# Patient Record
Sex: MALE | Race: WHITE | NOT HISPANIC OR LATINO | Employment: UNEMPLOYED | URBAN - NONMETROPOLITAN AREA
[De-identification: names, ages, dates, MRNs, and addresses within clinical notes are randomized per-mention and may not be internally consistent; named-entity substitution may affect disease eponyms.]

---

## 2017-06-22 ENCOUNTER — OFFICE VISIT (OUTPATIENT)
Dept: FAMILY MEDICINE CLINIC | Facility: CLINIC | Age: 57
End: 2017-06-22

## 2017-06-22 VITALS
HEIGHT: 69 IN | WEIGHT: 189.8 LBS | SYSTOLIC BLOOD PRESSURE: 128 MMHG | HEART RATE: 87 BPM | DIASTOLIC BLOOD PRESSURE: 75 MMHG | BODY MASS INDEX: 28.11 KG/M2 | OXYGEN SATURATION: 97 %

## 2017-06-22 DIAGNOSIS — E78.5 HYPERLIPIDEMIA, UNSPECIFIED HYPERLIPIDEMIA TYPE: ICD-10-CM

## 2017-06-22 DIAGNOSIS — Z88.9 MULTIPLE ALLERGIES: Primary | ICD-10-CM

## 2017-06-22 DIAGNOSIS — Z72.0 TOBACCO ABUSE: ICD-10-CM

## 2017-06-22 DIAGNOSIS — R00.0 TACHYCARDIA: ICD-10-CM

## 2017-06-22 DIAGNOSIS — I10 ESSENTIAL HYPERTENSION: ICD-10-CM

## 2017-06-22 PROCEDURE — 99214 OFFICE O/P EST MOD 30 MIN: CPT | Performed by: NURSE PRACTITIONER

## 2017-06-22 RX ORDER — ASPIRIN 81 MG/1
81 TABLET ORAL DAILY
COMMUNITY

## 2017-06-22 RX ORDER — HYDROXYZINE PAMOATE 25 MG/1
25 CAPSULE ORAL 2 TIMES DAILY PRN
Qty: 60 CAPSULE | Refills: 1 | Status: SHIPPED | OUTPATIENT
Start: 2017-06-22 | End: 2018-06-06 | Stop reason: SDUPTHER

## 2017-06-22 RX ORDER — LANOLIN ALCOHOL/MO/W.PET/CERES
5000 CREAM (GRAM) TOPICAL DAILY
COMMUNITY
End: 2022-05-24

## 2017-06-22 NOTE — PROGRESS NOTES
"Viktoria Madison is a 57 y.o. male.     Chief Complaint: Establish Care    History of Present Illness   Pt here to establish care.  Pt was being seen by JACIEL Zabala, at [facility] and that office has since closed.  Pt does have a hx of tachycardia, HLD, TIAs and multiple allergies.      Tachycardia.  Pt was told about 4 years ago that his heart rate was in the 120s.  He has been taking Metoprolol since that time.  He has tolerated the medication well without side effects.  He denies chest pain, SOA, headaches, dizziness or syncope.  Pt states that he does drink 2 cups of coffee in the mornings.  He does drink soda but drinks Sprite which has no caffeine.  He denies any use of energy drinks.      HLD.   He does have a hx of HLD.  He was prescribed medication in the past but states that he could not afford it.  He is not currently taking any medication for his cholesterol.  He states that he only eats once or twice daily most of the time.  He tries to eat breakfast and then he eats supper.  He tries \"to stay away\" from pork.  He does not eat at restaurants very often.  He has  Not had his lipid panel checked in several months.  He does not have any insurance at this time and cannot afford to have labs.  We discussed appropriate diet and exercise and risks of elevated lipids.  Pt states understanding.  His last lipid panel was done in March 2015 and his total cholesterol was found to be 174 with ; triglycerides 202.    TIA.  Pt states that he does have a hx of mini strokes.  These occurred 3-4 years ago.  He lost his memory at the time.  He was having pain above his left eye.  He went to [place] and had a spinal tap and was dx with B12 deficiency.  He denies any further symptoms since he started his B12 supplementation.  He denies any N/T in his extremities.  Once he got his B12 level up to normal, he never had any further problems with memory.      Allergies.   Runny nose and " watery eyes.  Occasional nose bleeds.  He worked out in the hay field yesterday and his symptoms have gotten worse.  He does have an allergy to tomatoes; they cause him to break out around his mouth and his lips swell.  He denies fever, cough, n/v/d.  He has taken benadryl but it causes him too much sleepiness.  He has taken Vistaril in the past which seemed to work well for him.        Family History   Problem Relation Age of Onset   • Nephrolithiasis Mother    • Heart disease Father    • Hypertension Father    • Kidney disease Father        Social History     Social History   • Marital status:      Spouse name: N/A   • Number of children: N/A   • Years of education: N/A     Occupational History   • Not on file.     Social History Main Topics   • Smoking status: Current Every Day Smoker     Packs/day: 1.00     Types: Cigarettes   • Smokeless tobacco: Never Used   • Alcohol use No   • Drug use: Yes     Special: Marijuana      Comment: occ    • Sexual activity: Defer     Other Topics Concern   • Not on file     Social History Narrative   • No narrative on file       Past Medical History:   Diagnosis Date   • Increased heart rate    • Mini stroke    • Tobacco abuse        Review of Systems   Constitutional: Negative.    HENT: Positive for nosebleeds and rhinorrhea.    Respiratory: Negative.    Cardiovascular: Negative.    Gastrointestinal: Negative.    Genitourinary: Negative.    Musculoskeletal: Negative.    Skin: Negative.    Neurological: Negative.    Psychiatric/Behavioral: Negative.        Objective   Physical Exam   Constitutional: He is oriented to person, place, and time. He appears well-developed and well-nourished.   Neck: Normal range of motion. Neck supple.   Cardiovascular: Normal rate, regular rhythm and normal heart sounds.    Pulmonary/Chest: Effort normal and breath sounds normal.   Neurological: He is alert and oriented to person, place, and time.   Skin: Skin is warm and dry.   Psychiatric:  "He has a normal mood and affect. His behavior is normal. Judgment and thought content normal.   Nursing note and vitals reviewed.      Procedures    Vitals: Blood pressure 128/75, pulse 87, height 69\" (175.3 cm), weight 189 lb 12.8 oz (86.1 kg), SpO2 97 %.    Allergies: No Known Allergies       Assessment/Plan   Ta was seen today for establish care.    Diagnoses and all orders for this visit:    Multiple allergies  -     hydrOXYzine (VISTARIL) 25 MG capsule; Take 1 capsule by mouth 2 (Two) Times a Day As Needed for Allergies.    Hyperlipidemia, unspecified hyperlipidemia type    Tachycardia    Essential hypertension  -     metoprolol tartrate (LOPRESSOR) 25 MG tablet; Take 1 tablet by mouth 2 (Two) Times a Day.    Tobacco abuse        Add Vistaril for allergies   Continue metoprolol as prescribed   RTC 6 months         "

## 2018-06-06 ENCOUNTER — OFFICE VISIT (OUTPATIENT)
Dept: FAMILY MEDICINE CLINIC | Facility: CLINIC | Age: 58
End: 2018-06-06

## 2018-06-06 VITALS
WEIGHT: 180 LBS | HEART RATE: 88 BPM | HEIGHT: 69 IN | BODY MASS INDEX: 26.66 KG/M2 | OXYGEN SATURATION: 99 % | SYSTOLIC BLOOD PRESSURE: 110 MMHG | DIASTOLIC BLOOD PRESSURE: 60 MMHG

## 2018-06-06 DIAGNOSIS — K21.9 GASTROESOPHAGEAL REFLUX DISEASE, ESOPHAGITIS PRESENCE NOT SPECIFIED: ICD-10-CM

## 2018-06-06 DIAGNOSIS — M25.511 CHRONIC RIGHT SHOULDER PAIN: ICD-10-CM

## 2018-06-06 DIAGNOSIS — Z72.0 TOBACCO ABUSE: Primary | ICD-10-CM

## 2018-06-06 DIAGNOSIS — R00.0 TACHYCARDIA: ICD-10-CM

## 2018-06-06 DIAGNOSIS — Z88.9 MULTIPLE ALLERGIES: ICD-10-CM

## 2018-06-06 DIAGNOSIS — E78.5 HYPERLIPIDEMIA, UNSPECIFIED HYPERLIPIDEMIA TYPE: ICD-10-CM

## 2018-06-06 DIAGNOSIS — L30.9 DERMATITIS: ICD-10-CM

## 2018-06-06 DIAGNOSIS — G89.29 CHRONIC RIGHT SHOULDER PAIN: ICD-10-CM

## 2018-06-06 LAB
ALBUMIN SERPL-MCNC: 4.7 G/DL (ref 3.5–5)
ALBUMIN/GLOB SERPL: 1.5 G/DL (ref 1.5–2.5)
ALP SERPL-CCNC: 109 U/L (ref 40–129)
ALT SERPL W P-5'-P-CCNC: 14 U/L (ref 10–44)
ANION GAP SERPL CALCULATED.3IONS-SCNC: 3.4 MMOL/L (ref 3.6–11.2)
AST SERPL-CCNC: 14 U/L (ref 10–34)
BASOPHILS # BLD AUTO: 0.06 10*3/MM3 (ref 0–0.3)
BASOPHILS NFR BLD AUTO: 0.5 % (ref 0–2)
BILIRUB SERPL-MCNC: 0.4 MG/DL (ref 0.2–1.8)
BUN BLD-MCNC: 17 MG/DL (ref 7–21)
BUN/CREAT SERPL: 19.1 (ref 7–25)
CALCIUM SPEC-SCNC: 9.4 MG/DL (ref 7.7–10)
CHLORIDE SERPL-SCNC: 108 MMOL/L (ref 99–112)
CHOLEST SERPL-MCNC: 207 MG/DL (ref 0–200)
CO2 SERPL-SCNC: 26.6 MMOL/L (ref 24.3–31.9)
CREAT BLD-MCNC: 0.89 MG/DL (ref 0.43–1.29)
DEPRECATED RDW RBC AUTO: 42.7 FL (ref 37–54)
EOSINOPHIL # BLD AUTO: 0.07 10*3/MM3 (ref 0–0.7)
EOSINOPHIL NFR BLD AUTO: 0.6 % (ref 0–5)
ERYTHROCYTE [DISTWIDTH] IN BLOOD BY AUTOMATED COUNT: 14.5 % (ref 11.5–14.5)
GFR SERPL CREATININE-BSD FRML MDRD: 88 ML/MIN/1.73
GLOBULIN UR ELPH-MCNC: 3.1 GM/DL
GLUCOSE BLD-MCNC: 103 MG/DL (ref 70–110)
HCT VFR BLD AUTO: 43.6 % (ref 42–52)
HDLC SERPL-MCNC: 38 MG/DL (ref 60–100)
HGB BLD-MCNC: 14.8 G/DL (ref 14–18)
IMM GRANULOCYTES # BLD: 0.03 10*3/MM3 (ref 0–0.03)
IMM GRANULOCYTES NFR BLD: 0.3 % (ref 0–0.5)
LDLC SERPL CALC-MCNC: 133 MG/DL (ref 0–100)
LDLC/HDLC SERPL: 3.49 {RATIO}
LYMPHOCYTES # BLD AUTO: 1.69 10*3/MM3 (ref 1–3)
LYMPHOCYTES NFR BLD AUTO: 14.6 % (ref 21–51)
MAGNESIUM SERPL-MCNC: 2.2 MG/DL (ref 1.7–2.6)
MCH RBC QN AUTO: 27.7 PG (ref 27–33)
MCHC RBC AUTO-ENTMCNC: 33.9 G/DL (ref 33–37)
MCV RBC AUTO: 81.6 FL (ref 80–94)
MONOCYTES # BLD AUTO: 0.91 10*3/MM3 (ref 0.1–0.9)
MONOCYTES NFR BLD AUTO: 7.9 % (ref 0–10)
NEUTROPHILS # BLD AUTO: 8.8 10*3/MM3 (ref 1.4–6.5)
NEUTROPHILS NFR BLD AUTO: 76.1 % (ref 30–70)
OSMOLALITY SERPL CALC.SUM OF ELEC: 277.5 MOSM/KG (ref 273–305)
PLATELET # BLD AUTO: 377 10*3/MM3 (ref 130–400)
PMV BLD AUTO: 11.5 FL (ref 6–10)
POTASSIUM BLD-SCNC: 4 MMOL/L (ref 3.5–5.3)
PROT SERPL-MCNC: 7.8 G/DL (ref 6–8)
RBC # BLD AUTO: 5.34 10*6/MM3 (ref 4.7–6.1)
SODIUM BLD-SCNC: 138 MMOL/L (ref 135–153)
T4 FREE SERPL-MCNC: 1.22 NG/DL (ref 0.89–1.76)
TRIGL SERPL-MCNC: 182 MG/DL (ref 0–150)
TSH SERPL DL<=0.05 MIU/L-ACNC: 1.4 MIU/ML (ref 0.55–4.78)
VIT B12 BLD-MCNC: >2000 PG/ML (ref 211–911)
VLDLC SERPL-MCNC: 36.4 MG/DL
WBC NRBC COR # BLD: 11.56 10*3/MM3 (ref 4.5–12.5)

## 2018-06-06 PROCEDURE — 83735 ASSAY OF MAGNESIUM: CPT | Performed by: NURSE PRACTITIONER

## 2018-06-06 PROCEDURE — 82607 VITAMIN B-12: CPT | Performed by: NURSE PRACTITIONER

## 2018-06-06 PROCEDURE — 80050 GENERAL HEALTH PANEL: CPT | Performed by: NURSE PRACTITIONER

## 2018-06-06 PROCEDURE — 99214 OFFICE O/P EST MOD 30 MIN: CPT | Performed by: NURSE PRACTITIONER

## 2018-06-06 PROCEDURE — 80061 LIPID PANEL: CPT | Performed by: NURSE PRACTITIONER

## 2018-06-06 PROCEDURE — 36415 COLL VENOUS BLD VENIPUNCTURE: CPT | Performed by: NURSE PRACTITIONER

## 2018-06-06 PROCEDURE — 84439 ASSAY OF FREE THYROXINE: CPT | Performed by: NURSE PRACTITIONER

## 2018-06-06 RX ORDER — PANTOPRAZOLE SODIUM 40 MG/1
40 TABLET, DELAYED RELEASE ORAL DAILY
Qty: 90 TABLET | Refills: 1 | Status: SHIPPED | OUTPATIENT
Start: 2018-06-06 | End: 2018-09-06 | Stop reason: SDUPTHER

## 2018-06-06 RX ORDER — TRIAMCINOLONE ACETONIDE 1 MG/G
CREAM TOPICAL 2 TIMES DAILY
Qty: 80 G | Refills: 1 | Status: SHIPPED | OUTPATIENT
Start: 2018-06-06 | End: 2019-01-29

## 2018-06-06 RX ORDER — HYDROXYZINE PAMOATE 25 MG/1
25 CAPSULE ORAL 2 TIMES DAILY PRN
Qty: 90 CAPSULE | Refills: 1 | Status: SHIPPED | OUTPATIENT
Start: 2018-06-06 | End: 2018-11-29

## 2018-06-06 NOTE — PROGRESS NOTES
"Subjective   Ta Madison is a 58 y.o. male.     Chief Complaint: Shoulder Pain (right arm )    Rash   This is a chronic problem. The current episode started more than 1 year ago. The problem is unchanged. The affected locations include the right hand and left hand. He was exposed to nothing. Past treatments include nothing.   Heartburn   He complains of heartburn. This is a chronic problem. The current episode started more than 1 year ago. The problem occurs frequently. The problem has been waxing and waning. The heartburn is located in the substernum. The heartburn is of moderate intensity. The heartburn does not wake him from sleep. The heartburn does not limit his activity. The heartburn doesn't change with position. The symptoms are aggravated by certain foods and caffeine. Risk factors include caffeine use and smoking/tobacco exposure. He has tried a PPI for the symptoms. The treatment provided significant relief.      Tachycardia.  Pt was told about 4 years ago that his heart rate was in the 120s.  He has been taking Metoprolol since that time.  He has tolerated the medication well without side effects.  He denies chest pain, SOA, headaches, dizziness or syncope.  Pt states that he does drink 2 cups of coffee in the mornings.  He does drink soda but drinks Sprite which has no caffeine.  He denies any use of energy drinks.       HLD.   He does have a hx of HLD.  He was prescribed medication in the past but states that he could not afford it.  He is not currently taking any medication for his cholesterol.  He states that he only eats once or twice daily most of the time.  He tries to eat breakfast and then he eats supper.  He tries \"to stay away\" from pork.  He does not eat at restaurants very often.  He has not had his lipid panel checked in several months.       TIA.  Pt states that he does have a hx of mini strokes.  These occurred 3-4 years ago.  He lost his memory at the time.  He was having pain above " his left eye.  He went to [place] and had a spinal tap and was dx with B12 deficiency.  He denies any further symptoms since he started his B12 supplementation.  He denies any N/T in his extremities.  Once he got his B12 level up to normal, he never had any further problems with memory.       Allergies.   Runny nose and watery eyes.  Occasional nose bleeds.  He worked out in the hay field yesterday and his symptoms have gotten worse.  He does have an allergy to tomatoes; they cause him to break out around his mouth and his lips swell.  He denies fever, cough, n/v/d.  He has taken benadryl but it causes him too much sleepiness.  He has taken Vistaril in the past which seemed to work well for him.       Right Shoulder Pain  Pt has had issues with shoulder pain off and on for the past few years.   The patient complains of right shoulder pain gradual onset along the anterior lateral aspect of the shoulder. The patient reports a sharp stabbing pain upon overhead range of motion, as well as lifting anything away from the body. The patient also has complaints of superior and inferior scapula pain that is worsened upon activity relieved with rest. The patient has no previous history of relief with physical therapy or NSAIDs. The patient is right hand dominant. No paresthesias noted.  Pt states that he was evaluated by ortho in the past and received a joint injection in the shoulder and his pain has been relieved for the past two years.  He has now started having pain in that shoulder again and would like to be evaluated by ortho again and possibly receive another joint injection.     Family History   Problem Relation Age of Onset   • Nephrolithiasis Mother    • Heart disease Father    • Hypertension Father    • Kidney disease Father        Social History     Social History   • Marital status:      Spouse name: N/A   • Number of children: N/A   • Years of education: N/A     Occupational History   • Not on file.  "    Social History Main Topics   • Smoking status: Current Every Day Smoker     Packs/day: 1.00     Types: Cigarettes   • Smokeless tobacco: Never Used   • Alcohol use No   • Drug use: Yes     Types: Marijuana      Comment: occ    • Sexual activity: Defer     Other Topics Concern   • Not on file     Social History Narrative   • No narrative on file       Past Medical History:   Diagnosis Date   • Increased heart rate    • Mini stroke    • Tobacco abuse        Review of Systems   Constitutional: Negative.    HENT: Negative.    Respiratory: Negative.    Cardiovascular: Negative.    Gastrointestinal: Positive for heartburn.   Musculoskeletal: Positive for arthralgias.   Skin: Positive for rash.   Neurological: Negative.    Psychiatric/Behavioral: Negative.        Objective   Physical Exam   Constitutional: He is oriented to person, place, and time. He appears well-developed and well-nourished.   Neck: Normal range of motion. Neck supple.   Cardiovascular: Normal rate, regular rhythm and normal heart sounds.    Pulmonary/Chest: Effort normal and breath sounds normal.   Musculoskeletal: Normal range of motion. He exhibits no edema, tenderness or deformity.   Neurological: He is alert and oriented to person, place, and time.   Skin: Skin is warm and dry.   Erythematous patches noted to back of both hands   Psychiatric: He has a normal mood and affect. His behavior is normal. Judgment and thought content normal.   Nursing note and vitals reviewed.      Procedures    Vitals: Blood pressure 110/60, pulse 88, height 175.3 cm (69\"), weight 81.6 kg (180 lb), SpO2 99 %.    Allergies: No Known Allergies     During this visit the following were done:  Labs Reviewed []    Labs Ordered []    Radiology Reports Reviewed []    Radiology Ordered []    PCP Records Reviewed []    Referring Provider Records Reviewed []    ER Records Reviewed []    Hospital Records Reviewed []    History Obtained From Family []    Radiology Images Reviewed " []    Other Reviewed []    Records Requested []      Assessment/Plan   Ta was seen today for shoulder pain.    Diagnoses and all orders for this visit:    Tobacco abuse    Hyperlipidemia, unspecified hyperlipidemia type  -     CBC & Differential  -     Comprehensive Metabolic Panel  -     Lipid Panel  -     Magnesium  -     TSH  -     Vitamin B12  -     T4, Free    Tachycardia  -     CBC & Differential  -     Comprehensive Metabolic Panel  -     Lipid Panel  -     Magnesium  -     TSH  -     Vitamin B12  -     T4, Free    Gastroesophageal reflux disease, esophagitis presence not specified  -     CBC & Differential  -     Comprehensive Metabolic Panel  -     Lipid Panel  -     Magnesium  -     TSH  -     Vitamin B12  -     T4, Free  -     pantoprazole (PROTONIX) 40 MG EC tablet; Take 1 tablet by mouth Daily.    Dermatitis  -     triamcinolone (KENALOG) 0.1 % cream; Apply  topically 2 (Two) Times a Day.  -     CBC & Differential  -     Comprehensive Metabolic Panel  -     Lipid Panel  -     Magnesium  -     TSH  -     Vitamin B12  -     T4, Free    Chronic right shoulder pain  -     Ambulatory Referral to Orthopedic Surgery  -     CBC & Differential  -     Comprehensive Metabolic Panel  -     Lipid Panel  -     Magnesium  -     TSH  -     Vitamin B12  -     T4, Free    Multiple allergies  -     hydrOXYzine (VISTARIL) 25 MG capsule; Take 1 capsule by mouth 2 (Two) Times a Day As Needed for Allergies.  -     CBC & Differential  -     Comprehensive Metabolic Panel  -     Lipid Panel  -     Magnesium  -     TSH  -     Vitamin B12  -     T4, Free    Other orders  -     metoprolol tartrate (LOPRESSOR) 25 MG tablet; Take 1 tablet by mouth Daily.  -     CBC Auto Differential  -     Osmolality, Calculated; Future  -     Osmolality, Calculated

## 2018-06-06 NOTE — PROGRESS NOTES
Cholesterol and triglycerides are elevated. I do suggest starting him on a statin. Is he agreeable?  If so, send script for atorvastatin 20mg qhs with 2 refills  Thank you

## 2018-06-07 RX ORDER — ATORVASTATIN CALCIUM 20 MG/1
20 TABLET, FILM COATED ORAL DAILY
Qty: 30 TABLET | Refills: 2 | Status: SHIPPED | OUTPATIENT
Start: 2018-06-07 | End: 2018-09-06 | Stop reason: SDUPTHER

## 2018-06-07 NOTE — PROGRESS NOTES
Patient notified and expressed understanding.  I will e-scribe Atorvastatin to [facility] in [place] per your earlier instructions.

## 2018-06-25 DIAGNOSIS — M25.511 RIGHT SHOULDER PAIN, UNSPECIFIED CHRONICITY: Primary | ICD-10-CM

## 2018-06-26 ENCOUNTER — HOSPITAL ENCOUNTER (OUTPATIENT)
Dept: GENERAL RADIOLOGY | Facility: HOSPITAL | Age: 58
Discharge: HOME | End: 2018-06-26
Attending: ORTHOPAEDIC SURGERY | Admitting: ORTHOPAEDIC SURGERY

## 2018-06-26 ENCOUNTER — OFFICE VISIT (OUTPATIENT)
Dept: ORTHOPEDIC SURGERY | Facility: CLINIC | Age: 58
End: 2018-06-26

## 2018-06-26 VITALS
DIASTOLIC BLOOD PRESSURE: 64 MMHG | SYSTOLIC BLOOD PRESSURE: 128 MMHG | HEART RATE: 80 BPM | HEIGHT: 69 IN | WEIGHT: 180 LBS | BODY MASS INDEX: 26.66 KG/M2

## 2018-06-26 DIAGNOSIS — M25.511 RIGHT SHOULDER PAIN, UNSPECIFIED CHRONICITY: ICD-10-CM

## 2018-06-26 DIAGNOSIS — M25.511 CHRONIC RIGHT SHOULDER PAIN: Primary | ICD-10-CM

## 2018-06-26 DIAGNOSIS — G89.29 CHRONIC RIGHT SHOULDER PAIN: Primary | ICD-10-CM

## 2018-06-26 PROCEDURE — 73030 X-RAY EXAM OF SHOULDER: CPT | Performed by: RADIOLOGY

## 2018-06-26 PROCEDURE — 99203 OFFICE O/P NEW LOW 30 MIN: CPT | Performed by: ORTHOPAEDIC SURGERY

## 2018-06-26 PROCEDURE — 20610 DRAIN/INJ JOINT/BURSA W/O US: CPT | Performed by: ORTHOPAEDIC SURGERY

## 2018-06-26 PROCEDURE — 73030 X-RAY EXAM OF SHOULDER: CPT

## 2018-06-26 RX ORDER — BUPIVACAINE HYDROCHLORIDE 5 MG/ML
5 INJECTION, SOLUTION EPIDURAL; INTRACAUDAL
Status: COMPLETED | OUTPATIENT
Start: 2018-06-26 | End: 2018-06-26

## 2018-06-26 RX ORDER — METHYLPREDNISOLONE ACETATE 40 MG/ML
80 INJECTION, SUSPENSION INTRA-ARTICULAR; INTRALESIONAL; INTRAMUSCULAR; SOFT TISSUE
Status: COMPLETED | OUTPATIENT
Start: 2018-06-26 | End: 2018-06-26

## 2018-06-26 RX ADMIN — METHYLPREDNISOLONE ACETATE 80 MG: 40 INJECTION, SUSPENSION INTRA-ARTICULAR; INTRALESIONAL; INTRAMUSCULAR; SOFT TISSUE at 09:20

## 2018-06-26 RX ADMIN — BUPIVACAINE HYDROCHLORIDE 5 ML: 5 INJECTION, SOLUTION EPIDURAL; INTRACAUDAL at 09:20

## 2018-06-26 NOTE — PROGRESS NOTES
"  Patient: Ta Madison    YOB: 1960        History of Present Illness: Patient presents for evaluation of his right shoulder.  States his been getting progressively sore over the last 5-6 months.  No specific injury or trauma.  No overuse.  Right-hand-dominant.  Still gets night pain at times.  Pack-a-day smoker.  Was Jacqueline seen 4 years ago for similar problem and responded quite nicely to a subacromial injection.  States he gets some numbness along the medial aspect of his shoulder blade.  Denies specific neck tenderness.  Denies any paresthesias or swelling of his hands    Past Medical History:   Diagnosis Date   • Increased heart rate    • Mini stroke    • Tobacco abuse         Social History     Social History   • Marital status:      Spouse name: N/A   • Number of children: N/A   • Years of education: N/A     Occupational History   • Not on file.     Social History Main Topics   • Smoking status: Current Every Day Smoker     Packs/day: 1.00     Types: Cigarettes   • Smokeless tobacco: Never Used   • Alcohol use No   • Drug use: Yes     Types: Marijuana      Comment: occ    • Sexual activity: Defer     Other Topics Concern   • Not on file     Social History Narrative   • No narrative on file        Review of Systems:    Pertinent positives evaluated and listed in HPI, others systems completed by patient and reviewed today.         Physical Exam: 58 y.o. male  General Appearance:    Alert and oriented x 3, cooperative, in no acute distress                   Vitals:    06/26/18 0851   BP: 128/64   Pulse: 80   Weight: 81.6 kg (180 lb)   Height: 175.3 cm (69\")          Normal weight white male muscular no apparent acute distress.  His full range of motion of the left shoulder right shoulder has good range of motion except for internal rotation he goes to about L1 on the right compared about the 6 on the left.  He has good internal and external rotation strength bilaterally.  Good  " strength bilaterally hands are grossly neurovascularly intact without swelling bilaterally.  Has some tenderness over the biceps tendon in the lateral chromium of the right shoulder.  Positive impingement.  Negative speed's test on the left equivocal speed's test on the right.  Good strength but some pain with stressing the supraspinatus.  No obvious apprehension.      Radiology:     X-rays of his right shoulder taken today reviewed by myself show he is some acromioclavicular joint arthritis noted.  Some very early spurring is noted of the inferior glenoid no acute findings are noted      Large Joint Arthrocentesis  Date/Time: 6/26/2018 9:20 AM  Consent given by: patient  Site marked: site marked  Supporting Documentation  Indications: pain and diagnostic evaluation   Procedure Details  Location: shoulder - R subacromial bursa  Needle size: 25 G  Approach: lateral  Medications administered: 80 mg methylPREDNISolone acetate 40 MG/ML; 5 mL bupivacaine (PF) 0.5 %  Patient tolerance: patient tolerated the procedure well with no immediate complications                Assessment/Plan: Right shoulder pain.  Could be impingement could be secondary to biceps tendon pathology.  4 years ago he responded very well to subacromial injection and we'll order an try that again today.  He has previously done physical therapy without help.  We'll see him back in 3-4 weeks and see how the injection helps        I advised Ta of the risks of continuing to use tobacco, and I provided him with tobacco cessation educational materials in the After Visit Summary.     During this visit, I spent 2 minutes counseling the patient regarding tobacco cessation.        Patient's Body mass index is 26.58 kg/m². BMI is within normal parameters. No follow-up required.        Discussion/Summary:    This chart was completed utilizing the dragon speech recognition software.  Grammatical errors, random word insertions, pronoun errors, and incomplete  sentences or occasional consequences of the system due to software limitations, ambient noise, and hardware issues.  Any questions or concerns about the content, text, or information contained within the body of this dictation should be directly addressed to the physician for clarification          This document was signed by Inocencio López M.D. June 26, 2018 9:17 AM

## 2018-06-27 ENCOUNTER — OFFICE VISIT (OUTPATIENT)
Dept: FAMILY MEDICINE CLINIC | Facility: CLINIC | Age: 58
End: 2018-06-27

## 2018-06-27 VITALS
HEIGHT: 69 IN | DIASTOLIC BLOOD PRESSURE: 70 MMHG | BODY MASS INDEX: 26.51 KG/M2 | SYSTOLIC BLOOD PRESSURE: 116 MMHG | OXYGEN SATURATION: 98 % | HEART RATE: 89 BPM | WEIGHT: 179 LBS

## 2018-06-27 DIAGNOSIS — R21 RASH: Primary | ICD-10-CM

## 2018-06-27 PROCEDURE — 99213 OFFICE O/P EST LOW 20 MIN: CPT | Performed by: NURSE PRACTITIONER

## 2018-06-27 RX ORDER — METHYLPREDNISOLONE 4 MG/1
TABLET ORAL
Qty: 21 EACH | Refills: 0 | Status: SHIPPED | OUTPATIENT
Start: 2018-06-27 | End: 2018-09-06

## 2018-06-27 RX ORDER — DOXYCYCLINE 100 MG/1
100 CAPSULE ORAL 2 TIMES DAILY
Qty: 20 CAPSULE | Refills: 0 | Status: SHIPPED | OUTPATIENT
Start: 2018-06-27 | End: 2018-09-06

## 2018-06-27 NOTE — PROGRESS NOTES
Subjective   Ta Madison is a 58 y.o. male.     Chief Complaint: Rash (bilateral arm)    History of Present Illness   Rash   This is a chronic problem. The current episode started more than 1 year ago. The problem is unchanged. The affected locations include the right hand and left hand. He was exposed to nothing. Past treatments include triamcinolone ointment.  No relief of symptoms.     Family History   Problem Relation Age of Onset   • Nephrolithiasis Mother    • Heart disease Father    • Hypertension Father    • Kidney disease Father        Social History     Social History   • Marital status:      Spouse name: N/A   • Number of children: N/A   • Years of education: N/A     Occupational History   • Not on file.     Social History Main Topics   • Smoking status: Current Every Day Smoker     Packs/day: 1.00     Types: Cigarettes   • Smokeless tobacco: Never Used   • Alcohol use No   • Drug use: Yes     Types: Marijuana      Comment: occ    • Sexual activity: Defer     Other Topics Concern   • Not on file     Social History Narrative   • No narrative on file       Past Medical History:   Diagnosis Date   • Increased heart rate    • Mini stroke    • Tobacco abuse        Review of Systems   Constitutional: Negative.    HENT: Negative.    Respiratory: Negative.    Cardiovascular: Negative.    Gastrointestinal: Negative.    Musculoskeletal: Negative.    Skin: Positive for rash.   Neurological: Negative.    Psychiatric/Behavioral: Negative.        Objective   Physical Exam   Constitutional: He is oriented to person, place, and time. He appears well-developed and well-nourished.   Neck: Normal range of motion. Neck supple.   Cardiovascular: Normal rate, regular rhythm and normal heart sounds.    Pulmonary/Chest: Effort normal and breath sounds normal.   Neurological: He is alert and oriented to person, place, and time.   Skin: Skin is warm and dry.   Erythematous and excoriated papular areas to bilateral  "forearms and hands   Psychiatric: He has a normal mood and affect. His behavior is normal. Judgment and thought content normal.   Nursing note and vitals reviewed.      Procedures    Vitals: Blood pressure 116/70, pulse 89, height 175.3 cm (69\"), weight 81.2 kg (179 lb), SpO2 98 %.    Allergies: No Known Allergies     During this visit the following were done:  Labs Reviewed []    Labs Ordered []    Radiology Reports Reviewed []    Radiology Ordered []    PCP Records Reviewed []    Referring Provider Records Reviewed []    ER Records Reviewed []    Hospital Records Reviewed []    History Obtained From Family []    Radiology Images Reviewed []    Other Reviewed []    Records Requested []      Assessment/Plan   Ta was seen today for rash.    Diagnoses and all orders for this visit:    Rash  -     doxycycline (MONODOX) 100 MG capsule; Take 1 capsule by mouth 2 (Two) Times a Day.  -     MethylPREDNISolone (MEDROL, KAYKAY,) 4 MG tablet; Take as directed on package instructions.               "

## 2018-09-06 ENCOUNTER — OFFICE VISIT (OUTPATIENT)
Dept: FAMILY MEDICINE CLINIC | Facility: CLINIC | Age: 58
End: 2018-09-06

## 2018-09-06 ENCOUNTER — TELEPHONE (OUTPATIENT)
Dept: FAMILY MEDICINE CLINIC | Facility: CLINIC | Age: 58
End: 2018-09-06

## 2018-09-06 VITALS
OXYGEN SATURATION: 98 % | SYSTOLIC BLOOD PRESSURE: 128 MMHG | DIASTOLIC BLOOD PRESSURE: 73 MMHG | HEART RATE: 80 BPM | HEIGHT: 69 IN | BODY MASS INDEX: 27.11 KG/M2 | WEIGHT: 183 LBS

## 2018-09-06 DIAGNOSIS — E78.2 MIXED HYPERLIPIDEMIA: Primary | ICD-10-CM

## 2018-09-06 DIAGNOSIS — K21.9 GASTROESOPHAGEAL REFLUX DISEASE, ESOPHAGITIS PRESENCE NOT SPECIFIED: ICD-10-CM

## 2018-09-06 DIAGNOSIS — R00.0 TACHYCARDIA: ICD-10-CM

## 2018-09-06 LAB
ALBUMIN SERPL-MCNC: 4.5 G/DL (ref 3.5–5)
ALBUMIN/GLOB SERPL: 1.6 G/DL (ref 1.5–2.5)
ALP SERPL-CCNC: 117 U/L (ref 40–129)
ALT SERPL W P-5'-P-CCNC: 23 U/L (ref 10–44)
ANION GAP SERPL CALCULATED.3IONS-SCNC: 1.6 MMOL/L (ref 3.6–11.2)
AST SERPL-CCNC: 16 U/L (ref 10–34)
BILIRUB SERPL-MCNC: 0.4 MG/DL (ref 0.2–1.8)
BUN BLD-MCNC: 13 MG/DL (ref 7–21)
BUN/CREAT SERPL: 14.1 (ref 7–25)
CALCIUM SPEC-SCNC: 9.5 MG/DL (ref 7.7–10)
CHLORIDE SERPL-SCNC: 109 MMOL/L (ref 99–112)
CHOLEST SERPL-MCNC: 120 MG/DL (ref 0–200)
CO2 SERPL-SCNC: 26.4 MMOL/L (ref 24.3–31.9)
CREAT BLD-MCNC: 0.92 MG/DL (ref 0.43–1.29)
GFR SERPL CREATININE-BSD FRML MDRD: 85 ML/MIN/1.73
GLOBULIN UR ELPH-MCNC: 2.9 GM/DL
GLUCOSE BLD-MCNC: 105 MG/DL (ref 70–110)
HDLC SERPL-MCNC: 34 MG/DL (ref 60–100)
LDLC SERPL CALC-MCNC: 41 MG/DL (ref 0–100)
LDLC/HDLC SERPL: 1.19 {RATIO}
OSMOLALITY SERPL CALC.SUM OF ELEC: 274.3 MOSM/KG (ref 273–305)
POTASSIUM BLD-SCNC: 4.7 MMOL/L (ref 3.5–5.3)
PROT SERPL-MCNC: 7.4 G/DL (ref 6–8)
SODIUM BLD-SCNC: 137 MMOL/L (ref 135–153)
TRIGL SERPL-MCNC: 227 MG/DL (ref 0–150)
VLDLC SERPL-MCNC: 45.4 MG/DL

## 2018-09-06 PROCEDURE — 36415 COLL VENOUS BLD VENIPUNCTURE: CPT | Performed by: NURSE PRACTITIONER

## 2018-09-06 PROCEDURE — 80061 LIPID PANEL: CPT | Performed by: NURSE PRACTITIONER

## 2018-09-06 PROCEDURE — 99214 OFFICE O/P EST MOD 30 MIN: CPT | Performed by: NURSE PRACTITIONER

## 2018-09-06 PROCEDURE — 80053 COMPREHEN METABOLIC PANEL: CPT | Performed by: NURSE PRACTITIONER

## 2018-09-06 RX ORDER — PANTOPRAZOLE SODIUM 40 MG/1
40 TABLET, DELAYED RELEASE ORAL DAILY
Qty: 90 TABLET | Refills: 1 | Status: SHIPPED | OUTPATIENT
Start: 2018-09-06 | End: 2019-01-11 | Stop reason: SDUPTHER

## 2018-09-06 RX ORDER — ATORVASTATIN CALCIUM 20 MG/1
20 TABLET, FILM COATED ORAL NIGHTLY
Qty: 90 TABLET | Refills: 1 | Status: SHIPPED | OUTPATIENT
Start: 2018-09-06 | End: 2019-01-11 | Stop reason: SDUPTHER

## 2018-09-06 NOTE — PROGRESS NOTES
His cholesterol looks great.  His triglycerides are still elevated.  Continue the medication for now.  Watch his sweets/sugar intake.

## 2018-09-06 NOTE — PROGRESS NOTES
Subjective   Ta Madison is a 58 y.o. male.     Chief Complaint: Follow-up and Rash    Hyperlipidemia   This is a chronic problem. The current episode started more than 1 month ago. Factors aggravating his hyperlipidemia include beta blockers, fatty foods and smoking. Current antihyperlipidemic treatment includes statins. Improvement on treatment: lipid panel today. Risk factors for coronary artery disease include dyslipidemia and male sex.   Rash   This is a chronic problem. The current episode started more than 1 year ago. The problem is unchanged. The affected locations include the right hand and left hand. He was exposed to nothing. Past treatments include triamcinolone ointment.  No relief of symptoms.  Pt was prescribed doxycycline and medrol dosepack at his previous visit. Rash has resolved.   Heartburn   He complains of heartburn. This is a chronic problem. The current episode started more than 1 year ago. The problem occurs frequently. The problem has been waxing and waning. The heartburn is located in the substernum. The heartburn is of moderate intensity. The heartburn does not wake him from sleep. The heartburn does not limit his activity. The heartburn doesn't change with position. The symptoms are aggravated by certain foods and caffeine. Risk factors include caffeine use and smoking/tobacco exposure. He has tried a PPI for the symptoms. The treatment provided significant relief.   Tachycardia.  Pt was told about 4 years ago that his heart rate was in the 120s.  He has been taking Metoprolol since that time.  He has tolerated the medication well without side effects.  He denies chest pain, SOA, headaches, dizziness or syncope.  Pt states that he does drink 2 cups of coffee in the mornings.  He does drink soda but drinks Sprite which has no caffeine.  He denies any use of energy drinks.  He has taken metoprolol for the past 4-5 years and tolerates well.  His HR does elevate if he doesn't take the  "medication.      Family History   Problem Relation Age of Onset   • Nephrolithiasis Mother    • Heart disease Father    • Hypertension Father    • Kidney disease Father        Social History     Social History   • Marital status:      Spouse name: N/A   • Number of children: N/A   • Years of education: N/A     Occupational History   • Not on file.     Social History Main Topics   • Smoking status: Current Every Day Smoker     Packs/day: 1.00     Types: Cigarettes   • Smokeless tobacco: Never Used   • Alcohol use No   • Drug use: Yes     Types: Marijuana      Comment: occ    • Sexual activity: Defer     Other Topics Concern   • Not on file     Social History Narrative   • No narrative on file       Past Medical History:   Diagnosis Date   • Increased heart rate    • Mini stroke (CMS/HCC)    • Tobacco abuse        Review of Systems   Constitutional: Negative.    HENT: Negative.    Respiratory: Negative.    Cardiovascular: Negative.    Gastrointestinal: Negative.    Musculoskeletal: Negative.    Skin: Negative.    Neurological: Negative.    Psychiatric/Behavioral: Negative.        Objective   Physical Exam   Constitutional: He is oriented to person, place, and time. He appears well-developed and well-nourished.   Neck: Normal range of motion. Neck supple.   Cardiovascular: Normal rate, regular rhythm and normal heart sounds.    Pulmonary/Chest: Effort normal and breath sounds normal.   Neurological: He is alert and oriented to person, place, and time.   Skin: Skin is warm and dry.   Psychiatric: He has a normal mood and affect. His behavior is normal. Judgment and thought content normal.   Nursing note and vitals reviewed.      Procedures    Vitals: Blood pressure 128/73, pulse 80, height 175.3 cm (69\"), weight 83 kg (183 lb), SpO2 98 %.    Allergies: No Known Allergies     During this visit the following were done:  Labs Reviewed []    Labs Ordered []    Radiology Reports Reviewed []    Radiology Ordered [x]  "   PCP Records Reviewed []    Referring Provider Records Reviewed []    ER Records Reviewed []    Hospital Records Reviewed []    History Obtained From Family []    Radiology Images Reviewed []    Other Reviewed []    Records Requested []      Assessment/Plan   Ta was seen today for follow-up and rash.    Diagnoses and all orders for this visit:    Mixed hyperlipidemia  -     atorvastatin (LIPITOR) 20 MG tablet; Take 1 tablet by mouth Every Night.  -     Comprehensive Metabolic Panel  -     Lipid Panel    Gastroesophageal reflux disease, esophagitis presence not specified  -     pantoprazole (PROTONIX) 40 MG EC tablet; Take 1 tablet by mouth Daily.  -     Comprehensive Metabolic Panel  -     Lipid Panel    Tachycardia  -     metoprolol tartrate (LOPRESSOR) 25 MG tablet; Take 1 tablet by mouth Daily.  -     Comprehensive Metabolic Panel  -     Lipid Panel    Recheck lipid panel today

## 2018-09-06 NOTE — TELEPHONE ENCOUNTER
----- Message from EUGENIA Hernadez sent at 9/6/2018  1:43 PM EDT -----  His cholesterol looks great.  His triglycerides are still elevated.  Continue the medication for now.  Watch his sweets/sugar intake.      Left a message to return call.      Left a message to return call.      Patient returned call & verbalized understanding.

## 2018-10-03 ENCOUNTER — OFFICE VISIT (OUTPATIENT)
Dept: FAMILY MEDICINE CLINIC | Facility: CLINIC | Age: 58
End: 2018-10-03

## 2018-10-03 VITALS
OXYGEN SATURATION: 98 % | DIASTOLIC BLOOD PRESSURE: 70 MMHG | BODY MASS INDEX: 26.51 KG/M2 | SYSTOLIC BLOOD PRESSURE: 130 MMHG | HEART RATE: 83 BPM | TEMPERATURE: 99.2 F | HEIGHT: 69 IN | WEIGHT: 179 LBS
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
## 2018-10-03 PROCEDURE — 99213 OFFICE O/P EST LOW 20 MIN: CPT | Performed by: NURSE PRACTITIONER

## 2018-10-03 PROCEDURE — 96372 THER/PROPH/DIAG INJ SC/IM: CPT | Performed by: NURSE PRACTITIONER

## 2018-10-03 RX ORDER — CETIRIZINE HYDROCHLORIDE 10 MG/1
10 TABLET ORAL DAILY
Qty: 30 TABLET | Refills: 5 | Status: SHIPPED | OUTPATIENT
Start: 2018-10-03 | End: 2019-02-12

## 2018-10-03 RX ORDER — DEXAMETHASONE SODIUM PHOSPHATE 4 MG/ML
8 INJECTION, SOLUTION INTRA-ARTICULAR; INTRALESIONAL; INTRAMUSCULAR; INTRAVENOUS; SOFT TISSUE ONCE
Status: COMPLETED | OUTPATIENT
Start: 2018-10-03 | End: 2018-10-03

## 2018-10-03 RX ADMIN — DEXAMETHASONE SODIUM PHOSPHATE 8 MG: 4 INJECTION, SOLUTION INTRA-ARTICULAR; INTRALESIONAL; INTRAMUSCULAR; INTRAVENOUS; SOFT TISSUE at 17:04

## 2018-10-09 ENCOUNTER — TELEPHONE (OUTPATIENT)
Dept: FAMILY MEDICINE CLINIC | Facility: CLINIC | Age: 58
End: 2018-10-09

## 2018-10-09 DIAGNOSIS — R21 RASH: Primary | ICD-10-CM

## 2018-10-18 ENCOUNTER — OFFICE VISIT (OUTPATIENT)
Dept: FAMILY MEDICINE CLINIC | Facility: CLINIC | Age: 58
End: 2018-10-18

## 2018-10-18 VITALS
HEIGHT: 69 IN | OXYGEN SATURATION: 98 % | BODY MASS INDEX: 27.25 KG/M2 | SYSTOLIC BLOOD PRESSURE: 113 MMHG | HEART RATE: 77 BPM | DIASTOLIC BLOOD PRESSURE: 65 MMHG | TEMPERATURE: 99.3 F | WEIGHT: 184 LBS

## 2018-10-18 DIAGNOSIS — L28.2 PRURITIC RASH: Primary | ICD-10-CM

## 2018-10-18 PROCEDURE — 99213 OFFICE O/P EST LOW 20 MIN: CPT | Performed by: NURSE PRACTITIONER

## 2018-10-18 RX ORDER — GABAPENTIN 300 MG/1
300 CAPSULE ORAL 3 TIMES DAILY
Qty: 90 CAPSULE | Refills: 0 | Status: SHIPPED | OUTPATIENT
Start: 2018-10-18 | End: 2018-11-13 | Stop reason: SDUPTHER

## 2018-11-13 ENCOUNTER — OFFICE VISIT (OUTPATIENT)
Dept: FAMILY MEDICINE CLINIC | Facility: CLINIC | Age: 58
End: 2018-11-13

## 2018-11-13 VITALS
BODY MASS INDEX: 28.2 KG/M2 | HEART RATE: 100 BPM | SYSTOLIC BLOOD PRESSURE: 118 MMHG | DIASTOLIC BLOOD PRESSURE: 69 MMHG | OXYGEN SATURATION: 98 % | WEIGHT: 190.4 LBS | HEIGHT: 69 IN | TEMPERATURE: 98.5 F

## 2018-11-13 DIAGNOSIS — F45.8 NEUROPATHIC PRURITUS: Primary | ICD-10-CM

## 2018-11-13 PROCEDURE — 99213 OFFICE O/P EST LOW 20 MIN: CPT | Performed by: NURSE PRACTITIONER

## 2018-11-13 RX ORDER — GABAPENTIN 300 MG/1
300 CAPSULE ORAL 3 TIMES DAILY
Qty: 90 CAPSULE | Refills: 1 | Status: SHIPPED | OUTPATIENT
Start: 2018-11-13 | End: 2019-01-15 | Stop reason: SDUPTHER

## 2018-11-13 NOTE — PROGRESS NOTES
"Subjective   Ta Madison is a 58 y.o. male.     Chief Complaint: Follow-up and pruritic rash    History of Present Illness   History of Present Illness   This is a chronic problem. The current episode started more than 1 year ago. The problem is unchanged. The affected locations include the right hand and left hand. He was exposed to nothing. Past treatments include triamcinolone ointment.  No relief of symptoms.   Rash continues to spread up arms and around neck.  Pt states that he has had a couple of areas in the top of his scalp area also.   He has tried hydroxyzine for the itching and it has not helped at all.  He does admit that his wife gave him a gabapentin and his itching did completely stop and he was able to rest without scratching areas.  He states that his ears have been itching. He has followed with Dr Blum, dermatologist, and a biopsy was done and found to be normal.  He was told by her that she thought these may be coming from his \"nerves\" and she wrote him a script to see a chiropractor.  He is now following with a chiropractor and has had 5 visits so far.  He continues to have the rash on his neck, arms and stomach.  He has been using the gabapentin and it does seem to help with his itching.  He tried to stop using it and the itching came back severely.  He can tolerate the itching with the use of the gabapentin.  I have discussed the risks and benefits of gabapentin with him today and have agreed to write refills due to the information provided from dermatology.    Pt is to continue to follow with chiropractor for now and we will re-evaluate in two months.     Family History   Problem Relation Age of Onset   • Nephrolithiasis Mother    • Heart disease Father    • Hypertension Father    • Kidney disease Father        Social History     Socioeconomic History   • Marital status:      Spouse name: Not on file   • Number of children: Not on file   • Years of education: Not on file   • " "Highest education level: Not on file   Social Needs   • Financial resource strain: Not on file   • Food insecurity - worry: Not on file   • Food insecurity - inability: Not on file   • Transportation needs - medical: Not on file   • Transportation needs - non-medical: Not on file   Occupational History   • Not on file   Tobacco Use   • Smoking status: Current Every Day Smoker     Packs/day: 1.00     Types: Cigarettes   • Smokeless tobacco: Never Used   Substance and Sexual Activity   • Alcohol use: No   • Drug use: Yes     Types: Marijuana     Comment: occ    • Sexual activity: Defer   Other Topics Concern   • Not on file   Social History Narrative   • Not on file       Past Medical History:   Diagnosis Date   • Increased heart rate    • Mini stroke (CMS/HCC)    • Tobacco abuse        Review of Systems   Constitutional: Negative.    HENT: Negative.    Respiratory: Negative.    Cardiovascular: Negative.    Gastrointestinal: Negative.    Musculoskeletal: Negative.    Skin: Positive for rash.        itching   Neurological: Negative.    Psychiatric/Behavioral: Negative.        Objective   Physical Exam   Constitutional: He is oriented to person, place, and time. He appears well-developed and well-nourished.   Neck: Normal range of motion. Neck supple.   Cardiovascular: Normal rate, regular rhythm and normal heart sounds.   Pulmonary/Chest: Effort normal and breath sounds normal.   Neurological: He is alert and oriented to person, place, and time.   Skin: Skin is warm and dry.   Maculopapular erythematous spots to abdomen, upper arms, neck and scalp area   Psychiatric: He has a normal mood and affect. His behavior is normal. Judgment and thought content normal.   Nursing note and vitals reviewed.      Procedures    Vitals: Blood pressure 118/69, pulse 100, temperature 98.5 °F (36.9 °C), temperature source Oral, height 175.3 cm (69\"), weight 86.4 kg (190 lb 6.4 oz), SpO2 98 %.    Allergies: No Known Allergies     During " this visit the following were done:  Labs Reviewed []    Labs Ordered []    Radiology Reports Reviewed []    Radiology Ordered []    PCP Records Reviewed []    Referring Provider Records Reviewed []    ER Records Reviewed []    Hospital Records Reviewed []    History Obtained From Family []    Radiology Images Reviewed []    Other Reviewed []    Records Requested []      Assessment/Plan   Ta was seen today for follow-up and pruritic rash.    Diagnoses and all orders for this visit:    Neuropathic pruritus  -     gabapentin (NEURONTIN) 300 MG capsule; Take 1 capsule by mouth 3 (Three) Times a Day.

## 2018-11-29 ENCOUNTER — OFFICE VISIT (OUTPATIENT)
Dept: FAMILY MEDICINE CLINIC | Facility: CLINIC | Age: 58
End: 2018-11-29

## 2018-11-29 VITALS
OXYGEN SATURATION: 97 % | HEART RATE: 88 BPM | BODY MASS INDEX: 28.29 KG/M2 | DIASTOLIC BLOOD PRESSURE: 75 MMHG | TEMPERATURE: 98.4 F | WEIGHT: 191 LBS | HEIGHT: 69 IN | SYSTOLIC BLOOD PRESSURE: 124 MMHG

## 2018-11-29 DIAGNOSIS — R21 RASH: Primary | ICD-10-CM

## 2018-11-29 LAB
BASOPHILS # BLD AUTO: 0.03 10*3/MM3 (ref 0–0.3)
BASOPHILS NFR BLD AUTO: 0.3 % (ref 0–2)
DEPRECATED RDW RBC AUTO: 41.1 FL (ref 37–54)
EOSINOPHIL # BLD AUTO: 0.21 10*3/MM3 (ref 0–0.7)
EOSINOPHIL NFR BLD AUTO: 2.1 % (ref 0–5)
ERYTHROCYTE [DISTWIDTH] IN BLOOD BY AUTOMATED COUNT: 13.5 % (ref 11.5–14.5)
HCT VFR BLD AUTO: 44.4 % (ref 42–52)
HGB BLD-MCNC: 14.8 G/DL (ref 14–18)
IMM GRANULOCYTES # BLD: 0.02 10*3/MM3 (ref 0–0.03)
IMM GRANULOCYTES NFR BLD: 0.2 % (ref 0–0.5)
LYMPHOCYTES # BLD AUTO: 2.51 10*3/MM3 (ref 1–3)
LYMPHOCYTES NFR BLD AUTO: 25.4 % (ref 21–51)
MCH RBC QN AUTO: 27.8 PG (ref 27–33)
MCHC RBC AUTO-ENTMCNC: 33.3 G/DL (ref 33–37)
MCV RBC AUTO: 83.3 FL (ref 80–94)
MONOCYTES # BLD AUTO: 0.86 10*3/MM3 (ref 0.1–0.9)
MONOCYTES NFR BLD AUTO: 8.7 % (ref 0–10)
NEUTROPHILS # BLD AUTO: 6.27 10*3/MM3 (ref 1.4–6.5)
NEUTROPHILS NFR BLD AUTO: 63.3 % (ref 30–70)
PLATELET # BLD AUTO: 357 10*3/MM3 (ref 130–400)
PMV BLD AUTO: 11.4 FL (ref 6–10)
RBC # BLD AUTO: 5.33 10*6/MM3 (ref 4.7–6.1)
WBC NRBC COR # BLD: 9.9 10*3/MM3 (ref 4.5–12.5)

## 2018-11-29 PROCEDURE — 85025 COMPLETE CBC W/AUTO DIFF WBC: CPT | Performed by: NURSE PRACTITIONER

## 2018-11-29 PROCEDURE — 36415 COLL VENOUS BLD VENIPUNCTURE: CPT | Performed by: NURSE PRACTITIONER

## 2018-11-29 PROCEDURE — 99213 OFFICE O/P EST LOW 20 MIN: CPT | Performed by: NURSE PRACTITIONER

## 2018-11-29 RX ORDER — DOXEPIN HYDROCHLORIDE 25 MG/1
CAPSULE ORAL
Refills: 0 | COMMUNITY
Start: 2018-11-01 | End: 2019-01-23

## 2018-11-29 RX ORDER — PERMETHRIN 50 MG/G
CREAM TOPICAL ONCE
Qty: 60 G | Refills: 0 | Status: SHIPPED | OUTPATIENT
Start: 2018-11-29 | End: 2018-11-29

## 2018-11-29 RX ORDER — HYDROXYZINE HYDROCHLORIDE 25 MG/1
TABLET, FILM COATED ORAL
Refills: 0 | COMMUNITY
Start: 2018-11-01 | End: 2019-01-11 | Stop reason: SDUPTHER

## 2018-11-29 NOTE — PROGRESS NOTES
"Subjective   Ta Madison is a 58 y.o. male.     Chief Complaint: Rash    History of Present Illness   Rash  This is a chronic problem. The current episode started more than 1 month ago. The problem is unchanged. The affected locations include the right hand and left hand. He was exposed to nothing. Past treatments include triamcinolone ointment.  No relief of symptoms. He states rash started after having multiple deer tick bites several months ago.  Rash continues to spread up arms and around neck.  Pt states that he has had a couple of areas in the top of his scalp area also.   He has tried hydroxyzine for the itching and it has not helped at all.  He does admit that his wife gave him a gabapentin and his itching did completely stop and he was able to rest without scratching areas.  He states that his ears have been itching. He has followed with Dr Blum, dermatologist, and a biopsy was done and found to be normal.  He was told by her that she thought these may be coming from his \"nerves\" and she wrote him a script to see a chiropractor.  He is now following with a chiropractor and has had 5 visits so far.  He continues to have the rash on his neck, arms and stomach.  He has been using the gabapentin and it does seem to help with his itching.  He was given script for doxepin and hydroxyzine but he continues to itch with the rash.  He tried to stop using it and the itching came back severely.  He can tolerate the itching with the use of the gabapentin.  I have discussed the risks and benefits of gabapentin with him today and have agreed to write refills due to the information provided from dermatology.      Family History   Problem Relation Age of Onset   • Nephrolithiasis Mother    • Heart disease Father    • Hypertension Father    • Kidney disease Father        Social History     Socioeconomic History   • Marital status:      Spouse name: Not on file   • Number of children: Not on file   • Years of " "education: Not on file   • Highest education level: Not on file   Social Needs   • Financial resource strain: Not on file   • Food insecurity - worry: Not on file   • Food insecurity - inability: Not on file   • Transportation needs - medical: Not on file   • Transportation needs - non-medical: Not on file   Occupational History   • Not on file   Tobacco Use   • Smoking status: Current Every Day Smoker     Packs/day: 1.00     Types: Cigarettes   • Smokeless tobacco: Never Used   Substance and Sexual Activity   • Alcohol use: No   • Drug use: Yes     Types: Marijuana     Comment: occ    • Sexual activity: Defer   Other Topics Concern   • Not on file   Social History Narrative   • Not on file       Past Medical History:   Diagnosis Date   • Increased heart rate    • Mini stroke (CMS/HCC)    • Rash    • Tobacco abuse        Review of Systems   Constitutional: Negative.    HENT: Negative.    Respiratory: Negative.    Cardiovascular: Negative.    Gastrointestinal: Negative.    Musculoskeletal: Negative.    Skin: Positive for rash.   Neurological: Negative.    Psychiatric/Behavioral: Negative.        Objective   Physical Exam   Constitutional: He is oriented to person, place, and time. He appears well-developed and well-nourished.   Neck: Normal range of motion. Neck supple.   Cardiovascular: Normal rate, regular rhythm and normal heart sounds.   Pulmonary/Chest: Effort normal and breath sounds normal.   Neurological: He is alert and oriented to person, place, and time.   Skin: Skin is warm and dry. Rash noted.   Insect bite appearing erythematous round areas to his stomach, neck and arms today   Psychiatric: He has a normal mood and affect. His behavior is normal. Judgment and thought content normal.   Nursing note and vitals reviewed.      Procedures    Vitals: Blood pressure 124/75, pulse 88, temperature 98.4 °F (36.9 °C), temperature source Oral, height 175.3 cm (69\"), weight 86.6 kg (191 lb), SpO2 97 %.    Allergies: " No Known Allergies     During this visit the following were done:  Labs Reviewed []    Labs Ordered []    Radiology Reports Reviewed []    Radiology Ordered []    PCP Records Reviewed []    Referring Provider Records Reviewed []    ER Records Reviewed []    Hospital Records Reviewed []    History Obtained From Family []    Radiology Images Reviewed []    Other Reviewed []    Records Requested []      Assessment/Plan   Ta was seen today for rash.    Diagnoses and all orders for this visit:    Rash  -     CBC & Differential  -     permethrin (ELIMITE) 5 % cream; Apply  topically to the appropriate area as directed 1 (One) Time for 1 dose.  -     CBC Auto Differential

## 2018-11-30 ENCOUNTER — TELEPHONE (OUTPATIENT)
Dept: FAMILY MEDICINE CLINIC | Facility: CLINIC | Age: 58
End: 2018-11-30

## 2018-11-30 NOTE — TELEPHONE ENCOUNTER
----- Message from EUGENIA Hernadez sent at 11/30/2018  9:50 AM EST -----  Lab work look ok.  You may send him a letter or call him.      Stable letter mailed

## 2019-01-11 ENCOUNTER — OFFICE VISIT (OUTPATIENT)
Dept: FAMILY MEDICINE CLINIC | Facility: CLINIC | Age: 59
End: 2019-01-11

## 2019-01-11 VITALS
OXYGEN SATURATION: 98 % | HEIGHT: 69 IN | TEMPERATURE: 98.5 F | WEIGHT: 198 LBS | BODY MASS INDEX: 29.33 KG/M2 | DIASTOLIC BLOOD PRESSURE: 78 MMHG | SYSTOLIC BLOOD PRESSURE: 149 MMHG | HEART RATE: 75 BPM

## 2019-01-11 DIAGNOSIS — E78.2 MIXED HYPERLIPIDEMIA: ICD-10-CM

## 2019-01-11 DIAGNOSIS — R00.0 TACHYCARDIA: ICD-10-CM

## 2019-01-11 DIAGNOSIS — F45.8 NEUROPATHIC PRURITUS: ICD-10-CM

## 2019-01-11 DIAGNOSIS — L28.2 PRURITIC RASH: Primary | ICD-10-CM

## 2019-01-11 DIAGNOSIS — K21.9 GASTROESOPHAGEAL REFLUX DISEASE, ESOPHAGITIS PRESENCE NOT SPECIFIED: ICD-10-CM

## 2019-01-11 LAB
ALBUMIN SERPL-MCNC: 4.9 G/DL (ref 3.5–5)
ALBUMIN/GLOB SERPL: 1.7 G/DL (ref 1.5–2.5)
ALP SERPL-CCNC: 115 U/L (ref 40–129)
ALT SERPL W P-5'-P-CCNC: 31 U/L (ref 10–44)
ANION GAP SERPL CALCULATED.3IONS-SCNC: 4.9 MMOL/L (ref 3.6–11.2)
AST SERPL-CCNC: 21 U/L (ref 10–34)
BASOPHILS # BLD AUTO: 0.11 10*3/MM3 (ref 0–0.3)
BASOPHILS NFR BLD AUTO: 1.1 % (ref 0–2)
BILIRUB SERPL-MCNC: 0.6 MG/DL (ref 0.2–1.8)
BUN BLD-MCNC: 14 MG/DL (ref 7–21)
BUN/CREAT SERPL: 14.7 (ref 7–25)
CALCIUM SPEC-SCNC: 9.5 MG/DL (ref 7.7–10)
CHLORIDE SERPL-SCNC: 106 MMOL/L (ref 99–112)
CO2 SERPL-SCNC: 27.1 MMOL/L (ref 24.3–31.9)
CREAT BLD-MCNC: 0.95 MG/DL (ref 0.43–1.29)
CRP SERPL-MCNC: 0.89 MG/DL (ref 0–0.99)
DEPRECATED RDW RBC AUTO: 42.5 FL (ref 37–54)
EOSINOPHIL # BLD AUTO: 0.2 10*3/MM3 (ref 0–0.7)
EOSINOPHIL NFR BLD AUTO: 1.9 % (ref 0–5)
ERYTHROCYTE [DISTWIDTH] IN BLOOD BY AUTOMATED COUNT: 14 % (ref 11.5–14.5)
ERYTHROCYTE [SEDIMENTATION RATE] IN BLOOD: 12 MM/HR (ref 0–20)
GFR SERPL CREATININE-BSD FRML MDRD: 82 ML/MIN/1.73
GLOBULIN UR ELPH-MCNC: 2.9 GM/DL
GLUCOSE BLD-MCNC: 98 MG/DL (ref 70–110)
HCT VFR BLD AUTO: 45.5 % (ref 42–52)
HGB BLD-MCNC: 15.1 G/DL (ref 14–18)
IMM GRANULOCYTES # BLD AUTO: 0.03 10*3/MM3 (ref 0–0.03)
IMM GRANULOCYTES NFR BLD AUTO: 0.3 % (ref 0–0.5)
LYMPHOCYTES # BLD AUTO: 2.4 10*3/MM3 (ref 1–3)
LYMPHOCYTES NFR BLD AUTO: 23.3 % (ref 21–51)
MCH RBC QN AUTO: 27.6 PG (ref 27–33)
MCHC RBC AUTO-ENTMCNC: 33.2 G/DL (ref 33–37)
MCV RBC AUTO: 83.2 FL (ref 80–94)
MONOCYTES # BLD AUTO: 0.98 10*3/MM3 (ref 0.1–0.9)
MONOCYTES NFR BLD AUTO: 9.5 % (ref 0–10)
NEUTROPHILS # BLD AUTO: 6.6 10*3/MM3 (ref 1.4–6.5)
NEUTROPHILS NFR BLD AUTO: 63.9 % (ref 30–70)
OSMOLALITY SERPL CALC.SUM OF ELEC: 276.1 MOSM/KG (ref 273–305)
PLATELET # BLD AUTO: 381 10*3/MM3 (ref 130–400)
PMV BLD AUTO: 11.7 FL (ref 6–10)
POTASSIUM BLD-SCNC: 4.1 MMOL/L (ref 3.5–5.3)
PROT SERPL-MCNC: 7.8 G/DL (ref 6–8)
RBC # BLD AUTO: 5.47 10*6/MM3 (ref 4.7–6.1)
SODIUM BLD-SCNC: 138 MMOL/L (ref 135–153)
WBC NRBC COR # BLD: 10.32 10*3/MM3 (ref 4.5–12.5)

## 2019-01-11 PROCEDURE — 86256 FLUORESCENT ANTIBODY TITER: CPT | Performed by: NURSE PRACTITIONER

## 2019-01-11 PROCEDURE — 83520 IMMUNOASSAY QUANT NOS NONAB: CPT | Performed by: NURSE PRACTITIONER

## 2019-01-11 PROCEDURE — 85025 COMPLETE CBC W/AUTO DIFF WBC: CPT | Performed by: NURSE PRACTITIONER

## 2019-01-11 PROCEDURE — 80053 COMPREHEN METABOLIC PANEL: CPT | Performed by: NURSE PRACTITIONER

## 2019-01-11 PROCEDURE — 86038 ANTINUCLEAR ANTIBODIES: CPT | Performed by: NURSE PRACTITIONER

## 2019-01-11 PROCEDURE — 85652 RBC SED RATE AUTOMATED: CPT | Performed by: NURSE PRACTITIONER

## 2019-01-11 PROCEDURE — 99213 OFFICE O/P EST LOW 20 MIN: CPT | Performed by: NURSE PRACTITIONER

## 2019-01-11 PROCEDURE — 86140 C-REACTIVE PROTEIN: CPT | Performed by: NURSE PRACTITIONER

## 2019-01-11 RX ORDER — PANTOPRAZOLE SODIUM 40 MG/1
40 TABLET, DELAYED RELEASE ORAL DAILY
Qty: 90 TABLET | Refills: 1 | Status: SHIPPED | OUTPATIENT
Start: 2019-01-11 | End: 2019-02-12

## 2019-01-11 RX ORDER — ATORVASTATIN CALCIUM 20 MG/1
20 TABLET, FILM COATED ORAL NIGHTLY
Qty: 90 TABLET | Refills: 1 | Status: SHIPPED | OUTPATIENT
Start: 2019-01-11 | End: 2019-02-12

## 2019-01-11 RX ORDER — ASPIRIN 81 MG/1
81 TABLET ORAL DAILY
Status: CANCELLED | OUTPATIENT
Start: 2019-01-11

## 2019-01-11 RX ORDER — LANOLIN ALCOHOL/MO/W.PET/CERES
5000 CREAM (GRAM) TOPICAL DAILY
Status: CANCELLED | OUTPATIENT
Start: 2019-01-11

## 2019-01-11 RX ORDER — CETIRIZINE HYDROCHLORIDE 10 MG/1
10 TABLET ORAL DAILY
Qty: 30 TABLET | Refills: 5 | Status: CANCELLED | OUTPATIENT
Start: 2019-01-11

## 2019-01-11 RX ORDER — HYDROXYZINE HYDROCHLORIDE 25 MG/1
TABLET, FILM COATED ORAL
Refills: 0 | Status: CANCELLED | OUTPATIENT
Start: 2019-01-11

## 2019-01-11 RX ORDER — HYDROXYZINE HYDROCHLORIDE 25 MG/1
25 TABLET, FILM COATED ORAL EVERY 8 HOURS PRN
Qty: 60 TABLET | Refills: 1 | Status: SHIPPED | OUTPATIENT
Start: 2019-01-11 | End: 2019-01-29

## 2019-01-11 RX ORDER — GABAPENTIN 300 MG/1
300 CAPSULE ORAL 3 TIMES DAILY
Qty: 90 CAPSULE | Refills: 1 | Status: CANCELLED | OUTPATIENT
Start: 2019-01-11

## 2019-01-11 NOTE — PROGRESS NOTES
Subjective   Ta Madison is a 58 y.o. male.     Chief Complaint: Rash (face)    History of Present Illness   Rash  This is a chronic problem. The current episode started more than 1 month ago. The problem is unchanged. The affected locations include the right hand and left hand. He was exposed to nothing. Past treatments include triamcinolone ointment, decadron injections, medrol dosepack, vistaril, benadryl and gabapentin.  The gabapentin does help with the itching so that he can sleep at night.  Rash continues to spread up arms and around neck.  Pt states that he has had a couple of areas in the top of his scalp area also.   Rash has resolved on his upper extremities and trunk at this time; however, he started having a lot of itching and redness to his face 5 days ago.    Discussed with Dr. Regalado today and labs will be drawn today.           Family History   Problem Relation Age of Onset   • Nephrolithiasis Mother    • Heart disease Father    • Hypertension Father    • Kidney disease Father        Social History     Socioeconomic History   • Marital status:      Spouse name: Not on file   • Number of children: Not on file   • Years of education: Not on file   • Highest education level: Not on file   Social Needs   • Financial resource strain: Not on file   • Food insecurity - worry: Not on file   • Food insecurity - inability: Not on file   • Transportation needs - medical: Not on file   • Transportation needs - non-medical: Not on file   Occupational History   • Not on file   Tobacco Use   • Smoking status: Current Every Day Smoker     Packs/day: 1.00     Types: Cigarettes   • Smokeless tobacco: Never Used   Substance and Sexual Activity   • Alcohol use: No   • Drug use: Yes     Types: Marijuana     Comment: occ    • Sexual activity: Defer   Other Topics Concern   • Not on file   Social History Narrative   • Not on file       Past Medical History:   Diagnosis Date   • Increased heart rate    • Mini  "stroke (CMS/[facility])    • Rash    • Tobacco abuse        Review of Systems   Constitutional: Negative.    HENT: Negative.    Respiratory: Negative.    Cardiovascular: Negative.    Gastrointestinal: Negative.    Musculoskeletal: Negative.    Skin: Positive for rash.   Neurological: Negative.    Psychiatric/Behavioral: Negative.        Objective   Physical Exam   Constitutional: He is oriented to person, place, and time. He appears well-developed and well-nourished.   Neck: Normal range of motion. Neck supple.   Cardiovascular: Normal rate, regular rhythm and normal heart sounds.   Pulmonary/Chest: Effort normal and breath sounds normal.   Neurological: He is alert and oriented to person, place, and time.   Skin: Skin is warm and dry.   Complete face erythema; fine rash to upper cheek areas; excoriated areas behind ears and in scalp   Psychiatric: He has a normal mood and affect. His behavior is normal. Judgment and thought content normal.   Nursing note and vitals reviewed.      Procedures    Vitals: Blood pressure 149/78, pulse 75, temperature 98.5 °F (36.9 °C), temperature source Oral, height 175.3 cm (69\"), weight 89.8 kg (198 lb), SpO2 98 %.    Allergies: No Known Allergies     During this visit the following were done:  Labs Reviewed []    Labs Ordered []    Radiology Reports Reviewed []    Radiology Ordered []    PCP Records Reviewed []    Referring Provider Records Reviewed []    ER Records Reviewed []    Hospital Records Reviewed []    History Obtained From Family []    Radiology Images Reviewed []    Other Reviewed []    Records Requested []      Assessment/Plan   Ta was seen today for rash.    Diagnoses and all orders for this visit:    Pruritic rash  -     Antinuclear Antibody With Reflex Cascade  -     CBC & Differential  -     Comprehensive Metabolic Panel  -     Sedimentation Rate  -     C-reactive Protein  -     ANCA Panel; Future  -     ANCA Panel  -     CBC Auto Differential  -     hydrOXYzine " (ATARAX) 25 MG tablet; Take 1 tablet by mouth Every 8 (Eight) Hours As Needed for Itching.    Gastroesophageal reflux disease, esophagitis presence not specified  -     pantoprazole (PROTONIX) 40 MG EC tablet; Take 1 tablet by mouth Daily.    Mixed hyperlipidemia  -     atorvastatin (LIPITOR) 20 MG tablet; Take 1 tablet by mouth Every Night.    Neuropathic pruritus  -     Antinuclear Antibody With Reflex Cascade  -     CBC & Differential  -     Comprehensive Metabolic Panel  -     Sedimentation Rate  -     C-reactive Protein  -     ANCA Panel; Future  -     ANCA Panel  -     CBC Auto Differential    Tachycardia  -     metoprolol tartrate (LOPRESSOR) 25 MG tablet; Take 1 tablet by mouth Daily.    Other orders  -     Cancel: cetirizine (zyrTEC) 10 MG tablet; Take 1 tablet by mouth Daily.  -     Cancel: gabapentin (NEURONTIN) 300 MG capsule; Take 1 capsule by mouth 3 (Three) Times a Day.  -     Cancel: aspirin 81 MG EC tablet; Take 1 tablet by mouth Daily.  -     Cancel: hydrOXYzine (ATARAX) 25 MG tablet;   -     Cancel: vitamin B-12 (CYANOCOBALAMIN) 1000 MCG tablet; Take 5 tablets by mouth Daily.

## 2019-01-14 LAB
C-ANCA TITR SER IF: NORMAL TITER
MYELOPEROXIDASE AB SER-ACNC: <9 U/ML (ref 0–9)
P-ANCA ATYPICAL TITR SER IF: NORMAL TITER
P-ANCA TITR SER IF: NORMAL TITER
PROTEINASE3 AB SER IA-ACNC: <3.5 U/ML (ref 0–3.5)
SPECIMEN STATUS: NORMAL

## 2019-01-15 ENCOUNTER — OFFICE VISIT (OUTPATIENT)
Dept: FAMILY MEDICINE CLINIC | Facility: CLINIC | Age: 59
End: 2019-01-15

## 2019-01-15 VITALS
DIASTOLIC BLOOD PRESSURE: 71 MMHG | HEIGHT: 69 IN | SYSTOLIC BLOOD PRESSURE: 127 MMHG | TEMPERATURE: 98.9 F | OXYGEN SATURATION: 98 % | BODY MASS INDEX: 29.62 KG/M2 | WEIGHT: 200 LBS | HEART RATE: 85 BPM

## 2019-01-15 DIAGNOSIS — L28.2 PRURITIC RASH: Primary | ICD-10-CM

## 2019-01-15 DIAGNOSIS — F45.8 NEUROPATHIC PRURITUS: ICD-10-CM

## 2019-01-15 LAB — ANA SER QL: NEGATIVE

## 2019-01-15 PROCEDURE — 99213 OFFICE O/P EST LOW 20 MIN: CPT | Performed by: NURSE PRACTITIONER

## 2019-01-15 RX ORDER — AMITRIPTYLINE HYDROCHLORIDE 25 MG/1
25 TABLET, FILM COATED ORAL NIGHTLY
Qty: 30 TABLET | Refills: 1 | Status: SHIPPED | OUTPATIENT
Start: 2019-01-15 | End: 2019-01-23

## 2019-01-15 NOTE — PROGRESS NOTES
Subjective   Ta Madison is a 58 y.o. male.     Chief Complaint: Eye Burn (bilateral)    History of Present Illness   Rash  This is a chronic problem. The current episode started more than 1 month ago. The problem is unchanged. The affected locations include the right hand and left hand. He was exposed to nothing. Past treatments include triamcinolone ointment, decadron injections, medrol dosepack, vistaril, benadryl and gabapentin.  The gabapentin and vistaril does help with the itching so that he can sleep at night. He started having a lot of itching and redness to his face couple of ago.    He states that the redness seems to be improving on his face; however, two days ago he awoke with his eyes almost swollen shut.  This swelling started resolving as the day went on.  He has had a lot of itching to his eyes and face.  He denies any visual disturbances.  His vision today with glasses is 20/15 as checked by MA.    I have discussed this with Dr. Regalado and she has also spoke to patient.  She has recommended that he have some more labs drawn at this time and possibly be referred to neurology.  Dr Regalado thinks this may all be a result of an insect bite at some point.  She will be discussing this with Dr. Ashley and we will be ordering labs after her discussion with him.  Patient has been made aware and he states understanding.    At this point he is to continue the gabapentin and vistaril as before.         Family History   Problem Relation Age of Onset   • Nephrolithiasis Mother    • Heart disease Father    • Hypertension Father    • Kidney disease Father        Social History     Socioeconomic History   • Marital status:      Spouse name: Not on file   • Number of children: Not on file   • Years of education: Not on file   • Highest education level: Not on file   Social Needs   • Financial resource strain: Not on file   • Food insecurity - worry: Not on file   • Food insecurity - inability: Not on file    • Transportation needs - medical: Not on file   • Transportation needs - non-medical: Not on file   Occupational History   • Not on file   Tobacco Use   • Smoking status: Current Every Day Smoker     Packs/day: 1.00     Types: Cigarettes   • Smokeless tobacco: Never Used   Substance and Sexual Activity   • Alcohol use: No   • Drug use: Yes     Types: Marijuana     Comment: occ    • Sexual activity: Defer   Other Topics Concern   • Not on file   Social History Narrative   • Not on file       Past Medical History:   Diagnosis Date   • Increased heart rate    • Mini stroke (CMS/HCC)    • Rash    • Tobacco abuse        Review of Systems   Constitutional: Negative.    HENT: Negative.    Eyes: Positive for itching. Negative for redness and visual disturbance.   Respiratory: Negative.    Cardiovascular: Negative.    Gastrointestinal: Negative.    Musculoskeletal: Negative.    Skin: Negative.         Facial redness   Neurological: Negative.    Psychiatric/Behavioral: Negative.        Objective   Physical Exam   Constitutional: He is oriented to person, place, and time. He appears well-developed and well-nourished.   HENT:   Right Ear: External ear normal.   Left Ear: External ear normal.   Mouth/Throat: Oropharynx is clear and moist.   Eyes: Conjunctivae are normal. Pupils are equal, round, and reactive to light.   Neck: Normal range of motion. Neck supple.   Cardiovascular: Normal rate, regular rhythm and normal heart sounds.   Pulmonary/Chest: Effort normal and breath sounds normal.   Lymphadenopathy:     He has no cervical adenopathy.   Neurological: He is alert and oriented to person, place, and time.   Skin: Skin is warm and dry.   Facial skin erythematous; eyes mildly swollen in appearance   Psychiatric: He has a normal mood and affect. His behavior is normal. Judgment and thought content normal.   Nursing note and vitals reviewed.      Procedures    Vitals: Blood pressure 127/71, pulse 85, temperature 98.9 °F (37.2  "°C), temperature source Oral, height 175.3 cm (69\"), weight 90.7 kg (200 lb), SpO2 98 %.    Allergies: No Known Allergies     During this visit the following were done:  Labs Reviewed []    Labs Ordered []    Radiology Reports Reviewed []    Radiology Ordered []    PCP Records Reviewed []    Referring Provider Records Reviewed []    ER Records Reviewed []    Hospital Records Reviewed []    History Obtained From Family []    Radiology Images Reviewed []    Other Reviewed []    Records Requested []      Assessment/Plan   Ta was seen today for eye burn.    Diagnoses and all orders for this visit:    Pruritic rash  -     amitriptyline (ELAVIL) 25 MG tablet; Take 1 tablet by mouth Every Night.    Neuropathic pruritus  -     gabapentin (NEURONTIN) 300 MG capsule; Take 1 capsule by mouth 3 (Three) Times a Day.               "

## 2019-01-18 RX ORDER — GABAPENTIN 300 MG/1
300 CAPSULE ORAL 3 TIMES DAILY
Qty: 90 CAPSULE | Refills: 1 | Status: SHIPPED | OUTPATIENT
Start: 2019-01-18 | End: 2019-01-23 | Stop reason: SDUPTHER

## 2019-01-23 ENCOUNTER — TELEPHONE (OUTPATIENT)
Dept: FAMILY MEDICINE CLINIC | Facility: CLINIC | Age: 59
End: 2019-01-23

## 2019-01-23 ENCOUNTER — HOSPITAL ENCOUNTER (OUTPATIENT)
Dept: GENERAL RADIOLOGY | Facility: HOSPITAL | Age: 59
Discharge: HOME | End: 2019-01-23
Admitting: FAMILY MEDICINE

## 2019-01-23 ENCOUNTER — OFFICE VISIT (OUTPATIENT)
Dept: FAMILY MEDICINE CLINIC | Facility: CLINIC | Age: 59
End: 2019-01-23

## 2019-01-23 VITALS
OXYGEN SATURATION: 96 % | TEMPERATURE: 98.6 F | HEIGHT: 69 IN | SYSTOLIC BLOOD PRESSURE: 116 MMHG | BODY MASS INDEX: 29.33 KG/M2 | HEART RATE: 91 BPM | WEIGHT: 198 LBS | DIASTOLIC BLOOD PRESSURE: 69 MMHG

## 2019-01-23 DIAGNOSIS — F45.8 NEUROPATHIC PRURITUS: Primary | ICD-10-CM

## 2019-01-23 DIAGNOSIS — I10 ESSENTIAL HYPERTENSION: ICD-10-CM

## 2019-01-23 DIAGNOSIS — K21.9 GASTROESOPHAGEAL REFLUX DISEASE, ESOPHAGITIS PRESENCE NOT SPECIFIED: ICD-10-CM

## 2019-01-23 LAB
BASOPHILS # BLD AUTO: 0.09 10*3/MM3 (ref 0–0.3)
BASOPHILS NFR BLD AUTO: 0.9 % (ref 0–2)
CRP SERPL-MCNC: 0.62 MG/DL (ref 0–0.99)
DEPRECATED RDW RBC AUTO: 42.5 FL (ref 37–54)
EOSINOPHIL # BLD AUTO: 0.16 10*3/MM3 (ref 0–0.7)
EOSINOPHIL NFR BLD AUTO: 1.7 % (ref 0–5)
ERYTHROCYTE [DISTWIDTH] IN BLOOD BY AUTOMATED COUNT: 14.2 % (ref 11.5–14.5)
ERYTHROCYTE [SEDIMENTATION RATE] IN BLOOD: 7 MM/HR (ref 0–20)
HCT VFR BLD AUTO: 46 % (ref 42–52)
HGB BLD-MCNC: 15.4 G/DL (ref 14–18)
IMM GRANULOCYTES # BLD AUTO: 0.02 10*3/MM3 (ref 0–0.03)
IMM GRANULOCYTES NFR BLD AUTO: 0.2 % (ref 0–0.5)
LYMPHOCYTES # BLD AUTO: 2.57 10*3/MM3 (ref 1–3)
LYMPHOCYTES NFR BLD AUTO: 27.1 % (ref 21–51)
MCH RBC QN AUTO: 27.5 PG (ref 27–33)
MCHC RBC AUTO-ENTMCNC: 33.5 G/DL (ref 33–37)
MCV RBC AUTO: 82.3 FL (ref 80–94)
MONOCYTES # BLD AUTO: 0.97 10*3/MM3 (ref 0.1–0.9)
MONOCYTES NFR BLD AUTO: 10.2 % (ref 0–10)
NEUTROPHILS # BLD AUTO: 5.68 10*3/MM3 (ref 1.4–6.5)
NEUTROPHILS NFR BLD AUTO: 59.9 % (ref 30–70)
PLATELET # BLD AUTO: 365 10*3/MM3 (ref 130–400)
PMV BLD AUTO: 11.6 FL (ref 6–10)
RBC # BLD AUTO: 5.59 10*6/MM3 (ref 4.7–6.1)
WBC NRBC COR # BLD: 9.49 10*3/MM3 (ref 4.5–12.5)

## 2019-01-23 PROCEDURE — 85025 COMPLETE CBC W/AUTO DIFF WBC: CPT | Performed by: FAMILY MEDICINE

## 2019-01-23 PROCEDURE — 85652 RBC SED RATE AUTOMATED: CPT | Performed by: FAMILY MEDICINE

## 2019-01-23 PROCEDURE — 72040 X-RAY EXAM NECK SPINE 2-3 VW: CPT

## 2019-01-23 PROCEDURE — 72040 X-RAY EXAM NECK SPINE 2-3 VW: CPT | Performed by: RADIOLOGY

## 2019-01-23 PROCEDURE — 86140 C-REACTIVE PROTEIN: CPT | Performed by: FAMILY MEDICINE

## 2019-01-23 PROCEDURE — 99214 OFFICE O/P EST MOD 30 MIN: CPT | Performed by: FAMILY MEDICINE

## 2019-01-23 RX ORDER — GABAPENTIN 300 MG/1
300 CAPSULE ORAL 3 TIMES DAILY
Qty: 90 CAPSULE | Refills: 0 | Status: SHIPPED | OUTPATIENT
Start: 2019-01-23 | End: 2019-01-29

## 2019-01-23 RX ORDER — DOXEPIN HYDROCHLORIDE 50 MG/1
50 CAPSULE ORAL NIGHTLY
Qty: 30 CAPSULE | Refills: 2 | Status: SHIPPED | OUTPATIENT
Start: 2019-01-23 | End: 2019-02-12

## 2019-01-23 RX ORDER — AMITRIPTYLINE HYDROCHLORIDE 50 MG/1
50 TABLET, FILM COATED ORAL NIGHTLY
Qty: 30 TABLET | Refills: 2 | Status: SHIPPED | OUTPATIENT
Start: 2019-01-23 | End: 2019-01-29

## 2019-01-23 NOTE — TELEPHONE ENCOUNTER
pts wife Azael called and asked if Marry and Dr. Regalado had decided on what labs pt was needing?

## 2019-01-23 NOTE — TELEPHONE ENCOUNTER
No, I haven't had time to call Dr. Ashley again and he hasn't called me back. We are still working on it. You can let her know that I don't have access to him after hours and we both see patients all day so it is hard for me to catch him unless it is on my half day which is today.

## 2019-01-24 ENCOUNTER — TELEPHONE (OUTPATIENT)
Dept: FAMILY MEDICINE CLINIC | Facility: CLINIC | Age: 59
End: 2019-01-24

## 2019-01-24 NOTE — TELEPHONE ENCOUNTER
----- Message from Sharon Regalado MD sent at 1/24/2019  8:54 AM EST -----  Labs stable. Okay to either call or send letter to patient. Thanks.      Stable letter mailed.

## 2019-01-29 ENCOUNTER — OFFICE VISIT (OUTPATIENT)
Dept: FAMILY MEDICINE CLINIC | Facility: CLINIC | Age: 59
End: 2019-01-29

## 2019-01-29 VITALS
OXYGEN SATURATION: 97 % | BODY MASS INDEX: 29.47 KG/M2 | HEART RATE: 112 BPM | WEIGHT: 199 LBS | DIASTOLIC BLOOD PRESSURE: 85 MMHG | HEIGHT: 69 IN | SYSTOLIC BLOOD PRESSURE: 155 MMHG | TEMPERATURE: 98.7 F

## 2019-01-29 DIAGNOSIS — F45.8 NEUROPATHIC PRURITUS: Primary | ICD-10-CM

## 2019-01-29 PROCEDURE — 99213 OFFICE O/P EST LOW 20 MIN: CPT | Performed by: NURSE PRACTITIONER

## 2019-01-29 RX ORDER — GABAPENTIN 600 MG/1
600 TABLET ORAL 3 TIMES DAILY
Qty: 90 TABLET | Refills: 1 | Status: SHIPPED | OUTPATIENT
Start: 2019-01-29 | End: 2019-03-06 | Stop reason: SDUPTHER

## 2019-01-29 RX ORDER — NORTRIPTYLINE HYDROCHLORIDE 25 MG/1
25 CAPSULE ORAL NIGHTLY
Qty: 30 CAPSULE | Refills: 2 | Status: SHIPPED | OUTPATIENT
Start: 2019-01-29 | End: 2019-02-12

## 2019-01-29 NOTE — PROGRESS NOTES
Subjective   Ta Madison is a 58 y.o. male.     Chief Complaint: Rash    History of Present Illness   Rash  This is a chronic problem. The current episode started more than 1 month ago. The problem is unchanged. The affected locations include generalized trunk, arms, neck and head (including face). He was exposed to insect bites several months ago.   Past treatments include triamcinolone ointment, decadron injections, medrol dosepack, vistaril, benadryl, elavil, doxepin and gabapentin.   He states the doxepin does help him sleep at night.  Gabapentin he is only taking prn.  I have discussed with him that he will need to take the gabapentin routinely; maybe this will help his symptoms. Elavil has not helped at all even with the increase in dose.  Will discontinue the elavil today and start him on nortriptyline at bedtime.    I have offered to write him a script for a cream including topical elavil and ketamine and he has declined any type of creams due to the discomfort of the creams on the rash.  I have also discussed the possibility of a TENS unit to help with the discomfort. He agrees to discuss this with the chiropractor and see if this may help.  I have discussed with him that he does need to follow with a neurologist since the treatment plans have not helped with his symptoms to this point.  Pt is agreeable to referral today.     I have discussed this with Dr Regalado and treatment plan agreeable.      Family History   Problem Relation Age of Onset   • Nephrolithiasis Mother    • Heart disease Father    • Hypertension Father    • Kidney disease Father        Social History     Socioeconomic History   • Marital status:      Spouse name: Not on file   • Number of children: Not on file   • Years of education: Not on file   • Highest education level: Not on file   Social Needs   • Financial resource strain: Not on file   • Food insecurity - worry: Not on file   • Food insecurity - inability: Not on file   •  "Transportation needs - medical: Not on file   • Transportation needs - non-medical: Not on file   Occupational History   • Not on file   Tobacco Use   • Smoking status: Current Every Day Smoker     Packs/day: 1.00     Types: Cigarettes   • Smokeless tobacco: Never Used   Substance and Sexual Activity   • Alcohol use: No   • Drug use: Yes     Types: Marijuana     Comment: occ    • Sexual activity: Defer   Other Topics Concern   • Not on file   Social History Narrative   • Not on file       Past Medical History:   Diagnosis Date   • Increased heart rate    • Mini stroke (CMS/HCC)    • Rash    • Tobacco abuse        Review of Systems   Constitutional: Negative.    HENT: Negative.    Respiratory: Negative.    Cardiovascular: Negative.    Gastrointestinal: Negative.    Musculoskeletal: Negative.    Skin: Negative.    Neurological: Negative.    Psychiatric/Behavioral: Negative.        Objective   Physical Exam   Constitutional: He is oriented to person, place, and time. He appears well-developed and well-nourished.   Neck: Normal range of motion. Neck supple.   Cardiovascular: Normal rate, regular rhythm and normal heart sounds.   Pulmonary/Chest: Effort normal and breath sounds normal.   Lymphadenopathy:     He has no cervical adenopathy.     He has no axillary adenopathy.        Right: No inguinal, no supraclavicular and no epitrochlear adenopathy present.        Left: No inguinal, no supraclavicular and no epitrochlear adenopathy present.   Neurological: He is alert and oriented to person, place, and time.   Skin: Skin is warm and dry.   Psychiatric: He has a normal mood and affect. His behavior is normal. Judgment and thought content normal.   Nursing note and vitals reviewed.      Procedures    Vitals: Blood pressure 155/85, pulse 112, temperature 98.7 °F (37.1 °C), temperature source Oral, height 175.3 cm (69\"), weight 90.3 kg (199 lb), SpO2 97 %.    Allergies: No Known Allergies     During this visit the following " were done:  Labs Reviewed []    Labs Ordered []    Radiology Reports Reviewed []    Radiology Ordered []    PCP Records Reviewed []    Referring Provider Records Reviewed []    ER Records Reviewed []    Hospital Records Reviewed []    History Obtained From Family []    Radiology Images Reviewed []    Other Reviewed []    Records Requested []      Assessment/Plan   Ta was seen today for rash.    Diagnoses and all orders for this visit:    Neuropathic pruritus  -     nortriptyline (PAMELOR) 25 MG capsule; Take 1 capsule by mouth Every Night.  -     gabapentin (NEURONTIN) 600 MG tablet; Take 1 tablet by mouth 3 (Three) Times a Day.  -     Ambulatory Referral to Neurology    Stop elavil; start nortriptyline  Increase gabapentin to 600mg TID routinely   Refera to neuro

## 2019-02-05 ENCOUNTER — LAB (OUTPATIENT)
Dept: LAB | Facility: HOSPITAL | Age: 59
End: 2019-02-05

## 2019-02-05 ENCOUNTER — OFFICE VISIT (OUTPATIENT)
Dept: NEUROLOGY | Facility: CLINIC | Age: 59
End: 2019-02-05

## 2019-02-05 VITALS
BODY MASS INDEX: 30.07 KG/M2 | WEIGHT: 203 LBS | HEIGHT: 69 IN | HEART RATE: 97 BPM | SYSTOLIC BLOOD PRESSURE: 124 MMHG | DIASTOLIC BLOOD PRESSURE: 76 MMHG | OXYGEN SATURATION: 97 %

## 2019-02-05 DIAGNOSIS — R21 RASH: ICD-10-CM

## 2019-02-05 DIAGNOSIS — R21 RASH: Primary | ICD-10-CM

## 2019-02-05 LAB
BASOPHILS # BLD AUTO: 0.06 10*3/MM3 (ref 0–0.2)
BASOPHILS NFR BLD AUTO: 0.6 % (ref 0–1)
DEPRECATED RDW RBC AUTO: 42.2 FL (ref 37–54)
EOSINOPHIL # BLD AUTO: 0.18 10*3/MM3 (ref 0–0.3)
EOSINOPHIL NFR BLD AUTO: 1.7 % (ref 0–3)
ERYTHROCYTE [DISTWIDTH] IN BLOOD BY AUTOMATED COUNT: 13.8 % (ref 11.3–14.5)
HCT VFR BLD AUTO: 47.4 % (ref 38.9–50.9)
HGB BLD-MCNC: 15.4 G/DL (ref 13.1–17.5)
IMM GRANULOCYTES # BLD AUTO: 0.02 10*3/MM3 (ref 0–0.03)
IMM GRANULOCYTES NFR BLD AUTO: 0.2 % (ref 0–0.6)
LYMPHOCYTES # BLD AUTO: 2.52 10*3/MM3 (ref 0.6–4.8)
LYMPHOCYTES NFR BLD AUTO: 24.1 % (ref 24–44)
MCH RBC QN AUTO: 27.2 PG (ref 27–31)
MCHC RBC AUTO-ENTMCNC: 32.5 G/DL (ref 32–36)
MCV RBC AUTO: 83.6 FL (ref 80–99)
MONOCYTES # BLD AUTO: 0.91 10*3/MM3 (ref 0–1)
MONOCYTES NFR BLD AUTO: 8.7 % (ref 0–12)
NEUTROPHILS # BLD AUTO: 6.77 10*3/MM3 (ref 1.5–8.3)
NEUTROPHILS NFR BLD AUTO: 64.7 % (ref 41–71)
PLATELET # BLD AUTO: 390 10*3/MM3 (ref 150–450)
PMV BLD AUTO: 11.3 FL (ref 6–12)
RBC # BLD AUTO: 5.67 10*6/MM3 (ref 4.2–5.76)
TSH SERPL DL<=0.05 MIU/L-ACNC: 1.44 MIU/ML (ref 0.35–5.35)
WBC NRBC COR # BLD: 10.46 10*3/MM3 (ref 3.5–10.8)

## 2019-02-05 PROCEDURE — 84165 PROTEIN E-PHORESIS SERUM: CPT | Performed by: NURSE PRACTITIONER

## 2019-02-05 PROCEDURE — 86618 LYME DISEASE ANTIBODY: CPT

## 2019-02-05 PROCEDURE — 82784 ASSAY IGA/IGD/IGG/IGM EACH: CPT | Performed by: NURSE PRACTITIONER

## 2019-02-05 PROCEDURE — 86038 ANTINUCLEAR ANTIBODIES: CPT

## 2019-02-05 PROCEDURE — 36415 COLL VENOUS BLD VENIPUNCTURE: CPT | Performed by: NURSE PRACTITIONER

## 2019-02-05 PROCEDURE — 84443 ASSAY THYROID STIM HORMONE: CPT

## 2019-02-05 PROCEDURE — 84155 ASSAY OF PROTEIN SERUM: CPT | Performed by: NURSE PRACTITIONER

## 2019-02-05 PROCEDURE — 86334 IMMUNOFIX E-PHORESIS SERUM: CPT | Performed by: NURSE PRACTITIONER

## 2019-02-05 PROCEDURE — 85025 COMPLETE CBC W/AUTO DIFF WBC: CPT

## 2019-02-05 PROCEDURE — 99214 OFFICE O/P EST MOD 30 MIN: CPT | Performed by: NURSE PRACTITIONER

## 2019-02-06 LAB
ALBUMIN SERPL-MCNC: 3.7 G/DL (ref 2.9–4.4)
ALBUMIN/GLOB SERPL: 1.1 {RATIO} (ref 0.7–1.7)
ALPHA1 GLOB FLD ELPH-MCNC: 0.3 G/DL (ref 0–0.4)
ALPHA2 GLOB SERPL ELPH-MCNC: 0.8 G/DL (ref 0.4–1)
ANA SER QL IA: NEGATIVE
B BURGDOR IGG+IGM SER-ACNC: <0.91 ISR (ref 0–0.9)
B BURGDOR IGM SER-ACNC: <0.8 INDEX (ref 0–0.79)
B-GLOBULIN SERPL ELPH-MCNC: 1.1 G/DL (ref 0.7–1.3)
GAMMA GLOB SERPL ELPH-MCNC: 1.2 G/DL (ref 0.4–1.8)
GLOBULIN SER CALC-MCNC: 3.5 G/DL (ref 2.2–3.9)
IGA SERPL-MCNC: 169 MG/DL (ref 90–386)
IGG SERPL-MCNC: 1114 MG/DL (ref 700–1600)
IGM SERPL-MCNC: 53 MG/DL (ref 20–172)
INTERPRETATION SERPL IEP-IMP: NORMAL
Lab: NORMAL
Lab: NORMAL
M-SPIKE: NORMAL G/DL
PROT SERPL-MCNC: 7.2 G/DL (ref 6–8.5)

## 2019-02-06 NOTE — PROGRESS NOTES
"Ta Madison     VITALS: Blood pressure 116/69, pulse 91, temperature 98.6 °F (37 °C), temperature source Oral, height 175.3 cm (69.02\"), weight 89.8 kg (198 lb), SpO2 96 %.    Subjective  Chief Complaint:   Chief Complaint   Patient presents with   • Rash        History of Present Illness:  Patient is a 58 y.o.  male who presents to clinic secondary to an acute concern.    Patient was seen today for these conditions and concerns:  Rash  This is a chronic problem. The current episode started more than 1 month ago. The problem is unchanged. The affected locations include the extremities and his abdomen. He was exposed to nothing. Past treatments include triamcinolone ointment, decadron injections, medrol dosepack, vistaril, benadryl and gabapentin.  The gabapentin and vistaril does help with the itching so that he can sleep at night. He started having a lot of itching and redness to his face couple of days ago. He has been having trouble sleeping.      He states that the redness seems to be improving on his face; however, two days ago he awoke with his eyes almost swollen shut.  This swelling started resolving as the day went on.  He has had a lot of itching to his eyes and face.  He denies any visual disturbances.      No complaints about any of the medications.    The following portions of the patient's history were reviewed and updated as appropriate: allergies, current medications, past family history, past medical history, past social history, past surgical history and problem list.    Past Medical History  Past Medical History:   Diagnosis Date   • Increased heart rate    • Mini stroke (CMS/HCC)    • Rash    • Tobacco abuse        Review of Systems  Constitutional: Denies any recent history of HAs, dizziness, fevers, chills.  Eyes: Denies any changes in vision. Denies any blurry vision or diplopia.  Ears, Nose, Mouth, Throat: Denies any sore throat, rhinorrhea, or cough.  Cardiovascular: Denies any chest pain, " pressure, or palpitations.  Respiratory: Denies any shortness of breath or wheezing.  Gastrointestinal: Denies any abdominal pain, nausea, vomiting, diarrhea, or constipation.  Genitourinary: Denies any changes in urination.  Musculoskeletal: Denies any muscle weakness.  Neurological: Denies any changes in balance or gait.  Psychiatric: Denies any anxiety, depression, or insomnia. Denies any suicidal or homicidal ideations.  Endocrine: Denies any heat or cold intolerance. Denies any voice changes, polydipsia, or polyuria.  Hematologic/Lymphatic: Denies any anemia or easy bruising.    Surgical History  Past Surgical History:   Procedure Laterality Date   • APPENDECTOMY  1971    [facility]        Family History  Family History   Problem Relation Age of Onset   • Nephrolithiasis Mother    • Heart disease Father    • Hypertension Father    • Kidney disease Father        Social History  Social History     Socioeconomic History   • Marital status:      Spouse name: Not on file   • Number of children: Not on file   • Years of education: Not on file   • Highest education level: Not on file   Social Needs   • Financial resource strain: Not on file   • Food insecurity - worry: Not on file   • Food insecurity - inability: Not on file   • Transportation needs - medical: Not on file   • Transportation needs - non-medical: Not on file   Occupational History   • Not on file   Tobacco Use   • Smoking status: Current Every Day Smoker     Packs/day: 1.00     Types: Cigarettes   • Smokeless tobacco: Never Used   Substance and Sexual Activity   • Alcohol use: No   • Drug use: Yes     Types: Marijuana     Comment: occ    • Sexual activity: Defer   Other Topics Concern   • Not on file   Social History Narrative   • Not on file       Objective  Physical Exam  Gen: Patient in NAD. Pleasant and answers appropriately. A&Ox3. Patient is erythematous.    Skin: Warm and dry with normal turgor. No purpura or unusual pigmentation  noted. Hair is normal in appearance and distribution.    HEENT: NC/AT. No lesions noted. Conjunctiva clear, sclera nonicteric. PERRL. EOMI without nystagmus or strabismus. Fundi appear benign. No hemorrhages or exudates of eyes. Auditory canals are patent bilaterally without lesions. TMs intact,  nonerythematous, nonbulging without lesions. Nasal mucosa pink, nonerythematous, and nonedematous. Frontal and maxillary sinuses are nontender. O/P nonerythematous and moist without exudate.    Neck: Supple without lymph nodes palpated. FROM.     Lungs: CTA B/L without rales, rhonchi, crackles, or wheezes.    Heart: RRR. S1 and S2 normal. No S3 or S4. No MRGT.    Abd: Soft, nontender,nondistended. (+)BSx4 quadrants.     Extrem: No CCE. Radial pulses 2+/4 and equal B/L. FROMx4. No bone, joint, or muscle tenderness noted.    Neuro: No focal motor/sensory deficits.    Procedures    Assessment/Plan  Ta Madison is a 58 y.o. here for medical followup.  Diagnoses and all orders for this visit:    Neuropathic pruritus  -     gabapentin (NEURONTIN) 300 MG capsule; Take 1 capsule by mouth 3 (Three) Times a Day.  -     amitriptyline (ELAVIL) 50 MG tablet; Take 1 tablet by mouth Every Night.  -     doxepin (SINEquan) 50 MG capsule; Take 1 capsule by mouth Every Night.  -     CBC & Differential  -     Sedimentation Rate  -     C-reactive Protein  -     CBC Auto Differential  -     XR Spine Cervical 2 or 3 View  Increase amitriptyline to 50 mg at night.  Increase doxepin to 50 mg at night.    Cut the hydroxyzine out.  Take the neurontin.  Get the xray  We will call you with results.     [facility] # [id number]  Reviewed and is consistent.  S 11/2018 reviewed and is consistent.  Patient comfort assessment guide reviewed and updated today.    As part of the patient's treatment plan they are being prescribed a controlled substance/ substances with potential for abuse.  This patient has been made aware of the appropriate use of such  medications, including potential risk of somnolence, limited ability to drive and/or work safely, and potential for overdose.  It has also been made clear these medications are for use by the patient only, without concomitant use of alcohol or other substances unless prescribed/advised by medical provider.    Patient has completed prescribing agreement detailing terms of continued prescribing of controlled substances including monitoring KRISTEN reports, urine drug screens, and pill counts.  The patient is aware KRISTEN reports are reviewed on a regular basis and scanned into the chart.    History and physical exam exhibit continued safe and appropriate use of controlled substances.      Gastroesophageal reflux disease, esophagitis presence not specified  Stable on protonix.    Essential hypertension  Stable today.     Patient's Body mass index is 29.23 kg/m². BMI is above normal parameters. Recommendations include: exercise counseling and nutrition counseling.       I advised Ta of the risks of continuing to use tobacco, and I provided him with tobacco cessation educational materials in the After Visit Summary.     During this visit, I spent 3 minutes counseling the patient regarding tobacco cessation.     Findings and plans discussed with patient who verbalizes understanding and agreement. Will followup with patient once results are in. Patient to followup at clinic PRN or in two weeks for further medical followup.    Sharon Regalado MD

## 2019-02-06 NOTE — PROGRESS NOTES
Please let him know his labs have been unremarkable.  Have his allergy and rheumatology appt been set up yet?

## 2019-02-07 ENCOUNTER — TELEPHONE (OUTPATIENT)
Dept: NEUROLOGY | Facility: CLINIC | Age: 59
End: 2019-02-07

## 2019-02-07 NOTE — TELEPHONE ENCOUNTER
----- Message from EUGENIA Tariq sent at 2/6/2019  4:10 PM EST -----  Please let him know his labs have been unremarkable.  Have his allergy and rheumatology appt been set up yet?

## 2019-02-11 NOTE — TELEPHONE ENCOUNTER
Pt is aware of the lab results and said the appt for Rheumatology has not been made but the appt for the Allergy doctor is made for 2- at 9 am in the [place] location.

## 2019-02-12 ENCOUNTER — OFFICE VISIT (OUTPATIENT)
Dept: FAMILY MEDICINE CLINIC | Facility: CLINIC | Age: 59
End: 2019-02-12

## 2019-02-12 VITALS
SYSTOLIC BLOOD PRESSURE: 122 MMHG | WEIGHT: 202 LBS | TEMPERATURE: 98.7 F | OXYGEN SATURATION: 97 % | DIASTOLIC BLOOD PRESSURE: 72 MMHG | HEIGHT: 69 IN | BODY MASS INDEX: 29.92 KG/M2 | HEART RATE: 91 BPM

## 2019-02-12 DIAGNOSIS — L28.2 PRURITIC RASH: Primary | ICD-10-CM

## 2019-02-12 PROCEDURE — 99213 OFFICE O/P EST LOW 20 MIN: CPT | Performed by: NURSE PRACTITIONER

## 2019-02-12 NOTE — PROGRESS NOTES
Subjective   Ta Madison is a 58 y.o. male.     Chief Complaint: Follow-up and Rash (Pruritic rash)    History of Present Illness   Rash  This is a chronic problem. The current episode started more than 1 month ago. The problem is unchanged. The affected locations include the right hand and left hand. He was exposed to nothing. Past treatments include triamcinolone ointment, decadron injections, medrol dosepack, vistaril, benadryl and gabapentin.  The gabapentin and vistaril does help with the itching so that he can sleep at night. He started having a lot of itching and redness to his face couple of ago.    He states that the redness seems to be improving on his face; however, two days ago he awoke with his eyes almost swollen shut.  This swelling started resolving as the day went on.  He has had a lot of itching to his eyes and face.  He denies any visual disturbances.    I have discussed this with Dr. Regalado and she has also spoke to patient.  She recommended that he be referred to neurology.  Pt did follow with neurology and I have reviewed the notes from that visit.  He has been referred to an allergist at this point.  Neurology also recommended that he follow with rheumatology and a referral has been placed for that appt.      Pt states that neurology feels that he has an allergy to something and has scheduled him to see allergist later this month.  He is to have allergy testing done at that appt and he has stopped all antihistamines at this time.  He states also that he has stopped call caffeine (he feels this may be causing his symptoms or making them worse).  He has stopped taking the atorvastatin at the advice of neurology also.  His symptoms have somewhat improved since stopping caffeine and atorvastatin.  He does continue to have some itching on his right upper arm and chest area.  I have advised him to keep his appt for allergy testing.   He also has an appt to see rheumatologist as recommended by  neurologist.  He plans to keep this appt also.         Family History   Problem Relation Age of Onset   • Nephrolithiasis Mother    • Heart disease Father    • Hypertension Father    • Kidney disease Father        Social History     Socioeconomic History   • Marital status:      Spouse name: Not on file   • Number of children: Not on file   • Years of education: Not on file   • Highest education level: Not on file   Social Needs   • Financial resource strain: Not on file   • Food insecurity - worry: Not on file   • Food insecurity - inability: Not on file   • Transportation needs - medical: Not on file   • Transportation needs - non-medical: Not on file   Occupational History   • Not on file   Tobacco Use   • Smoking status: Current Every Day Smoker     Packs/day: 1.00     Types: Cigarettes   • Smokeless tobacco: Never Used   Substance and Sexual Activity   • Alcohol use: No   • Drug use: Yes     Types: Marijuana     Comment: occ    • Sexual activity: Defer   Other Topics Concern   • Not on file   Social History Narrative   • Not on file       Past Medical History:   Diagnosis Date   • Increased heart rate    • Mini stroke (CMS/HCC)    • Rash    • Tobacco abuse        Review of Systems   Constitutional: Negative.    HENT: Negative.    Respiratory: Negative.    Cardiovascular: Negative.    Gastrointestinal: Negative.    Musculoskeletal: Negative.    Skin: Positive for rash.   Neurological: Negative.    Psychiatric/Behavioral: Negative.        Objective   Physical Exam   Constitutional: He is oriented to person, place, and time. He appears well-developed and well-nourished.   Neck: Normal range of motion. Neck supple.   Cardiovascular: Normal rate, regular rhythm and normal heart sounds.   Pulmonary/Chest: Effort normal and breath sounds normal.   Neurological: He is alert and oriented to person, place, and time.   Skin: Skin is warm and dry.   Psychiatric: He has a normal mood and affect. His behavior is  "normal. Judgment and thought content normal.   Nursing note and vitals reviewed.      Procedures    Vitals: Blood pressure 122/72, pulse 91, temperature 98.7 °F (37.1 °C), temperature source Oral, height 175.3 cm (69\"), weight 91.6 kg (202 lb), SpO2 97 %.    Allergies: No Known Allergies     During this visit the following were done:  Labs Reviewed []    Labs Ordered []    Radiology Reports Reviewed []    Radiology Ordered []    PCP Records Reviewed []    Referring Provider Records Reviewed []    ER Records Reviewed []    Hospital Records Reviewed []    History Obtained From Family []    Radiology Images Reviewed []    Other Reviewed []    Records Requested []      Assessment/Plan   Ta was seen today for follow-up and rash.    Diagnoses and all orders for this visit:    Pruritic rash    Continue gabapentin as this seems to help with itching  Keep appt with allergist and rheumatologist  Follow up in 6 weeks after being evaluated by both specialists           "

## 2019-02-20 ENCOUNTER — OFFICE VISIT (OUTPATIENT)
Dept: FAMILY MEDICINE CLINIC | Facility: CLINIC | Age: 59
End: 2019-02-20

## 2019-02-20 VITALS
HEART RATE: 93 BPM | TEMPERATURE: 98.3 F | WEIGHT: 203 LBS | SYSTOLIC BLOOD PRESSURE: 159 MMHG | DIASTOLIC BLOOD PRESSURE: 80 MMHG | BODY MASS INDEX: 30.07 KG/M2 | HEIGHT: 69 IN | OXYGEN SATURATION: 98 %

## 2019-02-20 DIAGNOSIS — G89.29 CHRONIC RIGHT SHOULDER PAIN: Primary | ICD-10-CM

## 2019-02-20 DIAGNOSIS — M25.511 CHRONIC RIGHT SHOULDER PAIN: Primary | ICD-10-CM

## 2019-02-20 PROCEDURE — 99213 OFFICE O/P EST LOW 20 MIN: CPT | Performed by: NURSE PRACTITIONER

## 2019-02-20 NOTE — PROGRESS NOTES
Subjective   Ta Madison is a 58 y.o. male.     Chief Complaint: Shoulder Pain and Rash    Upper Extremity Issue   This is a chronic problem. The current episode started more than 1 year ago. The problem occurs constantly. The problem has been unchanged. Associated symptoms include arthralgias and a rash. Nothing aggravates the symptoms. He has tried NSAIDs for the symptoms. The treatment provided no relief.   Pt was evaluated by ortho and received an injection in the joint last year.  Only helped for two weeks and pain returned.  He is having to hold his right arm up to help with the pain.  He states that he had been lifting 2x6 boards last week for a family member and pain has gotten much worse.  He also started having itching to his neck and face after lifting the boards.  The itching resolves if he holds his right arm close to his chest area.  He gets a numb area to his right shoulder blade if he doesn't hold his arm close to his chest.  He has been wearing an arm sling to help with movement.  Xray of right shoulder done in 2018 with the following results reviewed:  FINDINGS: 2 views of the right shoulder demonstrate arthritic change.  Possibly previous dislocation with slight irregularity of the superolateral aspect of the humeral head.    Family History   Problem Relation Age of Onset   • Nephrolithiasis Mother    • Heart disease Father    • Hypertension Father    • Kidney disease Father        Social History     Socioeconomic History   • Marital status:      Spouse name: Not on file   • Number of children: Not on file   • Years of education: Not on file   • Highest education level: Not on file   Social Needs   • Financial resource strain: Not on file   • Food insecurity - worry: Not on file   • Food insecurity - inability: Not on file   • Transportation needs - medical: Not on file   • Transportation needs - non-medical: Not on file   Occupational History   • Not on file   Tobacco Use   • Smoking  "status: Current Every Day Smoker     Packs/day: 1.00     Types: Cigarettes   • Smokeless tobacco: Never Used   Substance and Sexual Activity   • Alcohol use: No   • Drug use: Yes     Types: Marijuana     Comment: occ    • Sexual activity: Defer   Other Topics Concern   • Not on file   Social History Narrative   • Not on file       Past Medical History:   Diagnosis Date   • Increased heart rate    • Mini stroke (CMS/HCC)    • Rash    • Tobacco abuse        Review of Systems   Constitutional: Negative.    HENT: Negative.    Respiratory: Negative.    Cardiovascular: Negative.    Gastrointestinal: Negative.    Musculoskeletal: Positive for arthralgias.   Skin: Positive for rash.   Neurological: Negative.    Psychiatric/Behavioral: Negative.        Objective   Physical Exam   Constitutional: He is oriented to person, place, and time. He appears well-developed and well-nourished.   Neck: Normal range of motion. Neck supple.   Cardiovascular: Normal rate, regular rhythm and normal heart sounds.   Pulmonary/Chest: Effort normal and breath sounds normal.   Musculoskeletal:   Limited ROM due to pain; weakness with trying to push down against resistance to right arm   Neurological: He is alert and oriented to person, place, and time.   Skin: Skin is warm and dry.   Psychiatric: He has a normal mood and affect. His behavior is normal. Judgment and thought content normal.   Nursing note and vitals reviewed.      Procedures    Vitals: Blood pressure 159/80, pulse 93, temperature 98.3 °F (36.8 °C), temperature source Oral, height 175.3 cm (69\"), weight 92.1 kg (203 lb), SpO2 98 %.    Allergies: No Known Allergies     During this visit the following were done:  Labs Reviewed []    Labs Ordered []    Radiology Reports Reviewed []    Radiology Ordered []    PCP Records Reviewed []    Referring Provider Records Reviewed []    ER Records Reviewed []    Hospital Records Reviewed []    History Obtained From Family []    Radiology Images " Reviewed []    Other Reviewed []    Records Requested []      Assessment/Plan   Ta was seen today for shoulder pain and rash.    Diagnoses and all orders for this visit:    Chronic right shoulder pain  -     MRI Shoulder Right Without Contrast; Future

## 2019-02-28 ENCOUNTER — LAB (OUTPATIENT)
Dept: FAMILY MEDICINE CLINIC | Facility: CLINIC | Age: 59
End: 2019-02-28

## 2019-02-28 DIAGNOSIS — T78.40XD ALLERGIC REACTION, SUBSEQUENT ENCOUNTER: ICD-10-CM

## 2019-02-28 DIAGNOSIS — G89.29 CHRONIC RIGHT SHOULDER PAIN: Primary | ICD-10-CM

## 2019-02-28 DIAGNOSIS — R21 RASH: Primary | ICD-10-CM

## 2019-02-28 DIAGNOSIS — R21 RASH AND OTHER NONSPECIFIC SKIN ERUPTION: ICD-10-CM

## 2019-02-28 DIAGNOSIS — M25.511 CHRONIC RIGHT SHOULDER PAIN: Primary | ICD-10-CM

## 2019-02-28 LAB
ALBUMIN SERPL-MCNC: 4.5 G/DL (ref 3.5–5)
ALBUMIN/GLOB SERPL: 1.7 G/DL (ref 1.5–2.5)
ALP SERPL-CCNC: 130 U/L (ref 40–129)
ALT SERPL W P-5'-P-CCNC: 23 U/L (ref 10–44)
ANION GAP SERPL CALCULATED.3IONS-SCNC: 2.4 MMOL/L (ref 3.6–11.2)
AST SERPL-CCNC: 20 U/L (ref 10–34)
BACTERIA UR QL AUTO: NORMAL /HPF
BASOPHILS # BLD AUTO: 0.06 10*3/MM3 (ref 0–0.3)
BASOPHILS NFR BLD AUTO: 0.7 % (ref 0–2)
BILIRUB SERPL-MCNC: 0.4 MG/DL (ref 0.2–1.8)
BUN BLD-MCNC: 16 MG/DL (ref 7–21)
BUN/CREAT SERPL: 16.7 (ref 7–25)
CALCIUM SPEC-SCNC: 9.6 MG/DL (ref 7.7–10)
CHLORIDE SERPL-SCNC: 107 MMOL/L (ref 99–112)
CO2 SERPL-SCNC: 29.6 MMOL/L (ref 24.3–31.9)
CREAT BLD-MCNC: 0.96 MG/DL (ref 0.43–1.29)
DEPRECATED RDW RBC AUTO: 44.4 FL (ref 37–54)
EOSINOPHIL # BLD AUTO: 0.39 10*3/MM3 (ref 0–0.7)
EOSINOPHIL NFR BLD AUTO: 4.3 % (ref 0–5)
ERYTHROCYTE [DISTWIDTH] IN BLOOD BY AUTOMATED COUNT: 14.7 % (ref 11.5–14.5)
ERYTHROCYTE [SEDIMENTATION RATE] IN BLOOD: 9 MM/HR (ref 0–20)
GFR SERPL CREATININE-BSD FRML MDRD: 81 ML/MIN/1.73
GLOBULIN UR ELPH-MCNC: 2.7 GM/DL
GLUCOSE BLD-MCNC: 84 MG/DL (ref 70–110)
HCT VFR BLD AUTO: 45.2 % (ref 42–52)
HGB BLD-MCNC: 14.9 G/DL (ref 14–18)
HYALINE CASTS UR QL AUTO: NORMAL /LPF
IMM GRANULOCYTES # BLD AUTO: 0.02 10*3/MM3 (ref 0–0.03)
IMM GRANULOCYTES NFR BLD AUTO: 0.2 % (ref 0–0.5)
LYMPHOCYTES # BLD AUTO: 2.28 10*3/MM3 (ref 1–3)
LYMPHOCYTES NFR BLD AUTO: 25.4 % (ref 21–51)
MCH RBC QN AUTO: 27.4 PG (ref 27–33)
MCHC RBC AUTO-ENTMCNC: 33 G/DL (ref 33–37)
MCV RBC AUTO: 83.1 FL (ref 80–94)
MONOCYTES # BLD AUTO: 0.78 10*3/MM3 (ref 0.1–0.9)
MONOCYTES NFR BLD AUTO: 8.7 % (ref 0–10)
NEUTROPHILS # BLD AUTO: 5.44 10*3/MM3 (ref 1.4–6.5)
NEUTROPHILS NFR BLD AUTO: 60.7 % (ref 30–70)
OSMOLALITY SERPL CALC.SUM OF ELEC: 277.9 MOSM/KG (ref 273–305)
PLATELET # BLD AUTO: 385 10*3/MM3 (ref 130–400)
PMV BLD AUTO: 11.5 FL (ref 6–10)
POTASSIUM BLD-SCNC: 4.7 MMOL/L (ref 3.5–5.3)
PROT SERPL-MCNC: 7.2 G/DL (ref 6–8)
RBC # BLD AUTO: 5.44 10*6/MM3 (ref 4.7–6.1)
RBC # UR: NORMAL /HPF
REF LAB TEST METHOD: NORMAL
SODIUM BLD-SCNC: 139 MMOL/L (ref 135–153)
SQUAMOUS #/AREA URNS HPF: NORMAL /HPF
T3RU NFR SERPL: 26.7 % (ref 22.5–37)
T4 SERPL-MCNC: 8.5 MCG/DL (ref 4.5–10.9)
TSH SERPL DL<=0.05 MIU/L-ACNC: 1.67 MIU/ML (ref 0.55–4.78)
WBC NRBC COR # BLD: 8.97 10*3/MM3 (ref 4.5–12.5)
WBC UR QL AUTO: NORMAL /HPF

## 2019-02-28 PROCEDURE — 86376 MICROSOMAL ANTIBODY EACH: CPT | Performed by: NURSE PRACTITIONER

## 2019-02-28 PROCEDURE — 81015 MICROSCOPIC EXAM OF URINE: CPT | Performed by: NURSE PRACTITIONER

## 2019-02-28 PROCEDURE — 85652 RBC SED RATE AUTOMATED: CPT | Performed by: NURSE PRACTITIONER

## 2019-02-28 PROCEDURE — 84436 ASSAY OF TOTAL THYROXINE: CPT | Performed by: NURSE PRACTITIONER

## 2019-02-28 PROCEDURE — 84479 ASSAY OF THYROID (T3 OR T4): CPT | Performed by: NURSE PRACTITIONER

## 2019-02-28 PROCEDURE — 80050 GENERAL HEALTH PANEL: CPT | Performed by: NURSE PRACTITIONER

## 2019-02-28 PROCEDURE — 82785 ASSAY OF IGE: CPT | Performed by: NURSE PRACTITIONER

## 2019-02-28 PROCEDURE — 86162 COMPLEMENT TOTAL (CH50): CPT | Performed by: NURSE PRACTITIONER

## 2019-02-28 PROCEDURE — 86003 ALLG SPEC IGE CRUDE XTRC EA: CPT | Performed by: NURSE PRACTITIONER

## 2019-02-28 PROCEDURE — 86664 EPSTEIN-BARR NUCLEAR ANTIGEN: CPT | Performed by: NURSE PRACTITIONER

## 2019-02-28 PROCEDURE — 83516 IMMUNOASSAY NONANTIBODY: CPT | Performed by: NURSE PRACTITIONER

## 2019-02-28 PROCEDURE — 86663 EPSTEIN-BARR ANTIBODY: CPT | Performed by: NURSE PRACTITIONER

## 2019-02-28 PROCEDURE — 86665 EPSTEIN-BARR CAPSID VCA: CPT | Performed by: NURSE PRACTITIONER

## 2019-02-28 PROCEDURE — 86038 ANTINUCLEAR ANTIBODIES: CPT | Performed by: NURSE PRACTITIONER

## 2019-03-02 LAB
EBV EA IGG SER-ACNC: <9 U/ML (ref 0–8.9)
EBV NA IGG SER IA-ACNC: >600 U/ML (ref 0–17.9)
EBV VCA IGG SER-ACNC: 383 U/ML (ref 0–17.9)
INTERPRETATION: ABNORMAL
THYROPEROXIDASE AB SERPL-ACNC: 12 IU/ML (ref 0–34)

## 2019-03-03 LAB — ANA SER QL: NEGATIVE

## 2019-03-04 LAB — CH50 SERPL-ACNC: >60 U/ML

## 2019-03-05 LAB — LAMB IGE QN: <0.1 KU/L

## 2019-03-06 ENCOUNTER — OFFICE VISIT (OUTPATIENT)
Dept: FAMILY MEDICINE CLINIC | Facility: CLINIC | Age: 59
End: 2019-03-06

## 2019-03-06 VITALS
HEIGHT: 69 IN | WEIGHT: 208 LBS | BODY MASS INDEX: 30.81 KG/M2 | OXYGEN SATURATION: 95 % | HEART RATE: 96 BPM | TEMPERATURE: 98.3 F | SYSTOLIC BLOOD PRESSURE: 110 MMHG | DIASTOLIC BLOOD PRESSURE: 56 MMHG

## 2019-03-06 DIAGNOSIS — G89.29 CHRONIC RIGHT SHOULDER PAIN: Primary | ICD-10-CM

## 2019-03-06 DIAGNOSIS — F45.8 NEUROPATHIC PRURITUS: ICD-10-CM

## 2019-03-06 DIAGNOSIS — M25.511 CHRONIC RIGHT SHOULDER PAIN: Primary | ICD-10-CM

## 2019-03-06 DIAGNOSIS — T78.40XD ALLERGIC REACTION, SUBSEQUENT ENCOUNTER: ICD-10-CM

## 2019-03-06 LAB
ALPHA GAL IGE: 0.81 KU/L
BEEF IGE QN: 0.25 KU/L
TOTAL IGE SMQN RAST: 53 IU/ML (ref 0–100)

## 2019-03-06 PROCEDURE — 99214 OFFICE O/P EST MOD 30 MIN: CPT | Performed by: NURSE PRACTITIONER

## 2019-03-06 RX ORDER — GABAPENTIN 600 MG/1
600 TABLET ORAL 3 TIMES DAILY
Qty: 90 TABLET | Refills: 1 | Status: SHIPPED | OUTPATIENT
Start: 2019-03-06 | End: 2019-06-11 | Stop reason: SDUPTHER

## 2019-03-06 RX ORDER — PANTOPRAZOLE SODIUM 40 MG/1
40 TABLET, DELAYED RELEASE ORAL DAILY
Refills: 1 | COMMUNITY
Start: 2019-02-21 | End: 2019-04-17 | Stop reason: SDUPTHER

## 2019-03-06 RX ORDER — ALBUTEROL SULFATE 90 UG/1
AEROSOL, METERED RESPIRATORY (INHALATION)
Refills: 6 | COMMUNITY
Start: 2019-02-26 | End: 2019-04-17 | Stop reason: SDUPTHER

## 2019-03-06 RX ORDER — CETIRIZINE HYDROCHLORIDE 10 MG/1
TABLET ORAL
Refills: 6 | COMMUNITY
Start: 2019-02-26 | End: 2019-10-22

## 2019-03-06 NOTE — PROGRESS NOTES
Subjective   Ta Madison is a 58 y.o. male.     Chief Complaint: Follow-up and Shoulder Pain (right )    History of Present Illness   Upper Extremity Issue   This is a chronic problem. The current episode started more than 1 year ago. The problem occurs constantly. The problem has been unchanged. Associated symptoms include arthralgias and a rash. Nothing aggravates the symptoms. He has tried NSAIDs for the symptoms. The treatment provided no relief.   Pt was evaluated by ortho and received an injection in the joint last year.  Only helped for two weeks and pain returned.  He is having to hold his right arm up to help with the pain.  He states that he had been lifting 2x6 boards for a family member and pain has gotten much worse.  He also started having itching to his neck and face after lifting the boards.  The itching resolves if he holds his right arm close to his chest area.  He gets a numb area to his right shoulder blade if he doesn't hold his arm close to his chest.  He has been wearing an arm sling to help with movement.  Xray of right shoulder done in 2018 with the following results reviewed:  FINDINGS: 2 views of the right shoulder demonstrate arthritic change.  Possibly previous dislocation with slight irregularity of the superolateral aspect of the humeral head.  Pt has placed sports tape to his right shoulder to help with support to the shoulder and it has felt better today.  He is to start physical therapy also today.   Rash  Pt has been evaluated by allergist and had allergy testing done.  He is allergic to coffee and vanilla.  He has stopped drinking coffee completely for one week and his skin problems have resolved.  He had been having sore lips and this has also resolved.  He has had some other blood work per allergist and has a follow up scheduled.  He states that overall he is feeling better with the exception of his shoulder pain.  He states that his facial flushing has calmed since he stopped  coffee also.   I have advised him to continue to follow with allergist.      Kristen # [id number]  Reviewed and is consistent.  UDS 11/14/2018 reviewed and is consistent.  Patient comfort assessment guide reviewed and updated today.    As part of the patient's treatment plan they are being prescribed a controlled substance/ substances with potential for abuse.  This patient has been made aware of the appropriate use of such medications, including potential risk of somnolence, limited ability to drive and/or work safely, and potential for overdose.  It has also been made clear these medications are for use by the patient only, without concomitant use of alcohol or other substances unless prescribed/advised by medical provider.    Patient has completed prescribing agreement detailing terms of continued prescribing of controlled substances including monitoring KRISTEN reports, urine drug screens, and pill counts.  The patient is aware KRISTEN reports are reviewed on a regular basis and scanned into the chart.    History and physical exam exhibit continued safe and appropriate use of controlled substances.    Family History   Problem Relation Age of Onset   • Nephrolithiasis Mother    • Heart disease Father    • Hypertension Father    • Kidney disease Father        Social History     Socioeconomic History   • Marital status:      Spouse name: Not on file   • Number of children: Not on file   • Years of education: Not on file   • Highest education level: Not on file   Social Needs   • Financial resource strain: Not on file   • Food insecurity - worry: Not on file   • Food insecurity - inability: Not on file   • Transportation needs - medical: Not on file   • Transportation needs - non-medical: Not on file   Occupational History   • Not on file   Tobacco Use   • Smoking status: Current Every Day Smoker     Packs/day: 1.00     Types: Cigarettes   • Smokeless tobacco: Never Used   Substance and Sexual Activity   • Alcohol  "use: No   • Drug use: Yes     Types: Marijuana     Comment: occ    • Sexual activity: Defer   Other Topics Concern   • Not on file   Social History Narrative   • Not on file       Past Medical History:   Diagnosis Date   • Increased heart rate    • Mini stroke (CMS/HCC)    • Rash    • Tobacco abuse        Review of Systems   Constitutional: Negative.    HENT: Negative.    Respiratory: Negative.    Cardiovascular: Negative.    Gastrointestinal: Negative.    Musculoskeletal: Positive for arthralgias.   Skin: Negative.    Neurological: Negative.         Tingling in right arm and neck   Psychiatric/Behavioral: Negative.        Objective   Physical Exam   Constitutional: He is oriented to person, place, and time. He appears well-developed and well-nourished.   Neck: Normal range of motion. Neck supple.   Cardiovascular: Normal rate, regular rhythm and normal heart sounds.   Pulmonary/Chest: Effort normal and breath sounds normal.   Neurological: He is alert and oriented to person, place, and time.   Skin: Skin is warm and dry.   Psychiatric: He has a normal mood and affect. His behavior is normal. Judgment and thought content normal.   Nursing note and vitals reviewed.      Procedures    Vitals: Blood pressure 110/56, pulse 96, temperature 98.3 °F (36.8 °C), temperature source Oral, height 175.3 cm (69\"), weight 94.3 kg (208 lb), SpO2 95 %.    Allergies: No Known Allergies     During this visit the following were done:  Labs Reviewed []    Labs Ordered []    Radiology Reports Reviewed []    Radiology Ordered []    PCP Records Reviewed []    Referring Provider Records Reviewed []    ER Records Reviewed []    Hospital Records Reviewed []    History Obtained From Family []    Radiology Images Reviewed []    Other Reviewed []    Records Requested []      Assessment/Plan   Ta was seen today for follow-up and shoulder pain.    Diagnoses and all orders for this visit:    Chronic right shoulder pain  -     gabapentin " (NEURONTIN) 600 MG tablet; Take 1 tablet by mouth 3 (Three) Times a Day.    Allergic reaction, subsequent encounter    Neuropathic pruritus  -     gabapentin (NEURONTIN) 600 MG tablet; Take 1 tablet by mouth 3 (Three) Times a Day.

## 2019-03-11 LAB
Lab: NORMAL
VENISON IGE QN: NORMAL

## 2019-04-08 ENCOUNTER — OFFICE VISIT (OUTPATIENT)
Dept: FAMILY MEDICINE CLINIC | Facility: CLINIC | Age: 59
End: 2019-04-08

## 2019-04-08 VITALS
OXYGEN SATURATION: 97 % | WEIGHT: 210.6 LBS | BODY MASS INDEX: 31.19 KG/M2 | HEIGHT: 69 IN | SYSTOLIC BLOOD PRESSURE: 130 MMHG | HEART RATE: 97 BPM | DIASTOLIC BLOOD PRESSURE: 78 MMHG | TEMPERATURE: 97.9 F

## 2019-04-08 DIAGNOSIS — M67.431 GANGLION CYST OF DORSUM OF RIGHT WRIST: Primary | ICD-10-CM

## 2019-04-08 DIAGNOSIS — L72.9 BENIGN SKIN CYST: ICD-10-CM

## 2019-04-08 PROCEDURE — 99214 OFFICE O/P EST MOD 30 MIN: CPT | Performed by: FAMILY MEDICINE

## 2019-04-08 NOTE — PROGRESS NOTES
Subjective   Ta Madison is a 58 y.o. male.   Pt presents today with CC of Cyst (left shoulder)      History of Present Illness   1.  Patient complains of a left posterior shoulder cyst located approximately over the middle of his scapular spine.  He reports that it was previously a marble sized cyst, recently it became erythematous and tender, it came to ahead and he squeezed foul smelling pus out of it.  He reports that it remained tender and drained for 1 or 2 days.  It has since quit draining, it is becoming less tender, he would like evaluation to see about getting it removed.  #2 he also complains of a bump on his right dorsal wrist.         The following portions of the patient's history were reviewed and updated as appropriate: allergies, current medications, past family history, past social history, past surgical history and problem list.    Review of Systems   Constitutional: Negative for chills, fever and unexpected weight loss.   HENT: Negative for congestion and sore throat.    Eyes: Negative for blurred vision and visual disturbance.   Respiratory: Negative for cough and wheezing.    Cardiovascular: Negative for chest pain and palpitations.   Gastrointestinal: Negative for abdominal pain and diarrhea.   Genitourinary: Negative for dysuria.   Musculoskeletal: Negative for arthralgias and neck stiffness.   Skin: Positive for skin lesions.   Neurological: Negative for dizziness, seizures and syncope.       Objective   Physical Exam   Constitutional: He appears well-developed and well-nourished.   HENT:   Head: Normocephalic and atraumatic.   Eyes: Conjunctivae are normal.   Neck: Normal range of motion. Neck supple.   Cardiovascular: Normal rate and regular rhythm.   Pulmonary/Chest: Effort normal and breath sounds normal.   Musculoskeletal:   Right dorsal as a soft cyst consistent with synovial cyst.  It is somewhat soft, not bony like what would be seen with carpal bossing.   Lymphadenopathy:     He  has no cervical adenopathy.   Skin:   On his left shoulder blade approximately over the spine of the scapula is an area of induration approximately 2 inches in diameter, no palpable fluid collection.  No erythema.  There is a pore in the middle that is suspicious for where the pus was removed from.  Likely epidermal inclusion cyst that was recently drained, likely re-collecting, surrounding induration resolving.         Assessment/Plan   Ta was seen today for cyst.    Diagnoses and all orders for this visit:    Ganglion cyst of dorsum of right wrist  More likely a ganglion cyst rather than carpal bossing.  Recommended no treatment at this time as it is not bothering him.  Benign skin cyst  Seems to be an epidermal inclusion cyst that became infected, he drained it manually.  If it recollects we will consider surgical removal of the cyst.  I told him that it will be weeks or even a couple months before the cyst reforms enough to be of removal.  If it becomes red, hot, and painful again he should be evaluated for abscess.  He was agreeable.  No need for antibiotic at this time.             Patient's Body mass index is 31.09 kg/m². BMI is above normal parameters. Recommendations include: exercise counseling and nutrition counseling.

## 2019-04-17 ENCOUNTER — OFFICE VISIT (OUTPATIENT)
Dept: FAMILY MEDICINE CLINIC | Facility: CLINIC | Age: 59
End: 2019-04-17

## 2019-04-17 VITALS
HEIGHT: 69 IN | TEMPERATURE: 98.3 F | DIASTOLIC BLOOD PRESSURE: 85 MMHG | HEART RATE: 95 BPM | OXYGEN SATURATION: 97 % | SYSTOLIC BLOOD PRESSURE: 124 MMHG | BODY MASS INDEX: 31.25 KG/M2 | WEIGHT: 211 LBS

## 2019-04-17 DIAGNOSIS — L28.2 PRURITIC RASH: ICD-10-CM

## 2019-04-17 DIAGNOSIS — Z88.9 MULTIPLE ALLERGIES: ICD-10-CM

## 2019-04-17 DIAGNOSIS — G89.29 CHRONIC RIGHT SHOULDER PAIN: Primary | ICD-10-CM

## 2019-04-17 DIAGNOSIS — R00.0 TACHYCARDIA: ICD-10-CM

## 2019-04-17 DIAGNOSIS — K21.9 GASTROESOPHAGEAL REFLUX DISEASE, ESOPHAGITIS PRESENCE NOT SPECIFIED: ICD-10-CM

## 2019-04-17 DIAGNOSIS — E78.2 MIXED HYPERLIPIDEMIA: ICD-10-CM

## 2019-04-17 DIAGNOSIS — M25.511 CHRONIC RIGHT SHOULDER PAIN: Primary | ICD-10-CM

## 2019-04-17 PROCEDURE — 99214 OFFICE O/P EST MOD 30 MIN: CPT | Performed by: NURSE PRACTITIONER

## 2019-04-17 RX ORDER — PANTOPRAZOLE SODIUM 40 MG/1
40 TABLET, DELAYED RELEASE ORAL DAILY
Qty: 30 TABLET | Refills: 5 | Status: SHIPPED | OUTPATIENT
Start: 2019-04-17 | End: 2019-10-22 | Stop reason: SDUPTHER

## 2019-04-17 RX ORDER — ATORVASTATIN CALCIUM 20 MG/1
20 TABLET, FILM COATED ORAL NIGHTLY
Qty: 30 TABLET | Refills: 5 | Status: SHIPPED | OUTPATIENT
Start: 2019-04-17 | End: 2019-08-26 | Stop reason: SDUPTHER

## 2019-04-17 RX ORDER — ALBUTEROL SULFATE 90 UG/1
2 AEROSOL, METERED RESPIRATORY (INHALATION) EVERY 6 HOURS PRN
Qty: 18 G | Refills: 5 | Status: SHIPPED | OUTPATIENT
Start: 2019-04-17 | End: 2019-10-22 | Stop reason: SDUPTHER

## 2019-04-17 RX ORDER — RANITIDINE 150 MG/1
150 TABLET ORAL 2 TIMES DAILY
Refills: 6 | COMMUNITY
Start: 2019-02-26 | End: 2019-04-17

## 2019-04-17 RX ORDER — ATORVASTATIN CALCIUM 20 MG/1
TABLET, FILM COATED ORAL
Refills: 1 | COMMUNITY
Start: 2019-04-03 | End: 2019-04-17 | Stop reason: SDUPTHER

## 2019-04-17 NOTE — PROGRESS NOTES
Subjective   Ta Madison is a 58 y.o. male.     Chief Complaint: Follow-up and Rash    Rash   This is a recurrent problem. The current episode started yesterday. The affected locations include the left arm and right arm. The rash is characterized by redness and itchiness. Associated with: beef. (Patient states that this rash started yesterday evening.  After questioning patient, he apparently had a hamburger for lunch.  I have reviewed his labs and apparently he does have an allergy to beef.  Patient states that he was not aware that he was allergic to beef he states that he will stop eating any beef products to see if the rash resolves on its own) Past treatments include nothing. His past medical history is significant for allergies.      Upper Extremity Issue   This is a chronic problem. The current episode started more than 1 year ago. The problem occurs constantly. The problem has been unchanged. Associated symptoms include arthralgias and a rash. Nothing aggravates the symptoms. He has tried NSAIDs for the symptoms. The treatment provided no relief.   Pt was evaluated by ortho and received an injection in the joint last year.  Only helped for two weeks and pain returned.  He is having to hold his right arm up to help with the pain.  He states that he had been lifting 2x6 boards for a family member and pain has gotten much worse.  He also started having itching to his neck and face after lifting the boards.  The itching resolves if he holds his right arm close to his chest area.  He gets a numb area to his right shoulder blade if he doesn't hold his arm close to his chest.  He has been wearing an arm sling to help with movement.  Xray of right shoulder done in 2018 with the following results reviewed:  FINDINGS: 2 views of the right shoulder demonstrate arthritic change.  Possibly previous dislocation with slight irregularity of the superolateral aspect of the humeral head.  Pt has had 6 weeks of physical  therapy.  He states that he can move his shoulder and arm better but continues to have a lot of pain in the shoulder area. Patient has requested MRI of shoulder.           Family History   Problem Relation Age of Onset   • Nephrolithiasis Mother    • Heart disease Father    • Hypertension Father    • Kidney disease Father        Social History     Socioeconomic History   • Marital status:      Spouse name: Not on file   • Number of children: Not on file   • Years of education: Not on file   • Highest education level: Not on file   Tobacco Use   • Smoking status: Former Smoker     Packs/day: 1.00     Types: Cigarettes     Last attempt to quit: 3/12/2019     Years since quittin.0   • Smokeless tobacco: Never Used   Substance and Sexual Activity   • Alcohol use: No   • Drug use: Yes     Types: Marijuana     Comment: occ    • Sexual activity: Defer       Past Medical History:   Diagnosis Date   • Increased heart rate    • Mini stroke (CMS/HCC)    • Neck pain    • Rash    • Tobacco abuse        Review of Systems   Constitutional: Negative.    HENT: Negative.    Respiratory: Negative.    Cardiovascular: Negative.    Gastrointestinal: Negative.    Musculoskeletal: Positive for arthralgias.        Weakness in right arm   Skin: Positive for rash.   Neurological: Negative.    Psychiatric/Behavioral: Negative.        Objective   Physical Exam   Constitutional: He is oriented to person, place, and time. He appears well-developed and well-nourished.   Neck: Normal range of motion. Neck supple.   Cardiovascular: Normal rate, regular rhythm and normal heart sounds.   Pulmonary/Chest: Effort normal and breath sounds normal.   Musculoskeletal: Normal range of motion. He exhibits tenderness.   Right shoulder tenderness and mild weakness in right arm   Neurological: He is alert and oriented to person, place, and time.   Skin: Skin is warm and dry.   Erythematous rash to bilateral arms   Psychiatric: He has a normal mood and  "affect. His behavior is normal. Judgment and thought content normal.   Nursing note and vitals reviewed.      Procedures    Vitals: Blood pressure 124/85, pulse 95, temperature 98.3 °F (36.8 °C), temperature source Oral, height 175.3 cm (69\"), weight 95.7 kg (211 lb), SpO2 97 %.    Allergies: No Known Allergies     During this visit the following were done:  Labs Reviewed []    Labs Ordered []    Radiology Reports Reviewed []    Radiology Ordered []    PCP Records Reviewed []    Referring Provider Records Reviewed []    ER Records Reviewed []    Hospital Records Reviewed []    History Obtained From Family []    Radiology Images Reviewed []    Other Reviewed []    Records Requested []      Assessment/Plan   Ta was seen today for follow-up and rash.    Diagnoses and all orders for this visit:    Chronic right shoulder pain  -     MRI Shoulder Right Without Contrast; Future    Tachycardia  -     metoprolol tartrate (LOPRESSOR) 25 MG tablet; Take 1 tablet by mouth Daily.    Mixed hyperlipidemia  -     atorvastatin (LIPITOR) 20 MG tablet; Take 1 tablet by mouth Every Night.    Gastroesophageal reflux disease, esophagitis presence not specified  -     pantoprazole (PROTONIX) 40 MG EC tablet; Take 1 tablet by mouth Daily.    Multiple allergies  -     albuterol sulfate  (90 Base) MCG/ACT inhaler; Inhale 2 puffs Every 6 (Six) Hours As Needed for Wheezing.    Pruritic rash               "

## 2019-05-01 ENCOUNTER — HOSPITAL ENCOUNTER (OUTPATIENT)
Dept: MRI IMAGING | Facility: HOSPITAL | Age: 59
Discharge: HOME | End: 2019-05-01
Admitting: NURSE PRACTITIONER

## 2019-05-01 DIAGNOSIS — M25.511 CHRONIC RIGHT SHOULDER PAIN: ICD-10-CM

## 2019-05-01 DIAGNOSIS — G89.29 CHRONIC RIGHT SHOULDER PAIN: ICD-10-CM

## 2019-05-01 PROCEDURE — 73221 MRI JOINT UPR EXTREM W/O DYE: CPT | Performed by: RADIOLOGY

## 2019-05-01 PROCEDURE — 73221 MRI JOINT UPR EXTREM W/O DYE: CPT

## 2019-05-02 ENCOUNTER — TELEPHONE (OUTPATIENT)
Dept: FAMILY MEDICINE CLINIC | Facility: CLINIC | Age: 59
End: 2019-05-02

## 2019-05-02 NOTE — TELEPHONE ENCOUNTER
----- Message from EUGENIA Hernadez sent at 5/2/2019 11:28 AM EDT -----  MRI appears to show a tear in the supraspinatus tendon.  Needs referral to ortho.  Who would he like to see?      Left a message to return call.    Spoke with wife & she verbalized understanding,no preference,close as possible.

## 2019-05-03 DIAGNOSIS — G89.29 CHRONIC RIGHT SHOULDER PAIN: Primary | ICD-10-CM

## 2019-05-03 DIAGNOSIS — M25.511 CHRONIC RIGHT SHOULDER PAIN: Primary | ICD-10-CM

## 2019-05-07 ENCOUNTER — OFFICE VISIT (OUTPATIENT)
Dept: FAMILY MEDICINE CLINIC | Facility: CLINIC | Age: 59
End: 2019-05-07

## 2019-05-07 DIAGNOSIS — Z53.21 PATIENT LEFT WITHOUT BEING SEEN: ICD-10-CM

## 2019-05-17 ENCOUNTER — OFFICE VISIT (OUTPATIENT)
Dept: ORTHOPEDIC SURGERY | Facility: CLINIC | Age: 59
End: 2019-05-17

## 2019-05-17 VITALS — WEIGHT: 212 LBS | HEIGHT: 68 IN | BODY MASS INDEX: 32.13 KG/M2

## 2019-05-17 DIAGNOSIS — M25.511 RIGHT SHOULDER PAIN, UNSPECIFIED CHRONICITY: ICD-10-CM

## 2019-05-17 DIAGNOSIS — R20.0 NUMBNESS AND TINGLING OF BOTH UPPER EXTREMITIES: Primary | ICD-10-CM

## 2019-05-17 DIAGNOSIS — R20.2 NUMBNESS AND TINGLING OF BOTH UPPER EXTREMITIES: Primary | ICD-10-CM

## 2019-05-17 PROCEDURE — 20610 DRAIN/INJ JOINT/BURSA W/O US: CPT | Performed by: ORTHOPAEDIC SURGERY

## 2019-05-17 PROCEDURE — 99214 OFFICE O/P EST MOD 30 MIN: CPT | Performed by: ORTHOPAEDIC SURGERY

## 2019-05-17 RX ORDER — BUPIVACAINE HYDROCHLORIDE 5 MG/ML
5 INJECTION, SOLUTION EPIDURAL; INTRACAUDAL
Status: COMPLETED | OUTPATIENT
Start: 2019-05-17 | End: 2019-05-17

## 2019-05-17 RX ORDER — METHYLPREDNISOLONE ACETATE 40 MG/ML
80 INJECTION, SUSPENSION INTRA-ARTICULAR; INTRALESIONAL; INTRAMUSCULAR; SOFT TISSUE
Status: COMPLETED | OUTPATIENT
Start: 2019-05-17 | End: 2019-05-17

## 2019-05-17 RX ADMIN — METHYLPREDNISOLONE ACETATE 80 MG: 40 INJECTION, SUSPENSION INTRA-ARTICULAR; INTRALESIONAL; INTRAMUSCULAR; SOFT TISSUE at 09:59

## 2019-05-17 RX ADMIN — BUPIVACAINE HYDROCHLORIDE 5 ML: 5 INJECTION, SOLUTION EPIDURAL; INTRACAUDAL at 09:59

## 2019-05-17 NOTE — PROGRESS NOTES
Patient: Ta Madiosn    YOB: 1960      Chief Complaint   Patient presents with   • Right Shoulder - Follow-up, Pain         History of Present Illness: Patient is referred to the office for evaluation of right shoulder pain.  However his main complaint today seems to be itchy is and shooting pains down his arms.  He says it gets numb, at the base of his neck and he gets numbness in his hands.  When he tries for 1015 minutes his hands go numb he has to shake them out occasionally wake him up at night the left hand is worse than the right.  He said he has had a almost year history of itchiness and rash that no one can figure out.  He does get some stiffness and soreness in his shoulders at times.  No specific history of injury or trauma to the shoulder.  No change in activity level we actually saw him last summer at  time I gave him a subacromial injection and he never followed up afterwards.  States he did not think the shot did very much for him.  States he gets some discomfort in his shoulders when he reaches behind his back.  Denies any significant weakness.  Denies any specific swelling.  Does get neck pain at times.  States is been through physical therapy for his neck and his shoulders.  States he thinks that this makes his rash worse.  MRI was performed of his right shoulder knee.  Former smoker    Past Medical History:   Diagnosis Date   • Increased heart rate    • Mini stroke (CMS/HCC)    • Neck pain    • Rash    • Tobacco abuse         Social History     Socioeconomic History   • Marital status:      Spouse name: Not on file   • Number of children: Not on file   • Years of education: Not on file   • Highest education level: Not on file   Tobacco Use   • Smoking status: Former Smoker     Packs/day: 1.00     Types: Cigarettes     Last attempt to quit: 3/12/2019     Years since quittin.1   • Smokeless tobacco: Never Used   Substance and Sexual Activity   • Alcohol use: No   • Drug  "use: Yes     Types: Marijuana     Comment: occ    • Sexual activity: Defer        Review of Systems:    Pertinent positives evaluated and listed in HPI, others systems completed by patient and reviewed today.         Physical Exam: 58 y.o. male  General Appearance:    Alert and oriented x 3, cooperative, in no acute distress                   Vitals:    05/17/19 0857   Weight: 96.2 kg (212 lb)   Height: 172.7 cm (68\")          There is no apparent acute distress.  Has reasonable range of motion of the neck with some slight discomfort at extremes.  He has good range of motion of both shoulders.  He has internal rotation maybe to his mid lumbar region both shoulders is he is got good strength with stressing the suture of the supraspinatus internal and external rotators bilaterally.  He has some mild impingement on the right compared to the left.  There is no obvious swelling or erythema.  He does have kind of the papular intermittent rash in both his arms with, multiple sores.  Hands appear to be grossly neurovascular intact although subjectively he says his left hand feels numb all over compared to his right one  strength is approximately equivalent.  Equivocal Tinel sign.      Radiology:     Patient brought with him today an MRI which I reviewed and shows a possible partial articular thickness tear of his rotator cuff.  There is no evidence of any large tears for through and through to see labral appears okay.  There might be some mild inflammation along his biceps tendon.      Large Joint Arthrocentesis: R subacromial bursa  Date/Time: 5/17/2019 9:59 AM  Consent given by: patient  Site marked: site marked  Supporting Documentation  Indications: pain and diagnostic evaluation   Procedure Details  Location: shoulder - R subacromial bursa  Needle size: 25 G  Approach: lateral  Medications administered: 80 mg methylPREDNISolone acetate 40 MG/ML; 5 mL bupivacaine (PF) 0.5 %  Patient tolerance: patient tolerated the " procedure well with no immediate complications                Assessment/Plan: Right shoulder pain.  Have discussed this with the patient while the shoulder is annoying at times is not his major complaint is major complaint is is numb feeling in itchy and burning sensation that he gets constantly.  Apparently has been worked up pretty significantly for this nose, with a good diagnosis for him.  I discussed with him that he does may have some degenerative changes in his rotator cuff and approach partial articular surface tear but that really should not be related to his itchiness or paresthesias at all.  He does presents with some signs and symptoms of carpal tunnel also.  I will try 1 subacromial injection with beginning of his shoulder because although he does not remember it working with a look at his old notes he did not come back to the office he did complain about shoulder for quite some time to his family doctor.  Also to send him up for EMG nerve conduction velocity test to evaluate for carpal tunnel versus possible cervical radiculopathy or neuropathy.  We will see him back after these are completed.              Patient's Body mass index is 32.23 kg/m². BMI is above normal parameters. Recommendations include: referral to primary care.        Discussion/Summary:    This chart was completed utilizing the dragon speech recognition software.  Grammatical errors, random word insertions, pronoun errors, and incomplete sentences or occasional consequences of the system due to software limitations, ambient noise, and hardware issues.  Any questions or concerns about the content, text, or information contained within the body of this dictation should be directly addressed to the physician for clarification          This document was signed by Inocencio López M.D. May 17, 2019 9:54 AM

## 2019-06-10 ENCOUNTER — TELEPHONE (OUTPATIENT)
Dept: FAMILY MEDICINE CLINIC | Facility: CLINIC | Age: 59
End: 2019-06-10

## 2019-06-11 DIAGNOSIS — M25.511 CHRONIC RIGHT SHOULDER PAIN: ICD-10-CM

## 2019-06-11 DIAGNOSIS — F45.8 NEUROPATHIC PRURITUS: ICD-10-CM

## 2019-06-11 DIAGNOSIS — G89.29 CHRONIC RIGHT SHOULDER PAIN: ICD-10-CM

## 2019-06-11 RX ORDER — GABAPENTIN 600 MG/1
600 TABLET ORAL 3 TIMES DAILY
Qty: 90 TABLET | Refills: 0 | Status: SHIPPED | OUTPATIENT
Start: 2019-06-11 | End: 2019-07-12 | Stop reason: SDUPTHER

## 2019-06-11 NOTE — PROGRESS NOTES
[facility] # [id number]  Reviewed and is consistent.  [facility] 11/2018 reviewed and is consistent.  Patient comfort assessment guide reviewed and updated today.    As part of the patient's treatment plan they are being prescribed a controlled substance/ substances with potential for abuse.  This patient has been made aware of the appropriate use of such medications, including potential risk of somnolence, limited ability to drive and/or work safely, and potential for overdose.  It has also been made clear these medications are for use by the patient only, without concomitant use of alcohol or other substances unless prescribed/advised by medical provider.    Patient has completed prescribing agreement detailing terms of continued prescribing of controlled substances including monitoring KRISTEN reports, urine drug screens, and pill counts.  The patient is aware KRISTEN reports are reviewed on a regular basis and scanned into the chart.    History and physical exam exhibit continued safe and appropriate use of controlled substances.

## 2019-07-12 ENCOUNTER — TELEPHONE (OUTPATIENT)
Dept: FAMILY MEDICINE CLINIC | Facility: CLINIC | Age: 59
End: 2019-07-12

## 2019-07-12 DIAGNOSIS — F45.8 NEUROPATHIC PRURITUS: ICD-10-CM

## 2019-07-12 DIAGNOSIS — M25.511 CHRONIC RIGHT SHOULDER PAIN: ICD-10-CM

## 2019-07-12 DIAGNOSIS — G89.29 CHRONIC RIGHT SHOULDER PAIN: ICD-10-CM

## 2019-07-12 RX ORDER — GABAPENTIN 600 MG/1
600 TABLET ORAL 3 TIMES DAILY
Qty: 90 TABLET | Refills: 0 | Status: SHIPPED | OUTPATIENT
Start: 2019-07-12 | End: 2019-08-14 | Stop reason: SDUPTHER

## 2019-08-08 ENCOUNTER — OFFICE VISIT (OUTPATIENT)
Dept: ORTHOPEDIC SURGERY | Facility: CLINIC | Age: 59
End: 2019-08-08

## 2019-08-08 VITALS
DIASTOLIC BLOOD PRESSURE: 61 MMHG | HEART RATE: 89 BPM | WEIGHT: 212.08 LBS | BODY MASS INDEX: 32.14 KG/M2 | SYSTOLIC BLOOD PRESSURE: 111 MMHG | HEIGHT: 68 IN

## 2019-08-08 DIAGNOSIS — G89.29 CHRONIC RIGHT SHOULDER PAIN: Primary | ICD-10-CM

## 2019-08-08 DIAGNOSIS — G56.03 BILATERAL CARPAL TUNNEL SYNDROME: ICD-10-CM

## 2019-08-08 DIAGNOSIS — M25.511 CHRONIC RIGHT SHOULDER PAIN: Primary | ICD-10-CM

## 2019-08-08 PROCEDURE — 99213 OFFICE O/P EST LOW 20 MIN: CPT | Performed by: ORTHOPAEDIC SURGERY

## 2019-08-08 PROCEDURE — 20610 DRAIN/INJ JOINT/BURSA W/O US: CPT | Performed by: ORTHOPAEDIC SURGERY

## 2019-08-08 RX ADMIN — BUPIVACAINE HYDROCHLORIDE 5 ML: 5 INJECTION, SOLUTION PERINEURAL at 14:46

## 2019-08-08 RX ADMIN — METHYLPREDNISOLONE ACETATE 80 MG: 40 INJECTION, SUSPENSION INTRA-ARTICULAR; INTRALESIONAL; INTRAMUSCULAR; SOFT TISSUE at 14:46

## 2019-08-08 NOTE — PROGRESS NOTES
"Ta Madison   :1960    Date of encounter:2019    Chief Complaint   Patient presents with   • Left Wrist - Pain, Numbness   • Right Wrist - Numbness, Pain       HPI:  Ta Madison is a 59 y.o. male who returns today for follow-up of right shoulder pain with paresthesias of the right hand.  He is recently undergone EMG and nerve conduction study and presents here to review this.  He also received a subacromial injection on his last office visit on May 17, 2019.  He states that the injection has helped with his pain but he has noticed is starting to wear off some.  Stick is his biggest complaint still of this itching sensation and burning sensation.  States his hands specifically do not seem to be bothering him too badly.    PMH:   Patient Active Problem List   Diagnosis   • Tachycardia   • Hyperlipidemia   • Multiple allergies   • Tobacco abuse   • Gastroesophageal reflux disease   • Chronic right shoulder pain   • Ganglion cyst of dorsum of right wrist   • Benign skin cyst   • Numbness and tingling of both upper extremities   • Right shoulder pain       Exam:  General Appearance:    59 y.o. male  cooperative, in no acute distress.  Alert and oriented x 3,                   Vitals:    19 1422   BP: 111/61   Pulse: 89   Weight: 96.2 kg (212 lb 1.3 oz)   Height: 172.7 cm (67.99\")          Body mass index is 32.25 kg/m².    Examination of the right shoulder today reveals good motion with internal rotation to about the mid lumbar region.  He has good strength.  He has mildly positive signs of impingement with Valdez and Neer's.  There is no significant weakness on exam.  He does still have a papular intermittent rash on both of his arms with multiple sores.  His neurovascular status is grossly intact.    Procedure:  EMG nerve conduction study was reviewed revealing mild carpal tunnel syndrome across the bilateral wrists.  There is no evidence of cervical plexopathy or radiculopathy.    Large Joint " Arthrocentesis: R subacromial bursa  Date/Time: 8/8/2019 2:46 PM  Consent given by: patient  Site marked: site marked  Timeout: Immediately prior to procedure a time out was called to verify the correct patient, procedure, equipment, support staff and site/side marked as required   Supporting Documentation  Indications: pain   Procedure Details  Location: shoulder - R subacromial bursa  Needle size: 25 G  Approach: lateral  Medications administered: 80 mg methylPREDNISolone acetate 40 MG/ML; 5 mL bupivacaine 0.5 %            Assessment    ICD-10-CM ICD-9-CM   1. Chronic right shoulder pain M25.511 719.41    G89.29 338.29   2. Bilateral carpal tunnel syndrome G56.03 354.0       Plan:  59-year-old male with complaints of right shoulder pain and burning and itching sensation throughout the arms.  Recent EMG study reveals mild carpal tunnel syndrome but no neuropathy or radiculopathy seen.  He states hands are bothering him too bad.  Previous subacromial injection did improve right shoulder pain significantly but beginning to wear off.  Given this today I proceeded with 80 mg of Depo-Medrol with Marcaine block into the subacromial space of the right shoulder.  He tolerated the procedure well.  This was done under sterile conditions.  We will monitor his response to the injection and he will return back with any recurrence of pain.    Written by, Claudia GRISSOM, acting as a scribe for Dr. López

## 2019-08-12 RX ORDER — METHYLPREDNISOLONE ACETATE 40 MG/ML
80 INJECTION, SUSPENSION INTRA-ARTICULAR; INTRALESIONAL; INTRAMUSCULAR; SOFT TISSUE
Status: COMPLETED | OUTPATIENT
Start: 2019-08-08 | End: 2019-08-08

## 2019-08-12 RX ORDER — BUPIVACAINE HYDROCHLORIDE 5 MG/ML
5 INJECTION, SOLUTION PERINEURAL
Status: COMPLETED | OUTPATIENT
Start: 2019-08-08 | End: 2019-08-08

## 2019-08-14 DIAGNOSIS — G89.29 CHRONIC RIGHT SHOULDER PAIN: ICD-10-CM

## 2019-08-14 DIAGNOSIS — F45.8 NEUROPATHIC PRURITUS: ICD-10-CM

## 2019-08-14 DIAGNOSIS — M25.511 CHRONIC RIGHT SHOULDER PAIN: ICD-10-CM

## 2019-08-14 RX ORDER — GABAPENTIN 600 MG/1
TABLET ORAL
Qty: 90 TABLET | Refills: 0 | Status: SHIPPED | OUTPATIENT
Start: 2019-08-14 | End: 2019-09-18 | Stop reason: SDUPTHER

## 2019-08-26 ENCOUNTER — OFFICE VISIT (OUTPATIENT)
Dept: FAMILY MEDICINE CLINIC | Facility: CLINIC | Age: 59
End: 2019-08-26

## 2019-08-26 VITALS
HEIGHT: 68 IN | WEIGHT: 204 LBS | BODY MASS INDEX: 30.92 KG/M2 | TEMPERATURE: 98.5 F | SYSTOLIC BLOOD PRESSURE: 110 MMHG | DIASTOLIC BLOOD PRESSURE: 70 MMHG | OXYGEN SATURATION: 98 % | HEART RATE: 81 BPM

## 2019-08-26 DIAGNOSIS — R21 RASH AND NONSPECIFIC SKIN ERUPTION: Primary | ICD-10-CM

## 2019-08-26 DIAGNOSIS — E78.2 MIXED HYPERLIPIDEMIA: ICD-10-CM

## 2019-08-26 PROCEDURE — 96372 THER/PROPH/DIAG INJ SC/IM: CPT | Performed by: NURSE PRACTITIONER

## 2019-08-26 PROCEDURE — 99214 OFFICE O/P EST MOD 30 MIN: CPT | Performed by: NURSE PRACTITIONER

## 2019-08-26 RX ORDER — METHYLPREDNISOLONE 4 MG/1
TABLET ORAL
Qty: 1 EACH | Refills: 0 | Status: SHIPPED | OUTPATIENT
Start: 2019-08-26 | End: 2019-08-26 | Stop reason: SDUPTHER

## 2019-08-26 RX ORDER — ATORVASTATIN CALCIUM 20 MG/1
20 TABLET, FILM COATED ORAL NIGHTLY
Qty: 30 TABLET | Refills: 5 | Status: SHIPPED | OUTPATIENT
Start: 2019-08-26 | End: 2019-10-22 | Stop reason: SDUPTHER

## 2019-08-26 RX ORDER — METHYLPREDNISOLONE 4 MG/1
TABLET ORAL
Qty: 1 EACH | Refills: 0 | Status: SHIPPED | OUTPATIENT
Start: 2019-08-26 | End: 2019-10-22

## 2019-08-26 RX ORDER — DEXAMETHASONE SODIUM PHOSPHATE 4 MG/ML
8 INJECTION, SOLUTION INTRA-ARTICULAR; INTRALESIONAL; INTRAMUSCULAR; INTRAVENOUS; SOFT TISSUE ONCE
Status: COMPLETED | OUTPATIENT
Start: 2019-08-26 | End: 2019-08-26

## 2019-08-26 RX ORDER — ATORVASTATIN CALCIUM 20 MG/1
20 TABLET, FILM COATED ORAL NIGHTLY
Qty: 30 TABLET | Refills: 5 | Status: SHIPPED | OUTPATIENT
Start: 2019-08-26 | End: 2019-08-26 | Stop reason: SDUPTHER

## 2019-08-26 RX ADMIN — DEXAMETHASONE SODIUM PHOSPHATE 8 MG: 4 INJECTION, SOLUTION INTRA-ARTICULAR; INTRALESIONAL; INTRAMUSCULAR; INTRAVENOUS; SOFT TISSUE at 16:42

## 2019-08-26 NOTE — PROGRESS NOTES
"Subjective   Ta Madison is a 59 y.o. male.   Chief Compliant: The patient presents with Skin Problem    Skin Problem   This is a new ([place]  Thinks he seen deer ticks) problem. The current episode started in the past 7 days. The problem occurs constantly. The problem has been unchanged. Associated symptoms include a rash. Pertinent negatives include no arthralgias, fatigue or fever. Associated symptoms comments: Intense itching  . Treatments tried: OTC creams, ETOH and bleach. The treatment provided no relief.   Hyperlipidemia   This is a chronic problem. The current episode started more than 1 year ago. Recent lipid tests were reviewed and are variable. Exacerbating diseases include obesity. Current antihyperlipidemic treatment includes statins. The current treatment provides moderate improvement of lipids. Compliance problems include adherence to diet and adherence to exercise.  Risk factors for coronary artery disease include male sex and family history.      The following portions of the patient's history were reviewed and updated as appropriate: allergies, current medications, past family history, past medical history, past social history, past surgical history and problem list.      Review of Systems   Constitutional: Negative.  Negative for fatigue and fever.   HENT: Negative.    Respiratory: Negative.    Cardiovascular: Negative.    Musculoskeletal: Negative for arthralgias.   Skin: Positive for rash.   All other systems reviewed and are negative.      Procedures    Vitals: Blood pressure 110/70, pulse 81, temperature 98.5 °F (36.9 °C), temperature source Oral, height 172.7 cm (68\"), weight 92.5 kg (204 lb), SpO2 98 %.     Allergies: No Known Allergies       Objective   Physical Exam   Constitutional: He is oriented to person, place, and time. He appears well-developed and well-nourished. No distress.   HENT:   Head: Normocephalic.   Cardiovascular: Normal rate, regular rhythm " and normal heart sounds.   No murmur heard.  Pulmonary/Chest: Effort normal and breath sounds normal.   Neurological: He is alert and oriented to person, place, and time.   Skin: Skin is warm and dry. Rash noted. He is not diaphoretic.   Bilateral forearms neck and legs with excoriated erythremic papular and nodular.           Psychiatric: He has a normal mood and affect. His behavior is normal.   Nursing note and vitals reviewed.      During this visit the following were done:  Labs Reviewed []    Labs Ordered []    Radiology Reports Reviewed []    Radiology Ordered []    PCP Records Reviewed []    Referring Provider Records Reviewed []    ER Records Reviewed []    Hospital Records Reviewed []    History Obtained From Family []    Radiology Images Reviewed []    Other Reviewed []    Records Requested []      Assessment/Plan   Discussed with patient impression and plan, patient verbalizes understanding  Ta was seen today for skin problem.    Diagnoses and all orders for this visit:    Rash and nonspecific skin eruption  -     permethrin (NIX CREME RINSE) 1 % liquid; Apply  topically to the appropriate area as directed 1 (One) Time for 1 dose.  -     methylPREDNISolone (MEDROL, KAYKAY,) 4 MG tablet; Take as directed on package instructions.  -     dexamethasone (DECADRON) injection 8 mg    Mixed hyperlipidemia    -     atorvastatin (LIPITOR) 20 MG tablet; Take 1 tablet by mouth Every Night.         Patient's Body mass index is 31.02 kg/m². BMI is above normal parameters. Recommendations include: exercise counseling and nutrition counseling.

## 2019-09-18 DIAGNOSIS — G89.29 CHRONIC RIGHT SHOULDER PAIN: ICD-10-CM

## 2019-09-18 DIAGNOSIS — M25.511 CHRONIC RIGHT SHOULDER PAIN: ICD-10-CM

## 2019-09-18 DIAGNOSIS — F45.8 NEUROPATHIC PRURITUS: ICD-10-CM

## 2019-09-18 RX ORDER — GABAPENTIN 600 MG/1
TABLET ORAL
Qty: 90 TABLET | Refills: 0 | Status: SHIPPED | OUTPATIENT
Start: 2019-09-18 | End: 2019-10-22 | Stop reason: SDUPTHER

## 2019-10-22 ENCOUNTER — OFFICE VISIT (OUTPATIENT)
Dept: FAMILY MEDICINE CLINIC | Facility: CLINIC | Age: 59
End: 2019-10-22

## 2019-10-22 VITALS
SYSTOLIC BLOOD PRESSURE: 124 MMHG | HEIGHT: 68 IN | BODY MASS INDEX: 30.46 KG/M2 | WEIGHT: 201 LBS | OXYGEN SATURATION: 98 % | DIASTOLIC BLOOD PRESSURE: 60 MMHG | HEART RATE: 99 BPM | TEMPERATURE: 98.3 F

## 2019-10-22 DIAGNOSIS — G89.29 CHRONIC RIGHT SHOULDER PAIN: ICD-10-CM

## 2019-10-22 DIAGNOSIS — R00.0 TACHYCARDIA: ICD-10-CM

## 2019-10-22 DIAGNOSIS — M25.511 CHRONIC RIGHT SHOULDER PAIN: ICD-10-CM

## 2019-10-22 DIAGNOSIS — F45.8 NEUROPATHIC PRURITUS: ICD-10-CM

## 2019-10-22 DIAGNOSIS — E78.2 MIXED HYPERLIPIDEMIA: ICD-10-CM

## 2019-10-22 DIAGNOSIS — Z88.9 MULTIPLE ALLERGIES: ICD-10-CM

## 2019-10-22 DIAGNOSIS — K21.9 GASTROESOPHAGEAL REFLUX DISEASE, ESOPHAGITIS PRESENCE NOT SPECIFIED: ICD-10-CM

## 2019-10-22 PROCEDURE — 99214 OFFICE O/P EST MOD 30 MIN: CPT | Performed by: NURSE PRACTITIONER

## 2019-10-22 RX ORDER — ATORVASTATIN CALCIUM 20 MG/1
20 TABLET, FILM COATED ORAL NIGHTLY
Qty: 30 TABLET | Refills: 5 | Status: SHIPPED | OUTPATIENT
Start: 2019-10-22 | End: 2020-11-10 | Stop reason: SDUPTHER

## 2019-10-22 RX ORDER — PANTOPRAZOLE SODIUM 40 MG/1
40 TABLET, DELAYED RELEASE ORAL DAILY
Qty: 30 TABLET | Refills: 5 | Status: SHIPPED | OUTPATIENT
Start: 2019-10-22 | End: 2020-05-05

## 2019-10-22 RX ORDER — ASPIRIN 81 MG/1
81 TABLET ORAL DAILY
Status: CANCELLED | OUTPATIENT
Start: 2019-10-22

## 2019-10-22 RX ORDER — KETOCONAZOLE 200 MG/1
200 TABLET ORAL DAILY
Qty: 10 TABLET | Refills: 0 | Status: SHIPPED | OUTPATIENT
Start: 2019-10-22 | End: 2020-11-10

## 2019-10-22 RX ORDER — ALBUTEROL SULFATE 90 UG/1
2 AEROSOL, METERED RESPIRATORY (INHALATION) EVERY 6 HOURS PRN
Qty: 18 G | Refills: 5 | Status: SHIPPED | OUTPATIENT
Start: 2019-10-22 | End: 2020-11-10 | Stop reason: SDUPTHER

## 2019-10-22 RX ORDER — GABAPENTIN 600 MG/1
600 TABLET ORAL 3 TIMES DAILY
Qty: 90 TABLET | Refills: 1 | OUTPATIENT
Start: 2019-10-22 | End: 2019-10-28 | Stop reason: SDUPTHER

## 2019-10-22 RX ORDER — GABAPENTIN 600 MG/1
600 TABLET ORAL 3 TIMES DAILY
Qty: 90 TABLET | Refills: 0 | Status: CANCELLED | OUTPATIENT
Start: 2019-10-22

## 2019-10-22 RX ORDER — HYDROXYZINE 50 MG/1
50 TABLET, FILM COATED ORAL 3 TIMES DAILY PRN
Qty: 90 TABLET | Refills: 1 | Status: SHIPPED | OUTPATIENT
Start: 2019-10-22 | End: 2020-01-06

## 2019-10-22 NOTE — PROGRESS NOTES
Subjective   Ta Madison is a 59 y.o. male.     Chief Complaint: Follow-up and Rash    Hyperlipidemia   This is a chronic problem. The current episode started more than 1 year ago. Factors aggravating his hyperlipidemia include beta blockers and fatty foods. Current antihyperlipidemic treatment includes statins. The current treatment provides significant improvement of lipids. Compliance problems include adherence to exercise and adherence to diet.    Heartburn   He complains of heartburn. This is a chronic problem. The current episode started more than 1 year ago. The problem occurs occasionally. The heartburn does not wake him from sleep. The heartburn does not limit his activity. The heartburn doesn't change with position. The symptoms are aggravated by certain foods. Risk factors include smoking/tobacco exposure, lack of exercise and caffeine use. He has tried a PPI for the symptoms. The treatment provided significant relief.   URI    This is a chronic (Environmental allergies) problem. The current episode started more than 1 year ago. There has been no fever. Associated symptoms include a rash. He has tried antihistamine for the symptoms. The treatment provided significant relief.   Palpitations    This is a chronic (Tachycardia) problem. The current episode started more than 1 year ago. The problem has been unchanged. The symptoms are aggravated by caffeine. He has tried beta blockers for the symptoms. The treatment provided significant relief.   Rash   This is a recurrent problem. The current episode started more than 1 month ago. The problem has been waxing and waning since onset. The affected locations include the scalp, face, left arm and right arm. The rash is characterized by redness and itchiness. It is unknown if there was an exposure to a precipitant. Past treatments include antihistamine and topical steroids. The treatment provided no relief. His past medical history is significant for allergies.           [facility] # [id number] Reviewed and is consistent.  UDS 2019 reviewed and is consistent.  Patient comfort assessment guide reviewed and updated today.    As part of the patient's treatment plan they are being prescribed a controlled substance/ substances with potential for abuse.  This patient has been made aware of the appropriate use of such medications, including potential risk of somnolence, limited ability to drive and/or work safely, and potential for overdose.  It has also been made clear these medications are for use by the patient only, without concomitant use of alcohol or other substances unless prescribed/advised by medical provider.    Patient has completed prescribing agreement detailing terms of continued prescribing of controlled substances including monitoring KRISTEN reports, urine drug screens, and pill counts.  The patient is aware KRISTEN reports are reviewed on a regular basis and scanned into the chart.    History and physical exam exhibit continued safe and appropriate use of controlled substances.    Family History   Problem Relation Age of Onset   • Nephrolithiasis Mother    • Heart disease Father    • Hypertension Father    • Kidney disease Father        Social History     Socioeconomic History   • Marital status:      Spouse name: Not on file   • Number of children: Not on file   • Years of education: Not on file   • Highest education level: Not on file   Tobacco Use   • Smoking status: Light Tobacco Smoker     Packs/day: 1.00     Types: Cigarettes     Last attempt to quit: 3/12/2019     Years since quittin.6   • Smokeless tobacco: Never Used   Substance and Sexual Activity   • Alcohol use: No   • Drug use: Yes     Types: Marijuana     Comment: occ    • Sexual activity: Defer       Past Medical History:   Diagnosis Date   • Increased heart rate    • Mini stroke (CMS/HCC)    • Neck pain    • Rash    • Tobacco abuse        Review of Systems   Constitutional: Negative.   "  HENT: Negative.    Respiratory: Negative.    Cardiovascular: Positive for palpitations.   Gastrointestinal: Positive for heartburn.   Musculoskeletal: Negative.    Skin: Positive for rash.   Neurological: Negative.    Psychiatric/Behavioral: Negative.        Objective   Physical Exam   Constitutional: He is oriented to person, place, and time. He appears well-developed and well-nourished.   Neck: Normal range of motion. Neck supple.   Cardiovascular: Normal rate, regular rhythm and normal heart sounds.   Pulmonary/Chest: Effort normal and breath sounds normal.   Neurological: He is alert and oriented to person, place, and time.   Skin: Skin is warm and dry.   Erythematous maculopapular rash to bilateral arms, face and scalp   Psychiatric: He has a normal mood and affect. His behavior is normal. Judgment and thought content normal.   Nursing note and vitals reviewed.      Procedures    Vitals: Blood pressure 124/60, pulse 99, temperature 98.3 °F (36.8 °C), temperature source Oral, height 172.7 cm (68\"), weight 91.2 kg (201 lb), SpO2 98 %.    Allergies: No Known Allergies     During this visit the following were done:  Labs Reviewed []    Labs Ordered []    Radiology Reports Reviewed []    Radiology Ordered []    PCP Records Reviewed []    Referring Provider Records Reviewed []    ER Records Reviewed []    Hospital Records Reviewed []    History Obtained From Family []    Radiology Images Reviewed []    Other Reviewed []    Records Requested []      Assessment/Plan   Ta was seen today for follow-up and rash.    Diagnoses and all orders for this visit:    Neuropathic pruritus  -     Ambulatory Referral to Allergy   Patient continues to have a rash.  He has seen an allergist in the past and was told to follow-up however he did not keep this appointment.  Will refer patient back to allergist again  -     hydrOXYzine (ATARAX) 50 MG tablet; Take 1 tablet by mouth 3 (Three) Times a Day As Needed for Itching.  -     " Start ketoconazole (NIZORAL) 200 MG tablet; Take 1 tablet by mouth Daily for 7 days    Gastroesophageal reflux disease, esophagitis presence not specified  -     Continue pantoprazole (PROTONIX) 40 MG EC tablet; Take 1 tablet by mouth Daily.    Mixed hyperlipidemia  -     Continue atorvastatin (LIPITOR) 20 MG tablet; Take 1 tablet by mouth Every Night.    Multiple allergies  -     PRN use: Albuterol sulfate  (90 Base) MCG/ACT inhaler; Inhale 2 puffs Every 6 (Six) Hours As Needed for Wheezing.    Tachycardia  -     Continue metoprolol tartrate (LOPRESSOR) 25 MG tablet; Take 1 tablet by mouth Daily.

## 2019-10-23 DIAGNOSIS — M25.511 CHRONIC RIGHT SHOULDER PAIN: ICD-10-CM

## 2019-10-23 DIAGNOSIS — G89.29 CHRONIC RIGHT SHOULDER PAIN: ICD-10-CM

## 2019-10-23 DIAGNOSIS — F45.8 NEUROPATHIC PRURITUS: ICD-10-CM

## 2019-10-24 RX ORDER — GABAPENTIN 600 MG/1
TABLET ORAL
Qty: 90 TABLET | Refills: 0 | OUTPATIENT
Start: 2019-10-24

## 2019-10-28 ENCOUNTER — TELEPHONE (OUTPATIENT)
Dept: FAMILY MEDICINE CLINIC | Facility: CLINIC | Age: 59
End: 2019-10-28

## 2019-10-28 ENCOUNTER — LAB (OUTPATIENT)
Dept: FAMILY MEDICINE CLINIC | Facility: CLINIC | Age: 59
End: 2019-10-28

## 2019-10-28 DIAGNOSIS — F45.8 NEUROPATHIC PRURITUS: ICD-10-CM

## 2019-10-28 DIAGNOSIS — G89.29 CHRONIC RIGHT SHOULDER PAIN: ICD-10-CM

## 2019-10-28 DIAGNOSIS — Z88.9 MULTIPLE ALLERGIES: ICD-10-CM

## 2019-10-28 DIAGNOSIS — E78.2 MIXED HYPERLIPIDEMIA: ICD-10-CM

## 2019-10-28 DIAGNOSIS — K21.9 GASTROESOPHAGEAL REFLUX DISEASE, ESOPHAGITIS PRESENCE NOT SPECIFIED: ICD-10-CM

## 2019-10-28 DIAGNOSIS — M25.511 CHRONIC RIGHT SHOULDER PAIN: ICD-10-CM

## 2019-10-28 PROCEDURE — 80050 GENERAL HEALTH PANEL: CPT | Performed by: NURSE PRACTITIONER

## 2019-10-28 PROCEDURE — 80061 LIPID PANEL: CPT | Performed by: NURSE PRACTITIONER

## 2019-10-28 PROCEDURE — 83735 ASSAY OF MAGNESIUM: CPT | Performed by: NURSE PRACTITIONER

## 2019-10-28 RX ORDER — GABAPENTIN 600 MG/1
600 TABLET ORAL 3 TIMES DAILY
Qty: 90 TABLET | Refills: 0 | Status: SHIPPED | OUTPATIENT
Start: 2019-10-28 | End: 2019-11-26 | Stop reason: SDUPTHER

## 2019-10-29 ENCOUNTER — TELEPHONE (OUTPATIENT)
Dept: FAMILY MEDICINE CLINIC | Facility: CLINIC | Age: 59
End: 2019-10-29

## 2019-10-29 LAB
ALBUMIN SERPL-MCNC: 4.2 G/DL (ref 3.5–5.2)
ALBUMIN/GLOB SERPL: 1.4 G/DL
ALP SERPL-CCNC: 139 U/L (ref 39–117)
ALT SERPL W P-5'-P-CCNC: 14 U/L (ref 1–41)
ANION GAP SERPL CALCULATED.3IONS-SCNC: 5.8 MMOL/L (ref 5–15)
AST SERPL-CCNC: 11 U/L (ref 1–40)
BASOPHILS # BLD AUTO: 0.11 10*3/MM3 (ref 0–0.2)
BASOPHILS NFR BLD AUTO: 1.2 % (ref 0–1.5)
BILIRUB SERPL-MCNC: 0.2 MG/DL (ref 0.2–1.2)
BUN BLD-MCNC: 14 MG/DL (ref 6–20)
BUN/CREAT SERPL: 14.1 (ref 7–25)
CALCIUM SPEC-SCNC: 8.9 MG/DL (ref 8.6–10.5)
CHLORIDE SERPL-SCNC: 99 MMOL/L (ref 98–107)
CHOLEST SERPL-MCNC: 115 MG/DL (ref 0–200)
CO2 SERPL-SCNC: 31.2 MMOL/L (ref 22–29)
CREAT BLD-MCNC: 0.99 MG/DL (ref 0.76–1.27)
DEPRECATED RDW RBC AUTO: 43.3 FL (ref 37–54)
EOSINOPHIL # BLD AUTO: 0.14 10*3/MM3 (ref 0–0.4)
EOSINOPHIL NFR BLD AUTO: 1.5 % (ref 0.3–6.2)
ERYTHROCYTE [DISTWIDTH] IN BLOOD BY AUTOMATED COUNT: 14 % (ref 12.3–15.4)
GFR SERPL CREATININE-BSD FRML MDRD: 78 ML/MIN/1.73
GLOBULIN UR ELPH-MCNC: 3.1 GM/DL
GLUCOSE BLD-MCNC: 107 MG/DL (ref 65–99)
HCT VFR BLD AUTO: 44.4 % (ref 37.5–51)
HDLC SERPL-MCNC: 28 MG/DL (ref 40–60)
HGB BLD-MCNC: 14.5 G/DL (ref 13–17.7)
IMM GRANULOCYTES # BLD AUTO: 0.04 10*3/MM3 (ref 0–0.05)
IMM GRANULOCYTES NFR BLD AUTO: 0.4 % (ref 0–0.5)
LDLC SERPL CALC-MCNC: 36 MG/DL (ref 0–100)
LDLC/HDLC SERPL: 1.3 {RATIO}
LYMPHOCYTES # BLD AUTO: 2.13 10*3/MM3 (ref 0.7–3.1)
LYMPHOCYTES NFR BLD AUTO: 22.5 % (ref 19.6–45.3)
MAGNESIUM SERPL-MCNC: 2.2 MG/DL (ref 1.6–2.6)
MCH RBC QN AUTO: 27.8 PG (ref 26.6–33)
MCHC RBC AUTO-ENTMCNC: 32.7 G/DL (ref 31.5–35.7)
MCV RBC AUTO: 85.2 FL (ref 79–97)
MONOCYTES # BLD AUTO: 0.74 10*3/MM3 (ref 0.1–0.9)
MONOCYTES NFR BLD AUTO: 7.8 % (ref 5–12)
NEUTROPHILS # BLD AUTO: 6.29 10*3/MM3 (ref 1.7–7)
NEUTROPHILS NFR BLD AUTO: 66.6 % (ref 42.7–76)
NRBC BLD AUTO-RTO: 0 /100 WBC (ref 0–0.2)
PLATELET # BLD AUTO: 440 10*3/MM3 (ref 140–450)
PMV BLD AUTO: 11.5 FL (ref 6–12)
POTASSIUM BLD-SCNC: 4.3 MMOL/L (ref 3.5–5.2)
PROT SERPL-MCNC: 7.3 G/DL (ref 6–8.5)
RBC # BLD AUTO: 5.21 10*6/MM3 (ref 4.14–5.8)
SODIUM BLD-SCNC: 136 MMOL/L (ref 136–145)
TRIGL SERPL-MCNC: 253 MG/DL (ref 0–150)
TSH SERPL DL<=0.05 MIU/L-ACNC: 1.05 UIU/ML (ref 0.27–4.2)
VLDLC SERPL-MCNC: 50.6 MG/DL (ref 5–40)
WBC NRBC COR # BLD: 9.45 10*3/MM3 (ref 3.4–10.8)

## 2019-10-29 NOTE — TELEPHONE ENCOUNTER
----- Message from EUGENIA Hernadez sent at 10/29/2019  1:38 PM EDT -----  Patient does have an elevated triglyceride level.  He should be taking his statin as prescribed.  Can you please make sure that he is?  Also he may take fish oil 2000 units daily to help with his triglyceride levels.Please let patient know.      Attempted to contact patient,no answer & no way to leave a message at this time.    Wife  Azael notified as per his Hippa,he is taking his Lipitor ,will add Fish oil ,she has some at home.

## 2019-10-30 RX ORDER — CHLORAL HYDRATE 500 MG
2000 CAPSULE ORAL
Qty: 60 CAPSULE | Refills: 5
Start: 2019-10-30 | End: 2021-03-09

## 2019-11-22 DIAGNOSIS — L28.2 PRURITIC RASH: ICD-10-CM

## 2019-11-22 RX ORDER — AMITRIPTYLINE HYDROCHLORIDE 25 MG/1
25 TABLET, FILM COATED ORAL NIGHTLY
Qty: 30 TABLET | Refills: 0 | OUTPATIENT
Start: 2019-11-22

## 2019-11-26 ENCOUNTER — TELEPHONE (OUTPATIENT)
Dept: FAMILY MEDICINE CLINIC | Facility: CLINIC | Age: 59
End: 2019-11-26

## 2019-11-26 DIAGNOSIS — G89.29 CHRONIC RIGHT SHOULDER PAIN: ICD-10-CM

## 2019-11-26 DIAGNOSIS — M25.511 CHRONIC RIGHT SHOULDER PAIN: ICD-10-CM

## 2019-11-26 DIAGNOSIS — F45.8 NEUROPATHIC PRURITUS: ICD-10-CM

## 2019-11-26 RX ORDER — GABAPENTIN 600 MG/1
600 TABLET ORAL 3 TIMES DAILY
Qty: 90 TABLET | Refills: 1 | Status: SHIPPED | OUTPATIENT
Start: 2019-11-26 | End: 2020-02-11 | Stop reason: SDUPTHER

## 2019-12-23 DIAGNOSIS — F45.8 NEUROPATHIC PRURITUS: ICD-10-CM

## 2019-12-23 RX ORDER — NORTRIPTYLINE HYDROCHLORIDE 25 MG/1
25 CAPSULE ORAL NIGHTLY
Qty: 30 CAPSULE | Refills: 2 | OUTPATIENT
Start: 2019-12-23

## 2019-12-23 RX ORDER — DOXEPIN HYDROCHLORIDE 50 MG/1
50 CAPSULE ORAL NIGHTLY
Qty: 30 CAPSULE | Refills: 2 | OUTPATIENT
Start: 2019-12-23

## 2019-12-23 RX ORDER — AMITRIPTYLINE HYDROCHLORIDE 50 MG/1
50 TABLET, FILM COATED ORAL NIGHTLY
Qty: 30 TABLET | Refills: 2 | OUTPATIENT
Start: 2019-12-23

## 2020-01-06 DIAGNOSIS — F45.8 NEUROPATHIC PRURITUS: ICD-10-CM

## 2020-01-06 RX ORDER — HYDROXYZINE 50 MG/1
TABLET, FILM COATED ORAL
Qty: 90 TABLET | Refills: 1 | Status: SHIPPED | OUTPATIENT
Start: 2020-01-06 | End: 2020-03-17 | Stop reason: SDUPTHER

## 2020-02-10 ENCOUNTER — TELEPHONE (OUTPATIENT)
Dept: FAMILY MEDICINE CLINIC | Facility: CLINIC | Age: 60
End: 2020-02-10

## 2020-02-11 DIAGNOSIS — F45.8 NEUROPATHIC PRURITUS: ICD-10-CM

## 2020-02-11 DIAGNOSIS — M25.511 CHRONIC RIGHT SHOULDER PAIN: ICD-10-CM

## 2020-02-11 DIAGNOSIS — G89.29 CHRONIC RIGHT SHOULDER PAIN: ICD-10-CM

## 2020-02-11 RX ORDER — GABAPENTIN 600 MG/1
600 TABLET ORAL 3 TIMES DAILY
Qty: 90 TABLET | Refills: 1 | Status: SHIPPED | OUTPATIENT
Start: 2020-02-11 | End: 2020-04-15 | Stop reason: SDUPTHER

## 2020-03-17 DIAGNOSIS — F45.8 NEUROPATHIC PRURITUS: ICD-10-CM

## 2020-03-17 RX ORDER — HYDROXYZINE 50 MG/1
50 TABLET, FILM COATED ORAL 3 TIMES DAILY PRN
Qty: 90 TABLET | Refills: 0 | Status: SHIPPED | OUTPATIENT
Start: 2020-03-17 | End: 2020-04-15 | Stop reason: SDUPTHER

## 2020-04-15 DIAGNOSIS — M25.511 CHRONIC RIGHT SHOULDER PAIN: ICD-10-CM

## 2020-04-15 DIAGNOSIS — F45.8 NEUROPATHIC PRURITUS: ICD-10-CM

## 2020-04-15 DIAGNOSIS — G89.29 CHRONIC RIGHT SHOULDER PAIN: ICD-10-CM

## 2020-04-15 NOTE — TELEPHONE ENCOUNTER
Patient requesting refills on gabapentin and hydroxyzine. Also requesting refills for amitriptyline but did not see on list. Frederick on your desk.

## 2020-04-16 RX ORDER — GABAPENTIN 600 MG/1
600 TABLET ORAL 3 TIMES DAILY
Qty: 90 TABLET | Refills: 1 | Status: SHIPPED | OUTPATIENT
Start: 2020-04-16 | End: 2020-05-29 | Stop reason: SDUPTHER

## 2020-04-16 RX ORDER — HYDROXYZINE 50 MG/1
50 TABLET, FILM COATED ORAL 3 TIMES DAILY PRN
Qty: 90 TABLET | Refills: 0 | Status: SHIPPED | OUTPATIENT
Start: 2020-04-16 | End: 2020-05-29 | Stop reason: SDUPTHER

## 2020-05-05 DIAGNOSIS — K21.9 GASTROESOPHAGEAL REFLUX DISEASE, ESOPHAGITIS PRESENCE NOT SPECIFIED: ICD-10-CM

## 2020-05-05 DIAGNOSIS — R00.0 TACHYCARDIA: ICD-10-CM

## 2020-05-05 RX ORDER — PANTOPRAZOLE SODIUM 40 MG/1
40 TABLET, DELAYED RELEASE ORAL DAILY
Qty: 30 TABLET | Refills: 5 | Status: SHIPPED | OUTPATIENT
Start: 2020-05-05 | End: 2020-11-10 | Stop reason: SDUPTHER

## 2020-05-29 DIAGNOSIS — G89.29 CHRONIC RIGHT SHOULDER PAIN: ICD-10-CM

## 2020-05-29 DIAGNOSIS — M25.511 CHRONIC RIGHT SHOULDER PAIN: ICD-10-CM

## 2020-05-29 DIAGNOSIS — F45.8 NEUROPATHIC PRURITUS: ICD-10-CM

## 2020-05-29 RX ORDER — HYDROXYZINE 50 MG/1
50 TABLET, FILM COATED ORAL 3 TIMES DAILY PRN
Qty: 90 TABLET | Refills: 0 | Status: SHIPPED | OUTPATIENT
Start: 2020-05-29 | End: 2020-06-18

## 2020-05-29 RX ORDER — GABAPENTIN 600 MG/1
600 TABLET ORAL 3 TIMES DAILY
Qty: 90 TABLET | Refills: 1 | Status: SHIPPED | OUTPATIENT
Start: 2020-05-29 | End: 2020-07-22

## 2020-06-18 DIAGNOSIS — F45.8 NEUROPATHIC PRURITUS: ICD-10-CM

## 2020-06-18 RX ORDER — HYDROXYZINE 50 MG/1
TABLET, FILM COATED ORAL
Qty: 90 TABLET | Refills: 0 | Status: SHIPPED | OUTPATIENT
Start: 2020-06-18 | End: 2020-08-12 | Stop reason: SDUPTHER

## 2020-07-09 DIAGNOSIS — E78.2 MIXED HYPERLIPIDEMIA: ICD-10-CM

## 2020-07-09 RX ORDER — ATORVASTATIN CALCIUM 20 MG/1
20 TABLET, FILM COATED ORAL NIGHTLY
Qty: 30 TABLET | Refills: 5 | OUTPATIENT
Start: 2020-07-09

## 2020-07-22 DIAGNOSIS — G89.29 CHRONIC RIGHT SHOULDER PAIN: ICD-10-CM

## 2020-07-22 DIAGNOSIS — F45.8 NEUROPATHIC PRURITUS: ICD-10-CM

## 2020-07-22 DIAGNOSIS — M25.511 CHRONIC RIGHT SHOULDER PAIN: ICD-10-CM

## 2020-07-22 RX ORDER — GABAPENTIN 600 MG/1
TABLET ORAL
Qty: 90 TABLET | Refills: 1 | Status: SHIPPED | OUTPATIENT
Start: 2020-07-22 | End: 2020-11-10 | Stop reason: SDUPTHER

## 2020-08-12 ENCOUNTER — TELEPHONE (OUTPATIENT)
Dept: FAMILY MEDICINE CLINIC | Facility: CLINIC | Age: 60
End: 2020-08-12

## 2020-08-12 DIAGNOSIS — F45.8 NEUROPATHIC PRURITUS: ICD-10-CM

## 2020-08-12 RX ORDER — HYDROXYZINE 50 MG/1
50 TABLET, FILM COATED ORAL 3 TIMES DAILY PRN
Qty: 90 TABLET | Refills: 2 | Status: SHIPPED | OUTPATIENT
Start: 2020-08-12 | End: 2020-11-10 | Stop reason: SDUPTHER

## 2020-10-16 DIAGNOSIS — G89.29 CHRONIC RIGHT SHOULDER PAIN: ICD-10-CM

## 2020-10-16 DIAGNOSIS — F45.8 NEUROPATHIC PRURITUS: ICD-10-CM

## 2020-10-16 DIAGNOSIS — R00.0 TACHYCARDIA: ICD-10-CM

## 2020-10-16 DIAGNOSIS — M25.511 CHRONIC RIGHT SHOULDER PAIN: ICD-10-CM

## 2020-10-16 RX ORDER — GABAPENTIN 600 MG/1
TABLET ORAL
Qty: 90 TABLET | OUTPATIENT
Start: 2020-10-16

## 2020-10-30 DIAGNOSIS — F45.8 NEUROPATHIC PRURITUS: ICD-10-CM

## 2020-10-30 DIAGNOSIS — G89.29 CHRONIC RIGHT SHOULDER PAIN: ICD-10-CM

## 2020-10-30 DIAGNOSIS — M25.511 CHRONIC RIGHT SHOULDER PAIN: ICD-10-CM

## 2020-10-30 RX ORDER — GABAPENTIN 600 MG/1
600 TABLET ORAL 3 TIMES DAILY
Qty: 90 TABLET | Refills: 1 | OUTPATIENT
Start: 2020-10-30

## 2020-11-10 ENCOUNTER — OFFICE VISIT (OUTPATIENT)
Dept: FAMILY MEDICINE CLINIC | Facility: CLINIC | Age: 60
End: 2020-11-10

## 2020-11-10 VITALS
HEIGHT: 69 IN | SYSTOLIC BLOOD PRESSURE: 110 MMHG | OXYGEN SATURATION: 98 % | WEIGHT: 196 LBS | HEART RATE: 80 BPM | DIASTOLIC BLOOD PRESSURE: 62 MMHG | TEMPERATURE: 97.7 F | BODY MASS INDEX: 29.03 KG/M2

## 2020-11-10 DIAGNOSIS — M25.511 CHRONIC RIGHT SHOULDER PAIN: ICD-10-CM

## 2020-11-10 DIAGNOSIS — E78.2 MIXED HYPERLIPIDEMIA: ICD-10-CM

## 2020-11-10 DIAGNOSIS — F45.8 NEUROPATHIC PRURITUS: ICD-10-CM

## 2020-11-10 DIAGNOSIS — G89.29 CHRONIC RIGHT SHOULDER PAIN: ICD-10-CM

## 2020-11-10 DIAGNOSIS — R21 RASH: Primary | ICD-10-CM

## 2020-11-10 DIAGNOSIS — Z88.9 MULTIPLE ALLERGIES: ICD-10-CM

## 2020-11-10 DIAGNOSIS — K21.9 GASTROESOPHAGEAL REFLUX DISEASE: ICD-10-CM

## 2020-11-10 DIAGNOSIS — Z12.5 SCREENING PSA (PROSTATE SPECIFIC ANTIGEN): ICD-10-CM

## 2020-11-10 DIAGNOSIS — R00.0 TACHYCARDIA: ICD-10-CM

## 2020-11-10 PROCEDURE — 99214 OFFICE O/P EST MOD 30 MIN: CPT | Performed by: NURSE PRACTITIONER

## 2020-11-10 PROCEDURE — 80050 GENERAL HEALTH PANEL: CPT | Performed by: NURSE PRACTITIONER

## 2020-11-10 PROCEDURE — 80061 LIPID PANEL: CPT | Performed by: NURSE PRACTITIONER

## 2020-11-10 PROCEDURE — 82607 VITAMIN B-12: CPT | Performed by: NURSE PRACTITIONER

## 2020-11-10 PROCEDURE — G0103 PSA SCREENING: HCPCS | Performed by: NURSE PRACTITIONER

## 2020-11-10 PROCEDURE — 83735 ASSAY OF MAGNESIUM: CPT | Performed by: NURSE PRACTITIONER

## 2020-11-10 RX ORDER — HYDROXYZINE 50 MG/1
50 TABLET, FILM COATED ORAL 3 TIMES DAILY PRN
Qty: 90 TABLET | Refills: 2 | Status: SHIPPED | OUTPATIENT
Start: 2020-11-10 | End: 2021-02-02 | Stop reason: SDUPTHER

## 2020-11-10 RX ORDER — ALBUTEROL SULFATE 90 UG/1
2 AEROSOL, METERED RESPIRATORY (INHALATION) EVERY 6 HOURS PRN
Qty: 18 G | Refills: 5 | Status: SHIPPED | OUTPATIENT
Start: 2020-11-10 | End: 2022-01-12 | Stop reason: SDUPTHER

## 2020-11-10 RX ORDER — PANTOPRAZOLE SODIUM 40 MG/1
40 TABLET, DELAYED RELEASE ORAL DAILY
Qty: 30 TABLET | Refills: 5 | Status: SHIPPED | OUTPATIENT
Start: 2020-11-10 | End: 2021-06-14

## 2020-11-10 RX ORDER — ATORVASTATIN CALCIUM 20 MG/1
20 TABLET, FILM COATED ORAL NIGHTLY
Qty: 30 TABLET | Refills: 5 | Status: SHIPPED | OUTPATIENT
Start: 2020-11-10 | End: 2021-06-29 | Stop reason: SDUPTHER

## 2020-11-10 RX ORDER — GABAPENTIN 600 MG/1
600 TABLET ORAL 3 TIMES DAILY
Qty: 90 TABLET | Refills: 1 | Status: SHIPPED | OUTPATIENT
Start: 2020-11-10 | End: 2021-02-02 | Stop reason: SDUPTHER

## 2020-11-10 RX ORDER — GABAPENTIN 600 MG/1
600 TABLET ORAL 3 TIMES DAILY
Qty: 90 TABLET | Refills: 1 | Status: CANCELLED | OUTPATIENT
Start: 2020-11-10

## 2020-11-10 NOTE — PROGRESS NOTES
Subjective   Ta Madison is a 60 y.o. male.     Chief Complaint: Follow-up and Rash    History of Present Illness   Hyperlipidemia   This is a chronic problem. The current episode started more than 1 year ago. Factors aggravating his hyperlipidemia include beta blockers and fatty foods. Current antihyperlipidemic treatment includes statins. The current treatment provides significant improvement of lipids. Compliance problems include adherence to exercise and adherence to diet.  He has not been taking a statin for the past 5 months.  Heartburn   He complains of heartburn. This is a chronic problem. The current episode started more than 1 year ago. The problem occurs occasionally. The heartburn does not wake him from sleep. The heartburn does not limit his activity. The heartburn doesn't change with position. The symptoms are aggravated by certain foods. Risk factors include smoking/tobacco exposure, lack of exercise and caffeine use. He has tried a PPI for the symptoms. The treatment provided significant relief.   URI    This is a chronic (Environmental allergies) problem. The current episode started more than 1 year ago. There has been no fever. Associated symptoms include a rash. He has tried antihistamine for the symptoms. The treatment provided significant relief.   Palpitations    This is a chronic (Tachycardia) problem. The current episode started more than 1 year ago. The problem has been unchanged. The symptoms are aggravated by caffeine. He has tried beta blockers for the symptoms. The treatment provided significant relief.   Rash   This is a recurrent problem. The current episode started more than 1 year ago. The problem has been waxing and waning since onset. The affected locations include the scalp, chest, face, left arm and right arm. The rash is characterized by redness and itchiness. It is unknown if there was an exposure to a precipitant. Past treatments include antihistamine and topical steroids.  The treatment provided no relief. His past medical history is significant for allergies.   He was scheduled to see allergist last year but did not receive any appointment time.  Would like referral again as he continues to have rash.    Family History   Problem Relation Age of Onset   • Nephrolithiasis Mother    • Heart disease Father    • Hypertension Father    • Kidney disease Father        Social History     Socioeconomic History   • Marital status:      Spouse name: Not on file   • Number of children: Not on file   • Years of education: Not on file   • Highest education level: Not on file   Tobacco Use   • Smoking status: Light Tobacco Smoker     Packs/day: 1.00     Types: Cigarettes     Last attempt to quit: 3/12/2019     Years since quittin.6   • Smokeless tobacco: Never Used   Substance and Sexual Activity   • Alcohol use: No   • Drug use: Yes     Types: Marijuana     Comment: occ    • Sexual activity: Defer       Past Medical History:   Diagnosis Date   • Increased heart rate    • Mini stroke (CMS/HCC)    • Neck pain    • Rash    • Tobacco abuse        Review of Systems   Constitutional: Negative.    HENT: Negative.    Respiratory: Negative.    Cardiovascular: Negative.    Gastrointestinal: Negative.    Musculoskeletal: Negative.    Skin: Negative.    Neurological: Negative.    Psychiatric/Behavioral: Negative.        Objective   Physical Exam  Vitals signs and nursing note reviewed.   Constitutional:       Appearance: He is well-developed.   Neck:      Musculoskeletal: Normal range of motion and neck supple.   Cardiovascular:      Rate and Rhythm: Normal rate.   Pulmonary:      Effort: Pulmonary effort is normal.   Skin:     General: Skin is warm and dry.      Comments: Maculopapular rash noted to right chest area; bilateral arms and scalp   Neurological:      Mental Status: He is alert and oriented to person, place, and time.   Psychiatric:         Behavior: Behavior normal.         Thought  "Content: Thought content normal.         Judgment: Judgment normal.         Procedures    Vitals: Blood pressure 110/62, pulse 80, temperature 97.7 °F (36.5 °C), height 175.3 cm (69\"), weight 88.9 kg (196 lb), SpO2 98 %.    Body mass index is 28.94 kg/m².     Allergies: No Known Allergies     During this visit the following were done:  Labs Reviewed []    Labs Ordered []    Radiology Reports Reviewed []    Radiology Ordered []    PCP Records Reviewed []    Referring Provider Records Reviewed []    ER Records Reviewed []    Hospital Records Reviewed []    History Obtained From Family []    Radiology Images Reviewed []    Other Reviewed []    Records Requested []      Assessment/Plan   Diagnoses and all orders for this visit:    1. Rash (Primary)  -     Ambulatory Referral to Allergy  -     CBC & Differential; Future  -     Comprehensive Metabolic Panel; Future  -     TSH; Future  -     Lipid Panel; Future  -     Magnesium; Future  -     Vitamin B12; Future  -     PSA Screen; Future  -     CBC & Differential  -     Comprehensive Metabolic Panel  -     TSH  -     Lipid Panel  -     Magnesium  -     Vitamin B12  -     PSA Screen  -     CBC Auto Differential    2. Neuropathic pruritus  -     hydrOXYzine (ATARAX) 50 MG tablet; Take 1 tablet by mouth 3 (Three) Times a Day As Needed for Itching.  Dispense: 90 tablet; Refill: 2  -     CBC & Differential; Future  -     Comprehensive Metabolic Panel; Future  -     TSH; Future  -     Lipid Panel; Future  -     Magnesium; Future  -     Vitamin B12; Future  -     PSA Screen; Future  -     gabapentin (NEURONTIN) 600 MG tablet; Take 1 tablet by mouth 3 (Three) Times a Day.  Dispense: 90 tablet; Refill: 1  -     CBC & Differential  -     Comprehensive Metabolic Panel  -     TSH  -     Lipid Panel  -     Magnesium  -     Vitamin B12  -     PSA Screen  -     CBC Auto Differential    3. Chronic right shoulder pain  -     CBC & Differential; Future  -     Comprehensive Metabolic Panel; " Future  -     TSH; Future  -     Lipid Panel; Future  -     Magnesium; Future  -     Vitamin B12; Future  -     PSA Screen; Future  -     gabapentin (NEURONTIN) 600 MG tablet; Take 1 tablet by mouth 3 (Three) Times a Day.  Dispense: 90 tablet; Refill: 1  -     CBC & Differential  -     Comprehensive Metabolic Panel  -     TSH  -     Lipid Panel  -     Magnesium  -     Vitamin B12  -     PSA Screen  -     CBC Auto Differential    4. Multiple allergies  -     albuterol sulfate  (90 Base) MCG/ACT inhaler; Inhale 2 puffs Every 6 (Six) Hours As Needed for Wheezing.  Dispense: 18 g; Refill: 5  -     CBC & Differential; Future  -     Comprehensive Metabolic Panel; Future  -     TSH; Future  -     Lipid Panel; Future  -     Magnesium; Future  -     Vitamin B12; Future  -     PSA Screen; Future  -     CBC & Differential  -     Comprehensive Metabolic Panel  -     TSH  -     Lipid Panel  -     Magnesium  -     Vitamin B12  -     PSA Screen  -     CBC Auto Differential    5. Mixed hyperlipidemia  -     atorvastatin (LIPITOR) 20 MG tablet; Take 1 tablet by mouth Every Night.  Dispense: 30 tablet; Refill: 5  -     CBC & Differential; Future  -     Comprehensive Metabolic Panel; Future  -     TSH; Future  -     Lipid Panel; Future  -     Magnesium; Future  -     Vitamin B12; Future  -     PSA Screen; Future  -     CBC & Differential  -     Comprehensive Metabolic Panel  -     TSH  -     Lipid Panel  -     Magnesium  -     Vitamin B12  -     PSA Screen  -     CBC Auto Differential    6. Gastroesophageal reflux disease  -     pantoprazole (PROTONIX) 40 MG EC tablet; Take 1 tablet by mouth Daily.  Dispense: 30 tablet; Refill: 5  -     CBC & Differential; Future  -     Comprehensive Metabolic Panel; Future  -     TSH; Future  -     Lipid Panel; Future  -     Magnesium; Future  -     Vitamin B12; Future  -     PSA Screen; Future  -     CBC & Differential  -     Comprehensive Metabolic Panel  -     TSH  -     Lipid Panel  -      Magnesium  -     Vitamin B12  -     PSA Screen  -     CBC Auto Differential    7. Tachycardia  -     metoprolol tartrate (LOPRESSOR) 25 MG tablet; Take 1 tablet by mouth Daily.  Dispense: 90 tablet; Refill: 1  -     CBC & Differential; Future  -     Comprehensive Metabolic Panel; Future  -     TSH; Future  -     Lipid Panel; Future  -     Magnesium; Future  -     Vitamin B12; Future  -     PSA Screen; Future  -     CBC & Differential  -     Comprehensive Metabolic Panel  -     TSH  -     Lipid Panel  -     Magnesium  -     Vitamin B12  -     PSA Screen  -     CBC Auto Differential    8. Screening PSA (prostate specific antigen)  -     PSA Screen; Future  -     PSA Screen

## 2020-11-11 ENCOUNTER — TELEPHONE (OUTPATIENT)
Dept: FAMILY MEDICINE CLINIC | Facility: CLINIC | Age: 60
End: 2020-11-11

## 2020-11-11 DIAGNOSIS — E78.2 MIXED HYPERLIPIDEMIA: ICD-10-CM

## 2020-11-11 DIAGNOSIS — R97.20 ELEVATED PSA: Primary | ICD-10-CM

## 2020-11-11 LAB
ALBUMIN SERPL-MCNC: 4.3 G/DL (ref 3.5–5.2)
ALBUMIN/GLOB SERPL: 1.6 G/DL
ALP SERPL-CCNC: 575 U/L (ref 39–117)
ALT SERPL W P-5'-P-CCNC: 12 U/L (ref 1–41)
ANION GAP SERPL CALCULATED.3IONS-SCNC: 8.6 MMOL/L (ref 5–15)
AST SERPL-CCNC: 12 U/L (ref 1–40)
BASOPHILS # BLD AUTO: 0.09 10*3/MM3 (ref 0–0.2)
BASOPHILS NFR BLD AUTO: 1.1 % (ref 0–1.5)
BILIRUB SERPL-MCNC: 0.3 MG/DL (ref 0–1.2)
BUN SERPL-MCNC: 13 MG/DL (ref 6–20)
BUN/CREAT SERPL: 13.3 (ref 7–25)
CALCIUM SPEC-SCNC: 9.1 MG/DL (ref 8.6–10.5)
CHLORIDE SERPL-SCNC: 102 MMOL/L (ref 98–107)
CHOLEST SERPL-MCNC: 211 MG/DL (ref 0–200)
CO2 SERPL-SCNC: 25.4 MMOL/L (ref 22–29)
CREAT SERPL-MCNC: 0.98 MG/DL (ref 0.76–1.27)
DEPRECATED RDW RBC AUTO: 41.2 FL (ref 37–54)
EOSINOPHIL # BLD AUTO: 0.1 10*3/MM3 (ref 0–0.4)
EOSINOPHIL NFR BLD AUTO: 1.2 % (ref 0.3–6.2)
ERYTHROCYTE [DISTWIDTH] IN BLOOD BY AUTOMATED COUNT: 14.3 % (ref 12.3–15.4)
GFR SERPL CREATININE-BSD FRML MDRD: 78 ML/MIN/1.73
GLOBULIN UR ELPH-MCNC: 2.7 GM/DL
GLUCOSE SERPL-MCNC: 99 MG/DL (ref 65–99)
HCT VFR BLD AUTO: 44 % (ref 37.5–51)
HDLC SERPL-MCNC: 27 MG/DL (ref 40–60)
HGB BLD-MCNC: 14.4 G/DL (ref 13–17.7)
IMM GRANULOCYTES # BLD AUTO: 0.03 10*3/MM3 (ref 0–0.05)
IMM GRANULOCYTES NFR BLD AUTO: 0.4 % (ref 0–0.5)
LDLC SERPL CALC-MCNC: 132 MG/DL (ref 0–100)
LDLC/HDLC SERPL: 4.67 {RATIO}
LYMPHOCYTES # BLD AUTO: 1.93 10*3/MM3 (ref 0.7–3.1)
LYMPHOCYTES NFR BLD AUTO: 22.9 % (ref 19.6–45.3)
MAGNESIUM SERPL-MCNC: 2.1 MG/DL (ref 1.6–2.6)
MCH RBC QN AUTO: 26.4 PG (ref 26.6–33)
MCHC RBC AUTO-ENTMCNC: 32.7 G/DL (ref 31.5–35.7)
MCV RBC AUTO: 80.6 FL (ref 79–97)
MONOCYTES # BLD AUTO: 0.66 10*3/MM3 (ref 0.1–0.9)
MONOCYTES NFR BLD AUTO: 7.8 % (ref 5–12)
NEUTROPHILS NFR BLD AUTO: 5.63 10*3/MM3 (ref 1.7–7)
NEUTROPHILS NFR BLD AUTO: 66.6 % (ref 42.7–76)
NRBC BLD AUTO-RTO: 0 /100 WBC (ref 0–0.2)
PLATELET # BLD AUTO: 416 10*3/MM3 (ref 140–450)
PMV BLD AUTO: 10.9 FL (ref 6–12)
POTASSIUM SERPL-SCNC: 4.4 MMOL/L (ref 3.5–5.2)
PROT SERPL-MCNC: 7 G/DL (ref 6–8.5)
PSA SERPL-MCNC: 28.3 NG/ML (ref 0–4)
RBC # BLD AUTO: 5.46 10*6/MM3 (ref 4.14–5.8)
SODIUM SERPL-SCNC: 136 MMOL/L (ref 136–145)
TRIGL SERPL-MCNC: 289 MG/DL (ref 0–150)
TSH SERPL DL<=0.05 MIU/L-ACNC: 1.69 UIU/ML (ref 0.27–4.2)
VIT B12 BLD-MCNC: >2000 PG/ML (ref 211–946)
VLDLC SERPL-MCNC: 52 MG/DL (ref 5–40)
WBC # BLD AUTO: 8.44 10*3/MM3 (ref 3.4–10.8)

## 2020-11-11 RX ORDER — FENOFIBRATE 145 MG/1
145 TABLET, COATED ORAL DAILY
Qty: 30 TABLET | Refills: 5 | Status: SHIPPED | OUTPATIENT
Start: 2020-11-11 | End: 2021-06-21

## 2020-11-11 NOTE — PROGRESS NOTES
His cholesterol level is high and I would recommend he start a statin.  Is he agreeable?  Also his PSA level is really high and could be indicative of prostate cancer.  He needs referral to urology.  Who would he prefer to see?

## 2020-11-11 NOTE — TELEPHONE ENCOUNTER
----- Message from EUGENIA Hernadez sent at 11/11/2020  9:45 AM EST -----  His cholesterol level is high and I would recommend he start a statin.  Is he agreeable?  Also his PSA level is really high and could be indicative of prostate cancer.  He needs referral to urology.  Who would he prefer to see?      Made patient aware and he is agreeable to starting a statin drug. Also he has no preference for urology, he says anyone local is okay.

## 2020-12-01 ENCOUNTER — OFFICE VISIT (OUTPATIENT)
Dept: UROLOGY | Facility: CLINIC | Age: 60
End: 2020-12-01

## 2020-12-01 VITALS — HEIGHT: 69 IN | WEIGHT: 200.6 LBS | TEMPERATURE: 97.6 F | BODY MASS INDEX: 29.71 KG/M2

## 2020-12-01 DIAGNOSIS — R97.20 ELEVATED PSA: Primary | ICD-10-CM

## 2020-12-01 DIAGNOSIS — N48.6 PEYRONIE'S DISEASE: ICD-10-CM

## 2020-12-01 DIAGNOSIS — N40.2 PROSTATE NODULE: ICD-10-CM

## 2020-12-01 PROCEDURE — 99203 OFFICE O/P NEW LOW 30 MIN: CPT | Performed by: UROLOGY

## 2020-12-01 RX ORDER — LEVOFLOXACIN 500 MG/1
TABLET, FILM COATED ORAL
Qty: 3 TABLET | Refills: 0 | Status: SHIPPED | OUTPATIENT
Start: 2020-12-01 | End: 2020-12-08

## 2020-12-01 RX ORDER — ALPRAZOLAM 0.5 MG/1
TABLET ORAL
Qty: 3 TABLET | Refills: 0 | Status: SHIPPED | OUTPATIENT
Start: 2020-12-01 | End: 2020-12-08

## 2020-12-01 NOTE — PROGRESS NOTES
Chief Complaint:          Psa of 28    HPI:   60 y.o. male.  Elevated psa of 28.  No family hx of prostate cancer.  Pt has penile curvature for 2 years.  Pt not able to have intromission.  Pt denies hx of urine or prostate infections recently  HPI  Pt does have right knee pain and leg pain in the shaft of his bones.    Past Medical History:        Past Medical History:   Diagnosis Date   • Increased heart rate    • Mini stroke (CMS/HCC)    • Neck pain    • Rash    • Tobacco abuse          Current Meds:     Current Outpatient Medications   Medication Sig Dispense Refill   • albuterol sulfate  (90 Base) MCG/ACT inhaler Inhale 2 puffs Every 6 (Six) Hours As Needed for Wheezing. 18 g 5   • aspirin 81 MG EC tablet Take 81 mg by mouth Daily.     • atorvastatin (LIPITOR) 20 MG tablet Take 1 tablet by mouth Every Night. 30 tablet 5   • fenofibrate (Tricor) 145 MG tablet Take 1 tablet by mouth Daily. 30 tablet 5   • gabapentin (NEURONTIN) 600 MG tablet Take 1 tablet by mouth 3 (Three) Times a Day. 90 tablet 1   • hydrOXYzine (ATARAX) 50 MG tablet Take 1 tablet by mouth 3 (Three) Times a Day As Needed for Itching. 90 tablet 2   • metoprolol tartrate (LOPRESSOR) 25 MG tablet Take 1 tablet by mouth Daily. 90 tablet 1   • Omega-3 Fatty Acids (FISH OIL) 1000 MG capsule capsule Take 2 capsules by mouth Daily With Breakfast. (OTC) 60 capsule 5   • pantoprazole (PROTONIX) 40 MG EC tablet Take 1 tablet by mouth Daily. 30 tablet 5   • vitamin B-12 (CYANOCOBALAMIN) 1000 MCG tablet Take 5,000 mcg by mouth Daily.     • ALPRAZolam (Xanax) 0.5 MG tablet Bring to office for procedure. Staff will instruct on dose and timing. 3 tablet 0   • levoFLOXacin (LEVAQUIN) 500 MG tablet Take 1 tablet by mouth daily for three days starting the day prior to procedure. 3 tablet 0     No current facility-administered medications for this visit.         Allergies:      No Known Allergies     Past Surgical History:     Past Surgical History:      Procedure Laterality Date   • APPENDECTOMY      [facility]          Social History:     Social History     Socioeconomic History   • Marital status:      Spouse name: Not on file   • Number of children: Not on file   • Years of education: Not on file   • Highest education level: Not on file   Tobacco Use   • Smoking status: Light Tobacco Smoker     Packs/day: 1.00     Types: Cigarettes     Last attempt to quit: 3/12/2019     Years since quittin.7   • Smokeless tobacco: Never Used   Substance and Sexual Activity   • Alcohol use: No   • Drug use: Yes     Types: Marijuana     Comment: occ    • Sexual activity: Defer       Family History:     Family History   Problem Relation Age of Onset   • Nephrolithiasis Mother    • Heart disease Father    • Hypertension Father    • Kidney disease Father        Review of Systems:     Review of Systems   Constitutional: Negative for chills, fatigue and fever.   HENT: Negative for congestion and sinus pressure.    Eyes: Negative for pain and redness.   Respiratory: Negative for chest tightness and shortness of breath.    Cardiovascular: Negative for chest pain.   Gastrointestinal: Negative for abdominal pain, constipation, diarrhea, nausea and vomiting.   Genitourinary: Negative for dysuria, flank pain, frequency, hematuria and urgency.   Musculoskeletal: Negative for back pain and neck pain.   Skin: Negative for rash.   Allergic/Immunologic: Negative for food allergies.   Neurological: Negative for dizziness and headaches.   Hematological: Does not bruise/bleed easily.   Psychiatric/Behavioral: The patient is not nervous/anxious.         IPSS Questionnaire (AUA-7):  Over the past month…    1)  Incomplete Emptying  How often have you had a sensation of not emptying your bladder?  0 - Not at all   2)  Frequency  How often have you had to urinate less than every two hours? 1 - Less than 1 time in 5   3)  Intermittency  How often have you found you stopped and  started again several times when you urinated?  1 - Less than 1 time in 5   4) Urgency  How often have you found it difficult to postpone urination?  1 - Less than 1 time in 5   5) Weak Stream  How often have you had a weak urinary stream?  3 - About half the time   6) Straining  How often have you had to push or strain to begin urination?  2 - Less than half the time   7) Nocturia  How many times did you typically get up at night to urinate?  3 - 3 times   Total Score:  11       Quality of life due to urinary symptoms:  If you were to spend the rest of your life with your urinary condition the way it is now, how would you feel about that? 2-Mostly Satisfied   Urine Leakage (Incontinence) 0-No Leakage       I have reviewed the follow portions of the patient's history and confirmed they are accurate today:  allergies, current medications, past family history, past medical history, past social history, past surgical history, problem list and ROS  Physical Exam:     Physical Exam  Vitals signs and nursing note reviewed.   HENT:      Head: Normocephalic and atraumatic.      Right Ear: External ear normal.      Left Ear: External ear normal.      Nose: Nose normal.   Eyes:      Conjunctiva/sclera: Conjunctivae normal.      Pupils: Pupils are equal, round, and reactive to light.   Neck:      Musculoskeletal: Normal range of motion and neck supple.      Thyroid: No thyromegaly.   Cardiovascular:      Rate and Rhythm: Normal rate and regular rhythm.      Heart sounds: Normal heart sounds. No murmur.   Pulmonary:      Effort: Pulmonary effort is normal. No respiratory distress.      Breath sounds: Normal breath sounds. No wheezing or rales.   Chest:      Chest wall: No tenderness.   Abdominal:      General: Bowel sounds are normal. There is no distension.      Palpations: Abdomen is soft. There is no mass.      Tenderness: There is no abdominal tenderness.      Hernia: No hernia is present.   Genitourinary:     Penis:  "Normal.       Prostate: Normal.      Rectum: Normal.      Comments: Mid penile plaque  Hard and fixed prostate  Musculoskeletal: Normal range of motion.         General: No tenderness.   Lymphadenopathy:      Cervical: No cervical adenopathy.   Skin:     General: Skin is warm.      Findings: No rash.   Neurological:      Mental Status: He is alert and oriented to person, place, and time.      Cranial Nerves: No cranial nerve deficit.      Motor: No abnormal muscle tone.      Coordination: Coordination normal.   Psychiatric:         Behavior: Behavior normal.         Thought Content: Thought content normal.         Judgment: Judgment normal.         Temp 97.6 °F (36.4 °C)   Ht 175.3 cm (69\")   Wt 91 kg (200 lb 9.6 oz)   BMI 29.62 kg/m²    Procedure:         Assessment:     Encounter Diagnoses   Name Primary?   • Elevated PSA Yes   • Peyronie's disease    • Prostate nodule        No orders of the defined types were placed in this encounter.      Patient reports that he is not currently experiencing any symptoms of urinary incontinence.      Plan:   Pt has a very abnormal prostate exam hard and fixed and a very high psa of 28 in 59 yo pt.  I would recommend prostate ultrasound and biopsies  Pt also has peyronie's disease which will defer until we finish evaluation of elevated psa and a hard prostate.    Patient's Body mass index is 29.62 kg/m². BMI is within normal parameters. No follow-up required..      Smoking Cessation Counseling:  Current every day smoker. less than 3 minutes spent counseling. Will try to cut down.  Patient is going to follow up with PCP to discuss treatment/medication options to quit tobacco use.     Counseling was given to patient for the following topics instructions for management as follows: elevasted psa. The interim medical history and current results were reviewed.  A treatment plan with follow-up was made for Elevated PSA [R97.20].   This document has been electronically signed by " Dheeraj Nieves MD December 1, 2020 15:00 EST

## 2020-12-02 ENCOUNTER — PROCEDURE VISIT (OUTPATIENT)
Dept: UROLOGY | Facility: CLINIC | Age: 60
End: 2020-12-02

## 2020-12-02 VITALS — BODY MASS INDEX: 29.77 KG/M2 | HEIGHT: 69 IN | TEMPERATURE: 98.6 F | WEIGHT: 201 LBS

## 2020-12-02 DIAGNOSIS — Z48.816 AFTERCARE FOLLOWING SURGERY OF THE GENITOURINARY SYSTEM: Primary | ICD-10-CM

## 2020-12-02 DIAGNOSIS — N40.2 PROSTATE NODULE: ICD-10-CM

## 2020-12-02 DIAGNOSIS — R97.20 ELEVATED PSA: ICD-10-CM

## 2020-12-02 PROCEDURE — 96372 THER/PROPH/DIAG INJ SC/IM: CPT | Performed by: UROLOGY

## 2020-12-02 PROCEDURE — 76942 ECHO GUIDE FOR BIOPSY: CPT | Performed by: UROLOGY

## 2020-12-02 PROCEDURE — 55700 PR PROSTATE NEEDLE BIOPSY ANY APPROACH: CPT | Performed by: UROLOGY

## 2020-12-02 NOTE — PROGRESS NOTES
Chief Complaint:          Elevated psa of 28 and hard non mobile prostate on exam    HPI:   60 y.o. male.    HPI      Past Medical History:        Past Medical History:   Diagnosis Date   • Increased heart rate    • Mini stroke (CMS/HCC)    • Neck pain    • Rash    • Tobacco abuse          Current Meds:     Current Outpatient Medications   Medication Sig Dispense Refill   • albuterol sulfate  (90 Base) MCG/ACT inhaler Inhale 2 puffs Every 6 (Six) Hours As Needed for Wheezing. 18 g 5   • ALPRAZolam (Xanax) 0.5 MG tablet Bring to office for procedure. Staff will instruct on dose and timing. 3 tablet 0   • aspirin 81 MG EC tablet Take 81 mg by mouth Daily.     • atorvastatin (LIPITOR) 20 MG tablet Take 1 tablet by mouth Every Night. 30 tablet 5   • fenofibrate (Tricor) 145 MG tablet Take 1 tablet by mouth Daily. 30 tablet 5   • gabapentin (NEURONTIN) 600 MG tablet Take 1 tablet by mouth 3 (Three) Times a Day. 90 tablet 1   • hydrOXYzine (ATARAX) 50 MG tablet Take 1 tablet by mouth 3 (Three) Times a Day As Needed for Itching. 90 tablet 2   • levoFLOXacin (LEVAQUIN) 500 MG tablet Take 1 tablet by mouth daily for three days starting the day prior to procedure. 3 tablet 0   • metoprolol tartrate (LOPRESSOR) 25 MG tablet Take 1 tablet by mouth Daily. 90 tablet 1   • Omega-3 Fatty Acids (FISH OIL) 1000 MG capsule capsule Take 2 capsules by mouth Daily With Breakfast. (OTC) 60 capsule 5   • pantoprazole (PROTONIX) 40 MG EC tablet Take 1 tablet by mouth Daily. 30 tablet 5   • vitamin B-12 (CYANOCOBALAMIN) 1000 MCG tablet Take 5,000 mcg by mouth Daily.       No current facility-administered medications for this visit.         Allergies:      No Known Allergies     Past Surgical History:     Past Surgical History:   Procedure Laterality Date   • APPENDECTOMY  1971    [facility]          Social History:     Social History     Socioeconomic History   • Marital status:      Spouse name: Not on file   • Number  "of children: Not on file   • Years of education: Not on file   • Highest education level: Not on file   Tobacco Use   • Smoking status: Light Tobacco Smoker     Packs/day: 1.00     Types: Cigarettes     Last attempt to quit: 3/12/2019     Years since quittin.7   • Smokeless tobacco: Never Used   Substance and Sexual Activity   • Alcohol use: No   • Drug use: Yes     Types: Marijuana     Comment: occ    • Sexual activity: Defer       Family History:     Family History   Problem Relation Age of Onset   • Nephrolithiasis Mother    • Heart disease Father    • Hypertension Father    • Kidney disease Father        Review of Systems:       Physical Exam:     Physical Exam    Temp 98.6 °F (37 °C)   Ht 175.3 cm (69\")   Wt 91.2 kg (201 lb)   BMI 29.68 kg/m²    Procedure:     Procedure: Transrectal Ultrasound and Guided Biopsies    Position: Fetal/left lateral decubitus    Prostate Ultrasound Guided lidocaine prostate block    Sedation: Oral xanax    Indications: elevated psa of 28 and rock hard prostate fixed    Procedures risks and benefits and risk and alternatives were discussed with the patient. Written Consent was obtained prior to procedure.    In patients without penicillin allergies, preoperative Oral antibiotics and pre-procedure rocephin administered.    Procedure details: 8 Mhz biplanar B&K probe was placed in the rectum. Prostate was scanned from the base of the bladder to the apex. Prostate size was measured at 35 cc prosthetic height 36 mm    width 40.5 mm   length 45.0mm    Seminal vesicals: abnormal  Hypoechoic lesion  Prostate median lobe normal  Pt has a large 4 cm hypoechoic lesion in peripheral and central zones    Patient is to return in 1 week to discuss pathology.      Assessment:     Encounter Diagnoses   Name Primary?   • Aftercare following surgery of the genitourinary system Yes   • Elevated PSA    • Prostate nodule        No orders of the defined types were placed in this " encounter.      Patient reports that he is not currently experiencing any symptoms of urinary incontinence.       This document has been electronically signed by Dheeraj Nieves MD December 2, 2020 09:22 EST

## 2020-12-08 ENCOUNTER — OFFICE VISIT (OUTPATIENT)
Dept: UROLOGY | Facility: CLINIC | Age: 60
End: 2020-12-08

## 2020-12-08 VITALS — BODY MASS INDEX: 29.33 KG/M2 | WEIGHT: 198 LBS | TEMPERATURE: 98.7 F | HEIGHT: 69 IN

## 2020-12-08 DIAGNOSIS — C61 PROSTATE CANCER (HCC): Primary | ICD-10-CM

## 2020-12-08 PROCEDURE — 99215 OFFICE O/P EST HI 40 MIN: CPT | Performed by: UROLOGY

## 2020-12-08 NOTE — PROGRESS NOTES
Chief Complaint:          Prostate cancer    HPI:   60 y.o. male.  Pt has a psa of 28 and his prostate is hard and fixed.  His prostate biopsies were 12 of 12 positive for prostate cancer grade 5 and 4 for a score of 9.  He has at least stage T3b or greater.    HPI      Past Medical History:        Past Medical History:   Diagnosis Date   • Increased heart rate    • Mini stroke (CMS/HCC)    • Neck pain    • Rash    • Tobacco abuse          Current Meds:     Current Outpatient Medications   Medication Sig Dispense Refill   • albuterol sulfate  (90 Base) MCG/ACT inhaler Inhale 2 puffs Every 6 (Six) Hours As Needed for Wheezing. 18 g 5   • aspirin 81 MG EC tablet Take 81 mg by mouth Daily.     • atorvastatin (LIPITOR) 20 MG tablet Take 1 tablet by mouth Every Night. 30 tablet 5   • fenofibrate (Tricor) 145 MG tablet Take 1 tablet by mouth Daily. 30 tablet 5   • gabapentin (NEURONTIN) 600 MG tablet Take 1 tablet by mouth 3 (Three) Times a Day. 90 tablet 1   • hydrOXYzine (ATARAX) 50 MG tablet Take 1 tablet by mouth 3 (Three) Times a Day As Needed for Itching. 90 tablet 2   • metoprolol tartrate (LOPRESSOR) 25 MG tablet Take 1 tablet by mouth Daily. 90 tablet 1   • Omega-3 Fatty Acids (FISH OIL) 1000 MG capsule capsule Take 2 capsules by mouth Daily With Breakfast. (OTC) 60 capsule 5   • pantoprazole (PROTONIX) 40 MG EC tablet Take 1 tablet by mouth Daily. 30 tablet 5   • vitamin B-12 (CYANOCOBALAMIN) 1000 MCG tablet Take 5,000 mcg by mouth Daily.       No current facility-administered medications for this visit.         Allergies:      No Known Allergies     Past Surgical History:     Past Surgical History:   Procedure Laterality Date   • APPENDECTOMY  1971    [facility]          Social History:     Social History     Socioeconomic History   • Marital status:      Spouse name: Not on file   • Number of children: Not on file   • Years of education: Not on file   • Highest education level: Not on file  "  Tobacco Use   • Smoking status: Light Tobacco Smoker     Packs/day: 1.00     Types: Cigarettes     Last attempt to quit: 3/12/2019     Years since quittin.7   • Smokeless tobacco: Never Used   Substance and Sexual Activity   • Alcohol use: No   • Drug use: Yes     Types: Marijuana     Comment: occ    • Sexual activity: Defer       Family History:     Family History   Problem Relation Age of Onset   • Nephrolithiasis Mother    • Heart disease Father    • Hypertension Father    • Kidney disease Father        Review of Systems:     Review of Systems   Constitutional: Negative for chills and fever.   HENT: Negative for sinus pressure.    Respiratory: Negative for cough.    Cardiovascular: Negative for leg swelling.   Gastrointestinal: Negative for abdominal pain.   Musculoskeletal: Negative for back pain.   Neurological: Negative for headaches.   Psychiatric/Behavioral: The patient is nervous/anxious.          Physical Exam:     Physical Exam    Temp 98.7 °F (37.1 °C)   Ht 175.3 cm (69\")   Wt 89.8 kg (198 lb)   BMI 29.24 kg/m²    Procedure:         Assessment:     Encounter Diagnosis   Name Primary?   • Prostate cancer (CMS/HCC) Yes       Orders Placed This Encounter   Procedures   • NM Bone Scan Whole Body     Standing Status:   Future     Standing Expiration Date:   2021   • CT Pelvis With Contrast     Standing Status:   Future     Standing Expiration Date:   2021     Order Specific Question:   Will Oral Contrast be needed for this procedure?     Answer:   No       Patient reports that he is not currently experiencing any symptoms of urinary incontinence.      Plan:   Patient has at least stage T3b prostate cancer if not a T4 and his grade is very high with grade 5 and grade 4 on all biopsies 12 out of 12 for Darrius score of 9.  The patient certainly needs aggressive therapy but I think his cancer is not confined to the prostate and we need to stage her with a bone scan and CT scan.  I think the " patient certainly will need radiation therapy if not for curative intent certainly for palliative local control.  I think the patient will certainly need Lupron and probably Casodex.  We will see the patient back in 2 weeks    Patient's Body mass index is 29.24 kg/m². BMI is within normal parameters. No follow-up required..      Smoking Cessation Counseling:  Former smoker.  Patient does not currently use any tobacco products.     Counseling was given to patient and family for the following topics diagnostic results including: Pathology results. The interim medical history and current results were reviewed.  A treatment plan with follow-up was made for Prostate cancer (CMS/[facility]) [C61]. I spent 55 minutes face to face with Ta Madison and 80 percentage was spent in counseling.       This document has been electronically signed by Dheeraj Nieves MD December 8, 2020 16:40 EST

## 2020-12-15 ENCOUNTER — HOSPITAL ENCOUNTER (OUTPATIENT)
Dept: NUCLEAR MEDICINE | Facility: HOSPITAL | Age: 60
Discharge: HOME | End: 2020-12-15

## 2020-12-15 DIAGNOSIS — C61 PROSTATE CANCER (HCC): ICD-10-CM

## 2020-12-15 PROCEDURE — 78306 BONE IMAGING WHOLE BODY: CPT | Performed by: RADIOLOGY

## 2020-12-15 PROCEDURE — A9561 TC99M OXIDRONATE: HCPCS | Performed by: UROLOGY

## 2020-12-15 PROCEDURE — 78306 BONE IMAGING WHOLE BODY: CPT

## 2020-12-15 PROCEDURE — 0 TECHNETIUM OXIDRONATE KIT: Performed by: UROLOGY

## 2020-12-15 RX ADMIN — TECHNETIUM TC 99M OXIDRONATE 1 DOSE: 3.15 INJECTION, POWDER, LYOPHILIZED, FOR SOLUTION INTRAVENOUS at 09:05

## 2020-12-21 ENCOUNTER — HOSPITAL ENCOUNTER (OUTPATIENT)
Dept: CT IMAGING | Facility: HOSPITAL | Age: 60
Discharge: HOME | End: 2020-12-21
Admitting: UROLOGY

## 2020-12-21 DIAGNOSIS — C61 PROSTATE CANCER (HCC): ICD-10-CM

## 2020-12-21 PROCEDURE — 72193 CT PELVIS W/DYE: CPT

## 2020-12-21 PROCEDURE — 25010000002 IOPAMIDOL 61 % SOLUTION: Performed by: UROLOGY

## 2020-12-21 PROCEDURE — 72193 CT PELVIS W/DYE: CPT | Performed by: RADIOLOGY

## 2020-12-21 RX ADMIN — IOPAMIDOL 100 ML: 612 INJECTION, SOLUTION INTRAVENOUS at 09:25

## 2020-12-22 ENCOUNTER — OFFICE VISIT (OUTPATIENT)
Dept: UROLOGY | Facility: CLINIC | Age: 60
End: 2020-12-22

## 2020-12-22 VITALS — WEIGHT: 198 LBS | BODY MASS INDEX: 29.33 KG/M2 | HEIGHT: 69 IN | TEMPERATURE: 99 F

## 2020-12-22 DIAGNOSIS — C61 PROSTATE CANCER (HCC): Primary | ICD-10-CM

## 2020-12-22 PROCEDURE — 99215 OFFICE O/P EST HI 40 MIN: CPT | Performed by: UROLOGY

## 2020-12-22 NOTE — PROGRESS NOTES
Chief Complaint:          Prostate cancer.    HPI:   60 y.o. male.  Pt has a psa of 28 and his prostate is hard and fixed.  His prostate biopsies were 12 of 12 positive for prostate cancer grade 5 and 4 for a score of 9.  He has at least stage T3b or greater.    HPI  The patient had a bone scan that showed diffusely metastatic disease in the kidneys were not imaged by the nuclear agent at all thus making it a SuperScan.  The patient's CT scan showed lymphadenopathy as well as extensive prostatic cancer in the region of the prostate    Past Medical History:        Past Medical History:   Diagnosis Date   • Increased heart rate    • Mini stroke (CMS/HCC)    • Neck pain    • Rash    • Tobacco abuse          Current Meds:     Current Outpatient Medications   Medication Sig Dispense Refill   • albuterol sulfate  (90 Base) MCG/ACT inhaler Inhale 2 puffs Every 6 (Six) Hours As Needed for Wheezing. 18 g 5   • aspirin 81 MG EC tablet Take 81 mg by mouth Daily.     • atorvastatin (LIPITOR) 20 MG tablet Take 1 tablet by mouth Every Night. 30 tablet 5   • fenofibrate (Tricor) 145 MG tablet Take 1 tablet by mouth Daily. 30 tablet 5   • gabapentin (NEURONTIN) 600 MG tablet Take 1 tablet by mouth 3 (Three) Times a Day. 90 tablet 1   • hydrOXYzine (ATARAX) 50 MG tablet Take 1 tablet by mouth 3 (Three) Times a Day As Needed for Itching. 90 tablet 2   • metoprolol tartrate (LOPRESSOR) 25 MG tablet Take 1 tablet by mouth Daily. 90 tablet 1   • Omega-3 Fatty Acids (FISH OIL) 1000 MG capsule capsule Take 2 capsules by mouth Daily With Breakfast. (OTC) 60 capsule 5   • pantoprazole (PROTONIX) 40 MG EC tablet Take 1 tablet by mouth Daily. 30 tablet 5   • vitamin B-12 (CYANOCOBALAMIN) 1000 MCG tablet Take 5,000 mcg by mouth Daily.       No current facility-administered medications for this visit.         Allergies:      No Known Allergies     Past Surgical History:     Past Surgical History:   Procedure Laterality Date   •  "APPENDECTOMY  1971    [facility]          Social History:     Social History     Socioeconomic History   • Marital status:      Spouse name: Not on file   • Number of children: Not on file   • Years of education: Not on file   • Highest education level: Not on file   Tobacco Use   • Smoking status: Light Tobacco Smoker     Packs/day: 1.00     Types: Cigarettes     Last attempt to quit: 3/12/2019     Years since quittin.7   • Smokeless tobacco: Never Used   Substance and Sexual Activity   • Alcohol use: No   • Drug use: Yes     Types: Marijuana     Comment: occ    • Sexual activity: Defer       Family History:     Family History   Problem Relation Age of Onset   • Nephrolithiasis Mother    • Heart disease Father    • Hypertension Father    • Kidney disease Father        Review of Systems:     Review of Systems   Constitutional: Negative for chills, fatigue and fever.   HENT: Negative for congestion and sinus pressure.    Respiratory: Negative for chest tightness and shortness of breath.    Cardiovascular: Negative for chest pain.   Gastrointestinal: Negative for abdominal pain, constipation, diarrhea, nausea and vomiting.   Genitourinary: Negative for flank pain, frequency, hematuria and urgency.   Musculoskeletal: Negative for back pain and neck pain.   Skin: Negative for rash.   Neurological: Negative for dizziness and headaches.   Hematological: Does not bruise/bleed easily.   Psychiatric/Behavioral: The patient is not nervous/anxious.        Physical Exam:     Physical Exam    Temp 99 °F (37.2 °C)   Ht 175.3 cm (69\")   Wt 89.8 kg (198 lb)   BMI 29.24 kg/m²    Procedure:         Assessment:     Encounter Diagnosis   Name Primary?   • Prostate cancer (CMS/HCC) Yes       Orders Placed This Encounter   Procedures   • Ambulatory Referral to Oncology     Referral Priority:   Routine     Referral Type:   Consultation     Referral Reason:   Specialty Services Required     Referral Location:   [facility]     Requested Specialty:   Hematology and Oncology     Number of Visits Requested:   1       Patient reports that he is not currently experiencing any symptoms of urinary incontinence.      Plan:   Will refer pt to Dr. Martinez for management of metastatic aggressive prostate cancer with verónica score of 9    Patient's Body mass index is 29.24 kg/m². BMI is within normal parameters. No follow-up required..      Smoking Cessation Counseling:  Current every day smoker. less than 3 minutes spent counseling. Will try to cut down.  Patient is going to follow up with PCP to discuss treatment/medication options to quit tobacco use.     Counseling was given to patient and family for the following topics diagnostic results including: bone scan and ct scan. The interim medical history and current results were reviewed.  A treatment plan with follow-up was made for Prostate cancer (CMS/[facility]) [C61]. I spent 36 minutes face to face with Ta Madison and 80 percentage was spent in counseling.       This document has been electronically signed by Dheeraj Nieves MD December 22, 2020 16:43 EST

## 2020-12-28 ENCOUNTER — OFFICE VISIT (OUTPATIENT)
Dept: FAMILY MEDICINE CLINIC | Facility: CLINIC | Age: 60
End: 2020-12-28

## 2020-12-28 DIAGNOSIS — G89.3 CANCER ASSOCIATED PAIN: Primary | ICD-10-CM

## 2020-12-28 DIAGNOSIS — C61 PROSTATE CANCER (HCC): ICD-10-CM

## 2020-12-28 PROCEDURE — 99443 PR PHYS/QHP TELEPHONE EVALUATION 21-30 MIN: CPT | Performed by: FAMILY MEDICINE

## 2020-12-28 RX ORDER — HYDROCODONE BITARTRATE AND ACETAMINOPHEN 5; 325 MG/1; MG/1
1 TABLET ORAL EVERY 8 HOURS PRN
Qty: 60 TABLET | Refills: 0 | Status: SHIPPED | OUTPATIENT
Start: 2020-12-28 | End: 2021-01-13 | Stop reason: DRUGHIGH

## 2020-12-28 NOTE — PROGRESS NOTES
Subjective   Ta Madison is a 60 y.o. male.   Pt presents today with CC of cancer associated pain    You have chosen to receive care through a telephone visit. Do you consent to use a telephone visit for your medical care today? Yes    History of Present Illness   Patient is a 60-year-old male who was recently diagnosed with prostate cancer, it has been proven to be metastatic.  He has an appointment with oncology on January 7.  He reports that his pain has become unbearable with NSAIDs alone.  He has done well with Norco in the past when he had dental work done.  He would like prescription for Norco for as needed use.  Of note, he currently takes gabapentin.  He has taken benzodiazepines in the past but is not currently taking benzodiazepines.  He rates his pain at 6 out of 10 up to 9 out of 10.         The following portions of the patient's history were reviewed and updated as appropriate: allergies, current medications, past family history, past medical history, past social history, past surgical history and problem list.    Review of Systems   Constitutional: Negative for chills, fever and unexpected weight loss.   HENT: Negative for congestion and sore throat.    Eyes: Negative for blurred vision and visual disturbance.   Respiratory: Negative for cough and wheezing.    Cardiovascular: Negative for chest pain and palpitations.   Gastrointestinal: Negative for abdominal pain and diarrhea.   Endocrine: Negative for cold intolerance and heat intolerance.   Genitourinary: Negative for dysuria.   Musculoskeletal: Positive for arthralgias, back pain and gait problem. Negative for neck stiffness.   Neurological: Negative for dizziness, seizures and syncope.   Psychiatric/Behavioral: Negative for self-injury, suicidal ideas and depressed mood.       Objective   Physical Exam  Neurological:      Mental Status: He is alert.   Psychiatric:         Mood and Affect: Mood normal.         Thought Content: Thought content  normal.         Judgment: Judgment normal.           Assessment/Plan   Diagnoses and all orders for this visit:    1. Cancer associated pain (Primary)  -     HYDROcodone-acetaminophen (Norco) 5-325 MG per tablet; Take 1 tablet by mouth Every 8 (Eight) Hours As Needed for Moderate Pain .  Dispense: 60 tablet; Refill: 0  I reviewed his chart.  He has metastatic cancer and complains of pain consistent with his imaging findings.  He currently takes meloxicam 15 mg daily, he may continue this.  We discussed treatment options.  We will treat with Norco 5-3 25 as he is relatively narcotic naïve.  I would like oncology to guide his pain management if possible.  I will follow the recommendation.  The side effects of this medication were discussed and he was agreeable to proceed.  He has a controlled substance agreement in his chart from the past as he takes gabapentin, same applies for this medication.  UDS is up-to-date.  2. Prostate cancer (CMS/HCC)  Follow with oncology    I discussed his care with him for 22 minutes today

## 2021-01-07 ENCOUNTER — DOCUMENTATION (OUTPATIENT)
Dept: ONCOLOGY | Facility: HOSPITAL | Age: 61
End: 2021-01-07

## 2021-01-07 ENCOUNTER — CONSULT (OUTPATIENT)
Dept: ONCOLOGY | Facility: CLINIC | Age: 61
End: 2021-01-07

## 2021-01-07 VITALS
DIASTOLIC BLOOD PRESSURE: 74 MMHG | OXYGEN SATURATION: 98 % | BODY MASS INDEX: 27.99 KG/M2 | TEMPERATURE: 97.5 F | WEIGHT: 189 LBS | RESPIRATION RATE: 18 BRPM | SYSTOLIC BLOOD PRESSURE: 127 MMHG | HEART RATE: 94 BPM | HEIGHT: 69 IN

## 2021-01-07 DIAGNOSIS — C61 ADENOCARCINOMA OF PROSTATE (HCC): Primary | ICD-10-CM

## 2021-01-07 LAB
ALBUMIN SERPL-MCNC: 4.25 G/DL (ref 3.5–5.2)
ALBUMIN/GLOB SERPL: 1 G/DL
ALP SERPL-CCNC: 4430 U/L (ref 39–117)
ALT SERPL W P-5'-P-CCNC: 12 U/L (ref 1–41)
ANION GAP SERPL CALCULATED.3IONS-SCNC: 9.6 MMOL/L (ref 5–15)
AST SERPL-CCNC: 16 U/L (ref 1–40)
BASOPHILS # BLD AUTO: 0.09 10*3/MM3 (ref 0–0.2)
BASOPHILS NFR BLD AUTO: 0.9 % (ref 0–1.5)
BILIRUB SERPL-MCNC: 0.2 MG/DL (ref 0–1.2)
BUN SERPL-MCNC: 20 MG/DL (ref 6–20)
BUN/CREAT SERPL: 21.1 (ref 7–25)
CALCIUM SPEC-SCNC: 9.3 MG/DL (ref 8.6–10.5)
CHLORIDE SERPL-SCNC: 101 MMOL/L (ref 98–107)
CO2 SERPL-SCNC: 25.4 MMOL/L (ref 22–29)
CREAT SERPL-MCNC: 0.95 MG/DL (ref 0.76–1.27)
DEPRECATED RDW RBC AUTO: 41.2 FL (ref 37–54)
EOSINOPHIL # BLD AUTO: 0.08 10*3/MM3 (ref 0–0.4)
EOSINOPHIL NFR BLD AUTO: 0.8 % (ref 0.3–6.2)
ERYTHROCYTE [DISTWIDTH] IN BLOOD BY AUTOMATED COUNT: 14.2 % (ref 12.3–15.4)
GFR SERPL CREATININE-BSD FRML MDRD: 81 ML/MIN/1.73
GLOBULIN UR ELPH-MCNC: 4.2 GM/DL
GLUCOSE SERPL-MCNC: 97 MG/DL (ref 65–99)
HCT VFR BLD AUTO: 42 % (ref 37.5–51)
HGB BLD-MCNC: 13.4 G/DL (ref 13–17.7)
IMM GRANULOCYTES # BLD AUTO: 0.28 10*3/MM3 (ref 0–0.05)
IMM GRANULOCYTES NFR BLD AUTO: 2.8 % (ref 0–0.5)
LYMPHOCYTES # BLD AUTO: 2.47 10*3/MM3 (ref 0.7–3.1)
LYMPHOCYTES NFR BLD AUTO: 24.7 % (ref 19.6–45.3)
MCH RBC QN AUTO: 25.5 PG (ref 26.6–33)
MCHC RBC AUTO-ENTMCNC: 31.9 G/DL (ref 31.5–35.7)
MCV RBC AUTO: 80 FL (ref 79–97)
MONOCYTES # BLD AUTO: 0.71 10*3/MM3 (ref 0.1–0.9)
MONOCYTES NFR BLD AUTO: 7.1 % (ref 5–12)
NEUTROPHILS NFR BLD AUTO: 6.38 10*3/MM3 (ref 1.7–7)
NEUTROPHILS NFR BLD AUTO: 63.7 % (ref 42.7–76)
NRBC BLD AUTO-RTO: 0 /100 WBC (ref 0–0.2)
PLATELET # BLD AUTO: 478 10*3/MM3 (ref 140–450)
PMV BLD AUTO: 10 FL (ref 6–12)
POTASSIUM SERPL-SCNC: 4.8 MMOL/L (ref 3.5–5.2)
PROT SERPL-MCNC: 8.4 G/DL (ref 6–8.5)
PSA SERPL-MCNC: 247 NG/ML (ref 0–4)
RBC # BLD AUTO: 5.25 10*6/MM3 (ref 4.14–5.8)
SODIUM SERPL-SCNC: 136 MMOL/L (ref 136–145)
WBC # BLD AUTO: 10.01 10*3/MM3 (ref 3.4–10.8)

## 2021-01-07 PROCEDURE — 36415 COLL VENOUS BLD VENIPUNCTURE: CPT | Performed by: INTERNAL MEDICINE

## 2021-01-07 PROCEDURE — 80053 COMPREHEN METABOLIC PANEL: CPT | Performed by: INTERNAL MEDICINE

## 2021-01-07 PROCEDURE — 84153 ASSAY OF PSA TOTAL: CPT | Performed by: INTERNAL MEDICINE

## 2021-01-07 PROCEDURE — 85025 COMPLETE CBC W/AUTO DIFF WBC: CPT | Performed by: INTERNAL MEDICINE

## 2021-01-07 PROCEDURE — 99205 OFFICE O/P NEW HI 60 MIN: CPT | Performed by: INTERNAL MEDICINE

## 2021-01-07 RX ORDER — MELOXICAM 15 MG/1
15 TABLET ORAL DAILY
COMMUNITY
End: 2021-06-29

## 2021-01-07 NOTE — PROGRESS NOTES
The patient and I discussed the results of his PHQ depression screening.    PHQ-9 Total Score:  9    Patient seen in office visit this date by provider.    SS will follow.

## 2021-01-07 NOTE — PROGRESS NOTES
Name:  Ta Madison  :  1960  Date:  2021     REFERRING PHYSICIAN  Dheeraj Nieves MD    PRIMARY CARE PROVIDER  Marry Jain APRN    REASON FOR CONSULTATION  1. Adenocarcinoma of prostate (CMS/HCC)      CHIEF COMPLAINT  Intermittent, diffuse bone pains, particularly in the bilateral hips, currently under good control with prn Norco ~two to three times per day.    Dear Dr. Nieves,    HISTORY OF PRESENT ILLNESS:   Thank you for referring Mr. Madison to our medical oncology clinic. As you are aware, he is a pleasant, 60 y.o., white male with a history of tobacco abuse who was referred to you in 2020 for an elevated serum PSA level (of ~28 ng/mL). An examination revealed a very hard and fixed prostate, and you performed multiple core biopsies on 2020. The results were consistent with Caney Score 5+4 or 4+5=9 adenocarcinoma involving all twelve sampled cores. A staging workup confirmed pelvic adenopathy as well as diffuse, sclerotic lesions throughout the axial and appendicular skeleton, including the right greater than left femoral diaphyses. With this stage IV diagnosis, he is now referred to our clinic for further evaluation and management.    INTERIM HISTORY:  Mr. Madison presents to clinic today for initial consultation accompanied by his wife. They confirm the above history. He was recently given a Rx for Norco 5/325 mg prn by his PCP, which he has been taking at most two or three times per day; and this has recently been doing a reasonable job of controlling his diffuse, bone pains. Other than these (the bone pains) and some intermittent hot flashes, he denies any specific symptoms and feels to be in his usual state of health.    Past Medical History:   Diagnosis Date   • Increased heart rate    • Mini stroke (CMS/HCC)    • Neck pain    • Prostate cancer (CMS/HCC)    • Rash    • Tobacco abuse        Past Surgical History:   Procedure Laterality Date   • APPENDECTOMY       [facility]        Social History     Socioeconomic History   • Marital status:      Spouse name: Not on file   • Number of children: Not on file   • Years of education: Not on file   • Highest education level: Not on file   Tobacco Use   • Smoking status: Light Tobacco Smoker     Packs/day: 1.00     Types: Cigarettes     Last attempt to quit: 3/12/2019     Years since quittin.8   • Smokeless tobacco: Never Used   Substance and Sexual Activity   • Alcohol use: No   • Drug use: Yes     Types: Marijuana     Comment: occ    • Sexual activity: Defer       Family History   Problem Relation Age of Onset   • Nephrolithiasis Mother    • Heart disease Father    • Hypertension Father    • Kidney disease Father        No Known Allergies    Current Outpatient Medications   Medication Sig Dispense Refill   • albuterol sulfate  (90 Base) MCG/ACT inhaler Inhale 2 puffs Every 6 (Six) Hours As Needed for Wheezing. 18 g 5   • aspirin 81 MG EC tablet Take 81 mg by mouth Daily.     • fenofibrate (Tricor) 145 MG tablet Take 1 tablet by mouth Daily. 30 tablet 5   • gabapentin (NEURONTIN) 600 MG tablet Take 1 tablet by mouth 3 (Three) Times a Day. 90 tablet 1   • HYDROcodone-acetaminophen (Norco) 5-325 MG per tablet Take 1 tablet by mouth Every 8 (Eight) Hours As Needed for Moderate Pain . 60 tablet 0   • hydrOXYzine (ATARAX) 50 MG tablet Take 1 tablet by mouth 3 (Three) Times a Day As Needed for Itching. 90 tablet 2   • meloxicam (MOBIC) 15 MG tablet Take 15 mg by mouth Daily.     • metoprolol tartrate (LOPRESSOR) 25 MG tablet Take 1 tablet by mouth Daily. 90 tablet 1   • pantoprazole (PROTONIX) 40 MG EC tablet Take 1 tablet by mouth Daily. 30 tablet 5   • atorvastatin (LIPITOR) 20 MG tablet Take 1 tablet by mouth Every Night. 30 tablet 5   • Omega-3 Fatty Acids (FISH OIL) 1000 MG capsule capsule Take 2 capsules by mouth Daily With Breakfast. (OTC) 60 capsule 5   • vitamin B-12 (CYANOCOBALAMIN) 1000 MCG tablet  "Take 5,000 mcg by mouth Daily.       No current facility-administered medications for this visit.      REVIEW OF SYSTEMS  CONSTITUTIONAL:  No fever, chills, night sweats or fatigue.  EYES:  No blurry vision, diplopia or other vision changes.  ENT:  No hearing loss, nosebleeds or sore throat.  CARDIOVASCULAR:  No palpitations, arrhythmia, syncopal episodes or edema.  PULMONARY:  No hemoptysis, wheezing, chronic cough or shortness of breath.  GASTROINTESTINAL:  No nausea or vomiting.  No constipation or diarrhea.  No abdominal pain.  GENITOURINARY:  No hematuria, kidney stones or frequent urination.  MUSCULOSKELETAL:  As per the HPI above.  INTEGUMENTARY: No rashes or pruritus.  ENDOCRINE:  No excessive thirst. Occasional hot flashes.  HEMATOLOGIC:  No history of free bleeding, spontaneous bleeding or clotting.  IMMUNOLOGIC:  No allergies or frequent infections.  NEUROLOGIC: No numbness, tingling, seizures or weakness.  PSYCHIATRIC:  No anxiety or depression.    PHYSICAL EXAMINATION  /74   Pulse 94   Temp 97.5 °F (36.4 °C) (Temporal)   Resp 18   Ht 175.3 cm (69\")   Wt 85.7 kg (189 lb)   SpO2 98%   BMI 27.91 kg/m²     Pain Score:  Pain Score    01/07/21 0901   PainSc: 0-No pain       PHQ-Score Total:  PHQ-9 Total Score: 9    GENERAL:  A well-developed, well-nourished, white male in no acute distress.  HEENT:  Pupils equally round and reactive to light.  Extraocular muscles intact.  CARDIOVASCULAR:  Regular rate and rhythm.  No murmurs, gallops or rubs.  LUNGS:  Clear to auscultation bilaterally.  ABDOMEN:  Soft, nontender, nondistended with positive bowel sounds.  EXTREMITIES:  No clubbing, cyanosis or edema bilaterally.  SKIN:  No rashes or petechiae.  NEURO:  Cranial nerves grossly intact.  No focal deficits.  PSYCH:  Alert and oriented x3.    LABORATORY    Lab Results   Component Value Date    WBC 10.01 01/07/2021    HGB 13.4 01/07/2021    HCT 42.0 01/07/2021    MCV 80.0 01/07/2021     (H) " 01/07/2021    NEUTROABS 6.38 01/07/2021       Lab Results   Component Value Date     01/07/2021    K 4.8 01/07/2021     01/07/2021    CO2 25.4 01/07/2021    BUN 20 01/07/2021    CREATININE 0.95 01/07/2021    GLUCOSE 97 01/07/2021    CALCIUM 9.3 01/07/2021    AST 16 01/07/2021    ALT 12 01/07/2021    ALKPHOS 4,430 (H) 01/07/2021    BILITOT 0.2 01/07/2021    PROTEINTOT 8.4 01/07/2021    ALBUMIN 4.25 01/07/2021     PSA (01/07/2021): pending  PSA (11/10/2020): 28.3 ng/mL    IMAGING  NM bone scan, whole body (12/15/2020):  Impression: Extensive osteoblastic metastatic disease involving the axial and appendicular skeleton including the right greater than left femoral diaphyses.    CT pelvis with contrast (12/21/2020):  Impression:  1) Urinary bladder wall thickening noted.  2) Heterogeneous appearance of the prostate gland.  3) Nonspecific stranding in the retroperitoneal space.  4) Retroperitoneal region lymph node on the right side measuring 1.4 cm.  5) Right obturator chain region lymph node measuring 1.5 cm. Other lymph nodes are identified in this region.  6) Sclerotic bone lesions are noted throughout the pelvis concerning for metastatic disease.    PATHOLOGY  Left and right prostate, needle core biopsies, twelve total cores (12/02/2020):  Darrius Score 5+4 or 4+5 = 9 prostatic adenocarcinoma involving ~45-90% of all twelve (12/12) total cores.    IMPRESSION AND PLAN  Mr. Madison is a 60 y.o., white male with:  1. Prostate adenocarcinoma: Diagnosed in December 2020 with stage IV disease, with widespread metastases involving the axial and appendicular skeleton along with probable, pelvic adenopathy. I had a long discussion with the patient and his wife in clinic today regarding this diagnosis and its prognosis. We discussed how this disease is, unfortunately, not curable; however, particularly given his young age and this type of cancer's tendency to respond very well to initial anti-testosterone therapy,  sustained remissions are very possible. He is an excellent candidate for initial therapy with both ADT ([id number] Lupron) and an androgen receptor blocker (there are several options, but the newer agents have all been shown to be better than Casodex and Xtandi is a good choice). We will plan to begin both of these treatments within the next week. We will obtain a new, baseline PSA level today and see him back in clinic in ~four to five weeks with a CBC, CMP and repeat PSA for a symptom/toxicity check.  2. Skeletal metastases: There is diffuse involvement of both the axial and appendicular skeletons on the initial staging NM bone scan and CT of the abdomen and pelvis performed in late December 2020. Secondary to issue #1. 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 Continue to monitor.  3. Pain: Secondary to #2 and reportedly currently under good control with low dose, prn Norco (5/325 mg PO two to three times per day at the most) despite definitive treatment for issue #1 not being started yet. As discussed above, we will begin [id number] Lupron and PO Xtandi as soon as possible. Our clinic has agreed to take over his narcotic Rx from this point forward, and we will start refilling his Norco once he is out of his current supply. Continue to monitor.  The patient and his wife were in agreement with these plans.    It is a pleasure to participate in Mr. Madison's care. Please do not hesitate to call with any questions or concerns that you may have.    A total of 60 minutes were spent coordinating this patient’s care in clinic today; more than 50% of this time was face-to-face with the patient and his wife, reviewing his medical history, discussing the diagnosis and its prognosis and counseling on the current treatment and followup plan. All questions were answered to their satisfaction.    FOLLOW UP  Labs today. Begin [id number] Lupron and daily PO  Xtandi within the week. With urology as previously planned. Return to our clinic in ~4-5 weeks with a CBC, CMP and PSA.            This document was electronically signed by HIRO Martinez MD January 7, 2021 11:02 EST      CC: MD Marry Jesus APRN

## 2021-01-08 ENCOUNTER — SPECIALTY PHARMACY (OUTPATIENT)
Dept: PHARMACY | Facility: HOSPITAL | Age: 61
End: 2021-01-08

## 2021-01-08 DIAGNOSIS — C61 ADENOCARCINOMA OF PROSTATE (HCC): Primary | ICD-10-CM

## 2021-01-08 RX ORDER — ONDANSETRON HYDROCHLORIDE 8 MG/1
8 TABLET, FILM COATED ORAL 3 TIMES DAILY PRN
Qty: 30 TABLET | Refills: 5 | Status: SHIPPED | OUTPATIENT
Start: 2021-01-08 | End: 2021-06-29

## 2021-01-13 DIAGNOSIS — G89.3 CANCER ASSOCIATED PAIN: ICD-10-CM

## 2021-01-13 RX ORDER — HYDROCODONE BITARTRATE AND ACETAMINOPHEN 10; 325 MG/1; MG/1
1 TABLET ORAL EVERY 6 HOURS PRN
Qty: 120 TABLET | Refills: 0 | Status: SHIPPED | OUTPATIENT
Start: 2021-01-13 | End: 2022-05-24

## 2021-01-13 RX ORDER — HYDROCODONE BITARTRATE AND ACETAMINOPHEN 5; 325 MG/1; MG/1
1 TABLET ORAL EVERY 6 HOURS PRN
Qty: 120 TABLET | Refills: 0 | Status: CANCELLED | OUTPATIENT
Start: 2021-01-13

## 2021-01-18 NOTE — PROGRESS NOTES
Specialty Pharmacy Note      Name:  Ta Madison  :  1960  Date:  2021         Past Medical History:   Diagnosis Date   • Increased heart rate    • Mini stroke (CMS/HCC)    • Neck pain    • Prostate cancer (CMS/HCC)    • Rash    • Tobacco abuse        Past Surgical History:   Procedure Laterality Date   • APPENDECTOMY      [facility]        Social History     Socioeconomic History   • Marital status:      Spouse name: Not on file   • Number of children: Not on file   • Years of education: Not on file   • Highest education level: Not on file   Tobacco Use   • Smoking status: Light Tobacco Smoker     Packs/day: 1.00     Types: Cigarettes     Last attempt to quit: 3/12/2019     Years since quittin.8   • Smokeless tobacco: Never Used   Substance and Sexual Activity   • Alcohol use: No   • Drug use: Yes     Types: Marijuana     Comment: occ    • Sexual activity: Defer       Family History   Problem Relation Age of Onset   • Nephrolithiasis Mother    • Heart disease Father    • Hypertension Father    • Kidney disease Father        No Known Allergies    Current Outpatient Medications   Medication Sig Dispense Refill   • albuterol sulfate  (90 Base) MCG/ACT inhaler Inhale 2 puffs Every 6 (Six) Hours As Needed for Wheezing. 18 g 5   • aspirin 81 MG EC tablet Take 81 mg by mouth Daily.     • atorvastatin (LIPITOR) 20 MG tablet Take 1 tablet by mouth Every Night. 30 tablet 5   • enzalutamide (XTANDI) 40 MG chemo capsule Take 4 capsules by mouth Daily. 120 capsule 5   • fenofibrate (Tricor) 145 MG tablet Take 1 tablet by mouth Daily. 30 tablet 5   • gabapentin (NEURONTIN) 600 MG tablet Take 1 tablet by mouth 3 (Three) Times a Day. 90 tablet 1   • HYDROcodone-acetaminophen (NORCO)  MG per tablet Take 1 tablet by mouth Every 6 (Six) Hours As Needed for Moderate Pain . 120 tablet 0   • hydrOXYzine (ATARAX) 50 MG tablet Take 1 tablet by mouth 3 (Three) Times a Day As Needed for  Itching. 90 tablet 2   • meloxicam (MOBIC) 15 MG tablet Take 15 mg by mouth Daily.     • metoprolol tartrate (LOPRESSOR) 25 MG tablet Take 1 tablet by mouth Daily. 90 tablet 1   • Omega-3 Fatty Acids (FISH OIL) 1000 MG capsule capsule Take 2 capsules by mouth Daily With Breakfast. (OTC) 60 capsule 5   • ondansetron (ZOFRAN) 8 MG tablet Take 1 tablet by mouth 3 (Three) Times a Day As Needed for Nausea or Vomiting. 30 tablet 5   • pantoprazole (PROTONIX) 40 MG EC tablet Take 1 tablet by mouth Daily. 30 tablet 5   • vitamin B-12 (CYANOCOBALAMIN) 1000 MCG tablet Take 5,000 mcg by mouth Daily.       No current facility-administered medications for this visit.          LABORATORY:    Lab Results   Component Value Date    WBC 10.01 01/07/2021    HGB 13.4 01/07/2021    HCT 42.0 01/07/2021    MCV 80.0 01/07/2021    RDW 14.2 01/07/2021     (H) 01/07/2021    NEUTRORELPCT 63.7 01/07/2021    LYMPHORELPCT 24.7 01/07/2021    MONORELPCT 7.1 01/07/2021    EOSRELPCT 0.8 01/07/2021    BASORELPCT 0.9 01/07/2021    NEUTROABS 6.38 01/07/2021    LYMPHSABS 2.47 01/07/2021       Lab Results   Component Value Date     01/07/2021    K 4.8 01/07/2021    CO2 25.4 01/07/2021     01/07/2021    BUN 20 01/07/2021    CREATININE 0.95 01/07/2021    GLUCOSE 97 01/07/2021    CALCIUM 9.3 01/07/2021    ALKPHOS 4,430 (H) 01/07/2021    AST 16 01/07/2021    ALT 12 01/07/2021    BILITOT 0.2 01/07/2021    ALBUMIN 4.25 01/07/2021    PROTEINTOT 8.4 01/07/2021    MG 2.1 11/10/2020       No results found for: LDH, URICACID     Imaging Results (Last 24 Hours)     ** No results found for the last 24 hours. **          ASSESSMENT/PLAN:    Patient Update Assessment (new medications, allergies, medical history): Patient to begin Xtandi for treatment of prostate cancer.    Medication(s): Xtandi    Currently Taking Medication(s): Patient to begin on 1/12/21    Effectiveness of Medication: To be assessed as clinically appropriate by oncology  MD    Experiencing Side Effects: N/A    Prior Authorization Status: Approved    Financial Assistance Status: None needed. Patient currently has $0 copay.    Any Issues Identified: Not aware of any issues    Appropriate to Process Prescription(s): Yes, after discussion with patient and MD it is appropriate to process and dispense medication from [facility] ([place]).    Counseling Offered: Patient provided with medication guide for Xtandi and contents were discussed, including common/serious side effects and administration instructions. Patient had questions answered to his satisfaction and knows to contact the oncology clinic or pharmacy with questions/concerns related to treatment. Consent signed.    Next Specialty Pharmacy Visit: In 4 weeks.

## 2021-01-20 ENCOUNTER — INFUSION (OUTPATIENT)
Dept: ONCOLOGY | Facility: HOSPITAL | Age: 61
End: 2021-01-20

## 2021-01-20 VITALS
BODY MASS INDEX: 27.81 KG/M2 | SYSTOLIC BLOOD PRESSURE: 115 MMHG | RESPIRATION RATE: 18 BRPM | DIASTOLIC BLOOD PRESSURE: 62 MMHG | TEMPERATURE: 97.3 F | WEIGHT: 188.3 LBS | HEART RATE: 91 BPM | OXYGEN SATURATION: 98 %

## 2021-01-20 DIAGNOSIS — C61 ADENOCARCINOMA OF PROSTATE (HCC): Primary | ICD-10-CM

## 2021-01-20 LAB
ALBUMIN SERPL-MCNC: 3.99 G/DL (ref 3.5–5.2)
ALBUMIN/GLOB SERPL: 1.1 G/DL
ALP SERPL-CCNC: >6000 U/L (ref 39–117)
ALT SERPL W P-5'-P-CCNC: 21 U/L (ref 1–41)
ANION GAP SERPL CALCULATED.3IONS-SCNC: 9.5 MMOL/L (ref 5–15)
AST SERPL-CCNC: 24 U/L (ref 1–40)
BASOPHILS # BLD AUTO: 0.06 10*3/MM3 (ref 0–0.2)
BASOPHILS NFR BLD AUTO: 1 % (ref 0–1.5)
BILIRUB SERPL-MCNC: 0.2 MG/DL (ref 0–1.2)
BUN SERPL-MCNC: 20 MG/DL (ref 6–20)
BUN/CREAT SERPL: 20.2 (ref 7–25)
CALCIUM SPEC-SCNC: 9 MG/DL (ref 8.6–10.5)
CHLORIDE SERPL-SCNC: 101 MMOL/L (ref 98–107)
CO2 SERPL-SCNC: 25.5 MMOL/L (ref 22–29)
CREAT SERPL-MCNC: 0.99 MG/DL (ref 0.76–1.27)
DEPRECATED RDW RBC AUTO: 41.7 FL (ref 37–54)
EOSINOPHIL # BLD AUTO: 0.04 10*3/MM3 (ref 0–0.4)
EOSINOPHIL NFR BLD AUTO: 0.6 % (ref 0.3–6.2)
ERYTHROCYTE [DISTWIDTH] IN BLOOD BY AUTOMATED COUNT: 14.9 % (ref 12.3–15.4)
GFR SERPL CREATININE-BSD FRML MDRD: 77 ML/MIN/1.73
GLOBULIN UR ELPH-MCNC: 3.6 GM/DL
GLUCOSE SERPL-MCNC: 106 MG/DL (ref 65–99)
HCT VFR BLD AUTO: 37.4 % (ref 37.5–51)
HGB BLD-MCNC: 12.1 G/DL (ref 13–17.7)
IMM GRANULOCYTES # BLD AUTO: 0.09 10*3/MM3 (ref 0–0.05)
IMM GRANULOCYTES NFR BLD AUTO: 1.4 % (ref 0–0.5)
LYMPHOCYTES # BLD AUTO: 2.21 10*3/MM3 (ref 0.7–3.1)
LYMPHOCYTES NFR BLD AUTO: 35.5 % (ref 19.6–45.3)
MCH RBC QN AUTO: 25.3 PG (ref 26.6–33)
MCHC RBC AUTO-ENTMCNC: 32.4 G/DL (ref 31.5–35.7)
MCV RBC AUTO: 78.1 FL (ref 79–97)
MONOCYTES # BLD AUTO: 0.5 10*3/MM3 (ref 0.1–0.9)
MONOCYTES NFR BLD AUTO: 8 % (ref 5–12)
NEUTROPHILS NFR BLD AUTO: 3.32 10*3/MM3 (ref 1.7–7)
NEUTROPHILS NFR BLD AUTO: 53.5 % (ref 42.7–76)
NRBC BLD AUTO-RTO: 0 /100 WBC (ref 0–0.2)
PLATELET # BLD AUTO: 380 10*3/MM3 (ref 140–450)
PMV BLD AUTO: 9.3 FL (ref 6–12)
POTASSIUM SERPL-SCNC: 4.4 MMOL/L (ref 3.5–5.2)
PROT SERPL-MCNC: 7.6 G/DL (ref 6–8.5)
RBC # BLD AUTO: 4.79 10*6/MM3 (ref 4.14–5.8)
SODIUM SERPL-SCNC: 136 MMOL/L (ref 136–145)
WBC # BLD AUTO: 6.22 10*3/MM3 (ref 3.4–10.8)

## 2021-01-20 PROCEDURE — 84153 ASSAY OF PSA TOTAL: CPT

## 2021-01-20 PROCEDURE — 25010000002 LEUPROLIDE ACETATE (3 MONTH) PER 7.5 MG: Performed by: INTERNAL MEDICINE

## 2021-01-20 PROCEDURE — 36415 COLL VENOUS BLD VENIPUNCTURE: CPT

## 2021-01-20 PROCEDURE — 85025 COMPLETE CBC W/AUTO DIFF WBC: CPT

## 2021-01-20 PROCEDURE — 96402 CHEMO HORMON ANTINEOPL SQ/IM: CPT

## 2021-01-20 PROCEDURE — 80053 COMPREHEN METABOLIC PANEL: CPT

## 2021-01-20 RX ADMIN — LEUPROLIDE ACETATE 22.5 MG: KIT at 14:14

## 2021-01-21 LAB — PSA SERPL-MCNC: 29.5 NG/ML (ref 0–4)

## 2021-02-02 DIAGNOSIS — F45.8 NEUROPATHIC PRURITUS: ICD-10-CM

## 2021-02-02 DIAGNOSIS — G89.29 CHRONIC RIGHT SHOULDER PAIN: ICD-10-CM

## 2021-02-02 DIAGNOSIS — M25.511 CHRONIC RIGHT SHOULDER PAIN: ICD-10-CM

## 2021-02-02 RX ORDER — HYDROXYZINE 50 MG/1
50 TABLET, FILM COATED ORAL 3 TIMES DAILY PRN
Qty: 90 TABLET | Refills: 2 | Status: SHIPPED | OUTPATIENT
Start: 2021-02-02 | End: 2021-09-15 | Stop reason: SDUPTHER

## 2021-02-02 RX ORDER — GABAPENTIN 600 MG/1
600 TABLET ORAL 3 TIMES DAILY
Qty: 90 TABLET | Refills: 1 | Status: SHIPPED | OUTPATIENT
Start: 2021-02-02 | End: 2021-03-09 | Stop reason: SDUPTHER

## 2021-02-02 NOTE — TELEPHONE ENCOUNTER
Caller: LosAzael    Relationship: Emergency Contact    Best call back number: [phone]   Medication needed:   Requested Prescriptions     Pending Prescriptions Disp Refills   • gabapentin (NEURONTIN) 600 MG tablet 90 tablet 1     Sig: Take 1 tablet by mouth 3 (Three) Times a Day.   • hydrOXYzine (ATARAX) 50 MG tablet 90 tablet 2     Sig: Take 1 tablet by mouth 3 (Three) Times a Day As Needed for Itching.       When do you need the refill by: ASAP    What details did the patient provide when requesting the medication: ALMOST OUT OF MEDICATION  Does the patient have less than a 3 day supply:  [x] Yes  [] No    What is the patient's preferred pharmacy: [facility] #[id number] [address] AT [facility] OF [facility] [address] [facility] [address] - [phone] [facility] [phone] FX

## 2021-02-04 ENCOUNTER — SPECIALTY PHARMACY (OUTPATIENT)
Dept: PHARMACY | Facility: HOSPITAL | Age: 61
End: 2021-02-04

## 2021-02-12 NOTE — PROGRESS NOTES
Specialty Pharmacy Note      Name:  Ta Madison  :  1960  Date:  2021       Past Medical History:   Diagnosis Date   • Increased heart rate    • Mini stroke (CMS/HCC)    • Neck pain    • Prostate cancer (CMS/HCC)    • Rash    • Tobacco abuse        Past Surgical History:   Procedure Laterality Date   • APPENDECTOMY      [facility]        Social History     Socioeconomic History   • Marital status:      Spouse name: Not on file   • Number of children: Not on file   • Years of education: Not on file   • Highest education level: Not on file   Tobacco Use   • Smoking status: Light Tobacco Smoker     Packs/day: 1.00     Types: Cigarettes     Last attempt to quit: 3/12/2019     Years since quittin.9   • Smokeless tobacco: Never Used   Substance and Sexual Activity   • Alcohol use: No   • Drug use: Yes     Types: Marijuana     Comment: occ    • Sexual activity: Defer       Family History   Problem Relation Age of Onset   • Nephrolithiasis Mother    • Heart disease Father    • Hypertension Father    • Kidney disease Father        No Known Allergies    Current Outpatient Medications   Medication Sig Dispense Refill   • albuterol sulfate  (90 Base) MCG/ACT inhaler Inhale 2 puffs Every 6 (Six) Hours As Needed for Wheezing. 18 g 5   • aspirin 81 MG EC tablet Take 81 mg by mouth Daily.     • atorvastatin (LIPITOR) 20 MG tablet Take 1 tablet by mouth Every Night. 30 tablet 5   • enzalutamide (XTANDI) 40 MG chemo capsule Take 4 capsules by mouth Daily. 120 capsule 5   • fenofibrate (Tricor) 145 MG tablet Take 1 tablet by mouth Daily. 30 tablet 5   • gabapentin (NEURONTIN) 600 MG tablet Take 1 tablet by mouth 3 (Three) Times a Day. 90 tablet 1   • HYDROcodone-acetaminophen (NORCO)  MG per tablet Take 1 tablet by mouth Every 6 (Six) Hours As Needed for Moderate Pain . 120 tablet 0   • hydrOXYzine (ATARAX) 50 MG tablet Take 1 tablet by mouth 3 (Three) Times a Day As Needed for  Itching. 90 tablet 2   • meloxicam (MOBIC) 15 MG tablet Take 15 mg by mouth Daily.     • metoprolol tartrate (LOPRESSOR) 25 MG tablet Take 1 tablet by mouth Daily. 90 tablet 1   • Omega-3 Fatty Acids (FISH OIL) 1000 MG capsule capsule Take 2 capsules by mouth Daily With Breakfast. (OTC) 60 capsule 5   • ondansetron (ZOFRAN) 8 MG tablet Take 1 tablet by mouth 3 (Three) Times a Day As Needed for Nausea or Vomiting. 30 tablet 5   • pantoprazole (PROTONIX) 40 MG EC tablet Take 1 tablet by mouth Daily. 30 tablet 5   • vitamin B-12 (CYANOCOBALAMIN) 1000 MCG tablet Take 5,000 mcg by mouth Daily.       No current facility-administered medications for this visit.          LABORATORY:    Lab Results   Component Value Date    WBC 6.22 01/20/2021    HGB 12.1 (L) 01/20/2021    HCT 37.4 (L) 01/20/2021    MCV 78.1 (L) 01/20/2021    RDW 14.9 01/20/2021     01/20/2021    NEUTRORELPCT 53.5 01/20/2021    LYMPHORELPCT 35.5 01/20/2021    MONORELPCT 8.0 01/20/2021    EOSRELPCT 0.6 01/20/2021    BASORELPCT 1.0 01/20/2021    NEUTROABS 3.32 01/20/2021    LYMPHSABS 2.21 01/20/2021       Lab Results   Component Value Date     01/20/2021    K 4.4 01/20/2021    CO2 25.5 01/20/2021     01/20/2021    BUN 20 01/20/2021    CREATININE 0.99 01/20/2021    GLUCOSE 106 (H) 01/20/2021    CALCIUM 9.0 01/20/2021    ALKPHOS >6,000 (H) 01/20/2021    AST 24 01/20/2021    ALT 21 01/20/2021    BILITOT 0.2 01/20/2021    ALBUMIN 3.99 01/20/2021    PROTEINTOT 7.6 01/20/2021    MG 2.1 11/10/2020       No results found for: LDH, URICACID     Imaging Results (Last 24 Hours)     ** No results found for the last 24 hours. **          ASSESSMENT/PLAN:    Patient Update Assessment (new medications, allergies, medical history): No changes.     Medication(s): Xtandi 40 MG chemo capsule.    Currently Taking Medication(s): Taking medication as directed.      Effectiveness of Medication: To be assessed as clinically appropriate by oncology  team.    Experiencing Side Effects: None expressed at this time.     Prior Authorization Status: Approved.     Financial Assistance Status: Not needed, patient insurance covers medication cost at this time.      Any Issues Identified: No issues expressed from patient, no issues processing refill.     Appropriate to Process Prescription(s): Yes.  Medication will be dispensed to patient from [facility].      Counseling Offered:  Patient previously counseled upon initiation of therapy, aware to contact pharmacy or oncology office with any new questions or concerns.      Next Specialty Pharmacy Visit: 03/08/2021

## 2021-02-25 ENCOUNTER — OFFICE VISIT (OUTPATIENT)
Dept: ONCOLOGY | Facility: CLINIC | Age: 61
End: 2021-02-25

## 2021-02-25 ENCOUNTER — LAB (OUTPATIENT)
Dept: ONCOLOGY | Facility: CLINIC | Age: 61
End: 2021-02-25

## 2021-02-25 VITALS
WEIGHT: 189.4 LBS | RESPIRATION RATE: 18 BRPM | DIASTOLIC BLOOD PRESSURE: 55 MMHG | HEART RATE: 89 BPM | BODY MASS INDEX: 27.97 KG/M2 | OXYGEN SATURATION: 97 % | TEMPERATURE: 98.4 F | SYSTOLIC BLOOD PRESSURE: 111 MMHG

## 2021-02-25 DIAGNOSIS — C61 ADENOCARCINOMA OF PROSTATE (HCC): Primary | ICD-10-CM

## 2021-02-25 DIAGNOSIS — C61 ADENOCARCINOMA OF PROSTATE (HCC): ICD-10-CM

## 2021-02-25 LAB
ALBUMIN SERPL-MCNC: 4.23 G/DL (ref 3.5–5.2)
ALBUMIN/GLOB SERPL: 1.4 G/DL
ALP SERPL-CCNC: 580 U/L (ref 39–117)
ALT SERPL W P-5'-P-CCNC: 19 U/L (ref 1–41)
ANION GAP SERPL CALCULATED.3IONS-SCNC: 9.5 MMOL/L (ref 5–15)
AST SERPL-CCNC: 26 U/L (ref 1–40)
BASOPHILS # BLD AUTO: 0.08 10*3/MM3 (ref 0–0.2)
BASOPHILS NFR BLD AUTO: 1.2 % (ref 0–1.5)
BILIRUB SERPL-MCNC: 0.3 MG/DL (ref 0–1.2)
BUN SERPL-MCNC: 21 MG/DL (ref 6–20)
BUN/CREAT SERPL: 20.8 (ref 7–25)
CALCIUM SPEC-SCNC: 9.1 MG/DL (ref 8.6–10.5)
CHLORIDE SERPL-SCNC: 104 MMOL/L (ref 98–107)
CO2 SERPL-SCNC: 23.5 MMOL/L (ref 22–29)
CREAT SERPL-MCNC: 1.01 MG/DL (ref 0.76–1.27)
DEPRECATED RDW RBC AUTO: 53.5 FL (ref 37–54)
EOSINOPHIL # BLD AUTO: 0.18 10*3/MM3 (ref 0–0.4)
EOSINOPHIL NFR BLD AUTO: 2.7 % (ref 0.3–6.2)
ERYTHROCYTE [DISTWIDTH] IN BLOOD BY AUTOMATED COUNT: 18.2 % (ref 12.3–15.4)
GFR SERPL CREATININE-BSD FRML MDRD: 76 ML/MIN/1.73
GLOBULIN UR ELPH-MCNC: 3 GM/DL
GLUCOSE SERPL-MCNC: 114 MG/DL (ref 65–99)
HCT VFR BLD AUTO: 34.4 % (ref 37.5–51)
HGB BLD-MCNC: 11.2 G/DL (ref 13–17.7)
IMM GRANULOCYTES # BLD AUTO: 0.02 10*3/MM3 (ref 0–0.05)
IMM GRANULOCYTES NFR BLD AUTO: 0.3 % (ref 0–0.5)
LYMPHOCYTES # BLD AUTO: 2.31 10*3/MM3 (ref 0.7–3.1)
LYMPHOCYTES NFR BLD AUTO: 35.3 % (ref 19.6–45.3)
MCH RBC QN AUTO: 26.3 PG (ref 26.6–33)
MCHC RBC AUTO-ENTMCNC: 32.6 G/DL (ref 31.5–35.7)
MCV RBC AUTO: 80.8 FL (ref 79–97)
MONOCYTES # BLD AUTO: 0.51 10*3/MM3 (ref 0.1–0.9)
MONOCYTES NFR BLD AUTO: 7.8 % (ref 5–12)
NEUTROPHILS NFR BLD AUTO: 3.45 10*3/MM3 (ref 1.7–7)
NEUTROPHILS NFR BLD AUTO: 52.7 % (ref 42.7–76)
NRBC BLD AUTO-RTO: 0 /100 WBC (ref 0–0.2)
PLATELET # BLD AUTO: 407 10*3/MM3 (ref 140–450)
PMV BLD AUTO: 9.6 FL (ref 6–12)
POTASSIUM SERPL-SCNC: 3.9 MMOL/L (ref 3.5–5.2)
PROT SERPL-MCNC: 7.2 G/DL (ref 6–8.5)
RBC # BLD AUTO: 4.26 10*6/MM3 (ref 4.14–5.8)
SODIUM SERPL-SCNC: 137 MMOL/L (ref 136–145)
WBC # BLD AUTO: 6.55 10*3/MM3 (ref 3.4–10.8)

## 2021-02-25 PROCEDURE — 84153 ASSAY OF PSA TOTAL: CPT | Performed by: INTERNAL MEDICINE

## 2021-02-25 PROCEDURE — 36415 COLL VENOUS BLD VENIPUNCTURE: CPT

## 2021-02-25 PROCEDURE — 99214 OFFICE O/P EST MOD 30 MIN: CPT | Performed by: INTERNAL MEDICINE

## 2021-02-25 PROCEDURE — 80053 COMPREHEN METABOLIC PANEL: CPT | Performed by: INTERNAL MEDICINE

## 2021-02-25 PROCEDURE — 85025 COMPLETE CBC W/AUTO DIFF WBC: CPT | Performed by: INTERNAL MEDICINE

## 2021-02-25 NOTE — PROGRESS NOTES
Name:  Ta Madisno  :  1960  Date:  2021     REFERRING PHYSICIAN  Dheeraj Nieves MD    PRIMARY CARE PROVIDER  Marry Jain APRN    REASON FOR FOLLOWUP  1. Adenocarcinoma of prostate (CMS/HCC)      CHIEF COMPLAINT  Much improved/resolved diffuse bone pains since starting Lupron and Xtandi in early 2021.    Dear Dr. Nieves,    HISTORY OF PRESENT ILLNESS:   I saw Mr. Madison in follow up today in our medical oncology clinic. As you are aware, he is a pleasant, 60 y.o., white male with a history of tobacco abuse who was referred to you in 2020 for an elevated serum PSA level (of ~28 ng/mL, which had increased to 247 ng/mL at the time of his initial appointment in our clinic). An examination revealed a very hard and fixed prostate, and you performed multiple core biopsies on 2020. The results were consistent with [place] Score 5+4 or 4+5=9 adenocarcinoma involving all twelve sampled cores. A staging workup confirmed pelvic adenopathy as well as diffuse, sclerotic lesions throughout the axial and appendicular skeleton, including the right greater than left femoral diaphyses. With this stage IV diagnosis, he was referred to our clinic for further evaluation and management. At the time of his initial consultation in our office (on 2021), he was agreeable to starting definitive therapy with a combination of ADT (Lupron injections) and anti-androgen therapy (daily PO Xtandi).    INTERIM HISTORY:  Mr. Madison returns to clinic today for follow up again accompanied by his wife. He received an initial dose of Lupron on 2021 and has been on daily Xtandi for a total of about six (6) weeks now. He is tolerating this regimen well; and he states today that, almost immediately after starting, he started to feel much better. His diffuse skeletal pains are currently all but resolved (he has not taken a Norco in weeks). His only complaint today is actually of a rash on his  face and hands that has been a near persistent issue for the past couple of years (it actually went away when his PSA was high and he was feeling bad, but now its back). Previous dermatology evaluation and biopsies have not been able to find a specific cause or effective treatment. Overall though, he is happy that he has been able to get back to doing whatever he is he wants to do.    Past Medical History:   Diagnosis Date   • Increased heart rate    • Mini stroke (CMS/HCC)    • Neck pain    • Prostate cancer (CMS/HCC)    • Rash    • Tobacco abuse        Past Surgical History:   Procedure Laterality Date   • APPENDECTOMY      [facility]        Social History     Socioeconomic History   • Marital status:      Spouse name: Not on file   • Number of children: Not on file   • Years of education: Not on file   • Highest education level: Not on file   Tobacco Use   • Smoking status: Light Tobacco Smoker     Packs/day: 1.00     Types: Cigarettes     Last attempt to quit: 3/12/2019     Years since quittin.9   • Smokeless tobacco: Never Used   Substance and Sexual Activity   • Alcohol use: No   • Drug use: Yes     Types: Marijuana     Comment: occ    • Sexual activity: Defer       Family History   Problem Relation Age of Onset   • Nephrolithiasis Mother    • Heart disease Father    • Hypertension Father    • Kidney disease Father        No Known Allergies    Current Outpatient Medications   Medication Sig Dispense Refill   • albuterol sulfate  (90 Base) MCG/ACT inhaler Inhale 2 puffs Every 6 (Six) Hours As Needed for Wheezing. 18 g 5   • aspirin 81 MG EC tablet Take 81 mg by mouth Daily.     • atorvastatin (LIPITOR) 20 MG tablet Take 1 tablet by mouth Every Night. 30 tablet 5   • enzalutamide (XTANDI) 40 MG chemo capsule Take 4 capsules by mouth Daily. 120 capsule 5   • fenofibrate (Tricor) 145 MG tablet Take 1 tablet by mouth Daily. 30 tablet 5   • gabapentin (NEURONTIN) 600 MG tablet Take 1  tablet by mouth 3 (Three) Times a Day. 90 tablet 1   • HYDROcodone-acetaminophen (NORCO)  MG per tablet Take 1 tablet by mouth Every 6 (Six) Hours As Needed for Moderate Pain . 120 tablet 0   • hydrOXYzine (ATARAX) 50 MG tablet Take 1 tablet by mouth 3 (Three) Times a Day As Needed for Itching. 90 tablet 2   • meloxicam (MOBIC) 15 MG tablet Take 15 mg by mouth Daily.     • metoprolol tartrate (LOPRESSOR) 25 MG tablet Take 1 tablet by mouth Daily. 90 tablet 1   • Omega-3 Fatty Acids (FISH OIL) 1000 MG capsule capsule Take 2 capsules by mouth Daily With Breakfast. (OTC) 60 capsule 5   • ondansetron (ZOFRAN) 8 MG tablet Take 1 tablet by mouth 3 (Three) Times a Day As Needed for Nausea or Vomiting. 30 tablet 5   • pantoprazole (PROTONIX) 40 MG EC tablet Take 1 tablet by mouth Daily. 30 tablet 5   • vitamin B-12 (CYANOCOBALAMIN) 1000 MCG tablet Take 5,000 mcg by mouth Daily.       No current facility-administered medications for this visit.      REVIEW OF SYSTEMS  CONSTITUTIONAL:  No fever, chills or night sweats.  EYES:  No blurry vision, diplopia or other vision changes.  ENT:  No hearing loss, nosebleeds or sore throat.  CARDIOVASCULAR:  No palpitations, arrhythmia, syncopal episodes or edema.  PULMONARY:  No hemoptysis, wheezing, chronic cough or shortness of breath.  GASTROINTESTINAL:  No nausea or vomiting.  No constipation or diarrhea.  No abdominal pain.  GENITOURINARY:  No hematuria, kidney stones or frequent urination.  MUSCULOSKELETAL:  As per the HPI above.  INTEGUMENTARY: No rashes or pruritus.  ENDOCRINE:  No excessive thirst. Occasional hot flashes.  HEMATOLOGIC:  No history of free bleeding, spontaneous bleeding or clotting.  IMMUNOLOGIC:  No allergies or frequent infections. More or less persistent rash for the past ~two years, as per the HPI above.  NEUROLOGIC: No numbness, tingling, seizures or weakness.  PSYCHIATRIC:  No anxiety or depression.    PHYSICAL EXAMINATION  /55   Pulse 89    Temp 98.4 °F (36.9 °C) (Temporal)   Resp 18   Wt 85.9 kg (189 lb 6.4 oz)   SpO2 97%   BMI 27.97 kg/m²     Pain Score:  Pain Score    02/25/21 1319   PainSc: 0-No pain       PHQ-Score Total:  PHQ-9 Total Score:      GENERAL:  A well-developed, well-nourished, white male in no acute distress.  HEENT:  Pupils equally round and reactive to light.  Extraocular muscles intact.  CARDIOVASCULAR:  Regular rate and rhythm.  No murmurs, gallops or rubs.  LUNGS:  Clear to auscultation bilaterally.  ABDOMEN:  Soft, nontender, nondistended with positive bowel sounds.  EXTREMITIES:  No clubbing, cyanosis or edema bilaterally.  SKIN:  No petechiae. Diffuse, lacy and pruritic rash on the face, neck and dorsa of both hands. No visible rash in any clothed areas.  NEURO:  Cranial nerves grossly intact.  No focal deficits.  PSYCH:  Alert and oriented x3.    LABORATORY    Lab Results   Component Value Date    WBC 6.55 02/25/2021    HGB 11.2 (L) 02/25/2021    HCT 34.4 (L) 02/25/2021    MCV 80.8 02/25/2021     02/25/2021    NEUTROABS 3.45 02/25/2021       Lab Results   Component Value Date     02/25/2021    K 3.9 02/25/2021     02/25/2021    CO2 23.5 02/25/2021    BUN 21 (H) 02/25/2021    CREATININE 1.01 02/25/2021    GLUCOSE 114 (H) 02/25/2021    CALCIUM 9.1 02/25/2021    AST 26 02/25/2021    ALT 19 02/25/2021    ALKPHOS 580 (H) 02/25/2021    BILITOT 0.3 02/25/2021    PROTEINTOT 7.2 02/25/2021    ALBUMIN 4.23 02/25/2021     CBC (02/25/2021): WBCs: 6.5; HgB: 11.2; Hct: 34.4; platelets: 407    PSA (02/25/2021): pending  PSA (01/20/2021): 29.5 ng/mL  PSA (01/07/2021): 247.0 ng/mL  PSA (11/10/2020): 28.3 ng/mL    IMAGING  NM bone scan, whole body (12/15/2020):  Impression: Extensive osteoblastic metastatic disease involving the axial and appendicular skeleton including the right greater than left femoral diaphyses.    CT pelvis with contrast (12/21/2020):  Impression:  1) Urinary bladder wall thickening noted.  2)  Heterogeneous appearance of the prostate gland.  3) Nonspecific stranding in the retroperitoneal space.  4) Retroperitoneal region lymph node on the right side measuring 1.4 cm.  5) Right obturator chain region lymph node measuring 1.5 cm. Other lymph nodes are identified in this region.  6) Sclerotic bone lesions are noted throughout the pelvis concerning for metastatic disease.    PATHOLOGY  Left and right prostate, needle core biopsies, twelve total cores (12/02/2020):  Darrius Score 5+4 or 4+5 = 9 prostatic adenocarcinoma involving ~45-90% of all twelve (12/12) total cores.    IMPRESSION AND PLAN  Mr. Madison is a 60 y.o., white male with:  1. Prostate adenocarcinoma: Diagnosed in December 2020 with stage IV disease, with widespread metastases involving the axial and appendicular skeleton along with probable, pelvic adenopathy. I had another, long discussion with the patient and his wife in clinic today regarding this diagnosis and its prognosis. They remain aware that this disease is, unfortunately, not curable; however, particularly given his young age and this type of cancer's tendency to respond very well to initial anti-testosterone therapy, it is very treatable, and sustained remissions are very possible. He was felt to be an excellent candidate for initial therapy with both ADT and androgen receptor blockade. He received an initial cycle of (currently [id number]) Lupron on 01/08/2021 and was also started on daily PO Xtandi at that time. He has now been on the latter for about six (6) weeks. He is tolerating this regimen very well, with no significant side effects. He states that, almost immediately after beginning this therapy, he started to feel much better. He has actually not had to take a Norco tablet for over a month, and he is back to doing the things he normally likes to do. Meanwhile, his serum PSA level has responded dramatically and quickly, decreasing from 247 ng/mL on 01/07/2021 to 29.5 ng/mL by  01/20/2021 (today's repeat result is pending but will likely be even lower). We will proceed with this current treatment plan and see him back in clinic in another ~six weeks, on the day of the next cycle of Lupron, with a CBC, CMP and PSA, for another symptom check.  2. Skeletal metastases: There was diffuse involvement of both the axial and appendicular skeletons on the initial staging NM bone scan and CT of the abdomen and pelvis performed in late December 2020. Secondary to issue #1. 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 Continue to monitor.  3. Pain: Secondary to #2 and currently all but resolved ever since Lupron/Xtandi was started in early January 2021 for the palliative treatment of issue #1. He states that he has not had to take a Norco for over a month. Continue to monitor.  4. Rash: Based on the his history of the rash, which was resolved in late 2020/early 2021 when he was feeling badly from issue #1 (and was consequently staying indoors) but, otherwise, has been persistently present on his sun-exposed areas for the past ~two years, this is likely secondary to photosensitivity. He was counseled on the use of sunscreen and other protective measures, etc. Otherwise, ongoing management per primary care and dermatology.  The patient and his wife were in agreement with these plans.    It is a pleasure to participate in Mr. Madison's care. Please do not hesitate to call with any questions or concerns that you may have.    A total of 30 minutes were spent coordinating this patient’s care in clinic today; more than 50% of this time was face-to-face with the patient and his wife, reviewing his interim medical history, discussing the results of recent labwork and counseling on the current treatment and followup plan. All questions were answered to their satisfaction.    FOLLOW UP  Continue (currently [id number])  Lupron and daily PO Xtandi. With urology, as previously planned. With dermatology, as previously planned. Return to our clinic in 6 weeks, on the day of the next cycle of Lupron, with a CBC, CMP and PSA.            This document was electronically signed by HIRO Martinez MD February 25, 2021 13:55 EST      CC: MD Marry Jesus APRN

## 2021-02-26 LAB — PSA SERPL-MCNC: 0.81 NG/ML (ref 0–4)

## 2021-03-08 ENCOUNTER — SPECIALTY PHARMACY (OUTPATIENT)
Dept: PHARMACY | Facility: HOSPITAL | Age: 61
End: 2021-03-08

## 2021-03-09 ENCOUNTER — OFFICE VISIT (OUTPATIENT)
Dept: FAMILY MEDICINE CLINIC | Facility: CLINIC | Age: 61
End: 2021-03-09

## 2021-03-09 VITALS
OXYGEN SATURATION: 98 % | BODY MASS INDEX: 27.55 KG/M2 | DIASTOLIC BLOOD PRESSURE: 60 MMHG | SYSTOLIC BLOOD PRESSURE: 108 MMHG | HEART RATE: 88 BPM | TEMPERATURE: 97.3 F | WEIGHT: 186 LBS | HEIGHT: 69 IN

## 2021-03-09 DIAGNOSIS — C61 PROSTATE CANCER (HCC): ICD-10-CM

## 2021-03-09 DIAGNOSIS — G89.29 CHRONIC RIGHT SHOULDER PAIN: ICD-10-CM

## 2021-03-09 DIAGNOSIS — M25.511 CHRONIC RIGHT SHOULDER PAIN: ICD-10-CM

## 2021-03-09 DIAGNOSIS — F45.8 NEUROPATHIC PRURITUS: Primary | ICD-10-CM

## 2021-03-09 PROCEDURE — 99214 OFFICE O/P EST MOD 30 MIN: CPT | Performed by: NURSE PRACTITIONER

## 2021-03-09 RX ORDER — GABAPENTIN 600 MG/1
600 TABLET ORAL 3 TIMES DAILY
Qty: 90 TABLET | Refills: 1 | Status: SHIPPED | OUTPATIENT
Start: 2021-03-09 | End: 2021-10-12 | Stop reason: SDUPTHER

## 2021-03-09 NOTE — PROGRESS NOTES
"Chief Complaint  Follow-up and Rash    Subjective          Ta Madison presents to [facility]  Joint Swelling  This is a chronic (Multiple joint pains and myalgia) problem. The current episode started more than 1 year ago. The problem occurs daily. The problem has been gradually improving. Associated symptoms include joint swelling. Associated symptoms comments: Patient was diagnosed with prostate cancer and is currently following with oncology.  He is currently taking chemotherapy and doing well with the treatments.. Nothing aggravates the symptoms. He has tried nothing for the symptoms.     Rash   This is a recurrent problem. The current episode started more than 1 year ago. The problem has been waxing and waning since onset. The affected locations include the scalp, chest, face, left arm and right arm. The rash is characterized by redness and itchiness. It is unknown if there was an exposure to a precipitant. Past treatments include antihistamine and topical steroids. The treatment provided no relief. His past medical history is significant for allergies.        Objective   Vital Signs:   /60 (BP Location: Left arm, Patient Position: Sitting, Cuff Size: Adult)   Pulse 88   Temp 97.3 °F (36.3 °C)   Ht 175.3 cm (69\")   Wt 84.4 kg (186 lb)   SpO2 98%   BMI 27.47 kg/m²     Physical Exam  Vitals and nursing note reviewed.   Constitutional:       Appearance: He is well-developed.   Cardiovascular:      Rate and Rhythm: Normal rate and regular rhythm.      Heart sounds: Normal heart sounds.   Pulmonary:      Effort: Pulmonary effort is normal.      Breath sounds: Normal breath sounds.   Musculoskeletal:      Cervical back: Normal range of motion and neck supple.   Skin:     General: Skin is warm and dry.   Neurological:      Mental Status: He is alert and oriented to person, place, and time.   Psychiatric:         Behavior: Behavior normal.         Thought Content: Thought " content normal.         Judgment: Judgment normal.        Result Review :                 Assessment and Plan    Diagnoses and all orders for this visit:    1. Neuropathic pruritus (Primary)  -     Ambulatory Referral to Allergy  -     Continue gabapentin (NEURONTIN) 600 MG tablet; Take 1 tablet by mouth 3 (Three) Times a Day.  Dispense: 90 tablet; Refill: 1    2. Prostate cancer (CMS/[facility])   Continue to follow with oncology as scheduled   His last PSA level was found to be normal    3. Chronic joint pain  -     gabapentin (NEURONTIN) 600 MG tablet; Take 1 tablet by mouth 3 (Three) Times a Day.  Dispense: 90 tablet; Refill: 1   Patient would like to wean himself off of the gabapentin.  I have advised him how that he can wean himself off.  Hopefully he will not need another prescription for gabapentin      Follow Up   Return in about 3 months (around 6/9/2021).  Patient was given instructions and counseling regarding his condition or for health maintenance advice. Please see specific information pulled into the AVS if appropriate.     Kristen reviewed and is consistent.  UDS 11/10/2020 reviewed and is consistent.  Patient comfort assessment guide reviewed and updated today.    As part of the patient's treatment plan they are being prescribed a controlled substance/ substances with potential for abuse.  This patient has been made aware of the appropriate use of such medications, including potential risk of somnolence, limited ability to drive and/or work safely, and potential for overdose.  It has also been made clear these medications are for use by the patient only, without concomitant use of alcohol or other substances unless prescribed/advised by medical provider.    Patient has completed prescribing agreement detailing terms of continued prescribing of controlled substances including monitoring KRISTEN reports, urine drug screens, and pill counts.  The patient is aware KRISTEN reports are reviewed on a regular basis  and scanned into the chart.    History and physical exam exhibit continued safe and appropriate use of controlled substances.

## 2021-03-11 NOTE — PROGRESS NOTES
Specialty Pharmacy Note      Name:  Ta Madison  :  1960  Date:  3/11/2021         Past Medical History:   Diagnosis Date   • Increased heart rate    • Mini stroke (CMS/HCC)    • Neck pain    • Prostate cancer (CMS/HCC)    • Rash    • Tobacco abuse        Past Surgical History:   Procedure Laterality Date   • APPENDECTOMY      [facility]        Social History     Socioeconomic History   • Marital status:      Spouse name: Not on file   • Number of children: Not on file   • Years of education: Not on file   • Highest education level: Not on file   Tobacco Use   • Smoking status: Light Tobacco Smoker     Packs/day: 1.00     Types: Cigarettes     Last attempt to quit: 3/12/2019     Years since quittin.0   • Smokeless tobacco: Never Used   Substance and Sexual Activity   • Alcohol use: No   • Drug use: Yes     Types: Marijuana     Comment: occ    • Sexual activity: Defer       Family History   Problem Relation Age of Onset   • Nephrolithiasis Mother    • Heart disease Father    • Hypertension Father    • Kidney disease Father        No Known Allergies    Current Outpatient Medications   Medication Sig Dispense Refill   • albuterol sulfate  (90 Base) MCG/ACT inhaler Inhale 2 puffs Every 6 (Six) Hours As Needed for Wheezing. 18 g 5   • aspirin 81 MG EC tablet Take 81 mg by mouth Daily.     • atorvastatin (LIPITOR) 20 MG tablet Take 1 tablet by mouth Every Night. 30 tablet 5   • enzalutamide (XTANDI) 40 MG chemo capsule Take 4 capsules by mouth Daily. 120 capsule 5   • fenofibrate (Tricor) 145 MG tablet Take 1 tablet by mouth Daily. 30 tablet 5   • gabapentin (NEURONTIN) 600 MG tablet Take 1 tablet by mouth 3 (Three) Times a Day. 90 tablet 1   • HYDROcodone-acetaminophen (NORCO)  MG per tablet Take 1 tablet by mouth Every 6 (Six) Hours As Needed for Moderate Pain . 120 tablet 0   • hydrOXYzine (ATARAX) 50 MG tablet Take 1 tablet by mouth 3 (Three) Times a Day As Needed for  Itching. 90 tablet 2   • meloxicam (MOBIC) 15 MG tablet Take 15 mg by mouth Daily.     • metoprolol tartrate (LOPRESSOR) 25 MG tablet Take 1 tablet by mouth Daily. 90 tablet 1   • ondansetron (ZOFRAN) 8 MG tablet Take 1 tablet by mouth 3 (Three) Times a Day As Needed for Nausea or Vomiting. 30 tablet 5   • pantoprazole (PROTONIX) 40 MG EC tablet Take 1 tablet by mouth Daily. 30 tablet 5   • vitamin B-12 (CYANOCOBALAMIN) 1000 MCG tablet Take 5,000 mcg by mouth Daily.       No current facility-administered medications for this visit.         LABORATORY:    Lab Results   Component Value Date    WBC 6.55 02/25/2021    HGB 11.2 (L) 02/25/2021    HCT 34.4 (L) 02/25/2021    MCV 80.8 02/25/2021    RDW 18.2 (H) 02/25/2021     02/25/2021    NEUTRORELPCT 52.7 02/25/2021    LYMPHORELPCT 35.3 02/25/2021    MONORELPCT 7.8 02/25/2021    EOSRELPCT 2.7 02/25/2021    BASORELPCT 1.2 02/25/2021    NEUTROABS 3.45 02/25/2021    LYMPHSABS 2.31 02/25/2021       Lab Results   Component Value Date     02/25/2021    K 3.9 02/25/2021    CO2 23.5 02/25/2021     02/25/2021    BUN 21 (H) 02/25/2021    CREATININE 1.01 02/25/2021    GLUCOSE 114 (H) 02/25/2021    CALCIUM 9.1 02/25/2021    ALKPHOS 580 (H) 02/25/2021    AST 26 02/25/2021    ALT 19 02/25/2021    BILITOT 0.3 02/25/2021    ALBUMIN 4.23 02/25/2021    PROTEINTOT 7.2 02/25/2021    MG 2.1 11/10/2020       No results found for: LDH, URICACID     Imaging Results (Last 24 Hours)     ** No results found for the last 24 hours. **          ASSESSMENT/PLAN:    Patient Update Assessment (new medications, allergies, medical history): No changes.      Medication(s): Xtandi 40 MG chemo capsule.     Currently Taking Medication(s): Taking medication as directed.            Effectiveness of Medication: To be assessed as clinically appropriate by oncology team.     Experiencing Side Effects: None expressed at this time.      Prior Authorization Status: Approved.      Financial Assistance  Status: Not needed, patient insurance covers medication cost at this time.       Any Issues Identified: No issues expressed from patient, no issues processing refill.      Appropriate to Process Prescription(s): Yes.  Medication will be dispensed to patient from [facility].               Counseling Offered:  Patient previously counseled upon initiation of therapy, aware to contact pharmacy or oncology office with any new questions or concerns.        Next Specialty Pharmacy Visit:  04/07/2021

## 2021-04-07 ENCOUNTER — SPECIALTY PHARMACY (OUTPATIENT)
Dept: PHARMACY | Facility: HOSPITAL | Age: 61
End: 2021-04-07

## 2021-04-12 NOTE — PROGRESS NOTES
Specialty Pharmacy Note      Name:  Ta Madison  :  1960  Date:  2021         Past Medical History:   Diagnosis Date   • Increased heart rate    • Mini stroke (CMS/HCC)    • Neck pain    • Prostate cancer (CMS/HCC)    • Rash    • Tobacco abuse        Past Surgical History:   Procedure Laterality Date   • APPENDECTOMY      [facility]        Social History     Socioeconomic History   • Marital status:      Spouse name: Not on file   • Number of children: Not on file   • Years of education: Not on file   • Highest education level: Not on file   Tobacco Use   • Smoking status: Light Tobacco Smoker     Packs/day: 1.00     Types: Cigarettes     Last attempt to quit: 3/12/2019     Years since quittin.0   • Smokeless tobacco: Never Used   Substance and Sexual Activity   • Alcohol use: No   • Drug use: Yes     Types: Marijuana     Comment: occ    • Sexual activity: Defer       Family History   Problem Relation Age of Onset   • Nephrolithiasis Mother    • Heart disease Father    • Hypertension Father    • Kidney disease Father        No Known Allergies    Current Outpatient Medications   Medication Sig Dispense Refill   • albuterol sulfate  (90 Base) MCG/ACT inhaler Inhale 2 puffs Every 6 (Six) Hours As Needed for Wheezing. 18 g 5   • aspirin 81 MG EC tablet Take 81 mg by mouth Daily.     • atorvastatin (LIPITOR) 20 MG tablet Take 1 tablet by mouth Every Night. 30 tablet 5   • enzalutamide (XTANDI) 40 MG chemo capsule Take 4 capsules by mouth Daily. 120 capsule 5   • fenofibrate (Tricor) 145 MG tablet Take 1 tablet by mouth Daily. 30 tablet 5   • gabapentin (NEURONTIN) 600 MG tablet Take 1 tablet by mouth 3 (Three) Times a Day. 90 tablet 1   • HYDROcodone-acetaminophen (NORCO)  MG per tablet Take 1 tablet by mouth Every 6 (Six) Hours As Needed for Moderate Pain . 120 tablet 0   • hydrOXYzine (ATARAX) 50 MG tablet Take 1 tablet by mouth 3 (Three) Times a Day As Needed for  Itching. 90 tablet 2   • meloxicam (MOBIC) 15 MG tablet Take 15 mg by mouth Daily.     • metoprolol tartrate (LOPRESSOR) 25 MG tablet Take 1 tablet by mouth Daily. 90 tablet 1   • ondansetron (ZOFRAN) 8 MG tablet Take 1 tablet by mouth 3 (Three) Times a Day As Needed for Nausea or Vomiting. 30 tablet 5   • pantoprazole (PROTONIX) 40 MG EC tablet Take 1 tablet by mouth Daily. 30 tablet 5   • vitamin B-12 (CYANOCOBALAMIN) 1000 MCG tablet Take 5,000 mcg by mouth Daily.       No current facility-administered medications for this visit.         LABORATORY:    Lab Results   Component Value Date    WBC 6.55 02/25/2021    HGB 11.2 (L) 02/25/2021    HCT 34.4 (L) 02/25/2021    MCV 80.8 02/25/2021    RDW 18.2 (H) 02/25/2021     02/25/2021    NEUTRORELPCT 52.7 02/25/2021    LYMPHORELPCT 35.3 02/25/2021    MONORELPCT 7.8 02/25/2021    EOSRELPCT 2.7 02/25/2021    BASORELPCT 1.2 02/25/2021    NEUTROABS 3.45 02/25/2021    LYMPHSABS 2.31 02/25/2021       Lab Results   Component Value Date     02/25/2021    K 3.9 02/25/2021    CO2 23.5 02/25/2021     02/25/2021    BUN 21 (H) 02/25/2021    CREATININE 1.01 02/25/2021    GLUCOSE 114 (H) 02/25/2021    CALCIUM 9.1 02/25/2021    ALKPHOS 580 (H) 02/25/2021    AST 26 02/25/2021    ALT 19 02/25/2021    BILITOT 0.3 02/25/2021    ALBUMIN 4.23 02/25/2021    PROTEINTOT 7.2 02/25/2021    MG 2.1 11/10/2020       No results found for: LDH, URICACID     Imaging Results (Last 24 Hours)     ** No results found for the last 24 hours. **          ASSESSMENT/PLAN:    Patient Update Assessment (new medications, allergies, medical history): No changes.      Medication(s): Xtandi 40 MG chemo capsule.     Currently Taking Medication(s): Taking medication as directed.            Effectiveness of Medication: To be assessed as clinically appropriate by oncology team.     Experiencing Side Effects: None expressed at this time.      Prior Authorization Status: Approved.      Financial Assistance  Status: Not needed, patient insurance covers medication cost at this time.       Any Issues Identified: No issues expressed from patient, no issues processing refill.      Appropriate to Process Prescription(s): Yes.  Medication will be dispensed to patient from [facility].               Counseling Offered:  Patient previously counseled upon initiation of therapy, aware to contact pharmacy or oncology office with any new questions or concerns.        Next Specialty Pharmacy Visit:  Scheduled in four weeks.

## 2021-04-14 ENCOUNTER — OFFICE VISIT (OUTPATIENT)
Dept: ONCOLOGY | Facility: CLINIC | Age: 61
End: 2021-04-14

## 2021-04-14 ENCOUNTER — INFUSION (OUTPATIENT)
Dept: ONCOLOGY | Facility: HOSPITAL | Age: 61
End: 2021-04-14

## 2021-04-14 ENCOUNTER — LAB (OUTPATIENT)
Dept: ONCOLOGY | Facility: CLINIC | Age: 61
End: 2021-04-14

## 2021-04-14 VITALS
SYSTOLIC BLOOD PRESSURE: 106 MMHG | HEIGHT: 69 IN | BODY MASS INDEX: 28.29 KG/M2 | HEART RATE: 92 BPM | RESPIRATION RATE: 16 BRPM | WEIGHT: 191 LBS | DIASTOLIC BLOOD PRESSURE: 67 MMHG | OXYGEN SATURATION: 98 % | TEMPERATURE: 98.4 F

## 2021-04-14 VITALS
WEIGHT: 191 LBS | HEART RATE: 92 BPM | SYSTOLIC BLOOD PRESSURE: 106 MMHG | TEMPERATURE: 98.4 F | BODY MASS INDEX: 28.29 KG/M2 | DIASTOLIC BLOOD PRESSURE: 67 MMHG | HEIGHT: 69 IN | OXYGEN SATURATION: 98 % | RESPIRATION RATE: 16 BRPM

## 2021-04-14 DIAGNOSIS — C61 ADENOCARCINOMA OF PROSTATE (HCC): ICD-10-CM

## 2021-04-14 DIAGNOSIS — C61 ADENOCARCINOMA OF PROSTATE (HCC): Primary | ICD-10-CM

## 2021-04-14 LAB
ALBUMIN SERPL-MCNC: 4.01 G/DL (ref 3.5–5.2)
ALBUMIN/GLOB SERPL: 1.3 G/DL
ALP SERPL-CCNC: 181 U/L (ref 39–117)
ALT SERPL W P-5'-P-CCNC: 15 U/L (ref 1–41)
ANION GAP SERPL CALCULATED.3IONS-SCNC: 10.8 MMOL/L (ref 5–15)
AST SERPL-CCNC: 16 U/L (ref 1–40)
BASOPHILS # BLD AUTO: 0.08 10*3/MM3 (ref 0–0.2)
BASOPHILS NFR BLD AUTO: 1.1 % (ref 0–1.5)
BILIRUB SERPL-MCNC: 0.2 MG/DL (ref 0–1.2)
BUN SERPL-MCNC: 14 MG/DL (ref 6–20)
BUN/CREAT SERPL: 15.2 (ref 7–25)
CALCIUM SPEC-SCNC: 9.1 MG/DL (ref 8.6–10.5)
CHLORIDE SERPL-SCNC: 106 MMOL/L (ref 98–107)
CO2 SERPL-SCNC: 23.2 MMOL/L (ref 22–29)
CREAT SERPL-MCNC: 0.92 MG/DL (ref 0.76–1.27)
DEPRECATED RDW RBC AUTO: 50.3 FL (ref 37–54)
EOSINOPHIL # BLD AUTO: 0.16 10*3/MM3 (ref 0–0.4)
EOSINOPHIL NFR BLD AUTO: 2.2 % (ref 0.3–6.2)
ERYTHROCYTE [DISTWIDTH] IN BLOOD BY AUTOMATED COUNT: 16.5 % (ref 12.3–15.4)
FERRITIN SERPL-MCNC: 206.3 NG/ML (ref 30–400)
GFR SERPL CREATININE-BSD FRML MDRD: 84 ML/MIN/1.73
GLOBULIN UR ELPH-MCNC: 3.1 GM/DL
GLUCOSE SERPL-MCNC: 116 MG/DL (ref 65–99)
HCT VFR BLD AUTO: 38.8 % (ref 37.5–51)
HGB BLD-MCNC: 12.7 G/DL (ref 13–17.7)
IMM GRANULOCYTES # BLD AUTO: 0.03 10*3/MM3 (ref 0–0.05)
IMM GRANULOCYTES NFR BLD AUTO: 0.4 % (ref 0–0.5)
IRON 24H UR-MRATE: 83 MCG/DL (ref 59–158)
IRON SATN MFR SERPL: 16 % (ref 20–50)
LYMPHOCYTES # BLD AUTO: 2.22 10*3/MM3 (ref 0.7–3.1)
LYMPHOCYTES NFR BLD AUTO: 30.7 % (ref 19.6–45.3)
MCH RBC QN AUTO: 27.3 PG (ref 26.6–33)
MCHC RBC AUTO-ENTMCNC: 32.7 G/DL (ref 31.5–35.7)
MCV RBC AUTO: 83.3 FL (ref 79–97)
MONOCYTES # BLD AUTO: 0.6 10*3/MM3 (ref 0.1–0.9)
MONOCYTES NFR BLD AUTO: 8.3 % (ref 5–12)
NEUTROPHILS NFR BLD AUTO: 4.15 10*3/MM3 (ref 1.7–7)
NEUTROPHILS NFR BLD AUTO: 57.3 % (ref 42.7–76)
NRBC BLD AUTO-RTO: 0 /100 WBC (ref 0–0.2)
PLATELET # BLD AUTO: 423 10*3/MM3 (ref 140–450)
PMV BLD AUTO: 10 FL (ref 6–12)
POTASSIUM SERPL-SCNC: 4.2 MMOL/L (ref 3.5–5.2)
PROT SERPL-MCNC: 7.1 G/DL (ref 6–8.5)
RBC # BLD AUTO: 4.66 10*6/MM3 (ref 4.14–5.8)
SODIUM SERPL-SCNC: 140 MMOL/L (ref 136–145)
TIBC SERPL-MCNC: 526 MCG/DL (ref 298–536)
TRANSFERRIN SERPL-MCNC: 353 MG/DL (ref 200–360)
WBC # BLD AUTO: 7.24 10*3/MM3 (ref 3.4–10.8)

## 2021-04-14 PROCEDURE — 36415 COLL VENOUS BLD VENIPUNCTURE: CPT

## 2021-04-14 PROCEDURE — 82746 ASSAY OF FOLIC ACID SERUM: CPT | Performed by: INTERNAL MEDICINE

## 2021-04-14 PROCEDURE — 83540 ASSAY OF IRON: CPT | Performed by: INTERNAL MEDICINE

## 2021-04-14 PROCEDURE — 99214 OFFICE O/P EST MOD 30 MIN: CPT | Performed by: INTERNAL MEDICINE

## 2021-04-14 PROCEDURE — 82607 VITAMIN B-12: CPT | Performed by: INTERNAL MEDICINE

## 2021-04-14 PROCEDURE — 96402 CHEMO HORMON ANTINEOPL SQ/IM: CPT

## 2021-04-14 PROCEDURE — 85025 COMPLETE CBC W/AUTO DIFF WBC: CPT | Performed by: INTERNAL MEDICINE

## 2021-04-14 PROCEDURE — 84466 ASSAY OF TRANSFERRIN: CPT | Performed by: INTERNAL MEDICINE

## 2021-04-14 PROCEDURE — 80053 COMPREHEN METABOLIC PANEL: CPT | Performed by: INTERNAL MEDICINE

## 2021-04-14 PROCEDURE — 25010000002 LEUPROLIDE 45 MG KIT: Performed by: NURSE PRACTITIONER

## 2021-04-14 PROCEDURE — 84153 ASSAY OF PSA TOTAL: CPT | Performed by: INTERNAL MEDICINE

## 2021-04-14 PROCEDURE — 82728 ASSAY OF FERRITIN: CPT | Performed by: INTERNAL MEDICINE

## 2021-04-14 RX ADMIN — LEUPROLIDE ACETATE 45 MG: KIT at 14:27

## 2021-04-14 NOTE — PROGRESS NOTES
"Subjective:     Patient ID: Ta Madison is a 58 y.o. male.    CC:   Chief Complaint   Patient presents with   • Itching     Pt c/o of face and top of hands itchiung and burning. Also has sores all over stomach and top of thigs. Has been to dermatologist but was told everything was normal.       HPI:   History of Present Illness     Ms Madison is a 58-year-old male here today for evaluation on a rash that he developed about 5-6 months ago.  He tells me he first began to have a itching red rash on his hands about 6 months ago, the rash quickly spread and is now occurring on his trunk, arms, face, neck, upper back and thighs.  Sparing feet, lower legs, lower back and genitals.  He tells me that when the rash starts his skin becomes very red and appears sunburnt, feels tight and dry.  He begins itching which does cause some scabs however he does report a macular type rash present on the trunk, upper arms and legs.  He reports his skin will feel as if he has a sunburn, this will progress to an itching sensation, the skin will become tight and peel.  He saw a dermatologist in [facility] who told him this was likely related to anxiety.  Apparently some lab work was done, skin biopsy was done and they were told it was normal.  PCP was concerned that this could be neurologic pruritus.  He reports that he doesn't fact have some scabbing however he has the rash, red-type peeling skin prior to this From Itching.  He Adamantly Denies Any Neurological Symptoms Including Headache, Vision Changes, Paresthesias.  He does report that he gets \"blisters\" on the skin as well and again this is widespread and not localized to any dermatomal pattern  He states that the rash does not worsen with sunlight.  There are no new medications prior to the rash starting.  He did have workup including ANCA which was negative, CRP and sedimentation rate negative LOUIS negative.  Is a smoker.  CBC has been essentially unremarkable.  He has been " [facility]     NEEDS REFILLS :  FLOMAX, PLAVIX, AND GENERIC PRAVACHOL         PT PH  [phone]       placed on doxepin, amitriptyline and gabapentin, he is unsure if these have helped.  He has tried numerous topical creams as well as anti-histamine including hydroxyzine for anxiety      The following portions of the patient's history were reviewed and updated as appropriate: allergies, current medications, past family history, past medical history, past social history, past surgical history and problem list.    Past Medical History:   Diagnosis Date   • Increased heart rate    • Mini stroke (CMS/HCC)    • Rash    • Tobacco abuse        Past Surgical History:   Procedure Laterality Date   • APPENDECTOMY  1971    [facility]        Social History     Socioeconomic History   • Marital status:      Spouse name: Not on file   • Number of children: Not on file   • Years of education: Not on file   • Highest education level: Not on file   Social Needs   • Financial resource strain: Not on file   • Food insecurity - worry: Not on file   • Food insecurity - inability: Not on file   • Transportation needs - medical: Not on file   • Transportation needs - non-medical: Not on file   Occupational History   • Not on file   Tobacco Use   • Smoking status: Current Every Day Smoker     Packs/day: 1.00     Types: Cigarettes   • Smokeless tobacco: Never Used   Substance and Sexual Activity   • Alcohol use: No   • Drug use: Yes     Types: Marijuana     Comment: occ    • Sexual activity: Defer   Other Topics Concern   • Not on file   Social History Narrative   • Not on file       Family History   Problem Relation Age of Onset   • Nephrolithiasis Mother    • Heart disease Father    • Hypertension Father    • Kidney disease Father         Review of Systems   Constitutional: Negative.    HENT: Negative for drooling, tinnitus, trouble swallowing and voice change.    Eyes: Negative.    Respiratory: Negative.    Cardiovascular: Negative.    Gastrointestinal: Negative.    Endocrine: Negative.    Genitourinary: Negative.     Musculoskeletal: Negative.    Skin: Positive for rash (All over stomach, and top of thighs.).   Allergic/Immunologic: Negative.    Neurological: Negative.    Hematological: Negative.    Psychiatric/Behavioral: Negative.         Objective:    Neurologic Exam     Mental Status   Oriented to person, place, and time.   Attention: normal. Concentration: normal.   Speech: speech is normal   Level of consciousness: alert  Knowledge: consistent with education.   Normal comprehension.     Cranial Nerves   Cranial nerves II through XII intact.     CN III, IV, VI   Pupils are equal, round, and reactive to light.  Extraocular motions are normal.     Motor Exam   Muscle bulk: normal  Overall muscle tone: normal  Right arm tone: normal  Left arm tone: normal  Right arm pronator drift: absent  Left arm pronator drift: absent  Right leg tone: normal  Left leg tone: normal    Strength   Strength 5/5 throughout.     Sensory Exam   Light touch normal.   Vibration normal.   Proprioception normal.   Pinprick normal.     Gait, Coordination, and Reflexes     Gait  Gait: normal    Coordination   Romberg: negative  Finger to nose coordination: normal  Heel to shin coordination: normal  Tandem walking coordination: normal    Tremor   Resting tremor: absent  Intention tremor: absent  Action tremor: absent    Reflexes   Reflexes 2+ except as noted.   Right plantar: normal  Left plantar: normal  Right Lezama: absent  Left Lezama: absent      Physical Exam   Constitutional: He is oriented to person, place, and time. He appears well-developed and well-nourished. No distress.   HENT:   Head: Normocephalic and atraumatic.   Eyes: Conjunctivae and EOM are normal. Pupils are equal, round, and reactive to light. No scleral icterus.   Neck: Normal range of motion. Neck supple.   Cardiovascular: Normal rate.   Pulmonary/Chest: Effort normal.   Neurological: He is alert and oriented to person, place, and time. He has normal strength. He has a normal  Finger-Nose-Finger Test, a normal Heel to Shin Test, a normal Romberg Test and a normal Tandem Gait Test. Gait normal.   Skin:   Skin on face red and very dry, flaky  He has a diffuse papular rash on the entire abdomen and flank bilaterally, mild rash upper back/trapezius, skin on arms very dry and eczematous with some patches of scabbing from itching, also raised papules scattered on arms.   Psychiatric: His speech is normal.   Vitals reviewed.      Assessment/Plan:       Ta was seen today for itching.    Diagnoses and all orders for this visit:    Rash  -     FANNY + PE  -     LOUIS by IFA, Reflex 9-biomarkers profile; Future  -     CBC & Differential; Future  -     B. Burgdorferi Antibodies, WB Reflex; Future  -     TSH; Future  -     Ambulatory Referral to Allergy  -     Ambulatory Referral to Rheumatology    This does not appear to be in neurologic pruritus, there is actually a raised rash covering his entire trunk, scattered on his neck, upper back and arms.  His face appears very red and dry, peeling on the fore head and cheeks.  He has seen dermatology who cannot give him an answer and thought his itching was likely related to anxiety.  I am going to get some blood work to rule out a paraneoplastic syndrome with immunofixation, I feel as if there is something obviously causing his rash but again this does not seem neurologic, case was discussed with Dr. Diamond.  He denies any neurological symptoms, no arthralgia or myalgias reported.  Referral placed to rheumatology and allergy  I will keep in touch with this gentleman and follow his referrals closely.  Reviewed medications, potential side effects and signs and symptoms to report. Discussed risk versus benefits of treatment plan with patient and/or family-including medications, labs and radiology that may be ordered. Addressed questions and concerns during visit. Patient and/or family verbalized understanding and agree with plan.  During this visit the  following were done:  Labs Reviewed [x]    Labs Ordered []    Radiology Reports Reviewed []    Radiology Ordered []    PCP Records Reviewed [x]    Referring Provider Records Reviewed [x]    ER Records Reviewed []    Hospital Records Reviewed []    History Obtained From Family []    Radiology Images Reviewed []    Other Reviewed []    Records Requested []      EMR Dragon/Transcription Disclaimer:  Much of this encounter note is an electronic transcription of spoken language to printed text. Electronic transcription of spoken language may permit erroneous words or phrases to be inadvertently transcribed. Although I have reviewed the note for such errors, some may still exist in this documentation.           Meghan Terry, APRN  2/5/2019

## 2021-04-14 NOTE — PROGRESS NOTES
Name:  Ta Madison  :  1960  Date:  2021     REFERRING PHYSICIAN  Dheeraj Nieves MD    PRIMARY CARE PROVIDER  Marry Jain APRN    REASON FOR FOLLOWUP  1. Adenocarcinoma of prostate (CMS/HCC)      CHIEF COMPLAINT  Much improved/resolved diffuse bone pains since starting Lupron and Xtandi in early 2021.    Dear Dr. Nieves,    HISTORY OF PRESENT ILLNESS:   I saw Mr. Madison in follow up today in our medical oncology clinic. As you are aware, he is a pleasant, 60 y.o., white male with a history of tobacco abuse who was referred to you in 2020 for an elevated serum PSA level (of ~28 ng/mL, which had increased to 247 ng/mL at the time of his initial appointment in our clinic). An examination revealed a very hard and fixed prostate, and you performed multiple core biopsies on 2020. The results were consistent with [place] Score 5+4 or 4+5=9 adenocarcinoma involving all twelve sampled cores. A staging workup confirmed pelvic adenopathy as well as diffuse, sclerotic lesions throughout the axial and appendicular skeleton, including the right greater than left femoral diaphyses. With this stage IV diagnosis, he was referred to our clinic for further evaluation and management. At the time of his initial consultation in our office (on 2021), he was agreeable to starting definitive therapy with a combination of ADT (Lupron injections) and anti-androgen therapy (daily PO Xtandi).    INTERIM HISTORY:  Mr. Madison returns to clinic today for follow up again accompanied by his wife. He received an initial dose of Lupron on 2021 and has been on daily Xtandi for a total of about three (3) full months now. He is still tolerating this regimen well; and he reiterates today that, almost immediately after starting, he started to feel much better. His diffuse skeletal pains are currently all but resolved (he has not taken a Norco in months). His only recent complaint has been  of a rash on his face and hands that has been a near persistent issue for the past couple of years (it actually went away when his PSA was high and he was feeling bad, but now its back). Previous dermatology evaluation and biopsies were nonspecific, although an allergist is currently treating him for presumed hives. Overall though, he remains thrilled that he has been able to get back to doing whatever he is he wants to do.    Past Medical History:   Diagnosis Date   • Increased heart rate    • Mini stroke (CMS/HCC)    • Neck pain    • Prostate cancer (CMS/HCC)    • Rash    • Tobacco abuse        Past Surgical History:   Procedure Laterality Date   • APPENDECTOMY      [facility]        Social History     Socioeconomic History   • Marital status:      Spouse name: Not on file   • Number of children: Not on file   • Years of education: Not on file   • Highest education level: Not on file   Tobacco Use   • Smoking status: Light Tobacco Smoker     Packs/day: 1.00     Types: Cigarettes     Last attempt to quit: 3/12/2019     Years since quittin.0   • Smokeless tobacco: Never Used   Substance and Sexual Activity   • Alcohol use: No   • Drug use: Yes     Types: Marijuana     Comment: occ    • Sexual activity: Defer       Family History   Problem Relation Age of Onset   • Nephrolithiasis Mother    • Heart disease Father    • Hypertension Father    • Kidney disease Father        No Known Allergies    Current Outpatient Medications   Medication Sig Dispense Refill   • albuterol sulfate  (90 Base) MCG/ACT inhaler Inhale 2 puffs Every 6 (Six) Hours As Needed for Wheezing. 18 g 5   • aspirin 81 MG EC tablet Take 81 mg by mouth Daily.     • atorvastatin (LIPITOR) 20 MG tablet Take 1 tablet by mouth Every Night. 30 tablet 5   • enzalutamide (XTANDI) 40 MG chemo capsule Take 4 capsules by mouth Daily. 120 capsule 5   • fenofibrate (Tricor) 145 MG tablet Take 1 tablet by mouth Daily. 30 tablet 5   •  "gabapentin (NEURONTIN) 600 MG tablet Take 1 tablet by mouth 3 (Three) Times a Day. 90 tablet 1   • HYDROcodone-acetaminophen (NORCO)  MG per tablet Take 1 tablet by mouth Every 6 (Six) Hours As Needed for Moderate Pain . 120 tablet 0   • hydrOXYzine (ATARAX) 50 MG tablet Take 1 tablet by mouth 3 (Three) Times a Day As Needed for Itching. 90 tablet 2   • meloxicam (MOBIC) 15 MG tablet Take 15 mg by mouth Daily.     • metoprolol tartrate (LOPRESSOR) 25 MG tablet Take 1 tablet by mouth Daily. 90 tablet 1   • ondansetron (ZOFRAN) 8 MG tablet Take 1 tablet by mouth 3 (Three) Times a Day As Needed for Nausea or Vomiting. 30 tablet 5   • pantoprazole (PROTONIX) 40 MG EC tablet Take 1 tablet by mouth Daily. 30 tablet 5   • vitamin B-12 (CYANOCOBALAMIN) 1000 MCG tablet Take 5,000 mcg by mouth Daily.       No current facility-administered medications for this visit.     REVIEW OF SYSTEMS  CONSTITUTIONAL:  No fever, chills or night sweats.  EYES:  No blurry vision, diplopia or other vision changes.  ENT:  No hearing loss, nosebleeds or sore throat.  CARDIOVASCULAR:  No palpitations, arrhythmia, syncopal episodes or edema.  PULMONARY:  No hemoptysis, wheezing, chronic cough or shortness of breath.  GASTROINTESTINAL:  No nausea or vomiting.  No constipation or diarrhea.  No abdominal pain.  GENITOURINARY:  No hematuria, kidney stones or frequent urination.  MUSCULOSKELETAL:  As per the HPI above.  INTEGUMENTARY: As per the HPI above.  ENDOCRINE:  No excessive thirst. Occasional hot flashes.  HEMATOLOGIC:  No history of free bleeding, spontaneous bleeding or clotting.  IMMUNOLOGIC:  No allergies or frequent infections. More or less persistent rash for the past ~two years, as per the HPI above.  NEUROLOGIC: No numbness, tingling, seizures or weakness.  PSYCHIATRIC:  No anxiety or depression.    PHYSICAL EXAMINATION  /67   Pulse 92   Temp 98.4 °F (36.9 °C) (Temporal)   Resp 16   Ht 175.3 cm (69\")   Wt 86.6 kg (191 " lb)   SpO2 98%   BMI 28.21 kg/m²     Pain Score:  Pain Score    21 1330   PainSc: 0-No pain       PHQ-Score Total:  PHQ-9 Total Score:      ECO  GENERAL:  A well-developed, well-nourished, white male in no acute distress.  HEENT:  Pupils equally round and reactive to light.  Extraocular muscles intact.  CARDIOVASCULAR:  Regular rate and rhythm.  No murmurs, gallops or rubs.  LUNGS:  Clear to auscultation bilaterally.  ABDOMEN:  Soft, nontender, nondistended with positive bowel sounds.  EXTREMITIES:  No clubbing, cyanosis or edema bilaterally.  SKIN:  No petechiae. Diffuse, lacy and pruritic rash on the face, neck and dorsa of both hands, improved compared to ~six weeks ago. No visible rash in any clothed areas.  NEURO:  Cranial nerves grossly intact.  No focal deficits.  PSYCH:  Alert and oriented x3.    LABORATORY    Lab Results   Component Value Date    WBC 7.24 2021    HGB 12.7 (L) 2021    HCT 38.8 2021    MCV 83.3 2021     2021    NEUTROABS 4.15 2021       Lab Results   Component Value Date     2021    K 4.2 2021     2021    CO2 23.2 2021    BUN 14 2021    CREATININE 0.92 2021    GLUCOSE 116 (H) 2021    CALCIUM 9.1 2021    AST 16 2021    ALT 15 2021    ALKPHOS 181 (H) 2021    BILITOT 0.2 2021    PROTEINTOT 7.1 2021    ALBUMIN 4.01 2021     CBC (2021): WBCs: 7.2; HgB: 12.7; Hct: 38.8; platelets: 423  CBC (2021): WBCs: 6.5; HgB: 11.2; Hct: 34.4; platelets: 407    PSA (2021): pending  PSA (2021): 0.814 ng/mL  PSA (2021): 29.5 ng/mL  PSA (2021): 247.0 ng/mL  PSA (11/10/2020): 28.3 ng/mL    IMAGING  NM bone scan, whole body (12/15/2020):  Impression: Extensive osteoblastic metastatic disease involving the axial and appendicular skeleton including the right greater than left femoral diaphyses.    CT pelvis with contrast  (12/21/2020):  Impression:  1) Urinary bladder wall thickening noted.  2) Heterogeneous appearance of the prostate gland.  3) Nonspecific stranding in the retroperitoneal space.  4) Retroperitoneal region lymph node on the right side measuring 1.4 cm.  5) Right obturator chain region lymph node measuring 1.5 cm. Other lymph nodes are identified in this region.  6) Sclerotic bone lesions are noted throughout the pelvis concerning for metastatic disease.    PATHOLOGY  Left and right prostate, needle core biopsies, twelve total cores (12/02/2020):  [place] Score 5+4 or 4+5 = 9 prostatic adenocarcinoma involving ~45-90% of all twelve (12/12) total cores.    IMPRESSION AND PLAN  Mr. Madison is a 60 y.o., white male with:  1. Prostate adenocarcinoma: Diagnosed in December 2020 with stage IV disease, with widespread metastases involving the axial and appendicular skeleton along with probable, pelvic adenopathy. I have had several, long discussions with the patient and his wife since the time of his initial consultation in our clinic (on 01/07/2021) regarding this diagnosis and its prognosis. They remain aware that this disease is, unfortunately, not curable; however, particularly given his young age and this type of cancer's tendency to respond very well to initial anti-testosterone therapy, it was/is very treatable, and sustained remissions are very possible. He was felt to be an excellent candidate for initial therapy with both ADT and androgen receptor blockade. He received an initial cycle of (currently [id number]) Lupron on 01/08/2021 and was also started on daily PO Xtandi at that time. He has now been on the latter for a little over three (3) full months. He is still tolerating this regimen very well, with no significant side effects. He reiterates that, almost immediately after beginning this therapy, he started to feel much better. He has actually not had to take a Norco tablet for more than a couple of months, and  he is back to doing the things he normally likes to do. Meanwhile, his serum PSA level has responded dramatically and quickly, decreasing from 247 ng/mL on 01/07/2021 to 29.5 ng/mL by 01/20/2021 to 0.814 ng/mL on 02/25/2021 (today's result is pending but will likely be lower still). We will proceed with this current treatment plan (he is due for another Lupron treatment today) and see him back in clinic in three months, on the day of the next cycle of Lupron, with a CBC, CMP and PSA, for another symptom check.  2. Skeletal metastases: There was diffuse involvement of both the axial and appendicular skeletons on the initial staging NM bone scan and CT of the abdomen and pelvis performed in late December 2020. Secondary to issue #1. 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 Continue to monitor.  3. Pain: Secondary to #2 and currently all but resolved ever since Lupron/Xtandi was started in early January 2021 for the palliative treatment of issue #1. He reiterates that he has not had to take a Norco for more than a couple of months now. Continue to monitor.  4. Rash: Based on the his history of the rash, which was resolved in late 2020/early 2021 when he was feeling badly from issue #1 (and was consequently staying indoors) but, otherwise, has been persistently present on his sun-exposed areas for the past ~two plus years, this is likely a urticarial reaction, potentially exacerbated by photosensitivity. He has been counseled on the use of sunscreen and other protective measures, etc. Otherwise, ongoing management per primary care, dermatology and allergy medicine.  5. Anemia: Improved compared to earlier this year and currently very mild (HgB of 12.7 g/dL). Multifactorial, with ongoing testosterone deprivation for the treatment of issue #1 contributing. B12, folic acid, etc. drawn today are pending. Continue to  monitor.  The patient and his wife were in agreement with these plans.    It is a pleasure to participate in Mr. Madison's care. Please do not hesitate to call with any questions or concerns that you may have.    A total of 30 minutes were spent coordinating this patient’s care in clinic today; more than 50% of this time was face-to-face with the patient and his wife, reviewing his interim medical history, discussing the results of recent labwork and counseling on the current treatment and followup plan. All questions were answered to their satisfaction.    FOLLOW UP  Continue (currently [id number]) Lupron (dose given today) and daily PO Xtandi. With urology, as previously planned. With dermatology, as previously planned. With allergy medicine, as previously planned. Return to our clinic in 3 months (~early to mid-July 2021), on the day of the next cycle of Lupron, with a CBC, CMP and PSA.            This document was electronically signed by HIRO Martinez MD April 14, 2021 13:55 EDT      CC: MD Marry Jesus APRN

## 2021-04-15 LAB
FOLATE SERPL-MCNC: 8.47 NG/ML (ref 4.78–24.2)
PSA SERPL-MCNC: 0.11 NG/ML (ref 0–4)
VIT B12 BLD-MCNC: >2000 PG/ML (ref 211–946)

## 2021-04-20 ENCOUNTER — TELEPHONE (OUTPATIENT)
Dept: ONCOLOGY | Facility: HOSPITAL | Age: 61
End: 2021-04-20

## 2021-04-21 ENCOUNTER — OFFICE VISIT (OUTPATIENT)
Dept: ONCOLOGY | Facility: CLINIC | Age: 61
End: 2021-04-21

## 2021-04-21 VITALS
RESPIRATION RATE: 18 BRPM | WEIGHT: 192.2 LBS | OXYGEN SATURATION: 97 % | TEMPERATURE: 97.7 F | BODY MASS INDEX: 28.38 KG/M2 | HEART RATE: 86 BPM | SYSTOLIC BLOOD PRESSURE: 120 MMHG | DIASTOLIC BLOOD PRESSURE: 63 MMHG

## 2021-04-21 DIAGNOSIS — R52 PAIN: ICD-10-CM

## 2021-04-21 DIAGNOSIS — R60.9 SWELLING: Primary | ICD-10-CM

## 2021-04-21 PROCEDURE — 99213 OFFICE O/P EST LOW 20 MIN: CPT | Performed by: NURSE PRACTITIONER

## 2021-04-21 NOTE — TELEPHONE ENCOUNTER
"Spoke with patient regarding complaint of \"knot\" on his hip from Lupron injection given on 04/14/21. Patient states that a \"baseball sized knot\" appeared at the injection site two days after receiving his second lupron injection. Patient states that it is not red or hot, but does rate associated pain at 3/10. Discussed this with Dr. Elizabeth MD stated to have patient take Tylenol OTC for pain management and to apply warm compresses to the site. MD also stated to have patient report to clinic for APRN evaluation of area. Pt notified of appointment date/time & MD's instructions. Pt verbalized understanding with no further questions at this time.   "

## 2021-04-21 NOTE — PROGRESS NOTES
DATE:  2021    DIAGNOSIS: Prostate Cancer    CHIEF COMPLAINT:  Injection site swelling/pain    Interval History:  Mr. Madison follows with Dr. Martinez for prostate cancer and has been receiving Lupron q3mo and is on daily Xtandi. He is being seen today for an acute visit. He reports that he received his second dose of Lupron on 2021 and approximately 2 days after injection he noted that the injection site was painful and he had developed a knot about the size of a baseball. He reports that he took Norco 10/325 as needed for pain which helped. He also alternated applying heat and ice for the past 24 hours and notes improvement this morning. Denies redness or warmth to touch. Denies fever/chills. He denies any other specific complaints today.    The following portions of the patient's history were reviewed and updated as appropriate: allergies, current medications, past family history, past medical history, past social history, past surgical history and problem list.    PAST MEDICAL HISTORY:  Past Medical History:   Diagnosis Date   • Increased heart rate    • Mini stroke (CMS/HCC)    • Neck pain    • Prostate cancer (CMS/HCC)    • Rash    • Tobacco abuse        PAST SURGICAL HISTORY:  Past Surgical History:   Procedure Laterality Date   • APPENDECTOMY      [facility]        SOCIAL HISTORY:  Social History     Socioeconomic History   • Marital status:      Spouse name: Not on file   • Number of children: Not on file   • Years of education: Not on file   • Highest education level: Not on file   Tobacco Use   • Smoking status: Light Tobacco Smoker     Packs/day: 1.00     Types: Cigarettes     Last attempt to quit: 3/12/2019     Years since quittin.1   • Smokeless tobacco: Never Used   Substance and Sexual Activity   • Alcohol use: No   • Drug use: Yes     Types: Marijuana     Comment: occ    • Sexual activity: Defer       FAMILY HISTORY:  Family History   Problem Relation Age of Onset   •  Nephrolithiasis Mother    • Heart disease Father    • Hypertension Father    • Kidney disease Father          MEDICATIONS:  The current medication list was reviewed in the EMR    Current Outpatient Medications:   •  albuterol sulfate  (90 Base) MCG/ACT inhaler, Inhale 2 puffs Every 6 (Six) Hours As Needed for Wheezing., Disp: 18 g, Rfl: 5  •  aspirin 81 MG EC tablet, Take 81 mg by mouth Daily., Disp: , Rfl:   •  atorvastatin (LIPITOR) 20 MG tablet, Take 1 tablet by mouth Every Night., Disp: 30 tablet, Rfl: 5  •  enzalutamide (XTANDI) 40 MG chemo capsule, Take 4 capsules by mouth Daily., Disp: 120 capsule, Rfl: 5  •  fenofibrate (Tricor) 145 MG tablet, Take 1 tablet by mouth Daily., Disp: 30 tablet, Rfl: 5  •  gabapentin (NEURONTIN) 600 MG tablet, Take 1 tablet by mouth 3 (Three) Times a Day., Disp: 90 tablet, Rfl: 1  •  HYDROcodone-acetaminophen (NORCO)  MG per tablet, Take 1 tablet by mouth Every 6 (Six) Hours As Needed for Moderate Pain ., Disp: 120 tablet, Rfl: 0  •  hydrOXYzine (ATARAX) 50 MG tablet, Take 1 tablet by mouth 3 (Three) Times a Day As Needed for Itching., Disp: 90 tablet, Rfl: 2  •  meloxicam (MOBIC) 15 MG tablet, Take 15 mg by mouth Daily., Disp: , Rfl:   •  metoprolol tartrate (LOPRESSOR) 25 MG tablet, Take 1 tablet by mouth Daily., Disp: 90 tablet, Rfl: 1  •  ondansetron (ZOFRAN) 8 MG tablet, Take 1 tablet by mouth 3 (Three) Times a Day As Needed for Nausea or Vomiting., Disp: 30 tablet, Rfl: 5  •  pantoprazole (PROTONIX) 40 MG EC tablet, Take 1 tablet by mouth Daily., Disp: 30 tablet, Rfl: 5  •  vitamin B-12 (CYANOCOBALAMIN) 1000 MCG tablet, Take 5,000 mcg by mouth Daily., Disp: , Rfl:     ALLERGIES:  No Known Allergies    .ECOG score: 1     Karnofsky score: 100       REVIEW OF SYSTEMS:    A comprehensive 14 point review of systems was performed.  Significant findings as mentioned above.  All other systems reviewed and are negative.        Physical Exam   Vital Signs:   Vitals:     04/21/21 1014   BP: 120/63   Pulse: 86   Resp: 18   Temp: 97.7 °F (36.5 °C)   SpO2: 97%    Patient's Body mass index is 28.38 kg/m².     General: Well developed, well nourished, white male, alert and oriented x 3, in no acute distress.   Head: ATNC   Eyes: PERRL, No evidence of conjunctivitis.   Nose: No nasal discharge.   Mouth: Oral mucosal membranes moist.   Lungs: Clear in all fields to A&P without rales, rhonchi or wheezing.   Heart: S1, S2. Regular rate and rhythm. No murmurs, rubs, or gallops.   Abdomen: Soft. Bowel sounds are normoactive. Nontender with palpation.   Extremities: No cyanosis or edema.   Integumentary: Warm, dry, intact. Golf ball size knot noted to left buttock, tender to touch. No redness/no warmth to touch.  Neurologic: Grossly non-focal exam.    Pain Score:  Pain Score    04/21/21 1014   PainSc:   2   PainLoc: Hip         RECENT LABS:  Lab Results   Component Value Date    WBC 7.24 04/14/2021    HGB 12.7 (L) 04/14/2021    HCT 38.8 04/14/2021    MCV 83.3 04/14/2021    RDW 16.5 (H) 04/14/2021     04/14/2021    NEUTRORELPCT 57.3 04/14/2021    LYMPHORELPCT 30.7 04/14/2021    MONORELPCT 8.3 04/14/2021    EOSRELPCT 2.2 04/14/2021    BASORELPCT 1.1 04/14/2021    NEUTROABS 4.15 04/14/2021    LYMPHSABS 2.22 04/14/2021       Lab Results   Component Value Date     04/14/2021    K 4.2 04/14/2021    CO2 23.2 04/14/2021     04/14/2021    BUN 14 04/14/2021    CREATININE 0.92 04/14/2021    EGFRIFNONA 84 04/14/2021    GLUCOSE 116 (H) 04/14/2021    CALCIUM 9.1 04/14/2021    ALKPHOS 181 (H) 04/14/2021    AST 16 04/14/2021    ALT 15 04/14/2021    BILITOT 0.2 04/14/2021    ALBUMIN 4.01 04/14/2021    PROTEINTOT 7.1 04/14/2021    MG 2.1 11/10/2020           Lab Results   Component Value Date    FERRITIN 206.30 04/14/2021    IRON 83 04/14/2021    TIBC 526 04/14/2021    LABIRON 16 (L) 04/14/2021    [id number] >2,000 (H) 04/14/2021    FOLATE 8.47 04/14/2021      ASSESSMENT & PLAN:  Ta Madison  is a very pleasant 60 y.o. male with    1. Prostate Cancer:  - He follows with Dr. Martinez and has been receiving Lupron injections q3mo and is on daily Xtandi. He is being seen today for an acute visit. He will follow up with Dr. Martinez as previously planned.    2. Swelling/Pain Injection site:  - He received his second dose of Lupron on 04/14/2021 and approximately 2 days after injection he noticed a baseball size knot and pain at injection site. He has been taking Norco 10/325 as needed for pain which helps. He also alternated applying heat/ice for a 24 hour period and reports improvement in area today. No redness/no warmth to touch. Denies fever/chills.  - Advised patient to continue to alternate heat/ice as needed. He can continue Norco as needed for pain relief. Will continue to monitor.        A total of 20 minutes were spent coordinating this patient’s care in clinic today; more than 50% of this time was face-to-face with the patient, reviewing her interim medical history and counseling on the current treatment and followup plan. All questions were answered to her satisfaction.          Electronically Signed by: EUGENIA Raymond APRN April 21, 2021 10:21 EDT       CC:   No ref. provider found  Marry Jain APRN

## 2021-05-05 ENCOUNTER — SPECIALTY PHARMACY (OUTPATIENT)
Dept: PHARMACY | Facility: HOSPITAL | Age: 61
End: 2021-05-05

## 2021-05-05 NOTE — PROGRESS NOTES
Specialty Pharmacy Note      Name:  Ta Madison  :  1960  Date:  2021         Past Medical History:   Diagnosis Date   • Increased heart rate    • Mini stroke (CMS/HCC)    • Neck pain    • Prostate cancer (CMS/HCC)    • Rash    • Tobacco abuse        Past Surgical History:   Procedure Laterality Date   • APPENDECTOMY      [facility]        Social History     Socioeconomic History   • Marital status:      Spouse name: Not on file   • Number of children: Not on file   • Years of education: Not on file   • Highest education level: Not on file   Tobacco Use   • Smoking status: Light Tobacco Smoker     Packs/day: 1.00     Types: Cigarettes     Last attempt to quit: 3/12/2019     Years since quittin.1   • Smokeless tobacco: Never Used   Substance and Sexual Activity   • Alcohol use: No   • Drug use: Yes     Types: Marijuana     Comment: occ    • Sexual activity: Defer       Family History   Problem Relation Age of Onset   • Nephrolithiasis Mother    • Heart disease Father    • Hypertension Father    • Kidney disease Father        No Known Allergies    Current Outpatient Medications   Medication Sig Dispense Refill   • albuterol sulfate  (90 Base) MCG/ACT inhaler Inhale 2 puffs Every 6 (Six) Hours As Needed for Wheezing. 18 g 5   • aspirin 81 MG EC tablet Take 81 mg by mouth Daily.     • atorvastatin (LIPITOR) 20 MG tablet Take 1 tablet by mouth Every Night. 30 tablet 5   • enzalutamide (XTANDI) 40 MG chemo capsule Take 4 capsules by mouth Daily. 120 capsule 5   • fenofibrate (Tricor) 145 MG tablet Take 1 tablet by mouth Daily. 30 tablet 5   • gabapentin (NEURONTIN) 600 MG tablet Take 1 tablet by mouth 3 (Three) Times a Day. 90 tablet 1   • HYDROcodone-acetaminophen (NORCO)  MG per tablet Take 1 tablet by mouth Every 6 (Six) Hours As Needed for Moderate Pain . 120 tablet 0   • hydrOXYzine (ATARAX) 50 MG tablet Take 1 tablet by mouth 3 (Three) Times a Day As Needed for  Itching. 90 tablet 2   • meloxicam (MOBIC) 15 MG tablet Take 15 mg by mouth Daily.     • metoprolol tartrate (LOPRESSOR) 25 MG tablet Take 1 tablet by mouth Daily. 90 tablet 1   • ondansetron (ZOFRAN) 8 MG tablet Take 1 tablet by mouth 3 (Three) Times a Day As Needed for Nausea or Vomiting. 30 tablet 5   • pantoprazole (PROTONIX) 40 MG EC tablet Take 1 tablet by mouth Daily. 30 tablet 5   • vitamin B-12 (CYANOCOBALAMIN) 1000 MCG tablet Take 5,000 mcg by mouth Daily.       No current facility-administered medications for this visit.         LABORATORY:    Lab Results   Component Value Date    WBC 7.24 04/14/2021    HGB 12.7 (L) 04/14/2021    HCT 38.8 04/14/2021    MCV 83.3 04/14/2021    RDW 16.5 (H) 04/14/2021     04/14/2021    NEUTRORELPCT 57.3 04/14/2021    LYMPHORELPCT 30.7 04/14/2021    MONORELPCT 8.3 04/14/2021    EOSRELPCT 2.2 04/14/2021    BASORELPCT 1.1 04/14/2021    NEUTROABS 4.15 04/14/2021    LYMPHSABS 2.22 04/14/2021       Lab Results   Component Value Date     04/14/2021    K 4.2 04/14/2021    CO2 23.2 04/14/2021     04/14/2021    BUN 14 04/14/2021    CREATININE 0.92 04/14/2021    GLUCOSE 116 (H) 04/14/2021    CALCIUM 9.1 04/14/2021    ALKPHOS 181 (H) 04/14/2021    AST 16 04/14/2021    ALT 15 04/14/2021    BILITOT 0.2 04/14/2021    ALBUMIN 4.01 04/14/2021    PROTEINTOT 7.1 04/14/2021    MG 2.1 11/10/2020       No results found for: LDH, URICACID     Imaging Results (Last 24 Hours)     ** No results found for the last 24 hours. **          ASSESSMENT/PLAN:    Patient Update Assessment (new medications, allergies, medical history): No changes.      Medication(s): Xtandi 40 MG chemo capsule.     Currently Taking Medication(s): Taking medication as directed.            Effectiveness of Medication: To be assessed as clinically appropriate by oncology team.     Experiencing Side Effects: None expressed at this time.      Prior Authorization Status: Approved.      Financial Assistance  Status: Not needed, patient insurance covers medication cost at this time.       Any Issues Identified: No issues expressed from patient, no issues processing refill.      Appropriate to Process Prescription(s): Yes.  Medication will be dispensed to patient from [facility].               Counseling Offered:  Patient previously counseled upon initiation of therapy, aware to contact pharmacy or oncology office with any new questions or concerns.        Next Specialty Pharmacy Visit:  Scheduled in four weeks.

## 2021-06-09 ENCOUNTER — SPECIALTY PHARMACY (OUTPATIENT)
Dept: PHARMACY | Facility: HOSPITAL | Age: 61
End: 2021-06-09

## 2021-06-09 NOTE — PROGRESS NOTES
Specialty Pharmacy Note      Name:  Ta Madison  :  1960  Date:  2021         Past Medical History:   Diagnosis Date   • Increased heart rate    • Mini stroke (CMS/HCC)    • Neck pain    • Prostate cancer (CMS/HCC)    • Rash    • Tobacco abuse        Past Surgical History:   Procedure Laterality Date   • APPENDECTOMY      [facility]        Social History     Socioeconomic History   • Marital status:      Spouse name: Not on file   • Number of children: Not on file   • Years of education: Not on file   • Highest education level: Not on file   Tobacco Use   • Smoking status: Light Tobacco Smoker     Packs/day: 1.00     Types: Cigarettes     Last attempt to quit: 3/12/2019     Years since quittin.2   • Smokeless tobacco: Never Used   Substance and Sexual Activity   • Alcohol use: No   • Drug use: Yes     Types: Marijuana     Comment: occ    • Sexual activity: Defer       Family History   Problem Relation Age of Onset   • Nephrolithiasis Mother    • Heart disease Father    • Hypertension Father    • Kidney disease Father        No Known Allergies    Current Outpatient Medications   Medication Sig Dispense Refill   • albuterol sulfate  (90 Base) MCG/ACT inhaler Inhale 2 puffs Every 6 (Six) Hours As Needed for Wheezing. 18 g 5   • aspirin 81 MG EC tablet Take 81 mg by mouth Daily.     • atorvastatin (LIPITOR) 20 MG tablet Take 1 tablet by mouth Every Night. 30 tablet 5   • enzalutamide (XTANDI) 40 MG chemo capsule Take 4 capsules by mouth Daily. 120 capsule 5   • fenofibrate (Tricor) 145 MG tablet Take 1 tablet by mouth Daily. 30 tablet 5   • gabapentin (NEURONTIN) 600 MG tablet Take 1 tablet by mouth 3 (Three) Times a Day. 90 tablet 1   • HYDROcodone-acetaminophen (NORCO)  MG per tablet Take 1 tablet by mouth Every 6 (Six) Hours As Needed for Moderate Pain . 120 tablet 0   • hydrOXYzine (ATARAX) 50 MG tablet Take 1 tablet by mouth 3 (Three) Times a Day As Needed for  Itching. 90 tablet 2   • meloxicam (MOBIC) 15 MG tablet Take 15 mg by mouth Daily.     • metoprolol tartrate (LOPRESSOR) 25 MG tablet Take 1 tablet by mouth Daily. 90 tablet 1   • ondansetron (ZOFRAN) 8 MG tablet Take 1 tablet by mouth 3 (Three) Times a Day As Needed for Nausea or Vomiting. 30 tablet 5   • pantoprazole (PROTONIX) 40 MG EC tablet Take 1 tablet by mouth Daily. 30 tablet 5   • vitamin B-12 (CYANOCOBALAMIN) 1000 MCG tablet Take 5,000 mcg by mouth Daily.       No current facility-administered medications for this visit.         LABORATORY:    Lab Results   Component Value Date    WBC 7.24 04/14/2021    HGB 12.7 (L) 04/14/2021    HCT 38.8 04/14/2021    MCV 83.3 04/14/2021    RDW 16.5 (H) 04/14/2021     04/14/2021    NEUTRORELPCT 57.3 04/14/2021    LYMPHORELPCT 30.7 04/14/2021    MONORELPCT 8.3 04/14/2021    EOSRELPCT 2.2 04/14/2021    BASORELPCT 1.1 04/14/2021    NEUTROABS 4.15 04/14/2021    LYMPHSABS 2.22 04/14/2021       Lab Results   Component Value Date     04/14/2021    K 4.2 04/14/2021    CO2 23.2 04/14/2021     04/14/2021    BUN 14 04/14/2021    CREATININE 0.92 04/14/2021    GLUCOSE 116 (H) 04/14/2021    CALCIUM 9.1 04/14/2021    ALKPHOS 181 (H) 04/14/2021    AST 16 04/14/2021    ALT 15 04/14/2021    BILITOT 0.2 04/14/2021    ALBUMIN 4.01 04/14/2021    PROTEINTOT 7.1 04/14/2021    MG 2.1 11/10/2020       No results found for: LDH, URICACID     Imaging Results (Last 24 Hours)     ** No results found for the last 24 hours. **          ASSESSMENT/PLAN:    Patient Update Assessment (new medications, allergies, medical history): No changes.      Medication(s): Xtandi 40 MG chemo capsule.     Currently Taking Medication(s): Taking medication as directed.            Effectiveness of Medication: To be assessed as clinically appropriate by oncology team.     Experiencing Side Effects: None expressed at this time.      Prior Authorization Status: Approved.      Financial Assistance  Status: Not needed, patient insurance covers medication cost at this time.       Any Issues Identified: No issues expressed from patient, no issues processing refill.      Appropriate to Process Prescription(s): Yes.  Medication will be dispensed to patient from [facility].               Counseling Offered:  Patient previously counseled upon initiation of therapy, aware to contact pharmacy or oncology office with any new questions or concerns.        Next Specialty Pharmacy Visit:  Scheduled in four weeks.

## 2021-06-13 DIAGNOSIS — K21.9 GASTROESOPHAGEAL REFLUX DISEASE: ICD-10-CM

## 2021-06-14 RX ORDER — PANTOPRAZOLE SODIUM 40 MG/1
40 TABLET, DELAYED RELEASE ORAL DAILY
Qty: 30 TABLET | Refills: 5 | Status: SHIPPED | OUTPATIENT
Start: 2021-06-14 | End: 2022-01-10

## 2021-06-18 DIAGNOSIS — E78.2 MIXED HYPERLIPIDEMIA: ICD-10-CM

## 2021-06-21 RX ORDER — FENOFIBRATE 145 MG/1
145 TABLET, COATED ORAL DAILY
Qty: 30 TABLET | Refills: 5 | Status: SHIPPED | OUTPATIENT
Start: 2021-06-21 | End: 2022-01-12 | Stop reason: SDUPTHER

## 2021-06-29 ENCOUNTER — OFFICE VISIT (OUTPATIENT)
Dept: FAMILY MEDICINE CLINIC | Facility: CLINIC | Age: 61
End: 2021-06-29

## 2021-06-29 VITALS
DIASTOLIC BLOOD PRESSURE: 68 MMHG | HEIGHT: 69 IN | SYSTOLIC BLOOD PRESSURE: 126 MMHG | OXYGEN SATURATION: 98 % | HEART RATE: 82 BPM | WEIGHT: 194 LBS | BODY MASS INDEX: 28.73 KG/M2 | TEMPERATURE: 96.9 F

## 2021-06-29 DIAGNOSIS — R00.0 TACHYCARDIA: ICD-10-CM

## 2021-06-29 DIAGNOSIS — C61 ADENOCARCINOMA OF PROSTATE (HCC): ICD-10-CM

## 2021-06-29 DIAGNOSIS — E78.2 MIXED HYPERLIPIDEMIA: Primary | ICD-10-CM

## 2021-06-29 DIAGNOSIS — Z12.11 COLON CANCER SCREENING: ICD-10-CM

## 2021-06-29 DIAGNOSIS — K21.9 GASTROESOPHAGEAL REFLUX DISEASE, UNSPECIFIED WHETHER ESOPHAGITIS PRESENT: ICD-10-CM

## 2021-06-29 PROCEDURE — 85025 COMPLETE CBC W/AUTO DIFF WBC: CPT | Performed by: NURSE PRACTITIONER

## 2021-06-29 PROCEDURE — 80061 LIPID PANEL: CPT | Performed by: NURSE PRACTITIONER

## 2021-06-29 PROCEDURE — 99214 OFFICE O/P EST MOD 30 MIN: CPT | Performed by: NURSE PRACTITIONER

## 2021-06-29 PROCEDURE — 36415 COLL VENOUS BLD VENIPUNCTURE: CPT | Performed by: NURSE PRACTITIONER

## 2021-06-29 PROCEDURE — 80053 COMPREHEN METABOLIC PANEL: CPT | Performed by: NURSE PRACTITIONER

## 2021-06-29 RX ORDER — EPINEPHRINE 0.3 MG/.3ML
INJECTION SUBCUTANEOUS
COMMUNITY
Start: 2021-05-06

## 2021-06-29 RX ORDER — ATORVASTATIN CALCIUM 20 MG/1
20 TABLET, FILM COATED ORAL NIGHTLY
Qty: 30 TABLET | Refills: 5 | Status: SHIPPED | OUTPATIENT
Start: 2021-06-29 | End: 2021-10-12 | Stop reason: SDUPTHER

## 2021-06-29 NOTE — PROGRESS NOTES
"Chief Complaint  Hyperlipidemia    Subjective          Ta Madison presents to [facility]  Diagnosed with Prostate cancer two years ago .  FOllowing oncology and Urology/    Hyperlipidemia  This is a chronic problem. Recent lipid tests were reviewed and are variable. Factors aggravating his hyperlipidemia include fatty foods. Current antihyperlipidemic treatment includes statins. Compliance problems include adherence to diet and adherence to exercise.    Hypertension  Chronicity: tachycardia controlled with current medications.   Heartburn  He complains of heartburn. This is a recurrent problem. The problem occurs occasionally. The problem has been waxing and waning. He has tried a PPI for the symptoms.       Objective   Vital Signs:   /68 (BP Location: Right arm, Patient Position: Sitting)   Pulse 82   Temp 96.9 °F (36.1 °C)   Ht 175.3 cm (69\")   Wt 88 kg (194 lb)   SpO2 98%   BMI 28.65 kg/m²     Physical Exam   Result Review :                 Assessment and Plan {CC Problem List  Visit Diagnosis   ROS  Review (Popup)  Health Maintenance  Quality  BestPractice  Medications  SmartSets  SnapShot Encounters  Media :23}   Diagnoses and all orders for this visit:    1. Mixed hyperlipidemia (Primary)  -     CBC Auto Differential; Future  -     Comprehensive Metabolic Panel; Future  -     Lipid Panel; Future  -     CBC Auto Differential  -     Comprehensive Metabolic Panel  -     Lipid Panel  -     atorvastatin (LIPITOR) 20 MG tablet; Take 1 tablet by mouth Every Night.  Dispense: 30 tablet; Refill: 5    2. Colon cancer screening  -     Cologuard - Stool, Per Rectum; Future    3. Adenocarcinoma of prostate (CMS/HCC)  Continue with oncology and urology     4. Tachycardia  -     metoprolol tartrate (LOPRESSOR) 25 MG tablet; Take 1 tablet by mouth Daily.  Dispense: 90 tablet; Refill: 1    5. Gastroesophageal reflux disease, unspecified whether esophagitis " present  Continue with current meds        Follow Up   Return in about 4 months (around 10/29/2021), or if symptoms worsen or fail to improve, for Recheck labs today.  Patient was given instructions and counseling regarding his condition or for health maintenance advice. Please see specific information pulled into the AVS if appropriate.

## 2021-06-30 ENCOUNTER — TELEPHONE (OUTPATIENT)
Dept: FAMILY MEDICINE CLINIC | Facility: CLINIC | Age: 61
End: 2021-06-30

## 2021-06-30 LAB
ALBUMIN SERPL-MCNC: 4.3 G/DL (ref 3.5–5.2)
ALBUMIN/GLOB SERPL: 1.5 G/DL
ALP SERPL-CCNC: 124 U/L (ref 39–117)
ALT SERPL W P-5'-P-CCNC: 14 U/L (ref 1–41)
ANION GAP SERPL CALCULATED.3IONS-SCNC: 8.8 MMOL/L (ref 5–15)
AST SERPL-CCNC: 15 U/L (ref 1–40)
BASOPHILS # BLD AUTO: 0.1 10*3/MM3 (ref 0–0.2)
BASOPHILS NFR BLD AUTO: 1.3 % (ref 0–1.5)
BILIRUB SERPL-MCNC: 0.3 MG/DL (ref 0–1.2)
BUN SERPL-MCNC: 15 MG/DL (ref 8–23)
BUN/CREAT SERPL: 15.6 (ref 7–25)
CALCIUM SPEC-SCNC: 9.7 MG/DL (ref 8.6–10.5)
CHLORIDE SERPL-SCNC: 103 MMOL/L (ref 98–107)
CHOLEST SERPL-MCNC: 162 MG/DL (ref 0–200)
CO2 SERPL-SCNC: 26.2 MMOL/L (ref 22–29)
CREAT SERPL-MCNC: 0.96 MG/DL (ref 0.76–1.27)
DEPRECATED RDW RBC AUTO: 44.6 FL (ref 37–54)
EOSINOPHIL # BLD AUTO: 0.24 10*3/MM3 (ref 0–0.4)
EOSINOPHIL NFR BLD AUTO: 3 % (ref 0.3–6.2)
ERYTHROCYTE [DISTWIDTH] IN BLOOD BY AUTOMATED COUNT: 14.9 % (ref 12.3–15.4)
GFR SERPL CREATININE-BSD FRML MDRD: 80 ML/MIN/1.73
GLOBULIN UR ELPH-MCNC: 2.8 GM/DL
GLUCOSE SERPL-MCNC: 93 MG/DL (ref 65–99)
HCT VFR BLD AUTO: 38.1 % (ref 37.5–51)
HDLC SERPL-MCNC: 32 MG/DL (ref 40–60)
HGB BLD-MCNC: 12.6 G/DL (ref 13–17.7)
IMM GRANULOCYTES # BLD AUTO: 0.02 10*3/MM3 (ref 0–0.05)
IMM GRANULOCYTES NFR BLD AUTO: 0.3 % (ref 0–0.5)
LDLC SERPL CALC-MCNC: 86 MG/DL (ref 0–100)
LDLC/HDLC SERPL: 2.41 {RATIO}
LYMPHOCYTES # BLD AUTO: 2.5 10*3/MM3 (ref 0.7–3.1)
LYMPHOCYTES NFR BLD AUTO: 31.7 % (ref 19.6–45.3)
MCH RBC QN AUTO: 27.3 PG (ref 26.6–33)
MCHC RBC AUTO-ENTMCNC: 33.1 G/DL (ref 31.5–35.7)
MCV RBC AUTO: 82.6 FL (ref 79–97)
MONOCYTES # BLD AUTO: 0.62 10*3/MM3 (ref 0.1–0.9)
MONOCYTES NFR BLD AUTO: 7.9 % (ref 5–12)
NEUTROPHILS NFR BLD AUTO: 4.4 10*3/MM3 (ref 1.7–7)
NEUTROPHILS NFR BLD AUTO: 55.8 % (ref 42.7–76)
NRBC BLD AUTO-RTO: 0 /100 WBC (ref 0–0.2)
PLATELET # BLD AUTO: 396 10*3/MM3 (ref 140–450)
PMV BLD AUTO: 11.3 FL (ref 6–12)
POTASSIUM SERPL-SCNC: 4.4 MMOL/L (ref 3.5–5.2)
PROT SERPL-MCNC: 7.1 G/DL (ref 6–8.5)
RBC # BLD AUTO: 4.61 10*6/MM3 (ref 4.14–5.8)
SODIUM SERPL-SCNC: 138 MMOL/L (ref 136–145)
TRIGL SERPL-MCNC: 265 MG/DL (ref 0–150)
VLDLC SERPL-MCNC: 44 MG/DL (ref 5–40)
WBC # BLD AUTO: 7.88 10*3/MM3 (ref 3.4–10.8)

## 2021-06-30 NOTE — TELEPHONE ENCOUNTER
----- Message from EUGENIA Mendoza sent at 6/30/2021  8:24 AM EDT -----  Ask patient to take the lipitor and Tricor everyday. Otherwise labs look ok         Wife notified & verbalized understanding.

## 2021-07-02 DIAGNOSIS — C61 ADENOCARCINOMA OF PROSTATE (HCC): ICD-10-CM

## 2021-07-02 RX ORDER — ENZALUTAMIDE 40 MG/1
160 CAPSULE ORAL DAILY
Qty: 120 CAPSULE | Refills: 5 | Status: CANCELLED | OUTPATIENT
Start: 2021-07-02

## 2021-07-06 ENCOUNTER — SPECIALTY PHARMACY (OUTPATIENT)
Dept: PHARMACY | Facility: HOSPITAL | Age: 61
End: 2021-07-06

## 2021-07-06 DIAGNOSIS — C61 ADENOCARCINOMA OF PROSTATE (HCC): ICD-10-CM

## 2021-07-08 NOTE — PROGRESS NOTES
Specialty Pharmacy Note      Name:  Ta Madison  :  1960  Date:  2021         Past Medical History:   Diagnosis Date   • Increased heart rate    • Mini stroke (CMS/HCC)    • Neck pain    • Prostate cancer (CMS/HCC)    • Rash    • Tobacco abuse        Past Surgical History:   Procedure Laterality Date   • APPENDECTOMY      [facility]        Social History     Socioeconomic History   • Marital status:      Spouse name: Not on file   • Number of children: Not on file   • Years of education: Not on file   • Highest education level: Not on file   Tobacco Use   • Smoking status: Light Tobacco Smoker     Packs/day: 1.00     Years: 40.00     Pack years: 40.00     Types: Cigarettes     Last attempt to quit: 3/12/2019     Years since quittin.3   • Smokeless tobacco: Never Used   Substance and Sexual Activity   • Alcohol use: No   • Drug use: Yes     Types: Marijuana     Comment: occ    • Sexual activity: Defer       Family History   Problem Relation Age of Onset   • Nephrolithiasis Mother    • Heart disease Father    • Hypertension Father    • Kidney disease Father        No Known Allergies    Current Outpatient Medications   Medication Sig Dispense Refill   • albuterol sulfate  (90 Base) MCG/ACT inhaler Inhale 2 puffs Every 6 (Six) Hours As Needed for Wheezing. 18 g 5   • aspirin 81 MG EC tablet Take 81 mg by mouth Daily.     • atorvastatin (LIPITOR) 20 MG tablet Take 1 tablet by mouth Every Night. 30 tablet 5   • enzalutamide (XTANDI) 40 MG chemo capsule Take 4 capsules by mouth Daily. 120 capsule 5   • EPINEPHrine (EPIPEN) 0.3 MG/0.3ML solution auto-injector injection INJECT 1 PEN IN THE MUSCLE ONE TIME AS DIRECTED     • fenofibrate (TRICOR) 145 MG tablet TAKE 1 TABLET BY MOUTH DAILY 30 tablet 5   • gabapentin (NEURONTIN) 600 MG tablet Take 1 tablet by mouth 3 (Three) Times a Day. 90 tablet 1   • HYDROcodone-acetaminophen (NORCO)  MG per tablet Take 1 tablet by mouth  Every 6 (Six) Hours As Needed for Moderate Pain . 120 tablet 0   • hydrOXYzine (ATARAX) 50 MG tablet Take 1 tablet by mouth 3 (Three) Times a Day As Needed for Itching. 90 tablet 2   • metoprolol tartrate (LOPRESSOR) 25 MG tablet Take 1 tablet by mouth Daily. 90 tablet 1   • pantoprazole (PROTONIX) 40 MG EC tablet TAKE 1 TABLET BY MOUTH DAILY 30 tablet 5   • vitamin B-12 (CYANOCOBALAMIN) 1000 MCG tablet Take 5,000 mcg by mouth Daily.       No current facility-administered medications for this visit.         LABORATORY:    Lab Results   Component Value Date    WBC 7.88 06/29/2021    HGB 12.6 (L) 06/29/2021    HCT 38.1 06/29/2021    MCV 82.6 06/29/2021    RDW 14.9 06/29/2021     06/29/2021    NEUTRORELPCT 55.8 06/29/2021    LYMPHORELPCT 31.7 06/29/2021    MONORELPCT 7.9 06/29/2021    EOSRELPCT 3.0 06/29/2021    BASORELPCT 1.3 06/29/2021    NEUTROABS 4.40 06/29/2021    LYMPHSABS 2.50 06/29/2021       Lab Results   Component Value Date     06/29/2021    K 4.4 06/29/2021    CO2 26.2 06/29/2021     06/29/2021    BUN 15 06/29/2021    CREATININE 0.96 06/29/2021    GLUCOSE 93 06/29/2021    CALCIUM 9.7 06/29/2021    ALKPHOS 124 (H) 06/29/2021    AST 15 06/29/2021    ALT 14 06/29/2021    BILITOT 0.3 06/29/2021    ALBUMIN 4.30 06/29/2021    PROTEINTOT 7.1 06/29/2021    MG 2.1 11/10/2020       No results found for: LDH, URICACID     Imaging Results (Last 24 Hours)     ** No results found for the last 24 hours. **          ASSESSMENT/PLAN:    Patient Update Assessment (new medications, allergies, medical history): No updates reported    Medication(s): Xtandi    Currently Taking Medication(s): As directed    Prior Authorization Status: Approved    Financial Assistance Status: None needed - $0 co-pay    Any Issues Identified: None    Appropriate to Process Prescription(s): Refill due    Counseling Offered: Declined    Next Specialty Pharmacy Visit: ~30 days

## 2021-07-20 ENCOUNTER — LAB (OUTPATIENT)
Dept: ONCOLOGY | Facility: CLINIC | Age: 61
End: 2021-07-20

## 2021-07-20 ENCOUNTER — OFFICE VISIT (OUTPATIENT)
Dept: ONCOLOGY | Facility: CLINIC | Age: 61
End: 2021-07-20

## 2021-07-20 VITALS
BODY MASS INDEX: 28.56 KG/M2 | HEART RATE: 89 BPM | DIASTOLIC BLOOD PRESSURE: 63 MMHG | OXYGEN SATURATION: 98 % | RESPIRATION RATE: 18 BRPM | SYSTOLIC BLOOD PRESSURE: 114 MMHG | WEIGHT: 193.4 LBS | TEMPERATURE: 97.5 F

## 2021-07-20 DIAGNOSIS — C61 ADENOCARCINOMA OF PROSTATE (HCC): ICD-10-CM

## 2021-07-20 DIAGNOSIS — C61 ADENOCARCINOMA OF PROSTATE (HCC): Primary | ICD-10-CM

## 2021-07-20 LAB
ALBUMIN SERPL-MCNC: 4.51 G/DL (ref 3.5–5.2)
ALBUMIN/GLOB SERPL: 1.4 G/DL
ALP SERPL-CCNC: 92 U/L (ref 39–117)
ALT SERPL W P-5'-P-CCNC: 12 U/L (ref 1–41)
ANION GAP SERPL CALCULATED.3IONS-SCNC: 11.6 MMOL/L (ref 5–15)
AST SERPL-CCNC: 15 U/L (ref 1–40)
BASOPHILS # BLD AUTO: 0.07 10*3/MM3 (ref 0–0.2)
BASOPHILS NFR BLD AUTO: 0.9 % (ref 0–1.5)
BILIRUB SERPL-MCNC: 0.3 MG/DL (ref 0–1.2)
BUN SERPL-MCNC: 20 MG/DL (ref 8–23)
BUN/CREAT SERPL: 19.6 (ref 7–25)
CALCIUM SPEC-SCNC: 9.9 MG/DL (ref 8.6–10.5)
CHLORIDE SERPL-SCNC: 105 MMOL/L (ref 98–107)
CO2 SERPL-SCNC: 24.4 MMOL/L (ref 22–29)
CREAT SERPL-MCNC: 1.02 MG/DL (ref 0.76–1.27)
DEPRECATED RDW RBC AUTO: 45.5 FL (ref 37–54)
EOSINOPHIL # BLD AUTO: 0.13 10*3/MM3 (ref 0–0.4)
EOSINOPHIL NFR BLD AUTO: 1.7 % (ref 0.3–6.2)
ERYTHROCYTE [DISTWIDTH] IN BLOOD BY AUTOMATED COUNT: 15 % (ref 12.3–15.4)
GFR SERPL CREATININE-BSD FRML MDRD: 74 ML/MIN/1.73
GLOBULIN UR ELPH-MCNC: 3.2 GM/DL
GLUCOSE SERPL-MCNC: 131 MG/DL (ref 65–99)
HCT VFR BLD AUTO: 39.6 % (ref 37.5–51)
HGB BLD-MCNC: 13 G/DL (ref 13–17.7)
IMM GRANULOCYTES # BLD AUTO: 0.01 10*3/MM3 (ref 0–0.05)
IMM GRANULOCYTES NFR BLD AUTO: 0.1 % (ref 0–0.5)
LYMPHOCYTES # BLD AUTO: 2.22 10*3/MM3 (ref 0.7–3.1)
LYMPHOCYTES NFR BLD AUTO: 29.9 % (ref 19.6–45.3)
MCH RBC QN AUTO: 27.5 PG (ref 26.6–33)
MCHC RBC AUTO-ENTMCNC: 32.8 G/DL (ref 31.5–35.7)
MCV RBC AUTO: 83.7 FL (ref 79–97)
MONOCYTES # BLD AUTO: 0.68 10*3/MM3 (ref 0.1–0.9)
MONOCYTES NFR BLD AUTO: 9.2 % (ref 5–12)
NEUTROPHILS NFR BLD AUTO: 4.32 10*3/MM3 (ref 1.7–7)
NEUTROPHILS NFR BLD AUTO: 58.2 % (ref 42.7–76)
NRBC BLD AUTO-RTO: 0 /100 WBC (ref 0–0.2)
PLATELET # BLD AUTO: 469 10*3/MM3 (ref 140–450)
PMV BLD AUTO: 10.8 FL (ref 6–12)
POTASSIUM SERPL-SCNC: 4.3 MMOL/L (ref 3.5–5.2)
PROT SERPL-MCNC: 7.7 G/DL (ref 6–8.5)
RBC # BLD AUTO: 4.73 10*6/MM3 (ref 4.14–5.8)
SODIUM SERPL-SCNC: 141 MMOL/L (ref 136–145)
WBC # BLD AUTO: 7.43 10*3/MM3 (ref 3.4–10.8)

## 2021-07-20 PROCEDURE — 85025 COMPLETE CBC W/AUTO DIFF WBC: CPT | Performed by: INTERNAL MEDICINE

## 2021-07-20 PROCEDURE — 99214 OFFICE O/P EST MOD 30 MIN: CPT | Performed by: INTERNAL MEDICINE

## 2021-07-20 PROCEDURE — 84153 ASSAY OF PSA TOTAL: CPT | Performed by: INTERNAL MEDICINE

## 2021-07-20 PROCEDURE — 80053 COMPREHEN METABOLIC PANEL: CPT | Performed by: INTERNAL MEDICINE

## 2021-07-20 RX ORDER — MONTELUKAST SODIUM 10 MG/1
10 TABLET ORAL NIGHTLY
COMMUNITY
End: 2022-01-12 | Stop reason: SDUPTHER

## 2021-07-20 NOTE — PROGRESS NOTES
Name:  Ta Madison  :  1960  Date:  2021     REFERRING PHYSICIAN  Dheeraj Nieves MD    PRIMARY CARE PROVIDER  Yolande Olvera APRN    REASON FOR FOLLOWUP  1. Adenocarcinoma of prostate (CMS/HCC)      CHIEF COMPLAINT  Much improved/resolved diffuse bone pains since starting Lupron and Xtandi in early 2021.    Dear Ms. Olvera,    HISTORY OF PRESENT ILLNESS:   I saw Mr. Madison in follow up today in our medical oncology clinic. As you are aware, he is a pleasant, 61 y.o., white male with a history of tobacco abuse who was referred to urology in 2020 for an elevated serum PSA level (of ~28 ng/mL, which had increased to 247 ng/mL at the time of his initial appointment in our clinic). An examination revealed a very hard and fixed prostate, and multiple core biopsies were performed on 2020. The results were consistent with Darrius Score 5+4 or 4+5=9 adenocarcinoma involving all twelve sampled cores. A staging workup confirmed pelvic adenopathy as well as diffuse, sclerotic lesions throughout the axial and appendicular skeleton, including the right greater than left femoral diaphyses. With this stage IV diagnosis, he was referred to our clinic for further evaluation and management. At the time of his initial consultation in our office (on 2021), he was agreeable to starting definitive therapy with a combination of ADT (Lupron injections) and anti-androgen therapy (daily PO Xtandi).    INTERIM HISTORY:  Mr. Madison returns to clinic today for follow up again accompanied by his wife. He received an initial dose of Lupron on 2021 and has been on daily Xtandi for a total of a little over six (6) full months now. He is still tolerating this regimen well; and he reiterates today that, almost immediately after starting, he started to feel much better. His diffuse skeletal pains are currently all but resolved (he has not taken a Norco in months). He remains thrilled that he overall  "\"feels great\" and has been able to get back to doing whatever he is he wants to do. He has no specific complaints today.    Past Medical History:   Diagnosis Date   • Increased heart rate    • Mini stroke (CMS/HCC)    • Neck pain    • Prostate cancer (CMS/HCC)    • Rash    • Tobacco abuse        Past Surgical History:   Procedure Laterality Date   • APPENDECTOMY      [facility]        Social History     Socioeconomic History   • Marital status:      Spouse name: Not on file   • Number of children: Not on file   • Years of education: Not on file   • Highest education level: Not on file   Tobacco Use   • Smoking status: Light Tobacco Smoker     Packs/day: 1.00     Years: 40.00     Pack years: 40.00     Types: Cigarettes     Last attempt to quit: 3/12/2019     Years since quittin.3   • Smokeless tobacco: Never Used   Vaping Use   • Vaping Use: Never used   Substance and Sexual Activity   • Alcohol use: No   • Drug use: Yes     Types: Marijuana     Comment: occ    • Sexual activity: Defer       Family History   Problem Relation Age of Onset   • Nephrolithiasis Mother    • Heart disease Father    • Hypertension Father    • Kidney disease Father        No Known Allergies    Current Outpatient Medications   Medication Sig Dispense Refill   • albuterol sulfate  (90 Base) MCG/ACT inhaler Inhale 2 puffs Every 6 (Six) Hours As Needed for Wheezing. 18 g 5   • aspirin 81 MG EC tablet Take 81 mg by mouth Daily.     • atorvastatin (LIPITOR) 20 MG tablet Take 1 tablet by mouth Every Night. 30 tablet 5   • enzalutamide (XTANDI) 40 MG chemo capsule Take 4 capsules by mouth Daily. 120 capsule 5   • EPINEPHrine (EPIPEN) 0.3 MG/0.3ML solution auto-injector injection INJECT 1 PEN IN THE MUSCLE ONE TIME AS DIRECTED     • fenofibrate (TRICOR) 145 MG tablet TAKE 1 TABLET BY MOUTH DAILY 30 tablet 5   • gabapentin (NEURONTIN) 600 MG tablet Take 1 tablet by mouth 3 (Three) Times a Day. 90 tablet 1   • " HYDROcodone-acetaminophen (NORCO)  MG per tablet Take 1 tablet by mouth Every 6 (Six) Hours As Needed for Moderate Pain . 120 tablet 0   • hydrOXYzine (ATARAX) 50 MG tablet Take 1 tablet by mouth 3 (Three) Times a Day As Needed for Itching. 90 tablet 2   • metoprolol tartrate (LOPRESSOR) 25 MG tablet Take 1 tablet by mouth Daily. 90 tablet 1   • montelukast (SINGULAIR) 10 MG tablet Take 10 mg by mouth Every Night.     • pantoprazole (PROTONIX) 40 MG EC tablet TAKE 1 TABLET BY MOUTH DAILY 30 tablet 5   • vitamin B-12 (CYANOCOBALAMIN) 1000 MCG tablet Take 5,000 mcg by mouth Daily.       No current facility-administered medications for this visit.     REVIEW OF SYSTEMS  CONSTITUTIONAL:  No fever, chills or night sweats.  EYES:  No blurry vision, diplopia or other vision changes.  ENT:  No hearing loss, nosebleeds or sore throat.  CARDIOVASCULAR:  No palpitations, arrhythmia, syncopal episodes or edema.  PULMONARY:  No hemoptysis, wheezing, chronic cough or shortness of breath.  GASTROINTESTINAL:  No nausea or vomiting.  No constipation or diarrhea.  No abdominal pain.  GENITOURINARY:  No hematuria, kidney stones or frequent urination.  MUSCULOSKELETAL:  As per the HPI above.  INTEGUMENTARY: As per the HPI above.  ENDOCRINE:  No excessive thirst. Occasional hot flashes.  HEMATOLOGIC:  No history of free bleeding, spontaneous bleeding or clotting.  IMMUNOLOGIC:  No allergies or frequent infections. More or less persistent rash for the past ~two years, as per the HPI above.  NEUROLOGIC: No numbness, tingling, seizures or weakness.  PSYCHIATRIC:  No anxiety or depression.    PHYSICAL EXAMINATION  /63   Pulse 89   Temp 97.5 °F (36.4 °C) (Temporal)   Resp 18   Wt 87.7 kg (193 lb 6.4 oz)   SpO2 98%   BMI 28.56 kg/m²     Pain Score:  Pain Score    21 1329   PainSc: 0-No pain       PHQ-Score Total:  PHQ-9 Total Score:      ECO  GENERAL:  A well-developed, well-nourished, white male in no acute  distress.  HEENT:  Pupils equally round and reactive to light. Extraocular muscles intact.  CARDIOVASCULAR:  Regular rate and rhythm. No murmurs, gallops or rubs.  LUNGS:  Clear to auscultation bilaterally.  ABDOMEN:  Soft, nontender, nondistended with positive bowel sounds.  EXTREMITIES:  No clubbing, cyanosis or edema bilaterally.  SKIN:  No petechiae.  NEURO:  Cranial nerves grossly intact.  No focal deficits.  PSYCH:  Alert and oriented x3.    LABORATORY  Lab Results   Component Value Date    WBC 7.43 07/20/2021    HGB 13.0 07/20/2021    HCT 39.6 07/20/2021    MCV 83.7 07/20/2021     (H) 07/20/2021    NEUTROABS 4.32 07/20/2021       Lab Results   Component Value Date     06/29/2021    K 4.4 06/29/2021     06/29/2021    CO2 26.2 06/29/2021    BUN 15 06/29/2021    CREATININE 0.96 06/29/2021    GLUCOSE 93 06/29/2021    CALCIUM 9.7 06/29/2021    AST 15 06/29/2021    ALT 14 06/29/2021    ALKPHOS 124 (H) 06/29/2021    BILITOT 0.3 06/29/2021    PROTEINTOT 7.1 06/29/2021    ALBUMIN 4.30 06/29/2021     CBC (07/20/2021): WBCs: 7.43; HgB: 13.0; Hct: 39.6; platelets: 469  CBC (06/29/2021): WBCs: 7.8; HgB: 12.6; Hct: 38.1; platelets: 396  CBC (04/14/2021): WBCs: 7.2; HgB: 12.7; Hct: 38.8; platelets: 423  CBC (02/25/2021): WBCs: 6.5; HgB: 11.2; Hct: 34.4; platelets: 407    PSA (07/20/2021): pending  PSA (04/14/2021): 0.108 ng/mL  PSA (02/25/2021): 0.814 ng/mL  PSA (01/20/2021): 29.5 ng/mL  PSA (01/07/2021): 247.0 ng/mL  PSA (11/10/2020): 28.3 ng/mL    IMAGING  NM bone scan, whole body (12/15/2020):  Impression: Extensive osteoblastic metastatic disease involving the axial and appendicular skeleton including the right greater than left femoral diaphyses.    CT pelvis with contrast (12/21/2020):  Impression:  1) Urinary bladder wall thickening noted.  2) Heterogeneous appearance of the prostate gland.  3) Nonspecific stranding in the retroperitoneal space.  4) Retroperitoneal region lymph node on the right  "side measuring 1.4 cm.  5) Right obturator chain region lymph node measuring 1.5 cm. Other lymph nodes are identified in this region.  6) Sclerotic bone lesions are noted throughout the pelvis concerning for metastatic disease.    PATHOLOGY  Left and right prostate, needle core biopsies, twelve total cores (12/02/2020):  Darrius Score 5+4 or 4+5 = 9 prostatic adenocarcinoma involving ~45-90% of all twelve (12/12) total cores.    IMPRESSION AND PLAN  Mr. Madison is a 61 y.o., white male with:  1. Prostate adenocarcinoma: Diagnosed in December 2020 with stage IV disease, with widespread metastases involving the axial and appendicular skeleton along with probable, pelvic adenopathy. I have had multiple, long discussions with the patient and his wife since the time of his initial consultation in our clinic (on 01/07/2021) regarding this diagnosis and its prognosis. They remain aware that this disease is, unfortunately, not curable; however, particularly given his young age and this type of cancer's tendency to respond very well to initial anti-testosterone therapy, it was/is very treatable, and sustained remissions are very possible. He was felt to be an excellent candidate for initial therapy with both ADT and androgen receptor blockade. He received an initial cycle of (currently now [id number]) Lupron on 01/08/2021 and was also started on daily PO Xtandi at that time. He has now been on the latter for a little over six (6) full months. He is still tolerating this regimen very well, with no significant side effects. He reiterates that, almost immediately after beginning this therapy, he started to feel much better; and he currently feels \"great\". He has actually not had to take a Norco tablet for several months now, and he is still back to doing the things he normally likes to do. Meanwhile, his serum PSA level has responded dramatically and quickly, decreasing from 247 ng/mL on 01/07/2021 to 0.108 ng/mL as of 04/14/2021 " (today's result is pending but will likely be lower still). We will proceed with this current treatment plan and see him back in clinic in another three months, on the day of the next cycle of [id number] Lupron, with a CBC, CMP and PSA, for another symptom check.  2. Skeletal metastases: There was diffuse involvement of both the axial and appendicular skeletons on the initial staging NM bone scan and CT of the abdomen and pelvis performed in late December 2020. Secondary to issue #1. 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 Continue to monitor.  3. Pain: Secondary to #2 and currently still all but resolved ever since Lupron/Xtandi was started in early January 2021 for the palliative treatment of issue #1. He reiterates that he has not had to take a Norco for several months now. Continue to monitor.  4. Anemia: Currently still improved compared to earlier this year and currently still very mild (HgB of 13.0 g/dL). Multifactorial, with ongoing testosterone deprivation for the treatment of issue #1 contributing.  Continue to monitor.  The patient and his wife were in agreement with these plans.    It is a pleasure to participate in Mr. Madison's care. Please do not hesitate to call with any questions or concerns that you may have.    A total of 30 minutes were spent coordinating this patient’s care in clinic today; more than 50% of this time was face-to-face with the patient and his wife, reviewing his interim medical history, discussing the results of recent labwork and counseling on the current treatment and followup plan. All questions were answered to their satisfaction.    FOLLOW UP  Continue (currently [id number]) Lupron and daily PO Xtandi. With urology, as previously planned. Return to our clinic in 3 months (~mid-October 2021), on the day of the next cycle of Lupron, with a CBC, CMP and PSA.            This document  was electronically signed by HIRO Martinez MD July 20, 2021 13:44 EDT      CC: Yolande Olvera APRN

## 2021-07-21 LAB — PSA SERPL-MCNC: 0.02 NG/ML (ref 0–4)

## 2021-08-04 NOTE — PROGRESS NOTES
Specialty Pharmacy Note      Name:  Ta Madison  :  1960  Date:  2021         Past Medical History:   Diagnosis Date   • Increased heart rate    • Mini stroke (CMS/HCC)    • Neck pain    • Prostate cancer (CMS/HCC)    • Rash    • Tobacco abuse        Past Surgical History:   Procedure Laterality Date   • APPENDECTOMY      [facility]        Social History     Socioeconomic History   • Marital status:      Spouse name: Not on file   • Number of children: Not on file   • Years of education: Not on file   • Highest education level: Not on file   Tobacco Use   • Smoking status: Light Tobacco Smoker     Packs/day: 1.00     Years: 40.00     Pack years: 40.00     Types: Cigarettes     Last attempt to quit: 3/12/2019     Years since quittin.4   • Smokeless tobacco: Never Used   Vaping Use   • Vaping Use: Never used   Substance and Sexual Activity   • Alcohol use: No   • Drug use: Yes     Types: Marijuana     Comment: occ    • Sexual activity: Defer       Family History   Problem Relation Age of Onset   • Nephrolithiasis Mother    • Heart disease Father    • Hypertension Father    • Kidney disease Father        No Known Allergies    Current Outpatient Medications   Medication Sig Dispense Refill   • albuterol sulfate  (90 Base) MCG/ACT inhaler Inhale 2 puffs Every 6 (Six) Hours As Needed for Wheezing. 18 g 5   • aspirin 81 MG EC tablet Take 81 mg by mouth Daily.     • atorvastatin (LIPITOR) 20 MG tablet Take 1 tablet by mouth Every Night. 30 tablet 5   • enzalutamide (XTANDI) 40 MG chemo capsule Take 4 capsules by mouth Daily. 120 capsule 5   • EPINEPHrine (EPIPEN) 0.3 MG/0.3ML solution auto-injector injection INJECT 1 PEN IN THE MUSCLE ONE TIME AS DIRECTED     • fenofibrate (TRICOR) 145 MG tablet TAKE 1 TABLET BY MOUTH DAILY 30 tablet 5   • gabapentin (NEURONTIN) 600 MG tablet Take 1 tablet by mouth 3 (Three) Times a Day. 90 tablet 1   • HYDROcodone-acetaminophen (NORCO)   MG per tablet Take 1 tablet by mouth Every 6 (Six) Hours As Needed for Moderate Pain . 120 tablet 0   • hydrOXYzine (ATARAX) 50 MG tablet Take 1 tablet by mouth 3 (Three) Times a Day As Needed for Itching. 90 tablet 2   • metoprolol tartrate (LOPRESSOR) 25 MG tablet Take 1 tablet by mouth Daily. 90 tablet 1   • montelukast (SINGULAIR) 10 MG tablet Take 10 mg by mouth Every Night.     • pantoprazole (PROTONIX) 40 MG EC tablet TAKE 1 TABLET BY MOUTH DAILY 30 tablet 5   • vitamin B-12 (CYANOCOBALAMIN) 1000 MCG tablet Take 5,000 mcg by mouth Daily.       No current facility-administered medications for this visit.         LABORATORY:    Lab Results   Component Value Date    WBC 7.43 07/20/2021    HGB 13.0 07/20/2021    HCT 39.6 07/20/2021    MCV 83.7 07/20/2021    RDW 15.0 07/20/2021     (H) 07/20/2021    NEUTRORELPCT 58.2 07/20/2021    LYMPHORELPCT 29.9 07/20/2021    MONORELPCT 9.2 07/20/2021    EOSRELPCT 1.7 07/20/2021    BASORELPCT 0.9 07/20/2021    NEUTROABS 4.32 07/20/2021    LYMPHSABS 2.22 07/20/2021       Lab Results   Component Value Date     07/20/2021    K 4.3 07/20/2021    CO2 24.4 07/20/2021     07/20/2021    BUN 20 07/20/2021    CREATININE 1.02 07/20/2021    GLUCOSE 131 (H) 07/20/2021    CALCIUM 9.9 07/20/2021    ALKPHOS 92 07/20/2021    AST 15 07/20/2021    ALT 12 07/20/2021    BILITOT 0.3 07/20/2021    ALBUMIN 4.51 07/20/2021    PROTEINTOT 7.7 07/20/2021    MG 2.1 11/10/2020       No results found for: LDH, URICACID     Imaging Results (Last 24 Hours)     ** No results found for the last 24 hours. **          ASSESSMENT/PLAN:    Patient Update Assessment (new medications, allergies, medical history): No updates reported    Medication(s): Xtandi    Currently Taking Medication(s): As directed    Prior Authorization Status: Approved    Financial Assistance Status: None needed - $0 co-pay    Any Issues Identified: None    Appropriate to Process Prescription(s): Refill  due    Counseling Offered: Declined    Next Specialty Pharmacy Visit: ~30 days

## 2021-08-05 ENCOUNTER — SPECIALTY PHARMACY (OUTPATIENT)
Dept: PHARMACY | Facility: HOSPITAL | Age: 61
End: 2021-08-05

## 2021-08-12 DIAGNOSIS — G89.29 CHRONIC RIGHT SHOULDER PAIN: ICD-10-CM

## 2021-08-12 DIAGNOSIS — F45.8 NEUROPATHIC PRURITUS: ICD-10-CM

## 2021-08-12 DIAGNOSIS — M25.511 CHRONIC RIGHT SHOULDER PAIN: ICD-10-CM

## 2021-08-12 RX ORDER — GABAPENTIN 600 MG/1
TABLET ORAL
Qty: 90 TABLET | OUTPATIENT
Start: 2021-08-12

## 2021-08-17 NOTE — TELEPHONE ENCOUNTER
PT WIFE CALLED TO CHECK ON STATUS REFILL FOR RX  gabapentin (NEURONTIN) 600 MG tablet.    PLEASE ADVISE.  CALL BACK:[phone]    Wife notified.

## 2021-09-01 ENCOUNTER — SPECIALTY PHARMACY (OUTPATIENT)
Dept: PHARMACY | Facility: HOSPITAL | Age: 61
End: 2021-09-01

## 2021-09-15 DIAGNOSIS — F45.8 NEUROPATHIC PRURITUS: ICD-10-CM

## 2021-09-15 RX ORDER — HYDROXYZINE 50 MG/1
50 TABLET, FILM COATED ORAL 3 TIMES DAILY PRN
Qty: 90 TABLET | Refills: 2 | Status: SHIPPED | OUTPATIENT
Start: 2021-09-15 | End: 2021-10-12 | Stop reason: SDUPTHER

## 2021-09-15 NOTE — TELEPHONE ENCOUNTER
Caller: Ta Madison    Relationship: Self    Best call back number: [phone]    Medication needed:   Requested Prescriptions     Pending Prescriptions Disp Refills   • hydrOXYzine (ATARAX) 50 MG tablet 90 tablet 2     Sig: Take 1 tablet by mouth 3 (Three) Times a Day As Needed for Itching.       When do you need the refill by:     What additional details did the patient provide when requesting the medication: PATIENT IS REQUESTING A NEW PRESCRIPTION WITH REFILSS    Does the patient have less than a 3 day supply:  [] Yes  [x] No    What is the patient's preferred pharmacy: [id number] [address] [facility] [address] [facility] [address] - [phone] [facility] [phone] FX

## 2021-09-27 NOTE — PROGRESS NOTES
Specialty Pharmacy Note      Name:  Ta Madison  :  1960  Date:  2021         Past Medical History:   Diagnosis Date   • Increased heart rate    • Mini stroke (CMS/HCC)    • Neck pain    • Prostate cancer (CMS/HCC)    • Rash    • Tobacco abuse        Past Surgical History:   Procedure Laterality Date   • APPENDECTOMY      [facility]        Social History     Socioeconomic History   • Marital status:      Spouse name: Not on file   • Number of children: Not on file   • Years of education: Not on file   • Highest education level: Not on file   Tobacco Use   • Smoking status: Light Tobacco Smoker     Packs/day: 1.00     Years: 40.00     Pack years: 40.00     Types: Cigarettes     Last attempt to quit: 3/12/2019     Years since quittin.5   • Smokeless tobacco: Never Used   Vaping Use   • Vaping Use: Never used   Substance and Sexual Activity   • Alcohol use: No   • Drug use: Yes     Types: Marijuana     Comment: occ    • Sexual activity: Defer       Family History   Problem Relation Age of Onset   • Nephrolithiasis Mother    • Heart disease Father    • Hypertension Father    • Kidney disease Father        No Known Allergies    Current Outpatient Medications   Medication Sig Dispense Refill   • albuterol sulfate  (90 Base) MCG/ACT inhaler Inhale 2 puffs Every 6 (Six) Hours As Needed for Wheezing. 18 g 5   • aspirin 81 MG EC tablet Take 81 mg by mouth Daily.     • atorvastatin (LIPITOR) 20 MG tablet Take 1 tablet by mouth Every Night. 30 tablet 5   • enzalutamide (XTANDI) 40 MG chemo capsule Take 4 capsules by mouth Daily. 120 capsule 5   • EPINEPHrine (EPIPEN) 0.3 MG/0.3ML solution auto-injector injection INJECT 1 PEN IN THE MUSCLE ONE TIME AS DIRECTED     • fenofibrate (TRICOR) 145 MG tablet TAKE 1 TABLET BY MOUTH DAILY 30 tablet 5   • gabapentin (NEURONTIN) 600 MG tablet Take 1 tablet by mouth 3 (Three) Times a Day. 90 tablet 1   • HYDROcodone-acetaminophen (NORCO)   MG per tablet Take 1 tablet by mouth Every 6 (Six) Hours As Needed for Moderate Pain . 120 tablet 0   • hydrOXYzine (ATARAX) 50 MG tablet Take 1 tablet by mouth 3 (Three) Times a Day As Needed for Itching. 90 tablet 2   • metoprolol tartrate (LOPRESSOR) 25 MG tablet Take 1 tablet by mouth Daily. 90 tablet 1   • montelukast (SINGULAIR) 10 MG tablet Take 10 mg by mouth Every Night.     • pantoprazole (PROTONIX) 40 MG EC tablet TAKE 1 TABLET BY MOUTH DAILY 30 tablet 5   • vitamin B-12 (CYANOCOBALAMIN) 1000 MCG tablet Take 5,000 mcg by mouth Daily.       No current facility-administered medications for this visit.         LABORATORY:    Lab Results   Component Value Date    WBC 7.43 07/20/2021    HGB 13.0 07/20/2021    HCT 39.6 07/20/2021    MCV 83.7 07/20/2021    RDW 15.0 07/20/2021     (H) 07/20/2021    NEUTRORELPCT 58.2 07/20/2021    LYMPHORELPCT 29.9 07/20/2021    MONORELPCT 9.2 07/20/2021    EOSRELPCT 1.7 07/20/2021    BASORELPCT 0.9 07/20/2021    NEUTROABS 4.32 07/20/2021    LYMPHSABS 2.22 07/20/2021       Lab Results   Component Value Date     07/20/2021    K 4.3 07/20/2021    CO2 24.4 07/20/2021     07/20/2021    BUN 20 07/20/2021    CREATININE 1.02 07/20/2021    GLUCOSE 131 (H) 07/20/2021    CALCIUM 9.9 07/20/2021    ALKPHOS 92 07/20/2021    AST 15 07/20/2021    ALT 12 07/20/2021    BILITOT 0.3 07/20/2021    ALBUMIN 4.51 07/20/2021    PROTEINTOT 7.7 07/20/2021    MG 2.1 11/10/2020       No results found for: LDH, URICACID     Imaging Results (Last 24 Hours)     ** No results found for the last 24 hours. **          ASSESSMENT/PLAN:    Patient Update Assessment (new medications, allergies, medical history): No updates reported    Medication(s): Xtandi 40 mg capsule    Currently Taking Medication(s): Patient is currently taking medication as prescribed.    Effectiveness of Medication: Effective    Experiencing Side Effects: No side effects reported    Prior Authorization Status:  Approved    Financial Assistance Status: None needed - $0 patient co-pay    Any Issues Identified: None    Appropriate to Process Prescription(s): Refill due    Counseling Offered: Declined    Next Specialty Pharmacy Visit: ~28 days

## 2021-09-28 ENCOUNTER — SPECIALTY PHARMACY (OUTPATIENT)
Dept: PHARMACY | Facility: HOSPITAL | Age: 61
End: 2021-09-28

## 2021-10-04 DIAGNOSIS — C61 ADENOCARCINOMA OF PROSTATE (HCC): Primary | ICD-10-CM

## 2021-10-05 DIAGNOSIS — M25.511 CHRONIC RIGHT SHOULDER PAIN: ICD-10-CM

## 2021-10-05 DIAGNOSIS — G89.29 CHRONIC RIGHT SHOULDER PAIN: ICD-10-CM

## 2021-10-05 DIAGNOSIS — F45.8 NEUROPATHIC PRURITUS: ICD-10-CM

## 2021-10-05 RX ORDER — GABAPENTIN 600 MG/1
600 TABLET ORAL 3 TIMES DAILY
Qty: 90 TABLET | Refills: 1 | OUTPATIENT
Start: 2021-10-05

## 2021-10-05 NOTE — TELEPHONE ENCOUNTER
Caller: Azael Madison    Relationship: Emergency Contact      Medication requested (name and dosage):  gabapentin (NEURONTIN) 600 MG tablet  Pharmacy where request should be sent:   [facility] [place] [id number]  [facility] [address]  Additional details provided by patient: PATIENT IS REQUESTING A REFILL ON ABOVE MEDICATION    Best call back number: [phone]     Does the patient have less than a 3 day supply:  [x] Yes  [] No    Joi Varma   10/05/21 09:15 EDT

## 2021-10-06 ENCOUNTER — TELEPHONE (OUTPATIENT)
Dept: FAMILY MEDICINE CLINIC | Facility: CLINIC | Age: 61
End: 2021-10-06

## 2021-10-12 ENCOUNTER — OFFICE VISIT (OUTPATIENT)
Dept: FAMILY MEDICINE CLINIC | Facility: CLINIC | Age: 61
End: 2021-10-12

## 2021-10-12 VITALS
BODY MASS INDEX: 29.56 KG/M2 | WEIGHT: 199.6 LBS | DIASTOLIC BLOOD PRESSURE: 60 MMHG | SYSTOLIC BLOOD PRESSURE: 130 MMHG | TEMPERATURE: 96.8 F | HEART RATE: 88 BPM | OXYGEN SATURATION: 98 % | HEIGHT: 69 IN

## 2021-10-12 DIAGNOSIS — M25.511 CHRONIC RIGHT SHOULDER PAIN: Primary | ICD-10-CM

## 2021-10-12 DIAGNOSIS — F45.8 NEUROPATHIC PRURITUS: ICD-10-CM

## 2021-10-12 DIAGNOSIS — G89.29 CHRONIC RIGHT SHOULDER PAIN: Primary | ICD-10-CM

## 2021-10-12 DIAGNOSIS — E78.2 MIXED HYPERLIPIDEMIA: ICD-10-CM

## 2021-10-12 PROCEDURE — 99214 OFFICE O/P EST MOD 30 MIN: CPT | Performed by: FAMILY MEDICINE

## 2021-10-12 RX ORDER — GABAPENTIN 600 MG/1
600 TABLET ORAL 3 TIMES DAILY
Qty: 90 TABLET | Refills: 1 | Status: SHIPPED | OUTPATIENT
Start: 2021-10-12 | End: 2021-12-10

## 2021-10-12 RX ORDER — ATORVASTATIN CALCIUM 20 MG/1
20 TABLET, FILM COATED ORAL NIGHTLY
Qty: 30 TABLET | Refills: 5 | Status: SHIPPED | OUTPATIENT
Start: 2021-10-12 | End: 2022-01-12 | Stop reason: SDUPTHER

## 2021-10-12 RX ORDER — HYDROXYZINE 50 MG/1
50 TABLET, FILM COATED ORAL 3 TIMES DAILY PRN
Qty: 90 TABLET | Refills: 2 | Status: SHIPPED | OUTPATIENT
Start: 2021-10-12 | End: 2022-01-12 | Stop reason: SDUPTHER

## 2021-10-13 ENCOUNTER — INFUSION (OUTPATIENT)
Dept: ONCOLOGY | Facility: HOSPITAL | Age: 61
End: 2021-10-13

## 2021-10-13 ENCOUNTER — OFFICE VISIT (OUTPATIENT)
Dept: ONCOLOGY | Facility: CLINIC | Age: 61
End: 2021-10-13

## 2021-10-13 VITALS
TEMPERATURE: 97.8 F | DIASTOLIC BLOOD PRESSURE: 64 MMHG | BODY MASS INDEX: 29.27 KG/M2 | OXYGEN SATURATION: 96 % | SYSTOLIC BLOOD PRESSURE: 106 MMHG | WEIGHT: 198.2 LBS | DIASTOLIC BLOOD PRESSURE: 64 MMHG | SYSTOLIC BLOOD PRESSURE: 106 MMHG | OXYGEN SATURATION: 96 % | HEART RATE: 83 BPM | TEMPERATURE: 97.8 F | RESPIRATION RATE: 18 BRPM | BODY MASS INDEX: 29.27 KG/M2 | WEIGHT: 198.2 LBS | RESPIRATION RATE: 18 BRPM | HEART RATE: 83 BPM

## 2021-10-13 DIAGNOSIS — D64.9 ANEMIA, UNSPECIFIED TYPE: ICD-10-CM

## 2021-10-13 DIAGNOSIS — C61 ADENOCARCINOMA OF PROSTATE (HCC): Primary | ICD-10-CM

## 2021-10-13 DIAGNOSIS — C79.51 BONE METASTASES: ICD-10-CM

## 2021-10-13 DIAGNOSIS — R52 PAIN: ICD-10-CM

## 2021-10-13 LAB
ALBUMIN SERPL-MCNC: 4.51 G/DL (ref 3.5–5.2)
ALBUMIN/GLOB SERPL: 1.6 G/DL
ALP SERPL-CCNC: 74 U/L (ref 39–117)
ALT SERPL W P-5'-P-CCNC: 15 U/L (ref 1–41)
ANION GAP SERPL CALCULATED.3IONS-SCNC: 10.5 MMOL/L (ref 5–15)
AST SERPL-CCNC: 15 U/L (ref 1–40)
BASOPHILS # BLD AUTO: 0.08 10*3/MM3 (ref 0–0.2)
BASOPHILS NFR BLD AUTO: 1.1 % (ref 0–1.5)
BILIRUB SERPL-MCNC: 0.2 MG/DL (ref 0–1.2)
BUN SERPL-MCNC: 17 MG/DL (ref 8–23)
BUN/CREAT SERPL: 14.7 (ref 7–25)
CALCIUM SPEC-SCNC: 9.7 MG/DL (ref 8.6–10.5)
CHLORIDE SERPL-SCNC: 102 MMOL/L (ref 98–107)
CO2 SERPL-SCNC: 25.5 MMOL/L (ref 22–29)
CREAT SERPL-MCNC: 1.16 MG/DL (ref 0.76–1.27)
DEPRECATED RDW RBC AUTO: 42.5 FL (ref 37–54)
EOSINOPHIL # BLD AUTO: 0.15 10*3/MM3 (ref 0–0.4)
EOSINOPHIL NFR BLD AUTO: 2.1 % (ref 0.3–6.2)
ERYTHROCYTE [DISTWIDTH] IN BLOOD BY AUTOMATED COUNT: 13.9 % (ref 12.3–15.4)
GFR SERPL CREATININE-BSD FRML MDRD: 64 ML/MIN/1.73
GLOBULIN UR ELPH-MCNC: 2.8 GM/DL
GLUCOSE SERPL-MCNC: 120 MG/DL (ref 65–99)
HCT VFR BLD AUTO: 37.5 % (ref 37.5–51)
HGB BLD-MCNC: 12.2 G/DL (ref 13–17.7)
IMM GRANULOCYTES # BLD AUTO: 0.02 10*3/MM3 (ref 0–0.05)
IMM GRANULOCYTES NFR BLD AUTO: 0.3 % (ref 0–0.5)
LYMPHOCYTES # BLD AUTO: 2.27 10*3/MM3 (ref 0.7–3.1)
LYMPHOCYTES NFR BLD AUTO: 32.5 % (ref 19.6–45.3)
MCH RBC QN AUTO: 27.2 PG (ref 26.6–33)
MCHC RBC AUTO-ENTMCNC: 32.5 G/DL (ref 31.5–35.7)
MCV RBC AUTO: 83.7 FL (ref 79–97)
MONOCYTES # BLD AUTO: 0.58 10*3/MM3 (ref 0.1–0.9)
MONOCYTES NFR BLD AUTO: 8.3 % (ref 5–12)
NEUTROPHILS NFR BLD AUTO: 3.88 10*3/MM3 (ref 1.7–7)
NEUTROPHILS NFR BLD AUTO: 55.7 % (ref 42.7–76)
NRBC BLD AUTO-RTO: 0 /100 WBC (ref 0–0.2)
PLATELET # BLD AUTO: 456 10*3/MM3 (ref 140–450)
PMV BLD AUTO: 10.4 FL (ref 6–12)
POTASSIUM SERPL-SCNC: 4.4 MMOL/L (ref 3.5–5.2)
PROT SERPL-MCNC: 7.3 G/DL (ref 6–8.5)
RBC # BLD AUTO: 4.48 10*6/MM3 (ref 4.14–5.8)
SODIUM SERPL-SCNC: 138 MMOL/L (ref 136–145)
WBC # BLD AUTO: 6.98 10*3/MM3 (ref 3.4–10.8)

## 2021-10-13 PROCEDURE — 80053 COMPREHEN METABOLIC PANEL: CPT

## 2021-10-13 PROCEDURE — 96402 CHEMO HORMON ANTINEOPL SQ/IM: CPT

## 2021-10-13 PROCEDURE — 25010000002 LEUPROLIDE 45 MG KIT: Performed by: INTERNAL MEDICINE

## 2021-10-13 PROCEDURE — 85025 COMPLETE CBC W/AUTO DIFF WBC: CPT

## 2021-10-13 PROCEDURE — 84153 ASSAY OF PSA TOTAL: CPT

## 2021-10-13 PROCEDURE — 99214 OFFICE O/P EST MOD 30 MIN: CPT | Performed by: NURSE PRACTITIONER

## 2021-10-13 RX ADMIN — LEUPROLIDE ACETATE 45 MG: KIT at 13:29

## 2021-10-13 NOTE — PROGRESS NOTES
"Chief Complaint  Hyperlipidemia    Subjective          Ta Madison presents to [facility]  Hyperlipidemia  This is a chronic problem. The current episode started more than 1 year ago. The problem is controlled. Exacerbating diseases include obesity. Pertinent negatives include no shortness of breath. Current antihyperlipidemic treatment includes statins. The current treatment provides moderate improvement of lipids.   Pain  This is a chronic problem. The current episode started more than 1 year ago (shoulder pain). The problem occurs daily. The problem has been waxing and waning. Associated symptoms comments: Has neuropathic itching at times   . The symptoms are aggravated by exertion. The treatment provided mild relief.       Objective   Vital Signs:   /60 (BP Location: Left arm, Patient Position: Sitting, Cuff Size: Large Adult)   Pulse 88   Temp 96.8 °F (36 °C) (Temporal)   Ht 175.3 cm (69\")   Wt 90.5 kg (199 lb 9.6 oz)   SpO2 98%   BMI 29.48 kg/m²     Physical Exam  Constitutional:       General: He is not in acute distress.     Appearance: Normal appearance. He is well-developed and well-groomed. He is not ill-appearing, toxic-appearing or diaphoretic.   HENT:      Head: Normocephalic.      Right Ear: Tympanic membrane, ear canal and external ear normal.      Left Ear: Tympanic membrane, ear canal and external ear normal.      Nose: Nose normal. No congestion or rhinorrhea.      Mouth/Throat:      Mouth: Mucous membranes are moist.      Pharynx: Oropharynx is clear. No oropharyngeal exudate or posterior oropharyngeal erythema.   Eyes:      General: Lids are normal.         Right eye: No discharge.         Left eye: No discharge.      Extraocular Movements: Extraocular movements intact.      Pupils: Pupils are equal, round, and reactive to light.   Neck:      Vascular: No carotid bruit.   Cardiovascular:      Rate and Rhythm: Normal rate and regular rhythm.      " Pulses: Normal pulses.      Heart sounds: Normal heart sounds. No murmur heard.  No friction rub. No gallop.    Pulmonary:      Effort: Pulmonary effort is normal. No respiratory distress.      Breath sounds: Normal breath sounds. No stridor. No wheezing, rhonchi or rales.   Chest:      Chest wall: No tenderness.   Abdominal:      General: Bowel sounds are normal. There is no distension.      Palpations: Abdomen is soft. There is no mass.      Tenderness: There is no abdominal tenderness. There is no right CVA tenderness, left CVA tenderness, guarding or rebound.      Hernia: No hernia is present.   Musculoskeletal:         General: No swelling or tenderness. Normal range of motion.      Cervical back: Normal range of motion and neck supple. No rigidity or tenderness.      Right lower leg: No edema.      Left lower leg: No edema.   Lymphadenopathy:      Cervical: No cervical adenopathy.   Skin:     General: Skin is warm.      Capillary Refill: Capillary refill takes less than 2 seconds.      Coloration: Skin is not jaundiced.      Findings: No bruising, erythema or rash.   Neurological:      General: No focal deficit present.      Mental Status: He is alert and oriented to person, place, and time.      Motor: Motor function is intact. No weakness.      Coordination: Coordination is intact.      Gait: Gait is intact. Gait normal.   Psychiatric:         Attention and Perception: Attention normal.         Mood and Affect: Mood normal.         Speech: Speech normal.         Behavior: Behavior normal.         Cognition and Memory: Cognition normal.         Judgment: Judgment normal.        Social History     Tobacco Use   • Smoking status: Light Tobacco Smoker     Packs/day: 1.00     Years: 40.00     Pack years: 40.00     Types: Cigarettes     Last attempt to quit: 3/12/2019     Years since quittin.5   • Smokeless tobacco: Never Used   Substance Use Topics   • Alcohol use: No      Past Medical History:   • Increased  heart rate   • Mini stroke (HCC)   • Neck pain   • Prostate cancer (HCC)   • Rash   • Tobacco abuse      Current Outpatient Medications on File Prior to Visit   Medication Sig   • albuterol sulfate  (90 Base) MCG/ACT inhaler Inhale 2 puffs Every 6 (Six) Hours As Needed for Wheezing.   • aspirin 81 MG EC tablet Take 81 mg by mouth Daily.   • enzalutamide (XTANDI) 40 MG chemo capsule Take 4 capsules by mouth Daily.   • fenofibrate (TRICOR) 145 MG tablet TAKE 1 TABLET BY MOUTH DAILY   • metoprolol tartrate (LOPRESSOR) 25 MG tablet Take 1 tablet by mouth Daily.   • pantoprazole (PROTONIX) 40 MG EC tablet TAKE 1 TABLET BY MOUTH DAILY   • vitamin B-12 (CYANOCOBALAMIN) 1000 MCG tablet Take 5,000 mcg by mouth Daily.   • EPINEPHrine (EPIPEN) 0.3 MG/0.3ML solution auto-injector injection INJECT 1 PEN IN THE MUSCLE ONE TIME AS DIRECTED   • HYDROcodone-acetaminophen (NORCO)  MG per tablet Take 1 tablet by mouth Every 6 (Six) Hours As Needed for Moderate Pain .   • montelukast (SINGULAIR) 10 MG tablet Take 10 mg by mouth Every Night.     No current facility-administered medications on file prior to visit.      Result Review :                 Assessment and Plan    Diagnoses and all orders for this visit:    1. Chronic right shoulder pain (Primary)  -     gabapentin (NEURONTIN) 600 MG tablet; Take 1 tablet by mouth 3 (Three) Times a Day.  Dispense: 90 tablet; Refill: 1    2. Mixed hyperlipidemia  -     atorvastatin (LIPITOR) 20 MG tablet; Take 1 tablet by mouth Every Night.  Dispense: 30 tablet; Refill: 5    3. Neuropathic pruritus  -     hydrOXYzine (ATARAX) 50 MG tablet; Take 1 tablet by mouth 3 (Three) Times a Day As Needed for Itching.  Dispense: 90 tablet; Refill: 2        Follow Up   Return in about 3 months (around 1/12/2022), or if symptoms worsen or fail to improve.  Patient was given instructions and counseling regarding his condition or for health maintenance advice. Please see specific information pulled  into the AVS if appropriate.     Ta Madison  reports that he has been smoking cigarettes. He has a 40.00 pack-year smoking history. He has never used smokeless tobacco.. I have educated him on the risk of diseases from using tobacco products such as cancer, COPD and heart disease.

## 2021-10-13 NOTE — PROGRESS NOTES
Name:  Ta Madison  :  1960  Date:  10/13/2021     REFERRING PHYSICIAN  Dheeraj Nieves MD    PRIMARY CARE PROVIDER  Yolande Olvera APRN    REASON FOR FOLLOWUP  1. Adenocarcinoma of prostate (HCC)      CHIEF COMPLAINT  Follow up of prostate cancer/toxicity check    Dear Ms. Wade,    HISTORY OF PRESENT ILLNESS:   I saw Mr. Madison in follow up today in our medical oncology clinic. As you are aware, he is a pleasant, 61 y.o., white male with a history of tobacco abuse who was referred to urology in 2020 for an elevated serum PSA level (of ~28 ng/mL, which had increased to 247 ng/mL at the time of his initial appointment in our clinic). An examination revealed a very hard and fixed prostate, and multiple core biopsies were performed on 2020. The results were consistent with Kinross Score 5+4 or 4+5=9 adenocarcinoma involving all twelve sampled cores. A staging workup confirmed pelvic adenopathy as well as diffuse, sclerotic lesions throughout the axial and appendicular skeleton, including the right greater than left femoral diaphyses. With this stage IV diagnosis, he was referred to our clinic for further evaluation and management. At the time of his initial consultation in our office (on 2021), he was agreeable to starting definitive therapy with a combination of ADT (Lupron injections) and anti-androgen therapy (daily PO Xtandi).    INTERIM HISTORY:  Mr. Madison returns to clinic today for follow up again accompanied by his wife. He received an initial dose of Lupron on 2021 and remains on daily Xtandi both of which he is tolerating very well. His only complaint is hot flashes which he has noticed over the past few months which remains tolerable. His diffuse skeletal pains are now completely resolved. He reports good appetite. He denies any other specific complaints today.     Past Medical History:   Diagnosis Date   • Increased heart rate    • Mini stroke (HCC)    • Neck pain    •  Prostate cancer (HCC)    • Rash    • Tobacco abuse        Past Surgical History:   Procedure Laterality Date   • APPENDECTOMY  1971    [facility]        Social History     Socioeconomic History   • Marital status:    Tobacco Use   • Smoking status: Light Tobacco Smoker     Packs/day: 1.00     Years: 40.00     Pack years: 40.00     Types: Cigarettes     Last attempt to quit: 3/12/2019     Years since quittin.5   • Smokeless tobacco: Never Used   Vaping Use   • Vaping Use: Never used   Substance and Sexual Activity   • Alcohol use: No   • Drug use: Yes     Types: Marijuana     Comment: occ    • Sexual activity: Defer       Family History   Problem Relation Age of Onset   • Nephrolithiasis Mother    • Heart disease Father    • Hypertension Father    • Kidney disease Father        No Known Allergies    Current Outpatient Medications   Medication Sig Dispense Refill   • albuterol sulfate  (90 Base) MCG/ACT inhaler Inhale 2 puffs Every 6 (Six) Hours As Needed for Wheezing. 18 g 5   • aspirin 81 MG EC tablet Take 81 mg by mouth Daily.     • atorvastatin (LIPITOR) 20 MG tablet Take 1 tablet by mouth Every Night. 30 tablet 5   • enzalutamide (XTANDI) 40 MG chemo capsule Take 4 capsules by mouth Daily. 120 capsule 5   • EPINEPHrine (EPIPEN) 0.3 MG/0.3ML solution auto-injector injection INJECT 1 PEN IN THE MUSCLE ONE TIME AS DIRECTED     • fenofibrate (TRICOR) 145 MG tablet TAKE 1 TABLET BY MOUTH DAILY 30 tablet 5   • gabapentin (NEURONTIN) 600 MG tablet Take 1 tablet by mouth 3 (Three) Times a Day. 90 tablet 1   • HYDROcodone-acetaminophen (NORCO)  MG per tablet Take 1 tablet by mouth Every 6 (Six) Hours As Needed for Moderate Pain . 120 tablet 0   • hydrOXYzine (ATARAX) 50 MG tablet Take 1 tablet by mouth 3 (Three) Times a Day As Needed for Itching. 90 tablet 2   • metoprolol tartrate (LOPRESSOR) 25 MG tablet Take 1 tablet by mouth Daily. 90 tablet 1   • montelukast (SINGULAIR) 10 MG tablet  Take 10 mg by mouth Every Night.     • pantoprazole (PROTONIX) 40 MG EC tablet TAKE 1 TABLET BY MOUTH DAILY 30 tablet 5   • vitamin B-12 (CYANOCOBALAMIN) 1000 MCG tablet Take 5,000 mcg by mouth Daily.       No current facility-administered medications for this visit.     REVIEW OF SYSTEMS  CONSTITUTIONAL:  No fever, chills or night sweats.  EYES:  No blurry vision, diplopia or other vision changes.  ENT:  No hearing loss, nosebleeds or sore throat.  CARDIOVASCULAR:  No palpitations, arrhythmia, syncopal episodes or edema.  PULMONARY:  No hemoptysis, wheezing, chronic cough or shortness of breath.  GASTROINTESTINAL:  No nausea or vomiting.  No constipation or diarrhea.  No abdominal pain.  GENITOURINARY:  No hematuria, kidney stones or frequent urination.  MUSCULOSKELETAL:  As per the HPI above.  ENDOCRINE:  No excessive thirst. Hot flashes as above.  HEMATOLOGIC:  No history of free bleeding, spontaneous bleeding or clotting.  IMMUNOLOGIC:  No allergies or frequent infections.  NEUROLOGIC: No numbness, tingling, seizures or weakness.  PSYCHIATRIC:  No anxiety or depression.    PHYSICAL EXAMINATION  /64   Pulse 83   Temp 97.8 °F (36.6 °C) (Temporal)   Resp 18   Wt 89.9 kg (198 lb 3.2 oz)   SpO2 96%   BMI 29.27 kg/m²     Pain Score:  Pain Score    10/13/21 1304   PainSc: 0-No pain     ECO  GENERAL:  A well-developed, well-nourished, white male in no acute distress.  HEENT:  Pupils equally round and reactive to light. Extraocular muscles intact.  CARDIOVASCULAR:  Regular rate and rhythm. No murmurs, gallops or rubs.  LUNGS:  Clear to auscultation bilaterally. No wheezing  ABDOMEN:  Soft, nontender, nondistended with positive bowel sounds.  EXTREMITIES:  No clubbing, cyanosis or edema bilaterally.  SKIN:  No petechiae.  NEURO:  Cranial nerves grossly intact.  No focal deficits.  PSYCH:  Alert and oriented x3.    LABORATORY  Lab Results   Component Value Date    WBC 6.98 10/13/2021    HGB 12.2 (L)  10/13/2021    HCT 37.5 10/13/2021    MCV 83.7 10/13/2021     (H) 10/13/2021    NEUTROABS 3.88 10/13/2021       Lab Results   Component Value Date     10/13/2021    K 4.4 10/13/2021     10/13/2021    CO2 25.5 10/13/2021    BUN 17 10/13/2021    CREATININE 1.16 10/13/2021    GLUCOSE 120 (H) 10/13/2021    CALCIUM 9.7 10/13/2021    AST 15 10/13/2021    ALT 15 10/13/2021    ALKPHOS 74 10/13/2021    BILITOT 0.2 10/13/2021    PROTEINTOT 7.3 10/13/2021    ALBUMIN 4.51 10/13/2021     CBC (07/20/2021): WBCs: 7.43; HgB: 13.0; Hct: 39.6; platelets: 469  CBC (06/29/2021): WBCs: 7.8; HgB: 12.6; Hct: 38.1; platelets: 396  CBC (04/14/2021): WBCs: 7.2; HgB: 12.7; Hct: 38.8; platelets: 423  CBC (02/25/2021): WBCs: 6.5; HgB: 11.2; Hct: 34.4; platelets: 407    PSA (10/13/2021): Pending  PSA (07/20/2021): 0.020 ng/mL   PSA (04/14/2021): 0.108 ng/mL  PSA (02/25/2021): 0.814 ng/mL  PSA (01/20/2021): 29.5 ng/mL  PSA (01/07/2021): 247.0 ng/mL  PSA (11/10/2020): 28.3 ng/mL    IMAGING  NM bone scan, whole body (12/15/2020):  Impression: Extensive osteoblastic metastatic disease involving the axial and appendicular skeleton including the right greater than left femoral diaphyses.    CT pelvis with contrast (12/21/2020):  Impression:  1) Urinary bladder wall thickening noted.  2) Heterogeneous appearance of the prostate gland.  3) Nonspecific stranding in the retroperitoneal space.  4) Retroperitoneal region lymph node on the right side measuring 1.4 cm.  5) Right obturator chain region lymph node measuring 1.5 cm. Other lymph nodes are identified in this region.  6) Sclerotic bone lesions are noted throughout the pelvis concerning for metastatic disease.    PATHOLOGY  Left and right prostate, needle core biopsies, twelve total cores (12/02/2020):  Ignacio Score 5+4 or 4+5 = 9 prostatic adenocarcinoma involving ~45-90% of all twelve (12/12) total cores.    IMPRESSION AND PLAN  Mr. Madison is a 61 y.o., white male  with:  1. Prostate adenocarcinoma: Diagnosed in December 2020 with stage IV disease, with widespread metastases involving the axial and appendicular skeleton along with probable, pelvic adenopathy. Dr. Martinez has had multiple, long discussions with the patient and his wife since the time of his initial consultation in our clinic (on 01/07/2021) regarding this diagnosis and its prognosis. They remain aware that this disease is, unfortunately, not curable; however, particularly given his young age and this type of cancer's tendency to respond very well to initial anti-testosterone therapy, it was/is very treatable, and sustained remissions are very possible. He was felt to be an excellent candidate for initial therapy with both ADT and androgen receptor blockade. He received an initial cycle of (currently now [id number]) Lupron on 01/08/2021 and was also started on daily PO Xtandi at that time. He is still tolerating this regimen very well, with no significant side effects and clinically reports feeling much better. Meanwhile, his serum PSA level has responded dramatically and quickly, decreasing from 247 ng/mL on 01/07/2021 to 0.020 ng/mL as of 07/20/2021 (today's result is pending). We will proceed with this current treatment plan and see him back in clinic in another three months with a CBC, CMP and PSA, for another symptom check.  2. Skeletal metastases: There was diffuse involvement of both the axial and appendicular skeletons on the initial staging NM bone scan and CT of the abdomen and pelvis performed in late December 2020. Secondary to issue #1. 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 Continue to monitor.  3. Pain: Secondary to #2 and now resolved since starting Lupron/Xtandi in early January 2021 for the palliative treatment of issue #1. He has Norco at home but has not had to take it. Continue to  monitor.  4. Anemia: Currently still improved compared to earlier this year and currently still very mild (HgB of 12.2 g/dL). Multifactorial, with ongoing testosterone deprivation for the treatment of issue #1 contributing.  Continue to monitor.    ACO / ANGELA/Other  Quality measures  -  Ta Madison did not receive 2021 flu vaccine. This was recommended.   -  Ta Madison reports a pain score of 0.    -  Current outpatient and discharge medications have been reconciled for the patient.  Reviewed by: EUGENIA Hebert    The patient and his wife were in agreement with these plans.    It is a pleasure to participate in Mr. Madison's care. Please do not hesitate to call with any questions or concerns that you may have.    A total of 30 minutes were spent coordinating this patient’s care in clinic today; more than 50% of this time was face-to-face with the patient and his wife, reviewing his interim medical history, discussing the results of recent labwork and counseling on the current treatment and followup plan. All questions were answered to their satisfaction.    FOLLOW UP  Continue (currently [id number]) Lupron and daily PO Xtandi. With urology, as previously planned. Return to our clinic in 3 months with a CBC, CMP and PSA.      This document was electronically signed by EUGENIA Hebert October 13, 2021 15:21 EDT      CC: EUGENIA Mendoza

## 2021-10-14 LAB — PSA SERPL-MCNC: <0.014 NG/ML (ref 0–4)

## 2021-10-22 NOTE — PROGRESS NOTES
Specialty Pharmacy Note      Name:  Ta Madison  :  1960  Date:  10/22/2021         Past Medical History:   Diagnosis Date   • Increased heart rate    • Mini stroke (HCC)    • Neck pain    • Prostate cancer (HCC)    • Rash    • Tobacco abuse        Past Surgical History:   Procedure Laterality Date   • APPENDECTOMY      [facility]        Social History     Socioeconomic History   • Marital status:    Tobacco Use   • Smoking status: Light Tobacco Smoker     Packs/day: 1.00     Years: 40.00     Pack years: 40.00     Types: Cigarettes     Last attempt to quit: 3/12/2019     Years since quittin.6   • Smokeless tobacco: Never Used   Vaping Use   • Vaping Use: Never used   Substance and Sexual Activity   • Alcohol use: No   • Drug use: Yes     Types: Marijuana     Comment: occ    • Sexual activity: Defer       Family History   Problem Relation Age of Onset   • Nephrolithiasis Mother    • Heart disease Father    • Hypertension Father    • Kidney disease Father        No Known Allergies    Current Outpatient Medications   Medication Sig Dispense Refill   • albuterol sulfate  (90 Base) MCG/ACT inhaler Inhale 2 puffs Every 6 (Six) Hours As Needed for Wheezing. 18 g 5   • aspirin 81 MG EC tablet Take 81 mg by mouth Daily.     • atorvastatin (LIPITOR) 20 MG tablet Take 1 tablet by mouth Every Night. 30 tablet 5   • enzalutamide (XTANDI) 40 MG chemo capsule Take 4 capsules by mouth Daily. 120 capsule 5   • EPINEPHrine (EPIPEN) 0.3 MG/0.3ML solution auto-injector injection INJECT 1 PEN IN THE MUSCLE ONE TIME AS DIRECTED     • fenofibrate (TRICOR) 145 MG tablet TAKE 1 TABLET BY MOUTH DAILY 30 tablet 5   • gabapentin (NEURONTIN) 600 MG tablet Take 1 tablet by mouth 3 (Three) Times a Day. 90 tablet 1   • HYDROcodone-acetaminophen (NORCO)  MG per tablet Take 1 tablet by mouth Every 6 (Six) Hours As Needed for Moderate Pain . 120 tablet 0   • hydrOXYzine (ATARAX) 50 MG tablet Take 1  tablet by mouth 3 (Three) Times a Day As Needed for Itching. 90 tablet 2   • metoprolol tartrate (LOPRESSOR) 25 MG tablet Take 1 tablet by mouth Daily. 90 tablet 1   • montelukast (SINGULAIR) 10 MG tablet Take 10 mg by mouth Every Night.     • pantoprazole (PROTONIX) 40 MG EC tablet TAKE 1 TABLET BY MOUTH DAILY 30 tablet 5   • vitamin B-12 (CYANOCOBALAMIN) 1000 MCG tablet Take 5,000 mcg by mouth Daily.       No current facility-administered medications for this visit.         LABORATORY:    Lab Results   Component Value Date    WBC 6.98 10/13/2021    HGB 12.2 (L) 10/13/2021    HCT 37.5 10/13/2021    MCV 83.7 10/13/2021    RDW 13.9 10/13/2021     (H) 10/13/2021    NEUTRORELPCT 55.7 10/13/2021    LYMPHORELPCT 32.5 10/13/2021    MONORELPCT 8.3 10/13/2021    EOSRELPCT 2.1 10/13/2021    BASORELPCT 1.1 10/13/2021    NEUTROABS 3.88 10/13/2021    LYMPHSABS 2.27 10/13/2021       Lab Results   Component Value Date     10/13/2021    K 4.4 10/13/2021    CO2 25.5 10/13/2021     10/13/2021    BUN 17 10/13/2021    CREATININE 1.16 10/13/2021    GLUCOSE 120 (H) 10/13/2021    CALCIUM 9.7 10/13/2021    ALKPHOS 74 10/13/2021    AST 15 10/13/2021    ALT 15 10/13/2021    BILITOT 0.2 10/13/2021    ALBUMIN 4.51 10/13/2021    PROTEINTOT 7.3 10/13/2021    MG 2.1 11/10/2020       No results found for: LDH, URICACID     Imaging Results (Last 24 Hours)     ** No results found for the last 24 hours. **          ASSESSMENT/PLAN:    Patient Update Assessment (new medications, allergies, medical history): No updates reported at this time.    Medication(s): Xtandi 40 chemo capsule    Currently Taking Medication(s): Patient is currently taking medication as prescribed.    Effectiveness of Medication: Effective    Experiencing Side Effects: None reported at this time.    Prior Authorization Status: Approved    Financial Assistance Status: None needed at this time. ($0 patient co-pay)    Any Issues Identified: None    Appropriate to  Process Prescription(s): Yes, medication refill is due.    Counseling Offered: Declined    Next Specialty Pharmacy Visit: ~28 days

## 2021-10-25 ENCOUNTER — SPECIALTY PHARMACY (OUTPATIENT)
Dept: PHARMACY | Facility: HOSPITAL | Age: 61
End: 2021-10-25

## 2021-11-04 ENCOUNTER — SPECIALTY PHARMACY (OUTPATIENT)
Dept: PHARMACY | Facility: HOSPITAL | Age: 61
End: 2021-11-04

## 2021-11-19 ENCOUNTER — SPECIALTY PHARMACY (OUTPATIENT)
Dept: PHARMACY | Facility: HOSPITAL | Age: 61
End: 2021-11-19

## 2021-11-19 NOTE — PROGRESS NOTES
Specialty Pharmacy Note      Name:  Ta Madison  :  1960  Date:  2021         Past Medical History:   Diagnosis Date   • Increased heart rate    • Mini stroke (HCC)    • Neck pain    • Prostate cancer (HCC)    • Rash    • Tobacco abuse        Past Surgical History:   Procedure Laterality Date   • APPENDECTOMY      [facility]        Social History     Socioeconomic History   • Marital status:    Tobacco Use   • Smoking status: Light Tobacco Smoker     Packs/day: 1.00     Years: 40.00     Pack years: 40.00     Types: Cigarettes     Last attempt to quit: 3/12/2019     Years since quittin.6   • Smokeless tobacco: Never Used   Vaping Use   • Vaping Use: Never used   Substance and Sexual Activity   • Alcohol use: No   • Drug use: Yes     Types: Marijuana     Comment: occ    • Sexual activity: Defer       Family History   Problem Relation Age of Onset   • Nephrolithiasis Mother    • Heart disease Father    • Hypertension Father    • Kidney disease Father        No Known Allergies    Current Outpatient Medications   Medication Sig Dispense Refill   • albuterol sulfate  (90 Base) MCG/ACT inhaler Inhale 2 puffs Every 6 (Six) Hours As Needed for Wheezing. 18 g 5   • aspirin 81 MG EC tablet Take 81 mg by mouth Daily.     • atorvastatin (LIPITOR) 20 MG tablet Take 1 tablet by mouth Every Night. 30 tablet 5   • enzalutamide (XTANDI) 40 MG chemo capsule Take 4 capsules by mouth Daily. 120 capsule 5   • EPINEPHrine (EPIPEN) 0.3 MG/0.3ML solution auto-injector injection INJECT 1 PEN IN THE MUSCLE ONE TIME AS DIRECTED     • fenofibrate (TRICOR) 145 MG tablet TAKE 1 TABLET BY MOUTH DAILY 30 tablet 5   • gabapentin (NEURONTIN) 600 MG tablet Take 1 tablet by mouth 3 (Three) Times a Day. 90 tablet 1   • HYDROcodone-acetaminophen (NORCO)  MG per tablet Take 1 tablet by mouth Every 6 (Six) Hours As Needed for Moderate Pain . 120 tablet 0   • hydrOXYzine (ATARAX) 50 MG tablet Take 1  tablet by mouth 3 (Three) Times a Day As Needed for Itching. 90 tablet 2   • metoprolol tartrate (LOPRESSOR) 25 MG tablet Take 1 tablet by mouth Daily. 90 tablet 1   • montelukast (SINGULAIR) 10 MG tablet Take 10 mg by mouth Every Night.     • pantoprazole (PROTONIX) 40 MG EC tablet TAKE 1 TABLET BY MOUTH DAILY 30 tablet 5   • vitamin B-12 (CYANOCOBALAMIN) 1000 MCG tablet Take 5,000 mcg by mouth Daily.       No current facility-administered medications for this visit.         LABORATORY:    Lab Results   Component Value Date    WBC 6.98 10/13/2021    HGB 12.2 (L) 10/13/2021    HCT 37.5 10/13/2021    MCV 83.7 10/13/2021    RDW 13.9 10/13/2021     (H) 10/13/2021    NEUTRORELPCT 55.7 10/13/2021    LYMPHORELPCT 32.5 10/13/2021    MONORELPCT 8.3 10/13/2021    EOSRELPCT 2.1 10/13/2021    BASORELPCT 1.1 10/13/2021    NEUTROABS 3.88 10/13/2021    LYMPHSABS 2.27 10/13/2021       Lab Results   Component Value Date     10/13/2021    K 4.4 10/13/2021    CO2 25.5 10/13/2021     10/13/2021    BUN 17 10/13/2021    CREATININE 1.16 10/13/2021    GLUCOSE 120 (H) 10/13/2021    CALCIUM 9.7 10/13/2021    ALKPHOS 74 10/13/2021    AST 15 10/13/2021    ALT 15 10/13/2021    BILITOT 0.2 10/13/2021    ALBUMIN 4.51 10/13/2021    PROTEINTOT 7.3 10/13/2021    MG 2.1 11/10/2020       No results found for: LDH, URICACID     Imaging Results (Last 24 Hours)     ** No results found for the last 24 hours. **          ASSESSMENT/PLAN:    Patient Update Assessment (new medications, allergies, medical history): No updates reported at this time.    Medication(s): Xtandi 40 chemo capsule    Currently Taking Medication(s): Patient is currently taking medication as prescribed.    Effectiveness of Medication: Effective    Experiencing Side Effects: No side effect reported at this time.    Prior Authorization Status: Approved    Financial Assistance Status: None needed at this time. ($0 patient co-pay)    Any Issues Identified:  None    Appropriate to Process Prescription(s): Yes, medication refill is due.    Counseling Offered: Declined    Next Specialty Pharmacy Visit: ~30 days                Specialty Pharmacy Refill Coordination Note     Ta is a 61 y.o. male contacted today regarding refills of  One specialty medication(s).    Reviewed and verified with patient: Allergies  Meds       Specialty medication(s) and dose(s) confirmed: yes    Refill Questions      Most Recent Value   Changes to allergies? No   Changes to medications? No   New conditions since last clinic visit No   Unplanned office visit, urgent care, ED, or hospital admission in the last 4 weeks  No   How does patient/caregiver feel medication is working? Very good   Financial problems or insurance changes  No          Delivery Questions      Most Recent Value   Delivery method FedEx   Delivery address correct? Yes   Preferred delivery time? Anytime   Number of medications in delivery 1   Medication being filled and delivered Xtandi   Is there any medication that is due not being filled? No   Supplies needed? No supplies needed   Cooler needed? No   Do any medications need mixed or dated? No   Questions or concerns for the pharmacist? No   Are any medications first time fills? No            Medication Adherence    Any gaps in refill history greater than 2 weeks in the last 3 months: no  Demonstrates understanding of importance of adherence: yes  Informant: patient  Reliability of informant: reliable  Reasons for non-adherence: no problems identified          Follow-up: 30 day(s)     Agnieszka Echavarria, Pharmacy Technician  Specialty Pharmacy Technician

## 2021-12-10 DIAGNOSIS — M25.511 CHRONIC RIGHT SHOULDER PAIN: ICD-10-CM

## 2021-12-10 DIAGNOSIS — G89.29 CHRONIC RIGHT SHOULDER PAIN: ICD-10-CM

## 2021-12-10 RX ORDER — GABAPENTIN 600 MG/1
TABLET ORAL
Qty: 90 TABLET | Refills: 0 | Status: SHIPPED | OUTPATIENT
Start: 2021-12-13 | End: 2022-01-12 | Stop reason: SDUPTHER

## 2021-12-16 ENCOUNTER — SPECIALTY PHARMACY (OUTPATIENT)
Dept: PHARMACY | Facility: HOSPITAL | Age: 61
End: 2021-12-16

## 2021-12-16 DIAGNOSIS — C61 ADENOCARCINOMA OF PROSTATE (HCC): ICD-10-CM

## 2021-12-16 NOTE — PROGRESS NOTES
Specialty Pharmacy Patient Management Program  Oncology Reassessment     Ta Madison is a 61 y.o. male with prostate cancer seen by an Oncology provider and enrolled in the Oncology Patient Management program offered by [facility].  A follow-up outreach was conducted, including assessment of continued therapy appropriateness, medication adherence, and side effect incidence and management for Xtandi.       Relevant Past Medical History and Comorbidities  Relevant medical history and concomitant health conditions were discussed with the patient. The patient's chart has been reviewed for relevant past medical history and comorbid health conditions and updated as necessary.   Past Medical History:   Diagnosis Date   • Increased heart rate    • Mini stroke (HCC)    • Neck pain    • Prostate cancer (HCC)    • Rash    • Tobacco abuse      Social History     Socioeconomic History   • Marital status:    Tobacco Use   • Smoking status: Light Tobacco Smoker     Packs/day: 1.00     Years: 40.00     Pack years: 40.00     Types: Cigarettes     Last attempt to quit: 3/12/2019     Years since quittin.7   • Smokeless tobacco: Never Used   Vaping Use   • Vaping Use: Never used   Substance and Sexual Activity   • Alcohol use: No   • Drug use: Yes     Types: Marijuana     Comment: occ    • Sexual activity: Defer       Allergies  Known allergies and reactions were discussed with the patient. The patient's chart has been reviewed for allergy information and updated as necessary.   Patient has no known allergies.    Relevant Laboratory Values  Lab Results   Component Value Date    GLUCOSE 120 (H) 10/13/2021    CALCIUM 9.7 10/13/2021     10/13/2021    K 4.4 10/13/2021    CO2 25.5 10/13/2021     10/13/2021    BUN 17 10/13/2021    CREATININE 1.16 10/13/2021    EGFRIFNONA 64 10/13/2021    BCR 14.7 10/13/2021    ANIONGAP 10.5 10/13/2021     Lab Results   Component Value Date    WBC 6.98  10/13/2021    RBC 4.48 10/13/2021    HGB 12.2 (L) 10/13/2021    HCT 37.5 10/13/2021    MCV 83.7 10/13/2021    MCH 27.2 10/13/2021    MCHC 32.5 10/13/2021    RDW 13.9 10/13/2021    RDWSD 42.5 10/13/2021    MPV 10.4 10/13/2021     (H) 10/13/2021    NEUTRORELPCT 55.7 10/13/2021    LYMPHORELPCT 32.5 10/13/2021    MONORELPCT 8.3 10/13/2021    EOSRELPCT 2.1 10/13/2021    BASORELPCT 1.1 10/13/2021    AUTOIGPER 0.3 10/13/2021    NEUTROABS 3.88 10/13/2021    LYMPHSABS 2.27 10/13/2021    MONOSABS 0.58 10/13/2021    EOSABS 0.15 10/13/2021    BASOSABS 0.08 10/13/2021    AUTOIGNUM 0.02 10/13/2021    NRBC 0.0 10/13/2021        Current Medication List  This medication list has been reviewed with the patient and evaluated for any interactions or necessary modifications/recommendations, and updated to include all prescription medications, OTC medications, and supplements the patient is currently taking.  This list reflects what is contained in the patient's profile, which has also been marked as reviewed to communicate to other providers it is the most up to date version of the patient's current medication therapy.     Current Outpatient Medications:   •  albuterol sulfate  (90 Base) MCG/ACT inhaler, Inhale 2 puffs Every 6 (Six) Hours As Needed for Wheezing., Disp: 18 g, Rfl: 5  •  aspirin 81 MG EC tablet, Take 81 mg by mouth Daily., Disp: , Rfl:   •  atorvastatin (LIPITOR) 20 MG tablet, Take 1 tablet by mouth Every Night., Disp: 30 tablet, Rfl: 5  •  enzalutamide (XTANDI) 40 MG chemo capsule, Take 4 capsules by mouth Daily., Disp: 120 capsule, Rfl: 5  •  EPINEPHrine (EPIPEN) 0.3 MG/0.3ML solution auto-injector injection, INJECT 1 PEN IN THE MUSCLE ONE TIME AS DIRECTED, Disp: , Rfl:   •  fenofibrate (TRICOR) 145 MG tablet, TAKE 1 TABLET BY MOUTH DAILY, Disp: 30 tablet, Rfl: 5  •  gabapentin (NEURONTIN) 600 MG tablet, TAKE 1 TABLET BY MOUTH THREE TIMES DAILY, Disp: 90 tablet, Rfl: 0  •  HYDROcodone-acetaminophen (NORCO)  " MG per tablet, Take 1 tablet by mouth Every 6 (Six) Hours As Needed for Moderate Pain ., Disp: 120 tablet, Rfl: 0  •  hydrOXYzine (ATARAX) 50 MG tablet, Take 1 tablet by mouth 3 (Three) Times a Day As Needed for Itching., Disp: 90 tablet, Rfl: 2  •  metoprolol tartrate (LOPRESSOR) 25 MG tablet, Take 1 tablet by mouth Daily., Disp: 90 tablet, Rfl: 1  •  montelukast (SINGULAIR) 10 MG tablet, Take 10 mg by mouth Every Night., Disp: , Rfl:   •  pantoprazole (PROTONIX) 40 MG EC tablet, TAKE 1 TABLET BY MOUTH DAILY, Disp: 30 tablet, Rfl: 5  •  vitamin B-12 (CYANOCOBALAMIN) 1000 MCG tablet, Take 5,000 mcg by mouth Daily., Disp: , Rfl:     Drug Interactions  None    Adverse Drug Reactions  • Adverse Reactions Experienced: None   • Plan for ADR Management: Not required Enter Plan Here (patient education provided, discontinue drug, pharmacist to consult provider, etc.) or \"Not Required\"     Hospitalizations and Urgent Care Since Last Assessment  • Hospitalizations or Admissions: None    • ED Visits: None   • Urgent Office Visits: None     Adherence and Self-Administration  • Approximate Number of Doses Missed Since Last Assessment: 0   • Ongoing or New Barriers to Patient Adherence and/or Self-Administration: None   • Methods for Supporting Patient Adherence and/or Self-Administration: Patient states they can self-administer and adhere without any assistance.      Goals of Therapy  • Progress Toward Meeting Patient-Identified Goals of Therapy:  Unchanged  o Patient-Identified Goals  - Consistently take medications as prescribed  - Patient will adhere to medication regimen  - Patient will report any medication side effects to healthcare provider    • Progress Toward Meeting Clinical Goals or Therapeutic Targets: Unchanged  o Clinical Goals or Therapeutic Targets  - Support patient understanding of medication regimen  - Ensure patient knows the pharmacy contact information  - Schedule regular follow-up to monitor the " treatment serious adverse events  - Schedule regular follow-up to confirm medication adherence  - Schedule regular follow-up to monitor disease progression or stabilty    Quality of Life Assessment   Quality of Life related to the patient's specialty therapy was discussed with the patient. The QOL segment of this outreach has been reviewed and updated.   • Quality of Life Score: 5    Reassessment Plan & Follow-Up  1. Pharmacist to continue to perform regular reassessments no more than (6) months from the previous assessment.  2. Care Coordinator to facilitate future refill outreaches, coordinate prescription delivery, and escalate clinical questions to pharmacist.     Additional Plans, Therapy Recommendations or Therapy Problems to Be Addressed: None  Recent Provider Changes:  None    Attestation  I attest that the specialty medication(s) addressed above are appropriate for the patient based on my reassessment.  If the prescribed therapy is at any point deemed not appropriate based on the current or future assessments, a consultation will be initiated with the patient's specialty care provider to determine the best course of action. The revised plan of therapy will be documented along with any additional patient education provided.     .me  Oncology Clinical Pharmacist  12/16/2021  14:33 EST

## 2021-12-17 ENCOUNTER — SPECIALTY PHARMACY (OUTPATIENT)
Dept: PHARMACY | Facility: HOSPITAL | Age: 61
End: 2021-12-17

## 2021-12-27 NOTE — PROGRESS NOTES
Specialty Pharmacy Refill Coordination Note      Name:  Ta Madison  :  1960  Date:  2021         Past Medical History:   Diagnosis Date   • Increased heart rate    • Mini stroke (HCC)    • Neck pain    • Prostate cancer (HCC)    • Rash    • Tobacco abuse        Past Surgical History:   Procedure Laterality Date   • APPENDECTOMY      [facility]        Social History     Socioeconomic History   • Marital status:    Tobacco Use   • Smoking status: Light Tobacco Smoker     Packs/day: 1.00     Years: 40.00     Pack years: 40.00     Types: Cigarettes     Last attempt to quit: 3/12/2019     Years since quittin.7   • Smokeless tobacco: Never Used   Vaping Use   • Vaping Use: Never used   Substance and Sexual Activity   • Alcohol use: No   • Drug use: Yes     Types: Marijuana     Comment: occ    • Sexual activity: Defer       Family History   Problem Relation Age of Onset   • Nephrolithiasis Mother    • Heart disease Father    • Hypertension Father    • Kidney disease Father        No Known Allergies    Current Outpatient Medications   Medication Sig Dispense Refill   • albuterol sulfate  (90 Base) MCG/ACT inhaler Inhale 2 puffs Every 6 (Six) Hours As Needed for Wheezing. 18 g 5   • aspirin 81 MG EC tablet Take 81 mg by mouth Daily.     • atorvastatin (LIPITOR) 20 MG tablet Take 1 tablet by mouth Every Night. 30 tablet 5   • enzalutamide (XTANDI) 40 MG chemo capsule Take 4 capsules by mouth Daily. 120 capsule 5   • EPINEPHrine (EPIPEN) 0.3 MG/0.3ML solution auto-injector injection INJECT 1 PEN IN THE MUSCLE ONE TIME AS DIRECTED     • fenofibrate (TRICOR) 145 MG tablet TAKE 1 TABLET BY MOUTH DAILY 30 tablet 5   • gabapentin (NEURONTIN) 600 MG tablet TAKE 1 TABLET BY MOUTH THREE TIMES DAILY 90 tablet 0   • HYDROcodone-acetaminophen (NORCO)  MG per tablet Take 1 tablet by mouth Every 6 (Six) Hours As Needed for Moderate Pain . 120 tablet 0   • hydrOXYzine (ATARAX) 50 MG  tablet Take 1 tablet by mouth 3 (Three) Times a Day As Needed for Itching. 90 tablet 2   • metoprolol tartrate (LOPRESSOR) 25 MG tablet Take 1 tablet by mouth Daily. 90 tablet 1   • montelukast (SINGULAIR) 10 MG tablet Take 10 mg by mouth Every Night.     • pantoprazole (PROTONIX) 40 MG EC tablet TAKE 1 TABLET BY MOUTH DAILY 30 tablet 5   • vitamin B-12 (CYANOCOBALAMIN) 1000 MCG tablet Take 5,000 mcg by mouth Daily.       No current facility-administered medications for this visit.         ASSESSMENT/PLAN:      Ta is a 61 y.o. male contacted today regarding refills of one specialty medication(s).    Reviewed and verified with patient:  Allergies  Meds       Specialty medication(s) and dose(s) confirmed: yes    Refill Questions      Most Recent Value   Changes to allergies? No   Changes to medications? No   New conditions since last clinic visit No   Unplanned office visit, urgent care, ED, or hospital admission in the last 4 weeks  No   How does patient/caregiver feel medication is working? Very good   Financial problems or insurance changes  No          Delivery Questions      Most Recent Value   Delivery method FedEx   Delivery address correct? Yes   Preferred delivery time? Anytime   Number of medications in delivery 1   Medication being filled and delivered Xtandi   Is there any medication that is due not being filled? No   Supplies needed? No supplies needed   Cooler needed? No   Do any medications need mixed or dated? No   Copay form of payment --  [$0 co-pay]   Questions or concerns for the pharmacist? No   Are any medications first time fills? No            Medication Adherence    Any gaps in refill history greater than 2 weeks in the last 3 months: no  Demonstrates understanding of importance of adherence: yes  Informant: patient  Reliability of informant: reliable  Provider-estimated medication adherence level: %  Reasons for non-adherence: no problems identified  Support network for  adherence: family member          Follow-up: 30 day(s)     Agnieszka Echavarria, Pharmacy Technician  Specialty Pharmacy Technician

## 2022-01-09 DIAGNOSIS — K21.9 GASTROESOPHAGEAL REFLUX DISEASE: ICD-10-CM

## 2022-01-09 DIAGNOSIS — M25.511 CHRONIC RIGHT SHOULDER PAIN: ICD-10-CM

## 2022-01-09 DIAGNOSIS — G89.29 CHRONIC RIGHT SHOULDER PAIN: ICD-10-CM

## 2022-01-10 DIAGNOSIS — R00.0 TACHYCARDIA: ICD-10-CM

## 2022-01-10 RX ORDER — PANTOPRAZOLE SODIUM 40 MG/1
40 TABLET, DELAYED RELEASE ORAL DAILY
Qty: 30 TABLET | Refills: 0 | Status: SHIPPED | OUTPATIENT
Start: 2022-01-10 | End: 2022-01-12 | Stop reason: SDUPTHER

## 2022-01-11 RX ORDER — GABAPENTIN 600 MG/1
TABLET ORAL
Qty: 90 TABLET | OUTPATIENT
Start: 2022-01-11

## 2022-01-12 ENCOUNTER — OFFICE VISIT (OUTPATIENT)
Dept: FAMILY MEDICINE CLINIC | Facility: CLINIC | Age: 62
End: 2022-01-12

## 2022-01-12 VITALS
WEIGHT: 205 LBS | OXYGEN SATURATION: 98 % | HEIGHT: 69 IN | HEART RATE: 88 BPM | BODY MASS INDEX: 30.36 KG/M2 | DIASTOLIC BLOOD PRESSURE: 60 MMHG | SYSTOLIC BLOOD PRESSURE: 130 MMHG | TEMPERATURE: 97.1 F

## 2022-01-12 DIAGNOSIS — K21.9 GASTROESOPHAGEAL REFLUX DISEASE: ICD-10-CM

## 2022-01-12 DIAGNOSIS — M25.511 CHRONIC RIGHT SHOULDER PAIN: ICD-10-CM

## 2022-01-12 DIAGNOSIS — Z88.9 MULTIPLE ALLERGIES: ICD-10-CM

## 2022-01-12 DIAGNOSIS — R00.0 TACHYCARDIA: ICD-10-CM

## 2022-01-12 DIAGNOSIS — G89.29 CHRONIC RIGHT SHOULDER PAIN: ICD-10-CM

## 2022-01-12 DIAGNOSIS — C61 ADENOCARCINOMA OF PROSTATE: ICD-10-CM

## 2022-01-12 DIAGNOSIS — F45.8 NEUROPATHIC PRURITUS: ICD-10-CM

## 2022-01-12 DIAGNOSIS — E78.2 MIXED HYPERLIPIDEMIA: Primary | ICD-10-CM

## 2022-01-12 PROCEDURE — 99214 OFFICE O/P EST MOD 30 MIN: CPT | Performed by: NURSE PRACTITIONER

## 2022-01-12 RX ORDER — ALBUTEROL SULFATE 90 UG/1
2 AEROSOL, METERED RESPIRATORY (INHALATION) EVERY 6 HOURS PRN
Qty: 18 G | Refills: 5 | Status: SHIPPED | OUTPATIENT
Start: 2022-01-12 | End: 2022-11-28 | Stop reason: SDUPTHER

## 2022-01-12 RX ORDER — ATORVASTATIN CALCIUM 20 MG/1
20 TABLET, FILM COATED ORAL NIGHTLY
Qty: 90 TABLET | Refills: 1 | Status: SHIPPED | OUTPATIENT
Start: 2022-01-12 | End: 2022-05-24 | Stop reason: SDUPTHER

## 2022-01-12 RX ORDER — MONTELUKAST SODIUM 10 MG/1
10 TABLET ORAL NIGHTLY
Qty: 90 TABLET | Refills: 1 | Status: SHIPPED | OUTPATIENT
Start: 2022-01-12 | End: 2022-05-24 | Stop reason: SDUPTHER

## 2022-01-12 RX ORDER — PANTOPRAZOLE SODIUM 40 MG/1
40 TABLET, DELAYED RELEASE ORAL DAILY
Qty: 90 TABLET | Refills: 1 | Status: SHIPPED | OUTPATIENT
Start: 2022-01-12 | End: 2022-05-24 | Stop reason: SDUPTHER

## 2022-01-12 RX ORDER — HYDROXYZINE 50 MG/1
50 TABLET, FILM COATED ORAL 3 TIMES DAILY PRN
Qty: 90 TABLET | Refills: 1 | Status: SHIPPED | OUTPATIENT
Start: 2022-01-12 | End: 2022-05-24 | Stop reason: SDUPTHER

## 2022-01-12 RX ORDER — GABAPENTIN 600 MG/1
600 TABLET ORAL 3 TIMES DAILY
Qty: 90 TABLET | Refills: 2 | Status: SHIPPED | OUTPATIENT
Start: 2022-01-12 | End: 2022-03-27

## 2022-01-12 RX ORDER — FENOFIBRATE 145 MG/1
145 TABLET, COATED ORAL DAILY
Qty: 90 TABLET | Refills: 1 | Status: SHIPPED | OUTPATIENT
Start: 2022-01-12 | End: 2022-05-24 | Stop reason: SDUPTHER

## 2022-01-12 NOTE — PROGRESS NOTES
"Chief Complaint  Follow-up and Hyperlipidemia    Subjective          Ta Madison presents to [facility]  Diagnosed with Prostate cancer two years ago .  FOllowing oncology and Urology/    Hyperlipidemia  This is a chronic problem. Recent lipid tests were reviewed and are variable. Factors aggravating his hyperlipidemia include fatty foods. Pertinent negatives include no chest pain or shortness of breath. Current antihyperlipidemic treatment includes statins. Compliance problems include adherence to diet and adherence to exercise.    Hypertension  This is a chronic (tachycardia controlled with current medications) problem. The current episode started more than 1 year ago. The problem is controlled. Pertinent negatives include no chest pain, palpitations, peripheral edema or shortness of breath. Risk factors for coronary artery disease include dyslipidemia. Past treatments include beta blockers. Current antihypertension treatment includes beta blockers. The current treatment provides significant improvement. Compliance problems include diet.    Heartburn  He complains of heartburn. He reports no chest pain. This is a recurrent problem. The problem occurs occasionally. The problem has been waxing and waning. The heartburn is of mild intensity. He has tried a PPI for the symptoms. The treatment provided moderate relief.       Objective   Vital Signs:   /60 (BP Location: Right arm, Patient Position: Sitting, Cuff Size: Adult)   Pulse 88   Temp 97.1 °F (36.2 °C)   Ht 175.3 cm (69\")   Wt 93 kg (205 lb)   SpO2 98%   BMI 30.27 kg/m²     Physical Exam  Vitals and nursing note reviewed.   Constitutional:       Appearance: He is well-developed.   HENT:      Head: Normocephalic.   Cardiovascular:      Rate and Rhythm: Normal rate and regular rhythm.      Heart sounds: Normal heart sounds. No murmur heard.      Pulmonary:      Effort: Pulmonary effort is normal.      Breath sounds: " Normal breath sounds.   Abdominal:      General: Bowel sounds are normal.      Palpations: Abdomen is soft.   Skin:     General: Skin is warm and dry.   Neurological:      Mental Status: He is alert and oriented to person, place, and time.   Psychiatric:         Behavior: Behavior normal.        Result Review :   During this visit the following were done:  Labs Reviewed [x]    Labs Ordered [x]    Radiology Reports Reviewed []    Radiology Ordered []    PCP Records Reviewed []    Referring Provider Records Reviewed []    ER Records Reviewed []    Hospital Records Reviewed []    History Obtained From Family []    Radiology Images Reviewed []    Other Reviewed []    Records Requested []             Diagnoses and all orders for this visit:    1. Mixed hyperlipidemia (Primary)  -     atorvastatin (LIPITOR) 20 MG tablet; Take 1 tablet by mouth Every Night.  Dispense: 90 tablet; Refill: 1  -     fenofibrate (TRICOR) 145 MG tablet; Take 1 tablet by mouth Daily.  Dispense: 90 tablet; Refill: 1  -     Comprehensive Metabolic Panel; Future  -     Lipid Panel; Future    2. Gastroesophageal reflux disease  -     pantoprazole (PROTONIX) 40 MG EC tablet; Take 1 tablet by mouth Daily.  Dispense: 90 tablet; Refill: 1    3. Multiple allergies  -     albuterol sulfate  (90 Base) MCG/ACT inhaler; Inhale 2 puffs Every 6 (Six) Hours As Needed for Wheezing.  Dispense: 18 g; Refill: 5  -     montelukast (SINGULAIR) 10 MG tablet; Take 1 tablet by mouth Every Night.  Dispense: 90 tablet; Refill: 1    4. Neuropathic pruritus  -     hydrOXYzine (ATARAX) 50 MG tablet; Take 1 tablet by mouth 3 (Three) Times a Day As Needed for Itching.  Dispense: 90 tablet; Refill: 1    5. Tachycardia  -     metoprolol tartrate (LOPRESSOR) 25 MG tablet; Take 1 tablet by mouth Daily.  Dispense: 90 tablet; Refill: 1  -     Comprehensive Metabolic Panel; Future  -     Lipid Panel; Future    6. Chronic right shoulder pain  -     gabapentin (NEURONTIN) 600 MG  tablet; Take 1 tablet by mouth 3 (Three) Times a Day.  Dispense: 90 tablet; Refill: 2    7. Adenocarcinoma of prostate (HCC)    Continue with urology            Follow Up   Return in about 4 months (around 5/12/2022), or if symptoms worsen or fail to improve, for Recheck.  Patient was given instructions and counseling regarding his condition or for health maintenance advice. Please see specific information pulled into the AVS if appropriate.

## 2022-01-19 ENCOUNTER — OFFICE VISIT (OUTPATIENT)
Dept: ONCOLOGY | Facility: CLINIC | Age: 62
End: 2022-01-19

## 2022-01-19 ENCOUNTER — LAB (OUTPATIENT)
Dept: ONCOLOGY | Facility: CLINIC | Age: 62
End: 2022-01-19

## 2022-01-19 VITALS
OXYGEN SATURATION: 98 % | WEIGHT: 206 LBS | SYSTOLIC BLOOD PRESSURE: 138 MMHG | TEMPERATURE: 97.1 F | BODY MASS INDEX: 30.42 KG/M2 | DIASTOLIC BLOOD PRESSURE: 77 MMHG | RESPIRATION RATE: 18 BRPM | HEART RATE: 87 BPM

## 2022-01-19 DIAGNOSIS — C79.51 BONE METASTASES: ICD-10-CM

## 2022-01-19 DIAGNOSIS — C61 ADENOCARCINOMA OF PROSTATE: Primary | ICD-10-CM

## 2022-01-19 LAB
ALBUMIN SERPL-MCNC: 4.44 G/DL (ref 3.5–5.2)
ALBUMIN/GLOB SERPL: 1.4 G/DL
ALP SERPL-CCNC: 95 U/L (ref 39–117)
ALT SERPL W P-5'-P-CCNC: 10 U/L (ref 1–41)
ANION GAP SERPL CALCULATED.3IONS-SCNC: 11.2 MMOL/L (ref 5–15)
AST SERPL-CCNC: 15 U/L (ref 1–40)
BASOPHILS # BLD AUTO: 0.09 10*3/MM3 (ref 0–0.2)
BASOPHILS NFR BLD AUTO: 1.2 % (ref 0–1.5)
BILIRUB SERPL-MCNC: 0.2 MG/DL (ref 0–1.2)
BUN SERPL-MCNC: 23 MG/DL (ref 8–23)
BUN/CREAT SERPL: 21.5 (ref 7–25)
CALCIUM SPEC-SCNC: 9.5 MG/DL (ref 8.6–10.5)
CHLORIDE SERPL-SCNC: 102 MMOL/L (ref 98–107)
CO2 SERPL-SCNC: 23.8 MMOL/L (ref 22–29)
CREAT SERPL-MCNC: 1.07 MG/DL (ref 0.76–1.27)
DEPRECATED RDW RBC AUTO: 43.6 FL (ref 37–54)
EOSINOPHIL # BLD AUTO: 0.17 10*3/MM3 (ref 0–0.4)
EOSINOPHIL NFR BLD AUTO: 2.2 % (ref 0.3–6.2)
ERYTHROCYTE [DISTWIDTH] IN BLOOD BY AUTOMATED COUNT: 14.5 % (ref 12.3–15.4)
GFR SERPL CREATININE-BSD FRML MDRD: 70 ML/MIN/1.73
GLOBULIN UR ELPH-MCNC: 3.2 GM/DL
GLUCOSE SERPL-MCNC: 116 MG/DL (ref 65–99)
HCT VFR BLD AUTO: 41 % (ref 37.5–51)
HGB BLD-MCNC: 13.1 G/DL (ref 13–17.7)
IMM GRANULOCYTES # BLD AUTO: 0.01 10*3/MM3 (ref 0–0.05)
IMM GRANULOCYTES NFR BLD AUTO: 0.1 % (ref 0–0.5)
LYMPHOCYTES # BLD AUTO: 2.42 10*3/MM3 (ref 0.7–3.1)
LYMPHOCYTES NFR BLD AUTO: 32 % (ref 19.6–45.3)
MCH RBC QN AUTO: 26.5 PG (ref 26.6–33)
MCHC RBC AUTO-ENTMCNC: 32 G/DL (ref 31.5–35.7)
MCV RBC AUTO: 82.8 FL (ref 79–97)
MONOCYTES # BLD AUTO: 0.69 10*3/MM3 (ref 0.1–0.9)
MONOCYTES NFR BLD AUTO: 9.1 % (ref 5–12)
NEUTROPHILS NFR BLD AUTO: 4.18 10*3/MM3 (ref 1.7–7)
NEUTROPHILS NFR BLD AUTO: 55.4 % (ref 42.7–76)
NRBC BLD AUTO-RTO: 0 /100 WBC (ref 0–0.2)
PLATELET # BLD AUTO: 518 10*3/MM3 (ref 140–450)
PMV BLD AUTO: 10.5 FL (ref 6–12)
POTASSIUM SERPL-SCNC: 4.6 MMOL/L (ref 3.5–5.2)
PROT SERPL-MCNC: 7.6 G/DL (ref 6–8.5)
PSA SERPL-MCNC: <0.014 NG/ML (ref 0–4)
RBC # BLD AUTO: 4.95 10*6/MM3 (ref 4.14–5.8)
SODIUM SERPL-SCNC: 137 MMOL/L (ref 136–145)
WBC NRBC COR # BLD: 7.56 10*3/MM3 (ref 3.4–10.8)

## 2022-01-19 PROCEDURE — 80053 COMPREHEN METABOLIC PANEL: CPT | Performed by: INTERNAL MEDICINE

## 2022-01-19 PROCEDURE — 84153 ASSAY OF PSA TOTAL: CPT | Performed by: INTERNAL MEDICINE

## 2022-01-19 PROCEDURE — 99214 OFFICE O/P EST MOD 30 MIN: CPT | Performed by: INTERNAL MEDICINE

## 2022-01-19 PROCEDURE — 85025 COMPLETE CBC W/AUTO DIFF WBC: CPT | Performed by: INTERNAL MEDICINE

## 2022-01-19 NOTE — PROGRESS NOTES
Name:  Ta Madison  :  1960  Date:  2022     REFERRING PHYSICIAN  Dheeraj Nieves MD    PRIMARY CARE PROVIDER  Yolande Olvera APRN    REASON FOR FOLLOWUP  1. Adenocarcinoma of prostate (HCC)      CHIEF COMPLAINT  None.    Dear Ms. Wade,    HISTORY OF PRESENT ILLNESS:   I saw Mr. Madison in follow up today in our medical oncology clinic. As you are aware, he is a pleasant, 61 y.o., white male with a history of tobacco abuse who was referred to urology in 2020 for an elevated serum PSA level (of ~28 ng/mL, which had increased to 247 ng/mL at the time of his initial appointment in our clinic). An examination revealed a very hard and fixed prostate, and multiple core biopsies were performed on 2020. The results were consistent with [place] Score 5+4 or 4+5=9 adenocarcinoma involving all twelve sampled cores. A staging workup confirmed pelvic adenopathy as well as diffuse, sclerotic lesions throughout the axial and appendicular skeleton, including the right greater than left femoral diaphyses. With this stage IV diagnosis, he was referred to our clinic for further evaluation and management. At the time of his initial consultation in our office (on 2021), he was agreeable to starting definitive therapy with a combination of ADT (Lupron injections) and anti-androgen therapy (daily PO Xtandi).    INTERIM HISTORY:  Mr. Madison returns to clinic today for follow up again accompanied by his wife. He received an initial dose of Lupron on 2021 and remains on daily Xtandi, both of which he continues to tolerate very well. His only noticeable side effect is of occasional hot flashes (which remain mild and tolerable). The diffuse skeletal pains he was experiencing at the time of his initial presentation ~one year ago remain completely resolved. He overall continues to feel very well and has no other specific complaints.    Past Medical History:   Diagnosis Date   • Increased heart rate    •  Mini stroke (HCC)    • Neck pain    • Prostate cancer (HCC)    • Rash    • Tobacco abuse        Past Surgical History:   Procedure Laterality Date   • APPENDECTOMY  1971    [facility]        Social History     Socioeconomic History   • Marital status:    Tobacco Use   • Smoking status: Light Tobacco Smoker     Packs/day: 1.00     Years: 40.00     Pack years: 40.00     Types: Cigarettes     Last attempt to quit: 3/12/2019     Years since quittin.8   • Smokeless tobacco: Never Used   Vaping Use   • Vaping Use: Never used   Substance and Sexual Activity   • Alcohol use: No   • Drug use: Yes     Types: Marijuana     Comment: occ    • Sexual activity: Defer       Family History   Problem Relation Age of Onset   • Nephrolithiasis Mother    • Heart disease Father    • Hypertension Father    • Kidney disease Father        No Known Allergies    Current Outpatient Medications   Medication Sig Dispense Refill   • albuterol sulfate  (90 Base) MCG/ACT inhaler Inhale 2 puffs Every 6 (Six) Hours As Needed for Wheezing. 18 g 5   • aspirin 81 MG EC tablet Take 81 mg by mouth Daily.     • atorvastatin (LIPITOR) 20 MG tablet Take 1 tablet by mouth Every Night. 90 tablet 1   • enzalutamide (XTANDI) 40 MG chemo capsule Take 4 capsules by mouth Daily. 120 capsule 5   • EPINEPHrine (EPIPEN) 0.3 MG/0.3ML solution auto-injector injection INJECT 1 PEN IN THE MUSCLE ONE TIME AS DIRECTED     • fenofibrate (TRICOR) 145 MG tablet Take 1 tablet by mouth Daily. 90 tablet 1   • gabapentin (NEURONTIN) 600 MG tablet Take 1 tablet by mouth 3 (Three) Times a Day. 90 tablet 2   • HYDROcodone-acetaminophen (NORCO)  MG per tablet Take 1 tablet by mouth Every 6 (Six) Hours As Needed for Moderate Pain . 120 tablet 0   • hydrOXYzine (ATARAX) 50 MG tablet Take 1 tablet by mouth 3 (Three) Times a Day As Needed for Itching. 90 tablet 1   • metoprolol tartrate (LOPRESSOR) 25 MG tablet Take 1 tablet by mouth Daily. 90 tablet 1   •  montelukast (SINGULAIR) 10 MG tablet Take 1 tablet by mouth Every Night. 90 tablet 1   • pantoprazole (PROTONIX) 40 MG EC tablet Take 1 tablet by mouth Daily. 90 tablet 1   • vitamin B-12 (CYANOCOBALAMIN) 1000 MCG tablet Take 5,000 mcg by mouth Daily.       No current facility-administered medications for this visit.     REVIEW OF SYSTEMS  CONSTITUTIONAL:  No fever, chills or night sweats.  EYES:  No blurry vision, diplopia or other vision changes.  ENT:  No hearing loss, nosebleeds or sore throat.  CARDIOVASCULAR:  No palpitations, arrhythmia, syncopal episodes or edema.  PULMONARY:  No hemoptysis, wheezing, chronic cough or shortness of breath.  GASTROINTESTINAL:  No nausea or vomiting.  No constipation or diarrhea.  No abdominal pain.  GENITOURINARY:  No hematuria, kidney stones or frequent urination.  MUSCULOSKELETAL:  As per the HPI above.  ENDOCRINE:  No excessive thirst. Hot flashes as above.  HEMATOLOGIC:  No history of free bleeding, spontaneous bleeding or clotting.  IMMUNOLOGIC:  No allergies or frequent infections.  NEUROLOGIC: No numbness, tingling, seizures or weakness.  PSYCHIATRIC:  No anxiety or depression.    PHYSICAL EXAMINATION  /77   Pulse 87   Temp 97.1 °F (36.2 °C) (Temporal)   Resp 18   Wt 93.4 kg (206 lb)   SpO2 98%   BMI 30.42 kg/m²     Pain Score:  Pain Score    22 0834   PainSc: 0-No pain     ECO  GENERAL:  A well-developed, well-nourished, white male in no acute distress.  HEENT:  Pupils equally round and reactive to light. Extraocular muscles intact.  CARDIOVASCULAR:  Regular rate and rhythm. No murmurs, gallops or rubs.  LUNGS:  Clear to auscultation bilaterally. No wheezing  ABDOMEN:  Soft, nontender, nondistended with positive bowel sounds.  EXTREMITIES:  No clubbing, cyanosis or edema bilaterally.  SKIN:  No petechiae.  NEURO:  Cranial nerves grossly intact.  No focal deficits.  PSYCH:  Alert and oriented x3.    The physical exam is unchanged from recent  priors.    LABORATORY  Lab Results   Component Value Date    WBC 7.56 01/19/2022    HGB 13.1 01/19/2022    HCT 41.0 01/19/2022    MCV 82.8 01/19/2022     (H) 01/19/2022    NEUTROABS 4.18 01/19/2022       Lab Results   Component Value Date     01/19/2022    K 4.6 01/19/2022     01/19/2022    CO2 23.8 01/19/2022    BUN 23 01/19/2022    CREATININE 1.07 01/19/2022    GLUCOSE 116 (H) 01/19/2022    CALCIUM 9.5 01/19/2022    AST 15 01/19/2022    ALT 10 01/19/2022    ALKPHOS 95 01/19/2022    BILITOT 0.2 01/19/2022    PROTEINTOT 7.6 01/19/2022    ALBUMIN 4.44 01/19/2022     CBC (01/19/2022): WBCs: 7.56; HgB: 13.1; Hct: 41.0; platelets: 518  CBC (07/20/2021): WBCs: 7.43; HgB: 13.0; Hct: 39.6; platelets: 469  CBC (06/29/2021): WBCs: 7.8; HgB: 12.6; Hct: 38.1; platelets: 396  CBC (04/14/2021): WBCs: 7.2; HgB: 12.7; Hct: 38.8; platelets: 423  CBC (02/25/2021): WBCs: 6.5; HgB: 11.2; Hct: 34.4; platelets: 407    PSA (01/19/2022): pending  PSA (10/13/2021): < 0.014 ng/mL  PSA (07/20/2021): 0.020 ng/mL   PSA (04/14/2021): 0.108 ng/mL  PSA (02/25/2021): 0.814 ng/mL  PSA (01/20/2021): 29.5 ng/mL  PSA (01/07/2021): 247.0 ng/mL  PSA (11/10/2020): 28.3 ng/mL    IMAGING  NM bone scan, whole body (12/15/2020):  Impression: Extensive osteoblastic metastatic disease involving the axial and appendicular skeleton including the right greater than left femoral diaphyses.    CT pelvis with contrast (12/21/2020):  Impression:  1) Urinary bladder wall thickening noted.  2) Heterogeneous appearance of the prostate gland.  3) Nonspecific stranding in the retroperitoneal space.  4) Retroperitoneal region lymph node on the right side measuring 1.4 cm.  5) Right obturator chain region lymph node measuring 1.5 cm. Other lymph nodes are identified in this region.  6) Sclerotic bone lesions are noted throughout the pelvis concerning for metastatic disease.    PATHOLOGY  Left and right prostate, needle core biopsies, twelve total cores  (12/02/2020):  [place] Score 5+4 or 4+5 = 9 prostatic adenocarcinoma involving ~45-90% of all twelve (12/12) total cores.    IMPRESSION AND PLAN  Mr. Madison is a 61 y.o., white male with:  1. Prostate adenocarcinoma: Diagnosed in December 2020 with stage IV disease, with widespread metastases involving the axial and appendicular skeleton along with probable, pelvic adenopathy. I have had multiple, long discussions with the patient and his wife since the time of his initial consultation in our clinic (on 01/07/2021) regarding this diagnosis and its prognosis. They remain aware that this disease is, unfortunately, not curable; however, particularly given his young age and this type of cancer's tendency to respond very well to initial anti-testosterone therapy, it was/is very treatable, and sustained remissions are very possible. He was felt to be an excellent candidate for initial therapy with both ADT and androgen receptor blockade. He received an initial cycle of (currently now [id number]) Lupron on 01/08/2021 and was also started on daily PO Xtandi at that time. He is still tolerating this regimen very well, with no significant side effects and clinically reports feeling much better. Meanwhile, his serum PSA levels have responded dramatically and quickly, decreasing from 247 ng/mL on 01/07/2021 to undetectable (<0.014 ng/mL) as of 10/13/2021 (today's result is pending). We will proceed with this current treatment plan and see him back in clinic in another three months, on the day of his next dose of Lupron, with a CBC, CMP and PSA, for another symptom check.  2. Skeletal metastases: There was diffuse involvement of both the axial and appendicular skeletons on the initial staging NM bone scan and CT of the abdomen and pelvis performed in late December 2020. Secondary to issue #1. 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  Continue to monitor.  3. Pain: Secondary to #2 and now resolved since starting Lupron/Xtandi in early January 2021 for the palliative treatment of issue #1. He has Norco at home but has not had to take it since early 2021. Continue to monitor.  The patient and his wife were in agreement with these plans.    It is a pleasure to participate in Mr. Madison's care. Please do not hesitate to call with any questions or concerns that you may have.    A total of 30 minutes were spent coordinating this patient’s care in clinic today; more than 50% of this time was face-to-face with the patient and his wife, reviewing his interim medical history, discussing the results of recent labwork and counseling on the current treatment and followup plan. All questions were answered to their satisfaction.    FOLLOW UP  Continue (currently [id number]) Lupron and daily PO Xtandi. With urology, as previously planned. Return to our clinic in 3 months (~mid-April 2022), on the day of the next scheduled cycle of Lupron, with a CBC, CMP and PSA.            This document was electronically signed by HIRO Martinez MD January 19, 2022 08:57 EST      CC: EUGENIA Mendoza

## 2022-02-22 ENCOUNTER — SPECIALTY PHARMACY (OUTPATIENT)
Dept: PHARMACY | Facility: HOSPITAL | Age: 62
End: 2022-02-22

## 2022-02-22 NOTE — PROGRESS NOTES
Specialty Pharmacy Refill Coordination Note      Name:  Ta Madison  :  1960  Date:  2022         Past Medical History:   Diagnosis Date   • Increased heart rate    • Mini stroke (HCC)    • Neck pain    • Prostate cancer (HCC)    • Rash    • Tobacco abuse        Past Surgical History:   Procedure Laterality Date   • APPENDECTOMY      [facility]        Social History     Socioeconomic History   • Marital status:    Tobacco Use   • Smoking status: Light Tobacco Smoker     Packs/day: 1.00     Years: 40.00     Pack years: 40.00     Types: Cigarettes     Last attempt to quit: 3/12/2019     Years since quittin.9   • Smokeless tobacco: Never Used   Vaping Use   • Vaping Use: Never used   Substance and Sexual Activity   • Alcohol use: No   • Drug use: Yes     Types: Marijuana     Comment: occ    • Sexual activity: Defer       Family History   Problem Relation Age of Onset   • Nephrolithiasis Mother    • Heart disease Father    • Hypertension Father    • Kidney disease Father        No Known Allergies    Current Outpatient Medications   Medication Sig Dispense Refill   • albuterol sulfate  (90 Base) MCG/ACT inhaler Inhale 2 puffs Every 6 (Six) Hours As Needed for Wheezing. 18 g 5   • aspirin 81 MG EC tablet Take 81 mg by mouth Daily.     • atorvastatin (LIPITOR) 20 MG tablet Take 1 tablet by mouth Every Night. 90 tablet 1   • enzalutamide (XTANDI) 40 MG chemo capsule Take 4 capsules by mouth Daily. 120 capsule 5   • EPINEPHrine (EPIPEN) 0.3 MG/0.3ML solution auto-injector injection INJECT 1 PEN IN THE MUSCLE ONE TIME AS DIRECTED     • fenofibrate (TRICOR) 145 MG tablet Take 1 tablet by mouth Daily. 90 tablet 1   • gabapentin (NEURONTIN) 600 MG tablet Take 1 tablet by mouth 3 (Three) Times a Day. 90 tablet 2   • HYDROcodone-acetaminophen (NORCO)  MG per tablet Take 1 tablet by mouth Every 6 (Six) Hours As Needed for Moderate Pain . 120 tablet 0   • hydrOXYzine (ATARAX) 50  MG tablet Take 1 tablet by mouth 3 (Three) Times a Day As Needed for Itching. 90 tablet 1   • metoprolol tartrate (LOPRESSOR) 25 MG tablet Take 1 tablet by mouth Daily. 90 tablet 1   • montelukast (SINGULAIR) 10 MG tablet Take 1 tablet by mouth Every Night. 90 tablet 1   • pantoprazole (PROTONIX) 40 MG EC tablet Take 1 tablet by mouth Daily. 90 tablet 1   • vitamin B-12 (CYANOCOBALAMIN) 1000 MCG tablet Take 5,000 mcg by mouth Daily.       No current facility-administered medications for this visit.         ASSESSMENT/PLAN:      Ta is a 61 y.o. male contacted today regarding refills of Xtandi 40 mg chemo capsule specialty medication(s).    Reviewed and verified with patient:       Specialty medication(s) and dose(s) confirmed: yes    Refill Questions      Most Recent Value   Changes to allergies? No   Changes to medications? No   New conditions since last clinic visit No   Unplanned office visit, urgent care, ED, or hospital admission in the last 4 weeks  No   How does patient/caregiver feel medication is working? Very good   Financial problems or insurance changes  No   If yes, describe changes in insurance or financial issues. None   How many doses of your specialty medications were missed in the last 4 weeks? 0   Why were doses missed? NA   Does this patient require a clinical escalation to a pharmacist? No                Medication Adherence    Support network for adherence: family member          Review of previous fill history over past year confirms that patient should have an excess of medication on hand while taking compliantly.    Jocelyne Waters [facility]      Follow-up: 30 day(s)     Agnieszka Echavarria, Pharmacy Technician  Specialty Pharmacy Technician

## 2022-03-21 ENCOUNTER — SPECIALTY PHARMACY (OUTPATIENT)
Dept: PHARMACY | Facility: HOSPITAL | Age: 62
End: 2022-03-21

## 2022-03-21 NOTE — PROGRESS NOTES
Specialty Pharmacy Refill Coordination Note      Name:  Ta Madison  :  1960  Date:  3/21/2022         Past Medical History:   Diagnosis Date   • Increased heart rate    • Mini stroke (HCC)    • Neck pain    • Prostate cancer (HCC)    • Rash    • Tobacco abuse        Past Surgical History:   Procedure Laterality Date   • APPENDECTOMY      [facility]        Social History     Socioeconomic History   • Marital status:    Tobacco Use   • Smoking status: Light Tobacco Smoker     Packs/day: 1.00     Years: 40.00     Pack years: 40.00     Types: Cigarettes     Last attempt to quit: 3/12/2019     Years since quitting: 3.0   • Smokeless tobacco: Never Used   Vaping Use   • Vaping Use: Never used   Substance and Sexual Activity   • Alcohol use: No   • Drug use: Yes     Types: Marijuana     Comment: occ    • Sexual activity: Defer       Family History   Problem Relation Age of Onset   • Nephrolithiasis Mother    • Heart disease Father    • Hypertension Father    • Kidney disease Father        No Known Allergies    Current Outpatient Medications   Medication Sig Dispense Refill   • albuterol sulfate  (90 Base) MCG/ACT inhaler Inhale 2 puffs Every 6 (Six) Hours As Needed for Wheezing. 18 g 5   • aspirin 81 MG EC tablet Take 81 mg by mouth Daily.     • atorvastatin (LIPITOR) 20 MG tablet Take 1 tablet by mouth Every Night. 90 tablet 1   • enzalutamide (XTANDI) 40 MG chemo capsule Take 4 capsules by mouth Daily. 120 capsule 5   • EPINEPHrine (EPIPEN) 0.3 MG/0.3ML solution auto-injector injection INJECT 1 PEN IN THE MUSCLE ONE TIME AS DIRECTED     • fenofibrate (TRICOR) 145 MG tablet Take 1 tablet by mouth Daily. 90 tablet 1   • gabapentin (NEURONTIN) 600 MG tablet Take 1 tablet by mouth 3 (Three) Times a Day. 90 tablet 2   • HYDROcodone-acetaminophen (NORCO)  MG per tablet Take 1 tablet by mouth Every 6 (Six) Hours As Needed for Moderate Pain . 120 tablet 0   • hydrOXYzine (ATARAX) 50  MG tablet Take 1 tablet by mouth 3 (Three) Times a Day As Needed for Itching. 90 tablet 1   • metoprolol tartrate (LOPRESSOR) 25 MG tablet Take 1 tablet by mouth Daily. 90 tablet 1   • montelukast (SINGULAIR) 10 MG tablet Take 1 tablet by mouth Every Night. 90 tablet 1   • pantoprazole (PROTONIX) 40 MG EC tablet Take 1 tablet by mouth Daily. 90 tablet 1   • vitamin B-12 (CYANOCOBALAMIN) 1000 MCG tablet Take 5,000 mcg by mouth Daily.       No current facility-administered medications for this visit.         ASSESSMENT/PLAN:      Ta is a 61 y.o. male contacted today regarding refills of  Xtandi 40mg  specialty medication(s).    Reviewed and verified with patient:  Allergies         Specialty medication(s) and dose(s) confirmed: yes    Refill Questions    Flowsheet Row Most Recent Value   Changes to allergies? No   Changes to medications? No   New conditions since last clinic visit No   Unplanned office visit, urgent care, ED, or hospital admission in the last 4 weeks  No   How does patient/caregiver feel medication is working? Very good   Financial problems or insurance changes  No   If yes, describe changes in insurance or financial issues. None   How many doses of your specialty medications were missed in the last 4 weeks? 0   Why were doses missed? N/A   Does this patient require a clinical escalation to a pharmacist? No                Medication Adherence    Support network for adherence: family member          Follow-up: 28 day(s)     Hermelinda Waters, Pharmacy Technician  Specialty Pharmacy Technician

## 2022-03-24 DIAGNOSIS — C61 ADENOCARCINOMA OF PROSTATE: Primary | ICD-10-CM

## 2022-03-25 DIAGNOSIS — M25.511 CHRONIC RIGHT SHOULDER PAIN: ICD-10-CM

## 2022-03-25 DIAGNOSIS — G89.29 CHRONIC RIGHT SHOULDER PAIN: ICD-10-CM

## 2022-03-27 RX ORDER — GABAPENTIN 600 MG/1
TABLET ORAL
Qty: 90 TABLET | Refills: 2 | Status: SHIPPED | OUTPATIENT
Start: 2022-03-27 | End: 2022-05-24

## 2022-04-13 ENCOUNTER — LAB (OUTPATIENT)
Dept: ONCOLOGY | Facility: CLINIC | Age: 62
End: 2022-04-13

## 2022-04-13 ENCOUNTER — INFUSION (OUTPATIENT)
Dept: ONCOLOGY | Facility: HOSPITAL | Age: 62
End: 2022-04-13

## 2022-04-13 VITALS
BODY MASS INDEX: 29.09 KG/M2 | SYSTOLIC BLOOD PRESSURE: 111 MMHG | DIASTOLIC BLOOD PRESSURE: 62 MMHG | TEMPERATURE: 97.7 F | WEIGHT: 197 LBS | HEART RATE: 88 BPM | OXYGEN SATURATION: 94 % | RESPIRATION RATE: 18 BRPM

## 2022-04-13 DIAGNOSIS — C61 ADENOCARCINOMA OF PROSTATE: ICD-10-CM

## 2022-04-13 DIAGNOSIS — C79.51 BONE METASTASES: ICD-10-CM

## 2022-04-13 DIAGNOSIS — C61 ADENOCARCINOMA OF PROSTATE: Primary | ICD-10-CM

## 2022-04-13 LAB
ALBUMIN SERPL-MCNC: 4.6 G/DL (ref 3.5–5.2)
ALBUMIN/GLOB SERPL: 1.6 G/DL
ALP SERPL-CCNC: 89 U/L (ref 39–117)
ALT SERPL W P-5'-P-CCNC: 14 U/L (ref 1–41)
ANION GAP SERPL CALCULATED.3IONS-SCNC: 10.3 MMOL/L (ref 5–15)
AST SERPL-CCNC: 17 U/L (ref 1–40)
BASOPHILS # BLD AUTO: 0.09 10*3/MM3 (ref 0–0.2)
BASOPHILS NFR BLD AUTO: 1.2 % (ref 0–1.5)
BILIRUB SERPL-MCNC: 0.3 MG/DL (ref 0–1.2)
BUN SERPL-MCNC: 20 MG/DL (ref 8–23)
BUN/CREAT SERPL: 17.9 (ref 7–25)
CALCIUM SPEC-SCNC: 9.7 MG/DL (ref 8.6–10.5)
CHLORIDE SERPL-SCNC: 99 MMOL/L (ref 98–107)
CO2 SERPL-SCNC: 22.7 MMOL/L (ref 22–29)
CREAT SERPL-MCNC: 1.12 MG/DL (ref 0.76–1.27)
DEPRECATED RDW RBC AUTO: 42.9 FL (ref 37–54)
EGFRCR SERPLBLD CKD-EPI 2021: 75.2 ML/MIN/1.73
EOSINOPHIL # BLD AUTO: 0.18 10*3/MM3 (ref 0–0.4)
EOSINOPHIL NFR BLD AUTO: 2.4 % (ref 0.3–6.2)
ERYTHROCYTE [DISTWIDTH] IN BLOOD BY AUTOMATED COUNT: 14.7 % (ref 12.3–15.4)
GLOBULIN UR ELPH-MCNC: 2.9 GM/DL
GLUCOSE SERPL-MCNC: 96 MG/DL (ref 65–99)
HCT VFR BLD AUTO: 38.7 % (ref 37.5–51)
HGB BLD-MCNC: 12.7 G/DL (ref 13–17.7)
IMM GRANULOCYTES # BLD AUTO: 0.02 10*3/MM3 (ref 0–0.05)
IMM GRANULOCYTES NFR BLD AUTO: 0.3 % (ref 0–0.5)
LYMPHOCYTES # BLD AUTO: 2.46 10*3/MM3 (ref 0.7–3.1)
LYMPHOCYTES NFR BLD AUTO: 32.3 % (ref 19.6–45.3)
MCH RBC QN AUTO: 26.5 PG (ref 26.6–33)
MCHC RBC AUTO-ENTMCNC: 32.8 G/DL (ref 31.5–35.7)
MCV RBC AUTO: 80.8 FL (ref 79–97)
MONOCYTES # BLD AUTO: 0.72 10*3/MM3 (ref 0.1–0.9)
MONOCYTES NFR BLD AUTO: 9.4 % (ref 5–12)
NEUTROPHILS NFR BLD AUTO: 4.15 10*3/MM3 (ref 1.7–7)
NEUTROPHILS NFR BLD AUTO: 54.4 % (ref 42.7–76)
NRBC BLD AUTO-RTO: 0 /100 WBC (ref 0–0.2)
PLATELET # BLD AUTO: 453 10*3/MM3 (ref 140–450)
PMV BLD AUTO: 10.7 FL (ref 6–12)
POTASSIUM SERPL-SCNC: 4.2 MMOL/L (ref 3.5–5.2)
PROT SERPL-MCNC: 7.5 G/DL (ref 6–8.5)
RBC # BLD AUTO: 4.79 10*6/MM3 (ref 4.14–5.8)
SODIUM SERPL-SCNC: 132 MMOL/L (ref 136–145)
WBC NRBC COR # BLD: 7.62 10*3/MM3 (ref 3.4–10.8)

## 2022-04-13 PROCEDURE — 25010000002 LEUPROLIDE 45 MG KIT: Performed by: INTERNAL MEDICINE

## 2022-04-13 PROCEDURE — 80053 COMPREHEN METABOLIC PANEL: CPT | Performed by: INTERNAL MEDICINE

## 2022-04-13 PROCEDURE — 85025 COMPLETE CBC W/AUTO DIFF WBC: CPT | Performed by: INTERNAL MEDICINE

## 2022-04-13 PROCEDURE — 84153 ASSAY OF PSA TOTAL: CPT | Performed by: INTERNAL MEDICINE

## 2022-04-13 PROCEDURE — 96402 CHEMO HORMON ANTINEOPL SQ/IM: CPT

## 2022-04-13 RX ADMIN — LEUPROLIDE ACETATE 45 MG: KIT at 13:21

## 2022-04-14 ENCOUNTER — TELEPHONE (OUTPATIENT)
Dept: ONCOLOGY | Facility: CLINIC | Age: 62
End: 2022-04-14

## 2022-04-14 LAB — PSA SERPL-MCNC: <0.014 NG/ML (ref 0–4)

## 2022-04-14 NOTE — TELEPHONE ENCOUNTER
----- Message from HIRO Martinez MD sent at 4/14/2022  8:20 AM EDT -----  Regarding: PSA  Please let him know that yesterday's repeat level was still very stable and undetectable. Will follow up as planned. Thanks.

## 2022-04-18 ENCOUNTER — OFFICE VISIT (OUTPATIENT)
Dept: ONCOLOGY | Facility: CLINIC | Age: 62
End: 2022-04-18

## 2022-04-18 ENCOUNTER — LAB (OUTPATIENT)
Dept: ONCOLOGY | Facility: CLINIC | Age: 62
End: 2022-04-18

## 2022-04-18 VITALS
HEIGHT: 69 IN | HEART RATE: 88 BPM | WEIGHT: 199 LBS | TEMPERATURE: 97.3 F | DIASTOLIC BLOOD PRESSURE: 73 MMHG | BODY MASS INDEX: 29.47 KG/M2 | RESPIRATION RATE: 18 BRPM | OXYGEN SATURATION: 97 % | SYSTOLIC BLOOD PRESSURE: 112 MMHG

## 2022-04-18 DIAGNOSIS — C61 ADENOCARCINOMA OF PROSTATE: Primary | ICD-10-CM

## 2022-04-18 PROCEDURE — 99214 OFFICE O/P EST MOD 30 MIN: CPT | Performed by: INTERNAL MEDICINE

## 2022-04-18 NOTE — PROGRESS NOTES
Name:  Ta Madison  :  1960  Date:  2022     REFERRING PHYSICIAN  Dheeraj Nieves MD    PRIMARY CARE PROVIDER  Yolande Olvera APRN    REASON FOR FOLLOWUP  1. Adenocarcinoma of prostate (HCC)      CHIEF COMPLAINT  None.    Dear Ms. Wade,    HISTORY OF PRESENT ILLNESS:   I saw Mr. Madison in follow up today in our medical oncology clinic. As you are aware, he is a pleasant, 61 y.o., white male with a history of tobacco abuse who was referred to urology in 2020 for an elevated serum PSA level (of ~28 ng/mL, which had increased to 247 ng/mL at the time of his initial appointment in our clinic). An examination revealed a very hard and fixed prostate, and multiple core biopsies were performed on 2020. The results were consistent with Strasburg Score 5+4 or 4+5=9 adenocarcinoma involving all twelve sampled cores. A staging workup confirmed pelvic adenopathy as well as diffuse, sclerotic lesions throughout the axial and appendicular skeleton, including the right greater than left femoral diaphyses. With this stage IV diagnosis, he was referred to our clinic for further evaluation and management. At the time of his initial consultation in our office (on 2021), he was agreeable to starting definitive therapy with a combination of ADT (Lupron injections) and anti-androgen therapy (daily PO Xtandi).    INTERIM HISTORY:  Mr. Madison returns to clinic today for follow up by himself. He received an initial dose of Lupron on 2021 and remains on daily Xtandi, both of which he continues to tolerate very well. His only noticeable side effect is of occasional hot flashes (which remain mild and tolerable). The diffuse skeletal pains he was experiencing at the time of his initial presentation early last year remain completely resolved. He overall continues to feel very well and has no other specific complaints.    Past Medical History:   Diagnosis Date   • Increased heart rate    • Mini stroke    •  Neck pain    • Prostate cancer (HCC)    • Rash    • Tobacco abuse        Past Surgical History:   Procedure Laterality Date   • APPENDECTOMY  1971    [facility]        Social History     Socioeconomic History   • Marital status:    Tobacco Use   • Smoking status: Light Tobacco Smoker     Packs/day: 1.00     Years: 40.00     Pack years: 40.00     Types: Cigarettes     Last attempt to quit: 3/12/2019     Years since quitting: 3.1   • Smokeless tobacco: Never Used   Vaping Use   • Vaping Use: Never used   Substance and Sexual Activity   • Alcohol use: No   • Drug use: Yes     Types: Marijuana     Comment: occ    • Sexual activity: Defer       Family History   Problem Relation Age of Onset   • Nephrolithiasis Mother    • Heart disease Father    • Hypertension Father    • Kidney disease Father        No Known Allergies    Current Outpatient Medications   Medication Sig Dispense Refill   • albuterol sulfate  (90 Base) MCG/ACT inhaler Inhale 2 puffs Every 6 (Six) Hours As Needed for Wheezing. 18 g 5   • aspirin 81 MG EC tablet Take 81 mg by mouth Daily.     • atorvastatin (LIPITOR) 20 MG tablet Take 1 tablet by mouth Every Night. 90 tablet 1   • enzalutamide (XTANDI) 40 MG chemo capsule Take 4 capsules by mouth Daily. 120 capsule 5   • EPINEPHrine (EPIPEN) 0.3 MG/0.3ML solution auto-injector injection INJECT 1 PEN IN THE MUSCLE ONE TIME AS DIRECTED     • fenofibrate (TRICOR) 145 MG tablet Take 1 tablet by mouth Daily. 90 tablet 1   • gabapentin (NEURONTIN) 600 MG tablet TAKE 1 TABLET BY MOUTH THREE TIMES DAILY 90 tablet 2   • HYDROcodone-acetaminophen (NORCO)  MG per tablet Take 1 tablet by mouth Every 6 (Six) Hours As Needed for Moderate Pain . 120 tablet 0   • hydrOXYzine (ATARAX) 50 MG tablet Take 1 tablet by mouth 3 (Three) Times a Day As Needed for Itching. 90 tablet 1   • metoprolol tartrate (LOPRESSOR) 25 MG tablet Take 1 tablet by mouth Daily. 90 tablet 1   • montelukast (SINGULAIR) 10 MG  "tablet Take 1 tablet by mouth Every Night. 90 tablet 1   • pantoprazole (PROTONIX) 40 MG EC tablet Take 1 tablet by mouth Daily. 90 tablet 1   • vitamin B-12 (CYANOCOBALAMIN) 1000 MCG tablet Take 5,000 mcg by mouth Daily.       No current facility-administered medications for this visit.     REVIEW OF SYSTEMS  CONSTITUTIONAL:  No fever, chills or night sweats.  EYES:  No blurry vision, diplopia or other vision changes.  ENT:  No hearing loss, nosebleeds or sore throat.  CARDIOVASCULAR:  No palpitations, arrhythmia, syncopal episodes or edema.  PULMONARY:  No hemoptysis, wheezing, chronic cough or shortness of breath.  GASTROINTESTINAL:  No nausea or vomiting.  No constipation or diarrhea.  No abdominal pain.  GENITOURINARY:  No hematuria, kidney stones or frequent urination.  MUSCULOSKELETAL:  As per the HPI above.  ENDOCRINE:  No excessive thirst. Hot flashes as above.  HEMATOLOGIC:  No history of free bleeding, spontaneous bleeding or clotting.  IMMUNOLOGIC:  No allergies or frequent infections.  NEUROLOGIC: No numbness, tingling, seizures or weakness.  PSYCHIATRIC:  No anxiety or depression.    PHYSICAL EXAMINATION  /73   Pulse 88   Temp 97.3 °F (36.3 °C)   Resp 18   Ht 175.3 cm (69\")   Wt 90.3 kg (199 lb)   SpO2 97%   BMI 29.39 kg/m²     Pain Score:  Pain Score    22 1046   PainSc: 0-No pain     ECO  GENERAL:  A well-developed, well-nourished, white male in no acute distress.  HEENT:  Pupils equally round and reactive to light. Extraocular muscles intact.  CARDIOVASCULAR:  Regular rate and rhythm. No murmurs, gallops or rubs.  LUNGS:  Clear to auscultation bilaterally. No wheezing  ABDOMEN:  Soft, nontender, nondistended with positive bowel sounds.  EXTREMITIES:  No clubbing, cyanosis or edema bilaterally.  SKIN:  No petechiae.  NEURO:  Cranial nerves grossly intact.  No focal deficits.  PSYCH:  Alert and oriented x3.    The physical exam is again unchanged from recent " priors.    LABORATORY  Lab Results   Component Value Date    WBC 7.62 04/13/2022    HGB 12.7 (L) 04/13/2022    HCT 38.7 04/13/2022    MCV 80.8 04/13/2022     (H) 04/13/2022    NEUTROABS 4.15 04/13/2022       Lab Results   Component Value Date     (L) 04/13/2022    K 4.2 04/13/2022    CL 99 04/13/2022    CO2 22.7 04/13/2022    BUN 20 04/13/2022    CREATININE 1.12 04/13/2022    GLUCOSE 96 04/13/2022    CALCIUM 9.7 04/13/2022    AST 17 04/13/2022    ALT 14 04/13/2022    ALKPHOS 89 04/13/2022    BILITOT 0.3 04/13/2022    PROTEINTOT 7.5 04/13/2022    ALBUMIN 4.60 04/13/2022     CBC (04/13/2022): WBCs: 7.62; HgB: 12.7; Hct: 38.7; platelets: 453  CBC (01/19/2022): WBCs: 7.56; HgB: 13.1; Hct: 41.0; platelets: 518  CBC (07/20/2021): WBCs: 7.43; HgB: 13.0; Hct: 39.6; platelets: 469  CBC (06/29/2021): WBCs: 7.8; HgB: 12.6; Hct: 38.1; platelets: 396  CBC (04/14/2021): WBCs: 7.2; HgB: 12.7; Hct: 38.8; platelets: 423  CBC (02/25/2021): WBCs: 6.5; HgB: 11.2; Hct: 34.4; platelets: 407    PSA (04/13/2022): < 0.014 ng/mL  PSA (01/19/2022): < 0.014 ng/mL  PSA (10/13/2021): < 0.014 ng/mL  PSA (07/20/2021): 0.020 ng/mL   PSA (04/14/2021): 0.108 ng/mL  PSA (02/25/2021): 0.814 ng/mL  PSA (01/20/2021): 29.5 ng/mL  PSA (01/07/2021): 247.0 ng/mL  PSA (11/10/2020): 28.3 ng/mL    IMAGING  NM bone scan, whole body (12/15/2020):  Impression: Extensive osteoblastic metastatic disease involving the axial and appendicular skeleton including the right greater than left femoral diaphyses.    CT pelvis with contrast (12/21/2020):  Impression:  1) Urinary bladder wall thickening noted.  2) Heterogeneous appearance of the prostate gland.  3) Nonspecific stranding in the retroperitoneal space.  4) Retroperitoneal region lymph node on the right side measuring 1.4 cm.  5) Right obturator chain region lymph node measuring 1.5 cm. Other lymph nodes are identified in this region.  6) Sclerotic bone lesions are noted throughout the pelvis  concerning for metastatic disease.    PATHOLOGY  Left and right prostate, needle core biopsies, twelve total cores (12/02/2020):  Darrius Score 5+4 or 4+5 = 9 prostatic adenocarcinoma involving ~45-90% of all twelve (12/12) total cores.    IMPRESSION AND PLAN  Mr. Madison is a 61 y.o., white male with:  1. Prostate adenocarcinoma: Diagnosed in December 2020 with stage IV disease, with widespread metastases involving the axial and appendicular skeleton along with probable, pelvic adenopathy. I have had multiple, long discussions with the patient and his wife since the time of his initial consultation in our clinic (on 01/07/2021) regarding this diagnosis and its prognosis. They remain aware that this disease is, unfortunately, not curable; however, particularly given his young age and this type of cancer's tendency to respond very well to initial anti-testosterone therapy, it was/is very treatable, and sustained remissions are very possible. He was felt to be an excellent candidate for initial therapy with both ADT and androgen receptor blockade. He received an initial cycle of (currently now [id number]) Lupron on 01/08/2021 and was also started on daily PO Xtandi at that time. He is still tolerating this regimen very well, with no significant side effects and clinically reports feeling much better. Meanwhile, his serum PSA levels have responded dramatically and quickly, decreasing from 247 ng/mL on 01/07/2021 to undetectable (<0.014 ng/mL) by 10/13/2021 (and this continues to be the case on the most recent repeat check, drawn on 04/13/2022). We will proceed with this current treatment plan. We will recheck labs in three months and see him back in clinic in six months, on the day of the next scheduled cycle of Lupron, with a CBC, CMP and PSA.  2. Skeletal metastases: There was diffuse involvement of both the axial and appendicular skeletons on the initial staging NM bone scan and CT of the abdomen and pelvis performed  in late December 2020. Secondary to issue #1. 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 Continue to monitor.  3. Pain: Secondary to #2 and now resolved since starting Lupron/Xtandi in early January 2021 for the palliative treatment of issue #1. He has Norco at home but has still not had to take it since early 2021. Continue to monitor.  The patient was in agreement with these plans.    It is a pleasure to participate in Mr. Madison's care. Please do not hesitate to call with any questions or concerns that you may have.    A total of 30 minutes were spent coordinating this patient’s care in clinic today; more than 50% of this time was face-to-face with the patient, reviewing his interim medical history, discussing the results of the most recent repeat serum PSA levels and and counseling on the current treatment and followup plan. All questions were answered to his satisfaction.    FOLLOW UP  Continue (currently [id number]) Lupron and daily PO Xtandi. With urology, as previously planned. Repeat a CBC, CMP and PSA in 3 months. Return to our clinic in 6 months (on the day of the scheduled Lupron cycle in October) with a CBC, CMP and PSA.            This document was electronically signed by HIRO Martinez MD April 18, 2022 11:27 EDT      CC: EUGENIA Mendoza

## 2022-05-02 ENCOUNTER — SPECIALTY PHARMACY (OUTPATIENT)
Dept: PHARMACY | Facility: HOSPITAL | Age: 62
End: 2022-05-02

## 2022-05-02 NOTE — PROGRESS NOTES
Specialty Pharmacy Refill Coordination Note      Name:  Ta Madison  :  1960  Date:  2022         Past Medical History:   Diagnosis Date   • Increased heart rate    • Mini stroke    • Neck pain    • Prostate cancer (HCC)    • Rash    • Tobacco abuse        Past Surgical History:   Procedure Laterality Date   • APPENDECTOMY      [facility]        Social History     Socioeconomic History   • Marital status:    Tobacco Use   • Smoking status: Light Tobacco Smoker     Packs/day: 1.00     Years: 40.00     Pack years: 40.00     Types: Cigarettes     Last attempt to quit: 3/12/2019     Years since quitting: 3.1   • Smokeless tobacco: Never Used   Vaping Use   • Vaping Use: Never used   Substance and Sexual Activity   • Alcohol use: No   • Drug use: Yes     Types: Marijuana     Comment: occ    • Sexual activity: Defer       Family History   Problem Relation Age of Onset   • Nephrolithiasis Mother    • Heart disease Father    • Hypertension Father    • Kidney disease Father        No Known Allergies    Current Outpatient Medications   Medication Sig Dispense Refill   • albuterol sulfate  (90 Base) MCG/ACT inhaler Inhale 2 puffs Every 6 (Six) Hours As Needed for Wheezing. 18 g 5   • aspirin 81 MG EC tablet Take 81 mg by mouth Daily.     • atorvastatin (LIPITOR) 20 MG tablet Take 1 tablet by mouth Every Night. 90 tablet 1   • enzalutamide (XTANDI) 40 MG chemo capsule Take 4 capsules by mouth Daily. 120 capsule 5   • EPINEPHrine (EPIPEN) 0.3 MG/0.3ML solution auto-injector injection INJECT 1 PEN IN THE MUSCLE ONE TIME AS DIRECTED     • fenofibrate (TRICOR) 145 MG tablet Take 1 tablet by mouth Daily. 90 tablet 1   • gabapentin (NEURONTIN) 600 MG tablet TAKE 1 TABLET BY MOUTH THREE TIMES DAILY 90 tablet 2   • HYDROcodone-acetaminophen (NORCO)  MG per tablet Take 1 tablet by mouth Every 6 (Six) Hours As Needed for Moderate Pain . 120 tablet 0   • hydrOXYzine (ATARAX) 50 MG tablet  Take 1 tablet by mouth 3 (Three) Times a Day As Needed for Itching. 90 tablet 1   • metoprolol tartrate (LOPRESSOR) 25 MG tablet Take 1 tablet by mouth Daily. 90 tablet 1   • montelukast (SINGULAIR) 10 MG tablet Take 1 tablet by mouth Every Night. 90 tablet 1   • pantoprazole (PROTONIX) 40 MG EC tablet Take 1 tablet by mouth Daily. 90 tablet 1   • vitamin B-12 (CYANOCOBALAMIN) 1000 MCG tablet Take 5,000 mcg by mouth Daily.       No current facility-administered medications for this visit.         ASSESSMENT/PLAN:      Ta is a 61 y.o. male contacted today regarding refills of  Xtandi 40mg specialty medication(s).    Reviewed and verified with patient:         Specialty medication(s) and dose(s) confirmed: yes    Refill Questions    Flowsheet Row Most Recent Value   Changes to allergies? No   Changes to medications? No   New conditions since last clinic visit No   Unplanned office visit, urgent care, ED, or hospital admission in the last 4 weeks  No   How does patient/caregiver feel medication is working? Very good   Financial problems or insurance changes  No   If yes, describe changes in insurance or financial issues. none   How many doses of your specialty medications were missed in the last 4 weeks? 0   Why were doses missed? N/A   Does this patient require a clinical escalation to a pharmacist? No                Medication Adherence    Support network for adherence: family member          Follow-up: 30 day(s)     Hermelinda Waters, Pharmacy Technician  Specialty Pharmacy Technician

## 2022-05-23 ENCOUNTER — TELEPHONE (OUTPATIENT)
Dept: FAMILY MEDICINE CLINIC | Facility: CLINIC | Age: 62
End: 2022-05-23

## 2022-05-23 NOTE — TELEPHONE ENCOUNTER
Spoke with patient in regards to outstanding labs. He indicated he has an appointment with Yolande tomorrow and will have labs drawn at that time.

## 2022-05-24 ENCOUNTER — OFFICE VISIT (OUTPATIENT)
Dept: FAMILY MEDICINE CLINIC | Facility: CLINIC | Age: 62
End: 2022-05-24

## 2022-05-24 VITALS
DIASTOLIC BLOOD PRESSURE: 70 MMHG | HEART RATE: 83 BPM | HEIGHT: 69 IN | BODY MASS INDEX: 29.44 KG/M2 | WEIGHT: 198.8 LBS | SYSTOLIC BLOOD PRESSURE: 130 MMHG | TEMPERATURE: 97.1 F | OXYGEN SATURATION: 99 %

## 2022-05-24 DIAGNOSIS — K21.9 GASTROESOPHAGEAL REFLUX DISEASE: ICD-10-CM

## 2022-05-24 DIAGNOSIS — F45.8 NEUROPATHIC PRURITUS: Primary | ICD-10-CM

## 2022-05-24 DIAGNOSIS — Z88.9 MULTIPLE ALLERGIES: ICD-10-CM

## 2022-05-24 DIAGNOSIS — R00.0 TACHYCARDIA: ICD-10-CM

## 2022-05-24 DIAGNOSIS — E78.2 MIXED HYPERLIPIDEMIA: ICD-10-CM

## 2022-05-24 DIAGNOSIS — C61 PROSTATE CANCER: ICD-10-CM

## 2022-05-24 PROCEDURE — 99214 OFFICE O/P EST MOD 30 MIN: CPT | Performed by: NURSE PRACTITIONER

## 2022-05-24 RX ORDER — ATORVASTATIN CALCIUM 20 MG/1
20 TABLET, FILM COATED ORAL NIGHTLY
Qty: 90 TABLET | Refills: 1 | Status: SHIPPED | OUTPATIENT
Start: 2022-05-24 | End: 2022-11-28 | Stop reason: SDUPTHER

## 2022-05-24 RX ORDER — MONTELUKAST SODIUM 10 MG/1
10 TABLET ORAL NIGHTLY
Qty: 90 TABLET | Refills: 1 | Status: SHIPPED | OUTPATIENT
Start: 2022-05-24 | End: 2022-11-28 | Stop reason: SDUPTHER

## 2022-05-24 RX ORDER — FENOFIBRATE 145 MG/1
145 TABLET, COATED ORAL DAILY
Qty: 90 TABLET | Refills: 1 | Status: SHIPPED | OUTPATIENT
Start: 2022-05-24 | End: 2022-11-28 | Stop reason: SDUPTHER

## 2022-05-24 RX ORDER — PANTOPRAZOLE SODIUM 40 MG/1
40 TABLET, DELAYED RELEASE ORAL DAILY
Qty: 90 TABLET | Refills: 1 | Status: SHIPPED | OUTPATIENT
Start: 2022-05-24 | End: 2022-11-28 | Stop reason: SDUPTHER

## 2022-05-24 RX ORDER — HYDROXYZINE 50 MG/1
50 TABLET, FILM COATED ORAL 3 TIMES DAILY PRN
Qty: 90 TABLET | Refills: 1 | Status: SHIPPED | OUTPATIENT
Start: 2022-05-24 | End: 2022-11-21

## 2022-05-24 NOTE — PROGRESS NOTES
"Chief Complaint  Rash    Subjective          Ta Madison presents to [facility]  Diagnosed with Prostate cancer two years ago .  Following oncology and Urology.  No recent changes.      Hyperlipidemia  This is a chronic problem. Recent lipid tests were reviewed and are variable. Factors aggravating his hyperlipidemia include fatty foods. Pertinent negatives include no chest pain or shortness of breath. Current antihyperlipidemic treatment includes statins. Compliance problems include adherence to diet and adherence to exercise.    Hypertension  This is a chronic (tachycardia controlled with current medications) problem. The current episode started more than 1 year ago. The problem is controlled. Pertinent negatives include no chest pain, palpitations, peripheral edema or shortness of breath. Risk factors for coronary artery disease include dyslipidemia. Past treatments include beta blockers. Current antihypertension treatment includes beta blockers. The current treatment provides significant improvement. Compliance problems include diet.    Heartburn  He complains of heartburn. He reports no chest pain. This is a chronic problem. The problem occurs rarely. The problem has been unchanged. The heartburn is of mild intensity. He has tried a PPI for the symptoms. The treatment provided moderate relief.   Rash  This is a chronic problem. The affected locations include the torso and chest (bilateral arms). The rash is characterized by dryness, redness, itchiness and pain. He was exposed to nothing. Pertinent negatives include no shortness of breath. Past treatments include antihistamine, anti-itch cream, antibiotics, moisturizer and topical steroids. The treatment provided mild relief.       Objective   Vital Signs:   /70 (BP Location: Right arm, Patient Position: Sitting)   Pulse 83   Temp 97.1 °F (36.2 °C) (Temporal)   Ht 175.3 cm (69\")   Wt 90.2 kg (198 lb 12.8 oz)   SpO2 " 99%   BMI 29.36 kg/m²     Physical Exam  Vitals and nursing note reviewed.   Constitutional:       Appearance: He is well-developed.   HENT:      Head: Normocephalic.   Cardiovascular:      Rate and Rhythm: Normal rate and regular rhythm.      Heart sounds: Normal heart sounds. No murmur heard.  Pulmonary:      Effort: Pulmonary effort is normal.      Breath sounds: Normal breath sounds.   Abdominal:      General: Bowel sounds are normal.      Palpations: Abdomen is soft.   Musculoskeletal:      Right lower leg: No edema.      Left lower leg: No edema.   Skin:     General: Skin is warm and dry.      Findings: Rash present.   Neurological:      Mental Status: He is alert and oriented to person, place, and time.   Psychiatric:         Behavior: Behavior normal.        Result Review :   During this visit the following were done:  Labs Reviewed [x]    Labs Ordered [x]    Radiology Reports Reviewed []    Radiology Ordered []    PCP Records Reviewed []    Referring Provider Records Reviewed []    ER Records Reviewed []    Hospital Records Reviewed []    History Obtained From Family []    Radiology Images Reviewed []    Other Reviewed []    Records Requested []             Diagnoses and all orders for this visit:    1. Neuropathic pruritus (Primary)  -     hydrOXYzine (ATARAX) 50 MG tablet; Take 1 tablet by mouth 3 (Three) Times a Day As Needed for Itching.  Dispense: 90 tablet; Refill: 1    2. Gastroesophageal reflux disease  -     pantoprazole (PROTONIX) 40 MG EC tablet; Take 1 tablet by mouth Daily.  Dispense: 90 tablet; Refill: 1    3. Multiple allergies  -     montelukast (SINGULAIR) 10 MG tablet; Take 1 tablet by mouth Every Night.  Dispense: 90 tablet; Refill: 1    4. Tachycardia  -     metoprolol tartrate (LOPRESSOR) 25 MG tablet; Take 1 tablet by mouth Daily.  Dispense: 90 tablet; Refill: 1  -     Lipid Panel    5. Mixed hyperlipidemia  -     fenofibrate (TRICOR) 145 MG tablet; Take 1 tablet by mouth Daily.   Dispense: 90 tablet; Refill: 1  -     atorvastatin (LIPITOR) 20 MG tablet; Take 1 tablet by mouth Every Night.  Dispense: 90 tablet; Refill: 1  -     Lipid Panel    6. Prostate cancer (HCC)    Continue with urology and oncology     BMI is >= 25 and < 30. (Overweight) The following options were offered after discussion: exercise counseling/recommendations and nutrition counseling/recommendations              Follow Up   Return in about 6 months (around 11/24/2022) for Recheck.  Patient was given instructions and counseling regarding his condition or for health maintenance advice. Please see specific information pulled into the AVS if appropriate.

## 2022-05-27 ENCOUNTER — SPECIALTY PHARMACY (OUTPATIENT)
Dept: PHARMACY | Facility: HOSPITAL | Age: 62
End: 2022-05-27

## 2022-06-01 ENCOUNTER — TELEPHONE (OUTPATIENT)
Dept: FAMILY MEDICINE CLINIC | Facility: CLINIC | Age: 62
End: 2022-06-01

## 2022-06-08 ENCOUNTER — SPECIALTY PHARMACY (OUTPATIENT)
Dept: PHARMACY | Facility: HOSPITAL | Age: 62
End: 2022-06-08

## 2022-06-08 DIAGNOSIS — C61 ADENOCARCINOMA OF PROSTATE: ICD-10-CM

## 2022-06-08 NOTE — PROGRESS NOTES
Specialty Pharmacy Patient Management Program  Oncology Reassessment     Ta Madison is a 62 y.o. male with prostate cancer seen by an Oncology provider and enrolled in the Oncology Patient Management program offered by [facility].  A follow-up outreach was conducted, including assessment of continued therapy appropriateness, medication adherence, and side effect incidence and management for Xtandi.       Relevant Past Medical History and Comorbidities  Relevant medical history and concomitant health conditions were discussed with the patient. The patient's chart has been reviewed for relevant past medical history and comorbid health conditions and updated as necessary.   Past Medical History:   Diagnosis Date   • Increased heart rate    • Mini stroke    • Neck pain    • Prostate cancer (HCC)    • Rash    • Tobacco abuse      Social History     Socioeconomic History   • Marital status:    Tobacco Use   • Smoking status: Light Tobacco Smoker     Packs/day: 1.00     Years: 40.00     Pack years: 40.00     Types: Cigarettes     Last attempt to quit: 3/12/2019     Years since quitting: 3.2   • Smokeless tobacco: Never Used   Vaping Use   • Vaping Use: Never used   Substance and Sexual Activity   • Alcohol use: No   • Drug use: Yes     Types: Marijuana     Comment: occ    • Sexual activity: Defer       Allergies  Known allergies and reactions were discussed with the patient. The patient's chart has been reviewed for allergy information and updated as necessary.   Patient has no known allergies.    Relevant Laboratory Values  Lab Results   Component Value Date    GLUCOSE 96 04/13/2022    CALCIUM 9.7 04/13/2022     (L) 04/13/2022    K 4.2 04/13/2022    CO2 22.7 04/13/2022    CL 99 04/13/2022    BUN 20 04/13/2022    CREATININE 1.12 04/13/2022    EGFRIFNONA 70 01/19/2022    BCR 17.9 04/13/2022    ANIONGAP 10.3 04/13/2022     Lab Results   Component Value Date    WBC 7.62 04/13/2022     RBC 4.79 04/13/2022    HGB 12.7 (L) 04/13/2022    HCT 38.7 04/13/2022    MCV 80.8 04/13/2022    MCH 26.5 (L) 04/13/2022    MCHC 32.8 04/13/2022    RDW 14.7 04/13/2022    RDWSD 42.9 04/13/2022    MPV 10.7 04/13/2022     (H) 04/13/2022    NEUTRORELPCT 54.4 04/13/2022    LYMPHORELPCT 32.3 04/13/2022    MONORELPCT 9.4 04/13/2022    EOSRELPCT 2.4 04/13/2022    BASORELPCT 1.2 04/13/2022    AUTOIGPER 0.3 04/13/2022    NEUTROABS 4.15 04/13/2022    LYMPHSABS 2.46 04/13/2022    MONOSABS 0.72 04/13/2022    EOSABS 0.18 04/13/2022    BASOSABS 0.09 04/13/2022    AUTOIGNUM 0.02 04/13/2022    NRBC 0.0 04/13/2022        Current Medication List  This medication list has been reviewed with the patient and evaluated for any interactions or necessary modifications/recommendations, and updated to include all prescription medications, OTC medications, and supplements the patient is currently taking.  This list reflects what is contained in the patient's profile, which has also been marked as reviewed to communicate to other providers it is the most up to date version of the patient's current medication therapy.     Current Outpatient Medications:   •  albuterol sulfate  (90 Base) MCG/ACT inhaler, Inhale 2 puffs Every 6 (Six) Hours As Needed for Wheezing., Disp: 18 g, Rfl: 5  •  aspirin 81 MG EC tablet, Take 81 mg by mouth Daily., Disp: , Rfl:   •  atorvastatin (LIPITOR) 20 MG tablet, Take 1 tablet by mouth Every Night., Disp: 90 tablet, Rfl: 1  •  enzalutamide (XTANDI) 40 MG chemo capsule, Take 4 capsules by mouth Daily., Disp: 120 capsule, Rfl: 5  •  EPINEPHrine (EPIPEN) 0.3 MG/0.3ML solution auto-injector injection, INJECT 1 PEN IN THE MUSCLE ONE TIME AS DIRECTED, Disp: , Rfl:   •  fenofibrate (TRICOR) 145 MG tablet, Take 1 tablet by mouth Daily., Disp: 90 tablet, Rfl: 1  •  hydrOXYzine (ATARAX) 50 MG tablet, Take 1 tablet by mouth 3 (Three) Times a Day As Needed for Itching., Disp: 90 tablet, Rfl: 1  •  metoprolol  tartrate (LOPRESSOR) 25 MG tablet, Take 1 tablet by mouth Daily., Disp: 90 tablet, Rfl: 1  •  montelukast (SINGULAIR) 10 MG tablet, Take 1 tablet by mouth Every Night., Disp: 90 tablet, Rfl: 1  •  pantoprazole (PROTONIX) 40 MG EC tablet, Take 1 tablet by mouth Daily., Disp: 90 tablet, Rfl: 1    Drug Interactions  None     Adverse Drug Reactions  • Adverse Reactions Experienced: None   • Plan for ADR Management: Not required    Hospitalizations and Urgent Care Since Last Assessment  • Hospitalizations or Admissions: 0    • ED Visits: 0   • Urgent Office Visits: 0     Adherence and Self-Administration  • Approximate Number of Doses Missed Since Last Assessment: 0   • Ongoing or New Barriers to Patient Adherence and/or Self-Administration: None   • Methods for Supporting Patient Adherence and/or Self-Administration: Care coordinator will continue to provider routine follow-up via phone for refills. Pharmacist will provider ongoing adherence education an routine reassessments of the therapy plan. Patient was directed to contact provider/pharmacy if any adverse drug reactions occur that might affect adherence to therapy plan.       Goals of Therapy  • Progress Toward Meeting Patient-Identified Goals of Therapy:  On-track  o Patient-Identified Goals  - Consistently take medications as prescribed  - Patient will adhere to medication regimen  - Patient will report any medication side effects to healthcare provider    • Progress Toward Meeting Clinical Goals or Therapeutic Targets: On-track  o Clinical Goals or Therapeutic Targets  - Support patient understanding of medication regimen  - Ensure patient knows the pharmacy contact information  - Schedule regular follow-up to monitor the treatment serious adverse events  - Schedule regular follow-up to confirm medication adherence  - Schedule regular follow-up to monitor disease progression or stabilty    Quality of Life Change Assessment   Quality of Life related to the  patient's specialty therapy was discussed with the patient. The QOL segment of this outreach has been reviewed and updated.   • Quality of Life Score Change: 5    Reassessment Plan & Follow-Up  1. Pharmacist to continue to perform regular reassessments no more than (6) months from the previous assessment.  2. Care Coordinator to facilitate future refill outreaches, coordinate prescription delivery, and escalate clinical questions to pharmacist.     Additional Plans, Therapy Recommendations or Therapy Problems to Be Addressed: None  Recent Provider Changes:  None    Attestation  I attest that the specialty medication(s) addressed above are appropriate for the patient based on my reassessment.  If the prescribed therapy is at any point deemed not appropriate based on the current or future assessments, a consultation will be initiated with the patient's specialty care provider to determine the best course of action. The revised plan of therapy will be documented along with any additional patient education provided.     Agustin Houser PharmD  Oncology Clinical Pharmacist  6/8/2022  11:53 EDT

## 2022-06-24 ENCOUNTER — SPECIALTY PHARMACY (OUTPATIENT)
Dept: PHARMACY | Facility: HOSPITAL | Age: 62
End: 2022-06-24

## 2022-06-24 NOTE — PROGRESS NOTES
Specialty Pharmacy Refill Coordination Note      Name:  Ta Madison  :  1960  Date:  2022         Past Medical History:   Diagnosis Date   • Increased heart rate    • Mini stroke    • Neck pain    • Prostate cancer (HCC)    • Rash    • Tobacco abuse        Past Surgical History:   Procedure Laterality Date   • APPENDECTOMY      [facility]        Social History     Socioeconomic History   • Marital status:    Tobacco Use   • Smoking status: Light Tobacco Smoker     Packs/day: 1.00     Years: 40.00     Pack years: 40.00     Types: Cigarettes     Last attempt to quit: 3/12/2019     Years since quitting: 3.2   • Smokeless tobacco: Never Used   Vaping Use   • Vaping Use: Never used   Substance and Sexual Activity   • Alcohol use: No   • Drug use: Yes     Types: Marijuana     Comment: occ    • Sexual activity: Defer       Family History   Problem Relation Age of Onset   • Nephrolithiasis Mother    • Heart disease Father    • Hypertension Father    • Kidney disease Father        No Known Allergies    Current Outpatient Medications   Medication Sig Dispense Refill   • albuterol sulfate  (90 Base) MCG/ACT inhaler Inhale 2 puffs Every 6 (Six) Hours As Needed for Wheezing. 18 g 5   • aspirin 81 MG EC tablet Take 81 mg by mouth Daily.     • atorvastatin (LIPITOR) 20 MG tablet Take 1 tablet by mouth Every Night. 90 tablet 1   • enzalutamide (XTANDI) 40 MG chemo capsule Take 4 capsules by mouth Daily. 120 capsule 5   • EPINEPHrine (EPIPEN) 0.3 MG/0.3ML solution auto-injector injection INJECT 1 PEN IN THE MUSCLE ONE TIME AS DIRECTED     • fenofibrate (TRICOR) 145 MG tablet Take 1 tablet by mouth Daily. 90 tablet 1   • hydrOXYzine (ATARAX) 50 MG tablet Take 1 tablet by mouth 3 (Three) Times a Day As Needed for Itching. 90 tablet 1   • metoprolol tartrate (LOPRESSOR) 25 MG tablet Take 1 tablet by mouth Daily. 90 tablet 1   • montelukast (SINGULAIR) 10 MG tablet Take 1 tablet by mouth Every  Night. 90 tablet 1   • pantoprazole (PROTONIX) 40 MG EC tablet Take 1 tablet by mouth Daily. 90 tablet 1     No current facility-administered medications for this visit.         ASSESSMENT/PLAN:      Ta is a 62 y.o. male contacted today regarding refills of  Xtandi 40mg specialty medication(s).    Reviewed and verified with patient:         Specialty medication(s) and dose(s) confirmed: yes    Refill Questions    Flowsheet Row Most Recent Value   Changes to allergies? No   Changes to medications? No   New conditions since last clinic visit No   Unplanned office visit, urgent care, ED, or hospital admission in the last 4 weeks  No   How does patient/caregiver feel medication is working? Excellent   Financial problems or insurance changes  No   If yes, describe changes in insurance or financial issues. none   Since the previous refill, were any specialty medication doses or scheduled injections missed or delayed?  No   If yes, please provide the amount 0   Why were doses missed? n/a   Does this patient require a clinical escalation to a pharmacist? No                Medication Adherence    Adherence tools used: patient uses a pill box to manage medications  Support network for adherence: family member          Follow-up: 30 day(s)     Hermelinda Waters, Pharmacy Technician  Specialty Pharmacy Technician

## 2022-07-18 ENCOUNTER — LAB (OUTPATIENT)
Dept: ONCOLOGY | Facility: CLINIC | Age: 62
End: 2022-07-18

## 2022-07-18 VITALS
TEMPERATURE: 97.3 F | SYSTOLIC BLOOD PRESSURE: 131 MMHG | DIASTOLIC BLOOD PRESSURE: 77 MMHG | RESPIRATION RATE: 18 BRPM | HEART RATE: 74 BPM | OXYGEN SATURATION: 95 %

## 2022-07-18 DIAGNOSIS — C61 ADENOCARCINOMA OF PROSTATE: ICD-10-CM

## 2022-07-18 DIAGNOSIS — G89.3 CHRONIC PAIN DUE TO MALIGNANT NEOPLASTIC DISEASE: Primary | ICD-10-CM

## 2022-07-18 LAB
ALBUMIN SERPL-MCNC: 4.63 G/DL (ref 3.5–5.2)
ALBUMIN/GLOB SERPL: 1.5 G/DL
ALP SERPL-CCNC: 93 U/L (ref 39–117)
ALT SERPL W P-5'-P-CCNC: 10 U/L (ref 1–41)
ANION GAP SERPL CALCULATED.3IONS-SCNC: 10.3 MMOL/L (ref 5–15)
AST SERPL-CCNC: 14 U/L (ref 1–40)
BASOPHILS # BLD AUTO: 0.09 10*3/MM3 (ref 0–0.2)
BASOPHILS NFR BLD AUTO: 1.2 % (ref 0–1.5)
BILIRUB SERPL-MCNC: 0.2 MG/DL (ref 0–1.2)
BUN SERPL-MCNC: 21 MG/DL (ref 8–23)
BUN/CREAT SERPL: 18.9 (ref 7–25)
CALCIUM SPEC-SCNC: 10.1 MG/DL (ref 8.6–10.5)
CHLORIDE SERPL-SCNC: 104 MMOL/L (ref 98–107)
CO2 SERPL-SCNC: 24.7 MMOL/L (ref 22–29)
CREAT SERPL-MCNC: 1.11 MG/DL (ref 0.76–1.27)
DEPRECATED RDW RBC AUTO: 42.3 FL (ref 37–54)
EGFRCR SERPLBLD CKD-EPI 2021: 75.5 ML/MIN/1.73
EOSINOPHIL # BLD AUTO: 0.1 10*3/MM3 (ref 0–0.4)
EOSINOPHIL NFR BLD AUTO: 1.3 % (ref 0.3–6.2)
ERYTHROCYTE [DISTWIDTH] IN BLOOD BY AUTOMATED COUNT: 14.5 % (ref 12.3–15.4)
GLOBULIN UR ELPH-MCNC: 3.1 GM/DL
GLUCOSE SERPL-MCNC: 108 MG/DL (ref 65–99)
HCT VFR BLD AUTO: 39 % (ref 37.5–51)
HGB BLD-MCNC: 12.7 G/DL (ref 13–17.7)
IMM GRANULOCYTES # BLD AUTO: 0.01 10*3/MM3 (ref 0–0.05)
IMM GRANULOCYTES NFR BLD AUTO: 0.1 % (ref 0–0.5)
LYMPHOCYTES # BLD AUTO: 2.55 10*3/MM3 (ref 0.7–3.1)
LYMPHOCYTES NFR BLD AUTO: 32.6 % (ref 19.6–45.3)
MCH RBC QN AUTO: 26.2 PG (ref 26.6–33)
MCHC RBC AUTO-ENTMCNC: 32.6 G/DL (ref 31.5–35.7)
MCV RBC AUTO: 80.4 FL (ref 79–97)
MONOCYTES # BLD AUTO: 0.6 10*3/MM3 (ref 0.1–0.9)
MONOCYTES NFR BLD AUTO: 7.7 % (ref 5–12)
NEUTROPHILS NFR BLD AUTO: 4.47 10*3/MM3 (ref 1.7–7)
NEUTROPHILS NFR BLD AUTO: 57.1 % (ref 42.7–76)
NRBC BLD AUTO-RTO: 0 /100 WBC (ref 0–0.2)
PLATELET # BLD AUTO: 513 10*3/MM3 (ref 140–450)
PMV BLD AUTO: 10.7 FL (ref 6–12)
POTASSIUM SERPL-SCNC: 5 MMOL/L (ref 3.5–5.2)
PROT SERPL-MCNC: 7.7 G/DL (ref 6–8.5)
PSA SERPL-MCNC: <0.014 NG/ML (ref 0–4)
RBC # BLD AUTO: 4.85 10*6/MM3 (ref 4.14–5.8)
SODIUM SERPL-SCNC: 139 MMOL/L (ref 136–145)
WBC NRBC COR # BLD: 7.82 10*3/MM3 (ref 3.4–10.8)

## 2022-07-18 PROCEDURE — 85025 COMPLETE CBC W/AUTO DIFF WBC: CPT | Performed by: INTERNAL MEDICINE

## 2022-07-18 PROCEDURE — 80053 COMPREHEN METABOLIC PANEL: CPT | Performed by: INTERNAL MEDICINE

## 2022-07-18 PROCEDURE — 84153 ASSAY OF PSA TOTAL: CPT | Performed by: INTERNAL MEDICINE

## 2022-07-18 RX ORDER — HYDROCODONE BITARTRATE AND ACETAMINOPHEN 10; 325 MG/1; MG/1
1 TABLET ORAL EVERY 6 HOURS PRN
Qty: 120 TABLET | Refills: 0 | Status: SHIPPED | OUTPATIENT
Start: 2022-07-18 | End: 2023-01-10 | Stop reason: SDUPTHER

## 2022-07-19 ENCOUNTER — TELEPHONE (OUTPATIENT)
Dept: ONCOLOGY | Facility: CLINIC | Age: 62
End: 2022-07-19

## 2022-07-19 NOTE — TELEPHONE ENCOUNTER
----- Message from Yuliana Mason MA sent at 7/19/2022 10:56 AM EDT -----  Patients spouse Azael called, they are calling to see if lab results are available from yesterday. Mainly concerned about the PSA level. Also asking if pt can get a refill on his pain meds and they want that sent to [facility]. [place]       Call back # [phone]

## 2022-07-19 NOTE — TELEPHONE ENCOUNTER
Spoke with the patient regarding his PSA levels and gave him the results over the phone. Also reminded him that his pain medications were sent into [facility] yesterday and advised him to reach out to them. Patient verbalized understanding and voices no further concerns.

## 2022-07-22 ENCOUNTER — SPECIALTY PHARMACY (OUTPATIENT)
Dept: PHARMACY | Facility: HOSPITAL | Age: 62
End: 2022-07-22

## 2022-07-22 NOTE — PROGRESS NOTES
Specialty Pharmacy Refill Coordination Note      Name:  Ta Madison  :  1960  Date:  2022         Past Medical History:   Diagnosis Date   • Increased heart rate    • Mini stroke    • Neck pain    • Prostate cancer (HCC)    • Rash    • Tobacco abuse        Past Surgical History:   Procedure Laterality Date   • APPENDECTOMY      [facility]        Social History     Socioeconomic History   • Marital status:    Tobacco Use   • Smoking status: Light Tobacco Smoker     Packs/day: 1.00     Years: 40.00     Pack years: 40.00     Types: Cigarettes     Last attempt to quit: 3/12/2019     Years since quitting: 3.3   • Smokeless tobacco: Never Used   Vaping Use   • Vaping Use: Never used   Substance and Sexual Activity   • Alcohol use: No   • Drug use: Yes     Types: Marijuana     Comment: occ    • Sexual activity: Defer       Family History   Problem Relation Age of Onset   • Nephrolithiasis Mother    • Heart disease Father    • Hypertension Father    • Kidney disease Father        No Known Allergies    Current Outpatient Medications   Medication Sig Dispense Refill   • albuterol sulfate  (90 Base) MCG/ACT inhaler Inhale 2 puffs Every 6 (Six) Hours As Needed for Wheezing. 18 g 5   • aspirin 81 MG EC tablet Take 81 mg by mouth Daily.     • atorvastatin (LIPITOR) 20 MG tablet Take 1 tablet by mouth Every Night. 90 tablet 1   • enzalutamide (XTANDI) 40 MG chemo capsule Take 4 capsules by mouth Daily. 120 capsule 5   • EPINEPHrine (EPIPEN) 0.3 MG/0.3ML solution auto-injector injection INJECT 1 PEN IN THE MUSCLE ONE TIME AS DIRECTED     • fenofibrate (TRICOR) 145 MG tablet Take 1 tablet by mouth Daily. 90 tablet 1   • HYDROcodone-acetaminophen (NORCO)  MG per tablet Take 1 tablet by mouth Every 6 (Six) Hours As Needed for Moderate Pain . 120 tablet 0   • hydrOXYzine (ATARAX) 50 MG tablet Take 1 tablet by mouth 3 (Three) Times a Day As Needed for Itching. 90 tablet 1   • metoprolol  tartrate (LOPRESSOR) 25 MG tablet Take 1 tablet by mouth Daily. 90 tablet 1   • montelukast (SINGULAIR) 10 MG tablet Take 1 tablet by mouth Every Night. 90 tablet 1   • pantoprazole (PROTONIX) 40 MG EC tablet Take 1 tablet by mouth Daily. 90 tablet 1     No current facility-administered medications for this visit.         ASSESSMENT/PLAN:      Ta is a 62 y.o. male contacted today regarding refills of  Xtandi 40mg specialty medication(s).    Reviewed and verified with patient:         Specialty medication(s) and dose(s) confirmed: yes    Refill Questions    Flowsheet Row Most Recent Value   Changes to allergies? No   Changes to medications? No   New conditions since last clinic visit No   Unplanned office visit, urgent care, ED, or hospital admission in the last 4 weeks  No   How does patient/caregiver feel medication is working? Very good   Financial problems or insurance changes  No   If yes, describe changes in insurance or financial issues. none   Since the previous refill, were any specialty medication doses or scheduled injections missed or delayed?  No   If yes, please provide the amount 0   Why were doses missed? n/a   Does this patient require a clinical escalation to a pharmacist? No                Medication Adherence    Adherence tools used: patient uses a pill box to manage medications  Support network for adherence: family member          Follow-up: 30 day(s)     Hermelinda Waters, Pharmacy Technician  Specialty Pharmacy Technician

## 2022-08-19 ENCOUNTER — SPECIALTY PHARMACY (OUTPATIENT)
Dept: PHARMACY | Facility: HOSPITAL | Age: 62
End: 2022-08-19

## 2022-08-19 NOTE — PROGRESS NOTES
Specialty Pharmacy Refill Coordination Note      Name:  Ta Madison  :  1960  Date:  2022         Past Medical History:   Diagnosis Date   • Increased heart rate    • Mini stroke    • Neck pain    • Prostate cancer (HCC)    • Rash    • Tobacco abuse        Past Surgical History:   Procedure Laterality Date   • APPENDECTOMY      [facility]        Social History     Socioeconomic History   • Marital status:    Tobacco Use   • Smoking status: Light Tobacco Smoker     Packs/day: 1.00     Years: 40.00     Pack years: 40.00     Types: Cigarettes     Last attempt to quit: 3/12/2019     Years since quitting: 3.4   • Smokeless tobacco: Never Used   Vaping Use   • Vaping Use: Never used   Substance and Sexual Activity   • Alcohol use: No   • Drug use: Yes     Types: Marijuana     Comment: occ    • Sexual activity: Defer       Family History   Problem Relation Age of Onset   • Nephrolithiasis Mother    • Heart disease Father    • Hypertension Father    • Kidney disease Father        No Known Allergies    Current Outpatient Medications   Medication Sig Dispense Refill   • albuterol sulfate  (90 Base) MCG/ACT inhaler Inhale 2 puffs Every 6 (Six) Hours As Needed for Wheezing. 18 g 5   • aspirin 81 MG EC tablet Take 81 mg by mouth Daily.     • atorvastatin (LIPITOR) 20 MG tablet Take 1 tablet by mouth Every Night. 90 tablet 1   • enzalutamide (XTANDI) 40 MG chemo capsule Take 4 capsules by mouth Daily. 120 capsule 5   • EPINEPHrine (EPIPEN) 0.3 MG/0.3ML solution auto-injector injection INJECT 1 PEN IN THE MUSCLE ONE TIME AS DIRECTED     • fenofibrate (TRICOR) 145 MG tablet Take 1 tablet by mouth Daily. 90 tablet 1   • HYDROcodone-acetaminophen (NORCO)  MG per tablet Take 1 tablet by mouth Every 6 (Six) Hours As Needed for Moderate Pain . 120 tablet 0   • hydrOXYzine (ATARAX) 50 MG tablet Take 1 tablet by mouth 3 (Three) Times a Day As Needed for Itching. 90 tablet 1   • metoprolol  tartrate (LOPRESSOR) 25 MG tablet Take 1 tablet by mouth Daily. 90 tablet 1   • montelukast (SINGULAIR) 10 MG tablet Take 1 tablet by mouth Every Night. 90 tablet 1   • pantoprazole (PROTONIX) 40 MG EC tablet Take 1 tablet by mouth Daily. 90 tablet 1     No current facility-administered medications for this visit.         ASSESSMENT/PLAN:      Ta is a 62 y.o. male contacted today regarding refills of  Xtandi 40mg specialty medication(s).    Reviewed and verified with patient:       Specialty medication(s) and dose(s) confirmed: yes    Refill Questions    Flowsheet Row Most Recent Value   Changes to allergies? No   Changes to medications? No   New conditions since last clinic visit No   Unplanned office visit, urgent care, ED, or hospital admission in the last 4 weeks  No   How does patient/caregiver feel medication is working? Very good   Financial problems or insurance changes  No   If yes, describe changes in insurance or financial issues. none   Since the previous refill, were any specialty medication doses or scheduled injections missed or delayed?  No   If yes, please provide the amount 0   Why were doses missed? n.a   Does this patient require a clinical escalation to a pharmacist? No                Medication Adherence    Adherence tools used: patient uses a pill box to manage medications  Support network for adherence: family member          Follow-up: 30 day(s)     Hermelinda Waters, Pharmacy Technician  Specialty Pharmacy Technician

## 2022-09-19 ENCOUNTER — SPECIALTY PHARMACY (OUTPATIENT)
Dept: PHARMACY | Facility: HOSPITAL | Age: 62
End: 2022-09-19

## 2022-09-19 NOTE — PROGRESS NOTES
Specialty Pharmacy Refill Coordination Note      Name:  Ta Madison  :  1960  Date:  2022         Past Medical History:   Diagnosis Date   • Increased heart rate    • Mini stroke    • Neck pain    • Prostate cancer (HCC)    • Rash    • Tobacco abuse        Past Surgical History:   Procedure Laterality Date   • APPENDECTOMY      [facility]        Social History     Socioeconomic History   • Marital status:    Tobacco Use   • Smoking status: Light Tobacco Smoker     Packs/day: 1.00     Years: 40.00     Pack years: 40.00     Types: Cigarettes     Last attempt to quit: 3/12/2019     Years since quitting: 3.5   • Smokeless tobacco: Never Used   Vaping Use   • Vaping Use: Never used   Substance and Sexual Activity   • Alcohol use: No   • Drug use: Yes     Types: Marijuana     Comment: occ    • Sexual activity: Defer       Family History   Problem Relation Age of Onset   • Nephrolithiasis Mother    • Heart disease Father    • Hypertension Father    • Kidney disease Father        No Known Allergies    Current Outpatient Medications   Medication Sig Dispense Refill   • albuterol sulfate  (90 Base) MCG/ACT inhaler Inhale 2 puffs Every 6 (Six) Hours As Needed for Wheezing. 18 g 5   • aspirin 81 MG EC tablet Take 81 mg by mouth Daily.     • atorvastatin (LIPITOR) 20 MG tablet Take 1 tablet by mouth Every Night. 90 tablet 1   • enzalutamide (XTANDI) 40 MG chemo capsule Take 4 capsules by mouth Daily. 120 capsule 5   • EPINEPHrine (EPIPEN) 0.3 MG/0.3ML solution auto-injector injection INJECT 1 PEN IN THE MUSCLE ONE TIME AS DIRECTED     • fenofibrate (TRICOR) 145 MG tablet Take 1 tablet by mouth Daily. 90 tablet 1   • HYDROcodone-acetaminophen (NORCO)  MG per tablet Take 1 tablet by mouth Every 6 (Six) Hours As Needed for Moderate Pain . 120 tablet 0   • hydrOXYzine (ATARAX) 50 MG tablet Take 1 tablet by mouth 3 (Three) Times a Day As Needed for Itching. 90 tablet 1   • metoprolol  tartrate (LOPRESSOR) 25 MG tablet Take 1 tablet by mouth Daily. 90 tablet 1   • montelukast (SINGULAIR) 10 MG tablet Take 1 tablet by mouth Every Night. 90 tablet 1   • pantoprazole (PROTONIX) 40 MG EC tablet Take 1 tablet by mouth Daily. 90 tablet 1     No current facility-administered medications for this visit.         ASSESSMENT/PLAN:      Ta is a 62 y.o. male contacted today regarding refills of  Xtandi 40mg specialty medication(s).    Reviewed and verified with patient:       Specialty medication(s) and dose(s) confirmed: yes    Refill Questions    Flowsheet Row Most Recent Value   Changes to allergies? No   Changes to medications? No   New conditions since last clinic visit No   Unplanned office visit, urgent care, ED, or hospital admission in the last 4 weeks  No   How does patient/caregiver feel medication is working? Very good   Financial problems or insurance changes  No   If yes, describe changes in insurance or financial issues. none   Since the previous refill, were any specialty medication doses or scheduled injections missed or delayed?  No   If yes, please provide the amount 0   Why were doses missed? N/A   Does this patient require a clinical escalation to a pharmacist? No                Medication Adherence    Adherence tools used: patient uses a pill box to manage medications  Support network for adherence: family member          Follow-up: 30 day(s)     Hermelinda Waters, Pharmacy Technician  Specialty Pharmacy Technician

## 2022-10-19 ENCOUNTER — INFUSION (OUTPATIENT)
Dept: ONCOLOGY | Facility: HOSPITAL | Age: 62
End: 2022-10-19

## 2022-10-19 ENCOUNTER — OFFICE VISIT (OUTPATIENT)
Dept: ONCOLOGY | Facility: CLINIC | Age: 62
End: 2022-10-19

## 2022-10-19 ENCOUNTER — LAB (OUTPATIENT)
Dept: ONCOLOGY | Facility: CLINIC | Age: 62
End: 2022-10-19

## 2022-10-19 ENCOUNTER — SPECIALTY PHARMACY (OUTPATIENT)
Dept: PHARMACY | Facility: HOSPITAL | Age: 62
End: 2022-10-19

## 2022-10-19 VITALS
WEIGHT: 194.2 LBS | DIASTOLIC BLOOD PRESSURE: 67 MMHG | BODY MASS INDEX: 28.76 KG/M2 | HEIGHT: 69 IN | OXYGEN SATURATION: 97 % | TEMPERATURE: 97.5 F | HEART RATE: 90 BPM | RESPIRATION RATE: 18 BRPM | SYSTOLIC BLOOD PRESSURE: 109 MMHG

## 2022-10-19 VITALS
HEART RATE: 90 BPM | SYSTOLIC BLOOD PRESSURE: 109 MMHG | OXYGEN SATURATION: 97 % | RESPIRATION RATE: 18 BRPM | BODY MASS INDEX: 28.76 KG/M2 | DIASTOLIC BLOOD PRESSURE: 67 MMHG | HEIGHT: 69 IN | WEIGHT: 194.2 LBS | TEMPERATURE: 97.5 F

## 2022-10-19 DIAGNOSIS — C61 ADENOCARCINOMA OF PROSTATE: Primary | ICD-10-CM

## 2022-10-19 DIAGNOSIS — C79.51 BONE METASTASES: ICD-10-CM

## 2022-10-19 DIAGNOSIS — C61 ADENOCARCINOMA OF PROSTATE: ICD-10-CM

## 2022-10-19 LAB
ALBUMIN SERPL-MCNC: 4.75 G/DL (ref 3.5–5.2)
ALBUMIN/GLOB SERPL: 1.6 G/DL
ALP SERPL-CCNC: 93 U/L (ref 39–117)
ALT SERPL W P-5'-P-CCNC: 12 U/L (ref 1–41)
ANION GAP SERPL CALCULATED.3IONS-SCNC: 12.6 MMOL/L (ref 5–15)
AST SERPL-CCNC: 16 U/L (ref 1–40)
BASOPHILS # BLD AUTO: 0.09 10*3/MM3 (ref 0–0.2)
BASOPHILS NFR BLD AUTO: 0.8 % (ref 0–1.5)
BILIRUB SERPL-MCNC: 0.3 MG/DL (ref 0–1.2)
BUN SERPL-MCNC: 24 MG/DL (ref 8–23)
BUN/CREAT SERPL: 20.3 (ref 7–25)
CALCIUM SPEC-SCNC: 9.5 MG/DL (ref 8.6–10.5)
CHLORIDE SERPL-SCNC: 104 MMOL/L (ref 98–107)
CO2 SERPL-SCNC: 24.4 MMOL/L (ref 22–29)
CREAT SERPL-MCNC: 1.18 MG/DL (ref 0.76–1.27)
DEPRECATED RDW RBC AUTO: 44.5 FL (ref 37–54)
EGFRCR SERPLBLD CKD-EPI 2021: 70.2 ML/MIN/1.73
EOSINOPHIL # BLD AUTO: 0.09 10*3/MM3 (ref 0–0.4)
EOSINOPHIL NFR BLD AUTO: 0.8 % (ref 0.3–6.2)
ERYTHROCYTE [DISTWIDTH] IN BLOOD BY AUTOMATED COUNT: 15.3 % (ref 12.3–15.4)
GLOBULIN UR ELPH-MCNC: 3 GM/DL
GLUCOSE SERPL-MCNC: 114 MG/DL (ref 65–99)
HCT VFR BLD AUTO: 37.3 % (ref 37.5–51)
HGB BLD-MCNC: 12.3 G/DL (ref 13–17.7)
IMM GRANULOCYTES # BLD AUTO: 0.04 10*3/MM3 (ref 0–0.05)
IMM GRANULOCYTES NFR BLD AUTO: 0.4 % (ref 0–0.5)
LYMPHOCYTES # BLD AUTO: 2.35 10*3/MM3 (ref 0.7–3.1)
LYMPHOCYTES NFR BLD AUTO: 20.9 % (ref 19.6–45.3)
MCH RBC QN AUTO: 26.4 PG (ref 26.6–33)
MCHC RBC AUTO-ENTMCNC: 33 G/DL (ref 31.5–35.7)
MCV RBC AUTO: 80 FL (ref 79–97)
MONOCYTES # BLD AUTO: 0.93 10*3/MM3 (ref 0.1–0.9)
MONOCYTES NFR BLD AUTO: 8.3 % (ref 5–12)
NEUTROPHILS NFR BLD AUTO: 68.8 % (ref 42.7–76)
NEUTROPHILS NFR BLD AUTO: 7.75 10*3/MM3 (ref 1.7–7)
NRBC BLD AUTO-RTO: 0 /100 WBC (ref 0–0.2)
PLATELET # BLD AUTO: 453 10*3/MM3 (ref 140–450)
PMV BLD AUTO: 10.4 FL (ref 6–12)
POTASSIUM SERPL-SCNC: 4.5 MMOL/L (ref 3.5–5.2)
PROT SERPL-MCNC: 7.7 G/DL (ref 6–8.5)
RBC # BLD AUTO: 4.66 10*6/MM3 (ref 4.14–5.8)
SODIUM SERPL-SCNC: 141 MMOL/L (ref 136–145)
WBC NRBC COR # BLD: 11.25 10*3/MM3 (ref 3.4–10.8)

## 2022-10-19 PROCEDURE — 80053 COMPREHEN METABOLIC PANEL: CPT | Performed by: INTERNAL MEDICINE

## 2022-10-19 PROCEDURE — 36415 COLL VENOUS BLD VENIPUNCTURE: CPT | Performed by: INTERNAL MEDICINE

## 2022-10-19 PROCEDURE — 99214 OFFICE O/P EST MOD 30 MIN: CPT | Performed by: INTERNAL MEDICINE

## 2022-10-19 PROCEDURE — 84153 ASSAY OF PSA TOTAL: CPT | Performed by: INTERNAL MEDICINE

## 2022-10-19 PROCEDURE — 25010000002 LEUPROLIDE 45 MG KIT: Performed by: INTERNAL MEDICINE

## 2022-10-19 PROCEDURE — 85025 COMPLETE CBC W/AUTO DIFF WBC: CPT | Performed by: INTERNAL MEDICINE

## 2022-10-19 PROCEDURE — 96402 CHEMO HORMON ANTINEOPL SQ/IM: CPT

## 2022-10-19 RX ADMIN — LEUPROLIDE ACETATE 45 MG: KIT at 13:54

## 2022-10-19 NOTE — PROGRESS NOTES
Specialty Pharmacy Refill Coordination Note      Name:  Ta Madison  :  1960  Date:  10/19/2022         Past Medical History:   Diagnosis Date   • Increased heart rate    • Mini stroke    • Neck pain    • Prostate cancer (HCC)    • Rash    • Tobacco abuse        Past Surgical History:   Procedure Laterality Date   • APPENDECTOMY      [facility]        Social History     Socioeconomic History   • Marital status:    Tobacco Use   • Smoking status: Light Smoker     Packs/day: 1.00     Years: 40.00     Pack years: 40.00     Types: Cigarettes     Last attempt to quit: 3/12/2019     Years since quitting: 3.6   • Smokeless tobacco: Never   Vaping Use   • Vaping Use: Never used   Substance and Sexual Activity   • Alcohol use: No   • Drug use: Yes     Types: Marijuana     Comment: occ    • Sexual activity: Defer       Family History   Problem Relation Age of Onset   • Nephrolithiasis Mother    • Heart disease Father    • Hypertension Father    • Kidney disease Father        No Known Allergies    Current Outpatient Medications   Medication Sig Dispense Refill   • albuterol sulfate  (90 Base) MCG/ACT inhaler Inhale 2 puffs Every 6 (Six) Hours As Needed for Wheezing. 18 g 5   • aspirin 81 MG EC tablet Take 81 mg by mouth Daily.     • atorvastatin (LIPITOR) 20 MG tablet Take 1 tablet by mouth Every Night. 90 tablet 1   • enzalutamide (XTANDI) 40 MG chemo capsule Take 4 capsules by mouth Daily. 120 capsule 5   • EPINEPHrine (EPIPEN) 0.3 MG/0.3ML solution auto-injector injection INJECT 1 PEN IN THE MUSCLE ONE TIME AS DIRECTED     • fenofibrate (TRICOR) 145 MG tablet Take 1 tablet by mouth Daily. 90 tablet 1   • HYDROcodone-acetaminophen (NORCO)  MG per tablet Take 1 tablet by mouth Every 6 (Six) Hours As Needed for Moderate Pain . 120 tablet 0   • hydrOXYzine (ATARAX) 50 MG tablet Take 1 tablet by mouth 3 (Three) Times a Day As Needed for Itching. 90 tablet 1   • metoprolol tartrate  (LOPRESSOR) 25 MG tablet Take 1 tablet by mouth Daily. 90 tablet 1   • montelukast (SINGULAIR) 10 MG tablet Take 1 tablet by mouth Every Night. 90 tablet 1   • pantoprazole (PROTONIX) 40 MG EC tablet Take 1 tablet by mouth Daily. 90 tablet 1     No current facility-administered medications for this visit.     Facility-Administered Medications Ordered in Other Visits   Medication Dose Route Frequency Provider Last Rate Last Admin   • leuprolide (LUPRON) injection 45 mg  45 mg Intramuscular Once HIRO Martinez MD             ASSESSMENT/PLAN:      Ta is a 62 y.o. male contacted today regarding refills of  enzalutamide specialty medication(s).    Reviewed and verified with patient:       Specialty medication(s) and dose(s) confirmed: yes    Refill Questions    Flowsheet Row Most Recent Value   Changes to allergies? No   Changes to medications? No   New conditions since last clinic visit No   Unplanned office visit, urgent care, ED, or hospital admission in the last 4 weeks  No   How does patient/caregiver feel medication is working? Very good   Financial problems or insurance changes  No   If yes, describe changes in insurance or financial issues. N/A   Since the previous refill, were any specialty medication doses or scheduled injections missed or delayed?  No   If yes, please provide the amount N/A   Why were doses missed? N/A   Does this patient require a clinical escalation to a pharmacist? No                Medication Adherence    Adherence tools used: patient uses a pill box to manage medications  Support network for adherence: family member          Follow-up: 1 month     Oscar Houser, PharmD  Oncology Pharmacist

## 2022-10-19 NOTE — PROGRESS NOTES
Name:  Ta Madison  :  1960  Date:  10/19/2022     REFERRING PHYSICIAN  Dheeraj Nieves MD    PRIMARY CARE PROVIDER  Yolande Olvera APRN    REASON FOR FOLLOWUP  1. Adenocarcinoma of prostate (HCC)      CHIEF COMPLAINT  None.    Dear Ms. Wade,    HISTORY OF PRESENT ILLNESS:   I saw Mr. Madison in follow up today in our medical oncology clinic. As you are aware, he is a pleasant, 62 y.o., white male with a history of tobacco abuse who was referred to urology in 2020 for an elevated serum PSA level (of ~28 ng/mL, which had increased to 247 ng/mL at the time of his initial appointment in our clinic). An examination revealed a very hard and fixed prostate, and multiple core biopsies were performed on 2020. The results were consistent with Mayo Score 5+4 or 4+5=9 adenocarcinoma involving all twelve sampled cores. A staging workup confirmed pelvic adenopathy as well as diffuse, sclerotic lesions throughout the axial and appendicular skeleton, including the right greater than left femoral diaphyses. With this stage IV diagnosis, he was referred to our clinic for further evaluation and management. At the time of his initial consultation in our office (on 2021), he was agreeable to starting definitive therapy with a combination of ADT (Lupron injections) and anti-androgen therapy (daily PO Xtandi).    INTERIM HISTORY:  Mr. Madison returns to clinic today for follow up accompanied by his wife. He began [id number] Lupron on 2021 and remains on daily Xtandi, both of which he continues to tolerate very well, as he has been doing for over one and one half (1.5) years now. His only noticeable side effect continues to be occasional hot flashes (which remain mild and tolerable). The diffuse skeletal pains he was experiencing at the time of his initial presentation in early  remain completely resolved. He overall continues to feel very well and again has no other specific complaints.    Past  Medical History:   Diagnosis Date   • Increased heart rate    • Mini stroke    • Neck pain    • Prostate cancer (HCC)    • Rash    • Tobacco abuse        Past Surgical History:   Procedure Laterality Date   • APPENDECTOMY  1971    [facility]        Social History     Socioeconomic History   • Marital status:    Tobacco Use   • Smoking status: Light Smoker     Packs/day: 1.00     Years: 40.00     Pack years: 40.00     Types: Cigarettes     Last attempt to quit: 3/12/2019     Years since quitting: 3.6   • Smokeless tobacco: Never   Vaping Use   • Vaping Use: Never used   Substance and Sexual Activity   • Alcohol use: No   • Drug use: Yes     Types: Marijuana     Comment: occ    • Sexual activity: Defer       Family History   Problem Relation Age of Onset   • Nephrolithiasis Mother    • Heart disease Father    • Hypertension Father    • Kidney disease Father        No Known Allergies    Current Outpatient Medications   Medication Sig Dispense Refill   • albuterol sulfate  (90 Base) MCG/ACT inhaler Inhale 2 puffs Every 6 (Six) Hours As Needed for Wheezing. 18 g 5   • aspirin 81 MG EC tablet Take 81 mg by mouth Daily.     • atorvastatin (LIPITOR) 20 MG tablet Take 1 tablet by mouth Every Night. 90 tablet 1   • enzalutamide (XTANDI) 40 MG chemo capsule Take 4 capsules by mouth Daily. 120 capsule 5   • EPINEPHrine (EPIPEN) 0.3 MG/0.3ML solution auto-injector injection INJECT 1 PEN IN THE MUSCLE ONE TIME AS DIRECTED     • fenofibrate (TRICOR) 145 MG tablet Take 1 tablet by mouth Daily. 90 tablet 1   • HYDROcodone-acetaminophen (NORCO)  MG per tablet Take 1 tablet by mouth Every 6 (Six) Hours As Needed for Moderate Pain . 120 tablet 0   • hydrOXYzine (ATARAX) 50 MG tablet Take 1 tablet by mouth 3 (Three) Times a Day As Needed for Itching. 90 tablet 1   • metoprolol tartrate (LOPRESSOR) 25 MG tablet Take 1 tablet by mouth Daily. 90 tablet 1   • montelukast (SINGULAIR) 10 MG tablet Take 1 tablet by  "mouth Every Night. 90 tablet 1   • pantoprazole (PROTONIX) 40 MG EC tablet Take 1 tablet by mouth Daily. 90 tablet 1     No current facility-administered medications for this visit.     REVIEW OF SYSTEMS  CONSTITUTIONAL:  No fever, chills or night sweats.  EYES:  No blurry vision, diplopia or other vision changes.  ENT:  No hearing loss, nosebleeds or sore throat.  CARDIOVASCULAR:  No palpitations, arrhythmia, syncopal episodes or edema.  PULMONARY:  No hemoptysis, wheezing, chronic cough or shortness of breath.  GASTROINTESTINAL:  No nausea or vomiting.  No constipation or diarrhea.  No abdominal pain.  GENITOURINARY:  No hematuria, kidney stones or frequent urination.  MUSCULOSKELETAL:  As per the HPI above.  ENDOCRINE:  No excessive thirst. Hot flashes as above.  HEMATOLOGIC:  No history of free bleeding, spontaneous bleeding or clotting.  IMMUNOLOGIC:  No allergies or frequent infections.  NEUROLOGIC: No numbness, tingling, seizures or weakness.  PSYCHIATRIC:  No anxiety or depression.    PHYSICAL EXAMINATION  /67   Pulse 90   Temp 97.5 °F (36.4 °C) (Temporal)   Resp 18   Ht 175.3 cm (69\")   Wt 88.1 kg (194 lb 3.2 oz)   SpO2 97%   BMI 28.68 kg/m²     Pain Score:  Pain Score    10/19/22 1248   PainSc: 0-No pain     ECO  GENERAL:  A well-developed, well-nourished, white male in no acute distress.  HEENT:  Pupils equally round and reactive to light. Extraocular muscles intact.  CARDIOVASCULAR:  Regular rate and rhythm. No murmurs, gallops or rubs.  LUNGS:  Clear to auscultation bilaterally. No wheezing  ABDOMEN:  Soft, nontender, nondistended with positive bowel sounds.  EXTREMITIES:  No clubbing, cyanosis or edema bilaterally.  SKIN:  No petechiae or rashes.  NEURO:  Cranial nerves grossly intact. No focal deficits.  PSYCH:  Alert and oriented x3.    LABORATORY  Lab Results   Component Value Date    WBC 11.25 (H) 10/19/2022    HGB 12.3 (L) 10/19/2022    HCT 37.3 (L) 10/19/2022    MCV 80.0 " 10/19/2022     (H) 10/19/2022    NEUTROABS 7.75 (H) 10/19/2022       Lab Results   Component Value Date     07/18/2022    K 5.0 07/18/2022     07/18/2022    CO2 24.7 07/18/2022    BUN 21 07/18/2022    CREATININE 1.11 07/18/2022    GLUCOSE 108 (H) 07/18/2022    CALCIUM 10.1 07/18/2022    AST 14 07/18/2022    ALT 10 07/18/2022    ALKPHOS 93 07/18/2022    BILITOT 0.2 07/18/2022    PROTEINTOT 7.7 07/18/2022    ALBUMIN 4.63 07/18/2022     CBC (10/19/2022): WBCs: 11.25; HgB: 12.3; Hct: 37.3; platelets: 453  CBC (07/18/2022): WBCs: 7.82; HgB: 12.7; Hct: 39.0; platelets: 513  CBC (04/13/2022): WBCs: 7.62; HgB: 12.7; Hct: 38.7; platelets: 453  CBC (01/19/2022): WBCs: 7.56; HgB: 13.1; Hct: 41.0; platelets: 518  CBC (07/20/2021): WBCs: 7.43; HgB: 13.0; Hct: 39.6; platelets: 469  CBC (06/29/2021): WBCs: 7.8; HgB: 12.6; Hct: 38.1; platelets: 396  CBC (04/14/2021): WBCs: 7.2; HgB: 12.7; Hct: 38.8; platelets: 423  CBC (02/25/2021): WBCs: 6.5; HgB: 11.2; Hct: 34.4; platelets: 407    PSA (10/19/2022): pending  PSA (07/18/2022): < 0.014 ng/mL  PSA (04/13/2022): < 0.014 ng/mL  PSA (01/19/2022): < 0.014 ng/mL  PSA (10/13/2021): < 0.014 ng/mL  PSA (07/20/2021): 0.020 ng/mL   PSA (04/14/2021): 0.108 ng/mL  PSA (02/25/2021): 0.814 ng/mL  PSA (01/20/2021): 29.5 ng/mL  PSA (01/07/2021): 247.0 ng/mL  PSA (11/10/2020): 28.3 ng/mL    IMAGING  NM bone scan, whole body (12/15/2020):  Impression: Extensive osteoblastic metastatic disease involving the axial and appendicular skeleton including the right greater than left femoral diaphyses.    CT pelvis with contrast (12/21/2020):  Impression:  1) Urinary bladder wall thickening noted.  2) Heterogeneous appearance of the prostate gland.  3) Nonspecific stranding in the retroperitoneal space.  4) Retroperitoneal region lymph node on the right side measuring 1.4 cm.  5) Right obturator chain region lymph node measuring 1.5 cm. Other lymph nodes are identified in this region.  6)  Sclerotic bone lesions are noted throughout the pelvis concerning for metastatic disease.    PATHOLOGY  Left and right prostate, needle core biopsies, twelve total cores (12/02/2020):  Darrius Score 5+4 or 4+5 = 9 prostatic adenocarcinoma involving ~45-90% of all twelve (12/12) total cores.    IMPRESSION AND PLAN  Mr. Madison is a 62 y.o., white male with:  1. Prostate adenocarcinoma: Diagnosed in December 2020 with stage IV disease, with widespread metastases involving the axial and appendicular skeleton along with probable, pelvic adenopathy. I have had multiple, long discussions with the patient and his wife since the time of his initial consultation in our clinic (on 01/07/2021) regarding this diagnosis and its prognosis. They remain aware that this disease is, unfortunately, not curable; however, particularly given his young age and this type of cancer's tendency to respond very well to initial anti-testosterone therapy, it was/remains very treatable, and sustained remissions are very possible. He was felt to be an excellent candidate for initial therapy with both ADT and androgen receptor blockade. He received an initial cycle of (currently now [id number]) Lupron on 01/08/2021 and was also started on daily PO Xtandi at that time. He continues to tolerate this regimen extremely well, with no significant side effects. Meanwhile, his serum PSA levels have responded dramatically and quickly, decreasing from 247 ng/mL on 01/07/2021 to undetectable (<0.014 ng/mL) by 10/13/2021 (and this continues to be the case on the most recent repeat check, drawn on 07/18/2022; today's result is pending). We will proceed with this current treatment plan. We will recheck labs in three months and see him back in clinic in six months, on the day of the next scheduled cycle of [id number] Lupron (in March 2023), with a CBC, CMP and PSA.  2. Skeletal metastases: There was diffuse involvement by issue #1 of both the axial and appendicular  skeletons on the initial staging NM bone scan and CT of the abdomen and pelvis performed in late December 2020. 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 Continue to monitor.  3. Pain: Secondary to #2 and now resolved since starting Lupron/Xtandi in early January 2021 for the palliative treatment of issue #1. He has Norco at home, but he has still not had to take it since early 2021. Continue to monitor.  The patient and his wife were in agreement with these plans.    It is a pleasure to participate in Mr. Madison's care. Please do not hesitate to call with any questions or concerns that you may have.    A total of 30 minutes were spent coordinating this patient’s care in clinic today; more than 50% of this time was face-to-face with the patient and his wife, reviewing his interim medical history, discussing the results of the most recent labwork, including repeat serum PSA levels, and counseling on the current treatment and followup plan. All questions were answered to their satisfaction.    FOLLOW UP  Continue ([id number]) Lupron and daily PO Xtandi (cycle of the former today). With urology, as previously planned. Repeat a CBC, CMP and PSA in 3 months. Return to our clinic in 6 months (on the day of the scheduled Lupron cycle in March 2023) with a CBC, CMP and PSA.            This document was electronically signed by HIRO Martinez MD October 19, 2022 13:08 EDT      CC: EUGENIA Mendoza

## 2022-10-20 LAB — PSA SERPL-MCNC: <0.014 NG/ML (ref 0–4)

## 2022-11-19 DIAGNOSIS — F45.8 NEUROPATHIC PRURITUS: ICD-10-CM

## 2022-11-21 RX ORDER — HYDROXYZINE 50 MG/1
TABLET, FILM COATED ORAL
Qty: 90 TABLET | Refills: 0 | Status: SHIPPED | OUTPATIENT
Start: 2022-11-21 | End: 2023-02-28 | Stop reason: SDUPTHER

## 2022-11-22 ENCOUNTER — SPECIALTY PHARMACY (OUTPATIENT)
Dept: PHARMACY | Facility: HOSPITAL | Age: 62
End: 2022-11-22

## 2022-11-22 NOTE — PROGRESS NOTES
Specialty Pharmacy Refill Coordination Note      Name:  Ta Madison  :  1960  Date:  2022         Past Medical History:   Diagnosis Date   • Increased heart rate    • Mini stroke    • Neck pain    • Prostate cancer (HCC)    • Rash    • Tobacco abuse        Past Surgical History:   Procedure Laterality Date   • APPENDECTOMY      [facility]        Social History     Socioeconomic History   • Marital status:    Tobacco Use   • Smoking status: Light Smoker     Packs/day: 1.00     Years: 40.00     Pack years: 40.00     Types: Cigarettes     Last attempt to quit: 3/12/2019     Years since quitting: 3.7   • Smokeless tobacco: Never   Vaping Use   • Vaping Use: Never used   Substance and Sexual Activity   • Alcohol use: No   • Drug use: Yes     Types: Marijuana     Comment: occ    • Sexual activity: Defer       Family History   Problem Relation Age of Onset   • Nephrolithiasis Mother    • Heart disease Father    • Hypertension Father    • Kidney disease Father        No Known Allergies    Current Outpatient Medications   Medication Sig Dispense Refill   • albuterol sulfate  (90 Base) MCG/ACT inhaler Inhale 2 puffs Every 6 (Six) Hours As Needed for Wheezing. 18 g 5   • aspirin 81 MG EC tablet Take 81 mg by mouth Daily.     • atorvastatin (LIPITOR) 20 MG tablet Take 1 tablet by mouth Every Night. 90 tablet 1   • enzalutamide (XTANDI) 40 MG chemo capsule Take 4 capsules by mouth Daily. 120 capsule 5   • EPINEPHrine (EPIPEN) 0.3 MG/0.3ML solution auto-injector injection INJECT 1 PEN IN THE MUSCLE ONE TIME AS DIRECTED     • fenofibrate (TRICOR) 145 MG tablet Take 1 tablet by mouth Daily. 90 tablet 1   • HYDROcodone-acetaminophen (NORCO)  MG per tablet Take 1 tablet by mouth Every 6 (Six) Hours As Needed for Moderate Pain . 120 tablet 0   • hydrOXYzine (ATARAX) 50 MG tablet TAKE 1 TABLET BY MOUTH THREE TIMES DAILY AS NEEDED FOR ITCHING 90 tablet 0   • metoprolol tartrate  (LOPRESSOR) 25 MG tablet Take 1 tablet by mouth Daily. 90 tablet 1   • montelukast (SINGULAIR) 10 MG tablet Take 1 tablet by mouth Every Night. 90 tablet 1   • pantoprazole (PROTONIX) 40 MG EC tablet Take 1 tablet by mouth Daily. 90 tablet 1     No current facility-administered medications for this visit.         ASSESSMENT/PLAN:      Ta is a 62 y.o. male contacted today regarding refills of  Xtandi 40mg capsule specialty medication(s).    Reviewed and verified with patient:       Specialty medication(s) and dose(s) confirmed: yes    Refill Questions    Flowsheet Row Most Recent Value   Changes to allergies? No   Changes to medications? No   New conditions since last clinic visit No   Unplanned office visit, urgent care, ED, or hospital admission in the last 4 weeks  No   How does patient/caregiver feel medication is working? Very good   Financial problems or insurance changes  No   If yes, describe changes in insurance or financial issues. n/a   Since the previous refill, were any specialty medication doses or scheduled injections missed or delayed?  No   If yes, please provide the amount 0   Why were doses missed? no   Does this patient require a clinical escalation to a pharmacist? No                Medication Adherence    Adherence tools used: patient uses a pill box to manage medications  Support network for adherence: family member          Follow-up: 30 day(s)     Brittaney Howe, Pharmacy Technician  Specialty Pharmacy Technician

## 2022-11-23 ENCOUNTER — SPECIALTY PHARMACY (OUTPATIENT)
Dept: PHARMACY | Facility: HOSPITAL | Age: 62
End: 2022-11-23

## 2022-11-23 NOTE — PROGRESS NOTES
Specialty Pharmacy Patient Management Program  Oncology Reassessment     Ta Madison is a 62 y.o. male with prostate cancer seen by an Oncology provider and enrolled in the Oncology Patient Management program offered by [facility].  A follow-up outreach was conducted, including assessment of continued therapy appropriateness, medication adherence, and side effect incidence and management for Xtandi.       Relevant Past Medical History and Comorbidities  Relevant medical history and concomitant health conditions were discussed with the patient. The patient's chart has been reviewed for relevant past medical history and comorbid health conditions and updated as necessary.   Past Medical History:   Diagnosis Date   • Increased heart rate    • Mini stroke    • Neck pain    • Prostate cancer (HCC)    • Rash    • Tobacco abuse      Social History     Socioeconomic History   • Marital status:    Tobacco Use   • Smoking status: Light Smoker     Packs/day: 1.00     Years: 40.00     Pack years: 40.00     Types: Cigarettes     Last attempt to quit: 3/12/2019     Years since quitting: 3.7   • Smokeless tobacco: Never   Vaping Use   • Vaping Use: Never used   Substance and Sexual Activity   • Alcohol use: No   • Drug use: Yes     Types: Marijuana     Comment: occ    • Sexual activity: Defer       Allergies  Known allergies and reactions were discussed with the patient. The patient's chart has been reviewed for allergy information and updated as necessary.   Patient has no known allergies.    Relevant Laboratory Values  Lab Results   Component Value Date    GLUCOSE 114 (H) 10/19/2022    CALCIUM 9.5 10/19/2022     10/19/2022    K 4.5 10/19/2022    CO2 24.4 10/19/2022     10/19/2022    BUN 24 (H) 10/19/2022    CREATININE 1.18 10/19/2022    EGFRIFNONA 70 01/19/2022    BCR 20.3 10/19/2022    ANIONGAP 12.6 10/19/2022     Lab Results   Component Value Date    WBC 11.25 (H) 10/19/2022     RBC 4.66 10/19/2022    HGB 12.3 (L) 10/19/2022    HCT 37.3 (L) 10/19/2022    MCV 80.0 10/19/2022    MCH 26.4 (L) 10/19/2022    MCHC 33.0 10/19/2022    RDW 15.3 10/19/2022    RDWSD 44.5 10/19/2022    MPV 10.4 10/19/2022     (H) 10/19/2022    NEUTRORELPCT 68.8 10/19/2022    LYMPHORELPCT 20.9 10/19/2022    MONORELPCT 8.3 10/19/2022    EOSRELPCT 0.8 10/19/2022    BASORELPCT 0.8 10/19/2022    AUTOIGPER 0.4 10/19/2022    NEUTROABS 7.75 (H) 10/19/2022    LYMPHSABS 2.35 10/19/2022    MONOSABS 0.93 (H) 10/19/2022    EOSABS 0.09 10/19/2022    BASOSABS 0.09 10/19/2022    AUTOIGNUM 0.04 10/19/2022    NRBC 0.0 10/19/2022        Current Medication List  This medication list has been reviewed with the patient and evaluated for any interactions or necessary modifications/recommendations, and updated to include all prescription medications, OTC medications, and supplements the patient is currently taking.  This list reflects what is contained in the patient's profile, which has also been marked as reviewed to communicate to other providers it is the most up to date version of the patient's current medication therapy.     Current Outpatient Medications:   •  albuterol sulfate  (90 Base) MCG/ACT inhaler, Inhale 2 puffs Every 6 (Six) Hours As Needed for Wheezing., Disp: 18 g, Rfl: 5  •  aspirin 81 MG EC tablet, Take 81 mg by mouth Daily., Disp: , Rfl:   •  atorvastatin (LIPITOR) 20 MG tablet, Take 1 tablet by mouth Every Night., Disp: 90 tablet, Rfl: 1  •  enzalutamide (XTANDI) 40 MG chemo capsule, Take 4 capsules by mouth Daily., Disp: 120 capsule, Rfl: 5  •  EPINEPHrine (EPIPEN) 0.3 MG/0.3ML solution auto-injector injection, INJECT 1 PEN IN THE MUSCLE ONE TIME AS DIRECTED, Disp: , Rfl:   •  fenofibrate (TRICOR) 145 MG tablet, Take 1 tablet by mouth Daily., Disp: 90 tablet, Rfl: 1  •  HYDROcodone-acetaminophen (NORCO)  MG per tablet, Take 1 tablet by mouth Every 6 (Six) Hours As Needed for Moderate Pain ., Disp:  120 tablet, Rfl: 0  •  hydrOXYzine (ATARAX) 50 MG tablet, TAKE 1 TABLET BY MOUTH THREE TIMES DAILY AS NEEDED FOR ITCHING, Disp: 90 tablet, Rfl: 0  •  metoprolol tartrate (LOPRESSOR) 25 MG tablet, Take 1 tablet by mouth Daily., Disp: 90 tablet, Rfl: 1  •  montelukast (SINGULAIR) 10 MG tablet, Take 1 tablet by mouth Every Night., Disp: 90 tablet, Rfl: 1  •  pantoprazole (PROTONIX) 40 MG EC tablet, Take 1 tablet by mouth Daily., Disp: 90 tablet, Rfl: 1    Drug Interactions  None     Adverse Drug Reactions  • Adverse Reactions Experienced: Occasional hot flashes.   Plan for ADR Management: MD is aware. Patient reports the hot flashes occurring 1-2 times/month and does not want to pursue pharmaceutical options at this time. Will continue to monitor.    Hospitalizations and Urgent Care Since Last Assessment  • Hospitalizations or Admissions: 0    • ED Visits: 0   • Urgent Office Visits: 0     Adherence and Self-Administration  • Approximate Number of Doses Missed Since Last Assessment: 0   • Ongoing or New Barriers to Patient Adherence and/or Self-Administration: None   • Methods for Supporting Patient Adherence and/or Self-Administration: Provided direct education. Care coordinator to continue providing once-monthly outreach calls to assist with adherence and coordinate medication refills. Pharmacist to provide clinical assessments every 6 months and will assist in the management of clinical issues as they arise.        Goals of Therapy  • Progress Toward Meeting Patient-Identified Goals of Therapy:  On-track  o Patient-Identified Goals  - Consistently take medications as prescribed  - Patient will adhere to medication regimen  - Patient will report any medication side effects to healthcare provider    • Progress Toward Meeting Clinical Goals or Therapeutic Targets: On-track  o Clinical Goals or Therapeutic Targets  - Support patient understanding of medication regimen  - Ensure patient knows the pharmacy contact  information  - Schedule regular follow-up to monitor the treatment serious adverse events  - Schedule regular follow-up to confirm medication adherence  - Schedule regular follow-up to monitor disease progression or stabilty    Quality of Life Change Assessment   Quality of Life related to the patient's specialty therapy was discussed with the patient. The QOL segment of this outreach has been reviewed and updated.   • Quality of Life Score Change: 5    Reassessment Plan & Follow-Up  1. Pharmacist to continue to perform regular reassessments no more than (6) months from the previous assessment.  2. Care Coordinator to facilitate future refill outreaches, coordinate prescription delivery, and escalate clinical questions to pharmacist.     Additional Plans, Therapy Recommendations or Therapy Problems to Be Addressed: None  Recent Provider Changes:  None    Attestation  I attest that the specialty medication(s) addressed above are appropriate for the patient based on my reassessment.  If the prescribed therapy is at any point deemed not appropriate based on the current or future assessments, a consultation will be initiated with the patient's specialty care provider to determine the best course of action. The revised plan of therapy will be documented along with any additional patient education provided.     Oscar Houser, Karin  Oncology Clinical Pharmacist  11/23/2022  10:32 EST

## 2022-11-28 ENCOUNTER — OFFICE VISIT (OUTPATIENT)
Dept: FAMILY MEDICINE CLINIC | Facility: CLINIC | Age: 62
End: 2022-11-28

## 2022-11-28 VITALS
WEIGHT: 191.4 LBS | OXYGEN SATURATION: 99 % | BODY MASS INDEX: 28.35 KG/M2 | HEIGHT: 69 IN | SYSTOLIC BLOOD PRESSURE: 102 MMHG | DIASTOLIC BLOOD PRESSURE: 58 MMHG | TEMPERATURE: 97.8 F | HEART RATE: 80 BPM

## 2022-11-28 DIAGNOSIS — F45.8 NEUROPATHIC PRURITUS: ICD-10-CM

## 2022-11-28 DIAGNOSIS — E78.2 MIXED HYPERLIPIDEMIA: ICD-10-CM

## 2022-11-28 DIAGNOSIS — Z12.11 SCREENING FOR COLON CANCER: ICD-10-CM

## 2022-11-28 DIAGNOSIS — Z00.00 ANNUAL PHYSICAL EXAM: Primary | ICD-10-CM

## 2022-11-28 DIAGNOSIS — K21.9 GASTROESOPHAGEAL REFLUX DISEASE: ICD-10-CM

## 2022-11-28 DIAGNOSIS — R00.0 TACHYCARDIA: ICD-10-CM

## 2022-11-28 DIAGNOSIS — E53.8 VITAMIN B 12 DEFICIENCY: ICD-10-CM

## 2022-11-28 DIAGNOSIS — Z88.9 MULTIPLE ALLERGIES: ICD-10-CM

## 2022-11-28 PROCEDURE — 99396 PREV VISIT EST AGE 40-64: CPT | Performed by: NURSE PRACTITIONER

## 2022-11-28 RX ORDER — ATORVASTATIN CALCIUM 20 MG/1
20 TABLET, FILM COATED ORAL NIGHTLY
Qty: 90 TABLET | Refills: 1 | Status: SHIPPED | OUTPATIENT
Start: 2022-11-28 | End: 2023-02-28 | Stop reason: SDUPTHER

## 2022-11-28 RX ORDER — ALBUTEROL SULFATE 90 UG/1
2 AEROSOL, METERED RESPIRATORY (INHALATION) EVERY 6 HOURS PRN
Qty: 18 G | Refills: 5 | Status: SHIPPED | OUTPATIENT
Start: 2022-11-28

## 2022-11-28 RX ORDER — FENOFIBRATE 145 MG/1
145 TABLET, COATED ORAL DAILY
Qty: 90 TABLET | Refills: 1 | Status: SHIPPED | OUTPATIENT
Start: 2022-11-28 | End: 2023-02-28 | Stop reason: SDUPTHER

## 2022-11-28 RX ORDER — PANTOPRAZOLE SODIUM 40 MG/1
40 TABLET, DELAYED RELEASE ORAL DAILY
Qty: 90 TABLET | Refills: 1 | Status: SHIPPED | OUTPATIENT
Start: 2022-11-28 | End: 2023-02-28 | Stop reason: SDUPTHER

## 2022-11-28 RX ORDER — MONTELUKAST SODIUM 10 MG/1
10 TABLET ORAL NIGHTLY
Qty: 90 TABLET | Refills: 1 | Status: SHIPPED | OUTPATIENT
Start: 2022-11-28 | End: 2023-02-28

## 2022-11-28 NOTE — PROGRESS NOTES
Chief Complaint  Leg Pain and Annual Exam    Subjective          Ta Madison presents to [facility]  History of Present Illness  Presents today for annual exam.    Diagnosed with Prostate cancer two years ago .  Following oncology and Urology.  No recent changes.    Rash  This is a chronic problem. The problem has been waxing and waning since onset. The affected locations include the torso and chest (bilateral arms). The rash is characterized by dryness, redness, itchiness and pain. He was exposed to nothing. Pertinent negatives include no cough or shortness of breath. Past treatments include antihistamine, anti-itch cream, antibiotics, moisturizer and topical steroids. The treatment provided mild relief.   Hyperlipidemia  This is a chronic problem. Recent lipid tests were reviewed and are variable. Exacerbating diseases include obesity. Factors aggravating his hyperlipidemia include fatty foods. Associated symptoms include leg pain. Pertinent negatives include no chest pain or shortness of breath. Current antihyperlipidemic treatment includes statins. Compliance problems include adherence to diet and adherence to exercise.    Hypertension  This is a chronic (tachycardia controlled with current medications) problem. The current episode started more than 1 year ago. The problem is controlled. Pertinent negatives include no chest pain, palpitations, peripheral edema or shortness of breath. Risk factors for coronary artery disease include dyslipidemia. Past treatments include beta blockers. Current antihypertension treatment includes beta blockers. The current treatment provides significant improvement. Compliance problems include diet.    Heartburn  He complains of heartburn. He reports no chest pain, no coughing or no water brash. This is a chronic problem. The problem occurs rarely. The problem has been unchanged. The heartburn is of mild intensity. He has tried a PPI for the  "symptoms. The treatment provided moderate relief.   Leg Pain   Incident onset: 2-3 months. There was no injury mechanism. The pain is present in the left leg, right heel, left heel and right leg. The quality of the pain is described as aching and burning. The pain is mild. The pain has been intermittent since onset. Pertinent negatives include no inability to bear weight, loss of motion, loss of sensation, muscle weakness, numbness or tingling. He reports no foreign bodies present. The symptoms are aggravated by weight bearing (end of the day ). He has tried nothing for the symptoms.       Objective   Vital Signs:   /58 (BP Location: Left arm, Patient Position: Sitting)   Pulse 80   Temp 97.8 °F (36.6 °C) (Temporal)   Ht 175.3 cm (69\")   Wt 86.8 kg (191 lb 6.4 oz)   SpO2 99%   BMI 28.26 kg/m²     Physical Exam  Vitals and nursing note reviewed.   Constitutional:       Appearance: He is well-developed. He is obese.   HENT:      Head: Normocephalic.   Cardiovascular:      Rate and Rhythm: Normal rate and regular rhythm.      Heart sounds: Normal heart sounds. No murmur heard.  Pulmonary:      Effort: Pulmonary effort is normal.      Breath sounds: Normal breath sounds.   Abdominal:      General: Bowel sounds are normal.      Palpations: Abdomen is soft.   Musculoskeletal:         General: No swelling, tenderness, deformity or signs of injury.      Right lower leg: No edema.      Left lower leg: No edema.   Skin:     General: Skin is warm and dry.      Findings: Rash present.   Neurological:      Mental Status: He is alert and oriented to person, place, and time.   Psychiatric:         Behavior: Behavior normal.        Result Review :   During this visit the following were done:  Labs Reviewed [x]    Labs Ordered [x]    Radiology Reports Reviewed []    Radiology Ordered []    PCP Records Reviewed []    Referring Provider Records Reviewed []    ER Records Reviewed []    Hospital Records Reviewed []  "   History Obtained From Family []    Radiology Images Reviewed []    Other Reviewed []    Records Requested []             Diagnoses and all orders for this visit:    1. Annual physical exam (Primary)  -     Comprehensive Metabolic Panel; Future  -     Hemoglobin A1c; Future  -     Lipid Panel; Future  -     TSH; Future  -     Vitamin B12; Future  -     Vitamin D,25-Hydroxy; Future  -     Ambulatory Referral to Gastroenterology    2. Tachycardia  -     metoprolol tartrate (LOPRESSOR) 25 MG tablet; Take 1 tablet by mouth Daily.  Dispense: 90 tablet; Refill: 1  -     Comprehensive Metabolic Panel; Future  -     Lipid Panel; Future    3. Multiple allergies  -     albuterol sulfate  (90 Base) MCG/ACT inhaler; Inhale 2 puffs Every 6 (Six) Hours As Needed for Wheezing.  Dispense: 18 g; Refill: 5  -     montelukast (SINGULAIR) 10 MG tablet; Take 1 tablet by mouth Every Night.  Dispense: 90 tablet; Refill: 1    4. Mixed hyperlipidemia  -     atorvastatin (LIPITOR) 20 MG tablet; Take 1 tablet by mouth Every Night.  Dispense: 90 tablet; Refill: 1  -     fenofibrate (TRICOR) 145 MG tablet; Take 1 tablet by mouth Daily.  Dispense: 90 tablet; Refill: 1  -     Comprehensive Metabolic Panel; Future  -     Lipid Panel; Future    5. Gastroesophageal reflux disease  -     pantoprazole (PROTONIX) 40 MG EC tablet; Take 1 tablet by mouth Daily.  Dispense: 90 tablet; Refill: 1    6. Vitamin B 12 deficiency  -     Vitamin B12; Future  -     Vitamin D,25-Hydroxy; Future    7. Neuropathic pruritus  -     Hemoglobin A1c; Future  -     TSH; Future    8. Screening for colon cancer  -     Ambulatory Referral to Gastroenterology    Continue with urology and oncology     BMI is >= 25 and < 30. (Overweight) The following options were offered after discussion: exercise counseling/recommendations and nutrition counseling/recommendations              Follow Up   Return in about 3 months (around 2/28/2023), or if symptoms worsen or fail to  improve, for Recheck.  Patient was given instructions and counseling regarding his condition or for health maintenance advice. Please see specific information pulled into the AVS if appropriate.

## 2022-12-06 DIAGNOSIS — Z12.11 ENCOUNTER FOR SCREENING FOR MALIGNANT NEOPLASM OF COLON: Primary | ICD-10-CM

## 2022-12-06 RX ORDER — POLYETHYLENE GLYCOL 3350 17 G/17G
510 POWDER, FOR SOLUTION ORAL ONCE
Qty: 510 G | Refills: 0 | Status: SHIPPED | OUTPATIENT
Start: 2022-12-06 | End: 2022-12-06

## 2022-12-06 RX ORDER — BISACODYL 5 MG/1
20 TABLET, DELAYED RELEASE ORAL ONCE
Qty: 4 TABLET | Refills: 0 | Status: SHIPPED | OUTPATIENT
Start: 2022-12-06 | End: 2022-12-06

## 2022-12-13 ENCOUNTER — TELEPHONE (OUTPATIENT)
Dept: FAMILY MEDICINE CLINIC | Facility: CLINIC | Age: 62
End: 2022-12-13
Payer: COMMERCIAL

## 2022-12-13 ENCOUNTER — LAB (OUTPATIENT)
Dept: FAMILY MEDICINE CLINIC | Facility: CLINIC | Age: 62
End: 2022-12-13

## 2022-12-13 DIAGNOSIS — R00.0 TACHYCARDIA: ICD-10-CM

## 2022-12-13 DIAGNOSIS — E53.8 VITAMIN B 12 DEFICIENCY: ICD-10-CM

## 2022-12-13 DIAGNOSIS — F45.8 NEUROPATHIC PRURITUS: ICD-10-CM

## 2022-12-13 DIAGNOSIS — E78.2 MIXED HYPERLIPIDEMIA: ICD-10-CM

## 2022-12-13 DIAGNOSIS — Z00.00 ANNUAL PHYSICAL EXAM: ICD-10-CM

## 2022-12-13 PROCEDURE — 80053 COMPREHEN METABOLIC PANEL: CPT | Performed by: NURSE PRACTITIONER

## 2022-12-13 PROCEDURE — 84443 ASSAY THYROID STIM HORMONE: CPT | Performed by: NURSE PRACTITIONER

## 2022-12-13 PROCEDURE — 80061 LIPID PANEL: CPT | Performed by: NURSE PRACTITIONER

## 2022-12-13 PROCEDURE — 82607 VITAMIN B-12: CPT | Performed by: NURSE PRACTITIONER

## 2022-12-13 PROCEDURE — 82306 VITAMIN D 25 HYDROXY: CPT | Performed by: NURSE PRACTITIONER

## 2022-12-13 PROCEDURE — 83036 HEMOGLOBIN GLYCOSYLATED A1C: CPT | Performed by: NURSE PRACTITIONER

## 2022-12-14 ENCOUNTER — TELEPHONE (OUTPATIENT)
Dept: FAMILY MEDICINE CLINIC | Facility: CLINIC | Age: 62
End: 2022-12-14

## 2022-12-14 LAB
25(OH)D3 SERPL-MCNC: 16.8 NG/ML (ref 30–100)
ALBUMIN SERPL-MCNC: 4.9 G/DL (ref 3.5–5.2)
ALBUMIN/GLOB SERPL: 1.5 G/DL
ALP SERPL-CCNC: 87 U/L (ref 39–117)
ALT SERPL W P-5'-P-CCNC: 10 U/L (ref 1–41)
ANION GAP SERPL CALCULATED.3IONS-SCNC: 13.5 MMOL/L (ref 5–15)
AST SERPL-CCNC: 14 U/L (ref 1–40)
BILIRUB SERPL-MCNC: 0.3 MG/DL (ref 0–1.2)
BUN SERPL-MCNC: 18 MG/DL (ref 8–23)
BUN/CREAT SERPL: 16.2 (ref 7–25)
CALCIUM SPEC-SCNC: 9.8 MG/DL (ref 8.6–10.5)
CHLORIDE SERPL-SCNC: 101 MMOL/L (ref 98–107)
CHOLEST SERPL-MCNC: 179 MG/DL (ref 0–200)
CO2 SERPL-SCNC: 24.5 MMOL/L (ref 22–29)
CREAT SERPL-MCNC: 1.11 MG/DL (ref 0.76–1.27)
EGFRCR SERPLBLD CKD-EPI 2021: 75.5 ML/MIN/1.73
GLOBULIN UR ELPH-MCNC: 3.2 GM/DL
GLUCOSE SERPL-MCNC: 88 MG/DL (ref 65–99)
HBA1C MFR BLD: 5.6 % (ref 4.8–5.6)
HDLC SERPL-MCNC: 37 MG/DL (ref 40–60)
LDLC SERPL CALC-MCNC: 112 MG/DL (ref 0–100)
LDLC/HDLC SERPL: 2.94 {RATIO}
POTASSIUM SERPL-SCNC: 4.4 MMOL/L (ref 3.5–5.2)
PROT SERPL-MCNC: 8.1 G/DL (ref 6–8.5)
SODIUM SERPL-SCNC: 139 MMOL/L (ref 136–145)
TRIGL SERPL-MCNC: 167 MG/DL (ref 0–150)
TSH SERPL DL<=0.05 MIU/L-ACNC: 2.34 UIU/ML (ref 0.27–4.2)
VIT B12 BLD-MCNC: >2000 PG/ML (ref 211–946)
VLDLC SERPL-MCNC: 30 MG/DL (ref 5–40)

## 2022-12-14 RX ORDER — ERGOCALCIFEROL 1.25 MG/1
50000 CAPSULE ORAL WEEKLY
Qty: 5 CAPSULE | Refills: 5 | Status: SHIPPED | OUTPATIENT
Start: 2022-12-14

## 2022-12-14 NOTE — TELEPHONE ENCOUNTER
----- Message from EUGENIA Mendoza sent at 12/14/2022  1:09 PM EST -----  Labs are improved except VIT D is very low.  Sent weekly replacement to his pharmacy     Patient notified.

## 2022-12-19 ENCOUNTER — SPECIALTY PHARMACY (OUTPATIENT)
Dept: PHARMACY | Facility: HOSPITAL | Age: 62
End: 2022-12-19
Payer: COMMERCIAL

## 2022-12-19 DIAGNOSIS — C61 ADENOCARCINOMA OF PROSTATE: ICD-10-CM

## 2022-12-19 NOTE — PROGRESS NOTES
Specialty Pharmacy Refill Coordination Note      Name:  Ta Madison  :  1960  Date:  2022         Past Medical History:   Diagnosis Date   • Increased heart rate    • Mini stroke    • Neck pain    • Prostate cancer (HCC)    • Rash    • Tobacco abuse        Past Surgical History:   Procedure Laterality Date   • APPENDECTOMY      [facility]        Social History     Socioeconomic History   • Marital status:    Tobacco Use   • Smoking status: Light Smoker     Packs/day: 1.00     Years: 40.00     Pack years: 40.00     Types: Cigarettes     Last attempt to quit: 3/12/2019     Years since quitting: 3.7   • Smokeless tobacco: Never   Vaping Use   • Vaping Use: Never used   Substance and Sexual Activity   • Alcohol use: No   • Drug use: Yes     Types: Marijuana     Comment: occ    • Sexual activity: Defer       Family History   Problem Relation Age of Onset   • Nephrolithiasis Mother    • Heart disease Father    • Hypertension Father    • Kidney disease Father        No Known Allergies    Current Outpatient Medications   Medication Sig Dispense Refill   • albuterol sulfate  (90 Base) MCG/ACT inhaler Inhale 2 puffs Every 6 (Six) Hours As Needed for Wheezing. 18 g 5   • aspirin 81 MG EC tablet Take 81 mg by mouth Daily.     • atorvastatin (LIPITOR) 20 MG tablet Take 1 tablet by mouth Every Night. 90 tablet 1   • enzalutamide (XTANDI) 40 MG chemo capsule Take 4 capsules by mouth Daily. 120 capsule 5   • EPINEPHrine (EPIPEN) 0.3 MG/0.3ML solution auto-injector injection INJECT 1 PEN IN THE MUSCLE ONE TIME AS DIRECTED     • fenofibrate (TRICOR) 145 MG tablet Take 1 tablet by mouth Daily. 90 tablet 1   • HYDROcodone-acetaminophen (NORCO)  MG per tablet Take 1 tablet by mouth Every 6 (Six) Hours As Needed for Moderate Pain . 120 tablet 0   • hydrOXYzine (ATARAX) 50 MG tablet TAKE 1 TABLET BY MOUTH THREE TIMES DAILY AS NEEDED FOR ITCHING 90 tablet 0   • metoprolol tartrate  (LOPRESSOR) 25 MG tablet Take 1 tablet by mouth Daily. 90 tablet 1   • montelukast (SINGULAIR) 10 MG tablet Take 1 tablet by mouth Every Night. 90 tablet 1   • pantoprazole (PROTONIX) 40 MG EC tablet Take 1 tablet by mouth Daily. 90 tablet 1   • vitamin D (ERGOCALCIFEROL) 1.25 MG (40382 UT) capsule capsule Take 1 capsule by mouth 1 (One) Time Per Week. 5 capsule 5     No current facility-administered medications for this visit.         ASSESSMENT/PLAN:      Ta is a 62 y.o. male contacted today regarding refills of  Xtandi 40mg capsule  specialty medication(s).    Reviewed and verified with patient:       Specialty medication(s) and dose(s) confirmed: yes    Refill Questions    Flowsheet Row Most Recent Value   Changes to allergies? No   Changes to medications? No   New conditions since last clinic visit No   Unplanned office visit, urgent care, ED, or hospital admission in the last 4 weeks  No   How does patient/caregiver feel medication is working? Very good   Financial problems or insurance changes  No   If yes, describe changes in insurance or financial issues. no   Since the previous refill, were any specialty medication doses or scheduled injections missed or delayed?  No   If yes, please provide the amount 0   Why were doses missed? no   Does this patient require a clinical escalation to a pharmacist? No                Medication Adherence    Adherence tools used: patient uses a pill box to manage medications  Support network for adherence: family member          Follow-up: 30 day(s)     Brittaney Howe, Pharmacy Technician  Specialty Pharmacy Technician

## 2023-01-10 DIAGNOSIS — G89.3 CHRONIC PAIN DUE TO MALIGNANT NEOPLASTIC DISEASE: ICD-10-CM

## 2023-01-10 RX ORDER — HYDROCODONE BITARTRATE AND ACETAMINOPHEN 10; 325 MG/1; MG/1
1 TABLET ORAL EVERY 6 HOURS PRN
Qty: 120 TABLET | Refills: 0 | Status: SHIPPED | OUTPATIENT
Start: 2023-01-10

## 2023-01-10 NOTE — TELEPHONE ENCOUNTER
Caller: Azael Madison    Relationship: Emergency Contact    Best call back number: [phone]    Requested Prescriptions:   Requested Prescriptions     Pending Prescriptions Disp Refills   • HYDROcodone-acetaminophen (NORCO)  MG per tablet 120 tablet 0     Sig: Take 1 tablet by mouth Every 6 (Six) Hours As Needed for Moderate Pain.        Pharmacy where request should be sent: [id number] [address] [facility] [address] - [phone]  - [phone]     Does the patient have less than a 3 day supply:   [x] Yes  [] No    Would you like a call back once the refill request has been completed: [x] Yes [] No    If the office needs to give you a call back, can they leave a voicemail: [x] Yes [] No

## 2023-01-11 NOTE — TELEPHONE ENCOUNTER
Has The Growth Been Previously Biopsied?: has not been previously biopsied pts wife called and asked about referral. I advised that referral had been placed and that pt would be getting phone call.    Body Location Override (Optional): Left upper back

## 2023-01-17 DIAGNOSIS — Z12.11 ENCOUNTER FOR SCREENING FOR MALIGNANT NEOPLASM OF COLON: Primary | ICD-10-CM

## 2023-01-17 RX ORDER — BISACODYL 5 MG/1
20 TABLET, DELAYED RELEASE ORAL ONCE
Qty: 4 TABLET | Refills: 0 | Status: SHIPPED | OUTPATIENT
Start: 2023-01-17 | End: 2023-01-17

## 2023-01-17 RX ORDER — POLYETHYLENE GLYCOL 3350 17 G/17G
510 POWDER, FOR SOLUTION ORAL ONCE
Qty: 510 G | Refills: 0 | Status: SHIPPED | OUTPATIENT
Start: 2023-01-17 | End: 2023-01-17

## 2023-01-19 ENCOUNTER — LAB (OUTPATIENT)
Dept: ONCOLOGY | Facility: CLINIC | Age: 63
End: 2023-01-19
Payer: COMMERCIAL

## 2023-01-19 DIAGNOSIS — C79.51 BONE METASTASES: ICD-10-CM

## 2023-01-19 DIAGNOSIS — C61 ADENOCARCINOMA OF PROSTATE: ICD-10-CM

## 2023-01-19 LAB
ALBUMIN SERPL-MCNC: 4 G/DL (ref 3.5–5.2)
ALBUMIN/GLOB SERPL: 1.1 G/DL
ALP SERPL-CCNC: 86 U/L (ref 39–117)
ALT SERPL W P-5'-P-CCNC: 9 U/L (ref 1–41)
ANION GAP SERPL CALCULATED.3IONS-SCNC: 8.6 MMOL/L (ref 5–15)
AST SERPL-CCNC: 11 U/L (ref 1–40)
BASOPHILS # BLD AUTO: 0.1 10*3/MM3 (ref 0–0.2)
BASOPHILS NFR BLD AUTO: 1.2 % (ref 0–1.5)
BILIRUB SERPL-MCNC: 0.3 MG/DL (ref 0–1.2)
BUN SERPL-MCNC: 19 MG/DL (ref 8–23)
BUN/CREAT SERPL: 18.8 (ref 7–25)
CALCIUM SPEC-SCNC: 9.3 MG/DL (ref 8.6–10.5)
CHLORIDE SERPL-SCNC: 101 MMOL/L (ref 98–107)
CO2 SERPL-SCNC: 26.4 MMOL/L (ref 22–29)
CREAT SERPL-MCNC: 1.01 MG/DL (ref 0.76–1.27)
DEPRECATED RDW RBC AUTO: 42.5 FL (ref 37–54)
EGFRCR SERPLBLD CKD-EPI 2021: 84.6 ML/MIN/1.73
EOSINOPHIL # BLD AUTO: 0.14 10*3/MM3 (ref 0–0.4)
EOSINOPHIL NFR BLD AUTO: 1.7 % (ref 0.3–6.2)
ERYTHROCYTE [DISTWIDTH] IN BLOOD BY AUTOMATED COUNT: 14.8 % (ref 12.3–15.4)
GLOBULIN UR ELPH-MCNC: 3.5 GM/DL
GLUCOSE SERPL-MCNC: 114 MG/DL (ref 65–99)
HCT VFR BLD AUTO: 37.6 % (ref 37.5–51)
HGB BLD-MCNC: 12.1 G/DL (ref 13–17.7)
IMM GRANULOCYTES # BLD AUTO: 0.03 10*3/MM3 (ref 0–0.05)
IMM GRANULOCYTES NFR BLD AUTO: 0.4 % (ref 0–0.5)
LYMPHOCYTES # BLD AUTO: 3 10*3/MM3 (ref 0.7–3.1)
LYMPHOCYTES NFR BLD AUTO: 36.6 % (ref 19.6–45.3)
MCH RBC QN AUTO: 25.4 PG (ref 26.6–33)
MCHC RBC AUTO-ENTMCNC: 32.2 G/DL (ref 31.5–35.7)
MCV RBC AUTO: 79 FL (ref 79–97)
MONOCYTES # BLD AUTO: 0.58 10*3/MM3 (ref 0.1–0.9)
MONOCYTES NFR BLD AUTO: 7.1 % (ref 5–12)
NEUTROPHILS NFR BLD AUTO: 4.34 10*3/MM3 (ref 1.7–7)
NEUTROPHILS NFR BLD AUTO: 53 % (ref 42.7–76)
NRBC BLD AUTO-RTO: 0 /100 WBC (ref 0–0.2)
PLATELET # BLD AUTO: 586 10*3/MM3 (ref 140–450)
PMV BLD AUTO: 10.1 FL (ref 6–12)
POTASSIUM SERPL-SCNC: 4.5 MMOL/L (ref 3.5–5.2)
PROT SERPL-MCNC: 7.5 G/DL (ref 6–8.5)
PSA SERPL-MCNC: <0.014 NG/ML (ref 0–4)
RBC # BLD AUTO: 4.76 10*6/MM3 (ref 4.14–5.8)
SODIUM SERPL-SCNC: 136 MMOL/L (ref 136–145)
WBC NRBC COR # BLD: 8.19 10*3/MM3 (ref 3.4–10.8)

## 2023-01-19 PROCEDURE — 80053 COMPREHEN METABOLIC PANEL: CPT | Performed by: INTERNAL MEDICINE

## 2023-01-19 PROCEDURE — 36415 COLL VENOUS BLD VENIPUNCTURE: CPT | Performed by: INTERNAL MEDICINE

## 2023-01-19 PROCEDURE — 85025 COMPLETE CBC W/AUTO DIFF WBC: CPT | Performed by: INTERNAL MEDICINE

## 2023-01-19 PROCEDURE — 84153 ASSAY OF PSA TOTAL: CPT | Performed by: INTERNAL MEDICINE

## 2023-01-19 NOTE — PROGRESS NOTES
Venipuncture Blood Specimen Collection  Venipuncture performed in right arm by Chris Hernandez MA with good hemostasis. Patient tolerated the procedure well without complications.   01/19/23   Chris Hernandez MA

## 2023-01-23 ENCOUNTER — SPECIALTY PHARMACY (OUTPATIENT)
Dept: PHARMACY | Facility: HOSPITAL | Age: 63
End: 2023-01-23
Payer: COMMERCIAL

## 2023-01-23 NOTE — PROGRESS NOTES
Specialty Pharmacy Refill Coordination Note      Name:  Ta Madison  :  1960  Date:  2023         Past Medical History:   Diagnosis Date   • Increased heart rate    • Mini stroke    • Neck pain    • Prostate cancer (HCC)    • Rash    • Tobacco abuse        Past Surgical History:   Procedure Laterality Date   • APPENDECTOMY      [facility]        Social History     Socioeconomic History   • Marital status:    Tobacco Use   • Smoking status: Light Smoker     Packs/day: 1.00     Years: 40.00     Pack years: 40.00     Types: Cigarettes     Last attempt to quit: 3/12/2019     Years since quitting: 3.8   • Smokeless tobacco: Never   Vaping Use   • Vaping Use: Never used   Substance and Sexual Activity   • Alcohol use: No   • Drug use: Yes     Types: Marijuana     Comment: occ    • Sexual activity: Defer       Family History   Problem Relation Age of Onset   • Nephrolithiasis Mother    • Heart disease Father    • Hypertension Father    • Kidney disease Father        No Known Allergies    Current Outpatient Medications   Medication Sig Dispense Refill   • HYDROcodone-acetaminophen (NORCO)  MG per tablet Take 1 tablet by mouth Every 6 (Six) Hours As Needed for Moderate Pain. 120 tablet 0   • albuterol sulfate  (90 Base) MCG/ACT inhaler Inhale 2 puffs Every 6 (Six) Hours As Needed for Wheezing. 18 g 5   • aspirin 81 MG EC tablet Take 81 mg by mouth Daily.     • atorvastatin (LIPITOR) 20 MG tablet Take 1 tablet by mouth Every Night. 90 tablet 1   • enzalutamide (XTANDI) 40 MG chemo capsule Take 4 capsules by mouth Daily. 120 capsule 5   • EPINEPHrine (EPIPEN) 0.3 MG/0.3ML solution auto-injector injection INJECT 1 PEN IN THE MUSCLE ONE TIME AS DIRECTED     • fenofibrate (TRICOR) 145 MG tablet Take 1 tablet by mouth Daily. 90 tablet 1   • hydrOXYzine (ATARAX) 50 MG tablet TAKE 1 TABLET BY MOUTH THREE TIMES DAILY AS NEEDED FOR ITCHING 90 tablet 0   • metoprolol tartrate  (LOPRESSOR) 25 MG tablet Take 1 tablet by mouth Daily. 90 tablet 1   • montelukast (SINGULAIR) 10 MG tablet Take 1 tablet by mouth Every Night. 90 tablet 1   • pantoprazole (PROTONIX) 40 MG EC tablet Take 1 tablet by mouth Daily. 90 tablet 1   • vitamin D (ERGOCALCIFEROL) 1.25 MG (37105 UT) capsule capsule Take 1 capsule by mouth 1 (One) Time Per Week. 5 capsule 5     No current facility-administered medications for this visit.         ASSESSMENT/PLAN:      Ta is a 62 y.o. male contacted today regarding refills of  Xtandi 40mg chemo capsule specialty medication(s).    Reviewed and verified with patient:       Specialty medication(s) and dose(s) confirmed: yes    Refill Questions    Flowsheet Row Most Recent Value   Changes to allergies? No   Changes to medications? No   New conditions since last clinic visit No   Unplanned office visit, urgent care, ED, or hospital admission in the last 4 weeks  No   How does patient/caregiver feel medication is working? Very good   Financial problems or insurance changes  No   If yes, describe changes in insurance or financial issues. no   Since the previous refill, were any specialty medication doses or scheduled injections missed or delayed?  No   If yes, please provide the amount 0   Why were doses missed? no   Does this patient require a clinical escalation to a pharmacist? No                Medication Adherence    Adherence tools used: patient uses a pill box to manage medications  Support network for adherence: family member          Follow-up: 30 day(s)     Brittaney Howe, Pharmacy Technician  Specialty Pharmacy Technician

## 2023-01-25 ENCOUNTER — ANESTHESIA EVENT (OUTPATIENT)
Dept: PERIOP | Facility: HOSPITAL | Age: 63
End: 2023-01-25
Payer: COMMERCIAL

## 2023-01-25 ENCOUNTER — HOSPITAL ENCOUNTER (OUTPATIENT)
Facility: HOSPITAL | Age: 63
Setting detail: HOSPITAL OUTPATIENT SURGERY
Discharge: HOME | End: 2023-01-25
Attending: INTERNAL MEDICINE | Admitting: INTERNAL MEDICINE
Payer: COMMERCIAL

## 2023-01-25 ENCOUNTER — ANESTHESIA (OUTPATIENT)
Dept: PERIOP | Facility: HOSPITAL | Age: 63
End: 2023-01-25
Payer: COMMERCIAL

## 2023-01-25 VITALS
OXYGEN SATURATION: 95 % | DIASTOLIC BLOOD PRESSURE: 61 MMHG | SYSTOLIC BLOOD PRESSURE: 110 MMHG | HEIGHT: 69 IN | BODY MASS INDEX: 29.62 KG/M2 | RESPIRATION RATE: 20 BRPM | WEIGHT: 200 LBS | HEART RATE: 65 BPM | TEMPERATURE: 97.4 F

## 2023-01-25 DIAGNOSIS — Z12.11 ENCOUNTER FOR SCREENING FOR MALIGNANT NEOPLASM OF COLON: ICD-10-CM

## 2023-01-25 PROCEDURE — 45385 COLONOSCOPY W/LESION REMOVAL: CPT | Performed by: INTERNAL MEDICINE

## 2023-01-25 PROCEDURE — 25010000002 MIDAZOLAM PER 1MG: Performed by: NURSE ANESTHETIST, CERTIFIED REGISTERED

## 2023-01-25 PROCEDURE — 25010000002 PROPOFOL 200 MG/20ML EMULSION: Performed by: NURSE ANESTHETIST, CERTIFIED REGISTERED

## 2023-01-25 PROCEDURE — 25010000002 PROPOFOL 10 MG/ML EMULSION: Performed by: NURSE ANESTHETIST, CERTIFIED REGISTERED

## 2023-01-25 PROCEDURE — 25010000002 FENTANYL CITRATE (PF) 50 MCG/ML SOLUTION: Performed by: NURSE ANESTHETIST, CERTIFIED REGISTERED

## 2023-01-25 RX ORDER — SODIUM CHLORIDE, SODIUM LACTATE, POTASSIUM CHLORIDE, CALCIUM CHLORIDE 600; 310; 30; 20 MG/100ML; MG/100ML; MG/100ML; MG/100ML
INJECTION, SOLUTION INTRAVENOUS CONTINUOUS PRN
Status: DISCONTINUED | OUTPATIENT
Start: 2023-01-25 | End: 2023-01-25 | Stop reason: SURG

## 2023-01-25 RX ORDER — SODIUM CHLORIDE, SODIUM LACTATE, POTASSIUM CHLORIDE, CALCIUM CHLORIDE 600; 310; 30; 20 MG/100ML; MG/100ML; MG/100ML; MG/100ML
125 INJECTION, SOLUTION INTRAVENOUS ONCE
Status: DISCONTINUED | OUTPATIENT
Start: 2023-01-25 | End: 2023-01-25 | Stop reason: HOSPADM

## 2023-01-25 RX ORDER — SODIUM CHLORIDE, SODIUM LACTATE, POTASSIUM CHLORIDE, CALCIUM CHLORIDE 600; 310; 30; 20 MG/100ML; MG/100ML; MG/100ML; MG/100ML
100 INJECTION, SOLUTION INTRAVENOUS ONCE AS NEEDED
Status: DISCONTINUED | OUTPATIENT
Start: 2023-01-25 | End: 2023-01-25 | Stop reason: HOSPADM

## 2023-01-25 RX ORDER — SODIUM CHLORIDE 0.9 % (FLUSH) 0.9 %
10 SYRINGE (ML) INJECTION AS NEEDED
Status: DISCONTINUED | OUTPATIENT
Start: 2023-01-25 | End: 2023-01-25 | Stop reason: HOSPADM

## 2023-01-25 RX ORDER — SODIUM CHLORIDE 9 MG/ML
40 INJECTION, SOLUTION INTRAVENOUS AS NEEDED
Status: DISCONTINUED | OUTPATIENT
Start: 2023-01-25 | End: 2023-01-25 | Stop reason: HOSPADM

## 2023-01-25 RX ORDER — ONDANSETRON 2 MG/ML
4 INJECTION INTRAMUSCULAR; INTRAVENOUS AS NEEDED
Status: DISCONTINUED | OUTPATIENT
Start: 2023-01-25 | End: 2023-01-25 | Stop reason: HOSPADM

## 2023-01-25 RX ORDER — IPRATROPIUM BROMIDE AND ALBUTEROL SULFATE 2.5; .5 MG/3ML; MG/3ML
3 SOLUTION RESPIRATORY (INHALATION) ONCE AS NEEDED
Status: DISCONTINUED | OUTPATIENT
Start: 2023-01-25 | End: 2023-01-25 | Stop reason: HOSPADM

## 2023-01-25 RX ORDER — FENTANYL CITRATE 50 UG/ML
50 INJECTION, SOLUTION INTRAMUSCULAR; INTRAVENOUS
Status: DISCONTINUED | OUTPATIENT
Start: 2023-01-25 | End: 2023-01-25 | Stop reason: HOSPADM

## 2023-01-25 RX ORDER — SODIUM CHLORIDE 0.9 % (FLUSH) 0.9 %
10 SYRINGE (ML) INJECTION EVERY 12 HOURS SCHEDULED
Status: DISCONTINUED | OUTPATIENT
Start: 2023-01-25 | End: 2023-01-25 | Stop reason: HOSPADM

## 2023-01-25 RX ORDER — FENTANYL CITRATE 50 UG/ML
INJECTION, SOLUTION INTRAMUSCULAR; INTRAVENOUS AS NEEDED
Status: DISCONTINUED | OUTPATIENT
Start: 2023-01-25 | End: 2023-01-25 | Stop reason: SURG

## 2023-01-25 RX ORDER — MEPERIDINE HYDROCHLORIDE 50 MG/ML
12.5 INJECTION INTRAMUSCULAR; INTRAVENOUS; SUBCUTANEOUS
Status: DISCONTINUED | OUTPATIENT
Start: 2023-01-25 | End: 2023-01-25 | Stop reason: HOSPADM

## 2023-01-25 RX ORDER — LIDOCAINE HYDROCHLORIDE 20 MG/ML
INJECTION, SOLUTION EPIDURAL; INFILTRATION; INTRACAUDAL; PERINEURAL AS NEEDED
Status: DISCONTINUED | OUTPATIENT
Start: 2023-01-25 | End: 2023-01-25 | Stop reason: SURG

## 2023-01-25 RX ORDER — OXYCODONE HYDROCHLORIDE AND ACETAMINOPHEN 5; 325 MG/1; MG/1
1 TABLET ORAL ONCE AS NEEDED
Status: DISCONTINUED | OUTPATIENT
Start: 2023-01-25 | End: 2023-01-25 | Stop reason: HOSPADM

## 2023-01-25 RX ORDER — MIDAZOLAM HYDROCHLORIDE 1 MG/ML
1 INJECTION INTRAMUSCULAR; INTRAVENOUS
Status: DISCONTINUED | OUTPATIENT
Start: 2023-01-25 | End: 2023-01-25 | Stop reason: HOSPADM

## 2023-01-25 RX ORDER — MIDAZOLAM HYDROCHLORIDE 2 MG/2ML
INJECTION, SOLUTION INTRAMUSCULAR; INTRAVENOUS AS NEEDED
Status: DISCONTINUED | OUTPATIENT
Start: 2023-01-25 | End: 2023-01-25 | Stop reason: SURG

## 2023-01-25 RX ORDER — KETOROLAC TROMETHAMINE 30 MG/ML
30 INJECTION, SOLUTION INTRAMUSCULAR; INTRAVENOUS EVERY 6 HOURS PRN
Status: DISCONTINUED | OUTPATIENT
Start: 2023-01-25 | End: 2023-01-25 | Stop reason: HOSPADM

## 2023-01-25 RX ORDER — PROPOFOL 10 MG/ML
INJECTION, EMULSION INTRAVENOUS AS NEEDED
Status: DISCONTINUED | OUTPATIENT
Start: 2023-01-25 | End: 2023-01-25 | Stop reason: SURG

## 2023-01-25 RX ADMIN — PROPOFOL 140 MCG/KG/MIN: 10 INJECTION, EMULSION INTRAVENOUS at 09:28

## 2023-01-25 RX ADMIN — FENTANYL CITRATE 100 MCG: 50 INJECTION INTRAMUSCULAR; INTRAVENOUS at 09:24

## 2023-01-25 RX ADMIN — MIDAZOLAM HYDROCHLORIDE 2 MG: 1 INJECTION, SOLUTION INTRAMUSCULAR; INTRAVENOUS at 09:24

## 2023-01-25 RX ADMIN — PROPOFOL 50 MG: 10 INJECTION, EMULSION INTRAVENOUS at 09:28

## 2023-01-25 RX ADMIN — LIDOCAINE HYDROCHLORIDE 60 MG: 20 INJECTION, SOLUTION EPIDURAL; INFILTRATION; INTRACAUDAL; PERINEURAL at 09:28

## 2023-01-25 RX ADMIN — SODIUM CHLORIDE, POTASSIUM CHLORIDE, SODIUM LACTATE AND CALCIUM CHLORIDE: 600; 310; 30; 20 INJECTION, SOLUTION INTRAVENOUS at 09:24

## 2023-01-25 NOTE — H&P
[facility]    Patient Identification:  Name:  Ta Madison  Age:  62 y.o.  Sex:  male  :  1960  MRN:  [id number]   Visit Number:  [id number]  Primary Care Physician:  Yolande Olvera APRN       Chief complaint: Screening colonoscopy    History of presenting illness:  62 y.o. male presents today for screening colonoscopy.  He states his last colonoscopy was 10 years ago.  He currently has stage IV prostate cancer.  He is on hormone therapy.  He is also on a chemotherapy.  He denies bright red blood per rectum or melena.  There is been no change in his bowel pattern.  He has not had unusual weight loss.  The patient denies abdominal pain.  There is no family history of colon cancer.  ---------------------------------------------------------------------------------------------------------------------   Review of Systems   Constitutional: Negative for activity change, appetite change, chills, fatigue, fever and unexpected weight change.   HENT: Negative for mouth sores and trouble swallowing.    Eyes: Negative for photophobia, pain and redness.   Respiratory: Negative for cough and choking.    Cardiovascular: Negative for chest pain and leg swelling.   Gastrointestinal: Negative for abdominal distention, abdominal pain, anal bleeding, constipation, diarrhea, nausea, rectal pain and vomiting.   Endocrine: Negative for cold intolerance, heat intolerance and polyphagia.   Genitourinary: Negative for difficulty urinating and dysuria.   Musculoskeletal: Negative for arthralgias, joint swelling and myalgias.   Skin: Negative for rash and wound.   Allergic/Immunologic: Negative for environmental allergies and food allergies.   Neurological: Negative for dizziness and light-headedness.   Hematological: Negative for adenopathy. Does not bruise/bleed easily.   Psychiatric/Behavioral: Negative for sleep disturbance. The patient is not nervous/anxious.        ---------------------------------------------------------------------------------------------------------------------   Past Medical History:   Diagnosis Date   • Arthritis    • Elevated cholesterol    • GERD (gastroesophageal reflux disease)    • Increased heart rate    • Mini stroke    • Neck pain    • Prostate cancer (HCC)    • Rash    • Tobacco abuse      Past Surgical History:   Procedure Laterality Date   • APPENDECTOMY  1971    [facility]    • COLONOSCOPY       Family History   Problem Relation Age of Onset   • Nephrolithiasis Mother    • Heart disease Father    • Hypertension Father    • Kidney disease Father      Social History     Socioeconomic History   • Marital status:    Tobacco Use   • Smoking status: Every Day     Packs/day: 2.00     Years: 45.00     Pack years: 90.00     Types: Cigarettes     Last attempt to quit: 3/12/2019     Years since quitting: 3.8   • Smokeless tobacco: Never   Vaping Use   • Vaping Use: Never used   Substance and Sexual Activity   • Alcohol use: No   • Drug use: Yes     Types: Marijuana     Comment: occ    • Sexual activity: Defer     ---------------------------------------------------------------------------------------------------------------------   Allergies:  Patient has no known allergies.  ---------------------------------------------------------------------------------------------------------------------   Prior to Admission Medications     Prescriptions Last Dose Informant Patient Reported? Taking?    HYDROcodone-acetaminophen (NORCO)  MG per tablet Past Month  No Yes    Take 1 tablet by mouth Every 6 (Six) Hours As Needed for Moderate Pain.    albuterol sulfate  (90 Base) MCG/ACT inhaler 1/24/2023  No Yes    Inhale 2 puffs Every 6 (Six) Hours As Needed for Wheezing.    aspirin 81 MG EC tablet 1/23/2023  Yes Yes    Take 81 mg by mouth Daily.    atorvastatin (LIPITOR) 20 MG tablet Past Week  No Yes    Take 1 tablet by mouth Every Night.     enzalutamide (XTANDI) 40 MG chemo capsule 1/24/2023  No Yes    Take 4 capsules by mouth Daily.    EPINEPHrine (EPIPEN) 0.3 MG/0.3ML solution auto-injector injection Unknown  Yes No    INJECT 1 PEN IN THE MUSCLE ONE TIME AS DIRECTED    fenofibrate (TRICOR) 145 MG tablet 1/24/2023  No Yes    Take 1 tablet by mouth Daily.    hydrOXYzine (ATARAX) 50 MG tablet Past Week  No Yes    TAKE 1 TABLET BY MOUTH THREE TIMES DAILY AS NEEDED FOR ITCHING    metoprolol tartrate (LOPRESSOR) 25 MG tablet Past Week  No Yes    Take 1 tablet by mouth Daily.    montelukast (SINGULAIR) 10 MG tablet Past Week  No Yes    Take 1 tablet by mouth Every Night.    pantoprazole (PROTONIX) 40 MG EC tablet Past Week  No Yes    Take 1 tablet by mouth Daily.    vitamin D (ERGOCALCIFEROL) 1.25 MG (62028 UT) capsule capsule Past Week  No Yes    Take 1 capsule by mouth 1 (One) Time Per Week.        Hospital Scheduled Meds:  lactated ringers, 125 mL/hr, Intravenous, Once  sodium chloride, 10 mL, Intravenous, Q12H         ---------------------------------------------------------------------------------------------------------------------   Vital Signs:  Temp:  [98 °F (36.7 °C)] 98 °F (36.7 °C)  Heart Rate:  [90] 90  Resp:  [20] 20  BP: (133)/(92) 133/92      01/25/23  0819   Weight: 90.7 kg (200 lb)     Body mass index is 29.53 kg/m².  ---------------------------------------------------------------------------------------------------------------------   Physical Exam:  Constitutional:  Well-developed and well-nourished.  No respiratory distress.      HENT:  Head: Normocephalic and atraumatic.  Mouth:  Moist mucous membranes.    Eyes:  Conjunctivae and EOM are normal.  Pupils are equal, round, and reactive to light.  No scleral icterus.  Neck:  Neck supple.  No JVD present.    Cardiovascular:  Normal rate, regular rhythm and normal heart sounds with no murmur.  Pulmonary/Chest:  No respiratory distress, no wheezes, no crackles, with normal breath sounds and  good air movement.  Abdominal:  Soft.  Bowel sounds are normal.  No distension and no tenderness.   Musculoskeletal:  No edema, no tenderness, and no deformity.  No red or swollen joints anywhere.    Neurological:  Alert and oriented to person, place, and time.  No cranial nerve deficit.  No tongue deviation.  No facial droop.  No slurred speech.   Skin:  Skin is warm and dry.  No rash noted.  No pallor.   Psychiatric:  Normal mood and affect.  Behavior is normal.  Judgment and thought content normal.   Peripheral vascular:  No edema and strong pulses on all 4 extremities.  Genitourinary:  ---------------------------------------------------------------------------------------------------------------------    Results from last 7 days   Lab Units 01/19/23  0928   WBC 10*3/mm3 8.19   HEMOGLOBIN g/dL 12.1*   HEMATOCRIT % 37.6   MCV fL 79.0   MCHC g/dL 32.2   PLATELETS 10*3/mm3 586*         Results from last 7 days   Lab Units 01/19/23  0928   SODIUM mmol/L 136   POTASSIUM mmol/L 4.5   CHLORIDE mmol/L 101   CO2 mmol/L 26.4   BUN mg/dL 19   CREATININE mg/dL 1.01   CALCIUM mg/dL 9.3   GLUCOSE mg/dL 114*   ALBUMIN g/dL 4.0   BILIRUBIN mg/dL 0.3   ALK PHOS U/L 86   AST (SGOT) U/L 11   ALT (SGPT) U/L 9   Estimated Creatinine Clearance: 85.5 mL/min (by C-G formula based on SCr of 1.01 mg/dL).  No results found for: AMMONIA          Lab Results   Component Value Date    HGBA1C 5.60 12/13/2022     Lab Results   Component Value Date    TSH 2.340 12/13/2022    FREET4 1.22 06/06/2018     No results found for: PREGTESTUR, PREGSERUM, HCG, HCGQUANT  Pain Management Panel    There is no flowsheet data to display.       No results found for: BLOODCX  No results found for: URINECX  No results found for: WOUNDCX  No results found for: STOOLCX      ---------------------------------------------------------------------------------------------------------------------  Imaging Results (Last 7 Days)     ** No results found for the last 168  hours. **          I have personally reviewed the radiology images and read the final radiology report.  ---------------------------------------------------------------------------------------------------------------------  Assessment and Plan:  Proceed with screening colonoscopy    Mahnaz Caraballo MD  01/25/23  08:44 EST

## 2023-01-25 NOTE — ANESTHESIA POSTPROCEDURE EVALUATION
Patient: Ta Madison    Procedure Summary     Date: 01/25/23 Room / Location:  [facility] [address] [place] [address] [facility]    Anesthesia Start: 0924 Anesthesia Stop: 0949    Procedure: COLONOSCOPY Diagnosis:       Encounter for screening for malignant neoplasm of colon      (Encounter for screening for malignant neoplasm of colon [Z12.11])    Surgeons: Mahnaz Caraballo MD Provider: Dheeraj Tello MD    Anesthesia Type: general ASA Status: 3          Anesthesia Type: general    Vitals  Vitals Value Taken Time   /69 01/25/23 1005   Temp 97.3 °F (36.3 °C) 01/25/23 0950   Pulse 78 01/25/23 1005   Resp 20 01/25/23 1005   SpO2 98 % 01/25/23 1005           Post Anesthesia Care and Evaluation    Patient location during evaluation: PHASE II  Patient participation: complete - patient participated  Level of consciousness: awake and alert  Pain score: 0  Pain management: adequate    Airway patency: patent  Anesthetic complications: No anesthetic complications    Cardiovascular status: acceptable  Respiratory status: acceptable  Hydration status: acceptable

## 2023-01-25 NOTE — ANESTHESIA PREPROCEDURE EVALUATION
Anesthesia Evaluation     Patient summary reviewed and Nursing notes reviewed   no history of anesthetic complications:  NPO Solid Status: > 8 hours  NPO Liquid Status: > 8 hours           Airway   Mallampati: II  TM distance: >3 FB  Neck ROM: full  No difficulty expected  Dental    (+) edentulous    Pulmonary - normal exam    breath sounds clear to auscultation  (+) a smoker Current Smoked day of surgery,   Cardiovascular - normal exam    Patient on routine beta blocker  Rhythm: regular  Rate: normal    (+) dysrhythmias Tachycardia, hyperlipidemia,       Neuro/Psych  (+) TIA, numbness,    GI/Hepatic/Renal/Endo    (+)  GERD,      Musculoskeletal     (+) neck pain,   Abdominal  - normal exam   Substance History - negative use     OB/GYN negative ob/gyn ROS         Other   arthritis,    history of cancer (Prostate)                  Anesthesia Plan    ASA 3     general   total IV anesthesia  intravenous induction     Anesthetic plan, risks, benefits, and alternatives have been provided, discussed and informed consent has been obtained with: patient.    Use of blood products discussed with patient  Consented to blood products.       CODE STATUS:

## 2023-01-26 LAB — REF LAB TEST METHOD: NORMAL

## 2023-01-30 NOTE — PROGRESS NOTES
At the time of your recent colonoscopy, 2 polyps were removed from the colon.  One polyp was hyperplastic which has no cancer potential.  The other polyp was a tubular adenoma which is considered precancerous.  You will need repeat colonoscopy in 5 years due to personal history of colon polyps.  Enck you for allowing me to participate in your care.

## 2023-02-20 ENCOUNTER — SPECIALTY PHARMACY (OUTPATIENT)
Dept: PHARMACY | Facility: HOSPITAL | Age: 63
End: 2023-02-20
Payer: COMMERCIAL

## 2023-02-20 NOTE — PROGRESS NOTES
Specialty Pharmacy Refill Coordination Note      Name:  Ta Madison  :  1960  Date:  2023         Past Medical History:   Diagnosis Date   • Arthritis    • Elevated cholesterol    • GERD (gastroesophageal reflux disease)    • Increased heart rate    • Mini stroke    • Neck pain    • Prostate cancer (HCC)    • Rash    • Tobacco abuse        Past Surgical History:   Procedure Laterality Date   • APPENDECTOMY      [facility]    • COLONOSCOPY     • COLONOSCOPY N/A 2023    Procedure: COLONOSCOPY;  Surgeon: Mahnaz Caraballo MD;  Location: [facility];  Service: Gastroenterology;  Laterality: N/A;       Social History     Socioeconomic History   • Marital status:    Tobacco Use   • Smoking status: Every Day     Packs/day: 2.00     Years: 45.00     Pack years: 90.00     Types: Cigarettes     Last attempt to quit: 3/12/2019     Years since quitting: 3.9   • Smokeless tobacco: Never   Vaping Use   • Vaping Use: Never used   Substance and Sexual Activity   • Alcohol use: No   • Drug use: Yes     Types: Marijuana     Comment: occ    • Sexual activity: Defer       Family History   Problem Relation Age of Onset   • Nephrolithiasis Mother    • Heart disease Father    • Hypertension Father    • Kidney disease Father        No Known Allergies    Current Outpatient Medications   Medication Sig Dispense Refill   • albuterol sulfate  (90 Base) MCG/ACT inhaler Inhale 2 puffs Every 6 (Six) Hours As Needed for Wheezing. 18 g 5   • aspirin 81 MG EC tablet Take 81 mg by mouth Daily.     • atorvastatin (LIPITOR) 20 MG tablet Take 1 tablet by mouth Every Night. 90 tablet 1   • enzalutamide (XTANDI) 40 MG chemo capsule Take 4 capsules by mouth Daily. 120 capsule 5   • EPINEPHrine (EPIPEN) 0.3 MG/0.3ML solution auto-injector injection INJECT 1 PEN IN THE MUSCLE ONE TIME AS DIRECTED     • fenofibrate (TRICOR) 145 MG tablet Take 1 tablet by mouth Daily. 90 tablet 1   •  HYDROcodone-acetaminophen (NORCO)  MG per tablet Take 1 tablet by mouth Every 6 (Six) Hours As Needed for Moderate Pain. 120 tablet 0   • hydrOXYzine (ATARAX) 50 MG tablet TAKE 1 TABLET BY MOUTH THREE TIMES DAILY AS NEEDED FOR ITCHING 90 tablet 0   • metoprolol tartrate (LOPRESSOR) 25 MG tablet Take 1 tablet by mouth Daily. 90 tablet 1   • montelukast (SINGULAIR) 10 MG tablet Take 1 tablet by mouth Every Night. 90 tablet 1   • pantoprazole (PROTONIX) 40 MG EC tablet Take 1 tablet by mouth Daily. 90 tablet 1   • vitamin D (ERGOCALCIFEROL) 1.25 MG (76113 UT) capsule capsule Take 1 capsule by mouth 1 (One) Time Per Week. 5 capsule 5     No current facility-administered medications for this visit.         ASSESSMENT/PLAN:      Ta is a 62 y.o. male contacted today regarding refills of  Xtandi 40mg chemo capsule  specialty medication(s).    Reviewed and verified with patient:       Specialty medication(s) and dose(s) confirmed: yes    Refill Questions    Flowsheet Row Most Recent Value   Changes to allergies? No   Changes to medications? No   New conditions since last clinic visit No   Unplanned office visit, urgent care, ED, or hospital admission in the last 4 weeks  No   How does patient/caregiver feel medication is working? Very good   Financial problems or insurance changes  No   If yes, describe changes in insurance or financial issues. no   Since the previous refill, were any specialty medication doses or scheduled injections missed or delayed?  No   If yes, please provide the amount 0   Why were doses missed? n/a   Does this patient require a clinical escalation to a pharmacist? No                Medication Adherence    Adherence tools used: patient uses a pill box to manage medications  Support network for adherence: family member          Follow-up: 30 day(s)     Brittaney Howe, Pharmacy Technician  Specialty Pharmacy Technician

## 2023-02-28 ENCOUNTER — OFFICE VISIT (OUTPATIENT)
Dept: FAMILY MEDICINE CLINIC | Facility: CLINIC | Age: 63
End: 2023-02-28
Payer: COMMERCIAL

## 2023-02-28 VITALS
WEIGHT: 189.4 LBS | TEMPERATURE: 97.8 F | SYSTOLIC BLOOD PRESSURE: 116 MMHG | OXYGEN SATURATION: 98 % | BODY MASS INDEX: 28.05 KG/M2 | HEIGHT: 69 IN | HEART RATE: 89 BPM | DIASTOLIC BLOOD PRESSURE: 60 MMHG

## 2023-02-28 DIAGNOSIS — I10 HYPERTENSION, UNSPECIFIED TYPE: ICD-10-CM

## 2023-02-28 DIAGNOSIS — R00.0 TACHYCARDIA: ICD-10-CM

## 2023-02-28 DIAGNOSIS — R73.9 HYPERGLYCEMIA: Primary | ICD-10-CM

## 2023-02-28 DIAGNOSIS — K21.9 GASTROESOPHAGEAL REFLUX DISEASE: ICD-10-CM

## 2023-02-28 DIAGNOSIS — F45.8 NEUROPATHIC PRURITUS: ICD-10-CM

## 2023-02-28 DIAGNOSIS — E78.2 MIXED HYPERLIPIDEMIA: ICD-10-CM

## 2023-02-28 PROCEDURE — 84443 ASSAY THYROID STIM HORMONE: CPT | Performed by: NURSE PRACTITIONER

## 2023-02-28 PROCEDURE — 80053 COMPREHEN METABOLIC PANEL: CPT | Performed by: NURSE PRACTITIONER

## 2023-02-28 PROCEDURE — 36415 COLL VENOUS BLD VENIPUNCTURE: CPT | Performed by: NURSE PRACTITIONER

## 2023-02-28 PROCEDURE — 82306 VITAMIN D 25 HYDROXY: CPT | Performed by: NURSE PRACTITIONER

## 2023-02-28 PROCEDURE — 80061 LIPID PANEL: CPT | Performed by: NURSE PRACTITIONER

## 2023-02-28 PROCEDURE — 83036 HEMOGLOBIN GLYCOSYLATED A1C: CPT | Performed by: NURSE PRACTITIONER

## 2023-02-28 PROCEDURE — 99214 OFFICE O/P EST MOD 30 MIN: CPT | Performed by: NURSE PRACTITIONER

## 2023-02-28 RX ORDER — ATORVASTATIN CALCIUM 20 MG/1
20 TABLET, FILM COATED ORAL NIGHTLY
Qty: 90 TABLET | Refills: 1 | Status: SHIPPED | OUTPATIENT
Start: 2023-02-28

## 2023-02-28 RX ORDER — HYDROXYZINE 50 MG/1
50 TABLET, FILM COATED ORAL 3 TIMES DAILY PRN
Qty: 90 TABLET | Refills: 1 | Status: SHIPPED | OUTPATIENT
Start: 2023-02-28

## 2023-02-28 RX ORDER — POLYETHYLENE GLYCOL 3350 17 G/17G
POWDER, FOR SOLUTION ORAL
COMMUNITY
Start: 2023-01-18 | End: 2023-02-28

## 2023-02-28 RX ORDER — PANTOPRAZOLE SODIUM 40 MG/1
40 TABLET, DELAYED RELEASE ORAL DAILY
Qty: 90 TABLET | Refills: 1 | Status: SHIPPED | OUTPATIENT
Start: 2023-02-28

## 2023-02-28 RX ORDER — FENOFIBRATE 145 MG/1
145 TABLET, COATED ORAL DAILY
Qty: 90 TABLET | Refills: 1 | Status: SHIPPED | OUTPATIENT
Start: 2023-02-28

## 2023-02-28 NOTE — PROGRESS NOTES
"Chief Complaint  Toe Pain (Pt reports that his right pinky toe is \"numb\" )    Subjective          Ta Madison presents to [facility]  History of Present Illness  Diagnosed with Prostate cancer two years ago .  Following oncology and Urology.  No recent changes.     Hyperlipidemia  This is a chronic problem. Recent lipid tests were reviewed and are variable. Exacerbating diseases include obesity. Factors aggravating his hyperlipidemia include fatty foods. Pertinent negatives include no chest pain. Current antihyperlipidemic treatment includes statins and diet change. Compliance problems include adherence to diet and adherence to exercise.    Hypertension  This is a chronic (tachycardia controlled with current medications) problem. The current episode started more than 1 year ago. The problem is controlled. Pertinent negatives include no chest pain, malaise/fatigue, palpitations or peripheral edema. Risk factors for coronary artery disease include dyslipidemia. Past treatments include beta blockers. Current antihypertension treatment includes beta blockers. The current treatment provides significant improvement. Compliance problems include diet.    Heartburn  He complains of heartburn. He reports no chest pain. This is a chronic problem. The problem occurs rarely. The problem has been unchanged. The heartburn is of mild intensity. He has tried a PPI for the symptoms. The treatment provided moderate relief.   Toe Pain   Incident onset: Noticed onset right 5th toe numbness after stopping Neurontin.   The pain is present in the right toes. The pain is at a severity of 2/10. The pain is mild. The pain has been intermittent since onset. He has tried nothing for the symptoms.       Objective   Vital Signs:   /60 (BP Location: Right arm, Patient Position: Sitting)   Pulse 89   Temp 97.8 °F (36.6 °C) (Temporal)   Ht 175.3 cm (69\")   Wt 85.9 kg (189 lb 6.4 oz)   SpO2 98%   BMI " 27.97 kg/m²     Physical Exam  Vitals and nursing note reviewed.   Constitutional:       General: He is not in acute distress.     Appearance: He is well-developed. He is obese. He is not ill-appearing.   HENT:      Head: Normocephalic.   Eyes:      General:         Right eye: No discharge.         Left eye: No discharge.      Conjunctiva/sclera: Conjunctivae normal.   Cardiovascular:      Rate and Rhythm: Normal rate and regular rhythm.      Heart sounds: Normal heart sounds. No murmur heard.  Pulmonary:      Effort: Pulmonary effort is normal.      Breath sounds: Normal breath sounds.   Abdominal:      General: Bowel sounds are normal.      Palpations: Abdomen is soft.   Musculoskeletal:      Right lower leg: No edema.      Left lower leg: No edema.   Skin:     General: Skin is warm and dry.      Capillary Refill: Capillary refill takes 2 to 3 seconds.      Findings: Rash present.   Neurological:      General: No focal deficit present.      Mental Status: He is alert and oriented to person, place, and time.      Cranial Nerves: No cranial nerve deficit.      Sensory: Sensory deficit present.      Motor: No weakness.      Coordination: Coordination normal.      Gait: Gait normal.      Deep Tendon Reflexes: Reflexes normal.   Psychiatric:         Behavior: Behavior normal.        Result Review :   During this visit the following were done:  Labs Reviewed [x]    Labs Ordered [x]    Radiology Reports Reviewed []    Radiology Ordered []    PCP Records Reviewed []    Referring Provider Records Reviewed []    ER Records Reviewed []    Hospital Records Reviewed []    History Obtained From Family []    Radiology Images Reviewed []    Other Reviewed []    Records Requested []             Diagnoses and all orders for this visit:    1. Hyperglycemia (Primary)  -     Hemoglobin A1c; Future  -     Hemoglobin A1c    2. Mixed hyperlipidemia  -     atorvastatin (LIPITOR) 20 MG tablet; Take 1 tablet by mouth Every Night.  Dispense:  90 tablet; Refill: 1  -     fenofibrate (TRICOR) 145 MG tablet; Take 1 tablet by mouth Daily.  Dispense: 90 tablet; Refill: 1  -     Comprehensive Metabolic Panel; Future  -     Lipid Panel; Future  -     Comprehensive Metabolic Panel  -     Lipid Panel    3. Neuropathic pruritus  -     hydrOXYzine (ATARAX) 50 MG tablet; Take 1 tablet by mouth 3 (Three) Times a Day As Needed for Itching.  Dispense: 90 tablet; Refill: 1  -     TSH; Future  -     Vitamin D,25-Hydroxy; Future  -     TSH  -     Vitamin D,25-Hydroxy    4. Tachycardia  -     metoprolol tartrate (LOPRESSOR) 25 MG tablet; Take 1 tablet by mouth Daily.  Dispense: 90 tablet; Refill: 1    5. Gastroesophageal reflux disease  -     pantoprazole (PROTONIX) 40 MG EC tablet; Take 1 tablet by mouth Daily.  Dispense: 90 tablet; Refill: 1    6. Hypertension, unspecified type    Continue with urology and oncology     BMI is >= 25 and < 30. (Overweight) The following options were offered after discussion: exercise counseling/recommendations and nutrition counseling/recommendations              Follow Up   Return in about 3 months (around 5/28/2023), or if symptoms worsen or fail to improve, for Recheck.  Patient was given instructions and counseling regarding his condition or for health maintenance advice. Please see specific information pulled into the AVS if appropriate.

## 2023-03-01 LAB
25(OH)D3 SERPL-MCNC: 21.7 NG/ML (ref 30–100)
ALBUMIN SERPL-MCNC: 4.7 G/DL (ref 3.5–5.2)
ALBUMIN/GLOB SERPL: 1.6 G/DL
ALP SERPL-CCNC: 90 U/L (ref 39–117)
ALT SERPL W P-5'-P-CCNC: 10 U/L (ref 1–41)
ANION GAP SERPL CALCULATED.3IONS-SCNC: 13.1 MMOL/L (ref 5–15)
AST SERPL-CCNC: 11 U/L (ref 1–40)
BILIRUB SERPL-MCNC: 0.2 MG/DL (ref 0–1.2)
BUN SERPL-MCNC: 18 MG/DL (ref 8–23)
BUN/CREAT SERPL: 17.5 (ref 7–25)
CALCIUM SPEC-SCNC: 9.5 MG/DL (ref 8.6–10.5)
CHLORIDE SERPL-SCNC: 101 MMOL/L (ref 98–107)
CHOLEST SERPL-MCNC: 191 MG/DL (ref 0–200)
CO2 SERPL-SCNC: 23.9 MMOL/L (ref 22–29)
CREAT SERPL-MCNC: 1.03 MG/DL (ref 0.76–1.27)
EGFRCR SERPLBLD CKD-EPI 2021: 82.6 ML/MIN/1.73
GLOBULIN UR ELPH-MCNC: 3 GM/DL
GLUCOSE SERPL-MCNC: 87 MG/DL (ref 65–99)
HBA1C MFR BLD: 6 % (ref 4.8–5.6)
HDLC SERPL-MCNC: 40 MG/DL (ref 40–60)
LDLC SERPL CALC-MCNC: 116 MG/DL (ref 0–100)
LDLC/HDLC SERPL: 2.78 {RATIO}
POTASSIUM SERPL-SCNC: 4.9 MMOL/L (ref 3.5–5.2)
PROT SERPL-MCNC: 7.7 G/DL (ref 6–8.5)
SODIUM SERPL-SCNC: 138 MMOL/L (ref 136–145)
TRIGL SERPL-MCNC: 200 MG/DL (ref 0–150)
TSH SERPL DL<=0.05 MIU/L-ACNC: 2.57 UIU/ML (ref 0.27–4.2)
VLDLC SERPL-MCNC: 35 MG/DL (ref 5–40)

## 2023-03-02 ENCOUNTER — TELEPHONE (OUTPATIENT)
Dept: FAMILY MEDICINE CLINIC | Facility: CLINIC | Age: 63
End: 2023-03-02
Payer: COMMERCIAL

## 2023-03-02 NOTE — TELEPHONE ENCOUNTER
----- Message from EUGENIA Mendoza sent at 3/1/2023  5:21 PM EST -----  VIT D is a little better but still low needs to continue with VIT D supplement  Sugar up just a little needs to improve diet and activity          Wife notified and verbalized understanding.

## 2023-03-17 ENCOUNTER — SPECIALTY PHARMACY (OUTPATIENT)
Dept: PHARMACY | Facility: HOSPITAL | Age: 63
End: 2023-03-17
Payer: COMMERCIAL

## 2023-03-17 NOTE — PROGRESS NOTES
Specialty Pharmacy Refill Coordination Note      Name:  Ta Madison  :  1960  Date:  3/17/2023         Past Medical History:   Diagnosis Date   • Arthritis    • Elevated cholesterol    • GERD (gastroesophageal reflux disease)    • Increased heart rate    • Mini stroke    • Neck pain    • Prostate cancer (HCC)    • Rash    • Tobacco abuse        Past Surgical History:   Procedure Laterality Date   • APPENDECTOMY      [facility]    • COLONOSCOPY     • COLONOSCOPY N/A 2023    Procedure: COLONOSCOPY;  Surgeon: Mahnaz Caraballo MD;  Location: [facility];  Service: Gastroenterology;  Laterality: N/A;       Social History     Socioeconomic History   • Marital status:    Tobacco Use   • Smoking status: Every Day     Packs/day: 1.00     Years: 45.00     Pack years: 45.00     Types: Cigarettes   • Smokeless tobacco: Never   Vaping Use   • Vaping Use: Never used   Substance and Sexual Activity   • Alcohol use: No   • Drug use: Yes     Types: Marijuana     Comment: occ    • Sexual activity: Defer       Family History   Problem Relation Age of Onset   • Nephrolithiasis Mother    • Heart disease Father    • Hypertension Father    • Kidney disease Father        No Known Allergies    Current Outpatient Medications   Medication Sig Dispense Refill   • albuterol sulfate  (90 Base) MCG/ACT inhaler Inhale 2 puffs Every 6 (Six) Hours As Needed for Wheezing. 18 g 5   • aspirin 81 MG EC tablet Take 1 tablet by mouth Daily.     • atorvastatin (LIPITOR) 20 MG tablet Take 1 tablet by mouth Every Night. 90 tablet 1   • enzalutamide (XTANDI) 40 MG chemo capsule Take 4 capsules by mouth Daily. 120 capsule 5   • EPINEPHrine (EPIPEN) 0.3 MG/0.3ML solution auto-injector injection INJECT 1 PEN IN THE MUSCLE ONE TIME AS DIRECTED     • fenofibrate (TRICOR) 145 MG tablet Take 1 tablet by mouth Daily. 90 tablet 1   • HYDROcodone-acetaminophen (NORCO)  MG per tablet Take 1 tablet by mouth Every  6 (Six) Hours As Needed for Moderate Pain. 120 tablet 0   • hydrOXYzine (ATARAX) 50 MG tablet Take 1 tablet by mouth 3 (Three) Times a Day As Needed for Itching. 90 tablet 1   • metoprolol tartrate (LOPRESSOR) 25 MG tablet Take 1 tablet by mouth Daily. 90 tablet 1   • pantoprazole (PROTONIX) 40 MG EC tablet Take 1 tablet by mouth Daily. 90 tablet 1   • vitamin D (ERGOCALCIFEROL) 1.25 MG (69466 UT) capsule capsule Take 1 capsule by mouth 1 (One) Time Per Week. 5 capsule 5     No current facility-administered medications for this visit.         ASSESSMENT/PLAN:      Ta is a 62 y.o. male contacted today regarding refills of  xtandi 40mg chemo capsule  specialty medication(s).    Reviewed and verified with patient:       Specialty medication(s) and dose(s) confirmed: yes    Refill Questions    Flowsheet Row Most Recent Value   Changes to allergies? No   Changes to medications? No   New conditions since last clinic visit No   Unplanned office visit, urgent care, ED, or hospital admission in the last 4 weeks  No   How does patient/caregiver feel medication is working? Very good   Financial problems or insurance changes  No   If yes, describe changes in insurance or financial issues. no   Since the previous refill, were any specialty medication doses or scheduled injections missed or delayed?  No   If yes, please provide the amount 0   Why were doses missed? n/a   Does this patient require a clinical escalation to a pharmacist? No                Medication Adherence    Adherence tools used: patient uses a pill box to manage medications  Support network for adherence: family member          Follow-up: 30 day(s)     Brittaney Howe, Pharmacy Technician  Specialty Pharmacy Technician

## 2023-04-11 ENCOUNTER — SPECIALTY PHARMACY (OUTPATIENT)
Dept: PHARMACY | Facility: HOSPITAL | Age: 63
End: 2023-04-11
Payer: COMMERCIAL

## 2023-04-11 DIAGNOSIS — C61 ADENOCARCINOMA OF PROSTATE: Primary | ICD-10-CM

## 2023-04-11 NOTE — PROGRESS NOTES
Specialty Pharmacy Refill Coordination Note      Name:  Ta Madison  :  1960  Date:  2023         Past Medical History:   Diagnosis Date   • Arthritis    • Elevated cholesterol    • GERD (gastroesophageal reflux disease)    • Increased heart rate    • Mini stroke    • Neck pain    • Prostate cancer    • Rash    • Tobacco abuse        Past Surgical History:   Procedure Laterality Date   • APPENDECTOMY      [facility]    • COLONOSCOPY     • COLONOSCOPY N/A 2023    Procedure: COLONOSCOPY;  Surgeon: Mahnaz Caraballo MD;  Location: [facility];  Service: Gastroenterology;  Laterality: N/A;       Social History     Socioeconomic History   • Marital status:    Tobacco Use   • Smoking status: Every Day     Packs/day: 1.00     Years: 45.00     Pack years: 45.00     Types: Cigarettes   • Smokeless tobacco: Never   Vaping Use   • Vaping Use: Never used   Substance and Sexual Activity   • Alcohol use: No   • Drug use: Yes     Types: Marijuana     Comment: occ    • Sexual activity: Defer       Family History   Problem Relation Age of Onset   • Nephrolithiasis Mother    • Heart disease Father    • Hypertension Father    • Kidney disease Father        No Known Allergies    Current Outpatient Medications   Medication Sig Dispense Refill   • albuterol sulfate  (90 Base) MCG/ACT inhaler Inhale 2 puffs Every 6 (Six) Hours As Needed for Wheezing. 18 g 5   • aspirin 81 MG EC tablet Take 1 tablet by mouth Daily.     • atorvastatin (LIPITOR) 20 MG tablet Take 1 tablet by mouth Every Night. 90 tablet 1   • enzalutamide (XTANDI) 40 MG chemo capsule Take 4 capsules by mouth Daily. 120 capsule 5   • EPINEPHrine (EPIPEN) 0.3 MG/0.3ML solution auto-injector injection INJECT 1 PEN IN THE MUSCLE ONE TIME AS DIRECTED     • fenofibrate (TRICOR) 145 MG tablet Take 1 tablet by mouth Daily. 90 tablet 1   • HYDROcodone-acetaminophen (NORCO)  MG per tablet Take 1 tablet by mouth Every 6  (Six) Hours As Needed for Moderate Pain. 120 tablet 0   • hydrOXYzine (ATARAX) 50 MG tablet Take 1 tablet by mouth 3 (Three) Times a Day As Needed for Itching. 90 tablet 1   • metoprolol tartrate (LOPRESSOR) 25 MG tablet Take 1 tablet by mouth Daily. 90 tablet 1   • pantoprazole (PROTONIX) 40 MG EC tablet Take 1 tablet by mouth Daily. 90 tablet 1   • vitamin D (ERGOCALCIFEROL) 1.25 MG (95353 UT) capsule capsule Take 1 capsule by mouth 1 (One) Time Per Week. 5 capsule 5     No current facility-administered medications for this visit.         ASSESSMENT/PLAN:      Ta is a 62 y.o. male contacted today regarding refills of  xtandi 40mg chemo capsule specialty medication(s).    Reviewed and verified with patient:       Specialty medication(s) and dose(s) confirmed: yes    Refill Questions    Flowsheet Row Most Recent Value   Changes to allergies? No   Changes to medications? No   Unplanned office visit, urgent care, ED, or hospital admission in the last 4 weeks  No   How does patient/caregiver feel medication is working? Very good   Financial problems or insurance changes  No   If yes, describe changes in insurance or financial issues. no   Since the previous refill, were any specialty medication doses or scheduled injections missed or delayed?  No   If yes, please provide the amount 0   Why were doses missed? n/a   Does this patient require a clinical escalation to a pharmacist? No                Medication Adherence    Adherence tools used: patient uses a pill box to manage medications  Support network for adherence: family member          Follow-up: 30 day(s)     Brittaney Howe, Pharmacy Technician  Specialty Pharmacy Technician

## 2023-04-12 ENCOUNTER — LAB (OUTPATIENT)
Dept: ONCOLOGY | Facility: CLINIC | Age: 63
End: 2023-04-12
Payer: COMMERCIAL

## 2023-04-12 ENCOUNTER — OFFICE VISIT (OUTPATIENT)
Dept: ONCOLOGY | Facility: CLINIC | Age: 63
End: 2023-04-12
Payer: COMMERCIAL

## 2023-04-12 VITALS
OXYGEN SATURATION: 98 % | HEART RATE: 83 BPM | RESPIRATION RATE: 18 BRPM | HEIGHT: 69 IN | BODY MASS INDEX: 27.67 KG/M2 | TEMPERATURE: 97.8 F | SYSTOLIC BLOOD PRESSURE: 107 MMHG | WEIGHT: 186.8 LBS | DIASTOLIC BLOOD PRESSURE: 62 MMHG

## 2023-04-12 DIAGNOSIS — C79.51 MALIGNANT NEOPLASM METASTATIC TO BONE: ICD-10-CM

## 2023-04-12 DIAGNOSIS — C61 ADENOCARCINOMA OF PROSTATE: ICD-10-CM

## 2023-04-12 DIAGNOSIS — C61 ADENOCARCINOMA OF PROSTATE: Primary | ICD-10-CM

## 2023-04-12 LAB
ALBUMIN SERPL-MCNC: 4.2 G/DL (ref 3.5–5.2)
ALBUMIN/GLOB SERPL: 1.2 G/DL
ALP SERPL-CCNC: 93 U/L (ref 39–117)
ALT SERPL W P-5'-P-CCNC: 7 U/L (ref 1–41)
ANION GAP SERPL CALCULATED.3IONS-SCNC: 9.3 MMOL/L (ref 5–15)
AST SERPL-CCNC: 11 U/L (ref 1–40)
BASOPHILS # BLD AUTO: 0.09 10*3/MM3 (ref 0–0.2)
BASOPHILS NFR BLD AUTO: 1.3 % (ref 0–1.5)
BILIRUB SERPL-MCNC: 0.3 MG/DL (ref 0–1.2)
BUN SERPL-MCNC: 23 MG/DL (ref 8–23)
BUN/CREAT SERPL: 20.4 (ref 7–25)
CALCIUM SPEC-SCNC: 9.7 MG/DL (ref 8.6–10.5)
CHLORIDE SERPL-SCNC: 103 MMOL/L (ref 98–107)
CO2 SERPL-SCNC: 25.7 MMOL/L (ref 22–29)
CREAT SERPL-MCNC: 1.13 MG/DL (ref 0.76–1.27)
DEPRECATED RDW RBC AUTO: 43.9 FL (ref 37–54)
EGFRCR SERPLBLD CKD-EPI 2021: 73.9 ML/MIN/1.73
EOSINOPHIL # BLD AUTO: 0.09 10*3/MM3 (ref 0–0.4)
EOSINOPHIL NFR BLD AUTO: 1.3 % (ref 0.3–6.2)
ERYTHROCYTE [DISTWIDTH] IN BLOOD BY AUTOMATED COUNT: 15 % (ref 12.3–15.4)
GLOBULIN UR ELPH-MCNC: 3.5 GM/DL
GLUCOSE SERPL-MCNC: 114 MG/DL (ref 65–99)
HCT VFR BLD AUTO: 38.5 % (ref 37.5–51)
HGB BLD-MCNC: 12.2 G/DL (ref 13–17.7)
IMM GRANULOCYTES # BLD AUTO: 0.02 10*3/MM3 (ref 0–0.05)
IMM GRANULOCYTES NFR BLD AUTO: 0.3 % (ref 0–0.5)
LYMPHOCYTES # BLD AUTO: 2.03 10*3/MM3 (ref 0.7–3.1)
LYMPHOCYTES NFR BLD AUTO: 29.9 % (ref 19.6–45.3)
MCH RBC QN AUTO: 25.6 PG (ref 26.6–33)
MCHC RBC AUTO-ENTMCNC: 31.7 G/DL (ref 31.5–35.7)
MCV RBC AUTO: 80.9 FL (ref 79–97)
MONOCYTES # BLD AUTO: 0.62 10*3/MM3 (ref 0.1–0.9)
MONOCYTES NFR BLD AUTO: 9.1 % (ref 5–12)
NEUTROPHILS NFR BLD AUTO: 3.94 10*3/MM3 (ref 1.7–7)
NEUTROPHILS NFR BLD AUTO: 58.1 % (ref 42.7–76)
NRBC BLD AUTO-RTO: 0 /100 WBC (ref 0–0.2)
PLATELET # BLD AUTO: 546 10*3/MM3 (ref 140–450)
PMV BLD AUTO: 10.3 FL (ref 6–12)
POTASSIUM SERPL-SCNC: 4.7 MMOL/L (ref 3.5–5.2)
PROT SERPL-MCNC: 7.7 G/DL (ref 6–8.5)
PSA SERPL-MCNC: <0.014 NG/ML (ref 0–4)
RBC # BLD AUTO: 4.76 10*6/MM3 (ref 4.14–5.8)
SODIUM SERPL-SCNC: 138 MMOL/L (ref 136–145)
WBC NRBC COR # BLD: 6.79 10*3/MM3 (ref 3.4–10.8)

## 2023-04-12 PROCEDURE — 85025 COMPLETE CBC W/AUTO DIFF WBC: CPT | Performed by: INTERNAL MEDICINE

## 2023-04-12 PROCEDURE — 36415 COLL VENOUS BLD VENIPUNCTURE: CPT | Performed by: INTERNAL MEDICINE

## 2023-04-12 PROCEDURE — 84153 ASSAY OF PSA TOTAL: CPT | Performed by: INTERNAL MEDICINE

## 2023-04-12 PROCEDURE — 80053 COMPREHEN METABOLIC PANEL: CPT | Performed by: INTERNAL MEDICINE

## 2023-04-12 PROCEDURE — 1126F AMNT PAIN NOTED NONE PRSNT: CPT | Performed by: INTERNAL MEDICINE

## 2023-04-12 PROCEDURE — 99214 OFFICE O/P EST MOD 30 MIN: CPT | Performed by: INTERNAL MEDICINE

## 2023-04-12 NOTE — PROGRESS NOTES
Name:  Ta Madison  :  1960  Date:  2023     REFERRING PHYSICIAN  Dheeraj Nieves MD    PRIMARY CARE PROVIDER  Yolande Olvera APRN    REASON FOR FOLLOWUP  1. Adenocarcinoma of prostate      CHIEF COMPLAINT  None.    Dear Ms. Olvera,    HISTORY OF PRESENT ILLNESS:   I saw Mr. Madison in follow up today in our medical oncology clinic. As you are aware, he is a pleasant, 62 y.o., white male with a history of tobacco abuse who was referred to urology in 2020 for an elevated serum PSA level (of ~28 ng/mL, which had increased to 247 ng/mL at the time of his initial appointment in our clinic). An examination revealed a very hard and fixed prostate, and multiple core biopsies were performed on 2020. The results were consistent with Darrius Score 5+4 or 4+5=9 adenocarcinoma involving all twelve sampled cores. A staging workup confirmed pelvic adenopathy as well as diffuse, sclerotic lesions throughout the axial and appendicular skeleton, including the right greater than left femoral diaphyses. With this stage IV diagnosis, he was referred to our clinic for further evaluation and management. At the time of his initial consultation in our office (on 2021), he was agreeable to starting definitive therapy with a combination of ADT (Lupron injections) and anti-androgen therapy (daily PO Xtandi).    INTERIM HISTORY:  Mr. Madison returns to clinic today for follow up again accompanied by his wife. He began [id number] Lupron on 2021 and remains on daily Xtandi, both of which he continues to tolerate very well, as he has been doing for well over two (2) full years now. His only noticeable side effect continues to be occasional hot flashes (which remain mild and tolerable). The diffuse skeletal pains he was experiencing at the time of his initial presentation in early  remain completely resolved. He overall continues to feel very well and again has no other specific complaints.    Past Medical  History:   Diagnosis Date   • Arthritis    • Elevated cholesterol    • GERD (gastroesophageal reflux disease)    • Increased heart rate    • Mini stroke    • Neck pain    • Prostate cancer    • Rash    • Tobacco abuse        Past Surgical History:   Procedure Laterality Date   • APPENDECTOMY  1971    [facility]    • COLONOSCOPY     • COLONOSCOPY N/A 1/25/2023    Procedure: COLONOSCOPY;  Surgeon: Mahnaz Caraballo MD;  Location: [facility];  Service: Gastroenterology;  Laterality: N/A;       Social History     Socioeconomic History   • Marital status:    Tobacco Use   • Smoking status: Every Day     Packs/day: 1.00     Years: 45.00     Pack years: 45.00     Types: Cigarettes   • Smokeless tobacco: Never   Vaping Use   • Vaping Use: Never used   Substance and Sexual Activity   • Alcohol use: No   • Drug use: Yes     Types: Marijuana     Comment: occ    • Sexual activity: Defer       Family History   Problem Relation Age of Onset   • Nephrolithiasis Mother    • Heart disease Father    • Hypertension Father    • Kidney disease Father        No Known Allergies    Current Outpatient Medications   Medication Sig Dispense Refill   • albuterol sulfate  (90 Base) MCG/ACT inhaler Inhale 2 puffs Every 6 (Six) Hours As Needed for Wheezing. 18 g 5   • aspirin 81 MG EC tablet Take 1 tablet by mouth Daily.     • atorvastatin (LIPITOR) 20 MG tablet Take 1 tablet by mouth Every Night. 90 tablet 1   • enzalutamide (XTANDI) 40 MG chemo capsule Take 4 capsules by mouth Daily. 120 capsule 5   • EPINEPHrine (EPIPEN) 0.3 MG/0.3ML solution auto-injector injection INJECT 1 PEN IN THE MUSCLE ONE TIME AS DIRECTED     • fenofibrate (TRICOR) 145 MG tablet Take 1 tablet by mouth Daily. 90 tablet 1   • HYDROcodone-acetaminophen (NORCO)  MG per tablet Take 1 tablet by mouth Every 6 (Six) Hours As Needed for Moderate Pain. 120 tablet 0   • hydrOXYzine (ATARAX) 50 MG tablet Take 1 tablet by mouth 3 (Three) Times a  "Day As Needed for Itching. 90 tablet 1   • metoprolol tartrate (LOPRESSOR) 25 MG tablet Take 1 tablet by mouth Daily. 90 tablet 1   • pantoprazole (PROTONIX) 40 MG EC tablet Take 1 tablet by mouth Daily. 90 tablet 1   • vitamin D (ERGOCALCIFEROL) 1.25 MG (21493 UT) capsule capsule Take 1 capsule by mouth 1 (One) Time Per Week. 5 capsule 5     No current facility-administered medications for this visit.     REVIEW OF SYSTEMS  CONSTITUTIONAL:  No fever, chills or night sweats.  EYES:  No blurry vision, diplopia or other vision changes.  ENT:  No hearing loss, nosebleeds or sore throat.  CARDIOVASCULAR:  No palpitations, arrhythmia, syncopal episodes or edema.  PULMONARY:  No hemoptysis, wheezing, chronic cough or shortness of breath.  GASTROINTESTINAL:  No nausea or vomiting.  No constipation or diarrhea.  No abdominal pain.  GENITOURINARY:  No hematuria, kidney stones or frequent urination.  MUSCULOSKELETAL:  As per the HPI above.  ENDOCRINE:  No excessive thirst. Hot flashes as above.  HEMATOLOGIC:  No history of free bleeding, spontaneous bleeding or clotting.  IMMUNOLOGIC:  No allergies or frequent infections.  NEUROLOGIC: No numbness, tingling, seizures or weakness.  PSYCHIATRIC:  No anxiety or depression.    PHYSICAL EXAMINATION  /62   Pulse 83   Temp 97.8 °F (36.6 °C) (Temporal)   Resp 18   Ht 175.3 cm (69\")   Wt 84.7 kg (186 lb 12.8 oz)   SpO2 98%   BMI 27.59 kg/m²     Pain Score:  Pain Score    23 0834   PainSc: 0-No pain     ECO  GENERAL:  A well-developed, well-nourished, white male in no acute distress.  HEENT:  Pupils equally round and reactive to light. Extraocular muscles intact.  CARDIOVASCULAR:  Regular rate and rhythm. No murmurs, gallops or rubs.  LUNGS:  Clear to auscultation bilaterally. No wheezing  ABDOMEN:  Soft, nontender, nondistended with positive bowel sounds.  EXTREMITIES:  No clubbing, cyanosis or edema bilaterally.  SKIN:  No petechiae or rashes.  NEURO:  Cranial " nerves grossly intact. No focal deficits.  PSYCH:  Alert and oriented x3.    The physical exam is unchanged from the previous one.    LABORATORY  Lab Results   Component Value Date    WBC 6.79 04/12/2023    HGB 12.2 (L) 04/12/2023    HCT 38.5 04/12/2023    MCV 80.9 04/12/2023     (H) 04/12/2023    NEUTROABS 3.94 04/12/2023       Lab Results   Component Value Date     02/28/2023    K 4.9 02/28/2023     02/28/2023    CO2 23.9 02/28/2023    BUN 18 02/28/2023    CREATININE 1.03 02/28/2023    GLUCOSE 87 02/28/2023    CALCIUM 9.5 02/28/2023    AST 11 02/28/2023    ALT 10 02/28/2023    ALKPHOS 90 02/28/2023    BILITOT 0.2 02/28/2023    PROTEINTOT 7.7 02/28/2023    ALBUMIN 4.7 02/28/2023     CBC (04/12/2023): WBCs: 6.79; HgB: 12.2; Hct: 38.5; platelets: 546  CBC (01/19/2023): WBCs: 8.19; HgB: 12.1; Hct: 37.6; platelets: 586  CBC (10/19/2022): WBCs: 11.25; HgB: 12.3; Hct: 37.3; platelets: 453  CBC (07/18/2022): WBCs: 7.82; HgB: 12.7; Hct: 39.0; platelets: 513  CBC (04/13/2022): WBCs: 7.62; HgB: 12.7; Hct: 38.7; platelets: 453  CBC (01/19/2022): WBCs: 7.56; HgB: 13.1; Hct: 41.0; platelets: 518  CBC (07/20/2021): WBCs: 7.43; HgB: 13.0; Hct: 39.6; platelets: 469  CBC (06/29/2021): WBCs: 7.8; HgB: 12.6; Hct: 38.1; platelets: 396  CBC (04/14/2021): WBCs: 7.2; HgB: 12.7; Hct: 38.8; platelets: 423  CBC (02/25/2021): WBCs: 6.5; HgB: 11.2; Hct: 34.4; platelets: 407    PSA (04/12/2023): pending  PSA (01/19/2023): < 0.014 ng/mL  PSA (10/19/2022): < 0.014 ng/mL  PSA (07/18/2022): < 0.014 ng/mL  PSA (04/13/2022): < 0.014 ng/mL  PSA (01/19/2022): < 0.014 ng/mL  PSA (10/13/2021): < 0.014 ng/mL  PSA (07/20/2021): 0.020 ng/mL   PSA (04/14/2021): 0.108 ng/mL  PSA (02/25/2021): 0.814 ng/mL  PSA (01/20/2021): 29.5 ng/mL  PSA (01/07/2021): 247.0 ng/mL  PSA (11/10/2020): 28.3 ng/mL    IMAGING  NM bone scan, whole body (12/15/2020):  Impression: Extensive osteoblastic metastatic disease involving the axial and appendicular  skeleton including the right greater than left femoral diaphyses.    CT pelvis with contrast (12/21/2020):  Impression:  1) Urinary bladder wall thickening noted.  2) Heterogeneous appearance of the prostate gland.  3) Nonspecific stranding in the retroperitoneal space.  4) Retroperitoneal region lymph node on the right side measuring 1.4 cm.  5) Right obturator chain region lymph node measuring 1.5 cm. Other lymph nodes are identified in this region.  6) Sclerotic bone lesions are noted throughout the pelvis concerning for metastatic disease.    PATHOLOGY  Left and right prostate, needle core biopsies, twelve total cores (12/02/2020):  Darrius Score 5+4 or 4+5 = 9 prostatic adenocarcinoma involving ~45-90% of all twelve (12/12) total cores.    IMPRESSION AND PLAN  Mr. Madison is a 62 y.o., white male with:  1. Prostate adenocarcinoma: Diagnosed in December 2020 with stage IV disease, with widespread metastases involving the axial and appendicular skeleton along with probable, pelvic adenopathy. I have had multiple, long discussions with the patient and his wife since the time of his initial consultation in our clinic (on 01/07/2021) regarding this diagnosis and its prognosis. They remain aware that this disease is, unfortunately, not curable; however, particularly given his young age and this type of cancer's tendency to respond very well to initial anti-testosterone therapy, it was/remains very treatable, and sustained remissions are very possible. He was felt to be an excellent candidate for initial therapy with both ADT and androgen receptor blockade. He received an initial cycle of (currently now [id number]) Lupron on 01/08/2021 and was also started on daily PO Xtandi at that time. He continues to tolerate this regimen extremely well, with no significant side effects. Meanwhile, his serum PSA levels have responded dramatically and quickly, decreasing from 247 ng/mL on 01/07/2021 to undetectable (<0.014 ng/mL) by  10/13/2021 (and this continues to be the case on the most recent repeat check, drawn on 01/19/2023; today's result is pending). We will proceed with this current treatment plan. We will recheck labs in three months (early July) and see him back in clinic in six months, on the day of the next scheduled cycle of [id number] Lupron (in October 2023), with a CBC, CMP and PSA.  2. Skeletal metastases: There was diffuse involvement by issue #1 of both the axial and appendicular skeletons on the initial staging NM bone scan and CT of the abdomen and pelvis performed in late December 2020. 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 Continue to monitor.  3. Pain: Secondary to #2 and now resolved since starting Lupron/Xtandi in early January 2021 for the palliative treatment of issue #1. He has Norco at home, but he has still not had to take it since early 2021. Continue to monitor.  The patient and his wife were in agreement with these plans.    It is a pleasure to participate in Mr. Madison's care. Please do not hesitate to call with any questions or concerns that you may have.    A total of 30 minutes were spent coordinating this patient’s care in clinic today; more than 50% of this time was face-to-face with the patient and his wife, reviewing his interim medical history, discussing the results of the most recent labwork, including repeat serum PSA levels, and again counseling on the current treatment and followup plan. All questions were answered to their satisfaction.    FOLLOW UP  Continue ([id number]) Lupron (dose today) and daily PO Xtandi. With urology, as previously planned. Repeat a CBC, CMP and PSA in 3 months (early July). Return to our clinic in 6 months (on the day of the scheduled Lupron cycle in October 2023) with a CBC, CMP and PSA.            This document was  electronically signed by HIRO Martinez MD April 12, 2023 08:54 EDT      CC: EUGENIA Mendoza

## 2023-04-12 NOTE — PROGRESS NOTES
Venipuncture Blood Specimen Collection  Venipuncture performed in right arm by Chris Hernandez MA with good hemostasis. Patient tolerated the procedure well without complications.   04/12/23   Chris Hernandez MA

## 2023-04-19 ENCOUNTER — LAB (OUTPATIENT)
Dept: ONCOLOGY | Facility: HOSPITAL | Age: 63
End: 2023-04-19
Payer: COMMERCIAL

## 2023-04-19 ENCOUNTER — INFUSION (OUTPATIENT)
Dept: ONCOLOGY | Facility: HOSPITAL | Age: 63
End: 2023-04-19
Payer: COMMERCIAL

## 2023-04-19 VITALS
OXYGEN SATURATION: 96 % | WEIGHT: 184.6 LBS | BODY MASS INDEX: 27.26 KG/M2 | DIASTOLIC BLOOD PRESSURE: 68 MMHG | HEART RATE: 69 BPM | TEMPERATURE: 98.2 F | SYSTOLIC BLOOD PRESSURE: 105 MMHG | RESPIRATION RATE: 18 BRPM

## 2023-04-19 DIAGNOSIS — C61 ADENOCARCINOMA OF PROSTATE: ICD-10-CM

## 2023-04-19 DIAGNOSIS — C61 ADENOCARCINOMA OF PROSTATE: Primary | ICD-10-CM

## 2023-04-19 DIAGNOSIS — C79.51 MALIGNANT NEOPLASM METASTATIC TO BONE: ICD-10-CM

## 2023-04-19 PROCEDURE — 25010000002 LEUPROLIDE 45 MG KIT: Performed by: INTERNAL MEDICINE

## 2023-04-19 PROCEDURE — 96402 CHEMO HORMON ANTINEOPL SQ/IM: CPT

## 2023-04-19 RX ADMIN — LEUPROLIDE ACETATE 45 MG: KIT at 13:32

## 2023-05-08 ENCOUNTER — SPECIALTY PHARMACY (OUTPATIENT)
Dept: PHARMACY | Facility: HOSPITAL | Age: 63
End: 2023-05-08
Payer: COMMERCIAL

## 2023-05-08 NOTE — PROGRESS NOTES
Specialty Pharmacy Refill Coordination Note      Name:  Ta Madison  :  1960  Date:  2023         Past Medical History:   Diagnosis Date   • Arthritis    • Elevated cholesterol    • GERD (gastroesophageal reflux disease)    • Increased heart rate    • Mini stroke    • Neck pain    • Prostate cancer    • Rash    • Tobacco abuse        Past Surgical History:   Procedure Laterality Date   • APPENDECTOMY      [facility]    • COLONOSCOPY     • COLONOSCOPY N/A 2023    Procedure: COLONOSCOPY;  Surgeon: Mahnaz Caraballo MD;  Location: [facility];  Service: Gastroenterology;  Laterality: N/A;       Social History     Socioeconomic History   • Marital status:    Tobacco Use   • Smoking status: Every Day     Packs/day: 1.00     Years: 45.00     Pack years: 45.00     Types: Cigarettes   • Smokeless tobacco: Never   Vaping Use   • Vaping Use: Never used   Substance and Sexual Activity   • Alcohol use: No   • Drug use: Yes     Types: Marijuana     Comment: occ    • Sexual activity: Defer       Family History   Problem Relation Age of Onset   • Nephrolithiasis Mother    • Heart disease Father    • Hypertension Father    • Kidney disease Father        No Known Allergies    Current Outpatient Medications   Medication Sig Dispense Refill   • albuterol sulfate  (90 Base) MCG/ACT inhaler Inhale 2 puffs Every 6 (Six) Hours As Needed for Wheezing. 18 g 5   • aspirin 81 MG EC tablet Take 1 tablet by mouth Daily.     • atorvastatin (LIPITOR) 20 MG tablet Take 1 tablet by mouth Every Night. 90 tablet 1   • enzalutamide (XTANDI) 40 MG chemo capsule Take 4 capsules by mouth Daily. 120 capsule 5   • EPINEPHrine (EPIPEN) 0.3 MG/0.3ML solution auto-injector injection INJECT 1 PEN IN THE MUSCLE ONE TIME AS DIRECTED     • fenofibrate (TRICOR) 145 MG tablet Take 1 tablet by mouth Daily. 90 tablet 1   • HYDROcodone-acetaminophen (NORCO)  MG per tablet Take 1 tablet by mouth Every 6 (Six)  Hours As Needed for Moderate Pain. 120 tablet 0   • hydrOXYzine (ATARAX) 50 MG tablet Take 1 tablet by mouth 3 (Three) Times a Day As Needed for Itching. 90 tablet 1   • metoprolol tartrate (LOPRESSOR) 25 MG tablet Take 1 tablet by mouth Daily. 90 tablet 1   • pantoprazole (PROTONIX) 40 MG EC tablet Take 1 tablet by mouth Daily. 90 tablet 1   • vitamin D (ERGOCALCIFEROL) 1.25 MG (54117 UT) capsule capsule Take 1 capsule by mouth 1 (One) Time Per Week. 5 capsule 5     No current facility-administered medications for this visit.         ASSESSMENT/PLAN:      Ta is a 62 y.o. male contacted today regarding refills of  Xtandi 40mg capsule specialty medication(s).    Reviewed and verified with patient:       Specialty medication(s) and dose(s) confirmed: yes    Refill Questions    Flowsheet Row Most Recent Value   Changes to allergies? No   Changes to medications? No   New conditions since last clinic visit No   Unplanned office visit, urgent care, ED, or hospital admission in the last 4 weeks  No   How does patient/caregiver feel medication is working? Very good   Financial problems or insurance changes  No   If yes, describe changes in insurance or financial issues. no   Since the previous refill, were any specialty medication doses or scheduled injections missed or delayed?  No   If yes, please provide the amount na   Why were doses missed? na   Does this patient require a clinical escalation to a pharmacist? No                Medication Adherence    Adherence tools used: patient uses a pill box to manage medications  Support network for adherence: family member          Follow-up: 30 day(s)     Corinne Whatley, Pharmacy Technician  Specialty Pharmacy Technician

## 2023-05-12 ENCOUNTER — SPECIALTY PHARMACY (OUTPATIENT)
Dept: PHARMACY | Facility: HOSPITAL | Age: 63
End: 2023-05-12
Payer: COMMERCIAL

## 2023-05-12 DIAGNOSIS — C61 ADENOCARCINOMA OF PROSTATE: ICD-10-CM

## 2023-05-12 NOTE — PROGRESS NOTES
Specialty Pharmacy Patient Management Program  Oncology Reassessment     Ta Madison is a 62 y.o. male with prostate cancer seen by an Oncology provider and enrolled in the Oncology Patient Management program offered by [facility].  A follow-up outreach was conducted, including assessment of continued therapy appropriateness, medication adherence, and side effect incidence and management for Xtandi.       Relevant Past Medical History and Comorbidities  Relevant medical history and concomitant health conditions were discussed with the patient. The patient's chart has been reviewed for relevant past medical history and comorbid health conditions and updated as necessary.   Past Medical History:   Diagnosis Date   • Arthritis    • Elevated cholesterol    • GERD (gastroesophageal reflux disease)    • Increased heart rate    • Mini stroke    • Neck pain    • Prostate cancer    • Rash    • Tobacco abuse      Social History     Socioeconomic History   • Marital status:    Tobacco Use   • Smoking status: Every Day     Packs/day: 1.00     Years: 45.00     Pack years: 45.00     Types: Cigarettes   • Smokeless tobacco: Never   Vaping Use   • Vaping Use: Never used   Substance and Sexual Activity   • Alcohol use: No   • Drug use: Yes     Types: Marijuana     Comment: occ    • Sexual activity: Defer       Allergies  Known allergies and reactions were discussed with the patient. The patient's chart has been reviewed for allergy information and updated as necessary.   Patient has no known allergies.    Relevant Laboratory Values  Lab Results   Component Value Date    GLUCOSE 114 (H) 04/12/2023    CALCIUM 9.7 04/12/2023     04/12/2023    K 4.7 04/12/2023    CO2 25.7 04/12/2023     04/12/2023    BUN 23 04/12/2023    CREATININE 1.13 04/12/2023    EGFRIFNONA 70 01/19/2022    BCR 20.4 04/12/2023    ANIONGAP 9.3 04/12/2023     Lab Results   Component Value Date    WBC 6.79 04/12/2023     RBC 4.76 04/12/2023    HGB 12.2 (L) 04/12/2023    HCT 38.5 04/12/2023    MCV 80.9 04/12/2023    MCH 25.6 (L) 04/12/2023    MCHC 31.7 04/12/2023    RDW 15.0 04/12/2023    RDWSD 43.9 04/12/2023    MPV 10.3 04/12/2023     (H) 04/12/2023    NEUTRORELPCT 58.1 04/12/2023    LYMPHORELPCT 29.9 04/12/2023    MONORELPCT 9.1 04/12/2023    EOSRELPCT 1.3 04/12/2023    BASORELPCT 1.3 04/12/2023    AUTOIGPER 0.3 04/12/2023    NEUTROABS 3.94 04/12/2023    LYMPHSABS 2.03 04/12/2023    MONOSABS 0.62 04/12/2023    EOSABS 0.09 04/12/2023    BASOSABS 0.09 04/12/2023    AUTOIGNUM 0.02 04/12/2023    NRBC 0.0 04/12/2023        Current Medication List  This medication list has been reviewed with the patient and evaluated for any interactions or necessary modifications/recommendations, and updated to include all prescription medications, OTC medications, and supplements the patient is currently taking.  This list reflects what is contained in the patient's profile, which has also been marked as reviewed to communicate to other providers it is the most up to date version of the patient's current medication therapy.     Current Outpatient Medications:   •  enzalutamide (XTANDI) 40 MG chemo capsule, Take 4 capsules by mouth Daily., Disp: 120 capsule, Rfl: 5  •  albuterol sulfate  (90 Base) MCG/ACT inhaler, Inhale 2 puffs Every 6 (Six) Hours As Needed for Wheezing., Disp: 18 g, Rfl: 5  •  aspirin 81 MG EC tablet, Take 1 tablet by mouth Daily., Disp: , Rfl:   •  atorvastatin (LIPITOR) 20 MG tablet, Take 1 tablet by mouth Every Night., Disp: 90 tablet, Rfl: 1  •  EPINEPHrine (EPIPEN) 0.3 MG/0.3ML solution auto-injector injection, INJECT 1 PEN IN THE MUSCLE ONE TIME AS DIRECTED, Disp: , Rfl:   •  fenofibrate (TRICOR) 145 MG tablet, Take 1 tablet by mouth Daily., Disp: 90 tablet, Rfl: 1  •  HYDROcodone-acetaminophen (NORCO)  MG per tablet, Take 1 tablet by mouth Every 6 (Six) Hours As Needed for Moderate Pain., Disp: 120 tablet,  Rfl: 0  •  hydrOXYzine (ATARAX) 50 MG tablet, Take 1 tablet by mouth 3 (Three) Times a Day As Needed for Itching., Disp: 90 tablet, Rfl: 1  •  metoprolol tartrate (LOPRESSOR) 25 MG tablet, Take 1 tablet by mouth Daily., Disp: 90 tablet, Rfl: 1  •  pantoprazole (PROTONIX) 40 MG EC tablet, Take 1 tablet by mouth Daily., Disp: 90 tablet, Rfl: 1  •  vitamin D (ERGOCALCIFEROL) 1.25 MG (41684 UT) capsule capsule, Take 1 capsule by mouth 1 (One) Time Per Week., Disp: 5 capsule, Rfl: 5  No current facility-administered medications for this visit.    Drug Interactions  DDI report generated; no significant interactions.     Adverse Drug Reactions  • Adverse Reactions Experienced: None.   • Plan for ADR Management: Not required. Tolerating well.     Hospitalizations and Urgent Care Since Last Assessment  • Hospitalizations or Admissions: 0    • ED Visits: 0   • Urgent Office Visits: 0     Adherence and Self-Administration  • Approximate Number of Doses Missed Since Last Assessment: 0   • Ongoing or New Barriers to Patient Adherence and/or Self-Administration: None   • Methods for Supporting Patient Adherence and/or Self-Administration: Provided direct education. Care coordinator to continue providing once-monthly outreach calls to assist with adherence and coordinate medication refills. Pharmacist to provide clinical assessments every 6 months and will assist in the management of clinical issues as they arise.      Goals of Therapy  • Progress Toward Meeting Patient-Identified Goals of Therapy:  On-track  o Patient-Identified Goals  - Consistently take medications as prescribed  - Patient will adhere to medication regimen  - Patient will report any medication side effects to healthcare provider    • Progress Toward Meeting Clinical Goals or Therapeutic Targets: On-track  o Clinical Goals or Therapeutic Targets  - Support patient understanding of medication regimen  - Ensure patient knows the pharmacy contact  information  - Schedule regular follow-up to monitor the treatment serious adverse events  - Schedule regular follow-up to confirm medication adherence  - Schedule regular follow-up to monitor disease progression or stabilty    Quality of Life Change Assessment   Quality of Life related to the patient's specialty therapy was discussed with the patient. The QOL segment of this outreach has been reviewed and updated.   • Quality of Life Score Change: 5    Reassessment Plan & Follow-Up  1. Pharmacist to continue to perform regular reassessments no more than (6) months from the previous assessment.  2. Care Coordinator to facilitate future refill outreaches, coordinate prescription delivery, and escalate clinical questions to pharmacist.     Additional Plans, Therapy Recommendations or Therapy Problems to Be Addressed: None.  Recent Provider Changes:  None.    Attestation  I attest that the specialty medication(s) addressed above are appropriate for the patient based on my reassessment.  If the prescribed therapy is at any point deemed not appropriate based on the current or future assessments, a consultation will be initiated with the patient's specialty care provider to determine the best course of action. The revised plan of therapy will be documented along with any additional patient education provided.     Oscar Houser, Karin  Oncology Clinical Pharmacist  5/12/2023  11:44 EDT

## 2023-05-17 DIAGNOSIS — G89.3 CHRONIC PAIN DUE TO MALIGNANT NEOPLASTIC DISEASE: ICD-10-CM

## 2023-05-17 RX ORDER — HYDROCODONE BITARTRATE AND ACETAMINOPHEN 10; 325 MG/1; MG/1
1 TABLET ORAL EVERY 6 HOURS PRN
Qty: 120 TABLET | Refills: 0 | Status: SHIPPED | OUTPATIENT
Start: 2023-05-17

## 2023-06-07 ENCOUNTER — OFFICE VISIT (OUTPATIENT)
Dept: FAMILY MEDICINE CLINIC | Facility: CLINIC | Age: 63
End: 2023-06-07
Payer: COMMERCIAL

## 2023-06-07 VITALS
DIASTOLIC BLOOD PRESSURE: 60 MMHG | HEART RATE: 77 BPM | WEIGHT: 180.6 LBS | TEMPERATURE: 97.7 F | OXYGEN SATURATION: 96 % | HEIGHT: 69 IN | SYSTOLIC BLOOD PRESSURE: 108 MMHG | BODY MASS INDEX: 26.75 KG/M2

## 2023-06-07 DIAGNOSIS — R00.0 TACHYCARDIA: ICD-10-CM

## 2023-06-07 DIAGNOSIS — E55.9 VITAMIN D DEFICIENCY: ICD-10-CM

## 2023-06-07 DIAGNOSIS — R12 HEARTBURN: Primary | ICD-10-CM

## 2023-06-07 DIAGNOSIS — J40 BRONCHITIS: ICD-10-CM

## 2023-06-07 DIAGNOSIS — E78.2 MIXED HYPERLIPIDEMIA: ICD-10-CM

## 2023-06-07 DIAGNOSIS — K21.9 GASTROESOPHAGEAL REFLUX DISEASE: ICD-10-CM

## 2023-06-07 DIAGNOSIS — Z88.9 MULTIPLE ALLERGIES: ICD-10-CM

## 2023-06-07 DIAGNOSIS — F45.8 NEUROPATHIC PRURITUS: ICD-10-CM

## 2023-06-07 PROCEDURE — 1160F RVW MEDS BY RX/DR IN RCRD: CPT | Performed by: NURSE PRACTITIONER

## 2023-06-07 PROCEDURE — 80053 COMPREHEN METABOLIC PANEL: CPT | Performed by: NURSE PRACTITIONER

## 2023-06-07 PROCEDURE — 36415 COLL VENOUS BLD VENIPUNCTURE: CPT | Performed by: NURSE PRACTITIONER

## 2023-06-07 PROCEDURE — 1159F MED LIST DOCD IN RCRD: CPT | Performed by: NURSE PRACTITIONER

## 2023-06-07 PROCEDURE — 80061 LIPID PANEL: CPT | Performed by: NURSE PRACTITIONER

## 2023-06-07 PROCEDURE — 99214 OFFICE O/P EST MOD 30 MIN: CPT | Performed by: NURSE PRACTITIONER

## 2023-06-07 RX ORDER — ATORVASTATIN CALCIUM 20 MG/1
20 TABLET, FILM COATED ORAL NIGHTLY
Qty: 90 TABLET | Refills: 1 | Status: SHIPPED | OUTPATIENT
Start: 2023-06-07

## 2023-06-07 RX ORDER — ERGOCALCIFEROL 1.25 MG/1
50000 CAPSULE ORAL WEEKLY
Qty: 5 CAPSULE | Refills: 5 | Status: SHIPPED | OUTPATIENT
Start: 2023-06-07

## 2023-06-07 RX ORDER — PANTOPRAZOLE SODIUM 40 MG/1
40 TABLET, DELAYED RELEASE ORAL DAILY
Qty: 90 TABLET | Refills: 1 | Status: SHIPPED | OUTPATIENT
Start: 2023-06-07

## 2023-06-07 RX ORDER — MONTELUKAST SODIUM 10 MG/1
10 TABLET ORAL NIGHTLY
COMMUNITY
Start: 2023-03-06 | End: 2023-06-07 | Stop reason: SDUPTHER

## 2023-06-07 RX ORDER — MONTELUKAST SODIUM 10 MG/1
10 TABLET ORAL NIGHTLY
Qty: 90 TABLET | Refills: 1 | Status: SHIPPED | OUTPATIENT
Start: 2023-06-07

## 2023-06-07 RX ORDER — HYDROXYZINE 50 MG/1
50 TABLET, FILM COATED ORAL 3 TIMES DAILY PRN
Qty: 90 TABLET | Refills: 1 | Status: SHIPPED | OUTPATIENT
Start: 2023-06-07

## 2023-06-07 RX ORDER — AMOXICILLIN 875 MG/1
875 TABLET, COATED ORAL 2 TIMES DAILY
Qty: 20 TABLET | Refills: 0 | Status: SHIPPED | OUTPATIENT
Start: 2023-06-07

## 2023-06-07 RX ORDER — FENOFIBRATE 145 MG/1
145 TABLET, COATED ORAL DAILY
Qty: 90 TABLET | Refills: 1 | Status: SHIPPED | OUTPATIENT
Start: 2023-06-07

## 2023-06-07 RX ORDER — ALBUTEROL SULFATE 90 UG/1
2 AEROSOL, METERED RESPIRATORY (INHALATION) EVERY 6 HOURS PRN
Qty: 18 G | Refills: 5 | Status: SHIPPED | OUTPATIENT
Start: 2023-06-07

## 2023-06-08 LAB
ALBUMIN SERPL-MCNC: 4.1 G/DL (ref 3.5–5.2)
ALBUMIN/GLOB SERPL: 1.2 G/DL
ALP SERPL-CCNC: 104 U/L (ref 39–117)
ALT SERPL W P-5'-P-CCNC: 9 U/L (ref 1–41)
ANION GAP SERPL CALCULATED.3IONS-SCNC: 10 MMOL/L (ref 5–15)
AST SERPL-CCNC: 12 U/L (ref 1–40)
BILIRUB SERPL-MCNC: 0.2 MG/DL (ref 0–1.2)
BUN SERPL-MCNC: 18 MG/DL (ref 8–23)
BUN/CREAT SERPL: 21.2 (ref 7–25)
CALCIUM SPEC-SCNC: 9.4 MG/DL (ref 8.6–10.5)
CHLORIDE SERPL-SCNC: 102 MMOL/L (ref 98–107)
CHOLEST SERPL-MCNC: 146 MG/DL (ref 0–200)
CO2 SERPL-SCNC: 25 MMOL/L (ref 22–29)
CREAT SERPL-MCNC: 0.85 MG/DL (ref 0.76–1.27)
EGFRCR SERPLBLD CKD-EPI 2021: 98.2 ML/MIN/1.73
GLOBULIN UR ELPH-MCNC: 3.4 GM/DL
GLUCOSE SERPL-MCNC: 95 MG/DL (ref 65–99)
HDLC SERPL-MCNC: 31 MG/DL (ref 40–60)
LDLC SERPL CALC-MCNC: 91 MG/DL (ref 0–100)
LDLC/HDLC SERPL: 2.83 {RATIO}
POTASSIUM SERPL-SCNC: 4.9 MMOL/L (ref 3.5–5.2)
PROT SERPL-MCNC: 7.5 G/DL (ref 6–8.5)
SODIUM SERPL-SCNC: 137 MMOL/L (ref 136–145)
TRIGL SERPL-MCNC: 136 MG/DL (ref 0–150)
VLDLC SERPL-MCNC: 24 MG/DL (ref 5–40)

## 2023-06-12 ENCOUNTER — TELEPHONE (OUTPATIENT)
Dept: FAMILY MEDICINE CLINIC | Facility: CLINIC | Age: 63
End: 2023-06-12
Payer: COMMERCIAL

## 2023-06-12 NOTE — TELEPHONE ENCOUNTER
----- Message from EUGENIA Mendoza sent at 6/12/2023 10:47 AM EDT -----  Labs look good      Pt notified.

## 2023-06-13 ENCOUNTER — OFFICE VISIT (OUTPATIENT)
Dept: GASTROENTEROLOGY | Facility: CLINIC | Age: 63
End: 2023-06-13
Payer: COMMERCIAL

## 2023-06-13 VITALS
SYSTOLIC BLOOD PRESSURE: 112 MMHG | DIASTOLIC BLOOD PRESSURE: 72 MMHG | HEART RATE: 96 BPM | HEIGHT: 69 IN | WEIGHT: 178 LBS | BODY MASS INDEX: 26.36 KG/M2

## 2023-06-13 DIAGNOSIS — R10.13 EPIGASTRIC PAIN: ICD-10-CM

## 2023-06-13 DIAGNOSIS — K21.9 GASTROESOPHAGEAL REFLUX DISEASE, UNSPECIFIED WHETHER ESOPHAGITIS PRESENT: Primary | ICD-10-CM

## 2023-06-13 RX ORDER — OMEPRAZOLE 40 MG/1
40 CAPSULE, DELAYED RELEASE ORAL
Qty: 60 CAPSULE | Refills: 5 | Status: SHIPPED | OUTPATIENT
Start: 2023-06-13

## 2023-06-13 NOTE — PROGRESS NOTES
DATE OF CONSULTATION:  6/13/2023    REASON FOR REFERRAL: GERD, Epigastric pain    REFERRING PHYSICIAN:  EUGENIA Mendoza    CHIEF COMPLAINT:  Epigastric pain    HISTORY OF PRESENT ILLNESS:   Ta Madison is a very pleasant 63 y.o. male who is being seen today at the request of EUGENIA Mendoza for evaluation and treatment of GERD and epigastric pain. Mr. Madison reports GERD has been a chronic issue. He is taking Protonix 40 mg PO daily and currently denies burning in his chest or throat. However, he complains of frequent epigastric pain which occurs particularly after eating. He says spicy, acidic and greasy foods make his symptoms worse. He retains his gallbladder. Denies nausea or vomiting. He denies having dysphagia. He reports bowels move well with over the counter stool softeners. Denies unusual weight loss. Denies abdominal bloating. He has no other complaints at this time.     PAST MEDICAL HISTORY:  Past Medical History:   Diagnosis Date    Arthritis     Elevated cholesterol     GERD (gastroesophageal reflux disease)     Increased heart rate     Mini stroke     Neck pain     Prostate cancer     Rash     Tobacco abuse        PAST SURGICAL HISTORY:  Past Surgical History:   Procedure Laterality Date    APPENDECTOMY  1971    [facility]     COLONOSCOPY      COLONOSCOPY N/A 1/25/2023    Procedure: COLONOSCOPY;  Surgeon: Mahnaz Caraballo MD;  Location: [facility] OR;  Service: Gastroenterology;  Laterality: N/A;       FAMILY HISTORY:  Family History   Problem Relation Age of Onset    Nephrolithiasis Mother     Heart disease Father     Hypertension Father     Kidney disease Father        SOCIAL HISTORY:  Social History     Socioeconomic History    Marital status:    Tobacco Use    Smoking status: Every Day     Packs/day: 1.00     Years: 45.00     Pack years: 45.00     Types: Cigarettes    Smokeless tobacco: Never   Vaping Use    Vaping Use: Never used   Substance and Sexual Activity    Alcohol use:  No    Drug use: Yes     Types: Marijuana     Comment: occ     Sexual activity: Defer       MEDICATIONS:  The current medication list was reviewed in the EMR    Current Outpatient Medications:     albuterol sulfate  (90 Base) MCG/ACT inhaler, Inhale 2 puffs Every 6 (Six) Hours As Needed for Wheezing., Disp: 18 g, Rfl: 5    amoxicillin (AMOXIL) 875 MG tablet, Take 1 tablet by mouth 2 (Two) Times a Day., Disp: 20 tablet, Rfl: 0    aspirin 81 MG EC tablet, Take 1 tablet by mouth Daily., Disp: , Rfl:     atorvastatin (LIPITOR) 20 MG tablet, Take 1 tablet by mouth Every Night., Disp: 90 tablet, Rfl: 1    enzalutamide (XTANDI) 40 MG chemo capsule, Take 4 capsules by mouth Daily., Disp: 120 capsule, Rfl: 5    EPINEPHrine (EPIPEN) 0.3 MG/0.3ML solution auto-injector injection, INJECT 1 PEN IN THE MUSCLE ONE TIME AS DIRECTED, Disp: , Rfl:     fenofibrate (TRICOR) 145 MG tablet, Take 1 tablet by mouth Daily., Disp: 90 tablet, Rfl: 1    HYDROcodone-acetaminophen (NORCO)  MG per tablet, Take 1 tablet by mouth Every 6 (Six) Hours As Needed for Moderate Pain., Disp: 120 tablet, Rfl: 0    hydrOXYzine (ATARAX) 50 MG tablet, Take 1 tablet by mouth 3 (Three) Times a Day As Needed for Itching., Disp: 90 tablet, Rfl: 1    metoprolol tartrate (LOPRESSOR) 25 MG tablet, Take 1 tablet by mouth Daily., Disp: 90 tablet, Rfl: 1    montelukast (SINGULAIR) 10 MG tablet, Take 1 tablet by mouth Every Night., Disp: 90 tablet, Rfl: 1    pantoprazole (PROTONIX) 40 MG EC tablet, Take 1 tablet by mouth Daily., Disp: 90 tablet, Rfl: 1    vitamin D (ERGOCALCIFEROL) 1.25 MG (62959 UT) capsule capsule, Take 1 capsule by mouth 1 (One) Time Per Week., Disp: 5 capsule, Rfl: 5    ALLERGIES:  No Known Allergies      REVIEW OF SYSTEMS:    A comprehensive 14 point review of systems was performed.  Significant findings as mentioned above.  All other systems reviewed and are negative.        Physical Exam   Vital Signs: /72   Pulse 96   Ht  "175.3 cm (69\")   Wt 80.7 kg (178 lb)   BMI 26.29 kg/m²     General: Well developed, well nourished, alert and oriented x 3, in no acute distress.   Head: ATNC   Eyes: PERRL, No evidence of conjunctivitis.   Nose: No nasal discharge.   Mouth: Oral mucosal membranes moist. No oral ulceration or hemorrhages.   Neck: Neck supple. No thyromegaly. No JVD.   Lungs: Clear in all fields to A&P without rales, rhonchi or wheezing.   Heart: Regular rate and rhythm. No murmurs, rubs, or gallops.   Abdomen: Soft. Bowel sounds are normoactive. Nontender with palpation.   Extremities: No cyanosis or edema. Peripheral pulses palpable and equal bilaterally.   Neurologic: Grossly non-focal exam      RECENT LABS:  Lab Results   Component Value Date    WBC 6.79 04/12/2023    HGB 12.2 (L) 04/12/2023    HCT 38.5 04/12/2023    MCV 80.9 04/12/2023    RDW 15.0 04/12/2023     (H) 04/12/2023    NEUTRORELPCT 58.1 04/12/2023    LYMPHORELPCT 29.9 04/12/2023    MONORELPCT 9.1 04/12/2023    EOSRELPCT 1.3 04/12/2023    BASORELPCT 1.3 04/12/2023    NEUTROABS 3.94 04/12/2023    LYMPHSABS 2.03 04/12/2023       Lab Results   Component Value Date     06/07/2023    K 4.9 06/07/2023    CO2 25.0 06/07/2023     06/07/2023    BUN 18 06/07/2023    CREATININE 0.85 06/07/2023    EGFRIFNONA 70 01/19/2022    GLUCOSE 95 06/07/2023    CALCIUM 9.4 06/07/2023    ALKPHOS 104 06/07/2023    AST 12 06/07/2023    ALT 9 06/07/2023    BILITOT 0.2 06/07/2023    ALBUMIN 4.1 06/07/2023    PROTEINTOT 7.5 06/07/2023    MG 2.1 11/10/2020       ASSESSMENT & PLAN:  Ta Madison is a very pleasant 63 y.o. male with    1.  GERD:  2. Epigastric pain:    -Recommended EGD to further evaluate. After discussing the risks/benefits, patient was agreeable. Will schedule. In the meantime, will discontinue Protonix and start him on omeprazole 40 mg PO BID. We also dicussed important dietary modifications to help control his symptoms.   -RTC following procedure.     The " patient was in agreement with the plan and all questions were answered to his satisfaction.     Thank you so much for allowing us to participate in the care of Ta Madison . Please do not hesitate to contact us with any questions or concerns.             Electronically Signed by: EUGENIA Hebert , June 13, 2023 11:27 EDT       CC:   EUGENIA Mendoza Amy, APRN

## 2023-07-21 PROBLEM — R10.13 EPIGASTRIC PAIN: Status: ACTIVE | Noted: 2023-07-21

## 2023-07-23 ENCOUNTER — APPOINTMENT (OUTPATIENT)
Dept: OTHER | Facility: HOSPITAL | Age: 63
DRG: 438 | End: 2023-07-23
Payer: COMMERCIAL

## 2023-07-23 ENCOUNTER — HOSPITAL ENCOUNTER (INPATIENT)
Facility: HOSPITAL | Age: 63
LOS: 19 days | Discharge: OTHER INSTITUTION | DRG: 438 | End: 2023-08-11
Attending: FAMILY MEDICINE | Admitting: FAMILY MEDICINE
Payer: COMMERCIAL

## 2023-07-23 DIAGNOSIS — K85.10 ACUTE GALLSTONE PANCREATITIS: Primary | ICD-10-CM

## 2023-07-23 DIAGNOSIS — J40 BRONCHITIS: ICD-10-CM

## 2023-07-23 DIAGNOSIS — R13.10 DYSPHAGIA, UNSPECIFIED TYPE: ICD-10-CM

## 2023-07-23 DIAGNOSIS — K85.10 PANCREATITIS, GALLSTONE: ICD-10-CM

## 2023-07-23 PROBLEM — Z86.73 PERSONAL HISTORY OF TRANSIENT ISCHEMIC ATTACK (TIA), AND CEREBRAL INFARCTION WITHOUT RESIDUAL DEFICITS: Status: ACTIVE | Noted: 2023-07-23

## 2023-07-23 LAB
ABO GROUP BLD: NORMAL
ALBUMIN SERPL-MCNC: 3.8 G/DL (ref 3.5–5.2)
ALBUMIN/GLOB SERPL: 1.4 G/DL
ALP SERPL-CCNC: 76 U/L (ref 39–117)
ALT SERPL W P-5'-P-CCNC: 53 U/L (ref 1–41)
ANION GAP SERPL CALCULATED.3IONS-SCNC: 18 MMOL/L (ref 5–15)
APTT PPP: 26.9 SECONDS (ref 22–39)
AST SERPL-CCNC: 66 U/L (ref 1–40)
BILIRUB SERPL-MCNC: 0.4 MG/DL (ref 0–1.2)
BLD GP AB SCN SERPL QL: NEGATIVE
BUN SERPL-MCNC: 28 MG/DL (ref 8–23)
BUN/CREAT SERPL: 19.7 (ref 7–25)
CALCIUM SPEC-SCNC: 8.5 MG/DL (ref 8.6–10.5)
CHLORIDE SERPL-SCNC: 108 MMOL/L (ref 98–107)
CO2 SERPL-SCNC: 14 MMOL/L (ref 22–29)
CREAT SERPL-MCNC: 1.42 MG/DL (ref 0.76–1.27)
D-LACTATE SERPL-SCNC: 3.6 MMOL/L (ref 0.5–2)
EGFRCR SERPLBLD CKD-EPI 2021: 55.9 ML/MIN/1.73
GLOBULIN UR ELPH-MCNC: 2.8 GM/DL
GLUCOSE SERPL-MCNC: 171 MG/DL (ref 65–99)
INR PPP: 0.98 (ref 0.89–1.12)
MAGNESIUM SERPL-MCNC: 2.3 MG/DL (ref 1.6–2.4)
POTASSIUM SERPL-SCNC: 5.1 MMOL/L (ref 3.5–5.2)
PROCALCITONIN SERPL-MCNC: 0.85 NG/ML (ref 0–0.25)
PROT SERPL-MCNC: 6.6 G/DL (ref 6–8.5)
PROTHROMBIN TIME: 13.1 SECONDS (ref 12.2–14.5)
RH BLD: POSITIVE
SODIUM SERPL-SCNC: 140 MMOL/L (ref 136–145)
T&S EXPIRATION DATE: NORMAL
TRIGL SERPL-MCNC: 108 MG/DL (ref 0–150)

## 2023-07-23 PROCEDURE — 86850 RBC ANTIBODY SCREEN: CPT | Performed by: INTERNAL MEDICINE

## 2023-07-23 PROCEDURE — 84478 ASSAY OF TRIGLYCERIDES: CPT | Performed by: INTERNAL MEDICINE

## 2023-07-23 PROCEDURE — 83735 ASSAY OF MAGNESIUM: CPT | Performed by: INTERNAL MEDICINE

## 2023-07-23 PROCEDURE — G0378 HOSPITAL OBSERVATION PER HR: HCPCS

## 2023-07-23 PROCEDURE — 83605 ASSAY OF LACTIC ACID: CPT | Performed by: INTERNAL MEDICINE

## 2023-07-23 PROCEDURE — 25010000002 ONDANSETRON PER 1 MG: Performed by: NURSE PRACTITIONER

## 2023-07-23 PROCEDURE — 85730 THROMBOPLASTIN TIME PARTIAL: CPT | Performed by: INTERNAL MEDICINE

## 2023-07-23 PROCEDURE — 86901 BLOOD TYPING SEROLOGIC RH(D): CPT | Performed by: INTERNAL MEDICINE

## 2023-07-23 PROCEDURE — 85610 PROTHROMBIN TIME: CPT | Performed by: INTERNAL MEDICINE

## 2023-07-23 PROCEDURE — 93005 ELECTROCARDIOGRAM TRACING: CPT | Performed by: INTERNAL MEDICINE

## 2023-07-23 PROCEDURE — 99223 1ST HOSP IP/OBS HIGH 75: CPT | Performed by: INTERNAL MEDICINE

## 2023-07-23 PROCEDURE — 87040 BLOOD CULTURE FOR BACTERIA: CPT | Performed by: INTERNAL MEDICINE

## 2023-07-23 PROCEDURE — 86900 BLOOD TYPING SEROLOGIC ABO: CPT | Performed by: INTERNAL MEDICINE

## 2023-07-23 PROCEDURE — 86901 BLOOD TYPING SEROLOGIC RH(D): CPT

## 2023-07-23 PROCEDURE — 80053 COMPREHEN METABOLIC PANEL: CPT | Performed by: INTERNAL MEDICINE

## 2023-07-23 PROCEDURE — 25010000002 HYDROMORPHONE 1 MG/ML SOLUTION: Performed by: INTERNAL MEDICINE

## 2023-07-23 PROCEDURE — 93010 ELECTROCARDIOGRAM REPORT: CPT | Performed by: INTERNAL MEDICINE

## 2023-07-23 PROCEDURE — 84145 PROCALCITONIN (PCT): CPT | Performed by: INTERNAL MEDICINE

## 2023-07-23 PROCEDURE — 25010000002 KETOROLAC TROMETHAMINE PER 15 MG: Performed by: INTERNAL MEDICINE

## 2023-07-23 PROCEDURE — 86900 BLOOD TYPING SEROLOGIC ABO: CPT

## 2023-07-23 RX ORDER — POLYETHYLENE GLYCOL 3350 17 G/17G
17 POWDER, FOR SOLUTION ORAL DAILY PRN
Status: DISCONTINUED | OUTPATIENT
Start: 2023-07-23 | End: 2023-07-25

## 2023-07-23 RX ORDER — CHOLECALCIFEROL (VITAMIN D3) 125 MCG
5 CAPSULE ORAL NIGHTLY PRN
Status: DISCONTINUED | OUTPATIENT
Start: 2023-07-23 | End: 2023-07-31

## 2023-07-23 RX ORDER — KETOROLAC TROMETHAMINE 15 MG/ML
15 INJECTION, SOLUTION INTRAMUSCULAR; INTRAVENOUS ONCE
Status: COMPLETED | OUTPATIENT
Start: 2023-07-23 | End: 2023-07-23

## 2023-07-23 RX ORDER — PANTOPRAZOLE SODIUM 40 MG/1
40 TABLET, DELAYED RELEASE ORAL
Status: DISCONTINUED | OUTPATIENT
Start: 2023-07-24 | End: 2023-08-01 | Stop reason: ALTCHOICE

## 2023-07-23 RX ORDER — HYDROCODONE BITARTRATE AND ACETAMINOPHEN 10; 325 MG/1; MG/1
1 TABLET ORAL EVERY 6 HOURS PRN
Status: DISCONTINUED | OUTPATIENT
Start: 2023-07-23 | End: 2023-07-25

## 2023-07-23 RX ORDER — NICOTINE 21 MG/24HR
1 PATCH, TRANSDERMAL 24 HOURS TRANSDERMAL
Status: DISCONTINUED | OUTPATIENT
Start: 2023-07-24 | End: 2023-08-11 | Stop reason: HOSPADM

## 2023-07-23 RX ORDER — SODIUM CHLORIDE 0.9 % (FLUSH) 0.9 %
10 SYRINGE (ML) INJECTION EVERY 12 HOURS SCHEDULED
Status: DISCONTINUED | OUTPATIENT
Start: 2023-07-23 | End: 2023-08-11 | Stop reason: HOSPADM

## 2023-07-23 RX ORDER — NALOXONE HCL 0.4 MG/ML
0.4 VIAL (ML) INJECTION AS NEEDED
Status: DISCONTINUED | OUTPATIENT
Start: 2023-07-23 | End: 2023-08-11 | Stop reason: HOSPADM

## 2023-07-23 RX ORDER — SODIUM CHLORIDE 0.9 % (FLUSH) 0.9 %
10 SYRINGE (ML) INJECTION AS NEEDED
Status: DISCONTINUED | OUTPATIENT
Start: 2023-07-23 | End: 2023-08-11 | Stop reason: HOSPADM

## 2023-07-23 RX ORDER — HYDROXYZINE HYDROCHLORIDE 25 MG/1
50 TABLET, FILM COATED ORAL 3 TIMES DAILY PRN
Status: DISCONTINUED | OUTPATIENT
Start: 2023-07-23 | End: 2023-07-31

## 2023-07-23 RX ORDER — ACETAMINOPHEN 325 MG/1
650 TABLET ORAL EVERY 4 HOURS PRN
Status: DISCONTINUED | OUTPATIENT
Start: 2023-07-23 | End: 2023-08-01

## 2023-07-23 RX ORDER — PROCHLORPERAZINE EDISYLATE 5 MG/ML
5 INJECTION INTRAMUSCULAR; INTRAVENOUS
Status: DISCONTINUED | OUTPATIENT
Start: 2023-07-23 | End: 2023-08-11 | Stop reason: HOSPADM

## 2023-07-23 RX ORDER — BISACODYL 10 MG
10 SUPPOSITORY, RECTAL RECTAL DAILY PRN
Status: DISCONTINUED | OUTPATIENT
Start: 2023-07-23 | End: 2023-07-25

## 2023-07-23 RX ORDER — BISACODYL 5 MG/1
5 TABLET, DELAYED RELEASE ORAL DAILY PRN
Status: DISCONTINUED | OUTPATIENT
Start: 2023-07-23 | End: 2023-07-25

## 2023-07-23 RX ORDER — SODIUM CHLORIDE 9 MG/ML
40 INJECTION, SOLUTION INTRAVENOUS AS NEEDED
Status: DISCONTINUED | OUTPATIENT
Start: 2023-07-23 | End: 2023-08-11 | Stop reason: HOSPADM

## 2023-07-23 RX ORDER — ACETAMINOPHEN 160 MG/5ML
650 SOLUTION ORAL EVERY 4 HOURS PRN
Status: DISCONTINUED | OUTPATIENT
Start: 2023-07-23 | End: 2023-08-01

## 2023-07-23 RX ORDER — MONTELUKAST SODIUM 10 MG/1
10 TABLET ORAL NIGHTLY
Status: DISCONTINUED | OUTPATIENT
Start: 2023-07-23 | End: 2023-07-31

## 2023-07-23 RX ORDER — ATORVASTATIN CALCIUM 20 MG/1
20 TABLET, FILM COATED ORAL NIGHTLY
Status: DISCONTINUED | OUTPATIENT
Start: 2023-07-23 | End: 2023-07-31

## 2023-07-23 RX ORDER — KETOROLAC TROMETHAMINE 30 MG/ML
30 INJECTION, SOLUTION INTRAMUSCULAR; INTRAVENOUS EVERY 6 HOURS PRN
Status: DISCONTINUED | OUTPATIENT
Start: 2023-07-23 | End: 2023-07-24

## 2023-07-23 RX ORDER — ONDANSETRON 2 MG/ML
4 INJECTION INTRAMUSCULAR; INTRAVENOUS EVERY 6 HOURS PRN
Status: DISCONTINUED | OUTPATIENT
Start: 2023-07-23 | End: 2023-08-11 | Stop reason: HOSPADM

## 2023-07-23 RX ORDER — AMOXICILLIN 250 MG
2 CAPSULE ORAL 2 TIMES DAILY
Status: DISCONTINUED | OUTPATIENT
Start: 2023-07-23 | End: 2023-07-25

## 2023-07-23 RX ORDER — ALBUTEROL SULFATE 2.5 MG/3ML
2.5 SOLUTION RESPIRATORY (INHALATION) EVERY 6 HOURS PRN
Status: DISCONTINUED | OUTPATIENT
Start: 2023-07-23 | End: 2023-07-25

## 2023-07-23 RX ORDER — SODIUM CHLORIDE, SODIUM LACTATE, POTASSIUM CHLORIDE, CALCIUM CHLORIDE 600; 310; 30; 20 MG/100ML; MG/100ML; MG/100ML; MG/100ML
75 INJECTION, SOLUTION INTRAVENOUS CONTINUOUS
Status: DISCONTINUED | OUTPATIENT
Start: 2023-07-23 | End: 2023-07-24

## 2023-07-23 RX ORDER — ACETAMINOPHEN 650 MG/1
650 SUPPOSITORY RECTAL EVERY 4 HOURS PRN
Status: DISCONTINUED | OUTPATIENT
Start: 2023-07-23 | End: 2023-08-01

## 2023-07-23 RX ADMIN — HYDROMORPHONE HYDROCHLORIDE 0.75 MG: 1 INJECTION, SOLUTION INTRAMUSCULAR; INTRAVENOUS; SUBCUTANEOUS at 22:28

## 2023-07-23 RX ADMIN — KETOROLAC TROMETHAMINE 15 MG: 15 INJECTION, SOLUTION INTRAMUSCULAR; INTRAVENOUS at 23:41

## 2023-07-23 RX ADMIN — SODIUM CHLORIDE, POTASSIUM CHLORIDE, SODIUM LACTATE AND CALCIUM CHLORIDE 75 ML/HR: 600; 310; 30; 20 INJECTION, SOLUTION INTRAVENOUS at 22:33

## 2023-07-23 RX ADMIN — ONDANSETRON 4 MG: 2 INJECTION INTRAMUSCULAR; INTRAVENOUS at 23:41

## 2023-07-23 RX ADMIN — Medication 10 ML: at 23:42

## 2023-07-23 RX ADMIN — SODIUM CHLORIDE, POTASSIUM CHLORIDE, SODIUM LACTATE AND CALCIUM CHLORIDE 1000 ML: 600; 310; 30; 20 INJECTION, SOLUTION INTRAVENOUS at 23:40

## 2023-07-23 NOTE — NURSING NOTE
ACC REVIEW REPORT: [facility]        PATIENT NAME: Ta Madison    PATIENT ID: [id number]        COVID-19 ACC SCREENING       DOES THE PATIENT HAVE A FEVER GREATER THAN OR EQUAL .4:  no    IS THE PATIENT EXPERIENCING SHORTNESS OF BREATH:  no    DOES THE PATIENT HAVE A COUGH:  yes, started 1 - 4 weeks ago along with post nasal drip    DOES THE PATIENT HAVE ANY OF THE FOLLOWING RISK FACTORS:    EXPOSURE TO SUSPECTED OR KNOWN COVID-19:  no    RECENT TRAVEL HISTORY TO ENDEMIC AREA (DOMESTIC/LOCAL):  no    IS THE PATIENT A HEALTHCARE WORKER:  no    HAS THE PATIENT EXPERIENCED A LOSS OF SENSE OF TASTE OR SMELL:  no    HAS THE PATIENT BEEN TESTED FOR COVID-19:  no    DATE TESTED:      LAB TESTING SENT TO:             BED:  5F  / s501    BED TYPE:  TELEMETRY    BED GIVEN TO:  JASPREET CALDWELL RN    TIME BED GIVEN:  16:37    TODAY'S DATE: 7/23/2023    TRANSFER DATE:  7/23/2023    ETA:  22:00    TRANSFERRING FACILITY:  [facility]    TRANSFERRING FACILITY PHONE # :  [phone] ext [phone]    TRANSFERRING MD:  Dr. Kristal Del Rosario    DATE/TIME REQUEST RECEIVED:   07/23/2023 @ 15:49    [facility] RN:  SANDRA RICHARDS RN    REPORT FROM: JASPREET CALDWELL RN    TIME REPORT TAKEN:  16:37    DIAGNOSIS:  PANCREATITIS AND GALLSTONES    REASON FOR TRANSFER TO [facility]:  Pt needs gastroenterology for further workup and possible ERCP    TRANSPORTATION:  Local EMS    CLINICAL REASON FOR TRANSFER TO [facility]:  The patient presented to the ED today doubled over in epigastric pain that he said started about 3 hours prior to arrival to the ED. He also added he had a cough that started 1-4 weeks ago and now in the ED his LABS are impressive.   WBC almost 30,000, Hgb 12.9, Hct 40.2, INR 0.8, Lactic 3.4, BUN 26.8, Creatinine 1.4, , ALT 58.  He rates his pain a 3/10.    His CT SCAN showed ACUTE PANCREATITIS WITH A 2 MM STONE IN THE PROXIMAL DUODENUM. He also states this was the most severe gallbladder attack he has ever had.   He does have a 2 mm stone.   He has epigastric pain, GERD, and Hyperlipidemia.       CLINICAL INFORMATION    HEIGHT:  5'9''    WEIGHT:  178 LBS    ALLERGIES:  NKDA    INFECTIOUS DISEASE:   NONE    ISOLATION:  NONE    VITAL SIGNS:   TIME:  14:25  TEMP:  97.7  PULSE:  88  B/P:  141/65  RESP: 18         LAB INFORMATION:   Copies of all labs will sent with the patient.    CULTURE INFORMATION:  No cultures have been sent.    MEDS/IV FLUIDS:   IV access per # 20 ga in the Right A/C.     MEDICATIONS:  3000 ml bolus of fluids,  25 mg Phenergan,  1 gram Invanz,  250 Fentanyl,                               1 mg Dilaudid, Toradol 10,  and Reglan 10.      He is also on Protonix 40 p.o. q day and has been taking Xtandi 40 mg tablets Q.I.D. for his Prostate Cancer      CARDIAC SYSTEM:    CHEST PAIN:  NONE    RHYTHM:  NSR    Is patient taking or has patient been given any drugs that could increase bleeding?  No     CARDIAC NOTES:  The patient has had no ectopy since admission to the ED today.  He has been in NSR.        RESPIRATORY SYSTEM:    LUNG SOUNDS:  DIMINISHED    OXYGEN:  NONE    O2 SAT:  97% on room air    IMAGING FINDINGS:   A DISC WITH COPIES OF ALL RADIOLOGICAL STUDIES WILL COME WITH                                          THE PATIENT.    RESPIRATORY STATUS:  Mr Madison has had no difficulty with shortness of air.  He does continue to smoke 1 ppd and on occasion will also smoke Marijuana.       CNS/MUSCULOSKELETAL    ALERT AND ORIENTED:    PERSON:   ye  PLACE:  yes  TIME:  yes    INJURY:  None      BEVERLY COMA SCALE:    E:  4  M   6:    V:  5    EXTREMITY WEAKNESS:    LEFT ARM:  none  RIGHT ARM:  This arm is not weak but the IV is located in the A/C and Mr Madison is careful with this extremity.  LEFT LEG:  none  RIGHT LEG:  none    CNS/MUSCULOSKELETAL NOTES:   The patient has a history of Advanced Prostate Cancer.  He has had severe pain and was doubled over on arrival to the ED today.        GI//GY      ABDOMINAL  PAIN:  YES    VOMITING:  YES, ~ 200 ML ON ARRIVAL TO THE ED    DIARRHEA:  None    NAUSEA:  yes    BOWEL SOUNDS:  active    OCCULT STOOL:  n/a    HEMATURIA:  n/a       GI//GY NOTES:  The patient is NPO for transfer to [facility].    MIGUEL:  Uses Urinal      Suzie Haas RN  7/23/2023  16:36 EDT

## 2023-07-24 ENCOUNTER — APPOINTMENT (OUTPATIENT)
Dept: MRI IMAGING | Facility: HOSPITAL | Age: 63
DRG: 438 | End: 2023-07-24
Payer: COMMERCIAL

## 2023-07-24 PROBLEM — R79.89 ELEVATED SERUM CREATININE: Status: ACTIVE | Noted: 2023-07-24

## 2023-07-24 LAB
ABO GROUP BLD: NORMAL
ALBUMIN SERPL-MCNC: 3.7 G/DL (ref 3.5–5.2)
ALBUMIN/GLOB SERPL: 1.5 G/DL
ALP SERPL-CCNC: 75 U/L (ref 39–117)
ALT SERPL W P-5'-P-CCNC: 49 U/L (ref 1–41)
ANION GAP SERPL CALCULATED.3IONS-SCNC: 15 MMOL/L (ref 5–15)
AST SERPL-CCNC: 50 U/L (ref 1–40)
B PARAPERT DNA SPEC QL NAA+PROBE: NOT DETECTED
B PERT DNA SPEC QL NAA+PROBE: NOT DETECTED
BASOPHILS # BLD AUTO: 0.02 10*3/MM3 (ref 0–0.2)
BASOPHILS NFR BLD AUTO: 0.1 % (ref 0–1.5)
BILIRUB SERPL-MCNC: 0.8 MG/DL (ref 0–1.2)
BUN SERPL-MCNC: 31 MG/DL (ref 8–23)
BUN/CREAT SERPL: 21.5 (ref 7–25)
C PNEUM DNA NPH QL NAA+NON-PROBE: NOT DETECTED
CALCIUM SPEC-SCNC: 8.8 MG/DL (ref 8.6–10.5)
CHLORIDE SERPL-SCNC: 108 MMOL/L (ref 98–107)
CHOLEST SERPL-MCNC: 134 MG/DL (ref 0–200)
CO2 SERPL-SCNC: 20 MMOL/L (ref 22–29)
CREAT SERPL-MCNC: 1.44 MG/DL (ref 0.76–1.27)
D-LACTATE SERPL-SCNC: 2.7 MMOL/L (ref 0.5–2)
D-LACTATE SERPL-SCNC: 3.1 MMOL/L (ref 0.5–2)
D-LACTATE SERPL-SCNC: 3.5 MMOL/L (ref 0.5–2)
D-LACTATE SERPL-SCNC: 3.9 MMOL/L (ref 0.5–2)
D-LACTATE SERPL-SCNC: 4.1 MMOL/L (ref 0.5–2)
DEPRECATED RDW RBC AUTO: 47.4 FL (ref 37–54)
EGFRCR SERPLBLD CKD-EPI 2021: 54.9 ML/MIN/1.73
EOSINOPHIL # BLD AUTO: 0 10*3/MM3 (ref 0–0.4)
EOSINOPHIL NFR BLD AUTO: 0 % (ref 0.3–6.2)
ERYTHROCYTE [DISTWIDTH] IN BLOOD BY AUTOMATED COUNT: 16.8 % (ref 12.3–15.4)
FLUAV SUBTYP SPEC NAA+PROBE: NOT DETECTED
FLUBV RNA ISLT QL NAA+PROBE: NOT DETECTED
GLOBULIN UR ELPH-MCNC: 2.4 GM/DL
GLUCOSE SERPL-MCNC: 139 MG/DL (ref 65–99)
HADV DNA SPEC NAA+PROBE: NOT DETECTED
HCOV 229E RNA SPEC QL NAA+PROBE: NOT DETECTED
HCOV HKU1 RNA SPEC QL NAA+PROBE: NOT DETECTED
HCOV NL63 RNA SPEC QL NAA+PROBE: NOT DETECTED
HCOV OC43 RNA SPEC QL NAA+PROBE: NOT DETECTED
HCT VFR BLD AUTO: 39.3 % (ref 37.5–51)
HDLC SERPL-MCNC: 39 MG/DL (ref 40–60)
HGB BLD-MCNC: 12 G/DL (ref 13–17.7)
HMPV RNA NPH QL NAA+NON-PROBE: NOT DETECTED
HPIV1 RNA ISLT QL NAA+PROBE: NOT DETECTED
HPIV2 RNA SPEC QL NAA+PROBE: NOT DETECTED
HPIV3 RNA NPH QL NAA+PROBE: NOT DETECTED
HPIV4 P GENE NPH QL NAA+PROBE: NOT DETECTED
IMM GRANULOCYTES # BLD AUTO: 0.06 10*3/MM3 (ref 0–0.05)
IMM GRANULOCYTES NFR BLD AUTO: 0.4 % (ref 0–0.5)
LDLC SERPL CALC-MCNC: 72 MG/DL (ref 0–100)
LDLC/HDLC SERPL: 1.79 {RATIO}
LIPASE SERPL-CCNC: 1255 U/L (ref 13–60)
LYMPHOCYTES # BLD AUTO: 0.67 10*3/MM3 (ref 0.7–3.1)
LYMPHOCYTES NFR BLD AUTO: 4.5 % (ref 19.6–45.3)
M PNEUMO IGG SER IA-ACNC: NOT DETECTED
MCH RBC QN AUTO: 24.1 PG (ref 26.6–33)
MCHC RBC AUTO-ENTMCNC: 30.5 G/DL (ref 31.5–35.7)
MCV RBC AUTO: 78.9 FL (ref 79–97)
MONOCYTES # BLD AUTO: 0.66 10*3/MM3 (ref 0.1–0.9)
MONOCYTES NFR BLD AUTO: 4.4 % (ref 5–12)
NEUTROPHILS NFR BLD AUTO: 13.58 10*3/MM3 (ref 1.7–7)
NEUTROPHILS NFR BLD AUTO: 90.6 % (ref 42.7–76)
NRBC BLD AUTO-RTO: 0 /100 WBC (ref 0–0.2)
PLATELET # BLD AUTO: 456 10*3/MM3 (ref 140–450)
PMV BLD AUTO: 10.3 FL (ref 6–12)
POTASSIUM SERPL-SCNC: 5.3 MMOL/L (ref 3.5–5.2)
PROT SERPL-MCNC: 6.1 G/DL (ref 6–8.5)
RBC # BLD AUTO: 4.98 10*6/MM3 (ref 4.14–5.8)
RH BLD: POSITIVE
RHINOVIRUS RNA SPEC NAA+PROBE: NOT DETECTED
RSV RNA NPH QL NAA+NON-PROBE: NOT DETECTED
SARS-COV-2 RNA NPH QL NAA+NON-PROBE: NOT DETECTED
SODIUM SERPL-SCNC: 143 MMOL/L (ref 136–145)
TRIGL SERPL-MCNC: 126 MG/DL (ref 0–150)
VLDLC SERPL-MCNC: 23 MG/DL (ref 5–40)
WBC NRBC COR # BLD: 14.99 10*3/MM3 (ref 3.4–10.8)

## 2023-07-24 PROCEDURE — 83690 ASSAY OF LIPASE: CPT | Performed by: SURGERY

## 2023-07-24 PROCEDURE — 99232 SBSQ HOSP IP/OBS MODERATE 35: CPT | Performed by: HOSPITALIST

## 2023-07-24 PROCEDURE — 99222 1ST HOSP IP/OBS MODERATE 55: CPT | Performed by: NURSE PRACTITIONER

## 2023-07-24 PROCEDURE — 25010000002 ONDANSETRON PER 1 MG: Performed by: NURSE PRACTITIONER

## 2023-07-24 PROCEDURE — 25010000002 HYDROMORPHONE PER 4 MG: Performed by: INTERNAL MEDICINE

## 2023-07-24 PROCEDURE — 0202U NFCT DS 22 TRGT SARS-COV-2: CPT | Performed by: INTERNAL MEDICINE

## 2023-07-24 PROCEDURE — 80053 COMPREHEN METABOLIC PANEL: CPT | Performed by: HOSPITALIST

## 2023-07-24 PROCEDURE — 25010000002 PROCHLORPERAZINE 10 MG/2ML SOLUTION: Performed by: INTERNAL MEDICINE

## 2023-07-24 PROCEDURE — 83605 ASSAY OF LACTIC ACID: CPT | Performed by: INTERNAL MEDICINE

## 2023-07-24 PROCEDURE — 85025 COMPLETE CBC W/AUTO DIFF WBC: CPT | Performed by: INTERNAL MEDICINE

## 2023-07-24 PROCEDURE — 25010000002 HYDROMORPHONE 1 MG/ML SOLUTION: Performed by: INTERNAL MEDICINE

## 2023-07-24 PROCEDURE — 74181 MRI ABDOMEN W/O CONTRAST: CPT

## 2023-07-24 PROCEDURE — 25010000002 PIPERACILLIN SOD-TAZOBACTAM PER 1 G: Performed by: INTERNAL MEDICINE

## 2023-07-24 PROCEDURE — 80061 LIPID PANEL: CPT | Performed by: HOSPITALIST

## 2023-07-24 PROCEDURE — 25010000002 KETOROLAC TROMETHAMINE PER 15 MG: Performed by: INTERNAL MEDICINE

## 2023-07-24 PROCEDURE — 94640 AIRWAY INHALATION TREATMENT: CPT

## 2023-07-24 RX ORDER — SODIUM CHLORIDE 9 MG/ML
125 INJECTION, SOLUTION INTRAVENOUS CONTINUOUS
Status: DISCONTINUED | OUTPATIENT
Start: 2023-07-24 | End: 2023-07-24

## 2023-07-24 RX ORDER — SODIUM CHLORIDE, SODIUM LACTATE, POTASSIUM CHLORIDE, CALCIUM CHLORIDE 600; 310; 30; 20 MG/100ML; MG/100ML; MG/100ML; MG/100ML
120 INJECTION, SOLUTION INTRAVENOUS CONTINUOUS
Status: DISCONTINUED | OUTPATIENT
Start: 2023-07-24 | End: 2023-07-25

## 2023-07-24 RX ORDER — SODIUM CHLORIDE 9 MG/ML
200 INJECTION, SOLUTION INTRAVENOUS CONTINUOUS
Status: DISCONTINUED | OUTPATIENT
Start: 2023-07-24 | End: 2023-07-24

## 2023-07-24 RX ORDER — HYDROMORPHONE HYDROCHLORIDE 1 MG/ML
0.5 INJECTION, SOLUTION INTRAMUSCULAR; INTRAVENOUS; SUBCUTANEOUS ONCE
Status: COMPLETED | OUTPATIENT
Start: 2023-07-24 | End: 2023-07-24

## 2023-07-24 RX ADMIN — PANTOPRAZOLE SODIUM 40 MG: 40 TABLET, DELAYED RELEASE ORAL at 16:35

## 2023-07-24 RX ADMIN — MONTELUKAST 10 MG: 10 TABLET, FILM COATED ORAL at 21:31

## 2023-07-24 RX ADMIN — SODIUM CHLORIDE 200 ML/HR: 9 INJECTION, SOLUTION INTRAVENOUS at 06:29

## 2023-07-24 RX ADMIN — PROCHLORPERAZINE EDISYLATE 5 MG: 5 INJECTION INTRAMUSCULAR; INTRAVENOUS at 16:35

## 2023-07-24 RX ADMIN — ALBUTEROL SULFATE 2.5 MG: 2.5 SOLUTION RESPIRATORY (INHALATION) at 17:10

## 2023-07-24 RX ADMIN — HYDROMORPHONE HYDROCHLORIDE 1 MG: 1 INJECTION, SOLUTION INTRAMUSCULAR; INTRAVENOUS; SUBCUTANEOUS at 16:57

## 2023-07-24 RX ADMIN — Medication 10 ML: at 21:31

## 2023-07-24 RX ADMIN — HYDROMORPHONE HYDROCHLORIDE 0.75 MG: 1 INJECTION, SOLUTION INTRAMUSCULAR; INTRAVENOUS; SUBCUTANEOUS at 03:23

## 2023-07-24 RX ADMIN — HYDROMORPHONE HYDROCHLORIDE 1 MG: 1 INJECTION, SOLUTION INTRAMUSCULAR; INTRAVENOUS; SUBCUTANEOUS at 19:50

## 2023-07-24 RX ADMIN — SODIUM CHLORIDE 125 ML/HR: 9 INJECTION, SOLUTION INTRAVENOUS at 11:44

## 2023-07-24 RX ADMIN — ONDANSETRON 4 MG: 2 INJECTION INTRAMUSCULAR; INTRAVENOUS at 11:13

## 2023-07-24 RX ADMIN — KETOROLAC TROMETHAMINE 30 MG: 30 INJECTION, SOLUTION INTRAMUSCULAR; INTRAVENOUS at 11:09

## 2023-07-24 RX ADMIN — HYDROMORPHONE HYDROCHLORIDE 1 MG: 1 INJECTION, SOLUTION INTRAMUSCULAR; INTRAVENOUS; SUBCUTANEOUS at 14:51

## 2023-07-24 RX ADMIN — PIPERACILLIN SODIUM AND TAZOBACTAM SODIUM 3.38 G: 3; .375 INJECTION, SOLUTION INTRAVENOUS at 08:08

## 2023-07-24 RX ADMIN — SODIUM CHLORIDE, POTASSIUM CHLORIDE, SODIUM LACTATE AND CALCIUM CHLORIDE 75 ML/HR: 600; 310; 30; 20 INJECTION, SOLUTION INTRAVENOUS at 00:40

## 2023-07-24 RX ADMIN — SODIUM CHLORIDE, POTASSIUM CHLORIDE, SODIUM LACTATE AND CALCIUM CHLORIDE 120 ML/HR: 600; 310; 30; 20 INJECTION, SOLUTION INTRAVENOUS at 16:41

## 2023-07-24 RX ADMIN — PROCHLORPERAZINE EDISYLATE 5 MG: 5 INJECTION INTRAMUSCULAR; INTRAVENOUS at 00:47

## 2023-07-24 RX ADMIN — HYDROCODONE BITARTRATE AND ACETAMINOPHEN 1 TABLET: 10; 325 TABLET ORAL at 13:04

## 2023-07-24 RX ADMIN — HYDROMORPHONE HYDROCHLORIDE 1 MG: 1 INJECTION, SOLUTION INTRAMUSCULAR; INTRAVENOUS; SUBCUTANEOUS at 08:18

## 2023-07-24 RX ADMIN — Medication 10 ML: at 08:00

## 2023-07-24 RX ADMIN — SENNOSIDES AND DOCUSATE SODIUM 2 TABLET: 50; 8.6 TABLET ORAL at 21:30

## 2023-07-24 RX ADMIN — PIPERACILLIN SODIUM AND TAZOBACTAM SODIUM 3.38 G: 3; .375 INJECTION, SOLUTION INTRAVENOUS at 21:31

## 2023-07-24 RX ADMIN — HYDROMORPHONE HYDROCHLORIDE 1 MG: 1 INJECTION, SOLUTION INTRAMUSCULAR; INTRAVENOUS; SUBCUTANEOUS at 06:13

## 2023-07-24 RX ADMIN — SODIUM CHLORIDE, POTASSIUM CHLORIDE, SODIUM LACTATE AND CALCIUM CHLORIDE 500 ML: 600; 310; 30; 20 INJECTION, SOLUTION INTRAVENOUS at 03:23

## 2023-07-24 RX ADMIN — HYDROCODONE BITARTRATE AND ACETAMINOPHEN 1 TABLET: 10; 325 TABLET ORAL at 21:30

## 2023-07-24 RX ADMIN — HYDROXYZINE HYDROCHLORIDE 50 MG: 25 TABLET, FILM COATED ORAL at 23:28

## 2023-07-24 RX ADMIN — METOPROLOL TARTRATE 25 MG: 25 TABLET, FILM COATED ORAL at 08:18

## 2023-07-24 RX ADMIN — PANTOPRAZOLE SODIUM 40 MG: 40 TABLET, DELAYED RELEASE ORAL at 08:18

## 2023-07-24 RX ADMIN — Medication 1 PATCH: at 09:00

## 2023-07-24 RX ADMIN — HYDROMORPHONE HYDROCHLORIDE 0.75 MG: 1 INJECTION, SOLUTION INTRAMUSCULAR; INTRAVENOUS; SUBCUTANEOUS at 00:47

## 2023-07-24 RX ADMIN — SODIUM CHLORIDE, POTASSIUM CHLORIDE, SODIUM LACTATE AND CALCIUM CHLORIDE 75 ML/HR: 600; 310; 30; 20 INJECTION, SOLUTION INTRAVENOUS at 06:14

## 2023-07-24 RX ADMIN — HYDROMORPHONE HYDROCHLORIDE 1 MG: 1 INJECTION, SOLUTION INTRAMUSCULAR; INTRAVENOUS; SUBCUTANEOUS at 23:26

## 2023-07-24 RX ADMIN — SENNOSIDES AND DOCUSATE SODIUM 2 TABLET: 50; 8.6 TABLET ORAL at 08:18

## 2023-07-24 RX ADMIN — HYDROMORPHONE HYDROCHLORIDE 0.5 MG: 1 INJECTION, SOLUTION INTRAMUSCULAR; INTRAVENOUS; SUBCUTANEOUS at 04:28

## 2023-07-24 RX ADMIN — ATORVASTATIN CALCIUM 20 MG: 20 TABLET, FILM COATED ORAL at 21:30

## 2023-07-24 RX ADMIN — PIPERACILLIN SODIUM AND TAZOBACTAM SODIUM 3.38 G: 3; .375 INJECTION, SOLUTION INTRAVENOUS at 14:51

## 2023-07-24 RX ADMIN — BISACODYL 5 MG: 5 TABLET, COATED ORAL at 16:57

## 2023-07-24 NOTE — CONSULTS
[facility] Gastroenterology Consult    Referring Provider: Mary Rojas MD    PCP: Yolande Olvera APRN    Reason for Consultation: ERCP    Chief complaint: Abdominal pain    History of present illness:    Ta Madison is a 63 y.o. male who is admitted with acute onset of epigastric and right upper quadrant abdominal pain with associated nausea and vomiting.  Patient reports has been having these symptoms intermittently for the past few months but more so over the past few days.  Current episode was brought on following consumption of breakfast foods; at home, noted symptoms with potato chips.  Does not endorse any change in bowel habits, shortness of breath, chest pain, or fever/chills.  Imaging obtained in the emergency department shows evidence of a passed common duct stone in duodenum but no evidence of biliary obstruction; evidence of pancreatitis noted.  Elevated lipase.  LFTs and obstructive. Past medical, surgical, social, and family histories are reviewed for accuracy.  No documented alleviating or exacerbating factors.  Does not endorse pain at time of exam.    Allergies:  Patient has no known allergies.    Scheduled Meds:  atorvastatin, 20 mg, Oral, Nightly  enzalutamide, 40 mg, Oral, Daily  metoprolol tartrate, 25 mg, Oral, Daily  montelukast, 10 mg, Oral, Nightly  nicotine, 1 patch, Transdermal, Q24H  pantoprazole, 40 mg, Oral, BID AC  piperacillin-tazobactam, 3.375 g, Intravenous, Q8H  senna-docusate sodium, 2 tablet, Oral, BID  sodium chloride, 10 mL, Intravenous, Q12H         Infusions:  sodium chloride, 125 mL/hr, Last Rate: 125 mL/hr (07/24/23 1144)        PRN Meds:    acetaminophen **OR** acetaminophen **OR** acetaminophen    albuterol    senna-docusate sodium **AND** polyethylene glycol **AND** bisacodyl **AND** bisacodyl    HYDROcodone-acetaminophen    HYDROmorphone    hydrOXYzine    melatonin    naloxone    ondansetron    prochlorperazine    sodium chloride    sodium chloride    Home Meds:  Medications  Prior to Admission   Medication Sig Dispense Refill Last Dose    albuterol sulfate  (90 Base) MCG/ACT inhaler Inhale 2 puffs Every 6 (Six) Hours As Needed for Wheezing. 18 g 5 Past Month    aspirin 81 MG EC tablet Take 1 tablet by mouth Daily.   7/23/2023    atorvastatin (LIPITOR) 20 MG tablet Take 1 tablet by mouth Every Night. 90 tablet 1 7/23/2023    enzalutamide (XTANDI) 40 MG chemo capsule Take 4 capsules by mouth Daily. 120 capsule 5 7/23/2023    fenofibrate (TRICOR) 145 MG tablet Take 1 tablet by mouth Daily. 90 tablet 1 7/23/2023    HYDROcodone-acetaminophen (NORCO)  MG per tablet Take 1 tablet by mouth Every 6 (Six) Hours As Needed for Moderate Pain. 120 tablet 0 7/23/2023    metoprolol tartrate (LOPRESSOR) 25 MG tablet Take 1 tablet by mouth Daily. 90 tablet 1 7/23/2023    omeprazole (priLOSEC) 40 MG capsule Take 1 capsule by mouth 2 (Two) Times a Day Before Meals. 60 capsule 5 Past Week    vitamin D (ERGOCALCIFEROL) 1.25 MG (27584 UT) capsule capsule Take 1 capsule by mouth 1 (One) Time Per Week. 5 capsule 5 Past Week    amoxicillin (AMOXIL) 875 MG tablet Take 1 tablet by mouth 2 (Two) Times a Day. 20 tablet 0     EPINEPHrine (EPIPEN) 0.3 MG/0.3ML solution auto-injector injection INJECT 1 PEN IN THE MUSCLE ONE TIME AS DIRECTED   Unknown    hydrOXYzine (ATARAX) 50 MG tablet Take 1 tablet by mouth 3 (Three) Times a Day As Needed for Itching. 90 tablet 1 Unknown       ROS: Review of Systems   Constitutional:  Positive for activity change, appetite change and fatigue. Negative for chills, diaphoresis, fever and unexpected weight change.   HENT:  Negative for sore throat, trouble swallowing and voice change.    Eyes: Negative.    Respiratory:  Negative for apnea, cough, choking, chest tightness, shortness of breath, wheezing and stridor.    Cardiovascular:  Negative for chest pain, palpitations and leg swelling.   Gastrointestinal:  Positive for abdominal distention, abdominal pain, nausea and  "vomiting. Negative for anal bleeding, blood in stool, constipation, diarrhea and rectal pain.   Endocrine: Negative.    Genitourinary: Negative.    Musculoskeletal: Negative.    Skin: Negative.    Allergic/Immunologic: Negative.    Neurological: Negative.    Hematological: Negative.    Psychiatric/Behavioral: Negative.     All other systems reviewed and are negative.    PAST MED HX:  Past Medical History:   Diagnosis Date    Elevated cholesterol     GERD (gastroesophageal reflux disease)     Increased heart rate     Mini stroke     Neck pain     Prostate cancer     Rash     Tobacco abuse        PAST SURG HX:  Past Surgical History:   Procedure Laterality Date    APPENDECTOMY  1971    [facility]     COLONOSCOPY      COLONOSCOPY N/A 1/25/2023    Procedure: COLONOSCOPY;  Surgeon: Mahnaz Caraballo MD;  Location[facility];  Service: Gastroenterology;  Laterality: N/A;       FAM HX:  Family History   Problem Relation Age of Onset    Nephrolithiasis Mother     Heart disease Father     Hypertension Father     Kidney disease Father        SOC HX:  Social History     Socioeconomic History    Marital status:    Tobacco Use    Smoking status: Every Day     Packs/day: 2.00     Years: 45.00     Pack years: 90.00     Types: Cigarettes    Smokeless tobacco: Never   Vaping Use    Vaping Use: Never used   Substance and Sexual Activity    Alcohol use: No    Drug use: Yes     Types: Marijuana     Comment: occ     Sexual activity: Defer       PHYSICAL EXAM  /75 (BP Location: Left arm, Patient Position: Lying)   Pulse 76   Temp 98 øF (36.7 øC) (Oral)   Resp 16   Ht 172.7 cm (68\")   Wt 91.7 kg (202 lb 3.2 oz)   SpO2 91%   BMI 30.74 kg/mý   Wt Readings from Last 3 Encounters:   07/23/23 91.7 kg (202 lb 3.2 oz)   07/24/23 91.6 kg (202 lb)   06/13/23 80.7 kg (178 lb)   ,body mass index is 30.74 kg/mý.  Physical Exam  Vitals and nursing note reviewed.   Constitutional:       General: He is not in acute " distress.     Appearance: Normal appearance. He is normal weight. He is ill-appearing. He is not toxic-appearing.   HENT:      Head: Normocephalic and atraumatic.   Eyes:      General: No scleral icterus.     Extraocular Movements: Extraocular movements intact.      Conjunctiva/sclera: Conjunctivae normal.      Pupils: Pupils are equal, round, and reactive to light.   Cardiovascular:      Rate and Rhythm: Normal rate and regular rhythm.      Pulses: Normal pulses.      Heart sounds: Normal heart sounds.   Pulmonary:      Effort: Pulmonary effort is normal. No respiratory distress.      Breath sounds: Normal breath sounds.   Abdominal:      General: Abdomen is flat. There is distension.      Palpations: Abdomen is soft. There is no mass.      Tenderness: There is abdominal tenderness. There is no guarding or rebound.      Hernia: No hernia is present.      Comments: Bowel sounds hypoactive in all quadrants.  Significant right upper quadrant tenderness to light palpation   Genitourinary:     Comments: Defer  Musculoskeletal:         General: Normal range of motion.      Right lower leg: No edema.      Left lower leg: No edema.   Skin:     General: Skin is warm and dry.      Capillary Refill: Capillary refill takes less than 2 seconds.      Coloration: Skin is not jaundiced or pale.   Neurological:      General: No focal deficit present.      Mental Status: He is alert and oriented to person, place, and time.   Psychiatric:         Mood and Affect: Mood normal.         Behavior: Behavior normal.         Thought Content: Thought content normal.         Judgment: Judgment normal.       Results Review:   I reviewed the patient's new clinical results.  I reviewed the patient's new imaging results and agree with the interpretation.  I reviewed the patient's other test results and agree with the interpretation  I personally viewed and interpreted the patient's EKG/Telemetry data    Lab Results   Component Value Date    WBC  14.99 (H) 07/24/2023    HGB 12.0 (L) 07/24/2023    HGB 11.9 (L) 07/12/2023    HGB 12.2 (L) 04/12/2023    HCT 39.3 07/24/2023    MCV 78.9 (L) 07/24/2023     (H) 07/24/2023       Lab Results   Component Value Date    INR 0.98 07/23/2023       Lab Results   Component Value Date    GLUCOSE 139 (H) 07/24/2023    BUN 31 (H) 07/24/2023    CREATININE 1.44 (H) 07/24/2023    EGFRIFNONA 70 01/19/2022    BCR 21.5 07/24/2023     07/24/2023    K 5.3 (H) 07/24/2023    CO2 20.0 (L) 07/24/2023    CALCIUM 8.8 07/24/2023    PROTENTOTREF 7.2 02/05/2019    ALBUMIN 3.7 07/24/2023    ALKPHOS 75 07/24/2023    BILITOT 0.8 07/24/2023    ALT 49 (H) 07/24/2023    AST 50 (H) 07/24/2023       CT Chest Abdomen Pelvis W IV Contrast    Result Date: 7/23/2023      EXAM: CT CHEST, ABDOMEN, PELVIS WITH IV CONTRAST     INDICATION: 63-year-old with abdominal pain, nausea, vomiting, and stage IV prostate carcinoma  COMPARISON: No comparison  TECHNIQUE: Contiguous axial images were obtained from the thoracic inlet to the pelvic floor following the intravenous administration of 100 mL of Isovue-300 contrast. Coronal and sagittal reformations are provided. Up-to-date CT equipment and radiation dose reduction techniques were employed.  FINDINGS:  LUNGS AND PLEURA: Pulmonary hyperinflation. Slightly increased interstitial markings in the lower lung fields with some groundglass components and interstitial reticulation, possibly mild interstitial fibrosis. No alveolar infiltrates or consolidations. Mild fibrolinear scarring in the lingula near the cardiophrenic angle. Small calcified pulmonary granuloma in the left base. No pleural effusions or pleural thickening.  MEDIASTINUM: No masses.  THORACIC LYMPH NODES: No adenopathy.  HEART: Normal in size. No pericardial effusion. Coronary artery calcific disease in the proximal and mid LAD distributions.  THORACIC AORTA AND GREAT VESSELS: No aneurysm or dissection.  PULMONARY ARTERIES: Normal size. No  central pulmonary embolus.  LIVER: No mass. No intrahepatic biliary dilatation. Portal veins are patent.  GALLBLADDER: No wall thickening. A few tiny calcified gallstones layer dependently near the neck. There is a 2 mm calculus in the proximal duodenum just below the major duodenal papilla, possibly a recently passed gallstone.  COMMON BILE DUCT: Normal caliber. No radiopaque stones.    SPLEEN: Within normal limits.  PANCREAS: Interstitial pancreatitis pattern with pancreatic fat stranding, edema, and inflammatory changes. Small pancreatic effusion layers along the dorsal peritoneal gutters. No pancreatic fluid collections.  ADRENALS: No masses.  KIDNEYS: No masses. No hydronephrosis.  ABDOMINAL/PELVIC LYMPH NODES: No adenopathy.  STOMACH, SMALL BOWEL AND COLON: There is concentric thickening and transmural enhancement of some jejunal loops in the left mid abdomen, just distal to the inflammatory process or on the pancreas, possibly reactive enteritis. No bowel obstruction or ileus.  PERITONEAL CAVITY: No mesenteric stranding or free fluid. No free air.  ABDOMINAL AORTA: No aortic aneurysm or dissection. Diffuse aortoiliac calcific disease with mild multifocal narrowing.  PELVIC ORGANS: Degenerative calcifications in the prostate without hypertrophy. Normal urinary bladder. High riding testicles project above the scrotal sac in the lower inguinal canals.  SOFT TISSUE: Normal.  OSSEOUS STRUCTURES: No acute fracture or destructive lesion.         1.   Acute interstitial pancreatitis pattern with peripancreatic inflammatory changes, edema, and a small pancreatic effusion.       2. Cholelithiasis with 2 mm stone in the proximal duodenum, possibly a recently passed gallstone.       3. Transmural thickening and enhancement of some jejunal loops in the left mid abdomen, possibly reactive changes secondary to the pancreatitis.       4. Mild granulomatous changes and subtle peripheral interstitial scarring in the lower lung  fields.       5. No metastatic disease or lymphadenopathy.          Created by: Farooq Lara MD    Signed by: Farooq Lara MD  Signed on: 7/23/2023 3:46 PM  Location: [id number]            CT outside films    Result Date: 7/23/2023  This procedure was auto-finalized with no dictation required.    COVID19   Date Value Ref Range Status   07/24/2023 Not Detected Not Detected - Ref. Range Final     ASSESSMENTS/PLANS  1.  Acute gallstone pancreatitis  2.  Cholelithiasis without choledocholithiasis, evidence of passed CBD stone in duodenum  3.  Acute epigastric upper quadrant abdominal pain, related to above  4.  Non-intractable nausea without vomiting  5.  GERD.  6.  Tobacco abuse  7.  Prostate cancer, on Xtandi oral chemo    Ta Madison is a 63 y.o. male presented to hospital with cute onset of epigastric and upper quadrant abdominal pain with associated nausea and vomiting.  CT imaging reviewed and shows evidence of a passed common duct stone in the duodenum without any clear evidence of choledocholithiasis.  Recommend MRCP for further evaluation; if evidence of CBD stone, will need ERCP.  Otherwise, would benefit from general surgery consultation for consideration of [facility] at their discretion.    >>> Maintain n.p.o.  >>> Treatment of gallstone induced pancreatitis is supportive in nature  Continue bowel rest  Antiemetics and pain control measures  Aggressive fluid resuscitation  Has received 2500 mL total since admission  Discontinue normal saline infusion  Begin IV lactated Ringer's at 120 MLS per hour for 24 hours to meet patient's fluid resuscitation goal of 5400 mLs.  >>> Continue antiemetics and pain control measures  >>> Obtain MRCP  >>> Incentive spirometry    I discussed the patient's findings and my recommendations with patient, family, and nursing staff    Barry Zee, MSN, APRN, AGACNP-BC.  07/23/2023   [id number]    Addendum:  MRCP films personally reviewed and shows evidence of acute pancreatitis  without evidence of choledocholithiasis or biliary obstruction.  >>> Continue supportive care for acute gallstone induced pancreatitis as noted above  >>> No role for ERCP at this time.    Barry Zee, MSN, APRN, AGACNP-BC.  07/23/2023  16:25 EDT

## 2023-07-24 NOTE — PROGRESS NOTES
[facility] Medicine Services  PROGRESS NOTE    Patient Name: Ta Madison  : 1960  MRN: [id number]    Date of Admission: 2023  Primary Care Physician: Yolande Olvera APRN    Subjective   Subjective     CC:  F/U abdominal pain    HPI:  Seen this morning. Complains of nausea and RUQ/epigastric pain, rates it at a 9 currently.     ROS:  Gen-no fevers, no chills  CV-no chest pain, no palpitations  Resp-no cough, no dyspnea  GI-as above    Objective   Objective     Vital Signs:   Temp:  [98 øF (36.7 øC)-98.1 øF (36.7 øC)] 98 øF (36.7 øC)  Heart Rate:  [69-78] 76  Resp:  [16-18] 16  BP: (143-160)/(75-84) 143/75     Physical Exam:  Gen-no acute distress  HENT-NCAT, mucous membranes moist  CV-RRR, S1 S2 normal, no m/r/g  Resp-CTAB, no wheezes or rales  Abd-soft, moderate TTP in epigastric and RUQ regions, ND, +BS  Ext-no edema  Neuro-A&Ox3, no focal deficits  Skin-no rashes  Psych-appropriate mood      Results Reviewed:  LAB RESULTS:      Lab 23  0725 23  0442 23  0153 23  2246   WBC  --  14.99*  --   --    HEMOGLOBIN  --  12.0*  --   --    HEMATOCRIT  --  39.3  --   --    PLATELETS  --  456*  --   --    NEUTROS ABS  --  13.58*  --   --    IMMATURE GRANS (ABS)  --  0.06*  --   --    LYMPHS ABS  --  0.67*  --   --    MONOS ABS  --  0.66  --   --    EOS ABS  --  0.00  --   --    MCV  --  78.9*  --   --    PROCALCITONIN  --   --   --  0.85*   LACTATE 3.5* 4.1* 3.9* 3.6*   PROTIME  --   --   --  13.1   APTT  --   --   --  26.9         Lab 23  0924 23  2246   SODIUM 143 140   POTASSIUM 5.3* 5.1   CHLORIDE 108* 108*   CO2 20.0* 14.0*   ANION GAP 15.0 18.0*   BUN 31* 28*   CREATININE 1.44* 1.42*   EGFR 54.9* 55.9*   GLUCOSE 139* 171*   CALCIUM 8.8 8.5*   MAGNESIUM  --  2.3         Lab 23  0924 23  2246   TOTAL PROTEIN 6.1 6.6   ALBUMIN 3.7 3.8   GLOBULIN 2.4 2.8   ALT (SGPT) 49* 53*   AST (SGOT) 50* 66*   BILIRUBIN 0.8 0.4   ALK PHOS 75 76    LIPASE 1,255*  --          Lab 07/23/23 2246   PROTIME 13.1   INR 0.98         Lab 07/24/23  0924 07/23/23 2246   CHOLESTEROL 134  --    LDL CHOL 72  --    HDL CHOL 39*  --    TRIGLYCERIDES 126 108         Lab 07/23/23  2349 07/23/23 2245   ABO TYPING O O   RH TYPING Positive Positive   ANTIBODY SCREEN  --  Negative         Brief Urine Lab Results       None            Microbiology Results Abnormal       Procedure Component Value - Date/Time    COVID PRE-OP / PRE-PROCEDURE SCREENING ORDER (NO ISOLATION) - Swab, Nasopharynx [[id number]]  (Normal) Collected: 07/24/23 0620    Lab Status: Final result Specimen: Swab from Nasopharynx Updated: 07/24/23 0729    Narrative:      The following orders were created for panel order COVID PRE-OP / PRE-PROCEDURE SCREENING ORDER (NO ISOLATION) - Swab, Nasopharynx.  Procedure                               Abnormality         Status                     ---------                               -----------         ------                     Respiratory Panel PCR w/...[[id number]]  Normal              Final result                 Please view results for these tests on the individual orders.    Respiratory Panel PCR w/COVID-19(SARS-CoV-2) BALTAZAR/ANDRES/ASTER/PAD/COR/MAD/VALENTINE In-House, NP Swab in UTM/VTM, 3-4 HR TAT - Swab, Nasopharynx [[id number]]  (Normal) Collected: 07/24/23 0620    Lab Status: Final result Specimen: Swab from Nasopharynx Updated: 07/24/23 0729     ADENOVIRUS, PCR Not Detected     Coronavirus 229E Not Detected     Coronavirus HKU1 Not Detected     Coronavirus NL63 Not Detected     Coronavirus OC43 Not Detected     COVID19 Not Detected     Human Metapneumovirus Not Detected     Human Rhinovirus/Enterovirus Not Detected     Influenza A PCR Not Detected     Influenza B PCR Not Detected     Parainfluenza Virus 1 Not Detected     Parainfluenza Virus 2 Not Detected     Parainfluenza Virus 3 Not Detected     Parainfluenza Virus 4 Not Detected     RSV, PCR Not Detected     Bordetella  pertussis pcr Not Detected     Bordetella parapertussis PCR Not Detected     Chlamydophila pneumoniae PCR Not Detected     Mycoplasma pneumo by PCR Not Detected    Narrative:      In the setting of a positive respiratory panel with a viral infection PLUS a negative procalcitonin without other underlying concern for bacterial infection, consider observing off antibiotics or discontinuation of antibiotics and continue supportive care. If the respiratory panel is positive for atypical bacterial infection (Bordetella pertussis, Chlamydophila pneumoniae, or Mycoplasma pneumoniae), consider antibiotic de-escalation to target atypical bacterial infection.            CT Chest Abdomen Pelvis W IV Contrast    Result Date: 7/23/2023      EXAM: CT CHEST, ABDOMEN, PELVIS WITH IV CONTRAST     INDICATION: 63-year-old with abdominal pain, nausea, vomiting, and stage IV prostate carcinoma  COMPARISON: No comparison  TECHNIQUE: Contiguous axial images were obtained from the thoracic inlet to the pelvic floor following the intravenous administration of 100 mL of Isovue-300 contrast. Coronal and sagittal reformations are provided. Up-to-date CT equipment and radiation dose reduction techniques were employed.  FINDINGS:  LUNGS AND PLEURA: Pulmonary hyperinflation. Slightly increased interstitial markings in the lower lung fields with some groundglass components and interstitial reticulation, possibly mild interstitial fibrosis. No alveolar infiltrates or consolidations. Mild fibrolinear scarring in the lingula near the cardiophrenic angle. Small calcified pulmonary granuloma in the left base. No pleural effusions or pleural thickening.  MEDIASTINUM: No masses.  THORACIC LYMPH NODES: No adenopathy.  HEART: Normal in size. No pericardial effusion. Coronary artery calcific disease in the proximal and mid LAD distributions.  THORACIC AORTA AND GREAT VESSELS: No aneurysm or dissection.  PULMONARY ARTERIES: Normal size. No central pulmonary  embolus.  LIVER: No mass. No intrahepatic biliary dilatation. Portal veins are patent.  GALLBLADDER: No wall thickening. A few tiny calcified gallstones layer dependently near the neck. There is a 2 mm calculus in the proximal duodenum just below the major duodenal papilla, possibly a recently passed gallstone.  COMMON BILE DUCT: Normal caliber. No radiopaque stones.    SPLEEN: Within normal limits.  PANCREAS: Interstitial pancreatitis pattern with pancreatic fat stranding, edema, and inflammatory changes. Small pancreatic effusion layers along the dorsal peritoneal gutters. No pancreatic fluid collections.  ADRENALS: No masses.  KIDNEYS: No masses. No hydronephrosis.  ABDOMINAL/PELVIC LYMPH NODES: No adenopathy.  STOMACH, SMALL BOWEL AND COLON: There is concentric thickening and transmural enhancement of some jejunal loops in the left mid abdomen, just distal to the inflammatory process or on the pancreas, possibly reactive enteritis. No bowel obstruction or ileus.  PERITONEAL CAVITY: No mesenteric stranding or free fluid. No free air.  ABDOMINAL AORTA: No aortic aneurysm or dissection. Diffuse aortoiliac calcific disease with mild multifocal narrowing.  PELVIC ORGANS: Degenerative calcifications in the prostate without hypertrophy. Normal urinary bladder. High riding testicles project above the scrotal sac in the lower inguinal canals.  SOFT TISSUE: Normal.  OSSEOUS STRUCTURES: No acute fracture or destructive lesion.    Impression:      1.   Acute interstitial pancreatitis pattern with peripancreatic inflammatory changes, edema, and a small pancreatic effusion.       2. Cholelithiasis with 2 mm stone in the proximal duodenum, possibly a recently passed gallstone.       3. Transmural thickening and enhancement of some jejunal loops in the left mid abdomen, possibly reactive changes secondary to the pancreatitis.       4. Mild granulomatous changes and subtle peripheral interstitial scarring in the lower lung  fields.       5. No metastatic disease or lymphadenopathy.          Created by: Farooq Lara MD    Signed by: Farooq Lara MD  Signed on: 7/23/2023 3:46 PM  Location: [id number]            CT outside films    Result Date: 7/23/2023  This procedure was auto-finalized with no dictation required.         Current medications:  Scheduled Meds:atorvastatin, 20 mg, Oral, Nightly  enzalutamide, 40 mg, Oral, Daily  metoprolol tartrate, 25 mg, Oral, Daily  montelukast, 10 mg, Oral, Nightly  nicotine, 1 patch, Transdermal, Q24H  pantoprazole, 40 mg, Oral, BID AC  piperacillin-tazobactam, 3.375 g, Intravenous, Q8H  senna-docusate sodium, 2 tablet, Oral, BID  sodium chloride, 10 mL, Intravenous, Q12H      Continuous Infusions:sodium chloride, 125 mL/hr, Last Rate: 125 mL/hr (07/24/23 1144)      PRN Meds:.  acetaminophen **OR** acetaminophen **OR** acetaminophen    albuterol    senna-docusate sodium **AND** polyethylene glycol **AND** bisacodyl **AND** bisacodyl    HYDROcodone-acetaminophen    HYDROmorphone    hydrOXYzine    melatonin    naloxone    ondansetron    prochlorperazine    sodium chloride    sodium chloride    Assessment & Plan   Assessment & Plan     Active Hospital Problems    Diagnosis  POA    **Acute gallstone pancreatitis [K85.10]  Yes    Elevated serum creatinine [R79.89]  Yes    Personal history of transient ischemic attack (TIA) [Z86.73]  Not Applicable    Adenocarcinoma of prostate [C61]  Yes    Gastroesophageal reflux disease [K21.9]  Yes    Hyperlipidemia [E78.5]  Yes    Tobacco abuse [Z72.0]  Yes      Resolved Hospital Problems   No resolved problems to display.        Brief Hospital Course to date:  Ta Madison is a 63 y.o. male with PMH of GERD, HLD, hx of TIA, advanced prostate cancer on Xtandi, occasional marijuna use, and chronic 1 PPD tobacco use who initially presented to [place] 7/23/23 with complaints of severe epigastric abdominal pain with associated nausea and vomiting. CT A/P at  OSH showed acute pancreatitis with 2 mm stone in the proximal duodenum. Transferred to [facility] for GI evaluation.     *All problems are new to me today.    Acute gallstone pancreatitis  Leukocytosis  Lactic acidosis  --CT A/P at OSH: acute interstitial pancreatitis, cholelithiasis with 2 mm stone in proximal duodenum (possibly a recently passed gallstone), transmural thickening of some jejunal loops in the left mid abdomen possibly reactive changes secondary to pancreatitis  --Lipase 1255  --Normal triglycerides   --MRCP pending  --GI consulted  --General Surgery consulted, Dr. Tony recommends continued treatment for pancreatitis and F/U in 6 weeks for interval cholecystectomy, unless he has significant improvement in next 24-48 hours  --Continue IV fluids  --Empiric IV Zosyn given significant leukocytosis (almost 30K at OSH), F/U blood cultures  --Continue antiemetics, pain control as needed; stop Toradol due to elevated Cr, continue PRN Norco and IV Dilaudid     Elevated Cr  --Cr. 1.4 on admission  --Baseline Cr ~0.85-1.1  --Continue to monitor for improvement with IV fluids  --Avoid nephrotoxic meds    Cough  --Had complained of cough and postnasal drainage for past 3-4 weeks  --Negative respiratory PCR panel  --CT chest at OSH with mild granulomatous changes and subtle peripheral interstitial scarring in the lower lung fields    Prostate cancer  --Continue Xtandi oral chemotherapy (patient supplied)    HLD  --Lipid panel reviewed, within normal limits  --Continue statin    GERD  --Continue PPI    Hx of TIA  --Holding ASA for procedures  --Continue statin    Tobacco abuse  -- on cessation  --Nicotine patch    ADDENDUM 15:23 EDT MRCP results back, showing acute pancreatitis, no obstructing stones. Discussed results with patient and family. Continue treatment for pancreatitis. Will allow ice chips and sips with meds, he is still requiring high doses of IV pain meds and likely not ready to trial clears. Could  try later tonight or tomorrow depending on how he is doing.     Expected Discharge Location and Transportation: home  Expected Discharge   Expected Discharge Date: 7/27/2023; Expected Discharge Time:      DVT prophylaxis:  Mechanical DVT prophylaxis orders are present.          CODE STATUS:   Code Status and Medical Interventions:   Ordered at: 07/23/23 2229     Code Status (Patient has no pulse and is not breathing):    CPR (Attempt to Resuscitate)     Medical Interventions (Patient has pulse or is breathing):    Full Support     Release to patient:    Routine Release       Debra Whitfield MD  07/24/23

## 2023-07-24 NOTE — PLAN OF CARE
Problem: Adult Inpatient Plan of Care  Goal: Plan of Care Review  Outcome: Ongoing, Progressing  Flowsheets (Taken 7/24/2023 0447)  Progress: no change  Plan of Care Reviewed With:   patient   spouse  Outcome Evaluation:   Pt direct admit from OSH. Complaints of 8-9/10 pain with minimal ease from PRN medications. Discussed with provider regarding pt's pain complaints   PRN medications changed by provider. Patient with relief in N/V symptoms. Patient NPO since midnight. VSS.  Goal: Patient-Specific Goal (Individualized)  Outcome: Ongoing, Progressing  Goal: Absence of Hospital-Acquired Illness or Injury  Outcome: Ongoing, Progressing  Intervention: Identify and Manage Fall Risk  Recent Flowsheet Documentation  Taken 7/24/2023 0400 by Rina Dill RN  Safety Promotion/Fall Prevention:   assistive device/personal items within reach   clutter free environment maintained   nonskid shoes/slippers when out of bed   safety round/check completed   room organization consistent  Taken 7/24/2023 0232 by Rina Dill RN  Safety Promotion/Fall Prevention:   assistive device/personal items within reach   clutter free environment maintained   nonskid shoes/slippers when out of bed   room organization consistent   safety round/check completed  Taken 7/24/2023 0011 by Rina Dill RN  Safety Promotion/Fall Prevention:   assistive device/personal items within reach   clutter free environment maintained   nonskid shoes/slippers when out of bed   safety round/check completed  Taken 7/23/2023 2228 by Rina Dill RN  Safety Promotion/Fall Prevention:   clutter free environment maintained   assistive device/personal items within reach   nonskid shoes/slippers when out of bed   room organization consistent   safety round/check completed  Intervention: Prevent Skin Injury  Recent Flowsheet Documentation  Taken 7/24/2023 0400 by Rina Dill RN  Body Position: position changed independently  Skin  Protection:   adhesive use limited   transparent dressing maintained   tubing/devices free from skin contact  Taken 7/24/2023 0232 by Rina Dill RN  Body Position: position changed independently  Skin Protection:   adhesive use limited   transparent dressing maintained   tubing/devices free from skin contact  Taken 7/24/2023 0011 by Rina Dill RN  Body Position: position changed independently  Skin Protection:   adhesive use limited   transparent dressing maintained   tubing/devices free from skin contact  Taken 7/23/2023 2228 by Rina Dill RN  Body Position: position changed independently  Skin Protection:   adhesive use limited   transparent dressing maintained   tubing/devices free from skin contact  Intervention: Prevent and Manage VTE (Venous Thromboembolism) Risk  Recent Flowsheet Documentation  Taken 7/24/2023 0400 by Rina Dill RN  Activity Management: activity encouraged  Taken 7/24/2023 0232 by Rina Dill RN  Activity Management: activity encouraged  Taken 7/24/2023 0011 by Rina Dill RN  Activity Management: activity encouraged  Taken 7/23/2023 2228 by Rina Dill RN  Activity Management: activity encouraged  VTE Prevention/Management:   bilateral   sequential compression devices off  Range of Motion: active ROM (range of motion) encouraged  Intervention: Prevent Infection  Recent Flowsheet Documentation  Taken 7/24/2023 0400 by Rina Dill RN  Infection Prevention:   environmental surveillance performed   hand hygiene promoted   rest/sleep promoted  Taken 7/24/2023 0232 by Rina Dill RN  Infection Prevention:   environmental surveillance performed   hand hygiene promoted   rest/sleep promoted  Taken 7/24/2023 0011 by Rina Dill RN  Infection Prevention:   environmental surveillance performed   hand hygiene promoted   rest/sleep promoted  Taken 7/23/2023 2228 by Rina Dill RN  Infection  Prevention:   environmental surveillance performed   hand hygiene promoted   rest/sleep promoted  Goal: Optimal Comfort and Wellbeing  Outcome: Ongoing, Progressing  Intervention: Monitor Pain and Promote Comfort  Recent Flowsheet Documentation  Taken 7/24/2023 0323 by Rina Dill RN  Pain Management Interventions: see MAR  Taken 7/23/2023 2341 by Rina Dill RN  Pain Management Interventions: see MAR  Taken 7/23/2023 2228 by Rina Dill RN  Pain Management Interventions: see MAR  Intervention: Provide Person-Centered Care  Recent Flowsheet Documentation  Taken 7/23/2023 2228 by Rina Dill RN  Trust Relationship/Rapport:   care explained   choices provided   thoughts/feelings acknowledged  Goal: Readiness for Transition of Care  Outcome: Ongoing, Progressing  Intervention: Mutually Develop Transition Plan  Recent Flowsheet Documentation  Taken 7/23/2023 2350 by Rina Dill RN  Transportation Anticipated: family or friend will provide  Patient/Family Anticipated Services at Transition: none  Patient/Family Anticipates Transition to: home with family  Taken 7/23/2023 2221 by Rina Dill RN  Equipment Currently Used at Home: none     Problem: Hypertension Comorbidity  Goal: Blood Pressure in Desired Range  Outcome: Ongoing, Progressing   Goal Outcome Evaluation:  Plan of Care Reviewed With: patient, spouse        Progress: no change  Outcome Evaluation: Pt direct admit from OSH. Complaints of 8-9/10 pain with minimal ease from PRN medications. Discussed with provider regarding pt's pain complaints; PRN medications changed by provider. Patient with relief in N/V symptoms. Patient NPO since midnight. VSS.

## 2023-07-24 NOTE — H&P
[facility] Medicine Services  HISTORY AND PHYSICAL    Patient Name: Ta Madison  : 1960  MRN: [id number]  Primary Care Physician: Yolande Olvera APRN  Date of admission: 2023    Subjective   Subjective     Chief Complaint:  Epigastric abd pain    HPI:  Ta Madison is a 63 y.o. male with PMH of GERD, HLD, hx of tia, Advanced prostate cancer on Xtandi, occasional marijuna use, chronic 1ppd tobacco use who presents to [place] ER today with complaints of severe epigastric abd pain.    Patient reports 3 hours prior to arrival to [place] ER, he developed sudden onset of severe 9/10 epigastric abd pain.Throbbing dull constant epigastric and RUQ pain. Reports associated nausea with vomiting x 3.  Persistent nausea. Reports cough for last 3-4 weeks with some postnasal drainage.    Ct at OSH with acute pancreatitis with 2mm stone in proximal duodenum.  He was given 3000ml IV fluids, 25mg phenergan, 1 gram invanz, 250mcg fentanyl, 1mg dilaudid, 15 mg toradol, and 10mg reglan. Rated pain 3/10 after med admin.     Review of Systems   Gen- No fevers, chills  CV- No chest pain, palpitations  Resp- reports cough,denies  dyspnea  GI- reports N/V, reports abd pain      Personal History     Past Medical History:   Diagnosis Date    Elevated cholesterol     GERD (gastroesophageal reflux disease)     Increased heart rate     Mini stroke     Neck pain     Prostate cancer     Rash     Tobacco abuse          Oncology Problem List:  Adenocarcinoma of prostate (2021; Status: Active)    Oncology/Hematology History   Adenocarcinoma of prostate   2021 Initial Diagnosis    Adenocarcinoma of prostate (CMS/HCC)     2021 Cancer Staged    Staging form: Prostate, AJCC 8th Edition  - Clinical: Stage IVB (cTX, cN1, cM1b) - Signed by HIRO Martinez MD on 2021 -  Chemotherapy    OP PROSTATE Enzalutamide       2021 -  Chemotherapy    OP SUPPORTIVE Leuprolide 45 mg Q6M            Past Surgical History:   Procedure Laterality Date    APPENDECTOMY  1971    [facility]     COLONOSCOPY      COLONOSCOPY N/A 1/25/2023    Procedure: COLONOSCOPY;  Surgeon: Mahnaz Caraballo MD;  Location: [facility];  Service: Gastroenterology;  Laterality: N/A;       Family History: family history includes Heart disease in his father; Hypertension in his father; Kidney disease in his father; Nephrolithiasis in his mother.     Social History:  reports that he has been smoking cigarettes. He has a 90.00 pack-year smoking history. He has never used smokeless tobacco. He reports current drug use. Drug: Marijuana. He reports that he does not drink alcohol.  Social History     Social History Narrative    Not on file       Medications:  Available home medication information reviewed.  Medications Prior to Admission   Medication Sig Dispense Refill Last Dose    albuterol sulfate  (90 Base) MCG/ACT inhaler Inhale 2 puffs Every 6 (Six) Hours As Needed for Wheezing. 18 g 5     amoxicillin (AMOXIL) 875 MG tablet Take 1 tablet by mouth 2 (Two) Times a Day. 20 tablet 0     aspirin 81 MG EC tablet Take 1 tablet by mouth Daily.       atorvastatin (LIPITOR) 20 MG tablet Take 1 tablet by mouth Every Night. 90 tablet 1     enzalutamide (XTANDI) 40 MG chemo capsule Take 4 capsules by mouth Daily. 120 capsule 5     EPINEPHrine (EPIPEN) 0.3 MG/0.3ML solution auto-injector injection INJECT 1 PEN IN THE MUSCLE ONE TIME AS DIRECTED       fenofibrate (TRICOR) 145 MG tablet Take 1 tablet by mouth Daily. 90 tablet 1     HYDROcodone-acetaminophen (NORCO)  MG per tablet Take 1 tablet by mouth Every 6 (Six) Hours As Needed for Moderate Pain. 120 tablet 0     hydrOXYzine (ATARAX) 50 MG tablet Take 1 tablet by mouth 3 (Three) Times a Day As Needed for Itching. 90 tablet 1     metoprolol tartrate (LOPRESSOR) 25 MG tablet Take 1 tablet by mouth Daily. 90 tablet 1     montelukast (SINGULAIR) 10 MG tablet Take 1 tablet  by mouth Every Night. 90 tablet 1     omeprazole (priLOSEC) 40 MG capsule Take 1 capsule by mouth 2 (Two) Times a Day Before Meals. 60 capsule 5     vitamin D (ERGOCALCIFEROL) 1.25 MG (06928 UT) capsule capsule Take 1 capsule by mouth 1 (One) Time Per Week. 5 capsule 5        No Known Allergies    Objective   Objective     Vital Signs:           Physical Exam   Constitutional: Awake, alert, appears acutely ill  Eyes: PERRLA, sclerae anicteric, no conjunctival injection  HENT: NCAT, mucous membranes dry  Neck: Supple, no thyromegaly, no lymphadenopathy, trachea midline  Respiratory: Clear to auscultation bilaterally, nonlabored respirations   Cardiovascular: RRR, no murmurs, rubs, or gallops, palpable pedal pulses bilaterally  Gastrointestinal: Positive bowel sounds, soft, epigastric and RUQ tenderness, nondistended  Musculoskeletal: No bilateral ankle edema, no clubbing or cyanosis to extremities  Psychiatric: Appropriate affect, cooperative  Neurologic: Oriented x 3, strength symmetric in all extremities, Cranial Nerves grossly intact to confrontation, speech clear  Skin: No rashes      Result Review:  I have personally reviewed the results from the time of this admission to 7/23/2023 22:42 EDT and agree with these findings:  [x]  Laboratory list / accordion  []  Microbiology  [x]  Radiology  []  EKG/Telemetry   []  Cardiology/Vascular   []  Pathology  [x]  Old records  []  Other:  Most notable findings include: ct with pancreatitis and 2mm stone in duodenum. WBC 69295, hb 12.9, hct 40.2, inr 0.8, lactic 3.4, bun 26, cr 1.4, ast 145, alt 58         LAB RESULTS:                                  Microbiology Results (last 10 days)       ** No results found for the last 240 hours. **            CT Chest Abdomen Pelvis W IV Contrast    Result Date: 7/23/2023      EXAM: CT CHEST, ABDOMEN, PELVIS WITH IV CONTRAST     INDICATION: 63-year-old with abdominal pain, nausea, vomiting, and stage IV prostate carcinoma   COMPARISON: No comparison  TECHNIQUE: Contiguous axial images were obtained from the thoracic inlet to the pelvic floor following the intravenous administration of 100 mL of Isovue-300 contrast. Coronal and sagittal reformations are provided. Up-to-date CT equipment and radiation dose reduction techniques were employed.  FINDINGS:  LUNGS AND PLEURA: Pulmonary hyperinflation. Slightly increased interstitial markings in the lower lung fields with some groundglass components and interstitial reticulation, possibly mild interstitial fibrosis. No alveolar infiltrates or consolidations. Mild fibrolinear scarring in the lingula near the cardiophrenic angle. Small calcified pulmonary granuloma in the left base. No pleural effusions or pleural thickening.  MEDIASTINUM: No masses.  THORACIC LYMPH NODES: No adenopathy.  HEART: Normal in size. No pericardial effusion. Coronary artery calcific disease in the proximal and mid LAD distributions.  THORACIC AORTA AND GREAT VESSELS: No aneurysm or dissection.  PULMONARY ARTERIES: Normal size. No central pulmonary embolus.  LIVER: No mass. No intrahepatic biliary dilatation. Portal veins are patent.  GALLBLADDER: No wall thickening. A few tiny calcified gallstones layer dependently near the neck. There is a 2 mm calculus in the proximal duodenum just below the major duodenal papilla, possibly a recently passed gallstone.  COMMON BILE DUCT: Normal caliber. No radiopaque stones.    SPLEEN: Within normal limits.  PANCREAS: Interstitial pancreatitis pattern with pancreatic fat stranding, edema, and inflammatory changes. Small pancreatic effusion layers along the dorsal peritoneal gutters. No pancreatic fluid collections.  ADRENALS: No masses.  KIDNEYS: No masses. No hydronephrosis.  ABDOMINAL/PELVIC LYMPH NODES: No adenopathy.  STOMACH, SMALL BOWEL AND COLON: There is concentric thickening and transmural enhancement of some jejunal loops in the left mid abdomen, just distal to the  inflammatory process or on the pancreas, possibly reactive enteritis. No bowel obstruction or ileus.  PERITONEAL CAVITY: No mesenteric stranding or free fluid. No free air.  ABDOMINAL AORTA: No aortic aneurysm or dissection. Diffuse aortoiliac calcific disease with mild multifocal narrowing.  PELVIC ORGANS: Degenerative calcifications in the prostate without hypertrophy. Normal urinary bladder. High riding testicles project above the scrotal sac in the lower inguinal canals.  SOFT TISSUE: Normal.  OSSEOUS STRUCTURES: No acute fracture or destructive lesion.    Impression:      1.   Acute interstitial pancreatitis pattern with peripancreatic inflammatory changes, edema, and a small pancreatic effusion.       2. Cholelithiasis with 2 mm stone in the proximal duodenum, possibly a recently passed gallstone.       3. Transmural thickening and enhancement of some jejunal loops in the left mid abdomen, possibly reactive changes secondary to the pancreatitis.       4. Mild granulomatous changes and subtle peripheral interstitial scarring in the lower lung fields.       5. No metastatic disease or lymphadenopathy.          Created by: Farooq Lara MD    Signed by: Farooq Lara MD  Signed on: 7/23/2023 3:46 PM  Location: [id number]            CT outside films    Result Date: 7/23/2023  This procedure was auto-finalized with no dictation required.         Assessment & Plan   Assessment & Plan     Active Hospital Problems    Diagnosis  POA    **Acute gallstone pancreatitis [K85.10]  Unknown    Personal history of transient ischemic attack (TIA) [Z86.73]  Not Applicable    Adenocarcinoma of prostate [C61]  Yes    Gastroesophageal reflux disease [K21.9]  Yes    Hyperlipidemia [E78.5]  Yes    Tobacco abuse [Z72.0]  Yes     Patient is a 63 M with PMH of GERD, HLD, hx of tia,  Advanced prostate cancer on Xtandi, occasional marijuna use, chronic 1ppd tobacco use who presents to [place] ER today with complaints of severe  epigastric abd pain.    Acute gallstone pancreatitis  --mrcp when no longer nauseated  --pain control with 0.75 mg dilaudid q2hp  --zofran  --continue zosyn until infection ruled out  --[facility]  --general surgery and GI consult.  --IV fluids  --check triglycerides  --trend cmp as stone may have already passed.     Hx of tobacco and marijuana use  --cessation counseling, nicotine patch    Chronic conditions:  Advanced prostate ca, hld, gerd, hx of tia  --cont home meds      Total time spent:75  Time spent includes time reviewing chart, face-to-face time, counseling patient/family/caregiver, ordering medications/tests/procedures, communicating with other health care professionals, documenting clinical information in the electronic health record, and coordination of care.      DVT prophylaxis: scds in prep for ERCP      CODE STATUS: full code  Code Status and Medical Interventions:   Ordered at: 07/23/23 2229     Code Status (Patient has no pulse and is not breathing):    CPR (Attempt to Resuscitate)     Medical Interventions (Patient has pulse or is breathing):    Full Support     Release to patient:    Routine Release       Expected Discharge   Expected Discharge Date: 7/27/2023; Expected Discharge Time:      Carroll Bobo DO  07/23/23

## 2023-07-24 NOTE — PAYOR COMM NOTE
"Princess Rodrigues, JUDITH  Utilization Management  P:[phone]  F:[phone]        ID # [id number]     Francisco Madison (63 y.o. Male)       Date of Birth   1960    Social Security Number       Address   [address] [place] [address]    Home Phone   [phone]    MRN   [id number]       Pentecostal   None    Marital Status                               Admission Date   23    Admission Type   Urgent    Admitting Provider   Debra Whitfield MD    Attending Provider   Debra Whitfield MD    Department, Room/Bed   [address], S501/1       Discharge Date       Discharge Disposition       Discharge Destination                                 Attending Provider: Debra Whitfield MD    Allergies: No Known Allergies    Isolation: None   Infection: None   Code Status: CPR    Ht: 172.7 cm (68\")   Wt: 91.7 kg (202 lb 3.2 oz)    Admission Cmt: None   Principal Problem: Acute gallstone pancreatitis [K85.10]                   Active Insurance as of 2023       Primary Coverage       Payor Plan Insurance Group Employer/Plan Group    [employer] [place] [employer] [place]ICA[place]        Payor Plan Address Payor Plan Phone Number Payor Plan Fax Number Effective Dates    [address] [address] [phone]  2018 - None Entered    [place] [address]         Subscriber Name Subscriber Birth Date Member ID       FRANCISCO MADISON 1960 [id number]                        History & Physical        Carroll Bobo DO at 23 2242              [facility] Medicine Services  HISTORY AND PHYSICAL    Patient Name: Francisco Madison  : 1960  MRN: [id number]  Primary Care Physician: Yolande Olvera APRN  Date of admission: 2023    Subjective  Subjective     Chief Complaint:  Epigastric abd pain    HPI:  Francisco Madison is a 63 y.o. male with PMH of GERD, HLD, hx of tia, Advanced prostate cancer on Xtandi, occasional marijuna use, chronic 1ppd tobacco use who presents to [facility] today " with complaints of severe epigastric abd pain.    Patient reports 3 hours prior to arrival to [place] ER, he developed sudden onset of severe 9/10 epigastric abd pain.Throbbing dull constant epigastric and RUQ pain. Reports associated nausea with vomiting x 3.  Persistent nausea. Reports cough for last 3-4 weeks with some postnasal drainage.    Ct at OSH with acute pancreatitis with 2mm stone in proximal duodenum.  He was given 3000ml IV fluids, 25mg phenergan, 1 gram invanz, 250mcg fentanyl, 1mg dilaudid, 15 mg toradol, and 10mg reglan. Rated pain 3/10 after med admin.     Review of Systems   Gen- No fevers, chills  CV- No chest pain, palpitations  Resp- reports cough,denies  dyspnea  GI- reports N/V, reports abd pain      Personal History     Past Medical History:   Diagnosis Date    Elevated cholesterol     GERD (gastroesophageal reflux disease)     Increased heart rate     Mini stroke     Neck pain     Prostate cancer     Rash     Tobacco abuse          Oncology Problem List:  Adenocarcinoma of prostate (01/07/2021; Status: Active)    Oncology/Hematology History   Adenocarcinoma of prostate   1/7/2021 Initial Diagnosis    Adenocarcinoma of prostate (CMS/HCC)     1/7/2021 Cancer Staged    Staging form: Prostate, AJCC 8th Edition  - Clinical: Stage IVB (cTX, cN1, cM1b) - Signed by HIRO Martinez MD on 1/7/2021 1/8/2021 -  Chemotherapy    OP PROSTATE Enzalutamide       1/20/2021 -  Chemotherapy    OP SUPPORTIVE Leuprolide 45 mg Q6M           Past Surgical History:   Procedure Laterality Date    APPENDECTOMY  1971    [facility]     COLONOSCOPY      COLONOSCOPY N/A 1/25/2023    Procedure: COLONOSCOPY;  Surgeon: Mahnaz Caraballo MD;  Location: [facility];  Service: Gastroenterology;  Laterality: N/A;       Family History: family history includes Heart disease in his father; Hypertension in his father; Kidney disease in his father; Nephrolithiasis in his mother.     Social History:  reports  that he has been smoking cigarettes. He has a 90.00 pack-year smoking history. He has never used smokeless tobacco. He reports current drug use. Drug: Marijuana. He reports that he does not drink alcohol.  Social History     Social History Narrative    Not on file       Medications:  Available home medication information reviewed.  Medications Prior to Admission   Medication Sig Dispense Refill Last Dose    albuterol sulfate  (90 Base) MCG/ACT inhaler Inhale 2 puffs Every 6 (Six) Hours As Needed for Wheezing. 18 g 5     amoxicillin (AMOXIL) 875 MG tablet Take 1 tablet by mouth 2 (Two) Times a Day. 20 tablet 0     aspirin 81 MG EC tablet Take 1 tablet by mouth Daily.       atorvastatin (LIPITOR) 20 MG tablet Take 1 tablet by mouth Every Night. 90 tablet 1     enzalutamide (XTANDI) 40 MG chemo capsule Take 4 capsules by mouth Daily. 120 capsule 5     EPINEPHrine (EPIPEN) 0.3 MG/0.3ML solution auto-injector injection INJECT 1 PEN IN THE MUSCLE ONE TIME AS DIRECTED       fenofibrate (TRICOR) 145 MG tablet Take 1 tablet by mouth Daily. 90 tablet 1     HYDROcodone-acetaminophen (NORCO)  MG per tablet Take 1 tablet by mouth Every 6 (Six) Hours As Needed for Moderate Pain. 120 tablet 0     hydrOXYzine (ATARAX) 50 MG tablet Take 1 tablet by mouth 3 (Three) Times a Day As Needed for Itching. 90 tablet 1     metoprolol tartrate (LOPRESSOR) 25 MG tablet Take 1 tablet by mouth Daily. 90 tablet 1     montelukast (SINGULAIR) 10 MG tablet Take 1 tablet by mouth Every Night. 90 tablet 1     omeprazole (priLOSEC) 40 MG capsule Take 1 capsule by mouth 2 (Two) Times a Day Before Meals. 60 capsule 5     vitamin D (ERGOCALCIFEROL) 1.25 MG (45203 UT) capsule capsule Take 1 capsule by mouth 1 (One) Time Per Week. 5 capsule 5        No Known Allergies    Objective  Objective     Vital Signs:           Physical Exam   Constitutional: Awake, alert, appears acutely ill  Eyes: PERRLA, sclerae anicteric, no conjunctival  injection  HENT: NCAT, mucous membranes dry  Neck: Supple, no thyromegaly, no lymphadenopathy, trachea midline  Respiratory: Clear to auscultation bilaterally, nonlabored respirations   Cardiovascular: RRR, no murmurs, rubs, or gallops, palpable pedal pulses bilaterally  Gastrointestinal: Positive bowel sounds, soft, epigastric and RUQ tenderness, nondistended  Musculoskeletal: No bilateral ankle edema, no clubbing or cyanosis to extremities  Psychiatric: Appropriate affect, cooperative  Neurologic: Oriented x 3, strength symmetric in all extremities, Cranial Nerves grossly intact to confrontation, speech clear  Skin: No rashes      Result Review:  I have personally reviewed the results from the time of this admission to 7/23/2023 22:42 EDT and agree with these findings:  [x]  Laboratory list / accordion  []  Microbiology  [x]  Radiology  []  EKG/Telemetry   []  Cardiology/Vascular   []  Pathology  [x]  Old records  []  Other:  Most notable findings include: ct with pancreatitis and 2mm stone in duodenum. WBC 69368, hb 12.9, hct 40.2, inr 0.8, lactic 3.4, bun 26, cr 1.4, ast 145, alt 58         LAB RESULTS:                                  Microbiology Results (last 10 days)       ** No results found for the last 240 hours. **            CT Chest Abdomen Pelvis W IV Contrast    Result Date: 7/23/2023      EXAM: CT CHEST, ABDOMEN, PELVIS WITH IV CONTRAST     INDICATION: 63-year-old with abdominal pain, nausea, vomiting, and stage IV prostate carcinoma  COMPARISON: No comparison  TECHNIQUE: Contiguous axial images were obtained from the thoracic inlet to the pelvic floor following the intravenous administration of 100 mL of Isovue-300 contrast. Coronal and sagittal reformations are provided. Up-to-date CT equipment and radiation dose reduction techniques were employed.  FINDINGS:  LUNGS AND PLEURA: Pulmonary hyperinflation. Slightly increased interstitial markings in the lower lung fields with some groundglass  components and interstitial reticulation, possibly mild interstitial fibrosis. No alveolar infiltrates or consolidations. Mild fibrolinear scarring in the lingula near the cardiophrenic angle. Small calcified pulmonary granuloma in the left base. No pleural effusions or pleural thickening.  MEDIASTINUM: No masses.  THORACIC LYMPH NODES: No adenopathy.  HEART: Normal in size. No pericardial effusion. Coronary artery calcific disease in the proximal and mid LAD distributions.  THORACIC AORTA AND GREAT VESSELS: No aneurysm or dissection.  PULMONARY ARTERIES: Normal size. No central pulmonary embolus.  LIVER: No mass. No intrahepatic biliary dilatation. Portal veins are patent.  GALLBLADDER: No wall thickening. A few tiny calcified gallstones layer dependently near the neck. There is a 2 mm calculus in the proximal duodenum just below the major duodenal papilla, possibly a recently passed gallstone.  COMMON BILE DUCT: Normal caliber. No radiopaque stones.    SPLEEN: Within normal limits.  PANCREAS: Interstitial pancreatitis pattern with pancreatic fat stranding, edema, and inflammatory changes. Small pancreatic effusion layers along the dorsal peritoneal gutters. No pancreatic fluid collections.  ADRENALS: No masses.  KIDNEYS: No masses. No hydronephrosis.  ABDOMINAL/PELVIC LYMPH NODES: No adenopathy.  STOMACH, SMALL BOWEL AND COLON: There is concentric thickening and transmural enhancement of some jejunal loops in the left mid abdomen, just distal to the inflammatory process or on the pancreas, possibly reactive enteritis. No bowel obstruction or ileus.  PERITONEAL CAVITY: No mesenteric stranding or free fluid. No free air.  ABDOMINAL AORTA: No aortic aneurysm or dissection. Diffuse aortoiliac calcific disease with mild multifocal narrowing.  PELVIC ORGANS: Degenerative calcifications in the prostate without hypertrophy. Normal urinary bladder. High riding testicles project above the scrotal sac in the lower inguinal  canals.  SOFT TISSUE: Normal.  OSSEOUS STRUCTURES: No acute fracture or destructive lesion.    Impression:      1.   Acute interstitial pancreatitis pattern with peripancreatic inflammatory changes, edema, and a small pancreatic effusion.       2. Cholelithiasis with 2 mm stone in the proximal duodenum, possibly a recently passed gallstone.       3. Transmural thickening and enhancement of some jejunal loops in the left mid abdomen, possibly reactive changes secondary to the pancreatitis.       4. Mild granulomatous changes and subtle peripheral interstitial scarring in the lower lung fields.       5. No metastatic disease or lymphadenopathy.          Created by: Farooq Lara MD    Signed by: Farooq Lara MD  Signed on: 7/23/2023 3:46 PM  Location: [id number]            CT outside films    Result Date: 7/23/2023  This procedure was auto-finalized with no dictation required.         Assessment & Plan  Assessment & Plan     Active Hospital Problems    Diagnosis  POA    **Acute gallstone pancreatitis [K85.10]  Unknown    Personal history of transient ischemic attack (TIA) [Z86.73]  Not Applicable    Adenocarcinoma of prostate [C61]  Yes    Gastroesophageal reflux disease [K21.9]  Yes    Hyperlipidemia [E78.5]  Yes    Tobacco abuse [Z72.0]  Yes     Patient is a 63 M with PMH of GERD, HLD, hx of tia,  Advanced prostate cancer on Xtandi, occasional marijuna use, chronic 1ppd tobacco use who presents to [place] ER today with complaints of severe epigastric abd pain.    Acute gallstone pancreatitis  --mrcp when no longer nauseated  --pain control with 0.75 mg dilaudid q2hp  --zofran  --continue zosyn until infection ruled out  --RUQ us  --general surgery and GI consult.  --IV fluids  --check triglycerides  --trend cmp as stone may have already passed.     Hx of tobacco and marijuana use  --cessation counseling, nicotine patch    Chronic conditions:  Advanced prostate ca, hld, gerd, hx of tia  --cont home  meds      Total time spent:75  Time spent includes time reviewing chart, face-to-face time, counseling patient/family/caregiver, ordering medications/tests/procedures, communicating with other health care professionals, documenting clinical information in the electronic health record, and coordination of care.      DVT prophylaxis: scds in prep for ERCP      CODE STATUS: full code  Code Status and Medical Interventions:   Ordered at: 07/23/23 2229     Code Status (Patient has no pulse and is not breathing):    CPR (Attempt to Resuscitate)     Medical Interventions (Patient has pulse or is breathing):    Full Support     Release to patient:    Routine Release       Expected Discharge   Expected Discharge Date: 7/27/2023; Expected Discharge Time:      Carroll Bobo DO  07/23/23      Electronically signed by Carroll Bobo DO at 07/23/23 2246         Current Facility-Administered Medications   Medication Dose Route Frequency Provider Last Rate Last Admin    acetaminophen (TYLENOL) tablet 650 mg  650 mg Oral Q4H PRN Carroll Bobo DO        Or    acetaminophen (TYLENOL) 160 MG/5ML solution 650 mg  650 mg Oral Q4H PRN Carroll Bobo DO        Or    acetaminophen (TYLENOL) suppository 650 mg  650 mg Rectal Q4H PRN Carroll Bobo DO        albuterol (PROVENTIL) nebulizer solution 0.083% 2.5 mg/3mL  2.5 mg Nebulization Q6H PRN Carroll Bobo DO   2.5 mg at 07/24/23 1710    atorvastatin (LIPITOR) tablet 20 mg  20 mg Oral Nightly Carroll Bobo DO        sennosides-docusate (PERICOLACE) 8.6-50 MG per tablet 2 tablet  2 tablet Oral BID Carroll Bobo DO   2 tablet at 07/24/23 0818    And    polyethylene glycol (MIRALAX) packet 17 g  17 g Oral Daily PRN Carroll Bobo DO        And    bisacodyl (DULCOLAX) EC tablet 5 mg  5 mg Oral Daily PRN Carroll Bobo DO   5 mg at 07/24/23 1657    And    bisacodyl (DULCOLAX) suppository 10 mg  10 mg Rectal Daily PRN Carroll Bobo DO        enzalutamide (XTANDI) chemo capsule 40  mg **Patient Supplied Medication**  40 mg Oral Daily Carroll Bobo,         HYDROcodone-acetaminophen (NORCO)  MG per tablet 1 tablet  1 tablet Oral Q6H PRN Carroll Bobo DO   1 tablet at 07/24/23 1304    HYDROmorphone (DILAUDID) injection 1 mg  1 mg Intravenous Q2H PRN Cody Chow III, DO   1 mg at 07/24/23 1657    hydrOXYzine (ATARAX) tablet 50 mg  50 mg Oral TID PRN Carroll Bobo, DO        lactated ringers infusion  120 mL/hr Intravenous Continuous Barry Zee APRN 120 mL/hr at 07/24/23 1641 120 mL/hr at 07/24/23 1641    melatonin tablet 5 mg  5 mg Oral Nightly PRN Carroll Bobo, DO        metoprolol tartrate (LOPRESSOR) tablet 25 mg  25 mg Oral Daily Carroll Bobo DO   25 mg at 07/24/23 0818    montelukast (SINGULAIR) tablet 10 mg  10 mg Oral Nightly Carroll Bobo DO        naloxone (NARCAN) injection 0.4 mg  0.4 mg Intravenous PRN Carroll Bobo DO        nicotine (NICODERM CQ) 21 MG/24HR patch 1 patch  1 patch Transdermal Q24H Carroll Bobo DO   1 patch at 07/24/23 0900    ondansetron (ZOFRAN) injection 4 mg  4 mg Intravenous Q6H PRN Bharat Montana APRN   4 mg at 07/24/23 1113    pantoprazole (PROTONIX) EC tablet 40 mg  40 mg Oral BID AC Carroll Bobo, DO   40 mg at 07/24/23 1635    piperacillin-tazobactam (ZOSYN) 3.375 g in iso-osmotic dextrose 50 ml (premix)  3.375 g Intravenous Q8H Carroll Bobo, DO   Currently Infusing at 07/24/23 1658    prochlorperazine (COMPAZINE) injection 5 mg  5 mg Intravenous Q3H PRN Carroll Bobo, DO   5 mg at 07/24/23 1635    sodium chloride 0.9 % flush 10 mL  10 mL Intravenous Q12H Carroll Bobo, DO   10 mL at 07/24/23 0800    sodium chloride 0.9 % flush 10 mL  10 mL Intravenous PRN Carroll Bobo, DO        sodium chloride 0.9 % infusion 40 mL  40 mL Intravenous PRN Jihan, Carroll, DO         Lab Results (all)       Procedure Component Value Units Date/Time    STAT Lactic Acid, Reflex [[id number]]  (Abnormal) Collected:  07/24/23 1352    Specimen: Blood Updated: 07/24/23 1538     Lactate 3.1 mmol/L      Comment: Falsely depressed results may occur on samples drawn from patients receiving N-Acetylcysteine (NAC) or Metamizole.       Lipase [[id number]]  (Abnormal) Collected: 07/24/23 0924    Specimen: Blood Updated: 07/24/23 1117     Lipase 1,255 U/L     Comprehensive Metabolic Panel [[id number]]  (Abnormal) Collected: 07/24/23 0924    Specimen: Blood Updated: 07/24/23 1106     Glucose 139 mg/dL      BUN 31 mg/dL      Creatinine 1.44 mg/dL      Sodium 143 mmol/L      Potassium 5.3 mmol/L      Chloride 108 mmol/L      CO2 20.0 mmol/L      Calcium 8.8 mg/dL      Total Protein 6.1 g/dL      Albumin 3.7 g/dL      ALT (SGPT) 49 U/L      AST (SGOT) 50 U/L      Alkaline Phosphatase 75 U/L      Total Bilirubin 0.8 mg/dL      Globulin 2.4 gm/dL      Comment: Calculated Result        A/G Ratio 1.5 g/dL      BUN/Creatinine Ratio 21.5     Anion Gap 15.0 mmol/L      eGFR 54.9 mL/min/1.73     Narrative:      GFR Normal >60  Chronic Kidney Disease <60  Kidney Failure <15      Lipid Panel [[id number]]  (Abnormal) Collected: 07/24/23 0924    Specimen: Blood Updated: 07/24/23 1106     Total Cholesterol 134 mg/dL      Triglycerides 126 mg/dL      HDL Cholesterol 39 mg/dL      LDL Cholesterol  72 mg/dL      VLDL Cholesterol 23 mg/dL      LDL/HDL Ratio 1.79    Narrative:      Cholesterol Reference Ranges  (U.S. Department of Health and Human Services ATP III Classifications)    Desirable          <200 mg/dL  Borderline High    200-239 mg/dL  High Risk          >240 mg/dL      Triglyceride Reference Ranges  (U.S. Department of Health and Human Services ATP III Classifications)    Normal           <150 mg/dL  Borderline High  150-199 mg/dL  High             200-499 mg/dL  Very High        >500 mg/dL    HDL Reference Ranges  (U.S. Department of Health and Human Services ATP III Classifications)    Low     <40 mg/dl (major risk factor for CHD)  High    >60 mg/dl  ('negative' risk factor for CHD)        LDL Reference Ranges  (U.S. Department of Health and Human Services ATP III Classifications)    Optimal          <100 mg/dL  Near Optimal     100-129 mg/dL  Borderline High  130-159 mg/dL  High             160-189 mg/dL  Very High        >189 mg/dL    STAT Lactic Acid, Reflex [[id number]]  (Abnormal) Collected: 07/24/23 0725    Specimen: Blood Updated: 07/24/23 0758     Lactate 3.5 mmol/L      Comment: Falsely depressed results may occur on samples drawn from patients receiving N-Acetylcysteine (NAC) or Metamizole.       COVID PRE-OP / PRE-PROCEDURE SCREENING ORDER (NO ISOLATION) - Swab, Nasopharynx [[id number]]  (Normal) Collected: 07/24/23 0620    Specimen: Swab from Nasopharynx Updated: 07/24/23 0729    Narrative:      The following orders were created for panel order COVID PRE-OP / PRE-PROCEDURE SCREENING ORDER (NO ISOLATION) - Swab, Nasopharynx.  Procedure                               Abnormality         Status                     ---------                               -----------         ------                     Respiratory Panel PCR w/...[[id number]]  Normal              Final result                 Please view results for these tests on the individual orders.    Respiratory Panel PCR w/COVID-19(SARS-CoV-2) BALTAZAR/ANDRES/ASTER/PAD/COR/MAD/VALENTINE In-House, NP Swab in UTM/VTM, 3-4 HR TAT - Swab, Nasopharynx [[id number]]  (Normal) Collected: 07/24/23 0620    Specimen: Swab from Nasopharynx Updated: 07/24/23 0729     ADENOVIRUS, PCR Not Detected     Coronavirus 229E Not Detected     Coronavirus HKU1 Not Detected     Coronavirus NL63 Not Detected     Coronavirus OC43 Not Detected     COVID19 Not Detected     Human Metapneumovirus Not Detected     Human Rhinovirus/Enterovirus Not Detected     Influenza A PCR Not Detected     Influenza B PCR Not Detected     Parainfluenza Virus 1 Not Detected     Parainfluenza Virus 2 Not Detected     Parainfluenza Virus 3 Not Detected     Parainfluenza  Virus 4 Not Detected     RSV, PCR Not Detected     Bordetella pertussis pcr Not Detected     Bordetella parapertussis PCR Not Detected     Chlamydophila pneumoniae PCR Not Detected     Mycoplasma pneumo by PCR Not Detected    Narrative:      In the setting of a positive respiratory panel with a viral infection PLUS a negative procalcitonin without other underlying concern for bacterial infection, consider observing off antibiotics or discontinuation of antibiotics and continue supportive care. If the respiratory panel is positive for atypical bacterial infection (Bordetella pertussis, Chlamydophila pneumoniae, or Mycoplasma pneumoniae), consider antibiotic de-escalation to target atypical bacterial infection.    STAT Lactic Acid, Reflex [[id number]]  (Abnormal) Collected: 07/24/23 0442    Specimen: Blood Updated: 07/24/23 0541     Lactate 4.1 mmol/L      Comment: Falsely depressed results may occur on samples drawn from patients receiving N-Acetylcysteine (NAC) or Metamizole.       CBC & Differential [[id number]]  (Abnormal) Collected: 07/24/23 0442    Specimen: Blood Updated: 07/24/23 0519    Narrative:      The following orders were created for panel order CBC & Differential.  Procedure                               Abnormality         Status                     ---------                               -----------         ------                     CBC Auto Differential[[id number]]        Abnormal            Final result                 Please view results for these tests on the individual orders.    CBC Auto Differential [[id number]]  (Abnormal) Collected: 07/24/23 0442    Specimen: Blood Updated: 07/24/23 0519     WBC 14.99 10*3/mm3      RBC 4.98 10*6/mm3      Hemoglobin 12.0 g/dL      Hematocrit 39.3 %      MCV 78.9 fL      MCH 24.1 pg      MCHC 30.5 g/dL      RDW 16.8 %      RDW-SD 47.4 fl      MPV 10.3 fL      Platelets 456 10*3/mm3      Neutrophil % 90.6 %      Lymphocyte % 4.5 %      Monocyte % 4.4 %       "Eosinophil % 0.0 %      Basophil % 0.1 %      Immature Grans % 0.4 %      Neutrophils, Absolute 13.58 10*3/mm3      Lymphocytes, Absolute 0.67 10*3/mm3      Monocytes, Absolute 0.66 10*3/mm3      Eosinophils, Absolute 0.00 10*3/mm3      Basophils, Absolute 0.02 10*3/mm3      Immature Grans, Absolute 0.06 10*3/mm3      nRBC 0.0 /100 WBC     STAT Lactic Acid, Reflex [[id number]]  (Abnormal) Collected: 07/24/23 0153    Specimen: Blood Updated: 07/24/23 0231     Lactate 3.9 mmol/L      Comment: Falsely depressed results may occur on samples drawn from patients receiving N-Acetylcysteine (NAC) or Metamizole.       Procalcitonin [[id number]]  (Abnormal) Collected: 07/23/23 2246    Specimen: Blood Updated: 07/23/23 2324     Procalcitonin 0.85 ng/mL     Narrative:      As a Marker for Sepsis (Non-Neonates):    1. <0.5 ng/mL represents a low risk of severe sepsis and/or septic shock.  2. >2 ng/mL represents a high risk of severe sepsis and/or septic shock.    As a Marker for Lower Respiratory Tract Infections that require antibiotic therapy:    PCT on Admission    Antibiotic Therapy       6-12 Hrs later    >0.5                Strongly Recommended  >0.25 - <0.5        Recommended   0.1 - 0.25          Discouraged              Remeasure/reassess PCT  <0.1                Strongly Discouraged     Remeasure/reassess PCT    As 28 day mortality risk marker: \"Change in Procalcitonin Result\" (>80% or <=80%) if Day 0 (or Day 1) and Day 4 values are available. Refer to http://www.[employer]-pct-calculator.com    Change in PCT <=80%  A decrease of PCT levels below or equal to 80% defines a positive change in PCT test result representing a higher risk for 28-day all-cause mortality of patients diagnosed with severe sepsis for septic shock.    Change in PCT >80%  A decrease of PCT levels of more than 80% defines a negative change in PCT result representing a lower risk for 28-day all-cause mortality of patients diagnosed with severe sepsis or " septic shock.       Lactic Acid, Plasma [[id number]]  (Abnormal) Collected: 07/23/23 2246    Specimen: Blood Updated: 07/23/23 2323     Lactate 3.6 mmol/L      Comment: Falsely depressed results may occur on samples drawn from patients receiving N-Acetylcysteine (NAC) or Metamizole.       Magnesium [[id number]]  (Normal) Collected: 07/23/23 2246    Specimen: Blood Updated: 07/23/23 2320     Magnesium 2.3 mg/dL     Comprehensive Metabolic Panel [[id number]]  (Abnormal) Collected: 07/23/23 2246    Specimen: Blood Updated: 07/23/23 2320     Glucose 171 mg/dL      BUN 28 mg/dL      Creatinine 1.42 mg/dL      Sodium 140 mmol/L      Potassium 5.1 mmol/L      Comment: Slight hemolysis detected by analyzer. Results may be affected.        Chloride 108 mmol/L      CO2 14.0 mmol/L      Calcium 8.5 mg/dL      Total Protein 6.6 g/dL      Albumin 3.8 g/dL      ALT (SGPT) 53 U/L      AST (SGOT) 66 U/L      Alkaline Phosphatase 76 U/L      Total Bilirubin 0.4 mg/dL      Globulin 2.8 gm/dL      Comment: Calculated Result        A/G Ratio 1.4 g/dL      BUN/Creatinine Ratio 19.7     Anion Gap 18.0 mmol/L      eGFR 55.9 mL/min/1.73     Narrative:      GFR Normal >60  Chronic Kidney Disease <60  Kidney Failure <15      Triglycerides [[id number]]  (Normal) Collected: 07/23/23 2246    Specimen: Blood Updated: 07/23/23 2320     Triglycerides 108 mg/dL      Comment: Falsely depressed results may occur on samples drawn from patients receiving N-Acetylcysteine (NAC) or Metamizole.       Narrative:      Triglyceride Reference Ranges:    Normal           less than 150 mg/dL  Borderline       150-199 mg/dL  High             200-499 mg/dL  Very High        greater than 499 mg/dL    aPTT [[id number]]  (Normal) Collected: 07/23/23 2246    Specimen: Blood Updated: 07/23/23 2312     PTT 26.9 seconds     Narrative:      PTT = The equivalent PTT values for the therapeutic range of heparin levels at 0.3 to 0.5 U/ml are 60 to 70 seconds.    Protime-INR  [[id number]]  (Normal) Collected: 23    Specimen: Blood Updated: 23     Protime 13.1 Seconds      INR 0.98    Blood Culture - Blood, Hand, Right [[id number]] Collected: 23    Specimen: Blood from Hand, Right Updated: 23    Blood Culture - Blood, Hand, Left [[id number]] Collected: 23    Specimen: Blood from Hand, Left Updated: 23          Imaging Results (All)       Procedure Component Value Units Date/Time    MRI abdomen wo contrast mrcp [[id number]] Collected: 23 1049     Updated: 23 1507    Narrative:      MRI ABDOMEN WO CONTRAST MRCP    Date of Exam: 2023 9:58 AM EDT    Indication: gallstone pancreatitis.     Comparison: Imported/outside CT chest abdomen pelvis 2023.    Technique:  Routine multiplanar/multisequence images of the abdomen were obtained with MRCP sequences without contrast administration.    Findings:    Cluster of small gallstones layering at the gallbladder neck. No gallbladder distention, wall thickening, or pericholecystic fluid. No choledocholithiasis or evidence of biliary ductal dilatation.    Ill-defined peripancreatic fluid and stranding consistent with acute pancreatitis. No organized/walled-off fluid collection. Unable to assess for pancreatic necrosis without intravenous contrast.    Liver, spleen, adrenal glands, bowel, and kidneys are unremarkable. Atherosclerosis. No suspicious lymphadenopathy. Trace bilateral pleural effusions.      Impression:      Impression:    Acute pancreatitis without organized fluid collection.    Cholelithiasis without choledocholithiasis.    Electronically Signed: Michele Lopez MD    2023 11:09 AM EDT    Workstation ID: [id number]               Physician Progress Notes (all)        Debra Whitfield MD at 23 [facility]9              [facility]  PROGRESS NOTE    Patient Name: Ta Madison  : 1960  MRN: [id number]    Date of  Admission: 7/23/2023  Primary Care Physician: Yolande Olvera APRN    Subjective   Subjective     CC:  F/U abdominal pain    HPI:  Seen this morning. Complains of nausea and RUQ/epigastric pain, rates it at a 9 currently.     ROS:  Gen-no fevers, no chills  CV-no chest pain, no palpitations  Resp-no cough, no dyspnea  GI-as above    Objective   Objective     Vital Signs:   Temp:  [98 øF (36.7 øC)-98.1 øF (36.7 øC)] 98 øF (36.7 øC)  Heart Rate:  [69-78] 76  Resp:  [16-18] 16  BP: (143-160)/(75-84) 143/75     Physical Exam:  Gen-no acute distress  HENT-NCAT, mucous membranes moist  CV-RRR, S1 S2 normal, no m/r/g  Resp-CTAB, no wheezes or rales  Abd-soft, moderate TTP in epigastric and RUQ regions, ND, +BS  Ext-no edema  Neuro-A&Ox3, no focal deficits  Skin-no rashes  Psych-appropriate mood      Results Reviewed:  LAB RESULTS:      Lab 07/24/23  0725 07/24/23  0442 07/24/23  0153 07/23/23  2246   WBC  --  14.99*  --   --    HEMOGLOBIN  --  12.0*  --   --    HEMATOCRIT  --  39.3  --   --    PLATELETS  --  456*  --   --    NEUTROS ABS  --  13.58*  --   --    IMMATURE GRANS (ABS)  --  0.06*  --   --    LYMPHS ABS  --  0.67*  --   --    MONOS ABS  --  0.66  --   --    EOS ABS  --  0.00  --   --    MCV  --  78.9*  --   --    PROCALCITONIN  --   --   --  0.85*   LACTATE 3.5* 4.1* 3.9* 3.6*   PROTIME  --   --   --  13.1   APTT  --   --   --  26.9         Lab 07/24/23  0924 07/23/23  2246   SODIUM 143 140   POTASSIUM 5.3* 5.1   CHLORIDE 108* 108*   CO2 20.0* 14.0*   ANION GAP 15.0 18.0*   BUN 31* 28*   CREATININE 1.44* 1.42*   EGFR 54.9* 55.9*   GLUCOSE 139* 171*   CALCIUM 8.8 8.5*   MAGNESIUM  --  2.3         Lab 07/24/23 0924 07/23/23 2246   TOTAL PROTEIN 6.1 6.6   ALBUMIN 3.7 3.8   GLOBULIN 2.4 2.8   ALT (SGPT) 49* 53*   AST (SGOT) 50* 66*   BILIRUBIN 0.8 0.4   ALK PHOS 75 76   LIPASE 1,255*  --          Lab 07/23/23 2246   PROTIME 13.1   INR 0.98         Lab 07/24/23 0924 07/23/23 2246   CHOLESTEROL 134  --    LDL CHOL 72   --    HDL CHOL 39*  --    TRIGLYCERIDES 126 108         Lab 07/23/23  2349 07/23/23  2245   ABO TYPING O O   RH TYPING Positive Positive   ANTIBODY SCREEN  --  Negative         Brief Urine Lab Results       None            Microbiology Results Abnormal       Procedure Component Value - Date/Time    COVID PRE-OP / PRE-PROCEDURE SCREENING ORDER (NO ISOLATION) - Swab, Nasopharynx [[id number]]  (Normal) Collected: 07/24/23 0620    Lab Status: Final result Specimen: Swab from Nasopharynx Updated: 07/24/23 0729    Narrative:      The following orders were created for panel order COVID PRE-OP / PRE-PROCEDURE SCREENING ORDER (NO ISOLATION) - Swab, Nasopharynx.  Procedure                               Abnormality         Status                     ---------                               -----------         ------                     Respiratory Panel PCR w/...[[id number]]  Normal              Final result                 Please view results for these tests on the individual orders.    Respiratory Panel PCR w/COVID-19(SARS-CoV-2) BALTAZAR/ANDRES/ASTER/PAD/COR/MAD/VALENTINE In-House, NP Swab in UTM/VTM, 3-4 HR TAT - Swab, Nasopharynx [[id number]]  (Normal) Collected: 07/24/23 0620    Lab Status: Final result Specimen: Swab from Nasopharynx Updated: 07/24/23 0729     ADENOVIRUS, PCR Not Detected     Coronavirus 229E Not Detected     Coronavirus HKU1 Not Detected     Coronavirus NL63 Not Detected     Coronavirus OC43 Not Detected     COVID19 Not Detected     Human Metapneumovirus Not Detected     Human Rhinovirus/Enterovirus Not Detected     Influenza A PCR Not Detected     Influenza B PCR Not Detected     Parainfluenza Virus 1 Not Detected     Parainfluenza Virus 2 Not Detected     Parainfluenza Virus 3 Not Detected     Parainfluenza Virus 4 Not Detected     RSV, PCR Not Detected     Bordetella pertussis pcr Not Detected     Bordetella parapertussis PCR Not Detected     Chlamydophila pneumoniae PCR Not Detected     Mycoplasma pneumo by PCR Not  Detected    Narrative:      In the setting of a positive respiratory panel with a viral infection PLUS a negative procalcitonin without other underlying concern for bacterial infection, consider observing off antibiotics or discontinuation of antibiotics and continue supportive care. If the respiratory panel is positive for atypical bacterial infection (Bordetella pertussis, Chlamydophila pneumoniae, or Mycoplasma pneumoniae), consider antibiotic de-escalation to target atypical bacterial infection.            CT Chest Abdomen Pelvis W IV Contrast    Result Date: 7/23/2023      EXAM: CT CHEST, ABDOMEN, PELVIS WITH IV CONTRAST     INDICATION: 63-year-old with abdominal pain, nausea, vomiting, and stage IV prostate carcinoma  COMPARISON: No comparison  TECHNIQUE: Contiguous axial images were obtained from the thoracic inlet to the pelvic floor following the intravenous administration of 100 mL of Isovue-300 contrast. Coronal and sagittal reformations are provided. Up-to-date CT equipment and radiation dose reduction techniques were employed.  FINDINGS:  LUNGS AND PLEURA: Pulmonary hyperinflation. Slightly increased interstitial markings in the lower lung fields with some groundglass components and interstitial reticulation, possibly mild interstitial fibrosis. No alveolar infiltrates or consolidations. Mild fibrolinear scarring in the lingula near the cardiophrenic angle. Small calcified pulmonary granuloma in the left base. No pleural effusions or pleural thickening.  MEDIASTINUM: No masses.  THORACIC LYMPH NODES: No adenopathy.  HEART: Normal in size. No pericardial effusion. Coronary artery calcific disease in the proximal and mid LAD distributions.  THORACIC AORTA AND GREAT VESSELS: No aneurysm or dissection.  PULMONARY ARTERIES: Normal size. No central pulmonary embolus.  LIVER: No mass. No intrahepatic biliary dilatation. Portal veins are patent.  GALLBLADDER: No wall thickening. A few tiny calcified gallstones  layer dependently near the neck. There is a 2 mm calculus in the proximal duodenum just below the major duodenal papilla, possibly a recently passed gallstone.  COMMON BILE DUCT: Normal caliber. No radiopaque stones.    SPLEEN: Within normal limits.  PANCREAS: Interstitial pancreatitis pattern with pancreatic fat stranding, edema, and inflammatory changes. Small pancreatic effusion layers along the dorsal peritoneal gutters. No pancreatic fluid collections.  ADRENALS: No masses.  KIDNEYS: No masses. No hydronephrosis.  ABDOMINAL/PELVIC LYMPH NODES: No adenopathy.  STOMACH, SMALL BOWEL AND COLON: There is concentric thickening and transmural enhancement of some jejunal loops in the left mid abdomen, just distal to the inflammatory process or on the pancreas, possibly reactive enteritis. No bowel obstruction or ileus.  PERITONEAL CAVITY: No mesenteric stranding or free fluid. No free air.  ABDOMINAL AORTA: No aortic aneurysm or dissection. Diffuse aortoiliac calcific disease with mild multifocal narrowing.  PELVIC ORGANS: Degenerative calcifications in the prostate without hypertrophy. Normal urinary bladder. High riding testicles project above the scrotal sac in the lower inguinal canals.  SOFT TISSUE: Normal.  OSSEOUS STRUCTURES: No acute fracture or destructive lesion.    Impression:      1.   Acute interstitial pancreatitis pattern with peripancreatic inflammatory changes, edema, and a small pancreatic effusion.       2. Cholelithiasis with 2 mm stone in the proximal duodenum, possibly a recently passed gallstone.       3. Transmural thickening and enhancement of some jejunal loops in the left mid abdomen, possibly reactive changes secondary to the pancreatitis.       4. Mild granulomatous changes and subtle peripheral interstitial scarring in the lower lung fields.       5. No metastatic disease or lymphadenopathy.          Created by: Farooq Lara MD    Signed by: Farooq Lara MD  Signed on: 7/23/2023  3:46 PM  Location: [id number]            CT outside films    Result Date: 7/23/2023  This procedure was auto-finalized with no dictation required.         Current medications:  Scheduled Meds:atorvastatin, 20 mg, Oral, Nightly  enzalutamide, 40 mg, Oral, Daily  metoprolol tartrate, 25 mg, Oral, Daily  montelukast, 10 mg, Oral, Nightly  nicotine, 1 patch, Transdermal, Q24H  pantoprazole, 40 mg, Oral, BID AC  piperacillin-tazobactam, 3.375 g, Intravenous, Q8H  senna-docusate sodium, 2 tablet, Oral, BID  sodium chloride, 10 mL, Intravenous, Q12H      Continuous Infusions:sodium chloride, 125 mL/hr, Last Rate: 125 mL/hr (07/24/23 1144)      PRN Meds:.  acetaminophen **OR** acetaminophen **OR** acetaminophen    albuterol    senna-docusate sodium **AND** polyethylene glycol **AND** bisacodyl **AND** bisacodyl    HYDROcodone-acetaminophen    HYDROmorphone    hydrOXYzine    melatonin    naloxone    ondansetron    prochlorperazine    sodium chloride    sodium chloride    Assessment & Plan   Assessment & Plan     Active Hospital Problems    Diagnosis  POA    **Acute gallstone pancreatitis [K85.10]  Yes    Elevated serum creatinine [R79.89]  Yes    Personal history of transient ischemic attack (TIA) [Z86.73]  Not Applicable    Adenocarcinoma of prostate [C61]  Yes    Gastroesophageal reflux disease [K21.9]  Yes    Hyperlipidemia [E78.5]  Yes    Tobacco abuse [Z72.0]  Yes      Resolved Hospital Problems   No resolved problems to display.        Brief Hospital Course to date:  Ta Madison is a 63 y.o. male with PMH of GERD, HLD, hx of TIA, advanced prostate cancer on Xtandi, occasional marijuna use, and chronic 1 PPD tobacco use who initially presented to [place] [facility] 7/23/23 with complaints of severe epigastric abdominal pain with associated nausea and vomiting. CT A/P at OSH showed acute pancreatitis with 2 mm stone in the proximal duodenum. Transferred to [facility] for GI evaluation.     *All problems are new to me today.    Acute  gallstone pancreatitis  Leukocytosis  Lactic acidosis  --CT A/P at OSH: acute interstitial pancreatitis, cholelithiasis with 2 mm stone in proximal duodenum (possibly a recently passed gallstone), transmural thickening of some jejunal loops in the left mid abdomen possibly reactive changes secondary to pancreatitis  --Lipase 1255  --Normal triglycerides   --MRCP pending  --GI consulted  --General Surgery consulted, Dr. Tony recommends continued treatment for pancreatitis and F/U in 6 weeks for interval cholecystectomy, unless he has significant improvement in next 24-48 hours  --Continue IV fluids  --Empiric IV Zosyn given significant leukocytosis (almost 30K at OSH), F/U blood cultures  --Continue antiemetics, pain control as needed; stop Toradol due to elevated Cr, continue PRN Norco and IV Dilaudid     Elevated Cr  --Cr. 1.4 on admission  --Baseline Cr ~0.85-1.1  --Continue to monitor for improvement with IV fluids  --Avoid nephrotoxic meds    Cough  --Had complained of cough and postnasal drainage for past 3-4 weeks  --Negative respiratory PCR panel  --CT chest at OSH with mild granulomatous changes and subtle peripheral interstitial scarring in the lower lung fields    Prostate cancer  --Continue Xtandi oral chemotherapy (patient supplied)    HLD  --Lipid panel reviewed, within normal limits  --Continue statin    GERD  --Continue PPI    Hx of TIA  --Holding ASA for procedures  --Continue statin    Tobacco abuse  -- on cessation  --Nicotine patch    ADDENDUM 15:23 EDT MRCP results back, showing acute pancreatitis, no obstructing stones. Discussed results with patient and family. Continue treatment for pancreatitis. Will allow ice chips and sips with meds, he is still requiring high doses of IV pain meds and likely not ready to trial clears. Could try later tonight or tomorrow depending on how he is doing.     Expected Discharge Location and Transportation: home  Expected Discharge   Expected Discharge  Date: 7/27/2023; Expected Discharge Time:      DVT prophylaxis:  Mechanical DVT prophylaxis orders are present.          CODE STATUS:   Code Status and Medical Interventions:   Ordered at: 07/23/23 2229     Code Status (Patient has no pulse and is not breathing):    CPR (Attempt to Resuscitate)     Medical Interventions (Patient has pulse or is breathing):    Full Support     Release to patient:    Routine Release       Debra Whitfield MD  07/24/23        Electronically signed by Debra Whitfield MD at 07/24/23 1523          Consult Notes (all)        Barry Zee APRN at 07/24/23 0930        Consult Orders    1. Inpatient Gastroenterology Consult [[id number]] ordered by Carroll Bobo DO at 07/23/23 2224                 [facility] Gastroenterology Consult    Referring Provider: Mary Rojas MD    PCP: Yolande Olvera APRN    Reason for Consultation: ERCP    Chief complaint: Abdominal pain    History of present illness:    Ta Madison is a 63 y.o. male who is admitted with acute onset of epigastric and right upper quadrant abdominal pain with associated nausea and vomiting.  Patient reports has been having these symptoms intermittently for the past few months but more so over the past few days.  Current episode was brought on following consumption of breakfast foods; at home, noted symptoms with potato chips.  Does not endorse any change in bowel habits, shortness of breath, chest pain, or fever/chills.  Imaging obtained in the emergency department shows evidence of a passed common duct stone in duodenum but no evidence of biliary obstruction; evidence of pancreatitis noted.  Elevated lipase.  LFTs and obstructive. Past medical, surgical, social, and family histories are reviewed for accuracy.  No documented alleviating or exacerbating factors.  Does not endorse pain at time of exam.    Allergies:  Patient has no known allergies.    Scheduled Meds:  atorvastatin, 20 mg, Oral, Nightly  enzalutamide, 40 mg, Oral,  Daily  metoprolol tartrate, 25 mg, Oral, Daily  montelukast, 10 mg, Oral, Nightly  nicotine, 1 patch, Transdermal, Q24H  pantoprazole, 40 mg, Oral, BID AC  piperacillin-tazobactam, 3.375 g, Intravenous, Q8H  senna-docusate sodium, 2 tablet, Oral, BID  sodium chloride, 10 mL, Intravenous, Q12H         Infusions:  sodium chloride, 125 mL/hr, Last Rate: 125 mL/hr (07/24/23 1144)        PRN Meds:    acetaminophen **OR** acetaminophen **OR** acetaminophen    albuterol    senna-docusate sodium **AND** polyethylene glycol **AND** bisacodyl **AND** bisacodyl    HYDROcodone-acetaminophen    HYDROmorphone    hydrOXYzine    melatonin    naloxone    ondansetron    prochlorperazine    sodium chloride    sodium chloride    Home Meds:  Medications Prior to Admission   Medication Sig Dispense Refill Last Dose    albuterol sulfate  (90 Base) MCG/ACT inhaler Inhale 2 puffs Every 6 (Six) Hours As Needed for Wheezing. 18 g 5 Past Month    aspirin 81 MG EC tablet Take 1 tablet by mouth Daily.   7/23/2023    atorvastatin (LIPITOR) 20 MG tablet Take 1 tablet by mouth Every Night. 90 tablet 1 7/23/2023    enzalutamide (XTANDI) 40 MG chemo capsule Take 4 capsules by mouth Daily. 120 capsule 5 7/23/2023    fenofibrate (TRICOR) 145 MG tablet Take 1 tablet by mouth Daily. 90 tablet 1 7/23/2023    HYDROcodone-acetaminophen (NORCO)  MG per tablet Take 1 tablet by mouth Every 6 (Six) Hours As Needed for Moderate Pain. 120 tablet 0 7/23/2023    metoprolol tartrate (LOPRESSOR) 25 MG tablet Take 1 tablet by mouth Daily. 90 tablet 1 7/23/2023    omeprazole (priLOSEC) 40 MG capsule Take 1 capsule by mouth 2 (Two) Times a Day Before Meals. 60 capsule 5 Past Week    vitamin D (ERGOCALCIFEROL) 1.25 MG (04242 UT) capsule capsule Take 1 capsule by mouth 1 (One) Time Per Week. 5 capsule 5 Past Week    amoxicillin (AMOXIL) 875 MG tablet Take 1 tablet by mouth 2 (Two) Times a Day. 20 tablet 0     EPINEPHrine (EPIPEN) 0.3 MG/0.3ML solution  auto-injector injection INJECT 1 PEN IN THE MUSCLE ONE TIME AS DIRECTED   Unknown    hydrOXYzine (ATARAX) 50 MG tablet Take 1 tablet by mouth 3 (Three) Times a Day As Needed for Itching. 90 tablet 1 Unknown       ROS: Review of Systems   Constitutional:  Positive for activity change, appetite change and fatigue. Negative for chills, diaphoresis, fever and unexpected weight change.   HENT:  Negative for sore throat, trouble swallowing and voice change.    Eyes: Negative.    Respiratory:  Negative for apnea, cough, choking, chest tightness, shortness of breath, wheezing and stridor.    Cardiovascular:  Negative for chest pain, palpitations and leg swelling.   Gastrointestinal:  Positive for abdominal distention, abdominal pain, nausea and vomiting. Negative for anal bleeding, blood in stool, constipation, diarrhea and rectal pain.   Endocrine: Negative.    Genitourinary: Negative.    Musculoskeletal: Negative.    Skin: Negative.    Allergic/Immunologic: Negative.    Neurological: Negative.    Hematological: Negative.    Psychiatric/Behavioral: Negative.     All other systems reviewed and are negative.    PAST MED HX:  Past Medical History:   Diagnosis Date    Elevated cholesterol     GERD (gastroesophageal reflux disease)     Increased heart rate     Mini stroke     Neck pain     Prostate cancer     Rash     Tobacco abuse        PAST SURG HX:  Past Surgical History:   Procedure Laterality Date    APPENDECTOMY  1971    [facility]     COLONOSCOPY      COLONOSCOPY N/A 1/25/2023    Procedure: COLONOSCOPY;  Surgeon: Mahnaz Caraballo MD;  Location: [facility];  Service: Gastroenterology;  Laterality: N/A;       FAM HX:  Family History   Problem Relation Age of Onset    Nephrolithiasis Mother     Heart disease Father     Hypertension Father     Kidney disease Father        SOC HX:  Social History     Socioeconomic History    Marital status:    Tobacco Use    Smoking status: Every Day     Packs/day:  "2.00     Years: 45.00     Pack years: 90.00     Types: Cigarettes    Smokeless tobacco: Never   Vaping Use    Vaping Use: Never used   Substance and Sexual Activity    Alcohol use: No    Drug use: Yes     Types: Marijuana     Comment: occ     Sexual activity: Defer       PHYSICAL EXAM  /75 (BP Location: Left arm, Patient Position: Lying)   Pulse 76   Temp 98 øF (36.7 øC) (Oral)   Resp 16   Ht 172.7 cm (68\")   Wt 91.7 kg (202 lb 3.2 oz)   SpO2 91%   BMI 30.74 kg/mý   Wt Readings from Last 3 Encounters:   07/23/23 91.7 kg (202 lb 3.2 oz)   07/24/23 91.6 kg (202 lb)   06/13/23 80.7 kg (178 lb)   ,body mass index is 30.74 kg/mý.  Physical Exam  Vitals and nursing note reviewed.   Constitutional:       General: He is not in acute distress.     Appearance: Normal appearance. He is normal weight. He is ill-appearing. He is not toxic-appearing.   HENT:      Head: Normocephalic and atraumatic.   Eyes:      General: No scleral icterus.     Extraocular Movements: Extraocular movements intact.      Conjunctiva/sclera: Conjunctivae normal.      Pupils: Pupils are equal, round, and reactive to light.   Cardiovascular:      Rate and Rhythm: Normal rate and regular rhythm.      Pulses: Normal pulses.      Heart sounds: Normal heart sounds.   Pulmonary:      Effort: Pulmonary effort is normal. No respiratory distress.      Breath sounds: Normal breath sounds.   Abdominal:      General: Abdomen is flat. There is distension.      Palpations: Abdomen is soft. There is no mass.      Tenderness: There is abdominal tenderness. There is no guarding or rebound.      Hernia: No hernia is present.      Comments: Bowel sounds hypoactive in all quadrants.  Significant right upper quadrant tenderness to light palpation   Genitourinary:     Comments: Defer  Musculoskeletal:         General: Normal range of motion.      Right lower leg: No edema.      Left lower leg: No edema.   Skin:     General: Skin is warm and dry.      Capillary " Refill: Capillary refill takes less than 2 seconds.      Coloration: Skin is not jaundiced or pale.   Neurological:      General: No focal deficit present.      Mental Status: He is alert and oriented to person, place, and time.   Psychiatric:         Mood and Affect: Mood normal.         Behavior: Behavior normal.         Thought Content: Thought content normal.         Judgment: Judgment normal.       Results Review:   I reviewed the patient's new clinical results.  I reviewed the patient's new imaging results and agree with the interpretation.  I reviewed the patient's other test results and agree with the interpretation  I personally viewed and interpreted the patient's EKG/Telemetry data    Lab Results   Component Value Date    WBC 14.99 (H) 07/24/2023    HGB 12.0 (L) 07/24/2023    HGB 11.9 (L) 07/12/2023    HGB 12.2 (L) 04/12/2023    HCT 39.3 07/24/2023    MCV 78.9 (L) 07/24/2023     (H) 07/24/2023       Lab Results   Component Value Date    INR 0.98 07/23/2023       Lab Results   Component Value Date    GLUCOSE 139 (H) 07/24/2023    BUN 31 (H) 07/24/2023    CREATININE 1.44 (H) 07/24/2023    EGFRIFNONA 70 01/19/2022    BCR 21.5 07/24/2023     07/24/2023    K 5.3 (H) 07/24/2023    CO2 20.0 (L) 07/24/2023    CALCIUM 8.8 07/24/2023    PROTENTOTREF 7.2 02/05/2019    ALBUMIN 3.7 07/24/2023    ALKPHOS 75 07/24/2023    BILITOT 0.8 07/24/2023    ALT 49 (H) 07/24/2023    AST 50 (H) 07/24/2023       CT Chest Abdomen Pelvis W IV Contrast    Result Date: 7/23/2023      EXAM: CT CHEST, ABDOMEN, PELVIS WITH IV CONTRAST     INDICATION: 63-year-old with abdominal pain, nausea, vomiting, and stage IV prostate carcinoma  COMPARISON: No comparison  TECHNIQUE: Contiguous axial images were obtained from the thoracic inlet to the pelvic floor following the intravenous administration of 100 mL of Isovue-300 contrast. Coronal and sagittal reformations are provided. Up-to-date CT equipment and radiation dose reduction  techniques were employed.  FINDINGS:  LUNGS AND PLEURA: Pulmonary hyperinflation. Slightly increased interstitial markings in the lower lung fields with some groundglass components and interstitial reticulation, possibly mild interstitial fibrosis. No alveolar infiltrates or consolidations. Mild fibrolinear scarring in the lingula near the cardiophrenic angle. Small calcified pulmonary granuloma in the left base. No pleural effusions or pleural thickening.  MEDIASTINUM: No masses.  THORACIC LYMPH NODES: No adenopathy.  HEART: Normal in size. No pericardial effusion. Coronary artery calcific disease in the proximal and mid LAD distributions.  THORACIC AORTA AND GREAT VESSELS: No aneurysm or dissection.  PULMONARY ARTERIES: Normal size. No central pulmonary embolus.  LIVER: No mass. No intrahepatic biliary dilatation. Portal veins are patent.  GALLBLADDER: No wall thickening. A few tiny calcified gallstones layer dependently near the neck. There is a 2 mm calculus in the proximal duodenum just below the major duodenal papilla, possibly a recently passed gallstone.  COMMON BILE DUCT: Normal caliber. No radiopaque stones.    SPLEEN: Within normal limits.  PANCREAS: Interstitial pancreatitis pattern with pancreatic fat stranding, edema, and inflammatory changes. Small pancreatic effusion layers along the dorsal peritoneal gutters. No pancreatic fluid collections.  ADRENALS: No masses.  KIDNEYS: No masses. No hydronephrosis.  ABDOMINAL/PELVIC LYMPH NODES: No adenopathy.  STOMACH, SMALL BOWEL AND COLON: There is concentric thickening and transmural enhancement of some jejunal loops in the left mid abdomen, just distal to the inflammatory process or on the pancreas, possibly reactive enteritis. No bowel obstruction or ileus.  PERITONEAL CAVITY: No mesenteric stranding or free fluid. No free air.  ABDOMINAL AORTA: No aortic aneurysm or dissection. Diffuse aortoiliac calcific disease with mild multifocal narrowing.  PELVIC  ORGANS: Degenerative calcifications in the prostate without hypertrophy. Normal urinary bladder. High riding testicles project above the scrotal sac in the lower inguinal canals.  SOFT TISSUE: Normal.  OSSEOUS STRUCTURES: No acute fracture or destructive lesion.         1.   Acute interstitial pancreatitis pattern with peripancreatic inflammatory changes, edema, and a small pancreatic effusion.       2. Cholelithiasis with 2 mm stone in the proximal duodenum, possibly a recently passed gallstone.       3. Transmural thickening and enhancement of some jejunal loops in the left mid abdomen, possibly reactive changes secondary to the pancreatitis.       4. Mild granulomatous changes and subtle peripheral interstitial scarring in the lower lung fields.       5. No metastatic disease or lymphadenopathy.          Created by: Farooq Lara MD    Signed by: Farooq Lara MD  Signed on: 7/23/2023 3:46 PM  Location: [id number]            CT outside films    Result Date: 7/23/2023  This procedure was auto-finalized with no dictation required.    COVID19   Date Value Ref Range Status   07/24/2023 Not Detected Not Detected - Ref. Range Final     ASSESSMENTS/PLANS  1.  Acute gallstone pancreatitis  2.  Cholelithiasis without choledocholithiasis, evidence of passed CBD stone in duodenum  3.  Acute epigastric upper quadrant abdominal pain, related to above  4.  Non-intractable nausea without vomiting  5.  GERD.  6.  Tobacco abuse  7.  Prostate cancer, on Xtandi oral chemo    Ta Madison is a 63 y.o. male presented to hospital with cute onset of epigastric and upper quadrant abdominal pain with associated nausea and vomiting.  CT imaging reviewed and shows evidence of a passed common duct stone in the duodenum without any clear evidence of choledocholithiasis.  Recommend MRCP for further evaluation; if evidence of CBD stone, will need ERCP.  Otherwise, would benefit from general surgery consultation for consideration of [facility]  at their discretion.    >>> Maintain n.p.o.  >>> Treatment of gallstone induced pancreatitis is supportive in nature  Continue bowel rest  Antiemetics and pain control measures  Aggressive fluid resuscitation  Has received 2500 mL total since admission  Discontinue normal saline infusion  Begin IV lactated Ringer's at 120 MLS per hour for 24 hours to meet patient's fluid resuscitation goal of 5400 mLs.  >>> Continue antiemetics and pain control measures  >>> Obtain MRCP  >>> Incentive spirometry    I discussed the patient's findings and my recommendations with patient, family, and nursing staff    Barry Zee, MSN, APRN, AGACNP-BC.  07/23/2023   0930    Addendum:  MRCP films personally reviewed and shows evidence of acute pancreatitis without evidence of choledocholithiasis or biliary obstruction.  >>> Continue supportive care for acute gallstone induced pancreatitis as noted above  >>> No role for ERCP at this time.    Barry Zee, MSN, APRN, AGACNP-BC.  07/23/2023  16:25 EDT            Electronically signed by Barry Zee APRN at 07/24/23 1631       Derrek Tony MD at 07/24/23 0841        Consult Orders    1. Inpatient General Surgery Consult [[id number]] ordered by Carroll Bobo DO at 07/23/23 2224                 General Surgery Consultation Note    Date of Service: 7/24/2023  Ta Mdaison  [id number]  1960      Referring Provider: Debra Whitfield MD    Location of Consult: Inpatient     Reason for Consultation: Gallstone pancreatitis       History of Present Illness:  I am seeing, Ta Madison, in consultation at request of Debra Whitfield MD regarding concern for gallstone pancreatitis.  He is a 63-year-old gentleman with history of GERD, prostate cancer on medical therapy, hyperlipidemia, and TIAs who presents to [facility] with complaints of abdominal pain.  He reports that he experienced acute onset of epigastric abdominal pain yesterday morning.  He reports  that this was shortly after eating a breakfast of biscuits and gravy.  He reports constant pain in his epigastrium.  The pain worsened throughout the day yesterday.  This has been associated with nausea and vomiting.  He did not have any episodes of emesis overnight but did have persistent nausea.  He reports that the pain has not significantly improved and continues to describe sensation of bloating and pain throughout the abdomen.  No fevers before.  Past abdominal surgical history includes open appendectomy..  He reports that he has had some difficulty with abdominal pain over the last several weeks and apparently was scheduled for an EGD at [facility]    Problems Addressed this Visit    None  Diagnoses    None.         PMHx:  Past Medical History:   Diagnosis Date    Elevated cholesterol     GERD (gastroesophageal reflux disease)     Increased heart rate     Mini stroke     Neck pain     Prostate cancer     Rash     Tobacco abuse        Past Surgical History:  Past Surgical History:    APPENDECTOMY    [facility]     COLONOSCOPY    COLONOSCOPY    Procedure: COLONOSCOPY;  Surgeon: Mahnaz Caraballo MD;  Location: [facility];  Service: Gastroenterology;  Laterality: N/A;       Allergies:  No Known Allergies    Medications:  No current facility-administered medications on file prior to encounter.     Current Outpatient Medications on File Prior to Encounter   Medication Sig Dispense Refill    albuterol sulfate  (90 Base) MCG/ACT inhaler Inhale 2 puffs Every 6 (Six) Hours As Needed for Wheezing. 18 g 5    aspirin 81 MG EC tablet Take 1 tablet by mouth Daily.      atorvastatin (LIPITOR) 20 MG tablet Take 1 tablet by mouth Every Night. 90 tablet 1    enzalutamide (XTANDI) 40 MG chemo capsule Take 4 capsules by mouth Daily. 120 capsule 5    fenofibrate (TRICOR) 145 MG tablet Take 1 tablet by mouth Daily. 90 tablet 1    HYDROcodone-acetaminophen (NORCO)  MG per tablet Take 1 tablet by  mouth Every 6 (Six) Hours As Needed for Moderate Pain. 120 tablet 0    metoprolol tartrate (LOPRESSOR) 25 MG tablet Take 1 tablet by mouth Daily. 90 tablet 1    omeprazole (priLOSEC) 40 MG capsule Take 1 capsule by mouth 2 (Two) Times a Day Before Meals. 60 capsule 5    vitamin D (ERGOCALCIFEROL) 1.25 MG (75881 UT) capsule capsule Take 1 capsule by mouth 1 (One) Time Per Week. 5 capsule 5    amoxicillin (AMOXIL) 875 MG tablet Take 1 tablet by mouth 2 (Two) Times a Day. 20 tablet 0    EPINEPHrine (EPIPEN) 0.3 MG/0.3ML solution auto-injector injection INJECT 1 PEN IN THE MUSCLE ONE TIME AS DIRECTED      hydrOXYzine (ATARAX) 50 MG tablet Take 1 tablet by mouth 3 (Three) Times a Day As Needed for Itching. 90 tablet 1    [DISCONTINUED] montelukast (SINGULAIR) 10 MG tablet Take 1 tablet by mouth Every Night. 90 tablet 1         Current Facility-Administered Medications:     acetaminophen (TYLENOL) tablet 650 mg, 650 mg, Oral, Q4H PRN **OR** acetaminophen (TYLENOL) 160 MG/5ML solution 650 mg, 650 mg, Oral, Q4H PRN **OR** acetaminophen (TYLENOL) suppository 650 mg, 650 mg, Rectal, Q4H PRN, Carroll Bobo,     albuterol (PROVENTIL) nebulizer solution 0.083% 2.5 mg/3mL, 2.5 mg, Nebulization, Q6H PRN, Carroll Bobo DO    atorvastatin (LIPITOR) tablet 20 mg, 20 mg, Oral, Nightly, Carroll Bobo DO    sennosides-docusate (PERICOLACE) 8.6-50 MG per tablet 2 tablet, 2 tablet, Oral, BID, 2 tablet at 07/24/23 0818 **AND** polyethylene glycol (MIRALAX) packet 17 g, 17 g, Oral, Daily PRN **AND** bisacodyl (DULCOLAX) EC tablet 5 mg, 5 mg, Oral, Daily PRN **AND** bisacodyl (DULCOLAX) suppository 10 mg, 10 mg, Rectal, Daily PRN, Carroll Bobo, DO    enzalutamide (XTANDI) chemo capsule 40 mg **Patient Supplied Medication**, 40 mg, Oral, Daily, Carroll Bobo,     HYDROcodone-acetaminophen (NORCO)  MG per tablet 1 tablet, 1 tablet, Oral, Q6H PRN, Carroll Bobo,     HYDROmorphone (DILAUDID) injection 1 mg, 1 mg,  Intravenous, Q2H PRN, Cody Chow III, DO, 1 mg at 07/24/23 0818    hydrOXYzine (ATARAX) tablet 50 mg, 50 mg, Oral, TID PRN, Carroll Bobo,     ketorolac (TORADOL) injection 30 mg, 30 mg, Intravenous, Q6H PRN, Carroll Bobo,     melatonin tablet 5 mg, 5 mg, Oral, Nightly PRN, Carroll Bobo, DO    metoprolol tartrate (LOPRESSOR) tablet 25 mg, 25 mg, Oral, Daily, Carroll Bobo, DO, 25 mg at 07/24/23 0818    montelukast (SINGULAIR) tablet 10 mg, 10 mg, Oral, Nightly, Carroll Bobo,     naloxone (NARCAN) injection 0.4 mg, 0.4 mg, Intravenous, PRN, Carroll Bobo,     nicotine (NICODERM CQ) 21 MG/24HR patch 1 patch, 1 patch, Transdermal, Q24H, Carroll Bobo DO    ondansetron (ZOFRAN) injection 4 mg, 4 mg, Intravenous, Q6H PRN, Bharat Montana, APRN, 4 mg at 07/23/23 2341    pantoprazole (PROTONIX) EC tablet 40 mg, 40 mg, Oral, BID AC, Carroll Bobo, , 40 mg at 07/24/23 0818    piperacillin-tazobactam (ZOSYN) 3.375 g in iso-osmotic dextrose 50 ml (premix), 3.375 g, Intravenous, Q8H, Carroll Bobo, , 3.375 g at 07/24/23 0808    prochlorperazine (COMPAZINE) injection 5 mg, 5 mg, Intravenous, Q3H PRN, Carroll Bobo, , 5 mg at 07/24/23 0047    sodium chloride 0.9 % flush 10 mL, 10 mL, Intravenous, Q12H, Carroll Bobo, , 10 mL at 07/23/23 2342    sodium chloride 0.9 % flush 10 mL, 10 mL, Intravenous, PRN, Carroll Bobo,     sodium chloride 0.9 % infusion 40 mL, 40 mL, Intravenous, PRN, Carroll Bobo,     sodium chloride 0.9 % infusion, 200 mL/hr, Intravenous, Continuous, Bharat Montana, EUGENIA, Last Rate: 200 mL/hr at 07/24/23 0629, 200 mL/hr at 07/24/23 0629      Family History:  Family History   Problem Relation Age of Onset    Nephrolithiasis Mother     Heart disease Father     Hypertension Father     Kidney disease Father        Social History: Pt lives in [facility].    Tobacco use: Smokes 2 pack/day   EtOH use : Denies   Illicit drug use: Denies  "      Review of Systems:   Constitutional: No fevers, chills or malaise   Eyes: Denies visual changes    Cardiovascular: Denies chest pain, palpitations   Pulmonary: Denies cough or shortness of breath   Abdominal/ GI: See HPI    Genitourinary: Denies dysuria or hematuria   Musculoskeletal: Denies any but chronic joint aches, pains or deformities   Psychiatric: No recent mood changes   Neurologic: No paresthesias or loss of function    /76 (BP Location: Left arm, Patient Position: Lying)   Pulse 78   Temp 98 øF (36.7 øC) (Oral)   Resp 16   Ht 172.7 cm (68\")   Wt 91.7 kg (202 lb 3.2 oz)   SpO2 91%   BMI 30.74 kg/mý   Body mass index is 30.74 kg/mý.    Gen: Awake, alert, resting in bed, appears generally uncomfortable  Head: Normocephalic, atraumatic.   Eyes: Pupils equal, round, react to light and accommodation.   Mouth: Oral mucosa without lesions,   Neck: No masses, lymphadenopathy or carotid bruits bilaterally   CV: Rhythm and rate regular, no murmurs, rubs or gallops  Lungs: Clear to auscultation bilaterally, not labored on room air   Abdomen: Mildly distended throughout the abdomen, diffuse mild tenderness to palpation throughout the abdomen that is worse on the sides of the abdomen and better centrally, no rebound or guarding, right lower quadrant transverse scar  Groin : No obvious hernias bilaterally   Extremities:  No cyanosis, clubbing or edema bilaterally  Lymphatics: No abnormal lymphadenopathy appreciated   Neurologic: No gross deficits         CBC  Results from last 7 days   Lab Units 07/24/23  0442   WBC 10*3/mm3 14.99*   HEMOGLOBIN g/dL 12.0*   HEMATOCRIT % 39.3   PLATELETS 10*3/mm3 456*       CMP  Results from last 7 days   Lab Units 07/23/23  2246   SODIUM mmol/L 140   POTASSIUM mmol/L 5.1   CHLORIDE mmol/L 108*   CO2 mmol/L 14.0*   BUN mg/dL 28*   CREATININE mg/dL 1.42*   CALCIUM mg/dL 8.5*   BILIRUBIN mg/dL 0.4   ALK PHOS U/L 76   ALT (SGPT) U/L 53*   AST (SGOT) U/L 66*   GLUCOSE " mg/dL 171*       Radiology  Imaging Results (Last 72 Hours)       ** No results found for the last 72 hours. **                Results Review: I have personally reviewed all of the recent lab and imaging results available at this time.     Vital signs are stable    Labs demonstrate a leukocytosis of 14,000.  Hemoglobin is 12.  Platelets are 456.  There is some mild elevation of his transaminases with a AST of 66, ALT of 53, and total bilirubin of 0.4.  Creatinine is elevated at 1.4.  Lactate is slightly elevated at 3.5    I have personally reviewed an outside hospital CT scan as well as the report.  This demonstrates significant peripancreatic inflammation and stranding surrounding the pancreas concerning for pancreatitis.  There is quite a bit of edema around the head of the pancreas.  There was concern for a small stone within the proximal duodenum.  There are some stones in the gallbladder.  The gallbladder is not obviously distended and there is no significant pericholecystic stranding    Assessment:  Mr. Madison is a 63-year-old gentleman with history of GERD, prostate cancer on medical therapy, hyperlipidemia, and TIAs with gallstone pancreatitis.  His clinical history and work-up are consistent with gallstone pancreatitis as he has significant peripancreatic stranding as well as some stones within the gallbladder.  At this point, he still has quite a bit of abdominal tenderness and some distention.  Given his ongoing symptoms, I suspect that he will most likely require cholecystectomy on an interval outpatient basis in 4 to 6 weeks unless he has significant improvement in the next 24 to 48 hours that would make cholecystectomy possible this admission..  Agree with obtaining MRI to ensure no stones in the duct.     Plan:  -Continue supportive management of gallstone pancreatitis  -Agree with obtaining MRCP  -Given his ongoing symptoms as well as distention and degree of peripancreatic inflammation, most likely  we will pursue interval cholecystectomy in 6 weeks unless he has significant improvement in the next 24 to 48 hours      I discussed the patient's findings and my recommendations with the patient and/or family, as well as the primary team     Derrek Tony MD  07/24/23  08:41 EDT      Part of this note may be an electronic transcription/translation of spoken language to printed text using the Dragon Dictation System.                 Electronically signed by Derrek Tony MD at 07/24/23 0891

## 2023-07-24 NOTE — CASE MANAGEMENT/SOCIAL WORK
Continued Stay Note  [facility]     Patient Name: Ta Madison  MRN: [id number]  Today's Date: 7/24/2023    Admit Date: 7/23/2023    Plan: home with family   Discharge Plan       Row Name 07/24/23 1009       Plan    Plan home with family    Patient/Family in Agreement with Plan yes    Plan Comments Pt lives in [facility] with his wife. He is independent with his ADLs and denies use of any DME. He is followed by his PCP and has drug coverage. Plan for discharge at this time is to return home. CM to follow    Final Discharge Disposition Code 01 - home or self-care                   Discharge Codes    No documentation.                 Expected Discharge Date and Time       Expected Discharge Date Expected Discharge Time    Jul 27, 2023               Jayla Fracnis RN

## 2023-07-24 NOTE — CONSULTS
General Surgery Consultation Note    Date of Service: 7/24/2023  Ta Madison  [id number]  1960      Referring Provider: Debra Whitfield MD    Location of Consult: Inpatient     Reason for Consultation: Gallstone pancreatitis       History of Present Illness:  I am seeing, Ta Madison, in consultation at request of Debra Whitfield MD regarding concern for gallstone pancreatitis.  He is a 63-year-old gentleman with history of GERD, prostate cancer on medical therapy, hyperlipidemia, and TIAs who presents to [facility] with complaints of abdominal pain.  He reports that he experienced acute onset of epigastric abdominal pain yesterday morning.  He reports that this was shortly after eating a breakfast of biscuits and gravy.  He reports constant pain in his epigastrium.  The pain worsened throughout the day yesterday.  This has been associated with nausea and vomiting.  He did not have any episodes of emesis overnight but did have persistent nausea.  He reports that the pain has not significantly improved and continues to describe sensation of bloating and pain throughout the abdomen.  No fevers before.  Past abdominal surgical history includes open appendectomy..  He reports that he has had some difficulty with abdominal pain over the last several weeks and apparently was scheduled for an EGD at [facility]    Problems Addressed this Visit    None  Diagnoses    None.         PMHx:  Past Medical History:   Diagnosis Date    Elevated cholesterol     GERD (gastroesophageal reflux disease)     Increased heart rate     Mini stroke     Neck pain     Prostate cancer     Rash     Tobacco abuse        Past Surgical History:  Past Surgical History:    APPENDECTOMY    [facility]     COLONOSCOPY    COLONOSCOPY    Procedure: COLONOSCOPY;  Surgeon: Mahnaz Caraballo MD;  Location: [facility];  Service: Gastroenterology;  Laterality: N/A;       Allergies:  No Known  Allergies    Medications:  No current facility-administered medications on file prior to encounter.     Current Outpatient Medications on File Prior to Encounter   Medication Sig Dispense Refill    albuterol sulfate  (90 Base) MCG/ACT inhaler Inhale 2 puffs Every 6 (Six) Hours As Needed for Wheezing. 18 g 5    aspirin 81 MG EC tablet Take 1 tablet by mouth Daily.      atorvastatin (LIPITOR) 20 MG tablet Take 1 tablet by mouth Every Night. 90 tablet 1    enzalutamide (XTANDI) 40 MG chemo capsule Take 4 capsules by mouth Daily. 120 capsule 5    fenofibrate (TRICOR) 145 MG tablet Take 1 tablet by mouth Daily. 90 tablet 1    HYDROcodone-acetaminophen (NORCO)  MG per tablet Take 1 tablet by mouth Every 6 (Six) Hours As Needed for Moderate Pain. 120 tablet 0    metoprolol tartrate (LOPRESSOR) 25 MG tablet Take 1 tablet by mouth Daily. 90 tablet 1    omeprazole (priLOSEC) 40 MG capsule Take 1 capsule by mouth 2 (Two) Times a Day Before Meals. 60 capsule 5    vitamin D (ERGOCALCIFEROL) 1.25 MG (82083 UT) capsule capsule Take 1 capsule by mouth 1 (One) Time Per Week. 5 capsule 5    amoxicillin (AMOXIL) 875 MG tablet Take 1 tablet by mouth 2 (Two) Times a Day. 20 tablet 0    EPINEPHrine (EPIPEN) 0.3 MG/0.3ML solution auto-injector injection INJECT 1 PEN IN THE MUSCLE ONE TIME AS DIRECTED      hydrOXYzine (ATARAX) 50 MG tablet Take 1 tablet by mouth 3 (Three) Times a Day As Needed for Itching. 90 tablet 1    [DISCONTINUED] montelukast (SINGULAIR) 10 MG tablet Take 1 tablet by mouth Every Night. 90 tablet 1         Current Facility-Administered Medications:     acetaminophen (TYLENOL) tablet 650 mg, 650 mg, Oral, Q4H PRN **OR** acetaminophen (TYLENOL) 160 MG/5ML solution 650 mg, 650 mg, Oral, Q4H PRN **OR** acetaminophen (TYLENOL) suppository 650 mg, 650 mg, Rectal, Q4H PRN, Carroll Bobo, DO    albuterol (PROVENTIL) nebulizer solution 0.083% 2.5 mg/3mL, 2.5 mg, Nebulization, Q6H PRN, Carroll Bobo, DO     atorvastatin (LIPITOR) tablet 20 mg, 20 mg, Oral, Nightly, Carroll Bobo, DO    sennosides-docusate (PERICOLACE) 8.6-50 MG per tablet 2 tablet, 2 tablet, Oral, BID, 2 tablet at 07/24/23 0818 **AND** polyethylene glycol (MIRALAX) packet 17 g, 17 g, Oral, Daily PRN **AND** bisacodyl (DULCOLAX) EC tablet 5 mg, 5 mg, Oral, Daily PRN **AND** bisacodyl (DULCOLAX) suppository 10 mg, 10 mg, Rectal, Daily PRN, Carroll Bobo, DO    enzalutamide (XTANDI) chemo capsule 40 mg **Patient Supplied Medication**, 40 mg, Oral, Daily, Carroll Bobo,     HYDROcodone-acetaminophen (NORCO)  MG per tablet 1 tablet, 1 tablet, Oral, Q6H PRN, Carroll Bobo,     HYDROmorphone (DILAUDID) injection 1 mg, 1 mg, Intravenous, Q2H PRN, Cody Chow III, DO, 1 mg at 07/24/23 0818    hydrOXYzine (ATARAX) tablet 50 mg, 50 mg, Oral, TID PRN, Carroll Bobo,     ketorolac (TORADOL) injection 30 mg, 30 mg, Intravenous, Q6H PRN, Carroll Bobo,     melatonin tablet 5 mg, 5 mg, Oral, Nightly PRN, Carroll Bobo,     metoprolol tartrate (LOPRESSOR) tablet 25 mg, 25 mg, Oral, Daily, Carroll Bobo, DO, 25 mg at 07/24/23 0818    montelukast (SINGULAIR) tablet 10 mg, 10 mg, Oral, Nightly, Carroll Bobo,     naloxone (NARCAN) injection 0.4 mg, 0.4 mg, Intravenous, PRN, Carroll Bobo,     nicotine (NICODERM CQ) 21 MG/24HR patch 1 patch, 1 patch, Transdermal, Q24H, Carroll Bobo,     ondansetron (ZOFRAN) injection 4 mg, 4 mg, Intravenous, Q6H PRN, Bharat Montana, APRN, 4 mg at 07/23/23 2341    pantoprazole (PROTONIX) EC tablet 40 mg, 40 mg, Oral, BID AC, Carroll Bobo DO, 40 mg at 07/24/23 0818    piperacillin-tazobactam (ZOSYN) 3.375 g in iso-osmotic dextrose 50 ml (premix), 3.375 g, Intravenous, Q8H, Carroll Bobo DO, 3.375 g at 07/24/23 0808    prochlorperazine (COMPAZINE) injection 5 mg, 5 mg, Intravenous, Q3H PRN, Carroll Bobo DO, 5 mg at 07/24/23 0047    sodium chloride 0.9 % flush 10 mL, 10 mL,  "Intravenous, Q12H, Carroll Bobo, , 10 mL at 07/23/23 2342    sodium chloride 0.9 % flush 10 mL, 10 mL, Intravenous, PRN, Carroll Bobo,     sodium chloride 0.9 % infusion 40 mL, 40 mL, Intravenous, PRN, Carroll Bobo, DO    sodium chloride 0.9 % infusion, 200 mL/hr, Intravenous, Continuous, Bharat Montana, APRN, Last Rate: 200 mL/hr at 07/24/23 0629, 200 mL/hr at 07/24/23 0629      Family History:  Family History   Problem Relation Age of Onset    Nephrolithiasis Mother     Heart disease Father     Hypertension Father     Kidney disease Father        Social History: Pt lives in [facility].    Tobacco use: Smokes 2 pack/day   EtOH use : Denies   Illicit drug use: Denies       Review of Systems:   Constitutional: No fevers, chills or malaise   Eyes: Denies visual changes    Cardiovascular: Denies chest pain, palpitations   Pulmonary: Denies cough or shortness of breath   Abdominal/ GI: See HPI    Genitourinary: Denies dysuria or hematuria   Musculoskeletal: Denies any but chronic joint aches, pains or deformities   Psychiatric: No recent mood changes   Neurologic: No paresthesias or loss of function    /76 (BP Location: Left arm, Patient Position: Lying)   Pulse 78   Temp 98 øF (36.7 øC) (Oral)   Resp 16   Ht 172.7 cm (68\")   Wt 91.7 kg (202 lb 3.2 oz)   SpO2 91%   BMI 30.74 kg/mý   Body mass index is 30.74 kg/mý.    Gen: Awake, alert, resting in bed, appears generally uncomfortable  Head: Normocephalic, atraumatic.   Eyes: Pupils equal, round, react to light and accommodation.   Mouth: Oral mucosa without lesions,   Neck: No masses, lymphadenopathy or carotid bruits bilaterally   CV: Rhythm and rate regular, no murmurs, rubs or gallops  Lungs: Clear to auscultation bilaterally, not labored on room air   Abdomen: Mildly distended throughout the abdomen, diffuse mild tenderness to palpation throughout the abdomen that is worse on the sides of the abdomen and better centrally, no " rebound or guarding, right lower quadrant transverse scar  Groin : No obvious hernias bilaterally   Extremities:  No cyanosis, clubbing or edema bilaterally  Lymphatics: No abnormal lymphadenopathy appreciated   Neurologic: No gross deficits         CBC  Results from last 7 days   Lab Units 07/24/23  0442   WBC 10*3/mm3 14.99*   HEMOGLOBIN g/dL 12.0*   HEMATOCRIT % 39.3   PLATELETS 10*3/mm3 456*       CMP  Results from last 7 days   Lab Units 07/23/23  2246   SODIUM mmol/L 140   POTASSIUM mmol/L 5.1   CHLORIDE mmol/L 108*   CO2 mmol/L 14.0*   BUN mg/dL 28*   CREATININE mg/dL 1.42*   CALCIUM mg/dL 8.5*   BILIRUBIN mg/dL 0.4   ALK PHOS U/L 76   ALT (SGPT) U/L 53*   AST (SGOT) U/L 66*   GLUCOSE mg/dL 171*       Radiology  Imaging Results (Last 72 Hours)       ** No results found for the last 72 hours. **                Results Review: I have personally reviewed all of the recent lab and imaging results available at this time.     Vital signs are stable    Labs demonstrate a leukocytosis of 14,000.  Hemoglobin is 12.  Platelets are 456.  There is some mild elevation of his transaminases with a AST of 66, ALT of 53, and total bilirubin of 0.4.  Creatinine is elevated at 1.4.  Lactate is slightly elevated at 3.5    I have personally reviewed an outside hospital CT scan as well as the report.  This demonstrates significant peripancreatic inflammation and stranding surrounding the pancreas concerning for pancreatitis.  There is quite a bit of edema around the head of the pancreas.  There was concern for a small stone within the proximal duodenum.  There are some stones in the gallbladder.  The gallbladder is not obviously distended and there is no significant pericholecystic stranding    Assessment:  Mr. Madison is a 63-year-old gentleman with history of GERD, prostate cancer on medical therapy, hyperlipidemia, and TIAs with gallstone pancreatitis.  His clinical history and work-up are consistent with gallstone pancreatitis  as he has significant peripancreatic stranding as well as some stones within the gallbladder.  At this point, he still has quite a bit of abdominal tenderness and some distention.  Given his ongoing symptoms, I suspect that he will most likely require cholecystectomy on an interval outpatient basis in 4 to 6 weeks unless he has significant improvement in the next 24 to 48 hours that would make cholecystectomy possible this admission..  Agree with obtaining MRI to ensure no stones in the duct.     Plan:  -Continue supportive management of gallstone pancreatitis  -Agree with obtaining MRCP  -Given his ongoing symptoms as well as distention and degree of peripancreatic inflammation, most likely we will pursue interval cholecystectomy in 6 weeks unless he has significant improvement in the next 24 to 48 hours      I discussed the patient's findings and my recommendations with the patient and/or family, as well as the primary team     Derrek Tony MD  07/24/23  08:41 EDT      Part of this note may be an electronic transcription/translation of spoken language to printed text using the Dragon Dictation System.

## 2023-07-25 ENCOUNTER — APPOINTMENT (OUTPATIENT)
Dept: GENERAL RADIOLOGY | Facility: HOSPITAL | Age: 63
DRG: 438 | End: 2023-07-25
Payer: COMMERCIAL

## 2023-07-25 LAB
ALBUMIN SERPL-MCNC: 3.4 G/DL (ref 3.5–5.2)
ALBUMIN/GLOB SERPL: 1.6 G/DL
ALP SERPL-CCNC: 69 U/L (ref 39–117)
ALT SERPL W P-5'-P-CCNC: 38 U/L (ref 1–41)
ANION GAP SERPL CALCULATED.3IONS-SCNC: 12 MMOL/L (ref 5–15)
AST SERPL-CCNC: 38 U/L (ref 1–40)
BACTERIA UR QL AUTO: ABNORMAL /HPF
BASOPHILS # BLD AUTO: 0.02 10*3/MM3 (ref 0–0.2)
BASOPHILS NFR BLD AUTO: 0.1 % (ref 0–1.5)
BILIRUB SERPL-MCNC: 1.2 MG/DL (ref 0–1.2)
BILIRUB UR QL STRIP: ABNORMAL
BUN SERPL-MCNC: 36 MG/DL (ref 8–23)
BUN/CREAT SERPL: 27.9 (ref 7–25)
CALCIUM SPEC-SCNC: 8.7 MG/DL (ref 8.6–10.5)
CHLORIDE SERPL-SCNC: 107 MMOL/L (ref 98–107)
CLARITY UR: CLEAR
CO2 SERPL-SCNC: 22 MMOL/L (ref 22–29)
COLOR UR: ABNORMAL
CREAT SERPL-MCNC: 1.29 MG/DL (ref 0.76–1.27)
D-LACTATE SERPL-SCNC: 3.2 MMOL/L (ref 0.5–2)
DEPRECATED RDW RBC AUTO: 50.4 FL (ref 37–54)
EGFRCR SERPLBLD CKD-EPI 2021: 62.7 ML/MIN/1.73
EOSINOPHIL # BLD AUTO: 0 10*3/MM3 (ref 0–0.4)
EOSINOPHIL NFR BLD AUTO: 0 % (ref 0.3–6.2)
ERYTHROCYTE [DISTWIDTH] IN BLOOD BY AUTOMATED COUNT: 17.2 % (ref 12.3–15.4)
GLOBULIN UR ELPH-MCNC: 2.1 GM/DL
GLUCOSE SERPL-MCNC: 101 MG/DL (ref 65–99)
GLUCOSE UR STRIP-MCNC: ABNORMAL MG/DL
HCT VFR BLD AUTO: 35.6 % (ref 37.5–51)
HGB BLD-MCNC: 10.9 G/DL (ref 13–17.7)
HGB UR QL STRIP.AUTO: NEGATIVE
HYALINE CASTS UR QL AUTO: ABNORMAL /LPF
IMM GRANULOCYTES # BLD AUTO: 0.06 10*3/MM3 (ref 0–0.05)
IMM GRANULOCYTES NFR BLD AUTO: 0.4 % (ref 0–0.5)
KETONES UR QL STRIP: NEGATIVE
LEUKOCYTE ESTERASE UR QL STRIP.AUTO: NEGATIVE
LYMPHOCYTES # BLD AUTO: 0.8 10*3/MM3 (ref 0.7–3.1)
LYMPHOCYTES NFR BLD AUTO: 5.2 % (ref 19.6–45.3)
MCH RBC QN AUTO: 24.8 PG (ref 26.6–33)
MCHC RBC AUTO-ENTMCNC: 30.6 G/DL (ref 31.5–35.7)
MCV RBC AUTO: 80.9 FL (ref 79–97)
MONOCYTES # BLD AUTO: 0.79 10*3/MM3 (ref 0.1–0.9)
MONOCYTES NFR BLD AUTO: 5.2 % (ref 5–12)
NEUTROPHILS NFR BLD AUTO: 13.62 10*3/MM3 (ref 1.7–7)
NEUTROPHILS NFR BLD AUTO: 89.1 % (ref 42.7–76)
NITRITE UR QL STRIP: NEGATIVE
NRBC BLD AUTO-RTO: 0 /100 WBC (ref 0–0.2)
NT-PROBNP SERPL-MCNC: 789.2 PG/ML (ref 0–900)
PH UR STRIP.AUTO: <=5 [PH] (ref 5–8)
PLATELET # BLD AUTO: 358 10*3/MM3 (ref 140–450)
PMV BLD AUTO: 10.8 FL (ref 6–12)
POTASSIUM SERPL-SCNC: 5.1 MMOL/L (ref 3.5–5.2)
PROT SERPL-MCNC: 5.5 G/DL (ref 6–8.5)
PROT UR QL STRIP: ABNORMAL
RBC # BLD AUTO: 4.4 10*6/MM3 (ref 4.14–5.8)
RBC # UR STRIP: ABNORMAL /HPF
REF LAB TEST METHOD: ABNORMAL
SODIUM SERPL-SCNC: 141 MMOL/L (ref 136–145)
SP GR UR STRIP: 1.03 (ref 1–1.03)
SQUAMOUS #/AREA URNS HPF: ABNORMAL /HPF
UROBILINOGEN UR QL STRIP: ABNORMAL
WBC # UR STRIP: ABNORMAL /HPF
WBC NRBC COR # BLD: 15.29 10*3/MM3 (ref 3.4–10.8)

## 2023-07-25 PROCEDURE — 99232 SBSQ HOSP IP/OBS MODERATE 35: CPT | Performed by: NURSE PRACTITIONER

## 2023-07-25 PROCEDURE — 80053 COMPREHEN METABOLIC PANEL: CPT | Performed by: HOSPITALIST

## 2023-07-25 PROCEDURE — 83880 ASSAY OF NATRIURETIC PEPTIDE: CPT | Performed by: NURSE PRACTITIONER

## 2023-07-25 PROCEDURE — 25010000002 PIPERACILLIN SOD-TAZOBACTAM PER 1 G: Performed by: INTERNAL MEDICINE

## 2023-07-25 PROCEDURE — 25010000002 HYDROMORPHONE 1 MG/ML SOLUTION: Performed by: INTERNAL MEDICINE

## 2023-07-25 PROCEDURE — 81001 URINALYSIS AUTO W/SCOPE: CPT | Performed by: NURSE PRACTITIONER

## 2023-07-25 PROCEDURE — 85025 COMPLETE CBC W/AUTO DIFF WBC: CPT | Performed by: INTERNAL MEDICINE

## 2023-07-25 PROCEDURE — 83605 ASSAY OF LACTIC ACID: CPT | Performed by: NURSE PRACTITIONER

## 2023-07-25 PROCEDURE — 71045 X-RAY EXAM CHEST 1 VIEW: CPT

## 2023-07-25 PROCEDURE — 94799 UNLISTED PULMONARY SVC/PX: CPT

## 2023-07-25 PROCEDURE — 99232 SBSQ HOSP IP/OBS MODERATE 35: CPT | Performed by: PHYSICIAN ASSISTANT

## 2023-07-25 PROCEDURE — 25010000002 METHYLNALTREXONE 12 MG/0.6ML SOLUTION: Performed by: PHYSICIAN ASSISTANT

## 2023-07-25 RX ORDER — ASPIRIN 81 MG/1
81 TABLET ORAL DAILY
Status: DISCONTINUED | OUTPATIENT
Start: 2023-07-26 | End: 2023-08-01 | Stop reason: ALTCHOICE

## 2023-07-25 RX ORDER — SODIUM CHLORIDE, SODIUM LACTATE, POTASSIUM CHLORIDE, CALCIUM CHLORIDE 600; 310; 30; 20 MG/100ML; MG/100ML; MG/100ML; MG/100ML
120 INJECTION, SOLUTION INTRAVENOUS CONTINUOUS
Status: DISCONTINUED | OUTPATIENT
Start: 2023-07-25 | End: 2023-07-26

## 2023-07-25 RX ORDER — ALBUTEROL SULFATE 2.5 MG/3ML
2.5 SOLUTION RESPIRATORY (INHALATION)
Status: DISCONTINUED | OUTPATIENT
Start: 2023-07-25 | End: 2023-07-26

## 2023-07-25 RX ORDER — HYDROCODONE BITARTRATE AND ACETAMINOPHEN 10; 325 MG/1; MG/1
1 TABLET ORAL EVERY 4 HOURS PRN
Status: DISCONTINUED | OUTPATIENT
Start: 2023-07-25 | End: 2023-07-31

## 2023-07-25 RX ADMIN — Medication 10 ML: at 20:28

## 2023-07-25 RX ADMIN — ATORVASTATIN CALCIUM 20 MG: 20 TABLET, FILM COATED ORAL at 20:27

## 2023-07-25 RX ADMIN — ALBUTEROL SULFATE 2.5 MG: 2.5 SOLUTION RESPIRATORY (INHALATION) at 13:52

## 2023-07-25 RX ADMIN — HYDROMORPHONE HYDROCHLORIDE 1 MG: 1 INJECTION, SOLUTION INTRAMUSCULAR; INTRAVENOUS; SUBCUTANEOUS at 18:15

## 2023-07-25 RX ADMIN — PANTOPRAZOLE SODIUM 40 MG: 40 TABLET, DELAYED RELEASE ORAL at 08:41

## 2023-07-25 RX ADMIN — HYDROCODONE BITARTRATE AND ACETAMINOPHEN 1 TABLET: 10; 325 TABLET ORAL at 20:27

## 2023-07-25 RX ADMIN — PIPERACILLIN SODIUM AND TAZOBACTAM SODIUM 3.38 G: 3; .375 INJECTION, SOLUTION INTRAVENOUS at 14:32

## 2023-07-25 RX ADMIN — Medication 1 PATCH: at 08:41

## 2023-07-25 RX ADMIN — HYDROMORPHONE HYDROCHLORIDE 1 MG: 1 INJECTION, SOLUTION INTRAMUSCULAR; INTRAVENOUS; SUBCUTANEOUS at 13:36

## 2023-07-25 RX ADMIN — HYDROMORPHONE HYDROCHLORIDE 1 MG: 1 INJECTION, SOLUTION INTRAMUSCULAR; INTRAVENOUS; SUBCUTANEOUS at 08:41

## 2023-07-25 RX ADMIN — SODIUM CHLORIDE, POTASSIUM CHLORIDE, SODIUM LACTATE AND CALCIUM CHLORIDE 120 ML/HR: 600; 310; 30; 20 INJECTION, SOLUTION INTRAVENOUS at 00:18

## 2023-07-25 RX ADMIN — Medication 5 MG: at 20:27

## 2023-07-25 RX ADMIN — HYDROCODONE BITARTRATE AND ACETAMINOPHEN 1 TABLET: 10; 325 TABLET ORAL at 03:17

## 2023-07-25 RX ADMIN — PANTOPRAZOLE SODIUM 40 MG: 40 TABLET, DELAYED RELEASE ORAL at 16:14

## 2023-07-25 RX ADMIN — HYDROMORPHONE HYDROCHLORIDE 1 MG: 1 INJECTION, SOLUTION INTRAMUSCULAR; INTRAVENOUS; SUBCUTANEOUS at 16:14

## 2023-07-25 RX ADMIN — HYDROMORPHONE HYDROCHLORIDE 1 MG: 1 INJECTION, SOLUTION INTRAMUSCULAR; INTRAVENOUS; SUBCUTANEOUS at 04:19

## 2023-07-25 RX ADMIN — PIPERACILLIN SODIUM AND TAZOBACTAM SODIUM 3.38 G: 3; .375 INJECTION, SOLUTION INTRAVENOUS at 20:30

## 2023-07-25 RX ADMIN — HYDROCODONE BITARTRATE AND ACETAMINOPHEN 1 TABLET: 10; 325 TABLET ORAL at 08:41

## 2023-07-25 RX ADMIN — HYDROMORPHONE HYDROCHLORIDE 1 MG: 1 INJECTION, SOLUTION INTRAMUSCULAR; INTRAVENOUS; SUBCUTANEOUS at 20:28

## 2023-07-25 RX ADMIN — HYDROXYZINE HYDROCHLORIDE 50 MG: 25 TABLET, FILM COATED ORAL at 20:27

## 2023-07-25 RX ADMIN — SENNOSIDES AND DOCUSATE SODIUM 2 TABLET: 50; 8.6 TABLET ORAL at 08:41

## 2023-07-25 RX ADMIN — ALBUTEROL SULFATE 2.5 MG: 2.5 SOLUTION RESPIRATORY (INHALATION) at 18:58

## 2023-07-25 RX ADMIN — PIPERACILLIN SODIUM AND TAZOBACTAM SODIUM 3.38 G: 3; .375 INJECTION, SOLUTION INTRAVENOUS at 06:06

## 2023-07-25 RX ADMIN — HYDROXYZINE HYDROCHLORIDE 50 MG: 25 TABLET, FILM COATED ORAL at 14:41

## 2023-07-25 RX ADMIN — HYDROMORPHONE HYDROCHLORIDE 1 MG: 1 INJECTION, SOLUTION INTRAMUSCULAR; INTRAVENOUS; SUBCUTANEOUS at 06:06

## 2023-07-25 RX ADMIN — SODIUM CHLORIDE, POTASSIUM CHLORIDE, SODIUM LACTATE AND CALCIUM CHLORIDE 120 ML/HR: 600; 310; 30; 20 INJECTION, SOLUTION INTRAVENOUS at 23:35

## 2023-07-25 RX ADMIN — HYDROMORPHONE HYDROCHLORIDE 1 MG: 1 INJECTION, SOLUTION INTRAMUSCULAR; INTRAVENOUS; SUBCUTANEOUS at 10:36

## 2023-07-25 RX ADMIN — SODIUM CHLORIDE, POTASSIUM CHLORIDE, SODIUM LACTATE AND CALCIUM CHLORIDE 120 ML/HR: 600; 310; 30; 20 INJECTION, SOLUTION INTRAVENOUS at 14:35

## 2023-07-25 RX ADMIN — METOPROLOL TARTRATE 25 MG: 25 TABLET, FILM COATED ORAL at 08:41

## 2023-07-25 RX ADMIN — MONTELUKAST 10 MG: 10 TABLET, FILM COATED ORAL at 20:27

## 2023-07-25 RX ADMIN — HYDROMORPHONE HYDROCHLORIDE 1 MG: 1 INJECTION, SOLUTION INTRAMUSCULAR; INTRAVENOUS; SUBCUTANEOUS at 23:28

## 2023-07-25 RX ADMIN — METHYLNALTREXONE BROMIDE 12 MG: 12 INJECTION, SOLUTION SUBCUTANEOUS at 13:38

## 2023-07-25 RX ADMIN — SODIUM CHLORIDE, POTASSIUM CHLORIDE, SODIUM LACTATE AND CALCIUM CHLORIDE 1000 ML: 600; 310; 30; 20 INJECTION, SOLUTION INTRAVENOUS at 12:11

## 2023-07-25 RX ADMIN — SODIUM CHLORIDE, POTASSIUM CHLORIDE, SODIUM LACTATE AND CALCIUM CHLORIDE 120 ML/HR: 600; 310; 30; 20 INJECTION, SOLUTION INTRAVENOUS at 10:41

## 2023-07-25 NOTE — PROGRESS NOTES
"GI Daily Progress Note  Subjective:    Chief Complaint:  Abdominal pain     Mr. Madison states his abdominal pain is currently controlled on IV Dilaudid.   States he rested well after receiving this earlier today.        Denies flatus nor bowel movement.       Objective:    /71 (BP Location: Left arm, Patient Position: Lying)   Pulse 103   Temp 99.3 øF (37.4 øC) (Oral)   Resp 14   Ht 172.7 cm (68\")   Wt 91.7 kg (202 lb 3.2 oz)   SpO2 95%   BMI 30.74 kg/mý     Physical Exam  Constitutional:       General: He is not in acute distress.     Appearance: He is obese. He is not toxic-appearing.   Cardiovascular:      Rate and Rhythm: Regular rhythm. Tachycardia present.   Pulmonary:      Effort: Pulmonary effort is normal. No respiratory distress.   Abdominal:      General: Bowel sounds are normal. There is no distension.      Palpations: Abdomen is soft.      Tenderness: There is abdominal tenderness in the epigastric area. There is no guarding.   Neurological:      Mental Status: He is alert and oriented to person, place, and time.       Lab  Lab Results   Component Value Date    WBC 15.29 (H) 07/25/2023    HGB 10.9 (L) 07/25/2023    HGB 12.0 (L) 07/24/2023    HGB 11.9 (L) 07/12/2023    MCV 80.9 07/25/2023     07/25/2023    INR 0.98 07/23/2023       Lab Results   Component Value Date    GLUCOSE 101 (H) 07/25/2023    BUN 36 (H) 07/25/2023    CREATININE 1.29 (H) 07/25/2023    EGFRIFNONA 70 01/19/2022    BCR 27.9 (H) 07/25/2023     07/25/2023    K 5.1 07/25/2023    CO2 22.0 07/25/2023    CALCIUM 8.7 07/25/2023    PROTENTOTREF 7.2 02/05/2019    ALBUMIN 3.4 (L) 07/25/2023    ALKPHOS 69 07/25/2023    BILITOT 1.2 07/25/2023    ALT 38 07/25/2023    AST 38 07/25/2023       Assessment:    Acute gallstone pancreatitis   SIRS     Plan:    MRCP images reviewed, no evidence of choledocholithiasis.   Acute pancreatitis without organized fluid collection.         >> Can discontinue antibiotics from a GI " standpoint  >> Management is supportive.   Continue IV LR  >> Will add Relistor 12 mg SQ x 1   >> Encourage use of incentive spirometry.    Increase ambulation as tolerated  >> Outpatient cholecystectomy with Dr. Tony in ~ 6 weeks      JACIEL Albert  07/25/23  09:58 EDT

## 2023-07-25 NOTE — PROGRESS NOTES
[facility] Medicine Services  PROGRESS NOTE    Patient Name: Ta Madison  : 1960  MRN: [id number]    Date of Admission: 2023  Primary Care Physician: Yolande Olvera APRN    Subjective   Subjective     CC:  F/u abd pain     HPI:  Patient is up walking in room with wife at bedside. He had some mild nausea through the night. Still having a lot of abd pain. Has been doing ice chips.     ROS:  Gen- + fevers, no chills  CV- No chest pain, palpitations  Resp- No cough, dyspnea  GI- see above        Objective   Objective     Vital Signs:   Temp:  [98.1 øF (36.7 øC)-100.3 øF (37.9 øC)] 99 øF (37.2 øC)  Heart Rate:  [] 101  Resp:  [12-20] 16  BP: (118-153)/(60-80) 118/60  Flow (L/min):  [2] 2     Physical Exam:  Constitutional: No acute distress, awake, alert  HENT: NCAT, mucous membranes moist  Respiratory: Clear to auscultation bilaterally, respiratory effort normal room air   Cardiovascular: RRR, no murmurs, rubs, or gallops  Gastrointestinal: Positive bowel sounds, soft, general tenderness, nondistended  Musculoskeletal: No bilateral ankle edema  Psychiatric: Appropriate affect, cooperative  Neurologic: Oriented x 3, strength symmetric in all extremities, Cranial Nerves grossly intact to confrontation, speech clear  Skin: No rashes      Results Reviewed:  LAB RESULTS:      Lab 23  1036 23  0324 23  1352 23  0725 23  0442 23  0153 23  2246   WBC  --  15.29*  --   --   --  14.99*  --   --    HEMOGLOBIN  --  10.9*  --   --   --  12.0*  --   --    HEMATOCRIT  --  35.6*  --   --   --  39.3  --   --    PLATELETS  --  358  --   --   --  456*  --   --    NEUTROS ABS  --  13.62*  --   --   --  13.58*  --   --    IMMATURE GRANS (ABS)  --  0.06*  --   --   --  0.06*  --   --    LYMPHS ABS  --  0.80  --   --   --  0.67*  --   --    MONOS ABS  --  0.79  --   --   --  0.66  --   --    EOS ABS  --  0.00  --   --   --  0.00  --   --     MCV  --  80.9  --   --   --  78.9*  --   --    PROCALCITONIN  --   --   --   --   --   --   --  0.85*   LACTATE 3.2*  --  2.7* 3.1* 3.5* 4.1*   < > 3.6*   PROTIME  --   --   --   --   --   --   --  13.1   APTT  --   --   --   --   --   --   --  26.9    < > = values in this interval not displayed.         Lab 07/25/23 0324 07/24/23 0924 07/23/23 2246   SODIUM 141 143 140   POTASSIUM 5.1 5.3* 5.1   CHLORIDE 107 108* 108*   CO2 22.0 20.0* 14.0*   ANION GAP 12.0 15.0 18.0*   BUN 36* 31* 28*   CREATININE 1.29* 1.44* 1.42*   EGFR 62.7 54.9* 55.9*   GLUCOSE 101* 139* 171*   CALCIUM 8.7 8.8 8.5*   MAGNESIUM  --   --  2.3         Lab 07/25/23 0324 07/24/23 0924 07/23/23 2246   TOTAL PROTEIN 5.5* 6.1 6.6   ALBUMIN 3.4* 3.7 3.8   GLOBULIN 2.1 2.4 2.8   ALT (SGPT) 38 49* 53*   AST (SGOT) 38 50* 66*   BILIRUBIN 1.2 0.8 0.4   ALK PHOS 69 75 76   LIPASE  --  1,255*  --          Lab 07/23/23 2246   PROTIME 13.1   INR 0.98         Lab 07/24/23 0924 07/23/23 2246   CHOLESTEROL 134  --    LDL CHOL 72  --    HDL CHOL 39*  --    TRIGLYCERIDES 126 108         Lab 07/23/23 2349 07/23/23 2245   ABO TYPING O O   RH TYPING Positive Positive   ANTIBODY SCREEN  --  Negative         Brief Urine Lab Results  (Last result in the past 365 days)        Color   Clarity   Blood   Leuk Est   Nitrite   Protein   CREAT   Urine HCG        07/25/23 0636 Dark Yellow   Clear   Negative   Negative   Negative   30 mg/dL (1+)                   Microbiology Results Abnormal       Procedure Component Value - Date/Time    Blood Culture - Blood, Hand, Left [[id number]]  (Normal) Collected: 07/23/23 2240    Lab Status: Preliminary result Specimen: Blood from Hand, Left Updated: 07/24/23 2300     Blood Culture No growth at 24 hours    Narrative:      AEROBIC BOTTLE ONLY    Blood Culture - Blood, Hand, Right [[id number]]  (Normal) Collected: 07/23/23 2230    Lab Status: Preliminary result Specimen: Blood from Hand, Right Updated: 07/24/23 2300     Blood  Culture No growth at 24 hours    Narrative:      AEROBIC BOTTLE ONLY    COVID PRE-OP / PRE-PROCEDURE SCREENING ORDER (NO ISOLATION) - Swab, Nasopharynx [[id number]]  (Normal) Collected: 07/24/23 0620    Lab Status: Final result Specimen: Swab from Nasopharynx Updated: 07/24/23 0729    Narrative:      The following orders were created for panel order COVID PRE-OP / PRE-PROCEDURE SCREENING ORDER (NO ISOLATION) - Swab, Nasopharynx.  Procedure                               Abnormality         Status                     ---------                               -----------         ------                     Respiratory Panel PCR w/...[[id number]]  Normal              Final result                 Please view results for these tests on the individual orders.    Respiratory Panel PCR w/COVID-19(SARS-CoV-2) BALTAZAR/ANDRES/ASTER/PAD/COR/MAD/VALENTINE In-House, NP Swab in UTM/VTM, 3-4 HR TAT - Swab, Nasopharynx [[id number]]  (Normal) Collected: 07/24/23 0620    Lab Status: Final result Specimen: Swab from Nasopharynx Updated: 07/24/23 0729     ADENOVIRUS, PCR Not Detected     Coronavirus 229E Not Detected     Coronavirus HKU1 Not Detected     Coronavirus NL63 Not Detected     Coronavirus OC43 Not Detected     COVID19 Not Detected     Human Metapneumovirus Not Detected     Human Rhinovirus/Enterovirus Not Detected     Influenza A PCR Not Detected     Influenza B PCR Not Detected     Parainfluenza Virus 1 Not Detected     Parainfluenza Virus 2 Not Detected     Parainfluenza Virus 3 Not Detected     Parainfluenza Virus 4 Not Detected     RSV, PCR Not Detected     Bordetella pertussis pcr Not Detected     Bordetella parapertussis PCR Not Detected     Chlamydophila pneumoniae PCR Not Detected     Mycoplasma pneumo by PCR Not Detected    Narrative:      In the setting of a positive respiratory panel with a viral infection PLUS a negative procalcitonin without other underlying concern for bacterial infection, consider observing off antibiotics or  discontinuation of antibiotics and continue supportive care. If the respiratory panel is positive for atypical bacterial infection (Bordetella pertussis, Chlamydophila pneumoniae, or Mycoplasma pneumoniae), consider antibiotic de-escalation to target atypical bacterial infection.            MRI abdomen wo contrast mrcp    Result Date: 7/24/2023  MRI ABDOMEN WO CONTRAST MRCP Date of Exam: 7/24/2023 9:58 AM EDT Indication: gallstone pancreatitis.  Comparison: Imported/outside CT chest abdomen pelvis 7/23/2023. Technique:  Routine multiplanar/multisequence images of the abdomen were obtained with MRCP sequences without contrast administration. Findings: Cluster of small gallstones layering at the gallbladder neck. No gallbladder distention, wall thickening, or pericholecystic fluid. No choledocholithiasis or evidence of biliary ductal dilatation. Ill-defined peripancreatic fluid and stranding consistent with acute pancreatitis. No organized/walled-off fluid collection. Unable to assess for pancreatic necrosis without intravenous contrast. Liver, spleen, adrenal glands, bowel, and kidneys are unremarkable. Atherosclerosis. No suspicious lymphadenopathy. Trace bilateral pleural effusions.     Impression: Impression: Acute pancreatitis without organized fluid collection. Cholelithiasis without choledocholithiasis. Electronically Signed: Michele Lopez MD  7/24/2023 11:09 AM EDT  Workstation ID: [id number]    CT Chest Abdomen Pelvis W IV Contrast    Result Date: 7/23/2023      EXAM: CT CHEST, ABDOMEN, PELVIS WITH IV CONTRAST     INDICATION: 63-year-old with abdominal pain, nausea, vomiting, and stage IV prostate carcinoma  COMPARISON: No comparison  TECHNIQUE: Contiguous axial images were obtained from the thoracic inlet to the pelvic floor following the intravenous administration of 100 mL of Isovue-300 contrast. Coronal and sagittal reformations are provided. Up-to-date CT equipment and radiation dose reduction techniques  were employed.  FINDINGS:  LUNGS AND PLEURA: Pulmonary hyperinflation. Slightly increased interstitial markings in the lower lung fields with some groundglass components and interstitial reticulation, possibly mild interstitial fibrosis. No alveolar infiltrates or consolidations. Mild fibrolinear scarring in the lingula near the cardiophrenic angle. Small calcified pulmonary granuloma in the left base. No pleural effusions or pleural thickening.  MEDIASTINUM: No masses.  THORACIC LYMPH NODES: No adenopathy.  HEART: Normal in size. No pericardial effusion. Coronary artery calcific disease in the proximal and mid LAD distributions.  THORACIC AORTA AND GREAT VESSELS: No aneurysm or dissection.  PULMONARY ARTERIES: Normal size. No central pulmonary embolus.  LIVER: No mass. No intrahepatic biliary dilatation. Portal veins are patent.  GALLBLADDER: No wall thickening. A few tiny calcified gallstones layer dependently near the neck. There is a 2 mm calculus in the proximal duodenum just below the major duodenal papilla, possibly a recently passed gallstone.  COMMON BILE DUCT: Normal caliber. No radiopaque stones.    SPLEEN: Within normal limits.  PANCREAS: Interstitial pancreatitis pattern with pancreatic fat stranding, edema, and inflammatory changes. Small pancreatic effusion layers along the dorsal peritoneal gutters. No pancreatic fluid collections.  ADRENALS: No masses.  KIDNEYS: No masses. No hydronephrosis.  ABDOMINAL/PELVIC LYMPH NODES: No adenopathy.  STOMACH, SMALL BOWEL AND COLON: There is concentric thickening and transmural enhancement of some jejunal loops in the left mid abdomen, just distal to the inflammatory process or on the pancreas, possibly reactive enteritis. No bowel obstruction or ileus.  PERITONEAL CAVITY: No mesenteric stranding or free fluid. No free air.  ABDOMINAL AORTA: No aortic aneurysm or dissection. Diffuse aortoiliac calcific disease with mild multifocal narrowing.  PELVIC ORGANS:  Degenerative calcifications in the prostate without hypertrophy. Normal urinary bladder. High riding testicles project above the scrotal sac in the lower inguinal canals.  SOFT TISSUE: Normal.  OSSEOUS STRUCTURES: No acute fracture or destructive lesion.    Impression:      1.   Acute interstitial pancreatitis pattern with peripancreatic inflammatory changes, edema, and a small pancreatic effusion.       2. Cholelithiasis with 2 mm stone in the proximal duodenum, possibly a recently passed gallstone.       3. Transmural thickening and enhancement of some jejunal loops in the left mid abdomen, possibly reactive changes secondary to the pancreatitis.       4. Mild granulomatous changes and subtle peripheral interstitial scarring in the lower lung fields.       5. No metastatic disease or lymphadenopathy.          Created by: Farooq Lara MD    Signed by: Farooq Lara MD  Signed on: 7/23/2023 3:46 PM  Location: [id number]            CT outside films    Result Date: 7/23/2023  This procedure was auto-finalized with no dictation required.         Current medications:  Scheduled Meds:atorvastatin, 20 mg, Oral, Nightly  enzalutamide, 40 mg, Oral, Daily  methylnaltrexone, 12 mg, Subcutaneous, Once  metoprolol tartrate, 25 mg, Oral, Daily  montelukast, 10 mg, Oral, Nightly  nicotine, 1 patch, Transdermal, Q24H  pantoprazole, 40 mg, Oral, BID AC  piperacillin-tazobactam, 3.375 g, Intravenous, Q8H  sodium chloride, 10 mL, Intravenous, Q12H      Continuous Infusions:lactated ringers, 120 mL/hr, Last Rate: 120 mL/hr (07/25/23 1041)  lactated ringers, 120 mL/hr      PRN Meds:.  acetaminophen **OR** acetaminophen **OR** acetaminophen    albuterol    HYDROcodone-acetaminophen    HYDROmorphone    hydrOXYzine    melatonin    naloxone    ondansetron    prochlorperazine    sodium chloride    sodium chloride    Assessment & Plan   Assessment & Plan     Active Hospital Problems    Diagnosis  POA    **Acute gallstone pancreatitis  [K85.10]  Yes    Elevated serum creatinine [R79.89]  Yes    Personal history of transient ischemic attack (TIA) [Z86.73]  Not Applicable    Adenocarcinoma of prostate [C61]  Yes    Gastroesophageal reflux disease [K21.9]  Yes    Hyperlipidemia [E78.5]  Yes    Tobacco abuse [Z72.0]  Yes      Resolved Hospital Problems   No resolved problems to display.        Brief Hospital Course to date:  Ta Madison is a 63 y.o. male with PMH of GERD, HLD, hx of TIA, advanced prostate cancer on Xtandi, occasional marijuna use, and chronic 1 PPD tobacco use who initially presented to [place] 7/23/23 with complaints of severe epigastric abdominal pain with associated nausea and vomiting. CT A/P at OSH showed acute pancreatitis with 2 mm stone in the proximal duodenum. Transferred to [facility] for GI evaluation.      *All problems are new to me today. Plan was partially entered by my partner and I have reviewed and updated as appropriate on 7/25/23     Acute gallstone pancreatitis  Leukocytosis  Lactic acidosis  --CT A/P at OSH: acute interstitial pancreatitis, cholelithiasis with 2 mm stone in proximal duodenum (possibly a recently passed gallstone), transmural thickening of some jejunal loops in the left mid abdomen possibly reactive changes secondary to pancreatitis  --Lipase 1255  --Normal triglycerides   --MRCP showed acute pancreatitis no obstructing stones   --GI following   --General Surgery consulted, Dr. Tony recommends continued treatment for pancreatitis and F/U in 6 weeks for interval cholecystectomy, unless he has significant improvement in next 24-48 hours  --Continue IV fluids  --Empiric IV Zosyn given significant leukocytosis (almost 30K at OSH), F/U blood cultures  -- procal 0.85, lactic  elevated today will give another 1L bolus and cont IVF's. Low grade fever on 7/25  -- UA benign, CT chest no PNA noted   --Continue antiemetics, pain control as needed; stop Toradol due to elevated Cr, continue PRN Norco and IV  Dilaudid     SOA  -- check CXR and BNP     Elevated Cr  --Cr. 1.4 on admission  --Baseline Cr ~0.85-1.1  --Continue to monitor for improvement with IV fluids  --Avoid nephrotoxic meds     Cough  --Had complained of cough and postnasal drainage for past 3-4 weeks  --Negative respiratory PCR panel  --CT chest at OSH with mild granulomatous changes and subtle peripheral interstitial scarring in the lower lung fields     Prostate cancer  --Continue Xtandi oral chemotherapy (patient supplied)     HLD  --Lipid panel reviewed, within normal limits  --Continue statin     GERD  --Continue PPI     Hx of TIA  --resume  ASA since no further procedures planned   --Continue statin     Tobacco abuse  -- on cessation  --Nicotine patch       Expected Discharge Location and Transportation: home   Expected Discharge 7/29/2023  Expected Discharge Date: 7/27/2023; Expected Discharge Time:      DVT prophylaxis:  Mechanical DVT prophylaxis orders are present.     AM-PAC 6 Clicks Score (PT): 22 (07/23/23 2228)    CODE STATUS:   Code Status and Medical Interventions:   Ordered at: 07/23/23 2229     Code Status (Patient has no pulse and is not breathing):    CPR (Attempt to Resuscitate)     Medical Interventions (Patient has pulse or is breathing):    Full Support     Release to patient:    Routine Release       Jayla Lake, APRN  07/25/23

## 2023-07-25 NOTE — PROGRESS NOTES
"Patient Name:  Ta Madison  YOB: 1960  [id number]    Surgery Progress Note    Date of visit: 7/25/2023      Subjective: No significant changes.  Tells me that he still feels bloated and the same as yesterday.  Still with some pain diffusely across the abdomen that is not significantly changed as compared to yesterday.  Denies nausea or vomiting.          Objective:     /71 (BP Location: Left arm, Patient Position: Lying)   Pulse 103   Temp 99.3 øF (37.4 øC) (Oral)   Resp 14   Ht 172.7 cm (68\")   Wt 91.7 kg (202 lb 3.2 oz)   SpO2 95%   BMI 30.74 kg/mý     Intake/Output Summary (Last 24 hours) at 7/25/2023 0803  Last data filed at 7/25/2023 0606  Gross per 24 hour   Intake 4853.82 ml   Output 425 ml   Net 4428.82 ml       GEN:   Awake, alert, in no acute distress, resting comfortably in bed   CV:   Regular rate and rhythm  L:  Symmetric expansion, not labored on oxygen by nasal cannula  Abd:  Soft, mildly distended throughout, some diffuse tenderness palpation which is worse throughout the upper abdomen without rebound or guarding  Ext:  No cyanosis, clubbing, or edema    Recent labs that are back at this time have been reviewed.           Assessment/ Plan:    Mr. Madison is a 63-year-old gentleman with history of GERD, prostate cancer on medical therapy, hyperlipidemia, and TIAs with gallstone pancreatitis     #Gallstone pancreatitis  -Vital signs are stable  -White blood cell count is 15.  Creatinine is 1.2.  LFTs are normal  -MRCP yesterday with no evidence of choledocholithiasis.  Changes consistent with pancreatitis  -Given his significant ongoing abdominal symptoms related to his pancreatitis as well as moderate distention, will need to recover from his episode of pancreatitis before pursuing cholecystectomy.  Likely 6 weeks from now.  Otherwise continue supportive management of pancreatitis.  He should follow-up with me in 2 weeks after discharge      Derrek Tony, " MD  7/25/2023  08:03 EDT

## 2023-07-26 LAB
ALBUMIN SERPL-MCNC: 2.8 G/DL (ref 3.5–5.2)
ALBUMIN/GLOB SERPL: 1.1 G/DL
ALP SERPL-CCNC: 76 U/L (ref 39–117)
ALT SERPL W P-5'-P-CCNC: 24 U/L (ref 1–41)
ANION GAP SERPL CALCULATED.3IONS-SCNC: 13 MMOL/L (ref 5–15)
AST SERPL-CCNC: 28 U/L (ref 1–40)
BASOPHILS # BLD AUTO: 0.03 10*3/MM3 (ref 0–0.2)
BASOPHILS NFR BLD AUTO: 0.2 % (ref 0–1.5)
BILIRUB SERPL-MCNC: 1.8 MG/DL (ref 0–1.2)
BUN SERPL-MCNC: 27 MG/DL (ref 8–23)
BUN/CREAT SERPL: 35.1 (ref 7–25)
CALCIUM SPEC-SCNC: 8.3 MG/DL (ref 8.6–10.5)
CHLORIDE SERPL-SCNC: 102 MMOL/L (ref 98–107)
CO2 SERPL-SCNC: 24 MMOL/L (ref 22–29)
CREAT SERPL-MCNC: 0.77 MG/DL (ref 0.76–1.27)
DEPRECATED RDW RBC AUTO: 49.9 FL (ref 37–54)
EGFRCR SERPLBLD CKD-EPI 2021: 101.2 ML/MIN/1.73
EOSINOPHIL # BLD AUTO: 0.03 10*3/MM3 (ref 0–0.4)
EOSINOPHIL NFR BLD AUTO: 0.2 % (ref 0.3–6.2)
ERYTHROCYTE [DISTWIDTH] IN BLOOD BY AUTOMATED COUNT: 17.2 % (ref 12.3–15.4)
GLOBULIN UR ELPH-MCNC: 2.5 GM/DL
GLUCOSE SERPL-MCNC: 87 MG/DL (ref 65–99)
HCT VFR BLD AUTO: 31.7 % (ref 37.5–51)
HGB BLD-MCNC: 9.9 G/DL (ref 13–17.7)
IMM GRANULOCYTES # BLD AUTO: 0.08 10*3/MM3 (ref 0–0.05)
IMM GRANULOCYTES NFR BLD AUTO: 0.6 % (ref 0–0.5)
LYMPHOCYTES # BLD AUTO: 0.73 10*3/MM3 (ref 0.7–3.1)
LYMPHOCYTES NFR BLD AUTO: 5.5 % (ref 19.6–45.3)
MCH RBC QN AUTO: 24.8 PG (ref 26.6–33)
MCHC RBC AUTO-ENTMCNC: 31.2 G/DL (ref 31.5–35.7)
MCV RBC AUTO: 79.3 FL (ref 79–97)
MONOCYTES # BLD AUTO: 0.92 10*3/MM3 (ref 0.1–0.9)
MONOCYTES NFR BLD AUTO: 6.9 % (ref 5–12)
NEUTROPHILS NFR BLD AUTO: 11.54 10*3/MM3 (ref 1.7–7)
NEUTROPHILS NFR BLD AUTO: 86.6 % (ref 42.7–76)
NRBC BLD AUTO-RTO: 0 /100 WBC (ref 0–0.2)
PLATELET # BLD AUTO: 300 10*3/MM3 (ref 140–450)
PMV BLD AUTO: 10.8 FL (ref 6–12)
POTASSIUM SERPL-SCNC: 4.3 MMOL/L (ref 3.5–5.2)
PROT SERPL-MCNC: 5.3 G/DL (ref 6–8.5)
RBC # BLD AUTO: 4 10*6/MM3 (ref 4.14–5.8)
SODIUM SERPL-SCNC: 139 MMOL/L (ref 136–145)
WBC NRBC COR # BLD: 13.33 10*3/MM3 (ref 3.4–10.8)

## 2023-07-26 PROCEDURE — 94761 N-INVAS EAR/PLS OXIMETRY MLT: CPT

## 2023-07-26 PROCEDURE — 94799 UNLISTED PULMONARY SVC/PX: CPT

## 2023-07-26 PROCEDURE — 25010000002 PIPERACILLIN SOD-TAZOBACTAM PER 1 G: Performed by: INTERNAL MEDICINE

## 2023-07-26 PROCEDURE — 85025 COMPLETE CBC W/AUTO DIFF WBC: CPT | Performed by: INTERNAL MEDICINE

## 2023-07-26 PROCEDURE — 92610 EVALUATE SWALLOWING FUNCTION: CPT

## 2023-07-26 PROCEDURE — 94664 DEMO&/EVAL PT USE INHALER: CPT

## 2023-07-26 PROCEDURE — 99232 SBSQ HOSP IP/OBS MODERATE 35: CPT | Performed by: NURSE PRACTITIONER

## 2023-07-26 PROCEDURE — 80053 COMPREHEN METABOLIC PANEL: CPT | Performed by: HOSPITALIST

## 2023-07-26 PROCEDURE — 25010000002 HYDROMORPHONE 1 MG/ML SOLUTION: Performed by: INTERNAL MEDICINE

## 2023-07-26 PROCEDURE — 99232 SBSQ HOSP IP/OBS MODERATE 35: CPT | Performed by: PHYSICIAN ASSISTANT

## 2023-07-26 RX ORDER — SODIUM CHLORIDE, SODIUM LACTATE, POTASSIUM CHLORIDE, CALCIUM CHLORIDE 600; 310; 30; 20 MG/100ML; MG/100ML; MG/100ML; MG/100ML
50 INJECTION, SOLUTION INTRAVENOUS CONTINUOUS
Status: ACTIVE | OUTPATIENT
Start: 2023-07-26 | End: 2023-07-27

## 2023-07-26 RX ORDER — SIMETHICONE 80 MG
80 TABLET,CHEWABLE ORAL 4 TIMES DAILY PRN
Status: DISCONTINUED | OUTPATIENT
Start: 2023-07-26 | End: 2023-07-31

## 2023-07-26 RX ORDER — SODIUM CHLORIDE, SODIUM LACTATE, POTASSIUM CHLORIDE, CALCIUM CHLORIDE 600; 310; 30; 20 MG/100ML; MG/100ML; MG/100ML; MG/100ML
75 INJECTION, SOLUTION INTRAVENOUS CONTINUOUS
Status: ACTIVE | OUTPATIENT
Start: 2023-07-26 | End: 2023-07-27

## 2023-07-26 RX ADMIN — HYDROMORPHONE HYDROCHLORIDE 1 MG: 1 INJECTION, SOLUTION INTRAMUSCULAR; INTRAVENOUS; SUBCUTANEOUS at 04:05

## 2023-07-26 RX ADMIN — HYDROMORPHONE HYDROCHLORIDE 1 MG: 1 INJECTION, SOLUTION INTRAMUSCULAR; INTRAVENOUS; SUBCUTANEOUS at 17:51

## 2023-07-26 RX ADMIN — HYDROMORPHONE HYDROCHLORIDE 1 MG: 1 INJECTION, SOLUTION INTRAMUSCULAR; INTRAVENOUS; SUBCUTANEOUS at 23:30

## 2023-07-26 RX ADMIN — ALBUTEROL SULFATE 2.5 MG: 2.5 SOLUTION RESPIRATORY (INHALATION) at 07:47

## 2023-07-26 RX ADMIN — HYDROMORPHONE HYDROCHLORIDE 1 MG: 1 INJECTION, SOLUTION INTRAMUSCULAR; INTRAVENOUS; SUBCUTANEOUS at 01:30

## 2023-07-26 RX ADMIN — Medication 1 PATCH: at 08:31

## 2023-07-26 RX ADMIN — ALBUTEROL SULFATE 2.5 MG: 2.5 SOLUTION RESPIRATORY (INHALATION) at 00:30

## 2023-07-26 RX ADMIN — METOPROLOL TARTRATE 25 MG: 25 TABLET, FILM COATED ORAL at 08:31

## 2023-07-26 RX ADMIN — SODIUM CHLORIDE, POTASSIUM CHLORIDE, SODIUM LACTATE AND CALCIUM CHLORIDE 75 ML/HR: 600; 310; 30; 20 INJECTION, SOLUTION INTRAVENOUS at 11:38

## 2023-07-26 RX ADMIN — PANTOPRAZOLE SODIUM 40 MG: 40 TABLET, DELAYED RELEASE ORAL at 17:51

## 2023-07-26 RX ADMIN — PIPERACILLIN SODIUM AND TAZOBACTAM SODIUM 3.38 G: 3; .375 INJECTION, SOLUTION INTRAVENOUS at 21:27

## 2023-07-26 RX ADMIN — MONTELUKAST 10 MG: 10 TABLET, FILM COATED ORAL at 20:08

## 2023-07-26 RX ADMIN — ATORVASTATIN CALCIUM 20 MG: 20 TABLET, FILM COATED ORAL at 20:08

## 2023-07-26 RX ADMIN — HYDROCODONE BITARTRATE AND ACETAMINOPHEN 1 TABLET: 10; 325 TABLET ORAL at 12:41

## 2023-07-26 RX ADMIN — HYDROMORPHONE HYDROCHLORIDE 1 MG: 1 INJECTION, SOLUTION INTRAMUSCULAR; INTRAVENOUS; SUBCUTANEOUS at 11:39

## 2023-07-26 RX ADMIN — ASPIRIN 81 MG: 81 TABLET, COATED ORAL at 08:31

## 2023-07-26 RX ADMIN — HYDROMORPHONE HYDROCHLORIDE 1 MG: 1 INJECTION, SOLUTION INTRAMUSCULAR; INTRAVENOUS; SUBCUTANEOUS at 20:08

## 2023-07-26 RX ADMIN — Medication 10 ML: at 20:09

## 2023-07-26 RX ADMIN — SODIUM CHLORIDE, POTASSIUM CHLORIDE, SODIUM LACTATE AND CALCIUM CHLORIDE 50 ML/HR: 600; 310; 30; 20 INJECTION, SOLUTION INTRAVENOUS at 17:51

## 2023-07-26 RX ADMIN — PIPERACILLIN SODIUM AND TAZOBACTAM SODIUM 3.38 G: 3; .375 INJECTION, SOLUTION INTRAVENOUS at 14:20

## 2023-07-26 RX ADMIN — Medication 10 ML: at 08:33

## 2023-07-26 RX ADMIN — SIMETHICONE 80 MG: 80 TABLET, CHEWABLE ORAL at 08:31

## 2023-07-26 RX ADMIN — HYDROMORPHONE HYDROCHLORIDE 1 MG: 1 INJECTION, SOLUTION INTRAMUSCULAR; INTRAVENOUS; SUBCUTANEOUS at 14:24

## 2023-07-26 RX ADMIN — HYDROMORPHONE HYDROCHLORIDE 1 MG: 1 INJECTION, SOLUTION INTRAMUSCULAR; INTRAVENOUS; SUBCUTANEOUS at 05:56

## 2023-07-26 RX ADMIN — HYDROCODONE BITARTRATE AND ACETAMINOPHEN 1 TABLET: 10; 325 TABLET ORAL at 01:30

## 2023-07-26 RX ADMIN — PANTOPRAZOLE SODIUM 40 MG: 40 TABLET, DELAYED RELEASE ORAL at 08:31

## 2023-07-26 RX ADMIN — PIPERACILLIN SODIUM AND TAZOBACTAM SODIUM 3.38 G: 3; .375 INJECTION, SOLUTION INTRAVENOUS at 05:55

## 2023-07-26 NOTE — PROGRESS NOTES
[facility]  PROGRESS NOTE    Patient Name: Ta Madison  : 1960  MRN: [id number]    Date of Admission: 2023  Primary Care Physician: Yolande Olvera APRN    Subjective   Subjective     CC:  F/u abd pain     HPI:  Patient is resting in bed in NAD. He states he slept well. Abd pain and tenderness have improved. Will try clear liquids today. Output is increasing     ROS:  Gen- + fevers, no chills  CV- No chest pain, palpitations  Resp- No cough, dyspnea  GI- see above        Objective   Objective     Vital Signs:   Temp:  [98.7 øF (37.1 øC)-100.4 øF (38 øC)] 99.7 øF (37.6 øC)  Heart Rate:  [] 103  Resp:  [16-20] 16  BP: (114-151)/(56-78) 139/72  Flow (L/min):  [2] 2     Physical Exam:  Constitutional: No acute distress, awake, alert  HENT: NCAT, mucous membranes moist  Respiratory: Clear to auscultation bilaterally, respiratory effort normal room air 94%  Cardiovascular: RRR, no murmurs, rubs, or gallops  Gastrointestinal: Positive bowel sounds, soft, general tenderness, nondistended  Musculoskeletal: No bilateral ankle edema  Psychiatric: Appropriate affect, cooperative  Neurologic: Oriented x 3, strength symmetric in all extremities, Cranial Nerves grossly intact to confrontation, speech clear  Skin: No rashes      Results Reviewed:  LAB RESULTS:      Lab 23  0720 23  1036 23  0324 23  1352 23  0725 23  0442 23  0153 23  2246   WBC 13.33*  --  15.29*  --   --   --  14.99*  --   --    HEMOGLOBIN 9.9*  --  10.9*  --   --   --  12.0*  --   --    HEMATOCRIT 31.7*  --  35.6*  --   --   --  39.3  --   --    PLATELETS 300  --  358  --   --   --  456*  --   --    NEUTROS ABS 11.54*  --  13.62*  --   --   --  13.58*  --   --    IMMATURE GRANS (ABS) 0.08*  --  0.06*  --   --   --  0.06*  --   --    LYMPHS ABS 0.73  --  0.80  --   --   --  0.67*  --   --    MONOS ABS 0.92*  --  0.79  --   --   --  0.66  --    --    EOS ABS 0.03  --  0.00  --   --   --  0.00  --   --    MCV 79.3  --  80.9  --   --   --  78.9*  --   --    PROCALCITONIN  --   --   --   --   --   --   --   --  0.85*   LACTATE  --  3.2*  --  2.7* 3.1* 3.5* 4.1*   < > 3.6*   PROTIME  --   --   --   --   --   --   --   --  13.1   APTT  --   --   --   --   --   --   --   --  26.9    < > = values in this interval not displayed.         Lab 07/26/23 0720 07/25/23 0324 07/24/23 0924 07/23/23 2246   SODIUM 139 141 143 140   POTASSIUM 4.3 5.1 5.3* 5.1   CHLORIDE 102 107 108* 108*   CO2 24.0 22.0 20.0* 14.0*   ANION GAP 13.0 12.0 15.0 18.0*   BUN 27* 36* 31* 28*   CREATININE 0.77 1.29* 1.44* 1.42*   EGFR 101.2 62.7 54.9* 55.9*   GLUCOSE 87 101* 139* 171*   CALCIUM 8.3* 8.7 8.8 8.5*   MAGNESIUM  --   --   --  2.3         Lab 07/26/23 0720 07/25/23 0324 07/24/23 0924 07/23/23 2246   TOTAL PROTEIN 5.3* 5.5* 6.1 6.6   ALBUMIN 2.8* 3.4* 3.7 3.8   GLOBULIN 2.5 2.1 2.4 2.8   ALT (SGPT) 24 38 49* 53*   AST (SGOT) 28 38 50* 66*   BILIRUBIN 1.8* 1.2 0.8 0.4   ALK PHOS 76 69 75 76   LIPASE  --   --  1,255*  --          Lab 07/25/23  1502 07/23/23 2246   PROBNP 789.2  --    PROTIME  --  13.1   INR  --  0.98         Lab 07/24/23 0924 07/23/23 2246   CHOLESTEROL 134  --    LDL CHOL 72  --    HDL CHOL 39*  --    TRIGLYCERIDES 126 108         Lab 07/23/23  2349 07/23/23 2245   ABO TYPING O O   RH TYPING Positive Positive   ANTIBODY SCREEN  --  Negative         Brief Urine Lab Results  (Last result in the past 365 days)        Color   Clarity   Blood   Leuk Est   Nitrite   Protein   CREAT   Urine HCG        07/25/23 0636 Dark Yellow   Clear   Negative   Negative   Negative   30 mg/dL (1+)                   Microbiology Results Abnormal       Procedure Component Value - Date/Time    Blood Culture - Blood, Hand, Left [[id number]]  (Normal) Collected: 07/23/23 2240    Lab Status: Preliminary result Specimen: Blood from Hand, Left Updated: 07/25/23 2300     Blood Culture No  growth at 2 days    Narrative:      AEROBIC BOTTLE ONLY    Blood Culture - Blood, Hand, Right [[id number]]  (Normal) Collected: 07/23/23 2230    Lab Status: Preliminary result Specimen: Blood from Hand, Right Updated: 07/25/23 2300     Blood Culture No growth at 2 days    Narrative:      AEROBIC BOTTLE ONLY    COVID PRE-OP / PRE-PROCEDURE SCREENING ORDER (NO ISOLATION) - Swab, Nasopharynx [[id number]]  (Normal) Collected: 07/24/23 0620    Lab Status: Final result Specimen: Swab from Nasopharynx Updated: 07/24/23 0729    Narrative:      The following orders were created for panel order COVID PRE-OP / PRE-PROCEDURE SCREENING ORDER (NO ISOLATION) - Swab, Nasopharynx.  Procedure                               Abnormality         Status                     ---------                               -----------         ------                     Respiratory Panel PCR w/...[[id number]]  Normal              Final result                 Please view results for these tests on the individual orders.    Respiratory Panel PCR w/COVID-19(SARS-CoV-2) BALTAZAR/ANDRES/ASTER/PAD/COR/MAD/VALENTINE In-House, NP Swab in UTM/VTM, 3-4 HR TAT - Swab, Nasopharynx [[id number]]  (Normal) Collected: 07/24/23 0620    Lab Status: Final result Specimen: Swab from Nasopharynx Updated: 07/24/23 0729     ADENOVIRUS, PCR Not Detected     Coronavirus 229E Not Detected     Coronavirus HKU1 Not Detected     Coronavirus NL63 Not Detected     Coronavirus OC43 Not Detected     COVID19 Not Detected     Human Metapneumovirus Not Detected     Human Rhinovirus/Enterovirus Not Detected     Influenza A PCR Not Detected     Influenza B PCR Not Detected     Parainfluenza Virus 1 Not Detected     Parainfluenza Virus 2 Not Detected     Parainfluenza Virus 3 Not Detected     Parainfluenza Virus 4 Not Detected     RSV, PCR Not Detected     Bordetella pertussis pcr Not Detected     Bordetella parapertussis PCR Not Detected     Chlamydophila pneumoniae PCR Not Detected     Mycoplasma pneumo by  PCR Not Detected    Narrative:      In the setting of a positive respiratory panel with a viral infection PLUS a negative procalcitonin without other underlying concern for bacterial infection, consider observing off antibiotics or discontinuation of antibiotics and continue supportive care. If the respiratory panel is positive for atypical bacterial infection (Bordetella pertussis, Chlamydophila pneumoniae, or Mycoplasma pneumoniae), consider antibiotic de-escalation to target atypical bacterial infection.            XR Chest 1 View    Result Date: 7/25/2023  XR CHEST 1 VW Date of Exam: 7/25/2023 1:29 PM EDT Indication: soa Comparison: None available. Findings: There are low lung volumes with poor inspiration. There is mild atelectasis at the lung bases, greatest on the left. There may be a minimal left effusion. The heart size is normal and pulmonary vessels are within normal limits.     Impression: Impression: Mild atelectasis lung bases, greatest on the left. Electronically Signed: Suyapa Vu MD  7/25/2023 1:54 PM EDT  Workstation ID: [id number]         Current medications:  Scheduled Meds:aspirin, 81 mg, Oral, Daily  atorvastatin, 20 mg, Oral, Nightly  enzalutamide, 160 mg, Oral, Daily  metoprolol tartrate, 25 mg, Oral, Daily  montelukast, 10 mg, Oral, Nightly  nicotine, 1 patch, Transdermal, Q24H  pantoprazole, 40 mg, Oral, BID AC  piperacillin-tazobactam, 3.375 g, Intravenous, Q8H  sodium chloride, 10 mL, Intravenous, Q12H      Continuous Infusions:lactated ringers, 75 mL/hr      PRN Meds:.  acetaminophen **OR** acetaminophen **OR** acetaminophen    HYDROcodone-acetaminophen    HYDROmorphone    hydrOXYzine    melatonin    naloxone    ondansetron    prochlorperazine    simethicone    sodium chloride    sodium chloride    Assessment & Plan   Assessment & Plan     Active Hospital Problems    Diagnosis  POA    **Acute gallstone pancreatitis [K85.10]  Yes    Elevated serum creatinine [R79.89]  Yes    Personal  history of transient ischemic attack (TIA) [Z86.73]  Not Applicable    Adenocarcinoma of prostate [C61]  Yes    Gastroesophageal reflux disease [K21.9]  Yes    Hyperlipidemia [E78.5]  Yes    Tobacco abuse [Z72.0]  Yes      Resolved Hospital Problems   No resolved problems to display.        Brief Hospital Course to date:  Ta Madison is a 63 y.o. male with PMH of GERD, HLD, hx of TIA, advanced prostate cancer on Xtandi, occasional marijuna use, and chronic 1 PPD tobacco use who initially presented to [facility] 7/23/23 with complaints of severe epigastric abdominal pain with associated nausea and vomiting. CT A/P at OSH showed acute pancreatitis with 2 mm stone in the proximal duodenum. Transferred to [facility] for GI evaluation.      *All problems are new to me today. Plan was partially entered by my partner and I have reviewed and updated as appropriate on 7/26/23     Acute gallstone pancreatitis  Leukocytosis  Lactic acidosis  --CT A/P at OSH: acute interstitial pancreatitis, cholelithiasis with 2 mm stone in proximal duodenum (possibly a recently passed gallstone), transmural thickening of some jejunal loops in the left mid abdomen possibly reactive changes secondary to pancreatitis  --Lipase 1255  --Normal triglycerides   --MRCP showed acute pancreatitis no obstructing stones   --GI following   --General Surgery consulted, Dr. Tony recommends continued treatment for pancreatitis and F/U in 6 weeks for interval cholecystectomy, unless he has significant improvement in next 24-48 hours  --Continue IV fluids rate decreased on 7/26  --Empiric IV Zosyn given significant leukocytosis (almost 30K at OSH), F/U blood cultures NGTD and WBC trending down   -- procal 0.85, lactic  elevated had 2.5L bolus and cont IVF's. Low grade fever on 7/25 and 7/26  -- UA benign, CT chest no PNA noted   --Continue antiemetics, pain control as needed; stop Toradol due to elevated Cr, continue PRN Norco and IV Dilaudid     SOA  -- cxr  showed some atelectasis encouraged TCDB and IS  -- bnp WNL  -- weaned off oxygen     Elevated Cr  --Cr. 1.4 on admission  --Baseline Cr ~0.85-1.1  --Continue to monitor for improvement with IV fluids  --Avoid nephrotoxic meds     Cough  --Had complained of cough and postnasal drainage for past 3-4 weeks  --Negative respiratory PCR panel  --CT chest at OSH with mild granulomatous changes and subtle peripheral interstitial scarring in the lower lung fields     Prostate cancer  --Continue Xtandi oral chemotherapy (patient supplied)     HLD  --Lipid panel reviewed, within normal limits  --Continue statin     GERD  --Continue PPI     Hx of TIA  --resume  ASA since no further procedures planned   --Continue statin     Tobacco abuse  -- on cessation  --Nicotine patch       Expected Discharge Location and Transportation: home   Expected Discharge 7/29/2023  Expected Discharge Date: 7/27/2023; Expected Discharge Time:      DVT prophylaxis:  Mechanical DVT prophylaxis orders are present.     AM-PAC 6 Clicks Score (PT): 22 (07/23/23 2228)    CODE STATUS:   Code Status and Medical Interventions:   Ordered at: 07/23/23 2229     Code Status (Patient has no pulse and is not breathing):    CPR (Attempt to Resuscitate)     Medical Interventions (Patient has pulse or is breathing):    Full Support     Release to patient:    Routine Release       Jayla Lake, APRN  07/26/23

## 2023-07-26 NOTE — PLAN OF CARE
Problem: Adult Inpatient Plan of Care  Goal: Plan of Care Review  Outcome: Ongoing, Progressing  Goal: Patient-Specific Goal (Individualized)  Outcome: Ongoing, Progressing  Goal: Absence of Hospital-Acquired Illness or Injury  Outcome: Ongoing, Progressing  Intervention: Identify and Manage Fall Risk  Intervention: Prevent Skin Injury  Intervention: Prevent and Manage VTE (Venous Thromboembolism) Risk  Intervention: Prevent Infection  Goal: Optimal Comfort and Wellbeing  Outcome: Ongoing, Progressing  Intervention: Monitor Pain and Promote Comfort  Intervention: Provide Person-Centered Care  Goal: Readiness for Transition of Care  Outcome: Ongoing, Progressing   Goal Outcome Evaluation:   Patient had no acute events during shift  Pain controlled continued w meds and ice see MAR  Slept very very little  ETCO@ monitoring 2 L NC  A&O x 4      Patient coughed and had difficulty swallowing pills

## 2023-07-26 NOTE — PROGRESS NOTES
"GI Daily Progress Note  Subjective:    Chief Complaint:  Follow up pancreatitis     Still with abdominal pain and bloating.  Mild improvement   Took a small amount of clear liquids.   Has not had a bowel movement nor passed flatus.      Objective:    /72 (BP Location: Right arm, Patient Position: Sitting)   Pulse 103   Temp 99.7 øF (37.6 øC) (Oral)   Resp 16   Ht 172.7 cm (68\")   Wt 91.7 kg (202 lb 3.2 oz)   SpO2 90%   BMI 30.74 kg/mý     Physical Exam  Constitutional:       General: He is not in acute distress.  Cardiovascular:      Rate and Rhythm: Normal rate and regular rhythm.   Pulmonary:      Effort: Pulmonary effort is normal. No respiratory distress.   Abdominal:      General: Bowel sounds are normal. There is distension.      Palpations: Abdomen is soft.      Tenderness: There is abdominal tenderness. There is no guarding or rebound.   Neurological:      Mental Status: He is alert and oriented to person, place, and time.     Lab  Lab Results   Component Value Date    WBC 13.33 (H) 07/26/2023    HGB 9.9 (L) 07/26/2023    HGB 10.9 (L) 07/25/2023    HGB 12.0 (L) 07/24/2023    MCV 79.3 07/26/2023     07/26/2023    INR 0.98 07/23/2023       Lab Results   Component Value Date    GLUCOSE 87 07/26/2023    BUN 27 (H) 07/26/2023    CREATININE 0.77 07/26/2023    EGFRIFNONA 70 01/19/2022    BCR 35.1 (H) 07/26/2023     07/26/2023    K 4.3 07/26/2023    CO2 24.0 07/26/2023    CALCIUM 8.3 (L) 07/26/2023    PROTENTOTREF 7.2 02/05/2019    ALBUMIN 2.8 (L) 07/26/2023    ALKPHOS 76 07/26/2023    BILITOT 1.8 (H) 07/26/2023    ALT 24 07/26/2023    AST 28 07/26/2023       Assessment:    Acute gallstone pancreatitis   SIRS   Abdominal distention, likely sequela of pancreatitis, ileus      Plan:    >> Encouraged patient to get out of bed and ambulate as tolerated as well as up to the chair.    >> If no bowel movement by tomorrow, recommend dulcolax suppository  >> s/p Relistor yesterday.     >> Incentive " spirometer  >> Can discontinue antibiotics from a GI standpoint     JACIEL Albert  07/26/23  15:05 EDT

## 2023-07-26 NOTE — THERAPY EVALUATION
Acute Care - Speech Language Pathology   Swallow Initial Evaluation [facility]  Clinical Swallow Evaluation     Patient Name: Ta Madison  : 1960  MRN: [id number]  Today's Date: 2023               Admit Date: 2023    Visit Dx:     ICD-10-CM ICD-9-CM   1. Acute gallstone pancreatitis  K85.10 577.0     574.20   2. Dysphagia, unspecified type  R13.10 787.20     Patient Active Problem List   Diagnosis    Tachycardia    Hyperlipidemia    Multiple allergies    Tobacco abuse    Gastroesophageal reflux disease    Chronic right shoulder pain    Ganglion cyst of dorsum of right wrist    Benign skin cyst    Numbness and tingling of both upper extremities    Right shoulder pain    Adenocarcinoma of prostate    Encounter for screening for malignant neoplasm of colon    Epigastric pain    Acute gallstone pancreatitis    Personal history of transient ischemic attack (TIA)    Elevated serum creatinine     Past Medical History:   Diagnosis Date    Elevated cholesterol     GERD (gastroesophageal reflux disease)     Increased heart rate     Mini stroke     Neck pain     Prostate cancer     Rash     Tobacco abuse      Past Surgical History:   Procedure Laterality Date    APPENDECTOMY      [facility]     COLONOSCOPY      COLONOSCOPY N/A 2023    Procedure: COLONOSCOPY;  Surgeon: Mahnaz Caraballo MD;  Location: [facility];  Service: Gastroenterology;  Laterality: N/A;       SLP Recommendation and Plan  SLP Swallowing Diagnosis: R/O pharyngeal dysphagia, suspected esophageal dysphagia (23 1150)  SLP Diet Recommendation: clear liquid diet (23 1150)  Recommended Precautions and Strategies: upright posture during/after eating, general aspiration precautions, reflux precautions (23 1150)  SLP Rec. for Method of Medication Administration: meds whole, with thin liquids, with puree, as tolerated (23 1150)     Monitor for Signs of Aspiration: yes, notify SLP if any concerns  "(07/26/23 1150)  Recommended Diagnostics: other (see comments) (consider MBS vs FEES if further concerns arise) (07/26/23 1150)  Swallow Criteria for Skilled Therapeutic Interventions Met: demonstrates skilled criteria (07/26/23 1150)  Anticipated Discharge Disposition (SLP): anticipate therapy at next level of care, home with home health (pending) (07/26/23 1150)  Rehab Potential/Prognosis, Swallowing: good, to achieve stated therapy goals (07/26/23 1150)  Therapy Frequency (Swallow): PRN (07/26/23 1150)  Predicted Duration Therapy Intervention (Days): until discharge (07/26/23 1150)  Oral Care Recommendations: Oral Care BID/PRN, Toothbrush (07/26/23 1150)                                      Oral Care Recommendations: Oral Care BID/PRN, Toothbrush (07/26/23 1150)    Plan of Care Reviewed With: patient, spouse      SWALLOW EVALUATION (last 72 hours)       SLP Adult Swallow Evaluation       Row Name 07/26/23 1150                   Rehab Evaluation    Document Type evaluation  -AC        Subjective Information no complaints  -AC        Patient Observations alert;cooperative  -AC        Patient/Family/Caregiver Comments/Observations Spouse present.  -AC        Patient Effort good  -AC           General Information    Patient Profile Reviewed yes  -AC        Pertinent History Of Current Problem Acute gallstone pancreatitis. Consulted when \"strangled\" on pills last night, but reported he was reclined in bed & having more difficulty w/ his breathing at the time. Hx GERD w/ c/o drainage/indigestion (on Protonix), prostate CA, TIA.  -AC        Current Method of Nutrition clear liquids  -AC        Precautions/Limitations, Vision WFL;for purposes of eval  -AC        Precautions/Limitations, Hearing WFL;for purposes of eval  -AC        Prior Level of Function-Swallowing no diet consistency restrictions;esophageal concerns  -AC        Plans/Goals Discussed with patient;spouse/S.O.;agreed upon  -AC        Barriers to Rehab none " identified  -        Patient's Goals for Discharge patient did not state  -        Family Goals for Discharge family did not state  -           Pain    Additional Documentation Pain Scale: FACES Pre/Post-Treatment (Group)  -           Pain Scale: FACES Pre/Post-Treatment    Pain: FACES Scale, Pretreatment 2-->hurts little bit  -AC        Posttreatment Pain Rating 2-->hurts little bit  -AC        Pain Location - abdomen  -AC        Pre/Posttreatment Pain Comment Ongoing since admit  -AC           Oral Motor Structure and Function    Dentition Assessment edentulous;other (see comments)  reportedly wears dentures on occasion depending on what he's eating  -AC        Secretion Management WNL/WFL  -AC        Mucosal Quality moist, healthy  -AC        Volitional Swallow WFL  -AC        Volitional Cough WFL  -AC           Oral Musculature and Cranial Nerve Assessment    Oral Motor General Assessment WFL  -AC           General Eating/Swallowing Observations    Respiratory Support Currently in Use room air  -        Eating/Swallowing Skills self-fed;appropriate self-feeding skills observed  -AC        Positioning During Eating upright 90 degree;other (see comments)  sitting EOB  -AC        Utensils Used spoon;cup;straw  -AC        Consistencies Trialed thin liquids  -AC           Clinical Swallow Eval    Oral Prep Phase WFL  -AC        Oral Transit WFL  -AC        Oral Residue WFL  -AC        Clinical Swallow Evaluation Summary Pt assessed w/ clear liquid lunch tray: jell-O, thin H2O. Initial throat clear noted upon entry to room. Pt c/o chronic drainage. No further concerns or s/sxs aspiration, even when pt pushed w/ 3oz H2O test. Discussed s/sxs aspiration and risks w/ pt.  -AC           Swallowing Quality of Life Assessment    Education and counseling provided Signs of aspiration;Silent aspiration;Risks of aspiration;Aspiration precautions  -           SLP Evaluation Clinical Impression    SLP Swallowing  Diagnosis R/O pharyngeal dysphagia;suspected esophageal dysphagia  -AC        Functional Impact risk of aspiration/pneumonia  -AC        Rehab Potential/Prognosis, Swallowing good, to achieve stated therapy goals  -AC        Swallow Criteria for Skilled Therapeutic Interventions Met demonstrates skilled criteria  -AC           Recommendations    Therapy Frequency (Swallow) PRN  -        Predicted Duration Therapy Intervention (Days) until discharge  -        SLP Diet Recommendation clear liquid diet  -AC        Recommended Diagnostics other (see comments)  consider MBS vs FEES if further concerns arise  -AC        Recommended Precautions and Strategies upright posture during/after eating;general aspiration precautions;reflux precautions  -AC        Oral Care Recommendations Oral Care BID/PRN;Toothbrush  -AC        SLP Rec. for Method of Medication Administration meds whole;with thin liquids;with puree;as tolerated  -AC        Monitor for Signs of Aspiration yes;notify SLP if any concerns  -AC        Anticipated Discharge Disposition (SLP) anticipate therapy at next level of care;home with home health  pending  -                  User Key  (r) = Recorded By, (t) = Taken By, (c) = Cosigned By      Initials Name Effective Dates     Lorrie Amado MS CCC-SLP 02/03/23 -                     EDUCATION  The patient has been educated in the following areas:   Dysphagia (Swallowing Impairment).        SLP GOALS       Row Name 07/26/23 1150             (LTG) Patient will demonstrate functional swallow for    Diet Texture (Demonstrate functional swallow) regular textures  -AC      Liquid viscosity (Demonstrate functional swallow) thin liquids  -AC      [place] (Demonstrate functional swallow) independently (over 90% accuracy)  -      Time Frame (Demonstrate functional swallow) by discharge  -AC      Barriers (Demonstrate functional swallow) GI restrictions  -AC         (STG) Patient will tolerate trials of     Consistencies Trialed (Tolerate trials) regular textures;thin liquids  pending GI diet restrictions  -AC      Desired Outcome (Tolerate trials) without signs/symptoms of aspiration;with adequate oral prep/transit/clearance  -AC      [place] (Tolerate trials) independently (over 90% accuracy)  -AC      Time Frame (Tolerate trials) by discharge  -AC                User Key  (r) = Recorded By, (t) = Taken By, (c) = Cosigned By      Initials Name Provider Type     Lorrie Amado MS CCC-SLP Speech and Language Pathologist                       Time Calculation:    Time Calculation- SLP       Row Name 07/26/23 1235             Time Calculation- SLP    SLP Start Time 1150  -AC      SLP Received On 07/26/23  -         Untimed Charges    [id number]-[id number] Eval Oral Pharyng Swallow Minutes 46  -AC         Total Minutes    Untimed Charges Total Minutes 46  -AC       Total Minutes 46  -AC                User Key  (r) = Recorded By, (t) = Taken By, (c) = Cosigned By      Initials Name Provider Type     Lorrie Amado MS CCC-SLP Speech and Language Pathologist                    Therapy Charges for Today       Code Description Service Date Service Provider Modifiers Qty    [id number]  ST EVAL ORAL PHARYNG SWALLOW 3 7/26/2023 Lorrie Amado MS CCC-SLP GN 1                 Lorrie Amado MS CCC-INES  7/26/2023

## 2023-07-26 NOTE — PLAN OF CARE
Goal Outcome Evaluation:  Plan of Care Reviewed With: patient, spouse      SLP evaluation completed. Will  f/u to assess diet tolerance and determine if further assessment indicated. Please see note for further details and recommendations.

## 2023-07-26 NOTE — PROGRESS NOTES
"Patient Name:  Ta Madison  YOB: 1960  [id number]    Surgery Progress Note    Date of visit: 7/26/2023      Subjective: Feels somewhat better.  Reports abdominal pain is better controlled.  Feels less distended but still somewhat bloated.  No nausea or vomiting is only really had water up to this point          Objective:     /62 (BP Location: Right arm, Patient Position: Lying)   Pulse 109   Temp 98.9 øF (37.2 øC) (Oral)   Resp 18   Ht 172.7 cm (68\")   Wt 91.7 kg (202 lb 3.2 oz)   SpO2 98%   BMI 30.74 kg/mý     Intake/Output Summary (Last 24 hours) at 7/26/2023 0711  Last data filed at 7/26/2023 0555  Gross per 24 hour   Intake 2230 ml   Output 1225 ml   Net 1005 ml       GEN:   Awake, alert, in no acute distress, sitting in the bedside chair  CV:   Regular rate and rhythm  L:  Symmetric expansion, not labored on oxygen by nasal cannula  Abd:  Soft, less tender to palpation throughout the abdomen and now only with deep palpation, remains moderately distended throughout but better than yesterday  Ext:  No cyanosis, clubbing, or edema    Recent labs that are back at this time have been reviewed.           Assessment/ Plan:    Mr. Madison is a 63-year-old gentleman with history of GERD, prostate cancer on medical therapy, hyperlipidemia, and TIAs with gallstone pancreatitis      #Gallstone pancreatitis  -Vital signs are stable  -Labs from today are still pending  -MRCP Monday with no evidence of choledocholithiasis.  Changes consistent with pancreatitis  -Abdominal pain and distention are better, but still with fairly moderate distention.  Having some low-grade temperatures and continued sequelae of pancreatitis.  He will be better served by interval cholecystectomy in 6 weeks.  He should follow-up with me 2 weeks after hospital discharge to discuss this further.  Continue supportive management of pancreatitis.  I will sign off at this point         Derrek Tony MD  7/26/2023  07:11 " EDT

## 2023-07-27 ENCOUNTER — APPOINTMENT (OUTPATIENT)
Dept: GENERAL RADIOLOGY | Facility: HOSPITAL | Age: 63
DRG: 438 | End: 2023-07-27
Payer: COMMERCIAL

## 2023-07-27 ENCOUNTER — APPOINTMENT (OUTPATIENT)
Dept: CT IMAGING | Facility: HOSPITAL | Age: 63
DRG: 438 | End: 2023-07-27
Payer: COMMERCIAL

## 2023-07-27 LAB
ALBUMIN SERPL-MCNC: 2.8 G/DL (ref 3.5–5.2)
ALBUMIN/GLOB SERPL: 1.2 G/DL
ALP SERPL-CCNC: 71 U/L (ref 39–117)
ALT SERPL W P-5'-P-CCNC: 19 U/L (ref 1–41)
ANION GAP SERPL CALCULATED.3IONS-SCNC: 12 MMOL/L (ref 5–15)
AST SERPL-CCNC: 19 U/L (ref 1–40)
BASOPHILS # BLD AUTO: 0.02 10*3/MM3 (ref 0–0.2)
BASOPHILS NFR BLD AUTO: 0.3 % (ref 0–1.5)
BILIRUB SERPL-MCNC: 1.2 MG/DL (ref 0–1.2)
BUN SERPL-MCNC: 27 MG/DL (ref 8–23)
BUN/CREAT SERPL: 37.5 (ref 7–25)
CALCIUM SPEC-SCNC: 8.2 MG/DL (ref 8.6–10.5)
CHLORIDE SERPL-SCNC: 101 MMOL/L (ref 98–107)
CO2 SERPL-SCNC: 25 MMOL/L (ref 22–29)
CREAT SERPL-MCNC: 0.72 MG/DL (ref 0.76–1.27)
D-LACTATE SERPL-SCNC: 1.2 MMOL/L (ref 0.5–2)
DEPRECATED RDW RBC AUTO: 48.9 FL (ref 37–54)
EGFRCR SERPLBLD CKD-EPI 2021: 103.3 ML/MIN/1.73
EOSINOPHIL # BLD AUTO: 0.09 10*3/MM3 (ref 0–0.4)
EOSINOPHIL NFR BLD AUTO: 1.4 % (ref 0.3–6.2)
ERYTHROCYTE [DISTWIDTH] IN BLOOD BY AUTOMATED COUNT: 17.2 % (ref 12.3–15.4)
GLOBULIN UR ELPH-MCNC: 2.3 GM/DL
GLUCOSE SERPL-MCNC: 97 MG/DL (ref 65–99)
HCT VFR BLD AUTO: 30.4 % (ref 37.5–51)
HGB BLD-MCNC: 9.7 G/DL (ref 13–17.7)
IMM GRANULOCYTES # BLD AUTO: 0.02 10*3/MM3 (ref 0–0.05)
IMM GRANULOCYTES NFR BLD AUTO: 0.3 % (ref 0–0.5)
LYMPHOCYTES # BLD AUTO: 0.55 10*3/MM3 (ref 0.7–3.1)
LYMPHOCYTES NFR BLD AUTO: 8.3 % (ref 19.6–45.3)
MCH RBC QN AUTO: 24.7 PG (ref 26.6–33)
MCHC RBC AUTO-ENTMCNC: 31.9 G/DL (ref 31.5–35.7)
MCV RBC AUTO: 77.6 FL (ref 79–97)
MONOCYTES # BLD AUTO: 0.64 10*3/MM3 (ref 0.1–0.9)
MONOCYTES NFR BLD AUTO: 9.6 % (ref 5–12)
NEUTROPHILS NFR BLD AUTO: 5.34 10*3/MM3 (ref 1.7–7)
NEUTROPHILS NFR BLD AUTO: 80.1 % (ref 42.7–76)
NRBC BLD AUTO-RTO: 0 /100 WBC (ref 0–0.2)
PLAT MORPH BLD: NORMAL
PLATELET # BLD AUTO: 328 10*3/MM3 (ref 140–450)
PMV BLD AUTO: 11.1 FL (ref 6–12)
POTASSIUM SERPL-SCNC: 4.1 MMOL/L (ref 3.5–5.2)
PROCALCITONIN SERPL-MCNC: 0.43 NG/ML (ref 0–0.25)
PROT SERPL-MCNC: 5.1 G/DL (ref 6–8.5)
RBC # BLD AUTO: 3.92 10*6/MM3 (ref 4.14–5.8)
RBC MORPH BLD: NORMAL
SODIUM SERPL-SCNC: 138 MMOL/L (ref 136–145)
WBC MORPH BLD: NORMAL
WBC NRBC COR # BLD: 6.66 10*3/MM3 (ref 3.4–10.8)

## 2023-07-27 PROCEDURE — 85025 COMPLETE CBC W/AUTO DIFF WBC: CPT | Performed by: INTERNAL MEDICINE

## 2023-07-27 PROCEDURE — 80053 COMPREHEN METABOLIC PANEL: CPT | Performed by: HOSPITALIST

## 2023-07-27 PROCEDURE — 84145 PROCALCITONIN (PCT): CPT | Performed by: NURSE PRACTITIONER

## 2023-07-27 PROCEDURE — 94664 DEMO&/EVAL PT USE INHALER: CPT

## 2023-07-27 PROCEDURE — 99231 SBSQ HOSP IP/OBS SF/LOW 25: CPT | Performed by: PHYSICIAN ASSISTANT

## 2023-07-27 PROCEDURE — 99232 SBSQ HOSP IP/OBS MODERATE 35: CPT | Performed by: HOSPITALIST

## 2023-07-27 PROCEDURE — 94761 N-INVAS EAR/PLS OXIMETRY MLT: CPT

## 2023-07-27 PROCEDURE — 83605 ASSAY OF LACTIC ACID: CPT | Performed by: NURSE PRACTITIONER

## 2023-07-27 PROCEDURE — 92526 ORAL FUNCTION THERAPY: CPT

## 2023-07-27 PROCEDURE — 74018 RADEX ABDOMEN 1 VIEW: CPT

## 2023-07-27 PROCEDURE — 85007 BL SMEAR W/DIFF WBC COUNT: CPT | Performed by: INTERNAL MEDICINE

## 2023-07-27 PROCEDURE — 74177 CT ABD & PELVIS W/CONTRAST: CPT

## 2023-07-27 PROCEDURE — 94799 UNLISTED PULMONARY SVC/PX: CPT

## 2023-07-27 PROCEDURE — 25510000001 IOPAMIDOL PER 1 ML: Performed by: HOSPITALIST

## 2023-07-27 PROCEDURE — 25010000002 PIPERACILLIN SOD-TAZOBACTAM PER 1 G: Performed by: INTERNAL MEDICINE

## 2023-07-27 PROCEDURE — 25010000002 HYDROMORPHONE 1 MG/ML SOLUTION: Performed by: INTERNAL MEDICINE

## 2023-07-27 RX ORDER — IPRATROPIUM BROMIDE AND ALBUTEROL SULFATE 2.5; .5 MG/3ML; MG/3ML
3 SOLUTION RESPIRATORY (INHALATION) EVERY 4 HOURS PRN
Status: DISCONTINUED | OUTPATIENT
Start: 2023-07-27 | End: 2023-07-28

## 2023-07-27 RX ADMIN — IOPAMIDOL 90 ML: 755 INJECTION, SOLUTION INTRAVENOUS at 23:03

## 2023-07-27 RX ADMIN — HYDROMORPHONE HYDROCHLORIDE 1 MG: 1 INJECTION, SOLUTION INTRAMUSCULAR; INTRAVENOUS; SUBCUTANEOUS at 02:04

## 2023-07-27 RX ADMIN — HYDROMORPHONE HYDROCHLORIDE 1 MG: 1 INJECTION, SOLUTION INTRAMUSCULAR; INTRAVENOUS; SUBCUTANEOUS at 10:28

## 2023-07-27 RX ADMIN — HYDROCODONE BITARTRATE AND ACETAMINOPHEN 1 TABLET: 10; 325 TABLET ORAL at 08:45

## 2023-07-27 RX ADMIN — HYDROMORPHONE HYDROCHLORIDE 1 MG: 1 INJECTION, SOLUTION INTRAMUSCULAR; INTRAVENOUS; SUBCUTANEOUS at 05:03

## 2023-07-27 RX ADMIN — SODIUM CHLORIDE, POTASSIUM CHLORIDE, SODIUM LACTATE AND CALCIUM CHLORIDE 50 ML/HR: 600; 310; 30; 20 INJECTION, SOLUTION INTRAVENOUS at 02:00

## 2023-07-27 RX ADMIN — PANTOPRAZOLE SODIUM 40 MG: 40 TABLET, DELAYED RELEASE ORAL at 17:14

## 2023-07-27 RX ADMIN — HYDROCODONE BITARTRATE AND ACETAMINOPHEN 1 TABLET: 10; 325 TABLET ORAL at 20:31

## 2023-07-27 RX ADMIN — ATORVASTATIN CALCIUM 20 MG: 20 TABLET, FILM COATED ORAL at 20:31

## 2023-07-27 RX ADMIN — HYDROCODONE BITARTRATE AND ACETAMINOPHEN 1 TABLET: 10; 325 TABLET ORAL at 14:02

## 2023-07-27 RX ADMIN — PIPERACILLIN SODIUM AND TAZOBACTAM SODIUM 3.38 G: 3; .375 INJECTION, SOLUTION INTRAVENOUS at 05:03

## 2023-07-27 RX ADMIN — PANTOPRAZOLE SODIUM 40 MG: 40 TABLET, DELAYED RELEASE ORAL at 08:30

## 2023-07-27 RX ADMIN — Medication 1 PATCH: at 08:31

## 2023-07-27 RX ADMIN — METOPROLOL TARTRATE 25 MG: 25 TABLET, FILM COATED ORAL at 08:30

## 2023-07-27 RX ADMIN — Medication 5 MG: at 20:38

## 2023-07-27 RX ADMIN — IPRATROPIUM BROMIDE AND ALBUTEROL SULFATE 3 ML: .5; 3 SOLUTION RESPIRATORY (INHALATION) at 15:49

## 2023-07-27 RX ADMIN — HYDROXYZINE HYDROCHLORIDE 50 MG: 25 TABLET, FILM COATED ORAL at 20:38

## 2023-07-27 RX ADMIN — SIMETHICONE 80 MG: 80 TABLET, CHEWABLE ORAL at 20:38

## 2023-07-27 RX ADMIN — ASPIRIN 81 MG: 81 TABLET, COATED ORAL at 08:30

## 2023-07-27 RX ADMIN — Medication 10 ML: at 20:31

## 2023-07-27 RX ADMIN — MONTELUKAST 10 MG: 10 TABLET, FILM COATED ORAL at 20:31

## 2023-07-27 NOTE — PROGRESS NOTES
"GI Daily Progress Note  Subjective:    Chief Complaint:  Follow up pancreatitis     Had 2 bowel movements.  Tolerating clear liquids.     Wants to take a shower.   Abdominal pain is improving but still requiring IV Dilaudid frequently (received 8 mg Dilaudid total yesterday, 3 mg thus far today).        Objective:    /76 (BP Location: Right arm, Patient Position: Sitting)   Pulse 101   Temp 98.6 øF (37 øC) (Oral)   Resp 18   Ht 172.7 cm (68\")   Wt 91.7 kg (202 lb 3.2 oz)   SpO2 92%   BMI 30.74 kg/mý     Physical Exam  Constitutional:       General: He is not in acute distress.     Comments: Sitting up in the chair today   Cardiovascular:      Rate and Rhythm: Normal rate and regular rhythm.   Pulmonary:      Effort: Pulmonary effort is normal. No respiratory distress.   Abdominal:      General: Bowel sounds are normal. There is distension.      Palpations: Abdomen is soft.      Tenderness: There is abdominal tenderness.      Comments: Abdominal distention improved from previous exams  Mild tenderness to palpation of the epigastrium, improving    Neurological:      Mental Status: He is alert and oriented to person, place, and time.       Lab  Lab Results   Component Value Date    WBC 6.66 07/27/2023    HGB 9.7 (L) 07/27/2023    HGB 9.9 (L) 07/26/2023    HGB 10.9 (L) 07/25/2023    MCV 77.6 (L) 07/27/2023     07/27/2023    INR 0.98 07/23/2023       Lab Results   Component Value Date    GLUCOSE 97 07/27/2023    BUN 27 (H) 07/27/2023    CREATININE 0.72 (L) 07/27/2023    EGFRIFNONA 70 01/19/2022    BCR 37.5 (H) 07/27/2023     07/27/2023    K 4.1 07/27/2023    CO2 25.0 07/27/2023    CALCIUM 8.2 (L) 07/27/2023    PROTENTOTREF 7.2 02/05/2019    ALBUMIN 2.8 (L) 07/27/2023    ALKPHOS 71 07/27/2023    BILITOT 1.2 07/27/2023    ALT 19 07/27/2023    AST 19 07/27/2023       Assessment:    Acute gallstone pancreatitis   SIRS   Abdominal distention, likely sequela of pancreatitis, ileus.   Improved.   "     Plan:    Slowly improving.   Still requiring high dose IV narcotics, as described above.       >> Continue Relistor while inpatient on high dose opiates  >> Encouraged patient to try PO opiate regimen as discharge is anticipated in the coming days   >> Increase ambulation     GI will sign off.   Please call for questions or concerns.  Follow up outpatient with Dr. Tony regarding cholecystectomy.        JACIEL Albert  07/27/23  10:54 EDT

## 2023-07-27 NOTE — PLAN OF CARE
Goal Outcome Evaluation:  Plan of Care Reviewed With: patient, spouse              SLP treatment completed. Will sign-off. Please see note for further details and recommendations.

## 2023-07-27 NOTE — PROGRESS NOTES
[facility] Medicine Services  PROGRESS NOTE    Patient Name: Ta Madison  : 1960  MRN: [id number]    Date of Admission: 2023  Primary Care Physician: Yolande Olvera APRN    Subjective   Subjective     CC:  F/u abd pain     HPI: Tolerating PO. No f/c. Less distended and tender. No f/c. No n/v.     Review of Systems   Constitutional:  Positive for activity change and fatigue. Negative for chills and fever.   Respiratory: Negative.     Cardiovascular: Negative.    Gastrointestinal:  Positive for abdominal distention and abdominal pain.       Objective   Objective     Vital Signs:   Temp:  [98.6 øF (37 øC)-99.7 øF (37.6 øC)] 98.6 øF (37 øC)  Heart Rate:  [] 101  Resp:  [16-18] 18  BP: (125-152)/(63-83) 152/76  Flow (L/min):  [2] 2     Physical Exam:  NAD, alert and oriented  OP clear, MMM  Neck supple  No LAD  RRR  CTAB, on RA  +BS, soft, mild distention, NT  No c/c/e  No rashes  RENDON  Normal affect    Results Reviewed:  LAB RESULTS:      Lab 23  0319 23  0720 23  1036 23  0324 23  2019 23  1352 23  0725 23  0442 23  0153 23  2246   WBC 6.66 13.33*  --  15.29*  --   --   --  14.99*  --   --    HEMOGLOBIN 9.7* 9.9*  --  10.9*  --   --   --  12.0*  --   --    HEMATOCRIT 30.4* 31.7*  --  35.6*  --   --   --  39.3  --   --    PLATELETS 328 300  --  358  --   --   --  456*  --   --    NEUTROS ABS 5.34 11.54*  --  13.62*  --   --   --  13.58*  --   --    IMMATURE GRANS (ABS) 0.02 0.08*  --  0.06*  --   --   --  0.06*  --   --    LYMPHS ABS 0.55* 0.73  --  0.80  --   --   --  0.67*  --   --    MONOS ABS 0.64 0.92*  --  0.79  --   --   --  0.66  --   --    EOS ABS 0.09 0.03  --  0.00  --   --   --  0.00  --   --    MCV 77.6* 79.3  --  80.9  --   --   --  78.9*  --   --    PROCALCITONIN  --   --   --   --   --   --   --   --   --  0.85*   LACTATE  --   --  3.2*  --  2.7* 3.1* 3.5* 4.1*   < > 3.6*   PROTIME  --   --   --   --    --   --   --   --   --  13.1   APTT  --   --   --   --   --   --   --   --   --  26.9    < > = values in this interval not displayed.         Lab 07/27/23 0319 07/26/23 0720 07/25/23 0324 07/24/23 0924 07/23/23 2246   SODIUM 138 139 141 143 140   POTASSIUM 4.1 4.3 5.1 5.3* 5.1   CHLORIDE 101 102 107 108* 108*   CO2 25.0 24.0 22.0 20.0* 14.0*   ANION GAP 12.0 13.0 12.0 15.0 18.0*   BUN 27* 27* 36* 31* 28*   CREATININE 0.72* 0.77 1.29* 1.44* 1.42*   EGFR 103.3 101.2 62.7 54.9* 55.9*   GLUCOSE 97 87 101* 139* 171*   CALCIUM 8.2* 8.3* 8.7 8.8 8.5*   MAGNESIUM  --   --   --   --  2.3         Lab 07/27/23 0319 07/26/23 0720 07/25/23 0324 07/24/23 0924 07/23/23 2246   TOTAL PROTEIN 5.1* 5.3* 5.5* 6.1 6.6   ALBUMIN 2.8* 2.8* 3.4* 3.7 3.8   GLOBULIN 2.3 2.5 2.1 2.4 2.8   ALT (SGPT) 19 24 38 49* 53*   AST (SGOT) 19 28 38 50* 66*   BILIRUBIN 1.2 1.8* 1.2 0.8 0.4   ALK PHOS 71 76 69 75 76   LIPASE  --   --   --  1,255*  --          Lab 07/25/23  1502 07/23/23  2246   PROBNP 789.2  --    PROTIME  --  13.1   INR  --  0.98         Lab 07/24/23 0924 07/23/23 2246   CHOLESTEROL 134  --    LDL CHOL 72  --    HDL CHOL 39*  --    TRIGLYCERIDES 126 108         Lab 07/23/23  2349 07/23/23 2245   ABO TYPING O O   RH TYPING Positive Positive   ANTIBODY SCREEN  --  Negative         Brief Urine Lab Results  (Last result in the past 365 days)        Color   Clarity   Blood   Leuk Est   Nitrite   Protein   CREAT   Urine HCG        07/25/23 0636 Dark Yellow   Clear   Negative   Negative   Negative   30 mg/dL (1+)                   Microbiology Results Abnormal       Procedure Component Value - Date/Time    Blood Culture - Blood, Hand, Left [[id number]]  (Normal) Collected: 07/23/23 2240    Lab Status: Preliminary result Specimen: Blood from Hand, Left Updated: 07/26/23 2300     Blood Culture No growth at 3 days    Narrative:      AEROBIC BOTTLE ONLY    Blood Culture - Blood, Hand, Right [[id number]]  (Normal) Collected: 07/23/23 2230     Lab Status: Preliminary result Specimen: Blood from Hand, Right Updated: 07/26/23 2300     Blood Culture No growth at 3 days    Narrative:      AEROBIC BOTTLE ONLY    COVID PRE-OP / PRE-PROCEDURE SCREENING ORDER (NO ISOLATION) - Swab, Nasopharynx [[id number]]  (Normal) Collected: 07/24/23 0620    Lab Status: Final result Specimen: Swab from Nasopharynx Updated: 07/24/23 0729    Narrative:      The following orders were created for panel order COVID PRE-OP / PRE-PROCEDURE SCREENING ORDER (NO ISOLATION) - Swab, Nasopharynx.  Procedure                               Abnormality         Status                     ---------                               -----------         ------                     Respiratory Panel PCR w/...[[id number]]  Normal              Final result                 Please view results for these tests on the individual orders.    Respiratory Panel PCR w/COVID-19(SARS-CoV-2) [facility]TAZAR/ANDRES/ASTER/PAD/COR/MAD/VALENTINE In-House, NP Swab in UTM/VTM, 3-4 HR TAT - Swab, Nasopharynx [[id number]]  (Normal) Collected: 07/24/23 0620    Lab Status: Final result Specimen: Swab from Nasopharynx Updated: 07/24/23 0729     ADENOVIRUS, PCR Not Detected     Coronavirus 229E Not Detected     Coronavirus HKU1 Not Detected     Coronavirus NL63 Not Detected     Coronavirus OC43 Not Detected     COVID19 Not Detected     Human Metapneumovirus Not Detected     Human Rhinovirus/Enterovirus Not Detected     Influenza A PCR Not Detected     Influenza B PCR Not Detected     Parainfluenza Virus 1 Not Detected     Parainfluenza Virus 2 Not Detected     Parainfluenza Virus 3 Not Detected     Parainfluenza Virus 4 Not Detected     RSV, PCR Not Detected     Bordetella pertussis pcr Not Detected     Bordetella parapertussis PCR Not Detected     Chlamydophila pneumoniae PCR Not Detected     Mycoplasma pneumo by PCR Not Detected    Narrative:      In the setting of a positive respiratory panel with a viral infection PLUS a negative procalcitonin  without other underlying concern for bacterial infection, consider observing off antibiotics or discontinuation of antibiotics and continue supportive care. If the respiratory panel is positive for atypical bacterial infection (Bordetella pertussis, Chlamydophila pneumoniae, or Mycoplasma pneumoniae), consider antibiotic de-escalation to target atypical bacterial infection.            XR Chest 1 View    Result Date: 7/25/2023  XR CHEST 1 VW Date of Exam: 7/25/2023 1:29 PM EDT Indication: soa Comparison: None available. Findings: There are low lung volumes with poor inspiration. There is mild atelectasis at the lung bases, greatest on the left. There may be a minimal left effusion. The heart size is normal and pulmonary vessels are within normal limits.     Impression: Impression: Mild atelectasis lung bases, greatest on the left. Electronically Signed: Suyapa Vu MD  7/25/2023 1:54 PM EDT  Workstation ID: [id number]         Current medications:  Scheduled Meds:aspirin, 81 mg, Oral, Daily  atorvastatin, 20 mg, Oral, Nightly  enzalutamide, 160 mg, Oral, Daily  metoprolol tartrate, 25 mg, Oral, Daily  montelukast, 10 mg, Oral, Nightly  nicotine, 1 patch, Transdermal, Q24H  pantoprazole, 40 mg, Oral, BID AC  sodium chloride, 10 mL, Intravenous, Q12H      Continuous Infusions:lactated ringers, 75 mL/hr, Last Rate: 75 mL/hr (07/26/23 1138)  lactated ringers, 50 mL/hr, Last Rate: 50 mL/hr (07/27/23 0200)      PRN Meds:.  acetaminophen **OR** acetaminophen **OR** acetaminophen    HYDROcodone-acetaminophen    HYDROmorphone    hydrOXYzine    melatonin    naloxone    ondansetron    prochlorperazine    simethicone    sodium chloride    sodium chloride    Assessment & Plan   Assessment & Plan     Active Hospital Problems    Diagnosis  POA    **Acute gallstone pancreatitis [K85.10]  Yes    Elevated serum creatinine [R79.89]  Yes    Personal history of transient ischemic attack (TIA) [Z86.73]  Not Applicable    Adenocarcinoma  of prostate [C61]  Yes    Gastroesophageal reflux disease [K21.9]  Yes    Hyperlipidemia [E78.5]  Yes    Tobacco abuse [Z72.0]  Yes      Resolved Hospital Problems   No resolved problems to display.        Brief Hospital Course to date:  Ta Madison is a 63 y.o. male with PMH of GERD, HLD, hx of TIA, advanced prostate cancer on Xtandi, occasional marijuna use, and chronic 1 PPD tobacco use who initially presented to [facility] 7/23/23 with complaints of severe epigastric abdominal pain with associated nausea and vomiting. CT A/P at OSH showed acute pancreatitis with 2 mm stone in the proximal duodenum. Transferred to [facility] for GI evaluation.     Acute gallstone pancreatitis  Leukocytosis  Lactic acidosis  -CT A/P at OSH: acute interstitial pancreatitis, cholelithiasis with 2 mm stone in proximal duodenum (possibly a recently passed gallstone), transmural thickening of some jejunal loops in the left mid abdomen possibly reactive changes secondary to pancreatitis  -Lipase 1255  -Normal triglycerides   -MRCP showed acute pancreatitis no obstructing stones   -GI following   -General Surgery consulted, Dr. Tony recommends continued treatment for pancreatitis and F/U in 6 weeks for interval cholecystectomy, unless he has significant improvement in next 24-48 hours  -stop IV abx and observe  -mobilize  -home 7/28 if continues to improve with follow up with Dr. Tony in 2 weeks to discuss CCY    SOA  -better, on RA  -IS/pulmonary toilet     Elevated Cr  -better/resolved after IVF     Prostate cancer  -Continue Xtandi oral chemotherapy (patient supplied)     HLD  -statin     GERD  -PPI     Hx of TIA  -resumed ASA since no further procedures planned   -Continue statin     Tobacco abuse  -Counseled on cessation  -Nicotine patch       Expected Discharge Location and Transportation: home   Expected Discharge 7/28/2023  Expected Discharge Date: 7/28/2023; Expected Discharge Time:      DVT prophylaxis:  Mechanical DVT  prophylaxis orders are present.     AM-PAC 6 Clicks Score (PT): 22 (07/26/23 0800)    CODE STATUS:   Code Status and Medical Interventions:   Ordered at: 07/23/23 2229     Code Status (Patient has no pulse and is not breathing):    CPR (Attempt to Resuscitate)     Medical Interventions (Patient has pulse or is breathing):    Full Support     Release to patient:    Routine Release       Curt Barry MD  07/27/23

## 2023-07-27 NOTE — THERAPY TREATMENT NOTE
Acute Care - Speech Language Pathology   Swallow Treatment Note [facility]     Patient Name: Ta Madison  : 1960  MRN: [id number]  Today's Date: 2023               Admit Date: 2023    Visit Dx:     ICD-10-CM ICD-9-CM   1. Acute gallstone pancreatitis  K85.10 577.0     574.20   2. Dysphagia, unspecified type  R13.10 787.20     Patient Active Problem List   Diagnosis    Tachycardia    Hyperlipidemia    Multiple allergies    Tobacco abuse    Gastroesophageal reflux disease    Chronic right shoulder pain    Ganglion cyst of dorsum of right wrist    Benign skin cyst    Numbness and tingling of both upper extremities    Right shoulder pain    Adenocarcinoma of prostate    Encounter for screening for malignant neoplasm of colon    Epigastric pain    Acute gallstone pancreatitis    Personal history of transient ischemic attack (TIA)    Elevated serum creatinine     Past Medical History:   Diagnosis Date    Elevated cholesterol     GERD (gastroesophageal reflux disease)     Increased heart rate     Mini stroke     Neck pain     Prostate cancer     Rash     Tobacco abuse      Past Surgical History:   Procedure Laterality Date    APPENDECTOMY      [facility]     COLONOSCOPY      COLONOSCOPY N/A 2023    Procedure: COLONOSCOPY;  Surgeon: Mahnaz Caarballo MD;  Location: [facility];  Service: Gastroenterology;  Laterality: N/A;       SLP Recommendation and Plan     SLP Diet Recommendation: clear liquid diet, other (see comments) (advance per MD discretion) (23)  Recommended Precautions and Strategies: upright posture during/after eating, general aspiration precautions, reflux precautions (23)  SLP Rec. for Method of Medication Administration: meds whole, with thin liquids, with puree, as tolerated (23)     Monitor for Signs of Aspiration: yes, notify SLP if any concerns (23)        Anticipated Discharge Disposition (SLP): home with home  "health (07/27/23 1435)     Therapy Frequency (Swallow): evaluation only (07/27/23 1435)     Oral Care Recommendations: Oral Care BID/PRN, Toothbrush (07/27/23 1435)        Daily Summary of Progress (SLP): progress toward functional goals is good (07/27/23 1435)               Treatment Assessment (SLP): toleration of diet (07/27/23 1435)  Treatment Assessment Comments (SLP): Pt reported still some intermittent difficulty w/ meds, but declined further instrumental swallow eval to assess. No overt s/sxs aspiration w/ thin H2O or jell-o. Provided edu re: alternate methods for taking pills (i.e. in pudding/puree, pill swallowing gel, etc). Additionally provided edu re: OP swallow study if any difficulty following d/c. (07/27/23 1435)  Plan for Continued Treatment (SLP): treatment no longer indicated as all goals met (07/27/23 1435)       Oral Care Recommendations: Oral Care BID/PRN, Toothbrush (07/27/23 1435)    Plan of Care Reviewed With: patient, spouse      SWALLOW EVALUATION (last 72 hours)       SLP Adult Swallow Evaluation       Row Name 07/27/23 1435 07/26/23 1150                Rehab Evaluation    Document Type therapy note (daily note)  -MP evaluation  -AC       Subjective Information no complaints  -MP no complaints  -AC       Patient Observations alert;cooperative  -MP alert;cooperative  -AC       Patient/Family/Caregiver Comments/Observations Spouse present  -MP Spouse present.  -AC       Patient Effort good  -MP good  -AC          General Information    Patient Profile Reviewed -- yes  -AC       Pertinent History Of Current Problem -- Acute gallstone pancreatitis. Consulted when \"strangled\" on pills last night, but reported he was reclined in bed & having more difficulty w/ his breathing at the time. Hx GERD w/ c/o drainage/indigestion (on Protonix), prostate CA, TIA.  -AC       Current Method of Nutrition -- clear liquids  -AC       Precautions/Limitations, Vision -- WFL;for purposes of eval  -AC       " Precautions/Limitations, Hearing -- WFL;for purposes of eval  -AC       Prior Level of Function-Swallowing -- no diet consistency restrictions;esophageal concerns  -       Plans/Goals Discussed with -- patient;spouse/S.O.;agreed upon  -       Barriers to Rehab -- none identified  -       Patient's Goals for Discharge -- patient did not state  -AC       Family Goals for Discharge -- family did not state  -AC          Pain    Additional Documentation Pain Scale: FACES Pre/Post-Treatment (Group)  -MP Pain Scale: FACES Pre/Post-Treatment (Group)  -AC          Pain Scale: FACES Pre/Post-Treatment    Pain: FACES Scale, Pretreatment 0-->no hurt  -MP 2-->hurts little bit  -AC       Posttreatment Pain Rating 0-->no hurt  -MP 2-->hurts little bit  -AC       Pain Location - -- abdomen  -AC       Pre/Posttreatment Pain Comment -- Ongoing since admit  -          Oral Motor Structure and Function    Dentition Assessment -- edentulous;other (see comments)  reportedly wears dentures on occasion depending on what he's eating  -AC       Secretion Management -- WNL/WFL  -AC       Mucosal Quality -- moist, healthy  -AC       Volitional Swallow -- WFL  -AC       Volitional Cough -- WFL  -AC          Oral Musculature and Cranial Nerve Assessment    Oral Motor General Assessment -- WFL  -AC          General Eating/Swallowing Observations    Respiratory Support Currently in Use -- room air  -       Eating/Swallowing Skills -- self-fed;appropriate self-feeding skills observed  -AC       Positioning During Eating -- upright 90 degree;other (see comments)  sitting EOB  -AC       Utensils Used -- spoon;cup;straw  -       Consistencies Trialed -- thin liquids  -AC          Clinical Swallow Eval    Oral Prep Phase -- WFL  -AC       Oral Transit -- WFL  -AC       Oral Residue -- WFL  -AC       Clinical Swallow Evaluation Summary -- Pt assessed w/ clear liquid lunch tray: jell-O, thin H2O. Initial throat clear noted upon entry to room.  Pt c/o chronic drainage. No further concerns or s/sxs aspiration, even when pt pushed w/ 3oz H2O test. Discussed s/sxs aspiration and risks w/ pt.  -          Swallowing Quality of Life Assessment    Education and counseling provided -- Signs of aspiration;Silent aspiration;Risks of aspiration;Aspiration precautions  -          SLP Evaluation Clinical Impression    SLP Swallowing Diagnosis -- R/O pharyngeal dysphagia;suspected esophageal dysphagia  -       Functional Impact -- risk of aspiration/pneumonia  -       Rehab Potential/Prognosis, Swallowing -- good, to achieve stated therapy goals  -       Swallow Criteria for Skilled Therapeutic Interventions Met -- demonstrates skilled criteria  -          SLP Treatment Clinical Impressions    Treatment Assessment (SLP) toleration of diet  -MP --       Treatment Assessment Comments (SLP) Pt reported still some intermittent difficulty w/ meds, but declined further instrumental swallow eval to assess. No overt s/sxs aspiration w/ thin H2O or jell-o. Provided edu re: alternate methods for taking pills (i.e. in pudding/puree, pill swallowing gel, etc). Additionally provided edu re: OP swallow study if any difficulty following d/c.  -MP --       Daily Summary of Progress (SLP) progress toward functional goals is good  -MP --       Plan for Continued Treatment (SLP) treatment no longer indicated as all goals met  -MP --       Care Plan Review care plan/treatment goals reviewed;patient/other agree to care plan  - --          Recommendations    Therapy Frequency (Swallow) evaluation only  -MP PRN  -       Predicted Duration Therapy Intervention (Days) -- until discharge  -       SLP Diet Recommendation clear liquid diet;other (see comments)  advance per MD discretion  -MP clear liquid diet  -       Recommended Diagnostics -- other (see comments)  consider MBS vs FEES if further concerns arise  -       Recommended Precautions and Strategies upright posture  during/after eating;general aspiration precautions;reflux precautions  -MP upright posture during/after eating;general aspiration precautions;reflux precautions  -AC       Oral Care Recommendations Oral Care BID/PRN;Toothbrush  -MP Oral Care BID/PRN;Toothbrush  -AC       SLP Rec. for Method of Medication Administration meds whole;with thin liquids;with puree;as tolerated  -MP meds whole;with thin liquids;with puree;as tolerated  -AC       Monitor for Signs of Aspiration yes;notify SLP if any concerns  -MP yes;notify SLP if any concerns  -AC       Anticipated Discharge Disposition (SLP) home with home health  -MP anticipate therapy at next level of care;home with home health  pending  -AC                 User Key  (r) = Recorded By, (t) = Taken By, (c) = Cosigned By      Initials Name Effective Dates    AC Lorrie Amado MS CCC-SLP 02/03/23 -     MP Daryn Palafox MS CCC-SLP 12/28/21 -                     EDUCATION  The patient has been educated in the following areas:   Dysphagia (Swallowing Impairment).        SLP GOALS       Row Name 07/27/23 1435 07/26/23 1150          (LTG) Patient will demonstrate functional swallow for    Diet Texture (Demonstrate functional swallow) regular textures  -MP regular textures  -AC     Liquid viscosity (Demonstrate functional swallow) thin liquids  -MP thin liquids  -AC     [place] (Demonstrate functional swallow) independently (over 90% accuracy)  -MP independently (over 90% accuracy)  -AC     Time Frame (Demonstrate functional swallow) by discharge  -MP by discharge  -AC     Barriers (Demonstrate functional swallow) -- GI restrictions  -AC     Progress/Outcomes (Demonstrate functional swallow) goal partially met  -MP --        (STG) Patient will tolerate trials of    Consistencies Trialed (Tolerate trials) regular textures;thin liquids  -MP regular textures;thin liquids  pending GI diet restrictions  -AC     Desired Outcome (Tolerate trials) without signs/symptoms  of aspiration;with adequate oral prep/transit/clearance  -MP without signs/symptoms of aspiration;with adequate oral prep/transit/clearance  -AC     [place] (Tolerate trials) independently (over 90% accuracy)  -MP independently (over 90% accuracy)  -AC     Time Frame (Tolerate trials) by discharge  -MP by discharge  -AC     Progress/Outcomes (Tolerate trials) goal partially met  -MP --               User Key  (r) = Recorded By, (t) = Taken By, (c) = Cosigned By      Initials Name Provider Type    Lorrie Mcneil MS CCC-SLP Speech and Language Pathologist    Daryn Mattson MS CCC-SLP Speech and Language Pathologist                       Time Calculation:    Time Calculation- SLP       Row Name 07/27/23 1503             Time Calculation- SLP    SLP Start Time 1435  -MP      SLP Received On 07/27/23  -MP         Untimed Charges    07495-OL Treatment Swallow Minutes 40  -MP         Total Minutes    Untimed Charges Total Minutes 40  -MP       Total Minutes 40  -MP                User Key  (r) = Recorded By, (t) = Taken By, (c) = Cosigned By      Initials Name Provider Type    Daryn Mattson, MS CCC-SLP Speech and Language Pathologist                    Therapy Charges for Today       Code Description Service Date Service Provider Modifiers Qty    [id number] HC ST TREATMENT SWALLOW 3 7/27/2023 Daryn Palafox MS CCC-SLP GN 1                 Daryn Orozco MS CCC-INES  7/27/2023

## 2023-07-27 NOTE — CASE MANAGEMENT/SOCIAL WORK
Continued Stay Note  [facility]     Patient Name: Ta Madison  MRN: [id number]  Today's Date: 7/27/2023    Admit Date: 7/23/2023    Plan: Home at DC   Discharge Plan       Row Name 07/27/23 1130       Plan    Plan Home at DC    Patient/Family in Agreement with Plan yes    Plan Comments I spoke with the pt. He denies any DC needs at this time.    Final Discharge Disposition Code 01 - home or self-care                   Discharge Codes    No documentation.                 Expected Discharge Date and Time       Expected Discharge Date Expected Discharge Time    Jul 28, 2023               Lesvia Estrada RN

## 2023-07-27 NOTE — PLAN OF CARE
Problem: Adult Inpatient Plan of Care  Goal: Plan of Care Review  Outcome: Ongoing, Progressing  Flowsheets (Taken 7/27/2023 1635)  Progress: no change  Plan of Care Reviewed With:   patient   spouse  Outcome Evaluation: Pt continues to complain of 8-9 abdominal pain. Tolerating oral intake and had BM this shift.  Goal: Patient-Specific Goal (Individualized)  Outcome: Ongoing, Progressing  Flowsheets (Taken 7/27/2023 1635)  Patient-Specific Goals (Include Timeframe): pt will maintain pain level of 5 or less with meds  Individualized Care Needs: meds  Anxieties, Fears or Concerns: pain   Goal Outcome Evaluation:  Plan of Care Reviewed With: patient, spouse        Progress: no change  Outcome Evaluation: Pt continues to complain of 8-9 abdominal pain. Tolerating oral intake and had BM this shift.

## 2023-07-27 NOTE — PROGRESS NOTES
"                  Clinical Nutrition   Nutrition Assessment  Reason for Visit: MST score 2+, NPO/Clear liquid, Unintentional weight loss, Reduced oral [id number]    Patient Name: Ta Madison  YOB: 1960  MRN: [id number]  Date of Encounter: 07/27/23 13:46 EDT  Admission date: 7/23/2023    NPO/CLDX3d  No significant nutritional concerns, diet education for pancreatitis provided.   Recommend advancing to low fat diet as tolerated once medically appropriate.  Will send Breeze with meals, educated on importance managing nutritional needs.     Nutrition Assessment   Admission Diagnosis:  Pancreatitis [K85.90]  Acute gallstone pancreatitis [K85.10]    Problem List:    Acute gallstone pancreatitis    Hyperlipidemia    Tobacco abuse    Gastroesophageal reflux disease    Adenocarcinoma of prostate    Personal history of transient ischemic attack (TIA)    Elevated serum creatinine      PMH:   He  has a past medical history of Elevated cholesterol, GERD (gastroesophageal reflux disease), Increased heart rate, Mini stroke, Neck pain, Prostate cancer, Rash, and Tobacco abuse.    PSH:  He  has a past surgical history that includes Appendectomy (1971); Colonoscopy; and Colonoscopy (N/A, 1/25/2023).    Applicable Nutrition Concerns:   Skin:  Oral:  GI:    Applicable Interval History:   (7/24) MRI Abd. - Impression:  Acute pancreatitis without organized fluid collection.  Cholelithiasis without choledocholithiasis.    Reported/Observed/Food/Nutrition Related History:     Visited with pt and son at bedside. Pt reports no significant nutrition concerns PTA. Reports tolerating CLD, agreeable to Breeze while on liquids. Voices understanding education for pancreatitis/low fat diet. Denied further dietary needs/preferences, NKFA.     Anthropometrics     Height: Height: 172.7 cm (68\")  Last Filed Weight: Weight: 91.7 kg (202 lb 3.2 oz) (07/23/23 4525)  Method: Weight Method: Bed scale  BMI: BMI (Calculated): 30.8  BMI " classification: Obese Class I: 30-34.9kg/m2  IBW:   150 lb    UBW:     Weight      Weight (kg) Weight (lbs) Weight Method   11/28/2022 86.818 kg  191 lb 6.4 oz     1/25/2023 90.719 kg  200 lb  Stated    2/28/2023 85.911 kg  189 lb 6.4 oz     4/12/2023 84.732 kg  186 lb 12.8 oz     4/19/2023 83.734 kg  184 lb 9.6 oz     6/7/2023 81.92 kg  180 lb 9.6 oz     6/13/2023 80.74 kg  178 lb     7/23/2023 91.717 kg  202 lb 3.2 oz  Bed scale      Weight change: unchanged    Nutrition Focused Physical Exam     Date:  7/27       Pt does not meet criteria for malnutrition diagnosis, at this time.      Current Nutrition Prescription   PO: Diet: Liquid Diets; Clear Liquid; Texture: Regular Texture (IDDSI 7); Fluid Consistency: Thin (IDDSI 0)  Oral Nutrition Supplement:   Intake: Insufficient data    Nutrition Diagnosis   Date:  7/27            Updated:    Problem Altered GI function    Etiology Gallstone pancreatitis   Signs/Symptoms MRI, lipase>1000, abd. pain   Status: New    Date:   7/27    Updated:     Problem Inadequate oral intake   Etiology Per clinical status   Signs/Symptoms NPO/CLDx3d   Status: New    Goal:   General: Nutrition to support treatment  PO: Tolerate PO, Increase intake  EN/PN: N/A    Nutrition Intervention      Follow treatment progress, Care plan reviewed, Advise alternate selection, Advised available snacks, Interview for preferences, Menu provided, Encourage intake, Supplement provided, Education provided for pancreatitis    Breeze all meals while on clears  Recc advance to LF diet as tolerated    Monitoring/Evaluation:   Per protocol, I&O, PO intake, Supplement intake, Pertinent labs, Weight, GI status, Symptoms, POC/GOC      Minerva Carrera RD, CNSC  Time Spent: 30m

## 2023-07-27 NOTE — PLAN OF CARE
Problem: Adult Inpatient Plan of Care  Goal: Plan of Care Review  Outcome: Ongoing, Progressing  Goal: Patient-Specific Goal (Individualized)  Outcome: Ongoing, Progressing  Goal: Absence of Hospital-Acquired Illness or Injury  Outcome: Ongoing, Progressing  Intervention: Identify and Manage Fall Risk  Recent Flowsheet Documentation  Taken 7/27/2023 0400 by Allen Gresham RN  Safety Promotion/Fall Prevention:   activity supervised   assistive device/personal items within reach   clutter free environment maintained   toileting scheduled   safety round/check completed   room organization consistent   nonskid shoes/slippers when out of bed  Taken 7/27/2023 0204 by Allen Gresham RN  Safety Promotion/Fall Prevention:   activity supervised   assistive device/personal items within reach   clutter free environment maintained   toileting scheduled   safety round/check completed   room organization consistent   nonskid shoes/slippers when out of bed  Taken 7/27/2023 0000 by Allen Gresham RN  Safety Promotion/Fall Prevention:   activity supervised   clutter free environment maintained   assistive device/personal items within reach   toileting scheduled   safety round/check completed   room organization consistent   nonskid shoes/slippers when out of bed  Taken 7/26/2023 2200 by Allen Gresham RN  Safety Promotion/Fall Prevention:   activity supervised   assistive device/personal items within reach   clutter free environment maintained   toileting scheduled   safety round/check completed   room organization consistent   nonskid shoes/slippers when out of bed  Taken 7/26/2023 2008 by Allen Gresham RN  Safety Promotion/Fall Prevention:   activity supervised   assistive device/personal items within reach   clutter free environment maintained   toileting scheduled   safety round/check completed   room organization consistent   nonskid shoes/slippers when out of bed  Intervention: Prevent Skin Injury  Recent  Flowsheet Documentation  Taken 7/27/2023 0400 by Allen Gresham RN  Body Position: position changed independently  Taken 7/27/2023 0204 by Allen Gresham RN  Body Position: position changed independently  Taken 7/27/2023 0000 by Allen Gresham RN  Body Position: position changed independently  Taken 7/26/2023 2200 by Allen Gresham RN  Body Position: position changed independently  Taken 7/26/2023 2008 by Allen Gresham RN  Body Position: position changed independently  Skin Protection:   adhesive use limited   incontinence pads utilized   tubing/devices free from skin contact  Intervention: Prevent and Manage VTE (Venous Thromboembolism) Risk  Recent Flowsheet Documentation  Taken 7/27/2023 0400 by Allen Gresham RN  Activity Management: activity encouraged  Taken 7/27/2023 0204 by Allen Gresham RN  Activity Management: activity encouraged  Taken 7/27/2023 0000 by Allen Gresham RN  Activity Management: activity encouraged  Taken 7/26/2023 2200 by Allen Gresham RN  Activity Management: activity encouraged  Taken 7/26/2023 2008 by Allen Gresham RN  Activity Management: activity encouraged  Range of Motion: active ROM (range of motion) encouraged  Intervention: Prevent Infection  Recent Flowsheet Documentation  Taken 7/27/2023 0400 by Allen Gresham RN  Infection Prevention:   cohorting utilized   environmental surveillance performed   equipment surfaces disinfected   hand hygiene promoted   rest/sleep promoted   single patient room provided  Taken 7/27/2023 0204 by Allen Gresham RN  Infection Prevention:   cohorting utilized   environmental surveillance performed   equipment surfaces disinfected   rest/sleep promoted   hand hygiene promoted   single patient room provided  Taken 7/27/2023 0000 by Allen Gresham RN  Infection Prevention:   cohorting utilized   environmental surveillance performed   equipment surfaces disinfected   hand hygiene promoted   single  patient room provided   rest/sleep promoted  Taken 7/26/2023 2200 by Allen Gresham RN  Infection Prevention:   cohorting utilized   environmental surveillance performed   equipment surfaces disinfected   hand hygiene promoted   rest/sleep promoted   single patient room provided  Taken 7/26/2023 2008 by Allen Gresham RN  Infection Prevention:   cohorting utilized   environmental surveillance performed   hand hygiene promoted   equipment surfaces disinfected   rest/sleep promoted   single patient room provided  Goal: Optimal Comfort and Wellbeing  Outcome: Ongoing, Progressing  Intervention: Monitor Pain and Promote Comfort  Recent Flowsheet Documentation  Taken 7/26/2023 2008 by Allen Gresham RN  Pain Management Interventions: see MAR  Intervention: Provide Person-Centered Care  Recent Flowsheet Documentation  Taken 7/26/2023 2008 by Allen Gresham RN  Trust Relationship/Rapport:   care explained   choices provided   empathic listening provided   emotional support provided   questions answered   questions encouraged   reassurance provided   thoughts/feelings acknowledged  Goal: Readiness for Transition of Care  Outcome: Ongoing, Progressing     Problem: Hypertension Comorbidity  Goal: Blood Pressure in Desired Range  Outcome: Ongoing, Progressing  Intervention: Maintain Blood Pressure Management  Recent Flowsheet Documentation  Taken 7/27/2023 0400 by Allen Gresham RN  Medication Review/Management: medications reviewed  Taken 7/27/2023 0204 by Allen Gresham RN  Medication Review/Management: medications reviewed  Taken 7/27/2023 0000 by Allen Gresham RN  Medication Review/Management: medications reviewed  Taken 7/26/2023 2200 by Allen Gresham RN  Medication Review/Management: medications reviewed  Taken 7/26/2023 2008 by Allen Gresham RN  Medication Review/Management: medications reviewed     Problem: Skin Injury Risk Increased  Goal: Skin Health and Integrity  Outcome:  Ongoing, Progressing  Intervention: Optimize Skin Protection  Recent Flowsheet Documentation  Taken 7/27/2023 0400 by Allen Gresham RN  Head of Bed (HOB) Positioning: HOB elevated  Taken 7/27/2023 0204 by Allen Gresham RN  Head of Bed (HOB) Positioning: HOB elevated  Taken 7/27/2023 0000 by Allen Gresham RN  Head of Bed (HOB) Positioning: HOB elevated  Taken 7/26/2023 2200 by Allen Gresham RN  Head of Bed (HOB) Positioning: HOB elevated  Taken 7/26/2023 2008 by Allen Gresham RN  Pressure Reduction Techniques: frequent weight shift encouraged  Head of Bed (HOB) Positioning: HOB elevated  Pressure Reduction Devices: pressure-redistributing mattress utilized  Skin Protection:   adhesive use limited   incontinence pads utilized   tubing/devices free from skin contact     Problem: Adjustment to Illness (Sepsis/Septic Shock)  Goal: Optimal Coping  Outcome: Ongoing, Progressing  Intervention: Optimize Psychosocial Adjustment to Illness  Recent Flowsheet Documentation  Taken 7/26/2023 2008 by Allen Gresham RN  Supportive Measures: active listening utilized  Family/Support System Care:   support provided   self-care encouraged     Problem: Bleeding (Sepsis/Septic Shock)  Goal: Absence of Bleeding  Outcome: Ongoing, Progressing     Problem: Glycemic Control Impaired (Sepsis/Septic Shock)  Goal: Blood Glucose Level Within Desired Range  Outcome: Ongoing, Progressing     Problem: Infection Progression (Sepsis/Septic Shock)  Goal: Absence of Infection Signs and Symptoms  Outcome: Ongoing, Progressing  Intervention: Initiate Sepsis Management  Recent Flowsheet Documentation  Taken 7/27/2023 0400 by Allen Gresham RN  Infection Prevention:   cohorting utilized   environmental surveillance performed   equipment surfaces disinfected   hand hygiene promoted   rest/sleep promoted   single patient room provided  Taken 7/27/2023 0204 by Allen Gresham RN  Infection Prevention:   cohorting utilized    environmental surveillance performed   equipment surfaces disinfected   rest/sleep promoted   hand hygiene promoted   single patient room provided  Taken 7/27/2023 0000 by Allen Gresham RN  Infection Prevention:   cohorting utilized   environmental surveillance performed   equipment surfaces disinfected   hand hygiene promoted   single patient room provided   rest/sleep promoted  Taken 7/26/2023 2200 by Allen Gresham RN  Infection Prevention:   cohorting utilized   environmental surveillance performed   equipment surfaces disinfected   hand hygiene promoted   rest/sleep promoted   single patient room provided  Taken 7/26/2023 2008 by Allen Gresham RN  Infection Prevention:   cohorting utilized   environmental surveillance performed   hand hygiene promoted   equipment surfaces disinfected   rest/sleep promoted   single patient room provided  Intervention: Promote Recovery  Recent Flowsheet Documentation  Taken 7/27/2023 0400 by Allen Gresham, RN  Activity Management: activity encouraged  Taken 7/27/2023 0204 by Allen Gresham RN  Activity Management: activity encouraged  Taken 7/27/2023 0000 by Allen Gresham RN  Activity Management: activity encouraged  Taken 7/26/2023 2200 by Allen Gresham, RN  Activity Management: activity encouraged  Taken 7/26/2023 2008 by Allen Gresham RN  Activity Management: activity encouraged  Sleep/Rest Enhancement:   room darkened   noise level reduced     Problem: Nutrition Impaired (Sepsis/Septic Shock)  Goal: Optimal Nutrition Intake  Outcome: Ongoing, Progressing   Goal Outcome Evaluation:         Pt. resting in bed. Sinus tachycardia on telemetry. Pain managed using MAR. No complaints at this time.

## 2023-07-28 LAB
ALBUMIN SERPL-MCNC: 2.5 G/DL (ref 3.5–5.2)
ALBUMIN/GLOB SERPL: 0.7 G/DL
ALP SERPL-CCNC: 66 U/L (ref 39–117)
ALT SERPL W P-5'-P-CCNC: 15 U/L (ref 1–41)
ANION GAP SERPL CALCULATED.3IONS-SCNC: 10 MMOL/L (ref 5–15)
AST SERPL-CCNC: 16 U/L (ref 1–40)
BACTERIA SPEC AEROBE CULT: NORMAL
BACTERIA SPEC AEROBE CULT: NORMAL
BILIRUB SERPL-MCNC: 0.7 MG/DL (ref 0–1.2)
BUN SERPL-MCNC: 23 MG/DL (ref 8–23)
BUN/CREAT SERPL: 32.4 (ref 7–25)
CALCIUM SPEC-SCNC: 7.9 MG/DL (ref 8.6–10.5)
CHLORIDE SERPL-SCNC: 98 MMOL/L (ref 98–107)
CO2 SERPL-SCNC: 25 MMOL/L (ref 22–29)
CREAT SERPL-MCNC: 0.71 MG/DL (ref 0.76–1.27)
DEPRECATED RDW RBC AUTO: 47.5 FL (ref 37–54)
EGFRCR SERPLBLD CKD-EPI 2021: 103.7 ML/MIN/1.73
ERYTHROCYTE [DISTWIDTH] IN BLOOD BY AUTOMATED COUNT: 17 % (ref 12.3–15.4)
GLOBULIN UR ELPH-MCNC: 3.5 GM/DL
GLUCOSE SERPL-MCNC: 106 MG/DL (ref 65–99)
HCT VFR BLD AUTO: 28.6 % (ref 37.5–51)
HGB BLD-MCNC: 9.3 G/DL (ref 13–17.7)
MCH RBC QN AUTO: 24.9 PG (ref 26.6–33)
MCHC RBC AUTO-ENTMCNC: 32.5 G/DL (ref 31.5–35.7)
MCV RBC AUTO: 76.7 FL (ref 79–97)
MRSA DNA SPEC QL NAA+PROBE: POSITIVE
PLATELET # BLD AUTO: 367 10*3/MM3 (ref 140–450)
PMV BLD AUTO: 10.8 FL (ref 6–12)
POTASSIUM SERPL-SCNC: 3.2 MMOL/L (ref 3.5–5.2)
PROT SERPL-MCNC: 6 G/DL (ref 6–8.5)
RBC # BLD AUTO: 3.73 10*6/MM3 (ref 4.14–5.8)
SODIUM SERPL-SCNC: 133 MMOL/L (ref 136–145)
WBC NRBC COR # BLD: 4.25 10*3/MM3 (ref 3.4–10.8)

## 2023-07-28 PROCEDURE — 25010000002 MEROPENEM PER 100 MG: Performed by: INTERNAL MEDICINE

## 2023-07-28 PROCEDURE — 87641 MR-STAPH DNA AMP PROBE: CPT | Performed by: INTERNAL MEDICINE

## 2023-07-28 PROCEDURE — 25010000002 VANCOMYCIN 10 G RECONSTITUTED SOLUTION: Performed by: HOSPITALIST

## 2023-07-28 PROCEDURE — 80053 COMPREHEN METABOLIC PANEL: CPT | Performed by: HOSPITALIST

## 2023-07-28 PROCEDURE — 87040 BLOOD CULTURE FOR BACTERIA: CPT | Performed by: INTERNAL MEDICINE

## 2023-07-28 PROCEDURE — 25010000002 HYDROMORPHONE 1 MG/ML SOLUTION: Performed by: INTERNAL MEDICINE

## 2023-07-28 PROCEDURE — 25010000002 METHYLNALTREXONE 12 MG/0.6ML SOLUTION: Performed by: PHYSICIAN ASSISTANT

## 2023-07-28 PROCEDURE — 85027 COMPLETE CBC AUTOMATED: CPT | Performed by: HOSPITALIST

## 2023-07-28 PROCEDURE — 99232 SBSQ HOSP IP/OBS MODERATE 35: CPT | Performed by: NURSE PRACTITIONER

## 2023-07-28 PROCEDURE — 25010000002 METOCLOPRAMIDE PER 10 MG: Performed by: SURGERY

## 2023-07-28 RX ORDER — METOCLOPRAMIDE HYDROCHLORIDE 5 MG/ML
10 INJECTION INTRAMUSCULAR; INTRAVENOUS EVERY 6 HOURS
Status: DISCONTINUED | OUTPATIENT
Start: 2023-07-28 | End: 2023-08-11 | Stop reason: HOSPADM

## 2023-07-28 RX ORDER — BISACODYL 5 MG/1
5 TABLET, DELAYED RELEASE ORAL DAILY PRN
Status: DISCONTINUED | OUTPATIENT
Start: 2023-07-28 | End: 2023-07-31

## 2023-07-28 RX ORDER — SODIUM CHLORIDE, SODIUM LACTATE, POTASSIUM CHLORIDE, CALCIUM CHLORIDE 600; 310; 30; 20 MG/100ML; MG/100ML; MG/100ML; MG/100ML
125 INJECTION, SOLUTION INTRAVENOUS CONTINUOUS
Status: DISCONTINUED | OUTPATIENT
Start: 2023-07-28 | End: 2023-07-28

## 2023-07-28 RX ORDER — SIMETHICONE 80 MG
80 TABLET,CHEWABLE ORAL 4 TIMES DAILY PRN
Status: DISCONTINUED | OUTPATIENT
Start: 2023-07-28 | End: 2023-07-31

## 2023-07-28 RX ORDER — BISACODYL 5 MG/1
10 TABLET, DELAYED RELEASE ORAL DAILY
Status: DISCONTINUED | OUTPATIENT
Start: 2023-07-28 | End: 2023-07-31

## 2023-07-28 RX ORDER — POLYETHYLENE GLYCOL 3350 17 G/17G
17 POWDER, FOR SOLUTION ORAL DAILY PRN
Status: DISCONTINUED | OUTPATIENT
Start: 2023-07-28 | End: 2023-07-31

## 2023-07-28 RX ORDER — AMOXICILLIN 250 MG
2 CAPSULE ORAL 2 TIMES DAILY
Status: DISCONTINUED | OUTPATIENT
Start: 2023-07-28 | End: 2023-07-31

## 2023-07-28 RX ORDER — BISACODYL 10 MG
10 SUPPOSITORY, RECTAL RECTAL DAILY PRN
Status: DISCONTINUED | OUTPATIENT
Start: 2023-07-28 | End: 2023-07-31

## 2023-07-28 RX ORDER — DOCUSATE SODIUM 100 MG/1
100 CAPSULE, LIQUID FILLED ORAL 2 TIMES DAILY
Status: DISCONTINUED | OUTPATIENT
Start: 2023-07-28 | End: 2023-08-01

## 2023-07-28 RX ORDER — POTASSIUM CHLORIDE 750 MG/1
40 CAPSULE, EXTENDED RELEASE ORAL EVERY 4 HOURS
Status: COMPLETED | OUTPATIENT
Start: 2023-07-28 | End: 2023-07-28

## 2023-07-28 RX ORDER — IPRATROPIUM BROMIDE AND ALBUTEROL SULFATE 2.5; .5 MG/3ML; MG/3ML
3 SOLUTION RESPIRATORY (INHALATION) EVERY 4 HOURS PRN
Status: DISCONTINUED | OUTPATIENT
Start: 2023-07-28 | End: 2023-08-11 | Stop reason: HOSPADM

## 2023-07-28 RX ORDER — SODIUM CHLORIDE 9 MG/ML
100 INJECTION, SOLUTION INTRAVENOUS CONTINUOUS
Status: DISCONTINUED | OUTPATIENT
Start: 2023-07-28 | End: 2023-08-01

## 2023-07-28 RX ADMIN — Medication 10 ML: at 21:22

## 2023-07-28 RX ADMIN — SODIUM CHLORIDE 125 ML/HR: 9 INJECTION, SOLUTION INTRAVENOUS at 10:24

## 2023-07-28 RX ADMIN — HYDROMORPHONE HYDROCHLORIDE 1 MG: 1 INJECTION, SOLUTION INTRAMUSCULAR; INTRAVENOUS; SUBCUTANEOUS at 17:46

## 2023-07-28 RX ADMIN — POTASSIUM CHLORIDE 40 MEQ: 750 CAPSULE, EXTENDED RELEASE ORAL at 14:21

## 2023-07-28 RX ADMIN — PANTOPRAZOLE SODIUM 40 MG: 40 TABLET, DELAYED RELEASE ORAL at 08:13

## 2023-07-28 RX ADMIN — HYDROCODONE BITARTRATE AND ACETAMINOPHEN 1 TABLET: 10; 325 TABLET ORAL at 21:21

## 2023-07-28 RX ADMIN — DOCUSATE SODIUM 100 MG: 100 CAPSULE, LIQUID FILLED ORAL at 08:13

## 2023-07-28 RX ADMIN — HYDROMORPHONE HYDROCHLORIDE 1 MG: 1 INJECTION, SOLUTION INTRAMUSCULAR; INTRAVENOUS; SUBCUTANEOUS at 22:16

## 2023-07-28 RX ADMIN — METOCLOPRAMIDE 10 MG: 5 INJECTION, SOLUTION INTRAMUSCULAR; INTRAVENOUS at 21:21

## 2023-07-28 RX ADMIN — METHYLNALTREXONE BROMIDE 12 MG: 12 INJECTION, SOLUTION SUBCUTANEOUS at 09:42

## 2023-07-28 RX ADMIN — MEROPENEM 1000 MG: 1 INJECTION, POWDER, FOR SOLUTION INTRAVENOUS at 06:11

## 2023-07-28 RX ADMIN — MEROPENEM 1000 MG: 1 INJECTION, POWDER, FOR SOLUTION INTRAVENOUS at 21:20

## 2023-07-28 RX ADMIN — VANCOMYCIN HYDROCHLORIDE 1250 MG: 10 INJECTION, POWDER, LYOPHILIZED, FOR SOLUTION INTRAVENOUS at 21:20

## 2023-07-28 RX ADMIN — Medication 10 ML: at 09:52

## 2023-07-28 RX ADMIN — SENNOSIDES AND DOCUSATE SODIUM 2 TABLET: 50; 8.6 TABLET ORAL at 08:27

## 2023-07-28 RX ADMIN — HYDROCODONE BITARTRATE AND ACETAMINOPHEN 1 TABLET: 10; 325 TABLET ORAL at 00:42

## 2023-07-28 RX ADMIN — METOPROLOL TARTRATE 25 MG: 25 TABLET, FILM COATED ORAL at 08:13

## 2023-07-28 RX ADMIN — HYDROMORPHONE HYDROCHLORIDE 1 MG: 1 INJECTION, SOLUTION INTRAMUSCULAR; INTRAVENOUS; SUBCUTANEOUS at 09:56

## 2023-07-28 RX ADMIN — METOCLOPRAMIDE 10 MG: 5 INJECTION, SOLUTION INTRAMUSCULAR; INTRAVENOUS at 09:46

## 2023-07-28 RX ADMIN — DOCUSATE SODIUM 100 MG: 100 CAPSULE, LIQUID FILLED ORAL at 21:21

## 2023-07-28 RX ADMIN — MEROPENEM 1000 MG: 1 INJECTION, POWDER, FOR SOLUTION INTRAVENOUS at 00:42

## 2023-07-28 RX ADMIN — BISACODYL 10 MG: 5 TABLET, COATED ORAL at 08:13

## 2023-07-28 RX ADMIN — Medication 1 PATCH: at 08:12

## 2023-07-28 RX ADMIN — METOCLOPRAMIDE 10 MG: 5 INJECTION, SOLUTION INTRAMUSCULAR; INTRAVENOUS at 15:14

## 2023-07-28 RX ADMIN — SIMETHICONE 80 MG: 80 TABLET, CHEWABLE ORAL at 06:12

## 2023-07-28 RX ADMIN — MONTELUKAST 10 MG: 10 TABLET, FILM COATED ORAL at 21:21

## 2023-07-28 RX ADMIN — ATORVASTATIN CALCIUM 20 MG: 20 TABLET, FILM COATED ORAL at 21:21

## 2023-07-28 RX ADMIN — SENNOSIDES AND DOCUSATE SODIUM 2 TABLET: 50; 8.6 TABLET ORAL at 00:42

## 2023-07-28 RX ADMIN — HYDROMORPHONE HYDROCHLORIDE 1 MG: 1 INJECTION, SOLUTION INTRAMUSCULAR; INTRAVENOUS; SUBCUTANEOUS at 04:00

## 2023-07-28 RX ADMIN — MEROPENEM 1000 MG: 1 INJECTION, POWDER, FOR SOLUTION INTRAVENOUS at 13:35

## 2023-07-28 RX ADMIN — POTASSIUM CHLORIDE 40 MEQ: 750 CAPSULE, EXTENDED RELEASE ORAL at 17:35

## 2023-07-28 RX ADMIN — SENNOSIDES AND DOCUSATE SODIUM 2 TABLET: 50; 8.6 TABLET ORAL at 21:21

## 2023-07-28 RX ADMIN — VANCOMYCIN HYDROCHLORIDE 1250 MG: 10 INJECTION, POWDER, LYOPHILIZED, FOR SOLUTION INTRAVENOUS at 13:35

## 2023-07-28 RX ADMIN — HYDROCODONE BITARTRATE AND ACETAMINOPHEN 1 TABLET: 10; 325 TABLET ORAL at 06:11

## 2023-07-28 RX ADMIN — PANTOPRAZOLE SODIUM 40 MG: 40 TABLET, DELAYED RELEASE ORAL at 17:35

## 2023-07-28 RX ADMIN — ASPIRIN 81 MG: 81 TABLET, COATED ORAL at 08:12

## 2023-07-28 NOTE — SIGNIFICANT NOTE
RN called due to patient's abdomen distended and taught.  Patient does complain of abdominal distention but on exam is non-tender however minimal bowel sounds are noted.  KUB notes mildly dilated air filled loops of small bowel within the abdomen which could represent small bowel ileus vs SBO.   CT of abdomen and pelvis with contrast pending.  Procal: 0.43, lactic acid 1.2.  Patient made NPO.     nectar thick/thin liquid/puree

## 2023-07-28 NOTE — PAYOR COMM NOTE
"Princess Rodrigues RN  Utilization Management  P:[phone]  F:[phone]    [facility]# [id number]   Francisco Madison (63 y.o. Male)       Date of Birth   1960    Social Security Number       Address   [address] [place] [address]    Home Phone   [phone]    MRN   [id number]       Hoahaoism   None    Marital Status                               Admission Date   7/23/23    Admission Type   Urgent    Admitting Provider   Curt Barry MD    Attending Provider   Curt Barry MD    Department, Room/Bed   [address], S501/1       Discharge Date       Discharge Disposition       Discharge Destination                                 Attending Provider: Curt Barry MD    Allergies: No Known Allergies    Isolation: None   Infection: MRSA (07/28/23)   Code Status: CPR    Ht: 172.7 cm (68\")   Wt: 91.7 kg (202 lb 3.2 oz)    Admission Cmt: None   Principal Problem: Acute gallstone pancreatitis [K85.10]                   Active Insurance as of 7/23/2023       Primary Coverage       Payor Plan Insurance Group Employer/Plan Group    [employer] [place] [employer] [place]ICA[place]        Payor Plan Address Payor Plan Phone Number Payor Plan Fax Number Effective Dates    [address] [address] [phone]  6/6/2018 - None Entered    [facility] [id number]         Subscriber Name Subscriber Birth Date Member ID       FRANCISCO MADISON 1960 [id number]                      Current Facility-Administered Medications   Medication Dose Route Frequency Provider Last Rate Last Admin    acetaminophen (TYLENOL) tablet 650 mg  650 mg Oral Q4H PRN Carroll Bobo DO        Or    acetaminophen (TYLENOL) 160 MG/5ML solution 650 mg  650 mg Oral Q4H PRN Carroll Bobo DO        Or    acetaminophen (TYLENOL) suppository 650 mg  650 mg Rectal Q4H PRN Carroll Bobo, DO        aspirin EC tablet 81 mg  81 mg Oral Daily Jayla Lake APRN   81 mg at 07/28/23 0812    atorvastatin (LIPITOR) tablet 20 mg  20 mg Oral Nightly " Carroll Bobo, DO   20 mg at 07/27/23 2031    bisacodyl (DULCOLAX) EC tablet 10 mg  10 mg Oral Daily Christos Mijares MD   10 mg at 07/28/23 0813    sennosides-docusate (PERICOLACE) 8.6-50 MG per tablet 2 tablet  2 tablet Oral BID Carroll Bobo, DO   2 tablet at 07/28/23 0827    And    polyethylene glycol (MIRALAX) packet 17 g  17 g Oral Daily PRN Carroll Bobo, DO        And    bisacodyl (DULCOLAX) EC tablet 5 mg  5 mg Oral Daily PRN Carroll Bobo, DO        And    bisacodyl (DULCOLAX) suppository 10 mg  10 mg Rectal Daily PRN Carroll Bobo, DO        docusate sodium (COLACE) capsule 100 mg  100 mg Oral BID Christos Mijares MD   100 mg at 07/28/23 0813    enzalutamide (XTANDI) chemo capsule 160 mg (patient supplied medication)  160 mg Oral Daily Curt Barry MD   160 mg at 07/27/23 1714    HYDROcodone-acetaminophen (NORCO)  MG per tablet 1 tablet  1 tablet Oral Q4H PRN Jayla Lake, APRN   1 tablet at 07/28/23 0611    HYDROmorphone (DILAUDID) injection 1 mg  1 mg Intravenous Q2H PRN Cody Chow III, DO   1 mg at 07/28/23 0956    hydrOXYzine (ATARAX) tablet 50 mg  50 mg Oral TID PRN Carroll Bobo, DO   50 mg at 07/27/23 2038    ipratropium-albuterol (DUO-NEB) nebulizer solution 3 mL  3 mL Nebulization Q4H PRN Curt Barry MD   3 mL at 07/27/23 1549    lactated ringers infusion  125 mL/hr Intravenous Continuous Christos Mijares MD        melatonin tablet 5 mg  5 mg Oral Nightly PRN Carroll Bobo, DO   5 mg at 07/27/23 2038    meropenem (MERREM) 1000 mg/100 mL 0.9% NS (mbp)  1,000 mg Intravenous Q8H Carroll Bobo, DO   1,000 mg at 07/28/23 0611    methylnaltrexone (RELISTOR) injection 12 mg  12 mg Subcutaneous Every Other Day Carol Mai PA   12 mg at 07/28/23 0942    metoclopramide (REGLAN) injection 10 mg  10 mg Intravenous Q6H Christos Mijares MD   10 mg at 07/28/23 0946    metoprolol tartrate (LOPRESSOR) tablet 25 mg  25 mg Oral Daily Carroll Bobo DO   25 mg  at 07/28/23 0813    montelukast (SINGULAIR) tablet 10 mg  10 mg Oral Nightly Carroll Bobo, DO   10 mg at 07/27/23 2031    naloxone (NARCAN) injection 0.4 mg  0.4 mg Intravenous PRN Carroll Bobo, DO        nicotine (NICODERM CQ) 21 MG/24HR patch 1 patch  1 patch Transdermal Q24H Carroll Bobo, DO   1 patch at 07/28/23 0812    ondansetron (ZOFRAN) injection 4 mg  4 mg Intravenous Q6H PRN Bharat Montana APRN   4 mg at 07/24/23 1113    pantoprazole (PROTONIX) EC tablet 40 mg  40 mg Oral BID AC Carroll Bobo, DO   40 mg at 07/28/23 0813    prochlorperazine (COMPAZINE) injection 5 mg  5 mg Intravenous Q3H PRN Carroll Bobo, DO   5 mg at 07/24/23 1635    simethicone (MYLICON) chewable tablet 80 mg  80 mg Oral 4x Daily PRN Jayla Lake APRN   80 mg at 07/27/23 2038    simethicone (MYLICON) chewable tablet 80 mg  80 mg Oral 4x Daily PRN Christos Mijares MD   80 mg at 07/28/23 0612    sodium chloride 0.9 % flush 10 mL  10 mL Intravenous Q12H Carroll Bobo, DO   10 mL at 07/28/23 0952    sodium chloride 0.9 % flush 10 mL  10 mL Intravenous PRN Carroll Bobo, DO        sodium chloride 0.9 % infusion 40 mL  40 mL Intravenous PRN Carroll Bobo, DO         Lab Results (last 48 hours)       Procedure Component Value Units Date/Time    MRSA Screen, PCR (Inpatient) - Swab, Nares [[id number]]  (Abnormal) Collected: 07/28/23 0049    Specimen: Swab from Nares Updated: 07/28/23 0950     MRSA PCR Positive    Narrative:      The negative predictive value of this diagnostic test is high and should only be used to consider de-escalating anti-MRSA therapy. A positive result may indicate colonization with MRSA and must be correlated clinically.    Comprehensive Metabolic Panel [[id number]]  (Abnormal) Collected: 07/28/23 0240    Specimen: Blood Updated: 07/28/23 0433     Glucose 106 mg/dL      BUN 23 mg/dL      Creatinine 0.71 mg/dL      Sodium 133 mmol/L      Potassium 3.2 mmol/L      Chloride 98 mmol/L      CO2 25.0  mmol/L      Calcium 7.9 mg/dL      Total Protein 6.0 g/dL      Albumin 2.5 g/dL      ALT (SGPT) 15 U/L      AST (SGOT) 16 U/L      Alkaline Phosphatase 66 U/L      Total Bilirubin 0.7 mg/dL      Globulin 3.5 gm/dL      Comment: Calculated Result        A/G Ratio 0.7 g/dL      BUN/Creatinine Ratio 32.4     Anion Gap 10.0 mmol/L      eGFR 103.7 mL/min/1.73     Narrative:      GFR Normal >60  Chronic Kidney Disease <60  Kidney Failure <15      CBC (No Diff) [[id number]]  (Abnormal) Collected: 07/28/23 0240    Specimen: Blood Updated: 07/28/23 0432     WBC 4.25 10*3/mm3      RBC 3.73 10*6/mm3      Hemoglobin 9.3 g/dL      Hematocrit 28.6 %      MCV 76.7 fL      MCH 24.9 pg      MCHC 32.5 g/dL      RDW 17.0 %      RDW-SD 47.5 fl      MPV 10.8 fL      Platelets 367 10*3/mm3     Blood Culture - Blood, Arm, Right [[id number]] Collected: 07/28/23 0017    Specimen: Blood from Arm, Right Updated: 07/28/23 0039    Blood Culture - Blood, Arm, Right [[id number]] Collected: 07/28/23 0004    Specimen: Blood from Arm, Right Updated: 07/28/23 0038    Blood Culture - Blood, Hand, Left [[id number]]  (Normal) Collected: 07/23/23 2240    Specimen: Blood from Hand, Left Updated: 07/27/23 2300     Blood Culture No growth at 4 days    Narrative:      AEROBIC BOTTLE ONLY    Blood Culture - Blood, Hand, Right [[id number]]  (Normal) Collected: 07/23/23 2230    Specimen: Blood from Hand, Right Updated: 07/27/23 2300     Blood Culture No growth at 4 days    Narrative:      AEROBIC BOTTLE ONLY    Procalcitonin [[id number]]  (Abnormal) Collected: 07/27/23 2203    Specimen: Blood Updated: 07/27/23 2244     Procalcitonin 0.43 ng/mL     Narrative:      As a Marker for Sepsis (Non-Neonates):    1. <0.5 ng/mL represents a low risk of severe sepsis and/or septic shock.  2. >2 ng/mL represents a high risk of severe sepsis and/or septic shock.    As a Marker for Lower Respiratory Tract Infections that require antibiotic therapy:    PCT on Admission     "Antibiotic Therapy       6-12 Hrs later    >0.5                Strongly Recommended  >0.25 - <0.5        Recommended   0.1 - 0.25          Discouraged              Remeasure/reassess PCT  <0.1                Strongly Discouraged     Remeasure/reassess PCT    As 28 day mortality risk marker: \"Change in Procalcitonin Result\" (>80% or <=80%) if Day 0 (or Day 1) and Day 4 values are available. Refer to http://www.[facility]-pct-calculator.com    Change in PCT <=80%  A decrease of PCT levels below or equal to 80% defines a positive change in PCT test result representing a higher risk for 28-day all-cause mortality of patients diagnosed with severe sepsis for septic shock.    Change in PCT >80%  A decrease of PCT levels of more than 80% defines a negative change in PCT result representing a lower risk for 28-day all-cause mortality of patients diagnosed with severe sepsis or septic shock.       Lactic Acid, Plasma [[id number]]  (Normal) Collected: 07/27/23 2203    Specimen: Blood Updated: 07/27/23 2233     Lactate 1.2 mmol/L      Comment: Falsely depressed results may occur on samples drawn from patients receiving N-Acetylcysteine (NAC) or Metamizole.       CBC & Differential [[id number]]  (Abnormal) Collected: 07/27/23 0319    Specimen: Blood Updated: 07/27/23 0608    Narrative:      The following orders were created for panel order CBC & Differential.  Procedure                               Abnormality         Status                     ---------                               -----------         ------                     CBC Auto Differential[[id number]]        Abnormal            Final result               Scan Slide[[id number]]                   Normal              Final result                 Please view results for these tests on the individual orders.    Scan Slide [[id number]]  (Normal) Collected: 07/27/23 0319    Specimen: Blood Updated: 07/27/23 0608     RBC Morphology Normal     WBC Morphology Normal     Platelet " Morphology Normal    CBC Auto Differential [[id number]]  (Abnormal) Collected: 07/27/23 0319    Specimen: Blood Updated: 07/27/23 0608     WBC 6.66 10*3/mm3      RBC 3.92 10*6/mm3      Hemoglobin 9.7 g/dL      Hematocrit 30.4 %      MCV 77.6 fL      MCH 24.7 pg      MCHC 31.9 g/dL      RDW 17.2 %      RDW-SD 48.9 fl      MPV 11.1 fL      Platelets 328 10*3/mm3      Neutrophil % 80.1 %      Lymphocyte % 8.3 %      Monocyte % 9.6 %      Eosinophil % 1.4 %      Basophil % 0.3 %      Immature Grans % 0.3 %      Neutrophils, Absolute 5.34 10*3/mm3      Lymphocytes, Absolute 0.55 10*3/mm3      Monocytes, Absolute 0.64 10*3/mm3      Eosinophils, Absolute 0.09 10*3/mm3      Basophils, Absolute 0.02 10*3/mm3      Immature Grans, Absolute 0.02 10*3/mm3      nRBC 0.0 /100 WBC     Narrative:      Appended report. These results have been appended to a previously verified report.    Comprehensive Metabolic Panel [[id number]]  (Abnormal) Collected: 07/27/23 0319    Specimen: Blood Updated: 07/27/23 0455     Glucose 97 mg/dL      BUN 27 mg/dL      Creatinine 0.72 mg/dL      Sodium 138 mmol/L      Potassium 4.1 mmol/L      Chloride 101 mmol/L      CO2 25.0 mmol/L      Calcium 8.2 mg/dL      Total Protein 5.1 g/dL      Albumin 2.8 g/dL      ALT (SGPT) 19 U/L      AST (SGOT) 19 U/L      Alkaline Phosphatase 71 U/L      Total Bilirubin 1.2 mg/dL      Globulin 2.3 gm/dL      Comment: Calculated Result        A/G Ratio 1.2 g/dL      BUN/Creatinine Ratio 37.5     Anion Gap 12.0 mmol/L      eGFR 103.3 mL/min/1.73     Narrative:      GFR Normal >60  Chronic Kidney Disease <60  Kidney Failure <15            Imaging Results (Last 48 Hours)       Procedure Component Value Units Date/Time    CT Abdomen Pelvis With Contrast [[id number]] Collected: 07/27/23 2314     Updated: 07/27/23 2323    Narrative:      CT ABDOMEN PELVIS W CONTRAST    Date of Exam: 7/27/2023 11:02 PM EDT    Indication: abdominal distention, ? ileus vs obstruction.    Comparison:  4/27/2023, 7/23/2023.    Technique: Axial CT images were obtained of the abdomen and pelvis following the uneventful intravenous administration of intravenous contrast.. Reconstructed coronal and sagittal images were also obtained. Automated exposure control and iterative   construction methods were used.      Findings:  Lung Bases:     Small bilateral pleural effusions are present, left greater than right. Consolidative changes are present within the bilateral lower lobes with air bronchograms.  Liver:  Liver is normal in size and CT density. No focal lesions.    Biliary/Gallbladder:    The gallbladder is mildly under distended. Mild wall thickening present. Tiny stones are present within the lumen. No evidence of significant surrounding free fluid.. The biliary tree is nondilated.    Spleen:  Spleen is normal in size and CT density.    Pancreas:    Extensive inflammatory changes are seen throughout the pancreas with significant phlegmonous changes and fluid seen along the body of the pancreas with no normal parenchyma are present within this region (series 2 image 61). The area of involvement   measures up to 3.0 x 8.7 cm. No definite air pockets identified. Inflammatory changes are seen surrounding the remaining portions of the pancreas. No additional focal collection identified. Patchy fluid is seen extending within the mesentery. Multiple   tiny mesenteric lymph nodes present. Small to moderate amount of free fluid is seen tracking into the lower abdomen and pelvis.    Kidneys:    Kidneys are normal in size. There are no stones or hydronephrosis.    Adrenals:    Adrenal glands are unremarkable.    Retroperitoneal/Lymph Nodes/Vasculature:    No retroperitoneal adenopathy is identified. Atherosclerotic calcifications are present.    Gastrointestinal/Mesentery:    Mildly prominent small bowel loops are present, likely reactive and related to surrounding ascites. This appears diffuse. Air is present within the  colon. No definite obstruction identified. Surrounding free fluid present. The appendix is not well   visualized. No significant stool burden identified.. No evidence of obstruction. No free air. No mesenteric fluid collections identified. There is diffuse anasarca of the soft tissues.    Bladder:    The bladder is normal.    Genital:     Unremarkable          Bony Structures:     Visualized bony structures are consistent with the patient's age.        Impression:      Impression:    1. Extensive necrotizing appearing pancreatitis with fluid replacing nearly the entire pancreatic body as described above. Significant inflammatory changes are seen tracking along the entire length of the pancreas. No suspicious air pockets identified.   No additional focal collection identified. Significant surrounding edema is present. Findings appear to have significantly progressed as compared to the previous outside CT from 7/23/2023. Reactive tracking fluid is seen extending to the lower abdomen   and pelvis.  2. Mild gallbladder wall thickening present which may be related to mild underdistention. Tiny stones are present. Reactive edema is suspected. Acute cholecystitis is unlikely. No evidence of biliary ductal dilatation.  3. Small bilateral pleural effusions present, left greater than right with overlying consolidative changes which may be related to atelectasis or pneumonia. Pneumonia suspected due to air bronchograms. There is anasarca of the soft tissues likely related   to fluid overload.  4. Mildly distended small bowel loops present throughout the abdomen and pelvis, likely reactive and related to small to moderate amount of free fluid related to tracking inflammatory change. Air is present within the colon. Obstruction unlikely.  5. Ancillary findings as described above.        Electronically Signed: Cora Torres    7/27/2023 11:20 PM EDT    Workstation ID: [id number]    XR Abdomen KUB [[id number]] Collected: 07/27/23 214  "    Updated: 07/27/23 2148    Narrative:      XR ABDOMEN KUB    Date of Exam: 7/27/2023 9:01 PM EDT    Indication: abd distension. ? obstruction    Comparison: None available.    Findings:        There are several loops of air-filled mildly distended small bowel. This could represent small bowel ileus versus small bowel obstruction.    There is an air-filled colon.    There are no abnormal calcifications.    The osseous structures are grossly unremarkable.      Impression:        Mildly dilated air-filled loops of small bowel within the abdomen which could represent small bowel ileus versus small bowel obstruction. If clinically warranted follow-up CT scan of the abdomen may be obtained.              Electronically Signed: Damir Mccarthy    7/27/2023 9:45 PM EDT    Workstation ID: [id number]               Physician Progress Notes (last 48 hours)        Christos Mijares MD at 07/28/23 0729          Patient Name:  Ta Madison  YOB: 1960  [id number]    Surgery Progress Note    Date of visit: 7/28/2023    Subjective   Subjective: Asked to see patient again for abdominal distention.  He denies any abdominal pain, and was starting a clear liquid diet yesterday but his distention worsened.  He is passing a little bit of flatus but no bowel movements yet.  No other current complaints.        Objective     Objective:     /74 (BP Location: Right arm, Patient Position: Sitting)   Pulse 92   Temp 99.2 øF (37.3 øC) (Oral)   Resp 16   Ht 172.7 cm (68\")   Wt 91.7 kg (202 lb 3.2 oz)   SpO2 93%   BMI 30.74 kg/mý     Intake/Output Summary (Last 24 hours) at 7/28/2023 0729  Last data filed at 7/27/2023 1700  Gross per 24 hour   Intake 480 ml   Output --   Net 480 ml       CV:  Rhythm  regular and rate regular   L:  Clear  to auscultation bilaterally   Abd:  Bowel sounds positive , soft, minimally tender even to deep palpation though he does remain somewhat distended.  Ext:  No cyanosis, clubbing, " edema    Recent labs that are back at this time have been reviewed.  I reviewed the CT scan of the abdomen pelvis from last night as well as the 1 from admission.  This shows severe acute pancreatitis without obvious infection.  He has a very immature likely pseudocyst in the midportion of the pancreas.  There is no free intraperitoneal air.  There is evidence of mild ileus throughout likely related to his pancreatitis.  The gallbladder does show some sludge and stones but no evidence of acute cholecystitis at this time.    Laboratory exam from this morning demonstrates essentially normal transaminases with an albumin of 2.5.  White count is 4.2 with a hemoglobin of 9.3 and a platelet count of 367.          Assessment/ Plan:    Problem List Items Addressed This Visit          Gastrointestinal Abdominal     * (Principal) Acute gallstone pancreatitis - Primary- He has evidence of severe acute pancreatitis, though his symptoms are improving, though he remains with a ileus related to his pancreatitis.  I would recommend better IV hydration, and an aggressive bowel regimen.  I will keep him n.p.o. for now.  I have encouraged him to ambulate in the rubio as well.  There is no need for surgical intervention at this time and no evidence of infection.  I would continue meropenem.     Other Visit Diagnoses       Dysphagia, unspecified type                 Active Hospital Problems    Diagnosis  POA    **Acute gallstone pancreatitis [K85.10]  Yes    Elevated serum creatinine [R79.89]  Yes    Personal history of transient ischemic attack (TIA) [Z86.73]  Not Applicable    Adenocarcinoma of prostate [C61]  Yes    Gastroesophageal reflux disease [K21.9]  Yes    Hyperlipidemia [E78.5]  Yes    Tobacco abuse [Z72.0]  Yes      Resolved Hospital Problems   No resolved problems to display.              Christos Mijares MD  7/28/2023  07:29 EDT        Electronically signed by Christos Mijares MD at 07/28/23 2022       Carol Mai PA  "at 07/27/23 1054       Attestation signed by Yolande Eller MD at 07/27/23 1631    I have reviewed this documentation and agree.                  GI Daily Progress Note  Subjective:    Chief Complaint:  Follow up pancreatitis     Had 2 bowel movements.  Tolerating clear liquids.     Wants to take a shower.   Abdominal pain is improving but still requiring IV Dilaudid frequently (received 8 mg Dilaudid total yesterday, 3 mg thus far today).        Objective:    /76 (BP Location: Right arm, Patient Position: Sitting)   Pulse 101   Temp 98.6 øF (37 øC) (Oral)   Resp 18   Ht 172.7 cm (68\")   Wt 91.7 kg (202 lb 3.2 oz)   SpO2 92%   BMI 30.74 kg/mý     Physical Exam  Constitutional:       General: He is not in acute distress.     Comments: Sitting up in the chair today   Cardiovascular:      Rate and Rhythm: Normal rate and regular rhythm.   Pulmonary:      Effort: Pulmonary effort is normal. No respiratory distress.   Abdominal:      General: Bowel sounds are normal. There is distension.      Palpations: Abdomen is soft.      Tenderness: There is abdominal tenderness.      Comments: Abdominal distention improved from previous exams  Mild tenderness to palpation of the epigastrium, improving    Neurological:      Mental Status: He is alert and oriented to person, place, and time.       Lab  Lab Results   Component Value Date    WBC 6.66 07/27/2023    HGB 9.7 (L) 07/27/2023    HGB 9.9 (L) 07/26/2023    HGB 10.9 (L) 07/25/2023    MCV 77.6 (L) 07/27/2023     07/27/2023    INR 0.98 07/23/2023       Lab Results   Component Value Date    GLUCOSE 97 07/27/2023    BUN 27 (H) 07/27/2023    CREATININE 0.72 (L) 07/27/2023    EGFRIFNONA 70 01/19/2022    BCR 37.5 (H) 07/27/2023     07/27/2023    K 4.1 07/27/2023    CO2 25.0 07/27/2023    CALCIUM 8.2 (L) 07/27/2023    PROTENTOTREF 7.2 02/05/2019    ALBUMIN 2.8 (L) 07/27/2023    ALKPHOS 71 07/27/2023    BILITOT 1.2 07/27/2023    ALT 19 07/27/2023    AST 19 " 2023       Assessment:    Acute gallstone pancreatitis   SIRS   Abdominal distention, likely sequela of pancreatitis, ileus.   Improved.       Plan:    Slowly improving.   Still requiring high dose IV narcotics, as described above.       >> Continue Relistor while inpatient on high dose opiates  >> Encouraged patient to try PO opiate regimen as discharge is anticipated in the coming days   >> Increase ambulation     GI will sign off.   Please call for questions or concerns.  Follow up outpatient with Dr. Tony regarding cholecystectomy.        JACIEL Albert  23  10:54 EDT      Electronically signed by Yolande Eller MD at 23 1631       Curt Barry MD at 23 0850              [facility] Medicine Services  PROGRESS NOTE    Patient Name: Ta Madison  : 1960  MRN: [id number]    Date of Admission: 2023  Primary Care Physician: Yolande Olvera APRN    Subjective   Subjective     CC:  F/u abd pain     HPI: Tolerating PO. No f/c. Less distended and tender. No f/c. No n/v.     Review of Systems   Constitutional:  Positive for activity change and fatigue. Negative for chills and fever.   Respiratory: Negative.     Cardiovascular: Negative.    Gastrointestinal:  Positive for abdominal distention and abdominal pain.       Objective   Objective     Vital Signs:   Temp:  [98.6 øF (37 øC)-99.7 øF (37.6 øC)] 98.6 øF (37 øC)  Heart Rate:  [] 101  Resp:  [16-18] 18  BP: (125-152)/(63-83) 152/76  Flow (L/min):  [2] 2     Physical Exam:  NAD, alert and oriented  OP clear, MMM  Neck supple  No LAD  RRR  CTAB, on RA  +BS, soft, mild distention, NT  No c/c/e  No rashes  RENDON  Normal affect    Results Reviewed:  LAB RESULTS:      Lab 23  0319 23  0720 23  1036 23  0324 23  1352 23  0725 23  0442 23  0153 23  2246   WBC 6.66 13.33*  --  15.29*  --   --   --  14.99*  --   --    HEMOGLOBIN 9.7* 9.9*  --   10.9*  --   --   --  12.0*  --   --    HEMATOCRIT 30.4* 31.7*  --  35.6*  --   --   --  39.3  --   --    PLATELETS 328 300  --  358  --   --   --  456*  --   --    NEUTROS ABS 5.34 11.54*  --  13.62*  --   --   --  13.58*  --   --    IMMATURE GRANS (ABS) 0.02 0.08*  --  0.06*  --   --   --  0.06*  --   --    LYMPHS ABS 0.55* 0.73  --  0.80  --   --   --  0.67*  --   --    MONOS ABS 0.64 0.92*  --  0.79  --   --   --  0.66  --   --    EOS ABS 0.09 0.03  --  0.00  --   --   --  0.00  --   --    MCV 77.6* 79.3  --  80.9  --   --   --  78.9*  --   --    PROCALCITONIN  --   --   --   --   --   --   --   --   --  0.85*   LACTATE  --   --  3.2*  --  2.7* 3.1* 3.5* 4.1*   < > 3.6*   PROTIME  --   --   --   --   --   --   --   --   --  13.1   APTT  --   --   --   --   --   --   --   --   --  26.9    < > = values in this interval not displayed.         Lab 07/27/23  0319 07/26/23  0720 07/25/23  0324 07/24/23  0924 07/23/23  3286   SODIUM 138 139 141 143 140   POTASSIUM 4.1 4.3 5.1 5.3* 5.1   CHLORIDE 101 102 107 108* 108*   CO2 25.0 24.0 22.0 20.0* 14.0*   ANION GAP 12.0 13.0 12.0 15.0 18.0*   BUN 27* 27* 36* 31* 28*   CREATININE 0.72* 0.77 1.29* 1.44* 1.42*   EGFR 103.3 101.2 62.7 54.9* 55.9*   GLUCOSE 97 87 101* 139* 171*   CALCIUM 8.2* 8.3* 8.7 8.8 8.5*   MAGNESIUM  --   --   --   --  2.3         Lab 07/27/23  0319 07/26/23  0720 07/25/23  0324 07/24/23 0924 07/23/23 2246   TOTAL PROTEIN 5.1* 5.3* 5.5* 6.1 6.6   ALBUMIN 2.8* 2.8* 3.4* 3.7 3.8   GLOBULIN 2.3 2.5 2.1 2.4 2.8   ALT (SGPT) 19 24 38 49* 53*   AST (SGOT) 19 28 38 50* 66*   BILIRUBIN 1.2 1.8* 1.2 0.8 0.4   ALK PHOS 71 76 69 75 76   LIPASE  --   --   --  1,255*  --          Lab 07/25/23  1502 07/23/23 2246   PROBNP 789.2  --    PROTIME  --  13.1   INR  --  0.98         Lab 07/24/23  0924 07/23/23 2246   CHOLESTEROL 134  --    LDL CHOL 72  --    HDL CHOL 39*  --    TRIGLYCERIDES 126 108         Lab 07/23/23  2349 07/23/23 2245   ABO TYPING O O   RH TYPING  Positive Positive   ANTIBODY SCREEN  --  Negative         Brief Urine Lab Results  (Last result in the past 365 days)        Color   Clarity   Blood   Leuk Est   Nitrite   Protein   CREAT   Urine HCG        07/25/23 0636 Dark Yellow   Clear   Negative   Negative   Negative   30 mg/dL (1+)                   Microbiology Results Abnormal       Procedure Component Value - Date/Time    Blood Culture - Blood, Hand, Left [[id number]]  (Normal) Collected: 07/23/23 2240    Lab Status: Preliminary result Specimen: Blood from Hand, Left Updated: 07/26/23 2300     Blood Culture No growth at 3 days    Narrative:      AEROBIC BOTTLE ONLY    Blood Culture - Blood, Hand, Right [[id number]]  (Normal) Collected: 07/23/23 2230    Lab Status: Preliminary result Specimen: Blood from Hand, Right Updated: 07/26/23 2300     Blood Culture No growth at 3 days    Narrative:      AEROBIC BOTTLE ONLY    COVID PRE-OP / PRE-PROCEDURE SCREENING ORDER (NO ISOLATION) - Swab, Nasopharynx [[id number]]  (Normal) Collected: 07/24/23 0620    Lab Status: Final result Specimen: Swab from Nasopharynx Updated: 07/24/23 0729    Narrative:      The following orders were created for panel order COVID PRE-OP / PRE-PROCEDURE SCREENING ORDER (NO ISOLATION) - Swab, Nasopharynx.  Procedure                               Abnormality         Status                     ---------                               -----------         ------                     Respiratory Panel PCR w/...[[id number]]  Normal              Final result                 Please view results for these tests on the individual orders.    Respiratory Panel PCR w/COVID-19(SARS-CoV-2) [facility]TA[facility]/ANDRES/ASTER/PAD/[facility]INE In-House, NP Swab in [facility], 3-4 HR TAT - Swab, Nasopharynx [[id number]]  (Normal) Collected: 07/24/23 0620    Lab Status: Final result Specimen: Swab from Nasopharynx Updated: 07/24/23 0729     ADENOVIRUS, PCR Not Detected     Coronavirus 229E Not Detected     Coronavirus HKU1 Not Detected      Coronavirus NL63 Not Detected     Coronavirus OC43 Not Detected     COVID19 Not Detected     Human Metapneumovirus Not Detected     Human Rhinovirus/Enterovirus Not Detected     Influenza A PCR Not Detected     Influenza B PCR Not Detected     Parainfluenza Virus 1 Not Detected     Parainfluenza Virus 2 Not Detected     Parainfluenza Virus 3 Not Detected     Parainfluenza Virus 4 Not Detected     RSV, PCR Not Detected     Bordetella pertussis pcr Not Detected     Bordetella parapertussis PCR Not Detected     Chlamydophila pneumoniae PCR Not Detected     Mycoplasma pneumo by PCR Not Detected    Narrative:      In the setting of a positive respiratory panel with a viral infection PLUS a negative procalcitonin without other underlying concern for bacterial infection, consider observing off antibiotics or discontinuation of antibiotics and continue supportive care. If the respiratory panel is positive for atypical bacterial infection (Bordetella pertussis, Chlamydophila pneumoniae, or Mycoplasma pneumoniae), consider antibiotic de-escalation to target atypical bacterial infection.            XR Chest 1 View    Result Date: 7/25/2023  XR CHEST 1 VW Date of Exam: 7/25/2023 1:29 PM EDT Indication: soa Comparison: None available. Findings: There are low lung volumes with poor inspiration. There is mild atelectasis at the lung bases, greatest on the left. There may be a minimal left effusion. The heart size is normal and pulmonary vessels are within normal limits.     Impression: Impression: Mild atelectasis lung bases, greatest on the left. Electronically Signed: Suyapa Vu MD  7/25/2023 1:54 PM EDT  Workstation ID: [id number]         Current medications:  Scheduled Meds:aspirin, 81 mg, Oral, Daily  atorvastatin, 20 mg, Oral, Nightly  enzalutamide, 160 mg, Oral, Daily  metoprolol tartrate, 25 mg, Oral, Daily  montelukast, 10 mg, Oral, Nightly  nicotine, 1 patch, Transdermal, Q24H  pantoprazole, 40 mg, Oral, BID  AC  sodium chloride, 10 mL, Intravenous, Q12H      Continuous Infusions:lactated ringers, 75 mL/hr, Last Rate: 75 mL/hr (07/26/23 1138)  lactated ringers, 50 mL/hr, Last Rate: 50 mL/hr (07/27/23 0200)      PRN Meds:.  acetaminophen **OR** acetaminophen **OR** acetaminophen    HYDROcodone-acetaminophen    HYDROmorphone    hydrOXYzine    melatonin    naloxone    ondansetron    prochlorperazine    simethicone    sodium chloride    sodium chloride    Assessment & Plan   Assessment & Plan     Active Hospital Problems    Diagnosis  POA    **Acute gallstone pancreatitis [K85.10]  Yes    Elevated serum creatinine [R79.89]  Yes    Personal history of transient ischemic attack (TIA) [Z86.73]  Not Applicable    Adenocarcinoma of prostate [C61]  Yes    Gastroesophageal reflux disease [K21.9]  Yes    Hyperlipidemia [E78.5]  Yes    Tobacco abuse [Z72.0]  Yes      Resolved Hospital Problems   No resolved problems to display.        Brief Hospital Course to date:  Ta Madison is a 63 y.o. male with PMH of GERD, HLD, hx of TIA, advanced prostate cancer on Xtandi, occasional marijuna use, and chronic 1 PPD tobacco use who initially presented to [facility] 7/23/23 with complaints of severe epigastric abdominal pain with associated nausea and vomiting. CT A/P at OSH showed acute pancreatitis with 2 mm stone in the proximal duodenum. Transferred to [facility] for GI evaluation.     Acute gallstone pancreatitis  Leukocytosis  Lactic acidosis  -CT A/P at OSH: acute interstitial pancreatitis, cholelithiasis with 2 mm stone in proximal duodenum (possibly a recently passed gallstone), transmural thickening of some jejunal loops in the left mid abdomen possibly reactive changes secondary to pancreatitis  -Lipase 1255  -Normal triglycerides   -MRCP showed acute pancreatitis no obstructing stones   -GI following   -General Surgery consulted, Dr. Tony recommends continued treatment for pancreatitis and F/U in 6 weeks for interval  "cholecystectomy, unless he has significant improvement in next 24-48 hours  -stop IV abx and observe  -mobilize  -home 7/28 if continues to improve with follow up with Dr. Tony in 2 weeks to discuss CCY    SOA  -better, on RA  -IS/pulmonary toilet     Elevated Cr  -better/resolved after IVF     Prostate cancer  -Continue Xtandi oral chemotherapy (patient supplied)     HLD  -statin     GERD  -PPI     Hx of TIA  -resumed ASA since no further procedures planned   -Continue statin     Tobacco abuse  -Counseled on cessation  -Nicotine patch       Expected Discharge Location and Transportation: home   Expected Discharge 7/28/2023  Expected Discharge Date: 7/28/2023; Expected Discharge Time:      DVT prophylaxis:  Mechanical DVT prophylaxis orders are present.     AM-PAC 6 Clicks Score (PT): 22 (07/26/23 0800)    CODE STATUS:   Code Status and Medical Interventions:   Ordered at: 07/23/23 2229     Code Status (Patient has no pulse and is not breathing):    CPR (Attempt to Resuscitate)     Medical Interventions (Patient has pulse or is breathing):    Full Support     Release to patient:    Routine Release       Curt Barry MD  07/27/23        Electronically signed by Curt Barry MD at 07/27/23 0859       Carol Mai PA at 07/26/23 1505       Attestation signed by Yolande Eller MD at 07/26/23 1677    I have reviewed this documentation and agree.                  GI Daily Progress Note  Subjective:    Chief Complaint:  Follow up pancreatitis     Still with abdominal pain and bloating.  Mild improvement   Took a small amount of clear liquids.   Has not had a bowel movement nor passed flatus.      Objective:    /72 (BP Location: Right arm, Patient Position: Sitting)   Pulse 103   Temp 99.7 øF (37.6 øC) (Oral)   Resp 16   Ht 172.7 cm (68\")   Wt 91.7 kg (202 lb 3.2 oz)   SpO2 90%   BMI 30.74 kg/mý     Physical Exam  Constitutional:       General: He is not in acute distress.  Cardiovascular:      Rate " and Rhythm: Normal rate and regular rhythm.   Pulmonary:      Effort: Pulmonary effort is normal. No respiratory distress.   Abdominal:      General: Bowel sounds are normal. There is distension.      Palpations: Abdomen is soft.      Tenderness: There is abdominal tenderness. There is no guarding or rebound.   Neurological:      Mental Status: He is alert and oriented to person, place, and time.     Lab  Lab Results   Component Value Date    WBC 13.33 (H) 07/26/2023    HGB 9.9 (L) 07/26/2023    HGB 10.9 (L) 07/25/2023    HGB 12.0 (L) 07/24/2023    MCV 79.3 07/26/2023     07/26/2023    INR 0.98 07/23/2023       Lab Results   Component Value Date    GLUCOSE 87 07/26/2023    BUN 27 (H) 07/26/2023    CREATININE 0.77 07/26/2023    EGFRIFNONA 70 01/19/2022    BCR 35.1 (H) 07/26/2023     07/26/2023    K 4.3 07/26/2023    CO2 24.0 07/26/2023    CALCIUM 8.3 (L) 07/26/2023    PROTENTOTREF 7.2 02/05/2019    ALBUMIN 2.8 (L) 07/26/2023    ALKPHOS 76 07/26/2023    BILITOT 1.8 (H) 07/26/2023    ALT 24 07/26/2023    AST 28 07/26/2023       Assessment:    Acute gallstone pancreatitis   SIRS   Abdominal distention, likely sequela of pancreatitis, ileus      Plan:    >> Encouraged patient to get out of bed and ambulate as tolerated as well as up to the chair.    >> If no bowel movement by tomorrow, recommend dulcolax suppository  >> s/p Relistor yesterday.     >> Incentive spirometer  >> Can discontinue antibiotics from a GI standpoint     JAICEL Albert  07/26/23  15:05 EDT      Electronically signed by Yolande Eller MD at 07/26/23 5823

## 2023-07-28 NOTE — PROGRESS NOTES
[facility] Medicine Services  PROGRESS NOTE    Patient Name: Ta Madison  : 1960  MRN: [id number]    Date of Admission: 2023  Primary Care Physician: Yolande Olvera APRN    Subjective   Subjective     CC:  Follow-up abdominal pain    HPI:  Patient seen resting in bed no apparent distress.  Nursing notes reviewed.  Noted to have increased abdominal pain and distention overnight.  CT abdomen/pelvis revealed concern for necrotizing pancreatitis.  General surgeon Dr. Mijares was reconsulted who felt that findings were consistent with acute pancreatitis.  The patient notes he is feeling slightly better this morning. Passing flatus but no BM yet.     ROS:  Gen- No fevers, chills  CV- No chest pain, palpitations  Resp- No cough, dyspnea  GI- +nausea, +abd pain, +abd distention    Objective   Objective     Vital Signs:   Temp:  [97.3 øF (36.3 øC)-99.2 øF (37.3 øC)] 99.2 øF (37.3 øC)  Heart Rate:  [] 96  Resp:  [16-20] 16  BP: (115-139)/(56-74) 134/74     Physical Exam:  Constitutional: No acute distress, awake, alert  HENT: NCAT, mucous membranes moist  Respiratory: Clear to auscultation bilaterally, respiratory effort normal on RA  Cardiovascular: RRR, no murmurs, cap refill brisk   Gastrointestinal: Positive bowel sounds, firm, mild distention, tender to palpation  Musculoskeletal: 1+ BLE edema   Psychiatric: Appropriate affect, cooperative  Neurologic: Oriented x 3, moves all extremities, speech clear  Skin: warm, dry, no visible rash       Results Reviewed:  LAB RESULTS:      Lab 23  0240 23  2203 23  0319 23  0720 23  1036 23  0324 23  2019 23  1352 23  0725 23  0442 23  0153 23  2246   WBC 4.25  --  6.66 13.33*  --  15.29*  --   --   --  14.99*  --   --    HEMOGLOBIN 9.3*  --  9.7* 9.9*  --  10.9*  --   --   --  12.0*  --   --    HEMATOCRIT 28.6*  --  30.4* 31.7*  --  35.6*  --   --   --  39.3  --   --     PLATELETS 367  --  328 300  --  358  --   --   --  456*  --   --    NEUTROS ABS  --   --  5.34 11.54*  --  13.62*  --   --   --  13.58*  --   --    IMMATURE GRANS (ABS)  --   --  0.02 0.08*  --  0.06*  --   --   --  0.06*  --   --    LYMPHS ABS  --   --  0.55* 0.73  --  0.80  --   --   --  0.67*  --   --    MONOS ABS  --   --  0.64 0.92*  --  0.79  --   --   --  0.66  --   --    EOS ABS  --   --  0.09 0.03  --  0.00  --   --   --  0.00  --   --    MCV 76.7*  --  77.6* 79.3  --  80.9  --   --   --  78.9*  --   --    PROCALCITONIN  --  0.43*  --   --   --   --   --   --   --   --   --  0.85*   LACTATE  --  1.2  --   --  3.2*  --  2.7* 3.1* 3.5* 4.1*   < > 3.6*   PROTIME  --   --   --   --   --   --   --   --   --   --   --  13.1   APTT  --   --   --   --   --   --   --   --   --   --   --  26.9    < > = values in this interval not displayed.         Lab 07/28/23  0240 07/27/23  0319 07/26/23  0720 07/25/23  0324 07/24/23  0924 07/23/23  6016   SODIUM 133* 138 139 141 143 140   POTASSIUM 3.2* 4.1 4.3 5.1 5.3* 5.1   CHLORIDE 98 101 102 107 108* 108*   CO2 25.0 25.0 24.0 22.0 20.0* 14.0*   ANION GAP 10.0 12.0 13.0 12.0 15.0 18.0*   BUN 23 27* 27* 36* 31* 28*   CREATININE 0.71* 0.72* 0.77 1.29* 1.44* 1.42*   EGFR 103.7 103.3 101.2 62.7 54.9* 55.9*   GLUCOSE 106* 97 87 101* 139* 171*   CALCIUM 7.9* 8.2* 8.3* 8.7 8.8 8.5*   MAGNESIUM  --   --   --   --   --  2.3         Lab 07/28/23  0240 07/27/23  0319 07/26/23  0720 07/25/23  0324 07/24/23  0924   TOTAL PROTEIN 6.0 5.1* 5.3* 5.5* 6.1   ALBUMIN 2.5* 2.8* 2.8* 3.4* 3.7   GLOBULIN 3.5 2.3 2.5 2.1 2.4   ALT (SGPT) 15 19 24 38 49*   AST (SGOT) 16 19 28 38 50*   BILIRUBIN 0.7 1.2 1.8* 1.2 0.8   ALK PHOS 66 71 76 69 75   LIPASE  --   --   --   --  1,255*         Lab 07/25/23  1502 07/23/23  2246   PROBNP 789.2  --    PROTIME  --  13.1   INR  --  0.98         Lab 07/24/23  0924 07/23/23  2246   CHOLESTEROL 134  --    LDL CHOL 72  --    HDL CHOL 39*  --    TRIGLYCERIDES 126 108          Lab 07/23/23 2349 07/23/23 2245   ABO TYPING O O   RH TYPING Positive Positive   ANTIBODY SCREEN  --  Negative         Brief Urine Lab Results  (Last result in the past 365 days)        Color   Clarity   Blood   Leuk Est   Nitrite   Protein   CREAT   Urine HCG        07/25/23 0636 Dark Yellow   Clear   Negative   Negative   Negative   30 mg/dL (1+)                   Microbiology Results Abnormal       Procedure Component Value - Date/Time    Blood Culture - Blood, Hand, Left [[id number]]  (Normal) Collected: 07/23/23 2240    Lab Status: Preliminary result Specimen: Blood from Hand, Left Updated: 07/27/23 2300     Blood Culture No growth at 4 days    Narrative:      AEROBIC BOTTLE ONLY    Blood Culture - Blood, Hand, Right [[id number]]  (Normal) Collected: 07/23/23 2230    Lab Status: Preliminary result Specimen: Blood from Hand, Right Updated: 07/27/23 2300     Blood Culture No growth at 4 days    Narrative:      AEROBIC BOTTLE ONLY    COVID PRE-OP / PRE-PROCEDURE SCREENING ORDER (NO ISOLATION) - Swab, Nasopharynx [[id number]]  (Normal) Collected: 07/24/23 0620    Lab Status: Final result Specimen: Swab from Nasopharynx Updated: 07/24/23 0729    Narrative:      The following orders were created for panel order COVID PRE-OP / PRE-PROCEDURE SCREENING ORDER (NO ISOLATION) - Swab, Nasopharynx.  Procedure                               Abnormality         Status                     ---------                               -----------         ------                     Respiratory Panel PCR w/...[[id number]]  Normal              Final result                 Please view results for these tests on the individual orders.    Respiratory Panel PCR w/COVID-19(SARS-CoV-2) [facility]/ANDRES/ASTER/PAD/[facility]/MAD/VALENTINE In-House, NP Swab in UTM/VTM, 3-4 HR TAT - Swab, Nasopharynx [[id number]]  (Normal) Collected: 07/24/23 0620    Lab Status: Final result Specimen: Swab from Nasopharynx Updated: 07/24/23 0729     ADENOVIRUS, PCR Not Detected      Coronavirus 229E Not Detected     Coronavirus HKU1 Not Detected     Coronavirus NL63 Not Detected     Coronavirus OC43 Not Detected     COVID19 Not Detected     Human Metapneumovirus Not Detected     Human Rhinovirus/Enterovirus Not Detected     Influenza A PCR Not Detected     Influenza B PCR Not Detected     Parainfluenza Virus 1 Not Detected     Parainfluenza Virus 2 Not Detected     Parainfluenza Virus 3 Not Detected     Parainfluenza Virus 4 Not Detected     RSV, PCR Not Detected     Bordetella pertussis pcr Not Detected     Bordetella parapertussis PCR Not Detected     Chlamydophila pneumoniae PCR Not Detected     Mycoplasma pneumo by PCR Not Detected    Narrative:      In the setting of a positive respiratory panel with a viral infection PLUS a negative procalcitonin without other underlying concern for bacterial infection, consider observing off antibiotics or discontinuation of antibiotics and continue supportive care. If the respiratory panel is positive for atypical bacterial infection (Bordetella pertussis, Chlamydophila pneumoniae, or Mycoplasma pneumoniae), consider antibiotic de-escalation to target atypical bacterial infection.            CT Abdomen Pelvis With Contrast    Result Date: 7/27/2023  CT ABDOMEN PELVIS W CONTRAST Date of Exam: 7/27/2023 11:02 PM EDT Indication: abdominal distention, ? ileus vs obstruction. Comparison: 4/27/2023, 7/23/2023. Technique: Axial CT images were obtained of the abdomen and pelvis following the uneventful intravenous administration of intravenous contrast.. Reconstructed coronal and sagittal images were also obtained. Automated exposure control and iterative construction methods were used. Findings: Lung Bases:   Small bilateral pleural effusions are present, left greater than right. Consolidative changes are present within the bilateral lower lobes with air bronchograms. Liver: Liver is normal in size and CT density. No focal lesions. Biliary/Gallbladder:  The  gallbladder is mildly under distended. Mild wall thickening present. Tiny stones are present within the lumen. No evidence of significant surrounding free fluid.. The biliary tree is nondilated. Spleen: Spleen is normal in size and CT density. Pancreas:  Extensive inflammatory changes are seen throughout the pancreas with significant phlegmonous changes and fluid seen along the body of the pancreas with no normal parenchyma are present within this region (series 2 image 61). The area of involvement measures up to 3.0 x 8.7 cm. No definite air pockets identified. Inflammatory changes are seen surrounding the remaining portions of the pancreas. No additional focal collection identified. Patchy fluid is seen extending within the mesentery. Multiple tiny mesenteric lymph nodes present. Small to moderate amount of free fluid is seen tracking into the lower abdomen and pelvis. Kidneys:  Kidneys are normal in size. There are no stones or hydronephrosis. Adrenals:  Adrenal glands are unremarkable. Retroperitoneal/Lymph Nodes/Vasculature:  No retroperitoneal adenopathy is identified. Atherosclerotic calcifications are present. Gastrointestinal/Mesentery:  Mildly prominent small bowel loops are present, likely reactive and related to surrounding ascites. This appears diffuse. Air is present within the colon. No definite obstruction identified. Surrounding free fluid present. The appendix is not well visualized. No significant stool burden identified.. No evidence of obstruction. No free air. No mesenteric fluid collections identified. There is diffuse anasarca of the soft tissues. Bladder:  The bladder is normal. Genital:   Unremarkable       Bony Structures:   Visualized bony structures are consistent with the patient's age.     Impression: Impression: 1. Extensive necrotizing appearing pancreatitis with fluid replacing nearly the entire pancreatic body as described above. Significant inflammatory changes are seen tracking  along the entire length of the pancreas. No suspicious air pockets identified. No additional focal collection identified. Significant surrounding edema is present. Findings appear to have significantly progressed as compared to the previous outside CT from 7/23/2023. Reactive tracking fluid is seen extending to the lower abdomen and pelvis. 2. Mild gallbladder wall thickening present which may be related to mild underdistention. Tiny stones are present. Reactive edema is suspected. Acute cholecystitis is unlikely. No evidence of biliary ductal dilatation. 3. Small bilateral pleural effusions present, left greater than right with overlying consolidative changes which may be related to atelectasis or pneumonia. Pneumonia suspected due to air bronchograms. There is anasarca of the soft tissues likely related  to fluid overload. 4. Mildly distended small bowel loops present throughout the abdomen and pelvis, likely reactive and related to small to moderate amount of free fluid related to tracking inflammatory change. Air is present within the colon. Obstruction unlikely. 5. Ancillary findings as described above. Electronically Signed: Cora Torres  7/27/2023 11:20 PM EDT  Workstation ID: [id number]    XR Abdomen KUB    Result Date: 7/27/2023  XR ABDOMEN KUB Date of Exam: 7/27/2023 9:01 PM EDT Indication: abd distension. ? obstruction Comparison: None available. Findings: There are several loops of air-filled mildly distended small bowel. This could represent small bowel ileus versus small bowel obstruction. There is an air-filled colon. There are no abnormal calcifications. The osseous structures are grossly unremarkable.     Impression: Mildly dilated air-filled loops of small bowel within the abdomen which could represent small bowel ileus versus small bowel obstruction. If clinically warranted follow-up CT scan of the abdomen may be obtained. Electronically Signed: Damir Mccarthy  7/27/2023 9:45 PM EDT  Workstation ID:  [id number]         Current medications:  Scheduled Meds:aspirin, 81 mg, Oral, Daily  atorvastatin, 20 mg, Oral, Nightly  bisacodyl, 10 mg, Oral, Daily  docusate sodium, 100 mg, Oral, BID  enzalutamide, 160 mg, Oral, Daily  meropenem, 1,000 mg, Intravenous, Q8H  methylnaltrexone, 12 mg, Subcutaneous, Every Other Day  metoclopramide, 10 mg, Intravenous, Q6H  metoprolol tartrate, 25 mg, Oral, Daily  montelukast, 10 mg, Oral, Nightly  nicotine, 1 patch, Transdermal, Q24H  pantoprazole, 40 mg, Oral, BID AC  senna-docusate sodium, 2 tablet, Oral, BID  sodium chloride, 10 mL, Intravenous, Q12H      Continuous Infusions:lactated ringers, 125 mL/hr      PRN Meds:.  acetaminophen **OR** acetaminophen **OR** acetaminophen    senna-docusate sodium **AND** polyethylene glycol **AND** bisacodyl **AND** bisacodyl    HYDROcodone-acetaminophen    HYDROmorphone    hydrOXYzine    ipratropium-albuterol    melatonin    naloxone    ondansetron    prochlorperazine    simethicone    simethicone    sodium chloride    sodium chloride    Assessment & Plan   Assessment & Plan     Active Hospital Problems    Diagnosis  POA    **Acute gallstone pancreatitis [K85.10]  Yes    Elevated serum creatinine [R79.89]  Yes    Personal history of transient ischemic attack (TIA) [Z86.73]  Not Applicable    Adenocarcinoma of prostate [C61]  Yes    Gastroesophageal reflux disease [K21.9]  Yes    Hyperlipidemia [E78.5]  Yes    Tobacco abuse [Z72.0]  Yes      Resolved Hospital Problems   No resolved problems to display.        Brief Hospital Course to date:  Ta Madison is a 63 y.o. male  with PMH of GERD, HLD, hx of TIA, advanced prostate cancer on Xtandi, occasional marijuna use, and chronic 1 PPD tobacco use who initially presented to [facility] 7/23/23 with complaints of severe epigastric abdominal pain with associated nausea and vomiting. CT A/P at OSH showed acute pancreatitis with 2 mm stone in the proximal duodenum. Transferred to [facility] for GI  evaluation.      This patient's problems and plans were partially entered by my partner and updated as appropriate by me 07/28/23.    Acute gallstone pancreatitis  Leukocytosis  Lactic acidosis  -CT A/P at OSH: acute interstitial pancreatitis, cholelithiasis with 2 mm stone in proximal duodenum (possibly a recently passed gallstone), transmural thickening of some jejunal loops in the left mid abdomen possibly reactive changes secondary to pancreatitis  -Lipase 1255  -Normal triglycerides   -MRCP showed acute pancreatitis no obstructing stones   -GI has signed off, recommended to continue Relistor while on high dose opiates   -General Surgery consulted, Dr. Tony recommends continued treatment for pancreatitis and F/U in 6 weeks for interval cholecystectomy, unless he has significant improvement in next 24-48 hours  -Increased abd pain overnight on 7/28  -Repeat CT abd/pelvis revealed possible necrotizing pancreatitis. Gen Surgery Dr. Mijares reviewed images and felt findings were consistent with severe acute pancreatitis.   -Recommended to continue IV hydration, bowel regimen, and Meropenem for now   -AM labs      SOA  -better, on RA  -IS/pulmonary toilet     Elevated Cr  -better/resolved after IVF     Prostate cancer  -Continue Xtandi oral chemotherapy (patient supplied)     HLD  -statin     GERD  -PPI     Hx of TIA  -resumed ASA since no further procedures planned   -Continue statin     Tobacco abuse  -Counseled on cessation  -Nicotine patch     Expected Discharge Location and Transportation: home   Expected Discharge   Expected Discharge Date: 7/30/2023; Expected Discharge Time:       DVT prophylaxis:  Mechanical DVT prophylaxis orders are present.     AM-PAC 6 Clicks Score (PT): 23 (07/27/23 4376)    CODE STATUS:   Code Status and Medical Interventions:   Ordered at: 07/23/23 2220     Code Status (Patient has no pulse and is not breathing):    CPR (Attempt to Resuscitate)     Medical Interventions (Patient has  pulse or is breathing):    Full Support     Release to patient:    Routine Release       Jagdeep Quintero, EUGENIA  07/28/23

## 2023-07-28 NOTE — PROGRESS NOTES
"Patient Name:  Ta Madison  YOB: 1960  [id number]    Surgery Progress Note    Date of visit: 7/28/2023    Subjective   Subjective: Asked to see patient again for abdominal distention.  He denies any abdominal pain, and was starting a clear liquid diet yesterday but his distention worsened.  He is passing a little bit of flatus but no bowel movements yet.  No other current complaints.         Objective     Objective:     /74 (BP Location: Right arm, Patient Position: Sitting)   Pulse 92   Temp 99.2 øF (37.3 øC) (Oral)   Resp 16   Ht 172.7 cm (68\")   Wt 91.7 kg (202 lb 3.2 oz)   SpO2 93%   BMI 30.74 kg/mý     Intake/Output Summary (Last 24 hours) at 7/28/2023 0729  Last data filed at 7/27/2023 1700  Gross per 24 hour   Intake 480 ml   Output --   Net 480 ml       CV:  Rhythm  regular and rate regular   L:  Clear  to auscultation bilaterally   Abd:  Bowel sounds positive , soft, minimally tender even to deep palpation though he does remain somewhat distended.  Ext:  No cyanosis, clubbing, edema    Recent labs that are back at this time have been reviewed.  I reviewed the CT scan of the abdomen pelvis from last night as well as the 1 from admission.  This shows severe acute pancreatitis without obvious infection.  He has a very immature likely pseudocyst in the midportion of the pancreas.  There is no free intraperitoneal air.  There is evidence of mild ileus throughout likely related to his pancreatitis.  The gallbladder does show some sludge and stones but no evidence of acute cholecystitis at this time.    Laboratory exam from this morning demonstrates essentially normal transaminases with an albumin of 2.5.  White count is 4.2 with a hemoglobin of 9.3 and a platelet count of 367.           Assessment/ Plan:    Problem List Items Addressed This Visit          Gastrointestinal Abdominal     * (Principal) Acute gallstone pancreatitis - Primary- He has evidence of severe acute " pancreatitis, though his symptoms are improving, though he remains with a ileus related to his pancreatitis.  I would recommend better IV hydration, and an aggressive bowel regimen.  I will keep him n.p.o. for now.  I have encouraged him to ambulate in the rubio as well.  There is no need for surgical intervention at this time and no evidence of infection.  I would continue meropenem.     Other Visit Diagnoses       Dysphagia, unspecified type                 Active Hospital Problems    Diagnosis  POA    **Acute gallstone pancreatitis [K85.10]  Yes    Elevated serum creatinine [R79.89]  Yes    Personal history of transient ischemic attack (TIA) [Z86.73]  Not Applicable    Adenocarcinoma of prostate [C61]  Yes    Gastroesophageal reflux disease [K21.9]  Yes    Hyperlipidemia [E78.5]  Yes    Tobacco abuse [Z72.0]  Yes      Resolved Hospital Problems   No resolved problems to display.              Christos Mijares MD  7/28/2023  07:29 EDT

## 2023-07-28 NOTE — PROGRESS NOTES
"Pharmacy Consult-Vancomycin Dosing  Ta Madison is a  63 y.o. male receiving vancomycin therapy.     Indication: Pneumonia  Consulting Provider: Hospitalists  ID Consult: Y, ID to see    Goal AUC: 400 - 600 mg/L*hr    Current Antimicrobial Therapy  Anti-Infectives (From admission, onward)      Ordered     Dose/Rate Route Frequency Start Stop    07/28/23 1305  vancomycin 1250 mg/250 mL 0.9% NS IVPB ([facility])        Ordering Provider: Curt Barry MD    1,250 mg  over 90 Minutes Intravenous Every 12 Hours Scheduled 07/28/23 1400 08/02/23 0959    07/28/23 1306  Pharmacy to dose vancomycin        Ordering Provider: Curt Barry MD     Does not apply Continuous PRN 07/28/23 1305 08/02/23 1304    07/27/23 2329  meropenem (MERREM) 1000 mg/100 mL 0.9% NS (mbp)        Ordering Provider: Carroll Bobo DO    1,000 mg  over 3 Hours Intravenous Every 8 Hours 07/28/23 0600 08/02/23 0559    07/27/23 2329  meropenem (MERREM) 1000 mg/100 mL 0.9% NS (mbp)        Ordering Provider: Carroll Bobo DO    1,000 mg  over 30 Minutes Intravenous Once 07/28/23 0000 07/28/23 0112            Allergies  Allergies as of 07/23/2023    (No Known Allergies)       Labs    Results from last 7 days   Lab Units 07/28/23  0240 07/27/23  0319 07/26/23  0720   BUN mg/dL 23 27* 27*   CREATININE mg/dL 0.71* 0.72* 0.77       Results from last 7 days   Lab Units 07/28/23  0240 07/27/23  0319 07/26/23  0720   WBC 10*3/mm3 4.25 6.66 13.33*       Evaluation of Dosing     Last Dose Received in the ED/Outside Facility: No  Is Patient on Dialysis or Renal Replacement: No    Ht - 172.7 cm (68\")  Wt - 91.7 kg (202 lb 3.2 oz)    Estimated Creatinine Clearance: 118.6 mL/min (A) (by C-G formula based on SCr of 0.71 mg/dL (L)).    Intake & Output (last 3 days)         07/25 0701  07/26 0700 07/26 0701 07/27 0700 07/27 0701 07/28 0700 07/28 0701 07/29 0700    P.O.   480     I.V. (mL/kg) 1080 (11.8)       IV Piggyback 1150       Total Intake(mL/kg) 2230 (24.3)  " 480 (5.2)     Urine (mL/kg/hr) 1225 (0.6)       Total Output 1225       Net +1005  +480                     Microbiology and Radiology  Microbiology Results (last 10 days)       Procedure Component Value - Date/Time    MRSA Screen, PCR (Inpatient) - Swab, Nares [[id number]]  (Abnormal) Collected: 07/28/23 0049    Lab Status: Final result Specimen: Swab from Nares Updated: 07/28/23 0950     MRSA PCR Positive    Narrative:      The negative predictive value of this diagnostic test is high and should only be used to consider de-escalating anti-MRSA therapy. A positive result may indicate colonization with MRSA and must be correlated clinically.    COVID PRE-OP / PRE-PROCEDURE SCREENING ORDER (NO ISOLATION) - Swab, Nasopharynx [[id number]]  (Normal) Collected: 07/24/23 0620    Lab Status: Final result Specimen: Swab from Nasopharynx Updated: 07/24/23 0729    Narrative:      The following orders were created for panel order COVID PRE-OP / PRE-PROCEDURE SCREENING ORDER (NO ISOLATION) - Swab, Nasopharynx.  Procedure                               Abnormality         Status                     ---------                               -----------         ------                     Respiratory Panel PCR w/...[[id number]]  Normal              Final result                 Please view results for these tests on the individual orders.    Respiratory Panel PCR w/COVID-19(SARS-CoV-2) [facility]/ANDRES/ASTER/[facility] In-House, NP Swab in UTM/VTM, 3-4 HR TAT - Swab, Nasopharynx [[id number]]  (Normal) Collected: 07/24/23 0620    Lab Status: Final result Specimen: Swab from Nasopharynx Updated: 07/24/23 0729     ADENOVIRUS, PCR Not Detected     Coronavirus 229E Not Detected     Coronavirus HKU1 Not Detected     Coronavirus NL63 Not Detected     Coronavirus OC43 Not Detected     COVID19 Not Detected     Human Metapneumovirus Not Detected     Human Rhinovirus/Enterovirus Not Detected     Influenza A PCR Not Detected     Influenza B PCR Not Detected      Parainfluenza Virus 1 Not Detected     Parainfluenza Virus 2 Not Detected     Parainfluenza Virus 3 Not Detected     Parainfluenza Virus 4 Not Detected     RSV, PCR Not Detected     Bordetella pertussis pcr Not Detected     Bordetella parapertussis PCR Not Detected     Chlamydophila pneumoniae PCR Not Detected     Mycoplasma pneumo by PCR Not Detected    Narrative:      In the setting of a positive respiratory panel with a viral infection PLUS a negative procalcitonin without other underlying concern for bacterial infection, consider observing off antibiotics or discontinuation of antibiotics and continue supportive care. If the respiratory panel is positive for atypical bacterial infection (Bordetella pertussis, Chlamydophila pneumoniae, or Mycoplasma pneumoniae), consider antibiotic de-escalation to target atypical bacterial infection.    Blood Culture - Blood, Hand, Left [[id number]]  (Normal) Collected: 07/23/23 2240    Lab Status: Preliminary result Specimen: Blood from Hand, Left Updated: 07/27/23 2300     Blood Culture No growth at 4 days    Narrative:      AEROBIC BOTTLE ONLY    Blood Culture - Blood, Hand, Right [[id number]]  (Normal) Collected: 07/23/23 2230    Lab Status: Preliminary result Specimen: Blood from Hand, Right Updated: 07/27/23 2300     Blood Culture No growth at 4 days    Narrative:      AEROBIC BOTTLE ONLY            Reported Vancomycin Levels                         [employer] AUC Calculation:    Current AUC: 0 mg/L*hr    Predicted Steady State AUC on Current Dose: -- mg/L*hr  _________________________________    Predicted Steady State AUC on New Dose:   483 mg/L*hr    Assessment/Plan:  Pharmacy to dose vancomycin for treatment of pneumonia with positive MRSA PCR. Based on the patient's renal function and clinical status, will order a scheduled maintenance dose of vancomycin 1250 mg IV q12h (13 mg/kg).  Vancomycin level scheduled for 7/30 at AM labs, prior to the 5th total dose of  vancomycin. Daily CMP already ordered to trend renal function.  Pharmacy will continue to monitor and adjust vancomycin dose as necessary based on renal function, cultures, labs, and clinical status.    Jf Brar PharmD, BCPS  7/28/2023  13:08 EDT

## 2023-07-28 NOTE — SIGNIFICANT NOTE
Patient with CT read of possible necrotizing pancreatitis.  Hemodynamically stable.  Started on merrem.  General surgery contacted, appreciate assistance.  General surgery reviewed the film and feels that it is more likely severe acute pancreatitis. No gas within pancreas.  NPO for now. Recommended starting bowel regimen for small reactive bowel dilation.  Patient with 4 watery bms earlier today. General surgery will evaluate patient this am.  Again, appreciate assistance on this pleasant medically complicated patient.      Personally discussed the findings and plan with patient and he is in understanding.

## 2023-07-28 NOTE — CONSULTS
INFECTIOUS DISEASE CONSULT/INITIAL HOSPITAL VISIT    Ta Madison  1960  [id number]    Date of Consult: 7/28/2023    Admission Date: 7/23/2023      Requesting Provider: Carroll Bobo DO  Evaluating Physician: Palomo Orellana MD    Reason for Consultation: Necrotizing pancreatitis    History of present illness:    Patient is a 63 y.o. male with h/o TIA, ongoing tobacco abuse, GERD, HLD, occasional marijuana use, and advanced prostate cancer/on Xtandi who we were asked to see for necrotizing pancreatitis.  The patient presented to [facility] ED on 7/23 with severe upper abdominal pain that radiated to his back about 3 hours PTA.  He had associated nausea and vomiting.  He has had postnasal drip with associated cough for last 3 to 4 weeks.  A CT scan at OSH showed acute gallbladder pancreatitis with a 2 mm stone in proximal duodenem.  He was given a dose of Invanz and transferred to [facility] on 7/23 for further medical management.  On arrival to [facility], the patient developed a Tmax of 100.3 and hypoxia requiring 2 L O2 NC.  He is now on room air.  Labs on arrival were lactic acid 3.6, PCT 0.85, creatinine 1.42, ALT 53, AST 66, bilirubin 0.4, lipase 1255, and WBC 15,000 with 91% neutrophils.  Blood cultures are negative to date.  Repeat blood cultures from 7/28 are pending.   A resp panel PCR was negative. His lactic acid and WBC were normal level on 7/27.  His PCT was 0.43.  A MRSA PCR on 7/28 was positive. His pain level on 7/28 was 7 out of 10. An MRCP on 7/24 showed acute pancreatitis with cholelithiasis without choledocholelithiasis.  It was felt that he may have passed the stone.  His abdomen became more distended and taut last night on 7/27.  A KUB noted possible small bowel ileus vs SBO.  A CT scan with contrast was done on 7/27 and showed extensive necrotizing appearing pancreatitis with fluid replacing nearly the entire pancreatic body with inflammatory changes tracking along entire length of  pancreas, mild gallbladder wall thickening with cholelithiasis (less likely acute cholecystitis), small bilateral pleural effusion, and mildly distended SB loops likely reactive and left likely obstruction.  He was on Zosyn, but was changed Merrem and Vancomycin on 7/28.  ID was asked to evaluate and manage his antibiotic therapy. He complains of persistent abdominal pain and abdominal distention.    Past Medical History:   Diagnosis Date    Elevated cholesterol     GERD (gastroesophageal reflux disease)     Increased heart rate     Mini stroke     Neck pain     Prostate cancer     Rash     Tobacco abuse        Past Surgical History:   Procedure Laterality Date    APPENDECTOMY  1971    [facility]     COLONOSCOPY      COLONOSCOPY N/A 1/25/2023    Procedure: COLONOSCOPY;  Surgeon: Mahnaz Caraballo MD;  Location: [facility] OR;  Service: Gastroenterology;  Laterality: N/A;       Family History   Problem Relation Age of Onset    Nephrolithiasis Mother     Heart disease Father     Hypertension Father     Kidney disease Father        Social History     Socioeconomic History    Marital status:    Tobacco Use    Smoking status: Every Day     Packs/day: 2.00     Years: 45.00     Pack years: 90.00     Types: Cigarettes    Smokeless tobacco: Never   Vaping Use    Vaping Use: Never used   Substance and Sexual Activity    Alcohol use: No    Drug use: Yes     Types: Marijuana     Comment: occ     Sexual activity: Defer       No Known Allergies      Medication:    Current Facility-Administered Medications:     acetaminophen (TYLENOL) tablet 650 mg, 650 mg, Oral, Q4H PRN **OR** acetaminophen (TYLENOL) 160 MG/5ML solution 650 mg, 650 mg, Oral, Q4H PRN **OR** acetaminophen (TYLENOL) suppository 650 mg, 650 mg, Rectal, Q4H PRN, Carroll Bobo DO    aspirin EC tablet 81 mg, 81 mg, Oral, Daily, Jayla Lake, APRN, 81 mg at 07/28/23 0812    atorvastatin (LIPITOR) tablet 20 mg, 20 mg, Oral, Nightly, Jihan  DO Carroll, 20 mg at 07/27/23 2031    bisacodyl (DULCOLAX) EC tablet 10 mg, 10 mg, Oral, Daily, Christos Mijares MD, 10 mg at 07/28/23 0813    sennosides-docusate (PERICOLACE) 8.6-50 MG per tablet 2 tablet, 2 tablet, Oral, BID, 2 tablet at 07/28/23 0827 **AND** polyethylene glycol (MIRALAX) packet 17 g, 17 g, Oral, Daily PRN **AND** bisacodyl (DULCOLAX) EC tablet 5 mg, 5 mg, Oral, Daily PRN **AND** bisacodyl (DULCOLAX) suppository 10 mg, 10 mg, Rectal, Daily PRN, Carroll Bobo DO    Calcium Replacement - Follow Nurse / BPA Driven Protocol, , Does not apply, PRN, Curt Barry MD    docusate sodium (COLACE) capsule 100 mg, 100 mg, Oral, BID, Christos Mijares MD, 100 mg at 07/28/23 0813    enzalutamide (XTANDI) chemo capsule 160 mg (patient supplied medication), 160 mg, Oral, Daily, Curt Barry MD, 160 mg at 07/28/23 1735    HYDROcodone-acetaminophen (NORCO)  MG per tablet 1 tablet, 1 tablet, Oral, Q4H PRN, Jayla Lake, APRN, 1 tablet at 07/28/23 0611    HYDROmorphone (DILAUDID) injection 1 mg, 1 mg, Intravenous, Q2H PRN, Cody Chow III, DO, 1 mg at 07/28/23 1746    hydrOXYzine (ATARAX) tablet 50 mg, 50 mg, Oral, TID PRN, Carroll Bobo DO, 50 mg at 07/27/23 2038    ipratropium-albuterol (DUO-NEB) nebulizer solution 3 mL, 3 mL, Nebulization, Q4H PRN, Curt Barry MD    Magnesium Standard Dose Replacement - Follow Nurse / BPA Driven Protocol, , Does not apply, PRN, Curt Barry MD    melatonin tablet 5 mg, 5 mg, Oral, Nightly PRN, Carroll Bobo DO, 5 mg at 07/27/23 2038    meropenem (MERREM) 1000 mg/100 mL 0.9% NS (mbp), 1,000 mg, Intravenous, Q8H, Carroll Bobo DO, 1,000 mg at 07/28/23 1335    methylnaltrexone (RELISTOR) injection 12 mg, 12 mg, Subcutaneous, Every Other Day, Carol Mai PA, 12 mg at 07/28/23 0942    metoclopramide (REGLAN) injection 10 mg, 10 mg, Intravenous, Q6H, Christos Mijares MD, 10 mg at 07/28/23 1514    metoprolol tartrate (LOPRESSOR)  tablet 25 mg, 25 mg, Oral, Daily, Carroll Bobo, DO, 25 mg at 07/28/23 0813    montelukast (SINGULAIR) tablet 10 mg, 10 mg, Oral, Nightly, Carroll Bobo, DO, 10 mg at 07/27/23 2031    naloxone (NARCAN) injection 0.4 mg, 0.4 mg, Intravenous, PRN, Carroll Bobo, DO    nicotine (NICODERM CQ) 21 MG/24HR patch 1 patch, 1 patch, Transdermal, Q24H, Carroll Bobo, DO, 1 patch at 07/28/23 0812    ondansetron (ZOFRAN) injection 4 mg, 4 mg, Intravenous, Q6H PRN, Bharat Montana, APRN, 4 mg at 07/24/23 1113    pantoprazole (PROTONIX) EC tablet 40 mg, 40 mg, Oral, BID AC, Carroll Bobo, DO, 40 mg at 07/28/23 1735    Pharmacy to dose vancomycin, , Does not apply, Continuous PRN, Curt Barry MD    Phosphorus Replacement - Follow Nurse / BPA Driven Protocol, , Does not apply, PRJhonny KENYON Marc P, MD    Potassium Replacement - Follow Nurse / BPA Driven Protocol, , Does not apply, Jhonny LINK Marc P, MD    prochlorperazine (COMPAZINE) injection 5 mg, 5 mg, Intravenous, Q3H PRN, Carroll Bobo, DO, 5 mg at 07/24/23 1635    simethicone (MYLICON) chewable tablet 80 mg, 80 mg, Oral, 4x Daily PRN, Jayla Lake, APRN, 80 mg at 07/27/23 2038    simethicone (MYLICON) chewable tablet 80 mg, 80 mg, Oral, 4x Daily PRN, Christos Mijares MD, 80 mg at 07/28/23 0612    sodium chloride 0.9 % flush 10 mL, 10 mL, Intravenous, Q12H, Carroll Bobo, DO, 10 mL at 07/28/23 0952    sodium chloride 0.9 % flush 10 mL, 10 mL, Intravenous, PRN, Carroll Bobo, DO    sodium chloride 0.9 % infusion 40 mL, 40 mL, Intravenous, PRN, Carroll Bobo, DO    sodium chloride 0.9 % infusion, 125 mL/hr, Intravenous, Continuous, Curt Barry MD, Last Rate: 125 mL/hr at 07/28/23 1024, 125 mL/hr at 07/28/23 1024    vancomycin 1250 mg/250 mL 0.9% NS IVPB ([facility]), 1,250 mg, Intravenous, Q12H, Curt Barry MD, 1,250 mg at 07/28/23 1335    Antibiotics:  Anti-Infectives (From admission, onward)      Ordered     Dose/Rate Route Frequency Start  Stop    23 1305  vancomycin 1250 mg/250 mL 0.9% NS IVPB ([facility])        Ordering Provider: Curt Barry MD    1,250 mg  over 90 Minutes Intravenous Every 12 Hours Scheduled 23 1400 23 0959    23 1306  Pharmacy to dose vancomycin        Ordering Provider: Curt Barry MD     Does not apply Continuous PRN 23 1305 23 1304    23 2329  meropenem (MERREM) 1000 mg/100 mL 0.9% NS (mbp)        Ordering Provider: Carroll Bobo DO    1,000 mg  over 3 Hours Intravenous Every 8 Hours 23 0600 23 0559    23 2329  meropenem (MERREM) 1000 mg/100 mL 0.9% NS (mbp)        Ordering Provider: Carroll Bobo DO    1,000 mg  over 30 Minutes Intravenous Once 23 0000 23 0112              Review of Systems:  Constitutional-- No Fever, chills or sweats.  Appetite NPO, and no malaise. No fatigue. + weakness.  HEENT-- No new vision, hearing or throat complaints.  No epistaxis or oral sores.  Denies odynophagia or dysphagia. No headache, photophobia or neck stiffness.  CV-- No chest pain, palpitation or syncope  Resp-- No SOB/cough/Hemoptysis  GI- + nausea, no vomiting, or diarrhea.  No hematochezia, melena, or hematemesis. Has abdominal pain and abdominal distention.  -- No dysuria, hematuria, or flank pain.  Denies hesitancy, urgency or burning with urination.   Lymph- no swollen lymph nodes in neck/axilla or groin.   Heme- No active bruising or bleeding; no Hx of DVT or PE.  MS-- no swelling or pain in the bones or joints of arms/legs.  No new back pain.  Neuro-- No acute focal weakness or numbness in the arms or legs.  No h/o seizures.  Skin--No rashes or lesions      Physical Exam:   Vital Signs  Temp (24hrs), Av.8 øF (37.1 øC), Min:98 øF (36.7 øC), Max:99.2 øF (37.3 øC)    Temp  Min: 98 øF (36.7 øC)  Max: 99.2 øF (37.3 øC)  BP  Min: 115/56  Max: 143/62  Pulse  Min: 89  Max: 99  Resp  Min: 16  Max: 18  SpO2  Min: 93 %  Max: 95 %    GENERAL: Awake and alert,  in no acute distress.   HEENT: Normocephalic, atraumatic.  PERRL. EOMI. No conjunctival injection. No icterus. Oropharynx clear without evidence of thrush or exudate.   NECK: Supple without nuchal rigidity. No mass.  LYMPH: No cervical, axillary or inguinal lymphadenopathy.  HEART: RRR; No murmur, rubs, gallops.   LUNGS: Clear to auscultation bilaterally without wheezing, rales, rhonchi. Normal respiratory effort. Nonlabored.   ABDOMEN: Moderate to severe abdominal distention.  His abdomen is fairly tense and he has mild to moderate diffuse tenderness.  EXT:  No cyanosis, clubbing or edema. No cord.  :  Without Jorgensen catheter.  MSK: No joint effusions or erythema  SKIN: Warm and dry without cutaneous eruptions on Inspection/palpation.    NEURO: Oriented to PPT.  Motor 5/5 strength  PSYCHIATRIC: Normal insight and judgment. Cooperative with PE    Laboratory Data    Results from last 7 days   Lab Units 07/28/23  0240 07/27/23  0319 07/26/23  0720   WBC 10*3/mm3 4.25 6.66 13.33*   HEMOGLOBIN g/dL 9.3* 9.7* 9.9*   HEMATOCRIT % 28.6* 30.4* 31.7*   PLATELETS 10*3/mm3 367 328 300     Results from last 7 days   Lab Units 07/28/23  0240   SODIUM mmol/L 133*   POTASSIUM mmol/L 3.2*   CHLORIDE mmol/L 98   CO2 mmol/L 25.0   BUN mg/dL 23   CREATININE mg/dL 0.71*   GLUCOSE mg/dL 106*   CALCIUM mg/dL 7.9*     Results from last 7 days   Lab Units 07/28/23  0240   ALK PHOS U/L 66   BILIRUBIN mg/dL 0.7   ALT (SGPT) U/L 15   AST (SGOT) U/L 16             Results from last 7 days   Lab Units 07/27/23  2203   LACTATE mmol/L 1.2             Estimated Creatinine Clearance: 118.6 mL/min (A) (by C-G formula based on SCr of 0.71 mg/dL (L)).      Microbiology:  Microbiology Results (last 10 days)       Procedure Component Value - Date/Time    MRSA Screen, PCR (Inpatient) - Swab, Nares [[id number]]  (Abnormal) Collected: 07/28/23 0049    Lab Status: Final result Specimen: Swab from Nares Updated: 07/28/23 0950     MRSA PCR Positive     Narrative:      The negative predictive value of this diagnostic test is high and should only be used to consider de-escalating anti-MRSA therapy. A positive result may indicate colonization with MRSA and must be correlated clinically.    COVID PRE-OP / PRE-PROCEDURE SCREENING ORDER (NO ISOLATION) - Swab, Nasopharynx [[id number]]  (Normal) Collected: 07/24/23 0620    Lab Status: Final result Specimen: Swab from Nasopharynx Updated: 07/24/23 0729    Narrative:      The following orders were created for panel order COVID PRE-OP / PRE-PROCEDURE SCREENING ORDER (NO ISOLATION) - Swab, Nasopharynx.  Procedure                               Abnormality         Status                     ---------                               -----------         ------                     Respiratory Panel PCR w/...[[id number]]  Normal              Final result                 Please view results for these tests on the individual orders.    Respiratory Panel PCR w/COVID-19(SARS-CoV-2) BALTAZAR/ANDRES/ASTER/PAD/COR/MAD/VALENTINE In-House, NP Swab in UTM/VTM, 3-4 HR TAT - Swab, Nasopharynx [[id number]]  (Normal) Collected: 07/24/23 0620    Lab Status: Final result Specimen: Swab from Nasopharynx Updated: 07/24/23 0729     ADENOVIRUS, PCR Not Detected     Coronavirus 229E Not Detected     Coronavirus HKU1 Not Detected     Coronavirus NL63 Not Detected     Coronavirus OC43 Not Detected     COVID19 Not Detected     Human Metapneumovirus Not Detected     Human Rhinovirus/Enterovirus Not Detected     Influenza A PCR Not Detected     Influenza B PCR Not Detected     Parainfluenza Virus 1 Not Detected     Parainfluenza Virus 2 Not Detected     Parainfluenza Virus 3 Not Detected     Parainfluenza Virus 4 Not Detected     RSV, PCR Not Detected     Bordetella pertussis pcr Not Detected     Bordetella parapertussis PCR Not Detected     Chlamydophila pneumoniae PCR Not Detected     Mycoplasma pneumo by PCR Not Detected    Narrative:      In the setting of a positive  respiratory panel with a viral infection PLUS a negative procalcitonin without other underlying concern for bacterial infection, consider observing off antibiotics or discontinuation of antibiotics and continue supportive care. If the respiratory panel is positive for atypical bacterial infection (Bordetella pertussis, Chlamydophila pneumoniae, or Mycoplasma pneumoniae), consider antibiotic de-escalation to target atypical bacterial infection.    Blood Culture - Blood, Hand, Left [[id number]]  (Normal) Collected: 07/23/23 2240    Lab Status: Preliminary result Specimen: Blood from Hand, Left Updated: 07/27/23 2300     Blood Culture No growth at 4 days    Narrative:      AEROBIC BOTTLE ONLY    Blood Culture - Blood, Hand, Right [[id number]]  (Normal) Collected: 07/23/23 2230    Lab Status: Preliminary result Specimen: Blood from Hand, Right Updated: 07/27/23 2300     Blood Culture No growth at 4 days    Narrative:      AEROBIC BOTTLE ONLY                  Radiology:  Imaging Results (Last 72 Hours)       Procedure Component Value Units Date/Time    CT Abdomen Pelvis With Contrast [[id number]] Collected: 07/27/23 2314     Updated: 07/27/23 2323    Narrative:      CT ABDOMEN PELVIS W CONTRAST    Date of Exam: 7/27/2023 11:02 PM EDT    Indication: abdominal distention, ? ileus vs obstruction.    Comparison: 4/27/2023, 7/23/2023.    Technique: Axial CT images were obtained of the abdomen and pelvis following the uneventful intravenous administration of intravenous contrast.. Reconstructed coronal and sagittal images were also obtained. Automated exposure control and iterative   construction methods were used.      Findings:  Lung Bases:     Small bilateral pleural effusions are present, left greater than right. Consolidative changes are present within the bilateral lower lobes with air bronchograms.  Liver:  Liver is normal in size and CT density. No focal lesions.    Biliary/Gallbladder:    The gallbladder is mildly under  distended. Mild wall thickening present. Tiny stones are present within the lumen. No evidence of significant surrounding free fluid.. The biliary tree is nondilated.    Spleen:  Spleen is normal in size and CT density.    Pancreas:    Extensive inflammatory changes are seen throughout the pancreas with significant phlegmonous changes and fluid seen along the body of the pancreas with no normal parenchyma are present within this region (series 2 image 61). The area of involvement   measures up to 3.0 x 8.7 cm. No definite air pockets identified. Inflammatory changes are seen surrounding the remaining portions of the pancreas. No additional focal collection identified. Patchy fluid is seen extending within the mesentery. Multiple   tiny mesenteric lymph nodes present. Small to moderate amount of free fluid is seen tracking into the lower abdomen and pelvis.    Kidneys:    Kidneys are normal in size. There are no stones or hydronephrosis.    Adrenals:    Adrenal glands are unremarkable.    Retroperitoneal/Lymph Nodes/Vasculature:    No retroperitoneal adenopathy is identified. Atherosclerotic calcifications are present.    Gastrointestinal/Mesentery:    Mildly prominent small bowel loops are present, likely reactive and related to surrounding ascites. This appears diffuse. Air is present within the colon. No definite obstruction identified. Surrounding free fluid present. The appendix is not well   visualized. No significant stool burden identified.. No evidence of obstruction. No free air. No mesenteric fluid collections identified. There is diffuse anasarca of the soft tissues.    Bladder:    The bladder is normal.    Genital:     Unremarkable          Bony Structures:     Visualized bony structures are consistent with the patient's age.        Impression:      Impression:    1. Extensive necrotizing appearing pancreatitis with fluid replacing nearly the entire pancreatic body as described above. Significant  inflammatory changes are seen tracking along the entire length of the pancreas. No suspicious air pockets identified.   No additional focal collection identified. Significant surrounding edema is present. Findings appear to have significantly progressed as compared to the previous outside CT from 7/23/2023. Reactive tracking fluid is seen extending to the lower abdomen   and pelvis.  2. Mild gallbladder wall thickening present which may be related to mild underdistention. Tiny stones are present. Reactive edema is suspected. Acute cholecystitis is unlikely. No evidence of biliary ductal dilatation.  3. Small bilateral pleural effusions present, left greater than right with overlying consolidative changes which may be related to atelectasis or pneumonia. Pneumonia suspected due to air bronchograms. There is anasarca of the soft tissues likely related   to fluid overload.  4. Mildly distended small bowel loops present throughout the abdomen and pelvis, likely reactive and related to small to moderate amount of free fluid related to tracking inflammatory change. Air is present within the colon. Obstruction unlikely.  5. Ancillary findings as described above.        Electronically Signed: Cora Torres    7/27/2023 11:20 PM EDT    Workstation ID: [id number]    XR Abdomen KUB [[id number]] Collected: 07/27/23 2143     Updated: 07/27/23 2148    Narrative:      XR ABDOMEN KUB    Date of Exam: 7/27/2023 9:01 PM EDT    Indication: abd distension. ? obstruction    Comparison: None available.    Findings:        There are several loops of air-filled mildly distended small bowel. This could represent small bowel ileus versus small bowel obstruction.    There is an air-filled colon.    There are no abnormal calcifications.    The osseous structures are grossly unremarkable.      Impression:        Mildly dilated air-filled loops of small bowel within the abdomen which could represent small bowel ileus versus small bowel obstruction.  If clinically warranted follow-up CT scan of the abdomen may be obtained.              Electronically Signed: Damir Mccarthy    7/27/2023 9:45 PM EDT    Workstation ID: [id number]        I read his radiographic images reviewed the images with the patient and his family.      Impression:   Severe gallstone pancreatitis-Without overt evidence of severe pancreatic infection/pancreatic abscess.  I will leave him on intravenous Merrem for the time being but at this point, I do not think he will need a prolonged course. He appears to have passed the gallstone.  Leukocytosis/neutrophilia,now resolved.  MRSA nasal colonization  Mildly elevated procalcitonin  Lactic acidosis, resolved.  Advance prostate cancer/on Xtandi  Ongoing tobacco abuse  Occasional marijuana use    PLAN/RECOMMENDATIONS:   Thank you for asking us to see Ta Madison, I recommend the following:  Follow blood cultures  Continue Merrem for now    Palomo Orellana MD saw and examined patient, verified hx and PE, read all radiographic studies, reviewed labs and micro data, and formulated dx, plan for treatment and all medical decision making.      Jarod Dinero PA-C for Palomo Orellana MD    I discussed his complex situation in detail with him and his family today.  I discussed the pathophysiology and potential complications of severe pancreatitis with him and his family in detail.  His high complexity medical problems required high complexity medical decision making today.       Palomo Orellana MD  7/28/2023  21:10 EDT

## 2023-07-28 NOTE — PLAN OF CARE
Goal Outcome Evaluation:   VS stable. NSR on tele. Ambulated outside of room. Continue ABX IV therapy. Clear liquid diet. Dilaudid given for pain.

## 2023-07-29 LAB
ALBUMIN SERPL-MCNC: 2.6 G/DL (ref 3.5–5.2)
ALBUMIN/GLOB SERPL: 1 G/DL
ALP SERPL-CCNC: 68 U/L (ref 39–117)
ALT SERPL W P-5'-P-CCNC: 16 U/L (ref 1–41)
ANION GAP SERPL CALCULATED.3IONS-SCNC: 12 MMOL/L (ref 5–15)
AST SERPL-CCNC: 18 U/L (ref 1–40)
BILIRUB SERPL-MCNC: 0.6 MG/DL (ref 0–1.2)
BUN SERPL-MCNC: 17 MG/DL (ref 8–23)
BUN/CREAT SERPL: 29.3 (ref 7–25)
CALCIUM SPEC-SCNC: 7.8 MG/DL (ref 8.6–10.5)
CHLORIDE SERPL-SCNC: 103 MMOL/L (ref 98–107)
CO2 SERPL-SCNC: 22 MMOL/L (ref 22–29)
CREAT SERPL-MCNC: 0.58 MG/DL (ref 0.76–1.27)
DEPRECATED RDW RBC AUTO: 48.6 FL (ref 37–54)
EGFRCR SERPLBLD CKD-EPI 2021: 110.3 ML/MIN/1.73
ERYTHROCYTE [DISTWIDTH] IN BLOOD BY AUTOMATED COUNT: 17.4 % (ref 12.3–15.4)
GLOBULIN UR ELPH-MCNC: 2.5 GM/DL
GLUCOSE SERPL-MCNC: 105 MG/DL (ref 65–99)
HCT VFR BLD AUTO: 29 % (ref 37.5–51)
HGB BLD-MCNC: 9.4 G/DL (ref 13–17.7)
MAGNESIUM SERPL-MCNC: 2.1 MG/DL (ref 1.6–2.4)
MCH RBC QN AUTO: 24.9 PG (ref 26.6–33)
MCHC RBC AUTO-ENTMCNC: 32.4 G/DL (ref 31.5–35.7)
MCV RBC AUTO: 76.9 FL (ref 79–97)
NT-PROBNP SERPL-MCNC: 709.6 PG/ML (ref 0–900)
PHOSPHATE SERPL-MCNC: 1.5 MG/DL (ref 2.5–4.5)
PHOSPHATE SERPL-MCNC: 1.5 MG/DL (ref 2.5–4.5)
PLATELET # BLD AUTO: 441 10*3/MM3 (ref 140–450)
PMV BLD AUTO: 10.4 FL (ref 6–12)
POTASSIUM SERPL-SCNC: 4 MMOL/L (ref 3.5–5.2)
PROT SERPL-MCNC: 5.1 G/DL (ref 6–8.5)
RBC # BLD AUTO: 3.77 10*6/MM3 (ref 4.14–5.8)
SODIUM SERPL-SCNC: 137 MMOL/L (ref 136–145)
WBC NRBC COR # BLD: 9.34 10*3/MM3 (ref 3.4–10.8)

## 2023-07-29 PROCEDURE — 25010000002 HYDROMORPHONE 1 MG/ML SOLUTION: Performed by: INTERNAL MEDICINE

## 2023-07-29 PROCEDURE — 83735 ASSAY OF MAGNESIUM: CPT | Performed by: HOSPITALIST

## 2023-07-29 PROCEDURE — 80053 COMPREHEN METABOLIC PANEL: CPT | Performed by: HOSPITALIST

## 2023-07-29 PROCEDURE — 83880 ASSAY OF NATRIURETIC PEPTIDE: CPT | Performed by: NURSE PRACTITIONER

## 2023-07-29 PROCEDURE — 25010000002 METOCLOPRAMIDE PER 10 MG: Performed by: SURGERY

## 2023-07-29 PROCEDURE — 25010000002 VANCOMYCIN 10 G RECONSTITUTED SOLUTION: Performed by: HOSPITALIST

## 2023-07-29 PROCEDURE — 99231 SBSQ HOSP IP/OBS SF/LOW 25: CPT | Performed by: NURSE PRACTITIONER

## 2023-07-29 PROCEDURE — 84100 ASSAY OF PHOSPHORUS: CPT | Performed by: HOSPITALIST

## 2023-07-29 PROCEDURE — 85027 COMPLETE CBC AUTOMATED: CPT | Performed by: NURSE PRACTITIONER

## 2023-07-29 PROCEDURE — 25010000002 MEROPENEM PER 100 MG: Performed by: INTERNAL MEDICINE

## 2023-07-29 PROCEDURE — 97162 PT EVAL MOD COMPLEX 30 MIN: CPT

## 2023-07-29 RX ORDER — BISACODYL 10 MG
10 SUPPOSITORY, RECTAL RECTAL EVERY 8 HOURS
Status: DISCONTINUED | OUTPATIENT
Start: 2023-07-29 | End: 2023-08-11 | Stop reason: HOSPADM

## 2023-07-29 RX ORDER — HYDROCORTISONE ACETATE 25 MG/1
25 SUPPOSITORY RECTAL 2 TIMES DAILY
Status: DISCONTINUED | OUTPATIENT
Start: 2023-07-29 | End: 2023-08-11 | Stop reason: HOSPADM

## 2023-07-29 RX ADMIN — MEROPENEM 1000 MG: 1 INJECTION, POWDER, FOR SOLUTION INTRAVENOUS at 06:27

## 2023-07-29 RX ADMIN — HYDROMORPHONE HYDROCHLORIDE 1 MG: 1 INJECTION, SOLUTION INTRAMUSCULAR; INTRAVENOUS; SUBCUTANEOUS at 16:03

## 2023-07-29 RX ADMIN — HYDROCORTISONE ACETATE 25 MG: 25 SUPPOSITORY RECTAL at 20:43

## 2023-07-29 RX ADMIN — ATORVASTATIN CALCIUM 20 MG: 20 TABLET, FILM COATED ORAL at 20:43

## 2023-07-29 RX ADMIN — HYDROCODONE BITARTRATE AND ACETAMINOPHEN 1 TABLET: 10; 325 TABLET ORAL at 12:27

## 2023-07-29 RX ADMIN — Medication 5 MG: at 23:04

## 2023-07-29 RX ADMIN — SENNOSIDES AND DOCUSATE SODIUM 2 TABLET: 50; 8.6 TABLET ORAL at 08:27

## 2023-07-29 RX ADMIN — Medication 1 PATCH: at 08:27

## 2023-07-29 RX ADMIN — HYDROCODONE BITARTRATE AND ACETAMINOPHEN 1 TABLET: 10; 325 TABLET ORAL at 08:27

## 2023-07-29 RX ADMIN — DOCUSATE SODIUM 100 MG: 100 CAPSULE, LIQUID FILLED ORAL at 08:27

## 2023-07-29 RX ADMIN — MEROPENEM 1000 MG: 1 INJECTION, POWDER, FOR SOLUTION INTRAVENOUS at 13:44

## 2023-07-29 RX ADMIN — HYDROMORPHONE HYDROCHLORIDE 1 MG: 1 INJECTION, SOLUTION INTRAMUSCULAR; INTRAVENOUS; SUBCUTANEOUS at 10:04

## 2023-07-29 RX ADMIN — HYDROXYZINE HYDROCHLORIDE 50 MG: 25 TABLET, FILM COATED ORAL at 20:44

## 2023-07-29 RX ADMIN — HYDROMORPHONE HYDROCHLORIDE 1 MG: 1 INJECTION, SOLUTION INTRAMUSCULAR; INTRAVENOUS; SUBCUTANEOUS at 06:32

## 2023-07-29 RX ADMIN — VANCOMYCIN HYDROCHLORIDE 1250 MG: 10 INJECTION, POWDER, LYOPHILIZED, FOR SOLUTION INTRAVENOUS at 21:03

## 2023-07-29 RX ADMIN — BISACODYL 10 MG: 10 SUPPOSITORY RECTAL at 10:04

## 2023-07-29 RX ADMIN — HYDROCODONE BITARTRATE AND ACETAMINOPHEN 1 TABLET: 10; 325 TABLET ORAL at 23:04

## 2023-07-29 RX ADMIN — METOCLOPRAMIDE 10 MG: 5 INJECTION, SOLUTION INTRAMUSCULAR; INTRAVENOUS at 08:27

## 2023-07-29 RX ADMIN — SODIUM CHLORIDE 100 ML/HR: 9 INJECTION, SOLUTION INTRAVENOUS at 13:45

## 2023-07-29 RX ADMIN — POTASSIUM & SODIUM PHOSPHATES POWDER PACK 280-160-250 MG 2 PACKET: 280-160-250 PACK at 16:02

## 2023-07-29 RX ADMIN — HYDROMORPHONE HYDROCHLORIDE 1 MG: 1 INJECTION, SOLUTION INTRAMUSCULAR; INTRAVENOUS; SUBCUTANEOUS at 13:44

## 2023-07-29 RX ADMIN — SIMETHICONE 80 MG: 80 TABLET, CHEWABLE ORAL at 23:04

## 2023-07-29 RX ADMIN — HYDROCODONE BITARTRATE AND ACETAMINOPHEN 1 TABLET: 10; 325 TABLET ORAL at 03:30

## 2023-07-29 RX ADMIN — PANTOPRAZOLE SODIUM 40 MG: 40 TABLET, DELAYED RELEASE ORAL at 08:27

## 2023-07-29 RX ADMIN — Medication 10 ML: at 08:28

## 2023-07-29 RX ADMIN — DOCUSATE SODIUM 100 MG: 100 CAPSULE, LIQUID FILLED ORAL at 20:42

## 2023-07-29 RX ADMIN — BISACODYL 10 MG: 10 SUPPOSITORY RECTAL at 20:42

## 2023-07-29 RX ADMIN — MONTELUKAST 10 MG: 10 TABLET, FILM COATED ORAL at 20:42

## 2023-07-29 RX ADMIN — METOCLOPRAMIDE 10 MG: 5 INJECTION, SOLUTION INTRAMUSCULAR; INTRAVENOUS at 16:03

## 2023-07-29 RX ADMIN — MEROPENEM 1000 MG: 1 INJECTION, POWDER, FOR SOLUTION INTRAVENOUS at 21:02

## 2023-07-29 RX ADMIN — METOCLOPRAMIDE 10 MG: 5 INJECTION, SOLUTION INTRAMUSCULAR; INTRAVENOUS at 20:42

## 2023-07-29 RX ADMIN — POTASSIUM & SODIUM PHOSPHATES POWDER PACK 280-160-250 MG 2 PACKET: 280-160-250 PACK at 06:27

## 2023-07-29 RX ADMIN — BISACODYL 10 MG: 5 TABLET, COATED ORAL at 08:27

## 2023-07-29 RX ADMIN — ASPIRIN 81 MG: 81 TABLET, COATED ORAL at 08:27

## 2023-07-29 RX ADMIN — HYDROMORPHONE HYDROCHLORIDE 1 MG: 1 INJECTION, SOLUTION INTRAMUSCULAR; INTRAVENOUS; SUBCUTANEOUS at 01:03

## 2023-07-29 RX ADMIN — METOCLOPRAMIDE 10 MG: 5 INJECTION, SOLUTION INTRAMUSCULAR; INTRAVENOUS at 03:30

## 2023-07-29 RX ADMIN — METOPROLOL TARTRATE 25 MG: 25 TABLET, FILM COATED ORAL at 08:28

## 2023-07-29 RX ADMIN — SENNOSIDES AND DOCUSATE SODIUM 2 TABLET: 50; 8.6 TABLET ORAL at 20:42

## 2023-07-29 RX ADMIN — Medication 10 ML: at 20:44

## 2023-07-29 RX ADMIN — VANCOMYCIN HYDROCHLORIDE 1250 MG: 10 INJECTION, POWDER, LYOPHILIZED, FOR SOLUTION INTRAVENOUS at 10:04

## 2023-07-29 RX ADMIN — HYDROCODONE BITARTRATE AND ACETAMINOPHEN 1 TABLET: 10; 325 TABLET ORAL at 17:54

## 2023-07-29 RX ADMIN — HYDROMORPHONE HYDROCHLORIDE 1 MG: 1 INJECTION, SOLUTION INTRAMUSCULAR; INTRAVENOUS; SUBCUTANEOUS at 20:43

## 2023-07-29 RX ADMIN — PANTOPRAZOLE SODIUM 40 MG: 40 TABLET, DELAYED RELEASE ORAL at 16:03

## 2023-07-29 NOTE — PLAN OF CARE
Goal Outcome Evaluation:  Plan of Care Reviewed With: patient        Progress: no change  Outcome Evaluation: PT eval completed. Patient presents below baseline for functional mobility. Patient esaily SOA and fatigued with ambulation. Patient demonstrates decreased activity tolerance, deconditioning and reduced dynamic balance. Patient would continue to benefit from skilled PT services to improve dynamic balance with gait, strength, and activity tolerance training in order to build endurance and return to baseline.      Anticipated Discharge Disposition (PT): home with outpatient therapy services

## 2023-07-29 NOTE — PROGRESS NOTES
INFECTIOUS DISEASE Progress Note    Ta Madison  1960  [id number]      Admission Date: 7/23/2023      Requesting Provider: Carroll Bobo DO  Evaluating Physician: Palomo Orellana MD    Reason for Consultation: Severe  pancreatitis    History of present illness:    7/28/23:Patient is a 63 y.o. male with h/o TIA, ongoing tobacco abuse, GERD, HLD, occasional marijuana use, and advanced prostate cancer/on Xtandi who we were asked to see for necrotizing pancreatitis.  The patient presented to [facility] ED on 7/23 with severe upper abdominal pain that radiated to his back about 3 hours PTA.  He had associated nausea and vomiting.  He has had postnasal drip with associated cough for last 3 to 4 weeks.  A CT scan at OSH showed acute gallbladder pancreatitis with a 2 mm stone in proximal duodenem.  He was given a dose of Invanz and transferred to [facility] on 7/23 for further medical management.  On arrival to [facility], the patient developed a Tmax of 100.3 and hypoxia requiring 2 L O2 NC.  He is now on room air.  Labs on arrival were lactic acid 3.6, PCT 0.85, creatinine 1.42, ALT 53, AST 66, bilirubin 0.4, lipase 1255, and WBC 15,000 with 91% neutrophils.  Blood cultures are negative to date.  Repeat blood cultures from 7/28 are pending.   A resp panel PCR was negative. His lactic acid and WBC were normal level on 7/27.  His PCT was 0.43.  A MRSA PCR on 7/28 was positive. His pain level on 7/28 was 7 out of 10. An MRCP on 7/24 showed acute pancreatitis with cholelithiasis without choledocholelithiasis.  It was felt that he may have passed the stone.  His abdomen became more distended and taut last night on 7/27.  A KUB noted possible small bowel ileus vs SBO.  A CT scan with contrast was done on 7/27 and showed extensive necrotizing appearing pancreatitis with fluid replacing nearly the entire pancreatic body with inflammatory changes tracking along entire length of pancreas, mild gallbladder wall thickening with  cholelithiasis (less likely acute cholecystitis), small bilateral pleural effusion, and mildly distended SB loops likely reactive and left likely obstruction.  He was on Zosyn, but was changed Merrem and Vancomycin on 7/28.  ID was asked to evaluate and manage his antibiotic therapy. He complains of persistent abdominal pain and abdominal distention.    7/29/23: He has remained afebrile. His creatinine is 0.58.  His white blood cell count is 9.3. Blood cultures from 7/28 are no growth so far.He has noticed some partial improvement in his abdominal pain and distention.  He denies vomiting.    Past Medical History:   Diagnosis Date    Elevated cholesterol     GERD (gastroesophageal reflux disease)     Increased heart rate     Mini stroke     Neck pain     Prostate cancer     Rash     Tobacco abuse        Past Surgical History:   Procedure Laterality Date    APPENDECTOMY  1971    [facility]     COLONOSCOPY      COLONOSCOPY N/A 1/25/2023    Procedure: COLONOSCOPY;  Surgeon: Mahnaz Caraballo MD;  Location: [facility];  Service: Gastroenterology;  Laterality: N/A;       Family History   Problem Relation Age of Onset    Nephrolithiasis Mother     Heart disease Father     Hypertension Father     Kidney disease Father        Social History     Socioeconomic History    Marital status:    Tobacco Use    Smoking status: Every Day     Packs/day: 2.00     Years: 45.00     Pack years: 90.00     Types: Cigarettes    Smokeless tobacco: Never   Vaping Use    Vaping Use: Never used   Substance and Sexual Activity    Alcohol use: No    Drug use: Yes     Types: Marijuana     Comment: occ     Sexual activity: Defer       No Known Allergies      Medication:    Current Facility-Administered Medications:     acetaminophen (TYLENOL) tablet 650 mg, 650 mg, Oral, Q4H PRN **OR** acetaminophen (TYLENOL) 160 MG/5ML solution 650 mg, 650 mg, Oral, Q4H PRN **OR** acetaminophen (TYLENOL) suppository 650 mg, 650 mg, Rectal, Q4H  PRN, Carroll Bobo, DO    aspirin EC tablet 81 mg, 81 mg, Oral, Daily, Jayla Lake, APRN, 81 mg at 07/29/23 0827    atorvastatin (LIPITOR) tablet 20 mg, 20 mg, Oral, Nightly, Carroll Bobo, DO, 20 mg at 07/28/23 2121    bisacodyl (DULCOLAX) EC tablet 10 mg, 10 mg, Oral, Daily, Christos Mijares MD, 10 mg at 07/29/23 0827    sennosides-docusate (PERICOLACE) 8.6-50 MG per tablet 2 tablet, 2 tablet, Oral, BID, 2 tablet at 07/29/23 0827 **AND** polyethylene glycol (MIRALAX) packet 17 g, 17 g, Oral, Daily PRN **AND** bisacodyl (DULCOLAX) EC tablet 5 mg, 5 mg, Oral, Daily PRN **AND** bisacodyl (DULCOLAX) suppository 10 mg, 10 mg, Rectal, Daily PRN, Carroll Bobo DO    Calcium Replacement - Follow Nurse / BPA Driven Protocol, , Does not apply, PRN, Curt Barry MD    docusate sodium (COLACE) capsule 100 mg, 100 mg, Oral, BID, Christos Mijares MD, 100 mg at 07/29/23 0827    enzalutamide (XTANDI) chemo capsule 160 mg (patient supplied medication), 160 mg, Oral, Daily, Curt Barry MD, 160 mg at 07/28/23 1735    HYDROcodone-acetaminophen (NORCO)  MG per tablet 1 tablet, 1 tablet, Oral, Q4H PRN, Jayla Lake, APRN, 1 tablet at 07/29/23 0827    HYDROmorphone (DILAUDID) injection 1 mg, 1 mg, Intravenous, Q2H PRN, Cody Chow III DO, 1 mg at 07/29/23 0632    hydrOXYzine (ATARAX) tablet 50 mg, 50 mg, Oral, TID PRN, Carroll Bobo DO, 50 mg at 07/27/23 2038    ipratropium-albuterol (DUO-NEB) nebulizer solution 3 mL, 3 mL, Nebulization, Q4H PRN, Curt Barry MD    Magnesium Standard Dose Replacement - Follow Nurse / BPA Driven Protocol, , Does not apply, PRN, Curt Barry MD    melatonin tablet 5 mg, 5 mg, Oral, Nightly PRN, Carroll Bobo DO, 5 mg at 07/27/23 2038    meropenem (MERREM) 1000 mg/100 mL 0.9% NS (mbp), 1,000 mg, Intravenous, Q8H, Carroll Bobo DO, 1,000 mg at 07/29/23 0627    methylnaltrexone (RELISTOR) injection 12 mg, 12 mg, Subcutaneous, Every Other Day,  Carol Mai, PA, 12 mg at 07/28/23 0942    metoclopramide (REGLAN) injection 10 mg, 10 mg, Intravenous, Q6H, Christos Mijares MD, 10 mg at 07/29/23 0827    metoprolol tartrate (LOPRESSOR) tablet 25 mg, 25 mg, Oral, Daily, Carroll Bobo, DO, 25 mg at 07/29/23 0828    montelukast (SINGULAIR) tablet 10 mg, 10 mg, Oral, Nightly, WhitmoreCarroll, DO, 10 mg at 07/28/23 2121    naloxone (NARCAN) injection 0.4 mg, 0.4 mg, Intravenous, PRN, Carroll Bobo, DO    nicotine (NICODERM CQ) 21 MG/24HR patch 1 patch, 1 patch, Transdermal, Q24H, Carroll Bobo, DO, 1 patch at 07/29/23 0827    ondansetron (ZOFRAN) injection 4 mg, 4 mg, Intravenous, Q6H PRN, Bharat Montana, APRN, 4 mg at 07/24/23 1113    pantoprazole (PROTONIX) EC tablet 40 mg, 40 mg, Oral, BID AC, Carroll Bobo, DO, 40 mg at 07/29/23 0827    Pharmacy to dose vancomycin, , Does not apply, Continuous PRCHANTALE, Curt Barry MD    Phosphorus Replacement - Follow Nurse / BPA Driven Protocol, , Does not apply, Jhonny LINK Marc P, MD    Potassium Replacement - Follow Nurse / BPA Driven Protocol, , Does not apply, Jhonny LINK Marc P, MD    prochlorperazine (COMPAZINE) injection 5 mg, 5 mg, Intravenous, Q3H PRN, Carroll Bobo, DO, 5 mg at 07/24/23 1635    simethicone (MYLICON) chewable tablet 80 mg, 80 mg, Oral, 4x Daily PRN, Jayla Lake, APRN, 80 mg at 07/27/23 2038    simethicone (MYLICON) chewable tablet 80 mg, 80 mg, Oral, 4x Daily PRN, Christos Mijares MD, 80 mg at 07/28/23 0612    sodium chloride 0.9 % flush 10 mL, 10 mL, Intravenous, Q12H, Whitmore, Carroll, DO, 10 mL at 07/29/23 0828    sodium chloride 0.9 % flush 10 mL, 10 mL, Intravenous, PRN, Carroll Bobo,     sodium chloride 0.9 % infusion 40 mL, 40 mL, Intravenous, PRN, Carroll Bobo,     sodium chloride 0.9 % infusion, 125 mL/hr, Intravenous, Continuous, Curt Barry MD, Last Rate: 125 mL/hr at 07/28/23 1024, 125 mL/hr at 07/28/23 1024    vancomycin 1250 mg/250 mL 0.9% NS IVPB  ([facility]), 1,250 mg, Intravenous, Q12H, Curt Barry MD, 1,250 mg at 23 2120    Antibiotics:  Anti-Infectives (From admission, onward)      Ordered     Dose/Rate Route Frequency Start Stop    23 1305  vancomycin 1250 mg/250 mL 0.9% NS IVPB ([facility])        Ordering Provider: Curt Barry MD    1,250 mg  over 90 Minutes Intravenous Every 12 Hours Scheduled 23 1400 23 0959    23 1306  Pharmacy to dose vancomycin        Ordering Provider: Curt Barry MD     Does not apply Continuous PRN 23 1305 23 1304    23 2329  meropenem (MERREM) 1000 mg/100 mL 0.9% NS (mbp)        Ordering Provider: Carroll Bobo DO    1,000 mg  over 3 Hours Intravenous Every 8 Hours 23 0600 23 0559    23 2329  meropenem (MERREM) 1000 mg/100 mL 0.9% NS (mbp)        Ordering Provider: Carroll Bobo DO    1,000 mg  over 30 Minutes Intravenous Once 23 0000 23 0112              Review of Systems:  The HPI      Physical Exam:   Vital Signs  Temp (24hrs), Av.3 øF (36.8 øC), Min:97.6 øF (36.4 øC), Max:98.8 øF (37.1 øC)    Temp  Min: 97.6 øF (36.4 øC)  Max: 98.8 øF (37.1 øC)  BP  Min: 134/68  Max: 164/80  Pulse  Min: 86  Max: 109  Resp  Min: 18  Max: 18  SpO2  Min: 93 %  Max: 96 %    GENERAL: Awake and alert, in no acute distress.   HEENT: Normocephalic, atraumatic.  PERRL. EOMI. No conjunctival injection. No icterus. Oropharynx clear without evidence of thrush or exudate.   NECK: Supple   HEART: RRR; No murmur, rubs, gallops.   LUNGS: Clear to auscultation bilaterally without wheezing, rales, rhonchi. Normal respiratory effort. Nonlabored.   ABDOMEN: Decreased abdominal distention and tenderness.  EXT:  No cyanosis, clubbing or edema. No cord.  :  Without Jorgensen catheter.  MSK: No joint effusions or erythema  SKIN: Warm and dry without cutaneous eruptions on Inspection/palpation.    NEURO: Oriented to PPT.  Motor 5/5 strength  PSYCHIATRIC: Normal insight and  judgment. Cooperative with PE    Laboratory Data    Results from last 7 days   Lab Units 07/29/23  0401 07/28/23  0240 07/27/23  0319   WBC 10*3/mm3 9.34 4.25 6.66   HEMOGLOBIN g/dL 9.4* 9.3* 9.7*   HEMATOCRIT % 29.0* 28.6* 30.4*   PLATELETS 10*3/mm3 441 367 328       Results from last 7 days   Lab Units 07/29/23  0401   SODIUM mmol/L 137   POTASSIUM mmol/L 4.0   CHLORIDE mmol/L 103   CO2 mmol/L 22.0   BUN mg/dL 17   CREATININE mg/dL 0.58*   GLUCOSE mg/dL 105*   CALCIUM mg/dL 7.8*       Results from last 7 days   Lab Units 07/29/23  0401   ALK PHOS U/L 68   BILIRUBIN mg/dL 0.6   ALT (SGPT) U/L 16   AST (SGOT) U/L 18               Results from last 7 days   Lab Units 07/27/23  2203   LACTATE mmol/L 1.2               Estimated Creatinine Clearance: 145.1 mL/min (A) (by C-G formula based on SCr of 0.58 mg/dL (L)).      Microbiology:  Microbiology Results (last 10 days)       Procedure Component Value - Date/Time    MRSA Screen, PCR (Inpatient) - Swab, Nares [[id number]]  (Abnormal) Collected: 07/28/23 0049    Lab Status: Final result Specimen: Swab from Nares Updated: 07/28/23 0950     MRSA PCR Positive    Narrative:      The negative predictive value of this diagnostic test is high and should only be used to consider de-escalating anti-MRSA therapy. A positive result may indicate colonization with MRSA and must be correlated clinically.    Blood Culture - Blood, Arm, Right [[id number]]  (Normal) Collected: 07/28/23 0017    Lab Status: Preliminary result Specimen: Blood from Arm, Right Updated: 07/29/23 0045     Blood Culture No growth at 24 hours    Blood Culture - Blood, Arm, Right [[id number]]  (Normal) Collected: 07/28/23 0004    Lab Status: Preliminary result Specimen: Blood from Arm, Right Updated: 07/29/23 0045     Blood Culture No growth at 24 hours    COVID PRE-OP / PRE-PROCEDURE SCREENING ORDER (NO ISOLATION) - Swab, Nasopharynx [[id number]]  (Normal) Collected: 07/24/23 0620    Lab Status: Final result Specimen:  Swab from Nasopharynx Updated: 07/24/23 0729    Narrative:      The following orders were created for panel order COVID PRE-OP / PRE-PROCEDURE SCREENING ORDER (NO ISOLATION) - Swab, Nasopharynx.  Procedure                               Abnormality         Status                     ---------                               -----------         ------                     Respiratory Panel PCR w/...[[id number]]  Normal              Final result                 Please view results for these tests on the individual orders.    Respiratory Panel PCR w/COVID-19(SARS-CoV-2) BALTAZAR/ANDRES/ASTER/PAD/COR/MAD/VALENTINE In-House, NP Swab in UTM/VTM, 3-4 HR TAT - Swab, Nasopharynx [[id number]]  (Normal) Collected: 07/24/23 0620    Lab Status: Final result Specimen: Swab from Nasopharynx Updated: 07/24/23 0729     ADENOVIRUS, PCR Not Detected     Coronavirus 229E Not Detected     Coronavirus HKU1 Not Detected     Coronavirus NL63 Not Detected     Coronavirus OC43 Not Detected     COVID19 Not Detected     Human Metapneumovirus Not Detected     Human Rhinovirus/Enterovirus Not Detected     Influenza A PCR Not Detected     Influenza B PCR Not Detected     Parainfluenza Virus 1 Not Detected     Parainfluenza Virus 2 Not Detected     Parainfluenza Virus 3 Not Detected     Parainfluenza Virus 4 Not Detected     RSV, PCR Not Detected     Bordetella pertussis pcr Not Detected     Bordetella parapertussis PCR Not Detected     Chlamydophila pneumoniae PCR Not Detected     Mycoplasma pneumo by PCR Not Detected    Narrative:      In the setting of a positive respiratory panel with a viral infection PLUS a negative procalcitonin without other underlying concern for bacterial infection, consider observing off antibiotics or discontinuation of antibiotics and continue supportive care. If the respiratory panel is positive for atypical bacterial infection (Bordetella pertussis, Chlamydophila pneumoniae, or Mycoplasma pneumoniae), consider antibiotic de-escalation to  target atypical bacterial infection.    Blood Culture - Blood, Hand, Left [[id number]]  (Normal) Collected: 07/23/23 2240    Lab Status: Final result Specimen: Blood from Hand, Left Updated: 07/28/23 2300     Blood Culture No growth at 5 days    Narrative:      AEROBIC BOTTLE ONLY    Blood Culture - Blood, Hand, Right [[id number]]  (Normal) Collected: 07/23/23 2230    Lab Status: Final result Specimen: Blood from Hand, Right Updated: 07/28/23 2300     Blood Culture No growth at 5 days    Narrative:      AEROBIC BOTTLE ONLY                  Radiology:  Imaging Results (Last 72 Hours)       Procedure Component Value Units Date/Time    CT Abdomen Pelvis With Contrast [[id number]] Collected: 07/27/23 2314     Updated: 07/27/23 2323    Narrative:      CT ABDOMEN PELVIS W CONTRAST    Date of Exam: 7/27/2023 11:02 PM EDT    Indication: abdominal distention, ? ileus vs obstruction.    Comparison: 4/27/2023, 7/23/2023.    Technique: Axial CT images were obtained of the abdomen and pelvis following the uneventful intravenous administration of intravenous contrast.. Reconstructed coronal and sagittal images were also obtained. Automated exposure control and iterative   construction methods were used.      Findings:  Lung Bases:     Small bilateral pleural effusions are present, left greater than right. Consolidative changes are present within the bilateral lower lobes with air bronchograms.  Liver:  Liver is normal in size and CT density. No focal lesions.    Biliary/Gallbladder:    The gallbladder is mildly under distended. Mild wall thickening present. Tiny stones are present within the lumen. No evidence of significant surrounding free fluid.. The biliary tree is nondilated.    Spleen:  Spleen is normal in size and CT density.    Pancreas:    Extensive inflammatory changes are seen throughout the pancreas with significant phlegmonous changes and fluid seen along the body of the pancreas with no normal parenchyma are present  within this region (series 2 image 61). The area of involvement   measures up to 3.0 x 8.7 cm. No definite air pockets identified. Inflammatory changes are seen surrounding the remaining portions of the pancreas. No additional focal collection identified. Patchy fluid is seen extending within the mesentery. Multiple   tiny mesenteric lymph nodes present. Small to moderate amount of free fluid is seen tracking into the lower abdomen and pelvis.    Kidneys:    Kidneys are normal in size. There are no stones or hydronephrosis.    Adrenals:    Adrenal glands are unremarkable.    Retroperitoneal/Lymph Nodes/Vasculature:    No retroperitoneal adenopathy is identified. Atherosclerotic calcifications are present.    Gastrointestinal/Mesentery:    Mildly prominent small bowel loops are present, likely reactive and related to surrounding ascites. This appears diffuse. Air is present within the colon. No definite obstruction identified. Surrounding free fluid present. The appendix is not well   visualized. No significant stool burden identified.. No evidence of obstruction. No free air. No mesenteric fluid collections identified. There is diffuse anasarca of the soft tissues.    Bladder:    The bladder is normal.    Genital:     Unremarkable          Bony Structures:     Visualized bony structures are consistent with the patient's age.        Impression:      Impression:    1. Extensive necrotizing appearing pancreatitis with fluid replacing nearly the entire pancreatic body as described above. Significant inflammatory changes are seen tracking along the entire length of the pancreas. No suspicious air pockets identified.   No additional focal collection identified. Significant surrounding edema is present. Findings appear to have significantly progressed as compared to the previous outside CT from 7/23/2023. Reactive tracking fluid is seen extending to the lower abdomen   and pelvis.  2. Mild gallbladder wall thickening  present which may be related to mild underdistention. Tiny stones are present. Reactive edema is suspected. Acute cholecystitis is unlikely. No evidence of biliary ductal dilatation.  3. Small bilateral pleural effusions present, left greater than right with overlying consolidative changes which may be related to atelectasis or pneumonia. Pneumonia suspected due to air bronchograms. There is anasarca of the soft tissues likely related   to fluid overload.  4. Mildly distended small bowel loops present throughout the abdomen and pelvis, likely reactive and related to small to moderate amount of free fluid related to tracking inflammatory change. Air is present within the colon. Obstruction unlikely.  5. Ancillary findings as described above.        Electronically Signed: Cora Torres    7/27/2023 11:20 PM EDT    Workstation ID: [id number]    XR Abdomen KUB [[id number]] Collected: 07/27/23 2143     Updated: 07/27/23 2148    Narrative:      XR ABDOMEN KUB    Date of Exam: 7/27/2023 9:01 PM EDT    Indication: abd distension. ? obstruction    Comparison: None available.    Findings:        There are several loops of air-filled mildly distended small bowel. This could represent small bowel ileus versus small bowel obstruction.    There is an air-filled colon.    There are no abnormal calcifications.    The osseous structures are grossly unremarkable.      Impression:        Mildly dilated air-filled loops of small bowel within the abdomen which could represent small bowel ileus versus small bowel obstruction. If clinically warranted follow-up CT scan of the abdomen may be obtained.              Electronically Signed: Damir Mccarthy    7/27/2023 9:45 PM EDT    Workstation ID: [id number]        I read his radiographic images reviewed the images with the patient and his family.      Impression:   Severe gallstone pancreatitis-Without overt evidence of severe pancreatic infection/pancreatic abscess.  I will leave him on  intravenous Merrem for the time being but at this point, I do not think he will need a prolonged course. He appears to have passed the gallstone.  Leukocytosis/neutrophilia,now resolved.  MRSA nasal colonization  Mildly elevated procalcitonin  Lactic acidosis, resolved.  Advance prostate cancer/on Xtandi  Ongoing tobacco abuse  Occasional marijuana use    PLAN/RECOMMENDATIONS:   Follow blood cultures  Continue Merrem for now    I discussed his complex situation again in detail with him and his family today.  We again discussed the pathophysiology and potential complications of pancreatitis.         Palomo Orellana MD  7/29/2023  08:49 EDT

## 2023-07-29 NOTE — PROGRESS NOTES
[facility] Medicine Services  PROGRESS NOTE    Patient Name: Ta Madison  : 1960  MRN: [id number]    Date of Admission: 2023  Primary Care Physician: Yolande Olvera APRN    Subjective   Subjective     CC:  Follow-up abdominal pain    HPI:  Patient seen resting in bed no apparent distress.  No acute events overnight per nursing.  He is feeling better today.  Notes that abdominal pain has improved.  He has been tolerating clear liquids.  Passing flatus.  No BM yet.    ROS:  Gen- No fevers, chills  CV- No chest pain, palpitations  Resp- No cough, dyspnea  GI-+ nausea, + abdominal pain, + abdominal distention    Objective   Objective     Vital Signs:   Temp:  [97.6 øF (36.4 øC)-98.8 øF (37.1 øC)] 98.4 øF (36.9 øC)  Heart Rate:  [] 109  Resp:  [18] 18  BP: (134-164)/(62-80) 164/80     Physical Exam:  Constitutional: No acute distress, awake, alert  HENT: NCAT, mucous membranes moist  Respiratory: Clear to auscultation bilaterally, respiratory effort normal on room air  Cardiovascular: RRR, no murmurs, cap refill brisk   Gastrointestinal: Positive bowel sounds, firm, distended, tender to palpation  Musculoskeletal: 1+ BLE edema   Psychiatric: Appropriate affect, cooperative  Neurologic: Oriented x 3, moves all extremities, speech clear  Skin: warm, dry, no visible rash       Results Reviewed:  LAB RESULTS:      Lab 23  0401 23  0240 23  2203 23  0319 23  0720 23  1036 23  0324 23  2019 23  1352 23  0725 23  0442 23  0153 23  2246   WBC 9.34 4.25  --  6.66 13.33*  --  15.29*  --   --   --  14.99*   < >  --    HEMOGLOBIN 9.4* 9.3*  --  9.7* 9.9*  --  10.9*  --   --   --  12.0*   < >  --    HEMATOCRIT 29.0* 28.6*  --  30.4* 31.7*  --  35.6*  --   --   --  39.3   < >  --    PLATELETS 441 367  --  328 300  --  358  --   --   --  456*   < >  --    NEUTROS ABS  --   --   --  5.34 11.54*  --  13.62*  --   --    --  13.58*  --   --    IMMATURE GRANS (ABS)  --   --   --  0.02 0.08*  --  0.06*  --   --   --  0.06*  --   --    LYMPHS ABS  --   --   --  0.55* 0.73  --  0.80  --   --   --  0.67*  --   --    MONOS ABS  --   --   --  0.64 0.92*  --  0.79  --   --   --  0.66  --   --    EOS ABS  --   --   --  0.09 0.03  --  0.00  --   --   --  0.00  --   --    MCV 76.9* 76.7*  --  77.6* 79.3  --  80.9  --   --   --  78.9*   < >  --    PROCALCITONIN  --   --  0.43*  --   --   --   --   --   --   --   --   --  0.85*   LACTATE  --   --  1.2  --   --  3.2*  --  2.7* 3.1* 3.5* 4.1*   < > 3.6*   PROTIME  --   --   --   --   --   --   --   --   --   --   --   --  13.1   APTT  --   --   --   --   --   --   --   --   --   --   --   --  26.9    < > = values in this interval not displayed.         Lab 07/29/23  0401 07/28/23  0240 07/27/23  0319 07/26/23  0720 07/25/23  0324 07/24/23  0924 07/23/23  2246   SODIUM 137 133* 138 139 141   < > 140   POTASSIUM 4.0 3.2* 4.1 4.3 5.1   < > 5.1   CHLORIDE 103 98 101 102 107   < > 108*   CO2 22.0 25.0 25.0 24.0 22.0   < > 14.0*   ANION GAP 12.0 10.0 12.0 13.0 12.0   < > 18.0*   BUN 17 23 27* 27* 36*   < > 28*   CREATININE 0.58* 0.71* 0.72* 0.77 1.29*   < > 1.42*   EGFR 110.3 103.7 103.3 101.2 62.7   < > 55.9*   GLUCOSE 105* 106* 97 87 101*   < > 171*   CALCIUM 7.8* 7.9* 8.2* 8.3* 8.7   < > 8.5*   MAGNESIUM 2.1  --   --   --   --   --  2.3   PHOSPHORUS 1.5*  --   --   --   --   --   --     < > = values in this interval not displayed.         Lab 07/29/23  0401 07/28/23  0240 07/27/23  0319 07/26/23  0720 07/25/23  0324 07/24/23  0924   TOTAL PROTEIN 5.1* 6.0 5.1* 5.3* 5.5* 6.1   ALBUMIN 2.6* 2.5* 2.8* 2.8* 3.4* 3.7   GLOBULIN 2.5 3.5 2.3 2.5 2.1 2.4   ALT (SGPT) 16 15 19 24 38 49*   AST (SGOT) 18 16 19 28 38 50*   BILIRUBIN 0.6 0.7 1.2 1.8* 1.2 0.8   ALK PHOS 68 66 71 76 69 75   LIPASE  --   --   --   --   --  1,255*         Lab 07/29/23  0401 07/25/23  1502 07/23/23  2246   PROBNP 709.6 789.2  --     PROTIME  --   --  13.1   INR  --   --  0.98         Lab 07/24/23  0924 07/23/23 2246   CHOLESTEROL 134  --    LDL CHOL 72  --    HDL CHOL 39*  --    TRIGLYCERIDES 126 108         Lab 07/23/23  2349 07/23/23 2245   ABO TYPING O O   RH TYPING Positive Positive   ANTIBODY SCREEN  --  Negative         Brief Urine Lab Results  (Last result in the past 365 days)        Color   Clarity   Blood   Leuk Est   Nitrite   Protein   CREAT   Urine HCG        07/25/23 0636 Dark Yellow   Clear   Negative   Negative   Negative   30 mg/dL (1+)                   Microbiology Results Abnormal       Procedure Component Value - Date/Time    Blood Culture - Blood, Arm, Right [[id number]]  (Normal) Collected: 07/28/23 0017    Lab Status: Preliminary result Specimen: Blood from Arm, Right Updated: 07/29/23 0045     Blood Culture No growth at 24 hours    Blood Culture - Blood, Arm, Right [[id number]]  (Normal) Collected: 07/28/23 0004    Lab Status: Preliminary result Specimen: Blood from Arm, Right Updated: 07/29/23 0045     Blood Culture No growth at 24 hours    Blood Culture - Blood, Hand, Right [[id number]]  (Normal) Collected: 07/23/23 2230    Lab Status: Final result Specimen: Blood from Hand, Right Updated: 07/28/23 2300     Blood Culture No growth at 5 days    Narrative:      AEROBIC BOTTLE ONLY    Blood Culture - Blood, Hand, Left [[id number]]  (Normal) Collected: 07/23/23 2240    Lab Status: Final result Specimen: Blood from Hand, Left Updated: 07/28/23 2300     Blood Culture No growth at 5 days    Narrative:      AEROBIC BOTTLE ONLY    COVID PRE-OP / PRE-PROCEDURE SCREENING ORDER (NO ISOLATION) - Swab, Nasopharynx [[id number]]  (Normal) Collected: 07/24/23 0620    Lab Status: Final result Specimen: Swab from Nasopharynx Updated: 07/24/23 0729    Narrative:      The following orders were created for panel order COVID PRE-OP / PRE-PROCEDURE SCREENING ORDER (NO ISOLATION) - Swab, Nasopharynx.  Procedure                                Abnormality         Status                     ---------                               -----------         ------                     Respiratory Panel PCR w/...[[id number]]  Normal              Final result                 Please view results for these tests on the individual orders.    Respiratory Panel PCR w/COVID-19(SARS-CoV-2) BALTAZAR/ANDRES/ASTER/PAD/COR/MAD/VALENTINE In-House, NP Swab in UTM/VTM, 3-4 HR TAT - Swab, Nasopharynx [[id number]]  (Normal) Collected: 07/24/23 0620    Lab Status: Final result Specimen: Swab from Nasopharynx Updated: 07/24/23 0729     ADENOVIRUS, PCR Not Detected     Coronavirus 229E Not Detected     Coronavirus HKU1 Not Detected     Coronavirus NL63 Not Detected     Coronavirus OC43 Not Detected     COVID19 Not Detected     Human Metapneumovirus Not Detected     Human Rhinovirus/Enterovirus Not Detected     Influenza A PCR Not Detected     Influenza B PCR Not Detected     Parainfluenza Virus 1 Not Detected     Parainfluenza Virus 2 Not Detected     Parainfluenza Virus 3 Not Detected     Parainfluenza Virus 4 Not Detected     RSV, PCR Not Detected     Bordetella pertussis pcr Not Detected     Bordetella parapertussis PCR Not Detected     Chlamydophila pneumoniae PCR Not Detected     Mycoplasma pneumo by PCR Not Detected    Narrative:      In the setting of a positive respiratory panel with a viral infection PLUS a negative procalcitonin without other underlying concern for bacterial infection, consider observing off antibiotics or discontinuation of antibiotics and continue supportive care. If the respiratory panel is positive for atypical bacterial infection (Bordetella pertussis, Chlamydophila pneumoniae, or Mycoplasma pneumoniae), consider antibiotic de-escalation to target atypical bacterial infection.            CT Abdomen Pelvis With Contrast    Result Date: 7/27/2023  CT ABDOMEN PELVIS W CONTRAST Date of Exam: 7/27/2023 11:02 PM EDT Indication: abdominal distention, ? ileus vs obstruction.  Comparison: 4/27/2023, 7/23/2023. Technique: Axial CT images were obtained of the abdomen and pelvis following the uneventful intravenous administration of intravenous contrast.. Reconstructed coronal and sagittal images were also obtained. Automated exposure control and iterative construction methods were used. Findings: Lung Bases:   Small bilateral pleural effusions are present, left greater than right. Consolidative changes are present within the bilateral lower lobes with air bronchograms. Liver: Liver is normal in size and CT density. No focal lesions. Biliary/Gallbladder:  The gallbladder is mildly under distended. Mild wall thickening present. Tiny stones are present within the lumen. No evidence of significant surrounding free fluid.. The biliary tree is nondilated. Spleen: Spleen is normal in size and CT density. Pancreas:  Extensive inflammatory changes are seen throughout the pancreas with significant phlegmonous changes and fluid seen along the body of the pancreas with no normal parenchyma are present within this region (series 2 image 61). The area of involvement measures up to 3.0 x 8.7 cm. No definite air pockets identified. Inflammatory changes are seen surrounding the remaining portions of the pancreas. No additional focal collection identified. Patchy fluid is seen extending within the mesentery. Multiple tiny mesenteric lymph nodes present. Small to moderate amount of free fluid is seen tracking into the lower abdomen and pelvis. Kidneys:  Kidneys are normal in size. There are no stones or hydronephrosis. Adrenals:  Adrenal glands are unremarkable. Retroperitoneal/Lymph Nodes/Vasculature:  No retroperitoneal adenopathy is identified. Atherosclerotic calcifications are present. Gastrointestinal/Mesentery:  Mildly prominent small bowel loops are present, likely reactive and related to surrounding ascites. This appears diffuse. Air is present within the colon. No definite obstruction identified.  Surrounding free fluid present. The appendix is not well visualized. No significant stool burden identified.. No evidence of obstruction. No free air. No mesenteric fluid collections identified. There is diffuse anasarca of the soft tissues. Bladder:  The bladder is normal. Genital:   Unremarkable       Bony Structures:   Visualized bony structures are consistent with the patient's age.     Impression: Impression: 1. Extensive necrotizing appearing pancreatitis with fluid replacing nearly the entire pancreatic body as described above. Significant inflammatory changes are seen tracking along the entire length of the pancreas. No suspicious air pockets identified. No additional focal collection identified. Significant surrounding edema is present. Findings appear to have significantly progressed as compared to the previous outside CT from 7/23/2023. Reactive tracking fluid is seen extending to the lower abdomen and pelvis. 2. Mild gallbladder wall thickening present which may be related to mild underdistention. Tiny stones are present. Reactive edema is suspected. Acute cholecystitis is unlikely. No evidence of biliary ductal dilatation. 3. Small bilateral pleural effusions present, left greater than right with overlying consolidative changes which may be related to atelectasis or pneumonia. Pneumonia suspected due to air bronchograms. There is anasarca of the soft tissues likely related  to fluid overload. 4. Mildly distended small bowel loops present throughout the abdomen and pelvis, likely reactive and related to small to moderate amount of free fluid related to tracking inflammatory change. Air is present within the colon. Obstruction unlikely. 5. Ancillary findings as described above. Electronically Signed: Cora Torres  7/27/2023 11:20 PM EDT  Workstation ID: [id number]    XR Abdomen KUB    Result Date: 7/27/2023  XR ABDOMEN KUB Date of Exam: 7/27/2023 9:01 PM EDT Indication: abd distension. ? obstruction  Comparison: None available. Findings: There are several loops of air-filled mildly distended small bowel. This could represent small bowel ileus versus small bowel obstruction. There is an air-filled colon. There are no abnormal calcifications. The osseous structures are grossly unremarkable.     Impression: Mildly dilated air-filled loops of small bowel within the abdomen which could represent small bowel ileus versus small bowel obstruction. If clinically warranted follow-up CT scan of the abdomen may be obtained. Electronically Signed: Damir Mccarthy  7/27/2023 9:45 PM EDT  Workstation ID: [id number]         Current medications:  Scheduled Meds:aspirin, 81 mg, Oral, Daily  atorvastatin, 20 mg, Oral, Nightly  bisacodyl, 10 mg, Oral, Daily  bisacodyl, 10 mg, Rectal, Q8H  docusate sodium, 100 mg, Oral, BID  enzalutamide, 160 mg, Oral, Daily  meropenem, 1,000 mg, Intravenous, Q8H  methylnaltrexone, 12 mg, Subcutaneous, Every Other Day  metoclopramide, 10 mg, Intravenous, Q6H  metoprolol tartrate, 25 mg, Oral, Daily  montelukast, 10 mg, Oral, Nightly  nicotine, 1 patch, Transdermal, Q24H  pantoprazole, 40 mg, Oral, BID AC  senna-docusate sodium, 2 tablet, Oral, BID  sodium chloride, 10 mL, Intravenous, Q12H  vancomycin, 1,250 mg, Intravenous, Q12H      Continuous Infusions:Pharmacy to dose vancomycin,   sodium chloride, 100 mL/hr, Last Rate: 125 mL/hr (07/28/23 1024)      PRN Meds:.  acetaminophen **OR** acetaminophen **OR** acetaminophen    senna-docusate sodium **AND** polyethylene glycol **AND** bisacodyl **AND** bisacodyl    Calcium Replacement - Follow Nurse / BPA Driven Protocol    HYDROcodone-acetaminophen    HYDROmorphone    hydrOXYzine    ipratropium-albuterol    Magnesium Standard Dose Replacement - Follow Nurse / BPA Driven Protocol    melatonin    naloxone    ondansetron    Pharmacy to dose vancomycin    Phosphorus Replacement - Follow Nurse / BPA Driven Protocol    Potassium Replacement - Follow Nurse /  BPA Driven Protocol    prochlorperazine    simethicone    simethicone    sodium chloride    sodium chloride    Assessment & Plan   Assessment & Plan     Active Hospital Problems    Diagnosis  POA    **Acute gallstone pancreatitis [K85.10]  Yes    Elevated serum creatinine [R79.89]  Yes    Personal history of transient ischemic attack (TIA) [Z86.73]  Not Applicable    Adenocarcinoma of prostate [C61]  Yes    Gastroesophageal reflux disease [K21.9]  Yes    Hyperlipidemia [E78.5]  Yes    Tobacco abuse [Z72.0]  Yes      Resolved Hospital Problems   No resolved problems to display.        Brief Hospital Course to date:  Ta Madison is a 63 y.o. male  with PMH of GERD, HLD, hx of TIA, advanced prostate cancer on Xtandi, occasional marijuna use, and chronic 1 PPD tobacco use who initially presented to [facility] 7/23/23 with complaints of severe epigastric abdominal pain with associated nausea and vomiting. CT A/P at OSH showed acute pancreatitis with 2 mm stone in the proximal duodenum. Transferred to [facility] for GI evaluation.      This patient's problems and plans were partially entered by my partner and updated as appropriate by me 07/29/23.     Acute gallstone pancreatitis  Leukocytosis  Lactic acidosis  -CT A/P at OSH: acute interstitial pancreatitis, cholelithiasis with 2 mm stone in proximal duodenum (possibly a recently passed gallstone), transmural thickening of some jejunal loops in the left mid abdomen possibly reactive changes secondary to pancreatitis  -Lipase 1255  -Normal triglycerides   -MRCP showed acute pancreatitis no obstructing stones   -GI has signed off, recommended to continue Relistor while on high dose opiates   -General Surgery consulted, Dr. Tony recommends continued treatment for pancreatitis and F/U in 6 weeks for interval cholecystectomy  -Increased abd pain overnight on 7/28  -Repeat CT abd/pelvis revealed possible necrotizing pancreatitis. Gen Surgery Dr. Mijares reviewed images and felt  findings were consistent with severe acute pancreatitis.   -Recommended to continue IV hydration, bowel regimen  -ID following, continue Meropenem for now   -AM labs      SOA  -better, on RA  -IS/pulmonary toilet     Elevated Cr  -better/resolved after IVF      Prostate cancer  -Continue Xtandi oral chemotherapy (patient supplied)     HLD  -statin     GERD  -PPI     Hx of TIA  -resumed ASA since no further procedures planned   -Continue statin     Tobacco abuse  -Counseled on cessation  -Nicotine patch     Expected Discharge Location and Transportation: home   Expected Discharge   Expected Discharge Date: 7/30/2023; Expected Discharge Time:      DVT prophylaxis:  Mechanical DVT prophylaxis orders are present.     AM-PAC 6 Clicks Score (PT): 22 (07/28/23 0800)    CODE STATUS:   Code Status and Medical Interventions:   Ordered at: 07/23/23 2229     Code Status (Patient has no pulse and is not breathing):    CPR (Attempt to Resuscitate)     Medical Interventions (Patient has pulse or is breathing):    Full Support     Release to patient:    Routine Release       Jagdeep Quintero, EUGENIA  07/29/23

## 2023-07-29 NOTE — PLAN OF CARE
Problem: Adult Inpatient Plan of Care  Goal: Plan of Care Review  Outcome: Ongoing, Not Progressing  Flowsheets (Taken 7/29/2023 1811)  Plan of Care Reviewed With:   patient   spouse  Outcome Evaluation: Pt tolerating PO intake with no nausea/vomiting. Passing gas, no BM today. Requiring frequent pain medication.   Goal Outcome Evaluation:  Plan of Care Reviewed With: patient, spouse        Progress: no change  Outcome Evaluation: Pt tolerating PO intake with no nausea/vomiting. Passing gas, no BM today. Requiring frequent pain medication.

## 2023-07-29 NOTE — THERAPY EVALUATION
Patient Name: Ta Madison  : 1960    MRN: [id number]                              Today's Date: 2023       Admit Date: 2023    Visit Dx:     ICD-10-CM ICD-9-CM   1. Acute gallstone pancreatitis  K85.10 577.0     574.20   2. Dysphagia, unspecified type  R13.10 787.20     Patient Active Problem List   Diagnosis    Tachycardia    Hyperlipidemia    Multiple allergies    Tobacco abuse    Gastroesophageal reflux disease    Chronic right shoulder pain    Ganglion cyst of dorsum of right wrist    Benign skin cyst    Numbness and tingling of both upper extremities    Right shoulder pain    Adenocarcinoma of prostate    Encounter for screening for malignant neoplasm of colon    Epigastric pain    Acute gallstone pancreatitis    Personal history of transient ischemic attack (TIA)    Elevated serum creatinine     Past Medical History:   Diagnosis Date    Elevated cholesterol     GERD (gastroesophageal reflux disease)     Increased heart rate     Mini stroke     Neck pain     Prostate cancer     Rash     Tobacco abuse      Past Surgical History:   Procedure Laterality Date    APPENDECTOMY      [facility]     COLONOSCOPY      COLONOSCOPY N/A 2023    Procedure: COLONOSCOPY;  Surgeon: Mahnaz Caraballo MD;  Location: [facility];  Service: Gastroenterology;  Laterality: N/A;      General Information       Row Name 23 1145          Physical Therapy Time and Intention    Document Type evaluation  -KW     Mode of Treatment physical therapy;individual therapy  -       Row Name 23 1145          General Information    Patient Profile Reviewed yes  -KW     Prior Level of Function independent:;all household mobility;community mobility;gait;transfer;bed mobility  -KW     Barriers to Rehab medically complex  -KW       Row Name 23 1145          Living Environment    People in Home spouse  -KW       Row Name 23 1145          Stairs Within Home, Primary    Number of Stairs,  Within Home, Primary twelve  -KW     Stair Railings, Within Home, Primary railing on right side (ascending);railings safe and in good condition  -KW       Row Name 07/29/23 1145          Cognition    Orientation Status (Cognition) oriented x 4  -KW       Row Name 07/29/23 1145          Safety Issues, Functional Mobility    Impairments Affecting Function (Mobility) balance;endurance/activity tolerance;pain;shortness of breath;strength  -KW               User Key  (r) = Recorded By, (t) = Taken By, (c) = Cosigned By      Initials Name Provider Type    KW Brittaney Avitia PT Physical Therapist                   Mobility       Row Name 07/29/23 1145          Sit-Stand Transfer    Sit-Stand Jerome (Transfers) supervision  -KW       Row Name 07/29/23 1145          Gait/Stairs (Locomotion)    Jerome Level (Gait) supervision  -KW     Distance in Feet (Gait) 150  -KW     Deviations/Abnormal Patterns (Gait) gait speed decreased;base of support, wide;loren decreased  -KW               User Key  (r) = Recorded By, (t) = Taken By, (c) = Cosigned By      Initials Name Provider Type    KW Brittaney Avitia PT Physical Therapist                   Obj/Interventions       Row Name 07/29/23 1145          Strength Comprehensive (MMT)    General Manual Muscle Testing (MMT) Assessment lower extremity strength deficits identified  -KW       Row Name 07/29/23 1145          Balance    Balance Assessment sitting static balance;sitting dynamic balance;sit to stand dynamic balance;standing static balance;standing dynamic balance  -KW     Static Sitting Balance independent  -KW     Dynamic Sitting Balance independent  -KW     Position, Sitting Balance unsupported;sitting edge of bed  -KW     Static Standing Balance supervision  -KW     Dynamic Standing Balance supervision  -KW     Position/Device Used, Standing Balance unsupported  -KW     Balance Interventions sitting;standing;sit to stand  -KW               User Key   (r) = Recorded By, (t) = Taken By, (c) = Cosigned By      Initials Name Provider Type    Brittaney Rivero PT Physical Therapist                   Goals/Plan       Row Name 07/29/23 1145          Transfer Goal 1 (PT)    Activity/Assistive Device (Transfer Goal 1, PT) sit-to-stand/stand-to-sit;bed-to-chair/chair-to-bed  -KW     [place]anyon Level/Cues Needed (Transfer Goal 1, PT) independent  -KW     Time Frame (Transfer Goal 1, PT) 10 days  -KW       Row Name 07/29/23 1145          Gait Training Goal 1 (PT)    Activity/Assistive Device (Gait Training Goal 1, PT) gait (walking locomotion);decrease fall risk;diminish gait deviation;improve balance and speed;increase endurance/gait distance  -KW     [place] Level (Gait Training Goal 1, PT) independent  -KW     Distance (Gait Training Goal 1, PT) 300  -KW     Time Frame (Gait Training Goal 1, PT) 10 days  -KW       Row Name 07/29/23 1145          Stairs Goal 1 (PT)    Activity/Assistive Device (Stairs Goal 1, PT) ascending stairs;descending stairs;using handrail, right;step-over step;decrease fall risk;improve balance and speed  -KW     [place] Level/Cues Needed (Stairs Goal 1, PT) independent  -KW     Number of Stairs (Stairs Goal 1, PT) 12  -KW     Time Frame (Stairs Goal 1, PT) 10 days  -KW               User Key  (r) = Recorded By, (t) = Taken By, (c) = Cosigned By      Initials Name Provider Type    Brittaney Rivero PT Physical Therapist                   Clinical Impression       Row Name 07/29/23 1145          Pain    Pretreatment Pain Rating 8/10  -KW     Pain Location - abdomen  -KW       Row Name 07/29/23 1145          Plan of Care Review    Plan of Care Reviewed With patient  -KW     Progress no change  -KW     Outcome Evaluation PT eval completed. Patient presents below baseline for functional mobility. Patient esaily SOA and fatigued with ambulation. Patient demonstrates decreased activity tolerance, deconditioning and reduced  dynamic balance. Patient would continue to benefit from skilled PT services to improve dynamic balance with gait, strength, and activity tolerance training in order to build endurance and return to baseline.  -KW       Row Name 07/29/23 1145          Therapy Assessment/Plan (PT)    Rehab Potential (PT) good, to achieve stated therapy goals  -KW     Criteria for Skilled Interventions Met (PT) yes;skilled treatment is necessary  -KW     Therapy Frequency (PT) daily  -KW       Row Name 07/29/23 1145          Vital Signs    Pre Systolic BP Rehab 164  -KW     Pre Treatment Diastolic BP 80  -KW     Pretreatment Heart Rate (beats/min) 96  -KW     Pre SpO2 (%) 92  -KW       Row Name 07/29/23 1145          Positioning and Restraints    Pre-Treatment Position in bed  -KW     Post Treatment Position bed  -KW     In Bed supine;call light within reach;encouraged to call for assist;with other staff  -KW               User Key  (r) = Recorded By, (t) = Taken By, (c) = Cosigned By      Initials Name Provider Type    KW Brittaney Avitia, PT Physical Therapist                   Outcome Measures       Row Name 07/29/23 1145 07/29/23 0827       How much help from another person do you currently need...    Turning from your back to your side while in flat bed without using bedrails? 4  -KW 4  -AG    Moving from lying on back to sitting on the side of a flat bed without bedrails? 4  -KW 4  -AG    Moving to and from a bed to a chair (including a wheelchair)? 4  -KW 4  -AG    Standing up from a chair using your arms (e.g., wheelchair, bedside chair)? 4  -KW 4  -AG    Climbing 3-5 steps with a railing? 3  -KW 4  -AG    To walk in hospital room? 3  -KW 4  -AG    AM-PAC 6 Clicks Score (PT) 22  -KW 24  -AG    Highest level of mobility 7 --> Walked 25 feet or more  -KW 8 --> Walked 250 feet or more  -AG      Row Name 07/29/23 1145          Functional Assessment    Outcome Measure Options AM-PAC 6 Clicks Basic Mobility (PT)  -KW                User Key  (r) = Recorded By, (t) = Taken By, (c) = Cosigned By      Initials Name Provider Type    Brittaney Rivero PT Physical Therapist    Jacqueline Peacock RN Registered Nurse                                   PT Recommendation and Plan     Plan of Care Reviewed With: patient  Progress: no change  Outcome Evaluation: PT eval completed. Patient presents below baseline for functional mobility. Patient esaily SOA and fatigued with ambulation. Patient demonstrates decreased activity tolerance, deconditioning and reduced dynamic balance. Patient would continue to benefit from skilled PT services to improve dynamic balance with gait, strength, and activity tolerance training in order to build endurance and return to baseline.     Time Calculation:   PT Evaluation Complexity  History, PT Evaluation Complexity: 3 or more personal factors and/or comorbidities  Examination of Body Systems (PT Eval Complexity): total of 3 or more elements  Clinical Presentation (PT Evaluation Complexity): evolving  Clinical Decision Making (PT Evaluation Complexity): low complexity  Overall Complexity (PT Evaluation Complexity): low complexity     PT Charges       Row Name 07/29/23 1145             Time Calculation    Start Time 1145  -KW      PT Received On 07/29/23  -KW      PT Goal Re-Cert Due Date 08/08/23  -KW         Untimed Charges    PT Eval/Re-eval Minutes 40  -KW         Total Minutes    Untimed Charges Total Minutes 40  -KW       Total Minutes 40  -KW                User Key  (r) = Recorded By, (t) = Taken By, (c) = Cosigned By      Initials Name Provider Type    Brittaney Rivero PT Physical Therapist                  Therapy Charges for Today       Code Description Service Date Service Provider Modifiers Qty    94311942713 HC PT EVAL MOD COMPLEXITY 3 7/29/2023 Brittaney Avitia PT GP 1            PT G-Codes  Outcome Measure Options: AM-PAC 6 Clicks Basic Mobility (PT)  AM-PAC 6 Clicks Score (PT): 22  PT  Discharge Summary  Anticipated Discharge Disposition (PT): home with outpatient therapy services    Brittaney Avitia, PT  7/29/2023

## 2023-07-29 NOTE — PROGRESS NOTES
"Patient Name:  Ta Madison  YOB: 1960  [id number]    Surgery Progress Note    Date of visit: 7/29/2023    Subjective   Subjective: NAEO. Pain unchanged this AM. Tolerating small amount of clear liquids. Still passing some gas, denies BM.          Objective     Objective:     /80 (BP Location: Left arm, Patient Position: Lying)   Pulse 109   Temp 98.4 øF (36.9 øC) (Oral)   Resp 18   Ht 172.7 cm (68\")   Wt 91.7 kg (202 lb 3.2 oz)   SpO2 96%   BMI 30.74 kg/mý   No intake or output data in the 24 hours ending 07/29/23 0852    CV:  Rhythm  regular and rate regular   L:  Clear  to auscultation bilaterally   Abd:  Soft, distended. Diffuse, mild tenderness to deep palpation.   Ext:  No cyanosis, clubbing, edema    Recent labs that are back at this time have been reviewed.            Assessment/ Plan:    Problem List Items Addressed This Visit          Gastrointestinal Abdominal     * (Principal) Acute gallstone pancreatitis - Primary    Severe acute pancreatitis appears to be slowly improving. Recommend continuing IV fluids and NPO with sips as tolerated. Continue ambulation and increase bowel reg.       Other Visit Diagnoses       Dysphagia, unspecified type                 Active Hospital Problems    Diagnosis  POA    **Acute gallstone pancreatitis [K85.10]  Yes    Elevated serum creatinine [R79.89]  Yes    Personal history of transient ischemic attack (TIA) [Z86.73]  Not Applicable    Adenocarcinoma of prostate [C61]  Yes    Gastroesophageal reflux disease [K21.9]  Yes    Hyperlipidemia [E78.5]  Yes    Tobacco abuse [Z72.0]  Yes      Resolved Hospital Problems   No resolved problems to display.              Daryn Alfred MD  7/29/2023  08:52 EDT       I have personally performed the key portions of the history and physical exam, and I have edited the above note to better reflect my complete history and physical exam.    Passing some gas, no nausea. Pain better    CV:  Rhythm "  regular and rate regular   L:  Clear  to auscultation bilaterally   Abd:  Bowel sounds hypoactive, distended, but minimally tender.  Ext:  No cyanosis, clubbing, edema    Labs: Labs reviewed       Assessment/ Plan:    Ileus- Related to his known pancreatitis. Increase mobility, do not advance diet. Will follow.    Problem List Items Addressed This Visit          Gastrointestinal Abdominal     * (Principal) Acute gallstone pancreatitis - Primary     Other Visit Diagnoses       Dysphagia, unspecified type                    Christos Mijares MD  08:59 EDT

## 2023-07-30 ENCOUNTER — ANESTHESIA EVENT (OUTPATIENT)
Dept: GASTROENTEROLOGY | Facility: HOSPITAL | Age: 63
DRG: 438 | End: 2023-07-30
Payer: COMMERCIAL

## 2023-07-30 ENCOUNTER — APPOINTMENT (OUTPATIENT)
Dept: CT IMAGING | Facility: HOSPITAL | Age: 63
DRG: 438 | End: 2023-07-30
Payer: COMMERCIAL

## 2023-07-30 LAB
ALBUMIN SERPL-MCNC: 2.3 G/DL (ref 3.5–5.2)
ALBUMIN/GLOB SERPL: 0.8 G/DL
ALP SERPL-CCNC: 81 U/L (ref 39–117)
ALT SERPL W P-5'-P-CCNC: 12 U/L (ref 1–41)
ANION GAP SERPL CALCULATED.3IONS-SCNC: 12 MMOL/L (ref 5–15)
AST SERPL-CCNC: 20 U/L (ref 1–40)
BILIRUB SERPL-MCNC: 0.5 MG/DL (ref 0–1.2)
BUN SERPL-MCNC: 11 MG/DL (ref 8–23)
BUN/CREAT SERPL: 23.9 (ref 7–25)
CALCIUM SPEC-SCNC: 7.7 MG/DL (ref 8.6–10.5)
CHLORIDE SERPL-SCNC: 102 MMOL/L (ref 98–107)
CO2 SERPL-SCNC: 22 MMOL/L (ref 22–29)
CREAT SERPL-MCNC: 0.46 MG/DL (ref 0.76–1.27)
DEPRECATED RDW RBC AUTO: 49.4 FL (ref 37–54)
EGFRCR SERPLBLD CKD-EPI 2021: 118.3 ML/MIN/1.73
ERYTHROCYTE [DISTWIDTH] IN BLOOD BY AUTOMATED COUNT: 17.6 % (ref 12.3–15.4)
GLOBULIN UR ELPH-MCNC: 3 GM/DL
GLUCOSE SERPL-MCNC: 113 MG/DL (ref 65–99)
HCT VFR BLD AUTO: 27.9 % (ref 37.5–51)
HGB BLD-MCNC: 8.7 G/DL (ref 13–17.7)
LIPASE SERPL-CCNC: 15 U/L (ref 13–60)
MCH RBC QN AUTO: 24 PG (ref 26.6–33)
MCHC RBC AUTO-ENTMCNC: 31.2 G/DL (ref 31.5–35.7)
MCV RBC AUTO: 77.1 FL (ref 79–97)
PHOSPHATE SERPL-MCNC: 2.3 MG/DL (ref 2.5–4.5)
PLATELET # BLD AUTO: 488 10*3/MM3 (ref 140–450)
PMV BLD AUTO: 10.1 FL (ref 6–12)
POTASSIUM SERPL-SCNC: 3.4 MMOL/L (ref 3.5–5.2)
POTASSIUM SERPL-SCNC: 3.9 MMOL/L (ref 3.5–5.2)
PROT SERPL-MCNC: 5.3 G/DL (ref 6–8.5)
RBC # BLD AUTO: 3.62 10*6/MM3 (ref 4.14–5.8)
SODIUM SERPL-SCNC: 136 MMOL/L (ref 136–145)
VANCOMYCIN SERPL-MCNC: 7.6 MCG/ML (ref 5–40)
WBC NRBC COR # BLD: 20.53 10*3/MM3 (ref 3.4–10.8)

## 2023-07-30 PROCEDURE — 25010000002 METOCLOPRAMIDE PER 10 MG: Performed by: SURGERY

## 2023-07-30 PROCEDURE — 99232 SBSQ HOSP IP/OBS MODERATE 35: CPT | Performed by: NURSE PRACTITIONER

## 2023-07-30 PROCEDURE — 85027 COMPLETE CBC AUTOMATED: CPT | Performed by: SURGERY

## 2023-07-30 PROCEDURE — 25010000002 HYDROMORPHONE 1 MG/ML SOLUTION: Performed by: INTERNAL MEDICINE

## 2023-07-30 PROCEDURE — 99231 SBSQ HOSP IP/OBS SF/LOW 25: CPT | Performed by: INTERNAL MEDICINE

## 2023-07-30 PROCEDURE — 83690 ASSAY OF LIPASE: CPT | Performed by: SURGERY

## 2023-07-30 PROCEDURE — 84132 ASSAY OF SERUM POTASSIUM: CPT | Performed by: HOSPITALIST

## 2023-07-30 PROCEDURE — 80202 ASSAY OF VANCOMYCIN: CPT | Performed by: HOSPITALIST

## 2023-07-30 PROCEDURE — 25010000002 VANCOMYCIN 10 G RECONSTITUTED SOLUTION

## 2023-07-30 PROCEDURE — 80053 COMPREHEN METABOLIC PANEL: CPT | Performed by: HOSPITALIST

## 2023-07-30 PROCEDURE — 94799 UNLISTED PULMONARY SVC/PX: CPT

## 2023-07-30 PROCEDURE — 25010000002 MEROPENEM PER 100 MG: Performed by: INTERNAL MEDICINE

## 2023-07-30 PROCEDURE — 84100 ASSAY OF PHOSPHORUS: CPT | Performed by: HOSPITALIST

## 2023-07-30 PROCEDURE — 74176 CT ABD & PELVIS W/O CONTRAST: CPT

## 2023-07-30 PROCEDURE — 25010000002 METHYLNALTREXONE 12 MG/0.6ML SOLUTION: Performed by: SURGERY

## 2023-07-30 RX ORDER — VANCOMYCIN/0.9 % SOD CHLORIDE 1.5G/250ML
1500 PLASTIC BAG, INJECTION (ML) INTRAVENOUS EVERY 12 HOURS
Status: DISCONTINUED | OUTPATIENT
Start: 2023-07-30 | End: 2023-07-30

## 2023-07-30 RX ORDER — POTASSIUM CHLORIDE 20 MEQ/1
40 TABLET, EXTENDED RELEASE ORAL EVERY 4 HOURS
Status: COMPLETED | OUTPATIENT
Start: 2023-07-30 | End: 2023-07-30

## 2023-07-30 RX ADMIN — HYDROMORPHONE HYDROCHLORIDE 1 MG: 1 INJECTION, SOLUTION INTRAMUSCULAR; INTRAVENOUS; SUBCUTANEOUS at 17:25

## 2023-07-30 RX ADMIN — HYDROCORTISONE ACETATE 25 MG: 25 SUPPOSITORY RECTAL at 08:49

## 2023-07-30 RX ADMIN — POTASSIUM CHLORIDE 40 MEQ: 20 TABLET, EXTENDED RELEASE ORAL at 14:18

## 2023-07-30 RX ADMIN — Medication 10 ML: at 22:21

## 2023-07-30 RX ADMIN — IPRATROPIUM BROMIDE AND ALBUTEROL SULFATE 3 ML: .5; 3 SOLUTION RESPIRATORY (INHALATION) at 11:44

## 2023-07-30 RX ADMIN — POLYETHYLENE GLYCOL 3350 17 G: 17 POWDER, FOR SOLUTION ORAL at 08:48

## 2023-07-30 RX ADMIN — VANCOMYCIN HYDROCHLORIDE 1500 MG: 10 INJECTION, POWDER, LYOPHILIZED, FOR SOLUTION INTRAVENOUS at 11:25

## 2023-07-30 RX ADMIN — HYDROCODONE BITARTRATE AND ACETAMINOPHEN 1 TABLET: 10; 325 TABLET ORAL at 05:41

## 2023-07-30 RX ADMIN — HYDROMORPHONE HYDROCHLORIDE 1 MG: 1 INJECTION, SOLUTION INTRAMUSCULAR; INTRAVENOUS; SUBCUTANEOUS at 21:31

## 2023-07-30 RX ADMIN — DOCUSATE SODIUM 100 MG: 100 CAPSULE, LIQUID FILLED ORAL at 08:49

## 2023-07-30 RX ADMIN — METOCLOPRAMIDE 10 MG: 5 INJECTION, SOLUTION INTRAMUSCULAR; INTRAVENOUS at 08:48

## 2023-07-30 RX ADMIN — HYDROMORPHONE HYDROCHLORIDE 1 MG: 1 INJECTION, SOLUTION INTRAMUSCULAR; INTRAVENOUS; SUBCUTANEOUS at 08:48

## 2023-07-30 RX ADMIN — ASPIRIN 81 MG: 81 TABLET, COATED ORAL at 08:50

## 2023-07-30 RX ADMIN — MEROPENEM 1000 MG: 1 INJECTION, POWDER, FOR SOLUTION INTRAVENOUS at 22:20

## 2023-07-30 RX ADMIN — METOPROLOL TARTRATE 25 MG: 25 TABLET, FILM COATED ORAL at 08:49

## 2023-07-30 RX ADMIN — SENNOSIDES AND DOCUSATE SODIUM 2 TABLET: 50; 8.6 TABLET ORAL at 08:48

## 2023-07-30 RX ADMIN — HYDROMORPHONE HYDROCHLORIDE 1 MG: 1 INJECTION, SOLUTION INTRAMUSCULAR; INTRAVENOUS; SUBCUTANEOUS at 14:18

## 2023-07-30 RX ADMIN — BISACODYL 10 MG: 10 SUPPOSITORY RECTAL at 08:49

## 2023-07-30 RX ADMIN — METOCLOPRAMIDE 10 MG: 5 INJECTION, SOLUTION INTRAMUSCULAR; INTRAVENOUS at 03:49

## 2023-07-30 RX ADMIN — HYDROMORPHONE HYDROCHLORIDE 1 MG: 1 INJECTION, SOLUTION INTRAMUSCULAR; INTRAVENOUS; SUBCUTANEOUS at 23:31

## 2023-07-30 RX ADMIN — HYDROCODONE BITARTRATE AND ACETAMINOPHEN 1 TABLET: 10; 325 TABLET ORAL at 15:52

## 2023-07-30 RX ADMIN — METOCLOPRAMIDE 10 MG: 5 INJECTION, SOLUTION INTRAMUSCULAR; INTRAVENOUS at 22:20

## 2023-07-30 RX ADMIN — HYDROMORPHONE HYDROCHLORIDE 1 MG: 1 INJECTION, SOLUTION INTRAMUSCULAR; INTRAVENOUS; SUBCUTANEOUS at 19:39

## 2023-07-30 RX ADMIN — HYDROMORPHONE HYDROCHLORIDE 1 MG: 1 INJECTION, SOLUTION INTRAMUSCULAR; INTRAVENOUS; SUBCUTANEOUS at 03:49

## 2023-07-30 RX ADMIN — METOCLOPRAMIDE 10 MG: 5 INJECTION, SOLUTION INTRAMUSCULAR; INTRAVENOUS at 14:19

## 2023-07-30 RX ADMIN — HYDROMORPHONE HYDROCHLORIDE 1 MG: 1 INJECTION, SOLUTION INTRAMUSCULAR; INTRAVENOUS; SUBCUTANEOUS at 11:24

## 2023-07-30 RX ADMIN — Medication 1 PATCH: at 08:47

## 2023-07-30 RX ADMIN — BISACODYL 10 MG: 5 TABLET, COATED ORAL at 08:48

## 2023-07-30 RX ADMIN — METHYLNALTREXONE BROMIDE 12 MG: 12 INJECTION, SOLUTION SUBCUTANEOUS at 08:50

## 2023-07-30 RX ADMIN — MEROPENEM 1000 MG: 1 INJECTION, POWDER, FOR SOLUTION INTRAVENOUS at 05:46

## 2023-07-30 RX ADMIN — PANTOPRAZOLE SODIUM 40 MG: 40 TABLET, DELAYED RELEASE ORAL at 06:32

## 2023-07-30 RX ADMIN — POTASSIUM CHLORIDE 40 MEQ: 20 TABLET, EXTENDED RELEASE ORAL at 11:25

## 2023-07-30 RX ADMIN — HYDROXYZINE HYDROCHLORIDE 50 MG: 25 TABLET, FILM COATED ORAL at 08:49

## 2023-07-30 RX ADMIN — HYDROMORPHONE HYDROCHLORIDE 1 MG: 1 INJECTION, SOLUTION INTRAMUSCULAR; INTRAVENOUS; SUBCUTANEOUS at 00:23

## 2023-07-30 RX ADMIN — PANTOPRAZOLE SODIUM 40 MG: 40 TABLET, DELAYED RELEASE ORAL at 17:25

## 2023-07-30 RX ADMIN — SODIUM CHLORIDE 100 ML/HR: 9 INJECTION, SOLUTION INTRAVENOUS at 11:26

## 2023-07-30 RX ADMIN — MEROPENEM 1000 MG: 1 INJECTION, POWDER, FOR SOLUTION INTRAVENOUS at 14:18

## 2023-07-30 RX ADMIN — HYDROMORPHONE HYDROCHLORIDE 1 MG: 1 INJECTION, SOLUTION INTRAMUSCULAR; INTRAVENOUS; SUBCUTANEOUS at 06:32

## 2023-07-30 NOTE — PROGRESS NOTES
"GI Daily Progress Note  Subjective:    Chief Complaint:  \"I can not eat\"    Patient with pancreatitis with poor po intake;  Patient reports pain but better today;  Having BM      Objective:    /70 (BP Location: Left arm, Patient Position: Lying)   Pulse 100   Temp 99.7 øF (37.6 øC) (Oral)   Resp 18   Ht 172.7 cm (68\")   Wt 91.7 kg (202 lb 3.2 oz)   SpO2 95%   BMI 30.74 kg/mý     Physical Exam  HENT:      Head: Normocephalic.      Nose: Nose normal.   Cardiovascular:      Rate and Rhythm: Normal rate.   Abdominal:      General: Abdomen is flat. There is distension.      Palpations: Abdomen is soft.      Tenderness: There is abdominal tenderness.   Skin:     General: Skin is warm.   Neurological:      Mental Status: He is alert. Mental status is at baseline.   Psychiatric:         Mood and Affect: Mood normal.       Lab  Lab Results   Component Value Date    WBC 20.53 (H) 07/30/2023    HGB 8.7 (L) 07/30/2023    HGB 9.4 (L) 07/29/2023    HGB 9.3 (L) 07/28/2023    MCV 77.1 (L) 07/30/2023     (H) 07/30/2023    INR 0.98 07/23/2023       Lab Results   Component Value Date    GLUCOSE 113 (H) 07/30/2023    BUN 11 07/30/2023    CREATININE 0.46 (L) 07/30/2023    EGFRIFNONA 70 01/19/2022    BCR 23.9 07/30/2023     07/30/2023    K 3.4 (L) 07/30/2023    CO2 22.0 07/30/2023    CALCIUM 7.7 (L) 07/30/2023    PROTENTOTREF 7.2 02/05/2019    ALBUMIN 2.3 (L) 07/30/2023    ALKPHOS 81 07/30/2023    BILITOT 0.5 07/30/2023    ALT 12 07/30/2023    AST 20 07/30/2023   CT Abdomen Pelvis Without Contrast    Result Date: 7/30/2023  Impression: 1.Pancreatitis with what could reflect more of a necrotizing pancreatitis involving portions of the pancreas. There has been no improvement in the pancreatitis since prior study. 2.Fluid extending out from the pancreas to the anterior aspect of the quadrate lobe of the liver that may relate to developing pseudocyst 3.Increasing ascites within the abdomen and pelvis. " 4.Cholelithiasis 5.There is some fluid within the colon that might be reflective of an ileus. There is some thickening of the wall of the splenic flexure that may be reflective of some inflammation secondary to the pancreatitis. 6.Bibasilar effusions and atelectasis 7.Edema in the subcutaneous soft tissues which could reflect anasarca. 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 9.Some suggested thickening of the wall the bladder that may relate to under distention. Electronically Signed: Hosea Damon  7/30/2023 11:26 AM EDT  Workstation ID: [id number]         Assessment:      Acute gallstone pancreatitis    Hyperlipidemia    Tobacco abuse    Gastroesophageal reflux disease    Adenocarcinoma of prostate    Personal history of transient ischemic attack (TIA)    Elevated serum creatinine      Continued pancreatitis  Moderately severe acute pancreatitis      Plan:    Discussed with Dr. Mijares who recommends post pancreatic feeding tube  Discussed with patient and he agrees with plan  Notified endoscopy and plan for tomorrow      Yolande Eller MD  07/30/23  18:13 EDT

## 2023-07-30 NOTE — PLAN OF CARE
Goal Outcome Evaluation:              Outcome Evaluation: VSS on RA, although patient remains tachycardic. Pain managed w/prn dilaudid, prn norco. Indigestion managed w/prn simethicone. Prn melatonin x1. Denies nausea, tolerating clear liquid diet. No BM this shift. Slept overnight. Wife at bedside, attentive to patient. Care ongoing, will continue to monitor.

## 2023-07-30 NOTE — PROGRESS NOTES
"Pharmacy Consult-Vancomycin Dosing  Ta Madison is a  63 y.o. male receiving vancomycin therapy.     Indication: Pneumonia  Consulting Provider: Hospitalists  ID Consult: Y, ID to see    Goal AUC: 400 - 600 mg/L*hr    Current Antimicrobial Therapy  Anti-Infectives (From admission, onward)      Ordered     Dose/Rate Route Frequency Start Stop    07/30/23 0959  vancomycin IVPB 1500 mg in 0.9% NaCl (Premix) 500 mL        Ordering Provider: Gayr Munoz RPH    1,500 mg  333.3 mL/hr over 90 Minutes Intravenous Every 12 Hours 07/30/23 1100 08/03/23 1059    07/28/23 1306  Pharmacy to dose vancomycin        Ordering Provider: Curt Barry MD     Does not apply Continuous PRN 07/28/23 1305 08/02/23 1304    07/27/23 2329  meropenem (MERREM) 1000 mg/100 mL 0.9% NS (mbp)        Ordering Provider: Carroll Bobo DO    1,000 mg  over 3 Hours Intravenous Every 8 Hours 07/28/23 0600 08/02/23 0559    07/27/23 2329  meropenem (MERREM) 1000 mg/100 mL 0.9% NS (mbp)        Ordering Provider: Carroll Bobo DO    1,000 mg  over 30 Minutes Intravenous Once 07/28/23 0000 07/28/23 0112            Allergies  Allergies as of 07/23/2023    (No Known Allergies)       Labs    Results from last 7 days   Lab Units 07/30/23  0915 07/29/23  0401 07/28/23  0240   BUN mg/dL 11 17 23   CREATININE mg/dL 0.46* 0.58* 0.71*       Results from last 7 days   Lab Units 07/30/23  0914 07/29/23  0401 07/28/23  0240   WBC 10*3/mm3 20.53* 9.34 4.25       Evaluation of Dosing     Last Dose Received in the ED/Outside Facility: No  Is Patient on Dialysis or Renal Replacement: No    Ht - 172.7 cm (68\")  Wt - 91.7 kg (202 lb 3.2 oz)    Estimated Creatinine Clearance: 183 mL/min (A) (by C-G formula based on SCr of 0.46 mg/dL (L)).    Intake & Output (last 3 days)         07/27 0701 07/28 0700 07/28 0701 07/29 0700 07/29 0701 07/30 0700 07/30 0701 07/31 0700    P.O. 480  460 240    Total Intake(mL/kg) 480 (5.2)  460 (5) 240 (2.6)    Net +480  +460 +240 "            Urine Unmeasured Occurrence   2 x     Stool Unmeasured Occurrence   2 x             Microbiology and Radiology  Microbiology Results (last 10 days)       Procedure Component Value - Date/Time    MRSA Screen, PCR (Inpatient) - Swab, Nares [[id number]]  (Abnormal) Collected: 07/28/23 0049    Lab Status: Final result Specimen: Swab from Nares Updated: 07/28/23 0950     MRSA PCR Positive    Narrative:      The negative predictive value of this diagnostic test is high and should only be used to consider de-escalating anti-MRSA therapy. A positive result may indicate colonization with MRSA and must be correlated clinically.    Blood Culture - Blood, Arm, Right [[id number]]  (Normal) Collected: 07/28/23 0017    Lab Status: Preliminary result Specimen: Blood from Arm, Right Updated: 07/30/23 0045     Blood Culture No growth at 2 days    Blood Culture - Blood, Arm, Right [[id number]]  (Normal) Collected: 07/28/23 0004    Lab Status: Preliminary result Specimen: Blood from Arm, Right Updated: 07/30/23 0045     Blood Culture No growth at 2 days    COVID PRE-OP / PRE-PROCEDURE SCREENING ORDER (NO ISOLATION) - Swab, Nasopharynx [[id number]]  (Normal) Collected: 07/24/23 0620    Lab Status: Final result Specimen: Swab from Nasopharynx Updated: 07/24/23 0729    Narrative:      The following orders were created for panel order COVID PRE-OP / PRE-PROCEDURE SCREENING ORDER (NO ISOLATION) - Swab, Nasopharynx.  Procedure                               Abnormality         Status                     ---------                               -----------         ------                     Respiratory Panel PCR w/...[[id number]]  Normal              Final result                 Please view results for these tests on the individual orders.    Respiratory Panel PCR w/COVID-19(SARS-CoV-2) BALTAZAR/ANDRES/ASTER/PAD/COR/MAD/VALENTINE In-House, NP Swab in UTM/VTM, 3-4 HR TAT - Swab, Nasopharynx [[id number]]  (Normal) Collected: 07/24/23 0620    Lab Status:  Final result Specimen: Swab from Nasopharynx Updated: 07/24/23 0729     ADENOVIRUS, PCR Not Detected     Coronavirus 229E Not Detected     Coronavirus HKU1 Not Detected     Coronavirus NL63 Not Detected     Coronavirus OC43 Not Detected     COVID19 Not Detected     Human Metapneumovirus Not Detected     Human Rhinovirus/Enterovirus Not Detected     Influenza A PCR Not Detected     Influenza B PCR Not Detected     Parainfluenza Virus 1 Not Detected     Parainfluenza Virus 2 Not Detected     Parainfluenza Virus 3 Not Detected     Parainfluenza Virus 4 Not Detected     RSV, PCR Not Detected     Bordetella pertussis pcr Not Detected     Bordetella parapertussis PCR Not Detected     Chlamydophila pneumoniae PCR Not Detected     Mycoplasma pneumo by PCR Not Detected    Narrative:      In the setting of a positive respiratory panel with a viral infection PLUS a negative procalcitonin without other underlying concern for bacterial infection, consider observing off antibiotics or discontinuation of antibiotics and continue supportive care. If the respiratory panel is positive for atypical bacterial infection (Bordetella pertussis, Chlamydophila pneumoniae, or Mycoplasma pneumoniae), consider antibiotic de-escalation to target atypical bacterial infection.    Blood Culture - Blood, Hand, Left [[id number]]  (Normal) Collected: 07/23/23 2240    Lab Status: Final result Specimen: Blood from Hand, Left Updated: 07/28/23 2300     Blood Culture No growth at 5 days    Narrative:      AEROBIC BOTTLE ONLY    Blood Culture - Blood, Hand, Right [[id number]]  (Normal) Collected: 07/23/23 2230    Lab Status: Final result Specimen: Blood from Hand, Right Updated: 07/28/23 2300     Blood Culture No growth at 5 days    Narrative:      AEROBIC BOTTLE ONLY            Reported Vancomycin Levels    Results from last 7 days   Lab Units 07/30/23  0914   VANCOMYCIN RM mcg/mL 7.60                      [employer] AUC Calculation:    Current AUC: 351  mg/L*hr    Predicted Steady State AUC on Current Dose: 372 mg/L*hr  _________________________________    Predicted Steady State AUC on New Dose:   446 mg/L*hr    Assessment/Plan:  Pharmacy to dose vancomycin for treatment of pneumonia with positive MRSA PCR. Based on the patient's renal function and clinical status, patient was started on a scheduled maintenance dose of vancomycin 1250 mg IV q12h (13 mg/kg) on 7/28.  Vancomycin random level obtained today (7/30) resulted at 7.6 mcg/ml drawn 12 hours after previous dose.  This correlates with an AUC of 351 currently. Will increase dose to 1500 mg q12h. Will obtain an additional level on 8/1 with AM labs. Daily CMP already ordered to trend renal function. Renal function appears to be stable at this time.  Pharmacy will continue to monitor and adjust vancomycin dose as necessary based on renal function, cultures, labs, and clinical status.    Gary Munoz, PharmD  7/30/2023  10:00 EDT

## 2023-07-30 NOTE — PROGRESS NOTES
INFECTIOUS DISEASE Progress Note    Ta Madison  1960  [id number]      Admission Date: 7/23/2023      Requesting Provider: Carroll Bobo DO  Evaluating Physician: Palomo Orellana MD    Reason for Consultation: Severe  pancreatitis    History of present illness:    7/28/23:Patient is a 63 y.o. male with h/o TIA, ongoing tobacco abuse, GERD, HLD, occasional marijuana use, and advanced prostate cancer/on Xtandi who we were asked to see for necrotizing pancreatitis.  The patient presented to [facility] ED on 7/23 with severe upper abdominal pain that radiated to his back about 3 hours PTA.  He had associated nausea and vomiting.  He has had postnasal drip with associated cough for last 3 to 4 weeks.  A CT scan at OSH showed acute gallbladder pancreatitis with a 2 mm stone in proximal duodenem.  He was given a dose of Invanz and transferred to [facility] on 7/23 for further medical management.  On arrival to [facility], the patient developed a Tmax of 100.3 and hypoxia requiring 2 L O2 NC.  He is now on room air.  Labs on arrival were lactic acid 3.6, PCT 0.85, creatinine 1.42, ALT 53, AST 66, bilirubin 0.4, lipase 1255, and WBC 15,000 with 91% neutrophils.  Blood cultures are negative to date.  Repeat blood cultures from 7/28 are pending.   A resp panel PCR was negative. His lactic acid and WBC were normal level on 7/27.  His PCT was 0.43.  A MRSA PCR on 7/28 was positive. His pain level on 7/28 was 7 out of 10. An MRCP on 7/24 showed acute pancreatitis with cholelithiasis without choledocholelithiasis.  It was felt that he may have passed the stone.  His abdomen became more distended and taut last night on 7/27.  A KUB noted possible small bowel ileus vs SBO.  A CT scan with contrast was done on 7/27 and showed extensive necrotizing appearing pancreatitis with fluid replacing nearly the entire pancreatic body with inflammatory changes tracking along entire length of pancreas, mild gallbladder wall thickening with  cholelithiasis (less likely acute cholecystitis), small bilateral pleural effusion, and mildly distended SB loops likely reactive and left likely obstruction.  He was on Zosyn, but was changed Merrem and Vancomycin on 7/28.  ID was asked to evaluate and manage his antibiotic therapy. He complains of persistent abdominal pain and abdominal distention.    7/29/23: He has remained afebrile. His creatinine is 0.58.  His white blood cell count is 9.3. Blood cultures from 7/28 are no growth so far.He has noticed some partial improvement in his abdominal pain and distention.  He denies vomiting.    7/30/2023: Maximum temperature over the last 24 hours is 100.1.  A follow-up CT scan today reveals persistent severe pancreatitis.  His white blood cell count today is 20.5.  His vancomycin random level was 7.6.    Past Medical History:   Diagnosis Date    Elevated cholesterol     GERD (gastroesophageal reflux disease)     Increased heart rate     Mini stroke     Neck pain     Prostate cancer     Rash     Tobacco abuse        Past Surgical History:   Procedure Laterality Date    APPENDECTOMY  1971    [facility]     COLONOSCOPY      COLONOSCOPY N/A 1/25/2023    Procedure: COLONOSCOPY;  Surgeon: Mahnaz Caraballo MD;  Location: [facility];  Service: Gastroenterology;  Laterality: N/A;       Family History   Problem Relation Age of Onset    Nephrolithiasis Mother     Heart disease Father     Hypertension Father     Kidney disease Father        Social History     Socioeconomic History    Marital status:    Tobacco Use    Smoking status: Every Day     Packs/day: 2.00     Years: 45.00     Pack years: 90.00     Types: Cigarettes    Smokeless tobacco: Never   Vaping Use    Vaping Use: Never used   Substance and Sexual Activity    Alcohol use: No    Drug use: Yes     Types: Marijuana     Comment: occ     Sexual activity: Defer       No Known Allergies      Medication:    Current Facility-Administered Medications:      acetaminophen (TYLENOL) tablet 650 mg, 650 mg, Oral, Q4H PRN **OR** acetaminophen (TYLENOL) 160 MG/5ML solution 650 mg, 650 mg, Oral, Q4H PRN **OR** acetaminophen (TYLENOL) suppository 650 mg, 650 mg, Rectal, Q4H PRN, Carroll Bobo, DO    aspirin EC tablet 81 mg, 81 mg, Oral, Daily, Jayla Lake, APRN, 81 mg at 07/30/23 0850    atorvastatin (LIPITOR) tablet 20 mg, 20 mg, Oral, Nightly, Carroll Bobo, DO, 20 mg at 07/29/23 2043    bisacodyl (DULCOLAX) EC tablet 10 mg, 10 mg, Oral, Daily, Christos Mijares MD, 10 mg at 07/30/23 0848    sennosides-docusate (PERICOLACE) 8.6-50 MG per tablet 2 tablet, 2 tablet, Oral, BID, 2 tablet at 07/30/23 0848 **AND** polyethylene glycol (MIRALAX) packet 17 g, 17 g, Oral, Daily PRN, 17 g at 07/30/23 0848 **AND** bisacodyl (DULCOLAX) EC tablet 5 mg, 5 mg, Oral, Daily PRN **AND** bisacodyl (DULCOLAX) suppository 10 mg, 10 mg, Rectal, Daily PRN, Carroll Bobo, DO    bisacodyl (DULCOLAX) suppository 10 mg, 10 mg, Rectal, Q8H, Christos Mijares MD, 10 mg at 07/30/23 0849    Calcium Replacement - Follow Nurse / BPA Driven Protocol, , Does not apply, PRN, Curt Barry MD    docusate sodium (COLACE) capsule 100 mg, 100 mg, Oral, BID, Christos Mijares MD, 100 mg at 07/30/23 0849    enzalutamide (XTANDI) chemo capsule 160 mg (patient supplied medication), 160 mg, Oral, Daily, Curt Barry MD, 160 mg at 07/30/23 1725    HYDROcodone-acetaminophen (NORCO)  MG per tablet 1 tablet, 1 tablet, Oral, Q4H PRN, Jayla Lake, APRN, 1 tablet at 07/30/23 1552    hydrocortisone (ANUSOL-HC) suppository 25 mg, 25 mg, Rectal, BID, Jagdeep Quintero, APRN, 25 mg at 07/30/23 0849    HYDROmorphone (DILAUDID) injection 1 mg, 1 mg, Intravenous, Q2H PRN, Cody Chow III, DO, 1 mg at 07/30/23 1725    hydrOXYzine (ATARAX) tablet 50 mg, 50 mg, Oral, TID PRN, Carroll Bobo, DO, 50 mg at 07/30/23 0849    ipratropium-albuterol (DUO-NEB) nebulizer solution 3 mL, 3 mL,  Nebulization, Q4H PRN, Curt Barry MD, 3 mL at 07/30/23 1144    Magnesium Standard Dose Replacement - Follow Nurse / BPA Driven Protocol, , Does not apply, PRN, Curt Barry MD    melatonin tablet 5 mg, 5 mg, Oral, Nightly PRN, Carroll Bobo, , 5 mg at 07/29/23 2304    meropenem (MERREM) 1000 mg/100 mL 0.9% NS (mbp), 1,000 mg, Intravenous, Q8H, Carroll Bobo, , Last Rate: 0 mL/hr at 07/30/23 0002, 1,000 mg at 07/30/23 1418    methylnaltrexone (RELISTOR) injection 12 mg, 12 mg, Subcutaneous, Every Other Day, Christos Mijares MD, 12 mg at 07/30/23 0850    metoclopramide (REGLAN) injection 10 mg, 10 mg, Intravenous, Q6H, Christos Mijares MD, 10 mg at 07/30/23 1419    metoprolol tartrate (LOPRESSOR) tablet 25 mg, 25 mg, Oral, Daily, Carroll Bobo, DO, 25 mg at 07/30/23 0849    montelukast (SINGULAIR) tablet 10 mg, 10 mg, Oral, Nightly, Carroll Bobo, DO, 10 mg at 07/29/23 2042    naloxone (NARCAN) injection 0.4 mg, 0.4 mg, Intravenous, PRN, Carroll Bobo,     nicotine (NICODERM CQ) 21 MG/24HR patch 1 patch, 1 patch, Transdermal, Q24H, Carroll Bobo, , 1 patch at 07/30/23 0847    ondansetron (ZOFRAN) injection 4 mg, 4 mg, Intravenous, Q6H PRN, Bharat Montana, APRN, 4 mg at 07/24/23 1113    pantoprazole (PROTONIX) EC tablet 40 mg, 40 mg, Oral, BID AC, Carroll Bobo, , 40 mg at 07/30/23 1725    Pharmacy to dose vancomycin, , Does not apply, Continuous PRN, Curt Barry MD    phenylephrine-mineral oil-petrolatum (PREPARATION H) 0.25-14-74.9 % hemorhoidal ointment, , Rectal, BID PRN, Jagdeep Quintero APRN    Phosphorus Replacement - Follow Nurse / BPA Driven Protocol, , Does not apply, Jhonny LINK Marc P, MD    Potassium Replacement - Follow Nurse / BPA Driven Protocol, , Does not apply, Jhonny LINK Marc P, MD    prochlorperazine (COMPAZINE) injection 5 mg, 5 mg, Intravenous, Q3H PRN, Carroll Bobo, DO, 5 mg at 07/24/23 4906    simethicone (MYLICON) chewable tablet 80 mg, 80 mg,  Oral, 4x Daily PRN, Jayla Lake, APRN, 80 mg at 23    simethicone (MYLICON) chewable tablet 80 mg, 80 mg, Oral, 4x Daily PRN, Christos Mijares MD, 80 mg at 23    sodium chloride 0.9 % flush 10 mL, 10 mL, Intravenous, Q12H, Carroll Bobo, , 10 mL at 23    sodium chloride 0.9 % flush 10 mL, 10 mL, Intravenous, PRN, Carroll Bobo,     sodium chloride 0.9 % infusion 40 mL, 40 mL, Intravenous, PRN, Carroll Bobo,     sodium chloride 0.9 % infusion, 100 mL/hr, Intravenous, Continuous, Christos Mijares MD, Last Rate: 100 mL/hr at 23 1126, 100 mL/hr at 23 1126    vancomycin IVPB 1500 mg in 0.9% NaCl (Premix) 500 mL, 1,500 mg, Intravenous, Q12H, Gary Munoz RPH, Last Rate: 333.3 mL/hr at 23 1125, 1,500 mg at 23 1125    Antibiotics:  Anti-Infectives (From admission, onward)      Ordered     Dose/Rate Route Frequency Start Stop    23 0959  vancomycin IVPB 1500 mg in 0.9% NaCl (Premix) 500 mL        Ordering Provider: Gary Munoz RPH    1,500 mg  333.3 mL/hr over 90 Minutes Intravenous Every 12 Hours 23 1100 23 1059    23 1306  Pharmacy to dose vancomycin        Ordering Provider: Curt Barry MD     Does not apply Continuous PRN 23 1305 23 1304    23 2329  meropenem (MERREM) 1000 mg/100 mL 0.9% NS (mbp)        Ordering Provider: Carroll Bobo DO    1,000 mg  over 3 Hours Intravenous Every 8 Hours 23 0600 23 0559    23 2329  meropenem (MERREM) 1000 mg/100 mL 0.9% NS (mbp)        Ordering Provider: Carroll Bobo DO    1,000 mg  over 30 Minutes Intravenous Once 23 0000 23 0112              Review of Systems:  [facility]      Physical Exam:   Vital Signs  Temp (24hrs), Av.4 øF (37.4 øC), Min:98.9 øF (37.2 øC), Max:100.1 øF (37.8 øC)    Temp  Min: 98.9 øF (37.2 øC)  Max: 100.1 øF (37.8 øC)  BP  Min: 145/70  Max: 164/74  Pulse  Min: 98  Max: 116  Resp  Min: 16   Max: 18  SpO2  Min: 93 %  Max: 95 %    GENERAL: Awake and alert, in no acute distress.   HEENT: Normocephalic, atraumatic.  PERRL. EOMI. No conjunctival injection. No icterus. Oropharynx clear without evidence of thrush or exudate.   NECK: Supple   HEART: RRR; No murmur, rubs, gallops.   LUNGS: Clear to auscultation bilaterally without wheezing, rales, rhonchi. Normal respiratory effort. Nonlabored.   ABDOMEN: Decreased abdominal distention and tenderness.  EXT:  No cyanosis, clubbing or edema. No cord.  :  Without Jorgensen catheter.  MSK: No joint effusions or erythema  SKIN: Warm and dry without cutaneous eruptions on Inspection/palpation.    NEURO: Oriented to PPT.  Motor 5/5 strength  PSYCHIATRIC: Normal insight and judgment. Cooperative with PE    Laboratory Data    Results from last 7 days   Lab Units 07/30/23  0914 07/29/23  0401 07/28/23  0240   WBC 10*3/mm3 20.53* 9.34 4.25   HEMOGLOBIN g/dL 8.7* 9.4* 9.3*   HEMATOCRIT % 27.9* 29.0* 28.6*   PLATELETS 10*3/mm3 488* 441 367       Results from last 7 days   Lab Units 07/30/23  0915   SODIUM mmol/L 136   POTASSIUM mmol/L 3.4*   CHLORIDE mmol/L 102   CO2 mmol/L 22.0   BUN mg/dL 11   CREATININE mg/dL 0.46*   GLUCOSE mg/dL 113*   CALCIUM mg/dL 7.7*       Results from last 7 days   Lab Units 07/30/23  0915   ALK PHOS U/L 81   BILIRUBIN mg/dL 0.5   ALT (SGPT) U/L 12   AST (SGOT) U/L 20               Results from last 7 days   Lab Units 07/27/23  2203   LACTATE mmol/L 1.2           Results from last 7 days   Lab Units 07/30/23  0914   VANCOMYCIN RM mcg/mL 7.60     Estimated Creatinine Clearance: 183 mL/min (A) (by C-G formula based on SCr of 0.46 mg/dL (L)).      Microbiology:  Microbiology Results (last 10 days)       Procedure Component Value - Date/Time    MRSA Screen, PCR (Inpatient) - Swab, Nares [[id number]]  (Abnormal) Collected: 07/28/23 0049    Lab Status: Final result Specimen: Swab from Nares Updated: 07/28/23 0950     MRSA PCR Positive    Narrative:       The negative predictive value of this diagnostic test is high and should only be used to consider de-escalating anti-MRSA therapy. A positive result may indicate colonization with MRSA and must be correlated clinically.    Blood Culture - Blood, Arm, Right [[id number]]  (Normal) Collected: 07/28/23 0017    Lab Status: Preliminary result Specimen: Blood from Arm, Right Updated: 07/30/23 0045     Blood Culture No growth at 2 days    Blood Culture - Blood, Arm, Right [[id number]]  (Normal) Collected: 07/28/23 0004    Lab Status: Preliminary result Specimen: Blood from Arm, Right Updated: 07/30/23 0045     Blood Culture No growth at 2 days    COVID PRE-OP / PRE-PROCEDURE SCREENING ORDER (NO ISOLATION) - Swab, Nasopharynx [[id number]]  (Normal) Collected: 07/24/23 0620    Lab Status: Final result Specimen: Swab from Nasopharynx Updated: 07/24/23 0729    Narrative:      The following orders were created for panel order COVID PRE-OP / PRE-PROCEDURE SCREENING ORDER (NO ISOLATION) - Swab, Nasopharynx.  Procedure                               Abnormality         Status                     ---------                               -----------         ------                     Respiratory Panel PCR w/...[[id number]]  Normal              Final result                 Please view results for these tests on the individual orders.    Respiratory Panel PCR w/COVID-19(SARS-CoV-2) BALTAZAR/ANDRES/ASTER/PAD/COR/MAD/VALENTINE In-House, NP Swab in UTM/VTM, 3-4 HR TAT - Swab, Nasopharynx [[id number]]  (Normal) Collected: 07/24/23 0620    Lab Status: Final result Specimen: Swab from Nasopharynx Updated: 07/24/23 0729     ADENOVIRUS, PCR Not Detected     Coronavirus 229E Not Detected     Coronavirus HKU1 Not Detected     Coronavirus NL63 Not Detected     Coronavirus OC43 Not Detected     COVID19 Not Detected     Human Metapneumovirus Not Detected     Human Rhinovirus/Enterovirus Not Detected     Influenza A PCR Not Detected     Influenza B PCR Not Detected      Parainfluenza Virus 1 Not Detected     Parainfluenza Virus 2 Not Detected     Parainfluenza Virus 3 Not Detected     Parainfluenza Virus 4 Not Detected     RSV, PCR Not Detected     Bordetella pertussis pcr Not Detected     Bordetella parapertussis PCR Not Detected     Chlamydophila pneumoniae PCR Not Detected     Mycoplasma pneumo by PCR Not Detected    Narrative:      In the setting of a positive respiratory panel with a viral infection PLUS a negative procalcitonin without other underlying concern for bacterial infection, consider observing off antibiotics or discontinuation of antibiotics and continue supportive care. If the respiratory panel is positive for atypical bacterial infection (Bordetella pertussis, Chlamydophila pneumoniae, or Mycoplasma pneumoniae), consider antibiotic de-escalation to target atypical bacterial infection.    Blood Culture - Blood, Hand, Left [[id number]]  (Normal) Collected: 07/23/23 2240    Lab Status: Final result Specimen: Blood from Hand, Left Updated: 07/28/23 2300     Blood Culture No growth at 5 days    Narrative:      AEROBIC BOTTLE ONLY    Blood Culture - Blood, Hand, Right [[id number]]  (Normal) Collected: 07/23/23 2230    Lab Status: Final result Specimen: Blood from Hand, Right Updated: 07/28/23 2300     Blood Culture No growth at 5 days    Narrative:      AEROBIC BOTTLE ONLY                  Radiology:  Imaging Results (Last 72 Hours)       Procedure Component Value Units Date/Time    CT Abdomen Pelvis Without Contrast [[id number]] Collected: 07/30/23 1115     Updated: 07/30/23 1129    Narrative:      CT ABDOMEN PELVIS WO CONTRAST    Date of Exam: 7/30/2023 11:07 AM EDT    Indication: Pancreatitis, increasing WBC.    Comparison: CT abdomen pelvis July 27, 2023    Technique: Axial CT images were obtained of the abdomen and pelvis without the administration of contrast. Reconstructed coronal and sagittal images were also obtained. Automated exposure control and iterative  construction methods were used.      Findings:  There are bibasilar effusions and atelectatic changes in the lower lungs. There is ascites about the liver which has increased. There is stranding in the fat around the pancreas with swelling of the pancreas compatible with acute pancreatitis. There is   hypodense fluid like attenuation involving the more proximal and mid body as well as portions of the head of the pancreas. This could relate to necrotizing pancreatitis . There is suggested fluid tracking from the pancreas to the anterior aspect of the   quadrate lobe of the liver. Ascites tracks into the pelvis.    There are gallstones within the gallbladder. There is fluid about the spleen. Adrenal glands are grossly unremarkable. There is no acute renal abnormality.    There is some thickening wall the bladder that may relate to under distention as opposed to a cystitis.    There is some fluid within the lumen of the colon. There is some thickening of the wall of the splenic flexure that may reflect some reactive changes to the pancreatitis.    There is edema in the subcutaneous soft tissues which could reflect anasarca.    Atherosclerotic vascular calcification is noted.    There are pars defects at L5 without significant anterolisthesis.      Impression:      Impression:  1.Pancreatitis with what could reflect more of a necrotizing pancreatitis involving portions of the pancreas. There has been no improvement in the pancreatitis since prior study.  2.Fluid extending out from the pancreas to the anterior aspect of the quadrate lobe of the liver that may relate to developing pseudocyst  3.Increasing ascites within the abdomen and pelvis.  4.Cholelithiasis  5.There is some fluid within the colon that might be reflective of an ileus. There is some thickening of the wall of the splenic flexure that may be reflective of some inflammation secondary to the pancreatitis.  6.Bibasilar effusions and atelectasis  7.Edema in  the subcutaneous soft tissues which could reflect anasarca.  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  9.Some suggested thickening of the wall the bladder that may relate to under distention.            Electronically Signed: Hosea Damon    7/30/2023 11:26 AM EDT    Workstation ID: [id number]    CT Abdomen Pelvis With Contrast [[id number]] Collected: 07/27/23 2314     Updated: 07/27/23 2323    Narrative:      CT ABDOMEN PELVIS W CONTRAST    Date of Exam: 7/27/2023 11:02 PM EDT    Indication: abdominal distention, ? ileus vs obstruction.    Comparison: 4/27/2023, 7/23/2023.    Technique: Axial CT images were obtained of the abdomen and pelvis following the uneventful intravenous administration of intravenous contrast.. Reconstructed coronal and sagittal images were also obtained. Automated exposure control and iterative   construction methods were used.      Findings:  Lung Bases:     Small bilateral pleural effusions are present, left greater than right. Consolidative changes are present within the bilateral lower lobes with air bronchograms.  Liver:  Liver is normal in size and CT density. No focal lesions.    Biliary/Gallbladder:    The gallbladder is mildly under distended. Mild wall thickening present. Tiny stones are present within the lumen. No evidence of significant surrounding free fluid.. The biliary tree is nondilated.    Spleen:  Spleen is normal in size and CT density.    Pancreas:    Extensive inflammatory changes are seen throughout the pancreas with significant phlegmonous changes and fluid seen along the body of the pancreas with no normal parenchyma are present within this region (series 2 image 61). The area of involvement   measures up to 3.0 x 8.7 cm. No definite air pockets identified. Inflammatory changes are seen surrounding the remaining portions of the pancreas. No additional focal collection identified. Patchy fluid is seen extending within the mesentery. Multiple   tiny  mesenteric lymph nodes present. Small to moderate amount of free fluid is seen tracking into the lower abdomen and pelvis.    Kidneys:    Kidneys are normal in size. There are no stones or hydronephrosis.    Adrenals:    Adrenal glands are unremarkable.    Retroperitoneal/Lymph Nodes/Vasculature:    No retroperitoneal adenopathy is identified. Atherosclerotic calcifications are present.    Gastrointestinal/Mesentery:    Mildly prominent small bowel loops are present, likely reactive and related to surrounding ascites. This appears diffuse. Air is present within the colon. No definite obstruction identified. Surrounding free fluid present. The appendix is not well   visualized. No significant stool burden identified.. No evidence of obstruction. No free air. No mesenteric fluid collections identified. There is diffuse anasarca of the soft tissues.    Bladder:    The bladder is normal.    Genital:     Unremarkable          Bony Structures:     Visualized bony structures are consistent with the patient's age.        Impression:      Impression:    1. Extensive necrotizing appearing pancreatitis with fluid replacing nearly the entire pancreatic body as described above. Significant inflammatory changes are seen tracking along the entire length of the pancreas. No suspicious air pockets identified.   No additional focal collection identified. Significant surrounding edema is present. Findings appear to have significantly progressed as compared to the previous outside CT from 7/23/2023. Reactive tracking fluid is seen extending to the lower abdomen   and pelvis.  2. Mild gallbladder wall thickening present which may be related to mild underdistention. Tiny stones are present. Reactive edema is suspected. Acute cholecystitis is unlikely. No evidence of biliary ductal dilatation.  3. Small bilateral pleural effusions present, left greater than right with overlying consolidative changes which may be related to atelectasis or  pneumonia. Pneumonia suspected due to air bronchograms. There is anasarca of the soft tissues likely related   to fluid overload.  4. Mildly distended small bowel loops present throughout the abdomen and pelvis, likely reactive and related to small to moderate amount of free fluid related to tracking inflammatory change. Air is present within the colon. Obstruction unlikely.  5. Ancillary findings as described above.        Electronically Signed: Cora Torres    7/27/2023 11:20 PM EDT    Workstation ID: [id number]    XR Abdomen KUB [[id number]] Collected: 07/27/23 2143     Updated: 07/27/23 2148    Narrative:      XR ABDOMEN KUB    Date of Exam: 7/27/2023 9:01 PM EDT    Indication: abd distension. ? obstruction    Comparison: None available.    Findings:        There are several loops of air-filled mildly distended small bowel. This could represent small bowel ileus versus small bowel obstruction.    There is an air-filled colon.    There are no abnormal calcifications.    The osseous structures are grossly unremarkable.      Impression:        Mildly dilated air-filled loops of small bowel within the abdomen which could represent small bowel ileus versus small bowel obstruction. If clinically warranted follow-up CT scan of the abdomen may be obtained.              Electronically Signed: Damir Mccarthy    7/27/2023 9:45 PM EDT    Workstation ID: [id number]        I read his radiographic images reviewed the images with the patient and his family.      Impression:   Severe gallstone pancreatitis-Without overt evidence of severe pancreatic infection/pancreatic abscess.  I will leave him on intravenous Merrem. He appears to have passed the gallstone.  Leukocytosis/neutrophilia-Worse  MRSA nasal colonization- he was started on intravenous vancomycin but this does not appear to be necessary at this point.  Advance prostate cancer/on Xtandi  Ongoing tobacco abuse  Occasional marijuana use    PLAN/RECOMMENDATIONS:   Follow blood  cultures  Continue Merrem   Discontinue vancomycin    He has severe, extensive pancreatitis.  He appears to be developing a pseudocyst.    Palomo Orellana MD  7/30/2023  17:41 EDT

## 2023-07-30 NOTE — PROGRESS NOTES
"Patient Name:  Ta Madison  YOB: 1960  [id number]    Surgery Progress Note    Date of visit: 7/30/2023    Subjective   Subjective: NAEO. He reports several bowel movements overnight and is passing gas. Abdominal pain improved. Still feels bloated but less compared to yesterday.          Objective     Objective:     /77 (BP Location: Right arm, Patient Position: Lying)   Pulse 107   Temp 98.9 øF (37.2 øC) (Oral)   Resp 18   Ht 172.7 cm (68\")   Wt 91.7 kg (202 lb 3.2 oz)   SpO2 93%   BMI 30.74 kg/mý     Intake/Output Summary (Last 24 hours) at 7/30/2023 0809  Last data filed at 7/29/2023 1600  Gross per 24 hour   Intake 460 ml   Output --   Net 460 ml       CV:  Rhythm regular and rate regular   L:  Clear to auscultation bilaterally. No increased work of breathing.   Abd:  Soft, distended, minimal tenderness to palpation not localized.   Ext:  No cyanosis, clubbing, edema    Recent labs that are back at this time have been reviewed. New leukocytosis of 20 noted.            Assessment/ Plan:    Problem List Items Addressed This Visit          Gastrointestinal Abdominal     * (Principal) Acute gallstone pancreatitis - Primary     Other Visit Diagnoses       Dysphagia, unspecified type                 Active Hospital Problems    Diagnosis  POA    **Acute gallstone pancreatitis [K85.10]  Yes    Elevated serum creatinine [R79.89]  Yes    Personal history of transient ischemic attack (TIA) [Z86.73]  Not Applicable    Adenocarcinoma of prostate [C61]  Yes    Gastroesophageal reflux disease [K21.9]  Yes    Hyperlipidemia [E78.5]  Yes    Tobacco abuse [Z72.0]  Yes      Resolved Hospital Problems   No resolved problems to display.        Ileus- Related to his known pancreatitis. Appears to be improving with recent return of bowel function and improvement in bloating/nausea. Continue to encourage out of bed ambulation.    Daryn Alfred MD  7/30/2023  08:09 EDT         I have " Regimen: Alimta  Cycle/Day: C10D1      Dr. Aakash Chan  is supervising provider today.    Nursing Assessment:   A comprehensive nursing assessment addressing the side-effects of chemotherapy was performed and the patient reports the following : Nausea: NO  Vomiting: NO  Fever: NO  Chills: NO  Other signs of infection: NO  Bleeding: NO  Mucositis: NO  Diarrhea: NO  Constipation: NO  Anorexia: NO  Dysuria: NO  Shortness of Breath: YES, with moderate exertion  Fatigue/Weakness: NO  Numbness/Tingling: NO  Pain: NO    Pre-Treatment:  - Treatment consent signed  - Patient has valid pre-authorization  - VS completed  - BSA double checked  - Premed orders, including hydration, are verified prior to administration  - Chemotherapy doses double checked and verified by two practitioners  - Treatment parameters verified in patient protocol  - Lab results checked - MD notified;   - Chemotherapy Doses independently doubled checked & verified by two practitioners  - Chemotherapy infusion pump settings are double checked & verified by two practitioners  - Patient is identified by first & last name, Date of birth that has been verified by two practitioners with the patient chairside.    3/3/2017 at 0918  left  implanted port accessed using a 20 gauge non-coring needle primed with 0.9% sodium chloride.  Good blood return obtained.  Blood drawn for CBC, complete metabolic panel and sent to lab. Flushed with 10ml 0.9% sodium chloride followed by Heparin 500 units/5ml . Implanted port site is not sensitive to touch, not swollen, not warm and not reddened.  Patient tolerated procedure.    Treatment:  Refer to LDA and MAR for line assessment and medication administration  Refer to Toxicity Assessment doc flowsheet   Blood return confirmed before, during and after chemotherapy administered  Infusion pump used for non-vesicant drugs    Post Treatment: Treatment tolerated well; no adverse reaction    Transfusion: Not needed    Education: No  new instructions needed    Next appointment scheduled: 3/24  Patient instructed to call the office with any questions or concerns.    Patient Discharged: Pt discharged to home per self, ambulatory.         personally performed the malone portions of the history and physical exam, and I have edited the above note to better reflect my complete history and physical exam.    States that he had 2 bowel movements overnight and is feeling better.  Does not want any more than just clear liquids currently.  Remains distended.    CV:  Rhythm  regular and rate regular   L:  Clear  to auscultation bilaterally   Abd:  Bowel sounds hypoactive, distended but nontender to deep palpation.  No peritonitis.  Ext:  No cyanosis, clubbing, edema    Labs: Labs reviewed laboratory exam demonstrates a white count that is elevated to 20,000.  Hemoglobin is 8.7.  Lipase normal at 15.  Transaminases normal.      Assessment/ Plan:    :-I suspect that his elevated white count is due to his pancreatic inflammation.  We will repeat a CT scan to make sure there is no evidence of infected necrosis though I think this is unlikely.  I do think that given the severity of his pancreatitis we may need to pursue a nasoenteral post pancreatic feeding tube for nutritional status to allow his pancreatitis to resolve.  I discussed this with the hospitalist, who will consult gastroenterology.    Problem List Items Addressed This Visit          Gastrointestinal Abdominal     * (Principal) Acute gallstone pancreatitis - Primary     Other Visit Diagnoses       Dysphagia, unspecified type                    Christos Mijares MD  10:31 EDT      CT scan reviewed personally. Persistent severe pancreatitis, but actually looks a bit better with no abscess or evidence of infected necrosis. Some bilateral atelectasis and pleural effusions. GB with stones but no evidence of acute cholecystitis.      Recommend continuing with current management. Plan for nasoenteral post-pancreatic feeding tube.    Christos Mijares MD  11:30 EDT

## 2023-07-30 NOTE — PLAN OF CARE
Goal Outcome Evaluation:  Plan of Care Reviewed With: patient        Progress: no change  Outcome Evaluation: Pt continues to c/o severe pain and require frequent pain meds. No nausea/vomiting. WBCC 20 today. CT scan unchanged. PT NPO for tube placement in am

## 2023-07-30 NOTE — PROGRESS NOTES
[facility]  PROGRESS NOTE    Patient Name: Ta Madison  : 1960  MRN: [id number]    Date of Admission: 2023  Primary Care Physician: Yolande Olvera APRN    Subjective   Subjective     CC:  Follow-up abdominal pain    HPI:  Patient seen resting in bed in no apparent distress. He reports multiple bowel movements overnight. He notes that pain is about the same today. Noted to have significant elevation in WBC. Repeat CT scan ordered per surgery. GI consulted for post-pancreatic feeding tube.     ROS:  Gen- No fevers, chills  CV- No chest pain, palpitations  Resp- No cough, dyspnea  GI- +abd pain, +abd distention     Objective   Objective     Vital Signs:   Temp:  [98.9 øF (37.2 øC)-100.1 øF (37.8 øC)] 98.9 øF (37.2 øC)  Heart Rate:  [] 107  Resp:  [16-18] 18  BP: (145-164)/(70-77) 146/77     Physical Exam:  Constitutional: No acute distress, awake, alert, ill appearing   HENT: NCAT, mucous membranes moist  Respiratory: Clear to auscultation bilaterally, respiratory effort normal on RA  Cardiovascular: RRR, no murmurs, cap refill brisk   Gastrointestinal: hypoactive bowel sounds, distended, tender to palpation  Musculoskeletal: 1+ BLE edema   Psychiatric: Appropriate affect, cooperative  Neurologic: Oriented x 3, moves all extremities, speech clear  Skin: warm, dry, no visible rash     Results Reviewed:  LAB RESULTS:      Lab 23  0401 23  0240 23  2203 23  0319 23  0720 23  1036 23  0324 23  2019 23  1352 23  0725 23  0442 23  0153 23  2246   WBC 9.34 4.25  --  6.66 13.33*  --  15.29*  --   --   --  14.99*   < >  --    HEMOGLOBIN 9.4* 9.3*  --  9.7* 9.9*  --  10.9*  --   --   --  12.0*   < >  --    HEMATOCRIT 29.0* 28.6*  --  30.4* 31.7*  --  35.6*  --   --   --  39.3   < >  --    PLATELETS 441 367  --  328 300  --  358  --   --   --  456*   < >  --    NEUTROS ABS  --   --   --  5.34  11.54*  --  13.62*  --   --   --  13.58*  --   --    IMMATURE GRANS (ABS)  --   --   --  0.02 0.08*  --  0.06*  --   --   --  0.06*  --   --    LYMPHS ABS  --   --   --  0.55* 0.73  --  0.80  --   --   --  0.67*  --   --    MONOS ABS  --   --   --  0.64 0.92*  --  0.79  --   --   --  0.66  --   --    EOS ABS  --   --   --  0.09 0.03  --  0.00  --   --   --  0.00  --   --    MCV 76.9* 76.7*  --  77.6* 79.3  --  80.9  --   --   --  78.9*   < >  --    PROCALCITONIN  --   --  0.43*  --   --   --   --   --   --   --   --   --  0.85*   LACTATE  --   --  1.2  --   --  3.2*  --  2.7* 3.1* 3.5* 4.1*   < > 3.6*   PROTIME  --   --   --   --   --   --   --   --   --   --   --   --  13.1   APTT  --   --   --   --   --   --   --   --   --   --   --   --  26.9    < > = values in this interval not displayed.         Lab 07/29/23  1205 07/29/23  0401 07/28/23  0240 07/27/23  0319 07/26/23  0720 07/25/23  0324 07/24/23  0924 07/23/23  2246   SODIUM  --  137 133* 138 139 141   < > 140   POTASSIUM  --  4.0 3.2* 4.1 4.3 5.1   < > 5.1   CHLORIDE  --  103 98 101 102 107   < > 108*   CO2  --  22.0 25.0 25.0 24.0 22.0   < > 14.0*   ANION GAP  --  12.0 10.0 12.0 13.0 12.0   < > 18.0*   BUN  --  17 23 27* 27* 36*   < > 28*   CREATININE  --  0.58* 0.71* 0.72* 0.77 1.29*   < > 1.42*   EGFR  --  110.3 103.7 103.3 101.2 62.7   < > 55.9*   GLUCOSE  --  105* 106* 97 87 101*   < > 171*   CALCIUM  --  7.8* 7.9* 8.2* 8.3* 8.7   < > 8.5*   MAGNESIUM  --  2.1  --   --   --   --   --  2.3   PHOSPHORUS 1.5* 1.5*  --   --   --   --   --   --     < > = values in this interval not displayed.         Lab 07/29/23  0401 07/28/23  0240 07/27/23  0319 07/26/23  0720 07/25/23  0324 07/24/23  0924   TOTAL PROTEIN 5.1* 6.0 5.1* 5.3* 5.5* 6.1   ALBUMIN 2.6* 2.5* 2.8* 2.8* 3.4* 3.7   GLOBULIN 2.5 3.5 2.3 2.5 2.1 2.4   ALT (SGPT) 16 15 19 24 38 49*   AST (SGOT) 18 16 19 28 38 50*   BILIRUBIN 0.6 0.7 1.2 1.8* 1.2 0.8   ALK PHOS 68 66 71 76 69 75   LIPASE  --   --   --    --   --  1,255*         Lab 07/29/23  0401 07/25/23  1502 07/23/23 2246   PROBNP 709.6 789.2  --    PROTIME  --   --  13.1   INR  --   --  0.98         Lab 07/24/23  0924 07/23/23 2246   CHOLESTEROL 134  --    LDL CHOL 72  --    HDL CHOL 39*  --    TRIGLYCERIDES 126 108         Lab 07/23/23  2349 07/23/23 2245   ABO TYPING O O   RH TYPING Positive Positive   ANTIBODY SCREEN  --  Negative         Brief Urine Lab Results  (Last result in the past 365 days)        Color   Clarity   Blood   Leuk Est   Nitrite   Protein   CREAT   Urine HCG        07/25/23 0636 Dark Yellow   Clear   Negative   Negative   Negative   30 mg/dL (1+)                   Microbiology Results Abnormal       Procedure Component Value - Date/Time    Blood Culture - Blood, Arm, Right [[id number]]  (Normal) Collected: 07/28/23 0017    Lab Status: Preliminary result Specimen: Blood from Arm, Right Updated: 07/30/23 0045     Blood Culture No growth at 2 days    Blood Culture - Blood, Arm, Right [[id number]]  (Normal) Collected: 07/28/23 0004    Lab Status: Preliminary result Specimen: Blood from Arm, Right Updated: 07/30/23 0045     Blood Culture No growth at 2 days    Blood Culture - Blood, Hand, Right [[id number]]  (Normal) Collected: 07/23/23 2230    Lab Status: Final result Specimen: Blood from Hand, Right Updated: 07/28/23 2300     Blood Culture No growth at 5 days    Narrative:      AEROBIC BOTTLE ONLY    Blood Culture - Blood, Hand, Left [[id number]]  (Normal) Collected: 07/23/23 2240    Lab Status: Final result Specimen: Blood from Hand, Left Updated: 07/28/23 2300     Blood Culture No growth at 5 days    Narrative:      AEROBIC BOTTLE ONLY    COVID PRE-OP / PRE-PROCEDURE SCREENING ORDER (NO ISOLATION) - Swab, Nasopharynx [[id number]]  (Normal) Collected: 07/24/23 0620    Lab Status: Final result Specimen: Swab from Nasopharynx Updated: 07/24/23 0729    Narrative:      The following orders were created for panel order COVID PRE-OP / PRE-PROCEDURE  SCREENING ORDER (NO ISOLATION) - Swab, Nasopharynx.  Procedure                               Abnormality         Status                     ---------                               -----------         ------                     Respiratory Panel PCR w/...[[id number]]  Normal              Final result                 Please view results for these tests on the individual orders.    Respiratory Panel PCR w/COVID-19(SARS-CoV-2) BALTAZAR/ANDRES/ASTER/PAD/COR/MAD/VALENTINE In-House, NP Swab in UTM/VTM, 3-4 HR TAT - Swab, Nasopharynx [[id number]]  (Normal) Collected: 07/24/23 0620    Lab Status: Final result Specimen: Swab from Nasopharynx Updated: 07/24/23 0729     ADENOVIRUS, PCR Not Detected     Coronavirus 229E Not Detected     Coronavirus HKU1 Not Detected     Coronavirus NL63 Not Detected     Coronavirus OC43 Not Detected     COVID19 Not Detected     Human Metapneumovirus Not Detected     Human Rhinovirus/Enterovirus Not Detected     Influenza A PCR Not Detected     Influenza B PCR Not Detected     Parainfluenza Virus 1 Not Detected     Parainfluenza Virus 2 Not Detected     Parainfluenza Virus 3 Not Detected     Parainfluenza Virus 4 Not Detected     RSV, PCR Not Detected     Bordetella pertussis pcr Not Detected     Bordetella parapertussis PCR Not Detected     Chlamydophila pneumoniae PCR Not Detected     Mycoplasma pneumo by PCR Not Detected    Narrative:      In the setting of a positive respiratory panel with a viral infection PLUS a negative procalcitonin without other underlying concern for bacterial infection, consider observing off antibiotics or discontinuation of antibiotics and continue supportive care. If the respiratory panel is positive for atypical bacterial infection (Bordetella pertussis, Chlamydophila pneumoniae, or Mycoplasma pneumoniae), consider antibiotic de-escalation to target atypical bacterial infection.            No radiology results from the last 24 hrs        Current medications:  Scheduled Meds:aspirin,  81 mg, Oral, Daily  atorvastatin, 20 mg, Oral, Nightly  bisacodyl, 10 mg, Oral, Daily  bisacodyl, 10 mg, Rectal, Q8H  docusate sodium, 100 mg, Oral, BID  enzalutamide, 160 mg, Oral, Daily  hydrocortisone, 25 mg, Rectal, BID  meropenem, 1,000 mg, Intravenous, Q8H  methylnaltrexone, 12 mg, Subcutaneous, Every Other Day  metoclopramide, 10 mg, Intravenous, Q6H  metoprolol tartrate, 25 mg, Oral, Daily  montelukast, 10 mg, Oral, Nightly  nicotine, 1 patch, Transdermal, Q24H  pantoprazole, 40 mg, Oral, BID AC  senna-docusate sodium, 2 tablet, Oral, BID  sodium chloride, 10 mL, Intravenous, Q12H  vancomycin, 1,250 mg, Intravenous, Q12H      Continuous Infusions:Pharmacy to dose vancomycin,   sodium chloride, 100 mL/hr, Last Rate: 100 mL/hr (07/29/23 1345)      PRN Meds:.  acetaminophen **OR** acetaminophen **OR** acetaminophen    senna-docusate sodium **AND** polyethylene glycol **AND** bisacodyl **AND** bisacodyl    Calcium Replacement - Follow Nurse / BPA Driven Protocol    HYDROcodone-acetaminophen    HYDROmorphone    hydrOXYzine    ipratropium-albuterol    Magnesium Standard Dose Replacement - Follow Nurse / BPA Driven Protocol    melatonin    naloxone    ondansetron    Pharmacy to dose vancomycin    phenylephrine-mineral oil-petrolatum    Phosphorus Replacement - Follow Nurse / BPA Driven Protocol    Potassium Replacement - Follow Nurse / BPA Driven Protocol    prochlorperazine    simethicone    simethicone    sodium chloride    sodium chloride    Assessment & Plan   Assessment & Plan     Active Hospital Problems    Diagnosis  POA    **Acute gallstone pancreatitis [K85.10]  Yes    Elevated serum creatinine [R79.89]  Yes    Personal history of transient ischemic attack (TIA) [Z86.73]  Not Applicable    Adenocarcinoma of prostate [C61]  Yes    Gastroesophageal reflux disease [K21.9]  Yes    Hyperlipidemia [E78.5]  Yes    Tobacco abuse [Z72.0]  Yes      Resolved Hospital Problems   No resolved problems to display.         Brief Hospital Course to date:  Ta Madison is a 63 y.o. male with PMH of GERD, HLD, hx of TIA, advanced prostate cancer on Xtandi, occasional marijuna use, and chronic 1 PPD tobacco use who initially presented to [facility] 7/23/23 with complaints of severe epigastric abdominal pain with associated nausea and vomiting. CT A/P at OSH showed acute pancreatitis with 2 mm stone in the proximal duodenum. Transferred to [facility] for GI evaluation.      This patient's problems and plans were partially entered by my partner and updated as appropriate by me 07/30/23     Acute gallstone pancreatitis  Leukocytosis  Lactic acidosis  -CT A/P at OSH: acute interstitial pancreatitis, cholelithiasis with 2 mm stone in proximal duodenum (possibly a recently passed gallstone), transmural thickening of some jejunal loops in the left mid abdomen possibly reactive changes secondary to pancreatitis  -Lipase 1255  -Normal triglycerides   -MRCP showed acute pancreatitis no obstructing stones   -GI has signed off, recommended to continue Relistor while on high dose opiates   -General Surgery consulted, Dr. Tony recommends continued treatment for pancreatitis and F/U in 6 weeks for interval cholecystectomy  -Increased abd pain overnight on 7/28  -Repeat CT abd/pelvis revealed possible necrotizing pancreatitis. Gen Surgery Dr. Mijares reviewed images and felt findings were consistent with severe acute pancreatitis.   -Recommended to continue IV hydration, bowel regimen  -ID following, continue Meropenem for now   -WBC up to 20k today  -CT abd/pelvis ordered per surgery  -GI re-consulted for consideration of post-pancreatic feeding tube  -AM labs      SOA  -better, on RA  -IS/pulmonary toilet     Elevated Cr  -better/resolved after IVF      Prostate cancer  -Continue Xtandi oral chemotherapy (patient supplied)     HLD  -statin     GERD  -PPI     Hx of TIA  -resumed ASA since no further procedures planned   -Continue statin     Tobacco  abuse  -Counseled on cessation  -Nicotine patch     Expected Discharge Location and Transportation: home   Expected Discharge   Expected Discharge Date: 8/2/2023; Expected Discharge Time:       DVT prophylaxis:  Mechanical DVT prophylaxis orders are present.     AM-PAC 6 Clicks Score (PT): 24 (07/30/23 9373)    CODE STATUS:   Code Status and Medical Interventions:   Ordered at: 07/23/23 2229     Code Status (Patient has no pulse and is not breathing):    CPR (Attempt to Resuscitate)     Medical Interventions (Patient has pulse or is breathing):    Full Support     Release to patient:    Routine Release       Jagdeep Quintero, EUGENIA  07/30/23

## 2023-07-31 ENCOUNTER — ANESTHESIA (OUTPATIENT)
Dept: GASTROENTEROLOGY | Facility: HOSPITAL | Age: 63
DRG: 438 | End: 2023-07-31
Payer: COMMERCIAL

## 2023-07-31 ENCOUNTER — APPOINTMENT (OUTPATIENT)
Dept: GENERAL RADIOLOGY | Facility: HOSPITAL | Age: 63
DRG: 438 | End: 2023-07-31
Payer: COMMERCIAL

## 2023-07-31 LAB
ALBUMIN SERPL-MCNC: 2.5 G/DL (ref 3.5–5.2)
ALBUMIN/GLOB SERPL: 1 G/DL
ALP SERPL-CCNC: 107 U/L (ref 39–117)
ALT SERPL W P-5'-P-CCNC: 13 U/L (ref 1–41)
ANION GAP SERPL CALCULATED.3IONS-SCNC: 15 MMOL/L (ref 5–15)
AST SERPL-CCNC: 22 U/L (ref 1–40)
BASOPHILS # BLD AUTO: 0.06 10*3/MM3 (ref 0–0.2)
BASOPHILS NFR BLD AUTO: 0.2 % (ref 0–1.5)
BILIRUB SERPL-MCNC: 0.6 MG/DL (ref 0–1.2)
BUN SERPL-MCNC: 11 MG/DL (ref 8–23)
BUN/CREAT SERPL: 22.9 (ref 7–25)
CALCIUM SPEC-SCNC: 8.1 MG/DL (ref 8.6–10.5)
CHLORIDE SERPL-SCNC: 103 MMOL/L (ref 98–107)
CO2 SERPL-SCNC: 21 MMOL/L (ref 22–29)
CREAT SERPL-MCNC: 0.48 MG/DL (ref 0.76–1.27)
DEPRECATED RDW RBC AUTO: 50.2 FL (ref 37–54)
EGFRCR SERPLBLD CKD-EPI 2021: 116.8 ML/MIN/1.73
EOSINOPHIL # BLD AUTO: 0.09 10*3/MM3 (ref 0–0.4)
EOSINOPHIL NFR BLD AUTO: 0.4 % (ref 0.3–6.2)
ERYTHROCYTE [DISTWIDTH] IN BLOOD BY AUTOMATED COUNT: 17.6 % (ref 12.3–15.4)
GLOBULIN UR ELPH-MCNC: 2.5 GM/DL
GLUCOSE BLDC GLUCOMTR-MCNC: 128 MG/DL (ref 70–130)
GLUCOSE BLDC GLUCOMTR-MCNC: 74 MG/DL (ref 70–130)
GLUCOSE SERPL-MCNC: 62 MG/DL (ref 65–99)
HCT VFR BLD AUTO: 27 % (ref 37.5–51)
HGB BLD-MCNC: 8.6 G/DL (ref 13–17.7)
IMM GRANULOCYTES # BLD AUTO: 0.53 10*3/MM3 (ref 0–0.05)
IMM GRANULOCYTES NFR BLD AUTO: 2.1 % (ref 0–0.5)
LYMPHOCYTES # BLD AUTO: 1.25 10*3/MM3 (ref 0.7–3.1)
LYMPHOCYTES NFR BLD AUTO: 5 % (ref 19.6–45.3)
MAGNESIUM SERPL-MCNC: 2 MG/DL (ref 1.6–2.4)
MCH RBC QN AUTO: 24.9 PG (ref 26.6–33)
MCHC RBC AUTO-ENTMCNC: 31.9 G/DL (ref 31.5–35.7)
MCV RBC AUTO: 78.3 FL (ref 79–97)
MONOCYTES # BLD AUTO: 1.55 10*3/MM3 (ref 0.1–0.9)
MONOCYTES NFR BLD AUTO: 6.3 % (ref 5–12)
NEUTROPHILS NFR BLD AUTO: 21.31 10*3/MM3 (ref 1.7–7)
NEUTROPHILS NFR BLD AUTO: 86 % (ref 42.7–76)
NRBC BLD AUTO-RTO: 0 /100 WBC (ref 0–0.2)
PHOSPHATE SERPL-MCNC: 3 MG/DL (ref 2.5–4.5)
PLATELET # BLD AUTO: 544 10*3/MM3 (ref 140–450)
PMV BLD AUTO: 10.6 FL (ref 6–12)
POTASSIUM SERPL-SCNC: 4.2 MMOL/L (ref 3.5–5.2)
PROT SERPL-MCNC: 5 G/DL (ref 6–8.5)
RBC # BLD AUTO: 3.45 10*6/MM3 (ref 4.14–5.8)
SODIUM SERPL-SCNC: 139 MMOL/L (ref 136–145)
WBC NRBC COR # BLD: 24.79 10*3/MM3 (ref 3.4–10.8)

## 2023-07-31 PROCEDURE — 74018 RADEX ABDOMEN 1 VIEW: CPT

## 2023-07-31 PROCEDURE — 85025 COMPLETE CBC W/AUTO DIFF WBC: CPT | Performed by: NURSE PRACTITIONER

## 2023-07-31 PROCEDURE — 0DJ08ZZ INSPECTION OF UPPER INTESTINAL TRACT, VIA NATURAL OR ARTIFICIAL OPENING ENDOSCOPIC: ICD-10-PCS | Performed by: INTERNAL MEDICINE

## 2023-07-31 PROCEDURE — 80053 COMPREHEN METABOLIC PANEL: CPT | Performed by: HOSPITALIST

## 2023-07-31 PROCEDURE — 25010000002 PIPERACILLIN SOD-TAZOBACTAM PER 1 G: Performed by: INTERNAL MEDICINE

## 2023-07-31 PROCEDURE — 43241 EGD TUBE/CATH INSERTION: CPT | Performed by: INTERNAL MEDICINE

## 2023-07-31 PROCEDURE — 25010000002 PROPOFOL 10 MG/ML EMULSION: Performed by: NURSE ANESTHETIST, CERTIFIED REGISTERED

## 2023-07-31 PROCEDURE — 25010000002 HYDROMORPHONE 1 MG/ML SOLUTION: Performed by: INTERNAL MEDICINE

## 2023-07-31 PROCEDURE — 84100 ASSAY OF PHOSPHORUS: CPT

## 2023-07-31 PROCEDURE — 99232 SBSQ HOSP IP/OBS MODERATE 35: CPT | Performed by: HOSPITALIST

## 2023-07-31 PROCEDURE — 82948 REAGENT STRIP/BLOOD GLUCOSE: CPT

## 2023-07-31 PROCEDURE — 83735 ASSAY OF MAGNESIUM: CPT

## 2023-07-31 PROCEDURE — 25010000002 METOCLOPRAMIDE PER 10 MG: Performed by: SURGERY

## 2023-07-31 PROCEDURE — 25010000002 MEROPENEM PER 100 MG: Performed by: INTERNAL MEDICINE

## 2023-07-31 RX ORDER — LACTULOSE 10 G/15ML
300 SOLUTION ORAL ONCE
Status: DISCONTINUED | OUTPATIENT
Start: 2023-07-31 | End: 2023-08-11 | Stop reason: HOSPADM

## 2023-07-31 RX ORDER — OXYMETAZOLINE HYDROCHLORIDE 0.05 G/100ML
SPRAY NASAL AS NEEDED
Status: DISCONTINUED | OUTPATIENT
Start: 2023-07-31 | End: 2023-07-31 | Stop reason: HOSPADM

## 2023-07-31 RX ORDER — SODIUM CHLORIDE 0.9 % (FLUSH) 0.9 %
10 SYRINGE (ML) INJECTION AS NEEDED
Status: DISCONTINUED | OUTPATIENT
Start: 2023-07-31 | End: 2023-08-11 | Stop reason: HOSPADM

## 2023-07-31 RX ORDER — FAMOTIDINE 10 MG/ML
20 INJECTION, SOLUTION INTRAVENOUS ONCE
Status: DISCONTINUED | OUTPATIENT
Start: 2023-07-31 | End: 2023-08-11 | Stop reason: HOSPADM

## 2023-07-31 RX ORDER — HYDROXYZINE HYDROCHLORIDE 25 MG/1
50 TABLET, FILM COATED ORAL 3 TIMES DAILY PRN
Status: DISCONTINUED | OUTPATIENT
Start: 2023-07-31 | End: 2023-08-11 | Stop reason: HOSPADM

## 2023-07-31 RX ORDER — SODIUM CHLORIDE 0.9 % (FLUSH) 0.9 %
10 SYRINGE (ML) INJECTION EVERY 12 HOURS SCHEDULED
Status: DISCONTINUED | OUTPATIENT
Start: 2023-07-31 | End: 2023-08-11 | Stop reason: HOSPADM

## 2023-07-31 RX ORDER — HYDROCODONE BITARTRATE AND ACETAMINOPHEN 10; 325 MG/1; MG/1
1 TABLET ORAL EVERY 4 HOURS PRN
Status: DISCONTINUED | OUTPATIENT
Start: 2023-07-31 | End: 2023-08-02 | Stop reason: SDUPTHER

## 2023-07-31 RX ORDER — MIDAZOLAM HYDROCHLORIDE 1 MG/ML
1 INJECTION INTRAMUSCULAR; INTRAVENOUS
Status: DISCONTINUED | OUTPATIENT
Start: 2023-07-31 | End: 2023-08-11 | Stop reason: HOSPADM

## 2023-07-31 RX ORDER — BISACODYL 5 MG/1
5 TABLET, DELAYED RELEASE ORAL DAILY PRN
Status: DISCONTINUED | OUTPATIENT
Start: 2023-07-31 | End: 2023-08-11 | Stop reason: HOSPADM

## 2023-07-31 RX ORDER — SODIUM CHLORIDE, SODIUM LACTATE, POTASSIUM CHLORIDE, CALCIUM CHLORIDE 600; 310; 30; 20 MG/100ML; MG/100ML; MG/100ML; MG/100ML
9 INJECTION, SOLUTION INTRAVENOUS CONTINUOUS
Status: DISCONTINUED | OUTPATIENT
Start: 2023-07-31 | End: 2023-07-31

## 2023-07-31 RX ORDER — ONDANSETRON 2 MG/ML
4 INJECTION INTRAMUSCULAR; INTRAVENOUS ONCE AS NEEDED
Status: DISCONTINUED | OUTPATIENT
Start: 2023-07-31 | End: 2023-07-31 | Stop reason: HOSPADM

## 2023-07-31 RX ORDER — AMOXICILLIN 250 MG
2 CAPSULE ORAL 2 TIMES DAILY
Status: DISCONTINUED | OUTPATIENT
Start: 2023-07-31 | End: 2023-08-11 | Stop reason: HOSPADM

## 2023-07-31 RX ORDER — MONTELUKAST SODIUM 10 MG/1
10 TABLET ORAL NIGHTLY
Status: DISCONTINUED | OUTPATIENT
Start: 2023-07-31 | End: 2023-08-11 | Stop reason: HOSPADM

## 2023-07-31 RX ORDER — CHOLECALCIFEROL (VITAMIN D3) 125 MCG
5 CAPSULE ORAL NIGHTLY PRN
Status: DISCONTINUED | OUTPATIENT
Start: 2023-07-31 | End: 2023-08-11 | Stop reason: HOSPADM

## 2023-07-31 RX ORDER — PROPOFOL 10 MG/ML
VIAL (ML) INTRAVENOUS AS NEEDED
Status: DISCONTINUED | OUTPATIENT
Start: 2023-07-31 | End: 2023-07-31 | Stop reason: SURG

## 2023-07-31 RX ORDER — BISACODYL 10 MG
10 SUPPOSITORY, RECTAL RECTAL DAILY PRN
Status: DISCONTINUED | OUTPATIENT
Start: 2023-07-31 | End: 2023-08-11 | Stop reason: HOSPADM

## 2023-07-31 RX ORDER — SODIUM CHLORIDE 9 MG/ML
40 INJECTION, SOLUTION INTRAVENOUS AS NEEDED
Status: DISCONTINUED | OUTPATIENT
Start: 2023-07-31 | End: 2023-08-11 | Stop reason: HOSPADM

## 2023-07-31 RX ORDER — SIMETHICONE 80 MG
80 TABLET,CHEWABLE ORAL 4 TIMES DAILY PRN
Status: DISCONTINUED | OUTPATIENT
Start: 2023-07-31 | End: 2023-08-11 | Stop reason: HOSPADM

## 2023-07-31 RX ORDER — POLYETHYLENE GLYCOL 3350 17 G/17G
17 POWDER, FOR SOLUTION ORAL DAILY PRN
Status: DISCONTINUED | OUTPATIENT
Start: 2023-07-31 | End: 2023-08-11 | Stop reason: HOSPADM

## 2023-07-31 RX ORDER — AMINO ACIDS/PROTEIN HYDROLYS 11G-40/45
30 LIQUID IN PACKET (ML) ORAL 3 TIMES DAILY
Status: DISCONTINUED | OUTPATIENT
Start: 2023-07-31 | End: 2023-08-09

## 2023-07-31 RX ORDER — IPRATROPIUM BROMIDE AND ALBUTEROL SULFATE 2.5; .5 MG/3ML; MG/3ML
3 SOLUTION RESPIRATORY (INHALATION) ONCE AS NEEDED
Status: COMPLETED | OUTPATIENT
Start: 2023-07-31 | End: 2023-07-31

## 2023-07-31 RX ORDER — FAMOTIDINE 20 MG/1
20 TABLET, FILM COATED ORAL ONCE
Status: DISCONTINUED | OUTPATIENT
Start: 2023-07-31 | End: 2023-08-11 | Stop reason: HOSPADM

## 2023-07-31 RX ORDER — ATORVASTATIN CALCIUM 20 MG/1
20 TABLET, FILM COATED ORAL NIGHTLY
Status: DISCONTINUED | OUTPATIENT
Start: 2023-07-31 | End: 2023-08-11 | Stop reason: HOSPADM

## 2023-07-31 RX ORDER — DEXTROSE AND SODIUM CHLORIDE 5; .9 G/100ML; G/100ML
75 INJECTION, SOLUTION INTRAVENOUS CONTINUOUS
Status: DISCONTINUED | OUTPATIENT
Start: 2023-07-31 | End: 2023-08-02

## 2023-07-31 RX ORDER — LIDOCAINE HYDROCHLORIDE 10 MG/ML
INJECTION, SOLUTION EPIDURAL; INFILTRATION; INTRACAUDAL; PERINEURAL AS NEEDED
Status: DISCONTINUED | OUTPATIENT
Start: 2023-07-31 | End: 2023-07-31 | Stop reason: SURG

## 2023-07-31 RX ORDER — LIDOCAINE HYDROCHLORIDE 10 MG/ML
0.5 INJECTION, SOLUTION EPIDURAL; INFILTRATION; INTRACAUDAL; PERINEURAL ONCE AS NEEDED
Status: DISCONTINUED | OUTPATIENT
Start: 2023-07-31 | End: 2023-08-11 | Stop reason: HOSPADM

## 2023-07-31 RX ADMIN — PROPOFOL 50 MG: 10 INJECTION, EMULSION INTRAVENOUS at 09:44

## 2023-07-31 RX ADMIN — PIPERACILLIN SODIUM AND TAZOBACTAM SODIUM 3.38 G: 3; .375 INJECTION, SOLUTION INTRAVENOUS at 15:59

## 2023-07-31 RX ADMIN — HYDROMORPHONE HYDROCHLORIDE 1 MG: 1 INJECTION, SOLUTION INTRAMUSCULAR; INTRAVENOUS; SUBCUTANEOUS at 08:36

## 2023-07-31 RX ADMIN — METOCLOPRAMIDE 10 MG: 5 INJECTION, SOLUTION INTRAMUSCULAR; INTRAVENOUS at 14:50

## 2023-07-31 RX ADMIN — PANTOPRAZOLE SODIUM 40 MG: 40 TABLET, DELAYED RELEASE ORAL at 14:51

## 2023-07-31 RX ADMIN — PROPOFOL 50 MG: 10 INJECTION, EMULSION INTRAVENOUS at 09:51

## 2023-07-31 RX ADMIN — SENNOSIDES AND DOCUSATE SODIUM 2 TABLET: 50; 8.6 TABLET ORAL at 20:38

## 2023-07-31 RX ADMIN — HYDROMORPHONE HYDROCHLORIDE 1 MG: 1 INJECTION, SOLUTION INTRAMUSCULAR; INTRAVENOUS; SUBCUTANEOUS at 01:26

## 2023-07-31 RX ADMIN — HYDROCODONE BITARTRATE AND ACETAMINOPHEN 1 TABLET: 10; 325 TABLET ORAL at 14:55

## 2023-07-31 RX ADMIN — ATORVASTATIN CALCIUM 20 MG: 20 TABLET, FILM COATED ORAL at 20:38

## 2023-07-31 RX ADMIN — HYDROMORPHONE HYDROCHLORIDE 1 MG: 1 INJECTION, SOLUTION INTRAMUSCULAR; INTRAVENOUS; SUBCUTANEOUS at 05:38

## 2023-07-31 RX ADMIN — HYDROMORPHONE HYDROCHLORIDE 1 MG: 1 INJECTION, SOLUTION INTRAMUSCULAR; INTRAVENOUS; SUBCUTANEOUS at 16:05

## 2023-07-31 RX ADMIN — HYDROMORPHONE HYDROCHLORIDE 1 MG: 1 INJECTION, SOLUTION INTRAMUSCULAR; INTRAVENOUS; SUBCUTANEOUS at 22:58

## 2023-07-31 RX ADMIN — SODIUM CHLORIDE 100 ML/HR: 9 INJECTION, SOLUTION INTRAVENOUS at 01:30

## 2023-07-31 RX ADMIN — HYDROMORPHONE HYDROCHLORIDE 1 MG: 1 INJECTION, SOLUTION INTRAMUSCULAR; INTRAVENOUS; SUBCUTANEOUS at 11:25

## 2023-07-31 RX ADMIN — LIDOCAINE HYDROCHLORIDE 100 MG: 10 INJECTION, SOLUTION EPIDURAL; INFILTRATION; INTRACAUDAL; PERINEURAL at 09:44

## 2023-07-31 RX ADMIN — HYDROMORPHONE HYDROCHLORIDE 1 MG: 1 INJECTION, SOLUTION INTRAMUSCULAR; INTRAVENOUS; SUBCUTANEOUS at 03:34

## 2023-07-31 RX ADMIN — PROPOFOL 30 MG: 10 INJECTION, EMULSION INTRAVENOUS at 09:47

## 2023-07-31 RX ADMIN — Medication 10 ML: at 20:38

## 2023-07-31 RX ADMIN — ASPIRIN 81 MG: 81 TABLET, COATED ORAL at 15:55

## 2023-07-31 RX ADMIN — Medication 30 ML: at 18:07

## 2023-07-31 RX ADMIN — METOPROLOL TARTRATE 25 MG: 25 TABLET, FILM COATED ORAL at 15:55

## 2023-07-31 RX ADMIN — Medication 1 PATCH: at 15:55

## 2023-07-31 RX ADMIN — PROPOFOL 30 MG: 10 INJECTION, EMULSION INTRAVENOUS at 09:56

## 2023-07-31 RX ADMIN — IPRATROPIUM BROMIDE AND ALBUTEROL SULFATE 3 ML: .5; 2.5 SOLUTION RESPIRATORY (INHALATION) at 10:09

## 2023-07-31 RX ADMIN — HYDROMORPHONE HYDROCHLORIDE 1 MG: 1 INJECTION, SOLUTION INTRAMUSCULAR; INTRAVENOUS; SUBCUTANEOUS at 13:43

## 2023-07-31 RX ADMIN — METOCLOPRAMIDE 10 MG: 5 INJECTION, SOLUTION INTRAMUSCULAR; INTRAVENOUS at 03:34

## 2023-07-31 RX ADMIN — MEROPENEM 1000 MG: 1 INJECTION, POWDER, FOR SOLUTION INTRAVENOUS at 05:38

## 2023-07-31 RX ADMIN — METOCLOPRAMIDE 10 MG: 5 INJECTION, SOLUTION INTRAMUSCULAR; INTRAVENOUS at 20:43

## 2023-07-31 RX ADMIN — DEXTROSE AND SODIUM CHLORIDE 75 ML/HR: 5; 900 INJECTION, SOLUTION INTRAVENOUS at 11:28

## 2023-07-31 RX ADMIN — HYDROMORPHONE HYDROCHLORIDE 1 MG: 1 INJECTION, SOLUTION INTRAMUSCULAR; INTRAVENOUS; SUBCUTANEOUS at 20:39

## 2023-07-31 RX ADMIN — HYDROMORPHONE HYDROCHLORIDE 1 MG: 1 INJECTION, SOLUTION INTRAMUSCULAR; INTRAVENOUS; SUBCUTANEOUS at 18:07

## 2023-07-31 RX ADMIN — BISACODYL 10 MG: 5 TABLET, COATED ORAL at 15:55

## 2023-07-31 NOTE — POST-PROCEDURE NOTE
See provation    Nasojejunal tube placed  Bridle placed    Plan   Nutrition consult   Continue supportive care   Will sign off

## 2023-07-31 NOTE — PROGRESS NOTES
[facility] Medicine Services  PROGRESS NOTE    Patient Name: Ta Madison  : 1960  MRN: [id number]    Date of Admission: 2023  Primary Care Physician: Yolande Olvera APRN    Subjective   Subjective     CC:  Follow-up abdominal pain    HPI: Denies pain. Noted distention. Tired. Continues with BM. NO f/c/sweats. Denies dyspnea.     ROS:  Gen- No fevers, chills  CV- No chest pain, palpitations  Resp- No cough, dyspnea  GI- no abd pain, +abd distention     Objective   Objective     Vital Signs:   Temp:  [98.3 øF (36.8 øC)-99.7 øF (37.6 øC)] 98.3 øF (36.8 øC)  Heart Rate:  [] 113  Resp:  [18] 18  BP: (141-161)/(70-83) 144/83  Flow (L/min):  [2] 2     Physical Exam:  NAD, alert and oriented  OP clear, MMM  Neck supple  No LAD  TAchy  CTAB  +BS, soft, distended, TTP epigastrium  NO c/c  B LE edema  NO rashes  RENDON    Results Reviewed:  LAB RESULTS:      Lab 23  0307 23  0914 23  0401 23  0240 23  2203 23  0319 23  0720 23  1036 23  0324 23  0324 23  2019 23  1352 23  0725   WBC 24.79* 20.53* 9.34 4.25  --  6.66 13.33*  --    < > 15.29*  --   --   --    HEMOGLOBIN 8.6* 8.7* 9.4* 9.3*  --  9.7* 9.9*  --    < > 10.9*  --   --   --    HEMATOCRIT 27.0* 27.9* 29.0* 28.6*  --  30.4* 31.7*  --    < > 35.6*  --   --   --    PLATELETS 544* 488* 441 367  --  328 300  --    < > 358  --   --   --    NEUTROS ABS 21.31*  --   --   --   --  5.34 11.54*  --   --  13.62*  --   --   --    IMMATURE GRANS (ABS) 0.53*  --   --   --   --  0.02 0.08*  --   --  0.06*  --   --   --    LYMPHS ABS 1.25  --   --   --   --  0.55* 0.73  --   --  0.80  --   --   --    MONOS ABS 1.55*  --   --   --   --  0.64 0.92*  --   --  0.79  --   --   --    EOS ABS 0.09  --   --   --   --  0.09 0.03  --   --  0.00  --   --   --    MCV 78.3* 77.1* 76.9* 76.7*  --  77.6* 79.3  --    < > 80.9  --   --   --    PROCALCITONIN  --   --   --   --  0.43*   --   --   --   --   --   --   --   --    LACTATE  --   --   --   --  1.2  --   --  3.2*  --   --  2.7* 3.1* 3.5*    < > = values in this interval not displayed.         Lab 07/31/23 0307 07/30/23  1817 07/30/23  0915 07/30/23  0914 07/29/23  1205 07/29/23  0401 07/28/23  0240 07/27/23  0319   SODIUM 139  --  136  --   --  137 133* 138   POTASSIUM 4.2 3.9 3.4*  --   --  4.0 3.2* 4.1   CHLORIDE 103  --  102  --   --  103 98 101   CO2 21.0*  --  22.0  --   --  22.0 25.0 25.0   ANION GAP 15.0  --  12.0  --   --  12.0 10.0 12.0   BUN 11  --  11  --   --  17 23 27*   CREATININE 0.48*  --  0.46*  --   --  0.58* 0.71* 0.72*   EGFR 116.8  --  118.3  --   --  110.3 103.7 103.3   GLUCOSE 62*  --  113*  --   --  105* 106* 97   CALCIUM 8.1*  --  7.7*  --   --  7.8* 7.9* 8.2*   MAGNESIUM  --   --   --   --   --  2.1  --   --    PHOSPHORUS  --   --   --  2.3* 1.5* 1.5*  --   --          Lab 07/31/23  0307 07/30/23  0915 07/29/23  0401 07/28/23  0240 07/27/23  0319 07/25/23  0324 07/24/23  0924   TOTAL PROTEIN 5.0* 5.3* 5.1* 6.0 5.1*   < > 6.1   ALBUMIN 2.5* 2.3* 2.6* 2.5* 2.8*   < > 3.7   GLOBULIN 2.5 3.0 2.5 3.5 2.3   < > 2.4   ALT (SGPT) 13 12 16 15 19   < > 49*   AST (SGOT) 22 20 18 16 19   < > 50*   BILIRUBIN 0.6 0.5 0.6 0.7 1.2   < > 0.8   ALK PHOS 107 81 68 66 71   < > 75   LIPASE  --  15  --   --   --   --  1,255*    < > = values in this interval not displayed.         Lab 07/29/23  0401 07/25/23  1502   PROBNP 709.6 789.2         Lab 07/24/23  0924   CHOLESTEROL 134   LDL CHOL 72   HDL CHOL 39*   TRIGLYCERIDES 126               Brief Urine Lab Results  (Last result in the past 365 days)        Color   Clarity   Blood   Leuk Est   Nitrite   Protein   CREAT   Urine HCG        07/25/23 0636 Dark Yellow   Clear   Negative   Negative   Negative   30 mg/dL (1+)                   Microbiology Results Abnormal       Procedure Component Value - Date/Time    Blood Culture - Blood, Arm, Right [[id number]]  (Normal) Collected:  07/28/23 0017    Lab Status: Preliminary result Specimen: Blood from Arm, Right Updated: 07/31/23 0045     Blood Culture No growth at 3 days    Blood Culture - Blood, Arm, Right [[id number]]  (Normal) Collected: 07/28/23 0004    Lab Status: Preliminary result Specimen: Blood from Arm, Right Updated: 07/31/23 0045     Blood Culture No growth at 3 days    Blood Culture - Blood, Hand, Right [[id number]]  (Normal) Collected: 07/23/23 2230    Lab Status: Final result Specimen: Blood from Hand, Right Updated: 07/28/23 2300     Blood Culture No growth at 5 days    Narrative:      AEROBIC BOTTLE ONLY    Blood Culture - Blood, Hand, Left [[id number]]  (Normal) Collected: 07/23/23 2240    Lab Status: Final result Specimen: Blood from Hand, Left Updated: 07/28/23 2300     Blood Culture No growth at 5 days    Narrative:      AEROBIC BOTTLE ONLY    COVID PRE-OP / PRE-PROCEDURE SCREENING ORDER (NO ISOLATION) - Swab, Nasopharynx [[id number]]  (Normal) Collected: 07/24/23 0620    Lab Status: Final result Specimen: Swab from Nasopharynx Updated: 07/24/23 0729    Narrative:      The following orders were created for panel order COVID PRE-OP / PRE-PROCEDURE SCREENING ORDER (NO ISOLATION) - Swab, Nasopharynx.  Procedure                               Abnormality         Status                     ---------                               -----------         ------                     Respiratory Panel PCR w/...[[id number]]  Normal              Final result                 Please view results for these tests on the individual orders.    Respiratory Panel PCR w/COVID-19(SARS-CoV-2) BALTAZAR/ANDRES/ASTER/PAD/COR/MAD/VALENTINE In-House, NP Swab in UTM/VTM, 3-4 HR TAT - Swab, Nasopharynx [[id number]]  (Normal) Collected: 07/24/23 0620    Lab Status: Final result Specimen: Swab from Nasopharynx Updated: 07/24/23 0729     ADENOVIRUS, PCR Not Detected     Coronavirus 229E Not Detected     Coronavirus HKU1 Not Detected     Coronavirus NL63 Not Detected     Coronavirus  OC43 Not Detected     COVID19 Not Detected     Human Metapneumovirus Not Detected     Human Rhinovirus/Enterovirus Not Detected     Influenza A PCR Not Detected     Influenza B PCR Not Detected     Parainfluenza Virus 1 Not Detected     Parainfluenza Virus 2 Not Detected     Parainfluenza Virus 3 Not Detected     Parainfluenza Virus 4 Not Detected     RSV, PCR Not Detected     Bordetella pertussis pcr Not Detected     Bordetella parapertussis PCR Not Detected     Chlamydophila pneumoniae PCR Not Detected     Mycoplasma pneumo by PCR Not Detected    Narrative:      In the setting of a positive respiratory panel with a viral infection PLUS a negative procalcitonin without other underlying concern for bacterial infection, consider observing off antibiotics or discontinuation of antibiotics and continue supportive care. If the respiratory panel is positive for atypical bacterial infection (Bordetella pertussis, Chlamydophila pneumoniae, or Mycoplasma pneumoniae), consider antibiotic de-escalation to target atypical bacterial infection.            CT Abdomen Pelvis Without Contrast    Result Date: 7/30/2023  CT ABDOMEN PELVIS WO CONTRAST Date of Exam: 7/30/2023 11:07 AM EDT Indication: Pancreatitis, increasing WBC. Comparison: CT abdomen pelvis July 27, 2023 Technique: Axial CT images were obtained of the abdomen and pelvis without the administration of contrast. Reconstructed coronal and sagittal images were also obtained. Automated exposure control and iterative construction methods were used. Findings: There are bibasilar effusions and atelectatic changes in the lower lungs. There is ascites about the liver which has increased. There is stranding in the fat around the pancreas with swelling of the pancreas compatible with acute pancreatitis. There is hypodense fluid like attenuation involving the more proximal and mid body as well as portions of the head of the pancreas. This could relate to necrotizing pancreatitis .  There is suggested fluid tracking from the pancreas to the anterior aspect of the quadrate lobe of the liver. Ascites tracks into the pelvis. There are gallstones within the gallbladder. There is fluid about the spleen. Adrenal glands are grossly unremarkable. There is no acute renal abnormality. There is some thickening wall the bladder that may relate to under distention as opposed to a cystitis. There is some fluid within the lumen of the colon. There is some thickening of the wall of the splenic flexure that may reflect some reactive changes to the pancreatitis. There is edema in the subcutaneous soft tissues which could reflect anasarca. Atherosclerotic vascular calcification is noted. There are pars defects at L5 without significant anterolisthesis.     Impression: Impression: 1.Pancreatitis with what could reflect more of a necrotizing pancreatitis involving portions of the pancreas. There has been no improvement in the pancreatitis since prior study. 2.Fluid extending out from the pancreas to the anterior aspect of the quadrate lobe of the liver that may relate to developing pseudocyst 3.Increasing ascites within the abdomen and pelvis. 4.Cholelithiasis 5.There is some fluid within the colon that might be reflective of an ileus. There is some thickening of the wall of the splenic flexure that may be reflective of some inflammation secondary to the pancreatitis. 6.Bibasilar effusions and atelectasis 7.Edema in the subcutaneous soft tissues which could reflect anasarca. 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 9.Some suggested thickening of the wall the bladder that may relate to under distention. Electronically Signed: Hosea Damon  7/30/2023 11:26 AM EDT  Workstation ID: [id number]         Current medications:  Scheduled Meds:aspirin, 81 mg, Oral, Daily  atorvastatin, 20 mg, Oral, Nightly  bisacodyl, 10 mg, Oral, Daily  bisacodyl, 10 mg, Rectal, Q8H  docusate sodium, 100 mg, Oral,  BID  enzalutamide, 160 mg, Oral, Daily  hydrocortisone, 25 mg, Rectal, BID  meropenem, 1,000 mg, Intravenous, Q8H  methylnaltrexone, 12 mg, Subcutaneous, Every Other Day  metoclopramide, 10 mg, Intravenous, Q6H  metoprolol tartrate, 25 mg, Oral, Daily  montelukast, 10 mg, Oral, Nightly  nicotine, 1 patch, Transdermal, Q24H  pantoprazole, 40 mg, Oral, BID AC  senna-docusate sodium, 2 tablet, Oral, BID  sodium chloride, 10 mL, Intravenous, Q12H      Continuous Infusions:sodium chloride, 100 mL/hr, Last Rate: 100 mL/hr (07/31/23 0130)      PRN Meds:.  acetaminophen **OR** acetaminophen **OR** acetaminophen    senna-docusate sodium **AND** polyethylene glycol **AND** bisacodyl **AND** bisacodyl    Calcium Replacement - Follow Nurse / BPA Driven Protocol    HYDROcodone-acetaminophen    HYDROmorphone    hydrOXYzine    ipratropium-albuterol    Magnesium Standard Dose Replacement - Follow Nurse / BPA Driven Protocol    melatonin    naloxone    ondansetron    phenylephrine-mineral oil-petrolatum    Phosphorus Replacement - Follow Nurse / BPA Driven Protocol    Potassium Replacement - Follow Nurse / BPA Driven Protocol    prochlorperazine    simethicone    simethicone    sodium chloride    sodium chloride    Assessment & Plan   Assessment & Plan     Active Hospital Problems    Diagnosis  POA    **Acute gallstone pancreatitis [K85.10]  Yes    Elevated serum creatinine [R79.89]  Yes    Personal history of transient ischemic attack (TIA) [Z86.73]  Not Applicable    Adenocarcinoma of prostate [C61]  Yes    Gastroesophageal reflux disease [K21.9]  Yes    Hyperlipidemia [E78.5]  Yes    Tobacco abuse [Z72.0]  Yes      Resolved Hospital Problems   No resolved problems to display.        Brief Hospital Course to date:  Ta Madison is a 63 y.o. male with PMH of GERD, HLD, hx of TIA, advanced prostate cancer on Xtandi, occasional marijuna use, and chronic 1 PPD tobacco use who initially presented to [place] [facility] 7/23/23 with  complaints of severe epigastric abdominal pain with associated nausea and vomiting. CT A/P at OSH showed acute pancreatitis with 2 mm stone in the proximal duodenum. Transferred to [facility] for GI evaluation.      Acute gallstone pancreatitis  Leukocytosis  Lactic acidosis  -CT reviewed, possibly necrosis, possible forming pseudocyst  -marked leukocytosis  -remains on Merrem  -ID follows  -GI/surgery following, recommending NJ feeds, endoscopy planned today  -IVF  -AM labs      SOA  -better, on RA  -IS/pulmonary toilet     Elevated Creatinine  -better/resolved after IVF      Prostate cancer  -Continue Xtandi oral chemotherapy (patient supplied)     HLD  -statin     GERD  -PPI     Hx of TIA  -resumed ASA  -Continue statin     Tobacco abuse  -Counseled on cessation  -Nicotine patch     Expected Discharge Location and Transportation: home   Expected Discharge   Expected Discharge Date: 8/2/2023; Expected Discharge Time:       DVT prophylaxis:  Mechanical DVT prophylaxis orders are present.     AM-PAC 6 Clicks Score (PT): 23 (07/30/23 2009)    CODE STATUS:   Code Status and Medical Interventions:   Ordered at: 07/23/23 2229     Code Status (Patient has no pulse and is not breathing):    CPR (Attempt to Resuscitate)     Medical Interventions (Patient has pulse or is breathing):    Full Support     Release to patient:    Routine Release       Curt Barry MD  07/31/23

## 2023-07-31 NOTE — PLAN OF CARE
Problem: Adult Inpatient Plan of Care  Goal: Plan of Care Review  Outcome: Ongoing, Progressing  Flowsheets (Taken 7/31/2023 0444)  Progress: no change  Plan of Care Reviewed With:   patient   spouse  Outcome Evaluation: VSS throughout shift. Pt continues to c/o severe pain with frequently needing pain meds. No nausea/vomiting. NPO since midnight. No acute events.  Goal: Patient-Specific Goal (Individualized)  Outcome: Ongoing, Progressing  Goal: Absence of Hospital-Acquired Illness or Injury  Outcome: Ongoing, Progressing  Intervention: Identify and Manage Fall Risk  Recent Flowsheet Documentation  Taken 7/31/2023 0404 by Rina Dill RN  Safety Promotion/Fall Prevention:   assistive device/personal items within reach   clutter free environment maintained   nonskid shoes/slippers when out of bed   room organization consistent   safety round/check completed  Taken 7/31/2023 0000 by Rina Dill RN  Safety Promotion/Fall Prevention:   assistive device/personal items within reach   clutter free environment maintained   nonskid shoes/slippers when out of bed   safety round/check completed   room organization consistent  Taken 7/30/2023 2201 by Rina Dill RN  Safety Promotion/Fall Prevention:   assistive device/personal items within reach   clutter free environment maintained   nonskid shoes/slippers when out of bed   safety round/check completed   room organization consistent  Taken 7/30/2023 2009 by Rina Dill RN  Safety Promotion/Fall Prevention:   assistive device/personal items within reach   clutter free environment maintained   nonskid shoes/slippers when out of bed   safety round/check completed   room organization consistent  Intervention: Prevent Skin Injury  Recent Flowsheet Documentation  Taken 7/31/2023 0404 by Rina Dill RN  Body Position: position changed independently  Skin Protection:   adhesive use limited   transparent dressing maintained    tubing/devices free from skin contact  Taken 7/31/2023 0000 by Rina Dill RN  Body Position: position changed independently  Skin Protection:   adhesive use limited   transparent dressing maintained   tubing/devices free from skin contact  Taken 7/30/2023 2201 by Rina Dill RN  Body Position: position changed independently  Skin Protection:   adhesive use limited   transparent dressing maintained   tubing/devices free from skin contact  Taken 7/30/2023 2009 by Rina Dill RN  Body Position: position changed independently  Skin Protection:   adhesive use limited   transparent dressing maintained   tubing/devices free from skin contact  Intervention: Prevent and Manage VTE (Venous Thromboembolism) Risk  Recent Flowsheet Documentation  Taken 7/31/2023 0404 by Rina Dill RN  Activity Management: activity encouraged  Taken 7/31/2023 0000 by Rina Dill RN  Activity Management: activity encouraged  Taken 7/30/2023 2201 by Rina Dill RN  Activity Management: activity encouraged  Taken 7/30/2023 2009 by Rina Dill RN  Activity Management:   activity encouraged   up ad chris  VTE Prevention/Management:   bilateral   sequential compression devices off  Range of Motion: active ROM (range of motion) encouraged  Intervention: Prevent Infection  Recent Flowsheet Documentation  Taken 7/31/2023 0404 by Rina Dill RN  Infection Prevention:   environmental surveillance performed   hand hygiene promoted   rest/sleep promoted  Taken 7/31/2023 0000 by Rina Dill RN  Infection Prevention:   environmental surveillance performed   hand hygiene promoted   rest/sleep promoted  Taken 7/30/2023 2201 by Rina Dill RN  Infection Prevention:   environmental surveillance performed   hand hygiene promoted   rest/sleep promoted  Taken 7/30/2023 2009 by Rina Dill RN  Infection Prevention:   environmental surveillance performed   hand hygiene  promoted   rest/sleep promoted  Goal: Optimal Comfort and Wellbeing  Outcome: Ongoing, Progressing  Intervention: Monitor Pain and Promote Comfort  Recent Flowsheet Documentation  Taken 7/31/2023 0334 by Rina Dill RN  Pain Management Interventions: see MAR  Taken 7/31/2023 0126 by Rina Dill RN  Pain Management Interventions: see MAR  Taken 7/30/2023 2331 by Rina Dill RN  Pain Management Interventions: see MAR  Taken 7/30/2023 2131 by Rina Dill RN  Pain Management Interventions: see MAR  Taken 7/30/2023 1939 by Rina Dill RN  Pain Management Interventions: see MAR  Intervention: Provide Person-Centered Care  Recent Flowsheet Documentation  Taken 7/30/2023 2009 by Rina Dill RN  Trust Relationship/Rapport:   care explained   choices provided   thoughts/feelings acknowledged  Goal: Readiness for Transition of Care  Outcome: Ongoing, Progressing     Problem: Hypertension Comorbidity  Goal: Blood Pressure in Desired Range  Outcome: Ongoing, Progressing  Intervention: Maintain Blood Pressure Management  Recent Flowsheet Documentation  Taken 7/30/2023 2009 by Rina Dill RN  Medication Review/Management: medications reviewed     Problem: Skin Injury Risk Increased  Goal: Skin Health and Integrity  Outcome: Ongoing, Progressing  Intervention: Optimize Skin Protection  Recent Flowsheet Documentation  Taken 7/31/2023 0404 by Rina Dill RN  Pressure Reduction Techniques: frequent weight shift encouraged  Head of Bed (HOB) Positioning: HOB elevated  Pressure Reduction Devices:   positioning supports utilized   pressure-redistributing mattress utilized  Skin Protection:   adhesive use limited   transparent dressing maintained   tubing/devices free from skin contact  Taken 7/31/2023 0000 by Rina Dill RN  Pressure Reduction Techniques: frequent weight shift encouraged  Head of Bed (HOB) Positioning: HOB elevated  Pressure Reduction  Devices:   positioning supports utilized   pressure-redistributing mattress utilized  Skin Protection:   adhesive use limited   transparent dressing maintained   tubing/devices free from skin contact  Taken 7/30/2023 2201 by Rina Dill RN  Pressure Reduction Techniques: frequent weight shift encouraged  Head of Bed (HOB) Positioning: HOB elevated  Pressure Reduction Devices:   positioning supports utilized   pressure-redistributing mattress utilized  Skin Protection:   adhesive use limited   transparent dressing maintained   tubing/devices free from skin contact  Taken 7/30/2023 2009 by Rina Dill RN  Pressure Reduction Techniques: frequent weight shift encouraged  Head of Bed (HOB) Positioning: HOB elevated  Pressure Reduction Devices:   positioning supports utilized   pressure-redistributing mattress utilized  Skin Protection:   adhesive use limited   transparent dressing maintained   tubing/devices free from skin contact     Problem: Adjustment to Illness (Sepsis/Septic Shock)  Goal: Optimal Coping  Outcome: Ongoing, Progressing  Intervention: Optimize Psychosocial Adjustment to Illness  Recent Flowsheet Documentation  Taken 7/30/2023 2009 by Rina Dill RN  Supportive Measures: active listening utilized  Family/Support System Care: self-care encouraged     Problem: Bleeding (Sepsis/Septic Shock)  Goal: Absence of Bleeding  Outcome: Ongoing, Progressing     Problem: Glycemic Control Impaired (Sepsis/Septic Shock)  Goal: Blood Glucose Level Within Desired Range  Outcome: Ongoing, Progressing     Problem: Infection Progression (Sepsis/Septic Shock)  Goal: Absence of Infection Signs and Symptoms  Outcome: Ongoing, Progressing  Intervention: Initiate Sepsis Management  Recent Flowsheet Documentation  Taken 7/31/2023 0404 by Rina Dill RN  Infection Prevention:   environmental surveillance performed   hand hygiene promoted   rest/sleep promoted  Taken 7/31/2023 0000 by Fuentes  Rina BECERRA RN  Infection Prevention:   environmental surveillance performed   hand hygiene promoted   rest/sleep promoted  Taken 7/30/2023 2201 by Rina Dill RN  Infection Prevention:   environmental surveillance performed   hand hygiene promoted   rest/sleep promoted  Taken 7/30/2023 2009 by Rina Dill RN  Infection Prevention:   environmental surveillance performed   hand hygiene promoted   rest/sleep promoted  Intervention: Promote Recovery  Recent Flowsheet Documentation  Taken 7/31/2023 0404 by Rina Dill RN  Activity Management: activity encouraged  Taken 7/31/2023 0000 by Rina Dill RN  Activity Management: activity encouraged  Taken 7/30/2023 2201 by Rina Dill RN  Activity Management: activity encouraged  Taken 7/30/2023 2009 by Rina Dill RN  Activity Management:   activity encouraged   up ad chris  Sleep/Rest Enhancement: awakenings minimized  Intervention: Promote Stabilization  Recent Flowsheet Documentation  Taken 7/30/2023 2009 by Rina Dill RN  Fluid/Electrolyte Management: fluids provided     Problem: Nutrition Impaired (Sepsis/Septic Shock)  Goal: Optimal Nutrition Intake  Outcome: Ongoing, Progressing   Goal Outcome Evaluation:  Plan of Care Reviewed With: patient, spouse        Progress: no change  Outcome Evaluation: VSS throughout shift. Pt continues to c/o severe pain with frequently needing pain meds. No nausea/vomiting. NPO since midnight. No acute events.

## 2023-07-31 NOTE — CONSULTS
Clinical Nutrition   Nutrition Assessment  Reason for Visit: Physician consult, EN, NPO/Clear [email or web address]    Patient Name: Ta Madison  YOB: 1960  MRN: [id number]  Date of Encounter: 07/31/23 14:17 EDT  Admission date: 7/23/2023    RD recommends the following for Nutrition Support:  Initiate continuous EN regimen of Fibersource HN @ 20 ml/hr and advance by 10 ml/hr Q 6 hr as tolerated to goal rate of 70 ml/hr, FW @ 10 ml/hr (will adjust once IVF stopped). Provide Prosource TID.     At goal this regimen will provide: 1540 ml, 2028 kcal(101% est. needs), 128 g protein (99% est. needs), 62 g fat (12% of total kcal), and 1247 ml FW in formula +220 ml FW flushes = 1467 ml total FW    Plan for nutrition support while giving pancreatic rest.     Monitor/replace electrolytes per protocol, ordered for now and am - if phos low do not advance EN past 30 ml/hr until >2 mg/dL.    Recommend stopping or changing to IVF without D5 as EN advances to goal. Will adjust FW flushes as indicated following changes to fluids.     No measured weight documented since POA, ordered, will re-assess EN regimen as indicated.     Nutrition Assessment   Admission Diagnosis:  Pancreatitis [K85.90]  Acute gallstone pancreatitis [K85.10]    Problem List:    Acute gallstone pancreatitis    Hyperlipidemia    Tobacco abuse    Gastroesophageal reflux disease    Adenocarcinoma of prostate    Personal history of transient ischemic attack (TIA)    Elevated serum creatinine      PMH:   He  has a past medical history of Elevated cholesterol, GERD (gastroesophageal reflux disease), Increased heart rate, Mini stroke, Neck pain, Prostate cancer, Rash, and Tobacco abuse.    PSH:  He  has a past surgical history that includes Appendectomy (1971); Colonoscopy; and Colonoscopy (N/A, 1/25/2023).    Applicable Nutrition Concerns:   Skin:  Oral:  GI:    Applicable Interval History:   (7/24) MRI Abd. - Impression:  Acute pancreatitis  "without organized fluid collection.  Cholelithiasis without choledocholithiasis.    Reported/Observed/Food/Nutrition Related History:     7/31) Nutrition consult received for tube feeding assessment. RD previously assessed pt with no significant nutrition concerns as below. Unfortunately has continued with severe pancreatitis with little improvement t/o admit. Also now poor po intakes/poor tolerance. Surgeon recommending NJ placement for pancreatic healing. Endoscopically placed today and MD/surgeon/gastroenterology have discussed plan with pt.     7/27) Visited with pt and son at bedside. Pt reports no significant nutrition concerns PTA. Reports tolerating CLD, agreeable to Breeze while on liquids. Voices understanding education for pancreatitis/low fat diet. Denied further dietary needs/preferences, NKFA.     Anthropometrics     Height: Height: 172.7 cm (68\")  Last Filed Weight: Weight: 91.7 kg (202 lb 3.2 oz) (07/23/23 2245)  Method: Weight Method: Bed scale  BMI: BMI (Calculated): 30.8  BMI classification: Obese Class I: 30-34.9kg/m2  IBW:   150 lb    UBW:     Weight      Weight (kg) Weight (lbs) Weight Method   11/28/2022 86.818 kg  191 lb 6.4 oz     1/25/2023 90.719 kg  200 lb  Stated    2/28/2023 85.911 kg  189 lb 6.4 oz     4/12/2023 84.732 kg  186 lb 12.8 oz     4/19/2023 83.734 kg  184 lb 9.6 oz     6/7/2023 81.92 kg  180 lb 9.6 oz     6/13/2023 80.74 kg  178 lb     7/23/2023 91.717 kg  202 lb 3.2 oz  Bed scale      Weight change: unchanged    Nutrition Focused Physical Exam     Date:  7/27       Pt does not meet criteria for malnutrition diagnosis, at this time.      Needs Assessment   Date: 7/31    Height used:Height: 172.7 cm (68\")  Weights used: 91 kg ABW, 68 kg IBW    Estimated Calorie needs: ~2000 Kcal/day   Method:  Kcals/KG 25 JXF=9140 18-22 MXR=8446-2289  Method:  MSJ 1690X1.2=2028    Estimated Protein needs: ~130 g PRO/day   Method: 2 g/Kg IBW = 137, 1.2-1.5 ABW = 109-136    Estimated Fluid " needs: ~ ml/day   Per clinical status    Current Nutrition Prescription   PO: NPO Diet NPO Type: Strict NPO  Oral Nutrition Supplement:   Intake: Insufficient data    Nutrition Diagnosis   Date:  7/27            Updated:    Problem Altered GI function    Etiology Gallstone pancreatitis   Signs/Symptoms MRI, lipase>1000, abd. pain   Status: Ongoing little improvement    Date:   7/27    Updated:     Problem Inadequate oral intake   Etiology Per clinical status   Signs/Symptoms NPO/CLDx3d   Status: Ongoing had diet but continued with poor intakes, now NPO with plans for EN/pancreatic rest    Goal:   General: Nutrition support treatment, Nutrition to support treatment  PO: Advace diet as medically feasible/appropriate  EN/PN: N/A    Nutrition Intervention      Follow treatment progress, Care plan reviewed, Nutrition support order placed     Initiate EN    Monitoring/Evaluation:   Per protocol, I&O, PO intake, Supplement intake, Pertinent labs, Weight, GI status, Symptoms, POC/GOC      Minerva Carrera RD, CNSC  Time Spent: 30m

## 2023-07-31 NOTE — PROGRESS NOTES
INFECTIOUS DISEASE Progress Note    Ta Madison  1960  [id number]      Admission Date: 7/23/2023      Requesting Provider: Carroll Bobo DO  Evaluating Physician: Palomo Orellana MD    Reason for Consultation: Severe  pancreatitis    History of present illness:    7/28/23:Patient is a 63 y.o. male with h/o TIA, ongoing tobacco abuse, GERD, HLD, occasional marijuana use, and advanced prostate cancer/on Xtandi who we were asked to see for necrotizing pancreatitis.  The patient presented to [facility] ED on 7/23 with severe upper abdominal pain that radiated to his back about 3 hours PTA.  He had associated nausea and vomiting.  He has had postnasal drip with associated cough for last 3 to 4 weeks.  A CT scan at OSH showed acute gallbladder pancreatitis with a 2 mm stone in proximal duodenem.  He was given a dose of Invanz and transferred to [facility] on 7/23 for further medical management.  On arrival to [facility], the patient developed a Tmax of 100.3 and hypoxia requiring 2 L O2 NC.  He is now on room air.  Labs on arrival were lactic acid 3.6, PCT 0.85, creatinine 1.42, ALT 53, AST 66, bilirubin 0.4, lipase 1255, and WBC 15,000 with 91% neutrophils.  Blood cultures are negative to date.  Repeat blood cultures from 7/28 are pending.   A resp panel PCR was negative. His lactic acid and WBC were normal level on 7/27.  His PCT was 0.43.  A MRSA PCR on 7/28 was positive. His pain level on 7/28 was 7 out of 10. An MRCP on 7/24 showed acute pancreatitis with cholelithiasis without choledocholelithiasis.  It was felt that he may have passed the stone.  His abdomen became more distended and taut last night on 7/27.  A KUB noted possible small bowel ileus vs SBO.  A CT scan with contrast was done on 7/27 and showed extensive necrotizing appearing pancreatitis with fluid replacing nearly the entire pancreatic body with inflammatory changes tracking along entire length of pancreas, mild gallbladder wall thickening with  cholelithiasis (less likely acute cholecystitis), small bilateral pleural effusion, and mildly distended SB loops likely reactive and left likely obstruction.  He was on Zosyn, but was changed Merrem and Vancomycin on 7/28.  ID was asked to evaluate and manage his antibiotic therapy. He complains of persistent abdominal pain and abdominal distention.    7/29/23: He has remained afebrile. His creatinine is 0.58.  His white blood cell count is 9.3. Blood cultures from 7/28 are no growth so far.He has noticed some partial improvement in his abdominal pain and distention.  He denies vomiting.    7/30/2023: Maximum temperature over the last 24 hours is 100.1.  A follow-up CT scan today reveals persistent severe pancreatitis.  His white blood cell count today is 20.5.  His vancomycin random level was 7.6.    7/31/23: His maximum temperature over the last 24 hours is 99.7.White blood cell count today is 24.8. He continues to complain of abdominal distention and abdominal pain.  He denies nausea or vomiting.    Past Medical History:   Diagnosis Date    Elevated cholesterol     GERD (gastroesophageal reflux disease)     Increased heart rate     Mini stroke     Neck pain     Prostate cancer     Rash     Tobacco abuse        Past Surgical History:   Procedure Laterality Date    APPENDECTOMY  1971    [facility]     COLONOSCOPY      COLONOSCOPY N/A 1/25/2023    Procedure: COLONOSCOPY;  Surgeon: Mahnaz Caraballo MD;  Location: [facility];  Service: Gastroenterology;  Laterality: N/A;       Family History   Problem Relation Age of Onset    Nephrolithiasis Mother     Heart disease Father     Hypertension Father     Kidney disease Father        Social History     Socioeconomic History    Marital status:    Tobacco Use    Smoking status: Every Day     Packs/day: 2.00     Years: 45.00     Pack years: 90.00     Types: Cigarettes    Smokeless tobacco: Never   Vaping Use    Vaping Use: Never used   Substance and  Sexual Activity    Alcohol use: No    Drug use: Yes     Types: Marijuana     Comment: occ     Sexual activity: Defer       No Known Allergies      Medication:    Current Facility-Administered Medications:     acetaminophen (TYLENOL) tablet 650 mg, 650 mg, Oral, Q4H PRN **OR** acetaminophen (TYLENOL) 160 MG/5ML solution 650 mg, 650 mg, Oral, Q4H PRN **OR** acetaminophen (TYLENOL) suppository 650 mg, 650 mg, Rectal, Q4H PRN, Carroll Bobo, DO    aspirin EC tablet 81 mg, 81 mg, Oral, Daily, Jayla Lake, APRN, 81 mg at 07/30/23 0850    atorvastatin (LIPITOR) tablet 20 mg, 20 mg, Oral, Nightly, Carroll Bobo, DO, 20 mg at 07/29/23 2043    bisacodyl (DULCOLAX) EC tablet 10 mg, 10 mg, Oral, Daily, Christos Mijares MD, 10 mg at 07/30/23 0848    sennosides-docusate (PERICOLACE) 8.6-50 MG per tablet 2 tablet, 2 tablet, Oral, BID, 2 tablet at 07/30/23 0848 **AND** polyethylene glycol (MIRALAX) packet 17 g, 17 g, Oral, Daily PRN, 17 g at 07/30/23 0848 **AND** bisacodyl (DULCOLAX) EC tablet 5 mg, 5 mg, Oral, Daily PRN **AND** bisacodyl (DULCOLAX) suppository 10 mg, 10 mg, Rectal, Daily PRN, Carroll Bobo, DO    bisacodyl (DULCOLAX) suppository 10 mg, 10 mg, Rectal, Q8H, Christos Mijares MD, 10 mg at 07/30/23 0849    Calcium Replacement - Follow Nurse / BPA Driven Protocol, , Does not apply, PRN, Curt Barry MD    dextrose 5 % and sodium chloride 0.9 % infusion, 75 mL/hr, Intravenous, Continuous, Curt Barry MD    docusate sodium (COLACE) capsule 100 mg, 100 mg, Oral, BID, Christos Mijares MD, 100 mg at 07/30/23 0849    enzalutamide (XTANDI) chemo capsule 160 mg (patient supplied medication), 160 mg, Oral, Daily, Curt Barry MD, 160 mg at 07/30/23 1725    HYDROcodone-acetaminophen (NORCO)  MG per tablet 1 tablet, 1 tablet, Oral, Q4H PRN, Jayla Lake, APRN, 1 tablet at 07/30/23 1552    hydrocortisone (ANUSOL-HC) suppository 25 mg, 25 mg, Rectal, BID, Jagdeep Quintero, APRN, 25 mg at  07/30/23 0849    HYDROmorphone (DILAUDID) injection 1 mg, 1 mg, Intravenous, Q2H PRN, Maddison Yeung MD, 1 mg at 07/31/23 0538    hydrOXYzine (ATARAX) tablet 50 mg, 50 mg, Oral, TID PRN, Carroll Bobo, DO, 50 mg at 07/30/23 0849    ipratropium-albuterol (DUO-NEB) nebulizer solution 3 mL, 3 mL, Nebulization, Q4H PRN, Curt Barry MD, 3 mL at 07/30/23 1144    lactulose (CHRONULAC) solution for enema 300 mL, 300 mL, Rectal, Once, Christos Mijares MD    Magnesium Standard Dose Replacement - Follow Nurse / BPA Driven Protocol, , Does not apply, PRN, Curt Barry MD    melatonin tablet 5 mg, 5 mg, Oral, Nightly PRN, Carroll Bobo, DO, 5 mg at 07/29/23 2304    meropenem (MERREM) 1000 mg/100 mL 0.9% NS (mbp), 1,000 mg, Intravenous, Q8H, Carroll Bobo, DO, Last Rate: 0 mL/hr at 07/30/23 0002, 1,000 mg at 07/31/23 0538    methylnaltrexone (RELISTOR) injection 12 mg, 12 mg, Subcutaneous, Every Other Day, Christos Mijares MD, 12 mg at 07/30/23 0850    metoclopramide (REGLAN) injection 10 mg, 10 mg, Intravenous, Q6H, Christos Mijares MD, 10 mg at 07/31/23 0334    metoprolol tartrate (LOPRESSOR) tablet 25 mg, 25 mg, Oral, Daily, Carroll Bobo, DO, 25 mg at 07/30/23 0849    montelukast (SINGULAIR) tablet 10 mg, 10 mg, Oral, Nightly, Carroll Bobo, DO, 10 mg at 07/29/23 2042    naloxone (NARCAN) injection 0.4 mg, 0.4 mg, Intravenous, PRN, Carroll Bobo, DO    nicotine (NICODERM CQ) 21 MG/24HR patch 1 patch, 1 patch, Transdermal, Q24H, Carroll Bobo DO, 1 patch at 07/30/23 0847    ondansetron (ZOFRAN) injection 4 mg, 4 mg, Intravenous, Q6H PRN, Bharat Montana APRN, 4 mg at 07/24/23 1113    pantoprazole (PROTONIX) EC tablet 40 mg, 40 mg, Oral, BID AC, Carroll Bobo, , 40 mg at 07/30/23 1725    phenylephrine-mineral oil-petrolatum (PREPARATION H) 0.25-14-74.9 % hemorhoidal ointment, , Rectal, BID PRN, Jagdeep Quintero APRN    Phosphorus Replacement - Follow Nurse / BPA Driven Protocol, , Does not  apply, PRJhonny KENYON Marc P, MD    Potassium Replacement - Follow Nurse / BPA Driven Protocol, , Does not apply, Jhonny LINK Marc P, MD    prochlorperazine (COMPAZINE) injection 5 mg, 5 mg, Intravenous, Q3H PRN, Carroll Bobo DO, 5 mg at 23 1635    simethicone (MYLICON) chewable tablet 80 mg, 80 mg, Oral, 4x Daily PRN, Jayla Lake APRN, 80 mg at 23    simethicone (MYLICON) chewable tablet 80 mg, 80 mg, Oral, 4x Daily PRN, Christos Mijares MD, 80 mg at 23 2304    sodium chloride 0.9 % flush 10 mL, 10 mL, Intravenous, Q12H, Carroll Bobo DO, 10 mL at 23 2221    sodium chloride 0.9 % flush 10 mL, 10 mL, Intravenous, PRN, Carroll Bobo,     sodium chloride 0.9 % infusion 40 mL, 40 mL, Intravenous, PRN, Carroll Bobo,     sodium chloride 0.9 % infusion, 100 mL/hr, Intravenous, Continuous, Christos Mijares MD, Last Rate: 100 mL/hr at 23 0130, 100 mL/hr at 23 0130    Antibiotics:  Anti-Infectives (From admission, onward)      Ordered     Dose/Rate Route Frequency Start Stop    23  meropenem (MERREM) 1000 mg/100 mL 0.9% NS (mbp)        Ordering Provider: Carroll Bobo DO    1,000 mg  over 3 Hours Intravenous Every 8 Hours 23 0600 23 0559    23 232  meropenem (MERREM) 1000 mg/100 mL 0.9% NS (mbp)        Ordering Provider: Carroll Bobo DO    1,000 mg  over 30 Minutes Intravenous Once 23 0000 23 0112              Review of Systems:  The HPI      Physical Exam:   Vital Signs  Temp (24hrs), Av.9 øF (37.2 øC), Min:98.3 øF (36.8 øC), Max:99.7 øF (37.6 øC)    Temp  Min: 98.3 øF (36.8 øC)  Max: 99.7 øF (37.6 øC)  BP  Min: 141/70  Max: 161/80  Pulse  Min: 98  Max: 116  Resp  Min: 18  Max: 18  SpO2  Min: 93 %  Max: 95 %    GENERAL: Awake and alert, in no acute distress.   HEENT: Normocephalic, atraumatic.  PERRL. EOMI. No conjunctival injection. No icterus. Oropharynx clear without evidence of thrush or exudate.   NECK:  Supple   HEART: RRR; No murmur, rubs, gallops.   LUNGS: Clear to auscultation bilaterally without wheezing, rales, rhonchi. Normal respiratory effort. Nonlabored.   ABDOMEN: Persistent abdominal distention with mild diffuse tenderness.  EXT:  No cyanosis, clubbing or edema. No cord.  :  Without Jorgensen catheter.  MSK: No joint effusions or erythema  SKIN: Warm and dry without cutaneous eruptions on Inspection/palpation.    NEURO: Oriented to PPT.  Motor 5/5 strength  PSYCHIATRIC: Normal insight and judgment. Cooperative with PE    Laboratory Data    Results from last 7 days   Lab Units 07/31/23  0307 07/30/23  0914 07/29/23  0401   WBC 10*3/mm3 24.79* 20.53* 9.34   HEMOGLOBIN g/dL 8.6* 8.7* 9.4*   HEMATOCRIT % 27.0* 27.9* 29.0*   PLATELETS 10*3/mm3 544* 488* 441       Results from last 7 days   Lab Units 07/31/23  0307   SODIUM mmol/L 139   POTASSIUM mmol/L 4.2   CHLORIDE mmol/L 103   CO2 mmol/L 21.0*   BUN mg/dL 11   CREATININE mg/dL 0.48*   GLUCOSE mg/dL 62*   CALCIUM mg/dL 8.1*       Results from last 7 days   Lab Units 07/31/23  0307   ALK PHOS U/L 107   BILIRUBIN mg/dL 0.6   ALT (SGPT) U/L 13   AST (SGOT) U/L 22               Results from last 7 days   Lab Units 07/27/23  2203   LACTATE mmol/L 1.2           Results from last 7 days   Lab Units 07/30/23  0914   VANCOMYCIN RM mcg/mL 7.60       Estimated Creatinine Clearance: 175.4 mL/min (A) (by C-G formula based on SCr of 0.48 mg/dL (L)).      Microbiology:  Microbiology Results (last 10 days)       Procedure Component Value - Date/Time    MRSA Screen, PCR (Inpatient) - Swab, Nares [[id number]]  (Abnormal) Collected: 07/28/23 0049    Lab Status: Final result Specimen: Swab from Nares Updated: 07/28/23 0950     MRSA PCR Positive    Narrative:      The negative predictive value of this diagnostic test is high and should only be used to consider de-escalating anti-MRSA therapy. A positive result may indicate colonization with MRSA and must be correlated clinically.     Blood Culture - Blood, Arm, Right [[id number]]  (Normal) Collected: 07/28/23 0017    Lab Status: Preliminary result Specimen: Blood from Arm, Right Updated: 07/31/23 0045     Blood Culture No growth at 3 days    Blood Culture - Blood, Arm, Right [[id number]]  (Normal) Collected: 07/28/23 0004    Lab Status: Preliminary result Specimen: Blood from Arm, Right Updated: 07/31/23 0045     Blood Culture No growth at 3 days    COVID PRE-OP / PRE-PROCEDURE SCREENING ORDER (NO ISOLATION) - Swab, Nasopharynx [[id number]]  (Normal) Collected: 07/24/23 0620    Lab Status: Final result Specimen: Swab from Nasopharynx Updated: 07/24/23 0729    Narrative:      The following orders were created for panel order COVID PRE-OP / PRE-PROCEDURE SCREENING ORDER (NO ISOLATION) - Swab, Nasopharynx.  Procedure                               Abnormality         Status                     ---------                               -----------         ------                     Respiratory Panel PCR w/...[[id number]]  Normal              Final result                 Please view results for these tests on the individual orders.    Respiratory Panel PCR w/COVID-19(SARS-CoV-2) BALTAZAR/ANDRES/ASTER/PAD/COR/MAD/VALENTINE In-House, NP Swab in UTM/VTM, 3-4 HR TAT - Swab, Nasopharynx [[id number]]  (Normal) Collected: 07/24/23 0620    Lab Status: Final result Specimen: Swab from Nasopharynx Updated: 07/24/23 0729     ADENOVIRUS, PCR Not Detected     Coronavirus 229E Not Detected     Coronavirus HKU1 Not Detected     Coronavirus NL63 Not Detected     Coronavirus OC43 Not Detected     COVID19 Not Detected     Human Metapneumovirus Not Detected     Human Rhinovirus/Enterovirus Not Detected     Influenza A PCR Not Detected     Influenza B PCR Not Detected     Parainfluenza Virus 1 Not Detected     Parainfluenza Virus 2 Not Detected     Parainfluenza Virus 3 Not Detected     Parainfluenza Virus 4 Not Detected     RSV, PCR Not Detected     Bordetella pertussis pcr Not Detected      Bordetella parapertussis PCR Not Detected     Chlamydophila pneumoniae PCR Not Detected     Mycoplasma pneumo by PCR Not Detected    Narrative:      In the setting of a positive respiratory panel with a viral infection PLUS a negative procalcitonin without other underlying concern for bacterial infection, consider observing off antibiotics or discontinuation of antibiotics and continue supportive care. If the respiratory panel is positive for atypical bacterial infection (Bordetella pertussis, Chlamydophila pneumoniae, or Mycoplasma pneumoniae), consider antibiotic de-escalation to target atypical bacterial infection.    Blood Culture - Blood, Hand, Left [[id number]]  (Normal) Collected: 07/23/23 2240    Lab Status: Final result Specimen: Blood from Hand, Left Updated: 07/28/23 2300     Blood Culture No growth at 5 days    Narrative:      AEROBIC BOTTLE ONLY    Blood Culture - Blood, Hand, Right [[id number]]  (Normal) Collected: 07/23/23 2230    Lab Status: Final result Specimen: Blood from Hand, Right Updated: 07/28/23 2300     Blood Culture No growth at 5 days    Narrative:      AEROBIC BOTTLE ONLY                  Radiology:  Imaging Results (Last 72 Hours)       Procedure Component Value Units Date/Time    CT Abdomen Pelvis Without Contrast [[id number]] Collected: 07/30/23 1115     Updated: 07/30/23 1129    Narrative:      CT ABDOMEN PELVIS WO CONTRAST    Date of Exam: 7/30/2023 11:07 AM EDT    Indication: Pancreatitis, increasing WBC.    Comparison: CT abdomen pelvis July 27, 2023    Technique: Axial CT images were obtained of the abdomen and pelvis without the administration of contrast. Reconstructed coronal and sagittal images were also obtained. Automated exposure control and iterative construction methods were used.      Findings:  There are bibasilar effusions and atelectatic changes in the lower lungs. There is ascites about the liver which has increased. There is stranding in the fat around the pancreas  with swelling of the pancreas compatible with acute pancreatitis. There is   hypodense fluid like attenuation involving the more proximal and mid body as well as portions of the head of the pancreas. This could relate to necrotizing pancreatitis . There is suggested fluid tracking from the pancreas to the anterior aspect of the   quadrate lobe of the liver. Ascites tracks into the pelvis.    There are gallstones within the gallbladder. There is fluid about the spleen. Adrenal glands are grossly unremarkable. There is no acute renal abnormality.    There is some thickening wall the bladder that may relate to under distention as opposed to a cystitis.    There is some fluid within the lumen of the colon. There is some thickening of the wall of the splenic flexure that may reflect some reactive changes to the pancreatitis.    There is edema in the subcutaneous soft tissues which could reflect anasarca.    Atherosclerotic vascular calcification is noted.    There are pars defects at L5 without significant anterolisthesis.      Impression:      Impression:  1.Pancreatitis with what could reflect more of a necrotizing pancreatitis involving portions of the pancreas. There has been no improvement in the pancreatitis since prior study.  2.Fluid extending out from the pancreas to the anterior aspect of the quadrate lobe of the liver that may relate to developing pseudocyst  3.Increasing ascites within the abdomen and pelvis.  4.Cholelithiasis  5.There is some fluid within the colon that might be reflective of an ileus. There is some thickening of the wall of the splenic flexure that may be reflective of some inflammation secondary to the pancreatitis.  6.Bibasilar effusions and atelectasis  7.Edema in the subcutaneous soft tissues which could reflect anasarca.  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  9.Some suggested thickening of the wall the bladder that may relate to under  distention.            Electronically Signed: Hosea Damon    7/30/2023 11:26 AM EDT    Workstation ID: [id number]        I read his radiographic images reviewed the images with the patient and his family.      Impression:   Severe gallstone pancreatitis-Without overt evidence of severe pancreatic infection/pancreatic abscess. He appears to have passed the gallstone. He continues to have significant pancreatitis.  I will plan to switch his Merrem to intravenous Zosyn.  Leukocytosis/neutrophilia  MRSA nasal colonization  Advance prostate cancer/on Xtandi  Ongoing tobacco abuse  Occasional marijuana use    PLAN/RECOMMENDATIONS:   Discontinue Merrem  Intravenous Zosyn    I discussed his complex situation with ID clinical pharmacy today.    Palomo Orellana MD  7/31/2023  07:53 EDT

## 2023-07-31 NOTE — CASE MANAGEMENT/SOCIAL WORK
Continued Stay Note  [facility]     Patient Name: Ta Madison  MRN: [id number]  Today's Date: 7/31/2023    Admit Date: 7/23/2023    Plan: Home at DC   Discharge Plan       Row Name 07/31/23 1334       Plan    Plan Home at DC    Patient/Family in Agreement with Plan other (see comments)    Plan Comments NJ tube placed. CM will follow for any DC planning needs.    Final Discharge Disposition Code 01 - home or self-care                   Discharge Codes    No documentation.                 Expected Discharge Date and Time       Expected Discharge Date Expected Discharge Time    Aug 2, 2023               Lesvia Estrada RN

## 2023-07-31 NOTE — PROGRESS NOTES
"Patient Name:  Ta Madison  YOB: 1960  [id number]    Surgery Progress Note    Date of visit: 7/31/2023    Subjective   Subjective: Still with distention though several large BM's by report. Pain stable, though nursing reports that patient complains of more pain than her reports to doctors.         Objective     Objective:     /83 (BP Location: Left arm, Patient Position: Lying)   Pulse 113   Temp 98.3 øF (36.8 øC) (Oral)   Resp 18   Ht 172.7 cm (68\")   Wt 91.7 kg (202 lb 3.2 oz)   SpO2 95%   BMI 30.74 kg/mý     Intake/Output Summary (Last 24 hours) at 7/31/2023 0710  Last data filed at 7/31/2023 0538  Gross per 24 hour   Intake 5360 ml   Output --   Net 5360 ml       CV:  Rhythm  regular and rate regular, mildly tachy   L:  Clear  to auscultation bilaterally   Abd:  Bowel sounds hypoactive, distended, nontender  Ext:  No cyanosis, clubbing, edema    Recent labs that are back at this time have been reviewed. WBC 25K.            Assessment/ Plan:    Problem List Items Addressed This Visit          Gastrointestinal Abdominal     * (Principal) Acute gallstone pancreatitis - Primary- Continues with SAP. Plan for nasoenteral FT placement. Continue medical management. Will defer to ID re: broadening coverage give increased WBC. Patient needs to move more, ambulate.     Other Visit Diagnoses       Dysphagia, unspecified type        Relevant Orders    Case Request (Completed)             Active Hospital Problems    Diagnosis  POA    **Acute gallstone pancreatitis [K85.10]  Yes    Elevated serum creatinine [R79.89]  Yes    Personal history of transient ischemic attack (TIA) [Z86.73]  Not Applicable    Adenocarcinoma of prostate [C61]  Yes    Gastroesophageal reflux disease [K21.9]  Yes    Hyperlipidemia [E78.5]  Yes    Tobacco abuse [Z72.0]  Yes      Resolved Hospital Problems   No resolved problems to display.              Christos Mijares MD  7/31/2023  07:10 EDT      "

## 2023-08-01 LAB
ALBUMIN SERPL-MCNC: 2.4 G/DL (ref 3.5–5.2)
ALBUMIN/GLOB SERPL: 0.9 G/DL
ALP SERPL-CCNC: 117 U/L (ref 39–117)
ALT SERPL W P-5'-P-CCNC: 11 U/L (ref 1–41)
ANION GAP SERPL CALCULATED.3IONS-SCNC: 12 MMOL/L (ref 5–15)
AST SERPL-CCNC: 21 U/L (ref 1–40)
BILIRUB SERPL-MCNC: 0.5 MG/DL (ref 0–1.2)
BUN SERPL-MCNC: 13 MG/DL (ref 8–23)
BUN/CREAT SERPL: 27.1 (ref 7–25)
CALCIUM SPEC-SCNC: 8 MG/DL (ref 8.6–10.5)
CHLORIDE SERPL-SCNC: 101 MMOL/L (ref 98–107)
CO2 SERPL-SCNC: 24 MMOL/L (ref 22–29)
CREAT SERPL-MCNC: 0.48 MG/DL (ref 0.76–1.27)
DEPRECATED RDW RBC AUTO: 49.2 FL (ref 37–54)
EGFRCR SERPLBLD CKD-EPI 2021: 116.8 ML/MIN/1.73
ERYTHROCYTE [DISTWIDTH] IN BLOOD BY AUTOMATED COUNT: 17.3 % (ref 12.3–15.4)
GLOBULIN UR ELPH-MCNC: 2.6 GM/DL
GLUCOSE BLDC GLUCOMTR-MCNC: 142 MG/DL (ref 70–130)
GLUCOSE BLDC GLUCOMTR-MCNC: 143 MG/DL (ref 70–130)
GLUCOSE BLDC GLUCOMTR-MCNC: 149 MG/DL (ref 70–130)
GLUCOSE BLDC GLUCOMTR-MCNC: 153 MG/DL (ref 70–130)
GLUCOSE SERPL-MCNC: 123 MG/DL (ref 65–99)
HCT VFR BLD AUTO: 27.4 % (ref 37.5–51)
HGB BLD-MCNC: 8.7 G/DL (ref 13–17.7)
MAGNESIUM SERPL-MCNC: 1.9 MG/DL (ref 1.6–2.4)
MCH RBC QN AUTO: 24.6 PG (ref 26.6–33)
MCHC RBC AUTO-ENTMCNC: 31.8 G/DL (ref 31.5–35.7)
MCV RBC AUTO: 77.4 FL (ref 79–97)
PHOSPHATE SERPL-MCNC: 2.4 MG/DL (ref 2.5–4.5)
PLATELET # BLD AUTO: 641 10*3/MM3 (ref 140–450)
PMV BLD AUTO: 10.2 FL (ref 6–12)
POTASSIUM SERPL-SCNC: 3.8 MMOL/L (ref 3.5–5.2)
PROT SERPL-MCNC: 5 G/DL (ref 6–8.5)
RBC # BLD AUTO: 3.54 10*6/MM3 (ref 4.14–5.8)
SODIUM SERPL-SCNC: 137 MMOL/L (ref 136–145)
WBC NRBC COR # BLD: 21.89 10*3/MM3 (ref 3.4–10.8)

## 2023-08-01 PROCEDURE — 83735 ASSAY OF MAGNESIUM: CPT

## 2023-08-01 PROCEDURE — 99232 SBSQ HOSP IP/OBS MODERATE 35: CPT | Performed by: INTERNAL MEDICINE

## 2023-08-01 PROCEDURE — 85027 COMPLETE CBC AUTOMATED: CPT | Performed by: HOSPITALIST

## 2023-08-01 PROCEDURE — 82948 REAGENT STRIP/BLOOD GLUCOSE: CPT

## 2023-08-01 PROCEDURE — 25010000002 HYDROMORPHONE 1 MG/ML SOLUTION: Performed by: INTERNAL MEDICINE

## 2023-08-01 PROCEDURE — 84100 ASSAY OF PHOSPHORUS: CPT

## 2023-08-01 PROCEDURE — 25010000002 PIPERACILLIN SOD-TAZOBACTAM PER 1 G: Performed by: INTERNAL MEDICINE

## 2023-08-01 PROCEDURE — 97110 THERAPEUTIC EXERCISES: CPT

## 2023-08-01 PROCEDURE — 25010000002 METOCLOPRAMIDE PER 10 MG: Performed by: SURGERY

## 2023-08-01 PROCEDURE — 25010000002 METHYLNALTREXONE 12 MG/0.6ML SOLUTION: Performed by: SURGERY

## 2023-08-01 PROCEDURE — 80053 COMPREHEN METABOLIC PANEL: CPT | Performed by: HOSPITALIST

## 2023-08-01 RX ORDER — ACETAMINOPHEN 650 MG/1
650 SUPPOSITORY RECTAL EVERY 4 HOURS PRN
Status: DISCONTINUED | OUTPATIENT
Start: 2023-08-01 | End: 2023-08-11 | Stop reason: HOSPADM

## 2023-08-01 RX ORDER — ACETAMINOPHEN 160 MG/5ML
650 SOLUTION ORAL EVERY 4 HOURS PRN
Status: DISCONTINUED | OUTPATIENT
Start: 2023-08-01 | End: 2023-08-11 | Stop reason: HOSPADM

## 2023-08-01 RX ORDER — ASPIRIN 81 MG/1
81 TABLET, CHEWABLE ORAL DAILY
Status: DISCONTINUED | OUTPATIENT
Start: 2023-08-01 | End: 2023-08-11 | Stop reason: HOSPADM

## 2023-08-01 RX ORDER — DOCUSATE SODIUM 50 MG/5 ML
100 LIQUID (ML) ORAL 2 TIMES DAILY
Status: DISCONTINUED | OUTPATIENT
Start: 2023-08-01 | End: 2023-08-11 | Stop reason: HOSPADM

## 2023-08-01 RX ORDER — ASPIRIN 81 MG/1
81 TABLET, CHEWABLE ORAL DAILY
Status: DISCONTINUED | OUTPATIENT
Start: 2023-08-01 | End: 2023-08-01

## 2023-08-01 RX ORDER — ACETAMINOPHEN 325 MG/1
650 TABLET ORAL EVERY 4 HOURS PRN
Status: DISCONTINUED | OUTPATIENT
Start: 2023-08-01 | End: 2023-08-11 | Stop reason: HOSPADM

## 2023-08-01 RX ORDER — PANTOPRAZOLE SODIUM 40 MG/10ML
40 INJECTION, POWDER, LYOPHILIZED, FOR SOLUTION INTRAVENOUS
Status: DISCONTINUED | OUTPATIENT
Start: 2023-08-01 | End: 2023-08-11 | Stop reason: HOSPADM

## 2023-08-01 RX ADMIN — DOCUSATE SODIUM 100 MG: 50 LIQUID ORAL at 21:51

## 2023-08-01 RX ADMIN — HYDROMORPHONE HYDROCHLORIDE 1 MG: 1 INJECTION, SOLUTION INTRAMUSCULAR; INTRAVENOUS; SUBCUTANEOUS at 06:03

## 2023-08-01 RX ADMIN — METHYLNALTREXONE BROMIDE 12 MG: 12 INJECTION, SOLUTION SUBCUTANEOUS at 08:37

## 2023-08-01 RX ADMIN — HYDROMORPHONE HYDROCHLORIDE 1 MG: 1 INJECTION, SOLUTION INTRAMUSCULAR; INTRAVENOUS; SUBCUTANEOUS at 15:47

## 2023-08-01 RX ADMIN — HYDROMORPHONE HYDROCHLORIDE 1 MG: 1 INJECTION, SOLUTION INTRAMUSCULAR; INTRAVENOUS; SUBCUTANEOUS at 17:54

## 2023-08-01 RX ADMIN — METOCLOPRAMIDE 10 MG: 5 INJECTION, SOLUTION INTRAMUSCULAR; INTRAVENOUS at 15:48

## 2023-08-01 RX ADMIN — Medication 1 PATCH: at 08:37

## 2023-08-01 RX ADMIN — HYDROMORPHONE HYDROCHLORIDE 1 MG: 1 INJECTION, SOLUTION INTRAMUSCULAR; INTRAVENOUS; SUBCUTANEOUS at 08:32

## 2023-08-01 RX ADMIN — Medication 30 ML: at 15:48

## 2023-08-01 RX ADMIN — METOCLOPRAMIDE 10 MG: 5 INJECTION, SOLUTION INTRAMUSCULAR; INTRAVENOUS at 09:49

## 2023-08-01 RX ADMIN — HYDROMORPHONE HYDROCHLORIDE 1 MG: 1 INJECTION, SOLUTION INTRAMUSCULAR; INTRAVENOUS; SUBCUTANEOUS at 00:46

## 2023-08-01 RX ADMIN — ATORVASTATIN CALCIUM 20 MG: 20 TABLET, FILM COATED ORAL at 20:07

## 2023-08-01 RX ADMIN — PANTOPRAZOLE SODIUM 40 MG: 40 INJECTION, POWDER, LYOPHILIZED, FOR SOLUTION INTRAVENOUS at 10:48

## 2023-08-01 RX ADMIN — HYDROMORPHONE HYDROCHLORIDE 1 MG: 1 INJECTION, SOLUTION INTRAMUSCULAR; INTRAVENOUS; SUBCUTANEOUS at 22:09

## 2023-08-01 RX ADMIN — HYDROMORPHONE HYDROCHLORIDE 1 MG: 1 INJECTION, SOLUTION INTRAMUSCULAR; INTRAVENOUS; SUBCUTANEOUS at 02:52

## 2023-08-01 RX ADMIN — DEXTROSE AND SODIUM CHLORIDE 75 ML/HR: 5; 900 INJECTION, SOLUTION INTRAVENOUS at 06:03

## 2023-08-01 RX ADMIN — PIPERACILLIN SODIUM AND TAZOBACTAM SODIUM 3.38 G: 3; .375 INJECTION, SOLUTION INTRAVENOUS at 00:43

## 2023-08-01 RX ADMIN — HYDROMORPHONE HYDROCHLORIDE 1 MG: 1 INJECTION, SOLUTION INTRAMUSCULAR; INTRAVENOUS; SUBCUTANEOUS at 10:47

## 2023-08-01 RX ADMIN — METOCLOPRAMIDE 10 MG: 5 INJECTION, SOLUTION INTRAMUSCULAR; INTRAVENOUS at 02:59

## 2023-08-01 RX ADMIN — HYDROMORPHONE HYDROCHLORIDE 1 MG: 1 INJECTION, SOLUTION INTRAMUSCULAR; INTRAVENOUS; SUBCUTANEOUS at 20:07

## 2023-08-01 RX ADMIN — PIPERACILLIN SODIUM AND TAZOBACTAM SODIUM 3.38 G: 3; .375 INJECTION, SOLUTION INTRAVENOUS at 15:48

## 2023-08-01 RX ADMIN — Medication 30 ML: at 08:48

## 2023-08-01 RX ADMIN — MONTELUKAST 10 MG: 10 TABLET, FILM COATED ORAL at 20:07

## 2023-08-01 RX ADMIN — METOPROLOL TARTRATE 25 MG: 25 TABLET, FILM COATED ORAL at 08:36

## 2023-08-01 RX ADMIN — SENNOSIDES AND DOCUSATE SODIUM 2 TABLET: 50; 8.6 TABLET ORAL at 08:36

## 2023-08-01 RX ADMIN — SENNOSIDES AND DOCUSATE SODIUM 2 TABLET: 50; 8.6 TABLET ORAL at 20:07

## 2023-08-01 RX ADMIN — HYDROMORPHONE HYDROCHLORIDE 1 MG: 1 INJECTION, SOLUTION INTRAMUSCULAR; INTRAVENOUS; SUBCUTANEOUS at 13:39

## 2023-08-01 RX ADMIN — Medication 10 ML: at 08:33

## 2023-08-01 RX ADMIN — PIPERACILLIN SODIUM AND TAZOBACTAM SODIUM 3.38 G: 3; .375 INJECTION, SOLUTION INTRAVENOUS at 08:37

## 2023-08-01 RX ADMIN — Medication 10 ML: at 20:08

## 2023-08-01 RX ADMIN — ASPIRIN 81 MG: 81 TABLET, CHEWABLE ORAL at 10:48

## 2023-08-01 RX ADMIN — METOCLOPRAMIDE 10 MG: 5 INJECTION, SOLUTION INTRAMUSCULAR; INTRAVENOUS at 21:51

## 2023-08-01 NOTE — PROGRESS NOTES
"Patient Name:  Ta Madison  YOB: 1960  [id number]    Surgery Progress Note    Date of visit: 8/1/2023    Subjective   Subjective: Tolerating TF. Having BM's. Complains of scrotal swelling.         Objective     Objective:     /79 (BP Location: Left arm, Patient Position: Lying)   Pulse 114   Temp 98.9 øF (37.2 øC) (Oral)   Resp 16   Ht 172.7 cm (68\")   Wt 97.4 kg (214 lb 10.2 oz)   SpO2 91%   BMI 32.64 kg/mý     Intake/Output Summary (Last 24 hours) at 8/1/2023 0655  Last data filed at 8/1/2023 0603  Gross per 24 hour   Intake 1863.75 ml   Output --   Net 1863.75 ml       CV:  Rhythm  regular and rate regular   L:  Clear  to auscultation bilaterally   Abd:  Bowel sounds hypoactive, soft, distended, nontender  Ext:  No cyanosis, clubbing, edema    Recent labs that are back at this time have been reviewed. WBC dwn to 21K           Assessment/ Plan:    Problem List Items Addressed This Visit          Gastrointestinal Abdominal     * (Principal) Acute gallstone pancreatitis - Primary- Stable. Continue post-pancreatic feedings. Increase mobility. No plans for surgical intervention at this time.       Other Visit Diagnoses       Dysphagia, unspecified type        Relevant Orders    Case Request (Completed)             Active Hospital Problems    Diagnosis  POA    **Acute gallstone pancreatitis [K85.10]  Yes    Elevated serum creatinine [R79.89]  Yes    Personal history of transient ischemic attack (TIA) [Z86.73]  Not Applicable    Adenocarcinoma of prostate [C61]  Yes    Gastroesophageal reflux disease [K21.9]  Yes    Hyperlipidemia [E78.5]  Yes    Tobacco abuse [Z72.0]  Yes      Resolved Hospital Problems   No resolved problems to display.              Christos Mijares MD  8/1/2023  06:55 EDT      "

## 2023-08-01 NOTE — PROGRESS NOTES
INFECTIOUS DISEASE Progress Note    Ta Madison  1960  [id number]      Admission Date: 7/23/2023      Requesting Provider: Carroll Bobo DO  Evaluating Physician: Palomo Orellana MD    Reason for Consultation: Severe  pancreatitis    History of present illness:    7/28/23:Patient is a 63 y.o. male with h/o TIA, ongoing tobacco abuse, GERD, HLD, occasional marijuana use, and advanced prostate cancer/on Xtandi who we were asked to see for necrotizing pancreatitis.  The patient presented to [facility] ED on 7/23 with severe upper abdominal pain that radiated to his back about 3 hours PTA.  He had associated nausea and vomiting.  He has had postnasal drip with associated cough for last 3 to 4 weeks.  A CT scan at OSH showed acute gallbladder pancreatitis with a 2 mm stone in proximal duodenem.  He was given a dose of Invanz and transferred to [facility] on 7/23 for further medical management.  On arrival to [facility], the patient developed a Tmax of 100.3 and hypoxia requiring 2 L O2 NC.  He is now on room air.  Labs on arrival were lactic acid 3.6, PCT 0.85, creatinine 1.42, ALT 53, AST 66, bilirubin 0.4, lipase 1255, and WBC 15,000 with 91% neutrophils.  Blood cultures are negative to date.  Repeat blood cultures from 7/28 are pending.   A resp panel PCR was negative. His lactic acid and WBC were normal level on 7/27.  His PCT was 0.43.  A MRSA PCR on 7/28 was positive. His pain level on 7/28 was 7 out of 10. An MRCP on 7/24 showed acute pancreatitis with cholelithiasis without choledocholelithiasis.  It was felt that he may have passed the stone.  His abdomen became more distended and taut last night on 7/27.  A KUB noted possible small bowel ileus vs SBO.  A CT scan with contrast was done on 7/27 and showed extensive necrotizing appearing pancreatitis with fluid replacing nearly the entire pancreatic body with inflammatory changes tracking along entire length of pancreas, mild gallbladder wall thickening with  cholelithiasis (less likely acute cholecystitis), small bilateral pleural effusion, and mildly distended SB loops likely reactive and left likely obstruction.  He was on Zosyn, but was changed Merrem and Vancomycin on 7/28.  ID was asked to evaluate and manage his antibiotic therapy. He complains of persistent abdominal pain and abdominal distention.    7/29/23: He has remained afebrile. His creatinine is 0.58.  His white blood cell count is 9.3. Blood cultures from 7/28 are no growth so far.He has noticed some partial improvement in his abdominal pain and distention.  He denies vomiting.    7/30/2023: Maximum temperature over the last 24 hours is 100.1.  A follow-up CT scan today reveals persistent severe pancreatitis.  His white blood cell count today is 20.5.  His vancomycin random level was 7.6.    7/31/23: His maximum temperature over the last 24 hours is 99.7.White blood cell count today is 24.8. He continues to complain of abdominal distention and abdominal pain.  He denies nausea or vomiting.    8/1/23: Maximum temperature over the last 24 hours is 99.9.Creatinine is 0.48.  White blood cell count is 21.9.  Albumin is 2.4. He continues to require frequent narcotic therapy for abdominal pain.  He denies nausea and vomiting.  He is now on NJ feedings.    Past Medical History:   Diagnosis Date    Elevated cholesterol     GERD (gastroesophageal reflux disease)     Increased heart rate     Mini stroke     Neck pain     Prostate cancer     Rash     Tobacco abuse        Past Surgical History:   Procedure Laterality Date    APPENDECTOMY  1971    [facility]     COLONOSCOPY      COLONOSCOPY N/A 1/25/2023    Procedure: COLONOSCOPY;  Surgeon: Mahnaz Caraballo MD;  Location: [facility];  Service: Gastroenterology;  Laterality: N/A;       Family History   Problem Relation Age of Onset    Nephrolithiasis Mother     Heart disease Father     Hypertension Father     Kidney disease Father        Social History      Socioeconomic History    Marital status:    Tobacco Use    Smoking status: Every Day     Packs/day: 2.00     Years: 45.00     Pack years: 90.00     Types: Cigarettes    Smokeless tobacco: Never   Vaping Use    Vaping Use: Never used   Substance and Sexual Activity    Alcohol use: No    Drug use: Yes     Types: Marijuana     Comment: occ     Sexual activity: Defer       No Known Allergies      Medication:    Current Facility-Administered Medications:     acetaminophen (TYLENOL) tablet 650 mg, 650 mg, Oral, Q4H PRN **OR** acetaminophen (TYLENOL) 160 MG/5ML solution 650 mg, 650 mg, Oral, Q4H PRN **OR** acetaminophen (TYLENOL) suppository 650 mg, 650 mg, Rectal, Q4H PRN, Carroll Bobo DO    aspirin EC tablet 81 mg, 81 mg, Oral, Daily, Jayla Lake, APRN, 81 mg at 07/31/23 1555    atorvastatin (LIPITOR) tablet 20 mg, 20 mg, Nasogastric, Nightly, Curt Barry MD, 20 mg at 07/31/23 2038    sennosides-docusate (PERICOLACE) 8.6-50 MG per tablet 2 tablet, 2 tablet, Nasogastric, BID, 2 tablet at 07/31/23 2038 **AND** polyethylene glycol (MIRALAX) packet 17 g, 17 g, Oral, Daily PRN **AND** bisacodyl (DULCOLAX) EC tablet 5 mg, 5 mg, Oral, Daily PRN **AND** bisacodyl (DULCOLAX) suppository 10 mg, 10 mg, Rectal, Daily PRN, Curt Barry MD    bisacodyl (DULCOLAX) suppository 10 mg, 10 mg, Rectal, Q8H, Christos Mijares MD, 10 mg at 07/30/23 0849    Calcium Replacement - Follow Nurse / BPA Driven Protocol, , Does not apply, PRN, Curt Barry MD    dextrose 5 % and sodium chloride 0.9 % infusion, 75 mL/hr, Intravenous, Continuous, Curt Barry MD, Last Rate: 75 mL/hr at 08/01/23 0603, 75 mL/hr at 08/01/23 0603    docusate sodium (COLACE) capsule 100 mg, 100 mg, Oral, BID, Christos Mijares MD, 100 mg at 07/30/23 0849    enzalutamide (XTANDI) chemo capsule 160 mg (patient supplied medication), 160 mg, Oral, Daily, Curt Barry MD, 160 mg at 07/31/23 1804    famotidine (PEPCID) injection 20 mg,  20 mg, Intravenous, Once, Anurag Sloan Jr., MD    famotidine (PEPCID) tablet 20 mg, 20 mg, Oral, Once, Anurag Sloan Jr., MD    HYDROcodone-acetaminophen (NORCO)  MG per tablet 1 tablet, 1 tablet, Nasogastric, Q4H PRN, Curt Barry MD    hydrocortisone (ANUSOL-HC) suppository 25 mg, 25 mg, Rectal, BID, Jagdeep Quintero APRN, 25 mg at 07/30/23 0849    HYDROmorphone (DILAUDID) injection 1 mg, 1 mg, Intravenous, Q2H PRN, Maddison Yeung MD, 1 mg at 08/01/23 0603    hydrOXYzine (ATARAX) tablet 50 mg, 50 mg, Nasogastric, TID PRN, Curt Barry MD    ipratropium-albuterol (DUO-NEB) nebulizer solution 3 mL, 3 mL, Nebulization, Q4H PRN, Curt Barry MD, 3 mL at 07/30/23 1144    lactulose (CHRONULAC) solution for enema 300 mL, 300 mL, Rectal, Once, Christos Mijares MD    lidocaine PF 1% (XYLOCAINE) injection 0.5 mL, 0.5 mL, Injection, Once PRN, Anurag Sloan Jr., MD    Magnesium Standard Dose Replacement - Follow Nurse / BPA Driven Protocol, , Does not apply, PRN, Curt Barry MD    melatonin tablet 5 mg, 5 mg, Nasogastric, Nightly PRN, Curt Barry MD    methylnaltrexone (RELISTOR) injection 12 mg, 12 mg, Subcutaneous, Every Other Day, Christos Mijares MD, 12 mg at 07/30/23 0850    metoclopramide (REGLAN) injection 10 mg, 10 mg, Intravenous, Q6H, Christos Mijares MD, 10 mg at 08/01/23 0259    metoprolol tartrate (LOPRESSOR) tablet 25 mg, 25 mg, Nasogastric, Daily, Curt Barry MD    midazolam (VERSED) injection 1 mg, 1 mg, Intravenous, Q10 Min PRN, Anurag Sloan Jr., MD    montelukast (SINGULAIR) tablet 10 mg, 10 mg, Nasogastric, Nightly, Curt Barry MD    naloxone (NARCAN) injection 0.4 mg, 0.4 mg, Intravenous, PRN, Carroll Bobo DO    nicotine (NICODERM CQ) 21 MG/24HR patch 1 patch, 1 patch, Transdermal, Q24H, Carroll Bobo DO, 1 patch at 07/31/23 1555    ondansetron (ZOFRAN) injection 4 mg, 4 mg, Intravenous, Q6H PRN, Bharat Montana APRN, 4 mg at  07/24/23 1113    pantoprazole (PROTONIX) EC tablet 40 mg, 40 mg, Oral, BID AC, Carroll Bobo, DO, 40 mg at 07/31/23 1451    phenylephrine-mineral oil-petrolatum (PREPARATION H) 0.25-14-74.9 % hemorhoidal ointment, , Rectal, BID PRN, Jagdeep Quintero APRN    Phosphorus Replacement - Follow Nurse / BPA Driven Protocol, , Does not apply, Jhonny LINK Marc P, MD    piperacillin-tazobactam (ZOSYN) 3.375 g in iso-osmotic dextrose 50 ml (premix), 3.375 g, Intravenous, Q8H, Palomo Orellana MD, 3.375 g at 08/01/23 0043    Potassium Replacement - Follow Nurse / BPA Driven Protocol, , Does not apply, Jhonny LINK Marc P, MD    prochlorperazine (COMPAZINE) injection 5 mg, 5 mg, Intravenous, Q3H PRN, Carroll Bobo, , 5 mg at 07/24/23 1635    ProSource No Carb oral solution 30 mL, 30 mL, Per J Tube, TID, Minerva Carrera, RD, 30 mL at 07/31/23 1807    simethicone (MYLICON) chewable tablet 80 mg, 80 mg, Nasogastric, 4x Daily PRN, Curt Barry MD    sodium chloride 0.9 % flush 10 mL, 10 mL, Intravenous, Q12H, Carroll Bobo, , 10 mL at 07/31/23 2038    sodium chloride 0.9 % flush 10 mL, 10 mL, Intravenous, PRN, Carroll Bobo,     sodium chloride 0.9 % flush 10 mL, 10 mL, Intravenous, Q12H, Anurag Sloan Jr., MD    sodium chloride 0.9 % flush 10 mL, 10 mL, Intravenous, Luther LINK Charles G Jr., MD    sodium chloride 0.9 % infusion 40 mL, 40 mL, Intravenous, PRN, Carroll Bobo,     sodium chloride 0.9 % infusion 40 mL, 40 mL, Intravenous, PRNLuther Charles G Jr., MD    sodium chloride 0.9 % infusion, 100 mL/hr, Intravenous, Continuous, Christos Mijares MD, Stopped at 07/31/23 1131    Antibiotics:  Anti-Infectives (From admission, onward)      Ordered     Dose/Rate Route Frequency Start Stop    07/31/23 1437  piperacillin-tazobactam (ZOSYN) 3.375 g in iso-osmotic dextrose 50 ml (premix)        Ordering Provider: Palomo Orellana MD    3.375 g  over 4 Hours Intravenous Every 8 Hours 07/31/23 1600  23 1559    23 2329  meropenem (MERREM) 1000 mg/100 mL 0.9% NS (mbp)        Ordering Provider: Carroll Bobo DO    1,000 mg  over 30 Minutes Intravenous Once 23 0000 23 0112              Review of Systems:  [facility]      Physical Exam:   Vital Signs  Temp (24hrs), Av.8 øF (37.1 øC), Min:97.5 øF (36.4 øC), Max:99.9 øF (37.7 øC)    Temp  Min: 97.5 øF (36.4 øC)  Max: 99.9 øF (37.7 øC)  BP  Min: 123/76  Max: 154/74  Pulse  Min: 97  Max: 116  Resp  Min: 16  Max: 24  SpO2  Min: 91 %  Max: 100 %    GENERAL: Awake and alert, in no acute distress. Nasojejunal tube is in place.  HEENT: Normocephalic, atraumatic.  PERRL. EOMI. No conjunctival injection. No icterus. No labial ulcers  NECK: Supple   HEART: RRR; No murmur, rubs, gallops.   LUNGS: Clear to auscultation bilaterally without wheezing, rales, rhonchi. Normal respiratory effort. Nonlabored.   ABDOMEN: Persistent abdominal distention with mild diffuse tenderness.  EXT:  No cyanosis, clubbing or edema. No cord.  :  Without Jorgensen catheter.  MSK: No joint effusions or erythema  SKIN: Warm and dry without cutaneous eruptions on Inspection/palpation.    NEURO: Oriented to PPT.  Motor 5/5 strength  PSYCHIATRIC: Normal insight and judgment. Cooperative with PE    Laboratory Data    Results from last 7 days   Lab Units 23  0312 23  0307 23  0914   WBC 10*3/mm3 21.89* 24.79* 20.53*   HEMOGLOBIN g/dL 8.7* 8.6* 8.7*   HEMATOCRIT % 27.4* 27.0* 27.9*   PLATELETS 10*3/mm3 641* 544* 488*       Results from last 7 days   Lab Units 23  0312   SODIUM mmol/L 137   POTASSIUM mmol/L 3.8   CHLORIDE mmol/L 101   CO2 mmol/L 24.0   BUN mg/dL 13   CREATININE mg/dL 0.48*   GLUCOSE mg/dL 123*   CALCIUM mg/dL 8.0*       Results from last 7 days   Lab Units 23  0312   ALK PHOS U/L 117   BILIRUBIN mg/dL 0.5   ALT (SGPT) U/L 11   AST (SGOT) U/L 21               Results from last 7 days   Lab Units 23  2203   LACTATE mmol/L 1.2            Results from last 7 days   Lab Units 07/30/23  0914   VANCOMYCIN RM mcg/mL 7.60       Estimated Creatinine Clearance: 180.6 mL/min (A) (by C-G formula based on SCr of 0.48 mg/dL (L)).      Microbiology:  Microbiology Results (last 10 days)       Procedure Component Value - Date/Time    MRSA Screen, PCR (Inpatient) - Swab, Nares [[id number]]  (Abnormal) Collected: 07/28/23 0049    Lab Status: Final result Specimen: Swab from Nares Updated: 07/28/23 0950     MRSA PCR Positive    Narrative:      The negative predictive value of this diagnostic test is high and should only be used to consider de-escalating anti-MRSA therapy. A positive result may indicate colonization with MRSA and must be correlated clinically.    Blood Culture - Blood, Arm, Right [[id number]]  (Normal) Collected: 07/28/23 0017    Lab Status: Preliminary result Specimen: Blood from Arm, Right Updated: 08/01/23 0045     Blood Culture No growth at 4 days    Blood Culture - Blood, Arm, Right [[id number]]  (Normal) Collected: 07/28/23 0004    Lab Status: Preliminary result Specimen: Blood from Arm, Right Updated: 08/01/23 0045     Blood Culture No growth at 4 days    COVID PRE-OP / PRE-PROCEDURE SCREENING ORDER (NO ISOLATION) - Swab, Nasopharynx [[id number]]  (Normal) Collected: 07/24/23 0620    Lab Status: Final result Specimen: Swab from Nasopharynx Updated: 07/24/23 0729    Narrative:      The following orders were created for panel order COVID PRE-OP / PRE-PROCEDURE SCREENING ORDER (NO ISOLATION) - Swab, Nasopharynx.  Procedure                               Abnormality         Status                     ---------                               -----------         ------                     Respiratory Panel PCR w/...[[id number]]  Normal              Final result                 Please view results for these tests on the individual orders.    Respiratory Panel PCR w/COVID-19(SARS-CoV-2) BALTAZAR/ANDRES/ASTER/PAD/COR/MAD/VALENTINE In-House, NP Swab in UTM/VTM, 3-4 HR TAT -  Swab, Nasopharynx [[id number]]  (Normal) Collected: 07/24/23 0620    Lab Status: Final result Specimen: Swab from Nasopharynx Updated: 07/24/23 0729     ADENOVIRUS, PCR Not Detected     Coronavirus 229E Not Detected     Coronavirus HKU1 Not Detected     Coronavirus NL63 Not Detected     Coronavirus OC43 Not Detected     COVID19 Not Detected     Human Metapneumovirus Not Detected     Human Rhinovirus/Enterovirus Not Detected     Influenza A PCR Not Detected     Influenza B PCR Not Detected     Parainfluenza Virus 1 Not Detected     Parainfluenza Virus 2 Not Detected     Parainfluenza Virus 3 Not Detected     Parainfluenza Virus 4 Not Detected     RSV, PCR Not Detected     Bordetella pertussis pcr Not Detected     Bordetella parapertussis PCR Not Detected     Chlamydophila pneumoniae PCR Not Detected     Mycoplasma pneumo by PCR Not Detected    Narrative:      In the setting of a positive respiratory panel with a viral infection PLUS a negative procalcitonin without other underlying concern for bacterial infection, consider observing off antibiotics or discontinuation of antibiotics and continue supportive care. If the respiratory panel is positive for atypical bacterial infection (Bordetella pertussis, Chlamydophila pneumoniae, or Mycoplasma pneumoniae), consider antibiotic de-escalation to target atypical bacterial infection.    Blood Culture - Blood, Hand, Left [[id number]]  (Normal) Collected: 07/23/23 2240    Lab Status: Final result Specimen: Blood from Hand, Left Updated: 07/28/23 2300     Blood Culture No growth at 5 days    Narrative:      AEROBIC BOTTLE ONLY    Blood Culture - Blood, Hand, Right [[id number]]  (Normal) Collected: 07/23/23 2230    Lab Status: Final result Specimen: Blood from Hand, Right Updated: 07/28/23 2300     Blood Culture No growth at 5 days    Narrative:      AEROBIC BOTTLE ONLY                  Radiology:  Imaging Results (Last 72 Hours)       Procedure Component Value Units Date/Time     XR Abdomen KUB [[id number]] Collected: 07/31/23 1007     Updated: 07/31/23 1011    Narrative:      XR ABDOMEN KUB    Date of Exam: 7/31/2023 9:56 AM EDT    Indication: NJ tube placement    Comparison: None available.    Findings/    Impression:      Impression:  Feeding catheter projects over the proximal jejunum. Visualized bowel gas pattern is unremarkable. No acute osseous lesion is seen. Lung bases are unremarkable.    Electronically Signed: Amrit Roth    7/31/2023 10:08 AM EDT    Workstation ID: [id number]    CT Abdomen Pelvis Without Contrast [[id number]] Collected: 07/30/23 1115     Updated: 07/30/23 1129    Narrative:      CT ABDOMEN PELVIS WO CONTRAST    Date of Exam: 7/30/2023 11:07 AM EDT    Indication: Pancreatitis, increasing WBC.    Comparison: CT abdomen pelvis July 27, 2023    Technique: Axial CT images were obtained of the abdomen and pelvis without the administration of contrast. Reconstructed coronal and sagittal images were also obtained. Automated exposure control and iterative construction methods were used.      Findings:  There are bibasilar effusions and atelectatic changes in the lower lungs. There is ascites about the liver which has increased. There is stranding in the fat around the pancreas with swelling of the pancreas compatible with acute pancreatitis. There is   hypodense fluid like attenuation involving the more proximal and mid body as well as portions of the head of the pancreas. This could relate to necrotizing pancreatitis . There is suggested fluid tracking from the pancreas to the anterior aspect of the   quadrate lobe of the liver. Ascites tracks into the pelvis.    There are gallstones within the gallbladder. There is fluid about the spleen. Adrenal glands are grossly unremarkable. There is no acute renal abnormality.    There is some thickening wall the bladder that may relate to under distention as opposed to a cystitis.    There is some fluid within the lumen of the  colon. There is some thickening of the wall of the splenic flexure that may reflect some reactive changes to the pancreatitis.    There is edema in the subcutaneous soft tissues which could reflect anasarca.    Atherosclerotic vascular calcification is noted.    There are pars defects at L5 without significant anterolisthesis.      Impression:      Impression:  1.Pancreatitis with what could reflect more of a necrotizing pancreatitis involving portions of the pancreas. There has been no improvement in the pancreatitis since prior study.  2.Fluid extending out from the pancreas to the anterior aspect of the quadrate lobe of the liver that may relate to developing pseudocyst  3.Increasing ascites within the abdomen and pelvis.  4.Cholelithiasis  5.There is some fluid within the colon that might be reflective of an ileus. There is some thickening of the wall of the splenic flexure that may be reflective of some inflammation secondary to the pancreatitis.  6.Bibasilar effusions and atelectasis  7.Edema in the subcutaneous soft tissues which could reflect anasarca.  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  9.Some suggested thickening of the wall the bladder that may relate to under distention.            Electronically Signed: Hosea Damon    7/30/2023 11:26 AM EDT    Workstation ID: [id number]        I read his radiographic images reviewed the images with the patient and his family.      Impression:   Severe gallstone pancreatitis-Without overt evidence of severe pancreatic infection/pancreatic abscess. He appears to have passed the gallstone. He continues to have significant pancreatitis.  He is now on intravenous Zosyn  Leukocytosis/neutrophilia  MRSA nasal colonization  Advance prostate cancer/on Xtandi  Ongoing tobacco abuse  Occasional marijuana use    PLAN/RECOMMENDATIONS:   Nutritional support  Intravenous Zosyn      This is a difficult and complex clinical situation.  He will likely require a  prolonged hospitalization.  His severe pancreatitis may evolve into an infected pseudocyst.  If he develops worsening abdominal pain, fever, and persistent leukocytosis, we may need to drain a pseudocyst and sent for culture.  He will be at risk for invasive fungal infection.  I may need to start empiric antifungal therapy if he clinically worsens.  His complex medical problems required high complexity medical decision making.    Palomo Orellana MD  8/1/2023  07:40 EDT

## 2023-08-01 NOTE — PROGRESS NOTES
[facility] Medicine Services  PROGRESS NOTE    Patient Name: Ta Madison  : 1960  MRN: [id number]    Date of Admission: 2023  Primary Care Physician: Yolande Olvera APRN    Subjective   Subjective     CC:  Follow-up abdominal pain    HPI:   Doing ok. Reports passing gas and having BM. Tube stable. Wife reports patient getting very swollen and having a hard time ambulating due to this    ROS:  Gen- No fevers, chills  CV- No chest pain, palpitations  Resp- No cough, dyspnea  GI- as above  +swelling    Objective   Objective     Vital Signs:   Temp:  [98.3 øF (36.8 øC)-99.9 øF (37.7 øC)] 98.4 øF (36.9 øC)  Heart Rate:  [] 103  Resp:  [16-18] 18  BP: (134-154)/(67-79) 146/77     Physical Exam:  GEN: NAD, resting in bed, awake  HEENT: on room air, atraumatic,  NJ in place  NECK: supple, no masses  RESP: on room air, normal effort  CV: on tele, sinus rhythm  GI: +BS, slight distension noted   PSYCH: normal affect, appropriate  NEURO: awake, alert, no focal deficits noted  MSK: no edema noted  SKIN: no rashes noted       Results Reviewed:  LAB RESULTS:      Lab 23  0312 23  0307 23  0914 23  0401 23  0240 23  2203 23  0319 23  0720   WBC 21.89* 24.79* 20.53* 9.34 4.25  --  6.66 13.33*   HEMOGLOBIN 8.7* 8.6* 8.7* 9.4* 9.3*  --  9.7* 9.9*   HEMATOCRIT 27.4* 27.0* 27.9* 29.0* 28.6*  --  30.4* 31.7*   PLATELETS 641* 544* 488* 441 367  --  328 300   NEUTROS ABS  --  21.31*  --   --   --   --  5.34 11.54*   IMMATURE GRANS (ABS)  --  0.53*  --   --   --   --  0.02 0.08*   LYMPHS ABS  --  1.25  --   --   --   --  0.55* 0.73   MONOS ABS  --  1.55*  --   --   --   --  0.64 0.92*   EOS ABS  --  0.09  --   --   --   --  0.09 0.03   MCV 77.4* 78.3* 77.1* 76.9* 76.7*  --  77.6* 79.3   PROCALCITONIN  --   --   --   --   --  0.43*  --   --    LACTATE  --   --   --   --   --  1.2  --   --          Lab 23  0307 23  1810  07/30/23  0915 07/30/23  0914 07/29/23  1205 07/29/23  0401 07/28/23  0240   SODIUM 137 139  --  136  --   --  137 133*   POTASSIUM 3.8 4.2 3.9 3.4*  --   --  4.0 3.2*   CHLORIDE 101 103  --  102  --   --  103 98   CO2 24.0 21.0*  --  22.0  --   --  22.0 25.0   ANION GAP 12.0 15.0  --  12.0  --   --  12.0 10.0   BUN 13 11  --  11  --   --  17 23   CREATININE 0.48* 0.48*  --  0.46*  --   --  0.58* 0.71*   EGFR 116.8 116.8  --  118.3  --   --  110.3 103.7   GLUCOSE 123* 62*  --  113*  --   --  105* 106*   CALCIUM 8.0* 8.1*  --  7.7*  --   --  7.8* 7.9*   MAGNESIUM 1.9 2.0  --   --   --   --  2.1  --    PHOSPHORUS 2.4* 3.0  --   --  2.3* 1.5* 1.5*  --          Lab 08/01/23 0312 07/31/23  0307 07/30/23  0915 07/29/23  0401 07/28/23  0240   TOTAL PROTEIN 5.0* 5.0* 5.3* 5.1* 6.0   ALBUMIN 2.4* 2.5* 2.3* 2.6* 2.5*   GLOBULIN 2.6 2.5 3.0 2.5 3.5   ALT (SGPT) 11 13 12 16 15   AST (SGOT) 21 22 20 18 16   BILIRUBIN 0.5 0.6 0.5 0.6 0.7   ALK PHOS 117 107 81 68 66   LIPASE  --   --  15  --   --          Lab 07/29/23 0401 07/25/23  1502   PROBNP 709.6 789.2                     Brief Urine Lab Results  (Last result in the past 365 days)        Color   Clarity   Blood   Leuk Est   Nitrite   Protein   CREAT   Urine HCG        07/25/23 0636 Dark Yellow   Clear   Negative   Negative   Negative   30 mg/dL (1+)                   Microbiology Results Abnormal       Procedure Component Value - Date/Time    Blood Culture - Blood, Arm, Right [[id number]]  (Normal) Collected: 07/28/23 0017    Lab Status: Preliminary result Specimen: Blood from Arm, Right Updated: 08/01/23 0045     Blood Culture No growth at 4 days    Blood Culture - Blood, Arm, Right [[id number]]  (Normal) Collected: 07/28/23 0004    Lab Status: Preliminary result Specimen: Blood from Arm, Right Updated: 08/01/23 0045     Blood Culture No growth at 4 days    Blood Culture - Blood, Hand, Right [[id number]]  (Normal) Collected: 07/23/23 2230    Lab Status: Final result  Specimen: Blood from Hand, Right Updated: 07/28/23 2300     Blood Culture No growth at 5 days    Narrative:      AEROBIC BOTTLE ONLY    Blood Culture - Blood, Hand, Left [[id number]]  (Normal) Collected: 07/23/23 2240    Lab Status: Final result Specimen: Blood from Hand, Left Updated: 07/28/23 2300     Blood Culture No growth at 5 days    Narrative:      AEROBIC BOTTLE ONLY    COVID PRE-OP / PRE-PROCEDURE SCREENING ORDER (NO ISOLATION) - Swab, Nasopharynx [[id number]]  (Normal) Collected: 07/24/23 0620    Lab Status: Final result Specimen: Swab from Nasopharynx Updated: 07/24/23 0729    Narrative:      The following orders were created for panel order COVID PRE-OP / PRE-PROCEDURE SCREENING ORDER (NO ISOLATION) - Swab, Nasopharynx.  Procedure                               Abnormality         Status                     ---------                               -----------         ------                     Respiratory Panel PCR w/...[[id number]]  Normal              Final result                 Please view results for these tests on the individual orders.    Respiratory Panel PCR w/COVID-19(SARS-CoV-2) BALTAZAR/ANDRES/ASTER/PAD/COR/MAD/VALENTINE In-House, NP Swab in UTM/VTM, 3-4 HR TAT - Swab, Nasopharynx [[id number]]  (Normal) Collected: 07/24/23 0620    Lab Status: Final result Specimen: Swab from Nasopharynx Updated: 07/24/23 0729     ADENOVIRUS, PCR Not Detected     Coronavirus 229E Not Detected     Coronavirus HKU1 Not Detected     Coronavirus NL63 Not Detected     Coronavirus OC43 Not Detected     COVID19 Not Detected     Human Metapneumovirus Not Detected     Human Rhinovirus/Enterovirus Not Detected     Influenza A PCR Not Detected     Influenza B PCR Not Detected     Parainfluenza Virus 1 Not Detected     Parainfluenza Virus 2 Not Detected     Parainfluenza Virus 3 Not Detected     Parainfluenza Virus 4 Not Detected     RSV, PCR Not Detected     Bordetella pertussis pcr Not Detected     Bordetella parapertussis PCR Not Detected      Chlamydophila pneumoniae PCR Not Detected     Mycoplasma pneumo by PCR Not Detected    Narrative:      In the setting of a positive respiratory panel with a viral infection PLUS a negative procalcitonin without other underlying concern for bacterial infection, consider observing off antibiotics or discontinuation of antibiotics and continue supportive care. If the respiratory panel is positive for atypical bacterial infection (Bordetella pertussis, Chlamydophila pneumoniae, or Mycoplasma pneumoniae), consider antibiotic de-escalation to target atypical bacterial infection.            XR Abdomen KUB    Result Date: 7/31/2023  XR ABDOMEN KUB Date of Exam: 7/31/2023 9:56 AM EDT Indication: NJ tube placement Comparison: None available. Findings/    Impression: Impression: Feeding catheter projects over the proximal jejunum. Visualized bowel gas pattern is unremarkable. No acute osseous lesion is seen. Lung bases are unremarkable. Electronically Signed: Amrit Roth  7/31/2023 10:08 AM EDT  Workstation ID: [id number]         Current medications:  Scheduled Meds:aspirin, 81 mg, Nasogastric, Daily  atorvastatin, 20 mg, Nasogastric, Nightly  bisacodyl, 10 mg, Rectal, Q8H  docusate sodium, 100 mg, Oral, BID  enzalutamide, 160 mg, Oral, Daily  famotidine, 20 mg, Intravenous, Once  famotidine, 20 mg, Oral, Once  hydrocortisone, 25 mg, Rectal, BID  lactulose, 300 mL, Rectal, Once  methylnaltrexone, 12 mg, Subcutaneous, Every Other Day  metoclopramide, 10 mg, Intravenous, Q6H  metoprolol tartrate, 25 mg, Nasogastric, Daily  montelukast, 10 mg, Nasogastric, Nightly  nicotine, 1 patch, Transdermal, Q24H  pantoprazole, 40 mg, Intravenous, Q AM  piperacillin-tazobactam, 3.375 g, Intravenous, Q8H  ProSource No Carb, 30 mL, Per J Tube, TID  senna-docusate sodium, 2 tablet, Nasogastric, BID  sodium chloride, 10 mL, Intravenous, Q12H  sodium chloride, 10 mL, Intravenous, Q12H      Continuous Infusions:dextrose 5 % and sodium  chloride 0.9 %, 75 mL/hr, Last Rate: 75 mL/hr (08/01/23 0603)      PRN Meds:.  acetaminophen **OR** acetaminophen **OR** acetaminophen    senna-docusate sodium **AND** polyethylene glycol **AND** bisacodyl **AND** bisacodyl    Calcium Replacement - Follow Nurse / BPA Driven Protocol    HYDROcodone-acetaminophen    HYDROmorphone    hydrOXYzine    ipratropium-albuterol    lidocaine PF 1%    Magnesium Standard Dose Replacement - Follow Nurse / BPA Driven Protocol    melatonin    midazolam    naloxone    ondansetron    phenylephrine-mineral oil-petrolatum    Phosphorus Replacement - Follow Nurse / BPA Driven Protocol    Potassium Replacement - Follow Nurse / BPA Driven Protocol    prochlorperazine    simethicone    sodium chloride    sodium chloride    sodium chloride    sodium chloride    Assessment & Plan   Assessment & Plan     Active Hospital Problems    Diagnosis  POA    **Acute gallstone pancreatitis [K85.10]  Yes    Elevated serum creatinine [R79.89]  Yes    Personal history of transient ischemic attack (TIA) [Z86.73]  Not Applicable    Adenocarcinoma of prostate [C61]  Yes    Gastroesophageal reflux disease [K21.9]  Yes    Hyperlipidemia [E78.5]  Yes    Tobacco abuse [Z72.0]  Yes      Resolved Hospital Problems   No resolved problems to display.        Brief Hospital Course to date:  Ta Madison is a 63 y.o. male with PMH of GERD, HLD, hx of TIA, advanced prostate cancer on Xtandi, occasional marijuna use, and chronic 1 PPD tobacco use who initially presented to [facility] 7/23/23 with complaints of severe epigastric abdominal pain with associated nausea and vomiting. CT A/P at OSH showed acute pancreatitis with 2 mm stone in the proximal duodenum. Transferred to [facility] for GI evaluation.     This patient's problems and plans were partially entered by my partner and updated as appropriate by me 08/01/23.       Acute gallstone pancreatitis  Leukocytosis  Lactic acidosis  -CT reviewed, possibly necrosis,  possible forming pseudocyst  -marked leukocytosis  -transitioned from merrem-->zosyn  -ID follows  -GI/surgery following, recommending NJ feeds, placed 7/31  -IVF  -AM labs      SOA  -better, on RA  -IS/pulmonary toilet     Elevated Creatinine  -better/resolved after IVF, will drop to 75cc/hr given swelling       Prostate cancer  -Continue Xtandi oral chemotherapy (patient supplied)     HLD  -statin     GERD  -PPI     Hx of TIA  -resumed ASA  -Continue statin     Tobacco abuse  -Counseled on cessation  -Nicotine patch     Expected Discharge Location and Transportation: home   Expected Discharge   Expected Discharge Date: 8/4/2023; Expected Discharge Time:       DVT prophylaxis:  Mechanical DVT prophylaxis orders are present.     AM-PAC 6 Clicks Score (PT): 23 (07/31/23 2039)    CODE STATUS:   Code Status and Medical Interventions:   Ordered at: 07/23/23 2229     Code Status (Patient has no pulse and is not breathing):    CPR (Attempt to Resuscitate)     Medical Interventions (Patient has pulse or is breathing):    Full Support     Release to patient:    Routine Release       Marija Smallwood MD  08/01/23

## 2023-08-01 NOTE — PLAN OF CARE
Goal Outcome Evaluation:   VS stable. Sinus tach on tele. Pt c/o swollen scrotum. MD aware. Tube feeding increased to 60 ml/hr. Feeding bag changed at 1800. Dilaudid given Q2H throughout shift. Q6 finger sticks. Had 2 BM today.

## 2023-08-01 NOTE — PROGRESS NOTES
Brief EN Review Note    Patient Name: Ta Madison  Date of Encounter: 23 11:07 EDT  MRN: [id number]  Admission date: 2023    Reason for visit: EN review . RD to continue to follow per protocol.     EMR reviewed   Medication reviewed  Labs reviewed     Additional Information Obtained: EN initiated yesterday afternoon via endoscopically placed NJ. Plan for postpancreatic nutrition support for slow to improve pancreatitis. Pt currently tolerating at 40 ml/hr. Educated on s/s tolerance and encouraged to report any issues as EN advances to goal. Pt understands care plan, no questions/concerns at this time. Phos slightly low this am at 2.4, will continue to slowly advance.     Current diet: NPO Diet NPO Type: Strict NPO    EN: FiberSource HN  Goal Rate: 70 ml/hr Water Flushes: 10 ml/hr  Modular: Prosource-no carb 3/day  Route: NJ  Tube: Small bore    At goal over: 22Hrs/day    Rx will supply:   Goal Volume 1540 mL/day     Flush Volume 220 mL/day     Energy 2028 Kcal/day 101 % Est Need   Protein 128 g/day 99 % Est Need   Fat 62 g/day 12% Total kcal   Fiber 23 g/day     Water in  EN 1247 mL     Total Water 1467 mL     Meet DRI Yes        --------------------------------------------------------------------------  Product/Rate verified at bedside: Yes  Infusing Rate at time of visit: 40 ml/hr    Average Delivery from Chartin Day: 70 ml documented thus far while advancing  Volume  mL/day   % Goal Vol.   Flush Volume  mL/day     Energy  Kcal/day  % Est Need   Protein  g/day  % Est Need   Fiber  g/day     Water in  EN  mL     Total Water  mL     Meet DRI N/A            Intervention:  Follow treatment plan  Care plan reviewed    Continue advancing EN to goal as tolerated by 10 ml/hr Q 6 hr     Follow up:   Per protocol      Minerva Carrera RD, [facility]  11:07 EDT  Time: 25 min

## 2023-08-01 NOTE — THERAPY TREATMENT NOTE
Patient Name: Ta Madison  : 1960    MRN: [id number]                              Today's Date: 2023       Admit Date: 2023    Visit Dx:     ICD-10-CM ICD-9-CM   1. Acute gallstone pancreatitis  K85.10 577.0     574.20   2. Dysphagia, unspecified type  R13.10 787.20     Patient Active Problem List   Diagnosis    Tachycardia    Hyperlipidemia    Multiple allergies    Tobacco abuse    Gastroesophageal reflux disease    Chronic right shoulder pain    Ganglion cyst of dorsum of right wrist    Benign skin cyst    Numbness and tingling of both upper extremities    Right shoulder pain    Adenocarcinoma of prostate    Encounter for screening for malignant neoplasm of colon    Epigastric pain    Acute gallstone pancreatitis    Personal history of transient ischemic attack (TIA)    Elevated serum creatinine     Past Medical History:   Diagnosis Date    Elevated cholesterol     GERD (gastroesophageal reflux disease)     Increased heart rate     Mini stroke     Neck pain     Prostate cancer     Rash     Tobacco abuse      Past Surgical History:   Procedure Laterality Date    APPENDECTOMY      [facility]     COLONOSCOPY      COLONOSCOPY N/A 2023    Procedure: COLONOSCOPY;  Surgeon: Mahnaz Caraballo MD;  Location: [facility];  Service: Gastroenterology;  Laterality: N/A;      General Information       Row Name 23 1118          Physical Therapy Time and Intention    Document Type therapy note (daily note)  -LO     Mode of Treatment physical therapy;individual therapy  -       Row Name 23 1118          General Information    Patient Profile Reviewed yes  -LO     Existing Precautions/Restrictions other (see comments)  NG tube, abd incision, significant scrotal swelling  -LO       Row Name 23 1118          Cognition    Orientation Status (Cognition) oriented x 4  -LO       Row Name 23 1118          Safety Issues, Functional Mobility    Safety Issues Affecting  Function (Mobility) awareness of need for assistance  -LO     Impairments Affecting Function (Mobility) balance;endurance/activity tolerance;pain;shortness of breath;strength  -LO               User Key  (r) = Recorded By, (t) = Taken By, (c) = Cosigned By      Initials Name Provider Type    Saima Street PT Physical Therapist                   Mobility       Row Name 08/01/23 1119          Bed Mobility    Bed Mobility other (see comments)  unable to assess, due to pain levels pain agreeable to bed level TE only this date  -LO     Comment, (Bed Mobility) unable to assess, due to pain levels pain agreeable to bed level TE only this date  -LO       Row Name 08/01/23 1119          Transfers    Comment, (Transfers) unable to assess, due to pain levels pain agreeable to bed level TE only this date  -LO       Row Name 08/01/23 1119          Bed-Chair Transfer    Bed-Chair Humphreys (Transfers) unable to assess  -LO       Row Name 08/01/23 1119          Sit-Stand Transfer    Sit-Stand Humphreys (Transfers) unable to assess  -LO       Row Name 08/01/23 1119          Gait/Stairs (Locomotion)    Humphreys Level (Gait) unable to assess  -LO     Comment, (Gait/Stairs) unable to assess, due to pain levels pain agreeable to bed level TE only this date  -LO               User Key  (r) = Recorded By, (t) = Taken By, (c) = Cosigned By      Initials Name Provider Type    Saima Street PT Physical Therapist                   Obj/Interventions       Row Name 08/01/23 1120          Motor Skills    Therapeutic Exercise other (see comments)  supine ankle DF/PF, hip IR/ER, hip abd/add, heel slides, quad sets x 10 each with frequent rest breaks for fatigue  -LO       Row Name 08/01/23 1120          Balance    Static Sitting Balance unable to assess  -LO     Dynamic Sitting Balance unable to assess  -LO     Static Standing Balance unable to assess  -LO     Dynamic Standing Balance unable to assess  -LO     Comment, Balance  unable to assess, due to pain levels pain agreeable to bed level TE only this date  -               User Key  (r) = Recorded By, (t) = Taken By, (c) = Cosigned By      Initials Name Provider Type    Saima Street, PT Physical Therapist                   Goals/Plan    No documentation.                  Clinical Impression       Row Name 08/01/23 1122          Pain    Pretreatment Pain Rating 8/10  -LO     Posttreatment Pain Rating 8/10  -LO     Pain Location - abdomen;other (see comments)  scrotal region  -LO     Pre/Posttreatment Pain Comment pain limiting mobility this date, RN aware and managing pain  -LO       Row Name 08/01/23 1122          Plan of Care Review    Plan of Care Reviewed With patient;spouse  -LO     Progress declining  -LO     Outcome Evaluation Patient therapy session limited by severity of pain and swelling in abdominal and scrotal region as well as fatigue. Agreeable to bed level LE TE, requiring frequent rest breaks and HR elevations to 116 with minimal movement. Patient will continue to benefit from skilled IP PT services to address impairments in order to return to OF. Cont IP PT POC.  -       Row Name 08/01/23 1122          Therapy Assessment/Plan (PT)    Rehab Potential (PT) good, to achieve stated therapy goals  -     Criteria for Skilled Interventions Met (PT) yes;skilled treatment is necessary  -     Therapy Frequency (PT) daily  -LO       Row Name 08/01/23 1122          Vital Signs    Pre Systolic BP Rehab 154  -LO     Pre Treatment Diastolic BP 77  -LO     Post Systolic BP Rehab 153  -LO     Post Treatment Diastolic BP 77  -LO     O2 Delivery Pre Treatment room air  -LO     O2 Delivery Intra Treatment room air  -LO     O2 Delivery Post Treatment room air  -LO     Pre Patient Position Supine  -LO     Intra Patient Position Supine  -LO     Post Patient Position Supine  -LO     Rest Breaks  5  -LO       Row Name 08/01/23 1122          Positioning and Restraints     Pre-Treatment Position in bed  -LO     Post Treatment Position bed  -LO     In Bed notified nsg;supine;fowlers;call light within reach;encouraged to call for assist;with family/caregiver  -               User Key  (r) = Recorded By, (t) = Taken By, (c) = Cosigned By      Initials Name Provider Type    Saima Street PT Physical Therapist                   Outcome Measures    No documentation.                                Physical Therapy Education       Title: PT OT SLP Therapies (Done)       Topic: Physical Therapy (Done)       Point: Mobility training (Done)       Learning Progress Summary             Patient Acceptance, E,TB, VU by  at 7/29/2023 1145    Comment: continued benefit of skilled PT services                         Point: Home exercise program (Done)       Learning Progress Summary             Patient Acceptance, E,TB, VU by  at 7/29/2023 1145    Comment: continued benefit of skilled PT services                         Point: Body mechanics (Done)       Learning Progress Summary             Patient Acceptance, E,TB, VU by  at 7/29/2023 1145    Comment: continued benefit of skilled PT services                         Point: Precautions (Done)       Learning Progress Summary             Patient Acceptance, E,TB, VU by  at 7/29/2023 1145    Comment: continued benefit of skilled PT services                                         User Key       Initials Effective Dates Name Provider Type Discipline     01/27/22 -  Brittaney Avitia PT Physical Therapist PT                  PT Recommendation and Plan     Plan of Care Reviewed With: patient, spouse  Progress: declining  Outcome Evaluation: Patient therapy session limited by severity of pain and swelling in abdominal and scrotal region as well as fatigue. Agreeable to bed level LE TE, requiring frequent rest breaks and HR elevations to 116 with minimal movement. Patient will continue to benefit from skilled IP PT services to address  impairments in order to return to PLOF. Cont IP PT POC.     Time Calculation:         PT Charges       Row Name 08/01/23 0940             Time Calculation    Start Time 0940  -LO      PT Received On 08/01/23  -LO      PT Goal Re-Cert Due Date 08/08/23  -LO         Timed Charges    96655 - PT Therapeutic Exercise Minutes 24  -LO         Total Minutes    Timed Charges Total Minutes 24  -LO       Total Minutes 24  -LO                User Key  (r) = Recorded By, (t) = Taken By, (c) = Cosigned By      Initials Name Provider Type    Saima Street, PT Physical Therapist                  Therapy Charges for Today       Code Description Service Date Service Provider Modifiers Qty    30228697260 HC PT THER PROC EA 15 MIN 8/1/2023 Saima Carmona, PT GP 2            PT G-Codes  Outcome Measure Options: AM-PAC 6 Clicks Basic Mobility (PT)  AM-PAC 6 Clicks Score (PT): 23  PT Discharge Summary  Anticipated Discharge Disposition (PT): home with outpatient therapy services    Saima Carmona, LICHA  8/1/2023

## 2023-08-01 NOTE — PLAN OF CARE
Problem: Adult Inpatient Plan of Care  Goal: Plan of Care Review  Outcome: Ongoing, Progressing  Flowsheets (Taken 8/1/2023 0534)  Progress: improving  Plan of Care Reviewed With:   patient   spouse  Outcome Evaluation:   VSS throughout shift. Complaints of pain medicated per PRN MAR   not very effective per pt though pt did sleep some during shift. Tolerated advancement of TF during shift 10mL/Q6hr. Pt complaints of new scrotal swelling with no pain to scrotum. Documented in assessment. No acute events.  Goal: Patient-Specific Goal (Individualized)  Outcome: Ongoing, Progressing  Goal: Absence of Hospital-Acquired Illness or Injury  Outcome: Ongoing, Progressing  Intervention: Identify and Manage Fall Risk  Recent Flowsheet Documentation  Taken 8/1/2023 0252 by Rina Dill RN  Safety Promotion/Fall Prevention:   assistive device/personal items within reach   clutter free environment maintained   nonskid shoes/slippers when out of bed   room organization consistent   safety round/check completed  Taken 8/1/2023 0046 by Rina Dill RN  Safety Promotion/Fall Prevention:   assistive device/personal items within reach   clutter free environment maintained   nonskid shoes/slippers when out of bed   room organization consistent   safety round/check completed  Taken 7/31/2023 2258 by Rina Dill RN  Safety Promotion/Fall Prevention:   assistive device/personal items within reach   clutter free environment maintained   nonskid shoes/slippers when out of bed   room organization consistent   safety round/check completed  Taken 7/31/2023 2039 by Rina Dill RN  Safety Promotion/Fall Prevention:   activity supervised   assistive device/personal items within reach   clutter free environment maintained   nonskid shoes/slippers when out of bed   room organization consistent   safety round/check completed  Intervention: Prevent Skin Injury  Recent Flowsheet Documentation  Taken 8/1/2023 0252 by  Rina Dill RN  Body Position: position changed independently  Skin Protection:   adhesive use limited   transparent dressing maintained   tubing/devices free from skin contact  Taken 8/1/2023 0046 by Rina Dill RN  Body Position: position changed independently  Skin Protection:   adhesive use limited   transparent dressing maintained   tubing/devices free from skin contact  Taken 7/31/2023 2258 by Rina Dill RN  Body Position: position changed independently  Skin Protection:   adhesive use limited   transparent dressing maintained   tubing/devices free from skin contact  Taken 7/31/2023 2039 by Rina Dill RN  Body Position: position changed independently  Skin Protection:   adhesive use limited   transparent dressing maintained   tubing/devices free from skin contact  Intervention: Prevent and Manage VTE (Venous Thromboembolism) Risk  Recent Flowsheet Documentation  Taken 8/1/2023 0252 by Rina Dill RN  Activity Management:   up ad chris   activity encouraged  Taken 8/1/2023 0046 by Rina Dill RN  Activity Management:   up ad chris   activity encouraged  Taken 7/31/2023 2258 by Rina Dill RN  Activity Management:   up ad chris   activity encouraged  Taken 7/31/2023 2039 by Rina Dill RN  Activity Management:   up ad chris   activity encouraged  VTE Prevention/Management:   bilateral   sequential compression devices off  Range of Motion: active ROM (range of motion) encouraged  Intervention: Prevent Infection  Recent Flowsheet Documentation  Taken 8/1/2023 0252 by Rina Dill RN  Infection Prevention:   environmental surveillance performed   hand hygiene promoted   rest/sleep promoted  Taken 8/1/2023 0046 by Rina Dill RN  Infection Prevention:   environmental surveillance performed   hand hygiene promoted   rest/sleep promoted  Taken 7/31/2023 2258 by Rina Dill RN  Infection Prevention:   environmental surveillance  performed   hand hygiene promoted   rest/sleep promoted  Taken 7/31/2023 2039 by Rina Dill RN  Infection Prevention:   environmental surveillance performed   hand hygiene promoted   rest/sleep promoted  Goal: Optimal Comfort and Wellbeing  Outcome: Ongoing, Progressing  Intervention: Monitor Pain and Promote Comfort  Recent Flowsheet Documentation  Taken 8/1/2023 0252 by Rina Dill RN  Pain Management Interventions: see MAR  Taken 8/1/2023 0046 by Rina Dill RN  Pain Management Interventions: see MAR  Taken 7/31/2023 2258 by Rina Dill RN  Pain Management Interventions: see MAR  Taken 7/31/2023 2039 by Rina Dill RN  Pain Management Interventions: see MAR  Intervention: Provide Person-Centered Care  Recent Flowsheet Documentation  Taken 7/31/2023 2039 by Rina Dill RN  Trust Relationship/Rapport:   care explained   choices provided   thoughts/feelings acknowledged  Goal: Readiness for Transition of Care  Outcome: Ongoing, Progressing     Problem: Hypertension Comorbidity  Goal: Blood Pressure in Desired Range  Outcome: Ongoing, Progressing  Intervention: Maintain Blood Pressure Management  Recent Flowsheet Documentation  Taken 7/31/2023 2039 by Rina Dill RN  Medication Review/Management: medications reviewed     Problem: Skin Injury Risk Increased  Goal: Skin Health and Integrity  Outcome: Ongoing, Progressing  Intervention: Optimize Skin Protection  Recent Flowsheet Documentation  Taken 8/1/2023 0252 by Rina Dill RN  Pressure Reduction Techniques:   frequent weight shift encouraged   weight shift assistance provided  Head of Bed (HOB) Positioning: HOB at 30-45 degrees  Pressure Reduction Devices:   positioning supports utilized   pressure-redistributing mattress utilized  Skin Protection:   adhesive use limited   transparent dressing maintained   tubing/devices free from skin contact  Taken 8/1/2023 0046 by Rina Dill  RN  Pressure Reduction Techniques:   frequent weight shift encouraged   weight shift assistance provided  Head of Bed (HOB) Positioning: HOB at 30-45 degrees  Pressure Reduction Devices:   positioning supports utilized   pressure-redistributing mattress utilized  Skin Protection:   adhesive use limited   transparent dressing maintained   tubing/devices free from skin contact  Taken 7/31/2023 2258 by Rina Dill RN  Pressure Reduction Techniques:   frequent weight shift encouraged   weight shift assistance provided  Head of Bed (HOB) Positioning: HOB at 30-45 degrees  Pressure Reduction Devices:   positioning supports utilized   pressure-redistributing mattress utilized  Skin Protection:   adhesive use limited   transparent dressing maintained   tubing/devices free from skin contact  Taken 7/31/2023 2039 by Rina Dill RN  Pressure Reduction Techniques:   frequent weight shift encouraged   weight shift assistance provided  Head of Bed (HOB) Positioning: HOB at 30-45 degrees  Pressure Reduction Devices:   positioning supports utilized   pressure-redistributing mattress utilized  Skin Protection:   adhesive use limited   transparent dressing maintained   tubing/devices free from skin contact     Problem: Adjustment to Illness (Sepsis/Septic Shock)  Goal: Optimal Coping  Outcome: Ongoing, Progressing  Intervention: Optimize Psychosocial Adjustment to Illness  Recent Flowsheet Documentation  Taken 7/31/2023 2039 by Rina Dill RN  Supportive Measures: active listening utilized  Family/Support System Care: support provided     Problem: Bleeding (Sepsis/Septic Shock)  Goal: Absence of Bleeding  Outcome: Ongoing, Progressing     Problem: Glycemic Control Impaired (Sepsis/Septic Shock)  Goal: Blood Glucose Level Within Desired Range  Outcome: Ongoing, Progressing  Intervention: Optimize Glycemic Control  Recent Flowsheet Documentation  Taken 7/31/2023 2039 by Rina Dill RN  Glycemic  Management: blood glucose monitored     Problem: Infection Progression (Sepsis/Septic Shock)  Goal: Absence of Infection Signs and Symptoms  Outcome: Ongoing, Progressing  Intervention: Initiate Sepsis Management  Recent Flowsheet Documentation  Taken 8/1/2023 0252 by Rina Dill RN  Infection Prevention:   environmental surveillance performed   hand hygiene promoted   rest/sleep promoted  Taken 8/1/2023 0046 by Rina Dill RN  Infection Prevention:   environmental surveillance performed   hand hygiene promoted   rest/sleep promoted  Taken 7/31/2023 2258 by Rina Dill RN  Infection Prevention:   environmental surveillance performed   hand hygiene promoted   rest/sleep promoted  Taken 7/31/2023 2039 by Rina Dill RN  Infection Prevention:   environmental surveillance performed   hand hygiene promoted   rest/sleep promoted  Intervention: Promote Recovery  Recent Flowsheet Documentation  Taken 8/1/2023 0252 by Rina Dill RN  Activity Management:   up ad chris   activity encouraged  Taken 8/1/2023 0046 by Rina Dill RN  Activity Management:   up ad chris   activity encouraged  Taken 7/31/2023 2258 by Rina Dill RN  Activity Management:   up ad chris   activity encouraged  Taken 7/31/2023 2039 by Rina Dill RN  Activity Management:   up ad chris   activity encouraged  Sleep/Rest Enhancement: awakenings minimized  Intervention: Promote Stabilization  Recent Flowsheet Documentation  Taken 7/31/2023 2039 by Rina Dill RN  Fluid/Electrolyte Management: fluids provided     Problem: Nutrition Impaired (Sepsis/Septic Shock)  Goal: Optimal Nutrition Intake  Outcome: Ongoing, Progressing   Goal Outcome Evaluation:  Plan of Care Reviewed With: patient, spouse        Progress: improving  Outcome Evaluation: VSS throughout shift. Complaints of pain medicated per PRN MAR; not very effective per pt though pt did sleep some during shift. Tolerated  advancement of TF during shift 10mL/Q6hr. Pt complaints of new scrotal swelling with no pain to scrotum. Documented in assessment. No acute events.

## 2023-08-01 NOTE — PLAN OF CARE
Goal Outcome Evaluation:  Plan of Care Reviewed With: patient, spouse        Progress: declining  Outcome Evaluation: Patient therapy session limited by severity of pain and swelling in abdominal and scrotal region as well as fatigue. Agreeable to bed level LE TE, requiring frequent rest breaks and HR elevations to 116 with minimal movement. Patient will continue to benefit from skilled IP PT services to address impairments in order to return to PLOF. Cont IP PT POC.      Anticipated Discharge Disposition (PT): home with outpatient therapy services

## 2023-08-02 LAB
ALBUMIN SERPL-MCNC: 2.2 G/DL (ref 3.5–5.2)
ALBUMIN/GLOB SERPL: 0.8 G/DL
ALP SERPL-CCNC: 122 U/L (ref 39–117)
ALT SERPL W P-5'-P-CCNC: 9 U/L (ref 1–41)
ANION GAP SERPL CALCULATED.3IONS-SCNC: 10 MMOL/L (ref 5–15)
AST SERPL-CCNC: 17 U/L (ref 1–40)
BACTERIA SPEC AEROBE CULT: NORMAL
BACTERIA SPEC AEROBE CULT: NORMAL
BASOPHILS # BLD AUTO: 0.04 10*3/MM3 (ref 0–0.2)
BASOPHILS NFR BLD AUTO: 0.2 % (ref 0–1.5)
BILIRUB SERPL-MCNC: 0.3 MG/DL (ref 0–1.2)
BUN SERPL-MCNC: 15 MG/DL (ref 8–23)
BUN/CREAT SERPL: 31.9 (ref 7–25)
CALCIUM SPEC-SCNC: 7.7 MG/DL (ref 8.6–10.5)
CHLORIDE SERPL-SCNC: 102 MMOL/L (ref 98–107)
CO2 SERPL-SCNC: 26 MMOL/L (ref 22–29)
CREAT SERPL-MCNC: 0.47 MG/DL (ref 0.76–1.27)
DEPRECATED RDW RBC AUTO: 48.9 FL (ref 37–54)
EGFRCR SERPLBLD CKD-EPI 2021: 117.5 ML/MIN/1.73
EOSINOPHIL # BLD AUTO: 0.1 10*3/MM3 (ref 0–0.4)
EOSINOPHIL NFR BLD AUTO: 0.6 % (ref 0.3–6.2)
ERYTHROCYTE [DISTWIDTH] IN BLOOD BY AUTOMATED COUNT: 17.3 % (ref 12.3–15.4)
GLOBULIN UR ELPH-MCNC: 2.6 GM/DL
GLUCOSE BLDC GLUCOMTR-MCNC: 138 MG/DL (ref 70–130)
GLUCOSE BLDC GLUCOMTR-MCNC: 143 MG/DL (ref 70–130)
GLUCOSE BLDC GLUCOMTR-MCNC: 147 MG/DL (ref 70–130)
GLUCOSE BLDC GLUCOMTR-MCNC: 150 MG/DL (ref 70–130)
GLUCOSE BLDC GLUCOMTR-MCNC: 159 MG/DL (ref 70–130)
GLUCOSE SERPL-MCNC: 152 MG/DL (ref 65–99)
HCT VFR BLD AUTO: 25.8 % (ref 37.5–51)
HGB BLD-MCNC: 8.2 G/DL (ref 13–17.7)
IMM GRANULOCYTES # BLD AUTO: 0.27 10*3/MM3 (ref 0–0.05)
IMM GRANULOCYTES NFR BLD AUTO: 1.6 % (ref 0–0.5)
LYMPHOCYTES # BLD AUTO: 1.16 10*3/MM3 (ref 0.7–3.1)
LYMPHOCYTES NFR BLD AUTO: 6.9 % (ref 19.6–45.3)
MCH RBC QN AUTO: 24.6 PG (ref 26.6–33)
MCHC RBC AUTO-ENTMCNC: 31.8 G/DL (ref 31.5–35.7)
MCV RBC AUTO: 77.5 FL (ref 79–97)
MONOCYTES # BLD AUTO: 1.08 10*3/MM3 (ref 0.1–0.9)
MONOCYTES NFR BLD AUTO: 6.4 % (ref 5–12)
NEUTROPHILS NFR BLD AUTO: 14.19 10*3/MM3 (ref 1.7–7)
NEUTROPHILS NFR BLD AUTO: 84.3 % (ref 42.7–76)
NRBC BLD AUTO-RTO: 0 /100 WBC (ref 0–0.2)
PLATELET # BLD AUTO: 674 10*3/MM3 (ref 140–450)
PMV BLD AUTO: 10 FL (ref 6–12)
POTASSIUM SERPL-SCNC: 3.5 MMOL/L (ref 3.5–5.2)
POTASSIUM SERPL-SCNC: 4 MMOL/L (ref 3.5–5.2)
PROT SERPL-MCNC: 4.8 G/DL (ref 6–8.5)
RBC # BLD AUTO: 3.33 10*6/MM3 (ref 4.14–5.8)
SODIUM SERPL-SCNC: 138 MMOL/L (ref 136–145)
WBC NRBC COR # BLD: 16.84 10*3/MM3 (ref 3.4–10.8)

## 2023-08-02 PROCEDURE — 85025 COMPLETE CBC W/AUTO DIFF WBC: CPT | Performed by: INTERNAL MEDICINE

## 2023-08-02 PROCEDURE — 25010000002 PIPERACILLIN SOD-TAZOBACTAM PER 1 G: Performed by: INTERNAL MEDICINE

## 2023-08-02 PROCEDURE — 25010000002 METOCLOPRAMIDE PER 10 MG: Performed by: SURGERY

## 2023-08-02 PROCEDURE — 80053 COMPREHEN METABOLIC PANEL: CPT | Performed by: HOSPITALIST

## 2023-08-02 PROCEDURE — 25010000002 HYDROMORPHONE 1 MG/ML SOLUTION: Performed by: INTERNAL MEDICINE

## 2023-08-02 PROCEDURE — 84132 ASSAY OF SERUM POTASSIUM: CPT | Performed by: INTERNAL MEDICINE

## 2023-08-02 PROCEDURE — 99233 SBSQ HOSP IP/OBS HIGH 50: CPT | Performed by: INTERNAL MEDICINE

## 2023-08-02 PROCEDURE — 82948 REAGENT STRIP/BLOOD GLUCOSE: CPT

## 2023-08-02 RX ORDER — OXYCODONE HCL 5 MG/5 ML
5 SOLUTION, ORAL ORAL EVERY 4 HOURS PRN
Status: DISCONTINUED | OUTPATIENT
Start: 2023-08-02 | End: 2023-08-02

## 2023-08-02 RX ORDER — POTASSIUM CHLORIDE 1.5 G/1.58G
40 POWDER, FOR SOLUTION ORAL EVERY 4 HOURS
Status: COMPLETED | OUTPATIENT
Start: 2023-08-02 | End: 2023-08-02

## 2023-08-02 RX ORDER — OXYCODONE HCL 5 MG/5 ML
5 SOLUTION, ORAL ORAL EVERY 4 HOURS PRN
Status: DISCONTINUED | OUTPATIENT
Start: 2023-08-02 | End: 2023-08-03

## 2023-08-02 RX ADMIN — METOCLOPRAMIDE 10 MG: 5 INJECTION, SOLUTION INTRAMUSCULAR; INTRAVENOUS at 22:09

## 2023-08-02 RX ADMIN — Medication 10 ML: at 22:11

## 2023-08-02 RX ADMIN — PIPERACILLIN SODIUM AND TAZOBACTAM SODIUM 3.38 G: 3; .375 INJECTION, SOLUTION INTRAVENOUS at 00:16

## 2023-08-02 RX ADMIN — ASPIRIN 81 MG: 81 TABLET, CHEWABLE ORAL at 08:37

## 2023-08-02 RX ADMIN — HYDROMORPHONE HYDROCHLORIDE 1 MG: 1 INJECTION, SOLUTION INTRAMUSCULAR; INTRAVENOUS; SUBCUTANEOUS at 02:38

## 2023-08-02 RX ADMIN — METOCLOPRAMIDE 10 MG: 5 INJECTION, SOLUTION INTRAMUSCULAR; INTRAVENOUS at 15:14

## 2023-08-02 RX ADMIN — METOCLOPRAMIDE 10 MG: 5 INJECTION, SOLUTION INTRAMUSCULAR; INTRAVENOUS at 08:38

## 2023-08-02 RX ADMIN — METOPROLOL TARTRATE 25 MG: 25 TABLET, FILM COATED ORAL at 22:08

## 2023-08-02 RX ADMIN — PIPERACILLIN SODIUM AND TAZOBACTAM SODIUM 3.38 G: 3; .375 INJECTION, SOLUTION INTRAVENOUS at 08:39

## 2023-08-02 RX ADMIN — HYDROMORPHONE HYDROCHLORIDE 1 MG: 1 INJECTION, SOLUTION INTRAMUSCULAR; INTRAVENOUS; SUBCUTANEOUS at 19:29

## 2023-08-02 RX ADMIN — HYDROMORPHONE HYDROCHLORIDE 1 MG: 1 INJECTION, SOLUTION INTRAMUSCULAR; INTRAVENOUS; SUBCUTANEOUS at 17:00

## 2023-08-02 RX ADMIN — MONTELUKAST 10 MG: 10 TABLET, FILM COATED ORAL at 22:08

## 2023-08-02 RX ADMIN — Medication 10 ML: at 08:59

## 2023-08-02 RX ADMIN — Medication 1 PATCH: at 09:00

## 2023-08-02 RX ADMIN — Medication 30 ML: at 15:26

## 2023-08-02 RX ADMIN — POTASSIUM CHLORIDE 40 MEQ: 1.5 POWDER, FOR SOLUTION ORAL at 06:57

## 2023-08-02 RX ADMIN — Medication 10 ML: at 09:00

## 2023-08-02 RX ADMIN — HYDROMORPHONE HYDROCHLORIDE 1 MG: 1 INJECTION, SOLUTION INTRAMUSCULAR; INTRAVENOUS; SUBCUTANEOUS at 04:49

## 2023-08-02 RX ADMIN — METOPROLOL TARTRATE 25 MG: 25 TABLET, FILM COATED ORAL at 08:36

## 2023-08-02 RX ADMIN — DEXTROSE AND SODIUM CHLORIDE 75 ML/HR: 5; 900 INJECTION, SOLUTION INTRAVENOUS at 02:41

## 2023-08-02 RX ADMIN — HYDROMORPHONE HYDROCHLORIDE 1 MG: 1 INJECTION, SOLUTION INTRAMUSCULAR; INTRAVENOUS; SUBCUTANEOUS at 21:35

## 2023-08-02 RX ADMIN — HYDROMORPHONE HYDROCHLORIDE 1 MG: 1 INJECTION, SOLUTION INTRAMUSCULAR; INTRAVENOUS; SUBCUTANEOUS at 00:16

## 2023-08-02 RX ADMIN — Medication 30 ML: at 09:02

## 2023-08-02 RX ADMIN — ATORVASTATIN CALCIUM 20 MG: 20 TABLET, FILM COATED ORAL at 22:08

## 2023-08-02 RX ADMIN — DEXTROSE AND SODIUM CHLORIDE 75 ML/HR: 5; 900 INJECTION, SOLUTION INTRAVENOUS at 15:19

## 2023-08-02 RX ADMIN — PANTOPRAZOLE SODIUM 40 MG: 40 INJECTION, POWDER, LYOPHILIZED, FOR SOLUTION INTRAVENOUS at 06:57

## 2023-08-02 RX ADMIN — HYDROMORPHONE HYDROCHLORIDE 1 MG: 1 INJECTION, SOLUTION INTRAMUSCULAR; INTRAVENOUS; SUBCUTANEOUS at 06:57

## 2023-08-02 RX ADMIN — Medication 10 ML: at 22:10

## 2023-08-02 RX ADMIN — HYDROMORPHONE HYDROCHLORIDE 1 MG: 1 INJECTION, SOLUTION INTRAMUSCULAR; INTRAVENOUS; SUBCUTANEOUS at 09:12

## 2023-08-02 RX ADMIN — METOCLOPRAMIDE 10 MG: 5 INJECTION, SOLUTION INTRAMUSCULAR; INTRAVENOUS at 02:38

## 2023-08-02 RX ADMIN — POTASSIUM CHLORIDE 40 MEQ: 1.5 POWDER, FOR SOLUTION ORAL at 10:26

## 2023-08-02 RX ADMIN — PIPERACILLIN SODIUM AND TAZOBACTAM SODIUM 3.38 G: 3; .375 INJECTION, SOLUTION INTRAVENOUS at 15:13

## 2023-08-02 RX ADMIN — Medication 30 ML: at 22:08

## 2023-08-02 RX ADMIN — HYDROMORPHONE HYDROCHLORIDE 1 MG: 1 INJECTION, SOLUTION INTRAMUSCULAR; INTRAVENOUS; SUBCUTANEOUS at 23:49

## 2023-08-02 RX ADMIN — HYDROMORPHONE HYDROCHLORIDE 1 MG: 1 INJECTION, SOLUTION INTRAMUSCULAR; INTRAVENOUS; SUBCUTANEOUS at 12:31

## 2023-08-02 RX ADMIN — OXYCODONE HYDROCHLORIDE 5 MG: 5 SOLUTION ORAL at 15:14

## 2023-08-02 NOTE — PLAN OF CARE
Goal Outcome Evaluation:      No acute event this shift. Continues to get pain meds q2H- reports it helps. Tube feed rate at goal- tolerating. Small bowel movements when going to the bathroom- ambulates well. Scrotum, BLE mild/moderately swollen.        Problem: Adult Inpatient Plan of Care  Goal: Plan of Care Review  Outcome: Ongoing, Progressing  Goal: Patient-Specific Goal (Individualized)  Outcome: Ongoing, Progressing  Goal: Absence of Hospital-Acquired Illness or Injury  Outcome: Ongoing, Progressing  Intervention: Identify and Manage Fall Risk  Recent Flowsheet Documentation  Taken 8/2/2023 0409 by Meir Aldrich RN  Safety Promotion/Fall Prevention: safety round/check completed  Taken 8/2/2023 0238 by Meir Aldrich RN  Safety Promotion/Fall Prevention: safety round/check completed  Taken 8/2/2023 0016 by Meir Aldrich RN  Safety Promotion/Fall Prevention: safety round/check completed  Taken 8/1/2023 2209 by Meir Aldrich RN  Safety Promotion/Fall Prevention: safety round/check completed  Taken 8/1/2023 2010 by Meir Aldrich RN  Safety Promotion/Fall Prevention:   activity supervised   fall prevention program maintained   nonskid shoes/slippers when out of bed   safety round/check completed  Intervention: Prevent Skin Injury  Recent Flowsheet Documentation  Taken 8/1/2023 2010 by Meir Aldrich RN  Body Position:   position changed independently   sitting up in bed  Skin Protection:   adhesive use limited   incontinence pads utilized   tubing/devices free from skin contact  Intervention: Prevent and Manage VTE (Venous Thromboembolism) Risk  Recent Flowsheet Documentation  Taken 8/2/2023 0238 by Meir Aldrich RN  Activity Management: ambulated to bathroom  Taken 8/2/2023 0016 by Meir Aldrich RN  Activity Management: ambulated to bathroom  Taken 8/1/2023 2010 by Meir Aldrich RN  Activity Management: activity encouraged  Goal: Optimal Comfort and Wellbeing  Outcome:  Ongoing, Progressing  Intervention: Monitor Pain and Promote Comfort  Recent Flowsheet Documentation  Taken 8/2/2023 0016 by Meir Aldrich RN  Pain Management Interventions: see MAR  Taken 8/1/2023 2007 by Meir Aldrich RN  Pain Management Interventions: see MAR  Intervention: Provide Person-Centered Care  Recent Flowsheet Documentation  Taken 8/1/2023 2010 by Meir Aldrich RN  Trust Relationship/Rapport:   care explained   thoughts/feelings acknowledged  Goal: Readiness for Transition of Care  Outcome: Ongoing, Progressing     Problem: Hypertension Comorbidity  Goal: Blood Pressure in Desired Range  Outcome: Ongoing, Progressing  Intervention: Maintain Blood Pressure Management  Recent Flowsheet Documentation  Taken 8/1/2023 2010 by Meir Aldrich RN  Medication Review/Management: medications reviewed     Problem: Skin Injury Risk Increased  Goal: Skin Health and Integrity  Outcome: Ongoing, Progressing  Intervention: Optimize Skin Protection  Recent Flowsheet Documentation  Taken 8/1/2023 2010 by Meir Aldrich RN  Pressure Reduction Techniques:   frequent weight shift encouraged   weight shift assistance provided  Pressure Reduction Devices: pressure-redistributing mattress utilized  Skin Protection:   adhesive use limited   incontinence pads utilized   tubing/devices free from skin contact     Problem: Adjustment to Illness (Sepsis/Septic Shock)  Goal: Optimal Coping  Outcome: Ongoing, Progressing     Problem: Bleeding (Sepsis/Septic Shock)  Goal: Absence of Bleeding  Outcome: Ongoing, Progressing     Problem: Glycemic Control Impaired (Sepsis/Septic Shock)  Goal: Blood Glucose Level Within Desired Range  Outcome: Ongoing, Progressing  Intervention: Optimize Glycemic Control  Recent Flowsheet Documentation  Taken 8/1/2023 2010 by Meir Aldrich RN  Glycemic Management: blood glucose monitored     Problem: Infection Progression (Sepsis/Septic Shock)  Goal: Absence of Infection Signs and  Symptoms  Outcome: Ongoing, Progressing  Intervention: Promote Recovery  Recent Flowsheet Documentation  Taken 8/2/2023 0238 by Meir Aldrich RN  Activity Management: ambulated to bathroom  Taken 8/2/2023 0016 by Meir Aldrich RN  Activity Management: ambulated to bathroom  Taken 8/1/2023 2010 by Meir Aldrich RN  Activity Management: activity encouraged     Problem: Nutrition Impaired (Sepsis/Septic Shock)  Goal: Optimal Nutrition Intake  Outcome: Ongoing, Progressing     Problem: Fluid Imbalance (Pancreatitis)  Goal: Fluid Balance  Outcome: Ongoing, Progressing     Problem: Infection (Pancreatitis)  Goal: Infection Symptom Resolution  Outcome: Ongoing, Progressing     Problem: Nutrition Impaired (Pancreatitis)  Goal: Optimal Nutrition Intake  Outcome: Ongoing, Progressing     Problem: Pain (Pancreatitis)  Goal: Acceptable Pain Control  Outcome: Ongoing, Progressing  Intervention: Monitor and Manage Pain  Recent Flowsheet Documentation  Taken 8/2/2023 0016 by Meir Aldrich RN  Pain Management Interventions: see MAR  Taken 8/1/2023 2010 by Meir Aldrich RN  Diversional Activities: television  Taken 8/1/2023 2007 by Meir Aldrich, RN  Pain Management Interventions: see MAR

## 2023-08-02 NOTE — PROGRESS NOTES
[facility]  PROGRESS NOTE    Patient Name: Ta Madison  : 1960  MRN: [id number]    Date of Admission: 2023  Primary Care Physician: Yolande Olvera APRN    Subjective   Subjective     CC:  Follow-up abdominal pain    HPI:   Doing ok. Wife at bedside with questions about ambulation, his HR as well as his swelling. Continues to have sinus tach and worsens with movement. Swelling somewhat improved. Patient reports he is feeling good otherwise     ROS:  Gen- No fevers, chills  CV- No chest pain, palpitations  Resp- No cough, dyspnea  GI- as above  +swelling    Objective   Objective     Vital Signs:   Temp:  [98.4 øF (36.9 øC)-99.4 øF (37.4 øC)] 98.4 øF (36.9 øC)  Heart Rate:  [106-126] 106  Resp:  [15-18] 18  BP: (126-157)/(74-82) 157/77     Physical Exam:  GEN: NAD, resting in bed, awake  HEENT: on room air, atraumatic,  NJ in place  NECK: supple, no masses  RESP: on room air, normal effort  CV: on tele, sinus rhythm  GI: +BS, slight distension unchanged   PSYCH: normal affect, appropriate  NEURO: awake, alert, no focal deficits noted  MSK: no edema noted  SKIN: no rashes noted       Results Reviewed:  LAB RESULTS:      Lab 23  0340 23  0312 23  0307 23  0914 23  0401 23  0240 23  2203 23  0319   WBC 16.84* 21.89* 24.79* 20.53* 9.34   < >  --  6.66   HEMOGLOBIN 8.2* 8.7* 8.6* 8.7* 9.4*   < >  --  9.7*   HEMATOCRIT 25.8* 27.4* 27.0* 27.9* 29.0*   < >  --  30.4*   PLATELETS 674* 641* 544* 488* 441   < >  --  328   NEUTROS ABS 14.19*  --  21.31*  --   --   --   --  5.34   IMMATURE GRANS (ABS) 0.27*  --  0.53*  --   --   --   --  0.02   LYMPHS ABS 1.16  --  1.25  --   --   --   --  0.55*   MONOS ABS 1.08*  --  1.55*  --   --   --   --  0.64   EOS ABS 0.10  --  0.09  --   --   --   --  0.09   MCV 77.5* 77.4* 78.3* 77.1* 76.9*   < >  --  77.6*   PROCALCITONIN  --   --   --   --   --   --  0.43*  --    LACTATE  --   --   --    --   --   --  1.2  --     < > = values in this interval not displayed.         Lab 08/02/23  0340 08/01/23 0312 07/31/23  0307 07/30/23  1817 07/30/23  0915 07/30/23  0914 07/29/23  1205 07/29/23  0401   SODIUM 138 137 139  --  136  --   --  137   POTASSIUM 3.5 3.8 4.2 3.9 3.4*  --   --  4.0   CHLORIDE 102 101 103  --  102  --   --  103   CO2 26.0 24.0 21.0*  --  22.0  --   --  22.0   ANION GAP 10.0 12.0 15.0  --  12.0  --   --  12.0   BUN 15 13 11  --  11  --   --  17   CREATININE 0.47* 0.48* 0.48*  --  0.46*  --   --  0.58*   EGFR 117.5 116.8 116.8  --  118.3  --   --  110.3   GLUCOSE 152* 123* 62*  --  113*  --   --  105*   CALCIUM 7.7* 8.0* 8.1*  --  7.7*  --   --  7.8*   MAGNESIUM  --  1.9 2.0  --   --   --   --  2.1   PHOSPHORUS  --  2.4* 3.0  --   --  2.3* 1.5* 1.5*         Lab 08/02/23  0340 08/01/23 0312 07/31/23  0307 07/30/23  0915 07/29/23  0401   TOTAL PROTEIN 4.8* 5.0* 5.0* 5.3* 5.1*   ALBUMIN 2.2* 2.4* 2.5* 2.3* 2.6*   GLOBULIN 2.6 2.6 2.5 3.0 2.5   ALT (SGPT) 9 11 13 12 16   AST (SGOT) 17 21 22 20 18   BILIRUBIN 0.3 0.5 0.6 0.5 0.6   ALK PHOS 122* 117 107 81 68   LIPASE  --   --   --  15  --          Lab 07/29/23  0401   PROBNP 709.6                     Brief Urine Lab Results  (Last result in the past 365 days)        Color   Clarity   Blood   Leuk Est   Nitrite   Protein   CREAT   Urine HCG        07/25/23 0636 Dark Yellow   Clear   Negative   Negative   Negative   30 mg/dL (1+)                   Microbiology Results Abnormal       Procedure Component Value - Date/Time    Blood Culture - Blood, Arm, Right [[id number]]  (Normal) Collected: 07/28/23 0017    Lab Status: Final result Specimen: Blood from Arm, Right Updated: 08/02/23 0045     Blood Culture No growth at 5 days    Blood Culture - Blood, Arm, Right [[id number]]  (Normal) Collected: 07/28/23 0004    Lab Status: Final result Specimen: Blood from Arm, Right Updated: 08/02/23 0045     Blood Culture No growth at 5 days    Blood Culture -  Blood, Hand, Right [[id number]]  (Normal) Collected: 07/23/23 2230    Lab Status: Final result Specimen: Blood from Hand, Right Updated: 07/28/23 2300     Blood Culture No growth at 5 days    Narrative:      AEROBIC BOTTLE ONLY    Blood Culture - Blood, Hand, Left [[id number]]  (Normal) Collected: 07/23/23 2240    Lab Status: Final result Specimen: Blood from Hand, Left Updated: 07/28/23 2300     Blood Culture No growth at 5 days    Narrative:      AEROBIC BOTTLE ONLY    COVID PRE-OP / PRE-PROCEDURE SCREENING ORDER (NO ISOLATION) - Swab, Nasopharynx [[id number]]  (Normal) Collected: 07/24/23 0620    Lab Status: Final result Specimen: Swab from Nasopharynx Updated: 07/24/23 0729    Narrative:      The following orders were created for panel order COVID PRE-OP / PRE-PROCEDURE SCREENING ORDER (NO ISOLATION) - Swab, Nasopharynx.  Procedure                               Abnormality         Status                     ---------                               -----------         ------                     Respiratory Panel PCR w/...[[id number]]  Normal              Final result                 Please view results for these tests on the individual orders.    Respiratory Panel PCR w/COVID-19(SARS-CoV-2) BALTAZAR/ANDRES/ASTER/PAD/COR/MAD/VALENTINE In-House, NP Swab in UTM/VTM, 3-4 HR TAT - Swab, Nasopharynx [[id number]]  (Normal) Collected: 07/24/23 0620    Lab Status: Final result Specimen: Swab from Nasopharynx Updated: 07/24/23 0729     ADENOVIRUS, PCR Not Detected     Coronavirus 229E Not Detected     Coronavirus HKU1 Not Detected     Coronavirus NL63 Not Detected     Coronavirus OC43 Not Detected     COVID19 Not Detected     Human Metapneumovirus Not Detected     Human Rhinovirus/Enterovirus Not Detected     Influenza A PCR Not Detected     Influenza B PCR Not Detected     Parainfluenza Virus 1 Not Detected     Parainfluenza Virus 2 Not Detected     Parainfluenza Virus 3 Not Detected     Parainfluenza Virus 4 Not Detected     RSV, PCR Not  Detected     Bordetella pertussis pcr Not Detected     Bordetella parapertussis PCR Not Detected     Chlamydophila pneumoniae PCR Not Detected     Mycoplasma pneumo by PCR Not Detected    Narrative:      In the setting of a positive respiratory panel with a viral infection PLUS a negative procalcitonin without other underlying concern for bacterial infection, consider observing off antibiotics or discontinuation of antibiotics and continue supportive care. If the respiratory panel is positive for atypical bacterial infection (Bordetella pertussis, Chlamydophila pneumoniae, or Mycoplasma pneumoniae), consider antibiotic de-escalation to target atypical bacterial infection.            No radiology results from the last 24 hrs        Current medications:  Scheduled Meds:aspirin, 81 mg, Nasogastric, Daily  atorvastatin, 20 mg, Nasogastric, Nightly  bisacodyl, 10 mg, Rectal, Q8H  docusate sodium, 100 mg, Nasogastric, BID  enzalutamide, 160 mg, Oral, Daily  famotidine, 20 mg, Intravenous, Once  famotidine, 20 mg, Oral, Once  hydrocortisone, 25 mg, Rectal, BID  lactulose, 300 mL, Rectal, Once  methylnaltrexone, 12 mg, Subcutaneous, Every Other Day  metoclopramide, 10 mg, Intravenous, Q6H  metoprolol tartrate, 25 mg, Nasogastric, Daily  montelukast, 10 mg, Nasogastric, Nightly  nicotine, 1 patch, Transdermal, Q24H  pantoprazole, 40 mg, Intravenous, Q AM  piperacillin-tazobactam, 3.375 g, Intravenous, Q8H  ProSource No Carb, 30 mL, Per J Tube, TID  senna-docusate sodium, 2 tablet, Nasogastric, BID  sodium chloride, 10 mL, Intravenous, Q12H  sodium chloride, 10 mL, Intravenous, Q12H      Continuous Infusions:dextrose 5 % and sodium chloride 0.9 %, 75 mL/hr, Last Rate: 75 mL/hr (08/02/23 0241)      PRN Meds:.  acetaminophen **OR** acetaminophen **OR** acetaminophen    senna-docusate sodium **AND** polyethylene glycol **AND** bisacodyl **AND** bisacodyl    Calcium Replacement - Follow Nurse / BPA Driven Protocol     HYDROmorphone    hydrOXYzine    ipratropium-albuterol    lidocaine PF 1%    Magnesium Standard Dose Replacement - Follow Nurse / BPA Driven Protocol    melatonin    midazolam    naloxone    ondansetron    oxyCODONE    phenylephrine-mineral oil-petrolatum    Phosphorus Replacement - Follow Nurse / BPA Driven Protocol    Potassium Replacement - Follow Nurse / BPA Driven Protocol    prochlorperazine    simethicone    sodium chloride    sodium chloride    sodium chloride    sodium chloride    Assessment & Plan   Assessment & Plan     Active Hospital Problems    Diagnosis  POA    **Acute gallstone pancreatitis [K85.10]  Yes    Elevated serum creatinine [R79.89]  Yes    Personal history of transient ischemic attack (TIA) [Z86.73]  Not Applicable    Adenocarcinoma of prostate [C61]  Yes    Gastroesophageal reflux disease [K21.9]  Yes    Hyperlipidemia [E78.5]  Yes    Tobacco abuse [Z72.0]  Yes      Resolved Hospital Problems   No resolved problems to display.        Brief Hospital Course to date:  Ta Madison is a 63 y.o. male with PMH of GERD, HLD, hx of TIA, advanced prostate cancer on Xtandi, occasional marijuna use, and chronic 1 PPD tobacco use who initially presented to [facility] 7/23/23 with complaints of severe epigastric abdominal pain with associated nausea and vomiting. CT A/P at OSH showed acute pancreatitis with 2 mm stone in the proximal duodenum. Transferred to [facility] for GI evaluation.     This patient's problems and plans were partially entered by my partner and updated as appropriate by me 08/02/23.       Acute gallstone pancreatitis  Leukocytosis  Lactic acidosis  -CT reviewed, possibly necrosis, possible forming pseudocyst  -marked leukocytosis, improving   -transitioned from merrem-->zosyn  -ID follows  -GI/surgery following, recommending NJ feeds, placed 7/31  -IVF  -AM labs      SOA  -better, on RA  -IS/pulmonary toilet     Elevated Creatinine  -better/resolved after IVF, will trial stopping  fluids today given ongoing edema       Prostate cancer  -Continue Xtandi oral chemotherapy (patient supplied), however patient having difficulty with swallowing this  - will attempt to reach out to his oncologist today to make sure holding for period of time would be ok     HLD  -statin     GERD  -PPI     Hx of TIA  -resumed ASA  -Continue statin     Tobacco abuse  -Counseled on cessation  -Nicotine patch     Expected Discharge Location and Transportation: home   Expected Discharge   Expected Discharge Date: 8/4/2023; Expected Discharge Time:       DVT prophylaxis:  Mechanical DVT prophylaxis orders are present.     AM-PAC 6 Clicks Score (PT): 23 (08/01/23 0800)    CODE STATUS:   Code Status and Medical Interventions:   Ordered at: 07/23/23 2229     Code Status (Patient has no pulse and is not breathing):    CPR (Attempt to Resuscitate)     Medical Interventions (Patient has pulse or is breathing):    Full Support     Release to patient:    Routine Release       Marija Smallwood MD  08/02/23

## 2023-08-02 NOTE — PROGRESS NOTES
"Patient Name:  Ta Madison  YOB: 1960  [id number]    Surgery Progress Note    Date of visit: 8/2/2023      Subjective: No significant changes.  Tolerating tube feeds at 70 cc/h.  Tells me that groin swelling is better today.  He ambulated to the restroom but did not ambulate in the hallway yesterday.  Passed flatus and had 2 bowel movements yesterday.  Reports no abdominal pain at rest, and that pain is better with palpation          Objective:     /76 (BP Location: Left arm, Patient Position: Lying)   Pulse 114   Temp 98.6 øF (37 øC) (Oral)   Resp 15   Ht 172.7 cm (68\")   Wt 97.4 kg (214 lb 10.2 oz)   SpO2 92%   BMI 32.64 kg/mý     Intake/Output Summary (Last 24 hours) at 8/2/2023 0713  Last data filed at 8/2/2023 0656  Gross per 24 hour   Intake 1814 ml   Output --   Net 1814 ml       GEN:   Awake, alert, in no acute distress, resting comfortably in bed   CV:   Heart rate in the 100s  L:  Symmetric expansion, not labored on oxygen by nasal cannula  Abd:  Moderately distended, soft and not rigid, only mild tenderness to deep palpation throughout all abdominal quadrants and improved  Ext:  No cyanosis, clubbing, or edema    Recent labs that are back at this time have been reviewed.           Assessment/ Plan:    Mr. Madison is a 63-year-old gentleman with history of GERD, prostate cancer on medical therapy, hyperlipidemia, and TIAs with gallstone pancreatitis      #Severe Gallstone pancreatitis  -Vital signs are stable  -Labs improved.  White blood cell count down to 16.  -Continue supportive management including nutrition supplementation.  Needs to mobilize more.  No surgical interventions are planned at this time         Derrek Tony MD  8/2/2023  07:13 EDT      "

## 2023-08-02 NOTE — PROGRESS NOTES
INFECTIOUS DISEASE Progress Note    Ta Madison  1960  [id number]      Admission Date: 7/23/2023      Requesting Provider: Carroll Bobo DO  Evaluating Physician: Palomo Orellana MD    Reason for Consultation: Severe  pancreatitis    History of present illness:    7/28/23:Patient is a 63 y.o. male with h/o TIA, ongoing tobacco abuse, GERD, HLD, occasional marijuana use, and advanced prostate cancer/on Xtandi who we were asked to see for necrotizing pancreatitis.  The patient presented to [facility] ED on 7/23 with severe upper abdominal pain that radiated to his back about 3 hours PTA.  He had associated nausea and vomiting.  He has had postnasal drip with associated cough for last 3 to 4 weeks.  A CT scan at OSH showed acute gallbladder pancreatitis with a 2 mm stone in proximal duodenem.  He was given a dose of Invanz and transferred to [facility] on 7/23 for further medical management.  On arrival to [facility], the patient developed a Tmax of 100.3 and hypoxia requiring 2 L O2 NC.  He is now on room air.  Labs on arrival were lactic acid 3.6, PCT 0.85, creatinine 1.42, ALT 53, AST 66, bilirubin 0.4, lipase 1255, and WBC 15,000 with 91% neutrophils.  Blood cultures are negative to date.  Repeat blood cultures from 7/28 are pending.   A resp panel PCR was negative. His lactic acid and WBC were normal level on 7/27.  His PCT was 0.43.  A MRSA PCR on 7/28 was positive. His pain level on 7/28 was 7 out of 10. An MRCP on 7/24 showed acute pancreatitis with cholelithiasis without choledocholelithiasis.  It was felt that he may have passed the stone.  His abdomen became more distended and taut last night on 7/27.  A KUB noted possible small bowel ileus vs SBO.  A CT scan with contrast was done on 7/27 and showed extensive necrotizing appearing pancreatitis with fluid replacing nearly the entire pancreatic body with inflammatory changes tracking along entire length of pancreas, mild gallbladder wall thickening with  cholelithiasis (less likely acute cholecystitis), small bilateral pleural effusion, and mildly distended SB loops likely reactive and left likely obstruction.  He was on Zosyn, but was changed Merrem and Vancomycin on 7/28.  ID was asked to evaluate and manage his antibiotic therapy. He complains of persistent abdominal pain and abdominal distention.    7/29/23: He has remained afebrile. His creatinine is 0.58.  His white blood cell count is 9.3. Blood cultures from 7/28 are no growth so far.He has noticed some partial improvement in his abdominal pain and distention.  He denies vomiting.    7/30/2023: Maximum temperature over the last 24 hours is 100.1.  A follow-up CT scan today reveals persistent severe pancreatitis.  His white blood cell count today is 20.5.  His vancomycin random level was 7.6.    7/31/23: His maximum temperature over the last 24 hours is 99.7.White blood cell count today is 24.8. He continues to complain of abdominal distention and abdominal pain.  He denies nausea or vomiting.    8/1/23: Maximum temperature over the last 24 hours is 99.9.Creatinine is 0.48.  White blood cell count is 21.9.  Albumin is 2.4. He continues to require frequent narcotic therapy for abdominal pain.  He denies nausea and vomiting.  He is now on NJ feedings.    8/2/23: He has been afebrile over the last 24 hours. He is tolerating tube feedings. His white blood cell count today is 16.8.  His creatinine is 0.47.  Albumin is 2.2.  He states that his abdominal pain is somewhat improved today.  He denies nausea and vomiting.  He is tolerating tube feedings.    Past Medical History:   Diagnosis Date    Elevated cholesterol     GERD (gastroesophageal reflux disease)     Increased heart rate     Mini stroke     Neck pain     Prostate cancer     Rash     Tobacco abuse        Past Surgical History:   Procedure Laterality Date    APPENDECTOMY  1971    [facility]     COLONOSCOPY      COLONOSCOPY N/A 1/25/2023    Procedure:  COLONOSCOPY;  Surgeon: Mahnaz Caraballo MD;  [facility]: [facility];  Service: Gastroenterology;  Laterality: N/A;    ENDOSCOPY N/A 7/31/2023    Procedure: ESOPHAGOGASTRODUODENOSCOPY WITH KEOFEED PLACEMENT;  Surgeon: Yolande Eller MD;  Location: [facility] ENDOSCOPY;  Service: Gastroenterology;  Laterality: N/A;       Family History   Problem Relation Age of Onset    Nephrolithiasis Mother     Heart disease Father     Hypertension Father     Kidney disease Father        Social History     Socioeconomic History    Marital status:    Tobacco Use    Smoking status: Every Day     Packs/day: 2.00     Years: 45.00     Pack years: 90.00     Types: Cigarettes    Smokeless tobacco: Never   Vaping Use    Vaping Use: Never used   Substance and Sexual Activity    Alcohol use: No    Drug use: Yes     Types: Marijuana     Comment: occ     Sexual activity: Defer       No Known Allergies      Medication:    Current Facility-Administered Medications:     acetaminophen (TYLENOL) tablet 650 mg, 650 mg, Nasogastric, Q4H PRN **OR** acetaminophen (TYLENOL) 160 MG/5ML solution 650 mg, 650 mg, Nasogastric, Q4H PRN **OR** acetaminophen (TYLENOL) suppository 650 mg, 650 mg, Rectal, Q4H PRN, Corina Johansen, PharmD    aspirin chewable tablet 81 mg, 81 mg, Nasogastric, Daily, Corina Johansen, PharmD, 81 mg at 08/01/23 1048    atorvastatin (LIPITOR) tablet 20 mg, 20 mg, Nasogastric, Nightly, Curt Barry MD, 20 mg at 08/01/23 2007    sennosides-docusate (PERICOLACE) 8.6-50 MG per tablet 2 tablet, 2 tablet, Nasogastric, BID, 2 tablet at 08/01/23 2007 **AND** polyethylene glycol (MIRALAX) packet 17 g, 17 g, Oral, Daily PRN **AND** bisacodyl (DULCOLAX) EC tablet 5 mg, 5 mg, Oral, Daily PRN **AND** bisacodyl (DULCOLAX) suppository 10 mg, 10 mg, Rectal, Daily PRN, Curt Barry MD    bisacodyl (DULCOLAX) suppository 10 mg, 10 mg, Rectal, Q8H, Christos Mijares MD, 10 mg at 07/30/23 0849    Calcium Replacement - Follow Nurse / BPA  Driven Protocol, , Does not apply, PRN, Curt Barry MD    dextrose 5 % and sodium chloride 0.9 % infusion, 75 mL/hr, Intravenous, Continuous, Curt Barry MD, Last Rate: 75 mL/hr at 08/02/23 0241, 75 mL/hr at 08/02/23 0241    docusate sodium (COLACE) liquid 100 mg, 100 mg, Nasogastric, BID, Christos Mijares MD, 100 mg at 08/01/23 2151    enzalutamide (XTANDI) chemo capsule 160 mg (patient supplied medication), 160 mg, Oral, Daily, Corina Johansen, PharmD, 160 mg at 07/31/23 1804    famotidine (PEPCID) injection 20 mg, 20 mg, Intravenous, Once, Anurag Sloan Jr., MD    famotidine (PEPCID) tablet 20 mg, 20 mg, Oral, Once, Anurag Sloan Jr., MD    HYDROcodone-acetaminophen (NORCO)  MG per tablet 1 tablet, 1 tablet, Nasogastric, Q4H PRN, Curt Barry MD    hydrocortisone (ANUSOL-HC) suppository 25 mg, 25 mg, Rectal, BID, Jagdeep Quintero, APRN, 25 mg at 07/30/23 0849    HYDROmorphone (DILAUDID) injection 1 mg, 1 mg, Intravenous, Q2H PRN, Maddison Yeung MD, 1 mg at 08/02/23 0657    hydrOXYzine (ATARAX) tablet 50 mg, 50 mg, Nasogastric, TID PRN, Curt Barry MD    ipratropium-albuterol (DUO-NEB) nebulizer solution 3 mL, 3 mL, Nebulization, Q4H PRN, Curt Barry MD, 3 mL at 07/30/23 1144    lactulose (CHRONULAC) solution for enema 300 mL, 300 mL, Rectal, Once, Christos Mijares MD    lidocaine PF 1% (XYLOCAINE) injection 0.5 mL, 0.5 mL, Injection, Once PRN, Anurag Sloan Jr., MD    Magnesium Standard Dose Replacement - Follow Nurse / BPA Driven Protocol, , Does not apply, PRN, Curt Barry MD    melatonin tablet 5 mg, 5 mg, Nasogastric, Nightly PRN, Curt Barry MD    methylnaltrexone (RELISTOR) injection 12 mg, 12 mg, Subcutaneous, Every Other Day, Christos Mijares MD, 12 mg at 08/01/23 0837    metoclopramide (REGLAN) injection 10 mg, 10 mg, Intravenous, Q6H, Christos Mijares MD, 10 mg at 08/02/23 0238    metoprolol tartrate (LOPRESSOR) tablet 25 mg, 25 mg,  Nasogastric, Daily, Curt Barry MD, 25 mg at 08/01/23 0836    midazolam (VERSED) injection 1 mg, 1 mg, Intravenous, Q10 Min PRN, Anurag Sloan Jr., MD    montelukast (SINGULAIR) tablet 10 mg, 10 mg, Nasogastric, Nightly, Curt Barry MD, 10 mg at 08/01/23 2007    naloxone (NARCAN) injection 0.4 mg, 0.4 mg, Intravenous, PRN, Carroll Bobo, DO    nicotine (NICODERM CQ) 21 MG/24HR patch 1 patch, 1 patch, Transdermal, Q24H, Carroll Bobo, DO, 1 patch at 08/01/23 0837    ondansetron (ZOFRAN) injection 4 mg, 4 mg, Intravenous, Q6H PRN, Bharat Montana, EUGENIA, 4 mg at 07/24/23 1113    pantoprazole (PROTONIX) injection 40 mg, 40 mg, Intravenous, Q AM, Marija Smallwood MD, 40 mg at 08/02/23 0657    phenylephrine-mineral oil-petrolatum (PREPARATION H) 0.25-14-74.9 % hemorhoidal ointment, , Rectal, BID PRN, Jagdeep Quintero, APRN    Phosphorus Replacement - Follow Nurse / BPA Driven Protocol, , Does not apply, FARIBA, Curt Barry MD    piperacillin-tazobactam (ZOSYN) 3.375 g in iso-osmotic dextrose 50 ml (premix), 3.375 g, Intravenous, Q8H, Palomo Orellana MD, 3.375 g at 08/02/23 0016    potassium chloride (KLOR-CON) packet 40 mEq, 40 mEq, Oral, Q4H, Marija Smallwood MD, 40 mEq at 08/02/23 0657    Potassium Replacement - Follow Nurse / BPA Driven Protocol, , Does not apply, PRN, Curt Barry MD    prochlorperazine (COMPAZINE) injection 5 mg, 5 mg, Intravenous, Q3H PRN, Carroll Bobo DO, 5 mg at 07/24/23 1635    ProSource No Carb oral solution 30 mL, 30 mL, Per J Tube, TID, Minerva Carrera, DAVE, 30 mL at 08/01/23 1548    simethicone (MYLICON) chewable tablet 80 mg, 80 mg, Nasogastric, 4x Daily PRN, Curt Barry MD    sodium chloride 0.9 % flush 10 mL, 10 mL, Intravenous, Q12H, Carroll Bobo, , 10 mL at 08/01/23 2008    sodium chloride 0.9 % flush 10 mL, 10 mL, Intravenous, PRN, Carroll Bobo,     sodium chloride 0.9 % flush 10 mL, 10 mL, Intravenous, Q12H, Anurag Sloan Jr., MD     sodium chloride 0.9 % flush 10 mL, 10 mL, Intravenous, PRN, Anurag Sloan Jr., MD    sodium chloride 0.9 % infusion 40 mL, 40 mL, Intravenous, PRN, Carroll Bobo DO    sodium chloride 0.9 % infusion 40 mL, 40 mL, Intravenous, PRN, Anurag Sloan Jr., MD    Antibiotics:  Anti-Infectives (From admission, onward)      Ordered     Dose/Rate Route Frequency Start Stop    23 1437  piperacillin-tazobactam (ZOSYN) 3.375 g in iso-osmotic dextrose 50 ml (premix)        Ordering Provider: Palomo Orellana MD    3.375 g  over 4 Hours Intravenous Every 8 Hours 23 1600 23 1559    23 2329  meropenem (MERREM) 1000 mg/100 mL 0.9% NS (mbp)        Ordering Provider: Carroll Bobo DO    1,000 mg  over 30 Minutes Intravenous Once 23 0000 23 0112              Review of Systems:  The HPI      Physical Exam:   Vital Signs  Temp (24hrs), Av.8 øF (37.1 øC), Min:98.4 øF (36.9 øC), Max:99.4 øF (37.4 øC)    Temp  Min: 98.4 øF (36.9 øC)  Max: 99.4 øF (37.4 øC)  BP  Min: 126/76  Max: 149/74  Pulse  Min: 103  Max: 120  Resp  Min: 15  Max: 18  SpO2  Min: 92 %  Max: 93 %    GENERAL: Awake and alert, in no acute distress. Nasojejunal tube is in place.  HEENT: Normocephalic, atraumatic.  PERRL. EOMI. No conjunctival injection. No icterus. No labial ulcers  NECK: Supple   HEART: RRR; No murmur, rubs, gallops.   LUNGS: Clear to auscultation bilaterally without wheezing, rales, rhonchi. Normal respiratory effort. Nonlabored.   ABDOMEN: Persistent abdominal distention with mild diffuse tenderness.  EXT:  No cyanosis, clubbing or edema. No cord.  :  Without Jorgensen catheter.  MSK: No joint effusions or erythema  SKIN: Warm and dry without cutaneous eruptions on Inspection/palpation.    NEURO: Oriented to PPT.  Motor 5/5 strength  PSYCHIATRIC: Normal insight and judgment. Cooperative with PE    Laboratory Data    Results from last 7 days   Lab Units 23  0340 23  0312 23  0307   WBC  10*3/mm3 16.84* 21.89* 24.79*   HEMOGLOBIN g/dL 8.2* 8.7* 8.6*   HEMATOCRIT % 25.8* 27.4* 27.0*   PLATELETS 10*3/mm3 674* 641* 544*       Results from last 7 days   Lab Units 08/02/23  0340   SODIUM mmol/L 138   POTASSIUM mmol/L 3.5   CHLORIDE mmol/L 102   CO2 mmol/L 26.0   BUN mg/dL 15   CREATININE mg/dL 0.47*   GLUCOSE mg/dL 152*   CALCIUM mg/dL 7.7*       Results from last 7 days   Lab Units 08/02/23  0340   ALK PHOS U/L 122*   BILIRUBIN mg/dL 0.3   ALT (SGPT) U/L 9   AST (SGOT) U/L 17               Results from last 7 days   Lab Units 07/27/23  2203   LACTATE mmol/L 1.2           Results from last 7 days   Lab Units 07/30/23  0914   VANCOMYCIN RM mcg/mL 7.60       Estimated Creatinine Clearance: 184.4 mL/min (A) (by C-G formula based on SCr of 0.47 mg/dL (L)).      Microbiology:  Microbiology Results (last 10 days)       Procedure Component Value - Date/Time    MRSA Screen, PCR (Inpatient) - Swab, Nares [[id number]]  (Abnormal) Collected: 07/28/23 0049    Lab Status: Final result Specimen: Swab from Nares Updated: 07/28/23 0950     MRSA PCR Positive    Narrative:      The negative predictive value of this diagnostic test is high and should only be used to consider de-escalating anti-MRSA therapy. A positive result may indicate colonization with MRSA and must be correlated clinically.    Blood Culture - Blood, Arm, Right [[id number]]  (Normal) Collected: 07/28/23 0017    Lab Status: Final result Specimen: Blood from Arm, Right Updated: 08/02/23 0045     Blood Culture No growth at 5 days    Blood Culture - Blood, Arm, Right [[id number]]  (Normal) Collected: 07/28/23 0004    Lab Status: Final result Specimen: Blood from Arm, Right Updated: 08/02/23 0045     Blood Culture No growth at 5 days    COVID PRE-OP / PRE-PROCEDURE SCREENING ORDER (NO ISOLATION) - Swab, Nasopharynx [[id number]]  (Normal) Collected: 07/24/23 0620    Lab Status: Final result Specimen: Swab from Nasopharynx Updated: 07/24/23 0729    Narrative:       The following orders were created for panel order COVID PRE-OP / PRE-PROCEDURE SCREENING ORDER (NO ISOLATION) - Swab, Nasopharynx.  Procedure                               Abnormality         Status                     ---------                               -----------         ------                     Respiratory Panel PCR w/...[[id number]]  Normal              Final result                 Please view results for these tests on the individual orders.    Respiratory Panel PCR w/COVID-19(SARS-CoV-2) BALTAZAR/ANDRES/ASTER/PAD/COR/MAD/VALENTINE In-House, NP Swab in UTM/VTM, 3-4 HR TAT - Swab, Nasopharynx [[id number]]  (Normal) Collected: 07/24/23 0620    Lab Status: Final result Specimen: Swab from Nasopharynx Updated: 07/24/23 0729     ADENOVIRUS, PCR Not Detected     Coronavirus 229E Not Detected     Coronavirus HKU1 Not Detected     Coronavirus NL63 Not Detected     Coronavirus OC43 Not Detected     COVID19 Not Detected     Human Metapneumovirus Not Detected     Human Rhinovirus/Enterovirus Not Detected     Influenza A PCR Not Detected     Influenza B PCR Not Detected     Parainfluenza Virus 1 Not Detected     Parainfluenza Virus 2 Not Detected     Parainfluenza Virus 3 Not Detected     Parainfluenza Virus 4 Not Detected     RSV, PCR Not Detected     Bordetella pertussis pcr Not Detected     Bordetella parapertussis PCR Not Detected     Chlamydophila pneumoniae PCR Not Detected     Mycoplasma pneumo by PCR Not Detected    Narrative:      In the setting of a positive respiratory panel with a viral infection PLUS a negative procalcitonin without other underlying concern for bacterial infection, consider observing off antibiotics or discontinuation of antibiotics and continue supportive care. If the respiratory panel is positive for atypical bacterial infection (Bordetella pertussis, Chlamydophila pneumoniae, or Mycoplasma pneumoniae), consider antibiotic de-escalation to target atypical bacterial infection.    Blood Culture - Blood,  Hand, Left [[id number]]  (Normal) Collected: 07/23/23 2240    Lab Status: Final result Specimen: Blood from Hand, Left Updated: 07/28/23 2300     Blood Culture No growth at 5 days    Narrative:      AEROBIC BOTTLE ONLY    Blood Culture - Blood, Hand, Right [[id number]]  (Normal) Collected: 07/23/23 2230    Lab Status: Final result Specimen: Blood from Hand, Right Updated: 07/28/23 2300     Blood Culture No growth at 5 days    Narrative:      AEROBIC BOTTLE ONLY                  Radiology:  Imaging Results (Last 72 Hours)       Procedure Component Value Units Date/Time    XR Abdomen KUB [[id number]] Collected: 07/31/23 1007     Updated: 07/31/23 1011    Narrative:      XR ABDOMEN KUB    Date of Exam: 7/31/2023 9:56 AM EDT    Indication: NJ tube placement    Comparison: None available.    Findings/    Impression:      Impression:  Feeding catheter projects over the proximal jejunum. Visualized bowel gas pattern is unremarkable. No acute osseous lesion is seen. Lung bases are unremarkable.    Electronically Signed: Amrit Roth    7/31/2023 10:08 AM EDT    Workstation ID: [id number]    CT Abdomen Pelvis Without Contrast [[id number]] Collected: 07/30/23 1115     Updated: 07/30/23 1129    Narrative:      CT ABDOMEN PELVIS WO CONTRAST    Date of Exam: 7/30/2023 11:07 AM EDT    Indication: Pancreatitis, increasing WBC.    Comparison: CT abdomen pelvis July 27, 2023    Technique: Axial CT images were obtained of the abdomen and pelvis without the administration of contrast. Reconstructed coronal and sagittal images were also obtained. Automated exposure control and iterative construction methods were used.      Findings:  There are bibasilar effusions and atelectatic changes in the lower lungs. There is ascites about the liver which has increased. There is stranding in the fat around the pancreas with swelling of the pancreas compatible with acute pancreatitis. There is   hypodense fluid like attenuation involving the more  proximal and mid body as well as portions of the head of the pancreas. This could relate to necrotizing pancreatitis . There is suggested fluid tracking from the pancreas to the anterior aspect of the   quadrate lobe of the liver. Ascites tracks into the pelvis.    There are gallstones within the gallbladder. There is fluid about the spleen. Adrenal glands are grossly unremarkable. There is no acute renal abnormality.    There is some thickening wall the bladder that may relate to under distention as opposed to a cystitis.    There is some fluid within the lumen of the colon. There is some thickening of the wall of the splenic flexure that may reflect some reactive changes to the pancreatitis.    There is edema in the subcutaneous soft tissues which could reflect anasarca.    Atherosclerotic vascular calcification is noted.    There are pars defects at L5 without significant anterolisthesis.      Impression:      Impression:  1.Pancreatitis with what could reflect more of a necrotizing pancreatitis involving portions of the pancreas. There has been no improvement in the pancreatitis since prior study.  2.Fluid extending out from the pancreas to the anterior aspect of the quadrate lobe of the liver that may relate to developing pseudocyst  3.Increasing ascites within the abdomen and pelvis.  4.Cholelithiasis  5.There is some fluid within the colon that might be reflective of an ileus. There is some thickening of the wall of the splenic flexure that may be reflective of some inflammation secondary to the pancreatitis.  6.Bibasilar effusions and atelectasis  7.Edema in the subcutaneous soft tissues which could reflect anasarca.  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  9.Some suggested thickening of the wall the bladder that may relate to under distention.            Electronically Signed: Hosea Damon    7/30/2023 11:26 AM EDT    Workstation ID: [id number]        I read his radiographic images reviewed  the images with the patient and his family.      Impression:   Severe gallstone pancreatitis-Without overt evidence of severe pancreatic infection/pancreatic abscess. He appears to have passed the gallstone. He continues to have significant pancreatitis.  He is now on intravenous Zosyn  Leukocytosis/neutrophilia-improving.  MRSA nasal colonization  Advance prostate cancer/on Xtandi  Ongoing tobacco abuse  Occasional marijuana use    PLAN/RECOMMENDATIONS:   Nutritional support  Intravenous Zosyn    This is a difficult and complex clinical situation.  He will likely require a prolonged hospitalization.  His severe pancreatitis may evolve into an infected pseudocyst.  If he develops worsening abdominal pain, fever, and persistent leukocytosis, we may need to drain a pseudocyst and sent for culture.  He will be at risk for invasive fungal infection.  I may need to start empiric antifungal therapy if he clinically worsens.      Palomo Orellana MD  8/2/2023  08:37 EDT

## 2023-08-03 LAB
ALBUMIN SERPL-MCNC: 2.2 G/DL (ref 3.5–5.2)
ALBUMIN/GLOB SERPL: 0.6 G/DL
ALP SERPL-CCNC: 113 U/L (ref 39–117)
ALT SERPL W P-5'-P-CCNC: 11 U/L (ref 1–41)
ANION GAP SERPL CALCULATED.3IONS-SCNC: 10 MMOL/L (ref 5–15)
AST SERPL-CCNC: 20 U/L (ref 1–40)
BASOPHILS # BLD AUTO: 0.04 10*3/MM3 (ref 0–0.2)
BASOPHILS NFR BLD AUTO: 0.3 % (ref 0–1.5)
BILIRUB SERPL-MCNC: 0.3 MG/DL (ref 0–1.2)
BUN SERPL-MCNC: 15 MG/DL (ref 8–23)
BUN/CREAT SERPL: 34.1 (ref 7–25)
CALCIUM SPEC-SCNC: 7.9 MG/DL (ref 8.6–10.5)
CHLORIDE SERPL-SCNC: 103 MMOL/L (ref 98–107)
CO2 SERPL-SCNC: 26 MMOL/L (ref 22–29)
CREAT SERPL-MCNC: 0.44 MG/DL (ref 0.76–1.27)
DEPRECATED RDW RBC AUTO: 48.5 FL (ref 37–54)
EGFRCR SERPLBLD CKD-EPI 2021: 119.9 ML/MIN/1.73
EOSINOPHIL # BLD AUTO: 0.11 10*3/MM3 (ref 0–0.4)
EOSINOPHIL NFR BLD AUTO: 0.8 % (ref 0.3–6.2)
ERYTHROCYTE [DISTWIDTH] IN BLOOD BY AUTOMATED COUNT: 17.2 % (ref 12.3–15.4)
GLOBULIN UR ELPH-MCNC: 3.5 GM/DL
GLUCOSE BLDC GLUCOMTR-MCNC: 132 MG/DL (ref 70–130)
GLUCOSE BLDC GLUCOMTR-MCNC: 148 MG/DL (ref 70–130)
GLUCOSE BLDC GLUCOMTR-MCNC: 162 MG/DL (ref 70–130)
GLUCOSE SERPL-MCNC: 122 MG/DL (ref 65–99)
HCT VFR BLD AUTO: 25.4 % (ref 37.5–51)
HGB BLD-MCNC: 8 G/DL (ref 13–17.7)
IMM GRANULOCYTES # BLD AUTO: 0.13 10*3/MM3 (ref 0–0.05)
IMM GRANULOCYTES NFR BLD AUTO: 0.9 % (ref 0–0.5)
LYMPHOCYTES # BLD AUTO: 1.5 10*3/MM3 (ref 0.7–3.1)
LYMPHOCYTES NFR BLD AUTO: 10.9 % (ref 19.6–45.3)
MCH RBC QN AUTO: 24.5 PG (ref 26.6–33)
MCHC RBC AUTO-ENTMCNC: 31.5 G/DL (ref 31.5–35.7)
MCV RBC AUTO: 77.7 FL (ref 79–97)
MONOCYTES # BLD AUTO: 1.11 10*3/MM3 (ref 0.1–0.9)
MONOCYTES NFR BLD AUTO: 8.1 % (ref 5–12)
NEUTROPHILS NFR BLD AUTO: 10.85 10*3/MM3 (ref 1.7–7)
NEUTROPHILS NFR BLD AUTO: 79 % (ref 42.7–76)
NRBC BLD AUTO-RTO: 0 /100 WBC (ref 0–0.2)
PLATELET # BLD AUTO: 731 10*3/MM3 (ref 140–450)
PMV BLD AUTO: 10.1 FL (ref 6–12)
POTASSIUM SERPL-SCNC: 3.5 MMOL/L (ref 3.5–5.2)
POTASSIUM SERPL-SCNC: 3.8 MMOL/L (ref 3.5–5.2)
PROT SERPL-MCNC: 5.7 G/DL (ref 6–8.5)
QT INTERVAL: 434 MS
QTC INTERVAL: 465 MS
RBC # BLD AUTO: 3.27 10*6/MM3 (ref 4.14–5.8)
SODIUM SERPL-SCNC: 139 MMOL/L (ref 136–145)
WBC NRBC COR # BLD: 13.74 10*3/MM3 (ref 3.4–10.8)

## 2023-08-03 PROCEDURE — 99232 SBSQ HOSP IP/OBS MODERATE 35: CPT | Performed by: NURSE PRACTITIONER

## 2023-08-03 PROCEDURE — 25010000002 HYDROMORPHONE 1 MG/ML SOLUTION: Performed by: INTERNAL MEDICINE

## 2023-08-03 PROCEDURE — 25010000002 METHYLNALTREXONE 12 MG/0.6ML SOLUTION: Performed by: SURGERY

## 2023-08-03 PROCEDURE — 97530 THERAPEUTIC ACTIVITIES: CPT

## 2023-08-03 PROCEDURE — 25010000002 ALBUMIN HUMAN 25% PER 50 ML: Performed by: NURSE PRACTITIONER

## 2023-08-03 PROCEDURE — 84132 ASSAY OF SERUM POTASSIUM: CPT | Performed by: INTERNAL MEDICINE

## 2023-08-03 PROCEDURE — 25010000002 METOCLOPRAMIDE PER 10 MG: Performed by: SURGERY

## 2023-08-03 PROCEDURE — 85025 COMPLETE CBC W/AUTO DIFF WBC: CPT | Performed by: INTERNAL MEDICINE

## 2023-08-03 PROCEDURE — 82948 REAGENT STRIP/BLOOD GLUCOSE: CPT

## 2023-08-03 PROCEDURE — 25010000002 PIPERACILLIN SOD-TAZOBACTAM PER 1 G: Performed by: INTERNAL MEDICINE

## 2023-08-03 PROCEDURE — P9047 ALBUMIN (HUMAN), 25%, 50ML: HCPCS | Performed by: NURSE PRACTITIONER

## 2023-08-03 PROCEDURE — 80053 COMPREHEN METABOLIC PANEL: CPT | Performed by: HOSPITALIST

## 2023-08-03 RX ORDER — OXYCODONE AND ACETAMINOPHEN 7.5; 325 MG/1; MG/1
1 TABLET ORAL EVERY 6 HOURS PRN
Status: DISCONTINUED | OUTPATIENT
Start: 2023-08-03 | End: 2023-08-06

## 2023-08-03 RX ORDER — ALBUMIN (HUMAN) 12.5 G/50ML
50 SOLUTION INTRAVENOUS ONCE
Status: COMPLETED | OUTPATIENT
Start: 2023-08-03 | End: 2023-08-03

## 2023-08-03 RX ORDER — POTASSIUM CHLORIDE 1.5 G/1.58G
40 POWDER, FOR SOLUTION ORAL EVERY 4 HOURS
Status: COMPLETED | OUTPATIENT
Start: 2023-08-03 | End: 2023-08-03

## 2023-08-03 RX ADMIN — HYDROMORPHONE HYDROCHLORIDE 1 MG: 1 INJECTION, SOLUTION INTRAMUSCULAR; INTRAVENOUS; SUBCUTANEOUS at 20:06

## 2023-08-03 RX ADMIN — HYDROMORPHONE HYDROCHLORIDE 1 MG: 1 INJECTION, SOLUTION INTRAMUSCULAR; INTRAVENOUS; SUBCUTANEOUS at 06:17

## 2023-08-03 RX ADMIN — MONTELUKAST 10 MG: 10 TABLET, FILM COATED ORAL at 20:44

## 2023-08-03 RX ADMIN — METOCLOPRAMIDE 10 MG: 5 INJECTION, SOLUTION INTRAMUSCULAR; INTRAVENOUS at 09:13

## 2023-08-03 RX ADMIN — Medication 30 ML: at 20:55

## 2023-08-03 RX ADMIN — Medication 30 ML: at 09:18

## 2023-08-03 RX ADMIN — Medication 1 PATCH: at 09:18

## 2023-08-03 RX ADMIN — PIPERACILLIN SODIUM AND TAZOBACTAM SODIUM 3.38 G: 3; .375 INJECTION, SOLUTION INTRAVENOUS at 16:10

## 2023-08-03 RX ADMIN — METOCLOPRAMIDE 10 MG: 5 INJECTION, SOLUTION INTRAMUSCULAR; INTRAVENOUS at 16:07

## 2023-08-03 RX ADMIN — HYDROMORPHONE HYDROCHLORIDE 1 MG: 1 INJECTION, SOLUTION INTRAMUSCULAR; INTRAVENOUS; SUBCUTANEOUS at 18:08

## 2023-08-03 RX ADMIN — METOCLOPRAMIDE 10 MG: 5 INJECTION, SOLUTION INTRAMUSCULAR; INTRAVENOUS at 03:16

## 2023-08-03 RX ADMIN — Medication 10 ML: at 20:49

## 2023-08-03 RX ADMIN — METOCLOPRAMIDE 10 MG: 5 INJECTION, SOLUTION INTRAMUSCULAR; INTRAVENOUS at 20:48

## 2023-08-03 RX ADMIN — HYDROMORPHONE HYDROCHLORIDE 1 MG: 1 INJECTION, SOLUTION INTRAMUSCULAR; INTRAVENOUS; SUBCUTANEOUS at 22:01

## 2023-08-03 RX ADMIN — SENNOSIDES AND DOCUSATE SODIUM 2 TABLET: 50; 8.6 TABLET ORAL at 09:17

## 2023-08-03 RX ADMIN — ASPIRIN 81 MG: 81 TABLET, CHEWABLE ORAL at 09:17

## 2023-08-03 RX ADMIN — METOPROLOL TARTRATE 25 MG: 25 TABLET, FILM COATED ORAL at 09:16

## 2023-08-03 RX ADMIN — OXYCODONE HYDROCHLORIDE AND ACETAMINOPHEN 1 TABLET: 7.5; 325 TABLET ORAL at 16:06

## 2023-08-03 RX ADMIN — ATORVASTATIN CALCIUM 20 MG: 20 TABLET, FILM COATED ORAL at 20:44

## 2023-08-03 RX ADMIN — HYDROMORPHONE HYDROCHLORIDE 1 MG: 1 INJECTION, SOLUTION INTRAMUSCULAR; INTRAVENOUS; SUBCUTANEOUS at 03:54

## 2023-08-03 RX ADMIN — POTASSIUM CHLORIDE 40 MEQ: 1.5 POWDER, FOR SOLUTION ORAL at 09:17

## 2023-08-03 RX ADMIN — ALBUMIN (HUMAN) 50 G: 0.25 INJECTION, SOLUTION INTRAVENOUS at 09:09

## 2023-08-03 RX ADMIN — Medication 10 ML: at 09:19

## 2023-08-03 RX ADMIN — METOPROLOL TARTRATE 25 MG: 25 TABLET, FILM COATED ORAL at 20:44

## 2023-08-03 RX ADMIN — PIPERACILLIN SODIUM AND TAZOBACTAM SODIUM 3.38 G: 3; .375 INJECTION, SOLUTION INTRAVENOUS at 00:22

## 2023-08-03 RX ADMIN — DOCUSATE SODIUM 100 MG: 50 LIQUID ORAL at 20:45

## 2023-08-03 RX ADMIN — OXYCODONE HYDROCHLORIDE AND ACETAMINOPHEN 1 TABLET: 7.5; 325 TABLET ORAL at 09:12

## 2023-08-03 RX ADMIN — DOCUSATE SODIUM 100 MG: 50 LIQUID ORAL at 09:17

## 2023-08-03 RX ADMIN — METHYLNALTREXONE BROMIDE 12 MG: 12 INJECTION, SOLUTION SUBCUTANEOUS at 09:23

## 2023-08-03 RX ADMIN — Medication 30 ML: at 16:14

## 2023-08-03 RX ADMIN — HYDROMORPHONE HYDROCHLORIDE 1 MG: 1 INJECTION, SOLUTION INTRAMUSCULAR; INTRAVENOUS; SUBCUTANEOUS at 14:25

## 2023-08-03 RX ADMIN — PIPERACILLIN SODIUM AND TAZOBACTAM SODIUM 3.38 G: 3; .375 INJECTION, SOLUTION INTRAVENOUS at 09:05

## 2023-08-03 RX ADMIN — HYDROCORTISONE ACETATE 25 MG: 25 SUPPOSITORY RECTAL at 20:51

## 2023-08-03 RX ADMIN — PANTOPRAZOLE SODIUM 40 MG: 40 INJECTION, POWDER, LYOPHILIZED, FOR SOLUTION INTRAVENOUS at 09:13

## 2023-08-03 RX ADMIN — SENNOSIDES AND DOCUSATE SODIUM 2 TABLET: 50; 8.6 TABLET ORAL at 20:44

## 2023-08-03 RX ADMIN — HYDROMORPHONE HYDROCHLORIDE 1 MG: 1 INJECTION, SOLUTION INTRAMUSCULAR; INTRAVENOUS; SUBCUTANEOUS at 12:06

## 2023-08-03 RX ADMIN — HYDROMORPHONE HYDROCHLORIDE 1 MG: 1 INJECTION, SOLUTION INTRAMUSCULAR; INTRAVENOUS; SUBCUTANEOUS at 01:41

## 2023-08-03 RX ADMIN — POTASSIUM CHLORIDE 40 MEQ: 1.5 POWDER, FOR SOLUTION ORAL at 06:17

## 2023-08-03 NOTE — PROGRESS NOTES
"Patient Name:  Ta Madison  YOB: 1960  [id number]    Surgery Progress Note    Date of visit: 8/3/2023      Subjective: Continues to feel slightly better daily.  Tolerating tube feeds at goal.  3 small bowel movements yesterday.  Ambulated to the restroom and did some exercises sitting up in the bed.  Did not ambulate in the hallway.  Denies nausea or vomiting.  Denies abdominal pain at rest but has some pain with movement.          Objective:     /73 (BP Location: Right arm, Patient Position: Lying)   Pulse 103   Temp 98.8 øF (37.1 øC) (Oral)   Resp 18   Ht 172.7 cm (68\")   Wt 97.4 kg (214 lb 10.2 oz)   SpO2 94%   BMI 32.64 kg/mý     Intake/Output Summary (Last 24 hours) at 8/3/2023 0716  Last data filed at 8/3/2023 0617  Gross per 24 hour   Intake 570 ml   Output --   Net 570 ml       GEN:   Awake, alert, in no acute distress, resting comfortably in bed   CV:   Regular rate and rhythm  L:  Symmetric expansion, not labored on room air  Abd:  Soft, moderately distended, only very mild tenderness to deep palpation throughout all quadrants  Ext:  No cyanosis, clubbing, or edema    Recent labs that are back at this time have been reviewed.           Assessment/ Plan:    Mr. Madison is a 63-year-old gentleman with history of GERD, prostate cancer on medical therapy, hyperlipidemia, and TIAs with gallstone pancreatitis      #Severe Gallstone pancreatitis  -Vital signs are stable  -Labs continue to improve.  White blood cell count is down at 13  -Start clear liquid diet.  Continue nasoenteral feeds.  Advance oral diet as tolerated  -Continue supportive management. No surgical interventions are planned at this time         Derrek Tony MD  8/3/2023  07:16 EDT      "

## 2023-08-03 NOTE — CASE MANAGEMENT/SOCIAL WORK
Continued Stay Note  [facility]     Patient Name: Ta Madison  MRN: [id number]  Today's Date: 8/3/2023    Admit Date: 7/23/2023    Plan: Home at DC   Discharge Plan       Row Name 08/03/23 1528       Plan    Plan Home at DC    Patient/Family in Agreement with Plan yes    Plan Comments I spoke with the pt and family. They deny any DC needs at this time. Started on po. Still has NJ TF.    Final Discharge Disposition Code 01 - home or self-care                   Discharge Codes    No documentation.                 Expected Discharge Date and Time       Expected Discharge Date Expected Discharge Time    Aug 6, 2023               Lesvia Estrada RN     Pt requesting to leave.  MD notified.

## 2023-08-03 NOTE — PROGRESS NOTES
[facility] Medicine Services  PROGRESS NOTE    Patient Name: Ta Madison  : 1960  MRN: [id number]    Date of Admission: 2023  Primary Care Physician: Yolande Olvera APRN    Subjective   Subjective     CC:  Follow-up abdominal pain    HPI:   Seen resting in bed.  Awake seen.  Wife at bedside.  Reporting significant abdominal discomfort this morning rated 8/10 but no nausea or vomiting.  Did better with Percocet p.o.  Pain now 0/10 scale.  Still with NG tube with tube feed infusing.  Noted ongoing swelling from upper abdomen down.    ROS:  Gen- No fevers, chills  CV- No chest pain, palpitations  Resp- No cough, dyspnea  GI- as above    Objective   Objective     Vital Signs:   Temp:  [98.2 øF (36.8 øC)-99.3 øF (37.4 øC)] 98.3 øF (36.8 øC)  Heart Rate:  [] 94  Resp:  [16-18] 18  BP: (144-183)/(73-82) 150/76     Physical Exam:  Constitutional: No acute distress, awake, drowsy.  Resting back in bed.  Wife at bedside.  Chronically ill-appearing.  HENT: NCAT, mucous membranes moist.  NG tube to right nare with TF infusing.  Respiratory: Clear to auscultation bilaterally decreased at bases but no wheezing or crackles appreciated, respiratory effort normal on room air with sats 94%.  Cardiovascular: RRR, no murmurs, rubs, or gallops  Gastrointestinal: Positive bowel sounds, mildly distended, minimally tender to deep palpation.  No guarding or rebound.    Musculoskeletal: Bowie spontaneously. Significant edema noted from upper abdomen toes.  : Scrotal edema  Psychiatric: Appropriate affect, cooperative  Neurologic: Oriented x 3, strength symmetric in all extremities, Cranial Nerves grossly intact to confrontation, speech clear.  Follows commands.  Skin: No rashes.  Pallor.      Results Reviewed:  LAB RESULTS:      Lab 23  0355 23  0340 23  0312 23  0307 23  0914 23  0240 23  2203   WBC 13.74* 16.84* 21.89* 24.79* 20.53*   < >  --     HEMOGLOBIN 8.0* 8.2* 8.7* 8.6* 8.7*   < >  --    HEMATOCRIT 25.4* 25.8* 27.4* 27.0* 27.9*   < >  --    PLATELETS 731* 674* 641* 544* 488*   < >  --    NEUTROS ABS 10.85* 14.19*  --  21.31*  --   --   --    IMMATURE GRANS (ABS) 0.13* 0.27*  --  0.53*  --   --   --    LYMPHS ABS 1.50 1.16  --  1.25  --   --   --    MONOS ABS 1.11* 1.08*  --  1.55*  --   --   --    EOS ABS 0.11 0.10  --  0.09  --   --   --    MCV 77.7* 77.5* 77.4* 78.3* 77.1*   < >  --    PROCALCITONIN  --   --   --   --   --   --  0.43*   LACTATE  --   --   --   --   --   --  1.2    < > = values in this interval not displayed.         Lab 08/03/23  0355 08/02/23  1554 08/02/23  0340 08/01/23  0312 07/31/23  0307 07/30/23  1817 07/30/23  0915 07/30/23  0914 07/29/23  1205 07/29/23  0401   SODIUM 139  --  138 137 139  --  136  --   --  137   POTASSIUM 3.5 4.0 3.5 3.8 4.2   < > 3.4*  --   --  4.0   CHLORIDE 103  --  102 101 103  --  102  --   --  103   CO2 26.0  --  26.0 24.0 21.0*  --  22.0  --   --  22.0   ANION GAP 10.0  --  10.0 12.0 15.0  --  12.0  --   --  12.0   BUN 15  --  15 13 11  --  11  --   --  17   CREATININE 0.44*  --  0.47* 0.48* 0.48*  --  0.46*  --   --  0.58*   EGFR 119.9  --  117.5 116.8 116.8  --  118.3  --   --  110.3   GLUCOSE 122*  --  152* 123* 62*  --  113*  --   --  105*   CALCIUM 7.9*  --  7.7* 8.0* 8.1*  --  7.7*  --   --  7.8*   MAGNESIUM  --   --   --  1.9 2.0  --   --   --   --  2.1   PHOSPHORUS  --   --   --  2.4* 3.0  --   --  2.3* 1.5* 1.5*    < > = values in this interval not displayed.         Lab 08/03/23  0355 08/02/23  0340 08/01/23  0312 07/31/23  0307 07/30/23  0915   TOTAL PROTEIN 5.7* 4.8* 5.0* 5.0* 5.3*   ALBUMIN 2.2* 2.2* 2.4* 2.5* 2.3*   GLOBULIN 3.5 2.6 2.6 2.5 3.0   ALT (SGPT) 11 9 11 13 12   AST (SGOT) 20 17 21 22 20   BILIRUBIN 0.3 0.3 0.5 0.6 0.5   ALK PHOS 113 122* 117 107 81   LIPASE  --   --   --   --  15         Lab 07/29/23  0401   PROBNP 709.6                     Brief Urine Lab Results  (Last  result in the past 365 days)        Color   Clarity   Blood   Leuk Est   Nitrite   Protein   CREAT   Urine HCG        07/25/23 0636 Dark Yellow   Clear   Negative   Negative   Negative   30 mg/dL (1+)                   Microbiology Results Abnormal       Procedure Component Value - Date/Time    Blood Culture - Blood, Arm, Right [[id number]]  (Normal) Collected: 07/28/23 0017    Lab Status: Final result Specimen: Blood from Arm, Right Updated: 08/02/23 0045     Blood Culture No growth at 5 days    Blood Culture - Blood, Arm, Right [[id number]]  (Normal) Collected: 07/28/23 0004    Lab Status: Final result Specimen: Blood from Arm, Right Updated: 08/02/23 0045     Blood Culture No growth at 5 days    Blood Culture - Blood, Hand, Right [[id number]]  (Normal) Collected: 07/23/23 2230    Lab Status: Final result Specimen: Blood from Hand, Right Updated: 07/28/23 2300     Blood Culture No growth at 5 days    Narrative:      AEROBIC BOTTLE ONLY    Blood Culture - Blood, Hand, Left [[id number]]  (Normal) Collected: 07/23/23 2240    Lab Status: Final result Specimen: Blood from Hand, Left Updated: 07/28/23 2300     Blood Culture No growth at 5 days    Narrative:      AEROBIC BOTTLE ONLY    COVID PRE-OP / PRE-PROCEDURE SCREENING ORDER (NO ISOLATION) - Swab, Nasopharynx [[id number]]  (Normal) Collected: 07/24/23 0620    Lab Status: Final result Specimen: Swab from Nasopharynx Updated: 07/24/23 0729    Narrative:      The following orders were created for panel order COVID PRE-OP / PRE-PROCEDURE SCREENING ORDER (NO ISOLATION) - Swab, Nasopharynx.  Procedure                               Abnormality         Status                     ---------                               -----------         ------                     Respiratory Panel PCR w/...[[id number]]  Normal              Final result                 Please view results for these tests on the individual orders.    Respiratory Panel PCR w/COVID-19(SARS-CoV-2)  BALTAZAR/ANDRES/ASTER/PAD/COR/MAD/VALENTINE In-House, NP Swab in UTM/VTM, 3-4 HR TAT - Swab, Nasopharynx [[id number]]  (Normal) Collected: 07/24/23 0620    Lab Status: Final result Specimen: Swab from Nasopharynx Updated: 07/24/23 0729     ADENOVIRUS, PCR Not Detected     Coronavirus 229E Not Detected     Coronavirus HKU1 Not Detected     Coronavirus NL63 Not Detected     Coronavirus OC43 Not Detected     COVID19 Not Detected     Human Metapneumovirus Not Detected     Human Rhinovirus/Enterovirus Not Detected     Influenza A PCR Not Detected     Influenza B PCR Not Detected     Parainfluenza Virus 1 Not Detected     Parainfluenza Virus 2 Not Detected     Parainfluenza Virus 3 Not Detected     Parainfluenza Virus 4 Not Detected     RSV, PCR Not Detected     Bordetella pertussis pcr Not Detected     Bordetella parapertussis PCR Not Detected     Chlamydophila pneumoniae PCR Not Detected     Mycoplasma pneumo by PCR Not Detected    Narrative:      In the setting of a positive respiratory panel with a viral infection PLUS a negative procalcitonin without other underlying concern for bacterial infection, consider observing off antibiotics or discontinuation of antibiotics and continue supportive care. If the respiratory panel is positive for atypical bacterial infection (Bordetella pertussis, Chlamydophila pneumoniae, or Mycoplasma pneumoniae), consider antibiotic de-escalation to target atypical bacterial infection.            No radiology results from the last 24 hrs        Current medications:  Scheduled Meds:aspirin, 81 mg, Nasogastric, Daily  atorvastatin, 20 mg, Nasogastric, Nightly  bisacodyl, 10 mg, Rectal, Q8H  docusate sodium, 100 mg, Nasogastric, BID  enzalutamide, 160 mg, Oral, Daily  famotidine, 20 mg, Intravenous, Once  famotidine, 20 mg, Oral, Once  hydrocortisone, 25 mg, Rectal, BID  lactulose, 300 mL, Rectal, Once  methylnaltrexone, 12 mg, Subcutaneous, Every Other Day  metoclopramide, 10 mg, Intravenous, Q6H  metoprolol  tartrate, 25 mg, Nasogastric, Q12H  montelukast, 10 mg, Nasogastric, Nightly  nicotine, 1 patch, Transdermal, Q24H  pantoprazole, 40 mg, Intravenous, Q AM  piperacillin-tazobactam, 3.375 g, Intravenous, Q8H  ProSource No Carb, 30 mL, Per J Tube, TID  senna-docusate sodium, 2 tablet, Nasogastric, BID  sodium chloride, 10 mL, Intravenous, Q12H  sodium chloride, 10 mL, Intravenous, Q12H      Continuous Infusions:     PRN Meds:.  acetaminophen **OR** acetaminophen **OR** acetaminophen    senna-docusate sodium **AND** polyethylene glycol **AND** bisacodyl **AND** bisacodyl    Calcium Replacement - Follow Nurse / BPA Driven Protocol    HYDROmorphone    hydrOXYzine    ipratropium-albuterol    lidocaine PF 1%    Magnesium Standard Dose Replacement - Follow Nurse / BPA Driven Protocol    melatonin    midazolam    naloxone    ondansetron    oxyCODONE-acetaminophen    phenylephrine-mineral oil-petrolatum    Phosphorus Replacement - Follow Nurse / BPA Driven Protocol    Potassium Replacement - Follow Nurse / BPA Driven Protocol    prochlorperazine    simethicone    sodium chloride    sodium chloride    sodium chloride    sodium chloride    Assessment & Plan   Assessment & Plan     Active Hospital Problems    Diagnosis  POA    **Acute gallstone pancreatitis [K85.10]  Yes    Elevated serum creatinine [R79.89]  Yes    Personal history of transient ischemic attack (TIA) [Z86.73]  Not Applicable    Adenocarcinoma of prostate [C61]  Yes    Gastroesophageal reflux disease [K21.9]  Yes    Hyperlipidemia [E78.5]  Yes    Tobacco abuse [Z72.0]  Yes      Resolved Hospital Problems   No resolved problems to display.        Brief Hospital Course to date:  Ta Madison is a 63 y.o. male with PMH of GERD, HLD, hx of TIA, advanced prostate cancer on Xtandi, occasional marijuna use, and chronic 1 PPD tobacco use who initially presented to [place] [facility] 7/23/23 with complaints of severe epigastric abdominal pain with associated nausea and  vomiting. CT A/P at OSH showed acute pancreatitis with 2 mm stone in the proximal duodenum. Transferred to [facility] for GI evaluation.     This patient's problems and plans were partially entered by my partner and updated as appropriate by me 08/03/23.    Assessment/plan:  Patient is new to me today     Acute gallstone pancreatitis  Leukocytosis  Lactic acidosis  -CT reviewed, possibly necrosis, possible forming pseudocyst  -marked leukocytosis, improving   -transitioned from merrem-->zosyn  -ID follows  -GI/surgery following, recommending NJ feeds, placed 7/31  -IVF  -AM labs     Edema/upper abdomen to toes.  --Intravascularly appearing more dry with increasing bleeding right tachycardia.  --Elevate scrotum  --Complicated case with elevated creatinine as well.  --We will give albumin x1 today.  --Check a.m. labs     SOA  -better, on RA  -IS/pulmonary toilet     Elevated Creatinine  Platelets  -better/resolved after IVF  --Albumin as above  -- A.m. labs    Prostate cancer  -Continue Xtandi oral chemotherapy (patient supplied), however patient having difficulty with swallowing this  -Partner prior attempted to reach out to his oncologist to make sure holding for period of time would be ok.  Now swallowing pills okay.     HLD  -statin     GERD  -PPI     Hx of TIA  -resumed ASA  -Continue statin     Tobacco abuse  -Counseled on cessation  -Nicotine patch     Expected Discharge Location and Transportation: home is rehab pending medically ready and PT/OT recommendations  Expected Discharge   Expected Discharge Date: 8/6/2023; Expected Discharge Time:       DVT prophylaxis:  Mechanical DVT prophylaxis orders are present.     AM-PAC 6 Clicks Score (PT): 23 (08/03/23 0800)    CODE STATUS:   Code Status and Medical Interventions:   Ordered at: 07/23/23 2225     Code Status (Patient has no pulse and is not breathing):    CPR (Attempt to Resuscitate)     Medical Interventions (Patient has pulse or is breathing):    Full Support      Release to patient:    Routine Release       Aletha hOara, APRN  08/03/23

## 2023-08-03 NOTE — PLAN OF CARE
Problem: Adult Inpatient Plan of Care  Goal: Plan of Care Review  Outcome: Ongoing, Progressing  Flowsheets  Taken 8/3/2023 0517 by Rina Dill RN  Progress: improving  Outcome Evaluation: VSS stable throughout shift. Patient complaints of pain medicated per PRN MAR. No acute events during shift.  Taken 8/1/2023 1122 by Saima Carmona PT  Plan of Care Reviewed With:   patient   spouse  Goal: Patient-Specific Goal (Individualized)  Outcome: Ongoing, Progressing  Goal: Absence of Hospital-Acquired Illness or Injury  Outcome: Ongoing, Progressing  Intervention: Identify and Manage Fall Risk  Recent Flowsheet Documentation  Taken 8/3/2023 0211 by Rina Dill RN  Safety Promotion/Fall Prevention:   activity supervised   assistive device/personal items within reach   clutter free environment maintained   nonskid shoes/slippers when out of bed   room organization consistent   safety round/check completed  Taken 8/3/2023 0019 by Rina Dill RN  Safety Promotion/Fall Prevention:   activity supervised   assistive device/personal items within reach   clutter free environment maintained   nonskid shoes/slippers when out of bed   room organization consistent   safety round/check completed  Taken 8/2/2023 2205 by Rina Dill RN  Safety Promotion/Fall Prevention:   activity supervised   assistive device/personal items within reach   clutter free environment maintained   nonskid shoes/slippers when out of bed   room organization consistent   safety round/check completed  Taken 8/2/2023 2000 by Rina Dill RN  Safety Promotion/Fall Prevention:   activity supervised   assistive device/personal items within reach   clutter free environment maintained   nonskid shoes/slippers when out of bed   room organization consistent   safety round/check completed  Intervention: Prevent Skin Injury  Recent Flowsheet Documentation  Taken 8/3/2023 0211 by Rina Dill RN  Body Position: position  changed independently  Skin Protection:   adhesive use limited   transparent dressing maintained   tubing/devices free from skin contact  Taken 8/3/2023 0019 by Rina Dill RN  Body Position: position changed independently  Skin Protection:   adhesive use limited   transparent dressing maintained   tubing/devices free from skin contact  Taken 8/2/2023 2205 by Rina Dill RN  Body Position: position changed independently  Skin Protection:   adhesive use limited   transparent dressing maintained   tubing/devices free from skin contact  Taken 8/2/2023 2000 by Rina Dill RN  Body Position: position changed independently  Skin Protection:   adhesive use limited   transparent dressing maintained   tubing/devices free from skin contact  Intervention: Prevent and Manage VTE (Venous Thromboembolism) Risk  Recent Flowsheet Documentation  Taken 8/3/2023 0211 by Rina Dill RN  Activity Management:   up ad chris   activity encouraged  Taken 8/3/2023 0019 by Rina Dill RN  Activity Management:   up ad chris   activity encouraged  Taken 8/2/2023 2205 by Rina Dill RN  Activity Management:   up ad chris   activity encouraged  Taken 8/2/2023 2000 by Rina Dill RN  Activity Management:   up ad chris   activity encouraged  VTE Prevention/Management:   bilateral   sequential compression devices off  Range of Motion: active ROM (range of motion) encouraged  Intervention: Prevent Infection  Recent Flowsheet Documentation  Taken 8/3/2023 0211 by Rina Dill RN  Infection Prevention:   environmental surveillance performed   hand hygiene promoted   rest/sleep promoted  Taken 8/3/2023 0019 by Rina Dill RN  Infection Prevention:   environmental surveillance performed   hand hygiene promoted   rest/sleep promoted  Taken 8/2/2023 2205 by Rina Dill RN  Infection Prevention:   environmental surveillance performed   hand hygiene promoted   rest/sleep  promoted  Taken 8/2/2023 2000 by Rina Dill RN  Infection Prevention:   environmental surveillance performed   hand hygiene promoted   rest/sleep promoted  Goal: Optimal Comfort and Wellbeing  Outcome: Ongoing, Progressing  Intervention: Monitor Pain and Promote Comfort  Recent Flowsheet Documentation  Taken 8/3/2023 0354 by Rina Dill RN  Pain Management Interventions: see MAR  Taken 8/3/2023 0141 by Rina Dill RN  Pain Management Interventions: see MAR  Taken 8/2/2023 1929 by Rina Dill RN  Pain Management Interventions: see MAR  Intervention: Provide Person-Centered Care  Recent Flowsheet Documentation  Taken 8/2/2023 2000 by Rina Dill RN  Trust Relationship/Rapport:   care explained   choices provided   thoughts/feelings acknowledged  Goal: Readiness for Transition of Care  Outcome: Ongoing, Progressing     Problem: Hypertension Comorbidity  Goal: Blood Pressure in Desired Range  Outcome: Ongoing, Progressing  Intervention: Maintain Blood Pressure Management  Recent Flowsheet Documentation  Taken 8/2/2023 2000 by Rina Dill RN  Medication Review/Management: medications reviewed     Problem: Skin Injury Risk Increased  Goal: Skin Health and Integrity  Outcome: Ongoing, Progressing  Intervention: Optimize Skin Protection  Recent Flowsheet Documentation  Taken 8/3/2023 0211 by Rina Dill RN  Pressure Reduction Techniques: frequent weight shift encouraged  Head of Bed (HOB) Positioning: HOB elevated  Pressure Reduction Devices:   pressure-redistributing mattress utilized   positioning supports utilized  Skin Protection:   adhesive use limited   transparent dressing maintained   tubing/devices free from skin contact  Taken 8/3/2023 0019 by Rina Dill RN  Pressure Reduction Techniques: frequent weight shift encouraged  Head of Bed (HOB) Positioning: HOB elevated  Pressure Reduction Devices:   positioning supports utilized    pressure-redistributing mattress utilized  Skin Protection:   adhesive use limited   transparent dressing maintained   tubing/devices free from skin contact  Taken 8/2/2023 2205 by Rina Dill RN  Pressure Reduction Techniques: frequent weight shift encouraged  Head of Bed (HOB) Positioning: HOB elevated  Pressure Reduction Devices:   pressure-redistributing mattress utilized   positioning supports utilized  Skin Protection:   adhesive use limited   transparent dressing maintained   tubing/devices free from skin contact  Taken 8/2/2023 2000 by Rina Dill RN  Pressure Reduction Techniques: frequent weight shift encouraged  Head of Bed (HOB) Positioning: HOB elevated  Pressure Reduction Devices:   positioning supports utilized   pressure-redistributing mattress utilized  Skin Protection:   adhesive use limited   transparent dressing maintained   tubing/devices free from skin contact     Problem: Adjustment to Illness (Sepsis/Septic Shock)  Goal: Optimal Coping  Outcome: Ongoing, Progressing  Intervention: Optimize Psychosocial Adjustment to Illness  Recent Flowsheet Documentation  Taken 8/2/2023 2000 by Rina Dill RN  Family/Support System Care: support provided     Problem: Bleeding (Sepsis/Septic Shock)  Goal: Absence of Bleeding  Outcome: Ongoing, Progressing     Problem: Glycemic Control Impaired (Sepsis/Septic Shock)  Goal: Blood Glucose Level Within Desired Range  Outcome: Ongoing, Progressing  Intervention: Optimize Glycemic Control  Recent Flowsheet Documentation  Taken 8/2/2023 2000 by Rina Dill RN  Glycemic Management: oral hydration promoted     Problem: Infection Progression (Sepsis/Septic Shock)  Goal: Absence of Infection Signs and Symptoms  Outcome: Ongoing, Progressing  Intervention: Initiate Sepsis Management  Recent Flowsheet Documentation  Taken 8/3/2023 0211 by Rina Dill RN  Infection Prevention:   environmental surveillance performed   hand  hygiene promoted   rest/sleep promoted  Taken 8/3/2023 0019 by Rina Dill RN  Infection Prevention:   environmental surveillance performed   hand hygiene promoted   rest/sleep promoted  Taken 8/2/2023 2205 by Rina Dill RN  Infection Prevention:   environmental surveillance performed   hand hygiene promoted   rest/sleep promoted  Taken 8/2/2023 2000 by Rina Dill RN  Infection Prevention:   environmental surveillance performed   hand hygiene promoted   rest/sleep promoted  Intervention: Promote Recovery  Recent Flowsheet Documentation  Taken 8/3/2023 0211 by Rina Dill RN  Activity Management:   up ad chris   activity encouraged  Taken 8/3/2023 0019 by Rina Dill RN  Activity Management:   up ad chris   activity encouraged  Taken 8/2/2023 2205 by Rina Dill RN  Activity Management:   up ad chris   activity encouraged  Taken 8/2/2023 2000 by Rina Dill RN  Activity Management:   up ad chris   activity encouraged  Sleep/Rest Enhancement:   awakenings minimized   regular sleep/rest pattern promoted   relaxation techniques promoted   room darkened  Intervention: Promote Stabilization  Recent Flowsheet Documentation  Taken 8/2/2023 2000 by Rina Dill RN  Fluid/Electrolyte Management: fluids provided     Problem: Nutrition Impaired (Sepsis/Septic Shock)  Goal: Optimal Nutrition Intake  Outcome: Ongoing, Progressing     Problem: Fluid Imbalance (Pancreatitis)  Goal: Fluid Balance  Outcome: Ongoing, Progressing  Intervention: Monitor and Manage Fluid Balance  Recent Flowsheet Documentation  Taken 8/2/2023 2000 by Rina Dill RN  Fluid/Electrolyte Management: fluids provided     Problem: Infection (Pancreatitis)  Goal: Infection Symptom Resolution  Outcome: Ongoing, Progressing     Problem: Nutrition Impaired (Pancreatitis)  Goal: Optimal Nutrition Intake  Outcome: Ongoing, Progressing     Problem: Pain (Pancreatitis)  Goal: Acceptable Pain  Control  Outcome: Ongoing, Progressing  Intervention: Monitor and Manage Pain  Recent Flowsheet Documentation  Taken 8/3/2023 0354 by Rina Dill, RN  Pain Management Interventions: see MAR  Taken 8/3/2023 0141 by Rina Dill RN  Pain Management Interventions: see MAR  Taken 8/2/2023 2000 by Rina Dill, RN  Sensory Stimulation Regulation: care clustered  Diversional Activities:   television   smartphone  Sleep/Rest Enhancement:   awakenings minimized   regular sleep/rest pattern promoted   relaxation techniques promoted   room darkened  Taken 8/2/2023 1929 by Rina Dill, RN  Pain Management Interventions: see MAR   Goal Outcome Evaluation:  Plan of Care Reviewed With: patient, spouse        Progress: improving  Outcome Evaluation: VSS stable throughout shift. Patient complaints of pain medicated per PRN MAR. No acute events during shift.

## 2023-08-03 NOTE — PROGRESS NOTES
INFECTIOUS DISEASE Progress Note    Ta Madison  1960  [id number]      Admission Date: 7/23/2023      Requesting Provider: Carroll Bobo DO  Evaluating Physician: Palomo Orellana MD    Reason for Consultation: Severe  pancreatitis    History of present illness:    7/28/23:Patient is a 63 y.o. male with h/o TIA, ongoing tobacco abuse, GERD, HLD, occasional marijuana use, and advanced prostate cancer/on Xtandi who we were asked to see for necrotizing pancreatitis.  The patient presented to [facility] ED on 7/23 with severe upper abdominal pain that radiated to his back about 3 hours PTA.  He had associated nausea and vomiting.  He has had postnasal drip with associated cough for last 3 to 4 weeks.  A CT scan at OSH showed acute gallbladder pancreatitis with a 2 mm stone in proximal duodenem.  He was given a dose of Invanz and transferred to [facility] on 7/23 for further medical management.  On arrival to [facility], the patient developed a Tmax of 100.3 and hypoxia requiring 2 L O2 NC.  He is now on room air.  Labs on arrival were lactic acid 3.6, PCT 0.85, creatinine 1.42, ALT 53, AST 66, bilirubin 0.4, lipase 1255, and WBC 15,000 with 91% neutrophils.  Blood cultures are negative to date.  Repeat blood cultures from 7/28 are pending.   A resp panel PCR was negative. His lactic acid and WBC were normal level on 7/27.  His PCT was 0.43.  A MRSA PCR on 7/28 was positive. His pain level on 7/28 was 7 out of 10. An MRCP on 7/24 showed acute pancreatitis with cholelithiasis without choledocholelithiasis.  It was felt that he may have passed the stone.  His abdomen became more distended and taut last night on 7/27.  A KUB noted possible small bowel ileus vs SBO.  A CT scan with contrast was done on 7/27 and showed extensive necrotizing appearing pancreatitis with fluid replacing nearly the entire pancreatic body with inflammatory changes tracking along entire length of pancreas, mild gallbladder wall thickening with  cholelithiasis (less likely acute cholecystitis), small bilateral pleural effusion, and mildly distended SB loops likely reactive and left likely obstruction.  He was on Zosyn, but was changed Merrem and Vancomycin on 7/28.  ID was asked to evaluate and manage his antibiotic therapy. He complains of persistent abdominal pain and abdominal distention.    7/29/23: He has remained afebrile. His creatinine is 0.58.  His white blood cell count is 9.3. Blood cultures from 7/28 are no growth so far.He has noticed some partial improvement in his abdominal pain and distention.  He denies vomiting.    7/30/2023: Maximum temperature over the last 24 hours is 100.1.  A follow-up CT scan today reveals persistent severe pancreatitis.  His white blood cell count today is 20.5.  His vancomycin random level was 7.6.    7/31/23: His maximum temperature over the last 24 hours is 99.7.White blood cell count today is 24.8. He continues to complain of abdominal distention and abdominal pain.  He denies nausea or vomiting.    8/1/23: Maximum temperature over the last 24 hours is 99.9.Creatinine is 0.48.  White blood cell count is 21.9.  Albumin is 2.4. He continues to require frequent narcotic therapy for abdominal pain.  He denies nausea and vomiting.  He is now on NJ feedings.    8/2/23: He has been afebrile over the last 24 hours. He is tolerating tube feedings. His white blood cell count today is 16.8.  His creatinine is 0.47.  Albumin is 2.2.  He states that his abdominal pain is somewhat improved today.  He denies nausea and vomiting.  He is tolerating tube feedings.    8/3/23: He remains afebrile.  His white blood cell count is now down to 13.7. He denies increased abdominal pain.  He denies nausea and vomiting.    Past Medical History:   Diagnosis Date    Elevated cholesterol     GERD (gastroesophageal reflux disease)     Increased heart rate     Mini stroke     Neck pain     Prostate cancer     Rash     Tobacco abuse        Past  Surgical History:   Procedure Laterality Date    APPENDECTOMY  1971    [facility]     COLONOSCOPY      COLONOSCOPY N/A 1/25/2023    Procedure: COLONOSCOPY;  Surgeon: Mahnaz Caraballo MD;  Location: [facility] OR;  Service: Gastroenterology;  Laterality: N/A;    ENDOSCOPY N/A 7/31/2023    Procedure: ESOPHAGOGASTRODUODENOSCOPY WITH KEOFEED PLACEMENT;  Surgeon: Yolande Eller MD;  Location: [facility] ENDOSCOPY;  Service: Gastroenterology;  Laterality: N/A;       Family History   Problem Relation Age of Onset    Nephrolithiasis Mother     Heart disease Father     Hypertension Father     Kidney disease Father        Social History     Socioeconomic History    Marital status:    Tobacco Use    Smoking status: Every Day     Packs/day: 2.00     Years: 45.00     Pack years: 90.00     Types: Cigarettes    Smokeless tobacco: Never   Vaping Use    Vaping Use: Never used   Substance and Sexual Activity    Alcohol use: No    Drug use: Yes     Types: Marijuana     Comment: occ     Sexual activity: Defer       No Known Allergies      Medication:    Current Facility-Administered Medications:     acetaminophen (TYLENOL) tablet 650 mg, 650 mg, Nasogastric, Q4H PRN **OR** acetaminophen (TYLENOL) 160 MG/5ML solution 650 mg, 650 mg, Nasogastric, Q4H PRN **OR** acetaminophen (TYLENOL) suppository 650 mg, 650 mg, Rectal, Q4H PRN, Corina Johansen, PharmD    albumin human 25 % IV SOLN 50 g, 50 g, Intravenous, Once, Aletha Ohara, EUGENIA    aspirin chewable tablet 81 mg, 81 mg, Nasogastric, Daily, Corina Johansen, PharmD, 81 mg at 08/02/23 0837    atorvastatin (LIPITOR) tablet 20 mg, 20 mg, Nasogastric, Nightly, Curt Barry MD, 20 mg at 08/02/23 2208    sennosides-docusate (PERICOLACE) 8.6-50 MG per tablet 2 tablet, 2 tablet, Nasogastric, BID, 2 tablet at 08/01/23 2007 **AND** polyethylene glycol (MIRALAX) packet 17 g, 17 g, Oral, Daily PRN **AND** bisacodyl (DULCOLAX) EC tablet 5 mg, 5 mg, Oral, Daily PRN  **AND** bisacodyl (DULCOLAX) suppository 10 mg, 10 mg, Rectal, Daily PRN, Curt Barry MD    bisacodyl (DULCOLAX) suppository 10 mg, 10 mg, Rectal, Q8H, Christos Mijares MD, 10 mg at 07/30/23 0849    Calcium Replacement - Follow Nurse / BPA Driven Protocol, , Does not apply, PRN, Curt Barry MD    docusate sodium (COLACE) liquid 100 mg, 100 mg, Nasogastric, BID, Christos Mijares MD, 100 mg at 08/01/23 2151    enzalutamide (XTANDI) chemo capsule 160 mg (patient supplied medication), 160 mg, Oral, Daily, Corina Johansen, PharmD, 160 mg at 08/02/23 1704    famotidine (PEPCID) injection 20 mg, 20 mg, Intravenous, Once, Anurag Sloan Jr., MD    famotidine (PEPCID) tablet 20 mg, 20 mg, Oral, Once, Anurag Sloan Jr., MD    hydrocortisone (ANUSOL-HC) suppository 25 mg, 25 mg, Rectal, BID, Jagdeep Quintero APRN, 25 mg at 07/30/23 0849    HYDROmorphone (DILAUDID) injection 1 mg, 1 mg, Intravenous, Q2H PRN, Maddison Yeung MD, 1 mg at 08/03/23 0617    hydrOXYzine (ATARAX) tablet 50 mg, 50 mg, Nasogastric, TID PRN, Curt Barry MD    ipratropium-albuterol (DUO-NEB) nebulizer solution 3 mL, 3 mL, Nebulization, Q4H PRN, Curt Barry MD, 3 mL at 07/30/23 1144    lactulose (CHRONULAC) solution for enema 300 mL, 300 mL, Rectal, Once, Christos Mijares MD    lidocaine PF 1% (XYLOCAINE) injection 0.5 mL, 0.5 mL, Injection, Once PRN, Anurag Sloan Jr., MD    Magnesium Standard Dose Replacement - Follow Nurse / BPA Driven Protocol, , Does not apply, PRN, Curt Barry MD    melatonin tablet 5 mg, 5 mg, Nasogastric, Nightly PRN, Curt Barry MD    methylnaltrexone (RELISTOR) injection 12 mg, 12 mg, Subcutaneous, Every Other Day, Derrek Tony MD, 12 mg at 08/01/23 0837    metoclopramide (REGLAN) injection 10 mg, 10 mg, Intravenous, Q6H, Christos Mijares MD, 10 mg at 08/03/23 0316    metoprolol tartrate (LOPRESSOR) tablet 25 mg, 25 mg, Nasogastric, Q12H, Marija Smallwood MD, 25 mg at  08/02/23 2208    midazolam (VERSED) injection 1 mg, 1 mg, Intravenous, Q10 Min PRN, Anurag Sloan Jr., MD    montelukast (SINGULAIR) tablet 10 mg, 10 mg, Nasogastric, Nightly, Curt Barry MD, 10 mg at 08/02/23 2208    naloxone (NARCAN) injection 0.4 mg, 0.4 mg, Intravenous, PRN, Carroll Bobo, DO    nicotine (NICODERM CQ) 21 MG/24HR patch 1 patch, 1 patch, Transdermal, Q24H, Carroll Bobo, DO, 1 patch at 08/02/23 0900    ondansetron (ZOFRAN) injection 4 mg, 4 mg, Intravenous, Q6H PRN, Bharat Montana APRN, 4 mg at 07/24/23 1113    oxyCODONE-acetaminophen (PERCOCET) 7.5-325 MG per tablet 1 tablet, 1 tablet, Nasogastric, Q6H PRN, Aletha Ohara APRN    pantoprazole (PROTONIX) injection 40 mg, 40 mg, Intravenous, Q AM, Marija Smallwood MD, 40 mg at 08/02/23 0657    phenylephrine-mineral oil-petrolatum (PREPARATION H) 0.25-14-74.9 % hemorhoidal ointment, , Rectal, BID PRN, Jagdeep Quintero APRN    Phosphorus Replacement - Follow Nurse / BPA Driven Protocol, , Does not apply, PRN, Curt Barry MD    piperacillin-tazobactam (ZOSYN) 3.375 g in iso-osmotic dextrose 50 ml (premix), 3.375 g, Intravenous, Q8H, Palomo Orellana MD, 3.375 g at 08/03/23 0022    potassium chloride (KLOR-CON) packet 40 mEq, 40 mEq, Oral, Q4H, Marija Smallwood MD, 40 mEq at 08/03/23 0617    Potassium Replacement - Follow Nurse / BPA Driven Protocol, , Does not apply, PRN, Curt Barry MD    prochlorperazine (COMPAZINE) injection 5 mg, 5 mg, Intravenous, Q3H PRN, Carroll Bobo DO, 5 mg at 07/24/23 1635    ProSource No Carb oral solution 30 mL, 30 mL, Per J Tube, TID, Minerva Carrera, RD, 30 mL at 08/02/23 2208    simethicone (MYLICON) chewable tablet 80 mg, 80 mg, Nasogastric, 4x Daily PRN, Curt Barry MD    sodium chloride 0.9 % flush 10 mL, 10 mL, Intravenous, Q12H, Carroll Bobo, , 10 mL at 08/02/23 2211    sodium chloride 0.9 % flush 10 mL, 10 mL, Intravenous, PRN, Carroll Bobo,     sodium  chloride 0.9 % flush 10 mL, 10 mL, Intravenous, Q12H, Anurag Sloan Jr., MD, 10 mL at 23 2210    sodium chloride 0.9 % flush 10 mL, 10 mL, Intravenous, PRN, Anurag Sloan Jr., MD    sodium chloride 0.9 % infusion 40 mL, 40 mL, Intravenous, PRN, Carroll Bobo DO    sodium chloride 0.9 % infusion 40 mL, 40 mL, Intravenous, PRN, Anurag Sloan Jr., MD    Antibiotics:  Anti-Infectives (From admission, onward)      Ordered     Dose/Rate Route Frequency Start Stop    23 1437  piperacillin-tazobactam (ZOSYN) 3.375 g in iso-osmotic dextrose 50 ml (premix)        Ordering Provider: Palomo Orellana MD    3.375 g  over 4 Hours Intravenous Every 8 Hours 23 1600 23 1559    23 2329  meropenem (MERREM) 1000 mg/100 mL 0.9% NS (mbp)        Ordering Provider: Carroll Bobo DO    1,000 mg  over 30 Minutes Intravenous Once 23 0000 23 0112              Review of Systems:  The HPI      Physical Exam:   Vital Signs  Temp (24hrs), Av.5 øF (36.9 øC), Min:98.2 øF (36.8 øC), Max:99.3 øF (37.4 øC)    Temp  Min: 98.2 øF (36.8 øC)  Max: 99.3 øF (37.4 øC)  BP  Min: 146/73  Max: 183/80  Pulse  Min: 103  Max: 126  Resp  Min: 16  Max: 18  SpO2  Min: 92 %  Max: 97 %    GENERAL: Awake and alert, in no acute distress. Nasojejunal tube is in place.  HEENT: Normocephalic, atraumatic.  PERRL. EOMI. No conjunctival injection. No icterus. No labial ulcers  NECK: Supple   HEART: RRR; No murmur, rubs, gallops.   LUNGS: Clear to auscultation bilaterally without wheezing, rales, rhonchi. Normal respiratory effort.   ABDOMEN: He has continued abdominal distention with mild diffuse abdominal tenderness.  EXT:  No cyanosis, clubbing or edema. No cord.  :  Without Jorgensen catheter.  MSK: No joint effusions or erythema  SKIN: Warm and dry without cutaneous eruptions on Inspection/palpation.    NEURO: Oriented to PPT.  Motor 5/5 strength  PSYCHIATRIC: Normal insight and judgment. Cooperative with  PE    Laboratory Data    Results from last 7 days   Lab Units 08/03/23  0355 08/02/23  0340 08/01/23  0312   WBC 10*3/mm3 13.74* 16.84* 21.89*   HEMOGLOBIN g/dL 8.0* 8.2* 8.7*   HEMATOCRIT % 25.4* 25.8* 27.4*   PLATELETS 10*3/mm3 731* 674* 641*       Results from last 7 days   Lab Units 08/03/23  0355   SODIUM mmol/L 139   POTASSIUM mmol/L 3.5   CHLORIDE mmol/L 103   CO2 mmol/L 26.0   BUN mg/dL 15   CREATININE mg/dL 0.44*   GLUCOSE mg/dL 122*   CALCIUM mg/dL 7.9*       Results from last 7 days   Lab Units 08/03/23  0355   ALK PHOS U/L 113   BILIRUBIN mg/dL 0.3   ALT (SGPT) U/L 11   AST (SGOT) U/L 20               Results from last 7 days   Lab Units 07/27/23  2203   LACTATE mmol/L 1.2           Results from last 7 days   Lab Units 07/30/23  0914   VANCOMYCIN RM mcg/mL 7.60       Estimated Creatinine Clearance: 197 mL/min (A) (by C-G formula based on SCr of 0.44 mg/dL (L)).      Microbiology:  Microbiology Results (last 10 days)       Procedure Component Value - Date/Time    MRSA Screen, PCR (Inpatient) - Swab, Nares [[id number]]  (Abnormal) Collected: 07/28/23 0049    Lab Status: Final result Specimen: Swab from Nares Updated: 07/28/23 0950     MRSA PCR Positive    Narrative:      The negative predictive value of this diagnostic test is high and should only be used to consider de-escalating anti-MRSA therapy. A positive result may indicate colonization with MRSA and must be correlated clinically.    Blood Culture - Blood, Arm, Right [[id number]]  (Normal) Collected: 07/28/23 0017    Lab Status: Final result Specimen: Blood from Arm, Right Updated: 08/02/23 0045     Blood Culture No growth at 5 days    Blood Culture - Blood, Arm, Right [[id number]]  (Normal) Collected: 07/28/23 0004    Lab Status: Final result Specimen: Blood from Arm, Right Updated: 08/02/23 0045     Blood Culture No growth at 5 days                  Radiology:  Imaging Results (Last 72 Hours)       Procedure Component Value Units Date/Time    XR  Abdomen KUB [[id number]] Collected: 07/31/23 1007     Updated: 07/31/23 1011    Narrative:      XR ABDOMEN KUB    Date of Exam: 7/31/2023 9:56 AM EDT    Indication: NJ tube placement    Comparison: None available.    Findings/    Impression:      Impression:  Feeding catheter projects over the proximal jejunum. Visualized bowel gas pattern is unremarkable. No acute osseous lesion is seen. Lung bases are unremarkable.    Electronically Signed: Amrit Roth    7/31/2023 10:08 AM EDT    Workstation ID: [id number]        I read his radiographic images reviewed the images with the patient and his family.      Impression:   Severe gallstone pancreatitis-Without overt evidence of severe pancreatic infection/pancreatic abscess. He appears to have passed the gallstone. He continues to have significant pancreatitis.  He is now on intravenous Zosyn  Leukocytosis/neutrophilia-improving.  MRSA nasal colonization  Advance prostate cancer/on Xtandi  Ongoing tobacco abuse  Occasional marijuana use    PLAN/RECOMMENDATIONS:   Nutritional support  Continue intravenous Zosyn      Palomo Orellana MD  8/3/2023  08:22 EDT

## 2023-08-03 NOTE — PROGRESS NOTES
Brief EN Review Note    Patient Name: Ta Madison  Date of Encounter: 08/03/23 11:57 EDT  MRN: [id number]  Admission date: 7/23/2023    Reason for visit: EN review . RD to continue to follow per protocol.     EMR reviewed   Medication reviewed  Labs reviewed     Additional Information Obtained: Tolerating EN at goal. Advanced to CLD diet, to advance as tolerated. RD to monitor diet advancement/PO intake progress to alter regimen as indicated- will continue same regimen at this time.     Current diet: Diet: Liquid Diets; Clear Liquid; Texture: Regular Texture (IDDSI 7); Fluid Consistency: Thin (IDDSI 0)    EN: FiberSource HN  Goal Rate: 70 ml/hr Water Flushes: 10 ml/hr  Modular: Prosource-no carb 3/day  Route: NJ  Tube: Small bore    At goal over: 22Hrs/day    Rx will supply:   Goal Volume 1540 mL/day     Flush Volume 220 mL/day     Energy 2028 Kcal/day 101 % Est Need   Protein 128 g/day 99 % Est Need   Fat 62 g/day 12% Total kcal   Fiber 23 g/day     Water in  EN 1247 mL     Total Water 1467 mL     Meet DRI Yes        Intervention:  Follow treatment plan  Care plan reviewed    Continue EN regimen as above/per order  RD to monitor diet advancement/PO intake progress to alter regimen as indicated    Follow up:   Per protocol      Minerva Carrera RD, [facility]  11:57 EDT  Time: 15 min

## 2023-08-03 NOTE — THERAPY TREATMENT NOTE
Patient Name: Ta Madison  : 1960    MRN: [id number]                              Today's Date: 8/3/2023       Admit Date: 2023    Visit Dx:     ICD-10-CM ICD-9-CM   1. Acute gallstone pancreatitis  K85.10 577.0     574.20   2. Dysphagia, unspecified type  R13.10 787.20     Patient Active Problem List   Diagnosis    Tachycardia    Hyperlipidemia    Multiple allergies    Tobacco abuse    Gastroesophageal reflux disease    Chronic right shoulder pain    Ganglion cyst of dorsum of right wrist    Benign skin cyst    Numbness and tingling of both upper extremities    Right shoulder pain    Adenocarcinoma of prostate    Encounter for screening for malignant neoplasm of colon    Epigastric pain    Acute gallstone pancreatitis    Personal history of transient ischemic attack (TIA)    Elevated serum creatinine     Past Medical History:   Diagnosis Date    Elevated cholesterol     GERD (gastroesophageal reflux disease)     Increased heart rate     Mini stroke     Neck pain     Prostate cancer     Rash     Tobacco abuse      Past Surgical History:   Procedure Laterality Date    APPENDECTOMY      [facility]     COLONOSCOPY      COLONOSCOPY N/A 2023    Procedure: COLONOSCOPY;  Surgeon: Mahnaz Caraballo MD;  Location: [facility];  Service: Gastroenterology;  Laterality: N/A;    ENDOSCOPY N/A 2023    Procedure: ESOPHAGOGASTRODUODENOSCOPY WITH KEOFEED PLACEMENT;  Surgeon: Yolande Eller MD;  Location: [facility] ENDOSCOPY;  Service: Gastroenterology;  Laterality: N/A;      General Information       Row Name 23 1149          Physical Therapy Time and Intention    Document Type therapy note (daily note)  -AE     Mode of Treatment physical therapy  -AE       Row Name 23 1149          General Information    Patient Profile Reviewed yes  -AE     Existing Precautions/Restrictions other (see comments);fall;oxygen therapy device and L/min  NG tube, abd incision, scrotal edema  -AE      Barriers to Rehab medically complex  -AE       Row Name 08/03/23 1149          Cognition    Orientation Status (Cognition) oriented x 4  -AE       Row Name 08/03/23 1149          Safety Issues, Functional Mobility    Safety Issues Affecting Function (Mobility) awareness of need for assistance;safety precaution awareness  -AE     Impairments Affecting Function (Mobility) balance;endurance/activity tolerance;pain;shortness of breath;strength  -AE               User Key  (r) = Recorded By, (t) = Taken By, (c) = Cosigned By      Initials Name Provider Type    AE Antelmo Andersen, PT Physical Therapist                   Mobility       Row Name 08/03/23 1151          Bed Mobility    Bed Mobility supine-sit  -AE     Supine-Sit [place] (Bed Mobility) contact guard  -AE     Assistive Device (Bed Mobility) bed rails;head of bed elevated  -AE     Comment, (Bed Mobility) VCs for hand placement and sequencing. Pt completes bed mobility quickly.  -AE       Row Name 08/03/23 1151          Transfers    Comment, (Transfers) VCs for hand placement and sequencing. Pt required increased cues to sequence RW with STS.  -AE       Row Name 08/03/23 1151          Sit-Stand Transfer    Sit-Stand [place] (Transfers) contact guard;1 person assist;verbal cues  -AE     Assistive Device (Sit-Stand Transfers) walker, front-wheeled  -AE     Comment, (Sit-Stand Transfer) Pt attempting to pull up from RW rather than pushing up from bed for STS.  -AE       Row Name 08/03/23 1151          Gait/Stairs (Locomotion)    [place] Level (Gait) contact guard;1 person assist;verbal cues  -AE     Assistive Device (Gait) walker, front-wheeled  -AE     Distance in Feet (Gait) 200  -AE     Deviations/Abnormal Patterns (Gait) bilateral deviations;loren decreased;gait speed decreased;stride length decreased  -AE     Comment, (Gait/Stairs) Pt demo step through gait pattern with slowed loren, decreased step length, and wide PADMINI. Pt required increased  cues to improve positioning within RW and one standing rest break d/t fatigue. Further distance limited by fatigue.  -AE               User Key  (r) = Recorded By, (t) = Taken By, (c) = Cosigned By      Initials Name Provider Type    AE Antelmo Andersen PT Physical Therapist                   Obj/Interventions       Row Name 08/03/23 1301          Balance    Balance Assessment sitting static balance;sitting dynamic balance;sit to stand dynamic balance;standing static balance;standing dynamic balance  -AE     Static Sitting Balance standby assist  -AE     Dynamic Sitting Balance contact guard  -AE     Position, Sitting Balance unsupported;sitting edge of bed  -AE     Sit to Stand Dynamic Balance contact guard;1-person assist;verbal cues  -AE     Static Standing Balance contact guard  -AE     Dynamic Standing Balance contact guard  -AE     Position/Device Used, Standing Balance supported;walker, front-wheeled  -AE               User Key  (r) = Recorded By, (t) = Taken By, (c) = Cosigned By      Initials Name Provider Type    AE Antelmo Andersen PT Physical Therapist                   Goals/Plan    No documentation.                  Clinical Impression       Row Name 08/03/23 1304          Pain    Pretreatment Pain Rating 7/10  -AE     Posttreatment Pain Rating 7/10  -AE     Pain Location - other (see comments)  scrotum  -AE     Pre/Posttreatment Pain Comment no pain when ambulating  -AE     Pain Intervention(s) Repositioned;Ambulation/increased activity  -AE       Row Name 08/03/23 1300          Plan of Care Review    Plan of Care Reviewed With patient;son  -AE     Progress improving  -AE     Outcome Evaluation Pt continues to be limited by decreased functional endurance and increased pain, limiting independence with mobility. Pt ambulated 200ft with CGA and RW for support. Cues required to improve overall independence. Continue to progress per pt tolerance.  -AE       Row Name 08/03/23 1309          Vital Signs     Pre Systolic BP Rehab 150  -AE     Pre Treatment Diastolic BP 76  -AE     Pretreatment Heart Rate (beats/min) 97  -AE     Posttreatment Heart Rate (beats/min) 95  -AE     Pre SpO2 (%) 97  -AE     O2 Delivery Pre Treatment nasal cannula  -AE     O2 Delivery Intra Treatment room air  -AE     Post SpO2 (%) 96  -AE     O2 Delivery Post Treatment room air  -AE     Pre Patient Position Supine  -AE     Intra Patient Position Standing  -AE     Post Patient Position Sitting  -AE       Row Name 08/03/23 1305          Positioning and Restraints    Pre-Treatment Position in bed  -AE     Post Treatment Position bed  -AE     In Bed notified nsg;sitting EOB;call light within reach;encouraged to call for assist;with family/caregiver;side rails up x2  -AE               User Key  (r) = Recorded By, (t) = Taken By, (c) = Cosigned By      Initials Name Provider Type    Antelmo Joseph, PT Physical Therapist                   Outcome Measures       Row Name 08/03/23 1308 08/03/23 0800       How much help from another person do you currently need...    Turning from your back to your side while in flat bed without using bedrails? 4  -AE 4  -EK    Moving from lying on back to sitting on the side of a flat bed without bedrails? 3  -AE 4  -EK    Moving to and from a bed to a chair (including a wheelchair)? 3  -AE 4  -EK    Standing up from a chair using your arms (e.g., wheelchair, bedside chair)? 3  -AE 4  -EK    Climbing 3-5 steps with a railing? 2  -AE 3  -EK    To walk in hospital room? 3  -AE 4  -EK    AM-PAC 6 Clicks Score (PT) 18  -AE 23  -EK    Highest level of mobility 6 --> Walked 10 steps or more  -AE 7 --> Walked 25 feet or more  -EK      Row Name 08/03/23 1308          Functional Assessment    Outcome Measure Options AM-PAC 6 Clicks Basic Mobility (PT)  -AE               User Key  (r) = Recorded By, (t) = Taken By, (c) = Cosigned By      Initials Name Provider Type    Antelmo Joseph, PT Physical Therapist    CINTHYA Hurt  Mirian TREVINO RN Registered Nurse                                 Physical Therapy Education       Title: PT OT SLP Therapies (Done)       Topic: Physical Therapy (Done)       Point: Mobility training (Done)       Learning Progress Summary             Patient Acceptance, E, VU by AE at 8/3/2023 1114    Acceptance, E,TB, VU by AP at 8/1/2023 1851    Acceptance, E,TB, VU by KW at 7/29/2023 1145    Comment: continued benefit of skilled PT services                         Point: Home exercise program (Done)       Learning Progress Summary             Patient Acceptance, E, VU by AE at 8/3/2023 1114    Acceptance, E,TB, VU by AP at 8/1/2023 1851    Acceptance, E,TB, VU by KW at 7/29/2023 1145    Comment: continued benefit of skilled PT services                         Point: Body mechanics (Done)       Learning Progress Summary             Patient Acceptance, E, VU by AE at 8/3/2023 1114    Acceptance, E,TB, VU by AP at 8/1/2023 1851    Acceptance, E,TB, VU by KW at 7/29/2023 1145    Comment: continued benefit of skilled PT services                         Point: Precautions (Done)       Learning Progress Summary             Patient Acceptance, E, VU by AE at 8/3/2023 1114    Acceptance, E,TB, VU by AP at 8/1/2023 1851    Acceptance, E,TB, VU by KW at 7/29/2023 1145    Comment: continued benefit of skilled PT services                                         User Key       Initials Effective Dates Name Provider Type Discipline    AE 09/21/21 -  Antelmo Andersen, PT Physical Therapist PT    KW 01/27/22 -  Brittaney Avitia PT Physical Therapist PT    AP 06/01/23 -  Nannette Zuniga, RN Registered Nurse Nurse                  PT Recommendation and Plan     Plan of Care Reviewed With: patient, son  Progress: improving  Outcome Evaluation: Pt continues to be limited by decreased functional endurance and increased pain, limiting independence with mobility. Pt ambulated 200ft with CGA and RW for support. Cues required to improve  overall independence. Continue to progress per pt tolerance.     Time Calculation:         PT Charges       Row Name 08/03/23 1309             Time Calculation    Start Time 1114  -AE      PT Received On 08/03/23  -AE      PT Goal Re-Cert Due Date 08/08/23  -AE         Timed Charges    15275 - PT Therapeutic Activity Minutes 29  -AE         Total Minutes    Timed Charges Total Minutes 29  -AE       Total Minutes 29  -AE                User Key  (r) = Recorded By, (t) = Taken By, (c) = Cosigned By      Initials Name Provider Type    AE Antelmo Andersen, LICHA Physical Therapist                  Therapy Charges for Today       Code Description Service Date Service Provider Modifiers Qty    30961224019 HC PT THERAPEUTIC ACT EA 15 MIN 8/3/2023 Antelmo Andersen PT GP 2            PT G-Codes  Outcome Measure Options: AM-PAC 6 Clicks Basic Mobility (PT)  AM-PAC 6 Clicks Score (PT): 18  PT Discharge Summary  Anticipated Discharge Disposition (PT): home with outpatient therapy services    Antelmo Andersen PT  8/3/2023

## 2023-08-03 NOTE — PLAN OF CARE
Goal Outcome Evaluation:  Plan of Care Reviewed With: patient, son        Progress: improving  Outcome Evaluation: Pt continues to be limited by decreased functional endurance and increased pain, limiting independence with mobility. Pt ambulated 200ft with CGA and RW for support. Cues required to improve overall independence. Continue to progress per pt tolerance.      Anticipated Discharge Disposition (PT): home with outpatient therapy services

## 2023-08-04 ENCOUNTER — APPOINTMENT (OUTPATIENT)
Dept: GENERAL RADIOLOGY | Facility: HOSPITAL | Age: 63
DRG: 438 | End: 2023-08-04
Payer: COMMERCIAL

## 2023-08-04 LAB
ALBUMIN SERPL-MCNC: 2.9 G/DL (ref 3.5–5.2)
ALBUMIN/GLOB SERPL: 1.1 G/DL
ALP SERPL-CCNC: 104 U/L (ref 39–117)
ALT SERPL W P-5'-P-CCNC: 11 U/L (ref 1–41)
ANION GAP SERPL CALCULATED.3IONS-SCNC: 12 MMOL/L (ref 5–15)
AST SERPL-CCNC: 23 U/L (ref 1–40)
BASOPHILS # BLD AUTO: 0.03 10*3/MM3 (ref 0–0.2)
BASOPHILS NFR BLD AUTO: 0.2 % (ref 0–1.5)
BILIRUB SERPL-MCNC: 0.4 MG/DL (ref 0–1.2)
BUN SERPL-MCNC: 17 MG/DL (ref 8–23)
BUN/CREAT SERPL: 38.6 (ref 7–25)
CALCIUM SPEC-SCNC: 8.2 MG/DL (ref 8.6–10.5)
CHLORIDE SERPL-SCNC: 103 MMOL/L (ref 98–107)
CO2 SERPL-SCNC: 23 MMOL/L (ref 22–29)
CREAT SERPL-MCNC: 0.44 MG/DL (ref 0.76–1.27)
D-LACTATE SERPL-SCNC: 1.7 MMOL/L (ref 0.5–2)
DEPRECATED RDW RBC AUTO: 49.9 FL (ref 37–54)
EGFRCR SERPLBLD CKD-EPI 2021: 119.9 ML/MIN/1.73
EOSINOPHIL # BLD AUTO: 0.16 10*3/MM3 (ref 0–0.4)
EOSINOPHIL NFR BLD AUTO: 1 % (ref 0.3–6.2)
ERYTHROCYTE [DISTWIDTH] IN BLOOD BY AUTOMATED COUNT: 17.6 % (ref 12.3–15.4)
FERRITIN SERPL-MCNC: 606.4 NG/ML (ref 30–400)
FOLATE SERPL-MCNC: 8.8 NG/ML (ref 4.78–24.2)
GLOBULIN UR ELPH-MCNC: 2.6 GM/DL
GLUCOSE BLDC GLUCOMTR-MCNC: 131 MG/DL (ref 70–130)
GLUCOSE BLDC GLUCOMTR-MCNC: 133 MG/DL (ref 70–130)
GLUCOSE BLDC GLUCOMTR-MCNC: 139 MG/DL (ref 70–130)
GLUCOSE BLDC GLUCOMTR-MCNC: 142 MG/DL (ref 70–130)
GLUCOSE BLDC GLUCOMTR-MCNC: 99 MG/DL (ref 70–130)
GLUCOSE SERPL-MCNC: 134 MG/DL (ref 65–99)
HCT VFR BLD AUTO: 24.5 % (ref 37.5–51)
HGB BLD-MCNC: 7.7 G/DL (ref 13–17.7)
IMM GRANULOCYTES # BLD AUTO: 0.13 10*3/MM3 (ref 0–0.05)
IMM GRANULOCYTES NFR BLD AUTO: 0.8 % (ref 0–0.5)
IRON 24H UR-MRATE: 13 MCG/DL (ref 59–158)
IRON SATN MFR SERPL: 5 % (ref 20–50)
LIPASE SERPL-CCNC: 23 U/L (ref 13–60)
LYMPHOCYTES # BLD AUTO: 1.41 10*3/MM3 (ref 0.7–3.1)
LYMPHOCYTES NFR BLD AUTO: 8.5 % (ref 19.6–45.3)
MCH RBC QN AUTO: 24.6 PG (ref 26.6–33)
MCHC RBC AUTO-ENTMCNC: 31.4 G/DL (ref 31.5–35.7)
MCV RBC AUTO: 78.3 FL (ref 79–97)
MONOCYTES # BLD AUTO: 0.96 10*3/MM3 (ref 0.1–0.9)
MONOCYTES NFR BLD AUTO: 5.8 % (ref 5–12)
NEUTROPHILS NFR BLD AUTO: 13.88 10*3/MM3 (ref 1.7–7)
NEUTROPHILS NFR BLD AUTO: 83.7 % (ref 42.7–76)
NRBC BLD AUTO-RTO: 0 /100 WBC (ref 0–0.2)
PLATELET # BLD AUTO: 767 10*3/MM3 (ref 140–450)
PMV BLD AUTO: 9.7 FL (ref 6–12)
POTASSIUM SERPL-SCNC: 4 MMOL/L (ref 3.5–5.2)
PROT SERPL-MCNC: 5.5 G/DL (ref 6–8.5)
RBC # BLD AUTO: 3.13 10*6/MM3 (ref 4.14–5.8)
RETICS # AUTO: 0.04 10*6/MM3 (ref 0.02–0.13)
RETICS/RBC NFR AUTO: 1.27 % (ref 0.7–1.9)
SODIUM SERPL-SCNC: 138 MMOL/L (ref 136–145)
TIBC SERPL-MCNC: 237 MCG/DL (ref 298–536)
TRANSFERRIN SERPL-MCNC: 159 MG/DL (ref 200–360)
VIT B12 BLD-MCNC: >2000 PG/ML (ref 211–946)
WBC NRBC COR # BLD: 16.57 10*3/MM3 (ref 3.4–10.8)

## 2023-08-04 PROCEDURE — 94664 DEMO&/EVAL PT USE INHALER: CPT

## 2023-08-04 PROCEDURE — 83605 ASSAY OF LACTIC ACID: CPT | Performed by: NURSE PRACTITIONER

## 2023-08-04 PROCEDURE — 94799 UNLISTED PULMONARY SVC/PX: CPT

## 2023-08-04 PROCEDURE — 82746 ASSAY OF FOLIC ACID SERUM: CPT | Performed by: INTERNAL MEDICINE

## 2023-08-04 PROCEDURE — 71045 X-RAY EXAM CHEST 1 VIEW: CPT

## 2023-08-04 PROCEDURE — 85025 COMPLETE CBC W/AUTO DIFF WBC: CPT | Performed by: NURSE PRACTITIONER

## 2023-08-04 PROCEDURE — 97116 GAIT TRAINING THERAPY: CPT

## 2023-08-04 PROCEDURE — 97110 THERAPEUTIC EXERCISES: CPT

## 2023-08-04 PROCEDURE — 85045 AUTOMATED RETICULOCYTE COUNT: CPT | Performed by: INTERNAL MEDICINE

## 2023-08-04 PROCEDURE — 25010000002 HYDROMORPHONE 1 MG/ML SOLUTION: Performed by: INTERNAL MEDICINE

## 2023-08-04 PROCEDURE — 84466 ASSAY OF TRANSFERRIN: CPT | Performed by: INTERNAL MEDICINE

## 2023-08-04 PROCEDURE — 82607 VITAMIN B-12: CPT | Performed by: INTERNAL MEDICINE

## 2023-08-04 PROCEDURE — 25010000002 PIPERACILLIN SOD-TAZOBACTAM PER 1 G: Performed by: INTERNAL MEDICINE

## 2023-08-04 PROCEDURE — 80053 COMPREHEN METABOLIC PANEL: CPT | Performed by: HOSPITALIST

## 2023-08-04 PROCEDURE — 99233 SBSQ HOSP IP/OBS HIGH 50: CPT | Performed by: INTERNAL MEDICINE

## 2023-08-04 PROCEDURE — 82728 ASSAY OF FERRITIN: CPT | Performed by: INTERNAL MEDICINE

## 2023-08-04 PROCEDURE — 83690 ASSAY OF LIPASE: CPT | Performed by: NURSE PRACTITIONER

## 2023-08-04 PROCEDURE — 25010000002 FUROSEMIDE PER 20 MG: Performed by: INTERNAL MEDICINE

## 2023-08-04 PROCEDURE — 82948 REAGENT STRIP/BLOOD GLUCOSE: CPT

## 2023-08-04 PROCEDURE — 83540 ASSAY OF IRON: CPT | Performed by: INTERNAL MEDICINE

## 2023-08-04 PROCEDURE — 25010000002 METOCLOPRAMIDE PER 10 MG: Performed by: SURGERY

## 2023-08-04 PROCEDURE — 94761 N-INVAS EAR/PLS OXIMETRY MLT: CPT

## 2023-08-04 RX ORDER — FUROSEMIDE 10 MG/ML
40 INJECTION INTRAMUSCULAR; INTRAVENOUS DAILY
Status: DISCONTINUED | OUTPATIENT
Start: 2023-08-04 | End: 2023-08-06

## 2023-08-04 RX ADMIN — Medication 10 ML: at 21:02

## 2023-08-04 RX ADMIN — METOPROLOL TARTRATE 25 MG: 25 TABLET, FILM COATED ORAL at 09:31

## 2023-08-04 RX ADMIN — HYDROMORPHONE HYDROCHLORIDE 1 MG: 1 INJECTION, SOLUTION INTRAMUSCULAR; INTRAVENOUS; SUBCUTANEOUS at 11:42

## 2023-08-04 RX ADMIN — PIPERACILLIN SODIUM AND TAZOBACTAM SODIUM 3.38 G: 3; .375 INJECTION, SOLUTION INTRAVENOUS at 09:27

## 2023-08-04 RX ADMIN — MONTELUKAST 10 MG: 10 TABLET, FILM COATED ORAL at 20:55

## 2023-08-04 RX ADMIN — PIPERACILLIN SODIUM AND TAZOBACTAM SODIUM 3.38 G: 3; .375 INJECTION, SOLUTION INTRAVENOUS at 16:28

## 2023-08-04 RX ADMIN — PIPERACILLIN SODIUM AND TAZOBACTAM SODIUM 3.38 G: 3; .375 INJECTION, SOLUTION INTRAVENOUS at 23:23

## 2023-08-04 RX ADMIN — HYDROMORPHONE HYDROCHLORIDE 1 MG: 1 INJECTION, SOLUTION INTRAMUSCULAR; INTRAVENOUS; SUBCUTANEOUS at 09:53

## 2023-08-04 RX ADMIN — HYDROXYZINE HYDROCHLORIDE 50 MG: 25 TABLET, FILM COATED ORAL at 01:17

## 2023-08-04 RX ADMIN — HYDROMORPHONE HYDROCHLORIDE 1 MG: 1 INJECTION, SOLUTION INTRAMUSCULAR; INTRAVENOUS; SUBCUTANEOUS at 14:30

## 2023-08-04 RX ADMIN — METOCLOPRAMIDE 10 MG: 5 INJECTION, SOLUTION INTRAMUSCULAR; INTRAVENOUS at 15:16

## 2023-08-04 RX ADMIN — IPRATROPIUM BROMIDE AND ALBUTEROL SULFATE 3 ML: .5; 3 SOLUTION RESPIRATORY (INHALATION) at 06:51

## 2023-08-04 RX ADMIN — OXYCODONE HYDROCHLORIDE AND ACETAMINOPHEN 1 TABLET: 7.5; 325 TABLET ORAL at 19:49

## 2023-08-04 RX ADMIN — OXYCODONE HYDROCHLORIDE AND ACETAMINOPHEN 1 TABLET: 7.5; 325 TABLET ORAL at 13:09

## 2023-08-04 RX ADMIN — Medication 1 PATCH: at 09:33

## 2023-08-04 RX ADMIN — FUROSEMIDE 40 MG: 10 INJECTION, SOLUTION INTRAMUSCULAR; INTRAVENOUS at 15:16

## 2023-08-04 RX ADMIN — HYDROMORPHONE HYDROCHLORIDE 1 MG: 1 INJECTION, SOLUTION INTRAMUSCULAR; INTRAVENOUS; SUBCUTANEOUS at 23:23

## 2023-08-04 RX ADMIN — ATORVASTATIN CALCIUM 20 MG: 20 TABLET, FILM COATED ORAL at 20:52

## 2023-08-04 RX ADMIN — ASPIRIN 81 MG: 81 TABLET, CHEWABLE ORAL at 09:31

## 2023-08-04 RX ADMIN — HYDROMORPHONE HYDROCHLORIDE 1 MG: 1 INJECTION, SOLUTION INTRAMUSCULAR; INTRAVENOUS; SUBCUTANEOUS at 04:28

## 2023-08-04 RX ADMIN — METOCLOPRAMIDE 10 MG: 5 INJECTION, SOLUTION INTRAMUSCULAR; INTRAVENOUS at 22:24

## 2023-08-04 RX ADMIN — METOCLOPRAMIDE 10 MG: 5 INJECTION, SOLUTION INTRAMUSCULAR; INTRAVENOUS at 02:25

## 2023-08-04 RX ADMIN — HYDROMORPHONE HYDROCHLORIDE 1 MG: 1 INJECTION, SOLUTION INTRAMUSCULAR; INTRAVENOUS; SUBCUTANEOUS at 16:35

## 2023-08-04 RX ADMIN — HYDROMORPHONE HYDROCHLORIDE 1 MG: 1 INJECTION, SOLUTION INTRAMUSCULAR; INTRAVENOUS; SUBCUTANEOUS at 00:02

## 2023-08-04 RX ADMIN — HYDROMORPHONE HYDROCHLORIDE 1 MG: 1 INJECTION, SOLUTION INTRAMUSCULAR; INTRAVENOUS; SUBCUTANEOUS at 02:25

## 2023-08-04 RX ADMIN — METOPROLOL TARTRATE 25 MG: 25 TABLET, FILM COATED ORAL at 20:55

## 2023-08-04 RX ADMIN — Medication 30 ML: at 20:58

## 2023-08-04 RX ADMIN — Medication 10 ML: at 09:34

## 2023-08-04 RX ADMIN — SENNOSIDES AND DOCUSATE SODIUM 2 TABLET: 50; 8.6 TABLET ORAL at 20:53

## 2023-08-04 RX ADMIN — HYDROMORPHONE HYDROCHLORIDE 1 MG: 1 INJECTION, SOLUTION INTRAMUSCULAR; INTRAVENOUS; SUBCUTANEOUS at 18:26

## 2023-08-04 RX ADMIN — METOCLOPRAMIDE 10 MG: 5 INJECTION, SOLUTION INTRAMUSCULAR; INTRAVENOUS at 09:53

## 2023-08-04 RX ADMIN — HYDROMORPHONE HYDROCHLORIDE 1 MG: 1 INJECTION, SOLUTION INTRAMUSCULAR; INTRAVENOUS; SUBCUTANEOUS at 21:08

## 2023-08-04 RX ADMIN — PIPERACILLIN SODIUM AND TAZOBACTAM SODIUM 3.38 G: 3; .375 INJECTION, SOLUTION INTRAVENOUS at 00:02

## 2023-08-04 RX ADMIN — PANTOPRAZOLE SODIUM 40 MG: 40 INJECTION, POWDER, LYOPHILIZED, FOR SOLUTION INTRAVENOUS at 06:16

## 2023-08-04 RX ADMIN — Medication 30 ML: at 09:34

## 2023-08-04 RX ADMIN — OXYCODONE HYDROCHLORIDE AND ACETAMINOPHEN 1 TABLET: 7.5; 325 TABLET ORAL at 06:15

## 2023-08-04 RX ADMIN — Medication 30 ML: at 15:16

## 2023-08-04 NOTE — PROGRESS NOTES
Clinical Nutrition   Nutrition Assessment  Reason for Visit: Follow-up protocol, EN    Patient Name: Ta Madison  YOB: 1960  MRN: [id number]  Date of Encounter: 08/04/23 13:50 EDT  Admission date: 7/23/2023    Pt tolerating EN well.  Not consuming anything significant on CLD.    Continue current EN regimen per order as tolerated of:    EN: FiberSource HN  Goal Rate: 70 ml/hr    Water Flushes: 10 ml/hr  Modular: Prosource-no carb 3/day  Route: NJ  Tube: Small bore     At goal over: 22Hrs/day     Rx will supply:   Goal Volume 1540 mL/day       Flush Volume 220 mL/day       Energy 2028 Kcal/day 101 % Est Need   Protein 128 g/day 99 % Est Need   Fat 62 g/day 12% Total kcal   Fiber 23 g/day       Water in  EN 1247 mL       Total Water 1467 mL       Meet DRI Yes              If given diet and MD wishes to continue supplemental EN for pancreatic rest- could transition to nocturnal regimen as indicated pending diet advancement/PO intake progress.     RD continues to monitor, please consult if needed over the weekend.     Nutrition Assessment   Admission Diagnosis:  Pancreatitis [K85.90]  Acute gallstone pancreatitis [K85.10]    Problem List:    Acute gallstone pancreatitis    Hyperlipidemia    Tobacco abuse    Gastroesophageal reflux disease    Adenocarcinoma of prostate    Personal history of transient ischemic attack (TIA)    Elevated serum creatinine      PMH:   He  has a past medical history of Elevated cholesterol, GERD (gastroesophageal reflux disease), Increased heart rate, Mini stroke, Neck pain, Prostate cancer, Rash, and Tobacco abuse.    PSH:  He  has a past surgical history that includes Appendectomy (1971); Colonoscopy; Colonoscopy (N/A, 1/25/2023); and Esophagogastroduodenoscopy (N/A, 7/31/2023).    Applicable Nutrition Concerns:   Skin:  Oral:  GI:    Applicable Interval History:   (7/24) MRI Abd. - Impression:  Acute pancreatitis without organized fluid  "collection.  Cholelithiasis without choledocholithiasis.    Reported/Observed/Food/Nutrition Related History:     8/4) Visited with pt and spouse at bedside, pt reports tolerating EN, continues with pancreatitis related bloating and abd. pain but unchanged since EN stared.Tells me taking in minimal fluids, RD encouraged as tolerated. Pt denied further dietary needs or nutritional questions/concerns at this time. Per surgeon note plan to continue CLD to advance as tolerated and continue nutrition support at this time.     8/3) Tolerating EN at goal. Advanced to CLD diet, to advance as tolerated. RD to monitor diet advancement/PO intake progress to alter regimen as indicated- will continue same regimen at this time.      8/1) EN initiated yesterday afternoon via endoscopically placed NJ. Plan for postpancreatic nutrition support for slow to improve pancreatitis. Pt currently tolerating at 40 ml/hr. Educated on s/s tolerance and encouraged to report any issues as EN advances to goal. Pt understands care plan, no questions/concerns at this time. Phos slightly low this am at 2.4, will continue to slowly advance.     7/31) Nutrition consult received for tube feeding assessment. RD previously assessed pt with no significant nutrition concerns as below. Unfortunately has continued with severe pancreatitis with little improvement t/o admit. Also now poor po intakes/poor tolerance. Surgeon recommending NJ placement for pancreatic healing. Endoscopically placed today and MD/surgeon/gastroenterology have discussed plan with pt.     7/27) Visited with pt and son at bedside. Pt reports no significant nutrition concerns PTA. Reports tolerating CLD, agreeable to Breeze while on liquids. Voices understanding education for pancreatitis/low fat diet. Denied further dietary needs/preferences, NKFA.     Anthropometrics     Height: Height: 172.7 cm (68\")  Last Filed Weight: Weight: 97.4 kg (214 lb 10.2 oz) (07/31/23 6725)  Method: Weight " "Method: Standing scale  BMI: BMI (Calculated): 32.6  BMI classification: Obese Class I: 30-34.9kg/m2  IBW:   150 lb    UBW:     Weight      Weight (kg) Weight (lbs) Weight Method   2022 86.818 kg  191 lb 6.4 oz     2023 90.719 kg  200 lb  Stated    2023 85.911 kg  189 lb 6.4 oz     2023 84.732 kg  186 lb 12.8 oz     2023 83.734 kg  184 lb 9.6 oz     2023 81.92 kg  180 lb 9.6 oz     2023 80.74 kg  178 lb     2023 91.717 kg  202 lb 3.2 oz  Bed scale      Weight change: unchanged    Nutrition Focused Physical Exam     Date:         Pt does not meet criteria for malnutrition diagnosis, at this time.      Needs Assessment   Date:     Height used:Height: 172.7 cm (68\")  Weights used: 91 kg ABW, 68 kg IBW    Estimated Calorie needs: ~2000 Kcal/day   Method:  Kcals/KG 25 QNB=1212 18-22 CUS=[id number]  Method:  MSJ 1690X1.2=    Estimated Protein needs: ~130 g PRO/day   Method: 2 g/Kg IBW = 137, 1.2-1.5 ABW = 109-136    Estimated Fluid needs: ~ ml/day   Per clinical status    Current Nutrition Prescription   PO: Diet: Liquid Diets; Clear Liquid; Texture: Regular Texture (IDDSI 7); Fluid Consistency: Thin (IDDSI 0)  Oral Nutrition Supplement:   Intake: Insufficient data    EN: FiberSource HN  Goal Rate: 70 ml/hr  Water Flushes: 10 ml/hr  Modular: Prosource-no carb 3/day  Route: NJ  Tube: Small bore    At goal over: 22Hrs/day    Rx will supply:   Goal Volume 1540 mL/day       Flush Volume 220 mL/day       Energy 2028 Kcal/day 101 % Est Need   Protein 128 g/day 99 % Est Need   Fat 62 g/day 12% Total kcal   Fiber 23 g/day       Water in  EN 1247 mL       Total Water 1467 mL       Meet DRI Yes            --------------------------------------------------------------------------  Product/Rate verified at bedside: Yes  Infusing Rate at time of visit: 70 ml/hr    Average Delivery from [facility] Day:  Volume 1561 mL/day 101  % Goal Vol.   Flush Volume 394 mL/day     Energy  Kcal/day "  % Est Need   Protein  g/day  % Est Need   Fiber  g/day     Water in  EN  mL     Total Water  mL     Meet DRI N/A          Nutrition Diagnosis   Date:  7/27            Updated:  8/4  Problem Altered GI function    Etiology Gallstone pancreatitis   Signs/Symptoms MRI, lipase>1000, abd. pain   Status: Improvement Shown per recent labs, pain improved    Date:   7/27    Updated:   8/4  Problem Inadequate oral intake   Etiology Per clinical status   Signs/Symptoms NPO/CLDx3d   Status: Ongoing receiving EN    Goal:   General: Nutrition support treatment, Nutrition to support treatment  PO: Advace diet as medically feasible/appropriate  EN/PN: Maintain EN    Nutrition Intervention      Follow treatment progress, Care plan reviewed, Advise alternate selection, Advised available snacks, Interview for preferences, Menu provided, Encourage intake     Continue EN per order    If given diet and MD wishes to continue supplemental EN for pancreatic rest will likely transition to the following nocturnal regimen:  Fibersource HN @ 70 ml/hr, FW @ 10 ml/hr X 12 hr. Continue Prosource TID.   =960 ml, 1332 kcal (67% est. needs), 97 g protein (75% est. needs),, 38 g fat, 778 ml FW in formula +120 ml FW vxchtro=678 ml total FW    Monitoring/Evaluation:   Per protocol, I&O, PO intake, Supplement intake, Pertinent labs, Weight, GI status, Symptoms, POC/GOC      Minerva Carrera RD, CNSC  Time Spent: 30m

## 2023-08-04 NOTE — PROGRESS NOTES
"Patient Name:  Ta Madison  YOB: 1960  [id number]    Surgery Progress Note    Date of visit: 8/4/2023      Subjective: No significant changes.  Multiple loose bowel movements overnight.  Denies any nausea or vomiting.  Little appetite and only had 2 small sips of apple juice yesterday.  Ambulated.  Tolerating tube feeds without difficulty.  Still feels bloated and with abdominal pain with movement          Objective:     /72 (BP Location: Left arm, Patient Position: Sitting)   Pulse 101   Temp 98.7 øF (37.1 øC) (Oral)   Resp 16   Ht 172.7 cm (68\")   Wt 97.4 kg (214 lb 10.2 oz)   SpO2 95%   BMI 32.64 kg/mý     Intake/Output Summary (Last 24 hours) at 8/4/2023 0805  Last data filed at 8/4/2023 0600  Gross per 24 hour   Intake 1955 ml   Output --   Net 1955 ml       GEN:   Awake, alert, in no acute distress, resting comfortably in bed   CV:   Regular rate and rhythm  L:  Symmetric expansion, not labored on oxygen by nasal cannula  Abd:  Moderately distended, soft with no rebound or guarding and only diffuse tenderness to deep palpation throughout the abdomen  Ext:  No cyanosis, clubbing, or edema    Recent labs that are back at this time have been reviewed.           Assessment/ Plan:    Mr. Madison is a 63-year-old gentleman with history of GERD, prostate cancer on medical therapy, hyperlipidemia, and TIAs with gallstone pancreatitis      #Severe Gallstone pancreatitis  -Vital signs are stable  -No changes from my standpoint  -Continue clear liquid diet and can advance as tolerated as appetite improves.  Continue nutritional support with nasoenteral feeds  -Continue supportive management. No surgical interventions are planned at this time            Derrek Tony MD  8/4/2023  08:05 EDT      "

## 2023-08-04 NOTE — PLAN OF CARE
Goal Outcome Evaluation:  Plan of Care Reviewed With: patient, spouse        Problem: Adult Inpatient Plan of Care  Goal: Plan of Care Review  Outcome: Ongoing, Progressing  Flowsheets (Taken 8/4/2023 1710)  Progress: improving  Plan of Care Reviewed With:   patient   spouse  Goal: Patient-Specific Goal (Individualized)  Outcome: Ongoing, Progressing  Goal: Absence of Hospital-Acquired Illness or Injury  Outcome: Ongoing, Progressing  Intervention: Identify and Manage Fall Risk  Recent Flowsheet Documentation  Taken 8/4/2023 1600 by Claudia Foster RN  Safety Promotion/Fall Prevention:   safety round/check completed   room organization consistent   nonskid shoes/slippers when out of bed   fall prevention program maintained   clutter free environment maintained   assistive device/personal items within reach   activity supervised  Taken 8/4/2023 1430 by Claudia Foster RN  Safety Promotion/Fall Prevention:   safety round/check completed   room organization consistent   nonskid shoes/slippers when out of bed   fall prevention program maintained   clutter free environment maintained   assistive device/personal items within reach   activity supervised  Taken 8/4/2023 1212 by Claudia Foster RN  Safety Promotion/Fall Prevention:   safety round/check completed   room organization consistent   nonskid shoes/slippers when out of bed   fall prevention program maintained   clutter free environment maintained   assistive device/personal items within reach   activity supervised  Taken 8/4/2023 1023 by Claudia Foster RN  Safety Promotion/Fall Prevention:   safety round/check completed   room organization consistent   nonskid shoes/slippers when out of bed   muscle strengthening facilitated   fall prevention program maintained   assistive device/personal items within reach   activity supervised   clutter free environment maintained  Taken 8/4/2023 0800 by Claudia Foster RN  Safety Promotion/Fall Prevention:    safety round/check completed   room organization consistent   nonskid shoes/slippers when out of bed   muscle strengthening facilitated   fall prevention program maintained   clutter free environment maintained   activity supervised   assistive device/personal items within reach  Intervention: Prevent Skin Injury  Recent Flowsheet Documentation  Taken 8/4/2023 1600 by Claudia Foster RN  Body Position: position changed independently  Taken 8/4/2023 1430 by Claudia Foster RN  Body Position:   position changed independently   supine  Taken 8/4/2023 1212 by Claudia Foster RN  Body Position: supine  Taken 8/4/2023 1023 by Claudia Foster RN  Body Position: position changed independently  Taken 8/4/2023 0800 by Claudia Foster RN  Body Position: position changed independently  Skin Protection: adhesive use limited  Intervention: Prevent and Manage VTE (Venous Thromboembolism) Risk  Recent Flowsheet Documentation  Taken 8/4/2023 1600 by Claudia Foster RN  Activity Management:   ambulated in room   ambulated to bathroom   back to bed  Taken 8/4/2023 1430 by Claudia Foster RN  Activity Management:   ambulated in room   ambulated to bathroom   back to bed  Taken 8/4/2023 1023 by Claudia Foster RN  Activity Management: up ad chris  VTE Prevention/Management:   bilateral   sequential compression devices off  Taken 8/4/2023 0800 by Claudia Foster RN  Activity Management: up ad chris  VTE Prevention/Management:   bilateral   sequential compression devices off  Intervention: Prevent Infection  Recent Flowsheet Documentation  Taken 8/4/2023 0800 by Claudia Foster RN  Infection Prevention:   environmental surveillance performed   hand hygiene promoted  Goal: Optimal Comfort and Wellbeing  Outcome: Ongoing, Progressing  Intervention: Monitor Pain and Promote Comfort  Recent Flowsheet Documentation  Taken 8/4/2023 1635 by Claudia Foster RN  Pain Management Interventions: see MAR  Taken 8/4/2023  1430 by Claudia Foster RN  Pain Management Interventions:   see MAR   quiet environment facilitated  Taken 8/4/2023 1309 by Claudia Foster RN  Pain Management Interventions: see MAR  Taken 8/4/2023 1142 by Claudia Foster RN  Pain Management Interventions: see MAR  Taken 8/4/2023 0953 by Claudia Foster RN  Pain Management Interventions:   see MAR   quiet environment facilitated   pillow support provided   pain management plan reviewed with patient/caregiver  Intervention: Provide Person-Centered Care  Recent Flowsheet Documentation  Taken 8/4/2023 1600 by Claudia Foster RN  Trust Relationship/Rapport:   choices provided   care explained  Taken 8/4/2023 1430 by Claudia Foster RN  Trust Relationship/Rapport:   care explained   choices provided   emotional support provided  Taken 8/4/2023 1023 by Claudia Foster RN  Trust Relationship/Rapport:   care explained   choices provided   emotional support provided  Taken 8/4/2023 0800 by Claudia Foster RN  Trust Relationship/Rapport:   care explained   choices provided   emotional support provided  Goal: Readiness for Transition of Care  Outcome: Ongoing, Progressing     Problem: Hypertension Comorbidity  Goal: Blood Pressure in Desired Range  Outcome: Ongoing, Progressing  Intervention: Maintain Blood Pressure Management  Recent Flowsheet Documentation  Taken 8/4/2023 1430 by Claudia Foster RN  Medication Review/Management: medications reviewed  Taken 8/4/2023 0800 by Claudia Foster RN  Medication Review/Management: medications reviewed     Problem: Skin Injury Risk Increased  Goal: Skin Health and Integrity  Outcome: Ongoing, Progressing  Intervention: Optimize Skin Protection  Recent Flowsheet Documentation  Taken 8/4/2023 1430 by Claudia Foster RN  Head of Bed (HOB) Positioning: HOB at 30-45 degrees  Taken 8/4/2023 1212 by Claudia Foster RN  Head of Bed (HOB) Positioning: HOB at 30 degrees  Taken 8/4/2023 1023 by Cristian  Claudia MARISCAL RN  Head of Bed (HOB) Positioning:   HOB elevated   HOB at 30-45 degrees  Taken 8/4/2023 0800 by Claudia Foster RN  Pressure Reduction Techniques:   frequent weight shift encouraged   pressure points protected  Head of Bed (HOB) Positioning:   HOB elevated   HOB at 30-45 degrees  Skin Protection: adhesive use limited     Problem: Adjustment to Illness (Sepsis/Septic Shock)  Goal: Optimal Coping  Outcome: Ongoing, Progressing  Intervention: Optimize Psychosocial Adjustment to Illness  Recent Flowsheet Documentation  Taken 8/4/2023 1430 by Claudia Foster RN  Family/Support System Care:   involvement promoted   presence promoted   support provided   self-care encouraged  Taken 8/4/2023 1023 by Claudia Foster RN  Supportive Measures: active listening utilized  Taken 8/4/2023 0800 by Claudia Foster RN  Supportive Measures: active listening utilized     Problem: Bleeding (Sepsis/Septic Shock)  Goal: Absence of Bleeding  Outcome: Ongoing, Progressing     Problem: Glycemic Control Impaired (Sepsis/Septic Shock)  Goal: Blood Glucose Level Within Desired Range  Outcome: Ongoing, Progressing  Intervention: Optimize Glycemic Control  Recent Flowsheet Documentation  Taken 8/4/2023 0800 by Claudia Foster RN  Glycemic Management:   blood glucose monitored   oral hydration promoted     Problem: Infection Progression (Sepsis/Septic Shock)  Goal: Absence of Infection Signs and Symptoms  Outcome: Ongoing, Progressing  Intervention: Initiate Sepsis Management  Recent Flowsheet Documentation  Taken 8/4/2023 0800 by Claudia Foster RN  Infection Prevention:   environmental surveillance performed   hand hygiene promoted  Intervention: Promote Recovery  Recent Flowsheet Documentation  Taken 8/4/2023 1600 by Claudia Foster RN  Activity Management:   ambulated in room   ambulated to bathroom   back to bed  Taken 8/4/2023 1430 by Claudia Foster RN  Activity Management:   ambulated in room   ambulated  to bathroom   back to bed  Taken 8/4/2023 1023 by Claudia Foster RN  Activity Management: up ad chris  Sleep/Rest Enhancement: awakenings minimized  Taken 8/4/2023 0800 by Claudia Foster RN  Activity Management: up ad chris  Intervention: Promote Stabilization  Recent Flowsheet Documentation  Taken 8/4/2023 0800 by Claudia Foster RN  Fluid/Electrolyte Management: fluids provided     Problem: Nutrition Impaired (Sepsis/Septic Shock)  Goal: Optimal Nutrition Intake  Outcome: Ongoing, Progressing     Problem: Fluid Imbalance (Pancreatitis)  Goal: Fluid Balance  Outcome: Ongoing, Progressing  Intervention: Monitor and Manage Fluid Balance  Recent Flowsheet Documentation  Taken 8/4/2023 0800 by Claudia Foster RN  Fluid/Electrolyte Management: fluids provided     Problem: Infection (Pancreatitis)  Goal: Infection Symptom Resolution  Outcome: Ongoing, Progressing     Problem: Nutrition Impaired (Pancreatitis)  Goal: Optimal Nutrition Intake  Outcome: Ongoing, Progressing     Problem: Pain (Pancreatitis)  Goal: Acceptable Pain Control  Outcome: Ongoing, Progressing  Intervention: Monitor and Manage Pain  Recent Flowsheet Documentation  Taken 8/4/2023 1635 by Claudia Foster RN  Pain Management Interventions: see MAR  Taken 8/4/2023 1430 by Claudia Foster RN  Pain Management Interventions:   see MAR   quiet environment facilitated  Sensory Stimulation Regulation:   television on   lighting decreased   care clustered   quiet environment promoted  Diversional Activities: television  Taken 8/4/2023 1309 by Claudia Foster RN  Pain Management Interventions: see MAR  Taken 8/4/2023 1142 by Claudia Foster RN  Pain Management Interventions: see MAR  Taken 8/4/2023 1023 by Claudia Foster RN  Sensory Stimulation Regulation:   quiet environment promoted   lighting decreased  Sleep/Rest Enhancement: awakenings minimized  Taken 8/4/2023 0953 by Claudia Foster RN  Pain Management Interventions:   see  MAR   quiet environment facilitated   pillow support provided   pain management plan reviewed with patient/caregiver  Taken 8/4/2023 0800 by Claudia Foster RN  Sensory Stimulation Regulation:   quiet environment promoted   care clustered  Diversional Activities:   smartphone   television     Problem: Fall Injury Risk  Goal: Absence of Fall and Fall-Related Injury  Outcome: Ongoing, Progressing  Intervention: Identify and Manage Contributors  Recent Flowsheet Documentation  Taken 8/4/2023 1430 by Claudia Foster RN  Medication Review/Management: medications reviewed  Taken 8/4/2023 0800 by Claudia Foster RN  Medication Review/Management: medications reviewed  Intervention: Promote Injury-Free Environment  Recent Flowsheet Documentation  Taken 8/4/2023 1600 by Claudia Foster RN  Safety Promotion/Fall Prevention:   safety round/check completed   room organization consistent   nonskid shoes/slippers when out of bed   fall prevention program maintained   clutter free environment maintained   assistive device/personal items within reach   activity supervised  Taken 8/4/2023 1430 by Claudia Foster RN  Safety Promotion/Fall Prevention:   safety round/check completed   room organization consistent   nonskid shoes/slippers when out of bed   fall prevention program maintained   clutter free environment maintained   assistive device/personal items within reach   activity supervised  Taken 8/4/2023 1212 by Claudia Foster RN  Safety Promotion/Fall Prevention:   safety round/check completed   room organization consistent   nonskid shoes/slippers when out of bed   fall prevention program maintained   clutter free environment maintained   assistive device/personal items within reach   activity supervised  Taken 8/4/2023 1023 by Claudia Foster RN  Safety Promotion/Fall Prevention:   safety round/check completed   room organization consistent   nonskid shoes/slippers when out of bed   muscle strengthening  facilitated   fall prevention program maintained   assistive device/personal items within reach   activity supervised   clutter free environment maintained  Taken 8/4/2023 0800 by Claudia Foster RN  Safety Promotion/Fall Prevention:   safety round/check completed   room organization consistent   nonskid shoes/slippers when out of bed   muscle strengthening facilitated   fall prevention program maintained   clutter free environment maintained   activity supervised   assistive device/personal items within reach     Progress: improving   Afebrile, VSS on RA. Ambulating with SB assist. Having loose Bms; refused laxatives today. Denies nausea. Upper abd continues; requests pain meds frequently. IV abx. Appears to be tolerating continuous TF without nausea. Lasix today as ordered. Monitoring I/Os.

## 2023-08-04 NOTE — PLAN OF CARE
Goal Outcome Evaluation:  Plan of Care Reviewed With: (P) patient, spouse        Progress: (P) improving  Outcome Evaluation: (P) Pt demonstrating improvements this date by ambulating 150+150 ft w/ CGA and walker. Pt continues to present below baseline d/t decreased activity tolerance and decreased functional endurance. Pt would continue to benefit from skilled IP PT. Recommend home w/ assist and OP PT at d/c.      Anticipated Discharge Disposition (PT): (P) home with assist, home with outpatient therapy services

## 2023-08-04 NOTE — THERAPY TREATMENT NOTE
Patient Name: Ta Madison  : 1960    MRN: [id number]                              Today's Date: 2023       Admit Date: 2023    Visit Dx:     ICD-10-CM ICD-9-CM   1. Acute gallstone pancreatitis  K85.10 577.0     574.20   2. Dysphagia, unspecified type  R13.10 787.20     Patient Active Problem List   Diagnosis    Tachycardia    Hyperlipidemia    Multiple allergies    Tobacco abuse    Gastroesophageal reflux disease    Chronic right shoulder pain    Ganglion cyst of dorsum of right wrist    Benign skin cyst    Numbness and tingling of both upper extremities    Right shoulder pain    Adenocarcinoma of prostate    Encounter for screening for malignant neoplasm of colon    Epigastric pain    Acute gallstone pancreatitis    Personal history of transient ischemic attack (TIA)    Elevated serum creatinine     Past Medical History:   Diagnosis Date    Elevated cholesterol     GERD (gastroesophageal reflux disease)     Increased heart rate     Mini stroke     Neck pain     Prostate cancer     Rash     Tobacco abuse      Past Surgical History:   Procedure Laterality Date    APPENDECTOMY      [facility]     COLONOSCOPY      COLONOSCOPY N/A 2023    Procedure: COLONOSCOPY;  Surgeon: Mahnaz Caraballo MD;  Location: [facility];  Service: Gastroenterology;  Laterality: N/A;    ENDOSCOPY N/A 2023    Procedure: ESOPHAGOGASTRODUODENOSCOPY WITH KEOFEED PLACEMENT;  Surgeon: Yolande Eller MD;  Location: [facility] ENDOSCOPY;  Service: Gastroenterology;  Laterality: N/A;      General Information       Row Name 23 1413          Physical Therapy Time and Intention    Document Type therapy note (daily note) (P)   -     Mode of Treatment physical therapy (P)   -       Row Name 23 1413          General Information    Patient Profile Reviewed yes (P)   -     Existing Precautions/Restrictions fall;oxygen therapy device and L/min (P)   NG tube, abd incision, scrotal edema  -      Barriers to Rehab medically complex (P)   -[facility] Name 08/04/23 1413          Cognition    Orientation Status (Cognition) oriented x 4 (P)   -[facility] Name 08/04/23 1413          Safety Issues, Functional Mobility    Safety Issues Affecting Function (Mobility) awareness of need for assistance;insight into deficits/self-awareness;safety precaution awareness;impulsivity (P)   -     Impairments Affecting Function (Mobility) balance;endurance/activity tolerance;pain;shortness of breath;strength (P)   -               User Key  (r) = Recorded By, (t) = Taken By, (c) = Cosigned By      Initials Name Provider Type     Rebekah Weir, PT Student PT Student                   Mobility       Row Name 08/04/23 1414          Bed Mobility    Bed Mobility supine-sit (P)   -     Supine-Sit Hardy (Bed Mobility) modified independence (P)   -     Assistive Device (Bed Mobility) bed rails;head of bed elevated (P)   -     Comment, (Bed Mobility) Pt completed supine>sit quickly and prior to cuing (P)   -[facility]       Row Name 08/04/23 1414          Transfers    Comment, (Transfers) STS x 2. Impulsive with mobility and requires cues for safety awareness (P)   -LH       Row Name 08/04/23 1414          Sit-Stand Transfer    Sit-Stand Hardy (Transfers) contact guard;1 person assist;verbal cues (P)   -     Assistive Device (Sit-Stand Transfers) walker, front-wheeled (P)   -     Comment, (Sit-Stand Transfer) STS x 1 from EOB, x1 from toilet. VCs for hand placement and sequencing with walker. (P)   -LH       Row Name 08/04/23 1414          Gait/Stairs (Locomotion)    Hardy Level (Gait) contact guard;1 person assist;verbal cues (P)   -     Assistive Device (Gait) walker, front-wheeled (P)   -     Distance in Feet (Gait) 150+150 (P)   -     Deviations/Abnormal Patterns (Gait) bilateral deviations;base of support, wide;loren decreased;gait speed decreased;stride length decreased (P)   -      Bilateral Gait Deviations forward flexed posture (P)   -     Comment, (Gait/Stairs) VCs for upright posture and proper positioning of walker. Pt ambulates with wide PADMINI and requires cues to place feet inside walker. Pt required 1 standing rest break d/t SOA, spO2 = 96%. Further distance limited by fatigue. (P)   -               User Key  (r) = Recorded By, (t) = Taken By, (c) = Cosigned By      Initials Name Provider Type     Rebekah Weir, PT Student PT Student                   Obj/Interventions       [facility] Name 08/04/23 1419          Motor Skills    Therapeutic Exercise hip;knee;ankle (P)   -[facility] Name 08/04/23 1419          Hip (Therapeutic Exercise)    Hip (Therapeutic Exercise) strengthening exercise (P)   -     Hip Strengthening (Therapeutic Exercise) bilateral;heel slides;supine;15 repititions (P)   -[facility] Name 08/04/23 1419          Knee (Therapeutic Exercise)    Knee (Therapeutic Exercise) strengthening exercise (P)   -     Knee Strengthening (Therapeutic Exercise) bilateral;SLR (straight leg raise);supine;15 repititions (P)   -[facility] Name 08/04/23 1419          Ankle (Therapeutic Exercise)    Ankle (Therapeutic Exercise) AROM (active range of motion) (P)   -     Ankle AROM (Therapeutic Exercise) bilateral;dorsiflexion;plantarflexion;10 repetitions (P)   -[facility] Name 08/04/23 1419          Balance    Balance Assessment sitting static balance;sitting dynamic balance;sit to stand dynamic balance;standing static balance;standing dynamic balance (P)   -     Static Sitting Balance independent (P)   -     Dynamic Sitting Balance standby assist (P)   -     Position, Sitting Balance unsupported;sitting edge of bed (P)   -     Sit to Stand Dynamic Balance contact guard;verbal cues (P)   -     Static Standing Balance contact guard;verbal cues (P)   -     Dynamic Standing Balance contact guard;verbal cues (P)   -     Position/Device Used, Standing Balance  supported;walker, front-wheeled (P)   -     Balance Interventions sitting;standing;sit to stand;static;dynamic;supported (P)   -               User Key  (r) = Recorded By, (t) = Taken By, (c) = Cosigned By      Initials Name Provider Type     Rebekah Weir, PT Student PT Student                   Goals/Plan    No documentation.                  Clinical Impression       [facility] Name 08/04/23 1421          Pain    Pretreatment Pain Rating 0/10 - no pain (P)   -     Posttreatment Pain Rating 0/10 - no pain (P)   -     Pre/Posttreatment Pain Comment Discomfort with bed mobility. No pain w/ ambulation (P)   -[facility] Name 08/04/23 1421          Plan of Care Review    Plan of Care Reviewed With patient;spouse (P)   -     Progress improving (P)   -     Outcome Evaluation Pt demonstrating improvements this date by ambulating 150+150 ft w/ CGA and walker. Pt continues to present below baseline d/t decreased activity tolerance and decreased functional endurance. Pt would continue to benefit from skilled IP PT. Recommend home w/ assist and OP PT at d/c. (P)   -[facility] Name 08/04/23 1421          Vital Signs    Pre Systolic BP Rehab 168 (P)   -     Pre Treatment Diastolic BP 81 (P)   -     Post Systolic BP Rehab 159 (P)   -     Post Treatment Diastolic BP 71 (P)   -     Pretreatment Heart Rate (beats/min) 97 (P)   -     Intratreatment Heart Rate (beats/min) 103 (P)   -     Posttreatment Heart Rate (beats/min) 94 (P)   -     Pre SpO2 (%) 96 (P)   -     O2 Delivery Pre Treatment room air (P)   -     Intra SpO2 (%) 96 (P)   -     O2 Delivery Intra Treatment room air (P)   -     Post SpO2 (%) 97 (P)   -     O2 Delivery Post Treatment room air (P)   -     Pre Patient Position Supine (P)   -     Intra Patient Position Standing (P)   -     Post Patient Position Sitting (P)   -[facility] Name 08/04/23 1421          Positioning and Restraints    Pre-Treatment Position in bed (P)    -     Post Treatment Position chair (P)   -     In Chair reclined;sitting;call light within reach;encouraged to call for assist;with family/caregiver;legs elevated;notified nsg (P)   -               User Key  (r) = Recorded By, (t) = Taken By, (c) = Cosigned By      Initials Name Provider Type     Rebekah Weir, PT Student PT Student                   Outcome Measures       Row Name 08/04/23 1424 08/04/23 0800       How much help from another person do you currently need...    Turning from your back to your side while in flat bed without using bedrails? 4 (P)   - 4  -MC    Moving from lying on back to sitting on the side of a flat bed without bedrails? 4 (P)   - 4  -MC    Moving to and from a bed to a chair (including a wheelchair)? 3 (P)   - 3  -MC    Standing up from a chair using your arms (e.g., wheelchair, bedside chair)? 3 (P)   - 3  -    Climbing 3-5 steps with a railing? 2 (P)   - 3  -    To walk in hospital room? 3 (P)   - 3  -    AM-PAC 6 Clicks Score (PT) 19 (P)   - 20  -MC    Highest level of mobility 6 --> Walked 10 steps or more (P)   - 6 --> Walked 10 steps or more  -      Row Name 08/04/23 1424          Functional Assessment    Outcome Measure Options AM-PAC 6 Clicks Basic Mobility (PT) (P)   -               User Key  (r) = Recorded By, (t) = Taken By, (c) = Cosigned By      Initials Name Provider Type    Claudia Leblanc RN Registered Nurse     Rebekah Weir, PT Student PT Student                                 Physical Therapy Education       Title: PT OT SLP Therapies (Done)       Topic: Physical Therapy (Done)       Point: Mobility training (Done)       Learning Progress Summary             Patient Acceptance, E, VU,NR by  at 8/4/2023 1424    Acceptance, E, VU by AE at 8/3/2023 1114    Acceptance, E,TB, VU by AP at 8/1/2023 1851    Acceptance, E,TB, VU by KW at 7/29/2023 1145    Comment: continued benefit of skilled PT services   Significant Other  Acceptance, E, VU,NR by  at 8/4/2023 1424                         Point: Home exercise program (Done)       Learning Progress Summary             Patient Acceptance, E, VU by AE at 8/3/2023 1114    Acceptance, E,TB, VU by AP at 8/1/2023 1851    Acceptance, E,TB, VU by KW at 7/29/2023 1145    Comment: continued benefit of skilled PT services                         Point: Body mechanics (Done)       Learning Progress Summary             Patient Acceptance, E, VU,NR by  at 8/4/2023 1424    Acceptance, E, VU by AE at 8/3/2023 1114    Acceptance, E,TB, VU by AP at 8/1/2023 1851    Acceptance, E,TB, VU by KW at 7/29/2023 1145    Comment: continued benefit of skilled PT services   Significant Other Acceptance, E, VU,NR by  at 8/4/2023 1424                         Point: Precautions (Done)       Learning Progress Summary             Patient Acceptance, E, VU,NR by  at 8/4/2023 1424    Acceptance, E, VU by AE at 8/3/2023 1114    Acceptance, E,TB, VU by AP at 8/1/2023 1851    Acceptance, E,TB, VU by KW at 7/29/2023 1145    Comment: continued benefit of skilled PT services   Significant Other Acceptance, E, VU,NR by  at 8/4/2023 1424                                         User Key       Initials Effective Dates Name Provider Type Discipline    AE 09/21/21 -  Antelmo Andersen, PT Physical Therapist PT    KW 01/27/22 -  Brittaney Avitia PT Physical Therapist PT     05/15/23 -  Rebekah Weir PT Student PT Student PT    AP 06/01/23 -  Nannette Zuniga, JUDITH Registered Nurse Nurse                  PT Recommendation and Plan     Plan of Care Reviewed With: (P) patient, spouse  Progress: (P) improving  Outcome Evaluation: (P) Pt demonstrating improvements this date by ambulating 150+150 ft w/ CGA and walker. Pt continues to present below baseline d/t decreased activity tolerance and decreased functional endurance. Pt would continue to benefit from skilled IP PT. Recommend home w/ assist and OP PT at d/c.     Time  Calculation:         PT Charges       Row Name 08/04/23 1425             Time Calculation    Start Time 1342 (P)   -      PT Received On 08/04/23 (P)   -      PT Goal Re-Cert Due Date 08/08/23 (P)   -         Timed Charges    74072 - PT Therapeutic Exercise Minutes 10 (P)   -      67669 - Gait Training Minutes  16 (P)   -         Total Minutes    Timed Charges Total Minutes 26 (P)   -       Total Minutes 26 (P)   -                User Key  (r) = Recorded By, (t) = Taken By, (c) = Cosigned By      Initials Name Provider Type     Rebekah Weir, PT Student PT Student                  Therapy Charges for Today       Code Description Service Date Service Provider Modifiers Qty    25304309955 HC PT THER PROC EA 15 MIN 8/4/2023 Rebekah Weir, PT Student GP 1    99685971627 HC GAIT TRAINING EA 15 MIN 8/4/2023 Rebekah Weir, PT Student GP 1            PT G-Codes  Outcome Measure Options: (P) AM-PAC 6 Clicks Basic Mobility (PT)  AM-PAC 6 Clicks Score (PT): (P) 19  PT Discharge Summary  Anticipated Discharge Disposition (PT): (P) home with assist, home with outpatient therapy services    Rebekah Weir PT Student  8/4/2023

## 2023-08-04 NOTE — PROGRESS NOTES
INFECTIOUS DISEASE Progress Note    Ta Madison  1960  [id number]      Admission Date: 7/23/2023      Requesting Provider: Carroll Bobo DO  Evaluating Physician: Palomo Orellana MD    Reason for Consultation: Severe  pancreatitis    History of present illness:    7/28/23:Patient is a 63 y.o. male with h/o TIA, ongoing tobacco abuse, GERD, HLD, occasional marijuana use, and advanced prostate cancer/on Xtandi who we were asked to see for necrotizing pancreatitis.  The patient presented to [facility] ED on 7/23 with severe upper abdominal pain that radiated to his back about 3 hours PTA.  He had associated nausea and vomiting.  He has had postnasal drip with associated cough for last 3 to 4 weeks.  A CT scan at OSH showed acute gallbladder pancreatitis with a 2 mm stone in proximal duodenem.  He was given a dose of Invanz and transferred to [facility] on 7/23 for further medical management.  On arrival to [facility], the patient developed a Tmax of 100.3 and hypoxia requiring 2 L O2 NC.  He is now on room air.  Labs on arrival were lactic acid 3.6, PCT 0.85, creatinine 1.42, ALT 53, AST 66, bilirubin 0.4, lipase 1255, and WBC 15,000 with 91% neutrophils.  Blood cultures are negative to date.  Repeat blood cultures from 7/28 are pending.   A resp panel PCR was negative. His lactic acid and WBC were normal level on 7/27.  His PCT was 0.43.  A MRSA PCR on 7/28 was positive. His pain level on 7/28 was 7 out of 10. An MRCP on 7/24 showed acute pancreatitis with cholelithiasis without choledocholelithiasis.  It was felt that he may have passed the stone.  His abdomen became more distended and taut last night on 7/27.  A KUB noted possible small bowel ileus vs SBO.  A CT scan with contrast was done on 7/27 and showed extensive necrotizing appearing pancreatitis with fluid replacing nearly the entire pancreatic body with inflammatory changes tracking along entire length of pancreas, mild gallbladder wall thickening with  cholelithiasis (less likely acute cholecystitis), small bilateral pleural effusion, and mildly distended SB loops likely reactive and left likely obstruction.  He was on Zosyn, but was changed Merrem and Vancomycin on 7/28.  ID was asked to evaluate and manage his antibiotic therapy. He complains of persistent abdominal pain and abdominal distention.    7/29/23: He has remained afebrile. His creatinine is 0.58.  His white blood cell count is 9.3. Blood cultures from 7/28 are no growth so far.He has noticed some partial improvement in his abdominal pain and distention.  He denies vomiting.    7/30/2023: Maximum temperature over the last 24 hours is 100.1.  A follow-up CT scan today reveals persistent severe pancreatitis.  His white blood cell count today is 20.5.  His vancomycin random level was 7.6.    7/31/23: His maximum temperature over the last 24 hours is 99.7.White blood cell count today is 24.8. He continues to complain of abdominal distention and abdominal pain.  He denies nausea or vomiting.    8/1/23: Maximum temperature over the last 24 hours is 99.9.Creatinine is 0.48.  White blood cell count is 21.9.  Albumin is 2.4. He continues to require frequent narcotic therapy for abdominal pain.  He denies nausea and vomiting.  He is now on [place] feedings.    8/2/23: He has been afebrile over the last 24 hours. He is tolerating tube feedings. His white blood cell count today is 16.8.  His creatinine is 0.47.  Albumin is 2.2.  He states that his abdominal pain is somewhat improved today.  He denies nausea and vomiting.  He is tolerating tube feedings.    8/3/23: He remains afebrile.  His white blood cell count is now down to 13.7. He denies increased abdominal pain.  He denies nausea and vomiting.     8/4/23: He has remained afebrile.His white blood cell count today is 16.6.  He states that he has decreased abdominal pain.  He is tolerating tube feedings.         Past Medical History:   Diagnosis Date    Elevated  cholesterol     GERD (gastroesophageal reflux disease)     Increased heart rate     Mini stroke     Neck pain     Prostate cancer     Rash     Tobacco abuse        Past Surgical History:   Procedure Laterality Date    APPENDECTOMY  1971    [facility]     COLONOSCOPY      COLONOSCOPY N/A 1/25/2023    Procedure: COLONOSCOPY;  Surgeon: Mahnaz Caraballo MD;  Location: [facility] OR;  Service: Gastroenterology;  Laterality: N/A;    ENDOSCOPY N/A 7/31/2023    Procedure: ESOPHAGOGASTRODUODENOSCOPY WITH KEOFEED PLACEMENT;  Surgeon: Yolande Eller MD;  Location:  [facility];  Service: Gastroenterology;  Laterality: N/A;       Family History   Problem Relation Age of Onset    Nephrolithiasis Mother     Heart disease Father     Hypertension Father     Kidney disease Father        Social History     Socioeconomic History    Marital status:    Tobacco Use    Smoking status: Every Day     Packs/day: 2.00     Years: 45.00     Pack years: 90.00     Types: Cigarettes    Smokeless tobacco: Never   Vaping Use    Vaping Use: Never used   Substance and Sexual Activity    Alcohol use: No    Drug use: Yes     Types: Marijuana     Comment: occ     Sexual activity: Defer       No Known Allergies      Medication:    Current Facility-Administered Medications:     acetaminophen (TYLENOL) tablet 650 mg, 650 mg, Nasogastric, Q4H PRN **OR** acetaminophen (TYLENOL) 160 MG/5ML solution 650 mg, 650 mg, Nasogastric, Q4H PRN **OR** acetaminophen (TYLENOL) suppository 650 mg, 650 mg, Rectal, Q4H PRN, Corina Johansen, PharmD    aspirin chewable tablet 81 mg, 81 mg, Nasogastric, Daily, Corina Johansen, PharmD, 81 mg at 08/04/23 0931    atorvastatin (LIPITOR) tablet 20 mg, 20 mg, Nasogastric, Nightly, Curt Barry MD, 20 mg at 08/03/23 2044    sennosides-docusate (PERICOLACE) 8.6-50 MG per tablet 2 tablet, 2 tablet, Nasogastric, BID, 2 tablet at 08/03/23 2044 **AND** polyethylene glycol (MIRALAX) packet 17 g, 17 g, Oral,  Daily PRN **AND** bisacodyl (DULCOLAX) EC tablet 5 mg, 5 mg, Oral, Daily PRN **AND** bisacodyl (DULCOLAX) suppository 10 mg, 10 mg, Rectal, Daily PRN, Curt Barry MD    bisacodyl (DULCOLAX) suppository 10 mg, 10 mg, Rectal, Q8H, Christos Mijares MD, 10 mg at 07/30/23 0849    Calcium Replacement - Follow Nurse / BPA Driven Protocol, , Does not apply, PRN, Curt Barry MD    docusate sodium (COLACE) liquid 100 mg, 100 mg, Nasogastric, BID, Christos Mijares MD, 100 mg at 08/03/23 2045    enzalutamide (XTANDI) chemo capsule 160 mg (patient supplied medication), 160 mg, Oral, Daily, Corina Johansen, PharmD, 160 mg at 08/03/23 1755    famotidine (PEPCID) injection 20 mg, 20 mg, Intravenous, Once, Anurag Sloan Jr., MD    famotidine (PEPCID) tablet 20 mg, 20 mg, Oral, Once, Anurag Sloan Jr., MD    hydrocortisone (ANUSOL-HC) suppository 25 mg, 25 mg, Rectal, BID, Jagdeep Quintero APRN, 25 mg at 08/03/23 2051    HYDROmorphone (DILAUDID) injection 1 mg, 1 mg, Intravenous, Q2H PRN, Maddison Yeung MD, 1 mg at 08/04/23 0953    hydrOXYzine (ATARAX) tablet 50 mg, 50 mg, Nasogastric, TID PRN, Curt Barry MD, 50 mg at 08/04/23 0117    ipratropium-albuterol (DUO-NEB) nebulizer solution 3 mL, 3 mL, Nebulization, Q4H PRN, Curt Barry MD, 3 mL at 08/04/23 0651    lactulose (CHRONULAC) solution for enema 300 mL, 300 mL, Rectal, Once, Christos Mijares MD    lidocaine PF 1% (XYLOCAINE) injection 0.5 mL, 0.5 mL, Injection, Once PRN, Anurag Sloan Jr., MD    Magnesium Standard Dose Replacement - Follow Nurse / BPA Driven Protocol, , Does not apply, PRN, Curt Barry MD    melatonin tablet 5 mg, 5 mg, Nasogastric, Nightly PRN, Curt Barry MD    methylnaltrexone (RELISTOR) injection 12 mg, 12 mg, Subcutaneous, Every Other Day, Derrek Tony MD, 12 mg at 08/03/23 0923    metoclopramide (REGLAN) injection 10 mg, 10 mg, Intravenous, Q6H, Christos Mijares MD, 10 mg at 08/04/23 0953     metoprolol tartrate (LOPRESSOR) tablet 25 mg, 25 mg, Nasogastric, Q12H, Marija Smallwood MD, 25 mg at 08/04/23 0931    midazolam (VERSED) injection 1 mg, 1 mg, Intravenous, Q10 Min PRN, Anurag Sloan Jr., MD    montelukast (SINGULAIR) tablet 10 mg, 10 mg, Nasogastric, Nightly, Curt Barry MD, 10 mg at 08/03/23 2044    naloxone (NARCAN) injection 0.4 mg, 0.4 mg, Intravenous, PRN, Carroll Bobo, DO    nicotine (NICODERM CQ) 21 MG/24HR patch 1 patch, 1 patch, Transdermal, Q24H, Carroll Bobo, , 1 patch at 08/04/23 0933    ondansetron (ZOFRAN) injection 4 mg, 4 mg, Intravenous, Q6H PRN, Bharat Montana, APRN, 4 mg at 07/24/23 1113    oxyCODONE-acetaminophen (PERCOCET) 7.5-325 MG per tablet 1 tablet, 1 tablet, Nasogastric, Q6H PRN, Aletha Ohara, APRN, 1 tablet at 08/04/23 0615    pantoprazole (PROTONIX) injection 40 mg, 40 mg, Intravenous, Q AM, Marija Smallwood MD, 40 mg at 08/04/23 0616    phenylephrine-mineral oil-petrolatum (PREPARATION H) 0.25-14-74.9 % hemorhoidal ointment, , Rectal, BID PRN, Jagdeep Quintero, APRN    Phosphorus Replacement - Follow Nurse / BPA Driven Protocol, , Does not apply, PRN, Crut Barry MD    piperacillin-tazobactam (ZOSYN) 3.375 g in iso-osmotic dextrose 50 ml (premix), 3.375 g, Intravenous, Q8H, Palomo Orellana MD, 3.375 g at 08/04/23 0927    Potassium Replacement - Follow Nurse / BPA Driven Protocol, , Does not apply, PRN, Curt Barry MD    prochlorperazine (COMPAZINE) injection 5 mg, 5 mg, Intravenous, Q3H PRN, Carroll Bobo DO, 5 mg at 07/24/23 1635    ProSource No Carb oral solution 30 mL, 30 mL, Per J Tube, TID, Minerva Carrera, RD, 30 mL at 08/04/23 0934    simethicone (MYLICON) chewable tablet 80 mg, 80 mg, Nasogastric, 4x Daily PRN, Curt Barry MD    sodium chloride 0.9 % flush 10 mL, 10 mL, Intravenous, Q12H, Carroll Bobo DO, 10 mL at 08/04/23 0934    sodium chloride 0.9 % flush 10 mL, 10 mL, Intravenous, PRN, Jihan,  DO Carroll    sodium chloride 0.9 % flush 10 mL, 10 mL, Intravenous, Q12H, Anurag Sloan Jr., MD, 10 mL at 23 0934    sodium chloride 0.9 % flush 10 mL, 10 mL, Intravenous, PRN, Anurag Sloan Jr., MD    sodium chloride 0.9 % infusion 40 mL, 40 mL, Intravenous, PRN, Carroll Bobo DO    sodium chloride 0.9 % infusion 40 mL, 40 mL, Intravenous, PRN, Anurag Sloan Jr., MD    Antibiotics:  Anti-Infectives (From admission, onward)      Ordered     Dose/Rate Route Frequency Start Stop    23 1437  piperacillin-tazobactam (ZOSYN) 3.375 g in iso-osmotic dextrose 50 ml (premix)        Ordering Provider: Palomo Orellana MD    3.375 g  over 4 Hours Intravenous Every 8 Hours 23 1600 23 1559    23 2329  meropenem (MERREM) 1000 mg/100 mL 0.9% NS (mbp)        Ordering Provider: Carroll Bobo DO    1,000 mg  over 30 Minutes Intravenous Once 23 0000 23 0112              Review of Systems:  The HPI      Physical Exam:   Vital Signs  Temp (24hrs), Av.6 øF (37 øC), Min:98.3 øF (36.8 øC), Max:99.3 øF (37.4 øC)    Temp  Min: 98.3 øF (36.8 øC)  Max: 99.3 øF (37.4 øC)  BP  Min: 135/70  Max: 162/81  Pulse  Min: 94  Max: 103  Resp  Min: 16  Max: 20  SpO2  Min: 94 %  Max: 96 %    GENERAL: Awake and alert, in no acute distress. Nasojejunal tube is in place.  HEENT: Normocephalic, atraumatic.  PERRL. EOMI. No conjunctival injection. No icterus. No labial ulcers  NECK: Supple   HEART: RRR; No murmur, rubs, gallops.   LUNGS: Clear to auscultation bilaterally without wheezing, rales, rhonchi. Normal respiratory effort.   ABDOMEN: He has continued abdominal distention with mild diffuse abdominal tenderness.  EXT:  No cyanosis, clubbing or edema. No cord.  :  Without Jorgensen catheter.  MSK: No joint effusions or erythema  SKIN: Warm and dry without cutaneous eruptions on Inspection/palpation.    NEURO: Oriented to PPT.  Motor 5/5 strength  PSYCHIATRIC: Normal insight and judgment.  Cooperative with PE    Laboratory Data    Results from last 7 days   Lab Units 08/04/23  0756 08/03/23  0355 08/02/23  0340   WBC 10*3/mm3 16.57* 13.74* 16.84*   HEMOGLOBIN g/dL 7.7* 8.0* 8.2*   HEMATOCRIT % 24.5* 25.4* 25.8*   PLATELETS 10*3/mm3 767* 731* 674*       Results from last 7 days   Lab Units 08/04/23  0333   SODIUM mmol/L 138   POTASSIUM mmol/L 4.0   CHLORIDE mmol/L 103   CO2 mmol/L 23.0   BUN mg/dL 17   CREATININE mg/dL 0.44*   GLUCOSE mg/dL 134*   CALCIUM mg/dL 8.2*       Results from last 7 days   Lab Units 08/04/23  0333   ALK PHOS U/L 104   BILIRUBIN mg/dL 0.4   ALT (SGPT) U/L 11   AST (SGOT) U/L 23               Results from last 7 days   Lab Units 08/04/23  0333   LACTATE mmol/L 1.7           Results from last 7 days   Lab Units 07/30/23  0914   VANCOMYCIN RM mcg/mL 7.60       Estimated Creatinine Clearance: 197 mL/min (A) (by C-G formula based on SCr of 0.44 mg/dL (L)).      Microbiology:  Microbiology Results (last 10 days)       Procedure Component Value - Date/Time    MRSA Screen, PCR (Inpatient) - Swab, Nares [[id number]]  (Abnormal) Collected: 07/28/23 0049    Lab Status: Final result Specimen: Swab from Nares Updated: 07/28/23 0950     MRSA PCR Positive    Narrative:      The negative predictive value of this diagnostic test is high and should only be used to consider de-escalating anti-MRSA therapy. A positive result may indicate colonization with MRSA and must be correlated clinically.    Blood Culture - Blood, Arm, Right [[id number]]  (Normal) Collected: 07/28/23 0017    Lab Status: Final result Specimen: Blood from Arm, Right Updated: 08/02/23 0045     Blood Culture No growth at 5 days    Blood Culture - Blood, Arm, Right [[id number]]  (Normal) Collected: 07/28/23 0004    Lab Status: Final result Specimen: Blood from Arm, Right Updated: 08/02/23 0045     Blood Culture No growth at 5 days                  Radiology:  Imaging Results (Last 72 Hours)       ** No results found for the last 72  hours. **        I read his radiographic images reviewed the images with the patient and his family.      Impression:   Severe gallstone pancreatitis-Without overt evidence of severe pancreatic infection/pancreatic abscess. He appears to have passed the gallstone. He continues to have significant pancreatitis.  He is now on intravenous Zosyn  Leukocytosis/neutrophilia-improving.  MRSA nasal colonization  Advance prostate cancer/on Xtandi  Ongoing tobacco abuse  Occasional marijuana use    PLAN/RECOMMENDATIONS:   Nutritional support  Continue intravenous Zosyn      Palomo Orellana MD  8/4/2023  09:53 EDT

## 2023-08-04 NOTE — PROGRESS NOTES
[facility] Medicine Services  PROGRESS NOTE    Patient Name: Ta Madison  : 1960  MRN: [id number]    Date of Admission: 2023  Primary Care Physician: Yolande Olvera APRN    Subjective   Subjective     CC:  Follow-up abdominal pain    HPI:   Resting in bed in no acute distress and tells me that he feels better.  Pain is better controlled.  Patient's wife is also present at the bedside.  Denies any fever or chills.  No chest pain or palpitation.    ROS:  Gen- No fevers, chills  CV- No chest pain, palpitations  Resp- No cough, dyspnea  GI-no nausea or vomiting.  Abdominal pain has improved.    Objective   Objective     Vital Signs:   Temp:  [98.3 øF (36.8 øC)-99.3 øF (37.4 øC)] 98.6 øF (37 øC)  Heart Rate:  [] 100  Resp:  [16-20] 16  BP: (135-168)/(70-81) 168/81     Physical Exam:  Constitutional: No acute distress  HENT: NCAT, mucous membranes moist  Respiratory: Clear to auscultation bilaterally, decreased breath sounds at the bases, respiratory effort normal   Cardiovascular: RRR, no murmurs, rubs, or gallops  Gastrointestinal: Positive bowel sounds, soft, nontender, distended  Musculoskeletal: Severe bilateral ankle edema, edema of scrotum, edema of lower abdominal wall.  Psychiatric: Appropriate affect, cooperative  Neurologic: Oriented x 3, strength symmetric in all extremities, Cranial Nerves grossly intact to confrontation, speech clear  Skin: No rashes     Results Reviewed:  LAB RESULTS:      Lab 23  0756 23  0333 23  0355 23  0340 23  0312 23  0307   WBC 16.57*  --  13.74* 16.84* 21.89* 24.79*   HEMOGLOBIN 7.7*  --  8.0* 8.2* 8.7* 8.6*   HEMATOCRIT 24.5*  --  25.4* 25.8* 27.4* 27.0*   PLATELETS 767*  --  731* 674* 641* 544*   NEUTROS ABS 13.88*  --  10.85* 14.19*  --  21.31*   IMMATURE GRANS (ABS) 0.13*  --  0.13* 0.27*  --  0.53*   LYMPHS ABS 1.41  --  1.50 1.16  --  1.25   MONOS ABS 0.96*  --  1.11* 1.08*  --  1.55*   EOS ABS  0.16  --  0.11 0.10  --  0.09   MCV 78.3*  --  77.7* 77.5* 77.4* 78.3*   LACTATE  --  1.7  --   --   --   --          Lab 08/04/23 0333 08/03/23  1529 08/03/23 0355 08/02/23  1554 08/02/23 0340 08/01/23 0312 07/31/23 0307 07/30/23  0915 07/30/23  0914 07/29/23  1205 07/29/23  0401   SODIUM 138  --  139  --  138 137 139   < >  --   --  137   POTASSIUM 4.0 3.8 3.5 4.0 3.5 3.8 4.2   < >  --   --  4.0   CHLORIDE 103  --  103  --  102 101 103   < >  --   --  103   CO2 23.0  --  26.0  --  26.0 24.0 21.0*   < >  --   --  22.0   ANION GAP 12.0  --  10.0  --  10.0 12.0 15.0   < >  --   --  12.0   BUN 17  --  15  --  15 13 11   < >  --   --  17   CREATININE 0.44*  --  0.44*  --  0.47* 0.48* 0.48*   < >  --   --  0.58*   EGFR 119.9  --  119.9  --  117.5 116.8 116.8   < >  --   --  110.3   GLUCOSE 134*  --  122*  --  152* 123* 62*   < >  --   --  105*   CALCIUM 8.2*  --  7.9*  --  7.7* 8.0* 8.1*   < >  --   --  7.8*   MAGNESIUM  --   --   --   --   --  1.9 2.0  --   --   --  2.1   PHOSPHORUS  --   --   --   --   --  2.4* 3.0  --  2.3* 1.5* 1.5*    < > = values in this interval not displayed.         Lab 08/04/23 0333 08/03/23  0355 08/02/23  0340 08/01/23 0312 07/31/23 0307 07/30/23  0915   TOTAL PROTEIN 5.5* 5.7* 4.8* 5.0* 5.0* 5.3*   ALBUMIN 2.9* 2.2* 2.2* 2.4* 2.5* 2.3*   GLOBULIN 2.6 3.5 2.6 2.6 2.5 3.0   ALT (SGPT) 11 11 9 11 13 12   AST (SGOT) 23 20 17 21 22 20   BILIRUBIN 0.4 0.3 0.3 0.5 0.6 0.5   ALK PHOS 104 113 122* 117 107 81   LIPASE 23  --   --   --   --  15         Lab 07/29/23  0401   PROBNP 709.6               Lab 08/04/23  0333   IRON 13*   IRON SATURATION (TSAT) 5*   TIBC 237*   TRANSFERRIN 159*   FERRITIN 606.40*           Brief Urine Lab Results  (Last result in the past 365 days)        Color   Clarity   Blood   Leuk Est   Nitrite   Protein   CREAT   Urine HCG        07/25/23 0636 Dark Yellow   Clear   Negative   Negative   Negative   30 mg/dL (1+)                   Microbiology Results Abnormal        Procedure Component Value - Date/Time    Blood Culture - Blood, Arm, Right [[id number]]  (Normal) Collected: 07/28/23 0017    Lab Status: Final result Specimen: Blood from Arm, Right Updated: 08/02/23 0045     Blood Culture No growth at 5 days    Blood Culture - Blood, Arm, Right [[id number]]  (Normal) Collected: 07/28/23 0004    Lab Status: Final result Specimen: Blood from Arm, Right Updated: 08/02/23 0045     Blood Culture No growth at 5 days    Blood Culture - Blood, Hand, Right [[id number]]  (Normal) Collected: 07/23/23 2230    Lab Status: Final result Specimen: Blood from Hand, Right Updated: 07/28/23 2300     Blood Culture No growth at 5 days    Narrative:      AEROBIC BOTTLE ONLY    Blood Culture - Blood, Hand, Left [[id number]]  (Normal) Collected: 07/23/23 2240    Lab Status: Final result Specimen: Blood from Hand, Left Updated: 07/28/23 2300     Blood Culture No growth at 5 days    Narrative:      AEROBIC BOTTLE ONLY    COVID PRE-OP / PRE-PROCEDURE SCREENING ORDER (NO ISOLATION) - Swab, Nasopharynx [[id number]]  (Normal) Collected: 07/24/23 0620    Lab Status: Final result Specimen: Swab from Nasopharynx Updated: 07/24/23 0729    Narrative:      The following orders were created for panel order COVID PRE-OP / PRE-PROCEDURE SCREENING ORDER (NO ISOLATION) - Swab, Nasopharynx.  Procedure                               Abnormality         Status                     ---------                               -----------         ------                     Respiratory Panel PCR w/...[[id number]]  Normal              Final result                 Please view results for these tests on the individual orders.    Respiratory Panel PCR w/COVID-19(SARS-CoV-2) BALTAZAR/ANDRES/ASTER/PAD/COR/MAD/VALENTINE In-House, NP Swab in UTM/VTM, 3-4 HR TAT - Swab, Nasopharynx [[id number]]  (Normal) Collected: 07/24/23 0620    Lab Status: Final result Specimen: Swab from Nasopharynx Updated: 07/24/23 0729     ADENOVIRUS, PCR Not Detected     Coronavirus  229E Not Detected     Coronavirus HKU1 Not Detected     Coronavirus NL63 Not Detected     Coronavirus OC43 Not Detected     COVID19 Not Detected     Human Metapneumovirus Not Detected     Human Rhinovirus/Enterovirus Not Detected     Influenza A PCR Not Detected     Influenza B PCR Not Detected     Parainfluenza Virus 1 Not Detected     Parainfluenza Virus 2 Not Detected     Parainfluenza Virus 3 Not Detected     Parainfluenza Virus 4 Not Detected     RSV, PCR Not Detected     Bordetella pertussis pcr Not Detected     Bordetella parapertussis PCR Not Detected     Chlamydophila pneumoniae PCR Not Detected     Mycoplasma pneumo by PCR Not Detected    Narrative:      In the setting of a positive respiratory panel with a viral infection PLUS a negative procalcitonin without other underlying concern for bacterial infection, consider observing off antibiotics or discontinuation of antibiotics and continue supportive care. If the respiratory panel is positive for atypical bacterial infection (Bordetella pertussis, Chlamydophila pneumoniae, or Mycoplasma pneumoniae), consider antibiotic de-escalation to target atypical bacterial infection.            No radiology results from the last 24 hrs        Current medications:  Scheduled Meds:aspirin, 81 mg, Nasogastric, Daily  atorvastatin, 20 mg, Nasogastric, Nightly  bisacodyl, 10 mg, Rectal, Q8H  docusate sodium, 100 mg, Nasogastric, BID  enzalutamide, 160 mg, Oral, Daily  famotidine, 20 mg, Intravenous, Once  famotidine, 20 mg, Oral, Once  furosemide, 40 mg, Intravenous, Daily  hydrocortisone, 25 mg, Rectal, BID  lactulose, 300 mL, Rectal, Once  methylnaltrexone, 12 mg, Subcutaneous, Every Other Day  metoclopramide, 10 mg, Intravenous, Q6H  metoprolol tartrate, 25 mg, Nasogastric, Q12H  montelukast, 10 mg, Nasogastric, Nightly  nicotine, 1 patch, Transdermal, Q24H  pantoprazole, 40 mg, Intravenous, Q AM  piperacillin-tazobactam, 3.375 g, Intravenous, Q8H  ProSource No Carb, 30  mL, Per J Tube, TID  senna-docusate sodium, 2 tablet, Nasogastric, BID  sodium chloride, 10 mL, Intravenous, Q12H  sodium chloride, 10 mL, Intravenous, Q12H      Continuous Infusions:     PRN Meds:.  acetaminophen **OR** acetaminophen **OR** acetaminophen    senna-docusate sodium **AND** polyethylene glycol **AND** bisacodyl **AND** bisacodyl    Calcium Replacement - Follow Nurse / BPA Driven Protocol    HYDROmorphone    hydrOXYzine    ipratropium-albuterol    lidocaine PF 1%    Magnesium Standard Dose Replacement - Follow Nurse / BPA Driven Protocol    melatonin    midazolam    naloxone    ondansetron    oxyCODONE-acetaminophen    phenylephrine-mineral oil-petrolatum    Phosphorus Replacement - Follow Nurse / BPA Driven Protocol    Potassium Replacement - Follow Nurse / BPA Driven Protocol    prochlorperazine    simethicone    sodium chloride    sodium chloride    sodium chloride    sodium chloride    Assessment & Plan   Assessment & Plan     Active Hospital Problems    Diagnosis  POA    **Acute gallstone pancreatitis [K85.10]  Yes    Elevated serum creatinine [R79.89]  Yes    Personal history of transient ischemic attack (TIA) [Z86.73]  Not Applicable    Adenocarcinoma of prostate [C61]  Yes    Gastroesophageal reflux disease [K21.9]  Yes    Hyperlipidemia [E78.5]  Yes    Tobacco abuse [Z72.0]  Yes      Resolved Hospital Problems   No resolved problems to display.        Brief Hospital Course to date:  Ta Madison is a 63 y.o. male with PMH of GERD, HLD, hx of TIA, advanced prostate cancer on Xtandi, occasional marijuna use, and chronic 1 PPD tobacco use who initially presented to [facility] 7/23/23 with complaints of severe epigastric abdominal pain with associated nausea and vomiting. CT A/P at OSH showed acute pancreatitis with 2 mm stone in the proximal duodenum. Transferred to [facility] for GI evaluation.     This patient's problems and plans were partially entered by my partner and updated as appropriate by  me 08/04/23.    Assessment/plan:    Acute gallstone pancreatitis  Leukocytosis  Lactic acidosis  -CT reviewed, possibly necrosis, possible forming pseudocyst  -marked leukocytosis, improving   -transitioned from merrem-->zosyn  -ID follows  -GI/surgery following, recommending NJ feeds, placed 7/31    Edema/upper abdomen to toes.  -- Creatinine is normal and GFR is 119  --Will diurese with Lasix and monitor renal function and also blood pressure.  To some extent patient may be third spacing but at this point definitely blood pressure and renal function allows for some diuresis.     SOA  -better, on RA  -Get a chest x-ray    Prostate cancer  -Continue Xtandi oral chemotherapy (patient supplied), however patient having difficulty with swallowing this       HLD  GERD  Hx of TIA  -Home meds     Tobacco abuse  -Counseled on cessation  -Nicotine patch     Expected Discharge Location and Transportation: home is rehab pending medically ready and PT/OT recommendations  Expected Discharge   Expected Discharge Date: 8/6/2023; Expected Discharge Time:       DVT prophylaxis:  Mechanical DVT prophylaxis orders are present.     AM-PAC 6 Clicks Score (PT): 19 (08/04/23 9874)    CODE STATUS:   Code Status and Medical Interventions:   Ordered at: 07/23/23 1620     Code Status (Patient has no pulse and is not breathing):    CPR (Attempt to Resuscitate)     Medical Interventions (Patient has pulse or is breathing):    Full Support     Release to patient:    Routine Release       Servando Jarrett MD  08/04/23

## 2023-08-05 LAB
ALBUMIN SERPL-MCNC: 2.6 G/DL (ref 3.5–5.2)
ALBUMIN/GLOB SERPL: 0.8 G/DL
ALP SERPL-CCNC: 101 U/L (ref 39–117)
ALT SERPL W P-5'-P-CCNC: 12 U/L (ref 1–41)
ANION GAP SERPL CALCULATED.3IONS-SCNC: 11 MMOL/L (ref 5–15)
AST SERPL-CCNC: 27 U/L (ref 1–40)
BASOPHILS # BLD AUTO: 0.05 10*3/MM3 (ref 0–0.2)
BASOPHILS NFR BLD AUTO: 0.3 % (ref 0–1.5)
BILIRUB SERPL-MCNC: 0.4 MG/DL (ref 0–1.2)
BUN SERPL-MCNC: 16 MG/DL (ref 8–23)
BUN/CREAT SERPL: 30.8 (ref 7–25)
CALCIUM SPEC-SCNC: 8 MG/DL (ref 8.6–10.5)
CHLORIDE SERPL-SCNC: 101 MMOL/L (ref 98–107)
CO2 SERPL-SCNC: 26 MMOL/L (ref 22–29)
CREAT SERPL-MCNC: 0.52 MG/DL (ref 0.76–1.27)
DEPRECATED RDW RBC AUTO: 48.1 FL (ref 37–54)
EGFRCR SERPLBLD CKD-EPI 2021: 114 ML/MIN/1.73
EOSINOPHIL # BLD AUTO: 0.13 10*3/MM3 (ref 0–0.4)
EOSINOPHIL NFR BLD AUTO: 0.8 % (ref 0.3–6.2)
ERYTHROCYTE [DISTWIDTH] IN BLOOD BY AUTOMATED COUNT: 17.2 % (ref 12.3–15.4)
GLOBULIN UR ELPH-MCNC: 3.3 GM/DL
GLUCOSE BLDC GLUCOMTR-MCNC: 131 MG/DL (ref 70–130)
GLUCOSE BLDC GLUCOMTR-MCNC: 134 MG/DL (ref 70–130)
GLUCOSE BLDC GLUCOMTR-MCNC: 142 MG/DL (ref 70–130)
GLUCOSE BLDC GLUCOMTR-MCNC: 142 MG/DL (ref 70–130)
GLUCOSE BLDC GLUCOMTR-MCNC: 150 MG/DL (ref 70–130)
GLUCOSE SERPL-MCNC: 125 MG/DL (ref 65–99)
HCT VFR BLD AUTO: 25.1 % (ref 37.5–51)
HGB BLD-MCNC: 7.8 G/DL (ref 13–17.7)
IMM GRANULOCYTES # BLD AUTO: 0.12 10*3/MM3 (ref 0–0.05)
IMM GRANULOCYTES NFR BLD AUTO: 0.8 % (ref 0–0.5)
LYMPHOCYTES # BLD AUTO: 1.15 10*3/MM3 (ref 0.7–3.1)
LYMPHOCYTES NFR BLD AUTO: 7.3 % (ref 19.6–45.3)
MAGNESIUM SERPL-MCNC: 1.9 MG/DL (ref 1.6–2.4)
MCH RBC QN AUTO: 24.1 PG (ref 26.6–33)
MCHC RBC AUTO-ENTMCNC: 31.1 G/DL (ref 31.5–35.7)
MCV RBC AUTO: 77.5 FL (ref 79–97)
MONOCYTES # BLD AUTO: 0.87 10*3/MM3 (ref 0.1–0.9)
MONOCYTES NFR BLD AUTO: 5.5 % (ref 5–12)
NEUTROPHILS NFR BLD AUTO: 13.42 10*3/MM3 (ref 1.7–7)
NEUTROPHILS NFR BLD AUTO: 85.3 % (ref 42.7–76)
NRBC BLD AUTO-RTO: 0 /100 WBC (ref 0–0.2)
PHOSPHATE SERPL-MCNC: 2.9 MG/DL (ref 2.5–4.5)
PLATELET # BLD AUTO: 745 10*3/MM3 (ref 140–450)
PMV BLD AUTO: 9.8 FL (ref 6–12)
POTASSIUM SERPL-SCNC: 3.5 MMOL/L (ref 3.5–5.2)
POTASSIUM SERPL-SCNC: 4.3 MMOL/L (ref 3.5–5.2)
PROT SERPL-MCNC: 5.9 G/DL (ref 6–8.5)
RBC # BLD AUTO: 3.24 10*6/MM3 (ref 4.14–5.8)
SODIUM SERPL-SCNC: 138 MMOL/L (ref 136–145)
WBC NRBC COR # BLD: 15.74 10*3/MM3 (ref 3.4–10.8)

## 2023-08-05 PROCEDURE — 25010000002 METOCLOPRAMIDE PER 10 MG: Performed by: SURGERY

## 2023-08-05 PROCEDURE — 85025 COMPLETE CBC W/AUTO DIFF WBC: CPT | Performed by: INTERNAL MEDICINE

## 2023-08-05 PROCEDURE — 25010000002 HYDROMORPHONE PER 4 MG: Performed by: NURSE PRACTITIONER

## 2023-08-05 PROCEDURE — 25010000002 HYDROMORPHONE 1 MG/ML SOLUTION: Performed by: INTERNAL MEDICINE

## 2023-08-05 PROCEDURE — 25010000002 METHYLNALTREXONE 12 MG/0.6ML SOLUTION: Performed by: INTERNAL MEDICINE

## 2023-08-05 PROCEDURE — 82948 REAGENT STRIP/BLOOD GLUCOSE: CPT

## 2023-08-05 PROCEDURE — 80053 COMPREHEN METABOLIC PANEL: CPT | Performed by: HOSPITALIST

## 2023-08-05 PROCEDURE — 25010000002 FUROSEMIDE PER 20 MG: Performed by: INTERNAL MEDICINE

## 2023-08-05 PROCEDURE — 84132 ASSAY OF SERUM POTASSIUM: CPT | Performed by: INTERNAL MEDICINE

## 2023-08-05 PROCEDURE — 25010000002 MORPHINE PER 10 MG: Performed by: INTERNAL MEDICINE

## 2023-08-05 PROCEDURE — 84100 ASSAY OF PHOSPHORUS: CPT | Performed by: INTERNAL MEDICINE

## 2023-08-05 PROCEDURE — 25010000002 PIPERACILLIN SOD-TAZOBACTAM PER 1 G: Performed by: INTERNAL MEDICINE

## 2023-08-05 PROCEDURE — 83735 ASSAY OF MAGNESIUM: CPT | Performed by: INTERNAL MEDICINE

## 2023-08-05 PROCEDURE — 99232 SBSQ HOSP IP/OBS MODERATE 35: CPT | Performed by: INTERNAL MEDICINE

## 2023-08-05 RX ORDER — MORPHINE SULFATE 4 MG/ML
3 INJECTION, SOLUTION INTRAMUSCULAR; INTRAVENOUS EVERY 4 HOURS PRN
Status: DISCONTINUED | OUTPATIENT
Start: 2023-08-05 | End: 2023-08-05

## 2023-08-05 RX ORDER — METOPROLOL TARTRATE 50 MG/1
50 TABLET, FILM COATED ORAL EVERY 12 HOURS SCHEDULED
Status: DISCONTINUED | OUTPATIENT
Start: 2023-08-05 | End: 2023-08-11 | Stop reason: HOSPADM

## 2023-08-05 RX ORDER — MORPHINE SULFATE 2 MG/ML
2 INJECTION, SOLUTION INTRAMUSCULAR; INTRAVENOUS EVERY 4 HOURS PRN
Status: DISCONTINUED | OUTPATIENT
Start: 2023-08-05 | End: 2023-08-05

## 2023-08-05 RX ORDER — HYDROMORPHONE HYDROCHLORIDE 1 MG/ML
0.5 INJECTION, SOLUTION INTRAMUSCULAR; INTRAVENOUS; SUBCUTANEOUS
Status: DISPENSED | OUTPATIENT
Start: 2023-08-05 | End: 2023-08-06

## 2023-08-05 RX ORDER — POTASSIUM CHLORIDE 1.5 G/1.58G
40 POWDER, FOR SOLUTION ORAL EVERY 4 HOURS
Status: COMPLETED | OUTPATIENT
Start: 2023-08-05 | End: 2023-08-05

## 2023-08-05 RX ADMIN — HYDROMORPHONE HYDROCHLORIDE 1 MG: 1 INJECTION, SOLUTION INTRAMUSCULAR; INTRAVENOUS; SUBCUTANEOUS at 07:45

## 2023-08-05 RX ADMIN — METOPROLOL TARTRATE 25 MG: 25 TABLET, FILM COATED ORAL at 16:48

## 2023-08-05 RX ADMIN — FUROSEMIDE 40 MG: 10 INJECTION, SOLUTION INTRAMUSCULAR; INTRAVENOUS at 09:32

## 2023-08-05 RX ADMIN — METOCLOPRAMIDE 10 MG: 5 INJECTION, SOLUTION INTRAMUSCULAR; INTRAVENOUS at 09:33

## 2023-08-05 RX ADMIN — PIPERACILLIN SODIUM AND TAZOBACTAM SODIUM 3.38 G: 3; .375 INJECTION, SOLUTION INTRAVENOUS at 15:36

## 2023-08-05 RX ADMIN — Medication 10 ML: at 09:37

## 2023-08-05 RX ADMIN — METOCLOPRAMIDE 10 MG: 5 INJECTION, SOLUTION INTRAMUSCULAR; INTRAVENOUS at 20:56

## 2023-08-05 RX ADMIN — HYDROXYZINE HYDROCHLORIDE 50 MG: 25 TABLET, FILM COATED ORAL at 15:53

## 2023-08-05 RX ADMIN — HYDROMORPHONE HYDROCHLORIDE 1 MG: 1 INJECTION, SOLUTION INTRAMUSCULAR; INTRAVENOUS; SUBCUTANEOUS at 09:38

## 2023-08-05 RX ADMIN — METOCLOPRAMIDE 10 MG: 5 INJECTION, SOLUTION INTRAMUSCULAR; INTRAVENOUS at 15:20

## 2023-08-05 RX ADMIN — PIPERACILLIN SODIUM AND TAZOBACTAM SODIUM 3.38 G: 3; .375 INJECTION, SOLUTION INTRAVENOUS at 07:45

## 2023-08-05 RX ADMIN — METOPROLOL TARTRATE 50 MG: 50 TABLET, FILM COATED ORAL at 20:56

## 2023-08-05 RX ADMIN — HYDROMORPHONE HYDROCHLORIDE 1 MG: 1 INJECTION, SOLUTION INTRAMUSCULAR; INTRAVENOUS; SUBCUTANEOUS at 01:34

## 2023-08-05 RX ADMIN — DOCUSATE SODIUM 100 MG: 50 LIQUID ORAL at 09:39

## 2023-08-05 RX ADMIN — ASPIRIN 81 MG: 81 TABLET, CHEWABLE ORAL at 09:32

## 2023-08-05 RX ADMIN — OXYCODONE HYDROCHLORIDE AND ACETAMINOPHEN 1 TABLET: 7.5; 325 TABLET ORAL at 05:35

## 2023-08-05 RX ADMIN — MORPHINE SULFATE 2 MG: 2 INJECTION, SOLUTION INTRAMUSCULAR; INTRAVENOUS at 15:36

## 2023-08-05 RX ADMIN — OXYCODONE HYDROCHLORIDE AND ACETAMINOPHEN 1 TABLET: 7.5; 325 TABLET ORAL at 18:05

## 2023-08-05 RX ADMIN — HYDROMORPHONE HYDROCHLORIDE 1 MG: 1 INJECTION, SOLUTION INTRAMUSCULAR; INTRAVENOUS; SUBCUTANEOUS at 11:33

## 2023-08-05 RX ADMIN — Medication 10 ML: at 21:38

## 2023-08-05 RX ADMIN — METOPROLOL TARTRATE 25 MG: 25 TABLET, FILM COATED ORAL at 09:32

## 2023-08-05 RX ADMIN — METOCLOPRAMIDE 10 MG: 5 INJECTION, SOLUTION INTRAMUSCULAR; INTRAVENOUS at 02:37

## 2023-08-05 RX ADMIN — OXYCODONE HYDROCHLORIDE AND ACETAMINOPHEN 1 TABLET: 7.5; 325 TABLET ORAL at 12:30

## 2023-08-05 RX ADMIN — HYDROMORPHONE HYDROCHLORIDE 0.5 MG: 1 INJECTION, SOLUTION INTRAMUSCULAR; INTRAVENOUS; SUBCUTANEOUS at 21:24

## 2023-08-05 RX ADMIN — HYDROMORPHONE HYDROCHLORIDE 1 MG: 1 INJECTION, SOLUTION INTRAMUSCULAR; INTRAVENOUS; SUBCUTANEOUS at 13:57

## 2023-08-05 RX ADMIN — Medication 30 ML: at 21:37

## 2023-08-05 RX ADMIN — Medication 30 ML: at 15:20

## 2023-08-05 RX ADMIN — METHYLNALTREXONE BROMIDE 12 MG: 12 INJECTION, SOLUTION SUBCUTANEOUS at 15:43

## 2023-08-05 RX ADMIN — PANTOPRAZOLE SODIUM 40 MG: 40 INJECTION, POWDER, LYOPHILIZED, FOR SOLUTION INTRAVENOUS at 05:26

## 2023-08-05 RX ADMIN — Medication 5 MG: at 21:24

## 2023-08-05 RX ADMIN — MONTELUKAST 10 MG: 10 TABLET, FILM COATED ORAL at 20:56

## 2023-08-05 RX ADMIN — ATORVASTATIN CALCIUM 20 MG: 20 TABLET, FILM COATED ORAL at 20:55

## 2023-08-05 RX ADMIN — SENNOSIDES AND DOCUSATE SODIUM 2 TABLET: 50; 8.6 TABLET ORAL at 20:55

## 2023-08-05 RX ADMIN — POTASSIUM CHLORIDE 40 MEQ: 1.5 POWDER, FOR SOLUTION ORAL at 11:31

## 2023-08-05 RX ADMIN — Medication 30 ML: at 09:38

## 2023-08-05 RX ADMIN — HYDROMORPHONE HYDROCHLORIDE 1 MG: 1 INJECTION, SOLUTION INTRAMUSCULAR; INTRAVENOUS; SUBCUTANEOUS at 03:57

## 2023-08-05 RX ADMIN — POTASSIUM CHLORIDE 40 MEQ: 1.5 POWDER, FOR SOLUTION ORAL at 06:13

## 2023-08-05 RX ADMIN — Medication 1 PATCH: at 09:38

## 2023-08-05 RX ADMIN — MORPHINE SULFATE 3 MG: 4 INJECTION, SOLUTION INTRAMUSCULAR; INTRAVENOUS at 19:26

## 2023-08-05 RX ADMIN — DOCUSATE SODIUM 100 MG: 50 LIQUID ORAL at 20:55

## 2023-08-05 NOTE — PLAN OF CARE
Problem: Adult Inpatient Plan of Care  Goal: Plan of Care Review  Outcome: Ongoing, Progressing  Goal: Patient-Specific Goal (Individualized)  Outcome: Ongoing, Progressing  Goal: Absence of Hospital-Acquired Illness or Injury  Outcome: Ongoing, Progressing  Intervention: Identify and Manage Fall Risk  Intervention: Prevent Skin Injury  Intervention: Prevent and Manage VTE (Venous Thromboembolism) Risk  Intervention: Prevent Infection  Goal: Optimal Comfort and Wellbeing  Outcome: Ongoing, Progressing  Intervention: Provide Person-Centered Care  Goal: Readiness for Transition of Care  Outcome: Ongoing, Progressing   Goal Outcome Evaluation:   Plan of care reviewed with patient.

## 2023-08-05 NOTE — PROGRESS NOTES
[facility] Medicine Services  PROGRESS NOTE    Patient Name: Ta Madison  : 1960  MRN: [id number]    Date of Admission: 2023  Primary Care Physician: Yolande Olvera APRN    Subjective   Subjective     CC:  Follow-up abdominal pain    HPI:   Resting in bed in no acute distress and tells me that he feels improving and feels that edema is better today.  Patient's wife is also present at the bedside.  Denies any fever or chills.  No chest pain or palpitation.    ROS:  Gen- No fevers, chills  CV- No chest pain, palpitations  Resp- No cough, dyspnea  GI-no nausea or vomiting.  Abdominal pain has improved.    Objective   Objective     Vital Signs:   Temp:  [98.2 øF (36.8 øC)-98.9 øF (37.2 øC)] 98.9 øF (37.2 øC)  Heart Rate:  [] 100  Resp:  [16-18] 18  BP: (143-186)/(69-98) 148/98     Physical Exam:  Constitutional: No acute distress  HENT: NCAT, mucous membranes moist  Respiratory: Clear to auscultation bilaterally, decreased breath sounds at the bases, respiratory effort normal   Cardiovascular: RRR, no murmurs, rubs, or gallops  Gastrointestinal: Positive bowel sounds, soft, nontender, distended  Musculoskeletal: Severe bilateral ankle edema, edema of scrotum, edema of lower abdominal wall.  Psychiatric: Appropriate affect, cooperative  Neurologic: Oriented x 3, strength symmetric in all extremities, Cranial Nerves grossly intact to confrontation, speech clear  Skin: No rashes     Results Reviewed:  LAB RESULTS:      Lab 23  0232 23  0756 23  0333 23  0355 23  0340 23  0312 23  0307   WBC 15.74* 16.57*  --  13.74* 16.84* 21.89* 24.79*   HEMOGLOBIN 7.8* 7.7*  --  8.0* 8.2* 8.7* 8.6*   HEMATOCRIT 25.1* 24.5*  --  25.4* 25.8* 27.4* 27.0*   PLATELETS 745* 767*  --  731* 674* 641* 544*   NEUTROS ABS 13.42* 13.88*  --  10.85* 14.19*  --  21.31*   IMMATURE GRANS (ABS) 0.12* 0.13*  --  0.13* 0.27*  --  0.53*   LYMPHS ABS 1.15 1.41  --  1.50  1.16  --  1.25   MONOS ABS 0.87 0.96*  --  1.11* 1.08*  --  1.55*   EOS ABS 0.13 0.16  --  0.11 0.10  --  0.09   MCV 77.5* 78.3*  --  77.7* 77.5* 77.4* 78.3*   LACTATE  --   --  1.7  --   --   --   --          Lab 08/05/23  0232 08/04/23  0333 08/03/23  1529 08/03/23  0355 08/02/23  1554 08/02/23  0340 08/01/23  0312 07/31/23  0307 07/30/23  0915 07/30/23  0914   SODIUM 138 138  --  139  --  138 137 139   < >  --    POTASSIUM 3.5 4.0 3.8 3.5 4.0 3.5 3.8 4.2   < >  --    CHLORIDE 101 103  --  103  --  102 101 103   < >  --    CO2 26.0 23.0  --  26.0  --  26.0 24.0 21.0*   < >  --    ANION GAP 11.0 12.0  --  10.0  --  10.0 12.0 15.0   < >  --    BUN 16 17  --  15  --  15 13 11   < >  --    CREATININE 0.52* 0.44*  --  0.44*  --  0.47* 0.48* 0.48*   < >  --    EGFR 114.0 119.9  --  119.9  --  117.5 116.8 116.8   < >  --    GLUCOSE 125* 134*  --  122*  --  152* 123* 62*   < >  --    CALCIUM 8.0* 8.2*  --  7.9*  --  7.7* 8.0* 8.1*   < >  --    MAGNESIUM 1.9  --   --   --   --   --  1.9 2.0  --   --    PHOSPHORUS 2.9  --   --   --   --   --  2.4* 3.0  --  2.3*    < > = values in this interval not displayed.         Lab 08/05/23  0232 08/04/23  0333 08/03/23  0355 08/02/23  0340 08/01/23  0312 07/31/23  0307 07/30/23  0915   TOTAL PROTEIN 5.9* 5.5* 5.7* 4.8* 5.0*   < > 5.3*   ALBUMIN 2.6* 2.9* 2.2* 2.2* 2.4*   < > 2.3*   GLOBULIN 3.3 2.6 3.5 2.6 2.6   < > 3.0   ALT (SGPT) 12 11 11 9 11   < > 12   AST (SGOT) 27 23 20 17 21   < > 20   BILIRUBIN 0.4 0.4 0.3 0.3 0.5   < > 0.5   ALK PHOS 101 104 113 122* 117   < > 81   LIPASE  --  23  --   --   --   --  15    < > = values in this interval not displayed.                     Lab 08/04/23  0927 08/04/23  0333   IRON  --  13*   IRON SATURATION (TSAT)  --  5*   TIBC  --  237*   TRANSFERRIN  --  159*   FERRITIN  --  606.40*   FOLATE 8.80  --    VITAMIN B 12 >2,000*  --            Brief Urine Lab Results  (Last result in the past 365 days)        Color   Clarity   Blood   Leuk Est    Nitrite   Protein   CREAT   Urine HCG        07/25/23 0636 Dark Yellow   Clear   Negative   Negative   Negative   30 mg/dL (1+)                   Microbiology Results Abnormal       Procedure Component Value - Date/Time    Blood Culture - Blood, Arm, Right [[id number]]  (Normal) Collected: 07/28/23 0017    Lab Status: Final result Specimen: Blood from Arm, Right Updated: 08/02/23 0045     Blood Culture No growth at 5 days    Blood Culture - Blood, Arm, Right [[id number]]  (Normal) Collected: 07/28/23 0004    Lab Status: Final result Specimen: Blood from Arm, Right Updated: 08/02/23 0045     Blood Culture No growth at 5 days    Blood Culture - Blood, Hand, Right [[id number]]  (Normal) Collected: 07/23/23 2230    Lab Status: Final result Specimen: Blood from Hand, Right Updated: 07/28/23 2300     Blood Culture No growth at 5 days    Narrative:      AEROBIC BOTTLE ONLY    Blood Culture - Blood, Hand, Left [[id number]]  (Normal) Collected: 07/23/23 2240    Lab Status: Final result Specimen: Blood from Hand, Left Updated: 07/28/23 2300     Blood Culture No growth at 5 days    Narrative:      AEROBIC BOTTLE ONLY    COVID PRE-OP / PRE-PROCEDURE SCREENING ORDER (NO ISOLATION) - Swab, Nasopharynx [[id number]]  (Normal) Collected: 07/24/23 0620    Lab Status: Final result Specimen: Swab from Nasopharynx Updated: 07/24/23 0729    Narrative:      The following orders were created for panel order COVID PRE-OP / PRE-PROCEDURE SCREENING ORDER (NO ISOLATION) - Swab, Nasopharynx.  Procedure                               Abnormality         Status                     ---------                               -----------         ------                     Respiratory Panel PCR w/...[[id number]]  Normal              Final result                 Please view results for these tests on the individual orders.    Respiratory Panel PCR w/COVID-19(SARS-CoV-2) BALTAZAR/ANDRES/ASTER/PAD/COR/MAD/VALENTINE In-House, NP Swab in [facility], 3-4 HR TAT - Swab, Nasopharynx  [[id number]]  (Normal) Collected: 07/24/23 0620    Lab Status: Final result Specimen: Swab from Nasopharynx Updated: 07/24/23 0729     ADENOVIRUS, PCR Not Detected     Coronavirus 229E Not Detected     Coronavirus HKU1 Not Detected     Coronavirus NL63 Not Detected     Coronavirus OC43 Not Detected     COVID19 Not Detected     Human Metapneumovirus Not Detected     Human Rhinovirus/Enterovirus Not Detected     Influenza A PCR Not Detected     Influenza B PCR Not Detected     Parainfluenza Virus 1 Not Detected     Parainfluenza Virus 2 Not Detected     Parainfluenza Virus 3 Not Detected     Parainfluenza Virus 4 Not Detected     RSV, PCR Not Detected     Bordetella pertussis pcr Not Detected     Bordetella parapertussis PCR Not Detected     Chlamydophila pneumoniae PCR Not Detected     Mycoplasma pneumo by PCR Not Detected    Narrative:      In the setting of a positive respiratory panel with a viral infection PLUS a negative procalcitonin without other underlying concern for bacterial infection, consider observing off antibiotics or discontinuation of antibiotics and continue supportive care. If the respiratory panel is positive for atypical bacterial infection (Bordetella pertussis, Chlamydophila pneumoniae, or Mycoplasma pneumoniae), consider antibiotic de-escalation to target atypical bacterial infection.            XR Chest 1 View    Result Date: 8/4/2023  XR CHEST 1 VW Date of Exam: 8/4/2023 3:16 PM EDT Indication: decreased BS Comparison: Chest x-ray 7/25/2023 Findings: Esophagogastric tube is past GE junction. Tip is osteophyte view but is at least in the distal stomach. Small-moderate bilateral pleural effusions. Heart size is normal. Bibasilar airspace opacities left greater than right. Mild septal thickening. No pneumothorax.     Impression: Impression: Nasogastric tube is past GE junction. Small-moderate bilateral pleural effusions with bibasilar airspace opacities left greater than right. This is  increased since previous exam. Electronically Signed: Zoë Yoder  8/4/2023 3:45 PM EDT  Workstation ID: [id number]         Current medications:  Scheduled Meds:aspirin, 81 mg, Nasogastric, Daily  atorvastatin, 20 mg, Nasogastric, Nightly  bisacodyl, 10 mg, Rectal, Q8H  docusate sodium, 100 mg, Nasogastric, BID  enzalutamide, 160 mg, Oral, Daily  famotidine, 20 mg, Intravenous, Once  famotidine, 20 mg, Oral, Once  furosemide, 40 mg, Intravenous, Daily  hydrocortisone, 25 mg, Rectal, BID  lactulose, 300 mL, Rectal, Once  methylnaltrexone, 12 mg, Subcutaneous, Every Other Day  metoclopramide, 10 mg, Intravenous, Q6H  metoprolol tartrate, 25 mg, Nasogastric, Q12H  montelukast, 10 mg, Nasogastric, Nightly  nicotine, 1 patch, Transdermal, Q24H  pantoprazole, 40 mg, Intravenous, Q AM  piperacillin-tazobactam, 3.375 g, Intravenous, Q8H  ProSource No Carb, 30 mL, Per J Tube, TID  senna-docusate sodium, 2 tablet, Nasogastric, BID  sodium chloride, 10 mL, Intravenous, Q12H  sodium chloride, 10 mL, Intravenous, Q12H      Continuous Infusions:     PRN Meds:.  acetaminophen **OR** acetaminophen **OR** acetaminophen    senna-docusate sodium **AND** polyethylene glycol **AND** bisacodyl **AND** bisacodyl    Calcium Replacement - Follow Nurse / BPA Driven Protocol    HYDROmorphone    hydrOXYzine    ipratropium-albuterol    lidocaine PF 1%    Magnesium Standard Dose Replacement - Follow Nurse / BPA Driven Protocol    melatonin    midazolam    naloxone    ondansetron    oxyCODONE-acetaminophen    phenylephrine-mineral oil-petrolatum    Phosphorus Replacement - Follow Nurse / BPA Driven Protocol    Potassium Replacement - Follow Nurse / BPA Driven Protocol    prochlorperazine    simethicone    sodium chloride    sodium chloride    sodium chloride    sodium chloride    Assessment & Plan   Assessment & Plan     Active Hospital Problems    Diagnosis  POA    **Acute gallstone pancreatitis [K85.10]  Yes    Elevated serum creatinine [R79.89]   Yes    Personal history of transient ischemic attack (TIA) [Z86.73]  Not Applicable    Adenocarcinoma of prostate [C61]  Yes    Gastroesophageal reflux disease [K21.9]  Yes    Hyperlipidemia [E78.5]  Yes    Tobacco abuse [Z72.0]  Yes      Resolved Hospital Problems   No resolved problems to display.        Brief Hospital Course to date:  Ta Madison is a 63 y.o. male with PMH of GERD, HLD, hx of TIA, advanced prostate cancer on Xtandi, occasional marijuna use, and chronic 1 PPD tobacco use who initially presented to [facility] 7/23/23 with complaints of severe epigastric abdominal pain with associated nausea and vomiting. CT A/P at OSH showed acute pancreatitis with 2 mm stone in the proximal duodenum. Transferred to [facility] for GI evaluation.     This patient's problems and plans were partially entered by my partner and updated as appropriate by me 08/05/23.    Assessment/plan:    Acute gallstone pancreatitis  Leukocytosis  Lactic acidosis  -CT reviewed, possibly necrosis, possible forming pseudocyst  -marked leukocytosis, improving   -transitioned from merrem-->zosyn  -ID follows  -GI/surgery following, recommending NJ feeds, placed 7/31    Edema/upper abdomen to toes.  -- Creatinine is normal and GFR is 119  --Will diurese with Lasix and monitor renal function and also blood pressure.  To some extent patient may be third spacing but at this point definitely blood pressure and renal function allows for some diuresis.  Also albumin is low.  We may need to give patient some IV albumin to improve diuresis.     SOA  -better, on RA  -Get a chest x-ray    Prostate cancer  -Continue Xtandi oral chemotherapy (patient supplied), however patient having difficulty with swallowing this       HLD  GERD  Hx of TIA  -Home meds     Tobacco abuse  -Counseled on cessation  -Nicotine patch     Expected Discharge Location and Transportation: home is rehab pending medically ready and PT/OT recommendations  Expected Discharge    Expected Discharge Date: 8/6/2023; Expected Discharge Time:       DVT prophylaxis:  Mechanical DVT prophylaxis orders are present.     AM-PAC 6 Clicks Score (PT): 19 (08/05/23 0815)    CODE STATUS:   Code Status and Medical Interventions:   Ordered at: 07/23/23 2229     Code Status (Patient has no pulse and is not breathing):    CPR (Attempt to Resuscitate)     Medical Interventions (Patient has pulse or is breathing):    Full Support     Release to patient:    Routine Release       Servando Jarrett MD  08/05/23

## 2023-08-05 NOTE — PROGRESS NOTES
INFECTIOUS DISEASE Progress Note    Ta Madison  1960  [id number]      Admission Date: 7/23/2023      Requesting Provider: Carroll Bobo DO  Evaluating Physician: Palomo Orellana MD    Reason for Consultation: Severe  pancreatitis    History of present illness:    7/28/23:Patient is a 63 y.o. male with h/o TIA, ongoing tobacco abuse, GERD, HLD, occasional marijuana use, and advanced prostate cancer/on Xtandi who we were asked to see for necrotizing pancreatitis.  The patient presented to [facility] ED on 7/23 with severe upper abdominal pain that radiated to his back about 3 hours PTA.  He had associated nausea and vomiting.  He has had postnasal drip with associated cough for last 3 to 4 weeks.  A CT scan at OSH showed acute gallbladder pancreatitis with a 2 mm stone in proximal duodenem.  He was given a dose of Invanz and transferred to [facility] on 7/23 for further medical management.  On arrival to [facility], the patient developed a Tmax of 100.3 and hypoxia requiring 2 L O2 NC.  He is now on room air.  Labs on arrival were lactic acid 3.6, PCT 0.85, creatinine 1.42, ALT 53, AST 66, bilirubin 0.4, lipase 1255, and WBC 15,000 with 91% neutrophils.  Blood cultures are negative to date.  Repeat blood cultures from 7/28 are pending.   A resp panel PCR was negative. His lactic acid and WBC were normal level on 7/27.  His PCT was 0.43.  A MRSA PCR on 7/28 was positive. His pain level on 7/28 was 7 out of 10. An MRCP on 7/24 showed acute pancreatitis with cholelithiasis without choledocholelithiasis.  It was felt that he may have passed the stone.  His abdomen became more distended and taut last night on 7/27.  A KUB noted possible small bowel ileus vs SBO.  A CT scan with contrast was done on 7/27 and showed extensive necrotizing appearing pancreatitis with fluid replacing nearly the entire pancreatic body with inflammatory changes tracking along entire length of pancreas, mild gallbladder wall thickening with  cholelithiasis (less likely acute cholecystitis), small bilateral pleural effusion, and mildly distended SB loops likely reactive and left likely obstruction.  He was on Zosyn, but was changed Merrem and Vancomycin on 7/28.  ID was asked to evaluate and manage his antibiotic therapy. He complains of persistent abdominal pain and abdominal distention.    7/29/23: He has remained afebrile. His creatinine is 0.58.  His white blood cell count is 9.3. Blood cultures from 7/28 are no growth so far.He has noticed some partial improvement in his abdominal pain and distention.  He denies vomiting.    7/30/2023: Maximum temperature over the last 24 hours is 100.1.  A follow-up CT scan today reveals persistent severe pancreatitis.  His white blood cell count today is 20.5.  His vancomycin random level was 7.6.    7/31/23: His maximum temperature over the last 24 hours is 99.7.White blood cell count today is 24.8. He continues to complain of abdominal distention and abdominal pain.  He denies nausea or vomiting.    8/1/23: Maximum temperature over the last 24 hours is 99.9.Creatinine is 0.48.  White blood cell count is 21.9.  Albumin is 2.4. He continues to require frequent narcotic therapy for abdominal pain.  He denies nausea and vomiting.  He is now on [place] feedings.    8/2/23: He has been afebrile over the last 24 hours. He is tolerating tube feedings. His white blood cell count today is 16.8.  His creatinine is 0.47.  Albumin is 2.2.  He states that his abdominal pain is somewhat improved today.  He denies nausea and vomiting.  He is tolerating tube feedings.    8/3/23: He remains afebrile.  His white blood cell count is now down to 13.7. He denies increased abdominal pain.  He denies nausea and vomiting.     8/4/23: He has remained afebrile.His white blood cell count today is 16.6.  He states that he has decreased abdominal pain.  He is tolerating tube feedings.     8/5/23: He remains afebrile. His white blood cell count  today is 15.7.  Creatinine is 0.52.He remains afebrile.  He has some loose stool after being given laxatives and stool softener.  He has modest improvement in his abdominal pain today.    Past Medical History:   Diagnosis Date    Elevated cholesterol     GERD (gastroesophageal reflux disease)     Increased heart rate     Mini stroke     Neck pain     Prostate cancer     Rash     Tobacco abuse        Past Surgical History:   Procedure Laterality Date    APPENDECTOMY  1971    [facility]     COLONOSCOPY      COLONOSCOPY N/A 1/25/2023    Procedure: COLONOSCOPY;  Surgeon: Mahnaz Caraballo MD;  Location: [facility] OR;  Service: Gastroenterology;  Laterality: N/A;    ENDOSCOPY N/A 7/31/2023    Procedure: ESOPHAGOGASTRODUODENOSCOPY WITH KEOFEED PLACEMENT;  Surgeon: Yolande Eller MD;  Location:  [facility];  Service: Gastroenterology;  Laterality: N/A;       Family History   Problem Relation Age of Onset    Nephrolithiasis Mother     Heart disease Father     Hypertension Father     Kidney disease Father        Social History     Socioeconomic History    Marital status:    Tobacco Use    Smoking status: Every Day     Packs/day: 2.00     Years: 45.00     Pack years: 90.00     Types: Cigarettes    Smokeless tobacco: Never   Vaping Use    Vaping Use: Never used   Substance and Sexual Activity    Alcohol use: No    Drug use: Yes     Types: Marijuana     Comment: occ     Sexual activity: Defer       No Known Allergies      Medication:    Current Facility-Administered Medications:     acetaminophen (TYLENOL) tablet 650 mg, 650 mg, Nasogastric, Q4H PRN **OR** acetaminophen (TYLENOL) 160 MG/5ML solution 650 mg, 650 mg, Nasogastric, Q4H PRN **OR** acetaminophen (TYLENOL) suppository 650 mg, 650 mg, Rectal, Q4H PRN, Corina Johansen, PharmD    aspirin chewable tablet 81 mg, 81 mg, Nasogastric, Daily, Corina Johansen, PharmD, 81 mg at 08/04/23 0931    atorvastatin (LIPITOR) tablet 20 mg, 20 mg, Nasogastric,  Nightly, Curt Barry MD, 20 mg at 08/04/23 2052    sennosides-docusate (PERICOLACE) 8.6-50 MG per tablet 2 tablet, 2 tablet, Nasogastric, BID, 2 tablet at 08/04/23 2053 **AND** polyethylene glycol (MIRALAX) packet 17 g, 17 g, Oral, Daily PRN **AND** bisacodyl (DULCOLAX) EC tablet 5 mg, 5 mg, Oral, Daily PRN **AND** bisacodyl (DULCOLAX) suppository 10 mg, 10 mg, Rectal, Daily PRN, Curt Barry MD    bisacodyl (DULCOLAX) suppository 10 mg, 10 mg, Rectal, Q8H, Christos Mijares MD, 10 mg at 07/30/23 0849    Calcium Replacement - Follow Nurse / BPA Driven Protocol, , Does not apply, PRN, Curt Barry MD    docusate sodium (COLACE) liquid 100 mg, 100 mg, Nasogastric, BID, Christos Mijares MD, 100 mg at 08/03/23 2045    enzalutamide (XTANDI) chemo capsule 160 mg (patient supplied medication), 160 mg, Oral, Daily, Corina Johansen, PharmD, 160 mg at 08/04/23 1804    famotidine (PEPCID) injection 20 mg, 20 mg, Intravenous, Once, Anurag Sloan Jr., MD    famotidine (PEPCID) tablet 20 mg, 20 mg, Oral, Once, Anurag Sloan Jr., MD    furosemide (LASIX) injection 40 mg, 40 mg, Intravenous, Daily, Servando Jarrett MD, 40 mg at 08/04/23 1516    hydrocortisone (ANUSOL-HC) suppository 25 mg, 25 mg, Rectal, BID, Jagdeep Quintero APRN, 25 mg at 08/03/23 2051    HYDROmorphone (DILAUDID) injection 1 mg, 1 mg, Intravenous, Q2H PRN, Maddison Yeung MD, 1 mg at 08/05/23 0745    hydrOXYzine (ATARAX) tablet 50 mg, 50 mg, Nasogastric, TID PRN, Curt Barry MD, 50 mg at 08/04/23 0117    ipratropium-albuterol (DUO-NEB) nebulizer solution 3 mL, 3 mL, Nebulization, Q4H PRN, Curt Barry MD, 3 mL at 08/04/23 0651    lactulose (CHRONULAC) solution for enema 300 mL, 300 mL, Rectal, Once, Christos Mijares MD    lidocaine PF 1% (XYLOCAINE) injection 0.5 mL, 0.5 mL, Injection, Once PRN, Anurag Sloan Jr., MD    Magnesium Standard Dose Replacement - Follow Nurse / BPA Driven Protocol, , Does not apply, PRN,  Curt Barry MD    melatonin tablet 5 mg, 5 mg, Nasogastric, Nightly PRN, Curt Barry MD    methylnaltrexone (RELISTOR) injection 12 mg, 12 mg, Subcutaneous, Every Other Day, Servando Jarrett MD, 12 mg at 08/03/23 0923    metoclopramide (REGLAN) injection 10 mg, 10 mg, Intravenous, Q6H, Christos Mijares MD, 10 mg at 08/05/23 0237    metoprolol tartrate (LOPRESSOR) tablet 25 mg, 25 mg, Nasogastric, Q12H, Marija Smallwood MD, 25 mg at 08/04/23 2055    midazolam (VERSED) injection 1 mg, 1 mg, Intravenous, Q10 Min PRN, Anurag Sloan Jr., MD    montelukast (SINGULAIR) tablet 10 mg, 10 mg, Nasogastric, Nightly, Curt Barry MD, 10 mg at 08/04/23 2055    naloxone (NARCAN) injection 0.4 mg, 0.4 mg, Intravenous, PRN, Carroll Bobo,     nicotine (NICODERM CQ) 21 MG/24HR patch 1 patch, 1 patch, Transdermal, Q24H, Carroll Bobo, , 1 patch at 08/04/23 0933    ondansetron (ZOFRAN) injection 4 mg, 4 mg, Intravenous, Q6H PRN, Bharat Montana, APRN, 4 mg at 07/24/23 1113    oxyCODONE-acetaminophen (PERCOCET) 7.5-325 MG per tablet 1 tablet, 1 tablet, Nasogastric, Q6H PRN, Aletha Ohara, APRN, 1 tablet at 08/05/23 0535    pantoprazole (PROTONIX) injection 40 mg, 40 mg, Intravenous, Q AM, Marija Smallwood MD, 40 mg at 08/05/23 0526    phenylephrine-mineral oil-petrolatum (PREPARATION H) 0.25-14-74.9 % hemorhoidal ointment, , Rectal, BID PRN, Jagdeep Quintero, APRN    Phosphorus Replacement - Follow Nurse / BPA Driven Protocol, , Does not apply, PRN, Curt Barry MD    piperacillin-tazobactam (ZOSYN) 3.375 g in iso-osmotic dextrose 50 ml (premix), 3.375 g, Intravenous, Q8H, Servando Jarrett MD, 3.375 g at 08/05/23 0745    potassium chloride (KLOR-CON) packet 40 mEq, 40 mEq, Oral, Q4H, Servando Jarrett MD, 40 mEq at 08/05/23 0613    Potassium Replacement - Follow Nurse / BPA Driven Protocol, , Does not apply, PRN, Curt Barry MD    prochlorperazine (COMPAZINE) injection 5 mg, 5 mg,  Intravenous, Q3H PRN, Carroll Bobo DO, 5 mg at 23 1635    ProSource No Carb oral solution 30 mL, 30 mL, Per J Tube, TID, Minerva Carrera RD, 30 mL at 23    simethicone (MYLICON) chewable tablet 80 mg, 80 mg, Nasogastric, 4x Daily PRN, Curt Barry MD    sodium chloride 0.9 % flush 10 mL, 10 mL, Intravenous, Q12H, Carroll Bobo DO, 10 mL at 23    sodium chloride 0.9 % flush 10 mL, 10 mL, Intravenous, PRN, Carroll Bobo,     sodium chloride 0.9 % flush 10 mL, 10 mL, Intravenous, Q12H, Anurag Sloan Jr., MD, 10 mL at 23    sodium chloride 0.9 % flush 10 mL, 10 mL, Intravenous, PRN, Anurag Sloan Jr., MD    sodium chloride 0.9 % infusion 40 mL, 40 mL, Intravenous, PRN, Carroll Bobo,     sodium chloride 0.9 % infusion 40 mL, 40 mL, Intravenous, PRN, Anurag Sloan Jr., MD    Antibiotics:  Anti-Infectives (From admission, onward)      Ordered     Dose/Rate Route Frequency Start Stop    23 1437  piperacillin-tazobactam (ZOSYN) 3.375 g in iso-osmotic dextrose 50 ml (premix)        Ordering Provider: Servando Jarrett MD    3.375 g  over 4 Hours Intravenous Every 8 Hours 23 1600 23 1559    23 2329  meropenem (MERREM) 1000 mg/100 mL 0.9% NS (mbp)        Ordering Provider: Carroll Bobo DO    1,000 mg  over 30 Minutes Intravenous Once 23 0000 23 0112              Review of Systems:  The HPI      Physical Exam:   Vital Signs  Temp (24hrs), Av.4 øF (36.9 øC), Min:98.2 øF (36.8 øC), Max:98.7 øF (37.1 øC)    Temp  Min: 98.2 øF (36.8 øC)  Max: 98.7 øF (37.1 øC)  BP  Min: 143/77  Max: 186/83  Pulse  Min: 94  Max: 109  Resp  Min: 16  Max: 17  SpO2  Min: 95 %  Max: 97 %    GENERAL: Awake and alert, in no acute distress. Nasojejunal tube is in place.  HEENT: Normocephalic, atraumatic.  PERRL. EOMI. No conjunctival injection. No icterus. No labial ulcers  NECK: Supple   HEART: RRR; No murmur, rubs, gallops.   LUNGS: Clear to  auscultation bilaterally without wheezing, rales, rhonchi. Normal respiratory effort.   ABDOMEN: He has continued abdominal distention with mild diffuse abdominal tenderness.  EXT:  No cyanosis, clubbing or edema. No cord.  :  Without Jorgensen catheter.  MSK: No joint effusions or erythema  SKIN: Warm and dry without cutaneous eruptions on Inspection/palpation.    NEURO: Oriented to PPT.  Motor 5/5 strength  PSYCHIATRIC: Normal insight and judgment. Cooperative with PE    Laboratory Data    Results from last 7 days   Lab Units 08/05/23  0232 08/04/23  0756 08/03/23  0355   WBC 10*3/mm3 15.74* 16.57* 13.74*   HEMOGLOBIN g/dL 7.8* 7.7* 8.0*   HEMATOCRIT % 25.1* 24.5* 25.4*   PLATELETS 10*3/mm3 745* 767* 731*       Results from last 7 days   Lab Units 08/05/23  0232   SODIUM mmol/L 138   POTASSIUM mmol/L 3.5   CHLORIDE mmol/L 101   CO2 mmol/L 26.0   BUN mg/dL 16   CREATININE mg/dL 0.52*   GLUCOSE mg/dL 125*   CALCIUM mg/dL 8.0*       Results from last 7 days   Lab Units 08/05/23  0232   ALK PHOS U/L 101   BILIRUBIN mg/dL 0.4   ALT (SGPT) U/L 12   AST (SGOT) U/L 27               Results from last 7 days   Lab Units 08/04/23  0333   LACTATE mmol/L 1.7           Results from last 7 days   Lab Units 07/30/23  0914   VANCOMYCIN RM mcg/mL 7.60       Estimated Creatinine Clearance: 166.7 mL/min (A) (by C-G formula based on SCr of 0.52 mg/dL (L)).      Microbiology:  Microbiology Results (last 10 days)       Procedure Component Value - Date/Time    MRSA Screen, PCR (Inpatient) - Swab, Nares [[id number]]  (Abnormal) Collected: 07/28/23 0049    Lab Status: Final result Specimen: Swab from Nares Updated: 07/28/23 0950     MRSA PCR Positive    Narrative:      The negative predictive value of this diagnostic test is high and should only be used to consider de-escalating anti-MRSA therapy. A positive result may indicate colonization with MRSA and must be correlated clinically.    Blood Culture - Blood, Arm, Right [[id number]]  (Normal)  Collected: 07/28/23 0017    Lab Status: Final result Specimen: Blood from Arm, Right Updated: 08/02/23 0045     Blood Culture No growth at 5 days    Blood Culture - Blood, Arm, Right [[id number]]  (Normal) Collected: 07/28/23 0004    Lab Status: Final result Specimen: Blood from Arm, Right Updated: 08/02/23 0045     Blood Culture No growth at 5 days                  Radiology:  Imaging Results (Last 72 Hours)       Procedure Component Value Units Date/Time    XR Chest 1 View [[id number]] Collected: 08/04/23 1544     Updated: 08/04/23 1548    Narrative:      XR CHEST 1 VW    Date of Exam: 8/4/2023 3:16 PM EDT    Indication: decreased BS    Comparison: Chest x-ray 7/25/2023    Findings:  Esophagogastric tube is past GE junction. Tip is osteophyte view but is at least in the distal stomach. Small-moderate bilateral pleural effusions. Heart size is normal. Bibasilar airspace opacities left greater than right. Mild septal thickening. No   pneumothorax.      Impression:      Impression:  Nasogastric tube is past GE junction.  Small-moderate bilateral pleural effusions with bibasilar airspace opacities left greater than right. This is increased since previous exam.      Electronically Signed: Zoë Yoder    8/4/2023 3:45 PM EDT    Workstation ID: [id number]        I read his radiographic images reviewed the images with the patient and his family.      Impression:   Severe gallstone pancreatitis-Without overt evidence of severe pancreatic infection/pancreatic abscess. He appears to have passed the gallstone. He continues to have significant pancreatitis.  He is now on intravenous Zosyn  Leukocytosis/neutrophilia-improving.  MRSA nasal colonization  Advance prostate cancer/on Xtandi  Ongoing tobacco abuse  Occasional marijuana use    PLAN/RECOMMENDATIONS:   Nutritional support  Continue intravenous Zosyn      Palomo Orellana MD  8/5/2023  08:04 EDT

## 2023-08-05 NOTE — PLAN OF CARE
Goal Outcome Evaluation:  Plan of Care Reviewed With: patient, spouse        Problem: Adult Inpatient Plan of Care  Goal: Plan of Care Review  Outcome: Ongoing, Progressing  Flowsheets (Taken 8/5/2023 1334)  Progress: improving  Plan of Care Reviewed With:   patient   spouse  Goal: Patient-Specific Goal (Individualized)  Outcome: Ongoing, Progressing  Goal: Absence of Hospital-Acquired Illness or Injury  Outcome: Ongoing, Progressing  Intervention: Identify and Manage Fall Risk  Recent Flowsheet Documentation  Taken 8/5/2023 1203 by Claudia Foster RN  Safety Promotion/Fall Prevention:   safety round/check completed   room organization consistent   nonskid shoes/slippers when out of bed   fall prevention program maintained   assistive device/personal items within reach   clutter free environment maintained  Taken 8/5/2023 1000 by Claudia Foster RN  Safety Promotion/Fall Prevention:   safety round/check completed   room organization consistent   nonskid shoes/slippers when out of bed   fall prevention program maintained   clutter free environment maintained   assistive device/personal items within reach   activity supervised  Taken 8/5/2023 0815 by Claudia Foster RN  Safety Promotion/Fall Prevention:   safety round/check completed   room organization consistent   nonskid shoes/slippers when out of bed   fall prevention program maintained   clutter free environment maintained   assistive device/personal items within reach   activity supervised  Intervention: Prevent Skin Injury  Recent Flowsheet Documentation  Taken 8/5/2023 1203 by Claudia Foster RN  Body Position: position changed independently  Taken 8/5/2023 1000 by Claudia Foster RN  Body Position: position changed independently  Taken 8/5/2023 0815 by Claudia Foster RN  Body Position: position changed independently  Intervention: Prevent and Manage VTE (Venous Thromboembolism) Risk  Recent Flowsheet Documentation  Taken 8/5/2023 1203 by  Claudia Foster RN  Activity Management: activity encouraged  Taken 8/5/2023 1000 by Claudia Foster RN  Activity Management:   ambulated in room   ambulated to bathroom   up in chair  Taken 8/5/2023 0815 by Claudia Foster RN  Activity Management:   ambulated in room   ambulated to bathroom   back to bed  VTE Prevention/Management:   bilateral   sequential compression devices off  Intervention: Prevent Infection  Recent Flowsheet Documentation  Taken 8/5/2023 0815 by Claudia Foster RN  Infection Prevention:   environmental surveillance performed   hand hygiene promoted   rest/sleep promoted  Goal: Optimal Comfort and Wellbeing  Outcome: Ongoing, Progressing  Intervention: Monitor Pain and Promote Comfort  Recent Flowsheet Documentation  Taken 8/5/2023 1230 by Claudia Foster RN  Pain Management Interventions: see MAR  Taken 8/5/2023 1133 by Claudia Foster RN  Pain Management Interventions:   see MAR   quiet environment facilitated   pillow support provided  Taken 8/5/2023 0938 by Claudia Foster RN  Pain Management Interventions: see MAR  Taken 8/5/2023 0745 by Claudia Foster RN  Pain Management Interventions:   quiet environment facilitated   see MAR   pillow support provided   pain management plan reviewed with patient/caregiver  Intervention: Provide Person-Centered Care  Recent Flowsheet Documentation  Taken 8/5/2023 1000 by Claudia Foster RN  Trust Relationship/Rapport:   care explained   choices provided   emotional support provided  Taken 8/5/2023 0815 by Claudia Foster RN  Trust Relationship/Rapport:   choices provided   care explained   emotional support provided  Taken 8/5/2023 0745 by Claudia Foster RN  Trust Relationship/Rapport:   care explained   choices provided   emotional support provided   reassurance provided  Goal: Readiness for Transition of Care  Outcome: Ongoing, Progressing     Problem: Hypertension Comorbidity  Goal: Blood Pressure in Desired  Range  Outcome: Ongoing, Progressing  Intervention: Maintain Blood Pressure Management  Recent Flowsheet Documentation  Taken 8/5/2023 0815 by Claudia Foster RN  Medication Review/Management: medications reviewed     Problem: Skin Injury Risk Increased  Goal: Skin Health and Integrity  Outcome: Ongoing, Progressing  Intervention: Optimize Skin Protection  Recent Flowsheet Documentation  Taken 8/5/2023 1203 by Claudia Foster RN  Head of Bed (HOB) Positioning: HOB at 30 degrees  Taken 8/5/2023 1000 by Claudia Foster RN  Head of Bed (HOB) Positioning: HOB elevated  Taken 8/5/2023 0815 by Claudia Foster RN  Head of Bed (HOB) Positioning:   HOB elevated   HOB at 30-45 degrees     Problem: Adjustment to Illness (Sepsis/Septic Shock)  Goal: Optimal Coping  Outcome: Ongoing, Progressing  Intervention: Optimize Psychosocial Adjustment to Illness  Recent Flowsheet Documentation  Taken 8/5/2023 0815 by Claudia Foster RN  Supportive Measures: active listening utilized  Taken 8/5/2023 0745 by Claudia Foster RN  Family/Support System Care:   involvement promoted   presence promoted   self-care encouraged   caregiver stress acknowledged   support provided     Problem: Bleeding (Sepsis/Septic Shock)  Goal: Absence of Bleeding  Outcome: Ongoing, Progressing     Problem: Glycemic Control Impaired (Sepsis/Septic Shock)  Goal: Blood Glucose Level Within Desired Range  Outcome: Ongoing, Progressing  Intervention: Optimize Glycemic Control  Recent Flowsheet Documentation  Taken 8/5/2023 0815 by Claudia Foster RN  Glycemic Management: blood glucose monitored     Problem: Infection Progression (Sepsis/Septic Shock)  Goal: Absence of Infection Signs and Symptoms  Outcome: Ongoing, Progressing  Intervention: Initiate Sepsis Management  Recent Flowsheet Documentation  Taken 8/5/2023 0815 by Claudia Foster RN  Infection Prevention:   environmental surveillance performed   hand hygiene promoted   rest/sleep  promoted  Intervention: Promote Recovery  Recent Flowsheet Documentation  Taken 8/5/2023 1203 by Claudia Foster RN  Activity Management: activity encouraged  Taken 8/5/2023 1000 by Claudia Foster RN  Activity Management:   ambulated in room   ambulated to bathroom   up in chair  Taken 8/5/2023 0815 by Claudia Foster RN  Activity Management:   ambulated in room   ambulated to bathroom   back to bed  Sleep/Rest Enhancement:   consistent schedule promoted   room darkened  Intervention: Promote Stabilization  Recent Flowsheet Documentation  Taken 8/5/2023 0815 by Claudia Foster RN  Fluid/Electrolyte Management: fluids provided     Problem: Nutrition Impaired (Sepsis/Septic Shock)  Goal: Optimal Nutrition Intake  Outcome: Ongoing, Progressing     Problem: Fluid Imbalance (Pancreatitis)  Goal: Fluid Balance  Outcome: Ongoing, Progressing  Intervention: Monitor and Manage Fluid Balance  Recent Flowsheet Documentation  Taken 8/5/2023 0815 by Claudia Foster RN  Fluid/Electrolyte Management: fluids provided     Problem: Infection (Pancreatitis)  Goal: Infection Symptom Resolution  Outcome: Ongoing, Progressing     Problem: Nutrition Impaired (Pancreatitis)  Goal: Optimal Nutrition Intake  Outcome: Ongoing, Progressing     Problem: Pain (Pancreatitis)  Goal: Acceptable Pain Control  Outcome: Ongoing, Progressing  Intervention: Monitor and Manage Pain  Recent Flowsheet Documentation  Taken 8/5/2023 1230 by Claudia Foster RN  Pain Management Interventions: see MAR  Taken 8/5/2023 1133 by Claudia Foster RN  Pain Management Interventions:   see MAR   quiet environment facilitated   pillow support provided  Taken 8/5/2023 0938 by Claudia Foster RN  Pain Management Interventions: see MAR  Taken 8/5/2023 0815 by Claudia Foster RN  Sleep/Rest Enhancement:   consistent schedule promoted   room darkened  Taken 8/5/2023 0745 by Claudia Foster RN  Pain Management Interventions:   quiet environment  facilitated   see MAR   pillow support provided   pain management plan reviewed with patient/caregiver     Problem: Fall Injury Risk  Goal: Absence of Fall and Fall-Related Injury  Outcome: Ongoing, Progressing  Intervention: Identify and Manage Contributors  Recent Flowsheet Documentation  Taken 8/5/2023 0815 by Claudia Foster RN  Medication Review/Management: medications reviewed  Intervention: Promote Injury-Free Environment  Recent Flowsheet Documentation  Taken 8/5/2023 1203 by Claudia Foster RN  Safety Promotion/Fall Prevention:   safety round/check completed   room organization consistent   nonskid shoes/slippers when out of bed   fall prevention program maintained   assistive device/personal items within reach   clutter free environment maintained  Taken 8/5/2023 1000 by Claudia Foster RN  Safety Promotion/Fall Prevention:   safety round/check completed   room organization consistent   nonskid shoes/slippers when out of bed   fall prevention program maintained   clutter free environment maintained   assistive device/personal items within reach   activity supervised  Taken 8/5/2023 0815 by Claudia Foster RN  Safety Promotion/Fall Prevention:   safety round/check completed   room organization consistent   nonskid shoes/slippers when out of bed   fall prevention program maintained   clutter free environment maintained   assistive device/personal items within reach   activity supervised     Progress: improving     VSS on RA. Ambulating with minimal assistance in room and to BR. Loose/watery Bms continue. Holding laxatives at this time. TF at 70ml/hr continues. Patient does continue to request prn pain meds frequently c/o fairly constant abd discomfort. Will continue to monitor.

## 2023-08-05 NOTE — PROGRESS NOTES
"Patient Name:  Ta Madison  YOB: 1960  [id number]    Surgery Progress Note    Date of visit: 8/5/2023      Subjective: No changes.  Still feels distended.  Feels less \"smothering\" after diuresis yesterday.  Multiple loose bowel movements.  Has not really had any p.o. diet          Objective:     /74 (BP Location: Right arm, Patient Position: Lying)   Pulse 104   Temp 98.7 øF (37.1 øC) (Oral)   Resp 16   Ht 172.7 cm (68\")   Wt 97.4 kg (214 lb 10.2 oz)   SpO2 96%   BMI 32.64 kg/mý     Intake/Output Summary (Last 24 hours) at 8/5/2023 0905  Last data filed at 8/5/2023 0745  Gross per 24 hour   Intake 2272 ml   Output 2250 ml   Net 22 ml       GEN:   Awake, alert, in no acute distress, resting comfortably in bed   CV:   Regular rate and rhythm  L:  Symmetric expansion, not labored on room air  Abd:  Soft, moderately distended, only some mild tenderness to deep palpation throughout all abdominal quadrants and not changed  Ext:  No cyanosis, clubbing, or edema    Recent labs that are back at this time have been reviewed.           Assessment/ Plan:    Mr. Madison is a 63-year-old gentleman with history of GERD, prostate cancer on medical therapy, hyperlipidemia, and TIAs with gallstone pancreatitis      #Severe Gallstone pancreatitis  -Vital signs are stable  -No changes from my standpoint  -Continue nasoenteral feeds.  Advance oral diet as tolerated  -Continue supportive management of pancreatitis. No surgical interventions are planned at this time  -Will see again on Monday      Derrek Tony MD  8/5/2023  09:05 EDT      "

## 2023-08-06 ENCOUNTER — APPOINTMENT (OUTPATIENT)
Dept: CT IMAGING | Facility: HOSPITAL | Age: 63
DRG: 438 | End: 2023-08-06
Payer: COMMERCIAL

## 2023-08-06 LAB
ALBUMIN SERPL-MCNC: 2.6 G/DL (ref 3.5–5.2)
ALBUMIN/GLOB SERPL: 0.7 G/DL
ALP SERPL-CCNC: 109 U/L (ref 39–117)
ALT SERPL W P-5'-P-CCNC: 15 U/L (ref 1–41)
ANION GAP SERPL CALCULATED.3IONS-SCNC: 12 MMOL/L (ref 5–15)
AST SERPL-CCNC: 25 U/L (ref 1–40)
BASOPHILS # BLD AUTO: 0.04 10*3/MM3 (ref 0–0.2)
BASOPHILS # BLD AUTO: 0.06 10*3/MM3 (ref 0–0.2)
BASOPHILS NFR BLD AUTO: 0.2 % (ref 0–1.5)
BASOPHILS NFR BLD AUTO: 0.3 % (ref 0–1.5)
BILIRUB SERPL-MCNC: 0.4 MG/DL (ref 0–1.2)
BUN SERPL-MCNC: 16 MG/DL (ref 8–23)
BUN/CREAT SERPL: 25.4 (ref 7–25)
CALCIUM SPEC-SCNC: 8.2 MG/DL (ref 8.6–10.5)
CHLORIDE SERPL-SCNC: 101 MMOL/L (ref 98–107)
CO2 SERPL-SCNC: 23 MMOL/L (ref 22–29)
CREAT SERPL-MCNC: 0.63 MG/DL (ref 0.76–1.27)
D-LACTATE SERPL-SCNC: 2.5 MMOL/L (ref 0.5–2)
D-LACTATE SERPL-SCNC: 2.5 MMOL/L (ref 0.5–2)
DEPRECATED RDW RBC AUTO: 48.2 FL (ref 37–54)
DEPRECATED RDW RBC AUTO: 48.4 FL (ref 37–54)
EGFRCR SERPLBLD CKD-EPI 2021: 107.5 ML/MIN/1.73
EOSINOPHIL # BLD AUTO: 0.02 10*3/MM3 (ref 0–0.4)
EOSINOPHIL # BLD AUTO: 0.03 10*3/MM3 (ref 0–0.4)
EOSINOPHIL NFR BLD AUTO: 0.1 % (ref 0.3–6.2)
EOSINOPHIL NFR BLD AUTO: 0.1 % (ref 0.3–6.2)
ERYTHROCYTE [DISTWIDTH] IN BLOOD BY AUTOMATED COUNT: 17.2 % (ref 12.3–15.4)
ERYTHROCYTE [DISTWIDTH] IN BLOOD BY AUTOMATED COUNT: 17.3 % (ref 12.3–15.4)
GLOBULIN UR ELPH-MCNC: 3.6 GM/DL
GLUCOSE BLDC GLUCOMTR-MCNC: 129 MG/DL (ref 70–130)
GLUCOSE BLDC GLUCOMTR-MCNC: 135 MG/DL (ref 70–130)
GLUCOSE BLDC GLUCOMTR-MCNC: 97 MG/DL (ref 70–130)
GLUCOSE SERPL-MCNC: 151 MG/DL (ref 65–99)
HCT VFR BLD AUTO: 25.2 % (ref 37.5–51)
HCT VFR BLD AUTO: 25.5 % (ref 37.5–51)
HGB BLD-MCNC: 8 G/DL (ref 13–17.7)
HGB BLD-MCNC: 8.2 G/DL (ref 13–17.7)
IMM GRANULOCYTES # BLD AUTO: 0.17 10*3/MM3 (ref 0–0.05)
IMM GRANULOCYTES # BLD AUTO: 0.19 10*3/MM3 (ref 0–0.05)
IMM GRANULOCYTES NFR BLD AUTO: 0.7 % (ref 0–0.5)
IMM GRANULOCYTES NFR BLD AUTO: 0.8 % (ref 0–0.5)
LYMPHOCYTES # BLD AUTO: 1.04 10*3/MM3 (ref 0.7–3.1)
LYMPHOCYTES # BLD AUTO: 1.29 10*3/MM3 (ref 0.7–3.1)
LYMPHOCYTES NFR BLD AUTO: 4.6 % (ref 19.6–45.3)
LYMPHOCYTES NFR BLD AUTO: 5.7 % (ref 19.6–45.3)
MAGNESIUM SERPL-MCNC: 1.8 MG/DL (ref 1.6–2.4)
MCH RBC QN AUTO: 24.6 PG (ref 26.6–33)
MCH RBC QN AUTO: 24.7 PG (ref 26.6–33)
MCHC RBC AUTO-ENTMCNC: 31.7 G/DL (ref 31.5–35.7)
MCHC RBC AUTO-ENTMCNC: 32.2 G/DL (ref 31.5–35.7)
MCV RBC AUTO: 76.8 FL (ref 79–97)
MCV RBC AUTO: 77.5 FL (ref 79–97)
MONOCYTES # BLD AUTO: 1.19 10*3/MM3 (ref 0.1–0.9)
MONOCYTES # BLD AUTO: 1.44 10*3/MM3 (ref 0.1–0.9)
MONOCYTES NFR BLD AUTO: 5.2 % (ref 5–12)
MONOCYTES NFR BLD AUTO: 6.4 % (ref 5–12)
NEUTROPHILS NFR BLD AUTO: 19.56 10*3/MM3 (ref 1.7–7)
NEUTROPHILS NFR BLD AUTO: 20.25 10*3/MM3 (ref 1.7–7)
NEUTROPHILS NFR BLD AUTO: 86.7 % (ref 42.7–76)
NEUTROPHILS NFR BLD AUTO: 89.2 % (ref 42.7–76)
NRBC BLD AUTO-RTO: 0 /100 WBC (ref 0–0.2)
NRBC BLD AUTO-RTO: 0.1 /100 WBC (ref 0–0.2)
PLATELET # BLD AUTO: 685 10*3/MM3 (ref 140–450)
PLATELET # BLD AUTO: 852 10*3/MM3 (ref 140–450)
PMV BLD AUTO: 10 FL (ref 6–12)
PMV BLD AUTO: 9.9 FL (ref 6–12)
POTASSIUM SERPL-SCNC: 4.4 MMOL/L (ref 3.5–5.2)
PROCALCITONIN SERPL-MCNC: 0.27 NG/ML (ref 0–0.25)
PROT SERPL-MCNC: 6.2 G/DL (ref 6–8.5)
RBC # BLD AUTO: 3.25 10*6/MM3 (ref 4.14–5.8)
RBC # BLD AUTO: 3.32 10*6/MM3 (ref 4.14–5.8)
SODIUM SERPL-SCNC: 136 MMOL/L (ref 136–145)
WBC NRBC COR # BLD: 22.56 10*3/MM3 (ref 3.4–10.8)
WBC NRBC COR # BLD: 22.72 10*3/MM3 (ref 3.4–10.8)

## 2023-08-06 PROCEDURE — 80053 COMPREHEN METABOLIC PANEL: CPT | Performed by: NURSE PRACTITIONER

## 2023-08-06 PROCEDURE — 25010000002 ERTAPENEM PER 500 MG: Performed by: FAMILY MEDICINE

## 2023-08-06 PROCEDURE — 25010000002 PIPERACILLIN SOD-TAZOBACTAM PER 1 G: Performed by: INTERNAL MEDICINE

## 2023-08-06 PROCEDURE — 25010000002 MICAFUNGIN SODIUM 100 MG RECONSTITUTED SOLUTION: Performed by: INTERNAL MEDICINE

## 2023-08-06 PROCEDURE — 0 HYDROMORPHONE PER 4 MG

## 2023-08-06 PROCEDURE — 87040 BLOOD CULTURE FOR BACTERIA: CPT | Performed by: FAMILY MEDICINE

## 2023-08-06 PROCEDURE — 85025 COMPLETE CBC W/AUTO DIFF WBC: CPT | Performed by: NURSE PRACTITIONER

## 2023-08-06 PROCEDURE — 83605 ASSAY OF LACTIC ACID: CPT | Performed by: NURSE PRACTITIONER

## 2023-08-06 PROCEDURE — 25010000002 HYDROMORPHONE PER 4 MG: Performed by: NURSE PRACTITIONER

## 2023-08-06 PROCEDURE — 25010000002 METOCLOPRAMIDE PER 10 MG: Performed by: SURGERY

## 2023-08-06 PROCEDURE — 25010000002 ONDANSETRON PER 1 MG: Performed by: NURSE PRACTITIONER

## 2023-08-06 PROCEDURE — 25010000002 KETOROLAC TROMETHAMINE PER 15 MG: Performed by: FAMILY MEDICINE

## 2023-08-06 PROCEDURE — 84145 PROCALCITONIN (PCT): CPT | Performed by: NURSE PRACTITIONER

## 2023-08-06 PROCEDURE — 71250 CT THORAX DX C-: CPT

## 2023-08-06 PROCEDURE — 99233 SBSQ HOSP IP/OBS HIGH 50: CPT | Performed by: INTERNAL MEDICINE

## 2023-08-06 PROCEDURE — 82948 REAGENT STRIP/BLOOD GLUCOSE: CPT

## 2023-08-06 PROCEDURE — 83735 ASSAY OF MAGNESIUM: CPT | Performed by: NURSE PRACTITIONER

## 2023-08-06 PROCEDURE — 74176 CT ABD & PELVIS W/O CONTRAST: CPT

## 2023-08-06 RX ORDER — NALOXONE HCL 0.4 MG/ML
0.1 VIAL (ML) INJECTION
Status: DISCONTINUED | OUTPATIENT
Start: 2023-08-06 | End: 2023-08-11 | Stop reason: HOSPADM

## 2023-08-06 RX ORDER — HYDROMORPHONE HCL IN 0.9% NACL 10 MG/50ML
PATIENT CONTROLLED ANALGESIA SYRINGE INTRAVENOUS CONTINUOUS
Status: DISCONTINUED | OUTPATIENT
Start: 2023-08-06 | End: 2023-08-09 | Stop reason: SDUPTHER

## 2023-08-06 RX ORDER — SODIUM CHLORIDE 9 MG/ML
30 INJECTION, SOLUTION INTRAVENOUS CONTINUOUS PRN
Status: DISCONTINUED | OUTPATIENT
Start: 2023-08-06 | End: 2023-08-11 | Stop reason: HOSPADM

## 2023-08-06 RX ORDER — KETOROLAC TROMETHAMINE 30 MG/ML
30 INJECTION, SOLUTION INTRAMUSCULAR; INTRAVENOUS EVERY 6 HOURS PRN
Status: DISPENSED | OUTPATIENT
Start: 2023-08-06 | End: 2023-08-11

## 2023-08-06 RX ADMIN — HYDROMORPHONE HYDROCHLORIDE 0.5 MG: 1 INJECTION, SOLUTION INTRAMUSCULAR; INTRAVENOUS; SUBCUTANEOUS at 02:26

## 2023-08-06 RX ADMIN — HYDROMORPHONE HYDROCHLORIDE 0.5 MG: 1 INJECTION, SOLUTION INTRAMUSCULAR; INTRAVENOUS; SUBCUTANEOUS at 07:48

## 2023-08-06 RX ADMIN — MONTELUKAST 10 MG: 10 TABLET, FILM COATED ORAL at 20:25

## 2023-08-06 RX ADMIN — ASPIRIN 81 MG: 81 TABLET, CHEWABLE ORAL at 08:59

## 2023-08-06 RX ADMIN — METOPROLOL TARTRATE 50 MG: 50 TABLET, FILM COATED ORAL at 20:25

## 2023-08-06 RX ADMIN — HYDROMORPHONE HYDROCHLORIDE 0.5 MG: 1 INJECTION, SOLUTION INTRAMUSCULAR; INTRAVENOUS; SUBCUTANEOUS at 00:15

## 2023-08-06 RX ADMIN — METOCLOPRAMIDE 10 MG: 5 INJECTION, SOLUTION INTRAMUSCULAR; INTRAVENOUS at 14:55

## 2023-08-06 RX ADMIN — ONDANSETRON 4 MG: 2 INJECTION INTRAMUSCULAR; INTRAVENOUS at 00:15

## 2023-08-06 RX ADMIN — ACETAMINOPHEN 650 MG: 650 SOLUTION ORAL at 00:16

## 2023-08-06 RX ADMIN — HYDROXYZINE HYDROCHLORIDE 50 MG: 25 TABLET, FILM COATED ORAL at 07:49

## 2023-08-06 RX ADMIN — KETOROLAC TROMETHAMINE 30 MG: 30 INJECTION, SOLUTION INTRAMUSCULAR; INTRAVENOUS at 10:11

## 2023-08-06 RX ADMIN — PIPERACILLIN SODIUM AND TAZOBACTAM SODIUM 4.5 G: 4; .5 INJECTION, SOLUTION INTRAVENOUS at 12:57

## 2023-08-06 RX ADMIN — PIPERACILLIN SODIUM AND TAZOBACTAM SODIUM 3.38 G: 3; .375 INJECTION, SOLUTION INTRAVENOUS at 00:14

## 2023-08-06 RX ADMIN — KETOROLAC TROMETHAMINE 30 MG: 30 INJECTION, SOLUTION INTRAMUSCULAR; INTRAVENOUS at 04:37

## 2023-08-06 RX ADMIN — DOCUSATE SODIUM 100 MG: 50 LIQUID ORAL at 20:25

## 2023-08-06 RX ADMIN — ACETAMINOPHEN 650 MG: 650 SOLUTION ORAL at 11:32

## 2023-08-06 RX ADMIN — PANTOPRAZOLE SODIUM 40 MG: 40 INJECTION, POWDER, LYOPHILIZED, FOR SOLUTION INTRAVENOUS at 07:49

## 2023-08-06 RX ADMIN — METOCLOPRAMIDE 10 MG: 5 INJECTION, SOLUTION INTRAMUSCULAR; INTRAVENOUS at 02:26

## 2023-08-06 RX ADMIN — PIPERACILLIN SODIUM AND TAZOBACTAM SODIUM 4.5 G: 4; .5 INJECTION, SOLUTION INTRAVENOUS at 18:48

## 2023-08-06 RX ADMIN — Medication 10 ML: at 20:39

## 2023-08-06 RX ADMIN — ATORVASTATIN CALCIUM 20 MG: 20 TABLET, FILM COATED ORAL at 20:25

## 2023-08-06 RX ADMIN — Medication 10 ML: at 09:03

## 2023-08-06 RX ADMIN — HYDROMORPHONE HYDROCHLORIDE 0.5 MG: 1 INJECTION, SOLUTION INTRAMUSCULAR; INTRAVENOUS; SUBCUTANEOUS at 10:12

## 2023-08-06 RX ADMIN — HYDROMORPHONE HYDROCHLORIDE: 10 INJECTION, SOLUTION INTRAMUSCULAR; INTRAVENOUS; SUBCUTANEOUS at 12:37

## 2023-08-06 RX ADMIN — Medication 10 ML: at 20:38

## 2023-08-06 RX ADMIN — ACETAMINOPHEN 650 MG: 650 SOLUTION ORAL at 19:31

## 2023-08-06 RX ADMIN — METOCLOPRAMIDE 10 MG: 5 INJECTION, SOLUTION INTRAMUSCULAR; INTRAVENOUS at 20:27

## 2023-08-06 RX ADMIN — KETOROLAC TROMETHAMINE 30 MG: 30 INJECTION, SOLUTION INTRAMUSCULAR; INTRAVENOUS at 18:19

## 2023-08-06 RX ADMIN — METOCLOPRAMIDE 10 MG: 5 INJECTION, SOLUTION INTRAMUSCULAR; INTRAVENOUS at 09:12

## 2023-08-06 RX ADMIN — ERTAPENEM 1000 MG: 1 INJECTION INTRAMUSCULAR; INTRAVENOUS at 04:38

## 2023-08-06 RX ADMIN — Medication 1 PATCH: at 09:04

## 2023-08-06 RX ADMIN — METOPROLOL TARTRATE 50 MG: 50 TABLET, FILM COATED ORAL at 08:59

## 2023-08-06 RX ADMIN — MICAFUNGIN SODIUM 100 MG: 100 INJECTION, POWDER, LYOPHILIZED, FOR SOLUTION INTRAVENOUS at 14:56

## 2023-08-06 RX ADMIN — HYDROMORPHONE HYDROCHLORIDE 0.5 MG: 1 INJECTION, SOLUTION INTRAMUSCULAR; INTRAVENOUS; SUBCUTANEOUS at 04:37

## 2023-08-06 NOTE — SIGNIFICANT NOTE
[facility]  CROSS COVER NOTE        EVENT: Worsened abdominal pain and distention.  Report given to me by EUGENIA Garcia who has been evaluating patient through the evening.  CT of abdomen and pelvis reviewed which shows increase in peripancreatic changes and inflammation concerning for ongoing development of infected necrotizing pancreatitis      OBJECTIVE DATA:  Vitals:    08/05/23 2300   BP: 159/70   Pulse: 104   Resp: 20   Temp: (!) 100.7 øF (38.2 øC)   SpO2: 96%         ACTION TAKEN:     High concern for necrotizing pancreatitis evolving into infected peripancreatic process given increased pain, distention, fluid findings on CT, and fever with worsened WBC.    -Change Zosyn to Invanz --first-line recommendation for infected necrotizing pancreatitis due to better pancreatic penetrance  -STAT blood Cx x2 given ongoing fever despite Zosyn

## 2023-08-06 NOTE — SIGNIFICANT NOTE
Called per nursing secondary to uncontrolled pain despite IV morphine and PO pain control. Previously on dilaudid, will transition back to dilaudid for breakthrough pain and discontinue morphine. Continue with PO pain control as well. Palliative consult placed for AM for assistance with pain control     Pt with continued pain despite change of medication. Pt remains tachycardic and now complains of increased distention and increased pain in epigastric area.   Pt evaluated. Tachypnea and tachycardia noted with hr into the 110s. Abdomen distended. Pt reports increased from prior. Wife at bedside notes increased tachypnea and increasing pain. Decreased bowel sounds. Abdomen firm with mild pain with palpation.     CT abdomen/pelvis ordered for increased abdominal pain as well as CT chest for increased dyspnea.     Plan of care discussed with on call attending.     Addendum: 0151  CT scans reviewed, discussed with on call attending. IV antibiotic changed. Toradol given for inflammation to help with pain control

## 2023-08-06 NOTE — PROGRESS NOTES
[facility] Medicine Services  PROGRESS NOTE    Patient Name: Ta Madison  : 1960  MRN: [id number]    Date of Admission: 2023  Primary Care Physician: Yolande Olvera APRN    Subjective   Subjective     CC:  Follow-up abdominal pain    HPI:   Unfortunately last night patient took a turn for the worse.  Developed fever 100.7.  Abdominal distention also got worse and pain was not controlled.  Energy during the day Dilaudid IV was changed to IV morphine but this was not covering the pain.  The pain actually was worse at least partially secondary to worsening of pancreatitis.  Did not shift change antibiotic, good blood culture, get a CT scan of the abdomen without contrast which showed worsening of pancreatitis.    This morning patient resting in bed in no acute distress and feels a little better than last night.  However, again during the day he developed temperature of 101.2.  This afternoon seems to be doing a little better.  Patient is currently n.p.o. and tube feeding also has been stopped.  No nausea vomiting.  No chest pain reports shortness of breath.  Still has abdominal pain and requires pain medication.    ROS:  Gen-fever as mentioned above, chills  CV- No chest pain, palpitations  Resp- No cough, dyspnea  GI-no nausea or vomiting.  Has abdominal pain.    Objective   Objective     Vital Signs:   Temp:  [98.7 øF (37.1 øC)-101.1 øF (38.4 øC)] 99.2 øF (37.3 øC)  Heart Rate:  [] 104  Resp:  [16-26] 16  BP: (114-161)/(54-74) 114/54  Flow (L/min):  [2] 2     Physical Exam:  Constitutional: No acute distress  HENT: NCAT, mucous membranes moist  Respiratory: Clear to auscultation bilaterally, decreased breath sounds at the bases, respiratory effort normal   Cardiovascular: Tachycardia, no murmurs, rubs, or gallops  Gastrointestinal: Positive bowel sounds, soft, diffusely tender but no rebound, distended  Musculoskeletal: Severe bilateral ankle edema, edema of scrotum, edema  of lower abdominal wall.  Psychiatric: Appropriate affect, cooperative  Neurologic: Oriented x 3, strength symmetric in all extremities, Cranial Nerves grossly intact to confrontation, speech clear  Skin: No rashes     Results Reviewed:  LAB RESULTS:      Lab 08/06/23  0756 08/06/23  0453 08/06/23  0003 08/05/23  0232 08/04/23  0756 08/04/23  0333 08/03/23  0355   WBC 22.56*  --  22.72* 15.74* 16.57*  --  13.74*   HEMOGLOBIN 8.0*  --  8.2* 7.8* 7.7*  --  8.0*   HEMATOCRIT 25.2*  --  25.5* 25.1* 24.5*  --  25.4*   PLATELETS 685*  --  852* 745* 767*  --  731*   NEUTROS ABS 19.56*  --  20.25* 13.42* 13.88*  --  10.85*   IMMATURE GRANS (ABS) 0.19*  --  0.17* 0.12* 0.13*  --  0.13*   LYMPHS ABS 1.29  --  1.04 1.15 1.41  --  1.50   MONOS ABS 1.44*  --  1.19* 0.87 0.96*  --  1.11*   EOS ABS 0.02  --  0.03 0.13 0.16  --  0.11   MCV 77.5*  --  76.8* 77.5* 78.3*  --  77.7*   PROCALCITONIN  --   --  0.27*  --   --   --   --    LACTATE  --  2.5* 2.5*  --   --  1.7  --          Lab 08/06/23  0003 08/05/23  1411 08/05/23  0232 08/04/23  0333 08/03/23  1529 08/03/23  0355 08/02/23  1554 08/02/23  0340 08/01/23  0312 07/31/23  0307   SODIUM 136  --  138 138  --  139  --  138 137 139   POTASSIUM 4.4 4.3 3.5 4.0 3.8 3.5   < > 3.5 3.8 4.2   CHLORIDE 101  --  101 103  --  103  --  102 101 103   CO2 23.0  --  26.0 23.0  --  26.0  --  26.0 24.0 21.0*   ANION GAP 12.0  --  11.0 12.0  --  10.0  --  10.0 12.0 15.0   BUN 16  --  16 17  --  15  --  15 13 11   CREATININE 0.63*  --  0.52* 0.44*  --  0.44*  --  0.47* 0.48* 0.48*   EGFR 107.5  --  114.0 119.9  --  119.9  --  117.5 116.8 116.8   GLUCOSE 151*  --  125* 134*  --  122*  --  152* 123* 62*   CALCIUM 8.2*  --  8.0* 8.2*  --  7.9*  --  7.7* 8.0* 8.1*   MAGNESIUM 1.8  --  1.9  --   --   --   --   --  1.9 2.0   PHOSPHORUS  --   --  2.9  --   --   --   --   --  2.4* 3.0    < > = values in this interval not displayed.         Lab 08/06/23  0003 08/05/23  0232 08/04/23  0333 08/03/23  0355  08/02/23  0340   TOTAL PROTEIN 6.2 5.9* 5.5* 5.7* 4.8*   ALBUMIN 2.6* 2.6* 2.9* 2.2* 2.2*   GLOBULIN 3.6 3.3 2.6 3.5 2.6   ALT (SGPT) 15 12 11 11 9   AST (SGOT) 25 27 23 20 17   BILIRUBIN 0.4 0.4 0.4 0.3 0.3   ALK PHOS 109 101 104 113 122*   LIPASE  --   --  23  --   --                      Lab 08/04/23  0927 08/04/23  0333   IRON  --  13*   IRON SATURATION (TSAT)  --  5*   TIBC  --  237*   TRANSFERRIN  --  159*   FERRITIN  --  606.40*   FOLATE 8.80  --    VITAMIN B 12 >2,000*  --            Brief Urine Lab Results  (Last result in the past 365 days)        Color   Clarity   Blood   Leuk Est   Nitrite   Protein   CREAT   Urine HCG        07/25/23 0636 Dark Yellow   Clear   Negative   Negative   Negative   30 mg/dL (1+)                   Microbiology Results Abnormal       Procedure Component Value - Date/Time    Blood Culture - Blood, Arm, Right [[id number]]  (Normal) Collected: 07/28/23 0017    Lab Status: Final result Specimen: Blood from Arm, Right Updated: 08/02/23 0045     Blood Culture No growth at 5 days    Blood Culture - Blood, Arm, Right [[id number]]  (Normal) Collected: 07/28/23 0004    Lab Status: Final result Specimen: Blood from Arm, Right Updated: 08/02/23 0045     Blood Culture No growth at 5 days    Blood Culture - Blood, Hand, Right [[id number]]  (Normal) Collected: 07/23/23 2230    Lab Status: Final result Specimen: Blood from Hand, Right Updated: 07/28/23 2300     Blood Culture No growth at 5 days    Narrative:      AEROBIC BOTTLE ONLY    Blood Culture - Blood, Hand, Left [[id number]]  (Normal) Collected: 07/23/23 2240    Lab Status: Final result Specimen: Blood from Hand, Left Updated: 07/28/23 2300     Blood Culture No growth at 5 days    Narrative:      AEROBIC BOTTLE ONLY    COVID PRE-OP / PRE-PROCEDURE SCREENING ORDER (NO ISOLATION) - Swab, Nasopharynx [[id number]]  (Normal) Collected: 07/24/23 0620    Lab Status: Final result Specimen: Swab from Nasopharynx Updated: 07/24/23 0729    Narrative:       The following orders were created for panel order COVID PRE-OP / PRE-PROCEDURE SCREENING ORDER (NO ISOLATION) - Swab, Nasopharynx.  Procedure                               Abnormality         Status                     ---------                               -----------         ------                     Respiratory Panel PCR w/...[[id number]]  Normal              Final result                 Please view results for these tests on the individual orders.    Respiratory Panel PCR w/COVID-19(SARS-CoV-2) BALTAZAR/ANDRES/ASTER/PAD/COR/MAD/VALENTINE In-House, NP Swab in UTM/VTM, 3-4 HR TAT - Swab, Nasopharynx [[id number]]  (Normal) Collected: 07/24/23 0620    Lab Status: Final result Specimen: Swab from Nasopharynx Updated: 07/24/23 0729     ADENOVIRUS, PCR Not Detected     Coronavirus 229E Not Detected     Coronavirus HKU1 Not Detected     Coronavirus NL63 Not Detected     Coronavirus OC43 Not Detected     COVID19 Not Detected     Human Metapneumovirus Not Detected     Human Rhinovirus/Enterovirus Not Detected     Influenza A PCR Not Detected     Influenza B PCR Not Detected     Parainfluenza Virus 1 Not Detected     Parainfluenza Virus 2 Not Detected     Parainfluenza Virus 3 Not Detected     Parainfluenza Virus 4 Not Detected     RSV, PCR Not Detected     Bordetella pertussis pcr Not Detected     Bordetella parapertussis PCR Not Detected     Chlamydophila pneumoniae PCR Not Detected     Mycoplasma pneumo by PCR Not Detected    Narrative:      In the setting of a positive respiratory panel with a viral infection PLUS a negative procalcitonin without other underlying concern for bacterial infection, consider observing off antibiotics or discontinuation of antibiotics and continue supportive care. If the respiratory panel is positive for atypical bacterial infection (Bordetella pertussis, Chlamydophila pneumoniae, or Mycoplasma pneumoniae), consider antibiotic de-escalation to target atypical bacterial infection.            CT Abdomen  Pelvis Without Contrast    Result Date: 8/6/2023  CT ABDOMEN PELVIS WO CONTRAST Date of Exam: 8/6/2023 12:44 AM EDT Indication: Abdominal pain, acute, nonlocalized increased abdominal pain and distention. Comparison: 7/30/2023. Technique: Axial CT images were obtained of the abdomen and pelvis without the administration of contrast. Reconstructed coronal and sagittal images were also obtained. Automated exposure control and iterative construction methods were used. Findings: There are ongoing findings of necrotizing pancreatitis. There are appear to be areas of pancreatic necrosis primarily located at the pancreatic head and proximal body. Evaluation is limited without IV contrast. There is an increasing degree of inflammatory fluid surrounding the pancreas and within the adjacent tissues. There appear to be some organizing components to the peripancreatic fluid including a pocket abutting the caudate lobe of the liver measuring up to 54 mm diameter and an ovoid component measuring 47 mm distal to the pancreatic tail. The remainder of the fluid appears infiltrating. There is stable perihepatic and pelvic ascites. There is fluid and inflammation in the paracolic gutters. A feeding tube terminates at the ligament of Treitz. The liver, gallbladder, bile ducts and spleen appear unremarkable. The adrenal glands are normal. The kidneys and ureters appear within normal limits. The urinary bladder is unremarkable. The prostate gland is atrophic. Small bowel is nondistended. The colon demonstrates some wall thickening at the hepatic and splenic flexures, likely secondary to pancreatitis. There is stable moderate anasarca. Findings in the chest discussed in a separate report. There is moderate aortoiliac atherosclerosis. There are no acute osseous abnormalities or destructive bone lesions. There is mild thoracolumbar spondylosis with stable chronic bilateral L5 spondylolysis without spondylolisthesis.     Impression:  Impression: 1. Evolving changes of necrotizing pancreatitis. Evaluation is limited without IV contrast, but there appear to be areas of necrosis in the pancreatic body and head and there is increasing inflammatory fluid within and surrounding the pancreas with organizing pockets of fluid as described. 2. Stable small volume scattered ascites and moderate anasarca. 3. Feeding tube terminates at the ligament of Treitz. Electronically Signed: Abdirizak Ivan  8/6/2023 1:15 AM EDT  Workstation ID: [id number]    CT Chest Without Contrast Diagnostic    Result Date: 8/6/2023  CT CHEST WO CONTRAST DIAGNOSTIC Date of Exam: 8/6/2023 12:44 AM EDT Indication: increased dyspnea. Comparison: Chest radiograph 8/4/2023. Technique: Axial CT images were obtained of the chest without contrast administration.  Reconstructed coronal and sagittal images were also obtained. Automated exposure control and iterative construction methods were used. Findings: There are small bilateral pleural effusions. There is patchy airspace disease in both lungs. No pneumothorax. Central airways are patent. There is dependent bibasilar atelectasis. The thyroid, trachea and esophagus appear within normal limits. Gastric suction tube terminates in the distal stomach. Heart size is normal. There is diminished attenuation of the blood pool suggesting anemia. There are mild coronary artery calcifications. No mediastinal lymphadenopathy or pericardial effusion. No acute findings in the superficial soft tissues. There is mild bilateral gynecomastia. Findings in the abdomen discussed in a separate report. There are no acute osseous abnormalities or destructive bone lesions.     Impression: Impression: 1. Small bilateral pleural effusions and patchy bilateral airspace disease that could reflect atelectasis, pneumonia or mild edema. 2. Mild coronary artery calcification. Electronically Signed: Abdirizak Ivan  8/6/2023 1:07 AM EDT  Workstation ID: [id number]    XR Chest 1  View    Result Date: 8/4/2023  XR CHEST 1 VW Date of Exam: 8/4/2023 3:16 PM EDT Indication: decreased BS Comparison: Chest x-ray 7/25/2023 Findings: Esophagogastric tube is past GE junction. Tip is osteophyte view but is at least in the distal stomach. Small-moderate bilateral pleural effusions. Heart size is normal. Bibasilar airspace opacities left greater than right. Mild septal thickening. No pneumothorax.     Impression: Impression: Nasogastric tube is past GE junction. Small-moderate bilateral pleural effusions with bibasilar airspace opacities left greater than right. This is increased since previous exam. Electronically Signed: Zoë Beba  8/4/2023 3:45 PM EDT  Workstation ID: [id number]         Current medications:  Scheduled Meds:aspirin, 81 mg, Nasogastric, Daily  atorvastatin, 20 mg, Nasogastric, Nightly  bisacodyl, 10 mg, Rectal, Q8H  docusate sodium, 100 mg, Nasogastric, BID  enzalutamide, 160 mg, Oral, Daily  famotidine, 20 mg, Intravenous, Once  famotidine, 20 mg, Oral, Once  hydrocortisone, 25 mg, Rectal, BID  lactulose, 300 mL, Rectal, Once  methylnaltrexone, 12 mg, Subcutaneous, Every Other Day  metoclopramide, 10 mg, Intravenous, Q6H  metoprolol tartrate, 50 mg, Nasogastric, Q12H  micafungin (MYCAMINE) IV, 100 mg, Intravenous, Q24H  montelukast, 10 mg, Nasogastric, Nightly  nicotine, 1 patch, Transdermal, Q24H  pantoprazole, 40 mg, Intravenous, Q AM  piperacillin-tazobactam, 4.5 g, Intravenous, Q8H  ProSource No Carb, 30 mL, Per J Tube, TID  senna-docusate sodium, 2 tablet, Nasogastric, BID  sodium chloride, 10 mL, Intravenous, Q12H  sodium chloride, 10 mL, Intravenous, Q12H      Continuous Infusions:HYDROmorphone HCl-NaCl,   Pharmacy Consult,   sodium chloride, 30 mL/hr        PRN Meds:.  acetaminophen **OR** acetaminophen **OR** acetaminophen    senna-docusate sodium **AND** polyethylene glycol **AND** bisacodyl **AND** bisacodyl    Calcium Replacement - Follow Nurse / BPA Driven Protocol     hydrOXYzine    ipratropium-albuterol    ketorolac    lidocaine PF 1%    Magnesium Standard Dose Replacement - Follow Nurse / BPA Driven Protocol    melatonin    midazolam    naloxone    naloxone    ondansetron    Pharmacy Consult    phenylephrine-mineral oil-petrolatum    Phosphorus Replacement - Follow Nurse / BPA Driven Protocol    Potassium Replacement - Follow Nurse / BPA Driven Protocol    prochlorperazine    simethicone    sodium chloride    sodium chloride    sodium chloride    sodium chloride    sodium chloride    Assessment & Plan   Assessment & Plan     Active Hospital Problems    Diagnosis  POA    **Acute gallstone pancreatitis [K85.10]  Yes    Elevated serum creatinine [R79.89]  Yes    Personal history of transient ischemic attack (TIA) [Z86.73]  Not Applicable    Adenocarcinoma of prostate [C61]  Yes    Gastroesophageal reflux disease [K21.9]  Yes    Hyperlipidemia [E78.5]  Yes    Tobacco abuse [Z72.0]  Yes      Resolved Hospital Problems   No resolved problems to display.        Brief Hospital Course to date:  Ta Madison is a 63 y.o. male with PMH of GERD, HLD, hx of TIA, advanced prostate cancer on Xtandi, occasional marijuna use, and chronic 1 PPD tobacco use who initially presented to [place] [facility] 7/23/23 with complaints of severe epigastric abdominal pain with associated nausea and vomiting. CT A/P at OSH showed acute pancreatitis with 2 mm stone in the proximal duodenum. Transferred to [facility] for GI evaluation.     This patient's problems and plans were partially entered by my partner and updated as appropriate by me 08/06/23.    Assessment/plan:    Acute gallstone pancreatitis  Leukocytosis  Lactic acidosis  -CT reviewed, possibly necrosis, possible forming pseudocyst.  Repeat 6 CT scan of the abdomen without contrast last night shows progression of necrotizing pancreatitis but there is no free air.  -marked leukocytosis, initially improved however, worsening again.  Patient also developed fever  last night and midday today.  Culture was obtained last night which is negative to date.  -transitioned from merrem-->zosyn.  We will continue Zosyn for now.  Will start micafungin also.  -ID follows  -surgery following, they saw and evaluated the patient this morning and recommended n.p.o. and holding tube feeding for now.    Edema/upper abdomen to toes.  -- Creatinine is normal and GFR is 119  -- Started diuresing with Lasix and patient was improving as far as edema was concerned.  Will hold Lasix for now    SOA  -better, on RA      Prostate cancer  -Continue Xtandi oral chemotherapy (patient supplied), however patient having difficulty with swallowing this       HLD  GERD  Hx of TIA  -Home meds     Tobacco abuse  -Counseled on cessation  -Nicotine patch    PLAN:  -Continue current care with current antibiotics.  -Repeat labs in a.m.  -Talk to the family extensively today.  We will repeat CT scan of the abdomen pelvis with contrast tomorrow.  If significant worsening will consider transferring patient to [facility].     Expected Discharge Location and Transportation: To be determined   pending medically ready and PT/OT recommendations  Expected Discharge   Expected Discharge Date: 8/6/2023; Expected Discharge Time:       DVT prophylaxis:  Mechanical DVT prophylaxis orders are present.     AM-PAC 6 Clicks Score (PT): 19 (08/06/23 0818)    CODE STATUS:   Code Status and Medical Interventions:   Ordered at: 07/23/23 0422     Code Status (Patient has no pulse and is not breathing):    CPR (Attempt to Resuscitate)     Medical Interventions (Patient has pulse or is breathing):    Full Support     Release to patient:    Routine Release       Servando Jarrett MD  08/06/23

## 2023-08-06 NOTE — PLAN OF CARE
Goal Outcome Evaluation:  Plan of Care Reviewed With: patient, spouse     Problem: Adult Inpatient Plan of Care  Goal: Plan of Care Review  Outcome: Ongoing, Progressing  Flowsheets (Taken 8/6/2023 2539)  Progress: no change  Plan of Care Reviewed With:   patient   spouse     Problem: Adult Inpatient Plan of Care  Goal: Absence of Hospital-Acquired Illness or Injury  Outcome: Ongoing, Progressing     Problem: Adult Inpatient Plan of Care  Goal: Optimal Comfort and Wellbeing  Outcome: Ongoing, Progressing     Problem: Adult Inpatient Plan of Care  Goal: Readiness for Transition of Care  Outcome: Ongoing, Progressing     Problem: Hypertension Comorbidity  Goal: Blood Pressure in Desired Range  Outcome: Ongoing, Progressing     Problem: Skin Injury Risk Increased  Goal: Skin Health and Integrity  Outcome: Ongoing, Progressing     Problem: Adjustment to Illness (Sepsis/Septic Shock)  Goal: Optimal Coping  Outcome: Ongoing, Progressing     Problem: Bleeding (Sepsis/Septic Shock)  Goal: Absence of Bleeding  Outcome: Ongoing, Progressing     Problem: Nutrition Impaired (Sepsis/Septic Shock)  Goal: Optimal Nutrition Intake  Outcome: Ongoing, Progressing     Problem: Fluid Imbalance (Pancreatitis)  Goal: Fluid Balance  Outcome: Ongoing, Progressing     Problem: Nutrition Impaired (Pancreatitis)  Goal: Optimal Nutrition Intake  Outcome: Ongoing, Progressing     Problem: Pain (Pancreatitis)  Goal: Acceptable Pain Control  Outcome: Ongoing, Progressing     Problem: Fall Injury Risk  Goal: Absence of Fall and Fall-Related Injury  Outcome: Ongoing, Progressing        Progress: no change       Patient continues to be ST on monitor. T max 101.2 oral today which came down with tylenol and toradol. IV PCA infusing and patient states his pain is much better controlled. He has been up to chair today with minimal assistance. Voiding and has had Bms today. O2 2L/NC as needed. BP WNL. IV antibiotics continue. TF on hold as ordered. Will  continue to monitor.

## 2023-08-06 NOTE — CONSULTS
Palliative Care Initial Consult   Attending Physician: Servando Jarrett MD  Referring Provider: EUGENIA Garcia    Reason for Referral:  pain    Code Status:   Code Status and Medical Interventions:   Ordered at: 07/23/23 2229     Code Status (Patient has no pulse and is not breathing):    CPR (Attempt to Resuscitate)     Medical Interventions (Patient has pulse or is breathing):    Full Support     Release to patient:    Routine Release      Advanced Directives: Advance Directive Status: Patient does not have advance directive   Family/Support: Azael Madison (spouse)  Goals of Care: TBD.    HPI: Ta Madison is a 63 y.o. male with PMH significant for GERD, HLD, TIA, prostate cancer with mets to bone on Xtandi, occasional marijuana use, tobacco use 1ppd. Patient presented from [place] ED on 7/23 due to severe epigastric abdominal pain. Work up revealed acute pancreatitis. Hospitalization complicated by necrotizing pancreatitis. Palliative Care consulted for GOC in the context of complex medical decision making and pain management.   Patient receiving Hydromorphone 0.5mg IV x 6 doses, 1mg IV x3 doses prior, Morphine 2mg IV and 3mg IV x1 dose each.  Percocet 7.5-325mg PO x2 doses in the last 24 hours for total 157.5 WILFREDO's. Patient received Toradol 30mg IV x 2 doses.  Patient awake and alert in bed. Wife and son at bedside. Patient reports RUQ abdominal pain that is sharp, stabbing. It is constant, with decreasing in pain after pain medication. Patient reports current pain level 7/10. Patient reports he has a history of bone pain from mets that has resolved since being on Xtandi, no pain at present from bone mets. He reports one episode of nausea that has been relieved with Zofran. He endorses fatigue and SOB this admission, currently on 2LNC. Bilateral lower extremity edema that is new to patient, receiving lasix.   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 He does not use any assistive  "devices or oxygen at baseline.     ROS: +RUQ acute abdominal pain, sharp, stabbing, constant, improves with Hydromorphone but wears off prior to two hour frequency. +diarrhea. +SOB, currently on 2LNC, with exertion. +nausea, improves with Zofran. Denies vomiting, anxiety, HA, constipation.        Past Medical History:   Diagnosis Date    Elevated cholesterol     GERD (gastroesophageal reflux disease)     Increased heart rate     Mini stroke     Neck pain     Prostate cancer     Rash     Tobacco abuse      Past Surgical History:   Procedure Laterality Date    APPENDECTOMY  1971    [facility]     COLONOSCOPY      COLONOSCOPY N/A 1/25/2023    Procedure: COLONOSCOPY;  Surgeon: Mahnaz Caraballo MD;  Location:  [facility];  Service: Gastroenterology;  Laterality: N/A;    ENDOSCOPY N/A 7/31/2023    Procedure: ESOPHAGOGASTRODUODENOSCOPY WITH KEOFEED PLACEMENT;  Surgeon: Yolande Eller MD;  Location:  [facility];  Service: Gastroenterology;  Laterality: N/A;     Social History     Socioeconomic History    Marital status:    Tobacco Use    Smoking status: Every Day     Packs/day: 2.00     Years: 45.00     Pack years: 90.00     Types: Cigarettes    Smokeless tobacco: Never   Vaping Use    Vaping Use: Never used   Substance and Sexual Activity    Alcohol use: No    Drug use: Yes     Types: Marijuana     Comment: occ     Sexual activity: Defer     Family History   Problem Relation Age of Onset    Nephrolithiasis Mother     Heart disease Father     Hypertension Father     Kidney disease Father        No Known Allergies    Current medication reviewed for route, type, dose and frequency and are current per MAR at time of dictation.    Palliative Performance Scale Score:  60%    /58 (BP Location: Right arm, Patient Position: Lying)   Pulse 103   Temp (!) 101.1 øF (38.4 øC) (Oral)   Resp 20   Ht 172.7 cm (68\")   Wt 97.4 kg (214 lb 10.2 oz)   SpO2 97%   BMI 32.64 kg/mý     Intake/Output Summary " (Last 24 hours) at 8/6/2023 1101  Last data filed at 8/6/2023 0930  Gross per 24 hour   Intake 2725 ml   Output 975 ml   Net 1750 ml       Physical Exam:    General Appearance:    Patient laying in bed, awake, alert, A/C ill appearing, cooperative, NAD   HEENT:    NC/AT, EOMI, anicteric, MMM, face relaxed, NC in place, keofeed in place   Neck:   supple, trachea midline, no JVD   Lungs:     CTA bilat, diminished in bases; respirations regular, even and unlabored; RR 16-18 on exam, 2LNC    Heart:    RRR, normal S1 and S2, no M/R/G, HR 89 on monitor   Abdomen:     Distant bowel sounds, tender, distended   G/U:   Deferred   MSK/Extremities:   +2 BLE edema   Pulses:   Pulses palpable and equal bilaterally   Skin:   Warm, dry   Neurologic:   A/Ox3, cooperative, RENDON   Psych:   Calm, appropriate         Labs:   Results from last 7 days   Lab Units 08/06/23  0756   WBC 10*3/mm3 22.56*   HEMOGLOBIN g/dL 8.0*   HEMATOCRIT % 25.2*   PLATELETS 10*3/mm3 685*     Results from last 7 days   Lab Units 08/06/23  0003   SODIUM mmol/L 136   POTASSIUM mmol/L 4.4   CHLORIDE mmol/L 101   CO2 mmol/L 23.0   BUN mg/dL 16   CREATININE mg/dL 0.63*   GLUCOSE mg/dL 151*   CALCIUM mg/dL 8.2*     Results from last 7 days   Lab Units 08/06/23  0003   SODIUM mmol/L 136   POTASSIUM mmol/L 4.4   CHLORIDE mmol/L 101   CO2 mmol/L 23.0   BUN mg/dL 16   CREATININE mg/dL 0.63*   CALCIUM mg/dL 8.2*   BILIRUBIN mg/dL 0.4   ALK PHOS U/L 109   ALT (SGPT) U/L 15   AST (SGOT) U/L 25   GLUCOSE mg/dL 151*     Imaging Results (Last 72 Hours)       Procedure Component Value Units Date/Time    CT Abdomen Pelvis Without Contrast [[id number]] Collected: 08/06/23 0107     Updated: 08/06/23 0118    Narrative:      CT ABDOMEN PELVIS WO CONTRAST    Date of Exam: 8/6/2023 12:44 AM EDT    Indication: Abdominal pain, acute, nonlocalized  increased abdominal pain and distention.    Comparison: 7/30/2023.    Technique: Axial CT images were obtained of the abdomen and pelvis  without the administration of contrast. Reconstructed coronal and sagittal images were also obtained. Automated exposure control and iterative construction methods were used.      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 There are appear to be areas of pancreatic necrosis primarily located at the pancreatic head and proximal body. Evaluation is limited without IV contrast. There is an increasing degree of   inflammatory fluid surrounding the pancreas and within the adjacent tissues. There appear to be some organizing components to the peripancreatic fluid including a pocket abutting the caudate lobe of the liver measuring up to 54 mm diameter and an ovoid   component measuring 47 mm distal to the pancreatic tail. The remainder of the fluid appears infiltrating. There is stable perihepatic and pelvic ascites. There is fluid and inflammation in the paracolic gutters. A feeding tube terminates at the ligament   of Treitz. The liver, gallbladder, bile ducts and spleen appear unremarkable. The adrenal glands are normal. The kidneys and ureters appear within normal limits. The urinary bladder is unremarkable. The prostate gland is atrophic. Small bowel is   nondistended. The colon demonstrates some wall thickening at the hepatic and splenic flexures, likely secondary to pancreatitis.    There is stable moderate anasarca. Findings in the chest discussed in a separate report. There is moderate aortoiliac atherosclerosis. There are no acute osseous abnormalities or destructive bone lesions. There is mild thoracolumbar spondylosis with   stable chronic bilateral L5 spondylolysis without spondylolisthesis.      Impression:      Impression:    1. Evolving changes of necrotizing pancreatitis. Evaluation is limited without IV contrast, but there appear to be areas of necrosis in the pancreatic body and head and there is increasing inflammatory fluid within and surrounding the pancreas with   organizing  pockets of fluid as described.  2. Stable small volume scattered ascites and moderate anasarca.  3. Feeding tube terminates at the ligament of Treitz.            Electronically Signed: Abdirizak Moncho    8/6/2023 1:15 AM EDT    Workstation ID: [id number]    CT Chest Without Contrast Diagnostic [[id number]] Collected: 08/06/23 0059     Updated: 08/06/23 0110    Narrative:      CT CHEST WO CONTRAST DIAGNOSTIC    Date of Exam: 8/6/2023 12:44 AM EDT    Indication: increased dyspnea.    Comparison: Chest radiograph 8/4/2023.    Technique: Axial CT images were obtained of the chest without contrast administration.  Reconstructed coronal and sagittal images were also obtained. Automated exposure control and iterative construction methods were used.      Findings:  There are small bilateral pleural effusions. There is patchy airspace disease in both lungs. No pneumothorax. Central airways are patent. There is dependent bibasilar atelectasis.    The thyroid, trachea and esophagus appear within normal limits. Gastric suction tube terminates in the distal stomach. Heart size is normal. There is diminished attenuation of the blood pool suggesting anemia. There are mild coronary artery   calcifications. No mediastinal lymphadenopathy or pericardial effusion.    No acute findings in the superficial soft tissues. There is mild bilateral gynecomastia. Findings in the abdomen discussed in a separate report. There are no acute osseous abnormalities or destructive bone lesions.      Impression:      Impression:    1. Small bilateral pleural effusions and patchy bilateral airspace disease that could reflect atelectasis, pneumonia or mild edema.  2. Mild coronary artery calcification.        Electronically Signed: Abdirizak Ivan    8/6/2023 1:07 AM EDT    Workstation ID: [id number]    XR Chest 1 View [[id number]] Collected: 08/04/23 1544     Updated: 08/04/23 1548    Narrative:      XR CHEST 1 VW    Date of Exam: 8/4/2023 3:16 PM  EDT    Indication: decreased BS    Comparison: Chest x-ray 7/25/2023    Findings:  Esophagogastric tube is past GE junction. Tip is osteophyte view but is at least in the distal stomach. Small-moderate bilateral pleural effusions. Heart size is normal. Bibasilar airspace opacities left greater than right. Mild septal thickening. No   pneumothorax.      Impression:      Impression:  Nasogastric tube is past GE junction.  Small-moderate bilateral pleural effusions with bibasilar airspace opacities left greater than right. This is increased since previous exam.      Electronically Signed: Zoë Yoder    8/4/2023 3:45 PM EDT    Workstation ID: [id number]                  Diagnostics: Reviewed    A:   Acute gallstone pancreatitis    Hyperlipidemia    Tobacco abuse    Gastroesophageal reflux disease    Adenocarcinoma of prostate    Personal history of transient ischemic attack (TIA)    Elevated serum creatinine     63 y.o. male with necrotizing pancreatitis, prostate cancer with mets to bone.    S/S:   Pain -acute abdominal secondary pancreatitis  -continue Toradol 30mg IV q 6 hours prn pain  -started Hydromorphone PCA 0.2mg/hr basal rate with 0.2mg IV bolus q 15 minutes prn breakthrough pain (based on total 157.5 WILFREDO's in the last 24 hours)  -discontinued prn Hydromorphone and prn Percocet    2. Dyspnea -currently on 2LNC    3. Nausea -continue Zofran 4mg IV q 6 hours prn N/V  -TF on hold  -Reglan scheduled    4. GOC -Full Code/Full Support -discussed with patient/family at bedside  -patient would like to complete ACP documents later in this admission as he has considered goals at end of life which would be to avoid MV/CPR/ artificial nutrition  -continue Full Treatment, would consider transfer to  if necessary    P: Introduced Palliative Care and services to patient and family at bedside. Reviewed patient's symptoms and discussed starting PCA for pain control. Patient and family in agreement with PCA for pain control.  Reviewed code status and patient electing Full Code/Full Support. Discussed ACP consult later this admission as patient has considered his goals at end of life which would be to avoid MV/CPR/artificial nutrition. 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 All questions and concerns addressed. RN updated on plan.   Thank you for this consult and allowing us to participate in patient's plan of care. Palliative Care Team will continue to follow patient. Please do not hesitate to contact us regarding further symptom management or goals of care needs.  Time: 60 minutes spent reviewing medical and medication records, assessing and examining patient, discussing with family, answering questions, providing some guidance about a plan and documentation of care, and coordinating care with other healthcare members, with > 50% time spent face to face.         Ramila Martinez, APRN  8/6/2023

## 2023-08-06 NOTE — PROGRESS NOTES
"Ta Madison  1960  [id number]    Surgery Progress Note    Date of visit: 8/6/2023    Subjective: Patient complained of worsening abdominal pain last night that prompted work-up  CT scan of the abdomen pelvis was obtained that showed evolving changes of necrotizing pancreatitis with increasing inflammatory fluid.   Patient has been tolerating tube feeds  He is having diarrhea  He has been on clear liquid diet however has not been tolerating it.    Objective:    /58 (BP Location: Left arm, Patient Position: Lying)   Pulse 113   Temp 99.5 øF (37.5 øC) (Oral)   Resp 16   Ht 172.7 cm (68\")   Wt 97.4 kg (214 lb 10.2 oz)   SpO2 94%   BMI 32.64 kg/mý     Intake/Output Summary (Last 24 hours) at 8/6/2023 0932  Last data filed at 8/6/2023 0930  Gross per 24 hour   Intake 2875 ml   Output 2000 ml   Net 875 ml       CV: Tachycardic  L: normal air entry  Abd: Distended  Tender to palpation  No peritoneal signs    LABS:    Results from last 7 days   Lab Units 08/06/23  0756   WBC 10*3/mm3 22.56*   HEMOGLOBIN g/dL 8.0*   HEMATOCRIT % 25.2*   PLATELETS 10*3/mm3 685*     Results from last 7 days   Lab Units 08/06/23  0003   SODIUM mmol/L 136   POTASSIUM mmol/L 4.4   CHLORIDE mmol/L 101   CO2 mmol/L 23.0   BUN mg/dL 16   CREATININE mg/dL 0.63*   CALCIUM mg/dL 8.2*   BILIRUBIN mg/dL 0.4   ALK PHOS U/L 109   ALT (SGPT) U/L 15   AST (SGOT) U/L 25   GLUCOSE mg/dL 151*     Results from last 7 days   Lab Units 08/06/23  0003   SODIUM mmol/L 136   POTASSIUM mmol/L 4.4   CHLORIDE mmol/L 101   CO2 mmol/L 23.0   BUN mg/dL 16   CREATININE mg/dL 0.63*   GLUCOSE mg/dL 151*   CALCIUM mg/dL 8.2*     Lab Results   Lab Value Date/Time    LIPASE 23 08/04/2023 0333    LIPASE 15 07/30/2023 0915    LIPASE 1,255 (H) 07/24/2023 0924         Assessment/ Plan: Mr. Madison was admitted with acute gallstone pancreatitis with worsening necrotizing pancreatitis noted on recent CT scan of the abdomen and pelvis  He has low-grade fever, " leukocytosis and elevated lactic acid  He is hemodynamically stable at this time however, he is at risk for decompensation  Recommend close monitoring  Hold off tube feeds and diet for now  Continue with supportive care  Patient may need to be transferred to  with  instability or need for surgical intervention  I discussed the plan at length with Dr. Al        Problem List Items Addressed This Visit          Gastrointestinal Abdominal     * (Principal) Acute gallstone pancreatitis - Primary     Other Visit Diagnoses       Dysphagia, unspecified type                  James Singh MD  8/6/2023  09:32 EDT

## 2023-08-06 NOTE — PLAN OF CARE
Problem: Adult Inpatient Plan of Care  Goal: Plan of Care Review  Outcome: Ongoing, Progressing  Goal: Patient-Specific Goal (Individualized)  Outcome: Ongoing, Progressing  Goal: Absence of Hospital-Acquired Illness or Injury  Outcome: Ongoing, Progressing  Goal: Optimal Comfort and Wellbeing  Outcome: Ongoing, Progressing  Goal: Readiness for Transition of Care  Outcome: Ongoing, Progressing   Goal Outcome Evaluation:      Patient had no acute events during shift  Patient pain unmanaged  Patient complained of increasing upper abdominal pain, nausea, abdominal increasing abdominal tightness - notified OCD - see orders/ results  CT - see results  Med changes - see orders  Critical lactate - see results  Positive for severe sepsis  Requesting increased/ continuous pain control   X 4 loose BMs  Patient presents stoic about care being provided and reaction to pain, and symptomology   Refuses ambulation due to on-going pain

## 2023-08-07 ENCOUNTER — APPOINTMENT (OUTPATIENT)
Dept: CT IMAGING | Facility: HOSPITAL | Age: 63
DRG: 438 | End: 2023-08-07
Payer: COMMERCIAL

## 2023-08-07 LAB
ABO GROUP BLD: NORMAL
ALBUMIN SERPL-MCNC: 2.5 G/DL (ref 3.5–5.2)
ALBUMIN/GLOB SERPL: 0.7 G/DL
ALP SERPL-CCNC: 108 U/L (ref 39–117)
ALT SERPL W P-5'-P-CCNC: 14 U/L (ref 1–41)
ANION GAP SERPL CALCULATED.3IONS-SCNC: 12 MMOL/L (ref 5–15)
AST SERPL-CCNC: 25 U/L (ref 1–40)
BASOPHILS # BLD MANUAL: 0 10*3/MM3 (ref 0–0.2)
BASOPHILS NFR BLD MANUAL: 0 % (ref 0–1.5)
BILIRUB SERPL-MCNC: 0.7 MG/DL (ref 0–1.2)
BLD GP AB SCN SERPL QL: NEGATIVE
BUN SERPL-MCNC: 26 MG/DL (ref 8–23)
BUN/CREAT SERPL: 33.3 (ref 7–25)
CA-I SERPL ISE-MCNC: 1.24 MMOL/L (ref 1.12–1.32)
CALCIUM SPEC-SCNC: 8.4 MG/DL (ref 8.6–10.5)
CHLORIDE SERPL-SCNC: 103 MMOL/L (ref 98–107)
CO2 SERPL-SCNC: 25 MMOL/L (ref 22–29)
CREAT SERPL-MCNC: 0.78 MG/DL (ref 0.76–1.27)
CRP SERPL-MCNC: 34.91 MG/DL (ref 0–0.5)
D-LACTATE SERPL-SCNC: 2.5 MMOL/L (ref 0.5–2)
DEPRECATED RDW RBC AUTO: 49.1 FL (ref 37–54)
EGFRCR SERPLBLD CKD-EPI 2021: 100.8 ML/MIN/1.73
EOSINOPHIL # BLD MANUAL: 0 10*3/MM3 (ref 0–0.4)
EOSINOPHIL NFR BLD MANUAL: 0 % (ref 0.3–6.2)
ERYTHROCYTE [DISTWIDTH] IN BLOOD BY AUTOMATED COUNT: 17.4 % (ref 12.3–15.4)
GLOBULIN UR ELPH-MCNC: 3.7 GM/DL
GLUCOSE BLDC GLUCOMTR-MCNC: 96 MG/DL (ref 70–130)
GLUCOSE SERPL-MCNC: 129 MG/DL (ref 65–99)
HCT VFR BLD AUTO: 23.1 % (ref 37.5–51)
HGB BLD-MCNC: 7.3 G/DL (ref 13–17.7)
HYPOCHROMIA BLD QL: ABNORMAL
LIPASE SERPL-CCNC: 17 U/L (ref 13–60)
LYMPHOCYTES # BLD MANUAL: 0.51 10*3/MM3 (ref 0.7–3.1)
LYMPHOCYTES NFR BLD MANUAL: 5 % (ref 5–12)
MAGNESIUM SERPL-MCNC: 1.9 MG/DL (ref 1.6–2.4)
MCH RBC QN AUTO: 24.4 PG (ref 26.6–33)
MCHC RBC AUTO-ENTMCNC: 31.6 G/DL (ref 31.5–35.7)
MCV RBC AUTO: 77.3 FL (ref 79–97)
MONOCYTES # BLD: 0.85 10*3/MM3 (ref 0.1–0.9)
NEUTROPHILS # BLD AUTO: 15.7 10*3/MM3 (ref 1.7–7)
NEUTROPHILS NFR BLD MANUAL: 73 % (ref 42.7–76)
NEUTS BAND NFR BLD MANUAL: 19 % (ref 0–5)
PHOSPHATE SERPL-MCNC: 4.1 MG/DL (ref 2.5–4.5)
PLAT MORPH BLD: NORMAL
PLATELET # BLD AUTO: 732 10*3/MM3 (ref 140–450)
PMV BLD AUTO: 10.1 FL (ref 6–12)
POTASSIUM SERPL-SCNC: 4.3 MMOL/L (ref 3.5–5.2)
PROCALCITONIN SERPL-MCNC: 0.5 NG/ML (ref 0–0.25)
PROT SERPL-MCNC: 6.2 G/DL (ref 6–8.5)
RBC # BLD AUTO: 2.99 10*6/MM3 (ref 4.14–5.8)
RH BLD: POSITIVE
SCAN SLIDE: NORMAL
SODIUM SERPL-SCNC: 140 MMOL/L (ref 136–145)
T&S EXPIRATION DATE: NORMAL
VARIANT LYMPHS NFR BLD MANUAL: 3 % (ref 19.6–45.3)
WBC MORPH BLD: NORMAL
WBC NRBC COR # BLD: 17.07 10*3/MM3 (ref 3.4–10.8)

## 2023-08-07 PROCEDURE — 74177 CT ABD & PELVIS W/CONTRAST: CPT

## 2023-08-07 PROCEDURE — 82330 ASSAY OF CALCIUM: CPT | Performed by: INTERNAL MEDICINE

## 2023-08-07 PROCEDURE — 99233 SBSQ HOSP IP/OBS HIGH 50: CPT | Performed by: INTERNAL MEDICINE

## 2023-08-07 PROCEDURE — 84100 ASSAY OF PHOSPHORUS: CPT | Performed by: INTERNAL MEDICINE

## 2023-08-07 PROCEDURE — 85025 COMPLETE CBC W/AUTO DIFF WBC: CPT | Performed by: NURSE PRACTITIONER

## 2023-08-07 PROCEDURE — 86900 BLOOD TYPING SEROLOGIC ABO: CPT | Performed by: INTERNAL MEDICINE

## 2023-08-07 PROCEDURE — 83605 ASSAY OF LACTIC ACID: CPT | Performed by: INTERNAL MEDICINE

## 2023-08-07 PROCEDURE — 86850 RBC ANTIBODY SCREEN: CPT | Performed by: INTERNAL MEDICINE

## 2023-08-07 PROCEDURE — 25010000002 METOCLOPRAMIDE PER 10 MG: Performed by: SURGERY

## 2023-08-07 PROCEDURE — 84145 PROCALCITONIN (PCT): CPT | Performed by: INTERNAL MEDICINE

## 2023-08-07 PROCEDURE — 86900 BLOOD TYPING SEROLOGIC ABO: CPT

## 2023-08-07 PROCEDURE — 25010000002 PIPERACILLIN SOD-TAZOBACTAM PER 1 G: Performed by: INTERNAL MEDICINE

## 2023-08-07 PROCEDURE — 25010000002 METHYLNALTREXONE 12 MG/0.6ML SOLUTION: Performed by: INTERNAL MEDICINE

## 2023-08-07 PROCEDURE — 86901 BLOOD TYPING SEROLOGIC RH(D): CPT | Performed by: INTERNAL MEDICINE

## 2023-08-07 PROCEDURE — P9016 RBC LEUKOCYTES REDUCED: HCPCS

## 2023-08-07 PROCEDURE — 82948 REAGENT STRIP/BLOOD GLUCOSE: CPT

## 2023-08-07 PROCEDURE — 83690 ASSAY OF LIPASE: CPT | Performed by: INTERNAL MEDICINE

## 2023-08-07 PROCEDURE — 0 HYDROMORPHONE PER 4 MG

## 2023-08-07 PROCEDURE — 25010000002 MICAFUNGIN SODIUM 100 MG RECONSTITUTED SOLUTION: Performed by: INTERNAL MEDICINE

## 2023-08-07 PROCEDURE — 80053 COMPREHEN METABOLIC PANEL: CPT | Performed by: HOSPITALIST

## 2023-08-07 PROCEDURE — 86140 C-REACTIVE PROTEIN: CPT | Performed by: INTERNAL MEDICINE

## 2023-08-07 PROCEDURE — 85007 BL SMEAR W/DIFF WBC COUNT: CPT | Performed by: NURSE PRACTITIONER

## 2023-08-07 PROCEDURE — 83735 ASSAY OF MAGNESIUM: CPT | Performed by: INTERNAL MEDICINE

## 2023-08-07 PROCEDURE — 36430 TRANSFUSION BLD/BLD COMPNT: CPT

## 2023-08-07 PROCEDURE — 86923 COMPATIBILITY TEST ELECTRIC: CPT

## 2023-08-07 PROCEDURE — 97535 SELF CARE MNGMENT TRAINING: CPT

## 2023-08-07 PROCEDURE — 97164 PT RE-EVAL EST PLAN CARE: CPT

## 2023-08-07 PROCEDURE — 25510000001 IOPAMIDOL 61 % SOLUTION: Performed by: INTERNAL MEDICINE

## 2023-08-07 RX ORDER — DEXTROSE AND SODIUM CHLORIDE 5; .9 G/100ML; G/100ML
100 INJECTION, SOLUTION INTRAVENOUS CONTINUOUS
Status: ACTIVE | OUTPATIENT
Start: 2023-08-07 | End: 2023-08-08

## 2023-08-07 RX ORDER — AMINO ACIDS/PROTEIN HYDROLYS 11G-40/45
30 LIQUID IN PACKET (ML) ORAL 3 TIMES DAILY
Status: DISCONTINUED | OUTPATIENT
Start: 2023-08-07 | End: 2023-08-08

## 2023-08-07 RX ADMIN — PIPERACILLIN SODIUM AND TAZOBACTAM SODIUM 4.5 G: 4; .5 INJECTION, SOLUTION INTRAVENOUS at 22:35

## 2023-08-07 RX ADMIN — METHYLNALTREXONE BROMIDE 12 MG: 12 INJECTION, SOLUTION SUBCUTANEOUS at 10:07

## 2023-08-07 RX ADMIN — HYDROCORTISONE ACETATE 25 MG: 25 SUPPOSITORY RECTAL at 10:07

## 2023-08-07 RX ADMIN — DOCUSATE SODIUM 100 MG: 50 LIQUID ORAL at 10:03

## 2023-08-07 RX ADMIN — MICAFUNGIN SODIUM 100 MG: 100 INJECTION, POWDER, LYOPHILIZED, FOR SOLUTION INTRAVENOUS at 18:23

## 2023-08-07 RX ADMIN — METOPROLOL TARTRATE 50 MG: 50 TABLET, FILM COATED ORAL at 10:06

## 2023-08-07 RX ADMIN — Medication 30 ML: at 14:28

## 2023-08-07 RX ADMIN — MONTELUKAST 10 MG: 10 TABLET, FILM COATED ORAL at 22:34

## 2023-08-07 RX ADMIN — PANTOPRAZOLE SODIUM 40 MG: 40 INJECTION, POWDER, LYOPHILIZED, FOR SOLUTION INTRAVENOUS at 05:39

## 2023-08-07 RX ADMIN — ACETAMINOPHEN 650 MG: 650 SOLUTION ORAL at 06:55

## 2023-08-07 RX ADMIN — SENNOSIDES AND DOCUSATE SODIUM 2 TABLET: 50; 8.6 TABLET ORAL at 10:05

## 2023-08-07 RX ADMIN — METOCLOPRAMIDE 10 MG: 5 INJECTION, SOLUTION INTRAMUSCULAR; INTRAVENOUS at 05:39

## 2023-08-07 RX ADMIN — ACETAMINOPHEN 650 MG: 650 SOLUTION ORAL at 19:54

## 2023-08-07 RX ADMIN — Medication 30 ML: at 22:33

## 2023-08-07 RX ADMIN — IOPAMIDOL 85 ML: 612 INJECTION, SOLUTION INTRAVENOUS at 14:11

## 2023-08-07 RX ADMIN — Medication 1 PATCH: at 10:10

## 2023-08-07 RX ADMIN — METOCLOPRAMIDE 10 MG: 5 INJECTION, SOLUTION INTRAMUSCULAR; INTRAVENOUS at 10:08

## 2023-08-07 RX ADMIN — PIPERACILLIN SODIUM AND TAZOBACTAM SODIUM 4.5 G: 4; .5 INJECTION, SOLUTION INTRAVENOUS at 05:39

## 2023-08-07 RX ADMIN — METOCLOPRAMIDE 10 MG: 5 INJECTION, SOLUTION INTRAMUSCULAR; INTRAVENOUS at 22:33

## 2023-08-07 RX ADMIN — DEXTROSE AND SODIUM CHLORIDE 100 ML/HR: 5; 900 INJECTION, SOLUTION INTRAVENOUS at 10:02

## 2023-08-07 RX ADMIN — PIPERACILLIN SODIUM AND TAZOBACTAM SODIUM 4.5 G: 4; .5 INJECTION, SOLUTION INTRAVENOUS at 10:09

## 2023-08-07 RX ADMIN — ASPIRIN 81 MG: 81 TABLET, CHEWABLE ORAL at 10:05

## 2023-08-07 RX ADMIN — HYDROMORPHONE HYDROCHLORIDE: 10 INJECTION, SOLUTION INTRAMUSCULAR; INTRAVENOUS; SUBCUTANEOUS at 07:57

## 2023-08-07 RX ADMIN — BISACODYL 10 MG: 10 SUPPOSITORY RECTAL at 10:07

## 2023-08-07 RX ADMIN — METOPROLOL TARTRATE 50 MG: 50 TABLET, FILM COATED ORAL at 22:34

## 2023-08-07 RX ADMIN — ATORVASTATIN CALCIUM 20 MG: 20 TABLET, FILM COATED ORAL at 22:34

## 2023-08-07 NOTE — PROGRESS NOTES
Continued Stay Note  [facility]     Patient Name: Ta Madison  MRN: [id number]  Today's Date: 8/7/2023    Admit Date: 7/23/2023    Plan: Ongoing   Discharge Plan       Row Name 08/07/23 1101       Plan    Plan Ongoing    Plan Comments Case Management continues to follow and will assist with any discharge planning needs as they arise.                   Discharge Codes    No documentation.                 Expected Discharge Date and Time       Expected Discharge Date Expected Discharge Time    Aug 6, 2023               Jessi Tovar, RN

## 2023-08-07 NOTE — PROGRESS NOTES
"Patient Name:  Ta Madison  YOB: 1960  [id number]    Surgery Progress Note    Date of visit: 8/7/2023      Subjective: Febrile overnight to 101.  Tachycardia with a heart rate in the 120s.  No hypotension.  Having urine output with 200 mL and 3 unmeasured voids yesterday.  Continuing to have bowel movements with 3 yesterday.  Reports continued abdominal pain, not significantly changed over the last several days and actually better after reintroducing the PCA, currently rated at a 5 out of 10.  No nausea or vomiting.          Objective:     /65 (BP Location: Right arm, Patient Position: Lying)   Pulse (!) 131 Comment: nurse alerted  Temp (!) 101.2 øF (38.4 øC) (Oral) Comment: retested  Resp 19   Ht 172.7 cm (68\")   Wt 97.4 kg (214 lb 10.2 oz)   SpO2 95%   BMI 32.64 kg/mý     Intake/Output Summary (Last 24 hours) at 8/7/2023 0808  Last data filed at 8/7/2023 0539  Gross per 24 hour   Intake 1718 ml   Output 200 ml   Net 1518 ml       GEN:   Awake, alert, in no acute distress, resting comfortably in bed   CV:   Heart rate in the 120s  L:  Symmetric expansion, not labored on oxygen by nasal cannula  Abd:  Moderately distended, diffuse tenderness to deep palpation and no significant tenderness to light palpation, rebound, or guarding.  Ext:  No cyanosis, clubbing, or edema    Recent labs that are back at this time have been reviewed.           Assessment/ Plan:    Mr. Madison is a 63-year-old gentleman with history of GERD, prostate cancer on medical therapy, hyperlipidemia, and TIAs with gallstone pancreatitis      #Severe Gallstone pancreatitis  -Intermittently febrile and with tachycardia.  Otherwise hemodynamically stable  -CT scan from the 6th reviewed.  Extensive peripancreatic inflammation, stranding, and fluid.  No well-defined fluid collection and no air within the pancreas or any of these fluid collections  -Recommend to restart nasoenteral feeds, however would keep n.p.o. " otherwise.  Continue supportive measures with IV fluids, antibiotics, and pain control.  -No surgical interventions are planned at this time.  If he were to become hemodynamically unstable or CT scan demonstrated significant air within the pancreas or these fluid collections concerning for an advanced necrotizing process, then we may need to consider transfer to an academic medical center          Derrek Tony MD  8/7/2023  08:08 EDT

## 2023-08-07 NOTE — PLAN OF CARE
This was a Palliative Care referral visit. Pt lying in bed with his head raised. Pt's wife at bedside. He had no complaints.  provided supportive conversation, active listening and spiritual encouragement during this patient visit. Spiritual Care will continue to provide support for GOC/POC.

## 2023-08-07 NOTE — PLAN OF CARE
Goal Outcome Evaluation:  Plan of Care Reviewed With: patient, other (see comments) (RN present)        Progress: no change (PT wound eval)  Outcome Evaluation: Pt presents with c/o scrotal edema. PT educated pt on scrotal sling technique and importance of using barrier cream to prevent skin breakdown and decrease overall discomfort. RN present throughout session and verbalized understanding. No further IPPT wound services warranted, PT wound signing off.

## 2023-08-07 NOTE — PLAN OF CARE
Problem: Adult Inpatient Plan of Care  Goal: Plan of Care Review  Outcome: Ongoing, Progressing  Goal: Patient-Specific Goal (Individualized)  Outcome: Ongoing, Progressing  Goal: Absence of Hospital-Acquired Illness or Injury  Outcome: Ongoing, Progressing  Intervention: Identify and Manage Fall Risk  Intervention: Prevent Skin Injury  Intervention: Prevent and Manage VTE (Venous Thromboembolism) Risk  Intervention: Prevent Infection  Goal: Optimal Comfort and Wellbeing  Outcome: Ongoing, Progressing  Intervention: Provide Person-Centered Care  Goal: Readiness for Transition of Care  Outcome: Ongoing, Progressing   Goal Outcome Evaluation:   Patient had no acute events during shift  Pain better controlled w PCA -  PCA:   2L NC-ETCO2, A&O x 4  Up to toilet assisted by wife at bedside  Notable increase in interactions today   Lactate still 2.5  Slept better than usual

## 2023-08-07 NOTE — THERAPY DISCHARGE NOTE
Acute Care - Wound/Debridement Initial Evaluation/Discharge  [facility]     Patient Name: Ta Madison  : 1960  MRN: [id number]  Today's Date: 2023                    Admit Date: 2023    Visit Dx:    ICD-10-CM ICD-9-CM   1. Acute gallstone pancreatitis  K85.10 577.0     574.20   2. Dysphagia, unspecified type  R13.10 787.20       Patient Active Problem List   Diagnosis    Tachycardia    Hyperlipidemia    Multiple allergies    Tobacco abuse    Gastroesophageal reflux disease    Chronic right shoulder pain    Ganglion cyst of dorsum of right wrist    Benign skin cyst    Numbness and tingling of both upper extremities    Right shoulder pain    Adenocarcinoma of prostate    Encounter for screening for malignant neoplasm of colon    Epigastric pain    Acute gallstone pancreatitis    Personal history of transient ischemic attack (TIA)    Elevated serum creatinine        Past Medical History:   Diagnosis Date    Elevated cholesterol     GERD (gastroesophageal reflux disease)     Increased heart rate     Mini stroke     Neck pain     Prostate cancer     Rash     Tobacco abuse         Past Surgical History:   Procedure Laterality Date    APPENDECTOMY      [facility]     COLONOSCOPY      COLONOSCOPY N/A 2023    Procedure: COLONOSCOPY;  Surgeon: Mahnaz Caraballo MD;  Location: [facility] OR;  Service: Gastroenterology;  Laterality: N/A;    ENDOSCOPY N/A 2023    Procedure: ESOPHAGOGASTRODUODENOSCOPY WITH KEOFEED PLACEMENT;  Surgeon: Yolande Eller MD;  Location: [facility] ENDOSCOPY;  Service: Gastroenterology;  Laterality: N/A;               WOUND DEBRIDEMENT                     PT Assessment (last 12 hours)       PT Evaluation and Treatment       Row Name 23 0836          Physical Therapy Time and Intention    Subjective Information complains of;swelling  -ES     Document Type wound care;evaluation  -ES     Mode of Treatment physical therapy  -ES     Patient Effort good  -ES      Symptoms Noted During/After Treatment none  -ES       Row Name 08/07/23 0836          General Information    Patient Profile Reviewed yes  -ES     Patient Observations alert;cooperative;agree to therapy  -ES     Existing Precautions/Restrictions fall;oxygen therapy device and L/min;other (see comments)  NG, abd incision, edema  -ES     Risks Reviewed patient:;increased discomfort;other (comment)  RN present  -ES     Benefits Reviewed patient:;improve function;decrease pain;improve skin integrity;other (comment)  RN present  -ES     Barriers to Rehab medically complex  -ES       Row Name 08/07/23 0836          Pain    Additional Documentation Pain Scale: FACES Pre/Post-Treatment (Group)  -ES       Row Name 08/07/23 0836          Pain Scale: FACES Pre/Post-Treatment    Pain: FACES Scale, Pretreatment 0-->no hurt  -ES     Posttreatment Pain Rating 0-->no hurt  -ES     Pain Location generalized  -ES     Pain Location - other (see comments)  scrotum  -ES     Pre/Posttreatment Pain Comment 2/2 edema. tolerated  -ES       Row Name 08/07/23 0836          Cognition    Affect/Mental Status (Cognition) WNL  -ES       Row Name 08/07/23 0836          Plan of Care Review    Plan of Care Reviewed With patient;other (see comments)  RN present  -ES     Progress no change  PT wound eval  -ES     Outcome Evaluation Pt presents with c/o scrotal edema. PT educated pt on scrotal sling technique and importance of using barrier cream to prevent skin breakdown and decrease overall discomfort. RN present throughout session and verbalized understanding. No further IPPT wound services warranted, PT wound signing off.  -ES       Row Name 08/07/23 0836          Vital Signs    Pre Systolic BP Rehab --  VSS  -ES       Row Name 08/07/23 0836          Positioning and Restraints    Pre-Treatment Position in bed  -ES     Post Treatment Position bed  -ES     In Bed notified nsg;fowlers;call light within reach;encouraged to call for assist;exit alarm  on;with nsg  -ES               User Key  (r) = Recorded By, (t) = Taken By, (c) = Cosigned By      Initials Name Provider Type    Cristiane Chen PT Physical Therapist                  Physical Therapy Education       Title: PT OT SLP Therapies (Done)       Topic: Physical Therapy (Done)       Point: Mobility training (Done)       Learning Progress Summary             Patient Acceptance, E, VU,NR by LH at 8/4/2023 1424    Acceptance, E, VU by AE at 8/3/2023 1114    Acceptance, E,TB, VU by AP at 8/1/2023 1851    Acceptance, E,TB, VU by KW at 7/29/2023 1145    Comment: continued benefit of skilled PT services   Significant Other Acceptance, E, VU,NR by  at 8/4/2023 1424                         Point: Home exercise program (Done)       Learning Progress Summary             Patient Acceptance, E, VU by AE at 8/3/2023 1114    Acceptance, E,TB, VU by AP at 8/1/2023 1851    Acceptance, E,TB, VU by KW at 7/29/2023 1145    Comment: continued benefit of skilled PT services                         Point: Body mechanics (Done)       Learning Progress Summary             Patient Acceptance, E, VU,NR by  at 8/4/2023 1424    Acceptance, E, VU by AE at 8/3/2023 1114    Acceptance, E,TB, VU by AP at 8/1/2023 1851    Acceptance, E,TB, VU by KW at 7/29/2023 1145    Comment: continued benefit of skilled PT services   Significant Other Acceptance, E, VU,NR by LH at 8/4/2023 1424                         Point: Precautions (Done)       Learning Progress Summary             Patient Acceptance, E, VU,NR by  at 8/4/2023 1424    Acceptance, E, VU by AE at 8/3/2023 1114    Acceptance, E,TB, VU by AP at 8/1/2023 1851    Acceptance, E,TB, VU by KW at 7/29/2023 1145    Comment: continued benefit of skilled PT services   Significant Other Acceptance, E, VU,NR by  at 8/4/2023 1424                                         User Key       Initials Effective Dates Name Provider Type Discipline    AE 09/21/21 -  Antelmo Andersen, PT Physical  Therapist PT    KW 01/27/22 -  Brittaney Avitia, PT Physical Therapist PT    LH 05/15/23 -  Rebekah Weir PT Student PT Student PT    AP 06/01/23 -  Nannette Zuniga, RN Registered Nurse Nurse                      Recommendation and Plan  Anticipated Discharge Disposition (PT): home with assist, home with outpatient therapy services  Therapy Frequency (PT): daily    Plan of Care Reviewed With: patient, other (see comments) (RN present)   Progress: no change (PT wound eval)  Outcome Evaluation: Pt presents with c/o scrotal edema. PT educated pt on scrotal sling technique and importance of using barrier cream to prevent skin breakdown and decrease overall discomfort. RN present throughout session and verbalized understanding. No further IPPT wound services warranted, PT wound signing off.                  Time Calculation   PT Charges       Row Name 08/07/23 1028             Time Calculation    Start Time 0836  -ES         Timed Charges    78100 - PT Self Care/Mgmt Minutes 10  -ES         Untimed Charges    PT Eval/Re-eval Minutes 15  -ES         Total Minutes    Timed Charges Total Minutes 10  -ES      Untimed Charges Total Minutes 15  -ES       Total Minutes 25  -ES                User Key  (r) = Recorded By, (t) = Taken By, (c) = Cosigned By      Initials Name Provider Type    ES Cristiane Iraheta, PT Physical Therapist                    Therapy Charges for Today       Code Description Service Date Service Provider Modifiers Qty    [id number]  PT SELF CARE/MGMT/TRAIN EA 15 MIN 8/7/2023 Cristiane Iraheta, PT GP 1    [id number] HC PT RE-EVAL ESTABLISHED PLAN 2 8/7/2023 Cristiane Iraheta, PT GP 1              PT G-Codes  Outcome Measure Options: AM-PAC 6 Clicks Basic Mobility (PT)  AM-PAC 6 Clicks Score (PT): 19                Cristiane Iraheta PT  8/7/2023

## 2023-08-07 NOTE — PROGRESS NOTES
"Brief EN Review Note    Patient Name: Ta Madison  Date of Encounter: 08/07/23 09:38 EDT  MRN: [id number]  Admission date: 7/23/2023    Reason for visit: EN review . RD to continue to follow per protocol.     EMR reviewed   Medication reviewed  Labs reviewed     Additional Information Obtained: Events over the weekend noted. Pt with worsening pancreatitis. EN has been held for about 24 hr per surgery. Per surgeon's note this am- \"Recommend to restart nasoenteral feeds, however would keep n.p.o. otherwise.\" Will place order for postpancreatic EN to restart today, continue NPO otherwise, RD continues to monitor.     Current diet: NPO Diet NPO Type: Strict NPO    EN: FiberSource HN  Goal Rate:   Water Flushes:  Modular: Prosource-no carb 3/day  Route: NJ  Tube: Small bore    At goal over: 22Hrs/day    Rx will supply:   Goal Volume 1540 mL/day       Flush Volume 220 mL/day       Energy 2028 Kcal/day 101 % Est Need   Protein 128 g/day 99 % Est Need   Fat 62 g/day 12% Total kcal   Fiber 23 g/day       Water in  EN 1247 mL       Total Water 1467 mL       Meet DRI Yes              Intervention:  Follow treatment plan  Care plan reviewed    Re-initiate postpancreatic EN regimen of-  Fibersource HN @ 30 ml/hr and advance by 20 ml/hr Q 6 hr as tolerated to goal rate of 70 ml/hr, FW @ 10 ml/hr (will adjust once IVF stopped). Provide Prosource TID.     Follow up:   Per protocol      Minerva Carrera RD  09:38 EDT  Time: 25min     "

## 2023-08-07 NOTE — PLAN OF CARE
Goal Outcome Evaluation:  Plan of Care Reviewed With: patient, spouse        Progress: no change  Outcome Evaluation: New palliative consult on 8/6 for assistance with pain management per EUGENIA Montejo.  No ACP on file.  NOK is spouse Azael Madison.  EUGENIA Pang saw pt. yesterday for initial palliative care consult.  Dilaudid pca initiated.  Pain much improved today and is more tolerable per pt report.  He rates his resting pain at a 5/10.  No nausea reported.  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  Pt. tachycardic this am and had Tmax 101.2 this morning.  He seemed to be in a good mood and encouraged today.  Plan to check back on patient pain tomorrow.  Pallaitive  care to follow for support and symptom management.        Problem: Palliative Care  Goal: Enhanced Quality of Life  Intervention: Promote Advance Care Planning  Flowsheets (Taken 8/7/2023 5949)  Life Transition/Adjustment:   palliative care initiated   palliative care discussed

## 2023-08-07 NOTE — PROGRESS NOTES
[facility] Medicine Services  PROGRESS NOTE    Patient Name: Ta Madison  : 1960  MRN: [id number]    Date of Admission: 2023  Primary Care Physician: Yolande Olvera APRN    Subjective   Subjective     CC:  Follow-up abdominal pain    HPI:   I have seen the patient multiple times today.  Resting in bed in no acute distress and overall feels okay.  PCA pump controls the pain better and he is more comfortable.  Had episode of fever earlier today and also last night.  Through the day however, did not spike any fever.  No chest pain or palpitation.  No nausea vomiting or diarrhea.    ROS:  Gen-fever as mentioned above, chills  CV- No chest pain, palpitations  Resp- No cough, dyspnea  GI-no nausea or vomiting.  Has abdominal pain.    Objective   Objective     Vital Signs:   Temp:  [98.9 øF (37.2 øC)-101.5 øF (38.6 øC)] 98.9 øF (37.2 øC)  Heart Rate:  [] 101  Resp:  [16-19] 16  BP: (107-144)/(54-66) 125/65  Flow (L/min):  [2] 2     Physical Exam:  Constitutional: No acute distress  HENT: NCAT, mucous membranes moist  Respiratory: Some crackles at the left base,, decreased breath sounds at the bases, respiratory effort normal   Cardiovascular: Tachycardia, no murmurs, rubs, or gallops  Gastrointestinal: Positive bowel sounds, soft, diffusely tender but no rebound, distended  Musculoskeletal: Severe bilateral ankle edema, edema of scrotum, edema of lower abdominal wall.  Psychiatric: Appropriate affect, cooperative  Neurologic: Oriented x 3, strength symmetric in all extremities, Cranial Nerves grossly intact to confrontation, speech clear  Skin: No rashes     Results Reviewed:  LAB RESULTS:      Lab 23  0419 23  0343 23  0756 23  0453 23  0003 23  0232 23  0756 23  0333 23  0355   WBC  --  17.07* 22.56*  --  22.72* 15.74* 16.57*  --  13.74*   HEMOGLOBIN  --  7.3* 8.0*  --  8.2* 7.8* 7.7*  --  8.0*   HEMATOCRIT  --  23.1* 25.2*   --  25.5* 25.1* 24.5*  --  25.4*   PLATELETS  --  732* 685*  --  852* 745* 767*  --  731*   NEUTROS ABS  --  15.70* 19.56*  --  20.25* 13.42* 13.88*  --  10.85*   IMMATURE GRANS (ABS)  --   --  0.19*  --  0.17* 0.12* 0.13*  --  0.13*   LYMPHS ABS  --   --  1.29  --  1.04 1.15 1.41  --  1.50   MONOS ABS  --   --  1.44*  --  1.19* 0.87 0.96*  --  1.11*   EOS ABS  --  0.00 0.02  --  0.03 0.13 0.16  --  0.11   MCV  --  77.3* 77.5*  --  76.8* 77.5* 78.3*  --  77.7*   CRP 34.91*  --   --   --   --   --   --   --   --    PROCALCITONIN 0.50*  --   --   --  0.27*  --   --   --   --    LACTATE  --  2.5*  --  2.5* 2.5*  --   --  1.7  --          Lab 08/07/23  0419 08/07/23  0343 08/06/23  0003 08/05/23  1411 08/05/23  0232 08/04/23  0333 08/03/23  1529 08/03/23  0355 08/02/23  0340 08/01/23  0312   SODIUM 140  --  136  --  138 138  --  139   < > 137   POTASSIUM 4.3  --  4.4 4.3 3.5 4.0   < > 3.5   < > 3.8   CHLORIDE 103  --  101  --  101 103  --  103   < > 101   CO2 25.0  --  23.0  --  26.0 23.0  --  26.0   < > 24.0   ANION GAP 12.0  --  12.0  --  11.0 12.0  --  10.0   < > 12.0   BUN 26*  --  16  --  16 17  --  15   < > 13   CREATININE 0.78  --  0.63*  --  0.52* 0.44*  --  0.44*   < > 0.48*   EGFR 100.8  --  107.5  --  114.0 119.9  --  119.9   < > 116.8   GLUCOSE 129*  --  151*  --  125* 134*  --  122*   < > 123*   CALCIUM 8.4*  --  8.2*  --  8.0* 8.2*  --  7.9*   < > 8.0*   IONIZED CALCIUM  --  1.24  --   --   --   --   --   --   --   --    MAGNESIUM 1.9  --  1.8  --  1.9  --   --   --   --  1.9   PHOSPHORUS 4.1  --   --   --  2.9  --   --   --   --  2.4*    < > = values in this interval not displayed.         Lab 08/07/23  0419 08/06/23  0003 08/05/23  0232 08/04/23  0333 08/03/23  0355   TOTAL PROTEIN 6.2 6.2 5.9* 5.5* 5.7*   ALBUMIN 2.5* 2.6* 2.6* 2.9* 2.2*   GLOBULIN 3.7 3.6 3.3 2.6 3.5   ALT (SGPT) 14 15 12 11 11   AST (SGOT) 25 25 27 23 20   BILIRUBIN 0.7 0.4 0.4 0.4 0.3   ALK PHOS 108 109 101 104 113   LIPASE 17  --    --  23  --                      Lab 08/04/23 0927 08/04/23  0333   IRON  --  13*   IRON SATURATION (TSAT)  --  5*   TIBC  --  237*   TRANSFERRIN  --  159*   FERRITIN  --  606.40*   FOLATE 8.80  --    VITAMIN B 12 >2,000*  --            Brief Urine Lab Results  (Last result in the past 365 days)        Color   Clarity   Blood   Leuk Est   Nitrite   Protein   CREAT   Urine HCG        07/25/23 0636 Dark Yellow   Clear   Negative   Negative   Negative   30 mg/dL (1+)                   Microbiology Results Abnormal       Procedure Component Value - Date/Time    Blood Culture - Blood, Hand, Right [[id number]]  (Normal) Collected: 08/06/23 0226    Lab Status: Preliminary result Specimen: Blood from Hand, Right Updated: 08/07/23 0846     Blood Culture No growth at 24 hours    Blood Culture - Blood, Arm, Left [[id number]]  (Normal) Collected: 08/06/23 0226    Lab Status: Preliminary result Specimen: Blood from Arm, Left Updated: 08/07/23 0846     Blood Culture No growth at 24 hours    Blood Culture - Blood, Arm, Right [[id number]]  (Normal) Collected: 07/28/23 0017    Lab Status: Final result Specimen: Blood from Arm, Right Updated: 08/02/23 0045     Blood Culture No growth at 5 days    Blood Culture - Blood, Arm, Right [[id number]]  (Normal) Collected: 07/28/23 0004    Lab Status: Final result Specimen: Blood from Arm, Right Updated: 08/02/23 0045     Blood Culture No growth at 5 days    Blood Culture - Blood, Hand, Right [[id number]]  (Normal) Collected: 07/23/23 2230    Lab Status: Final result Specimen: Blood from Hand, Right Updated: 07/28/23 2300     Blood Culture No growth at 5 days    Narrative:      AEROBIC BOTTLE ONLY    Blood Culture - Blood, Hand, Left [[id number]]  (Normal) Collected: 07/23/23 2240    Lab Status: Final result Specimen: Blood from Hand, Left Updated: 07/28/23 2300     Blood Culture No growth at 5 days    Narrative:      AEROBIC BOTTLE ONLY    COVID PRE-OP / PRE-PROCEDURE SCREENING ORDER (NO ISOLATION)  - Swab, Nasopharynx [[id number]]  (Normal) Collected: 07/24/23 0620    Lab Status: Final result Specimen: Swab from Nasopharynx Updated: 07/24/23 0729    Narrative:      The following orders were created for panel order COVID PRE-OP / PRE-PROCEDURE SCREENING ORDER (NO ISOLATION) - Swab, Nasopharynx.  Procedure                               Abnormality         Status                     ---------                               -----------         ------                     Respiratory Panel PCR w/...[[id number]]  Normal              Final result                 Please view results for these tests on the individual orders.    Respiratory Panel PCR w/COVID-19(SARS-CoV-2) BALTAZAR/ANDRES/ASTER/PAD/COR/MAD/VALENTINE In-House, NP Swab in UTM/VTM, 3-4 HR TAT - Swab, Nasopharynx [[id number]]  (Normal) Collected: 07/24/23 0620    Lab Status: Final result Specimen: Swab from Nasopharynx Updated: 07/24/23 0729     ADENOVIRUS, PCR Not Detected     Coronavirus 229E Not Detected     Coronavirus HKU1 Not Detected     Coronavirus NL63 Not Detected     Coronavirus OC43 Not Detected     COVID19 Not Detected     Human Metapneumovirus Not Detected     Human Rhinovirus/Enterovirus Not Detected     Influenza A PCR Not Detected     Influenza B PCR Not Detected     Parainfluenza Virus 1 Not Detected     Parainfluenza Virus 2 Not Detected     Parainfluenza Virus 3 Not Detected     Parainfluenza Virus 4 Not Detected     RSV, PCR Not Detected     Bordetella pertussis pcr Not Detected     Bordetella parapertussis PCR Not Detected     Chlamydophila pneumoniae PCR Not Detected     Mycoplasma pneumo by PCR Not Detected    Narrative:      In the setting of a positive respiratory panel with a viral infection PLUS a negative procalcitonin without other underlying concern for bacterial infection, consider observing off antibiotics or discontinuation of antibiotics and continue supportive care. If the respiratory panel is positive for atypical bacterial infection  (Bordetella pertussis, Chlamydophila pneumoniae, or Mycoplasma pneumoniae), consider antibiotic de-escalation to target atypical bacterial infection.            CT Abdomen Pelvis Without Contrast    Result Date: 8/6/2023  CT ABDOMEN PELVIS WO CONTRAST Date of Exam: 8/6/2023 12:44 AM EDT Indication: Abdominal pain, acute, nonlocalized increased abdominal pain and distention. Comparison: 7/30/2023. Technique: Axial CT images were obtained of the abdomen and pelvis without the administration of contrast. Reconstructed coronal and sagittal images were also obtained. Automated exposure control and iterative construction methods were used. Findings: There are ongoing findings of necrotizing pancreatitis. There are appear to be areas of pancreatic necrosis primarily located at the pancreatic head and proximal body. Evaluation is limited without IV contrast. There is an increasing degree of inflammatory fluid surrounding the pancreas and within the adjacent tissues. There appear to be some organizing components to the peripancreatic fluid including a pocket abutting the caudate lobe of the liver measuring up to 54 mm diameter and an ovoid component measuring 47 mm distal to the pancreatic tail. The remainder of the fluid appears infiltrating. There is stable perihepatic and pelvic ascites. There is fluid and inflammation in the paracolic gutters. A feeding tube terminates at the ligament of Treitz. The liver, gallbladder, bile ducts and spleen appear unremarkable. The adrenal glands are normal. The kidneys and ureters appear within normal limits. The urinary bladder is unremarkable. The prostate gland is atrophic. Small bowel is nondistended. The colon demonstrates some wall thickening at the hepatic and splenic flexures, likely secondary to pancreatitis. There is stable moderate anasarca. Findings in the chest discussed in a separate report. There is moderate aortoiliac atherosclerosis. There are no acute osseous  abnormalities or destructive bone lesions. There is mild thoracolumbar spondylosis with stable chronic bilateral L5 spondylolysis without spondylolisthesis.     Impression: Impression: 1. Evolving changes of necrotizing pancreatitis. Evaluation is limited without IV contrast, but there appear to be areas of necrosis in the pancreatic body and head and there is increasing inflammatory fluid within and surrounding the pancreas with organizing pockets of fluid as described. 2. Stable small volume scattered ascites and moderate anasarca. 3. Feeding tube terminates at the ligament of Treitz. Electronically Signed: Abdirizak Ivan  8/6/2023 1:15 AM EDT  Workstation ID: [id number]    CT Chest Without Contrast Diagnostic    Result Date: 8/6/2023  CT CHEST WO CONTRAST DIAGNOSTIC Date of Exam: 8/6/2023 12:44 AM EDT Indication: increased dyspnea. Comparison: Chest radiograph 8/4/2023. Technique: Axial CT images were obtained of the chest without contrast administration.  Reconstructed coronal and sagittal images were also obtained. Automated exposure control and iterative construction methods were used. Findings: There are small bilateral pleural effusions. There is patchy airspace disease in both lungs. No pneumothorax. Central airways are patent. There is dependent bibasilar atelectasis. The thyroid, trachea and esophagus appear within normal limits. Gastric suction tube terminates in the distal stomach. Heart size is normal. There is diminished attenuation of the blood pool suggesting anemia. There are mild coronary artery calcifications. No mediastinal lymphadenopathy or pericardial effusion. No acute findings in the superficial soft tissues. There is mild bilateral gynecomastia. Findings in the abdomen discussed in a separate report. There are no acute osseous abnormalities or destructive bone lesions.     Impression: Impression: 1. Small bilateral pleural effusions and patchy bilateral airspace disease that could reflect  atelectasis, pneumonia or mild edema. 2. Mild coronary artery calcification. Electronically Signed: Abdirizak Ivan  8/6/2023 1:07 AM EDT  Workstation ID: [id number]    CT Abdomen Pelvis With Contrast    Result Date: 8/7/2023  CT ABDOMEN PELVIS W CONTRAST Date of Exam: 8/7/2023 1:06 PM CDT Indication: necrotizing pancreatitis. Comparison: 8/6/2023 Technique: Axial CT images were obtained of the abdomen and pelvis following the uneventful intravenous administration of 85 cc Isovue-300. Reconstructed coronal and sagittal images were also obtained. Automated exposure control and iterative construction methods were used. Findings: LUNG BASES: Small to moderate bilateral pleural effusions with basal atelectasis. LIVER:  Unremarkable parenchyma without focal lesion. BILIARY/GALLBLADDER:  Unremarkable SPLEEN:  Unremarkable PANCREAS: There is necrotizing pancreatitis involving the head and body with lack of enhancing parenchyma. There is surrounding inflammatory fluid. No well-defined walled off fluid collection with enhancing rim noted as of yet. There is no pancreatic duct dilation. There are few parenchymal calcifications ADRENAL:  Unremarkable KIDNEYS:  Unremarkable parenchyma with no solid mass identified. No obstruction.  No calculus identified. GASTROINTESTINAL/MESENTERY:  No evidence of obstruction nor inflammation. There is an enteric tube with tip at the ligament of Treitz. MESENTERIC VESSELS:  Patent. AORTA/IVC:  Normal caliber. RETROPERITONEUM/LYMPH NODES:  Unremarkable REPRODUCTIVE:  Unremarkable BLADDER:  Unremarkable OSSEUS STRUCTURES:  Typical for age with no acute process identified. There is free fluid within the abdomen and pelvis.     Impression: Impression: 1.Necrotizing pancreatitis involving the pancreas head and body. Associated surrounding inflammatory fluid without well organized rim-enhancing fluid collection. Electronically Signed: Eduar Oliva MD  8/7/2023 1:28 PM CDT  Workstation ID: [id number]          Current medications:  Scheduled Meds:aspirin, 81 mg, Nasogastric, Daily  atorvastatin, 20 mg, Nasogastric, Nightly  bisacodyl, 10 mg, Rectal, Q8H  docusate sodium, 100 mg, Nasogastric, BID  enzalutamide, 160 mg, Oral, Daily  famotidine, 20 mg, Intravenous, Once  famotidine, 20 mg, Oral, Once  hydrocortisone, 25 mg, Rectal, BID  lactulose, 300 mL, Rectal, Once  metoclopramide, 10 mg, Intravenous, Q6H  metoprolol tartrate, 50 mg, Nasogastric, Q12H  micafungin (MYCAMINE) IV, 100 mg, Intravenous, Q24H  montelukast, 10 mg, Nasogastric, Nightly  nicotine, 1 patch, Transdermal, Q24H  pantoprazole, 40 mg, Intravenous, Q AM  piperacillin-tazobactam, 4.5 g, Intravenous, Q8H  ProSource No Carb, 30 mL, Per J Tube, TID  ProSource No Carb, 30 mL, Nasogastric, TID  senna-docusate sodium, 2 tablet, Nasogastric, BID  sodium chloride, 10 mL, Intravenous, Q12H  sodium chloride, 10 mL, Intravenous, Q12H      Continuous Infusions:dextrose 5 % and sodium chloride 0.9 %, 100 mL/hr, Last Rate: 100 mL/hr (08/07/23 1002)  HYDROmorphone HCl-NaCl,   Pharmacy Consult,   sodium chloride, 30 mL/hr        PRN Meds:.  acetaminophen **OR** acetaminophen **OR** acetaminophen    senna-docusate sodium **AND** polyethylene glycol **AND** bisacodyl **AND** bisacodyl    Calcium Replacement - Follow Nurse / BPA Driven Protocol    hydrOXYzine    ipratropium-albuterol    ketorolac    lidocaine PF 1%    Magnesium Standard Dose Replacement - Follow Nurse / BPA Driven Protocol    melatonin    midazolam    naloxone    naloxone    ondansetron    Pharmacy Consult    phenylephrine-mineral oil-petrolatum    Phosphorus Replacement - Follow Nurse / BPA Driven Protocol    Potassium Replacement - Follow Nurse / BPA Driven Protocol    prochlorperazine    simethicone    sodium chloride    sodium chloride    sodium chloride    sodium chloride    sodium chloride    Assessment & Plan   Assessment & Plan     Active Hospital Problems    Diagnosis  POA    **Acute  gallstone pancreatitis [K85.10]  Yes    Elevated serum creatinine [R79.89]  Yes    Personal history of transient ischemic attack (TIA) [Z86.73]  Not Applicable    Adenocarcinoma of prostate [C61]  Yes    Gastroesophageal reflux disease [K21.9]  Yes    Hyperlipidemia [E78.5]  Yes    Tobacco abuse [Z72.0]  Yes      Resolved Hospital Problems   No resolved problems to display.        Brief Hospital Course to date:  Ta Madison is a 63 y.o. male with PMH of GERD, HLD, hx of TIA, advanced prostate cancer on Xtandi, occasional marijuna use, and chronic 1 PPD tobacco use who initially presented to [facility] 7/23/23 with complaints of severe epigastric abdominal pain with associated nausea and vomiting. CT A/P at OSH showed acute pancreatitis with 2 mm stone in the proximal duodenum. Transferred to [facility] for GI evaluation.     This patient's problems and plans were partially entered by my partner and updated as appropriate by me 08/07/23.    Assessment/plan:    Acute gallstone pancreatitis  Leukocytosis  Lactic acidosis  -CT scan of abdomen pelvis with contrast was repeated today.  Shows evidence of inflammation and necrosis but no pseudocyst and no air in any part of the pancreas or any other fluid pockets around it.  This study also shows some calcification.  -marked leukocytosis, initially improved however, then worsened and went up to 22,000.  It is coming down again now and today is 17,000.  Patient also has had fever over the past 24 hours..  -transitioned from merrem-->zosyn.  We will continue Zosyn for now.  Will start micafungin also.  -ID follows  -surgery following, they saw and evaluated the patient this morning and recommended n.p.o. and holding tube feeding for now.    Edema/upper abdomen to toes.  -- Creatinine is normal and GFR is 119  -- Started diuresing with Lasix and patient was improving as far as edema was concerned.  Will hold Lasix for now    SOA  -better, on RA      Prostate cancer  -Continue  Xtandi oral chemotherapy (patient supplied), however patient having difficulty with swallowing this       HLD  GERD  Hx of TIA  -Home meds     Tobacco abuse  -Counseled on cessation  -Nicotine patch    PLAN:  -Continue current care with current antibiotics.  -Repeat labs in a.m.  -Talk to the family extensively today.  We will repeat CT scan of the abdomen pelvis with contrast tomorrow.  If significant worsening will consider transferring patient to [facility].     Expected Discharge Location and Transportation: To be determined   pending medically ready and PT/OT recommendations  Expected Discharge   Expected Discharge Date: 8/6/2023; Expected Discharge Time:       DVT prophylaxis:  Mechanical DVT prophylaxis orders are present.     AM-PAC 6 Clicks Score (PT): 19 (08/06/23 0818)    CODE STATUS:   Code Status and Medical Interventions:   Ordered at: 07/23/23 2229     Code Status (Patient has no pulse and is not breathing):    CPR (Attempt to Resuscitate)     Medical Interventions (Patient has pulse or is breathing):    Full Support     Release to patient:    Routine Release       Servando Jarrett MD  08/07/23

## 2023-08-07 NOTE — PROGRESS NOTES
INFECTIOUS DISEASE Progress Note    Ta Madison  1960  [id number]      Admission Date: 7/23/2023      Requesting Provider: Carroll Bobo DO  Evaluating Physician: Palomo Orellana MD    Reason for Consultation: Severe  pancreatitis    History of present illness:    7/28/23:Patient is a 63 y.o. male with h/o TIA, ongoing tobacco abuse, GERD, HLD, occasional marijuana use, and advanced prostate cancer/on Xtandi who we were asked to see for necrotizing pancreatitis.  The patient presented to [facility] ED on 7/23 with severe upper abdominal pain that radiated to his back about 3 hours PTA.  He had associated nausea and vomiting.  He has had postnasal drip with associated cough for last 3 to 4 weeks.  A CT scan at OSH showed acute gallbladder pancreatitis with a 2 mm stone in proximal duodenem.  He was given a dose of Invanz and transferred to [facility] on 7/23 for further medical management.  On arrival to [facility], the patient developed a Tmax of 100.3 and hypoxia requiring 2 L O2 NC.  He is now on room air.  Labs on arrival were lactic acid 3.6, PCT 0.85, creatinine 1.42, ALT 53, AST 66, bilirubin 0.4, lipase 1255, and WBC 15,000 with 91% neutrophils.  Blood cultures are negative to date.  Repeat blood cultures from 7/28 are pending.   A resp panel PCR was negative. His lactic acid and WBC were normal level on 7/27.  His PCT was 0.43.  A MRSA PCR on 7/28 was positive. His pain level on 7/28 was 7 out of 10. An MRCP on 7/24 showed acute pancreatitis with cholelithiasis without choledocholelithiasis.  It was felt that he may have passed the stone.  His abdomen became more distended and taut last night on 7/27.  A KUB noted possible small bowel ileus vs SBO.  A CT scan with contrast was done on 7/27 and showed extensive necrotizing appearing pancreatitis with fluid replacing nearly the entire pancreatic body with inflammatory changes tracking along entire length of pancreas, mild gallbladder wall thickening with  cholelithiasis (less likely acute cholecystitis), small bilateral pleural effusion, and mildly distended SB loops likely reactive and left likely obstruction.  He was on Zosyn, but was changed Merrem and Vancomycin on 7/28.  ID was asked to evaluate and manage his antibiotic therapy. He complains of persistent abdominal pain and abdominal distention.    7/29/23: He has remained afebrile. His creatinine is 0.58.  His white blood cell count is 9.3. Blood cultures from 7/28 are no growth so far.He has noticed some partial improvement in his abdominal pain and distention.  He denies vomiting.    7/30/2023: Maximum temperature over the last 24 hours is 100.1.  A follow-up CT scan today reveals persistent severe pancreatitis.  His white blood cell count today is 20.5.  His vancomycin random level was 7.6.    7/31/23: His maximum temperature over the last 24 hours is 99.7.White blood cell count today is 24.8. He continues to complain of abdominal distention and abdominal pain.  He denies nausea or vomiting.    8/1/23: Maximum temperature over the last 24 hours is 99.9.Creatinine is 0.48.  White blood cell count is 21.9.  Albumin is 2.4. He continues to require frequent narcotic therapy for abdominal pain.  He denies nausea and vomiting.  He is now on NJ feedings.    8/2/23: He has been afebrile over the last 24 hours. He is tolerating tube feedings. His white blood cell count today is 16.8.  His creatinine is 0.47.  Albumin is 2.2.  He states that his abdominal pain is somewhat improved today.  He denies nausea and vomiting.  He is tolerating tube feedings.    8/3/23: He remains afebrile.  His white blood cell count is now down to 13.7. He denies increased abdominal pain.  He denies nausea and vomiting.     8/4/23: He has remained afebrile.His white blood cell count today is 16.6.  He states that he has decreased abdominal pain.  He is tolerating tube feedings.     8/5/23: He remains afebrile. His white blood cell count  today is 15.7.  Creatinine is 0.52.He remains afebrile.  He has some loose stool after being given laxatives and stool softener.  He has modest improvement in his abdominal pain today.    8/7/23:He had a fever to 100.7ø 2300 on 8/5.  Yesterday had a fever to 101.1ø.  This morning his temperature is up to 101.2.  Early yesterday morning he was switched from Zosyn to Invanz but then yesterday he was switched back to Zosyn to which Micafungin.  A CT scan performed yesterday revealed evolving changes of necrotizing pancreatitis.  There appeared to be organizing pockets of fluid.  A repeat CT scan with contrast has been ordered for today. His C-reactive protein today is markedly elevated at 34.9.  His creatinine is 0.78.Pro-calcitonin is 0.5 and lactic acid is 2.5.  His white blood cell count is 17.1.  A follow-up CT scan reveals necrotizing pancreatitis with surrounding inflammatory fluid but without a well organized rim-enhancing abscess.    Past Medical History:   Diagnosis Date    Elevated cholesterol     GERD (gastroesophageal reflux disease)     Increased heart rate     Mini stroke     Neck pain     Prostate cancer     Rash     Tobacco abuse        Past Surgical History:   Procedure Laterality Date    APPENDECTOMY  1971    [facility]     COLONOSCOPY      COLONOSCOPY N/A 1/25/2023    Procedure: COLONOSCOPY;  Surgeon: Mahnaz Caraballo MD;  Location: [facility] OR;  Service: Gastroenterology;  Laterality: N/A;    ENDOSCOPY N/A 7/31/2023    Procedure: ESOPHAGOGASTRODUODENOSCOPY WITH KEOFEED PLACEMENT;  Surgeon: Yolande Eller MD;  Location: [facility] ENDOSCOPY;  Service: Gastroenterology;  Laterality: N/A;       Family History   Problem Relation Age of Onset    Nephrolithiasis Mother     Heart disease Father     Hypertension Father     Kidney disease Father        Social History     Socioeconomic History    Marital status:    Tobacco Use    Smoking status: Every Day     Packs/day: 2.00     Years: 45.00      Pack years: 90.00     Types: Cigarettes    Smokeless tobacco: Never   Vaping Use    Vaping Use: Never used   Substance and Sexual Activity    Alcohol use: No    Drug use: Yes     Types: Marijuana     Comment: occ     Sexual activity: Defer       No Known Allergies      Medication:    Current Facility-Administered Medications:     acetaminophen (TYLENOL) tablet 650 mg, 650 mg, Nasogastric, Q4H PRN **OR** acetaminophen (TYLENOL) 160 MG/5ML solution 650 mg, 650 mg, Nasogastric, Q4H PRN, 650 mg at 08/07/23 0655 **OR** acetaminophen (TYLENOL) suppository 650 mg, 650 mg, Rectal, Q4H PRN, Corina Johansen, PharmD    aspirin chewable tablet 81 mg, 81 mg, Nasogastric, Daily, Corina Johansen, PharmD, 81 mg at 08/06/23 0859    atorvastatin (LIPITOR) tablet 20 mg, 20 mg, Nasogastric, Nightly, Curt Barry MD, 20 mg at 08/06/23 2025    sennosides-docusate (PERICOLACE) 8.6-50 MG per tablet 2 tablet, 2 tablet, Nasogastric, BID, 2 tablet at 08/05/23 2055 **AND** polyethylene glycol (MIRALAX) packet 17 g, 17 g, Oral, Daily PRN **AND** bisacodyl (DULCOLAX) EC tablet 5 mg, 5 mg, Oral, Daily PRN **AND** bisacodyl (DULCOLAX) suppository 10 mg, 10 mg, Rectal, Daily PRN, Curt Barry MD    bisacodyl (DULCOLAX) suppository 10 mg, 10 mg, Rectal, Q8H, Christos Mijares MD, 10 mg at 07/30/23 0849    Calcium Replacement - Follow Nurse / BPA Driven Protocol, , Does not apply, PRN, Curt Barry MD    dextrose 5 % and sodium chloride 0.9 % infusion, 100 mL/hr, Intravenous, Continuous, Servando Jarrett MD    docusate sodium (COLACE) liquid 100 mg, 100 mg, Nasogastric, BID, Christos Mijares MD, 100 mg at 08/06/23 2025    enzalutamide (XTANDI) chemo capsule 160 mg (patient supplied medication), 160 mg, Oral, Daily, Corina Johansen, PharmD, 160 mg at 08/05/23 2100    famotidine (PEPCID) injection 20 mg, 20 mg, Intravenous, Once, Anurag Sloan Jr., MD    famotidine (PEPCID) tablet 20 mg, 20 mg, Oral, Once, Anurag Sloan  MD Noemi    hydrocortisone (ANUSOL-HC) suppository 25 mg, 25 mg, Rectal, BID, Jagdeep Quintero, APRN, 25 mg at 08/03/23 2051    HYDROmorphone (DILAUDID) PCA 0.2 mg/ml 50 mL syringe, , Intravenous, Continuous, Ramila Martinez APRN, Currently Infusing at 08/06/23 1717    hydrOXYzine (ATARAX) tablet 50 mg, 50 mg, Nasogastric, TID PRN, Curt Barry MD, 50 mg at 08/06/23 0749    ipratropium-albuterol (DUO-NEB) nebulizer solution 3 mL, 3 mL, Nebulization, Q4H PRN, Curt Barry MD, 3 mL at 08/04/23 0651    ketorolac (TORADOL) injection 30 mg, 30 mg, Intravenous, Q6H PRN, Mary Rojas MD, 30 mg at 08/06/23 1819    lactulose (CHRONULAC) solution for enema 300 mL, 300 mL, Rectal, Once, Christos Mijares MD    lidocaine PF 1% (XYLOCAINE) injection 0.5 mL, 0.5 mL, Injection, Once PRN, Anurag Sloan Jr., MD    Magnesium Standard Dose Replacement - Follow Nurse / BPA Driven Protocol, , Does not apply, PRN, Curt Barry MD    melatonin tablet 5 mg, 5 mg, Nasogastric, Nightly PRN, Curt Barry MD, 5 mg at 08/05/23 2124    methylnaltrexone (RELISTOR) injection 12 mg, 12 mg, Subcutaneous, Every Other Day, Servando Jarrett MD, 12 mg at 08/05/23 1543    metoclopramide (REGLAN) injection 10 mg, 10 mg, Intravenous, Q6H, Christos Mijares MD, 10 mg at 08/07/23 0539    metoprolol tartrate (LOPRESSOR) tablet 50 mg, 50 mg, Nasogastric, Q12H, Servando Jarrett MD, 50 mg at 08/06/23 2025    micafungin 100 mg/100 mL 0.9% NS IVPB (mbp), 100 mg, Intravenous, Q24H, Servando Jarrett MD, 100 mg at 08/06/23 1456    midazolam (VERSED) injection 1 mg, 1 mg, Intravenous, Q10 Min PRN, Anurag Sloan Jr., MD    montelukast (SINGULAIR) tablet 10 mg, 10 mg, Nasogastric, Nightly, Curt Barry MD, 10 mg at 08/06/23 2025    naloxone (NARCAN) injection 0.1 mg, 0.1 mg, Intravenous, Q5 Min PRN, Ramila Martinez, APRN    naloxone (NARCAN) injection 0.4 mg, 0.4 mg, Intravenous, PRN, Carroll Bobo, DO    nicotine (NICODERM CQ) 21  MG/24HR patch 1 patch, 1 patch, Transdermal, Q24H, Carroll Bobo, , 1 patch at 08/06/23 0904    ondansetron (ZOFRAN) injection 4 mg, 4 mg, Intravenous, Q6H PRN, Bharat Montana APRN, 4 mg at 08/06/23 0015    pantoprazole (PROTONIX) injection 40 mg, 40 mg, Intravenous, Q AM, Marija Smallwood MD, 40 mg at 08/07/23 0539    Pharmacy Consult, , Does not apply, Continuous PRN, Ramila Martinez APRN    phenylephrine-mineral oil-petrolatum (PREPARATION H) 0.25-14-74.9 % hemorhoidal ointment, , Rectal, BID PRN, Jagdeep Quintero APRN    Phosphorus Replacement - Follow Nurse / BPA Driven Protocol, , Does not apply, PRN, Curt Barry MD    piperacillin-tazobactam (ZOSYN) 4.5 g in iso-osmotic dextrose 100 mL IVPB (premix), 4.5 g, Intravenous, Q8H, Servando Jarrett MD, 4.5 g at 08/07/23 0539    Potassium Replacement - Follow Nurse / BPA Driven Protocol, , Does not apply, PRN, Curt Barry MD    prochlorperazine (COMPAZINE) injection 5 mg, 5 mg, Intravenous, Q3H PRN, Carroll Bobo DO, 5 mg at 07/24/23 1635    ProSource No Carb oral solution 30 mL, 30 mL, Per J Tube, TID, Minerva Carrera, DAVE, 30 mL at 08/05/23 2137    simethicone (MYLICON) chewable tablet 80 mg, 80 mg, Nasogastric, 4x Daily PRN, Curt Barry MD    sodium chloride 0.9 % flush 10 mL, 10 mL, Intravenous, Q12H, Carroll Bobo DO, 10 mL at 08/06/23 2039    sodium chloride 0.9 % flush 10 mL, 10 mL, Intravenous, PRN, Carroll Bobo,     sodium chloride 0.9 % flush 10 mL, 10 mL, Intravenous, Q12H, Anurag Sloan Jr., MD, 10 mL at 08/06/23 2038    sodium chloride 0.9 % flush 10 mL, 10 mL, Intravenous, PRN, Anurag Sloan Jr., MD    sodium chloride 0.9 % infusion 40 mL, 40 mL, Intravenous, PRN, Carroll Bobo DO    sodium chloride 0.9 % infusion 40 mL, 40 mL, Intravenous, PRN, Anurag Sloan Jr., MD    sodium chloride 0.9 % infusion, 30 mL/hr, Intravenous, Continuous PRN, Ramila Martinez, APRN    Antibiotics:  Anti-Infectives (From  admission, onward)      Ordered     Dose/Rate Route Frequency Start Stop    23 1208  piperacillin-tazobactam (ZOSYN) 4.5 g in iso-osmotic dextrose 100 mL IVPB (premix)        Ordering Provider: Servando Jarrett MD    4.5 g  over 4 Hours Intravenous Every 8 Hours 23 1900 23 1859    23 1210  micafungin 100 mg/100 mL 0.9% NS IVPB (mbp)        Ordering Provider: Servando Jarrett MD    100 mg  over 60 Minutes Intravenous Every 24 Hours 23 1400 23 1359    23 1208  piperacillin-tazobactam (ZOSYN) 4.5 g in iso-osmotic dextrose 100 mL IVPB (premix)        Ordering Provider: Servando Jarrett MD    4.5 g  over 30 Minutes Intravenous Once 23 1300 23 1327    23 2329  meropenem (MERREM) 1000 mg/100 mL 0.9% NS (mbp)        Ordering Provider: Carroll oBbo DO    1,000 mg  over 30 Minutes Intravenous Once 23 0000 23 0112              Review of Systems:  The HPI      Physical Exam:   Vital Signs  Temp (24hrs), Av.2 øF (37.9 øC), Min:99.1 øF (37.3 øC), Max:101.5 øF (38.6 øC)    Temp  Min: 99.1 øF (37.3 øC)  Max: 101.5 øF (38.6 øC)  BP  Min: 107/54  Max: 144/59  Pulse  Min: 89  Max: 131  Resp  Min: 16  Max: 20  SpO2  Min: 94 %  Max: 97 %    GENERAL: Awake and alert, in no acute distress. Nasojejunal tube is in place.  HEENT: Normocephalic, atraumatic.  PERRL. EOMI. No conjunctival injection. No icterus. No labial ulcers  NECK: Supple   HEART: RRR; No murmur, rubs, gallops.   LUNGS: Clear to auscultation bilaterally without wheezing, rales, rhonchi. Normal respiratory effort.   ABDOMEN: He has continued abdominal distention with mild diffuse abdominal tenderness.  EXT:  No cyanosis, clubbing or edema. No cord.  :  Without Jorgensen catheter.  MSK: No joint effusions or erythema  SKIN: Warm and dry without cutaneous eruptions on Inspection/palpation.    NEURO: Oriented to PPT.  Motor 5/5 strength  PSYCHIATRIC: Normal insight and judgment. Cooperative with  PE    Laboratory Data    Results from last 7 days   Lab Units 08/07/23  0343 08/06/23  0756 08/06/23  0003   WBC 10*3/mm3 17.07* 22.56* 22.72*   HEMOGLOBIN g/dL 7.3* 8.0* 8.2*   HEMATOCRIT % 23.1* 25.2* 25.5*   PLATELETS 10*3/mm3 732* 685* 852*       Results from last 7 days   Lab Units 08/07/23  0419   SODIUM mmol/L 140   POTASSIUM mmol/L 4.3   CHLORIDE mmol/L 103   CO2 mmol/L 25.0   BUN mg/dL 26*   CREATININE mg/dL 0.78   GLUCOSE mg/dL 129*   CALCIUM mg/dL 8.4*       Results from last 7 days   Lab Units 08/07/23  0419   ALK PHOS U/L 108   BILIRUBIN mg/dL 0.7   ALT (SGPT) U/L 14   AST (SGOT) U/L 25           Results from last 7 days   Lab Units 08/07/23  0419   CRP mg/dL 34.91*     Results from last 7 days   Lab Units 08/07/23  0343   LACTATE mmol/L 2.5*                 Estimated Creatinine Clearance: 111.1 mL/min (by C-G formula based on SCr of 0.78 mg/dL).      Microbiology:  Microbiology Results (last 10 days)       ** No results found for the last 240 hours. **                  Radiology:    CT ABDOMEN PELVIS W CONTRAST    Date of Exam: 8/7/2023 1:06 PM CDT    Indication: necrotizing pancreatitis.    Comparison: 8/6/2023    Technique: Axial CT images were obtained of the abdomen and pelvis following the uneventful intravenous administration of 85 cc Isovue-300. Reconstructed coronal and sagittal images were also obtained. Automated exposure control and iterative  construction methods were used.      Findings:  LUNG BASES: Small to moderate bilateral pleural effusions with basal atelectasis.    LIVER:  Unremarkable parenchyma without focal lesion.  BILIARY/GALLBLADDER:  Unremarkable  SPLEEN:  Unremarkable  PANCREAS: There is necrotizing pancreatitis involving the head and body with lack of enhancing parenchyma. There is surrounding inflammatory fluid. No well-defined walled off fluid collection with enhancing rim noted as of yet. There is no pancreatic  duct dilation. There are few parenchymal  calcifications  ADRENAL:  Unremarkable  KIDNEYS:  Unremarkable parenchyma with no solid mass identified. No obstruction.  No calculus identified.  GASTROINTESTINAL/MESENTERY:  No evidence of obstruction nor inflammation. There is an enteric tube with tip at the ligament of Treitz.  MESENTERIC VESSELS:  Patent.  AORTA/IVC:  Normal caliber.    RETROPERITONEUM/LYMPH NODES:  Unremarkable    REPRODUCTIVE:  Unremarkable  BLADDER:  Unremarkable    OSSEUS STRUCTURES:  Typical for age with no acute process identified.    There is free fluid within the abdomen and pelvis.     Impression:     Impression:  1.Necrotizing pancreatitis involving the pancreas head and body. Associated surrounding inflammatory fluid without well organized rim-enhancing fluid collection.       I read his CT scan of 8/7.      Impression:   Severe gallstone pancreatitis-he passed the gallstone. He continues to have significant pancreatitis and remains at risk for evolution to pancreatic abscess.  He developed fevers and worsening leukocytosis over the weekend. He is now on intravenous Zosyn and micafungin.  We will repeat an abdominal/pelvic CT scan today with contrast.  We will look for an evolving pancreatic abscess.  Leukocytosis/neutrophilia  MRSA nasal colonization  Advance prostate cancer/on Xtandi  Ongoing tobacco abuse  Occasional marijuana use    PLAN/RECOMMENDATIONS:   Nutritional support  Continue intravenous Zosyn  Continue micafungin  Abdominal/pelvic CT scan with contrast today    I discussed his complex situation again with Dr. Jarrett today      Palomo Orellana MD  8/7/2023  07:45 EDT

## 2023-08-07 NOTE — PROGRESS NOTES
"Palliative Care Daily Progress Note     C/C: RUQ pain, rates 5/10 this am, states this is much improved with dilaudid PCA.  Reports he was able to sleep last night.     S: Medical record reviewed. Follow up visit for ongoing symptom management and GOC. Events noted.  Pt reports pain much better managed with dilaudid PCA.     ROS: +pain RUQ, rates 5/10, reports this is manageable for him.  He was able to rest last night.     O: Code Status:   Code Status and Medical Interventions:   Ordered at: 07/23/23 2229     Code Status (Patient has no pulse and is not breathing):    CPR (Attempt to Resuscitate)     Medical Interventions (Patient has pulse or is breathing):    Full Support     Release to patient:    Routine Release      Advanced Directives: Advance Directive Status: Patient does not have advance directive   Goals of Care: Ongoing.   Palliative Performance Scale Score:       /65 (BP Location: Right arm, Patient Position: Lying)   Pulse 101   Temp 98.9 øF (37.2 øC) (Oral)   Resp 16   Ht 172.7 cm (68\")   Wt 97.4 kg (214 lb 10.2 oz)   SpO2 92%   BMI 32.64 kg/mý     Intake/Output Summary (Last 24 hours) at 8/7/2023 1414  Last data filed at 8/7/2023 0539  Gross per 24 hour   Intake 300 ml   Output 200 ml   Net 100 ml       PE:  General Appearance:    Alert, cooperative, NAD   HEENT:    NC/AT, EOMI, anicteric, MMM, face relaxed   Neck:   supple, trachea midline, no JVD   Lungs:     CTA bilat, diminished in bases; respirations regular, even and unlabored; RR on exam    Heart:    RRR, normal S1 and S2, no M/R/G   Abdomen:     Normal bowel sounds, soft, non-tender, non-distended   G/U:   Deferred   MSK/Extremities:   Wasting, no edema   Pulses:   Pulses palpable and equal bilaterally   Skin:   Warm, dry   Neurologic:   A/Ox3, cooperative, RENDON   Psych:   Calm, appropriate     Meds: Reviewed and changes noted    Labs:   Results from last 7 days   Lab Units 08/07/23  0343   WBC 10*3/mm3 17.07*   HEMOGLOBIN g/dL " 7.3*   HEMATOCRIT % 23.1*   PLATELETS 10*3/mm3 732*     Results from last 7 days   Lab Units 08/07/23  0419   SODIUM mmol/L 140   POTASSIUM mmol/L 4.3   CHLORIDE mmol/L 103   CO2 mmol/L 25.0   BUN mg/dL 26*   CREATININE mg/dL 0.78   GLUCOSE mg/dL 129*   CALCIUM mg/dL 8.4*     Results from last 7 days   Lab Units 08/07/23  0419   SODIUM mmol/L 140   POTASSIUM mmol/L 4.3   CHLORIDE mmol/L 103   CO2 mmol/L 25.0   BUN mg/dL 26*   CREATININE mg/dL 0.78   CALCIUM mg/dL 8.4*   BILIRUBIN mg/dL 0.7   ALK PHOS U/L 108   ALT (SGPT) U/L 14   AST (SGOT) U/L 25   GLUCOSE mg/dL 129*     Imaging Results (Last 72 Hours)       Procedure Component Value Units Date/Time    CT Abdomen Pelvis With Contrast [[id number]] Resulted: 08/07/23 1407     Updated: 08/07/23 1413    CT Abdomen Pelvis Without Contrast [[id number]] Collected: 08/06/23 0107     Updated: 08/06/23 0118    Narrative:      CT ABDOMEN PELVIS WO CONTRAST    Date of Exam: 8/6/2023 12:44 AM EDT    Indication: Abdominal pain, acute, nonlocalized  increased abdominal pain and distention.    Comparison: 7/30/2023.    Technique: Axial CT images were obtained of the abdomen and pelvis without the administration of contrast. Reconstructed coronal and sagittal images were also obtained. Automated exposure control and iterative construction methods were used.      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 There are appear to be areas of pancreatic necrosis primarily located at the pancreatic head and proximal body. Evaluation is limited without IV contrast. There is an increasing degree of   inflammatory fluid surrounding the pancreas and within the adjacent tissues. There appear to be some organizing components to the peripancreatic fluid including a pocket abutting the caudate lobe of the liver measuring up to 54 mm diameter and an ovoid   component measuring 47 mm distal to the pancreatic tail. The remainder of the fluid appears infiltrating. There is stable  perihepatic and pelvic ascites. There is fluid and inflammation in the paracolic gutters. A feeding tube terminates at the ligament   of Treitz. The liver, gallbladder, bile ducts and spleen appear unremarkable. The adrenal glands are normal. The kidneys and ureters appear within normal limits. The urinary bladder is unremarkable. The prostate gland is atrophic. Small bowel is   nondistended. The colon demonstrates some wall thickening at the hepatic and splenic flexures, likely secondary to pancreatitis.    There is stable moderate anasarca. Findings in the chest discussed in a separate report. There is moderate aortoiliac atherosclerosis. There are no acute osseous abnormalities or destructive bone lesions. There is mild thoracolumbar spondylosis with   stable chronic bilateral L5 spondylolysis without spondylolisthesis.      Impression:      Impression:    1. Evolving changes of necrotizing pancreatitis. Evaluation is limited without IV contrast, but there appear to be areas of necrosis in the pancreatic body and head and there is increasing inflammatory fluid within and surrounding the pancreas with   organizing pockets of fluid as described.  2. Stable small volume scattered ascites and moderate anasarca.  3. Feeding tube terminates at the ligament of Treitz.            Electronically Signed: Abdirizak Ivan    8/6/2023 1:15 AM EDT    Workstation ID: [id number]    CT Chest Without Contrast Diagnostic [[id number]] Collected: 08/06/23 0059     Updated: 08/06/23 0110    Narrative:      CT CHEST WO CONTRAST DIAGNOSTIC    Date of Exam: 8/6/2023 12:44 AM EDT    Indication: increased dyspnea.    Comparison: Chest radiograph 8/4/2023.    Technique: Axial CT images were obtained of the chest without contrast administration.  Reconstructed coronal and sagittal images were also obtained. Automated exposure control and iterative construction methods were used.      Findings:  There are small bilateral pleural effusions. There  is patchy airspace disease in both lungs. No pneumothorax. Central airways are patent. There is dependent bibasilar atelectasis.    The thyroid, trachea and esophagus appear within normal limits. Gastric suction tube terminates in the distal stomach. Heart size is normal. There is diminished attenuation of the blood pool suggesting anemia. There are mild coronary artery   calcifications. No mediastinal lymphadenopathy or pericardial effusion.    No acute findings in the superficial soft tissues. There is mild bilateral gynecomastia. Findings in the abdomen discussed in a separate report. There are no acute osseous abnormalities or destructive bone lesions.      Impression:      Impression:    1. Small bilateral pleural effusions and patchy bilateral airspace disease that could reflect atelectasis, pneumonia or mild edema.  2. Mild coronary artery calcification.        Electronically Signed: Abdirizak Ivan    8/6/2023 1:07 AM EDT    Workstation ID: [id number]    XR Chest 1 View [[id number]] Collected: 08/04/23 1544     Updated: 08/04/23 1548    Narrative:      XR CHEST 1 VW    Date of Exam: 8/4/2023 3:16 PM EDT    Indication: decreased BS    Comparison: Chest x-ray 7/25/2023    Findings:  Esophagogastric tube is past GE junction. Tip is osteophyte view but is at least in the distal stomach. Small-moderate bilateral pleural effusions. Heart size is normal. Bibasilar airspace opacities left greater than right. Mild septal thickening. No   pneumothorax.      Impression:      Impression:  Nasogastric tube is past GE junction.  Small-moderate bilateral pleural effusions with bibasilar airspace opacities left greater than right. This is increased since previous exam.      Electronically Signed: Zoë Yoder    8/4/2023 3:45 PM EDT    Workstation ID: [id number]                  Diagnostics: Reviewed    A:   Acute gallstone pancreatitis    Hyperlipidemia    Tobacco abuse    Gastroesophageal reflux disease    Adenocarcinoma of  prostate    Personal history of transient ischemic attack (TIA)    Elevated serum creatinine       S/Sx:  1. RUQ pain- Continue dilaudid @ 0.2 mg/hr, 0.2 mg bolus q 1 hr PRN.  Pt has gotten 7/8 bolus attempts overnight.     2. GOC- Full Code/Full treat.     P:   Palliative Care Team will continue to follow patient. Please do not hesitate to contact us regarding further sx mgmt or GOC needs.    Raven Alarcon, APRN  8/7/2023  Time spent: 30 min

## 2023-08-08 ENCOUNTER — APPOINTMENT (OUTPATIENT)
Dept: GENERAL RADIOLOGY | Facility: HOSPITAL | Age: 63
DRG: 438 | End: 2023-08-08
Payer: COMMERCIAL

## 2023-08-08 LAB
ALBUMIN SERPL-MCNC: 2.4 G/DL (ref 3.5–5.2)
ALBUMIN/GLOB SERPL: 0.8 G/DL
ALP SERPL-CCNC: 109 U/L (ref 39–117)
ALT SERPL W P-5'-P-CCNC: 10 U/L (ref 1–41)
ANION GAP SERPL CALCULATED.3IONS-SCNC: 12 MMOL/L (ref 5–15)
ANISOCYTOSIS BLD QL: ABNORMAL
AST SERPL-CCNC: 17 U/L (ref 1–40)
BASOPHILS # BLD MANUAL: 0 10*3/MM3 (ref 0–0.2)
BASOPHILS NFR BLD MANUAL: 0 % (ref 0–1.5)
BH BB BLOOD EXPIRATION DATE: NORMAL
BH BB BLOOD TYPE BARCODE: 5100
BH BB DISPENSE STATUS: NORMAL
BH BB PRODUCT CODE: NORMAL
BH BB UNIT NUMBER: NORMAL
BILIRUB SERPL-MCNC: 0.6 MG/DL (ref 0–1.2)
BUN SERPL-MCNC: 22 MG/DL (ref 8–23)
BUN/CREAT SERPL: 34.9 (ref 7–25)
CALCIUM SPEC-SCNC: 8.2 MG/DL (ref 8.6–10.5)
CHLORIDE SERPL-SCNC: 102 MMOL/L (ref 98–107)
CO2 SERPL-SCNC: 25 MMOL/L (ref 22–29)
CREAT SERPL-MCNC: 0.63 MG/DL (ref 0.76–1.27)
CROSSMATCH INTERPRETATION: NORMAL
DEPRECATED RDW RBC AUTO: 51.8 FL (ref 37–54)
DOHLE BODIES: PRESENT
EGFRCR SERPLBLD CKD-EPI 2021: 107.5 ML/MIN/1.73
EOSINOPHIL # BLD MANUAL: 0 10*3/MM3 (ref 0–0.4)
EOSINOPHIL NFR BLD MANUAL: 0 % (ref 0.3–6.2)
ERYTHROCYTE [DISTWIDTH] IN BLOOD BY AUTOMATED COUNT: 17.8 % (ref 12.3–15.4)
GLOBULIN UR ELPH-MCNC: 3.1 GM/DL
GLUCOSE BLDC GLUCOMTR-MCNC: 115 MG/DL (ref 70–130)
GLUCOSE BLDC GLUCOMTR-MCNC: 153 MG/DL (ref 70–130)
GLUCOSE SERPL-MCNC: 106 MG/DL (ref 65–99)
HCT VFR BLD AUTO: 26.5 % (ref 37.5–51)
HGB BLD-MCNC: 8.4 G/DL (ref 13–17.7)
LYMPHOCYTES # BLD MANUAL: 0.71 10*3/MM3 (ref 0.7–3.1)
LYMPHOCYTES NFR BLD MANUAL: 5 % (ref 5–12)
MAGNESIUM SERPL-MCNC: 2 MG/DL (ref 1.6–2.4)
MCH RBC QN AUTO: 25.3 PG (ref 26.6–33)
MCHC RBC AUTO-ENTMCNC: 31.7 G/DL (ref 31.5–35.7)
MCV RBC AUTO: 79.8 FL (ref 79–97)
MONOCYTES # BLD: 0.89 10*3/MM3 (ref 0.1–0.9)
NEUTROPHILS # BLD AUTO: 16.25 10*3/MM3 (ref 1.7–7)
NEUTROPHILS NFR BLD MANUAL: 84 % (ref 42.7–76)
NEUTS BAND NFR BLD MANUAL: 7 % (ref 0–5)
PHOSPHATE SERPL-MCNC: 3.1 MG/DL (ref 2.5–4.5)
PLAT MORPH BLD: NORMAL
PLATELET # BLD AUTO: 731 10*3/MM3 (ref 140–450)
PMV BLD AUTO: 10.4 FL (ref 6–12)
POTASSIUM SERPL-SCNC: 4.5 MMOL/L (ref 3.5–5.2)
PROT SERPL-MCNC: 5.5 G/DL (ref 6–8.5)
RBC # BLD AUTO: 3.32 10*6/MM3 (ref 4.14–5.8)
SODIUM SERPL-SCNC: 139 MMOL/L (ref 136–145)
UNIT  ABO: NORMAL
UNIT  RH: NORMAL
VARIANT LYMPHS NFR BLD MANUAL: 4 % (ref 19.6–45.3)
WBC NRBC COR # BLD: 17.86 10*3/MM3 (ref 3.4–10.8)

## 2023-08-08 PROCEDURE — 83735 ASSAY OF MAGNESIUM: CPT | Performed by: INTERNAL MEDICINE

## 2023-08-08 PROCEDURE — 25010000002 PIPERACILLIN SOD-TAZOBACTAM PER 1 G: Performed by: INTERNAL MEDICINE

## 2023-08-08 PROCEDURE — 80053 COMPREHEN METABOLIC PANEL: CPT | Performed by: HOSPITALIST

## 2023-08-08 PROCEDURE — 25010000002 MICAFUNGIN SODIUM 100 MG RECONSTITUTED SOLUTION: Performed by: INTERNAL MEDICINE

## 2023-08-08 PROCEDURE — 25010000002 METOCLOPRAMIDE PER 10 MG: Performed by: SURGERY

## 2023-08-08 PROCEDURE — 85007 BL SMEAR W/DIFF WBC COUNT: CPT | Performed by: NURSE PRACTITIONER

## 2023-08-08 PROCEDURE — 0 HYDROMORPHONE PER 4 MG

## 2023-08-08 PROCEDURE — 97530 THERAPEUTIC ACTIVITIES: CPT

## 2023-08-08 PROCEDURE — 85025 COMPLETE CBC W/AUTO DIFF WBC: CPT | Performed by: NURSE PRACTITIONER

## 2023-08-08 PROCEDURE — 82948 REAGENT STRIP/BLOOD GLUCOSE: CPT

## 2023-08-08 PROCEDURE — 84100 ASSAY OF PHOSPHORUS: CPT | Performed by: INTERNAL MEDICINE

## 2023-08-08 PROCEDURE — 71045 X-RAY EXAM CHEST 1 VIEW: CPT

## 2023-08-08 RX ORDER — SODIUM CHLORIDE 9 MG/ML
50 INJECTION, SOLUTION INTRAVENOUS CONTINUOUS
Status: DISCONTINUED | OUTPATIENT
Start: 2023-08-08 | End: 2023-08-09

## 2023-08-08 RX ORDER — DILTIAZEM HYDROCHLORIDE 60 MG/1
60 TABLET, FILM COATED ORAL EVERY 8 HOURS SCHEDULED
Status: DISCONTINUED | OUTPATIENT
Start: 2023-08-08 | End: 2023-08-11 | Stop reason: HOSPADM

## 2023-08-08 RX ADMIN — DILTIAZEM HYDROCHLORIDE 60 MG: 60 TABLET, FILM COATED ORAL at 21:33

## 2023-08-08 RX ADMIN — METOPROLOL TARTRATE 50 MG: 50 TABLET, FILM COATED ORAL at 08:34

## 2023-08-08 RX ADMIN — METOCLOPRAMIDE 10 MG: 5 INJECTION, SOLUTION INTRAMUSCULAR; INTRAVENOUS at 21:34

## 2023-08-08 RX ADMIN — PIPERACILLIN SODIUM AND TAZOBACTAM SODIUM 4.5 G: 4; .5 INJECTION, SOLUTION INTRAVENOUS at 15:20

## 2023-08-08 RX ADMIN — Medication 10 ML: at 08:37

## 2023-08-08 RX ADMIN — Medication 10 ML: at 21:34

## 2023-08-08 RX ADMIN — DILTIAZEM HYDROCHLORIDE 60 MG: 60 TABLET, FILM COATED ORAL at 15:20

## 2023-08-08 RX ADMIN — MONTELUKAST 10 MG: 10 TABLET, FILM COATED ORAL at 21:33

## 2023-08-08 RX ADMIN — PANTOPRAZOLE SODIUM 40 MG: 40 INJECTION, POWDER, LYOPHILIZED, FOR SOLUTION INTRAVENOUS at 08:34

## 2023-08-08 RX ADMIN — METOCLOPRAMIDE 10 MG: 5 INJECTION, SOLUTION INTRAMUSCULAR; INTRAVENOUS at 08:48

## 2023-08-08 RX ADMIN — METOCLOPRAMIDE 10 MG: 5 INJECTION, SOLUTION INTRAMUSCULAR; INTRAVENOUS at 15:20

## 2023-08-08 RX ADMIN — Medication 30 ML: at 08:46

## 2023-08-08 RX ADMIN — METOPROLOL TARTRATE 50 MG: 50 TABLET, FILM COATED ORAL at 21:33

## 2023-08-08 RX ADMIN — DOCUSATE SODIUM 100 MG: 50 LIQUID ORAL at 08:48

## 2023-08-08 RX ADMIN — MICAFUNGIN SODIUM 100 MG: 100 INJECTION, POWDER, LYOPHILIZED, FOR SOLUTION INTRAVENOUS at 14:10

## 2023-08-08 RX ADMIN — ACETAMINOPHEN 650 MG: 650 SOLUTION ORAL at 03:56

## 2023-08-08 RX ADMIN — Medication 30 ML: at 21:33

## 2023-08-08 RX ADMIN — HYDROMORPHONE HYDROCHLORIDE: 10 INJECTION, SOLUTION INTRAMUSCULAR; INTRAVENOUS; SUBCUTANEOUS at 19:20

## 2023-08-08 RX ADMIN — HYDROMORPHONE HYDROCHLORIDE: 10 INJECTION, SOLUTION INTRAMUSCULAR; INTRAVENOUS; SUBCUTANEOUS at 04:01

## 2023-08-08 RX ADMIN — Medication 1 PATCH: at 08:37

## 2023-08-08 RX ADMIN — PIPERACILLIN SODIUM AND TAZOBACTAM SODIUM 4.5 G: 4; .5 INJECTION, SOLUTION INTRAVENOUS at 22:39

## 2023-08-08 RX ADMIN — SODIUM CHLORIDE 50 ML/HR: 9 INJECTION, SOLUTION INTRAVENOUS at 08:35

## 2023-08-08 RX ADMIN — ASPIRIN 81 MG: 81 TABLET, CHEWABLE ORAL at 08:34

## 2023-08-08 RX ADMIN — PIPERACILLIN SODIUM AND TAZOBACTAM SODIUM 4.5 G: 4; .5 INJECTION, SOLUTION INTRAVENOUS at 04:07

## 2023-08-08 RX ADMIN — ATORVASTATIN CALCIUM 20 MG: 20 TABLET, FILM COATED ORAL at 21:33

## 2023-08-08 RX ADMIN — METOCLOPRAMIDE 10 MG: 5 INJECTION, SOLUTION INTRAMUSCULAR; INTRAVENOUS at 04:07

## 2023-08-08 RX ADMIN — ACETAMINOPHEN 650 MG: 325 TABLET ORAL at 15:20

## 2023-08-08 RX ADMIN — Medication 10 ML: at 08:38

## 2023-08-08 NOTE — PROGRESS NOTES
[facility] Medicine Services  PROGRESS NOTE    Patient Name: Ta Madison  : 1960  MRN: [id number]    Date of Admission: 2023  Primary Care Physician: Yolande Olvera APRN    Subjective   Subjective     CC:  Follow-up abdominal pain    HPI:   I have seen the patient multiple times today.  Resting in bed in no acute distress and overall feels okay.  No fever last night.  No chest pain or palpitation.  No nausea vomiting or diarrhea.  Overall he feels better today.    ROS:  Gen-fever as mentioned above, chills  CV- No chest pain, palpitations  Resp- No cough, dyspnea  GI-no nausea or vomiting.  Has abdominal pain.    Objective   Objective     Vital Signs:   Temp:  [98.1 øF (36.7 øC)-100.4 øF (38 øC)] 100.3 øF (37.9 øC)  Heart Rate:  [107-136] 126  Resp:  [16-22] 22  BP: (130-165)/(73-87) 165/80     Physical Exam:  Constitutional: No acute distress  HENT: NCAT, mucous membranes moist  Respiratory: Some crackles at the left base,, decreased breath sounds at the bases, respiratory effort normal   Cardiovascular: Tachycardia, no murmurs, rubs, or gallops  Gastrointestinal: Positive bowel sounds, soft, diffusely tender but no rebound, distended  Musculoskeletal: Severe bilateral ankle edema, edema of scrotum, edema of lower abdominal wall.  Psychiatric: Appropriate affect, cooperative  Neurologic: Oriented x 3, strength symmetric in all extremities, Cranial Nerves grossly intact to confrontation, speech clear  Skin: No rashes     Results Reviewed:  LAB RESULTS:      Lab 23  0358 23  0419 23  0343 23  0756 23  0453 23  0003 23  0232 23  0756 23  0333 23  0355   WBC 17.86*  --  17.07* 22.56*  --  22.72* 15.74* 16.57*  --  13.74*   HEMOGLOBIN 8.4*  --  7.3* 8.0*  --  8.2* 7.8* 7.7*  --  8.0*   HEMATOCRIT 26.5*  --  23.1* 25.2*  --  25.5* 25.1* 24.5*  --  25.4*   PLATELETS 731*  --  732* 685*  --  852* 745* 767*  --  731*   NEUTROS  ABS 16.25*  --  15.70* 19.56*  --  20.25* 13.42* 13.88*  --  10.85*   IMMATURE GRANS (ABS)  --   --   --  0.19*  --  0.17* 0.12* 0.13*  --  0.13*   LYMPHS ABS  --   --   --  1.29  --  1.04 1.15 1.41  --  1.50   MONOS ABS  --   --   --  1.44*  --  1.19* 0.87 0.96*  --  1.11*   EOS ABS 0.00  --  0.00 0.02  --  0.03 0.13 0.16  --  0.11   MCV 79.8  --  77.3* 77.5*  --  76.8* 77.5* 78.3*  --  77.7*   CRP  --  34.91*  --   --   --   --   --   --   --   --    PROCALCITONIN  --  0.50*  --   --   --  0.27*  --   --   --   --    LACTATE  --   --  2.5*  --  2.5* 2.5*  --   --  1.7  --          Lab 08/08/23  0358 08/07/23  0419 08/07/23  0343 08/06/23  0003 08/05/23  1411 08/05/23  0232 08/04/23  0333   SODIUM 139 140  --  136  --  138 138   POTASSIUM 4.5 4.3  --  4.4 4.3 3.5 4.0   CHLORIDE 102 103  --  101  --  101 103   CO2 25.0 25.0  --  23.0  --  26.0 23.0   ANION GAP 12.0 12.0  --  12.0  --  11.0 12.0   BUN 22 26*  --  16  --  16 17   CREATININE 0.63* 0.78  --  0.63*  --  0.52* 0.44*   EGFR 107.5 100.8  --  107.5  --  114.0 119.9   GLUCOSE 106* 129*  --  151*  --  125* 134*   CALCIUM 8.2* 8.4*  --  8.2*  --  8.0* 8.2*   IONIZED CALCIUM  --   --  1.24  --   --   --   --    MAGNESIUM 2.0 1.9  --  1.8  --  1.9  --    PHOSPHORUS 3.1 4.1  --   --   --  2.9  --          Lab 08/08/23  0358 08/07/23  0419 08/06/23  0003 08/05/23  0232 08/04/23  0333   TOTAL PROTEIN 5.5* 6.2 6.2 5.9* 5.5*   ALBUMIN 2.4* 2.5* 2.6* 2.6* 2.9*   GLOBULIN 3.1 3.7 3.6 3.3 2.6   ALT (SGPT) 10 14 15 12 11   AST (SGOT) 17 25 25 27 23   BILIRUBIN 0.6 0.7 0.4 0.4 0.4   ALK PHOS 109 108 109 101 104   LIPASE  --  17  --   --  23                     Lab 08/07/23  1618 08/04/23  0927 08/04/23  0333   IRON  --   --  13*   IRON SATURATION (TSAT)  --   --  5*   TIBC  --   --  237*   TRANSFERRIN  --   --  159*   FERRITIN  --   --  606.40*   FOLATE  --  8.80  --    VITAMIN B 12  --  >2,000*  --    ABO TYPING O  --   --    RH TYPING Positive  --   --    ANTIBODY SCREEN  Negative  --   --            Brief Urine Lab Results  (Last result in the past 365 days)        Color   Clarity   Blood   Leuk Est   Nitrite   Protein   CREAT   Urine HCG        07/25/23 0636 Dark Yellow   Clear   Negative   Negative   Negative   30 mg/dL (1+)                   Microbiology Results Abnormal       Procedure Component Value - Date/Time    Blood Culture - Blood, Hand, Right [[id number]]  (Normal) Collected: 08/06/23 0226    Lab Status: Preliminary result Specimen: Blood from Hand, Right Updated: 08/08/23 0846     Blood Culture No growth at 2 days    Blood Culture - Blood, Arm, Left [[id number]]  (Normal) Collected: 08/06/23 0226    Lab Status: Preliminary result Specimen: Blood from Arm, Left Updated: 08/08/23 0846     Blood Culture No growth at 2 days    Blood Culture - Blood, Arm, Right [[id number]]  (Normal) Collected: 07/28/23 0017    Lab Status: Final result Specimen: Blood from Arm, Right Updated: 08/02/23 0045     Blood Culture No growth at 5 days    Blood Culture - Blood, Arm, Right [[id number]]  (Normal) Collected: 07/28/23 0004    Lab Status: Final result Specimen: Blood from Arm, Right Updated: 08/02/23 0045     Blood Culture No growth at 5 days    Blood Culture - Blood, Hand, Right [[id number]]  (Normal) Collected: 07/23/23 2230    Lab Status: Final result Specimen: Blood from Hand, Right Updated: 07/28/23 2300     Blood Culture No growth at 5 days    Narrative:      AEROBIC BOTTLE ONLY    Blood Culture - Blood, Hand, Left [[id number]]  (Normal) Collected: 07/23/23 2240    Lab Status: Final result Specimen: Blood from Hand, Left Updated: 07/28/23 2300     Blood Culture No growth at 5 days    Narrative:      AEROBIC BOTTLE ONLY    COVID PRE-OP / PRE-PROCEDURE SCREENING ORDER (NO ISOLATION) - Swab, Nasopharynx [[id number]]  (Normal) Collected: 07/24/23 0620    Lab Status: Final result Specimen: Swab from Nasopharynx Updated: 07/24/23 0729    Narrative:      The following orders were created for  panel order COVID PRE-OP / PRE-PROCEDURE SCREENING ORDER (NO ISOLATION) - Swab, Nasopharynx.  Procedure                               Abnormality         Status                     ---------                               -----------         ------                     Respiratory Panel PCR w/...[[id number]]  Normal              Final result                 Please view results for these tests on the individual orders.    Respiratory Panel PCR w/COVID-19(SARS-CoV-2) BALTAZAR/ANDRES/ASTER/PAD/COR/MAD/VALENTINE In-House, NP Swab in UTM/VTM, 3-4 HR TAT - Swab, Nasopharynx [[id number]]  (Normal) Collected: 07/24/23 0620    Lab Status: Final result Specimen: Swab from Nasopharynx Updated: 07/24/23 0729     ADENOVIRUS, PCR Not Detected     Coronavirus 229E Not Detected     Coronavirus HKU1 Not Detected     Coronavirus NL63 Not Detected     Coronavirus OC43 Not Detected     COVID19 Not Detected     Human Metapneumovirus Not Detected     Human Rhinovirus/Enterovirus Not Detected     Influenza A PCR Not Detected     Influenza B PCR Not Detected     Parainfluenza Virus 1 Not Detected     Parainfluenza Virus 2 Not Detected     Parainfluenza Virus 3 Not Detected     Parainfluenza Virus 4 Not Detected     RSV, PCR Not Detected     Bordetella pertussis pcr Not Detected     Bordetella parapertussis PCR Not Detected     Chlamydophila pneumoniae PCR Not Detected     Mycoplasma pneumo by PCR Not Detected    Narrative:      In the setting of a positive respiratory panel with a viral infection PLUS a negative procalcitonin without other underlying concern for bacterial infection, consider observing off antibiotics or discontinuation of antibiotics and continue supportive care. If the respiratory panel is positive for atypical bacterial infection (Bordetella pertussis, Chlamydophila pneumoniae, or Mycoplasma pneumoniae), consider antibiotic de-escalation to target atypical bacterial infection.            CT Abdomen Pelvis With Contrast    Result Date:  8/7/2023  CT ABDOMEN PELVIS W CONTRAST Date of Exam: 8/7/2023 1:06 PM CDT Indication: necrotizing pancreatitis. Comparison: 8/6/2023 Technique: Axial CT images were obtained of the abdomen and pelvis following the uneventful intravenous administration of 85 cc Isovue-300. Reconstructed coronal and sagittal images were also obtained. Automated exposure control and iterative construction methods were used. Findings: LUNG BASES: Small to moderate bilateral pleural effusions with basal atelectasis. LIVER:  Unremarkable parenchyma without focal lesion. BILIARY/GALLBLADDER:  Unremarkable SPLEEN:  Unremarkable PANCREAS: There is necrotizing pancreatitis involving the head and body with lack of enhancing parenchyma. There is surrounding inflammatory fluid. No well-defined walled off fluid collection with enhancing rim noted as of yet. There is no pancreatic duct dilation. There are few parenchymal calcifications ADRENAL:  Unremarkable KIDNEYS:  Unremarkable parenchyma with no solid mass identified. No obstruction.  No calculus identified. GASTROINTESTINAL/MESENTERY:  No evidence of obstruction nor inflammation. There is an enteric tube with tip at the ligament of Treitz. MESENTERIC VESSELS:  Patent. AORTA/IVC:  Normal caliber. RETROPERITONEUM/LYMPH NODES:  Unremarkable REPRODUCTIVE:  Unremarkable BLADDER:  Unremarkable OSSEUS STRUCTURES:  Typical for age with no acute process identified. There is free fluid within the abdomen and pelvis.     Impression: Impression: 1.Necrotizing pancreatitis involving the pancreas head and body. Associated surrounding inflammatory fluid without well organized rim-enhancing fluid collection. Electronically Signed: Eduar Oliva MD  8/7/2023 1:28 PM CDT  Workstation ID: [id number]    XR Chest 1 View    Result Date: 8/8/2023  XR CHEST 1 VW Date of Exam: 8/8/2023 5:54 AM EDT Indication: Crackles Comparison: 8/4/2023 and chest CT 8/6/2023. Findings: Stable small bilateral pleural effusions. There  is improved aeration of the lung bases with mild residual atelectasis. Stable interstitial prominence in the lungs. Feeding tube again seen coursing below the diaphragm. No pneumothorax or new lung opacity.  No acute osseous abnormality.     Impression: Impression: Stable interstitial prominence in the lungs, bibasilar atelectasis and small bilateral pleural effusions. Electronically Signed: Abdirizak Ivan  8/8/2023 6:19 AM EDT  Workstation ID: [id number]         Current medications:  Scheduled Meds:aspirin, 81 mg, Nasogastric, Daily  atorvastatin, 20 mg, Nasogastric, Nightly  bisacodyl, 10 mg, Rectal, Q8H  dilTIAZem, 60 mg, Oral, Q8H  docusate sodium, 100 mg, Nasogastric, BID  enzalutamide, 160 mg, Oral, Daily  famotidine, 20 mg, Intravenous, Once  famotidine, 20 mg, Oral, Once  hydrocortisone, 25 mg, Rectal, BID  lactulose, 300 mL, Rectal, Once  metoclopramide, 10 mg, Intravenous, Q6H  metoprolol tartrate, 50 mg, Nasogastric, Q12H  micafungin (MYCAMINE) IV, 100 mg, Intravenous, Q24H  montelukast, 10 mg, Nasogastric, Nightly  nicotine, 1 patch, Transdermal, Q24H  pantoprazole, 40 mg, Intravenous, Q AM  piperacillin-tazobactam, 4.5 g, Intravenous, Q8H  ProSource No Carb, 30 mL, Per J Tube, TID  senna-docusate sodium, 2 tablet, Nasogastric, BID  sodium chloride, 10 mL, Intravenous, Q12H  sodium chloride, 10 mL, Intravenous, Q12H      Continuous Infusions:HYDROmorphone HCl-NaCl,   Pharmacy Consult,   sodium chloride, 30 mL/hr  sodium chloride, 50 mL/hr, Last Rate: 50 mL/hr (08/08/23 0835)        PRN Meds:.  acetaminophen **OR** acetaminophen **OR** acetaminophen    senna-docusate sodium **AND** polyethylene glycol **AND** bisacodyl **AND** bisacodyl    Calcium Replacement - Follow Nurse / BPA Driven Protocol    hydrOXYzine    ipratropium-albuterol    ketorolac    lidocaine PF 1%    Magnesium Standard Dose Replacement - Follow Nurse / BPA Driven Protocol    melatonin    midazolam    naloxone    naloxone    ondansetron     Pharmacy Consult    phenylephrine-mineral oil-petrolatum    Phosphorus Replacement - Follow Nurse / BPA Driven Protocol    Potassium Replacement - Follow Nurse / BPA Driven Protocol    prochlorperazine    simethicone    sodium chloride    sodium chloride    sodium chloride    sodium chloride    sodium chloride    Assessment & Plan   Assessment & Plan     Active Hospital Problems    Diagnosis  POA    **Acute gallstone pancreatitis [K85.10]  Yes    Elevated serum creatinine [R79.89]  Yes    Personal history of transient ischemic attack (TIA) [Z86.73]  Not Applicable    Adenocarcinoma of prostate [C61]  Yes    Gastroesophageal reflux disease [K21.9]  Yes    Hyperlipidemia [E78.5]  Yes    Tobacco abuse [Z72.0]  Yes      Resolved Hospital Problems   No resolved problems to display.        Brief Hospital Course to date:  Ta Madison is a 63 y.o. male with PMH of GERD, HLD, hx of TIA, advanced prostate cancer on Xtandi, occasional marijuna use, and chronic 1 PPD tobacco use who initially presented to [facility] 7/23/23 with complaints of severe epigastric abdominal pain with associated nausea and vomiting. CT A/P at OSH showed acute pancreatitis with 2 mm stone in the proximal duodenum. Transferred to [facility] for GI evaluation.     This patient's problems and plans were partially entered by my partner and updated as appropriate by me 08/08/23.    Assessment/plan:    Acute gallstone pancreatitis  Leukocytosis  Lactic acidosis  -CT scan of abdomen pelvis with contrast was repeated on 8/7/2023.  Shows evidence of inflammation and necrosis but no pseudocyst and no air in any part of the pancreas or any other fluid pockets around it.  This study also shows some calcification.  -marked leukocytosis, initially improved however, then worsened and went up to 22,000.  It is coming down again now and today is 17,000.  Patient also has had no fever over the past 24 hours.  -transitioned from merrem-->zosyn.  We will continue Zosyn  for now.  Will start micafungin also.  -ID follows  -surgery following, they saw and evaluated the patient this morning and recommended n.p.o. and holding tube feeding for now.  -Although patient took a turn for worse on 8/6/23 it seems that now he is improving.  We need to watch his blood pressure, heart rate, temperature.  2 days ago we stopped tube feeding also and will restart again today.    Edema/upper abdomen to toes.  -- Creatinine is normal   -- Started diuresing with Lasix and patient was improving as far as edema was concerned.  Then on 8/6 we have stopped Lasix and started fluid again.  Fluid will be stopped today.  If blood pressure allows hopefully we can start diuresing him again tomorrow.    SOA  -better, on RA      Prostate cancer  -Continue Xtandi oral chemotherapy (patient supplied), however patient having difficulty with swallowing this  -Has not been able to swallow Xtandia for couple of days.  If this continues may need to contact his oncologist and see what they recommend.       HLD  GERD  Hx of TIA  -Home meds     Tobacco abuse  -Counseled on cessation  -Nicotine patch    PLAN:  -Continue current care with current antibiotics.  -Repeat labs in a.m.  -Monitor blood pressure, heart rate, temperature closely.  Low threshold for repeating CT scan of the abdomen pelvis with contrast to see if this necrosis is progressing.   - If significant worsening and if at any point debridement becomes necessary patient needs to be [facility] the [facility].     Expected Discharge Location and Transportation: To be determined   pending medically ready and PT/OT recommendations  Expected Discharge   Expected Discharge Date: 8/6/2023; Expected Discharge Time:       DVT prophylaxis:  Mechanical DVT prophylaxis orders are present.     AM-PAC 6 Clicks Score (PT): 17 (08/08/23 1008)    CODE STATUS:   Code Status and Medical Interventions:   Ordered at: 07/23/23 1496     Code Status (Patient has no  pulse and is not breathing):    CPR (Attempt to Resuscitate)     Medical Interventions (Patient has pulse or is breathing):    Full Support     Release to patient:    Routine Release       Servando Jarrett MD  08/08/23

## 2023-08-08 NOTE — PLAN OF CARE
Goal Outcome Evaluation:  Plan of Care Reviewed With: patient, spouse        Progress: improving  Outcome Evaluation: Progress report completed. Goals reviewed and revised as appropriate to monitor progress. Pt continues to present with decreased functional mobility and decreased strength limiting independence. Pt ambualted 350ft with CGA and RW for support. Continue to progress per pt tolerance.      Anticipated Discharge Disposition (PT): home with assist, home with outpatient therapy services   Not applicable

## 2023-08-08 NOTE — PROGRESS NOTES
INFECTIOUS DISEASE Progress Note    Ta Madison  1960  [id number]      Admission Date: 7/23/2023      Requesting Provider: Carroll Bobo DO  Evaluating Physician: Palomo Orellana MD    Reason for Consultation: Severe  pancreatitis    History of present illness:    7/28/23:Patient is a 63 y.o. male with h/o TIA, ongoing tobacco abuse, GERD, HLD, occasional marijuana use, and advanced prostate cancer/on Xtandi who we were asked to see for necrotizing pancreatitis.  The patient presented to [facility] ED on 7/23 with severe upper abdominal pain that radiated to his back about 3 hours PTA.  He had associated nausea and vomiting.  He has had postnasal drip with associated cough for last 3 to 4 weeks.  A CT scan at OSH showed acute gallbladder pancreatitis with a 2 mm stone in proximal duodenem.  He was given a dose of Invanz and transferred to [facility] on 7/23 for further medical management.  On arrival to [facility], the patient developed a Tmax of 100.3 and hypoxia requiring 2 L O2 NC.  He is now on room air.  Labs on arrival were lactic acid 3.6, PCT 0.85, creatinine 1.42, ALT 53, AST 66, bilirubin 0.4, lipase 1255, and WBC 15,000 with 91% neutrophils.  Blood cultures are negative to date.  Repeat blood cultures from 7/28 are pending.   A resp panel PCR was negative. His lactic acid and WBC were normal level on 7/27.  His PCT was 0.43.  A MRSA PCR on 7/28 was positive. His pain level on 7/28 was 7 out of 10. An MRCP on 7/24 showed acute pancreatitis with cholelithiasis without choledocholelithiasis.  It was felt that he may have passed the stone.  His abdomen became more distended and taut last night on 7/27.  A KUB noted possible small bowel ileus vs SBO.  A CT scan with contrast was done on 7/27 and showed extensive necrotizing appearing pancreatitis with fluid replacing nearly the entire pancreatic body with inflammatory changes tracking along entire length of pancreas, mild gallbladder wall thickening with  cholelithiasis (less likely acute cholecystitis), small bilateral pleural effusion, and mildly distended SB loops likely reactive and left likely obstruction.  He was on Zosyn, but was changed Merrem and Vancomycin on 7/28.  ID was asked to evaluate and manage his antibiotic therapy. He complains of persistent abdominal pain and abdominal distention.    7/29/23: He has remained afebrile. His creatinine is 0.58.  His white blood cell count is 9.3. Blood cultures from 7/28 are no growth so far.He has noticed some partial improvement in his abdominal pain and distention.  He denies vomiting.    7/30/2023: Maximum temperature over the last 24 hours is 100.1.  A follow-up CT scan today reveals persistent severe pancreatitis.  His white blood cell count today is 20.5.  His vancomycin random level was 7.6.    7/31/23: His maximum temperature over the last 24 hours is 99.7.White blood cell count today is 24.8. He continues to complain of abdominal distention and abdominal pain.  He denies nausea or vomiting.    8/1/23: Maximum temperature over the last 24 hours is 99.9.Creatinine is 0.48.  White blood cell count is 21.9.  Albumin is 2.4. He continues to require frequent narcotic therapy for abdominal pain.  He denies nausea and vomiting.  He is now on [place] feedings.    8/2/23: He has been afebrile over the last 24 hours. He is tolerating tube feedings. His white blood cell count today is 16.8.  His creatinine is 0.47.  Albumin is 2.2.  He states that his abdominal pain is somewhat improved today.  He denies nausea and vomiting.  He is tolerating tube feedings.    8/3/23: He remains afebrile.  His white blood cell count is now down to 13.7. He denies increased abdominal pain.  He denies nausea and vomiting.     8/4/23: He has remained afebrile.His white blood cell count today is 16.6.  He states that he has decreased abdominal pain.  He is tolerating tube feedings.     8/5/23: He remains afebrile. His white blood cell count  today is 15.7.  Creatinine is 0.52.He remains afebrile.  He has some loose stool after being given laxatives and stool softener.  He has modest improvement in his abdominal pain today.    8/7/23:He had a fever to 100.7ø 2300 on 8/5.  Yesterday had a fever to 101.1ø.  This morning his temperature is up to 101.2.  Early yesterday morning he was switched from Zosyn to Invanz but then yesterday he was switched back to Zosyn to which Micafungin.  A CT scan performed yesterday revealed evolving changes of necrotizing pancreatitis.  There appeared to be organizing pockets of fluid.  A repeat CT scan with contrast has been ordered for today. His C-reactive protein today is markedly elevated at 34.9.  His creatinine is 0.78.Pro-calcitonin is 0.5 and lactic acid is 2.5.  His white blood cell count is 17.1.  A follow-up CT scan reveals necrotizing pancreatitis with surrounding inflammatory fluid but without a well organized rim-enhancing abscess.    8/8/23: Maximum temperature over the last 24 hours is 100.3.  White blood cell count is 17.9.  Creatinine is 0.63. Blood cultures from 8/6 no growth so far. I saw him up and ambulating in the hallway this morning.  He states that his abdominal pain is partially improved.      Past Medical History:   Diagnosis Date    Elevated cholesterol     GERD (gastroesophageal reflux disease)     Increased heart rate     Mini stroke     Neck pain     Prostate cancer     Rash     Tobacco abuse        Past Surgical History:   Procedure Laterality Date    APPENDECTOMY  1971    [facility]     COLONOSCOPY      COLONOSCOPY N/A 1/25/2023    Procedure: COLONOSCOPY;  Surgeon: Mahnaz Caraballo MD;  Location: [facility] OR;  Service: Gastroenterology;  Laterality: N/A;    ENDOSCOPY N/A 7/31/2023    Procedure: ESOPHAGOGASTRODUODENOSCOPY WITH KEOFEED PLACEMENT;  Surgeon: Yolande Eller MD;  Location:  [facility];  Service: Gastroenterology;  Laterality: N/A;       Family History   Problem  Relation Age of Onset    Nephrolithiasis Mother     Heart disease Father     Hypertension Father     Kidney disease Father        Social History     Socioeconomic History    Marital status:    Tobacco Use    Smoking status: Every Day     Packs/day: 2.00     Years: 45.00     Pack years: 90.00     Types: Cigarettes    Smokeless tobacco: Never   Vaping Use    Vaping Use: Never used   Substance and Sexual Activity    Alcohol use: No    Drug use: Yes     Types: Marijuana     Comment: occ     Sexual activity: Defer       No Known Allergies      Medication:    Current Facility-Administered Medications:     acetaminophen (TYLENOL) tablet 650 mg, 650 mg, Nasogastric, Q4H PRN **OR** acetaminophen (TYLENOL) 160 MG/5ML solution 650 mg, 650 mg, Nasogastric, Q4H PRN, 650 mg at 08/08/23 0356 **OR** acetaminophen (TYLENOL) suppository 650 mg, 650 mg, Rectal, Q4H PRN, Corina Johansen, PharmD    aspirin chewable tablet 81 mg, 81 mg, Nasogastric, Daily, Corina Johansen PharmD, 81 mg at 08/07/23 1005    atorvastatin (LIPITOR) tablet 20 mg, 20 mg, Nasogastric, Nightly, Curt Barry MD, 20 mg at 08/07/23 2234    sennosides-docusate (PERICOLACE) 8.6-50 MG per tablet 2 tablet, 2 tablet, Nasogastric, BID, 2 tablet at 08/07/23 1005 **AND** polyethylene glycol (MIRALAX) packet 17 g, 17 g, Oral, Daily PRN **AND** bisacodyl (DULCOLAX) EC tablet 5 mg, 5 mg, Oral, Daily PRN **AND** bisacodyl (DULCOLAX) suppository 10 mg, 10 mg, Rectal, Daily PRN, Curt Barry MD    bisacodyl (DULCOLAX) suppository 10 mg, 10 mg, Rectal, Q8H, Christos Mijares MD, 10 mg at 08/07/23 1007    Calcium Replacement - Follow Nurse / BPA Driven Protocol, , Does not apply, PRN, Curt Barry MD    dextrose 5 % and sodium chloride 0.9 % infusion, 100 mL/hr, Intravenous, Continuous, Servando Jarrett MD, Last Rate: 100 mL/hr at 08/07/23 1002, 100 mL/hr at 08/07/23 1002    docusate sodium (COLACE) liquid 100 mg, 100 mg, Nasogastric, BID, Christos Mijares  MD GANESH, 100 mg at 08/07/23 1003    enzalutamide (XTANDI) chemo capsule 160 mg (patient supplied medication), 160 mg, Oral, Daily, Corina Johansen, PharmD, 160 mg at 08/05/23 2100    famotidine (PEPCID) injection 20 mg, 20 mg, Intravenous, Once, Anurag Sloan Jr., MD    famotidine (PEPCID) tablet 20 mg, 20 mg, Oral, Once, Anurag Sloan Jr., MD    hydrocortisone (ANUSOL-HC) suppository 25 mg, 25 mg, Rectal, BID, Jagdeep Quintero, APRN, 25 mg at 08/07/23 1007    HYDROmorphone (DILAUDID) PCA 0.2 mg/ml 50 mL syringe, , Intravenous, Continuous, Ramila Martinez, APRN, New Bag at 08/08/23 0401    hydrOXYzine (ATARAX) tablet 50 mg, 50 mg, Nasogastric, TID PRN, Curt Barry MD, 50 mg at 08/06/23 0749    ipratropium-albuterol (DUO-NEB) nebulizer solution 3 mL, 3 mL, Nebulization, Q4H PRN, Curt Barry MD, 3 mL at 08/04/23 0651    ketorolac (TORADOL) injection 30 mg, 30 mg, Intravenous, Q6H PRN, Mary Rojas MD, 30 mg at 08/06/23 1819    lactulose (CHRONULAC) solution for enema 300 mL, 300 mL, Rectal, Once, Christos Mijares MD    lidocaine PF 1% (XYLOCAINE) injection 0.5 mL, 0.5 mL, Injection, Once PRN, Anurag Sloan Jr., MD    Magnesium Standard Dose Replacement - Follow Nurse / BPA Driven Protocol, , Does not apply, PRN, Curt Barry MD    melatonin tablet 5 mg, 5 mg, Nasogastric, Nightly PRN, Curt Barry MD, 5 mg at 08/05/23 2124    metoclopramide (REGLAN) injection 10 mg, 10 mg, Intravenous, Q6H, Christos Mijares MD, 10 mg at 08/08/23 0407    metoprolol tartrate (LOPRESSOR) tablet 50 mg, 50 mg, Nasogastric, Q12H, Servando Jarrett MD, 50 mg at 08/07/23 2234    micafungin 100 mg/100 mL 0.9% NS IVPB (mbp), 100 mg, Intravenous, Q24H, Servando Jarrett MD, 100 mg at 08/07/23 1823    midazolam (VERSED) injection 1 mg, 1 mg, Intravenous, Q10 Min PRN, Anurag Sloan Jr., MD    montelukast (SINGULAIR) tablet 10 mg, 10 mg, Nasogastric, Nightly, Curt Barry MD, 10 mg at 08/07/23 2234     naloxone (NARCAN) injection 0.1 mg, 0.1 mg, Intravenous, Q5 Min PRN, Ramila Martinez APRN    naloxone (NARCAN) injection 0.4 mg, 0.4 mg, Intravenous, PRN, Carroll Bobo,     nicotine (NICODERM CQ) 21 MG/24HR patch 1 patch, 1 patch, Transdermal, Q24H, Carroll Bobo, DO, 1 patch at 08/07/23 1010    ondansetron (ZOFRAN) injection 4 mg, 4 mg, Intravenous, Q6H PRN, Bharat Montana, APRN, 4 mg at 08/06/23 0015    pantoprazole (PROTONIX) injection 40 mg, 40 mg, Intravenous, Q AM, Marija Smallwood MD, 40 mg at 08/07/23 0539    Pharmacy Consult, , Does not apply, Continuous PRN, Ramila Martinez APRCHANTALE    phenylephrine-mineral oil-petrolatum (PREPARATION H) 0.25-14-74.9 % hemorhoidal ointment, , Rectal, BID PRN, Jagdeep Quintero APRN    Phosphorus Replacement - Follow Nurse / BPA Driven Protocol, , Does not apply, PRN, Curt Barry MD    piperacillin-tazobactam (ZOSYN) 4.5 g in iso-osmotic dextrose 100 mL IVPB (premix), 4.5 g, Intravenous, Q8H, Servando Jarrett MD, 4.5 g at 08/08/23 0407    Potassium Replacement - Follow Nurse / BPA Driven Protocol, , Does not apply, PRN, Curt Barry MD    prochlorperazine (COMPAZINE) injection 5 mg, 5 mg, Intravenous, Q3H PRN, Carroll Bobo, DO, 5 mg at 07/24/23 1635    ProSource No Carb oral solution 30 mL, 30 mL, Per J Tube, TID, Minerva Carrera, DAVE, 30 mL at 08/05/23 2137    ProSource No Carb oral solution 30 mL, 30 mL, Nasogastric, TID, Minerva Carrera, DAVE, 30 mL at 08/07/23 2233    simethicone (MYLICON) chewable tablet 80 mg, 80 mg, Nasogastric, 4x Daily PRN, Curt Barry MD    sodium chloride 0.9 % flush 10 mL, 10 mL, Intravenous, Q12H, Carroll Bobo, , 10 mL at 08/06/23 2039    sodium chloride 0.9 % flush 10 mL, 10 mL, Intravenous, PRN, Carroll Bobo,     sodium chloride 0.9 % flush 10 mL, 10 mL, Intravenous, Q12H, Anurag Sloan Jr., MD, 10 mL at 08/06/23 2038    sodium chloride 0.9 % flush 10 mL, 10 mL, Intravenous, PRN, Anurag Sloan  MD Noemi    sodium chloride 0.9 % infusion 40 mL, 40 mL, Intravenous, PRN, Carroll Bobo DO    sodium chloride 0.9 % infusion 40 mL, 40 mL, Intravenous, PRN, Anurag Sloan Jr., MD    sodium chloride 0.9 % infusion, 30 mL/hr, Intravenous, Continuous PRN, Ramila Martinez, APRN    Antibiotics:  Anti-Infectives (From admission, onward)      Ordered     Dose/Rate Route Frequency Start Stop    23 1208  piperacillin-tazobactam (ZOSYN) 4.5 g in iso-osmotic dextrose 100 mL IVPB (premix)        Ordering Provider: Servando Jarrett MD    4.5 g  over 4 Hours Intravenous Every 8 Hours 23 1900 23 1859    23 1210  micafungin 100 mg/100 mL 0.9% NS IVPB (mbp)        Ordering Provider: Servando Jarrett MD    100 mg  over 60 Minutes Intravenous Every 24 Hours 23 1400 23 1359    23 1208  piperacillin-tazobactam (ZOSYN) 4.5 g in iso-osmotic dextrose 100 mL IVPB (premix)        Ordering Provider: Servando Jarrett MD    4.5 g  over 30 Minutes Intravenous Once 23 1300 23 1327    23 2329  meropenem (MERREM) 1000 mg/100 mL 0.9% NS (mbp)        Ordering Provider: Carroll Bobo DO    1,000 mg  over 30 Minutes Intravenous Once 23 0000 23 0112              Review of Systems:  The HPI      Physical Exam:   Vital Signs  Temp (24hrs), Av.2 øF (37.3 øC), Min:98.1 øF (36.7 øC), Max:100.4 øF (38 øC)    Temp  Min: 98.1 øF (36.7 øC)  Max: 100.4 øF (38 øC)  BP  Min: 125/65  Max: 158/75  Pulse  Min: 101  Max: 136  Resp  Min: 16  Max: 20  SpO2  Min: 92 %  Max: 95 %    GENERAL: Awake and alert, in no acute distress. Nasojejunal tube is in place.  HEENT: Normocephalic, atraumatic.  PERRL. EOMI. No conjunctival injection. No icterus. No labial ulcers  NECK: Supple   HEART: RRR; No murmur, rubs, gallops.   LUNGS: Clear to auscultation bilaterally without wheezing, rales, rhonchi. Normal respiratory effort.   ABDOMEN: He has continued abdominal distention with mild diffuse  abdominal tenderness.  EXT:  No cyanosis, clubbing or edema. No cord.  :  Without Jorgensen catheter.  MSK: No joint effusions or erythema  SKIN: Warm and dry without cutaneous eruptions on Inspection/palpation.    NEURO: Oriented to PPT.  Motor 5/5 strength  PSYCHIATRIC: Normal insight and judgment. Cooperative with PE    Laboratory Data    Results from last 7 days   Lab Units 08/08/23  0358 08/07/23  0343 08/06/23  0756   WBC 10*3/mm3 17.86* 17.07* 22.56*   HEMOGLOBIN g/dL 8.4* 7.3* 8.0*   HEMATOCRIT % 26.5* 23.1* 25.2*   PLATELETS 10*3/mm3 731* 732* 685*       Results from last 7 days   Lab Units 08/08/23  0358   SODIUM mmol/L 139   POTASSIUM mmol/L 4.5   CHLORIDE mmol/L 102   CO2 mmol/L 25.0   BUN mg/dL 22   CREATININE mg/dL 0.63*   GLUCOSE mg/dL 106*   CALCIUM mg/dL 8.2*       Results from last 7 days   Lab Units 08/08/23  0358   ALK PHOS U/L 109   BILIRUBIN mg/dL 0.6   ALT (SGPT) U/L 10   AST (SGOT) U/L 17           Results from last 7 days   Lab Units 08/07/23  0419   CRP mg/dL 34.91*       Results from last 7 days   Lab Units 08/07/23  0343   LACTATE mmol/L 2.5*                 Estimated Creatinine Clearance: 137.6 mL/min (A) (by C-G formula based on SCr of 0.63 mg/dL (L)).      Microbiology:  Microbiology Results (last 10 days)       Procedure Component Value - Date/Time    Blood Culture - Blood, Hand, Right [[id number]]  (Normal) Collected: 08/06/23 0226    Lab Status: Preliminary result Specimen: Blood from Hand, Right Updated: 08/07/23 0846     Blood Culture No growth at 24 hours    Blood Culture - Blood, Arm, Left [[id number]]  (Normal) Collected: 08/06/23 0226    Lab Status: Preliminary result Specimen: Blood from Arm, Left Updated: 08/07/23 0846     Blood Culture No growth at 24 hours                  Radiology:    CT ABDOMEN PELVIS W CONTRAST    Date of Exam: 8/7/2023 1:06 PM CDT    Indication: necrotizing pancreatitis.    Comparison: 8/6/2023    Technique: Axial CT images were obtained of the abdomen  and pelvis following the uneventful intravenous administration of 85 cc Isovue-300. Reconstructed coronal and sagittal images were also obtained. Automated exposure control and iterative  construction methods were used.      Findings:  LUNG BASES: Small to moderate bilateral pleural effusions with basal atelectasis.    LIVER:  Unremarkable parenchyma without focal lesion.  BILIARY/GALLBLADDER:  Unremarkable  SPLEEN:  Unremarkable  PANCREAS: There is necrotizing pancreatitis involving the head and body with lack of enhancing parenchyma. There is surrounding inflammatory fluid. No well-defined walled off fluid collection with enhancing rim noted as of yet. There is no pancreatic  duct dilation. There are few parenchymal calcifications  ADRENAL:  Unremarkable  KIDNEYS:  Unremarkable parenchyma with no solid mass identified. No obstruction.  No calculus identified.  GASTROINTESTINAL/MESENTERY:  No evidence of obstruction nor inflammation. There is an enteric tube with tip at the ligament of Treitz.  MESENTERIC VESSELS:  Patent.  AORTA/IVC:  Normal caliber.    RETROPERITONEUM/LYMPH NODES:  Unremarkable    REPRODUCTIVE:  Unremarkable  BLADDER:  Unremarkable    OSSEUS STRUCTURES:  Typical for age with no acute process identified.    There is free fluid within the abdomen and pelvis.     Impression:     Impression:  1.Necrotizing pancreatitis involving the pancreas head and body. Associated surrounding inflammatory fluid without well organized rim-enhancing fluid collection.       I read his CT scan of 8/7.      Impression:   Severe gallstone pancreatitis- He passed the gallstone. He continues to have significant pancreatitis and remains at risk for evolution to pancreatic abscess.  Leukocytosis/neutrophilia  MRSA nasal colonization  Advance prostate cancer/on Xtandi  Ongoing tobacco abuse  Occasional marijuana use    PLAN/RECOMMENDATIONS:   Nutritional support  Continue intravenous Zosyn  Continue micafungin  Transfer to   if he worsens or appears to be developing a pancreatic abscess.      Palomo Orellana MD  8/8/2023  07:24 EDT

## 2023-08-08 NOTE — PROGRESS NOTES
"Patient Name:  Ta Madison  YOB: 1960  [id number]    Surgery Progress Note    Date of visit: 8/8/2023      Subjective: No significant changes. Pain better controlled. NO nausea or vomiting           Objective:     /87 (BP Location: Right arm, Patient Position: Lying)   Pulse 111   Temp 98.4 øF (36.9 øC) (Oral)   Resp 20   Ht 172.7 cm (68\")   Wt 97.4 kg (214 lb 10.2 oz)   SpO2 94%   BMI 32.64 kg/mý     Intake/Output Summary (Last 24 hours) at 8/8/2023 0816  Last data filed at 8/8/2023 0600  Gross per 24 hour   Intake 7.01 ml   Output --   Net 7.01 ml     GEN:   Awake, alert, in no acute distress, resting comfortably in bed   CV:      Heart rate in the 120s  L:         Symmetric expansion, not labored on oxygen by nasal cannula  Abd:    Moderately distended, diffuse tenderness to deep palpation and no significant tenderness to light palpation, rebound, or guarding.  Ext:     No cyanosis, clubbing, or edema    Recent labs that are back at this time have been reviewed.           Assessment/ Plan:       Mr. Madison is a 63-year-old gentleman with history of GERD, prostate cancer on medical therapy, hyperlipidemia, and TIAs with gallstone pancreatitis      #Severe Gallstone pancreatitis  -No significant changes. Hemodynamically stable. Labs stable   -CT scan yesterday without significant change   -Recommend to restart tube feeds  -No surgical interventions are planned at this time. Continue supportive measures         Derrek Tony MD  8/8/2023  08:16 EDT      "

## 2023-08-08 NOTE — THERAPY PROGRESS REPORT/RE-CERT
Patient Name: Ta Madison  : 1960    MRN: [id number]                              Today's Date: 2023       Admit Date: 2023    Visit Dx:     ICD-10-CM ICD-9-CM   1. Acute gallstone pancreatitis  K85.10 577.0     574.20   2. Dysphagia, unspecified type  R13.10 787.20     Patient Active Problem List   Diagnosis    Tachycardia    Hyperlipidemia    Multiple allergies    Tobacco abuse    Gastroesophageal reflux disease    Chronic right shoulder pain    Ganglion cyst of dorsum of right wrist    Benign skin cyst    Numbness and tingling of both upper extremities    Right shoulder pain    Adenocarcinoma of prostate    Encounter for screening for malignant neoplasm of colon    Epigastric pain    Acute gallstone pancreatitis    Personal history of transient ischemic attack (TIA)    Elevated serum creatinine     Past Medical History:   Diagnosis Date    Elevated cholesterol     GERD (gastroesophageal reflux disease)     Increased heart rate     Mini stroke     Neck pain     Prostate cancer     Rash     Tobacco abuse      Past Surgical History:   Procedure Laterality Date    APPENDECTOMY      [facility]     COLONOSCOPY      COLONOSCOPY N/A 2023    Procedure: COLONOSCOPY;  Surgeon: Mahnaz Caraballo MD;  Location: [facility];  Service: Gastroenterology;  Laterality: N/A;    ENDOSCOPY N/A 2023    Procedure: ESOPHAGOGASTRODUODENOSCOPY WITH KEOFEED PLACEMENT;  Surgeon: Yolande Eller MD;  Location: [facility] ENDOSCOPY;  Service: Gastroenterology;  Laterality: N/A;      General Information       Row Name 23 0952          Physical Therapy Time and Intention    Document Type progress note/recertification  -AE     Mode of Treatment physical therapy  -AE       Row Name 23 0952          General Information    Patient Profile Reviewed yes  -AE     Existing Precautions/Restrictions fall;other (see comments)  NG, abd incision, scrotal edema; PCA pump  -AE     Barriers to Rehab  medically complex  -AE       Row Name 08/08/23 0952          Cognition    Orientation Status (Cognition) oriented x 4  -AE       Row Name 08/08/23 0952          Safety Issues, Functional Mobility    Safety Issues Affecting Function (Mobility) awareness of need for assistance;insight into deficits/self-awareness;safety precaution awareness;safety precautions follow-through/compliance;impulsivity  -AE     Impairments Affecting Function (Mobility) balance;endurance/activity tolerance;pain;shortness of breath;strength  -AE               User Key  (r) = Recorded By, (t) = Taken By, (c) = Cosigned By      Initials Name Provider Type    AE Antelmo Andersen, PT Physical Therapist                   Mobility       Row Name 08/08/23 0956          Bed Mobility    Bed Mobility supine-sit;sit-supine  -AE     Supine-Sit [place] (Bed Mobility) minimum assist (75% patient effort);verbal cues  -AE     Sit-Supine [place] (Bed Mobility) contact guard  -AE     Assistive Device (Bed Mobility) bed rails;head of bed elevated  -AE     Comment, (Bed Mobility) VCs for hand placement and sequencing. Pt required HHA to get to EOB.  -AE       Row Name 08/08/23 0956          Transfers    Comment, (Transfers) Impulsive with STS and demo one LOB. Pt required cues to improve balance and slow down to prevent risk of falls.  -AE       Row Name 08/08/23 0956          Sit-Stand Transfer    Sit-Stand [place] (Transfers) contact guard;1 person assist;verbal cues  -AE     Assistive Device (Sit-Stand Transfers) walker, front-wheeled  -AE       Row Name 08/08/23 0956          Gait/Stairs (Locomotion)    [place] Level (Gait) contact guard;1 person assist;verbal cues  -AE     Assistive Device (Gait) walker, front-wheeled  -AE     Distance in Feet (Gait) 350  -AE     Deviations/Abnormal Patterns (Gait) bilateral deviations;base of support, wide;loren decreased;gait speed decreased;stride length decreased  -AE     Bilateral Gait Deviations  forward flexed posture  -AE     Comment, (Gait/Stairs) Pt demo step through gait pattern with slowed loren, forward flexed posture and wide PADMINI. Pt required increased cues for sequencing of AD, overall demo improved endurance with activity and balance while ambulating. Further distance limited by fatigue.  -AE               User Key  (r) = Recorded By, (t) = Taken By, (c) = Cosigned By      Initials Name Provider Type    AE Antelmo Andersen PT Physical Therapist                   Obj/Interventions       Row Name 08/08/23 1001          Range of Motion Comprehensive    General Range of Motion bilateral lower extremity ROM WFL  -AE       Row Name 08/08/23 1001          Strength Comprehensive (MMT)    Comment, General Manual Muscle Testing (MMT) Assessment BLE grossly 4/5  -AE       Row Name 08/08/23 1001          Balance    Balance Assessment sitting static balance;sitting dynamic balance;sit to stand dynamic balance;standing static balance;standing dynamic balance  -AE     Static Sitting Balance standby assist  -AE     Dynamic Sitting Balance standby assist  -AE     Position, Sitting Balance unsupported;sitting edge of bed  -AE     Sit to Stand Dynamic Balance contact guard;1-person assist;verbal cues  -AE     Static Standing Balance contact guard  -AE     Dynamic Standing Balance contact guard  -AE     Position/Device Used, Standing Balance supported  -AE               User Key  (r) = Recorded By, (t) = Taken By, (c) = Cosigned By      Initials Name Provider Type    AE Antelmo Andersen PT Physical Therapist                   Goals/Plan       Row Name 08/08/23 1007          Bed Mobility Goal 1 (PT)    Activity/Assistive Device (Bed Mobility Goal 1, PT) sit to supine/supine to sit  -AE     [place] Level/Cues Needed (Bed Mobility Goal 1, PT) modified independence  -AE     Time Frame (Bed Mobility Goal 1, PT) long term goal (LTG);10 days  -AE     Progress/Outcomes (Bed Mobility Goal 1, PT) goal ongoing;new goal   -AE       Row Name 08/08/23 1007          Transfer Goal 1 (PT)    Activity/Assistive Device (Transfer Goal 1, PT) sit-to-stand/stand-to-sit;bed-to-chair/chair-to-bed  -AE     [place] Level/Cues Needed (Transfer Goal 1, PT) modified independence  -AE     Time Frame (Transfer Goal 1, PT) 10 days  -AE     Progress/Outcome (Transfer Goal 1, PT) continuing progress toward goal;goal revised this date;goal ongoing  -AE       Row Name 08/08/23 1007          Gait Training Goal 1 (PT)    Activity/Assistive Device (Gait Training Goal 1, PT) gait (walking locomotion);decrease fall risk;diminish gait deviation;improve balance and speed;increase endurance/gait distance  -AE     [place] Level (Gait Training Goal 1, PT) modified independence  -AE     Distance (Gait Training Goal 1, PT) 450ft  -AE     Time Frame (Gait Training Goal 1, PT) long term goal (LTG);10 days  -AE     Progress/Outcome (Gait Training Goal 1, PT) continuing progress toward goal;goal partially met;goal revised this date;goal ongoing  -AE       Row Name 08/08/23 1007          Stairs Goal 1 (PT)    Activity/Assistive Device (Stairs Goal 1, PT) ascending stairs;descending stairs;using handrail, right;step-over step;decrease fall risk;improve balance and speed  -AE     [place] Level/Cues Needed (Stairs Goal 1, PT) standby assist  -AE     Number of Stairs (Stairs Goal 1, PT) 12  -AE     Time Frame (Stairs Goal 1, PT) 10 days;long term goal (LTG)  -AE     Progress/Outcome (Stairs Goal 1, PT) continuing progress toward goal;goal revised this date;goal ongoing  -AE       Row Name 08/08/23 1007          Therapy Assessment/Plan (PT)    Planned Therapy Interventions (PT) balance training;bed mobility training;gait training;home exercise program;postural re-education;patient/family education;ROM (range of motion);strengthening;stair training;transfer training  -AE               User Key  (r) = Recorded By, (t) = Taken By, (c) = Cosigned By      Initials Name  Provider Type    AE Antelmo Andesren, PT Physical Therapist                   Clinical Impression       Row Name 08/08/23 1003          Pain    Pretreatment Pain Rating 0/10 - no pain  -AE     Posttreatment Pain Rating 0/10 - no pain  -AE       Row Name 08/08/23 1003          Plan of Care Review    Plan of Care Reviewed With patient;spouse  -AE     Progress improving  -AE     Outcome Evaluation Progress report completed. Goals reviewed and revised as appropriate to monitor progress. Pt continues to present with decreased functional mobility and decreased strength limiting independence. Pt ambualted 350ft with CGA and RW for support. Continue to progress per pt tolerance.  -AE       Row Name 08/08/23 1003          Vital Signs    Pre Systolic BP Rehab 145  -AE     Pre Treatment Diastolic BP 69  -AE     Pretreatment Heart Rate (beats/min) 116  -AE     Intratreatment Heart Rate (beats/min) 136  -AE     Posttreatment Heart Rate (beats/min) 126  -AE     Pre SpO2 (%) 95  -AE     O2 Delivery Pre Treatment room air  -AE     O2 Delivery Intra Treatment room air  -AE     Post SpO2 (%) 95  -AE     O2 Delivery Post Treatment room air  -AE     Pre Patient Position Supine  -AE     Intra Patient Position Standing  -AE     Post Patient Position Supine  -AE       Row Name 08/08/23 1003          Positioning and Restraints    Pre-Treatment Position in bed  -AE     Post Treatment Position bed  -AE     In Bed notified nsg;fowlers;call light within reach;encouraged to call for assist;exit alarm on;side rails up x2;with family/caregiver;legs elevated  -AE               User Key  (r) = Recorded By, (t) = Taken By, (c) = Cosigned By      Initials Name Provider Type    AE Antelmo Andersen, LICHA Physical Therapist                   Outcome Measures       Row Name 08/08/23 1008          How much help from another person do you currently need...    Turning from your back to your side while in flat bed without using bedrails? 3  -AE     Moving from  lying on back to sitting on the side of a flat bed without bedrails? 3  -AE     Moving to and from a bed to a chair (including a wheelchair)? 3  -AE     Standing up from a chair using your arms (e.g., wheelchair, bedside chair)? 3  -AE     Climbing 3-5 steps with a railing? 2  -AE     To walk in hospital room? 3  -AE     AM-PAC 6 Clicks Score (PT) 17  -AE     Highest level of mobility 5 --> Static standing  -AE       Row Name 08/08/23 1008          Functional Assessment    Outcome Measure Options AM-PAC 6 Clicks Basic Mobility (PT)  -AE               User Key  (r) = Recorded By, (t) = Taken By, (c) = Cosigned By      Initials Name Provider Type    AE Antelmo Andersen PT Physical Therapist                                 Physical Therapy Education       Title: PT OT SLP Therapies (Done)       Topic: Physical Therapy (Done)       Point: Mobility training (Done)       Learning Progress Summary             Patient Acceptance, E, VU by AE at 8/8/2023 0855    Acceptance, E, VU,NR by  at 8/4/2023 1424    Acceptance, E, VU by AE at 8/3/2023 1114    Acceptance, E,TB, VU by AP at 8/1/2023 1851    Acceptance, E,TB, VU by KW at 7/29/2023 1145    Comment: continued benefit of skilled PT services   Significant Other Acceptance, E, VU,NR by  at 8/4/2023 1424                         Point: Home exercise program (Done)       Learning Progress Summary             Patient Acceptance, E, VU by AE at 8/8/2023 0855    Acceptance, E, VU by AE at 8/3/2023 1114    Acceptance, E,TB, VU by AP at 8/1/2023 1851    Acceptance, E,TB, VU by KW at 7/29/2023 1145    Comment: continued benefit of skilled PT services                         Point: Body mechanics (Done)       Learning Progress Summary             Patient Acceptance, E, VU by AE at 8/8/2023 0855    Acceptance, E, VU,NR by  at 8/4/2023 1424    Acceptance, E, VU by AE at 8/3/2023 1114    Acceptance, E,TB, VU by AP at 8/1/2023 1851    Acceptance, E,TB, VU by KW at 7/29/2023 1145     Comment: continued benefit of skilled PT services   Significant Other Acceptance, E, VU,NR by  at 8/4/2023 1424                         Point: Precautions (Done)       Learning Progress Summary             Patient Acceptance, E, VU by AE at 8/8/2023 0855    Acceptance, E, VU,NR by  at 8/4/2023 1424    Acceptance, E, VU by AE at 8/3/2023 1114    Acceptance, E,TB, VU by AP at 8/1/2023 1851    Acceptance, E,TB, VU by KW at 7/29/2023 1145    Comment: continued benefit of skilled PT services   Significant Other Acceptance, E, VU,NR by  at 8/4/2023 1424                                         User Key       Initials Effective Dates Name Provider Type Discipline    AE 09/21/21 -  Antelmo Andersen, PT Physical Therapist PT    KW 01/27/22 -  Brittaney Avitia, PT Physical Therapist PT     05/15/23 -  Rebekah Weir, PT Student PT Student PT    AP 06/01/23 -  Nannette Zuniga RN Registered Nurse Nurse                  PT Recommendation and Plan  Planned Therapy Interventions (PT): balance training, bed mobility training, gait training, home exercise program, postural re-education, patient/family education, ROM (range of motion), strengthening, stair training, transfer training  Plan of Care Reviewed With: patient, spouse  Progress: improving  Outcome Evaluation: Progress report completed. Goals reviewed and revised as appropriate to monitor progress. Pt continues to present with decreased functional mobility and decreased strength limiting independence. Pt ambualted 350ft with CGA and RW for support. Continue to progress per pt tolerance.     Time Calculation:         PT Charges       Row Name 08/08/23 1009             Time Calculation    Start Time 0855  -AE      PT Received On 08/08/23  -AE      PT Goal Re-Cert Due Date 08/18/23  -AE         Timed Charges    52839 - PT Therapeutic Activity Minutes 29  -AE         Total Minutes    Timed Charges Total Minutes 29  -AE       Total Minutes 29  -AE                User  Key  (r) = Recorded By, (t) = Taken By, (c) = Cosigned By      Initials Name Provider Type    AE Antelmo Andersen, PT Physical Therapist                  Therapy Charges for Today       Code Description Service Date Service Provider Modifiers Qty    60562112678 HC PT THERAPEUTIC ACT EA 15 MIN 8/8/2023 Antelmo Andersen, LICHA GP 2            PT G-Codes  Outcome Measure Options: AM-PAC 6 Clicks Basic Mobility (PT)  AM-PAC 6 Clicks Score (PT): 17  PT Discharge Summary  Anticipated Discharge Disposition (PT): home with assist, home with outpatient therapy services    Antelmo Andersen PT  8/8/2023

## 2023-08-08 NOTE — PLAN OF CARE
Goal Outcome Evaluation:  Pt is resting peacefully in bed. PCA pump in use, no complaints of pain at this time. HR sustaining in the 130s, Dr Jarrett aware.                          Statement Selected

## 2023-08-08 NOTE — PLAN OF CARE
Goal Outcome Evaluation:  Plan of Care Reviewed With: patient        Progress: no change  Outcome Evaluation: Pt. asleep and did not demonstrate any visible signs of distress or discomfort during palliative nursing visit.  Per nursing report pt is feeling much better today as he received 1UPRBC yesterday.  TF restarted today.  He was even able to walk with PT.  Per report, pt stated the PCA has worked wonders for him.  Palliative care to continue to follow for support, POC and ongoing GOC.

## 2023-08-09 LAB
ALBUMIN SERPL-MCNC: 2.1 G/DL (ref 3.5–5.2)
ALBUMIN/GLOB SERPL: 0.6 G/DL
ALP SERPL-CCNC: 125 U/L (ref 39–117)
ALT SERPL W P-5'-P-CCNC: 8 U/L (ref 1–41)
ANION GAP SERPL CALCULATED.3IONS-SCNC: 11 MMOL/L (ref 5–15)
AST SERPL-CCNC: 16 U/L (ref 1–40)
BASOPHILS # BLD MANUAL: 0 10*3/MM3 (ref 0–0.2)
BASOPHILS NFR BLD MANUAL: 0 % (ref 0–1.5)
BILIRUB SERPL-MCNC: 0.3 MG/DL (ref 0–1.2)
BUN SERPL-MCNC: 24 MG/DL (ref 8–23)
BUN/CREAT SERPL: 43.6 (ref 7–25)
CALCIUM SPEC-SCNC: 7.9 MG/DL (ref 8.6–10.5)
CHLORIDE SERPL-SCNC: 106 MMOL/L (ref 98–107)
CO2 SERPL-SCNC: 24 MMOL/L (ref 22–29)
CREAT SERPL-MCNC: 0.55 MG/DL (ref 0.76–1.27)
D-LACTATE SERPL-SCNC: 1.6 MMOL/L (ref 0.5–2)
DEPRECATED RDW RBC AUTO: 51 FL (ref 37–54)
EGFRCR SERPLBLD CKD-EPI 2021: 112.1 ML/MIN/1.73
EOSINOPHIL # BLD MANUAL: 0 10*3/MM3 (ref 0–0.4)
EOSINOPHIL NFR BLD MANUAL: 0 % (ref 0.3–6.2)
ERYTHROCYTE [DISTWIDTH] IN BLOOD BY AUTOMATED COUNT: 17.7 % (ref 12.3–15.4)
GLOBULIN UR ELPH-MCNC: 3.3 GM/DL
GLUCOSE BLDC GLUCOMTR-MCNC: 153 MG/DL (ref 70–130)
GLUCOSE BLDC GLUCOMTR-MCNC: 159 MG/DL (ref 70–130)
GLUCOSE BLDC GLUCOMTR-MCNC: 175 MG/DL (ref 70–130)
GLUCOSE BLDC GLUCOMTR-MCNC: 181 MG/DL (ref 70–130)
GLUCOSE SERPL-MCNC: 171 MG/DL (ref 65–99)
HCT VFR BLD AUTO: 25.2 % (ref 37.5–51)
HGB BLD-MCNC: 7.9 G/DL (ref 13–17.7)
HYPOCHROMIA BLD QL: ABNORMAL
LYMPHOCYTES # BLD MANUAL: 0.38 10*3/MM3 (ref 0.7–3.1)
LYMPHOCYTES NFR BLD MANUAL: 4 % (ref 5–12)
MCH RBC QN AUTO: 24.8 PG (ref 26.6–33)
MCHC RBC AUTO-ENTMCNC: 31.3 G/DL (ref 31.5–35.7)
MCV RBC AUTO: 79 FL (ref 79–97)
MONOCYTES # BLD: 0.76 10*3/MM3 (ref 0.1–0.9)
NEUTROPHILS # BLD AUTO: 17.9 10*3/MM3 (ref 1.7–7)
NEUTROPHILS NFR BLD MANUAL: 81 % (ref 42.7–76)
NEUTS BAND NFR BLD MANUAL: 13 % (ref 0–5)
PHOSPHATE SERPL-MCNC: 2.8 MG/DL (ref 2.5–4.5)
PLAT MORPH BLD: NORMAL
PLATELET # BLD AUTO: 803 10*3/MM3 (ref 140–450)
PMV BLD AUTO: 10.3 FL (ref 6–12)
POTASSIUM SERPL-SCNC: 4 MMOL/L (ref 3.5–5.2)
PROCALCITONIN SERPL-MCNC: 0.39 NG/ML (ref 0–0.25)
PROT SERPL-MCNC: 5.4 G/DL (ref 6–8.5)
RBC # BLD AUTO: 3.19 10*6/MM3 (ref 4.14–5.8)
SCAN SLIDE: NORMAL
SODIUM SERPL-SCNC: 141 MMOL/L (ref 136–145)
VARIANT LYMPHS NFR BLD MANUAL: 2 % (ref 19.6–45.3)
WBC MORPH BLD: NORMAL
WBC NRBC COR # BLD: 19.04 10*3/MM3 (ref 3.4–10.8)

## 2023-08-09 PROCEDURE — 25010000002 METOCLOPRAMIDE PER 10 MG: Performed by: SURGERY

## 2023-08-09 PROCEDURE — 85007 BL SMEAR W/DIFF WBC COUNT: CPT | Performed by: NURSE PRACTITIONER

## 2023-08-09 PROCEDURE — 83605 ASSAY OF LACTIC ACID: CPT | Performed by: INTERNAL MEDICINE

## 2023-08-09 PROCEDURE — 84145 PROCALCITONIN (PCT): CPT | Performed by: INTERNAL MEDICINE

## 2023-08-09 PROCEDURE — 82948 REAGENT STRIP/BLOOD GLUCOSE: CPT

## 2023-08-09 PROCEDURE — 80053 COMPREHEN METABOLIC PANEL: CPT | Performed by: HOSPITALIST

## 2023-08-09 PROCEDURE — 25010000002 MICAFUNGIN SODIUM 100 MG RECONSTITUTED SOLUTION: Performed by: INTERNAL MEDICINE

## 2023-08-09 PROCEDURE — 85025 COMPLETE CBC W/AUTO DIFF WBC: CPT | Performed by: NURSE PRACTITIONER

## 2023-08-09 PROCEDURE — 0 HYDROMORPHONE PER 4 MG: Performed by: FAMILY MEDICINE

## 2023-08-09 PROCEDURE — 99221 1ST HOSP IP/OBS SF/LOW 40: CPT | Performed by: INTERNAL MEDICINE

## 2023-08-09 PROCEDURE — 84100 ASSAY OF PHOSPHORUS: CPT

## 2023-08-09 PROCEDURE — 25010000002 PIPERACILLIN SOD-TAZOBACTAM PER 1 G: Performed by: INTERNAL MEDICINE

## 2023-08-09 RX ORDER — HYDROMORPHONE HCL IN 0.9% NACL 10 MG/50ML
PATIENT CONTROLLED ANALGESIA SYRINGE INTRAVENOUS CONTINUOUS
Status: DISCONTINUED | OUTPATIENT
Start: 2023-08-09 | End: 2023-08-11 | Stop reason: HOSPADM

## 2023-08-09 RX ORDER — AMINO ACIDS/PROTEIN HYDROLYS 11G-40/45
30 LIQUID IN PACKET (ML) ORAL 3 TIMES DAILY
Status: DISCONTINUED | OUTPATIENT
Start: 2023-08-09 | End: 2023-08-11 | Stop reason: HOSPADM

## 2023-08-09 RX ORDER — METOPROLOL TARTRATE 5 MG/5ML
5 INJECTION INTRAVENOUS EVERY 6 HOURS PRN
Status: DISCONTINUED | OUTPATIENT
Start: 2023-08-09 | End: 2023-08-11 | Stop reason: HOSPADM

## 2023-08-09 RX ADMIN — METOCLOPRAMIDE 10 MG: 5 INJECTION, SOLUTION INTRAMUSCULAR; INTRAVENOUS at 15:51

## 2023-08-09 RX ADMIN — MONTELUKAST 10 MG: 10 TABLET, FILM COATED ORAL at 20:30

## 2023-08-09 RX ADMIN — PANTOPRAZOLE SODIUM 40 MG: 40 INJECTION, POWDER, LYOPHILIZED, FOR SOLUTION INTRAVENOUS at 05:02

## 2023-08-09 RX ADMIN — ASPIRIN 81 MG: 81 TABLET, CHEWABLE ORAL at 08:45

## 2023-08-09 RX ADMIN — PIPERACILLIN SODIUM AND TAZOBACTAM SODIUM 4.5 G: 4; .5 INJECTION, SOLUTION INTRAVENOUS at 13:50

## 2023-08-09 RX ADMIN — ACETAMINOPHEN 650 MG: 650 SOLUTION ORAL at 16:56

## 2023-08-09 RX ADMIN — METOPROLOL TARTRATE 50 MG: 50 TABLET, FILM COATED ORAL at 20:30

## 2023-08-09 RX ADMIN — Medication 10 ML: at 20:30

## 2023-08-09 RX ADMIN — DILTIAZEM HYDROCHLORIDE 60 MG: 60 TABLET, FILM COATED ORAL at 05:02

## 2023-08-09 RX ADMIN — DOCUSATE SODIUM 100 MG: 50 LIQUID ORAL at 20:30

## 2023-08-09 RX ADMIN — METOPROLOL TARTRATE 50 MG: 50 TABLET, FILM COATED ORAL at 08:45

## 2023-08-09 RX ADMIN — DILTIAZEM HYDROCHLORIDE 60 MG: 60 TABLET, FILM COATED ORAL at 21:36

## 2023-08-09 RX ADMIN — PIPERACILLIN SODIUM AND TAZOBACTAM SODIUM 4.5 G: 4; .5 INJECTION, SOLUTION INTRAVENOUS at 05:02

## 2023-08-09 RX ADMIN — Medication: at 13:38

## 2023-08-09 RX ADMIN — ACETAMINOPHEN 650 MG: 650 SOLUTION ORAL at 00:43

## 2023-08-09 RX ADMIN — Medication 30 ML: at 08:44

## 2023-08-09 RX ADMIN — Medication 30 ML: at 15:50

## 2023-08-09 RX ADMIN — Medication 10 ML: at 08:50

## 2023-08-09 RX ADMIN — MICAFUNGIN SODIUM 100 MG: 100 INJECTION, POWDER, LYOPHILIZED, FOR SOLUTION INTRAVENOUS at 13:50

## 2023-08-09 RX ADMIN — METOCLOPRAMIDE 10 MG: 5 INJECTION, SOLUTION INTRAMUSCULAR; INTRAVENOUS at 20:30

## 2023-08-09 RX ADMIN — Medication 30 ML: at 21:36

## 2023-08-09 RX ADMIN — METOCLOPRAMIDE 10 MG: 5 INJECTION, SOLUTION INTRAMUSCULAR; INTRAVENOUS at 05:01

## 2023-08-09 RX ADMIN — Medication 1 PATCH: at 08:46

## 2023-08-09 RX ADMIN — ATORVASTATIN CALCIUM 20 MG: 20 TABLET, FILM COATED ORAL at 20:30

## 2023-08-09 RX ADMIN — METOCLOPRAMIDE 10 MG: 5 INJECTION, SOLUTION INTRAMUSCULAR; INTRAVENOUS at 08:46

## 2023-08-09 RX ADMIN — PIPERACILLIN SODIUM AND TAZOBACTAM SODIUM 4.5 G: 4; .5 INJECTION, SOLUTION INTRAVENOUS at 21:36

## 2023-08-09 RX ADMIN — DILTIAZEM HYDROCHLORIDE 60 MG: 60 TABLET, FILM COATED ORAL at 13:50

## 2023-08-09 RX ADMIN — Medication 10 ML: at 20:31

## 2023-08-09 NOTE — PLAN OF CARE
Problem: Adult Inpatient Plan of Care  Goal: Plan of Care Review  Outcome: Ongoing, Progressing  Goal: Patient-Specific Goal (Individualized)  Outcome: Ongoing, Progressing  Goal: Absence of Hospital-Acquired Illness or Injury  Outcome: Ongoing, Progressing  Intervention: Identify and Manage Fall Risk  Recent Flowsheet Documentation  Taken 8/9/2023 1800 by Kesha Nava RN  Safety Promotion/Fall Prevention:   safety round/check completed   fall prevention program maintained  Taken 8/9/2023 1600 by Kesha Nava RN  Safety Promotion/Fall Prevention: safety round/check completed  Taken 8/9/2023 1400 by Kesha Nava RN  Safety Promotion/Fall Prevention: safety round/check completed  Taken 8/9/2023 1200 by Kesha Nava RN  Safety Promotion/Fall Prevention: safety round/check completed  Taken 8/9/2023 1000 by Kesha Nava RN  Safety Promotion/Fall Prevention:   safety round/check completed   fall prevention program maintained  Taken 8/9/2023 0800 by Kesha Nava RN  Safety Promotion/Fall Prevention: safety round/check completed  Intervention: Prevent Skin Injury  Recent Flowsheet Documentation  Taken 8/9/2023 1800 by Kesha Nava RN  Body Position: position changed independently  Taken 8/9/2023 1600 by Kesha Nava RN  Body Position: position changed independently  Taken 8/9/2023 1400 by Kesha Nava RN  Body Position: position changed independently  Taken 8/9/2023 1200 by Kesha Nava RN  Body Position: position changed independently  Taken 8/9/2023 1000 by Kesha Nava RN  Body Position: position changed independently  Taken 8/9/2023 0800 by Kesha Nava RN  Body Position: position changed independently  Skin Protection:   incontinence pads utilized   skin-to-skin areas padded  Intervention: Prevent and Manage VTE (Venous Thromboembolism) Risk  Recent Flowsheet Documentation  Taken 8/9/2023 1800 by Kesha Nava RN  Activity Management: activity  encouraged  Taken 8/9/2023 1600 by Kesha Nava RN  Activity Management: activity encouraged  Taken 8/9/2023 1400 by Kesha Nava RN  Activity Management: activity encouraged  Taken 8/9/2023 1200 by Kesha Nava RN  Activity Management: activity encouraged  Taken 8/9/2023 1000 by Kesha Nava RN  Activity Management: activity encouraged  Taken 8/9/2023 0800 by Kesha Nava RN  Activity Management: activity encouraged  Goal: Optimal Comfort and Wellbeing  Outcome: Ongoing, Progressing  Intervention: Monitor Pain and Promote Comfort  Recent Flowsheet Documentation  Taken 8/9/2023 1600 by Kesha Nava RN  Pain Management Interventions: around-the-clock dosing utilized  Taken 8/9/2023 1200 by Kesha Nava RN  Pain Management Interventions: around-the-clock dosing utilized  Taken 8/9/2023 0800 by Kesha Nava RN  Pain Management Interventions: around-the-clock dosing utilized  Goal: Readiness for Transition of Care  Outcome: Ongoing, Progressing     Problem: Hypertension Comorbidity  Goal: Blood Pressure in Desired Range  Outcome: Ongoing, Progressing     Problem: Skin Injury Risk Increased  Goal: Skin Health and Integrity  Outcome: Ongoing, Progressing  Intervention: Optimize Skin Protection  Recent Flowsheet Documentation  Taken 8/9/2023 0800 by Kesha Nava RN  Pressure Reduction Techniques: frequent weight shift encouraged  Pressure Reduction Devices: pressure-redistributing mattress utilized  Skin Protection:   incontinence pads utilized   skin-to-skin areas padded     Problem: Adjustment to Illness (Sepsis/Septic Shock)  Goal: Optimal Coping  Outcome: Ongoing, Progressing     Problem: Bleeding (Sepsis/Septic Shock)  Goal: Absence of Bleeding  Outcome: Ongoing, Progressing     Problem: Glycemic Control Impaired (Sepsis/Septic Shock)  Goal: Blood Glucose Level Within Desired Range  Outcome: Ongoing, Progressing     Problem: Infection Progression (Sepsis/Septic  Shock)  Goal: Absence of Infection Signs and Symptoms  Outcome: Ongoing, Progressing  Intervention: Promote Recovery  Recent Flowsheet Documentation  Taken 8/9/2023 1800 by Kesha Nava RN  Activity Management: activity encouraged  Taken 8/9/2023 1600 by Kesha Nava RN  Activity Management: activity encouraged  Taken 8/9/2023 1400 by Kesha Nava RN  Activity Management: activity encouraged  Taken 8/9/2023 1200 by Kesha Nava RN  Activity Management: activity encouraged  Taken 8/9/2023 1000 by Kesha Nava RN  Activity Management: activity encouraged  Taken 8/9/2023 0800 by Kesha Nava RN  Activity Management: activity encouraged     Problem: Nutrition Impaired (Sepsis/Septic Shock)  Goal: Optimal Nutrition Intake  Outcome: Ongoing, Progressing     Problem: Fluid Imbalance (Pancreatitis)  Goal: Fluid Balance  Outcome: Ongoing, Progressing     Problem: Infection (Pancreatitis)  Goal: Infection Symptom Resolution  Outcome: Ongoing, Progressing     Problem: Nutrition Impaired (Pancreatitis)  Goal: Optimal Nutrition Intake  Outcome: Ongoing, Progressing     Problem: Pain (Pancreatitis)  Goal: Acceptable Pain Control  Outcome: Ongoing, Progressing  Intervention: Monitor and Manage Pain  Recent Flowsheet Documentation  Taken 8/9/2023 1600 by Kesha Nava RN  Pain Management Interventions: around-the-clock dosing utilized  Taken 8/9/2023 1200 by Kesha Nava RN  Pain Management Interventions: around-the-clock dosing utilized  Taken 8/9/2023 0800 by Kesha Nava RN  Pain Management Interventions: around-the-clock dosing utilized     Problem: Fall Injury Risk  Goal: Absence of Fall and Fall-Related Injury  Outcome: Ongoing, Progressing  Intervention: Promote Injury-Free Environment  Recent Flowsheet Documentation  Taken 8/9/2023 1800 by Kesha Nava RN  Safety Promotion/Fall Prevention:   safety round/check completed   fall prevention program maintained  Taken 8/9/2023  1600 by McCane, Kesha N, RN  Safety Promotion/Fall Prevention: safety round/check completed  Taken 8/9/2023 1400 by Kesha Nava RN  Safety Promotion/Fall Prevention: safety round/check completed  Taken 8/9/2023 1200 by Kesha Nava RN  Safety Promotion/Fall Prevention: safety round/check completed  Taken 8/9/2023 1000 by Kesha Nava RN  Safety Promotion/Fall Prevention:   safety round/check completed   fall prevention program maintained  Taken 8/9/2023 0800 by Kesha Nava RN  Safety Promotion/Fall Prevention: safety round/check completed     Problem: Palliative Care  Goal: Enhanced Quality of Life  Outcome: Ongoing, Progressing  Intervention: Maximize Comfort  Recent Flowsheet Documentation  Taken 8/9/2023 1600 by Kesha Nava RN  Pain Management Interventions: around-the-clock dosing utilized  Taken 8/9/2023 1200 by Kesha Nava RN  Pain Management Interventions: around-the-clock dosing utilized  Taken 8/9/2023 0800 by Kesha Nava RN  Pain Management Interventions: around-the-clock dosing utilized  Goal: Enhanced Quality of Life  Outcome: Ongoing, Progressing  Intervention: Maximize Comfort  Recent Flowsheet Documentation  Taken 8/9/2023 1600 by Kesha Nava RN  Pain Management Interventions: around-the-clock dosing utilized  Taken 8/9/2023 1200 by Kesha Nava RN  Pain Management Interventions: around-the-clock dosing utilized  Taken 8/9/2023 0800 by Kesha Nava RN  Pain Management Interventions: around-the-clock dosing utilized   Goal Outcome Evaluation:

## 2023-08-09 NOTE — PROGRESS NOTES
"Palliative Care Daily Progress Note     C/C: worsening pain overnight.  TF now @ goal.      S: Medical record reviewed. Follow up visit for pain management. Events noted. No family present.  Desires to keep pain regimen the same for now, verbalizes that pain is manageable at present dose, wants to go home.      ROS: +pain, rates 5/10 at present.  Denies nausea, denies SOB.     O: Code Status:   Code Status and Medical Interventions:   Ordered at: 07/23/23 5256     Code Status (Patient has no pulse and is not breathing):    CPR (Attempt to Resuscitate)     Medical Interventions (Patient has pulse or is breathing):    Full Support     Release to patient:    Routine Release      Advanced Directives: Advance Directive Status: Patient does not have advance directive   Goals of Care: Ongoing.   Palliative Performance Scale Score: 60%     /77 (BP Location: Right arm, Patient Position: Lying)   Pulse (!) 122   Temp 99.4 øF (37.4 øC) (Oral)   Resp 18   Ht 172.7 cm (68\")   Wt 97.4 kg (214 lb 10.2 oz)   SpO2 97%   BMI 32.64 kg/mý     Intake/Output Summary (Last 24 hours) at 8/9/2023 1605  Last data filed at 8/9/2023 1029  Gross per 24 hour   Intake 1845.36 ml   Output 725 ml   Net 1120.36 ml       PE:  General Appearance:    Alert, cooperative, NAD   HEENT:    NC/AT, EOMI, anicteric, MMM, face relaxed   Neck:   supple, trachea midline, no JVD   Lungs:     CTA bilat, diminished in bases; respirations regular, even and unlabored; RR on exam    Heart:    RRR, normal S1 and S2, no M/R/G   Abdomen:     Normal bowel sounds, soft, non-tender, non-distended   G/U:   Deferred   MSK/Extremities:   Wasting, no edema   Pulses:   Pulses palpable and equal bilaterally   Skin:   Warm, dry   Neurologic:   A/Ox3, cooperative, RENDON   Psych:   Calm, appropriate     Meds: Reviewed and changes noted    Labs:   Results from last 7 days   Lab Units 08/09/23  0229   WBC 10*3/mm3 19.04*   HEMOGLOBIN g/dL 7.9*   HEMATOCRIT % 25.2* "   PLATELETS 10*3/mm3 803*     Results from last 7 days   Lab Units 08/09/23  0229   SODIUM mmol/L 141   POTASSIUM mmol/L 4.0   CHLORIDE mmol/L 106   CO2 mmol/L 24.0   BUN mg/dL 24*   CREATININE mg/dL 0.55*   GLUCOSE mg/dL 171*   CALCIUM mg/dL 7.9*     Results from last 7 days   Lab Units 08/09/23  0229   SODIUM mmol/L 141   POTASSIUM mmol/L 4.0   CHLORIDE mmol/L 106   CO2 mmol/L 24.0   BUN mg/dL 24*   CREATININE mg/dL 0.55*   CALCIUM mg/dL 7.9*   BILIRUBIN mg/dL 0.3   ALK PHOS U/L 125*   ALT (SGPT) U/L 8   AST (SGOT) U/L 16   GLUCOSE mg/dL 171*     Imaging Results (Last 72 Hours)       Procedure Component Value Units Date/Time    XR Chest 1 View [[id number]] Collected: 08/08/23 0618     Updated: 08/08/23 0622    Narrative:      XR CHEST 1 VW    Date of Exam: 8/8/2023 5:54 AM EDT    Indication: Crackles    Comparison: 8/4/2023 and chest CT 8/6/2023.    Findings:  Stable small bilateral pleural effusions. There is improved aeration of the lung bases with mild residual atelectasis. Stable interstitial prominence in the lungs. Feeding tube again seen coursing below the diaphragm. No pneumothorax or new lung opacity.   No acute osseous abnormality.      Impression:      Impression:  Stable interstitial prominence in the lungs, bibasilar atelectasis and small bilateral pleural effusions.      Electronically Signed: Abdirizak Ivan    8/8/2023 6:19 AM EDT    Workstation ID: [id number]    CT Abdomen Pelvis With Contrast [[id number]] Collected: 08/07/23 1419     Updated: 08/07/23 1431    Narrative:      CT ABDOMEN PELVIS W CONTRAST    Date of Exam: 8/7/2023 1:06 PM CDT    Indication: necrotizing pancreatitis.    Comparison: 8/6/2023    Technique: Axial CT images were obtained of the abdomen and pelvis following the uneventful intravenous administration of 85 cc Isovue-300. Reconstructed coronal and sagittal images were also obtained. Automated exposure control and iterative   construction methods were used.      Findings:  LUNG  BASES: Small to moderate bilateral pleural effusions with basal atelectasis.    LIVER:  Unremarkable parenchyma without focal lesion.  BILIARY/GALLBLADDER:  Unremarkable  SPLEEN:  Unremarkable  PANCREAS: There is necrotizing pancreatitis involving the head and body with lack of enhancing parenchyma. There is surrounding inflammatory fluid. No well-defined walled off fluid collection with enhancing rim noted as of yet. There is no pancreatic   duct dilation. There are few parenchymal calcifications  ADRENAL:  Unremarkable  KIDNEYS:  Unremarkable parenchyma with no solid mass identified. No obstruction.  No calculus identified.  GASTROINTESTINAL/MESENTERY:  No evidence of obstruction nor inflammation. There is an enteric tube with tip at the ligament of Treitz.   MESENTERIC VESSELS:  Patent.  AORTA/IVC:  Normal caliber.    RETROPERITONEUM/LYMPH NODES:  Unremarkable    REPRODUCTIVE:  Unremarkable  BLADDER:  Unremarkable    OSSEUS STRUCTURES:  Typical for age with no acute process identified.    There is free fluid within the abdomen and pelvis.        Impression:      Impression:  1.Necrotizing pancreatitis involving the pancreas head and body. Associated surrounding inflammatory fluid without well organized rim-enhancing fluid collection.        Electronically Signed: Eduar Oliva MD    8/7/2023 1:28 PM CDT    Workstation ID: [id number]                  Diagnostics: Reviewed    A:   Acute gallstone pancreatitis    Hyperlipidemia    Tobacco abuse    Gastroesophageal reflux disease    Adenocarcinoma of prostate    Personal history of transient ischemic attack (TIA)    Elevated serum creatinine       S/Sx:  1. Pain- worse overnight, worse with TF @ goal.  Requested no increase in PCA dose, wants to go home fearful this would delay discharge.  Verbalizes pain manageable at present dose.  Will continue to monitor.     P:   Palliative Care Team will continue to follow patient. Please do not hesitate to contact us regarding  further sx mgmt or GOC needs.  Raven Alarcon, APRN  8/9/2023  Time spent: 20 min

## 2023-08-09 NOTE — PLAN OF CARE
Problem: Adult Inpatient Plan of Care  Goal: Plan of Care Review  Outcome: Ongoing, Progressing  Flowsheets (Taken 8/9/2023 0545)  Progress: no change  Plan of Care Reviewed With:   patient   spouse  Outcome Evaluation: VSS throughout shift. Pt pain treated well with PCA. No additional required pain medication. Tube feed increased to goal rate with no s/s of n/v/increased abd discomfort. Pt rested well throughout shift. No increased oxygen needs. No acute events during shift.  Goal: Patient-Specific Goal (Individualized)  Outcome: Ongoing, Progressing  Goal: Absence of Hospital-Acquired Illness or Injury  Outcome: Ongoing, Progressing  Intervention: Identify and Manage Fall Risk  Recent Flowsheet Documentation  Taken 8/9/2023 0000 by Rina Dill RN  Safety Promotion/Fall Prevention:   activity supervised   assistive device/personal items within reach   clutter free environment maintained   nonskid shoes/slippers when out of bed   safety round/check completed   room organization consistent  Taken 8/8/2023 2217 by Rina Dill RN  Safety Promotion/Fall Prevention:   activity supervised   assistive device/personal items within reach   clutter free environment maintained   nonskid shoes/slippers when out of bed   room organization consistent   safety round/check completed  Taken 8/8/2023 2017 by Rina Dill RN  Safety Promotion/Fall Prevention:   activity supervised   assistive device/personal items within reach   clutter free environment maintained   nonskid shoes/slippers when out of bed   room organization consistent   safety round/check completed  Intervention: Prevent Skin Injury  Recent Flowsheet Documentation  Taken 8/9/2023 0000 by Rina Dill RN  Body Position: position changed independently  Skin Protection:   adhesive use limited   transparent dressing maintained   tubing/devices free from skin contact  Taken 8/8/2023 2217 by Rina Dill RN  Body Position:  position changed independently  Skin Protection:   adhesive use limited   transparent dressing maintained   tubing/devices free from skin contact  Taken 8/8/2023 2017 by Rina Dill RN  Body Position: position changed independently  Skin Protection:   adhesive use limited   transparent dressing maintained   tubing/devices free from skin contact  Intervention: Prevent and Manage VTE (Venous Thromboembolism) Risk  Recent Flowsheet Documentation  Taken 8/9/2023 0000 by Rina Dill RN  Activity Management: activity minimized  Taken 8/8/2023 2217 by Rina Dill RN  Activity Management: activity minimized  Taken 8/8/2023 2017 by Rina Dill RN  Activity Management: activity minimized  VTE Prevention/Management:   bilateral   sequential compression devices off  Range of Motion: active ROM (range of motion) encouraged  Intervention: Prevent Infection  Recent Flowsheet Documentation  Taken 8/9/2023 0000 by Rina Dill RN  Infection Prevention:   environmental surveillance performed   hand hygiene promoted   rest/sleep promoted  Taken 8/8/2023 2217 by Rina Dill RN  Infection Prevention:   environmental surveillance performed   hand hygiene promoted   rest/sleep promoted  Taken 8/8/2023 2017 by Rina Dill RN  Infection Prevention:   environmental surveillance performed   hand hygiene promoted   rest/sleep promoted  Goal: Optimal Comfort and Wellbeing  Outcome: Ongoing, Progressing  Intervention: Provide Person-Centered Care  Recent Flowsheet Documentation  Taken 8/8/2023 2017 by Rina Dill RN  Trust Relationship/Rapport:   care explained   choices provided   thoughts/feelings acknowledged  Goal: Readiness for Transition of Care  Outcome: Ongoing, Progressing     Problem: Hypertension Comorbidity  Goal: Blood Pressure in Desired Range  Outcome: Ongoing, Progressing  Intervention: Maintain Blood Pressure Management  Recent Flowsheet Documentation  Taken  8/8/2023 2017 by Rina Dill RN  Medication Review/Management: medications reviewed     Problem: Skin Injury Risk Increased  Goal: Skin Health and Integrity  Outcome: Ongoing, Progressing  Intervention: Promote and Optimize Oral Intake  Recent Flowsheet Documentation  Taken 8/8/2023 2017 by Rina Dill RN  Oral Nutrition Promotion: rest periods promoted  Intervention: Optimize Skin Protection  Recent Flowsheet Documentation  Taken 8/9/2023 0000 by Rina Dill RN  Pressure Reduction Techniques: frequent weight shift encouraged  Head of Bed (HOB) Positioning: HOB at 45 degrees  Pressure Reduction Devices:   positioning supports utilized   pressure-redistributing mattress utilized  Skin Protection:   adhesive use limited   transparent dressing maintained   tubing/devices free from skin contact  Taken 8/8/2023 2217 by Rina Dill RN  Pressure Reduction Techniques: frequent weight shift encouraged  Head of Bed (HOB) Positioning: HOB at 45 degrees  Pressure Reduction Devices:   positioning supports utilized   pressure-redistributing mattress utilized  Skin Protection:   adhesive use limited   transparent dressing maintained   tubing/devices free from skin contact  Taken 8/8/2023 2017 by Rina Dill RN  Pressure Reduction Techniques: frequent weight shift encouraged  Head of Bed (HOB) Positioning: HOB at 30-45 degrees  Pressure Reduction Devices:   positioning supports utilized   pressure-redistributing mattress utilized  Skin Protection:   adhesive use limited   transparent dressing maintained   tubing/devices free from skin contact     Problem: Adjustment to Illness (Sepsis/Septic Shock)  Goal: Optimal Coping  Outcome: Ongoing, Progressing  Intervention: Optimize Psychosocial Adjustment to Illness  Recent Flowsheet Documentation  Taken 8/8/2023 2017 by Rina Dill RN  Supportive Measures: active listening utilized  Family/Support System Care: support provided      Problem: Bleeding (Sepsis/Septic Shock)  Goal: Absence of Bleeding  Outcome: Ongoing, Progressing     Problem: Glycemic Control Impaired (Sepsis/Septic Shock)  Goal: Blood Glucose Level Within Desired Range  Outcome: Ongoing, Progressing  Intervention: Optimize Glycemic Control  Recent Flowsheet Documentation  Taken 8/8/2023 2017 by Rina Dill RN  Glycemic Management: blood glucose monitored     Problem: Infection Progression (Sepsis/Septic Shock)  Goal: Absence of Infection Signs and Symptoms  Outcome: Ongoing, Progressing  Intervention: Initiate Sepsis Management  Recent Flowsheet Documentation  Taken 8/9/2023 0000 by Rina Dill RN  Infection Prevention:   environmental surveillance performed   hand hygiene promoted   rest/sleep promoted  Taken 8/8/2023 2217 by Rina Dill RN  Infection Prevention:   environmental surveillance performed   hand hygiene promoted   rest/sleep promoted  Taken 8/8/2023 2017 by Rina Dill RN  Infection Prevention:   environmental surveillance performed   hand hygiene promoted   rest/sleep promoted  Intervention: Promote Recovery  Recent Flowsheet Documentation  Taken 8/9/2023 0000 by Rina Dill RN  Activity Management: activity minimized  Taken 8/8/2023 2217 by Rina Dill RN  Activity Management: activity minimized  Taken 8/8/2023 2017 by Rina Dill RN  Activity Management: activity minimized  Sleep/Rest Enhancement:   awakenings minimized   regular sleep/rest pattern promoted   relaxation techniques promoted   room darkened  Intervention: Promote Stabilization  Recent Flowsheet Documentation  Taken 8/8/2023 2017 by Rina Dill RN  Fluid/Electrolyte Management: fluids provided     Problem: Nutrition Impaired (Sepsis/Septic Shock)  Goal: Optimal Nutrition Intake  Outcome: Ongoing, Progressing     Problem: Fluid Imbalance (Pancreatitis)  Goal: Fluid Balance  Outcome: Ongoing, Progressing  Intervention: Monitor  and Manage Fluid Balance  Recent Flowsheet Documentation  Taken 8/8/2023 2017 by Rina Dill RN  Fluid/Electrolyte Management: fluids provided     Problem: Infection (Pancreatitis)  Goal: Infection Symptom Resolution  Outcome: Ongoing, Progressing     Problem: Nutrition Impaired (Pancreatitis)  Goal: Optimal Nutrition Intake  Outcome: Ongoing, Progressing     Problem: Pain (Pancreatitis)  Goal: Acceptable Pain Control  Outcome: Ongoing, Progressing  Intervention: Monitor and Manage Pain  Recent Flowsheet Documentation  Taken 8/8/2023 2017 by Rina Dill RN  Sensory Stimulation Regulation: care clustered  Diversional Activities:   television   smartphone  Sleep/Rest Enhancement:   awakenings minimized   regular sleep/rest pattern promoted   relaxation techniques promoted   room darkened     Problem: Fall Injury Risk  Goal: Absence of Fall and Fall-Related Injury  Outcome: Ongoing, Progressing  Intervention: Identify and Manage Contributors  Recent Flowsheet Documentation  Taken 8/8/2023 2017 by Rina Dill RN  Medication Review/Management: medications reviewed  Self-Care Promotion:   independence encouraged   BADL personal objects within reach   safe use of adaptive equipment encouraged  Intervention: Promote Injury-Free Environment  Recent Flowsheet Documentation  Taken 8/9/2023 0000 by Rina Dill RN  Safety Promotion/Fall Prevention:   activity supervised   assistive device/personal items within reach   clutter free environment maintained   nonskid shoes/slippers when out of bed   safety round/check completed   room organization consistent  Taken 8/8/2023 2217 by Rina Dill RN  Safety Promotion/Fall Prevention:   activity supervised   assistive device/personal items within reach   clutter free environment maintained   nonskid shoes/slippers when out of bed   room organization consistent   safety round/check completed  Taken 8/8/2023 2017 by Rina Dill  RN  Safety Promotion/Fall Prevention:   activity supervised   assistive device/personal items within reach   clutter free environment maintained   nonskid shoes/slippers when out of bed   room organization consistent   safety round/check completed     Problem: Palliative Care  Goal: Enhanced Quality of Life  Outcome: Ongoing, Progressing  Intervention: Maximize Comfort  Recent Flowsheet Documentation  Taken 8/8/2023 2017 by Rina Dill RN  Oral Care: oral rinse provided  Intervention: Optimize Function  Recent Flowsheet Documentation  Taken 8/8/2023 2017 by Rina Dill RN  Sensory Stimulation Regulation: care clustered  Sleep/Rest Enhancement:   awakenings minimized   regular sleep/rest pattern promoted   relaxation techniques promoted   room darkened  Intervention: Optimize Psychosocial Wellbeing  Recent Flowsheet Documentation  Taken 8/8/2023 2017 by Rina Dill RN  Supportive Measures: active listening utilized  Family/Support System Care: support provided  Goal: Enhanced Quality of Life  Outcome: Ongoing, Progressing  Intervention: Maximize Comfort  Recent Flowsheet Documentation  Taken 8/8/2023 2017 by Rina Dill RN  Oral Care: oral rinse provided  Intervention: Optimize Function  Recent Flowsheet Documentation  Taken 8/8/2023 2017 by Rina Dill RN  Sensory Stimulation Regulation: care clustered  Sleep/Rest Enhancement:   awakenings minimized   regular sleep/rest pattern promoted   relaxation techniques promoted   room darkened  Intervention: Optimize Psychosocial Wellbeing  Recent Flowsheet Documentation  Taken 8/8/2023 2017 by Rina Dill RN  Supportive Measures: active listening utilized  Family/Support System Care: support provided   Goal Outcome Evaluation:  Plan of Care Reviewed With: patient, spouse        Progress: no change  Outcome Evaluation: VSS throughout shift. Pt pain treated well with PCA. No additional required pain medication. Tube feed  increased to goal rate with no s/s of n/v/increased abd discomfort. Pt rested well throughout shift. No increased oxygen needs. No acute events during shift.

## 2023-08-09 NOTE — PROGRESS NOTES
"Patient Name:  Ta Madison  YOB: 1960  [id number]    Surgery Progress Note    Date of visit: 8/9/2023      Subjective: No significant changes.  Tolerating tube feeds without difficulty yesterday.  No nausea or vomiting.  Had a bowel movement.          Objective:     /81 (BP Location: Right arm, Patient Position: Lying)   Pulse 96   Temp 99.6 øF (37.6 øC) (Oral)   Resp 20   Ht 172.7 cm (68\")   Wt 97.4 kg (214 lb 10.2 oz)   SpO2 97%   BMI 32.64 kg/mý     Intake/Output Summary (Last 24 hours) at 8/9/2023 0840  Last data filed at 8/9/2023 0502  Gross per 24 hour   Intake 1830.76 ml   Output 725 ml   Net 1105.76 ml       GEN:   Awake, alert, in no acute distress, resting comfortably in bed   CV:   Regular rate and rhythm  L:  Symmetric expansion, not labored on room air  Abd:  Soft, still somewhat distended but appears slightly better than yesterday, no significant tenderness to palpation  Ext:  No cyanosis, clubbing, or edema    Recent labs that are back at this time have been reviewed.           Assessment/ Plan:    Mr. Madison is a 63-year-old gentleman with history of GERD, prostate cancer on medical therapy, hyperlipidemia, and TIAs with gallstone pancreatitis      #Severe Gallstone pancreatitis  -Clinically stable.  Intermittent fevers  -Tolerating tube feeds.  Continue  -Continue supportive treatment         Derrek Tony MD  8/9/2023  08:40 EDT      "

## 2023-08-09 NOTE — PROGRESS NOTES
[facility] Medicine Services  PROGRESS NOTE    Patient Name: Ta Madison  : 1960  MRN: [id number]    Date of Admission: 2023  Primary Care Physician: Yolande Olvera APRN    Subjective   Subjective     CC:  Follow-up abdominal pain    HPI:   Patient is a 63-year-old male seen and examined by me this a.m., he is resting in bed and continues on tube feeds at this time.  He reports his abdominal pain is doing some better this a.m., he denies any active nausea or vomiting at this time.  Continuing to follow-up with general surgery and infectious disease at this time as he likely has necrotizing pancreatitis.  He denies any other new acute changes or problems overnight, continued IV antibiotics, IV fluids, TF, and supportive care at this time.     ROS:  Gen-fever overnight but afebrile currently, no chills, no new complaints   CV- No chest pain, palpitations  Resp- No cough, dyspnea  GI-no nausea or vomiting.  Reports abdominal pain improving    Objective   Objective     Vital Signs:   Temp:  [97.9 øF (36.6 øC)-100.6 øF (38.1 øC)] 99.4 øF (37.4 øC)  Heart Rate:  [] 122  Resp:  [18-20] 18  BP: (146-161)/(70-81) 161/77  Flow (L/min):  [2] 2     Physical Exam:  Constitutional: No acute distress, awake and alert, oriented x 3   HENT: NCAT, mucous membranes moist  Respiratory: Decreased BS, no active rhonchi or wheezing   Cardiovascular: Sinus tachycardia, no murmurs, rubs, or gallops  Gastrointestinal: Positive bowel sounds, soft, nontender to light palpation on exam but no rebound, distended, no obvious skin changes  Musculoskeletal: Severe bilateral ankle edema, edema of scrotum, edema of lower abdominal wall.  Psychiatric: Appropriate affect, cooperative  Neurologic: Oriented x 3, strength symmetric in all extremities, Cranial Nerves grossly intact to confrontation, speech clear  Skin: No rashes     Results Reviewed:  LAB RESULTS:      Lab 23  0229 23  3690  08/07/23  0419 08/07/23  0343 08/06/23  0756 08/06/23  0453 08/06/23  0003 08/05/23  0232 08/04/23  0756 08/04/23  0333 08/03/23  0355   WBC 19.04* 17.86*  --  17.07* 22.56*  --  22.72* 15.74* 16.57*  --  13.74*   HEMOGLOBIN 7.9* 8.4*  --  7.3* 8.0*  --  8.2* 7.8* 7.7*  --  8.0*   HEMATOCRIT 25.2* 26.5*  --  23.1* 25.2*  --  25.5* 25.1* 24.5*  --  25.4*   PLATELETS 803* 731*  --  732* 685*  --  852* 745* 767*  --  731*   NEUTROS ABS 17.90* 16.25*  --  15.70* 19.56*  --  20.25* 13.42* 13.88*  --  10.85*   IMMATURE GRANS (ABS)  --   --   --   --  0.19*  --  0.17* 0.12* 0.13*  --  0.13*   LYMPHS ABS  --   --   --   --  1.29  --  1.04 1.15 1.41  --  1.50   MONOS ABS  --   --   --   --  1.44*  --  1.19* 0.87 0.96*  --  1.11*   EOS ABS 0.00 0.00  --  0.00 0.02  --  0.03 0.13 0.16  --  0.11   MCV 79.0 79.8  --  77.3* 77.5*  --  76.8* 77.5* 78.3*  --  77.7*   CRP  --   --  34.91*  --   --   --   --   --   --   --   --    PROCALCITONIN 0.39*  --  0.50*  --   --   --  0.27*  --   --   --   --    LACTATE 1.6  --   --  2.5*  --  2.5* 2.5*  --   --  1.7  --          Lab 08/09/23 0229 08/08/23 0358 08/07/23 0419 08/07/23 0343 08/06/23 0003 08/05/23  1411 08/05/23  0232   SODIUM 141 139 140  --  136  --  138   POTASSIUM 4.0 4.5 4.3  --  4.4 4.3 3.5   CHLORIDE 106 102 103  --  101  --  101   CO2 24.0 25.0 25.0  --  23.0  --  26.0   ANION GAP 11.0 12.0 12.0  --  12.0  --  11.0   BUN 24* 22 26*  --  16  --  16   CREATININE 0.55* 0.63* 0.78  --  0.63*  --  0.52*   EGFR 112.1 107.5 100.8  --  107.5  --  114.0   GLUCOSE 171* 106* 129*  --  151*  --  125*   CALCIUM 7.9* 8.2* 8.4*  --  8.2*  --  8.0*   IONIZED CALCIUM  --   --   --  1.24  --   --   --    MAGNESIUM  --  2.0 1.9  --  1.8  --  1.9   PHOSPHORUS 2.8 3.1 4.1  --   --   --  2.9         Lab 08/09/23 0229 08/08/23 0358 08/07/23 0419 08/06/23 0003 08/05/23  0232 08/04/23  0333   TOTAL PROTEIN 5.4* 5.5* 6.2 6.2 5.9* 5.5*   ALBUMIN 2.1* 2.4* 2.5* 2.6* 2.6* 2.9*   GLOBULIN  3.3 3.1 3.7 3.6 3.3 2.6   ALT (SGPT) 8 10 14 15 12 11   AST (SGOT) 16 17 25 25 27 23   BILIRUBIN 0.3 0.6 0.7 0.4 0.4 0.4   ALK PHOS 125* 109 108 109 101 104   LIPASE  --   --  17  --   --  23                     Lab 08/07/23  1618 08/04/23  0927 08/04/23  0333   IRON  --   --  13*   IRON SATURATION (TSAT)  --   --  5*   TIBC  --   --  237*   TRANSFERRIN  --   --  159*   FERRITIN  --   --  606.40*   FOLATE  --  8.80  --    VITAMIN B 12  --  >2,000*  --    ABO TYPING O  --   --    RH TYPING Positive  --   --    ANTIBODY SCREEN Negative  --   --            Brief Urine Lab Results  (Last result in the past 365 days)        Color   Clarity   Blood   Leuk Est   Nitrite   Protein   CREAT   Urine HCG        07/25/23 0636 Dark Yellow   Clear   Negative   Negative   Negative   30 mg/dL (1+)                   Microbiology Results Abnormal       Procedure Component Value - Date/Time    Blood Culture - Blood, Hand, Right [[id number]]  (Normal) Collected: 08/06/23 0226    Lab Status: Preliminary result Specimen: Blood from Hand, Right Updated: 08/09/23 0845     Blood Culture No growth at 3 days    Blood Culture - Blood, Arm, Left [[id number]]  (Normal) Collected: 08/06/23 0226    Lab Status: Preliminary result Specimen: Blood from Arm, Left Updated: 08/09/23 0845     Blood Culture No growth at 3 days    Blood Culture - Blood, Arm, Right [[id number]]  (Normal) Collected: 07/28/23 0017    Lab Status: Final result Specimen: Blood from Arm, Right Updated: 08/02/23 0045     Blood Culture No growth at 5 days    Blood Culture - Blood, Arm, Right [[id number]]  (Normal) Collected: 07/28/23 0004    Lab Status: Final result Specimen: Blood from Arm, Right Updated: 08/02/23 0045     Blood Culture No growth at 5 days    Blood Culture - Blood, Hand, Right [[id number]]  (Normal) Collected: 07/23/23 2230    Lab Status: Final result Specimen: Blood from Hand, Right Updated: 07/28/23 2300     Blood Culture No growth at 5 days    Narrative:      AEROBIC  BOTTLE ONLY    Blood Culture - Blood, Hand, Left [[id number]]  (Normal) Collected: 07/23/23 2240    Lab Status: Final result Specimen: Blood from Hand, Left Updated: 07/28/23 2300     Blood Culture No growth at 5 days    Narrative:      AEROBIC BOTTLE ONLY    COVID PRE-OP / PRE-PROCEDURE SCREENING ORDER (NO ISOLATION) - Swab, Nasopharynx [[id number]]  (Normal) Collected: 07/24/23 0620    Lab Status: Final result Specimen: Swab from Nasopharynx Updated: 07/24/23 0729    Narrative:      The following orders were created for panel order COVID PRE-OP / PRE-PROCEDURE SCREENING ORDER (NO ISOLATION) - Swab, Nasopharynx.  Procedure                               Abnormality         Status                     ---------                               -----------         ------                     Respiratory Panel PCR w/...[[id number]]  Normal              Final result                 Please view results for these tests on the individual orders.    Respiratory Panel PCR w/COVID-19(SARS-CoV-2) [facility] In-House, NP Swab in UTM/VTM, 3-4 HR TAT - Swab, Nasopharynx [[id number]]  (Normal) Collected: 07/24/23 0620    Lab Status: Final result Specimen: Swab from Nasopharynx Updated: 07/24/23 0729     ADENOVIRUS, PCR Not Detected     Coronavirus 229E Not Detected     Coronavirus HKU1 Not Detected     Coronavirus NL63 Not Detected     Coronavirus OC43 Not Detected     COVID19 Not Detected     Human Metapneumovirus Not Detected     Human Rhinovirus/Enterovirus Not Detected     Influenza A PCR Not Detected     Influenza B PCR Not Detected     Parainfluenza Virus 1 Not Detected     Parainfluenza Virus 2 Not Detected     Parainfluenza Virus 3 Not Detected     Parainfluenza Virus 4 Not Detected     RSV, PCR Not Detected     Bordetella pertussis pcr Not Detected     Bordetella parapertussis PCR Not Detected     Chlamydophila pneumoniae PCR Not Detected     Mycoplasma pneumo by PCR Not Detected    Narrative:      In the setting  of a positive respiratory panel with a viral infection PLUS a negative procalcitonin without other underlying concern for bacterial infection, consider observing off antibiotics or discontinuation of antibiotics and continue supportive care. If the respiratory panel is positive for atypical bacterial infection (Bordetella pertussis, Chlamydophila pneumoniae, or Mycoplasma pneumoniae), consider antibiotic de-escalation to target atypical bacterial infection.            XR Chest 1 View    Result Date: 8/8/2023  XR CHEST 1 VW Date of Exam: 8/8/2023 5:54 AM EDT Indication: Crackles Comparison: 8/4/2023 and chest CT 8/6/2023. Findings: Stable small bilateral pleural effusions. There is improved aeration of the lung bases with mild residual atelectasis. Stable interstitial prominence in the lungs. Feeding tube again seen coursing below the diaphragm. No pneumothorax or new lung opacity.  No acute osseous abnormality.     Impression: Impression: Stable interstitial prominence in the lungs, bibasilar atelectasis and small bilateral pleural effusions. Electronically Signed: Abdirizak Ivan  8/8/2023 6:19 AM EDT  Workstation ID: [id number]         Current medications:  Scheduled Meds:aspirin, 81 mg, Nasogastric, Daily  atorvastatin, 20 mg, Nasogastric, Nightly  bisacodyl, 10 mg, Rectal, Q8H  dilTIAZem, 60 mg, Oral, Q8H  docusate sodium, 100 mg, Nasogastric, BID  enzalutamide, 160 mg, Oral, Daily  famotidine, 20 mg, Intravenous, Once  famotidine, 20 mg, Oral, Once  hydrocortisone, 25 mg, Rectal, BID  lactulose, 300 mL, Rectal, Once  metoclopramide, 10 mg, Intravenous, Q6H  metoprolol tartrate, 50 mg, Nasogastric, Q12H  micafungin (MYCAMINE) IV, 100 mg, Intravenous, Q24H  montelukast, 10 mg, Nasogastric, Nightly  nicotine, 1 patch, Transdermal, Q24H  pantoprazole, 40 mg, Intravenous, Q AM  piperacillin-tazobactam, 4.5 g, Intravenous, Q8H  ProSource No Carb, 30 mL, Nasogastric, TID  senna-docusate sodium, 2 tablet, Nasogastric,  BID  sodium chloride, 10 mL, Intravenous, Q12H  sodium chloride, 10 mL, Intravenous, Q12H      Continuous Infusions:HYDROmorphone HCl-NaCl,   Pharmacy Consult,   sodium chloride, 30 mL/hr        PRN Meds:.  acetaminophen **OR** acetaminophen **OR** acetaminophen    senna-docusate sodium **AND** polyethylene glycol **AND** bisacodyl **AND** bisacodyl    Calcium Replacement - Follow Nurse / BPA Driven Protocol    hydrOXYzine    ipratropium-albuterol    ketorolac    lidocaine PF 1%    Magnesium Standard Dose Replacement - Follow Nurse / BPA Driven Protocol    melatonin    midazolam    naloxone    naloxone    ondansetron    Pharmacy Consult    phenylephrine-mineral oil-petrolatum    Phosphorus Replacement - Follow Nurse / BPA Driven Protocol    Potassium Replacement - Follow Nurse / BPA Driven Protocol    prochlorperazine    simethicone    sodium chloride    sodium chloride    sodium chloride    sodium chloride    sodium chloride    Assessment & Plan   Assessment & Plan     Active Hospital Problems    Diagnosis  POA    **Acute gallstone pancreatitis [K85.10]  Yes    Elevated serum creatinine [R79.89]  Yes    Personal history of transient ischemic attack (TIA) [Z86.73]  Not Applicable    Adenocarcinoma of prostate [C61]  Yes    Gastroesophageal reflux disease [K21.9]  Yes    Hyperlipidemia [E78.5]  Yes    Tobacco abuse [Z72.0]  Yes      Resolved Hospital Problems   No resolved problems to display.        Brief Hospital Course to date:  Ta Madison is a 63 y.o. male with PMH of GERD, HLD, hx of TIA, advanced prostate cancer on Xtandi, occasional marijuna use, and chronic 1 PPD tobacco use who initially presented to [place] ER 7/23/23 with complaints of severe epigastric abdominal pain with associated nausea and vomiting. CT A/P at OSH showed acute pancreatitis with 2 mm stone in the proximal duodenum. Transferred to [facility] for GI evaluation.       Assessment/plan:    Acute gallstone pancreatitis  Leukocytosis  Lactic  acidosis  - [place] of patient care 8/9, patient resting in bed and TF have been increased back to goal this AM. He reports abdominal pain has been improving for the past day, he denies any recent N/V at this time. Patient with fever overnight but fever curve appears to be improving.   - CT scan of abdomen and pelvis from 8/7 noted, with recurrent symptoms will again repeat.   - Continue to follow with GS and ID at this time, patient currently continuing on Zosyn and Micafungin at this time   - No other new acute changes or problems at this time, continued supportive care and close observation.     Edema/upper abdomen to toes.  -- Creatinine is normal   -- Hold IVF today, if tolerated will look at resuming diuresis.     SOA  -better, on RA      Prostate cancer  -Continue Xtandi oral chemotherapy (patient supplied), however patient having difficulty with swallowing this  - Oncology here now following, will look at alternatives but likely okay to continue to hold medication at this time.        HLD  GERD  Hx of TIA  -Home meds     Tobacco abuse  -Counseled on cessation  -Nicotine patch    PLAN:  -Continue current care with current antibiotics.  -Monitor blood pressure, heart rate, temperature closely.  Low threshold for repeating CT scan of the abdomen pelvis with contrast to see if this necrosis is progressing.   - If significant worsening and if at any point debridement becomes necessary patient needs to be transferred to the [facility].  - Continue to follow with ID and GS recommendations     Expected Discharge Location and Transportation: To be determined   pending medically ready and PT/OT recommendations  Expected Discharge   Expected Discharge Date: 8/6/2023; Expected Discharge Time:       DVT prophylaxis:  Mechanical DVT prophylaxis orders are present.     AM-PAC 6 Clicks Score (PT): 17 (08/09/23 0800)    CODE STATUS:   Code Status and Medical Interventions:   Ordered at: 07/23/23 1368     Code  Status (Patient has no pulse and is not breathing):    CPR (Attempt to Resuscitate)     Medical Interventions (Patient has pulse or is breathing):    Full Support     Release to patient:    Routine Release       Christos Gan, DO  08/09/23

## 2023-08-09 NOTE — CONSULTS
Subjective     PROBLEM LIST:    Acute gallstone pancreatitis    Hyperlipidemia    Tobacco abuse    Gastroesophageal reflux disease    Adenocarcinoma of prostate    Personal history of transient ischemic attack (TIA)    Elevated serum creatinine         CHIEF COMPLAINT: prostate cancer    HISTORY OF PRESENT ILLNESS:  The patient is a 63 y.o. male, admitted with necrotizing gallstone pancreatitis.    He has a history of metastatic prostate cancer and has been on GnRH agonist along with Xtandi for about 2 years, with undetectable PSA.  He is followed by Dr. Martinez.    He says he had trouble swallowing the Xtandi prior to this admission due to the size of the pill, but he has been completely unable to swallow it with the NG tube in place and with his ongoing symptoms.  He has not had a dose for 5 days.    REVIEW OF SYSTEMS:  A 14 point review of systems was performed and is negative except as noted above.    Past Medical History:   Diagnosis Date    Elevated cholesterol     GERD (gastroesophageal reflux disease)     Increased heart rate     Mini stroke     Neck pain     Prostate cancer     Rash     Tobacco abuse        No current facility-administered medications on file prior to encounter.     Current Outpatient Medications on File Prior to Encounter   Medication Sig Dispense Refill    albuterol sulfate  (90 Base) MCG/ACT inhaler Inhale 2 puffs Every 6 (Six) Hours As Needed for Wheezing. 18 g 5    aspirin 81 MG EC tablet Take 1 tablet by mouth Daily.      atorvastatin (LIPITOR) 20 MG tablet Take 1 tablet by mouth Every Night. 90 tablet 1    enzalutamide (XTANDI) 40 MG chemo capsule Take 4 capsules by mouth Daily. 120 capsule 5    fenofibrate (TRICOR) 145 MG tablet Take 1 tablet by mouth Daily. 90 tablet 1    HYDROcodone-acetaminophen (NORCO)  MG per tablet Take 1 tablet by mouth Every 6 (Six) Hours As Needed for Moderate Pain. 120 tablet 0    metoprolol tartrate (LOPRESSOR) 25 MG tablet Take 1 tablet by  mouth Daily. 90 tablet 1    omeprazole (priLOSEC) 40 MG capsule Take 1 capsule by mouth 2 (Two) Times a Day Before Meals. 60 capsule 5    vitamin D (ERGOCALCIFEROL) 1.25 MG (82995 UT) capsule capsule Take 1 capsule by mouth 1 (One) Time Per Week. 5 capsule 5    amoxicillin (AMOXIL) 875 MG tablet Take 1 tablet by mouth 2 (Two) Times a Day. 20 tablet 0    EPINEPHrine (EPIPEN) 0.3 MG/0.3ML solution auto-injector injection INJECT 1 PEN IN THE MUSCLE ONE TIME AS DIRECTED      hydrOXYzine (ATARAX) 50 MG tablet Take 1 tablet by mouth 3 (Three) Times a Day As Needed for Itching. 90 tablet 1       No Known Allergies    Past Surgical History:   Procedure Laterality Date    APPENDECTOMY  1971    [facility]     COLONOSCOPY      COLONOSCOPY N/A 1/25/2023    Procedure: COLONOSCOPY;  Surgeon: Mahnaz Caraballo MD;  Location: [facility] OR;  Service: Gastroenterology;  Laterality: N/A;    ENDOSCOPY N/A 7/31/2023    Procedure: ESOPHAGOGASTRODUODENOSCOPY WITH KEOFEED PLACEMENT;  Surgeon: Yolande Eller MD;  Location:  [facility];  Service: Gastroenterology;  Laterality: N/A;           Social History     Socioeconomic History    Marital status:    Tobacco Use    Smoking status: Every Day     Packs/day: 2.00     Years: 45.00     Pack years: 90.00     Types: Cigarettes    Smokeless tobacco: Never   Vaping Use    Vaping Use: Never used   Substance and Sexual Activity    Alcohol use: No    Drug use: Yes     Types: Marijuana     Comment: occ     Sexual activity: Defer       Family History   Problem Relation Age of Onset    Nephrolithiasis Mother     Heart disease Father     Hypertension Father     Kidney disease Father        Objective     Vitals:    08/09/23 0700 08/09/23 0845 08/09/23 1100 08/09/23 1400   BP: 158/81  161/77    BP Location: Right arm  Right arm    Patient Position: Lying  Lying    Pulse:  (!) 122     Resp: 20  18    Temp: 99.6 øF (37.6 øC)  98.7 øF (37.1 øC) 99.4 øF (37.4 øC)   TempSrc: Oral  Oral  Oral   SpO2:       Weight:       Height:                       Performance Status: 3  General:  male in no acute distress  Neuro: alert and oriented  HEENT: sclerae anicteric, NG tube in place  Cardiovascular: regular rate and rhythm, no murmurs  Lungs: clear to auscultation bilaterally  Extremities: 1+ lower extremity edema  Skin: no rashes, lesions, bruising, or petechiae  Psych: mood and affect appropriate    Lab Results   Component Value Date    WBC 19.04 (H) 08/09/2023    HGB 7.9 (L) 08/09/2023    HCT 25.2 (L) 08/09/2023    MCV 79.0 08/09/2023     (H) 08/09/2023     Lab Results   Component Value Date    GLUCOSE 171 (H) 08/09/2023    BUN 24 (H) 08/09/2023    CREATININE 0.55 (L) 08/09/2023    EGFRIFNONA 70 01/19/2022    BCR 43.6 (H) 08/09/2023    K 4.0 08/09/2023    CO2 24.0 08/09/2023    CALCIUM 7.9 (L) 08/09/2023    PROTENTOTREF 7.2 02/05/2019    ALBUMIN 2.1 (L) 08/09/2023    LABIL2 1.1 02/05/2019    AST 16 08/09/2023    ALT 8 08/09/2023     XR Chest 1 View  Narrative: XR CHEST 1 VW    Date of Exam: 8/8/2023 5:54 AM EDT    Indication: Crackles    Comparison: 8/4/2023 and chest CT 8/6/2023.    Findings:  Stable small bilateral pleural effusions. There is improved aeration of the lung bases with mild residual atelectasis. Stable interstitial prominence in the lungs. Feeding tube again seen coursing below the diaphragm. No pneumothorax or new lung opacity.   No acute osseous abnormality.  Impression: Impression:  Stable interstitial prominence in the lungs, bibasilar atelectasis and small bilateral pleural effusions.    Electronically Signed: Abdirizak Ivan    8/8/2023 6:19 AM EDT    Workstation ID: [id number]          Assessment & Plan     Ta Madison is a 63 y.o. male admitted with severe gallstone pancreatitis.    Due to his current situation he has not been able to swallow his Xtandi medication.    I will check with pharmacy to see if there are any alternatives for administering this with crushing or  dissolving the medication and giving through his tube.  If not I think it is reasonable just to hold this until he is able to reliably swallow medications and liquids.  I think it is unlikely that missing a few weeks of this medication will have a major impact on his overall course.           Yolande Gaines MD    8/9/2023

## 2023-08-09 NOTE — PLAN OF CARE
"Goal Outcome Evaluation:  Plan of Care Reviewed With: patient        Progress: no change  Outcome Evaluation: Pt. sitting up in bed endorsing RUQ pain he rates 7/10.  He states the pain is worse since TF is at goal though he denies nausea.  Checked PCA pump and there were 23 demands and 23 deliveries and 8.66mg of dilaudid delivered in the past 24 hr.  Notified EUGENIA Corley who visited pt.  Pt. did not want any medication adjustments.  Plan per hospitalist- \"PLAN:  -Continue current care with current antibiotics.  -Repeat labs in a.m.  -Monitor blood pressure, heart rate, temperature closely.  Low threshold for repeating CT scan of the abdomen pelvis with contrast to see if this necrosis is progressing.   - If significant worsening and if at any point debridement becomes necessary patient needs to be transferred to the [facility].\"  Palliative care to continue to follow for support, POC and ongoing GOC.         "

## 2023-08-09 NOTE — PROGRESS NOTES
Brief EN Review Note    Patient Name: Ta Madison  Date of Encounter: 08/09/23 16:34 EDT  MRN: [id number]  Admission date: 7/23/2023    Reason for visit: EN review . RD to continue to follow per protocol.     EMR reviewed   Medication reviewed  Labs reviewed     Additional Information Obtained: EN now at goal. RD checked on yesterday, visited with pt and spouse at bedside this am. Pt stated felt he was tolerating EN and denied any changes to ongoing abdominal pain since restarted and brought to goal. Has been tolerating per nsg. At time of this note, noted in palliative note that pt now c/o increased abd. pain. Continue to closely monitor, unsure if changes to EN would assist at this time as pt with post pancreatic NJ low fat regimen and previously tolerating EN without issue. Unsure if pain is related to TF, suspect unfortunately more likely r/t worsening condition.    Current diet: NPO Diet NPO Type: Strict NPO, Tube Feeding    EN: FiberSource HN  Goal Rate: 70 ml/hr  Water Flushes: 10 ml/hr  Modular: Prosource-no carb 3/day  Route: NJ  Tube: Small bore    At goal over: 22Hrs/day    Rx will supply:   Goal Volume 1540 mL/day       Flush Volume 220 mL/day       Energy 2028 Kcal/day 101 % Est Need   Protein 128 g/day 99 % Est Need   Fat 62 g/day 12% Total kcal   Fiber 23 g/day       Water in  EN 1247 mL       Total Water 1467 mL       Meet DRI Yes              Intervention:  Follow treatment plan  Care plan reviewed    Continue post pancreatic EN regimen via NJ as tolerated- FW increased r/t NPO status    Increased FW @ 30 ml/hr = 440 ml flushes / 1687 ml total FW    Follow up:   Per protocol      Minerva Carrera RD, [facility]  16:34 EDT  Time: 25min

## 2023-08-09 NOTE — PROGRESS NOTES
INFECTIOUS DISEASE Progress Note    Ta Madison  1960  [id number]      Admission Date: 7/23/2023      Requesting Provider: Carroll Bobo DO  Evaluating Physician: Palomo Orellana MD    Reason for Consultation: Severe  pancreatitis    History of present illness:    7/28/23:Patient is a 63 y.o. male with h/o TIA, ongoing tobacco abuse, GERD, HLD, occasional marijuana use, and advanced prostate cancer/on Xtandi who we were asked to see for necrotizing pancreatitis.  The patient presented to [facility] ED on 7/23 with severe upper abdominal pain that radiated to his back about 3 hours PTA.  He had associated nausea and vomiting.  He has had postnasal drip with associated cough for last 3 to 4 weeks.  A CT scan at OSH showed acute gallbladder pancreatitis with a 2 mm stone in proximal duodenem.  He was given a dose of Invanz and transferred to [facility] on 7/23 for further medical management.  On arrival to [facility], the patient developed a Tmax of 100.3 and hypoxia requiring 2 L O2 NC.  He is now on room air.  Labs on arrival were lactic acid 3.6, PCT 0.85, creatinine 1.42, ALT 53, AST 66, bilirubin 0.4, lipase 1255, and WBC 15,000 with 91% neutrophils.  Blood cultures are negative to date.  Repeat blood cultures from 7/28 are pending.   A resp panel PCR was negative. His lactic acid and WBC were normal level on 7/27.  His PCT was 0.43.  A MRSA PCR on 7/28 was positive. His pain level on 7/28 was 7 out of 10. An MRCP on 7/24 showed acute pancreatitis with cholelithiasis without choledocholelithiasis.  It was felt that he may have passed the stone.  His abdomen became more distended and taut last night on 7/27.  A KUB noted possible small bowel ileus vs SBO.  A CT scan with contrast was done on 7/27 and showed extensive necrotizing appearing pancreatitis with fluid replacing nearly the entire pancreatic body with inflammatory changes tracking along entire length of pancreas, mild gallbladder wall thickening with  cholelithiasis (less likely acute cholecystitis), small bilateral pleural effusion, and mildly distended SB loops likely reactive and left likely obstruction.  He was on Zosyn, but was changed Merrem and Vancomycin on 7/28.  ID was asked to evaluate and manage his antibiotic therapy. He complains of persistent abdominal pain and abdominal distention.    7/29/23: He has remained afebrile. His creatinine is 0.58.  His white blood cell count is 9.3. Blood cultures from 7/28 are no growth so far.He has noticed some partial improvement in his abdominal pain and distention.  He denies vomiting.    7/30/2023: Maximum temperature over the last 24 hours is 100.1.  A follow-up CT scan today reveals persistent severe pancreatitis.  His white blood cell count today is 20.5.  His vancomycin random level was 7.6.    7/31/23: His maximum temperature over the last 24 hours is 99.7.White blood cell count today is 24.8. He continues to complain of abdominal distention and abdominal pain.  He denies nausea or vomiting.    8/1/23: Maximum temperature over the last 24 hours is 99.9.Creatinine is 0.48.  White blood cell count is 21.9.  Albumin is 2.4. He continues to require frequent narcotic therapy for abdominal pain.  He denies nausea and vomiting.  He is now on NJ feedings.    8/2/23: He has been afebrile over the last 24 hours. He is tolerating tube feedings. His white blood cell count today is 16.8.  His creatinine is 0.47.  Albumin is 2.2.  He states that his abdominal pain is somewhat improved today.  He denies nausea and vomiting.  He is tolerating tube feedings.    8/3/23: He remains afebrile.  His white blood cell count is now down to 13.7. He denies increased abdominal pain.  He denies nausea and vomiting.     8/4/23: He has remained afebrile.His white blood cell count today is 16.6.  He states that he has decreased abdominal pain.  He is tolerating tube feedings.     8/5/23: He remains afebrile. His white blood cell count  today is 15.7.  Creatinine is 0.52.He remains afebrile.  He has some loose stool after being given laxatives and stool softener.  He has modest improvement in his abdominal pain today.    8/7/23:He had a fever to 100.7ø 2300 on 8/5.  Yesterday had a fever to 101.1ø.  This morning his temperature is up to 101.2.  Early yesterday morning he was switched from Zosyn to Invanz but then yesterday he was switched back to Zosyn to which Micafungin.  A CT scan performed yesterday revealed evolving changes of necrotizing pancreatitis.  There appeared to be organizing pockets of fluid.  A repeat CT scan with contrast has been ordered for today. His C-reactive protein today is markedly elevated at 34.9.  His creatinine is 0.78.Pro-calcitonin is 0.5 and lactic acid is 2.5.  His white blood cell count is 17.1.  A follow-up CT scan reveals necrotizing pancreatitis with surrounding inflammatory fluid but without a well organized rim-enhancing abscess.    8/8/23: Maximum temperature over the last 24 hours is 100.3.  White blood cell count is 17.9.  Creatinine is 0.63. Blood cultures from 8/6 no growth so far. I saw him up and ambulating in the hallway this morning.  He states that his abdominal pain is partially improved.    8/9/23:He had temperatures up to 100.6 over the last 24 hours.Creatinine is 0.55.  Pro-calcitonin is 0.39.  His lactic acid is normal at 1.6.White blood cell count is 19.  He denies increased abdominal pain.  He denies severe nausea and vomiting.      Past Medical History:   Diagnosis Date    Elevated cholesterol     GERD (gastroesophageal reflux disease)     Increased heart rate     Mini stroke     Neck pain     Prostate cancer     Rash     Tobacco abuse        Past Surgical History:   Procedure Laterality Date    APPENDECTOMY  1971    [facility]     COLONOSCOPY      COLONOSCOPY N/A 1/25/2023    Procedure: COLONOSCOPY;  Surgeon: Mahnaz Caraballo MD;  Location: [facility] OR;  Service:  Gastroenterology;  Laterality: N/A;    ENDOSCOPY N/A 7/31/2023    Procedure: ESOPHAGOGASTRODUODENOSCOPY WITH KEOFEED PLACEMENT;  Surgeon: Yolande Eller MD;  Location: [facility] ENDOSCOPY;  Service: Gastroenterology;  Laterality: N/A;       Family History   Problem Relation Age of Onset    Nephrolithiasis Mother     Heart disease Father     Hypertension Father     Kidney disease Father        Social History     Socioeconomic History    Marital status:    Tobacco Use    Smoking status: Every Day     Packs/day: 2.00     Years: 45.00     Pack years: 90.00     Types: Cigarettes    Smokeless tobacco: Never   Vaping Use    Vaping Use: Never used   Substance and Sexual Activity    Alcohol use: No    Drug use: Yes     Types: Marijuana     Comment: occ     Sexual activity: Defer       No Known Allergies      Medication:    Current Facility-Administered Medications:     acetaminophen (TYLENOL) tablet 650 mg, 650 mg, Nasogastric, Q4H PRN, 650 mg at 08/08/23 1520 **OR** acetaminophen (TYLENOL) 160 MG/5ML solution 650 mg, 650 mg, Nasogastric, Q4H PRN, 650 mg at 08/09/23 0043 **OR** acetaminophen (TYLENOL) suppository 650 mg, 650 mg, Rectal, Q4H PRN, Corina Johansen, PharmD    aspirin chewable tablet 81 mg, 81 mg, Nasogastric, Daily, Corina Johansen, PharmD, 81 mg at 08/08/23 0834    atorvastatin (LIPITOR) tablet 20 mg, 20 mg, Nasogastric, Nightly, Curt Barry MD, 20 mg at 08/08/23 2133    sennosides-docusate (PERICOLACE) 8.6-50 MG per tablet 2 tablet, 2 tablet, Nasogastric, BID, 2 tablet at 08/07/23 1005 **AND** polyethylene glycol (MIRALAX) packet 17 g, 17 g, Oral, Daily PRN **AND** bisacodyl (DULCOLAX) EC tablet 5 mg, 5 mg, Oral, Daily PRN **AND** bisacodyl (DULCOLAX) suppository 10 mg, 10 mg, Rectal, Daily PRN, Curt Barry MD    bisacodyl (DULCOLAX) suppository 10 mg, 10 mg, Rectal, Q8H, Christos Mijares MD, 10 mg at 08/07/23 1007    Calcium Replacement - Follow Nurse / BPA Driven Protocol, , Does not apply,  PRN, Curt Barry MD    dilTIAZem (CARDIZEM) tablet 60 mg, 60 mg, Oral, Q8H, Servando Jarrett MD, 60 mg at 08/09/23 0502    docusate sodium (COLACE) liquid 100 mg, 100 mg, Nasogastric, BID, Christos Mijares MD, 100 mg at 08/08/23 0848    enzalutamide (XTANDI) chemo capsule 160 mg (patient supplied medication), 160 mg, Oral, Daily, Corina Johansen, PharmD, 160 mg at 08/05/23 2100    famotidine (PEPCID) injection 20 mg, 20 mg, Intravenous, Once, Anurag Sloan Jr., MD    famotidine (PEPCID) tablet 20 mg, 20 mg, Oral, Once, Anurag Sloan Jr., MD    hydrocortisone (ANUSOL-HC) suppository 25 mg, 25 mg, Rectal, BID, Jagdeep Quintero, APRN, 25 mg at 08/07/23 1007    HYDROmorphone (DILAUDID) PCA 0.2 mg/ml 50 mL syringe, , Intravenous, Continuous, Ramila Martinez, APRN, New Bag at 08/08/23 1920    hydrOXYzine (ATARAX) tablet 50 mg, 50 mg, Nasogastric, TID PRN, Curt Barry MD, 50 mg at 08/06/23 0749    ipratropium-albuterol (DUO-NEB) nebulizer solution 3 mL, 3 mL, Nebulization, Q4H PRN, Curt Barry MD, 3 mL at 08/04/23 0651    ketorolac (TORADOL) injection 30 mg, 30 mg, Intravenous, Q6H PRN, Mary Rojas MD, 30 mg at 08/06/23 1819    lactulose (CHRONULAC) solution for enema 300 mL, 300 mL, Rectal, Once, Christos Mijares MD    lidocaine PF 1% (XYLOCAINE) injection 0.5 mL, 0.5 mL, Injection, Once PRN, Anurag Sloan Jr., MD    Magnesium Standard Dose Replacement - Follow Nurse / BPA Driven Protocol, , Does not apply, PRN, Curt Barry MD    melatonin tablet 5 mg, 5 mg, Nasogastric, Nightly PRN, Curt Barry MD, 5 mg at 08/05/23 2124    metoclopramide (REGLAN) injection 10 mg, 10 mg, Intravenous, Q6H, Christos Mijares MD, 10 mg at 08/09/23 0501    metoprolol tartrate (LOPRESSOR) tablet 50 mg, 50 mg, Nasogastric, Q12H, Servando Jarrett MD, 50 mg at 08/08/23 2133    micafungin 100 mg/100 mL 0.9% NS IVPB (mbp), 100 mg, Intravenous, Q24H, Servando Jarrett MD, 100 mg at 08/08/23 1410     midazolam (VERSED) injection 1 mg, 1 mg, Intravenous, Q10 Min PRN, Anurag Sloan Jr., MD    montelukast (SINGULAIR) tablet 10 mg, 10 mg, Nasogastric, Nightly, Curt Barry MD, 10 mg at 08/08/23 2133    naloxone (NARCAN) injection 0.1 mg, 0.1 mg, Intravenous, Q5 Min PRN, Ramila Martinez, APRN    naloxone (NARCAN) injection 0.4 mg, 0.4 mg, Intravenous, PRN, Carroll Bobo, DO    nicotine (NICODERM CQ) 21 MG/24HR patch 1 patch, 1 patch, Transdermal, Q24H, Carroll Bobo, , 1 patch at 08/08/23 0837    ondansetron (ZOFRAN) injection 4 mg, 4 mg, Intravenous, Q6H PRN, Bharat Montana, EUGENIA, 4 mg at 08/06/23 0015    pantoprazole (PROTONIX) injection 40 mg, 40 mg, Intravenous, Q AM, Marija Smallwood MD, 40 mg at 08/09/23 0502    Pharmacy Consult, , Does not apply, Continuous PRN, Ramila Martinez, APRN    phenylephrine-mineral oil-petrolatum (PREPARATION H) 0.25-14-74.9 % hemorhoidal ointment, , Rectal, BID PRN, Jagdeep Quintero APRN    Phosphorus Replacement - Follow Nurse / BPA Driven Protocol, , Does not apply, PRN, Curt Barry MD    piperacillin-tazobactam (ZOSYN) 4.5 g in iso-osmotic dextrose 100 mL IVPB (premix), 4.5 g, Intravenous, Q8H, Servando Jarrett MD, 4.5 g at 08/09/23 0502    Potassium Replacement - Follow Nurse / BPA Driven Protocol, , Does not apply, PRN, Curt Barry MD    prochlorperazine (COMPAZINE) injection 5 mg, 5 mg, Intravenous, Q3H PRN, Carroll Bobo DO, 5 mg at 07/24/23 1635    ProSource No Carb oral solution 30 mL, 30 mL, Nasogastric, TID, Minerva Carrera RD    simethicone (MYLICON) chewable tablet 80 mg, 80 mg, Nasogastric, 4x Daily PRN, Curt Barry MD    sodium chloride 0.9 % flush 10 mL, 10 mL, Intravenous, Q12H, Carroll Bobo, , 10 mL at 08/08/23 2134    sodium chloride 0.9 % flush 10 mL, 10 mL, Intravenous, PRN, Carroll Bobo,     sodium chloride 0.9 % flush 10 mL, 10 mL, Intravenous, Q12H, Anurag Sloan Jr., MD, 10 mL at 08/08/23 0838    sodium  chloride 0.9 % flush 10 mL, 10 mL, Intravenous, PRN, Anurag Sloan Jr., MD    sodium chloride 0.9 % infusion 40 mL, 40 mL, Intravenous, PRN, Carroll Bobo DO    sodium chloride 0.9 % infusion 40 mL, 40 mL, Intravenous, PRN, Anurag Sloan Jr., MD    sodium chloride 0.9 % infusion, 30 mL/hr, Intravenous, Continuous PRN, Ramila Martinez APRN    sodium chloride 0.9 % infusion, 50 mL/hr, Intravenous, Continuous, Servando Jarrett MD, Last Rate: 50 mL/hr at 23 0835, 50 mL/hr at 23 0835    Antibiotics:  Anti-Infectives (From admission, onward)      Ordered     Dose/Rate Route Frequency Start Stop    23 1208  piperacillin-tazobactam (ZOSYN) 4.5 g in iso-osmotic dextrose 100 mL IVPB (premix)        Ordering Provider: Servando Jarrett MD    4.5 g  over 4 Hours Intravenous Every 8 Hours 23 1900 23 2159    23 1210  micafungin 100 mg/100 mL 0.9% NS IVPB (mbp)        Ordering Provider: Servando Jarrett MD    100 mg  over 60 Minutes Intravenous Every 24 Hours 23 1400 23 1359    23 1208  piperacillin-tazobactam (ZOSYN) 4.5 g in iso-osmotic dextrose 100 mL IVPB (premix)        Ordering Provider: Servando Jarrett MD    4.5 g  over 30 Minutes Intravenous Once 23 1300 23 1327    23 2329  meropenem (MERREM) 1000 mg/100 mL 0.9% NS (mbp)        Ordering Provider: Carroll Bobo DO    1,000 mg  over 30 Minutes Intravenous Once 23 0000 23 0112              Review of Systems:  The HPI      Physical Exam:   Vital Signs  Temp (24hrs), Av.9 øF (37.2 øC), Min:97.9 øF (36.6 øC), Max:100.6 øF (38.1 øC)    Temp  Min: 97.9 øF (36.6 øC)  Max: 100.6 øF (38.1 øC)  BP  Min: 146/74  Max: 165/80  Pulse  Min: 96  Max: 126  Resp  Min: 18  Max: 22  SpO2  Min: 92 %  Max: 97 %    GENERAL: Awake and alert, in no acute distress. Nasojejunal tube is in place.  HEENT: Normocephalic, atraumatic.  PERRL. EOMI. No conjunctival injection. No icterus. No labial  ulcers  NECK: Supple   HEART: RRR; No murmur, rubs, gallops.   LUNGS: Clear to auscultation bilaterally without wheezing, rales, rhonchi. Normal respiratory effort.   ABDOMEN: He has continued abdominal distention with mild diffuse abdominal tenderness.  EXT:  No cyanosis, clubbing or edema. No cord.  :  Without Jorgensen catheter.  MSK: No joint effusions or erythema  SKIN: Warm and dry without cutaneous eruptions on Inspection/palpation.    NEURO: Oriented to PPT.  Motor 5/5 strength  PSYCHIATRIC: Normal insight and judgment. Cooperative with PE    Laboratory Data    Results from last 7 days   Lab Units 08/09/23 0229 08/08/23  0358 08/07/23  0343   WBC 10*3/mm3 19.04* 17.86* 17.07*   HEMOGLOBIN g/dL 7.9* 8.4* 7.3*   HEMATOCRIT % 25.2* 26.5* 23.1*   PLATELETS 10*3/mm3 803* 731* 732*       Results from last 7 days   Lab Units 08/09/23 0229   SODIUM mmol/L 141   POTASSIUM mmol/L 4.0   CHLORIDE mmol/L 106   CO2 mmol/L 24.0   BUN mg/dL 24*   CREATININE mg/dL 0.55*   GLUCOSE mg/dL 171*   CALCIUM mg/dL 7.9*       Results from last 7 days   Lab Units 08/09/23 0229   ALK PHOS U/L 125*   BILIRUBIN mg/dL 0.3   ALT (SGPT) U/L 8   AST (SGOT) U/L 16           Results from last 7 days   Lab Units 08/07/23  0419   CRP mg/dL 34.91*       Results from last 7 days   Lab Units 08/09/23 0229   LACTATE mmol/L 1.6                 Estimated Creatinine Clearance: 157.6 mL/min (A) (by C-G formula based on SCr of 0.55 mg/dL (L)).      Microbiology:  Microbiology Results (last 10 days)       Procedure Component Value - Date/Time    Blood Culture - Blood, Hand, Right [[id number]]  (Normal) Collected: 08/06/23 0226    Lab Status: Preliminary result Specimen: Blood from Hand, Right Updated: 08/08/23 0846     Blood Culture No growth at 2 days    Blood Culture - Blood, Arm, Left [[id number]]  (Normal) Collected: 08/06/23 0226    Lab Status: Preliminary result Specimen: Blood from Arm, Left Updated: 08/08/23 0846     Blood Culture No growth at 2  days                  Radiology:    CT ABDOMEN PELVIS W CONTRAST    Date of Exam: 8/7/2023 1:06 PM CDT    Indication: necrotizing pancreatitis.    Comparison: 8/6/2023    Technique: Axial CT images were obtained of the abdomen and pelvis following the uneventful intravenous administration of 85 cc Isovue-300. Reconstructed coronal and sagittal images were also obtained. Automated exposure control and iterative  construction methods were used.      Findings:  LUNG BASES: Small to moderate bilateral pleural effusions with basal atelectasis.    LIVER:  Unremarkable parenchyma without focal lesion.  BILIARY/GALLBLADDER:  Unremarkable  SPLEEN:  Unremarkable  PANCREAS: There is necrotizing pancreatitis involving the head and body with lack of enhancing parenchyma. There is surrounding inflammatory fluid. No well-defined walled off fluid collection with enhancing rim noted as of yet. There is no pancreatic  duct dilation. There are few parenchymal calcifications  ADRENAL:  Unremarkable  KIDNEYS:  Unremarkable parenchyma with no solid mass identified. No obstruction.  No calculus identified.  GASTROINTESTINAL/MESENTERY:  No evidence of obstruction nor inflammation. There is an enteric tube with tip at the ligament of Treitz.  MESENTERIC VESSELS:  Patent.  AORTA/IVC:  Normal caliber.    RETROPERITONEUM/LYMPH NODES:  Unremarkable    REPRODUCTIVE:  Unremarkable  BLADDER:  Unremarkable    OSSEUS STRUCTURES:  Typical for age with no acute process identified.    There is free fluid within the abdomen and pelvis.     Impression:     Impression:  1.Necrotizing pancreatitis involving the pancreas head and body. Associated surrounding inflammatory fluid without well organized rim-enhancing fluid collection.       I read his CT scan of 8/7.      Impression:   Severe gallstone pancreatitis- He passed the gallstone. He continues to have significant pancreatitis and remains at risk for evolution to pancreatic  abscess.  Leukocytosis/neutrophilia  MRSA nasal colonization  Advance prostate cancer/on Xtandi  Ongoing tobacco abuse  Occasional marijuana use    PLAN/RECOMMENDATIONS:   Nutritional support  Continue intravenous Zosyn  Continue micafungin  Transfer to [facility] if he worsens or appears to be developing a pancreatic abscess.      Palomo Orellana MD  8/9/2023  07:25 EDT

## 2023-08-10 ENCOUNTER — APPOINTMENT (OUTPATIENT)
Dept: CT IMAGING | Facility: HOSPITAL | Age: 63
DRG: 438 | End: 2023-08-10
Payer: COMMERCIAL

## 2023-08-10 ENCOUNTER — APPOINTMENT (OUTPATIENT)
Dept: GENERAL RADIOLOGY | Facility: HOSPITAL | Age: 63
DRG: 438 | End: 2023-08-10
Payer: COMMERCIAL

## 2023-08-10 PROBLEM — A41.9 SEPSIS ASSOCIATED HYPOTENSION: Status: ACTIVE | Noted: 2023-08-10

## 2023-08-10 PROBLEM — I95.9 SEPSIS ASSOCIATED HYPOTENSION: Status: ACTIVE | Noted: 2023-08-10

## 2023-08-10 LAB
ALBUMIN SERPL-MCNC: 2.2 G/DL (ref 3.5–5.2)
ALBUMIN/GLOB SERPL: 0.7 G/DL
ALP SERPL-CCNC: 125 U/L (ref 39–117)
ALT SERPL W P-5'-P-CCNC: 7 U/L (ref 1–41)
ANION GAP SERPL CALCULATED.3IONS-SCNC: 10 MMOL/L (ref 5–15)
ANISOCYTOSIS BLD QL: ABNORMAL
ARTERIAL PATENCY WRIST A: POSITIVE
AST SERPL-CCNC: 16 U/L (ref 1–40)
ATMOSPHERIC PRESS: ABNORMAL MM[HG]
BASE EXCESS BLDA CALC-SCNC: 2.7 MMOL/L (ref 0–2)
BASO STIPL COARSE BLD QL SMEAR: ABNORMAL
BASOPHILS # BLD MANUAL: 0 10*3/MM3 (ref 0–0.2)
BASOPHILS # BLD MANUAL: 0 10*3/MM3 (ref 0–0.2)
BASOPHILS NFR BLD MANUAL: 0 % (ref 0–1.5)
BASOPHILS NFR BLD MANUAL: 0 % (ref 0–1.5)
BDY SITE: ABNORMAL
BILIRUB SERPL-MCNC: 0.2 MG/DL (ref 0–1.2)
BODY TEMPERATURE: 37 C
BUN SERPL-MCNC: 21 MG/DL (ref 8–23)
BUN/CREAT SERPL: 41.2 (ref 7–25)
CALCIUM SPEC-SCNC: 7.7 MG/DL (ref 8.6–10.5)
CHLORIDE SERPL-SCNC: 104 MMOL/L (ref 98–107)
CLUMPED PLATELETS: PRESENT
CO2 BLDA-SCNC: 29.1 MMOL/L (ref 22–33)
CO2 SERPL-SCNC: 25 MMOL/L (ref 22–29)
COHGB MFR BLD: 1 % (ref 0–2)
CREAT SERPL-MCNC: 0.51 MG/DL (ref 0.76–1.27)
DEPRECATED RDW RBC AUTO: 53 FL (ref 37–54)
DEPRECATED RDW RBC AUTO: 54.4 FL (ref 37–54)
DOHLE BODIES: PRESENT
EGFRCR SERPLBLD CKD-EPI 2021: 114.6 ML/MIN/1.73
EOSINOPHIL # BLD MANUAL: 0 10*3/MM3 (ref 0–0.4)
EOSINOPHIL # BLD MANUAL: 0.26 10*3/MM3 (ref 0–0.4)
EOSINOPHIL NFR BLD MANUAL: 0 % (ref 0.3–6.2)
EOSINOPHIL NFR BLD MANUAL: 2 % (ref 0.3–6.2)
EPAP: 0
ERYTHROCYTE [DISTWIDTH] IN BLOOD BY AUTOMATED COUNT: 18.2 % (ref 12.3–15.4)
ERYTHROCYTE [DISTWIDTH] IN BLOOD BY AUTOMATED COUNT: 18.5 % (ref 12.3–15.4)
GLOBULIN UR ELPH-MCNC: 3.1 GM/DL
GLUCOSE BLDC GLUCOMTR-MCNC: 134 MG/DL (ref 70–130)
GLUCOSE BLDC GLUCOMTR-MCNC: 156 MG/DL (ref 70–130)
GLUCOSE BLDC GLUCOMTR-MCNC: 161 MG/DL (ref 70–130)
GLUCOSE BLDC GLUCOMTR-MCNC: 178 MG/DL (ref 70–130)
GLUCOSE BLDC GLUCOMTR-MCNC: 185 MG/DL (ref 70–130)
GLUCOSE SERPL-MCNC: 157 MG/DL (ref 65–99)
HCO3 BLDA-SCNC: 27.8 MMOL/L (ref 20–26)
HCT VFR BLD AUTO: 25.3 % (ref 37.5–51)
HCT VFR BLD AUTO: 29 % (ref 37.5–51)
HCT VFR BLD CALC: 27 % (ref 38–51)
HGB BLD-MCNC: 7.8 G/DL (ref 13–17.7)
HGB BLD-MCNC: 9.1 G/DL (ref 13–17.7)
HGB BLDA-MCNC: 8.8 G/DL (ref 13.5–17.5)
HYPOCHROMIA BLD QL: ABNORMAL
INHALED O2 CONCENTRATION: 36 %
IPAP: 0
LARGE PLATELETS: ABNORMAL
LYMPHOCYTES # BLD MANUAL: 1.34 10*3/MM3 (ref 0.7–3.1)
LYMPHOCYTES # BLD MANUAL: 1.46 10*3/MM3 (ref 0.7–3.1)
LYMPHOCYTES NFR BLD MANUAL: 1 % (ref 5–12)
LYMPHOCYTES NFR BLD MANUAL: 10 % (ref 5–12)
MAGNESIUM SERPL-MCNC: 2 MG/DL (ref 1.6–2.4)
MAGNESIUM SERPL-MCNC: 2.3 MG/DL (ref 1.6–2.4)
MCH RBC QN AUTO: 24.6 PG (ref 26.6–33)
MCH RBC QN AUTO: 25.1 PG (ref 26.6–33)
MCHC RBC AUTO-ENTMCNC: 30.8 G/DL (ref 31.5–35.7)
MCHC RBC AUTO-ENTMCNC: 31.4 G/DL (ref 31.5–35.7)
MCV RBC AUTO: 79.8 FL (ref 79–97)
MCV RBC AUTO: 79.9 FL (ref 79–97)
METHGB BLD QL: 0.4 % (ref 0–1.5)
MICROCYTES BLD QL: ABNORMAL
MODALITY: ABNORMAL
MONOCYTES # BLD: 0.13 10*3/MM3 (ref 0.1–0.9)
MONOCYTES # BLD: 1.68 10*3/MM3 (ref 0.1–0.9)
NEUTROPHILS # BLD AUTO: 11.38 10*3/MM3 (ref 1.7–7)
NEUTROPHILS # BLD AUTO: 13.74 10*3/MM3 (ref 1.7–7)
NEUTROPHILS NFR BLD MANUAL: 51 % (ref 42.7–76)
NEUTROPHILS NFR BLD MANUAL: 71 % (ref 42.7–76)
NEUTS BAND NFR BLD MANUAL: 15 % (ref 0–5)
NEUTS BAND NFR BLD MANUAL: 31 % (ref 0–5)
NOTE: ABNORMAL
OXYHGB MFR BLDV: 89.3 % (ref 94–99)
PAW @ PEAK INSP FLOW SETTING VENT: 0 CMH2O
PCO2 BLDA: 44.1 MM HG (ref 35–45)
PCO2 TEMP ADJ BLD: 44.1 MM HG (ref 35–48)
PH BLDA: 7.41 PH UNITS (ref 7.35–7.45)
PH, TEMP CORRECTED: 7.41 PH UNITS
PLAT MORPH BLD: NORMAL
PLATELET # BLD AUTO: 773 10*3/MM3 (ref 140–450)
PLATELET # BLD AUTO: 971 10*3/MM3 (ref 140–450)
PMV BLD AUTO: 10.1 FL (ref 6–12)
PMV BLD AUTO: 9.8 FL (ref 6–12)
PO2 BLDA: 61.8 MM HG (ref 83–108)
PO2 TEMP ADJ BLD: 61.8 MM HG (ref 83–108)
POTASSIUM SERPL-SCNC: 3.9 MMOL/L (ref 3.5–5.2)
PROCALCITONIN SERPL-MCNC: 0.34 NG/ML (ref 0–0.25)
PROT SERPL-MCNC: 5.3 G/DL (ref 6–8.5)
RBC # BLD AUTO: 3.17 10*6/MM3 (ref 4.14–5.8)
RBC # BLD AUTO: 3.63 10*6/MM3 (ref 4.14–5.8)
SMALL PLATELETS BLD QL SMEAR: ABNORMAL
SODIUM SERPL-SCNC: 139 MMOL/L (ref 136–145)
TARGETS BLD QL SMEAR: ABNORMAL
TOTAL RATE: 0 BREATHS/MINUTE
TOXIC GRANULATION: ABNORMAL
VARIANT LYMPHS NFR BLD MANUAL: 5 % (ref 19.6–45.3)
VARIANT LYMPHS NFR BLD MANUAL: 6 % (ref 0–5)
VARIANT LYMPHS NFR BLD MANUAL: 8 % (ref 19.6–45.3)
WBC MORPH BLD: NORMAL
WBC NRBC COR # BLD: 13.23 10*3/MM3 (ref 3.4–10.8)
WBC NRBC COR # BLD: 16.76 10*3/MM3 (ref 3.4–10.8)

## 2023-08-10 PROCEDURE — 97110 THERAPEUTIC EXERCISES: CPT

## 2023-08-10 PROCEDURE — 83735 ASSAY OF MAGNESIUM: CPT | Performed by: PHYSICIAN ASSISTANT

## 2023-08-10 PROCEDURE — 85007 BL SMEAR W/DIFF WBC COUNT: CPT | Performed by: NURSE PRACTITIONER

## 2023-08-10 PROCEDURE — 83050 HGB METHEMOGLOBIN QUAN: CPT

## 2023-08-10 PROCEDURE — 25010000002 PIPERACILLIN SOD-TAZOBACTAM PER 1 G: Performed by: INTERNAL MEDICINE

## 2023-08-10 PROCEDURE — 97165 OT EVAL LOW COMPLEX 30 MIN: CPT

## 2023-08-10 PROCEDURE — 80053 COMPREHEN METABOLIC PANEL: CPT | Performed by: HOSPITALIST

## 2023-08-10 PROCEDURE — 84145 PROCALCITONIN (PCT): CPT | Performed by: FAMILY MEDICINE

## 2023-08-10 PROCEDURE — 82948 REAGENT STRIP/BLOOD GLUCOSE: CPT

## 2023-08-10 PROCEDURE — 85025 COMPLETE CBC W/AUTO DIFF WBC: CPT | Performed by: NURSE PRACTITIONER

## 2023-08-10 PROCEDURE — 0 HYDROMORPHONE PER 4 MG: Performed by: FAMILY MEDICINE

## 2023-08-10 PROCEDURE — 82805 BLOOD GASES W/O2 SATURATION: CPT

## 2023-08-10 PROCEDURE — 36600 WITHDRAWAL OF ARTERIAL BLOOD: CPT

## 2023-08-10 PROCEDURE — 25010000002 FUROSEMIDE PER 20 MG: Performed by: INTERNAL MEDICINE

## 2023-08-10 PROCEDURE — 85007 BL SMEAR W/DIFF WBC COUNT: CPT | Performed by: PHYSICIAN ASSISTANT

## 2023-08-10 PROCEDURE — 93005 ELECTROCARDIOGRAM TRACING: CPT | Performed by: FAMILY MEDICINE

## 2023-08-10 PROCEDURE — 93010 ELECTROCARDIOGRAM REPORT: CPT | Performed by: STUDENT IN AN ORGANIZED HEALTH CARE EDUCATION/TRAINING PROGRAM

## 2023-08-10 PROCEDURE — 25010000002 METOCLOPRAMIDE PER 10 MG: Performed by: SURGERY

## 2023-08-10 PROCEDURE — 25010000002 MICAFUNGIN SODIUM 100 MG RECONSTITUTED SOLUTION: Performed by: FAMILY MEDICINE

## 2023-08-10 PROCEDURE — 83605 ASSAY OF LACTIC ACID: CPT | Performed by: PHYSICIAN ASSISTANT

## 2023-08-10 PROCEDURE — 83735 ASSAY OF MAGNESIUM: CPT | Performed by: FAMILY MEDICINE

## 2023-08-10 PROCEDURE — 85025 COMPLETE CBC W/AUTO DIFF WBC: CPT | Performed by: PHYSICIAN ASSISTANT

## 2023-08-10 PROCEDURE — 86901 BLOOD TYPING SEROLOGIC RH(D): CPT | Performed by: PHYSICIAN ASSISTANT

## 2023-08-10 PROCEDURE — 97116 GAIT TRAINING THERAPY: CPT

## 2023-08-10 PROCEDURE — 25010000002 FUROSEMIDE PER 20 MG: Performed by: FAMILY MEDICINE

## 2023-08-10 PROCEDURE — 82375 ASSAY CARBOXYHB QUANT: CPT

## 2023-08-10 PROCEDURE — 86850 RBC ANTIBODY SCREEN: CPT | Performed by: PHYSICIAN ASSISTANT

## 2023-08-10 PROCEDURE — 25510000001 IOPAMIDOL 61 % SOLUTION: Performed by: FAMILY MEDICINE

## 2023-08-10 PROCEDURE — 74177 CT ABD & PELVIS W/CONTRAST: CPT

## 2023-08-10 PROCEDURE — 86900 BLOOD TYPING SEROLOGIC ABO: CPT | Performed by: PHYSICIAN ASSISTANT

## 2023-08-10 PROCEDURE — 71045 X-RAY EXAM CHEST 1 VIEW: CPT

## 2023-08-10 RX ORDER — FUROSEMIDE 10 MG/ML
40 INJECTION INTRAMUSCULAR; INTRAVENOUS ONCE
Status: COMPLETED | OUTPATIENT
Start: 2023-08-10 | End: 2023-08-10

## 2023-08-10 RX ORDER — FUROSEMIDE 10 MG/ML
20 INJECTION INTRAMUSCULAR; INTRAVENOUS DAILY
Status: DISCONTINUED | OUTPATIENT
Start: 2023-08-10 | End: 2023-08-11 | Stop reason: HOSPADM

## 2023-08-10 RX ADMIN — ACETAMINOPHEN 650 MG: 650 SOLUTION ORAL at 23:01

## 2023-08-10 RX ADMIN — Medication 10 ML: at 20:14

## 2023-08-10 RX ADMIN — Medication: at 04:41

## 2023-08-10 RX ADMIN — Medication 1 PATCH: at 08:48

## 2023-08-10 RX ADMIN — DILTIAZEM HYDROCHLORIDE 60 MG: 60 TABLET, FILM COATED ORAL at 20:15

## 2023-08-10 RX ADMIN — PIPERACILLIN SODIUM AND TAZOBACTAM SODIUM 4.5 G: 4; .5 INJECTION, SOLUTION INTRAVENOUS at 05:09

## 2023-08-10 RX ADMIN — ATORVASTATIN CALCIUM 20 MG: 20 TABLET, FILM COATED ORAL at 20:14

## 2023-08-10 RX ADMIN — PIPERACILLIN SODIUM AND TAZOBACTAM SODIUM 4.5 G: 4; .5 INJECTION, SOLUTION INTRAVENOUS at 14:12

## 2023-08-10 RX ADMIN — DILTIAZEM HYDROCHLORIDE 60 MG: 60 TABLET, FILM COATED ORAL at 13:03

## 2023-08-10 RX ADMIN — METOCLOPRAMIDE 10 MG: 5 INJECTION, SOLUTION INTRAMUSCULAR; INTRAVENOUS at 04:06

## 2023-08-10 RX ADMIN — SENNOSIDES AND DOCUSATE SODIUM 2 TABLET: 50; 8.6 TABLET ORAL at 08:49

## 2023-08-10 RX ADMIN — METOPROLOL TARTRATE 50 MG: 50 TABLET, FILM COATED ORAL at 20:14

## 2023-08-10 RX ADMIN — Medication 30 ML: at 15:43

## 2023-08-10 RX ADMIN — Medication 10 ML: at 08:50

## 2023-08-10 RX ADMIN — Medication 30 ML: at 20:17

## 2023-08-10 RX ADMIN — Medication 30 ML: at 08:59

## 2023-08-10 RX ADMIN — DOCUSATE SODIUM 100 MG: 50 LIQUID ORAL at 20:14

## 2023-08-10 RX ADMIN — METOCLOPRAMIDE 10 MG: 5 INJECTION, SOLUTION INTRAMUSCULAR; INTRAVENOUS at 08:49

## 2023-08-10 RX ADMIN — SENNOSIDES AND DOCUSATE SODIUM 2 TABLET: 50; 8.6 TABLET ORAL at 20:14

## 2023-08-10 RX ADMIN — FUROSEMIDE 20 MG: 10 INJECTION, SOLUTION INTRAMUSCULAR; INTRAVENOUS at 11:03

## 2023-08-10 RX ADMIN — PANTOPRAZOLE SODIUM 40 MG: 40 INJECTION, POWDER, LYOPHILIZED, FOR SOLUTION INTRAVENOUS at 05:09

## 2023-08-10 RX ADMIN — IOPAMIDOL 85 ML: 612 INJECTION, SOLUTION INTRAVENOUS at 21:04

## 2023-08-10 RX ADMIN — Medication: at 18:32

## 2023-08-10 RX ADMIN — PIPERACILLIN SODIUM AND TAZOBACTAM SODIUM 4.5 G: 4; .5 INJECTION, SOLUTION INTRAVENOUS at 21:32

## 2023-08-10 RX ADMIN — ASPIRIN 81 MG: 81 TABLET, CHEWABLE ORAL at 08:49

## 2023-08-10 RX ADMIN — DOCUSATE SODIUM 100 MG: 50 LIQUID ORAL at 08:48

## 2023-08-10 RX ADMIN — METOCLOPRAMIDE 10 MG: 5 INJECTION, SOLUTION INTRAMUSCULAR; INTRAVENOUS at 20:13

## 2023-08-10 RX ADMIN — METOCLOPRAMIDE 10 MG: 5 INJECTION, SOLUTION INTRAMUSCULAR; INTRAVENOUS at 14:12

## 2023-08-10 RX ADMIN — METOPROLOL TARTRATE 50 MG: 50 TABLET, FILM COATED ORAL at 08:49

## 2023-08-10 RX ADMIN — DILTIAZEM HYDROCHLORIDE 60 MG: 60 TABLET, FILM COATED ORAL at 05:09

## 2023-08-10 RX ADMIN — ACETAMINOPHEN 650 MG: 650 SOLUTION ORAL at 17:06

## 2023-08-10 RX ADMIN — MONTELUKAST 10 MG: 10 TABLET, FILM COATED ORAL at 20:14

## 2023-08-10 RX ADMIN — FUROSEMIDE 40 MG: 10 INJECTION, SOLUTION INTRAMUSCULAR; INTRAVENOUS at 23:01

## 2023-08-10 RX ADMIN — MICAFUNGIN SODIUM 100 MG: 100 INJECTION, POWDER, LYOPHILIZED, FOR SOLUTION INTRAVENOUS at 13:03

## 2023-08-10 NOTE — PLAN OF CARE
Goal Outcome Evaluation:              Outcome Evaluation: pt has been stable throughout the day today. pt wife has been at bedside all day. ng tube remains, feeds at goal. no concerns noted.

## 2023-08-10 NOTE — THERAPY EVALUATION
Patient Name: Ta Madison  : 1960    MRN: [id number]                              Today's Date: 8/10/2023       Admit Date: 2023    Visit Dx:     ICD-10-CM ICD-9-CM   1. Acute gallstone pancreatitis  K85.10 577.0     574.20   2. Dysphagia, unspecified type  R13.10 787.20     Patient Active Problem List   Diagnosis    Tachycardia    Hyperlipidemia    Multiple allergies    Tobacco abuse    Gastroesophageal reflux disease    Chronic right shoulder pain    Ganglion cyst of dorsum of right wrist    Benign skin cyst    Numbness and tingling of both upper extremities    Right shoulder pain    Adenocarcinoma of prostate    Encounter for screening for malignant neoplasm of colon    Epigastric pain    Acute gallstone pancreatitis    Personal history of transient ischemic attack (TIA)    Elevated serum creatinine    Sepsis associated hypotension     Past Medical History:   Diagnosis Date    Elevated cholesterol     GERD (gastroesophageal reflux disease)     Increased heart rate     Mini stroke     Neck pain     Prostate cancer     Rash     Tobacco abuse      Past Surgical History:   Procedure Laterality Date    APPEN[facility]CTOMY      [facility]     [facility]COPY      COLONOSCOPY N/A 2023    Procedure: COLONOSCOPY;  Surgeon: Mahnaz Caraballo MD;  Location: [facility];  Service: Gastroenterology;  Laterality: N/A;    ENDOSCOPY N/A 2023    Procedure: ESOPHAGOGASTRODUODENOSCOPY WITH KEOFEED PLACEMENT;  Surgeon: Yolande Eller MD;  Location: [facility] ENDOSCOPY;  Service: Gastroenterology;  Laterality: N/A;      General Information       Row Name 08/10/23 0942          OT Time and Intention    Document Type evaluation  -LC     Mode of Treatment occupational therapy  -LC       Row Name 08/10/23 0942          General Information    Patient Profile Reviewed yes  -LC     Prior Level of Function independent:;all household mobility;transfer;ADL's  -LC     Existing Precautions/Restrictions fall;other (see  comments)  NG tube, Abdominal incision, Scrotal edema  -     Barriers to Rehab medically complex  -       Row Name 08/10/23 0942          Living Environment    People in Home spouse  -       Row Name 08/10/23 0942          Home Main Entrance    Number of Stairs, Main Entrance none  -       Row Name 08/10/23 0942          Stairs Within Home, Primary    Number of Stairs, Within Home, Primary twelve  -     Stair Railings, Within Home, Primary railing on right side (ascending)  -       Row Name 08/10/23 0942          Cognition    Orientation Status (Cognition) oriented x 4  -       Row Name 08/10/23 0942          Safety Issues, Functional Mobility    Safety Issues Affecting Function (Mobility) awareness of need for assistance;insight into deficits/self-awareness;safety precaution awareness;safety precautions follow-through/compliance  -     Impairments Affecting Function (Mobility) balance;endurance/activity tolerance;pain;shortness of breath;strength  -               User Key  (r) = Recorded By, (t) = Taken By, (c) = Cosigned By      Initials Name Provider Type     Jessi Bernstein OT Occupational Therapist                     Mobility/ADL's       Row Name 08/10/23 0944          Bed Mobility    Comment, (Bed Mobility) UIC  -       Row Name 08/10/23 0944          Transfers    Transfers sit-stand transfer  -     Comment, (Transfers) VC's for hand placement and sequencing.  -       Row Name 08/10/23 0944          Sit-Stand Transfer    Sit-Stand [place] (Transfers) standby assist  -     Assistive Device (Sit-Stand Transfers) other (see comments)  UE support  -       Row Name 08/10/23 0944          Activities of Daily Living    BADL Assessment/Intervention upper body dressing;lower body dressing  -       Row Name 08/10/23 0944          Upper Body Dressing Assessment/Training    [place] Level (Upper Body Dressing) minimum assist (75% patient effort);verbal cues  -     Position  (Upper Body Dressing) supported sitting  -[facility] Name 08/10/23 0944          Lower Body Dressing Assessment/Training    [place] Level (Lower Body Dressing) lower body dressing skills;minimum assist (75% patient effort)  -               User Key  (r) = Recorded By, (t) = Taken By, (c) = Cosigned By      Initials Name Provider Type     Jessi Bernstein OT Occupational Therapist                   Obj/Interventions       [facility] Name 08/10/23 0945          Sensory Assessment (Somatosensory)    Sensory Assessment (Somatosensory) UE sensation intact  -[facility] Name 08/10/23 0945          Vision Assessment/Intervention    Visual Impairment/Limitations corrective lenses full-time  -[facility] Name 08/10/23 0945          Range of Motion Comprehensive    General Range of Motion bilateral upper extremity ROM WFL  -[facility] Name 08/10/23 0945          Strength Comprehensive (MMT)    General Manual Muscle Testing (MMT) Assessment upper extremity strength deficits identified  -[facility] Name 08/10/23 0945          Upper Extremity (Manual Muscle Testing)    Upper Extremity: Manual Muscle Testing (MMT) left UE strength is WFL;right UE strength is WFL  -[facility] Name 08/10/23 0945          Motor Skills    Motor Skills functional endurance  -     Functional Endurance decreased activity tolerance  -[facility] Name 08/10/23 0945          Balance    Balance Assessment sitting static balance;sitting dynamic balance;standing static balance;standing dynamic balance  -     Static Sitting Balance standby assist  -     Dynamic Sitting Balance standby assist  -     Position, Sitting Balance unsupported;sitting edge of bed  -     Static Standing Balance standby assist  -     Dynamic Standing Balance contact guard  -     Position/Device Used, Standing Balance supported  -     Balance Interventions sitting;standing;sit to stand;occupation based/functional task;weight shifting activity  -     Comment,  Balance No LOB  -               User Key  (r) = Recorded By, (t) = Taken By, (c) = Cosigned By      Initials Name Provider Type    Jessi Proctor OT Occupational Therapist                   Goals/Plan       Row Name 08/10/23 0950          Transfer Goal 1 (OT)    Activity/Assistive Device (Transfer Goal 1, OT) sit-to-stand/stand-to-sit;bed-to-chair/chair-to-bed;walker, rolling  -LC     [place] Level/Cues Needed (Transfer Goal 1, OT) supervision required  -LC     Time Frame (Transfer Goal 1, OT) long term goal (LTG);by discharge  -LC     Progress/Outcome (Transfer Goal 1, OT) goal ongoing  -       Row Name 08/10/23 0950          Dressing Goal 1 (OT)    Activity/Device (Dressing Goal 1, OT) lower body dressing  -LC     [place]/Cues Needed (Dressing Goal 1, OT) standby assist  -LC     Time Frame (Dressing Goal 1, OT) long term goal (LTG);by discharge  -LC     Progress/Outcome (Dressing Goal 1, OT) goal ongoing  -       Row Name 08/10/23 0950          Toileting Goal 1 (OT)    Activity/Device (Toileting Goal 1, OT) adjust/manage clothing;perform perineal hygiene;commode;grab bar/safety frame  -LC     [place] Level/Cues Needed (Toileting Goal 1, OT) verbal cues required;contact guard required  -LC     Time Frame (Toileting Goal 1, OT) long term goal (LTG);by discharge  -LC     Progress/Outcome (Toileting Goal 1, OT) goal ongoing  -               User Key  (r) = Recorded By, (t) = Taken By, (c) = Cosigned By      Initials Name Provider Type    Jessi Proctor OT Occupational Therapist                   Clinical Impression       Row Name 08/10/23 0970          Pain Assessment    Pretreatment Pain Rating 0/10 - no pain  -LC     Posttreatment Pain Rating 0/10 - no pain  -LC     Additional Documentation Pain Scale: Word Pre/Post-Treatment (Group)  -       Row Name 08/10/23 0947          Plan of Care Review    Plan of Care Reviewed With patient  -     Progress no change  -     Outcome  Evaluation Pt. presents below baseline with ADLs and functional mobility. Limited by decreased activity tolerance, generalized weakness, and balance. Skilled OT services warranted to promote return to PLOF. Recommend home with assist at discharge.  -       Row Name 08/10/23 0947          Therapy Assessment/Plan (OT)    Patient/Family Therapy Goal Statement (OT) To return to PLOF  -     Therapy Frequency (OT) daily  -       Row Name 08/10/23 0947          Therapy Plan Review/Discharge Plan (OT)    Anticipated Discharge Disposition (OT) home with assist  -       Row Name 08/10/23 0947          Vital Signs    Pre Systolic BP Rehab 146  -LC     Pre Treatment Diastolic BP 73  -LC     Pretreatment Heart Rate (beats/min) 114  -LC     Posttreatment Heart Rate (beats/min) 120  -LC     Pre Patient Position Sitting  -     Intra Patient Position Standing  -LC     Post Patient Position Sitting  -       Row Name 08/10/23 0947          Positioning and Restraints    Pre-Treatment Position sitting in chair/recliner  -LC     Post Treatment Position chair  -LC     In Chair notified nsg;reclined;call light within reach;encouraged to call for assist;with family/caregiver;waffle cushion;legs elevated  -               User Key  (r) = Recorded By, (t) = Taken By, (c) = Cosigned By      Initials Name Provider Type     Jessi Bernstein, OT Occupational Therapist                   Outcome Measures       Row Name 08/10/23 0951          How much help from another is currently needed...    Putting on and taking off regular lower body clothing? 3  -LC     Bathing (including washing, rinsing, and drying) 3  -LC     Toileting (which includes using toilet bed pan or urinal) 3  -LC     Putting on and taking off regular upper body clothing 3  -LC     Taking care of personal grooming (such as brushing teeth) 3  -LC     Eating meals 3  -LC     AM-PAC 6 Clicks Score (OT) 18  -       Row Name 08/10/23 0837 08/10/23 0757       How much help  from another person do you currently need...    Turning from your back to your side while in flat bed without using bedrails? 3  -AS 3  -TS    Moving from lying on back to sitting on the side of a flat bed without bedrails? 3  -AS 4  -TS    Moving to and from a bed to a chair (including a wheelchair)? 4  -AS 4  -TS    Standing up from a chair using your arms (e.g., wheelchair, bedside chair)? 3  -AS 4  -TS    Climbing 3-5 steps with a railing? 3  -AS 3  -TS    To walk in hospital room? 3  -AS 3  -TS    AM-PAC 6 Clicks Score (PT) 19  -AS 21  -TS    Highest level of mobility 6 --> Walked 10 steps or more  -AS 6 --> Walked 10 steps or more  -TS      Row Name 08/10/23 0951 08/10/23 0837       Functional Assessment    Outcome Measure Options AM-PAC 6 Clicks Daily Activity (OT)  - AM-PAC 6 Clicks Basic Mobility (PT)  -AS              User Key  (r) = Recorded By, (t) = Taken By, (c) = Cosigned By      Initials Name Provider Type    AS Jacqueline Dior PTA Physical Therapist Assistant    Jessi Proctor OT Occupational Therapist    TS Charity Power, RN Registered Nurse                    Occupational Therapy Education       Title: PT OT SLP Therapies (In Progress)       Topic: Occupational Therapy (In Progress)       Point: ADL training (In Progress)       Description:   Instruct learner(s) on proper safety adaptation and remediation techniques during self care or transfers.   Instruct in proper use of assistive devices.                  Learning Progress Summary             Patient Acceptance, E, NR by  at 8/10/2023 0817                         Point: Home exercise program (Not Started)       Description:   Instruct learner(s) on appropriate technique for monitoring, assisting and/or progressing therapeutic exercises/activities.                  Learner Progress:  Not documented in this visit.              Point: Precautions (In Progress)       Description:   Instruct learner(s) on prescribed precautions  during self-care and functional transfers.                  Learning Progress Summary             Patient Acceptance, E, NR by  at 8/10/2023 0817                         Point: Body mechanics (In Progress)       Description:   Instruct learner(s) on proper positioning and spine alignment during self-care, functional mobility activities and/or exercises.                  Learning Progress Summary             Patient Acceptance, E, NR by  at 8/10/2023 0817                                         User Key       Initials Effective Dates Name Provider Type Discipline     06/16/21 -  Jessi Bernstein, OT Occupational Therapist OT                  OT Recommendation and Plan  Therapy Frequency (OT): daily  Plan of Care Review  Plan of Care Reviewed With: patient  Progress: no change  Outcome Evaluation: Pt. presents below baseline with ADLs and functional mobility. Limited by decreased activity tolerance, generalized weakness, and balance. Skilled OT services warranted to promote return to PLOF. Recommend home with assist at discharge.     Time Calculation:   Evaluation Complexity (OT)  Review Occupational Profile/Medical/Therapy History Complexity: brief/low complexity  Assessment, Occupational Performance/Identification of Deficit Complexity: 1-3 performance deficits  Clinical Decision Making Complexity (OT): problem focused assessment/low complexity  Overall Complexity of Evaluation (OT): low complexity     Time Calculation- OT       Row Name 08/10/23 0952 08/10/23 0838          Time Calculation- OT    OT Start Time 0817 - --     OT Received On 08/10/23  - --     OT Goal Re-Cert Due Date 08/20/23  - --        Timed Charges    23406 - Gait Training Minutes  -- 13  -AS        Untimed Charges    OT Eval/Re-eval Minutes 47  -LC --        Total Minutes    Timed Charges Total Minutes -- 13  -AS     Untimed Charges Total Minutes 47  -LC --      Total Minutes 47  -LC 13  -AS               User Key  (r) = Recorded By,  (t) = Taken By, (c) = Cosigned By      Initials Name Provider Type    AS Jacqueline Dior, PTA Physical Therapist Assistant     Jessi Bernstein, OT Occupational Therapist                  Therapy Charges for Today       Code Description Service Date Service Provider Modifiers Qty    [id number] HC OT EVAL LOW COMPLEXITY 4 8/10/2023 Jessi Bernstein OT GO 1                 Jessi Bernstein OT  8/10/2023

## 2023-08-10 NOTE — PLAN OF CARE
Goal Outcome Evaluation:  Plan of Care Reviewed With: patient        Progress: no change  Outcome Evaluation: patient ambulated 350 feet with CGA x1, cues for walker placement and improvement in posture, Patient with c/o SOA and weakness. SaO2 94%-94% on RA. Further distance limited by weakness and SOA. Recommend home with assist and OPPT.

## 2023-08-10 NOTE — PROGRESS NOTES
[facility] Medicine Services  PROGRESS NOTE    Patient Name: Ta Madison  : 1960  MRN: [id number]    Date of Admission: 2023  Primary Care Physician: Yolande Olvera APRN    Subjective   Subjective     CC:  Follow-up abdominal pain    HPI:   Patient seen and examined again this AM, reports tolerating TF well this AM and abdominal pain continuing to improve. He denies any new acute complaints or problems at this time, continuing supportive care and IV antibiotics for treatment of pancreatitis at this time. WBC trending down along with procal, he reports abdominal pain improving, still bloated, plans to try and diuresis patient as tolerated today.     ROS:  Gen-fever overnight but afebrile currently, no chills, no new complaints   CV- No chest pain, palpitations  Resp- No cough, dyspnea  GI-no nausea or vomiting.  Reports abdominal pain improving    Objective   Objective     Vital Signs:   Temp:  [97.9 øF (36.6 øC)-99.7 øF (37.6 øC)] 98.2 øF (36.8 øC)  Heart Rate:  [] 116  Resp:  [18-22] 18  BP: (139-161)/(51-77) 146/73     Physical Exam:  Constitutional: No acute distress, awake and alert, oriented x 3   HENT: NCAT, mucous membranes moist, nares patent, MMM  Respiratory: Decreased BS, no active rhonchi or wheezing   Cardiovascular: NSR, no murmurs, rubs, or gallops  Gastrointestinal: Positive bowel sounds, soft, nontender to light palpation on exam but no rebound, remains distended, no obvious skin changes  Musculoskeletal: Severe bilateral ankle edema, edema of scrotum, edema of lower abdominal wall.  Psychiatric: Appropriate affect, cooperative  Neurologic: Oriented x 3, strength symmetric in all extremities, Cranial Nerves grossly intact to confrontation, speech clear  Skin: No rashes     Results Reviewed:  LAB RESULTS:      Lab 08/10/23  0313 23  0229 23  0358 23  0419 23  0343 23  0756 23  0453 23  0003 23  0232  08/04/23 0756 08/04/23 0756 08/04/23  0333   WBC 16.76* 19.04* 17.86*  --  17.07* 22.56*  --  22.72* 15.74*   < > 16.57*  --    HEMOGLOBIN 7.8* 7.9* 8.4*  --  7.3* 8.0*  --  8.2* 7.8*   < > 7.7*  --    HEMATOCRIT 25.3* 25.2* 26.5*  --  23.1* 25.2*  --  25.5* 25.1*   < > 24.5*  --    PLATELETS 773* 803* 731*  --  732* 685*  --  852* 745*   < > 767*  --    NEUTROS ABS 13.74* 17.90* 16.25*  --  15.70* 19.56*  --  20.25* 13.42*   < > 13.88*  --    IMMATURE GRANS (ABS)  --   --   --   --   --  0.19*  --  0.17* 0.12*  --  0.13*  --    LYMPHS ABS  --   --   --   --   --  1.29  --  1.04 1.15  --  1.41  --    MONOS ABS  --   --   --   --   --  1.44*  --  1.19* 0.87  --  0.96*  --    EOS ABS 0.00 0.00 0.00  --  0.00 0.02  --  0.03 0.13   < > 0.16  --    MCV 79.8 79.0 79.8  --  77.3* 77.5*  --  76.8* 77.5*   < > 78.3*  --    CRP  --   --   --  34.91*  --   --   --   --   --   --   --   --    PROCALCITONIN 0.34* 0.39*  --  0.50*  --   --   --  0.27*  --   --   --   --    LACTATE  --  1.6  --   --  2.5*  --  2.5* 2.5*  --   --   --  1.7    < > = values in this interval not displayed.         Lab 08/10/23  0313 08/09/23  0229 08/08/23  0358 08/07/23  0419 08/07/23  0343 08/06/23  0003 08/05/23  1411 08/05/23 0232   SODIUM 139 141 139 140  --  136  --  138   POTASSIUM 3.9 4.0 4.5 4.3  --  4.4   < > 3.5   CHLORIDE 104 106 102 103  --  101  --  101   CO2 25.0 24.0 25.0 25.0  --  23.0  --  26.0   ANION GAP 10.0 11.0 12.0 12.0  --  12.0  --  11.0   BUN 21 24* 22 26*  --  16  --  16   CREATININE 0.51* 0.55* 0.63* 0.78  --  0.63*  --  0.52*   EGFR 114.6 112.1 107.5 100.8  --  107.5  --  114.0   GLUCOSE 157* 171* 106* 129*  --  151*  --  125*   CALCIUM 7.7* 7.9* 8.2* 8.4*  --  8.2*  --  8.0*   IONIZED CALCIUM  --   --   --   --  1.24  --   --   --    MAGNESIUM 2.0  --  2.0 1.9  --  1.8  --  1.9   PHOSPHORUS  --  2.8 3.1 4.1  --   --   --  2.9    < > = values in this interval not displayed.         Lab 08/10/23  0313 08/09/23  0229  08/08/23  0358 08/07/23  0419 08/06/23  0003 08/05/23  0232 08/04/23  0333   TOTAL PROTEIN 5.3* 5.4* 5.5* 6.2 6.2   < > 5.5*   ALBUMIN 2.2* 2.1* 2.4* 2.5* 2.6*   < > 2.9*   GLOBULIN 3.1 3.3 3.1 3.7 3.6   < > 2.6   ALT (SGPT) 7 8 10 14 15   < > 11   AST (SGOT) 16 16 17 25 25   < > 23   BILIRUBIN 0.2 0.3 0.6 0.7 0.4   < > 0.4   ALK PHOS 125* 125* 109 108 109   < > 104   LIPASE  --   --   --  17  --   --  23    < > = values in this interval not displayed.                     Lab 08/07/23  1618 08/04/23  0927 08/04/23  0333   IRON  --   --  13*   IRON SATURATION (TSAT)  --   --  5*   TIBC  --   --  237*   TRANSFERRIN  --   --  159*   FERRITIN  --   --  606.40*   FOLATE  --  8.80  --    VITAMIN B 12  --  >2,000*  --    ABO TYPING O  --   --    RH TYPING Positive  --   --    ANTIBODY SCREEN Negative  --   --            Brief Urine Lab Results  (Last result in the past 365 days)        Color   Clarity   Blood   Leuk Est   Nitrite   Protein   CREAT   Urine HCG        07/25/23 0636 Dark Yellow   Clear   Negative   Negative   Negative   30 mg/dL (1+)                   Microbiology Results Abnormal       Procedure Component Value - Date/Time    Blood Culture - Blood, Hand, Right [[id number]]  (Normal) Collected: 08/06/23 0226    Lab Status: Preliminary result Specimen: Blood from Hand, Right Updated: 08/10/23 0845     Blood Culture No growth at 4 days    Blood Culture - Blood, Arm, Left [[id number]]  (Normal) Collected: 08/06/23 0226    Lab Status: Preliminary result Specimen: Blood from Arm, Left Updated: 08/10/23 0845     Blood Culture No growth at 4 days    Blood Culture - Blood, Arm, Right [[id number]]  (Normal) Collected: 07/28/23 0017    Lab Status: Final result Specimen: Blood from Arm, Right Updated: 08/02/23 0045     Blood Culture No growth at 5 days    Blood Culture - Blood, Arm, Right [[id number]]  (Normal) Collected: 07/28/23 0004    Lab Status: Final result Specimen: Blood from Arm, Right Updated: 08/02/23 0045      Blood Culture No growth at 5 days    Blood Culture - Blood, Hand, Right [[id number]]  (Normal) Collected: 07/23/23 2230    Lab Status: Final result Specimen: Blood from Hand, Right Updated: 07/28/23 2300     Blood Culture No growth at 5 days    Narrative:      AEROBIC BOTTLE ONLY    Blood Culture - Blood, Hand, Left [[id number]]  (Normal) Collected: 07/23/23 2240    Lab Status: Final result Specimen: Blood from Hand, Left Updated: 07/28/23 2300     Blood Culture No growth at 5 days    Narrative:      AEROBIC BOTTLE ONLY    COVID PRE-OP / PRE-PROCEDURE SCREENING ORDER (NO ISOLATION) - Swab, Nasopharynx [[id number]]  (Normal) Collected: 07/24/23 0620    Lab Status: Final result Specimen: Swab from Nasopharynx Updated: 07/24/23 0729    Narrative:      The following orders were created for panel order COVID PRE-OP / PRE-PROCEDURE SCREENING ORDER (NO ISOLATION) - Swab, Nasopharynx.  Procedure                               Abnormality         Status                     ---------                               -----------         ------                     Respiratory Panel PCR w/...[[id number]]  Normal              Final result                 Please view results for these tests on the individual orders.    Respiratory Panel PCR w/COVID-19(SARS-CoV-2) [facility]TAZ[facility]/ANDRES/ASTER/PAD/COR/MAD/VALENTINE In-House, NP Swab in UTM/VTM, 3-4 HR TAT - Swab, Nasopharynx [[id number]]  (Normal) Collected: 07/24/23 0620    Lab Status: Final result Specimen: Swab from Nasopharynx Updated: 07/24/23 0729     ADENOVIRUS, PCR Not Detected     Coronavirus 229E Not Detected     Coronavirus HKU1 Not Detected     Coronavirus NL63 Not Detected     Coronavirus OC43 Not Detected     COVID19 Not Detected     Human Metapneumovirus Not Detected     Human Rhinovirus/Enterovirus Not Detected     Influenza A PCR Not Detected     Influenza B PCR Not Detected     Parainfluenza Virus 1 Not Detected     Parainfluenza Virus 2 Not Detected     Parainfluenza Virus 3 Not Detected      Parainfluenza Virus 4 Not Detected     RSV, PCR Not Detected     Bordetella pertussis pcr Not Detected     Bordetella parapertussis PCR Not Detected     Chlamydophila pneumoniae PCR Not Detected     Mycoplasma pneumo by PCR Not Detected    Narrative:      In the setting of a positive respiratory panel with a viral infection PLUS a negative procalcitonin without other underlying concern for bacterial infection, consider observing off antibiotics or discontinuation of antibiotics and continue supportive care. If the respiratory panel is positive for atypical bacterial infection (Bordetella pertussis, Chlamydophila pneumoniae, or Mycoplasma pneumoniae), consider antibiotic de-escalation to target atypical bacterial infection.            No radiology results from the last 24 hrs        Current medications:  Scheduled Meds:aspirin, 81 mg, Nasogastric, Daily  atorvastatin, 20 mg, Nasogastric, Nightly  bisacodyl, 10 mg, Rectal, Q8H  dilTIAZem, 60 mg, Oral, Q8H  docusate sodium, 100 mg, Nasogastric, BID  enzalutamide, 160 mg, Oral, Daily  famotidine, 20 mg, Intravenous, Once  famotidine, 20 mg, Oral, Once  furosemide, 20 mg, Intravenous, Daily  hydrocortisone, 25 mg, Rectal, BID  lactulose, 300 mL, Rectal, Once  metoclopramide, 10 mg, Intravenous, Q6H  metoprolol tartrate, 50 mg, Nasogastric, Q12H  micafungin (MYCAMINE) IV, 100 mg, Intravenous, Q24H  montelukast, 10 mg, Nasogastric, Nightly  nicotine, 1 patch, Transdermal, Q24H  pantoprazole, 40 mg, Intravenous, Q AM  piperacillin-tazobactam, 4.5 g, Intravenous, Q8H  ProSource No Carb, 30 mL, Nasogastric, TID  senna-docusate sodium, 2 tablet, Nasogastric, BID  sodium chloride, 10 mL, Intravenous, Q12H  sodium chloride, 10 mL, Intravenous, Q12H      Continuous Infusions:HYDROmorphone HCl-NaCl,   Pharmacy Consult,   sodium chloride, 30 mL/hr        PRN Meds:.  acetaminophen **OR** acetaminophen **OR** acetaminophen    senna-docusate sodium **AND** polyethylene glycol **AND**  bisacodyl **AND** bisacodyl    Calcium Replacement - Follow Nurse / BPA Driven Protocol    hydrOXYzine    ipratropium-albuterol    ketorolac    lidocaine PF 1%    Magnesium Standard Dose Replacement - Follow Nurse / BPA Driven Protocol    melatonin    metoprolol tartrate    midazolam    naloxone    naloxone    ondansetron    Pharmacy Consult    phenylephrine-mineral oil-petrolatum    Phosphorus Replacement - Follow Nurse / BPA Driven Protocol    Potassium Replacement - Follow Nurse / BPA Driven Protocol    prochlorperazine    simethicone    sodium chloride    sodium chloride    sodium chloride    sodium chloride    sodium chloride    Assessment & Plan   Assessment & Plan     Active Hospital Problems    Diagnosis  POA    **Acute gallstone pancreatitis [K85.10]  Yes    Sepsis associated hypotension [A41.9, I95.9]  No    Elevated serum creatinine [R79.89]  Yes    Personal history of transient ischemic attack (TIA) [Z86.73]  Not Applicable    Adenocarcinoma of prostate [C61]  Yes    Gastroesophageal reflux disease [K21.9]  Yes    Hyperlipidemia [E78.5]  Yes    Tobacco abuse [Z72.0]  Yes      Resolved Hospital Problems   No resolved problems to display.        Brief Hospital Course to date:  Ta Madison is a 63 y.o. male with PMH of GERD, HLD, hx of TIA, advanced prostate cancer on Xtandi, occasional marijuna use, and chronic 1 PPD tobacco use who initially presented to [facility] 7/23/23 with complaints of severe epigastric abdominal pain with associated nausea and vomiting. CT A/P at OSH showed acute pancreatitis with 2 mm stone in the proximal duodenum. Transferred to [facility] for GI evaluation.       Assessment/plan:    Acute gallstone pancreatitis  Leukocytosis  Lactic acidosis  - [place] of patient care 8/9, patient resting in bed and TF have been increased back to goal this AM. He reports abdominal pain has been improving for the past day, he denies any recent N/V at this time. Patient with fever overnight but  fever curve appears to be improving.   - CT scan of abdomen and pelvis from 8/7 noted, with recurrent symptoms will again repeat.   - Continue to follow with GS and ID at this time, patient currently continuing on Zosyn and Micafungin at this time   - Patient with some continued improvement in abdominal pain, tolerating TF currently at goal, remains swollen, plans to attempt diuresis of patient today again as tolerated.     Edema/upper abdomen to toes.  -- Creatinine is normal   -- Plans to start patient on low dose diuresis with Lasix 20 mg for overload, continue to monitor and adjust as needed.     SOA  -better, on RA      Prostate cancer  -Continue Xtandi oral chemotherapy (patient supplied), however patient having difficulty with swallowing this  - Oncology here now following, will look at alternatives but likely okay to continue to hold medication at this time.        HLD  GERD  Hx of TIA  -Home meds     Tobacco abuse  -Counseled on cessation  -Nicotine patch    PLAN:  -Continue current care with current antibiotics.  -Monitor blood pressure, heart rate, temperature closely.  Low threshold for repeating CT scan of the abdomen pelvis with contrast to see if this necrosis is progressing.   - If significant worsening and if at any point debridement becomes necessary patient needs to be transferred to the [facility].  - Continue to follow with ID and GS recommendations     Expected Discharge Location and Transportation: To be determined   pending medically ready and PT/OT recommendations  Expected Discharge   Expected Discharge Date: 8/6/2023; Expected Discharge Time:       DVT prophylaxis:  Mechanical DVT prophylaxis orders are present.     AM-PAC 6 Clicks Score (PT): 19 (08/10/23 2283)    CODE STATUS:   Code Status and Medical Interventions:   Ordered at: 07/23/23 6783     Code Status (Patient has no pulse and is not breathing):    CPR (Attempt to Resuscitate)     Medical Interventions (Patient has  pulse or is breathing):    Full Support     Release to patient:    Routine Release       Christos Gan, DO  08/10/23

## 2023-08-10 NOTE — DISCHARGE SUMMARY
[facility] Medicine Services  TRANSFER SUMMARY    Patient Name: Ta Madison  : 1960  MRN: [id number]    Date of Admission: 2023  Date of Discharge:  23  Length of Stay: 18  Primary Care Physician: Yolande Olvera APRN    Consults       Date and Time Order Name Status Description    2023 12:31 AM Inpatient Palliative Care MD Consult Completed     2023 10:34 AM Inpatient Gastroenterology Consult Completed     2023 10:32 AM Inpatient Gastroenterology Consult Completed     2023 11:32 PM Inpatient Infectious Diseases Consult Completed     2023 10:24 PM Inpatient Gastroenterology Consult Completed     2023 10:24 PM Inpatient General Surgery Consult Completed             Hospital Course     Presenting Problem:   Pancreatitis [K85.90]  Acute gallstone pancreatitis [K85.10]    Active Hospital Problems    Diagnosis  POA    **Acute gallstone pancreatitis [K85.10]  Yes    Sepsis associated hypotension [A41.9, I95.9]  No    Elevated serum creatinine [R79.89]  Yes    Personal history of transient ischemic attack (TIA) [Z86.73]  Not Applicable    Adenocarcinoma of prostate [C61]  Yes    Gastroesophageal reflux disease [K21.9]  Yes    Hyperlipidemia [E78.5]  Yes    Tobacco abuse [Z72.0]  Yes      Resolved Hospital Problems   No resolved problems to display.          Hospital Course:  Ta Madison is a 63 y.o. male admitted to our facility on 2023 secondary to epigastric abdominal pain. He has a PMH of GERD, HLD, hx of TIA, advanced prostate cancer on Xtandi, occasional marijuna use, and chronic 1 PPD tobacco use who initially presented to [place] [facility] 23 with complaints of severe epigastric abdominal pain with associated nausea and vomiting. CT A/P at OSH showed acute pancreatitis with 2 mm stone in the proximal duodenum. Transferred to [facility] for GI evaluation.  He was evaluated by GS and GI upon arrival to our facility.  He had MRCP on  which  shoed acute pancreatitis without organized fluid collection, he was also noted to have choleithiasis without choledocholithiasis.     He was also seen by ID beginning on 7/27 for concerns with necrotizing pancreatitis vs severe gallstone pancreatitis, he was continued on IV merrem for IV antibiotics. Patient continued on IV antibiotics and supportive care. Repeat CT abdomen and pelvis with IV contrast on 7/30 after recurrent worsening abdominal pain showed possible necrotizing pancreatitis. He had upper GI endoscopy on 7/31 along with NJ tube placed for nutrition. Patient was transitioned to IV Zosyn on 8/1. Patient was overall making slow progress and improvement until the night of 8/6 he had worsening abdominal pain and distention, he had repeat CT assessment without IV contrast which showed pancreatitis.  Follow up CT abdomen and pelvis from 8/7 with IV contrast also showed possible necrotizing pancreatitis involving the pancreas head and body without obvious fluid collection. He has been having issues with intermittent fevers since that time. Patient was started on Micafungin at that time and TF was placed on hold. Diuresis was also held for his edema and was started back on some IVFs. Patient's symptoms then began to improve again, he was slowly reintroduced to TF and fever curve overall improved.     Patient's abdominal pain had overall been improving and was weaning from pain medications along with IVFs. Patient was noted to be very edematous, started back on IV diuretics on 8/10. Patient began complaining of abdominal pain around 6 pm on 8/10. Ordered stat CT abd/pelvis with IV contrast and now shows air within enlarging pseudocyst. Patient also had acute decompensation tonight with evidence of cytokine storm (see significant note from earlier tonight).  Vitals now improving with tylenol.  General surgery contacted and recommended contacting  for possible transfer.  has accepted the patient to their ICU  surgical floor after reviewing the patients imaging.         Discharge Follow Up Recommendations for outpatient labs/diagnostics:   Transfer     Day of Discharge     HPI:   Abdominal pain controlled on dilaudid. He is now feeling improved after tylenol and his vitals have begun to normalize.     Review of Systems  Gen- No fevers, chills  CV- No chest pain, palpitations  Resp- Minor SOA, no cough   GI-  Recurrent abdominal pain this evening, no N/V        Vital Signs:   Temp:  [97.4 øF (36.3 øC)-102.3 øF (39.1 øC)] 101 øF (38.3 øC)  Heart Rate:  [103-141] 122  Resp:  [18-30] 20  BP: (134-162)/(71-82) 141/71     Physical Exam:  Constitutional: No acute distress, awake and alert, oriented x 3   HENT: NCAT, mucous membranes moist, nares patent, MMM  Respiratory: Decreased BS, no active rhonchi or wheezing   Cardiovascular: NSR, no murmurs, rubs, or gallops  Gastrointestinal: Positive bowel sounds, soft, nontender to light palpation on exam but no rebound, remains distended, no obvious skin changes  Musculoskeletal: Severe bilateral ankle edema, edema of scrotum, edema of lower abdominal wall.  Psychiatric: Appropriate affect, cooperative  Neurologic: Oriented x 3, strength symmetric in all extremities, Cranial Nerves grossly intact to confrontation, speech clear  Skin: No rashes     Pertinent Results     Results from last 7 days   Lab Units 08/10/23  2317 08/10/23  0313 08/09/23  0229 08/08/23  0358 08/07/23  0419 08/07/23  0343 08/06/23  0756 08/06/23  0003 08/05/23  1411 08/05/23  0232 08/04/23  0756 08/04/23  0333   WBC 10*3/mm3 13.23* 16.76* 19.04* 17.86*  --  17.07* 22.56* 22.72*  --  15.74*   < >  --    HEMOGLOBIN g/dL 9.1* 7.8* 7.9* 8.4*  --  7.3* 8.0* 8.2*  --  7.8*   < >  --    HEMATOCRIT % 29.0* 25.3* 25.2* 26.5*  --  23.1* 25.2* 25.5*  --  25.1*   < >  --    PLATELETS 10*3/mm3 971* 773* 803* 731*  --  732* 685* 852*  --  745*   < >  --    SODIUM mmol/L  --  139 141 139 140  --   --  136  --  138  --  138    POTASSIUM mmol/L  --  3.9 4.0 4.5 4.3  --   --  4.4 4.3 3.5  --  4.0   CHLORIDE mmol/L  --  104 106 102 103  --   --  101  --  101  --  103   CO2 mmol/L  --  25.0 24.0 25.0 25.0  --   --  23.0  --  26.0  --  23.0   BUN mg/dL  --  21 24* 22 26*  --   --  16  --  16  --  17   CREATININE mg/dL  --  0.51* 0.55* 0.63* 0.78  --   --  0.63*  --  0.52*  --  0.44*   GLUCOSE mg/dL  --  157* 171* 106* 129*  --   --  151*  --  125*  --  134*   CALCIUM mg/dL  --  7.7* 7.9* 8.2* 8.4*  --   --  8.2*  --  8.0*  --  8.2*    < > = values in this interval not displayed.     Results from last 7 days   Lab Units 08/10/23  0313 08/09/23  0229 08/08/23  0358 08/07/23  0419 08/06/23  0003 08/05/23  0232 08/04/23  0333   BILIRUBIN mg/dL 0.2 0.3 0.6 0.7 0.4 0.4 0.4   ALK PHOS U/L 125* 125* 109 108 109 101 104   ALT (SGPT) U/L 7 8 10 14 15 12 11   AST (SGOT) U/L 16 16 17 25 25 27 23           Invalid input(s): TG, LDLCALC, LDLREALC      Brief Urine Lab Results  (Last result in the past 365 days)        Color   Clarity   Blood   Leuk Est   Nitrite   Protein   CREAT   Urine HCG        07/25/23 0636 Dark Yellow   Clear   Negative   Negative   Negative   30 mg/dL (1+)                   Microbiology Results Abnormal       Procedure Component Value - Date/Time    Blood Culture - Blood, Hand, Right [[id number]]  (Normal) Collected: 08/06/23 0226    Lab Status: Preliminary result Specimen: Blood from Hand, Right Updated: 08/10/23 0845     Blood Culture No growth at 4 days    Blood Culture - Blood, Arm, Left [[id number]]  (Normal) Collected: 08/06/23 0226    Lab Status: Preliminary result Specimen: Blood from Arm, Left Updated: 08/10/23 0845     Blood Culture No growth at 4 days    Blood Culture - Blood, Arm, Right [[id number]]  (Normal) Collected: 07/28/23 0017    Lab Status: Final result Specimen: Blood from Arm, Right Updated: 08/02/23 0045     Blood Culture No growth at 5 days    Blood Culture - Blood, Arm, Right [[id number]]  (Normal) Collected:  07/28/23 0004    Lab Status: Final result Specimen: Blood from Arm, Right Updated: 08/02/23 0045     Blood Culture No growth at 5 days    Blood Culture - Blood, Hand, Right [[id number]]  (Normal) Collected: 07/23/23 2230    Lab Status: Final result Specimen: Blood from Hand, Right Updated: 07/28/23 2300     Blood Culture No growth at 5 days    Narrative:      AEROBIC BOTTLE ONLY    Blood Culture - Blood, Hand, Left [[id number]]  (Normal) Collected: 07/23/23 2240    Lab Status: Final result Specimen: Blood from Hand, Left Updated: 07/28/23 2300     Blood Culture No growth at 5 days    Narrative:      AEROBIC BOTTLE ONLY    COVID PRE-OP / PRE-PROCEDURE SCREENING ORDER (NO ISOLATION) - Swab, Nasopharynx [[id number]]  (Normal) Collected: 07/24/23 0620    Lab Status: Final result Specimen: Swab from Nasopharynx Updated: 07/24/23 0729    Narrative:      The following orders were created for panel order COVID PRE-OP / PRE-PROCEDURE SCREENING ORDER (NO ISOLATION) - Swab, Nasopharynx.  Procedure                               Abnormality         Status                     ---------                               -----------         ------                     Respiratory Panel PCR w/...[[id number]]  Normal              Final result                 Please view results for these tests on the individual orders.    Respiratory Panel PCR w/COVID-19(SARS-CoV-2) BALTAZAR/ANDRES/ASTER/PAD/COR/MAD/VALENTINE In-House, NP Swab in UTM/VTM, 3-4 HR TAT - Swab, Nasopharynx [[id number]]  (Normal) Collected: 07/24/23 0620    Lab Status: Final result Specimen: Swab from Nasopharynx Updated: 07/24/23 0729     ADENOVIRUS, PCR Not Detected     Coronavirus 229E Not Detected     Coronavirus HKU1 Not Detected     Coronavirus NL63 Not Detected     Coronavirus OC43 Not Detected     COVID19 Not Detected     Human Metapneumovirus Not Detected     Human Rhinovirus/Enterovirus Not Detected     Influenza A PCR Not Detected     Influenza B PCR Not Detected     Parainfluenza Virus  1 Not Detected     Parainfluenza Virus 2 Not Detected     Parainfluenza Virus 3 Not Detected     Parainfluenza Virus 4 Not Detected     RSV, PCR Not Detected     Bordetella pertussis pcr Not Detected     Bordetella parapertussis PCR Not Detected     Chlamydophila pneumoniae PCR Not Detected     Mycoplasma pneumo by PCR Not Detected    Narrative:      In the setting of a positive respiratory panel with a viral infection PLUS a negative procalcitonin without other underlying concern for bacterial infection, consider observing off antibiotics or discontinuation of antibiotics and continue supportive care. If the respiratory panel is positive for atypical bacterial infection (Bordetella pertussis, Chlamydophila pneumoniae, or Mycoplasma pneumoniae), consider antibiotic de-escalation to target atypical bacterial infection.            Imaging Results (All)       Procedure Component Value Units Date/Time    XR Chest 1 View [[id number]] Collected: 08/08/23 0618     Updated: 08/08/23 0622    Narrative:      XR CHEST 1 VW    Date of Exam: 8/8/2023 5:54 AM EDT    Indication: Crackles    Comparison: 8/4/2023 and chest CT 8/6/2023.    Findings:  Stable small bilateral pleural effusions. There is improved aeration of the lung bases with mild residual atelectasis. Stable interstitial prominence in the lungs. Feeding tube again seen coursing below the diaphragm. No pneumothorax or new lung opacity.   No acute osseous abnormality.      Impression:      Impression:  Stable interstitial prominence in the lungs, bibasilar atelectasis and small bilateral pleural effusions.      Electronically Signed: Abdirizak Ivan    8/8/2023 6:19 AM EDT    Workstation ID: [id number]    CT Abdomen Pelvis With Contrast [[id number]] Collected: 08/07/23 1419     Updated: 08/07/23 1431    Narrative:      CT ABDOMEN PELVIS W CONTRAST    Date of Exam: 8/7/2023 1:06 PM CDT    Indication: necrotizing pancreatitis.    Comparison: 8/6/2023    Technique: Axial CT  images were obtained of the abdomen and pelvis following the uneventful intravenous administration of 85 cc Isovue-300. Reconstructed coronal and sagittal images were also obtained. Automated exposure control and iterative   construction methods were used.      Findings:  LUNG BASES: Small to moderate bilateral pleural effusions with basal atelectasis.    LIVER:  Unremarkable parenchyma without focal lesion.  BILIARY/GALLBLADDER:  Unremarkable  SPLEEN:  Unremarkable  PANCREAS: There is necrotizing pancreatitis involving the head and body with lack of enhancing parenchyma. There is surrounding inflammatory fluid. No well-defined walled off fluid collection with enhancing rim noted as of yet. There is no pancreatic   duct dilation. There are few parenchymal calcifications  ADRENAL:  Unremarkable  KIDNEYS:  Unremarkable parenchyma with no solid mass identified. No obstruction.  No calculus identified.  GASTROINTESTINAL/MESENTERY:  No evidence of obstruction nor inflammation. There is an enteric tube with tip at the ligament of Treitz.   MESENTERIC VESSELS:  Patent.  AORTA/IVC:  Normal caliber.    RETROPERITONEUM/LYMPH NODES:  Unremarkable    REPRODUCTIVE:  Unremarkable  BLADDER:  Unremarkable    OSSEUS STRUCTURES:  Typical for age with no acute process identified.    There is free fluid within the abdomen and pelvis.        Impression:      Impression:  1.Necrotizing pancreatitis involving the pancreas head and body. Associated surrounding inflammatory fluid without well organized rim-enhancing fluid collection.        Electronically Signed: Eduar Oliva MD    8/7/2023 1:28 PM CDT    Workstation ID: [id number]    CT Abdomen Pelvis Without Contrast [[id number]] Collected: 08/06/23 0107     Updated: 08/06/23 0118    Narrative:      CT ABDOMEN PELVIS WO CONTRAST    Date of Exam: 8/6/2023 12:44 AM EDT    Indication: Abdominal pain, acute, nonlocalized  increased abdominal pain and distention.    Comparison:  7/30/2023.    Technique: Axial CT images were obtained of the abdomen and pelvis without the administration of contrast. Reconstructed coronal and sagittal images were also obtained. Automated exposure control and iterative construction methods were used.      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 There are appear to be areas of pancreatic necrosis primarily located at the pancreatic head and proximal body. Evaluation is limited without IV contrast. There is an increasing degree of   inflammatory fluid surrounding the pancreas and within the adjacent tissues. There appear to be some organizing components to the peripancreatic fluid including a pocket abutting the caudate lobe of the liver measuring up to 54 mm diameter and an ovoid   component measuring 47 mm distal to the pancreatic tail. The remainder of the fluid appears infiltrating. There is stable perihepatic and pelvic ascites. There is fluid and inflammation in the paracolic gutters. A feeding tube terminates at the ligament   of Treitz. The liver, gallbladder, bile ducts and spleen appear unremarkable. The adrenal glands are normal. The kidneys and ureters appear within normal limits. The urinary bladder is unremarkable. The prostate gland is atrophic. Small bowel is   nondistended. The colon demonstrates some wall thickening at the hepatic and splenic flexures, likely secondary to pancreatitis.    There is stable moderate anasarca. Findings in the chest discussed in a separate report. There is moderate aortoiliac atherosclerosis. There are no acute osseous abnormalities or destructive bone lesions. There is mild thoracolumbar spondylosis with   stable chronic bilateral L5 spondylolysis without spondylolisthesis.      Impression:      Impression:    1. Evolving changes of necrotizing pancreatitis. Evaluation is limited without IV contrast, but there appear to be areas of necrosis in the pancreatic body and head and there is  increasing inflammatory fluid within and surrounding the pancreas with   organizing pockets of fluid as described.  2. Stable small volume scattered ascites and moderate anasarca.  3. Feeding tube terminates at the ligament of Treitz.            Electronically Signed: Abdirizak Moncho    8/6/2023 1:15 AM EDT    Workstation ID: [id number]    CT Chest Without Contrast Diagnostic [[id number]] Collected: 08/06/23 0059     Updated: 08/06/23 0110    Narrative:      CT CHEST WO CONTRAST DIAGNOSTIC    Date of Exam: 8/6/2023 12:44 AM EDT    Indication: increased dyspnea.    Comparison: Chest radiograph 8/4/2023.    Technique: Axial CT images were obtained of the chest without contrast administration.  Reconstructed coronal and sagittal images were also obtained. Automated exposure control and iterative construction methods were used.      Findings:  There are small bilateral pleural effusions. There is patchy airspace disease in both lungs. No pneumothorax. Central airways are patent. There is dependent bibasilar atelectasis.    The thyroid, trachea and esophagus appear within normal limits. Gastric suction tube terminates in the distal stomach. Heart size is normal. There is diminished attenuation of the blood pool suggesting anemia. There are mild coronary artery   calcifications. No mediastinal lymphadenopathy or pericardial effusion.    No acute findings in the superficial soft tissues. There is mild bilateral gynecomastia. Findings in the abdomen discussed in a separate report. There are no acute osseous abnormalities or destructive bone lesions.      Impression:      Impression:    1. Small bilateral pleural effusions and patchy bilateral airspace disease that could reflect atelectasis, pneumonia or mild edema.  2. Mild coronary artery calcification.        Electronically Signed: Abdirizak Ivan    8/6/2023 1:07 AM EDT    Workstation ID: [id number]    XR Chest 1 View [[id number]] Collected: 08/04/23 1544     Updated: 08/04/23  1548    Narrative:      XR CHEST 1 VW    Date of Exam: 8/4/2023 3:16 PM EDT    Indication: decreased BS    Comparison: Chest x-ray 7/25/2023    Findings:  Esophagogastric tube is past GE junction. Tip is osteophyte view but is at least in the distal stomach. Small-moderate bilateral pleural effusions. Heart size is normal. Bibasilar airspace opacities left greater than right. Mild septal thickening. No   pneumothorax.      Impression:      Impression:  Nasogastric tube is past GE junction.  Small-moderate bilateral pleural effusions with bibasilar airspace opacities left greater than right. This is increased since previous exam.      Electronically Signed: Zoë Yoder    8/4/2023 3:45 PM EDT    Workstation ID: [id number]    XR Abdomen KUB [[id number]] Collected: 07/31/23 1007     Updated: 07/31/23 1011    Narrative:      XR ABDOMEN KUB    Date of Exam: 7/31/2023 9:56 AM EDT    Indication: NJ tube placement    Comparison: None available.    Findings/    Impression:      Impression:  Feeding catheter projects over the proximal jejunum. Visualized bowel gas pattern is unremarkable. No acute osseous lesion is seen. Lung bases are unremarkable.    Electronically Signed: Amrit Roth    7/31/2023 10:08 AM EDT    Workstation ID: [id number]    CT Abdomen Pelvis Without Contrast [[id number]] Collected: 07/30/23 1115     Updated: 07/30/23 1129    Narrative:      CT ABDOMEN PELVIS WO CONTRAST    Date of Exam: 7/30/2023 11:07 AM EDT    Indication: Pancreatitis, increasing WBC.    Comparison: CT abdomen pelvis July 27, 2023    Technique: Axial CT images were obtained of the abdomen and pelvis without the administration of contrast. Reconstructed coronal and sagittal images were also obtained. Automated exposure control and iterative construction methods were used.      Findings:  There are bibasilar effusions and atelectatic changes in the lower lungs. There is ascites about the liver which has increased. There is stranding in the  fat around the pancreas with swelling of the pancreas compatible with acute pancreatitis. There is   hypodense fluid like attenuation involving the more proximal and mid body as well as portions of the head of the pancreas. This could relate to necrotizing pancreatitis . There is suggested fluid tracking from the pancreas to the anterior aspect of the   quadrate lobe of the liver. Ascites tracks into the pelvis.    There are gallstones within the gallbladder. There is fluid about the spleen. Adrenal glands are grossly unremarkable. There is no acute renal abnormality.    There is some thickening wall the bladder that may relate to under distention as opposed to a cystitis.    There is some fluid within the lumen of the colon. There is some thickening of the wall of the splenic flexure that may reflect some reactive changes to the pancreatitis.    There is edema in the subcutaneous soft tissues which could reflect anasarca.    Atherosclerotic vascular calcification is noted.    There are pars defects at L5 without significant anterolisthesis.      Impression:      Impression:  1.Pancreatitis with what could reflect more of a necrotizing pancreatitis involving portions of the pancreas. There has been no improvement in the pancreatitis since prior study.  2.Fluid extending out from the pancreas to the anterior aspect of the quadrate lobe of the liver that may relate to developing pseudocyst  3.Increasing ascites within the abdomen and pelvis.  4.Cholelithiasis  5.There is some fluid within the colon that might be reflective of an ileus. There is some thickening of the wall of the splenic flexure that may be reflective of some inflammation secondary to the pancreatitis.  6.Bibasilar effusions and atelectasis  7.Edema in the subcutaneous soft tissues which could reflect anasarca.  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  9.Some suggested thickening of the wall the bladder that may relate to under  distention.            Electronically Signed: Hosea Damon    7/30/2023 11:26 AM EDT    Workstation ID: [id number]    CT Abdomen Pelvis With Contrast [[id number]] Collected: 07/27/23 2314     Updated: 07/27/23 2323    Narrative:      CT ABDOMEN PELVIS W CONTRAST    Date of Exam: 7/27/2023 11:02 PM EDT    Indication: abdominal distention, ? ileus vs obstruction.    Comparison: 4/27/2023, 7/23/2023.    Technique: Axial CT images were obtained of the abdomen and pelvis following the uneventful intravenous administration of intravenous contrast.. Reconstructed coronal and sagittal images were also obtained. Automated exposure control and iterative   construction methods were used.      Findings:  Lung Bases:     Small bilateral pleural effusions are present, left greater than right. Consolidative changes are present within the bilateral lower lobes with air bronchograms.  Liver:  Liver is normal in size and CT density. No focal lesions.    Biliary/Gallbladder:    The gallbladder is mildly under distended. Mild wall thickening present. Tiny stones are present within the lumen. No evidence of significant surrounding free fluid.. The biliary tree is nondilated.    Spleen:  Spleen is normal in size and CT density.    Pancreas:    Extensive inflammatory changes are seen throughout the pancreas with significant phlegmonous changes and fluid seen along the body of the pancreas with no normal parenchyma are present within this region (series 2 image 61). The area of involvement   measures up to 3.0 x 8.7 cm. No definite air pockets identified. Inflammatory changes are seen surrounding the remaining portions of the pancreas. No additional focal collection identified. Patchy fluid is seen extending within the mesentery. Multiple   tiny mesenteric lymph nodes present. Small to moderate amount of free fluid is seen tracking into the lower abdomen and pelvis.    Kidneys:    Kidneys are normal in size. There are no stones or  hydronephrosis.    Adrenals:    Adrenal glands are unremarkable.    Retroperitoneal/Lymph Nodes/Vasculature:    No retroperitoneal adenopathy is identified. Atherosclerotic calcifications are present.    Gastrointestinal/Mesentery:    Mildly prominent small bowel loops are present, likely reactive and related to surrounding ascites. This appears diffuse. Air is present within the colon. No definite obstruction identified. Surrounding free fluid present. The appendix is not well   visualized. No significant stool burden identified.. No evidence of obstruction. No free air. No mesenteric fluid collections identified. There is diffuse anasarca of the soft tissues.    Bladder:    The bladder is normal.    Genital:     Unremarkable          Bony Structures:     Visualized bony structures are consistent with the patient's age.        Impression:      Impression:    1. Extensive necrotizing appearing pancreatitis with fluid replacing nearly the entire pancreatic body as described above. Significant inflammatory changes are seen tracking along the entire length of the pancreas. No suspicious air pockets identified.   No additional focal collection identified. Significant surrounding edema is present. Findings appear to have significantly progressed as compared to the previous outside CT from 7/23/2023. Reactive tracking fluid is seen extending to the lower abdomen   and pelvis.  2. Mild gallbladder wall thickening present which may be related to mild underdistention. Tiny stones are present. Reactive edema is suspected. Acute cholecystitis is unlikely. No evidence of biliary ductal dilatation.  3. Small bilateral pleural effusions present, left greater than right with overlying consolidative changes which may be related to atelectasis or pneumonia. Pneumonia suspected due to air bronchograms. There is anasarca of the soft tissues likely related   to fluid overload.  4. Mildly distended small bowel loops present throughout  the abdomen and pelvis, likely reactive and related to small to moderate amount of free fluid related to tracking inflammatory change. Air is present within the colon. Obstruction unlikely.  5. Ancillary findings as described above.        Electronically Signed: Cora Torres    7/27/2023 11:20 PM EDT    Workstation ID: [id number]    XR Abdomen KUB [[id number]] Collected: 07/27/23 2143     Updated: 07/27/23 2148    Narrative:      XR ABDOMEN KUB    Date of Exam: 7/27/2023 9:01 PM EDT    Indication: abd distension. ? obstruction    Comparison: None available.    Findings:        There are several loops of air-filled mildly distended small bowel. This could represent small bowel ileus versus small bowel obstruction.    There is an air-filled colon.    There are no abnormal calcifications.    The osseous structures are grossly unremarkable.      Impression:        Mildly dilated air-filled loops of small bowel within the abdomen which could represent small bowel ileus versus small bowel obstruction. If clinically warranted follow-up CT scan of the abdomen may be obtained.              Electronically Signed: Damir Mccarthy    7/27/2023 9:45 PM EDT    Workstation ID: [id number]    XR Chest 1 View [[id number]] Collected: 07/25/23 1353     Updated: 07/25/23 1357    Narrative:      XR CHEST 1 VW    Date of Exam: 7/25/2023 1:29 PM EDT    Indication: soa    Comparison: None available.    Findings:  There are low lung volumes with poor inspiration. There is mild atelectasis at the lung bases, greatest on the left. There may be a minimal left effusion. The heart size is normal and pulmonary vessels are within normal limits.      Impression:      Impression:  Mild atelectasis lung bases, greatest on the left.      Electronically Signed: Suyapa Vu MD    7/25/2023 1:54 PM EDT    Workstation ID: [id number]    MRI abdomen wo contrast mrcp [[id number]] Collected: 07/24/23 1049     Updated: 07/24/23 1507    Narrative:      MRI ABDOMEN WO  CONTRAST MRCP    Date of Exam: 7/24/2023 9:58 AM EDT    Indication: gallstone pancreatitis.     Comparison: Imported/outside CT chest abdomen pelvis 7/23/2023.    Technique:  Routine multiplanar/multisequence images of the abdomen were obtained with MRCP sequences without contrast administration.    Findings:    Cluster of small gallstones layering at the gallbladder neck. No gallbladder distention, wall thickening, or pericholecystic fluid. No choledocholithiasis or evidence of biliary ductal dilatation.    Ill-defined peripancreatic fluid and stranding consistent with acute pancreatitis. No organized/walled-off fluid collection. Unable to assess for pancreatic necrosis without intravenous contrast.    Liver, spleen, adrenal glands, bowel, and kidneys are unremarkable. Atherosclerosis. No suspicious lymphadenopathy. Trace bilateral pleural effusions.      Impression:      Impression:    Acute pancreatitis without organized fluid collection.    Cholelithiasis without choledocholithiasis.    Electronically Signed: Michele Lopez MD    7/24/2023 11:09 AM EDT    Workstation ID: [id number]                Pending Labs       Order Current Status    Blood Culture - Blood, Arm, Left Preliminary result    Blood Culture - Blood, Hand, Right Preliminary result            Discharge Details        Discharge Medications        Continue These Medications        Instructions Start Date   albuterol sulfate  (90 Base) MCG/ACT inhaler  Commonly known as: PROVENTIL HFA;VENTOLIN HFA;PROAIR HFA   2 puffs, Inhalation, Every 6 Hours PRN      EPINEPHrine 0.3 MG/0.3ML solution auto-injector injection  Commonly known as: EPIPEN   INJECT 1 PEN IN THE MUSCLE ONE TIME AS DIRECTED      fenofibrate 145 MG tablet  Commonly known as: TRICOR   145 mg, Oral, Daily      HYDROcodone-acetaminophen  MG per tablet  Commonly known as: NORCO   1 tablet, Oral, Every 6 Hours PRN      omeprazole 40 MG capsule  Commonly known as: priLOSEC   40 mg,  Oral, 2 Times Daily Before Meals      vitamin D 1.25 MG (44916 UT) capsule capsule  Commonly known as: ERGOCALCIFEROL   50,000 Units, Oral, Weekly             Stop These Medications      amoxicillin 875 MG tablet  Commonly known as: AMOXIL            ASK your doctor about these medications        Instructions Start Date   aspirin 81 MG EC tablet   81 mg, Oral, Daily      atorvastatin 20 MG tablet  Commonly known as: LIPITOR   20 mg, Oral, Nightly      hydrOXYzine 50 MG tablet  Commonly known as: ATARAX   50 mg, Oral, 3 Times Daily PRN      metoprolol tartrate 25 MG tablet  Commonly known as: LOPRESSOR   25 mg, Oral, Daily      Xtandi 40 MG chemo capsule  Generic drug: enzalutamide   160 mg, Oral, Daily               No Known Allergies      Discharge Disposition: [facility] ([place] [facility])    Discharge Diet:  Diet Order   Procedures    NPO Diet NPO Type: Sips with Meds       Discharge Activity:        CODE STATUS:    Code Status and Medical Interventions:   Ordered at: 07/23/23 2229     Code Status (Patient has no pulse and is not breathing):    CPR (Attempt to Resuscitate)     Medical Interventions (Patient has pulse or is breathing):    Full Support     Release to patient:    Routine Release         Future Appointments   Date Time Provider Department Center   9/6/2023  8:30 AM Yolande Olvera APRN [facility]   10/18/2023 10:30 AM [facility]   10/18/2023 11:00 AM HIRO Martinez MD [facility]   10/18/2023 11:30 AM [facility]   10/18/2023 11:30 AM [facility] OP INFUSION [facility]               Electronically signed by Carroll Bobo DO, 08/11/23, 12:28 AM EDT.      Time Spent on Discharge: I spent  65 minutes on this discharge activity which included: face-to-face encounter with the patient, reviewing the data in the system, coordination of the care with the nursing staff as well as consultants, documentation, and  entering orders.

## 2023-08-10 NOTE — PROGRESS NOTES
INFECTIOUS DISEASE Progress Note    Ta Madison  1960  [id number]      Admission Date: 7/23/2023      Requesting Provider: Carroll Bobo DO  Evaluating Physician: Palomo Orellana MD    Reason for Consultation: Severe  pancreatitis    History of present illness:    7/28/23:Patient is a 63 y.o. male with h/o TIA, ongoing tobacco abuse, GERD, HLD, occasional marijuana use, and advanced prostate cancer/on Xtandi who we were asked to see for necrotizing pancreatitis.  The patient presented to [facility] ED on 7/23 with severe upper abdominal pain that radiated to his back about 3 hours PTA.  He had associated nausea and vomiting.  He has had postnasal drip with associated cough for last 3 to 4 weeks.  A CT scan at OSH showed acute gallbladder pancreatitis with a 2 mm stone in proximal duodenem.  He was given a dose of Invanz and transferred to [facility] on 7/23 for further medical management.  On arrival to [facility], the patient developed a Tmax of 100.3 and hypoxia requiring 2 L O2 NC.  He is now on room air.  Labs on arrival were lactic acid 3.6, PCT 0.85, creatinine 1.42, ALT 53, AST 66, bilirubin 0.4, lipase 1255, and WBC 15,000 with 91% neutrophils.  Blood cultures are negative to date.  Repeat blood cultures from 7/28 are pending.   A resp panel PCR was negative. His lactic acid and WBC were normal level on 7/27.  His PCT was 0.43.  A MRSA PCR on 7/28 was positive. His pain level on 7/28 was 7 out of 10. An MRCP on 7/24 showed acute pancreatitis with cholelithiasis without choledocholelithiasis.  It was felt that he may have passed the stone.  His abdomen became more distended and taut last night on 7/27.  A KUB noted possible small bowel ileus vs SBO.  A CT scan with contrast was done on 7/27 and showed extensive necrotizing appearing pancreatitis with fluid replacing nearly the entire pancreatic body with inflammatory changes tracking along entire length of pancreas, mild gallbladder wall thickening with  cholelithiasis (less likely acute cholecystitis), small bilateral pleural effusion, and mildly distended SB loops likely reactive and left likely obstruction.  He was on Zosyn, but was changed Merrem and Vancomycin on 7/28.  ID was asked to evaluate and manage his antibiotic therapy. He complains of persistent abdominal pain and abdominal distention.    7/29/23: He has remained afebrile. His creatinine is 0.58.  His white blood cell count is 9.3. Blood cultures from 7/28 are no growth so far.He has noticed some partial improvement in his abdominal pain and distention.  He denies vomiting.    7/30/2023: Maximum temperature over the last 24 hours is 100.1.  A follow-up CT scan today reveals persistent severe pancreatitis.  His white blood cell count today is 20.5.  His vancomycin random level was 7.6.    7/31/23: His maximum temperature over the last 24 hours is 99.7.White blood cell count today is 24.8. He continues to complain of abdominal distention and abdominal pain.  He denies nausea or vomiting.    8/1/23: Maximum temperature over the last 24 hours is 99.9.Creatinine is 0.48.  White blood cell count is 21.9.  Albumin is 2.4. He continues to require frequent narcotic therapy for abdominal pain.  He denies nausea and vomiting.  He is now on NJ feedings.    8/2/23: He has been afebrile over the last 24 hours. He is tolerating tube feedings. His white blood cell count today is 16.8.  His creatinine is 0.47.  Albumin is 2.2.  He states that his abdominal pain is somewhat improved today.  He denies nausea and vomiting.  He is tolerating tube feedings.    8/3/23: He remains afebrile.  His white blood cell count is now down to 13.7. He denies increased abdominal pain.  He denies nausea and vomiting.     8/4/23: He has remained afebrile.His white blood cell count today is 16.6.  He states that he has decreased abdominal pain.  He is tolerating tube feedings.     8/5/23: He remains afebrile. His white blood cell count  today is 15.7.  Creatinine is 0.52.He remains afebrile.  He has some loose stool after being given laxatives and stool softener.  He has modest improvement in his abdominal pain today.    8/7/23:He had a fever to 100.7ø 2300 on 8/5.  Yesterday had a fever to 101.1ø.  This morning his temperature is up to 101.2.  Early yesterday morning he was switched from Zosyn to Invanz but then yesterday he was switched back to Zosyn to which Micafungin.  A CT scan performed yesterday revealed evolving changes of necrotizing pancreatitis.  There appeared to be organizing pockets of fluid.  A repeat CT scan with contrast has been ordered for today. His C-reactive protein today is markedly elevated at 34.9.  His creatinine is 0.78.Pro-calcitonin is 0.5 and lactic acid is 2.5.  His white blood cell count is 17.1.  A follow-up CT scan reveals necrotizing pancreatitis with surrounding inflammatory fluid but without a well organized rim-enhancing abscess.    8/8/23: Maximum temperature over the last 24 hours is 100.3.  White blood cell count is 17.9.  Creatinine is 0.63. Blood cultures from 8/6 no growth so far. I saw him up and ambulating in the hallway this morning.  He states that his abdominal pain is partially improved.    8/9/23:He had temperatures up to 100.6 over the last 24 hours.Creatinine is 0.55.  Pro-calcitonin is 0.39.  His lactic acid is normal at 1.6.White blood cell count is 19.  He denies increased abdominal pain.  He denies severe nausea and vomiting.    8/10/23: His maximum temperature over the last 24 hours is 99.7.  White blood cell count today is 16.8.  Pro-calcitonin is 0.34.  Albumin is low at 2.2.He denies increased abdominal pain.  He denies nausea and vomiting.  His pain is controlled with the PCA pump.      Past Medical History:   Diagnosis Date    Elevated cholesterol     GERD (gastroesophageal reflux disease)     Increased heart rate     Mini stroke     Neck pain     Prostate cancer     Rash     Tobacco  abuse        Past Surgical History:   Procedure Laterality Date    APPENDECTOMY  1971    [facility]     COLONOSCOPY      COLONOSCOPY N/A 1/25/2023    Procedure: COLONOSCOPY;  Surgeon: Mahnaz Caraballo MD;  Location: [facility] OR;  Service: Gastroenterology;  Laterality: N/A;    ENDOSCOPY N/A 7/31/2023    Procedure: ESOPHAGOGASTRODUODENOSCOPY WITH KEOFEED PLACEMENT;  Surgeon: Yolande Eller MD;  Location:  [facility];  Service: Gastroenterology;  Laterality: N/A;       Family History   Problem Relation Age of Onset    Nephrolithiasis Mother     Heart disease Father     Hypertension Father     Kidney disease Father        Social History     Socioeconomic History    Marital status:    Tobacco Use    Smoking status: Every Day     Packs/day: 2.00     Years: 45.00     Pack years: 90.00     Types: Cigarettes    Smokeless tobacco: Never   Vaping Use    Vaping Use: Never used   Substance and Sexual Activity    Alcohol use: No    Drug use: Yes     Types: Marijuana     Comment: occ     Sexual activity: Defer       No Known Allergies      Medication:    Current Facility-Administered Medications:     acetaminophen (TYLENOL) tablet 650 mg, 650 mg, Nasogastric, Q4H PRN, 650 mg at 08/08/23 1520 **OR** acetaminophen (TYLENOL) 160 MG/5ML solution 650 mg, 650 mg, Nasogastric, Q4H PRN, 650 mg at 08/09/23 1656 **OR** acetaminophen (TYLENOL) suppository 650 mg, 650 mg, Rectal, Q4H PRN, Corina Johansen, PharmD    aspirin chewable tablet 81 mg, 81 mg, Nasogastric, Daily, Corina Johansen, PharmD, 81 mg at 08/09/23 0845    atorvastatin (LIPITOR) tablet 20 mg, 20 mg, Nasogastric, Nightly, Curt Barry MD, 20 mg at 08/09/23 2030    sennosides-docusate (PERICOLACE) 8.6-50 MG per tablet 2 tablet, 2 tablet, Nasogastric, BID, 2 tablet at 08/07/23 1005 **AND** polyethylene glycol (MIRALAX) packet 17 g, 17 g, Oral, Daily PRN **AND** bisacodyl (DULCOLAX) EC tablet 5 mg, 5 mg, Oral, Daily PRN **AND** bisacodyl (DULCOLAX)  suppository 10 mg, 10 mg, Rectal, Daily PRN, Curt Barry MD    bisacodyl (DULCOLAX) suppository 10 mg, 10 mg, Rectal, Q8H, Christos Mijares MD, 10 mg at 08/07/23 1007    Calcium Replacement - Follow Nurse / BPA Driven Protocol, , Does not apply, PRN, Curt Barry MD    dilTIAZem (CARDIZEM) tablet 60 mg, 60 mg, Oral, Q8H, Servando Jarrett MD, 60 mg at 08/10/23 0509    docusate sodium (COLACE) liquid 100 mg, 100 mg, Nasogastric, BID, Christos Mijares MD, 100 mg at 08/09/23 2030    enzalutamide (XTANDI) chemo capsule 160 mg (patient supplied medication), 160 mg, Oral, Daily, Corina Johansen, PharmD, 160 mg at 08/05/23 2100    famotidine (PEPCID) injection 20 mg, 20 mg, Intravenous, Once, Anurag Sloan Jr., MD    famotidine (PEPCID) tablet 20 mg, 20 mg, Oral, Once, Anurag Sloan Jr., MD    hydrocortisone (ANUSOL-HC) suppository 25 mg, 25 mg, Rectal, BID, Jagdeep Quintero APRN, 25 mg at 08/07/23 1007    HYDROmorphone (DILAUDID) PCA 0.2 mg/ml 50 mL syringe, , Intravenous, Continuous, Christos Gan, DO, New Bag at 08/10/23 0441    hydrOXYzine (ATARAX) tablet 50 mg, 50 mg, Nasogastric, TID PRN, Curt Barry MD, 50 mg at 08/06/23 0749    ipratropium-albuterol (DUO-NEB) nebulizer solution 3 mL, 3 mL, Nebulization, Q4H PRN, Curt Barry MD, 3 mL at 08/04/23 0651    ketorolac (TORADOL) injection 30 mg, 30 mg, Intravenous, Q6H PRN, Mary Rojas MD, 30 mg at 08/06/23 1819    lactulose (CHRONULAC) solution for enema 300 mL, 300 mL, Rectal, Once, Christos Mijares MD    lidocaine PF 1% (XYLOCAINE) injection 0.5 mL, 0.5 mL, Injection, Once PRN, Anurag Sloan Jr., MD    Magnesium Standard Dose Replacement - Follow Nurse / BPA Driven Protocol, , Does not apply, PRN, Curt Barry MD    melatonin tablet 5 mg, 5 mg, Nasogastric, Nightly PRN, Curt Barry MD, 5 mg at 08/05/23 2124    metoclopramide (REGLAN) injection 10 mg, 10 mg, Intravenous, Q6H, Christos Mijares MD, 10 mg at 08/10/23  0406    metoprolol tartrate (LOPRESSOR) injection 5 mg, 5 mg, Intravenous, Q6H PRN, Christos Gan,     metoprolol tartrate (LOPRESSOR) tablet 50 mg, 50 mg, Nasogastric, Q12H, Servando Jarrett MD, 50 mg at 08/09/23 2030    micafungin 100 mg/100 mL 0.9% NS IVPB (mbp), 100 mg, Intravenous, Q24H, Christos Gan DO, 100 mg at 08/09/23 1350    midazolam (VERSED) injection 1 mg, 1 mg, Intravenous, Q10 Min PRN, Anurag Sloan Jr., MD    montelukast (SINGULAIR) tablet 10 mg, 10 mg, Nasogastric, Nightly, Curt Barry MD, 10 mg at 08/09/23 2030    naloxone (NARCAN) injection 0.1 mg, 0.1 mg, Intravenous, Q5 Min PRN, Ramila Martinez APRN    naloxone (NARCAN) injection 0.4 mg, 0.4 mg, Intravenous, PRN, Carroll Bobo,     nicotine (NICODERM CQ) 21 MG/24HR patch 1 patch, 1 patch, Transdermal, Q24H, Carroll Bobo DO, 1 patch at 08/09/23 0846    ondansetron (ZOFRAN) injection 4 mg, 4 mg, Intravenous, Q6H PRN, Bharat Montana, EUGENIA, 4 mg at 08/06/23 0015    pantoprazole (PROTONIX) injection 40 mg, 40 mg, Intravenous, Q AM, Marija Smallwood MD, 40 mg at 08/10/23 0509    Pharmacy Consult, , Does not apply, Continuous PRN, Ramila Martinez APRCHANTALE    phenylephrine-mineral oil-petrolatum (PREPARATION H) 0.25-14-74.9 % hemorhoidal ointment, , Rectal, BID PRN, Jagdeep Quintero APRN    Phosphorus Replacement - Follow Nurse / BPA Driven Protocol, , Does not apply, PRN, Curt Barry MD    piperacillin-tazobactam (ZOSYN) 4.5 g in iso-osmotic dextrose 100 mL IVPB (premix), 4.5 g, Intravenous, Q8H, Servando Jarrett MD, 4.5 g at 08/10/23 0509    Potassium Replacement - Follow Nurse / BPA Driven Protocol, , Does not apply, PRN, Curt Barry MD    prochlorperazine (COMPAZINE) injection 5 mg, 5 mg, Intravenous, Q3H PRN, Carroll Bobo, , 5 mg at 07/24/23 1635    ProSource No Carb oral solution 30 mL, 30 mL, Nasogastric, TID, Minerva Carrera, DAVE, 30 mL at 08/09/23 2139    simethicone (MYLICON) chewable tablet  80 mg, 80 mg, Nasogastric, 4x Daily PRN, Curt Barry MD    sodium chloride 0.9 % flush 10 mL, 10 mL, Intravenous, Q12H, Carroll Bobo DO, 10 mL at 23 2031    sodium chloride 0.9 % flush 10 mL, 10 mL, Intravenous, PRN, Carroll Bobo,     sodium chloride 0.9 % flush 10 mL, 10 mL, Intravenous, Q12H, Anurag Sloan Jr., MD, 10 mL at 23 2030    sodium chloride 0.9 % flush 10 mL, 10 mL, Intravenous, PRN, Anurag Sloan Jr., MD    sodium chloride 0.9 % infusion 40 mL, 40 mL, Intravenous, PRN, Carroll Bobo,     sodium chloride 0.9 % infusion 40 mL, 40 mL, Intravenous, PRN, Anurag Sloan Jr., MD    sodium chloride 0.9 % infusion, 30 mL/hr, Intravenous, Continuous PRN, Ramila Martinez, APRN    Antibiotics:  Anti-Infectives (From admission, onward)      Ordered     Dose/Rate Route Frequency Start Stop    23 1208  piperacillin-tazobactam (ZOSYN) 4.5 g in iso-osmotic dextrose 100 mL IVPB (premix)        Ordering Provider: Servando Jarrett MD    4.5 g  over 4 Hours Intravenous Every 8 Hours 23 1900 23 2159    23 1210  micafungin 100 mg/100 mL 0.9% NS IVPB (mbp)        Ordering Provider: Christos Gan DO    100 mg  over 60 Minutes Intravenous Every 24 Hours 23 1400 23 1359    23 1208  piperacillin-tazobactam (ZOSYN) 4.5 g in iso-osmotic dextrose 100 mL IVPB (premix)        Ordering Provider: Servando Jarrett MD    4.5 g  over 30 Minutes Intravenous Once 23 1300 23 1327    23 2329  meropenem (MERREM) 1000 mg/100 mL 0.9% NS (mbp)        Ordering Provider: Carroll Bobo DO    1,000 mg  over 30 Minutes Intravenous Once 23 0000 23 0112              Review of Systems:  The HPI      Physical Exam:   Vital Signs  Temp (24hrs), Av.9 øF (37.2 øC), Min:97.9 øF (36.6 øC), Max:99.7 øF (37.6 øC)    Temp  Min: 97.9 øF (36.6 øC)  Max: 99.7 øF (37.6 øC)  BP  Min: 139/51  Max: 161/76  Pulse  Min: 89  Max: 126  Resp  Min: 18  Max:  22  SpO2  Min: 92 %  Max: 97 %    GENERAL: Awake and alert, in no acute distress. Nasojejunal tube is in place.  HEENT: Normocephalic, atraumatic.  PERRL. EOMI. No conjunctival injection. No icterus. No labial ulcers  NECK: Supple   HEART: RRR; No murmur, rubs, gallops.   LUNGS: Clear to auscultation bilaterally without wheezing, rales, rhonchi. Normal respiratory effort.   ABDOMEN: He still has extensive abdominal distention with mild diffuse tenderness despite his PCA pump.   EXT:  No cyanosis, clubbing or edema. No cord.  :  Without Jorgensen catheter.  MSK: No joint effusions or erythema  SKIN: Warm and dry without cutaneous eruptions on Inspection/palpation.    NEURO: Oriented to PPT.  Motor 5/5 strength  PSYCHIATRIC: Normal insight and judgment. Cooperative with PE    Laboratory Data    Results from last 7 days   Lab Units 08/10/23  0313 08/09/23  0229 08/08/23  0358   WBC 10*3/mm3 16.76* 19.04* 17.86*   HEMOGLOBIN g/dL 7.8* 7.9* 8.4*   HEMATOCRIT % 25.3* 25.2* 26.5*   PLATELETS 10*3/mm3 773* 803* 731*       Results from last 7 days   Lab Units 08/10/23  0313   SODIUM mmol/L 139   POTASSIUM mmol/L 3.9   CHLORIDE mmol/L 104   CO2 mmol/L 25.0   BUN mg/dL 21   CREATININE mg/dL 0.51*   GLUCOSE mg/dL 157*   CALCIUM mg/dL 7.7*       Results from last 7 days   Lab Units 08/10/23  0313   ALK PHOS U/L 125*   BILIRUBIN mg/dL 0.2   ALT (SGPT) U/L 7   AST (SGOT) U/L 16           Results from last 7 days   Lab Units 08/07/23  0419   CRP mg/dL 34.91*       Results from last 7 days   Lab Units 08/09/23  0229   LACTATE mmol/L 1.6                 Estimated Creatinine Clearance: 178.9 mL/min (A) (by C-G formula based on SCr of 0.51 mg/dL (L)).      Microbiology:  Microbiology Results (last 10 days)       Procedure Component Value - Date/Time    Blood Culture - Blood, Hand, Right [[id number]]  (Normal) Collected: 08/06/23 0226    Lab Status: Preliminary result Specimen: Blood from Hand, Right Updated: 08/09/23 0847     Blood  Culture No growth at 3 days    Blood Culture - Blood, Arm, Left [[id number]]  (Normal) Collected: 08/06/23 0226    Lab Status: Preliminary result Specimen: Blood from Arm, Left Updated: 08/09/23 0845     Blood Culture No growth at 3 days                  Radiology:    CT ABDOMEN PELVIS W CONTRAST    Date of Exam: 8/7/2023 1:06 PM CDT    Indication: necrotizing pancreatitis.    Comparison: 8/6/2023    Technique: Axial CT images were obtained of the abdomen and pelvis following the uneventful intravenous administration of 85 cc Isovue-300. Reconstructed coronal and sagittal images were also obtained. Automated exposure control and iterative  construction methods were used.      Findings:  LUNG BASES: Small to moderate bilateral pleural effusions with basal atelectasis.    LIVER:  Unremarkable parenchyma without focal lesion.  BILIARY/GALLBLADDER:  Unremarkable  SPLEEN:  Unremarkable  PANCREAS: There is necrotizing pancreatitis involving the head and body with lack of enhancing parenchyma. There is surrounding inflammatory fluid. No well-defined walled off fluid collection with enhancing rim noted as of yet. There is no pancreatic  duct dilation. There are few parenchymal calcifications  ADRENAL:  Unremarkable  KIDNEYS:  Unremarkable parenchyma with no solid mass identified. No obstruction.  No calculus identified.  GASTROINTESTINAL/MESENTERY:  No evidence of obstruction nor inflammation. There is an enteric tube with tip at the ligament of Treitz.  MESENTERIC VESSELS:  Patent.  AORTA/IVC:  Normal caliber.    RETROPERITONEUM/LYMPH NODES:  Unremarkable    REPRODUCTIVE:  Unremarkable  BLADDER:  Unremarkable    OSSEUS STRUCTURES:  Typical for age with no acute process identified.    There is free fluid within the abdomen and pelvis.     Impression:     Impression:  1.Necrotizing pancreatitis involving the pancreas head and body. Associated surrounding inflammatory fluid without well organized rim-enhancing fluid  collection.       I read his CT scan of 8/7.      Impression:   Severe gallstone pancreatitis- He passed the gallstone. He continues to have significant pancreatitis and remains at risk for evolution to pancreatic abscess.  Leukocytosis/neutrophilia  MRSA nasal colonization  Advance prostate cancer/on Xtandi  Ongoing tobacco abuse  Occasional marijuana use    PLAN/RECOMMENDATIONS:   Nutritional support  Continue intravenous Zosyn  Continue micafungin  Transfer to  if he worsens or appears to be developing a pancreatic abscess.    Addendum: He deteriorated late yesterday with fever to 102.3ø, tachycardia, and hypoxemia. A follow-up CT scan last night revealed an enlarging pseudocyst with air bubbles consistent with evolving pancreatic infection.  Arrangements have been made for transfer to       Palomo Orellana MD  8/10/2023  07:12 EDT

## 2023-08-10 NOTE — CASE MANAGEMENT/SOCIAL WORK
Continued Stay Note  [facility]     Patient Name: Ta Madison  MRN: [id number]  Today's Date: 8/10/2023    Admit Date: 7/23/2023    Plan: Update   Discharge Plan       Row Name 08/10/23 1007       Plan    Plan Update    Patient/Family in Agreement with Plan yes    Plan Comments I spoke with the pt and his spouse. They are hopeful he is trending in the right direction. They deny any DC needs at this time.    Final Discharge Disposition Code 01 - home or self-care                   Discharge Codes    No documentation.                 Expected Discharge Date and Time       Expected Discharge Date Expected Discharge Time    Aug 15, 2023               Lesvia Estrada RN

## 2023-08-10 NOTE — THERAPY TREATMENT NOTE
Patient Name: Ta Madison  : 1960    MRN: [id number]                              Today's Date: 8/10/2023       Admit Date: 2023    Visit Dx:     ICD-10-CM ICD-9-CM   1. Acute gallstone pancreatitis  K85.10 577.0     574.20   2. Dysphagia, unspecified type  R13.10 787.20     Patient Active Problem List   Diagnosis    Tachycardia    Hyperlipidemia    Multiple allergies    Tobacco abuse    Gastroesophageal reflux disease    Chronic right shoulder pain    Ganglion cyst of dorsum of right wrist    Benign skin cyst    Numbness and tingling of both upper extremities    Right shoulder pain    Adenocarcinoma of prostate    Encounter for screening for malignant neoplasm of colon    Epigastric pain    Acute gallstone pancreatitis    Personal history of transient ischemic attack (TIA)    Elevated serum creatinine    Sepsis associated hypotension     Past Medical History:   Diagnosis Date    Elevated cholesterol     GERD (gastroesophageal reflux disease)     Increased heart rate     Mini stroke     Neck pain     Prostate cancer     Rash     Tobacco abuse      Past Surgical History:   Procedure Laterality Date    APPENDECTOMY      [facility]     COLONOSCOPY      COLONOSCOPY N/A 2023    Procedure: COLONOSCOPY;  Surgeon: Mahnaz Caraballo MD;  Location: [facility];  Service: Gastroenterology;  Laterality: N/A;    ENDOSCOPY N/A 2023    Procedure: ESOPHAGOGASTRODUODENOSCOPY WITH KEOFEED PLACEMENT;  Surgeon: Yolande Eller MD;  Location: [facility] ENDOSCOPY;  Service: Gastroenterology;  Laterality: N/A;      General Information       Row Name 08/10/23 0830          Physical Therapy Time and Intention    Document Type therapy note (daily note)  -AS     Mode of Treatment physical therapy  -AS       Row Name 08/10/23 0830          General Information    Patient Profile Reviewed yes  -AS     Existing Precautions/Restrictions fall;other (see comments)  NG, abdominal incision, scrotal edema, PCA pump   -AS     Barriers to Rehab medically complex  -AS       Row Name 08/10/23 0830          Cognition    Orientation Status (Cognition) oriented x 4  -AS       Row Name 08/10/23 0830          Safety Issues, Functional Mobility    Safety Issues Affecting Function (Mobility) awareness of need for assistance;insight into deficits/self-awareness;safety precaution awareness;safety precautions follow-through/compliance;positioning of assistive device  -AS     Impairments Affecting Function (Mobility) balance;endurance/activity tolerance;pain;shortness of breath;strength  -AS     Comment, Safety Issues/Impairments (Mobility) alert and following commands  -AS               User Key  (r) = Recorded By, (t) = Taken By, (c) = Cosigned By      Initials Name Provider Type    AS Jacqueline Dior PTA Physical Therapist Assistant                   Mobility       Row Name 08/10/23 0833          Bed Mobility    Supine-Sit [place] (Bed Mobility) verbal cues;minimum assist (75% patient effort);1 person assist  -AS     Assistive Device (Bed Mobility) head of bed elevated;bed rails  -AS     Comment, (Bed Mobility) cues for sequencing  -AS       Row Name 08/10/23 0833          Transfers    Comment, (Transfers) verbal cues to reach back when sitting in recliner  -AS       Row Name 08/10/23 0833          Bed-Chair Transfer    Bed-Chair [place] (Transfers) verbal cues;contact guard;1 person assist  -AS     Assistive Device (Bed-Chair Transfers) walker, front-wheeled  -AS       Row Name 08/10/23 0833          Sit-Stand Transfer    Sit-Stand [place] (Transfers) verbal cues;contact guard;1 person assist  -AS     Assistive Device (Sit-Stand Transfers) walker, front-wheeled  -AS     Comment, (Sit-Stand Transfer) cues for hand placement  -AS       Row Name 08/10/23 0833          Gait/Stairs (Locomotion)    [place] Level (Gait) verbal cues;contact guard;1 person assist  -AS     Assistive Device (Gait) walker, front-wheeled  -AS      Distance in Feet (Gait) 350  -AS     Deviations/Abnormal Patterns (Gait) bilateral deviations;base of support, wide;loren decreased;gait speed decreased;stride length decreased  -AS     Bilateral Gait Deviations forward flexed posture  -AS     Comment, (Gait/Stairs) patient ambulated 350 feet with CGA x1, cues for walker placement and improvement in posture, Patient with c/o SOA and weakness. SaO2 94%-94% on RA. Further distance limited by weakness and SOA.  -AS               User Key  (r) = Recorded By, (t) = Taken By, (c) = Cosigned By      Initials Name Provider Type    AS Jacqueline Dior PTA Physical Therapist Assistant                   Obj/Interventions       Row Name 08/10/23 0836          Motor Skills    Therapeutic Exercise knee;ankle  -AS       Row Name 08/10/23 08          Knee (Therapeutic Exercise)    Knee (Therapeutic Exercise) strengthening exercise  -AS     Knee Strengthening (Therapeutic Exercise) bilateral;marching while seated;LAQ (long arc quad);sitting;10 repetitions  -AS       [facility] Name 08/10/23 08          Ankle (Therapeutic Exercise)    Ankle (Therapeutic Exercise) AROM (active range of motion)  -AS     Ankle AROM (Therapeutic Exercise) bilateral;dorsiflexion;plantarflexion;sitting;10 repetitions  -AS       [facility] Name 08/10/23 08          Balance    Dynamic Standing Balance verbal cues;contact guard;1-person assist  -AS     Position/Device Used, Standing Balance supported;walker, front-wheeled  -AS     Comment, Balance no LOB  -AS               User Key  (r) = Recorded By, (t) = Taken By, (c) = Cosigned By      Initials Name Provider Type    AS Jacqueline Dior PTA Physical Therapist Assistant                   Goals/Plan    No documentation.                  Clinical Impression       [facility] Name 08/10/23 08          Pain    Pretreatment Pain Rating 0/10 - no pain  -AS     Posttreatment Pain Rating 0/10 - no pain  -AS       Row Name 08/10/23 0836          Plan of Care Review     Plan of Care Reviewed With patient  -AS     Progress no change  -AS     Outcome Evaluation patient ambulated 350 feet with CGA x1, cues for walker placement and improvement in posture, Patient with c/o SOA and weakness. SaO2 94%-94% on RA. Further distance limited by weakness and SOA. Recommend home with assist and OPPT.  -AS       Row Name 08/10/23 0836          Vital Signs    Pre SpO2 (%) 93  -AS     O2 Delivery Pre Treatment supplemental O2  -AS     Intra SpO2 (%) 94  -AS     O2 Delivery Intra Treatment room air  -AS     Post SpO2 (%) 93  -AS     O2 Delivery Post Treatment room air  -AS     Pre Patient Position Supine  -AS     Intra Patient Position Standing  -AS     Post Patient Position Sitting  -AS       Row Name 08/10/23 0836          Positioning and Restraints    Pre-Treatment Position in bed  -AS     Post Treatment Position chair  -AS     In Chair reclined;call light within reach;encouraged to call for assist;exit alarm on;waffle cushion;legs elevated;with family/caregiver;notified nsg  -AS               User Key  (r) = Recorded By, (t) = Taken By, (c) = Cosigned By      Initials Name Provider Type    AS Jacqueline Dior, ОЛЬГА Physical Therapist Assistant                   Outcome Measures       Row Name 08/10/23 0837 08/10/23 0757       How much help from another person do you currently need...    Turning from your back to your side while in flat bed without using bedrails? 3  -AS 3  -TS    Moving from lying on back to sitting on the side of a flat bed without bedrails? 3  -AS 4  -TS    Moving to and from a bed to a chair (including a wheelchair)? 4  -AS 4  -TS    Standing up from a chair using your arms (e.g., wheelchair, bedside chair)? 3  -AS 4  -TS    Climbing 3-5 steps with a railing? 3  -AS 3  -TS    To walk in hospital room? 3  -AS 3  -TS    AM-PAC 6 Clicks Score (PT) 19  -AS 21  -TS    Highest level of mobility 6 --> Walked 10 steps or more  -AS 6 --> Walked 10 steps or more  -TS      Row Name  08/10/23 0837          Functional Assessment    Outcome Measure Options AM-PAC 6 Clicks Basic Mobility (PT)  -AS               User Key  (r) = Recorded By, (t) = Taken By, (c) = Cosigned By      Initials Name Provider Type    AS Jacqueline Doir PTA Physical Therapist Assistant    Charity Lawson RN Registered Nurse                                 Physical Therapy Education       Title: PT OT SLP Therapies (In Progress)       Topic: Physical Therapy (In Progress)       Point: Mobility training (In Progress)       Learning Progress Summary             Patient Acceptance, E, NR by AS at 8/10/2023 0838    Acceptance, E, VU by AE at 8/8/2023 0855    Acceptance, E, VU,NR by LH at 8/4/2023 1424    Acceptance, E, VU by AE at 8/3/2023 1114    Acceptance, E,TB, VU by AP at 8/1/2023 1851    Acceptance, E,TB, VU by KW at 7/29/2023 1145    Comment: continued benefit of skilled PT services   Significant Other Acceptance, E, VU,NR by  at 8/4/2023 1424                         Point: Home exercise program (In Progress)       Learning Progress Summary             Patient Acceptance, E, NR by AS at 8/10/2023 0838    Acceptance, E, VU by AE at 8/8/2023 0855    Acceptance, E, VU by AE at 8/3/2023 1114    Acceptance, E,TB, VU by AP at 8/1/2023 1851    Acceptance, E,TB, VU by KW at 7/29/2023 1145    Comment: continued benefit of skilled PT services                         Point: Body mechanics (In Progress)       Learning Progress Summary             Patient Acceptance, E, NR by AS at 8/10/2023 0838    Acceptance, E, VU by AE at 8/8/2023 0855    Acceptance, E, VU,NR by LH at 8/4/2023 1424    Acceptance, E, VU by AE at 8/3/2023 1114    Acceptance, E,TB, VU by AP at 8/1/2023 1851    Acceptance, E,TB, VU by KW at 7/29/2023 1145    Comment: continued benefit of skilled PT services   Significant Other Acceptance, E, VU,NR by LH at 8/4/2023 1424                         Point: Precautions (In Progress)       Learning Progress Summary              Patient Acceptance, E, NR by AS at 8/10/2023 0838    Acceptance, E, VU by AE at 8/8/2023 0855    Acceptance, E, VU,NR by  at 8/4/2023 1424    Acceptance, E, VU by AE at 8/3/2023 1114    Acceptance, E,TB, VU by AP at 8/1/2023 1851    Acceptance, E,TB, VU by KW at 7/29/2023 1145    Comment: continued benefit of skilled PT services   Significant Other Acceptance, E, VU,NR by  at 8/4/2023 1424                                         User Key       Initials Effective Dates Name Provider Type Discipline    AS 04/28/23 -  Jacqueline Dior PTA Physical Therapist Assistant PT    AE 09/21/21 -  Antelmo Andersen, PT Physical Therapist PT    KW 01/27/22 -  Brittaney Avitia, PT Physical Therapist PT    LH 05/15/23 -  Rebekah Weir, PT Student PT Student PT    AP 06/01/23 -  Nannette Zuniga, RN Registered Nurse Nurse                  PT Recommendation and Plan     Plan of Care Reviewed With: patient  Progress: no change  Outcome Evaluation: patient ambulated 350 feet with CGA x1, cues for walker placement and improvement in posture, Patient with c/o SOA and weakness. SaO2 94%-94% on RA. Further distance limited by weakness and SOA. Recommend home with assist and OPPT.     Time Calculation:         PT Charges       Row Name 08/10/23 0838             Time Calculation    Start Time 0745  -AS      PT Received On 08/10/23  -AS      PT Goal Re-Cert Due Date 08/18/23  -AS         Timed Charges    21897 - PT Therapeutic Exercise Minutes 10  -AS      37940 - Gait Training Minutes  13  -AS         Total Minutes    Timed Charges Total Minutes 23  -AS       Total Minutes 23  -AS                User Key  (r) = Recorded By, (t) = Taken By, (c) = Cosigned By      Initials Name Provider Type    AS Jacqueline Dior PTA Physical Therapist Assistant                  Therapy Charges for Today       Code Description Service Date Service Provider Modifiers Qty    82332172321 HC PT THER PROC EA 15 MIN 8/10/2023 Jacqueline Dior  ОЛЬГА Marie GP 1    15548170270 HC GAIT TRAINING EA 15 MIN 8/10/2023 Jacqueline Dior, ОЛЬГА GP 1            PT G-Codes  Outcome Measure Options: AM-PAC 6 Clicks Basic Mobility (PT)  AM-PAC 6 Clicks Score (PT): 19       Jacqueline Dior PTA  8/10/2023

## 2023-08-10 NOTE — PROGRESS NOTES
Oncology follow up note:    Discussed with pharmacy.  Xtandi can be dissolved and given through feeding tube.  Instructions from the drug company have been shared with pharmacy staff.      Reviewed with patient's wife over the phone.    I will sign off.  Please call with further questions.

## 2023-08-10 NOTE — PROGRESS NOTES
"Patient Name:  Ta Madison  YOB: 1960  [id number]    Surgery Progress Note    Date of visit: 8/10/2023      Subjective: No acute events overnight.  Tells me that he feels overall better today.  Had 3 bowel movements yesterday.  Tolerating tube feeds without difficulty.  Abdominal pain is improved          Objective:     /76 (BP Location: Right arm, Patient Position: Lying)   Pulse 103   Temp 99.7 øF (37.6 øC) (Oral)   Resp 22   Ht 172.7 cm (68\")   Wt 108 kg (237 lb)   SpO2 96%   BMI 36.04 kg/mý     Intake/Output Summary (Last 24 hours) at 8/10/2023 0718  Last data filed at 8/10/2023 0441  Gross per 24 hour   Intake 1903.41 ml   Output --   Net 1903.41 ml       GEN:   Awake, alert, in no acute distress, resting comfortably in bed   CV:   Regular rate and rhythm  L:  Symmetric expansion, not labored on room air  Abd:  Soft, some moderate diffuse distention, only very mild tenderness to deep palpation throughout all abdominal quadrants and improved  Ext:  No cyanosis, clubbing, or edema    Recent labs that are back at this time have been reviewed.          Assessment/ Plan:    Mr. Madison is a 63-year-old gentleman with history of GERD, prostate cancer on medical therapy, hyperlipidemia, and TIAs with gallstone pancreatitis      #Severe Gallstone pancreatitis  -No changes from my standpoint today.  Continue supportive measures         Derrek Tony MD  8/10/2023  07:18 EDT      "

## 2023-08-10 NOTE — PLAN OF CARE
Goal Outcome Evaluation:  Plan of Care Reviewed With: patient        Progress: no change  Outcome Evaluation: Pt. presents below baseline with ADLs and functional mobility. Limited by decreased activity tolerance, generalized weakness, and balance. Skilled OT services warranted to promote return to PLOF. Recommend home with assist at discharge.      Anticipated Discharge Disposition (OT): home with assist

## 2023-08-10 NOTE — PLAN OF CARE
Goal Outcome Evaluation:  Plan of Care Reviewed With: patient           Outcome Evaluation:   -Pt remains alert and oriented x4.   -PCA continued. Channel was disconnected, so 24 hour totals are not accurate. See charting. Pain rating remains at a 5, but tolerable. PCA syringe/tubing changed.  -Ambulated to bathroom with spouse. UOP adequate.   -One BM- bowel regimen not given per pt request.   -Pt remains on RA- tolerating well.   -HR 100s to 120s with exertion.   -TF remains at goal. Tolerating well.  -AM labs drawn, no replacement needed.

## 2023-08-11 VITALS
HEART RATE: 122 BPM | OXYGEN SATURATION: 94 % | SYSTOLIC BLOOD PRESSURE: 141 MMHG | BODY MASS INDEX: 35.92 KG/M2 | DIASTOLIC BLOOD PRESSURE: 71 MMHG | WEIGHT: 237 LBS | TEMPERATURE: 101 F | RESPIRATION RATE: 20 BRPM | HEIGHT: 68 IN

## 2023-08-11 LAB
ABO GROUP BLD: NORMAL
BACTERIA SPEC AEROBE CULT: NORMAL
BACTERIA SPEC AEROBE CULT: NORMAL
BLD GP AB SCN SERPL QL: NEGATIVE
D-LACTATE SERPL-SCNC: 3.5 MMOL/L (ref 0.5–2)
QT INTERVAL: 292 MS
QTC INTERVAL: 445 MS
RH BLD: POSITIVE
T&S EXPIRATION DATE: NORMAL

## 2023-08-11 RX ORDER — MONTELUKAST SODIUM 10 MG/1
10 TABLET ORAL NIGHTLY
Qty: 90 TABLET | Refills: 1 | Status: SHIPPED | OUTPATIENT
Start: 2023-08-11

## 2023-08-11 NOTE — PLAN OF CARE
Problem: Adult Inpatient Plan of Care  Goal: Plan of Care Review  8/11/2023 0341 by Jesenia Smith RN  Outcome: Unable to Meet, Plan Revised  8/11/2023 0341 by Jesenia Smith RN  Outcome: Ongoing, Progressing  8/11/2023 0202 by Jesenia Smith RN  Outcome: Ongoing, Progressing  Goal: Patient-Specific Goal (Individualized)  8/11/2023 0341 by Jesenia Smith RN  Outcome: Unable to Meet, Plan Revised  8/11/2023 0341 by Jesenia Smith RN  Outcome: Ongoing, Progressing  8/11/2023 0202 by Jesenia Smith RN  Outcome: Ongoing, Progressing  Goal: Absence of Hospital-Acquired Illness or Injury  8/11/2023 0341 by Jesenia Smith RN  Outcome: Unable to Meet, Plan Revised  8/11/2023 0341 by Jesenia Smith RN  Outcome: Ongoing, Progressing  8/11/2023 0202 by Jesenia Smith RN  Outcome: Ongoing, Progressing  Intervention: Identify and Manage Fall Risk  Recent Flowsheet Documentation  Taken 8/11/2023 0000 by Jesenia Smith RN  Safety Promotion/Fall Prevention:   activity supervised   clutter free environment maintained   room organization consistent   safety round/check completed  Taken 8/10/2023 2315 by Jesenia Smith RN  Safety Promotion/Fall Prevention:   activity supervised   clutter free environment maintained   room organization consistent   safety round/check completed  Taken 8/10/2023 2200 by Jesenia Smith RN  Safety Promotion/Fall Prevention:   activity supervised   clutter free environment maintained   room organization consistent   safety round/check completed  Taken 8/10/2023 2000 by Jesenia Smith RN  Safety Promotion/Fall Prevention:   activity supervised   clutter free environment maintained   room organization consistent   safety round/check completed   lighting adjusted  Intervention: Prevent Skin Injury  Recent Flowsheet Documentation  Taken 8/10/2023 2000 by Jesenia Smith RN  Body Position: position changed independently  Intervention: Prevent and Manage VTE (Venous Thromboembolism) Risk  Recent  Flowsheet Documentation  Taken 8/10/2023 2000 by Jesenia Smith RN  Activity Management: activity encouraged  Range of Motion: active ROM (range of motion) encouraged  Intervention: Prevent Infection  Recent Flowsheet Documentation  Taken 8/11/2023 0000 by Jesenia Smith RN  Infection Prevention:   environmental surveillance performed   rest/sleep promoted  Taken 8/10/2023 2315 by Jesenia Smith RN  Infection Prevention:   environmental surveillance performed   rest/sleep promoted  Taken 8/10/2023 2200 by Jesenia Smith RN  Infection Prevention:   environmental surveillance performed   rest/sleep promoted  Taken 8/10/2023 2000 by Jesenia Smith RN  Infection Prevention:   environmental surveillance performed   rest/sleep promoted  Goal: Optimal Comfort and Wellbeing  8/11/2023 0341 by Jesenia Smith RN  Outcome: Unable to Meet, Plan Revised  8/11/2023 0341 by Jesenia Smith RN  Outcome: Ongoing, Progressing  8/11/2023 0202 by Jesenia Smith RN  Outcome: Ongoing, Progressing  Intervention: Provide Person-Centered Care  Recent Flowsheet Documentation  Taken 8/10/2023 2000 by Jesenia Smith RN  Trust Relationship/Rapport:   care explained   choices provided   questions answered   questions encouraged   thoughts/feelings acknowledged  Goal: Readiness for Transition of Care  8/11/2023 0341 by Jesenia Smith RN  Outcome: Unable to Meet, Plan Revised  8/11/2023 0341 by Jesenia Smith RN  Outcome: Ongoing, Progressing  8/11/2023 0202 by Jesenia Smith RN  Outcome: Ongoing, Progressing     Problem: Hypertension Comorbidity  Goal: Blood Pressure in Desired Range  8/11/2023 0341 by Jesenia Smith RN  Outcome: Unable to Meet, Plan Revised  8/11/2023 0341 by Jesenia Smith RN  Outcome: Ongoing, Progressing  8/11/2023 0202 by Jesenia Smith RN  Outcome: Ongoing, Progressing  Intervention: Maintain Blood Pressure Management  Recent Flowsheet Documentation  Taken 8/11/2023 0000 by Jesenia Smith RN  Medication  Review/Management: medications reviewed  Taken 8/10/2023 2315 by Jesenia Smith RN  Medication Review/Management: medications reviewed  Taken 8/10/2023 2200 by Jesenia Smith RN  Medication Review/Management: medications reviewed  Taken 8/10/2023 2000 by Jesenia Smith RN  Medication Review/Management: medications reviewed     Problem: Skin Injury Risk Increased  Goal: Skin Health and Integrity  8/11/2023 0341 by Jesenia Smith RN  Outcome: Unable to Meet, Plan Revised  8/11/2023 0341 by Jesenia Smith RN  Outcome: Ongoing, Progressing  8/11/2023 0202 by Jesenia Smith RN  Outcome: Ongoing, Progressing  Intervention: Optimize Skin Protection  Recent Flowsheet Documentation  Taken 8/10/2023 2000 by Jesenia Smith RN  Head of Bed (HOB) Positioning: HOB elevated     Problem: Adjustment to Illness (Sepsis/Septic Shock)  Goal: Optimal Coping  8/11/2023 0341 by Jesenia Smith RN  Outcome: Unable to Meet, Plan Revised  8/11/2023 0341 by Jesenia Smith RN  Outcome: Ongoing, Progressing  8/11/2023 0202 by Jesenia Smith RN  Outcome: Ongoing, Progressing  Intervention: Optimize Psychosocial Adjustment to Illness  Recent Flowsheet Documentation  Taken 8/10/2023 2000 by Jesenia Smith RN  Family/Support System Care: self-care encouraged     Problem: Bleeding (Sepsis/Septic Shock)  Goal: Absence of Bleeding  8/11/2023 0341 by Jesenia Smith RN  Outcome: Unable to Meet, Plan Revised  8/11/2023 0341 by Jesenia Smith RN  Outcome: Ongoing, Progressing  8/11/2023 0202 by Jesenia Smith RN  Outcome: Ongoing, Progressing     Problem: Glycemic Control Impaired (Sepsis/Septic Shock)  Goal: Blood Glucose Level Within Desired Range  8/11/2023 0341 by Jesenia Smith RN  Outcome: Unable to Meet, Plan Revised  8/11/2023 0341 by Jesenia Smith RN  Outcome: Ongoing, Progressing  8/11/2023 0202 by Jesenia Smith RN  Outcome: Ongoing, Progressing     Problem: Infection Progression (Sepsis/Septic Shock)  Goal: Absence of  Infection Signs and Symptoms  8/11/2023 0341 by Jesenia Smith RN  Outcome: Unable to Meet, Plan Revised  8/11/2023 0341 by Jesenia Smith RN  Outcome: Ongoing, Progressing  8/11/2023 0202 by Jesenia Smith RN  Outcome: Ongoing, Progressing  Intervention: Initiate Sepsis Management  Recent Flowsheet Documentation  Taken 8/11/2023 0000 by Jesenia Smith RN  Infection Prevention:   environmental surveillance performed   rest/sleep promoted  Taken 8/10/2023 2315 by Jesenia Smith RN  Infection Prevention:   environmental surveillance performed   rest/sleep promoted  Taken 8/10/2023 2200 by Jesenia Smith RN  Infection Prevention:   environmental surveillance performed   rest/sleep promoted  Taken 8/10/2023 2000 by Jesenia Smith RN  Infection Prevention:   environmental surveillance performed   rest/sleep promoted  Intervention: Promote Recovery  Recent Flowsheet Documentation  Taken 8/10/2023 2000 by Jesenia Smith RN  Activity Management: activity encouraged     Problem: Nutrition Impaired (Sepsis/Septic Shock)  Goal: Optimal Nutrition Intake  8/11/2023 0341 by Jesenia Smith RN  Outcome: Unable to Meet, Plan Revised  8/11/2023 0341 by Jesenia Smith RN  Outcome: Ongoing, Progressing  8/11/2023 0202 by Jesenia Smith RN  Outcome: Ongoing, Progressing     Problem: Fluid Imbalance (Pancreatitis)  Goal: Fluid Balance  8/11/2023 0341 by Jesenia Smith RN  Outcome: Unable to Meet, Plan Revised  8/11/2023 0341 by Jesenia Smith RN  Outcome: Ongoing, Progressing  8/11/2023 0202 by Jesenia Smith RN  Outcome: Ongoing, Progressing     Problem: Infection (Pancreatitis)  Goal: Infection Symptom Resolution  8/11/2023 0341 by Jesenia Smith RN  Outcome: Unable to Meet, Plan Revised  8/11/2023 0341 by Jesenia Smith RN  Outcome: Ongoing, Progressing  8/11/2023 0202 by Jesenia Smith RN  Outcome: Ongoing, Progressing     Problem: Nutrition Impaired (Pancreatitis)  Goal: Optimal Nutrition Intake  8/11/2023 0341 by  Engbers, Jesenia, RN  Outcome: Unable to Meet, Plan Revised  8/11/2023 0341 by Jesenia Smith RN  Outcome: Ongoing, Progressing  8/11/2023 0202 by Jesenia Smith RN  Outcome: Ongoing, Progressing     Problem: Pain (Pancreatitis)  Goal: Acceptable Pain Control  8/11/2023 0341 by Jesenia Smith RN  Outcome: Unable to Meet, Plan Revised  8/11/2023 0341 by Jesenia Smith RN  Outcome: Ongoing, Progressing  8/11/2023 0202 by Jesenia Smith RN  Outcome: Ongoing, Progressing     Problem: Fall Injury Risk  Goal: Absence of Fall and Fall-Related Injury  8/11/2023 0341 by Jesenia Smith RN  Outcome: Unable to Meet, Plan Revised  8/11/2023 0341 by Jesenia Smith RN  Outcome: Ongoing, Progressing  8/11/2023 0202 by Jesenia Smith RN  Outcome: Ongoing, Progressing  Intervention: Identify and Manage Contributors  Recent Flowsheet Documentation  Taken 8/11/2023 0000 by Jesenia Smith RN  Medication Review/Management: medications reviewed  Taken 8/10/2023 2315 by Jesenia Smith RN  Medication Review/Management: medications reviewed  Taken 8/10/2023 2200 by Jesenia Smith RN  Medication Review/Management: medications reviewed  Taken 8/10/2023 2000 by Jesenia Smith RN  Medication Review/Management: medications reviewed  Intervention: Promote Injury-Free Environment  Recent Flowsheet Documentation  Taken 8/11/2023 0000 by Jesenia Smith RN  Safety Promotion/Fall Prevention:   activity supervised   clutter free environment maintained   room organization consistent   safety round/check completed  Taken 8/10/2023 2315 by Jesenia Smith RN  Safety Promotion/Fall Prevention:   activity supervised   clutter free environment maintained   room organization consistent   safety round/check completed  Taken 8/10/2023 2200 by Jesenia Smith RN  Safety Promotion/Fall Prevention:   activity supervised   clutter free environment maintained   room organization consistent   safety round/check completed  Taken 8/10/2023 2000 by Luis  JUDITH Ba  Safety Promotion/Fall Prevention:   activity supervised   clutter free environment maintained   room organization consistent   safety round/check completed   lighting adjusted     Problem: Palliative Care  Goal: Enhanced Quality of Life  8/11/2023 0341 by Jesenia Smith RN  Outcome: Unable to Meet, Plan Revised  8/11/2023 0341 by Jesenia Smith RN  Outcome: Ongoing, Progressing  8/11/2023 0202 by Jesenia Smith RN  Outcome: Ongoing, Progressing  Intervention: Optimize Psychosocial Wellbeing  Recent Flowsheet Documentation  Taken 8/10/2023 2000 by Jesenia Smith RN  Family/Support System Care: self-care encouraged  Goal: Enhanced Quality of Life  8/11/2023 0341 by Jesenia Smith RN  Outcome: Unable to Meet, Plan Revised  8/11/2023 0341 by Jesenia Smith RN  Outcome: Ongoing, Progressing  8/11/2023 0202 by Jesenia Smith RN  Outcome: Ongoing, Progressing  Intervention: Optimize Psychosocial Wellbeing  Recent Flowsheet Documentation  Taken 8/10/2023 2000 by Jesenia Smith RN  Family/Support System Care: self-care encouraged   Goal Outcome Evaluation:                        Problem: Adult Inpatient Plan of Care  Goal: Plan of Care Review  8/11/2023 0341 by Jesenia Smith RN  Outcome: Unable to Meet, Plan Revised  8/11/2023 0341 by Jesenia Smith RN  Outcome: Ongoing, Progressing  8/11/2023 0202 by Jesenia Smith RN  Outcome: Ongoing, Progressing  Goal: Patient-Specific Goal (Individualized)  8/11/2023 0341 by Jesenia Smith RN  Outcome: Unable to Meet, Plan Revised  8/11/2023 0341 by Jesenia Smith RN  Outcome: Ongoing, Progressing  8/11/2023 0202 by Jesenia Smith RN  Outcome: Ongoing, Progressing  Goal: Absence of Hospital-Acquired Illness or Injury  8/11/2023 0341 by Jesenia Smith RN  Outcome: Unable to Meet, Plan Revised  8/11/2023 0341 by Jesenia Smith RN  Outcome: Ongoing, Progressing  8/11/2023 0202 by Jesenia Smith RN  Outcome: Ongoing, Progressing  Goal: Optimal Comfort and  Wellbeing  8/11/2023 0341 by Jesenia Smith RN  Outcome: Unable to Meet, Plan Revised  8/11/2023 0341 by Jesenia Smith RN  Outcome: Ongoing, Progressing  8/11/2023 0202 by Jesenia Smith RN  Outcome: Ongoing, Progressing  Goal: Readiness for Transition of Care  8/11/2023 0341 by Jesenia Smith RN  Outcome: Unable to Meet, Plan Revised  8/11/2023 0341 by Jesenia Smith RN  Outcome: Ongoing, Progressing  8/11/2023 0202 by Jesenia Smith RN  Outcome: Ongoing, Progressing     Problem: Hypertension Comorbidity  Goal: Blood Pressure in Desired Range  8/11/2023 0341 by Jesenia Smith RN  Outcome: Unable to Meet, Plan Revised  8/11/2023 0341 by Jesenia Smith RN  Outcome: Ongoing, Progressing  8/11/2023 0202 by Jesenia Smith RN  Outcome: Ongoing, Progressing     Problem: Skin Injury Risk Increased  Goal: Skin Health and Integrity  8/11/2023 0341 by Jesenia Smith RN  Outcome: Unable to Meet, Plan Revised  8/11/2023 0341 by Jesenia Smith RN  Outcome: Ongoing, Progressing  8/11/2023 0202 by Jesenia Smith RN  Outcome: Ongoing, Progressing     Problem: Adjustment to Illness (Sepsis/Septic Shock)  Goal: Optimal Coping  8/11/2023 0341 by Jesenia Smith RN  Outcome: Unable to Meet, Plan Revised  8/11/2023 0341 by Jesenia Smith RN  Outcome: Ongoing, Progressing  8/11/2023 0202 by Jesenia Smith RN  Outcome: Ongoing, Progressing     Problem: Bleeding (Sepsis/Septic Shock)  Goal: Absence of Bleeding  8/11/2023 0341 by Jesenia Smith RN  Outcome: Unable to Meet, Plan Revised  8/11/2023 0341 by Jesenia Smith RN  Outcome: Ongoing, Progressing  8/11/2023 0202 by Jesenia Smith RN  Outcome: Ongoing, Progressing     Problem: Glycemic Control Impaired (Sepsis/Septic Shock)  Goal: Blood Glucose Level Within Desired Range  8/11/2023 0341 by Jesenia Smith RN  Outcome: Unable to Meet, Plan Revised  8/11/2023 0341 by Jesenia Smith, RN  Outcome: Ongoing, Progressing  8/11/2023 0202 by Jesenia Smith, RN  Outcome:  Ongoing, Progressing     Problem: Infection Progression (Sepsis/Septic Shock)  Goal: Absence of Infection Signs and Symptoms  8/11/2023 0341 by Jesenia Smith RN  Outcome: Unable to Meet, Plan Revised  8/11/2023 0341 by Jesenia Smith RN  Outcome: Ongoing, Progressing  8/11/2023 0202 by Jesenia Smith RN  Outcome: Ongoing, Progressing     Problem: Nutrition Impaired (Sepsis/Septic Shock)  Goal: Optimal Nutrition Intake  8/11/2023 0341 by Jesenia Smith RN  Outcome: Unable to Meet, Plan Revised  8/11/2023 0341 by Jesenia Smith RN  Outcome: Ongoing, Progressing  8/11/2023 0202 by Jesenia Smith RN  Outcome: Ongoing, Progressing     Problem: Fluid Imbalance (Pancreatitis)  Goal: Fluid Balance  8/11/2023 0341 by Jesenia Smith RN  Outcome: Unable to Meet, Plan Revised  8/11/2023 0341 by Jesenia Smith RN  Outcome: Ongoing, Progressing  8/11/2023 0202 by Jesenia Smith RN  Outcome: Ongoing, Progressing     Problem: Infection (Pancreatitis)  Goal: Infection Symptom Resolution  8/11/2023 0341 by Jesenia Smith RN  Outcome: Unable to Meet, Plan Revised  8/11/2023 0341 by Jesenia Smith RN  Outcome: Ongoing, Progressing  8/11/2023 0202 by Jesenia Smith RN  Outcome: Ongoing, Progressing     Problem: Nutrition Impaired (Pancreatitis)  Goal: Optimal Nutrition Intake  8/11/2023 0341 by Jesenia Smith RN  Outcome: Unable to Meet, Plan Revised  8/11/2023 0341 by Jesenai Smith RN  Outcome: Ongoing, Progressing  8/11/2023 0202 by Jesenia Smith RN  Outcome: Ongoing, Progressing     Problem: Pain (Pancreatitis)  Goal: Acceptable Pain Control  8/11/2023 0341 by Jesenia Smith RN  Outcome: Unable to Meet, Plan Revised  8/11/2023 0341 by Jesenia Smith RN  Outcome: Ongoing, Progressing  8/11/2023 0202 by Jesenia Smith RN  Outcome: Ongoing, Progressing     Problem: Fall Injury Risk  Goal: Absence of Fall and Fall-Related Injury  8/11/2023 0341 by Engbers, Jesenia, RN  Outcome: Unable to Meet, Plan Revised  8/11/2023  0341 by Jesenia Smith RN  Outcome: Ongoing, Progressing  8/11/2023 0202 by Jesenia Smith RN  Outcome: Ongoing, Progressing     Problem: Palliative Care  Goal: Enhanced Quality of Life  8/11/2023 0341 by Jesenia Smith RN  Outcome: Unable to Meet, Plan Revised  8/11/2023 0341 by Jesenia Smith RN  Outcome: Ongoing, Progressing  8/11/2023 0202 by Jesenia Smith RN  Outcome: Ongoing, Progressing  Goal: Enhanced Quality of Life  8/11/2023 0341 by Jesenia Smith RN  Outcome: Unable to Meet, Plan Revised  8/11/2023 0341 by Jesenia Smith RN  Outcome: Ongoing, Progressing  8/11/2023 0202 by Jesenia Smith RN  Outcome: Ongoing, Progressing

## 2023-08-11 NOTE — PLAN OF CARE
Problem: Adult Inpatient Plan of Care  Goal: Plan of Care Review  Outcome: Ongoing, Progressing   Goal Outcome Evaluation:   [facility] room 239 for continuation of care. Report called to Genet WONG at 0056.

## 2023-08-11 NOTE — SIGNIFICANT NOTE
Pt with RRT called for severe rigors, new requirement of 4L, tachy to 144, and respirations to 35-40. Upon arrival pt blood pressure is 160 sytolic. He is in moderate distress and appears pale, mottled, sitting up in bed and tachypneic.  He reports feeling severely cold and soa.  Oral temp was 98.1.  Suspected cytokine storm and requested rectal temp which was 102.3. Tylenol given.  CT reviewed which shows enlarging pseudocyst that now has air bubbles. Continues to have evidence of necrotizing pancreatitis to head and body of pancreas.  Tube feeds stopped. Pseudocyst is compressing anteriorly upon the body of the stomach.      General surgery called who has reviewed his films and recommends we start initiation of transfer to [facility].   contacted and patient placed on waitlist.  Estimated it could potentially take up to 4-5 days for transfer unless patient decompensates.   Pt accepted on 8/10/23 at 23:41 by Dr. Yoo.      I have also contacted [facility] in [facility]. Awaiting call from Dr. Ayon.

## 2023-08-30 ENCOUNTER — OFFICE VISIT (OUTPATIENT)
Dept: FAMILY MEDICINE CLINIC | Facility: CLINIC | Age: 63
End: 2023-08-30
Payer: COMMERCIAL

## 2023-08-30 VITALS
TEMPERATURE: 97.7 F | HEIGHT: 68 IN | WEIGHT: 171 LBS | HEART RATE: 79 BPM | DIASTOLIC BLOOD PRESSURE: 58 MMHG | OXYGEN SATURATION: 95 % | BODY MASS INDEX: 25.91 KG/M2 | SYSTOLIC BLOOD PRESSURE: 118 MMHG

## 2023-08-30 DIAGNOSIS — K85.91 NECROTIZING PANCREATITIS: Primary | ICD-10-CM

## 2023-08-30 DIAGNOSIS — E46 PROTEIN-CALORIE MALNUTRITION, UNSPECIFIED SEVERITY: ICD-10-CM

## 2023-08-30 RX ORDER — OXYCODONE HYDROCHLORIDE 5 MG/1
TABLET ORAL
COMMUNITY
Start: 2023-08-26

## 2023-08-30 RX ORDER — MIRTAZAPINE 7.5 MG/1
7.5 TABLET, FILM COATED ORAL
COMMUNITY
Start: 2023-08-26 | End: 2023-09-25

## 2023-08-30 RX ORDER — SIMETHICONE 80 MG
80 TABLET,CHEWABLE ORAL EVERY 6 HOURS PRN
COMMUNITY
Start: 2023-08-26 | End: 2023-09-05

## 2023-08-30 RX ORDER — ONDANSETRON 4 MG/1
4 TABLET, ORALLY DISINTEGRATING ORAL
COMMUNITY
Start: 2023-08-26 | End: 2023-09-02

## 2023-08-30 RX ORDER — LANOLIN ALCOHOL/MO/W.PET/CERES
6 CREAM (GRAM) TOPICAL
COMMUNITY
Start: 2023-08-26

## 2023-08-30 RX ORDER — CALCIUM CARBONATE 500 MG/1
500 TABLET, CHEWABLE ORAL
COMMUNITY
Start: 2023-08-26 | End: 2024-08-25

## 2023-08-30 RX ORDER — METHOCARBAMOL 500 MG/1
1000 TABLET, FILM COATED ORAL 4 TIMES DAILY
Qty: 80 TABLET | Refills: 0 | COMMUNITY
Start: 2023-08-26 | End: 2023-09-05

## 2023-08-30 NOTE — PROGRESS NOTES
"Chief Complaint  Pancreatitis and Hospital Follow Up Visit    Subjective        Ta Madison presents to [facility]  History of Present Illness  Returns today follow up after lengthy hospital stay for pancreatitis.  Patient is improving but admits to aggravation most days not being able to eat.  Scheduled in the am for NG removal and peg placement.  No acute concerns today     Hospital records reviewed and summary copied below:      Ta Madison is a 63 y.o. male with PMHx significant for stage IV adenocarcinoma of the prostate on Xtandi, GERD, HLD, TIA, occasional marijuana use, and 2 pack per day tobacco abuse who presented to the [facility] on 8/11/2023 as a transfer from OSH for acute necrotizing pancreatitis with pseudocyst. Patient originally presented to [place] ED on 07/23/2023 with severe epigastric abdominal pain, nausea, vomiting. Initial CT scan of abdomen and pelvis showed pancreatitis with 2 mm stone in the proximal duodenum. This prompted transfer to [facility] in [place] for GI and GS evaluation. MRCP on 07/24 revealed acute pancreatitis and cholelithiasis without choledocholithiasis. IV meropenem initiated on 07/27 for concerns of necrotizing pancreatitis. Worsening abdominal pain prompted repeat CT abdomen and pelvis with contrast on 07/30 showing pancreatitis. Repeat CT on 08/07 revealed potential necrotizing pancreatitis of the head and body without abscess or pseudocyst. IV micafungin initiated on 08/07 and tube feeds placed on hold. To feed since reintroduced. Patient received IV diuretics for edema. Repeat CT on 08/10 showed pancreatitis with enlarging pseudocyst containing air. Decompensation on the evening of 8/10 prompted transfer to this facility.\" Taken from H&P  Admitted to EGS for evaluation fo post-necrotic fluid collection.  Consulted IR on 8/11 with recommendations for conservative management as collection poorly organized and not " "amenable to percutaneous intervention.  Consulted GI on 8/11 for necrotizing pancreatitis, repeat imaging completed and EGD with cyst-gastrostomy done on 8/17 and necrosectomy on 8/22. Will need this repeated in 1-2 weeks, as well as a PEG-J at that time due to inability to tolerate oral nutrition.  MBS 8/14: easy chew diet with nectar thick liquids. Feeding tube remained in place to optimize nutritional status.  Family taught tube feeding management. Order for peptamen sent to [facility] for Monday delivery, as well as to  [facility] for weekend administration.    Physical therapy and occupational therapy evaluated the patient during hospitalization and recommend home.    At the time of discharge the patient was hemodynamically stable, tolerating PO, voiding spontaneously, normal bowel function, mobilizing appropriately, with their pain controlled with PO medication. At this time, the patient has obtained the maximum benefit from the present hospital stay, and so will be discharged to home.    Objective   Vital Signs:  /58 (BP Location: Right arm, Patient Position: Sitting)   Pulse 79   Temp 97.7 øF (36.5 øC) (Temporal)   Ht 172.7 cm (68\")   Wt 77.6 kg (171 lb)   SpO2 95%   BMI 26.00 kg/mý   Estimated body mass index is 26 kg/mý as calculated from the following:    Height as of this encounter: 172.7 cm (68\").    Weight as of this encounter: 77.6 kg (171 lb).               Physical Exam   Result Review :                   Assessment and Plan   Diagnoses and all orders for this visit:    1. Necrotizing pancreatitis (Primary)  Continue with GI in the AM  Encouraged increased intake as tolerated    2. Protein-calorie malnutrition, unspecified severity    Other orders  -     nystatin (MYCOSTATIN) 100,000 unit/mL suspension; Swish and swallow 5 mL 4 (Four) Times a Day.  Dispense: 500 mL; Refill: 2             Follow Up   Return if symptoms worsen or fail to improve, for Recheck, Next scheduled follow " up.  Patient was given instructions and counseling regarding his condition or for health maintenance advice. Please see specific information pulled into the AVS if appropriate.

## 2023-09-06 ENCOUNTER — OFFICE VISIT (OUTPATIENT)
Dept: FAMILY MEDICINE CLINIC | Facility: CLINIC | Age: 63
End: 2023-09-06
Payer: COMMERCIAL

## 2023-09-06 VITALS
DIASTOLIC BLOOD PRESSURE: 50 MMHG | HEART RATE: 79 BPM | SYSTOLIC BLOOD PRESSURE: 90 MMHG | TEMPERATURE: 97.7 F | OXYGEN SATURATION: 94 % | WEIGHT: 157.4 LBS | BODY MASS INDEX: 23.86 KG/M2 | HEIGHT: 68 IN

## 2023-09-06 DIAGNOSIS — R19.7 DIARRHEA, UNSPECIFIED TYPE: ICD-10-CM

## 2023-09-06 DIAGNOSIS — E46 PROTEIN-CALORIE MALNUTRITION, UNSPECIFIED SEVERITY: Primary | ICD-10-CM

## 2023-09-06 DIAGNOSIS — K85.91 NECROTIZING PANCREATITIS: ICD-10-CM

## 2023-09-06 PROCEDURE — 1159F MED LIST DOCD IN RCRD: CPT | Performed by: NURSE PRACTITIONER

## 2023-09-06 PROCEDURE — 1160F RVW MEDS BY RX/DR IN RCRD: CPT | Performed by: NURSE PRACTITIONER

## 2023-09-06 PROCEDURE — 99214 OFFICE O/P EST MOD 30 MIN: CPT | Performed by: NURSE PRACTITIONER

## 2023-09-06 RX ORDER — MULTIPLE VITAMINS W/ MINERALS TAB 9MG-400MCG
1 TAB ORAL DAILY
COMMUNITY
Start: 2023-09-01

## 2023-09-06 RX ORDER — BENZONATATE 100 MG/1
CAPSULE, LIQUID FILLED ORAL
COMMUNITY
Start: 2023-09-01

## 2023-09-06 RX ORDER — NUT.TX.IMPAIRED DIGESTIVE FXN 0.068G-1.5
LIQUID (ML) ORAL
COMMUNITY

## 2023-09-06 RX ORDER — SUCRALFATE ORAL 1 G/10ML
1 SUSPENSION ORAL
COMMUNITY
Start: 2023-09-01 | End: 2023-10-01

## 2023-09-06 RX ORDER — PANTOPRAZOLE SODIUM 40 MG/1
TABLET, DELAYED RELEASE ORAL
COMMUNITY
Start: 2023-09-01

## 2023-09-06 RX ORDER — LANOLIN ALCOHOL/MO/W.PET/CERES
100 CREAM (GRAM) TOPICAL DAILY
Qty: 30 TABLET | Refills: 0 | COMMUNITY
Start: 2023-09-01 | End: 2023-10-01

## 2023-09-06 RX ORDER — FOLIC ACID 1 MG/1
1 TABLET ORAL DAILY
Qty: 30 TABLET | Refills: 0 | COMMUNITY
Start: 2023-09-01 | End: 2023-10-01

## 2023-09-06 NOTE — PROGRESS NOTES
"Chief Complaint  Diarrhea (Having diarrhea for two days )    Subjective        Ta Madison presents to [facility]  Diarrhea     Returns today with 2 day history diarrhea.  Wife with similar symptoms.  No fever or chills.  Ongoing anorexia with no desire to eat.  NG removed last week with PEG placement for continuous feeding.  Patient has not allowed wife to do continuous feeding as of yet.  Total consumption is two cans ensure per day or less and very limited oral intake.  Patient states he is not hungry and has no desire to eat.  Initially with NG felt he couldn't swallow in hospital swallow study ok.  No episodes of choking with food or liquid.  Patient admits he is tired and weak experiencing some mild fullness in abd with consumption.    Objective   Vital Signs:  BP 90/50 (BP Location: Right arm, Patient Position: Sitting)   Pulse 79   Temp 97.7 °F (36.5 °C) (Temporal)   Ht 172.7 cm (68\")   Wt 71.4 kg (157 lb 6.4 oz)   SpO2 94%   BMI 23.93 kg/m²   Estimated body mass index is 23.93 kg/m² as calculated from the following:    Height as of this encounter: 172.7 cm (68\").    Weight as of this encounter: 71.4 kg (157 lb 6.4 oz).       BMI is within normal parameters. No other follow-up for BMI required.      Physical Exam  Vitals and nursing note reviewed.   Constitutional:       General: He is not in acute distress.     Appearance: He is well-developed. He is not ill-appearing.   HENT:      Head: Normocephalic.   Cardiovascular:      Rate and Rhythm: Normal rate and regular rhythm.      Heart sounds: Normal heart sounds. No murmur heard.  Pulmonary:      Effort: Pulmonary effort is normal.      Breath sounds: Normal breath sounds.   Abdominal:      Palpations: Abdomen is soft.      Comments: PEG skin clear      Skin:     General: Skin is warm and dry.   Neurological:      Mental Status: He is alert and oriented to person, place, and time.   Psychiatric:         Behavior: " Behavior normal.      Result Review :                   Assessment and Plan   Diagnoses and all orders for this visit:    1. Protein-calorie malnutrition, unspecified severity (Primary)  -     Ambulatory Referral to Home Health    2. Necrotizing pancreatitis  -     Ambulatory Referral to Home Health    3. Diarrhea, unspecified type    Encouraged continuous feeding as recommended and small oral and liquid intake.  Patient states he will try and agrees to PT/OT at home to assist           Follow Up   Return in about 4 weeks (around 10/4/2023), or if symptoms worsen or fail to improve, for Recheck.  Patient was given instructions and counseling regarding his condition or for health maintenance advice. Please see specific information pulled into the AVS if appropriate.

## 2023-09-08 ENCOUNTER — OFFICE VISIT (OUTPATIENT)
Dept: FAMILY MEDICINE CLINIC | Facility: CLINIC | Age: 63
End: 2023-09-08
Payer: COMMERCIAL

## 2023-09-08 VITALS
BODY MASS INDEX: 23.64 KG/M2 | SYSTOLIC BLOOD PRESSURE: 110 MMHG | OXYGEN SATURATION: 96 % | HEIGHT: 68 IN | DIASTOLIC BLOOD PRESSURE: 68 MMHG | TEMPERATURE: 97.7 F | WEIGHT: 156 LBS | HEART RATE: 86 BPM

## 2023-09-08 DIAGNOSIS — K85.91 NECROTIZING PANCREATITIS: Primary | ICD-10-CM

## 2023-09-08 PROCEDURE — 36415 COLL VENOUS BLD VENIPUNCTURE: CPT | Performed by: FAMILY MEDICINE

## 2023-09-08 PROCEDURE — 85025 COMPLETE CBC W/AUTO DIFF WBC: CPT | Performed by: FAMILY MEDICINE

## 2023-09-08 PROCEDURE — 83690 ASSAY OF LIPASE: CPT | Performed by: FAMILY MEDICINE

## 2023-09-08 PROCEDURE — 80053 COMPREHEN METABOLIC PANEL: CPT | Performed by: FAMILY MEDICINE

## 2023-09-08 PROCEDURE — 82150 ASSAY OF AMYLASE: CPT | Performed by: FAMILY MEDICINE

## 2023-09-08 RX ORDER — ONDANSETRON 2 MG/ML
4 INJECTION INTRAMUSCULAR; INTRAVENOUS ONCE
Status: COMPLETED | OUTPATIENT
Start: 2023-09-08 | End: 2023-09-08

## 2023-09-08 RX ADMIN — ONDANSETRON 4 MG: 2 INJECTION INTRAMUSCULAR; INTRAVENOUS at 13:30

## 2023-09-08 NOTE — PROGRESS NOTES
"Ta Madison     VITALS: Blood pressure 110/68, pulse 86, temperature 97.7 °F (36.5 °C), temperature source Temporal, height 172.7 cm (68\"), weight 70.8 kg (156 lb), SpO2 96 %.    Subjective  Chief Complaint  Green bile in feeding tube    Subjective          History of Present Illness:  Patient is a 63 y.o.  male with medical conditions significant for GERD, gallstone pancreatitis, elevated creatinine, history of TIA with a feeding tube who presents to clinic for an acute concern. He is having green discharge from his feeding tube. An adult female accompanies him.    The adult female conveys that on the evening of 09/06/2023, the patient had an extensive amount of green bile that poured out from his feeding tube site. The adult female further noted that on the evening of 09/07/2023, there was an additional amount of drainage; however, to a lesser degree than noted 09/06/2023.  He confirmed pain around his feeding tube site which was described as cramping. He does experience nausea and vomiting. The patient had a stent placed 09/01/2023 and it was recently cleansed out. He is scheduled to see Dr. White 09/22/2023 for follow-up. The adult female notes the patient has lost weight. He does not take the feedings very well. The patient has diarrhea. She thought they both had gotten a virus because she had diarrhea for a day, as well. The patient is not taking anything by mouth. When the patient is administered a feed through the feeding tube, it flows through without obstruction. He did not come home on any antibiotics. The patient denied having a fever at home.    No complaints about any of the medications.    The following portions of the patient's history were reviewed and updated as appropriate: allergies, current medications, past family history, past medical history, past social history, past surgical history and problem list.    Past Medical History  Past Medical History:   Diagnosis Date    Elevated cholesterol " "    GERD (gastroesophageal reflux disease)     Increased heart rate     Mini stroke     Neck pain     Prostate cancer     Rash     Tobacco abuse        Surgical History  Past Surgical History:   Procedure Laterality Date    APPENDECTOMY      [facility]     COLONOSCOPY      COLONOSCOPY N/A 2023    Procedure: COLONOSCOPY;  Surgeon: Mahnaz Caraballo MD;  Location: [facility] OR;  Service: Gastroenterology;  Laterality: N/A;    ENDOSCOPY N/A 2023    Procedure: ESOPHAGOGASTRODUODENOSCOPY WITH KEOFEED PLACEMENT;  Surgeon: Yolande Eller MD;  Location:  [facility];  Service: Gastroenterology;  Laterality: N/A;       Family History  Family History   Problem Relation Age of Onset    Nephrolithiasis Mother     Heart disease Father     Hypertension Father     Kidney disease Father        Social History  Social History     Socioeconomic History    Marital status:    Tobacco Use    Smoking status: Former     Packs/day: 2.00     Years: 45.00     Pack years: 90.00     Types: Cigarettes     Quit date: 2023     Years since quittin.1    Smokeless tobacco: Never   Vaping Use    Vaping Use: Never used   Substance and Sexual Activity    Alcohol use: No    Drug use: Yes     Types: Marijuana     Comment: occ     Sexual activity: Defer       Objective   Vital Signs:   /68 (BP Location: Right arm, Patient Position: Sitting, Cuff Size: Adult)   Pulse 86   Temp 97.7 °F (36.5 °C) (Temporal)   Ht 172.7 cm (68\")   Wt 70.8 kg (156 lb)   SpO2 96%   BMI 23.72 kg/m²     Physical Exam     Gen: Patient in NAD. Pleasant and answers appropriately. A&Ox3.    Skin: Warm and dry with normal turgor. No purpura, rashes, or unusual pigmentation noted. Hair is normal in appearance and distribution.    HEENT: NC/AT. No lesions noted. Conjunctiva clear, sclera nonicteric. PERRL. EOMI without nystagmus or strabismus. Fundi appear benign. No hemorrhages or exudates of eyes. Auditory canals are patent " bilaterally without lesions. TMs intact,  nonerythematous, nonbulging without lesions. Nasal mucosa pink, nonerythematous, and nonedematous. Frontal and maxillary sinuses are nontender. O/P nonerythematous and moist without exudate.    Neck: Supple without lymph nodes palpated. FROM.     Lungs: Decreased B/L without rales, rhonchi, crackles, or wheezes.    Heart: RRR. S1 and S2 normal. No S3 or S4. No MRGT.    Abd: Soft, diffusely tender, slightly distended. (+)BSx4 quadrants.  Feeding tube has green bile in it.  Stoma within normal limits.    Extrem: No CCE. Radial pulses 2+/4 and equal B/L. FROMx4. No bone, joint, or muscle tenderness noted.    Neuro: No focal motor/sensory deficits.    Procedures    Result Review :   The following data was reviewed by: Sharon Regalado MD on 09/08/2023:                Assessment and Plan    Ta Madison is a 63 y.o. here for medical followup.    Diagnoses and all orders for this visit:    1. Necrotizing pancreatitis (Primary)  -     CBC Auto Differential; Future  -     Comprehensive Metabolic Panel; Future  -     Amylase; Future  -     Lipase; Future  -     ondansetron (ZOFRAN) injection 4 mg  -     CBC Auto Differential  -     Comprehensive Metabolic Panel  -     Amylase  -     Lipase          Problem List Items Addressed This Visit    None  Visit Diagnoses       Necrotizing pancreatitis    -  Primary    Relevant Medications    ondansetron (ZOFRAN) injection 4 mg (Completed)    Other Relevant Orders    CBC Auto Differential    Comprehensive Metabolic Panel    Amylase    Lipase          1.  Necrotizing pancreatitis.  -Contacted Dr. White's office so he is aware of the situation.  His nurse was not available so discussed the situation with his nurse coordinator Sharon.  She is familiar with the patient.  Discussed at length the findings.  Unsure at this point if this requires admit, but given his previous length of his last admit, we would like to be on the conservative side.   She was sent a message to Dr. Jacobs.  Discussed at length that if he feels worse, he is to head to the  ER.  Should the labs be concerning, he also should go to the  ER.  -Will order a CBC, pancreas enzymes, and other laboratory studies.    BMI is within normal parameters. No other follow-up for BMI required.               I spent 90 minutes caring for Ta on this date of service. This time includes time spent by me in the following activities:obtaining and/or reviewing a separately obtained history, performing a medically appropriate examination and/or evaluation , counseling and educating the patient/family/caregiver, referring and communicating with other health care professionals , and care coordination  Follow Up   Return in about 1 week (around 9/15/2023).  Findings and plans discussed with patient who verbalizes understanding and agreement. Will followup with patient once results are in. Patient was given instructions and counseling regarding his condition or for health maintenance advice. Please see specific information pulled into the AVS if appropriate.     Sharon Regalado MD     Transcribed from ambient dictation for Sharon Regalado MD by Ting Del Rosario.  09/08/23   17:56 EDT    Patient or patient representative verbalized consent to the visit recording.  I have personally performed the services described in this document as transcribed by the above individual, and it is both accurate and complete.

## 2023-09-09 LAB
ALBUMIN SERPL-MCNC: 3.2 G/DL (ref 3.5–5.2)
ALBUMIN/GLOB SERPL: 0.7 G/DL
ALP SERPL-CCNC: 113 U/L (ref 39–117)
ALT SERPL W P-5'-P-CCNC: 12 U/L (ref 1–41)
AMYLASE SERPL-CCNC: 237 U/L (ref 28–100)
ANION GAP SERPL CALCULATED.3IONS-SCNC: 13 MMOL/L (ref 5–15)
AST SERPL-CCNC: 15 U/L (ref 1–40)
BASOPHILS # BLD AUTO: 0.04 10*3/MM3 (ref 0–0.2)
BASOPHILS NFR BLD AUTO: 0.2 % (ref 0–1.5)
BILIRUB SERPL-MCNC: 0.3 MG/DL (ref 0–1.2)
BUN SERPL-MCNC: 28 MG/DL (ref 8–23)
BUN/CREAT SERPL: 50.9 (ref 7–25)
CALCIUM SPEC-SCNC: 9.4 MG/DL (ref 8.6–10.5)
CHLORIDE SERPL-SCNC: 97 MMOL/L (ref 98–107)
CO2 SERPL-SCNC: 26 MMOL/L (ref 22–29)
CREAT SERPL-MCNC: 0.55 MG/DL (ref 0.76–1.27)
DEPRECATED RDW RBC AUTO: 45 FL (ref 37–54)
EGFRCR SERPLBLD CKD-EPI 2021: 112.1 ML/MIN/1.73
EOSINOPHIL # BLD AUTO: 0.01 10*3/MM3 (ref 0–0.4)
EOSINOPHIL NFR BLD AUTO: 0.1 % (ref 0.3–6.2)
ERYTHROCYTE [DISTWIDTH] IN BLOOD BY AUTOMATED COUNT: 16.6 % (ref 12.3–15.4)
GLOBULIN UR ELPH-MCNC: 4.5 GM/DL
GLUCOSE SERPL-MCNC: 118 MG/DL (ref 65–99)
HCT VFR BLD AUTO: 28.6 % (ref 37.5–51)
HGB BLD-MCNC: 8.9 G/DL (ref 13–17.7)
IMM GRANULOCYTES # BLD AUTO: 0.11 10*3/MM3 (ref 0–0.05)
IMM GRANULOCYTES NFR BLD AUTO: 0.7 % (ref 0–0.5)
LIPASE SERPL-CCNC: 96 U/L (ref 13–60)
LYMPHOCYTES # BLD AUTO: 2.16 10*3/MM3 (ref 0.7–3.1)
LYMPHOCYTES NFR BLD AUTO: 13 % (ref 19.6–45.3)
MCH RBC QN AUTO: 23.5 PG (ref 26.6–33)
MCHC RBC AUTO-ENTMCNC: 31.1 G/DL (ref 31.5–35.7)
MCV RBC AUTO: 75.7 FL (ref 79–97)
MONOCYTES # BLD AUTO: 0.79 10*3/MM3 (ref 0.1–0.9)
MONOCYTES NFR BLD AUTO: 4.8 % (ref 5–12)
NEUTROPHILS NFR BLD AUTO: 13.45 10*3/MM3 (ref 1.7–7)
NEUTROPHILS NFR BLD AUTO: 81.2 % (ref 42.7–76)
NRBC BLD AUTO-RTO: 0 /100 WBC (ref 0–0.2)
PLATELET # BLD AUTO: 561 10*3/MM3 (ref 140–450)
PMV BLD AUTO: 11.2 FL (ref 6–12)
POTASSIUM SERPL-SCNC: 4.3 MMOL/L (ref 3.5–5.2)
PROT SERPL-MCNC: 7.7 G/DL (ref 6–8.5)
RBC # BLD AUTO: 3.78 10*6/MM3 (ref 4.14–5.8)
SODIUM SERPL-SCNC: 136 MMOL/L (ref 136–145)
WBC NRBC COR # BLD: 16.56 10*3/MM3 (ref 3.4–10.8)

## 2023-09-11 ENCOUNTER — TELEPHONE (OUTPATIENT)
Dept: FAMILY MEDICINE CLINIC | Facility: CLINIC | Age: 63
End: 2023-09-11
Payer: COMMERCIAL

## 2023-09-11 NOTE — TELEPHONE ENCOUNTER
----- Message from Sharon Regalado MD sent at 9/11/2023  1:31 PM EDT -----  Can you call and see how he is doing? He did go to  on Saturday after I saw him on Friday, but I can't tell if they kept him. Thanks.

## 2023-09-11 NOTE — TELEPHONE ENCOUNTER
----- Message from Sharon Regalado MD sent at 9/11/2023  1:31 PM EDT -----  Can you call and see how he is doing? He did go to  on Saturday after I saw him on Friday, but I can't tell if they kept him. Thanks.      Left a message to return call.

## 2023-09-12 NOTE — TELEPHONE ENCOUNTER
----- Message from Sharon Regalado MD sent at 9/11/2023  1:31 PM EDT -----  Can you call and see how he is doing? He did go to  on Saturday after I saw him on Friday, but I can't tell if they kept him. Thanks.      Left a message to return call.      Left a second message to return call.

## 2023-09-13 NOTE — TELEPHONE ENCOUNTER
----- Message from Sharon Regalado MD sent at 9/11/2023  1:31 PM EDT -----  Can you call and see how he is doing? He did go to  on Saturday after I saw him on Friday, but I can't tell if they kept him. Thanks.      Left a message to return call.      Left a second message to return call.    Left a third message to return call.

## 2023-09-14 NOTE — TELEPHONE ENCOUNTER
----- Message from Sharon Regalado MD sent at 9/11/2023  1:31 PM EDT -----  Can you call and see how he is doing? He did go to  on Saturday after I saw him on Friday, but I can't tell if they kept him. Thanks.      Left a message to return call.      Left a second message to return call.    Left a third message to return call.      Left a fourth message to return call.    I have been unable to reach him or his emergency contact.

## 2023-09-14 NOTE — TELEPHONE ENCOUNTER
He's probably inpatient at  then. Let me see if someone other than Adrianna has access. Did you just try Azael? Did you try the son?   Peng Madison Unknown [phone] (Mobile) Son, Emergency Contact       I had not tried the son,just now called him he said he is hospitalized at  not sure exactly what is going on but was told he will probably be there 3-4 more days.

## 2023-09-14 NOTE — TELEPHONE ENCOUNTER
He's probably inpatient at  then. Let me see if someone other than Adrianna has access. Did you just try Azael? Did you try the son?   Peng Madison Unknown [phone] (Mobile) Son, Emergency Contact

## 2023-09-14 NOTE — TELEPHONE ENCOUNTER
----- Message from Sharon Regaldao MD sent at 9/11/2023  1:31 PM EDT -----  Can you call and see how he is doing? He did go to  on Saturday after I saw him on Friday, but I can't tell if they kept him. Thanks.      Left a message to return call.      Left a second message to return call.    Left a third message to return call.      Left a fourth message to return call.

## 2023-09-14 NOTE — TELEPHONE ENCOUNTER
It is his necrotizing pancreatitis. Thanks. His lab results were worse than how he looked so I was worried, but they knew that if the labs were bad to just go on ahead up. Thank you!

## 2023-09-18 ENCOUNTER — TELEPHONE (OUTPATIENT)
Dept: FAMILY MEDICINE CLINIC | Facility: CLINIC | Age: 63
End: 2023-09-18
Payer: COMMERCIAL

## 2023-09-18 ENCOUNTER — LAB (OUTPATIENT)
Dept: FAMILY MEDICINE CLINIC | Facility: CLINIC | Age: 63
End: 2023-09-18
Payer: COMMERCIAL

## 2023-09-18 DIAGNOSIS — K85.91 NECROTIZING PANCREATITIS: ICD-10-CM

## 2023-09-18 DIAGNOSIS — K85.91 NECROTIZING PANCREATITIS: Primary | ICD-10-CM

## 2023-09-18 LAB
ANION GAP SERPL CALCULATED.3IONS-SCNC: 11 MMOL/L (ref 5–15)
BUN SERPL-MCNC: 13 MG/DL (ref 8–23)
BUN/CREAT SERPL: 22 (ref 7–25)
CALCIUM SPEC-SCNC: 8.2 MG/DL (ref 8.6–10.5)
CHLORIDE SERPL-SCNC: 93 MMOL/L (ref 98–107)
CO2 SERPL-SCNC: 23 MMOL/L (ref 22–29)
CREAT SERPL-MCNC: 0.59 MG/DL (ref 0.76–1.27)
EGFRCR SERPLBLD CKD-EPI 2021: 109.7 ML/MIN/1.73
GLUCOSE SERPL-MCNC: 99 MG/DL (ref 65–99)
POTASSIUM SERPL-SCNC: 4.7 MMOL/L (ref 3.5–5.2)
SODIUM SERPL-SCNC: 127 MMOL/L (ref 136–145)

## 2023-09-18 PROCEDURE — 36415 COLL VENOUS BLD VENIPUNCTURE: CPT

## 2023-09-18 PROCEDURE — 80048 BASIC METABOLIC PNL TOTAL CA: CPT | Performed by: NURSE PRACTITIONER

## 2023-09-18 NOTE — TELEPHONE ENCOUNTER
Caller: Azael Madison    Relationship: Emergency Contact    Best call back number:      [phone]     What orders are you requesting (i.e. lab or imaging): BLOOD WORK     In what timeframe would the patient need to come in: MONDAY 9-18-23    Where will you receive your lab/imaging services: IN OFFICE     Additional notes: PATIENT WAS DISCHARGED FROM  ON 9-16-23  THEY WANT HIM TO HAVE BLOOD WORK TODAY TO CHECK HIS SODIUM LEVEL BECAUSE IT WAS LOW   PATIENT HAS AN APPOINTMENT ON 9-20-23 TO SEE ANUPAM CHONG     PLEASE CALL IF THE BLOOD WORK CAN BE DONE TODAY    DISCHARGED HIM IF HE PROMISED TO SEE HIS PRIMARY CARE DOCTOR AND GET BLOOD WORK DONE FOR THE SODIUM LEVEL

## 2023-09-19 ENCOUNTER — TELEPHONE (OUTPATIENT)
Dept: FAMILY MEDICINE CLINIC | Facility: CLINIC | Age: 63
End: 2023-09-19
Payer: COMMERCIAL

## 2023-09-19 NOTE — TELEPHONE ENCOUNTER
----- Message from EUGENIA Mendoza sent at 9/19/2023  1:37 PM EDT -----  Sodium is still low will repeat on return.  He is scheduled follow up tomorrow

## 2023-09-19 NOTE — TELEPHONE ENCOUNTER
----- Message from EUGENIA Mendoza sent at 9/19/2023  1:37 PM EDT -----  Sodium is still low will repeat on return.  He is scheduled follow up tomorrow       Left a message to return call.    Hub to read.

## 2023-09-20 ENCOUNTER — OFFICE VISIT (OUTPATIENT)
Dept: FAMILY MEDICINE CLINIC | Facility: CLINIC | Age: 63
End: 2023-09-20
Payer: COMMERCIAL

## 2023-09-20 VITALS
HEIGHT: 68 IN | BODY MASS INDEX: 24.43 KG/M2 | HEART RATE: 95 BPM | WEIGHT: 161.2 LBS | DIASTOLIC BLOOD PRESSURE: 40 MMHG | TEMPERATURE: 97.5 F | OXYGEN SATURATION: 94 % | SYSTOLIC BLOOD PRESSURE: 108 MMHG

## 2023-09-20 DIAGNOSIS — K85.91 NECROTIZING PANCREATITIS: Primary | ICD-10-CM

## 2023-09-20 DIAGNOSIS — E46 PROTEIN-CALORIE MALNUTRITION, UNSPECIFIED SEVERITY: ICD-10-CM

## 2023-09-20 DIAGNOSIS — A04.72 C. DIFFICILE DIARRHEA: ICD-10-CM

## 2023-09-20 PROCEDURE — 1159F MED LIST DOCD IN RCRD: CPT | Performed by: NURSE PRACTITIONER

## 2023-09-20 PROCEDURE — 36415 COLL VENOUS BLD VENIPUNCTURE: CPT | Performed by: NURSE PRACTITIONER

## 2023-09-20 PROCEDURE — 1160F RVW MEDS BY RX/DR IN RCRD: CPT | Performed by: NURSE PRACTITIONER

## 2023-09-20 PROCEDURE — 80053 COMPREHEN METABOLIC PANEL: CPT | Performed by: NURSE PRACTITIONER

## 2023-09-20 PROCEDURE — 99214 OFFICE O/P EST MOD 30 MIN: CPT | Performed by: NURSE PRACTITIONER

## 2023-09-20 PROCEDURE — 85025 COMPLETE CBC W/AUTO DIFF WBC: CPT | Performed by: NURSE PRACTITIONER

## 2023-09-20 RX ORDER — ONDANSETRON 4 MG/1
4 TABLET, ORALLY DISINTEGRATING ORAL
COMMUNITY

## 2023-09-20 RX ORDER — HYDROCODONE BITARTRATE AND ACETAMINOPHEN 10; 325 MG/1; MG/1
TABLET ORAL EVERY 6 HOURS PRN
COMMUNITY

## 2023-09-20 RX ORDER — NICOTINE 21 MG/24HR
1 PATCH, TRANSDERMAL 24 HOURS TRANSDERMAL DAILY
Qty: 30 PATCH | Refills: 1 | COMMUNITY
Start: 2023-09-17 | End: 2023-10-28

## 2023-09-20 NOTE — TELEPHONE ENCOUNTER
----- Message from EUEGNIA Mendoza sent at 9/19/2023  1:37 PM EDT -----  Sodium is still low will repeat on return.  He is scheduled follow up tomorrow       Left a message to return call.    Hub to read.        Wife notified & verbalized understanding,will be here today for Appointment.

## 2023-09-21 LAB
ALBUMIN SERPL-MCNC: 2.6 G/DL (ref 3.5–5.2)
ALBUMIN/GLOB SERPL: 0.6 G/DL
ALP SERPL-CCNC: 135 U/L (ref 39–117)
ALT SERPL W P-5'-P-CCNC: 15 U/L (ref 1–41)
ANION GAP SERPL CALCULATED.3IONS-SCNC: 11.2 MMOL/L (ref 5–15)
AST SERPL-CCNC: 16 U/L (ref 1–40)
BASOPHILS # BLD AUTO: 0.07 10*3/MM3 (ref 0–0.2)
BASOPHILS NFR BLD AUTO: 0.3 % (ref 0–1.5)
BILIRUB SERPL-MCNC: 0.5 MG/DL (ref 0–1.2)
BUN SERPL-MCNC: 18 MG/DL (ref 8–23)
BUN/CREAT SERPL: 28.1 (ref 7–25)
CALCIUM SPEC-SCNC: 8.5 MG/DL (ref 8.6–10.5)
CHLORIDE SERPL-SCNC: 93 MMOL/L (ref 98–107)
CO2 SERPL-SCNC: 22.8 MMOL/L (ref 22–29)
CREAT SERPL-MCNC: 0.64 MG/DL (ref 0.76–1.27)
DEPRECATED RDW RBC AUTO: 46.7 FL (ref 37–54)
EGFRCR SERPLBLD CKD-EPI 2021: 107 ML/MIN/1.73
EOSINOPHIL # BLD AUTO: 0 10*3/MM3 (ref 0–0.4)
EOSINOPHIL NFR BLD AUTO: 0 % (ref 0.3–6.2)
ERYTHROCYTE [DISTWIDTH] IN BLOOD BY AUTOMATED COUNT: 17.2 % (ref 12.3–15.4)
GLOBULIN UR ELPH-MCNC: 4.5 GM/DL
GLUCOSE SERPL-MCNC: 102 MG/DL (ref 65–99)
HCT VFR BLD AUTO: 26.1 % (ref 37.5–51)
HGB BLD-MCNC: 8.1 G/DL (ref 13–17.7)
IMM GRANULOCYTES # BLD AUTO: 0.2 10*3/MM3 (ref 0–0.05)
IMM GRANULOCYTES NFR BLD AUTO: 0.9 % (ref 0–0.5)
LYMPHOCYTES # BLD AUTO: 2.51 10*3/MM3 (ref 0.7–3.1)
LYMPHOCYTES NFR BLD AUTO: 11.1 % (ref 19.6–45.3)
MCH RBC QN AUTO: 23.7 PG (ref 26.6–33)
MCHC RBC AUTO-ENTMCNC: 31 G/DL (ref 31.5–35.7)
MCV RBC AUTO: 76.3 FL (ref 79–97)
MONOCYTES # BLD AUTO: 1.41 10*3/MM3 (ref 0.1–0.9)
MONOCYTES NFR BLD AUTO: 6.2 % (ref 5–12)
NEUTROPHILS NFR BLD AUTO: 18.5 10*3/MM3 (ref 1.7–7)
NEUTROPHILS NFR BLD AUTO: 81.5 % (ref 42.7–76)
NRBC BLD AUTO-RTO: 0 /100 WBC (ref 0–0.2)
PLATELET # BLD AUTO: 740 10*3/MM3 (ref 140–450)
PMV BLD AUTO: 10.1 FL (ref 6–12)
POTASSIUM SERPL-SCNC: 5 MMOL/L (ref 3.5–5.2)
PROT SERPL-MCNC: 7.1 G/DL (ref 6–8.5)
RBC # BLD AUTO: 3.42 10*6/MM3 (ref 4.14–5.8)
SODIUM SERPL-SCNC: 127 MMOL/L (ref 136–145)
WBC NRBC COR # BLD: 22.69 10*3/MM3 (ref 3.4–10.8)

## 2023-09-25 ENCOUNTER — TELEPHONE (OUTPATIENT)
Dept: FAMILY MEDICINE CLINIC | Facility: CLINIC | Age: 63
End: 2023-09-25
Payer: COMMERCIAL

## 2023-09-25 DIAGNOSIS — K85.91 NECROTIZING PANCREATITIS: Primary | ICD-10-CM

## 2023-09-25 DIAGNOSIS — E46 PROTEIN-CALORIE MALNUTRITION, UNSPECIFIED SEVERITY: ICD-10-CM

## 2023-09-25 NOTE — TELEPHONE ENCOUNTER
----- Message from EUGENIA Mendoza sent at 9/25/2023  2:35 PM EDT -----  Let patient know lab is stable.  Will need to follow with repeat BMP this week      Made patients wife, Delia aware. Pended order for you to sign

## 2023-09-26 NOTE — PROGRESS NOTES
"Chief Complaint  Transitional Care Management    Subjective        Ta Madison presents to [facility]  History of Present Illness  Returns today follow up post 7 days hospital stay for difficulty feeding secondary to Pancreatitis.  New development of diarrhea and complications with feeding tube.  Since home admits he is feeling some better with improved tolerance with feeling and appetite.  No fever no further diarrhea.      Objective   Vital Signs:  /40 (BP Location: Right arm, Patient Position: Sitting)   Pulse 95   Temp 97.5 °F (36.4 °C) (Temporal)   Ht 172.7 cm (68\")   Wt 73.1 kg (161 lb 3.2 oz)   SpO2 94%   BMI 24.51 kg/m²   Estimated body mass index is 24.51 kg/m² as calculated from the following:    Height as of this encounter: 172.7 cm (68\").    Weight as of this encounter: 73.1 kg (161 lb 3.2 oz).       BMI is within normal parameters. No other follow-up for BMI required.      Physical Exam  Vitals and nursing note reviewed.   Constitutional:       General: He is not in acute distress.     Appearance: He is well-developed. He is not ill-appearing.   HENT:      Head: Normocephalic.   Cardiovascular:      Rate and Rhythm: Normal rate and regular rhythm.      Heart sounds: Normal heart sounds. No murmur heard.  Pulmonary:      Effort: Pulmonary effort is normal.      Breath sounds: Normal breath sounds.   Abdominal:      General: Bowel sounds are normal. There is no distension.      Palpations: Abdomen is soft.      Tenderness: There is no abdominal tenderness.      Comments: Feeding tube in place with no surrounding erythema.       Skin:     General: Skin is warm and dry.   Neurological:      Mental Status: He is alert and oriented to person, place, and time.   Psychiatric:         Behavior: Behavior normal.      Result Review :                   Assessment and Plan   Diagnoses and all orders for this visit:    1. Necrotizing pancreatitis (Primary)  -     CBC " Auto Differential; Future  -     Comprehensive Metabolic Panel; Future  -     CBC Auto Differential  -     Comprehensive Metabolic Panel    2. Protein-calorie malnutrition, unspecified severity  -     CBC Auto Differential; Future  -     Comprehensive Metabolic Panel; Future  -     CBC Auto Differential  -     Comprehensive Metabolic Panel    3. C. difficile diarrhea  -     CBC Auto Differential; Future  -     Comprehensive Metabolic Panel; Future  -     CBC Auto Differential  -     Comprehensive Metabolic Panel    RTC one week sooner if any new or worsening concerns  Continue to increase intake as tolerated  Keep [facility] follow up in 2 days         Follow Up   Return in about 2 weeks (around 10/4/2023), or if symptoms worsen or fail to improve, for labs today , Recheck.  Patient was given instructions and counseling regarding his condition or for health maintenance advice. Please see specific information pulled into the AVS if appropriate.

## 2023-09-27 ENCOUNTER — TELEPHONE (OUTPATIENT)
Dept: FAMILY MEDICINE CLINIC | Facility: CLINIC | Age: 63
End: 2023-09-27
Payer: COMMERCIAL

## 2023-09-27 RX ORDER — MENTHOL AND ZINC OXIDE .44; 20.625 G/100G; G/100G
1 OINTMENT TOPICAL AS NEEDED
Qty: 113 G | Refills: 1 | Status: SHIPPED | OUTPATIENT
Start: 2023-09-27

## 2023-09-27 RX ORDER — OSTOMY SUPPLY 2 1/4"
1 EACH MISCELLANEOUS DAILY PRN
Qty: 30 EACH | Refills: 3 | Status: CANCELLED | OUTPATIENT
Start: 2023-09-27

## 2023-09-27 NOTE — TELEPHONE ENCOUNTER
Caller: Azael Madison    Relationship: Emergency Contact    Best call back number: [phone]     What orders are you requesting (i.e. lab or imaging): HOME HEALTH    In what timeframe would the patient need to come in: ASAP    Where will you receive your lab/imaging services: HOME    Additional notes: G TUBE IS CRACKED AND LEAKING, AND HE RED SPOT AROUND THE G TUBE    Spoke with Azael she reports his J Tube has a crack in it down right where it goes into his abdomen,leaking a lot,they cannot replace it until Friday(Scheduled) she reports that the contents are really irritating his abdomen  he is sitting in his chair with a kidney basin held against it to catch some of the contents,she states there is no way she can bring him in like he is for the lab you wanted,is also questioning if home health could come out,I see a referral but she reports she has not heard from anyone?

## 2023-09-27 NOTE — TELEPHONE ENCOUNTER
I pended an order for colostomy wafer which pharmacy does she get J tube supplies from as Im not sure [facility] will have

## 2023-09-27 NOTE — TELEPHONE ENCOUNTER
I pended an order for colostomy wafer which pharmacy does she get J tube supplies from as Im not sure [facility] will have      Spoke with Azael she reports her supplies come from a Mail order Company,she will have to find the paperwork & call back.

## 2023-09-27 NOTE — TELEPHONE ENCOUNTER
Is there not a local supply for an initial set up   Friday will be here before they are able to get from a mail order.      Spoke with Azael she chooses to use the Calmoseptine & drain sponges until Friday.

## 2023-09-27 NOTE — TELEPHONE ENCOUNTER
Can we make a call to follow up on home health.  The issue could be readmission and they have missed the call or visit  I'm thinking of suggestions for J tube if home health were there I'm sure they have suggestions.        Spoke with Azael again she reports when GI checked his tube they told her the crack is just below the skin right after it goes into the abdomen,you cannot see the area but the contents just keep running back out,they would have changed it that day but did not have a tube,he is really irritated,maybe something for the irritation & will send to Jocelyne to see about Home Health she requests [facility].    Wonder if Calmoseptine would help?

## 2023-09-27 NOTE — TELEPHONE ENCOUNTER
Can we make a call to follow up on home health.  The issue could be readmission and they have missed the call or visit  I'm thinking of suggestions for J tube if home health were there I'm sure they have suggestions.

## 2023-09-27 NOTE — TELEPHONE ENCOUNTER
Ok thank you   Once HH is in the home we will be able to better manage with the combination of resources.

## 2023-09-27 NOTE — TELEPHONE ENCOUNTER
Is there not a local supply for an initial set up   Friday will be here before they are able to get from a mail order

## 2023-09-27 NOTE — TELEPHONE ENCOUNTER
Sent to pharmacy   Does he use an Island dressing like the colostomy bag       Spoke with Azael & she verbalized understanding,she is just using drain sponges.

## 2023-09-27 NOTE — TELEPHONE ENCOUNTER
MOE IS CALLING BACK WITH THE NAME OF THE COMPANY FOR HIS J-TUBES, IT IS [facility] [facility]. PHONE [phone]

## 2023-09-27 NOTE — TELEPHONE ENCOUNTER
Caller: Azael Madison    Relationship: Emergency Contact    Best call back number: [phone]     What orders are you requesting (i.e. lab or imaging): HOME HEALTH    In what timeframe would the patient need to come in: ASAP    Where will you receive your lab/imaging services: HOME    Additional notes: G TUBE IS CRACKED AND LEAKING, AND HE RED SPOT AROUND THE G TUBE

## 2023-10-02 RX ORDER — MIRTAZAPINE 7.5 MG/1
7.5 TABLET, FILM COATED ORAL NIGHTLY
Qty: 90 TABLET | Refills: 1 | Status: SHIPPED | OUTPATIENT
Start: 2023-10-02 | End: 2023-11-01

## 2023-10-02 NOTE — TELEPHONE ENCOUNTER
Caller: Azael Madison    Relationship: Emergency Contact    Best call back number: [phone]     Requested Prescriptions:   Requested Prescriptions     Pending Prescriptions Disp Refills    sertraline (ZOLOFT) 50 MG tablet 30 tablet 11     Sig: Take 1 tablet by mouth Daily.    mirtazapine (REMERON) 7.5 MG tablet       Sig: Take 1 tablet by mouth.        Pharmacy where request should be sent: [id number] [address] [facility] [address] - [phone] PH - [phone] FX     Last office visit with prescribing clinician: 9/20/2023   Last telemedicine visit with prescribing clinician: Visit date not found   Next office visit with prescribing clinician: 10/4/2023     Additional details provided by patient: WAS GIVEN AT [facility] NOT SURE IF HE STILL NEEDS TO TAKE THIS. PLEASE CALL IN IF NEEDED OR CALL TO LET THEM KNOW IF NO LONGR NEEDED     Does the patient have less than a 3 day supply:  [x] Yes  [] No    Would you like a call back once the refill request has been completed: [] Yes [x] No    If the office needs to give you a call back, can they leave a voicemail: [] Yes [x] No    Nicolle England Rep   10/02/23 08:26 EDT

## 2023-10-04 ENCOUNTER — TELEPHONE (OUTPATIENT)
Dept: ONCOLOGY | Facility: CLINIC | Age: 63
End: 2023-10-04
Payer: COMMERCIAL

## 2023-10-04 ENCOUNTER — OFFICE VISIT (OUTPATIENT)
Dept: FAMILY MEDICINE CLINIC | Facility: CLINIC | Age: 63
End: 2023-10-04
Payer: COMMERCIAL

## 2023-10-04 VITALS
WEIGHT: 145.8 LBS | SYSTOLIC BLOOD PRESSURE: 98 MMHG | BODY MASS INDEX: 22.1 KG/M2 | OXYGEN SATURATION: 98 % | TEMPERATURE: 97.5 F | DIASTOLIC BLOOD PRESSURE: 62 MMHG | HEIGHT: 68 IN | HEART RATE: 82 BPM

## 2023-10-04 DIAGNOSIS — G89.3 CHRONIC PAIN DUE TO MALIGNANT NEOPLASTIC DISEASE: ICD-10-CM

## 2023-10-04 DIAGNOSIS — R53.83 OTHER FATIGUE: ICD-10-CM

## 2023-10-04 DIAGNOSIS — E46 PROTEIN-CALORIE MALNUTRITION, UNSPECIFIED SEVERITY: ICD-10-CM

## 2023-10-04 DIAGNOSIS — K85.91 NECROTIZING PANCREATITIS: Primary | ICD-10-CM

## 2023-10-04 DIAGNOSIS — K85.91 NECROTIZING PANCREATITIS: ICD-10-CM

## 2023-10-04 LAB
ALBUMIN SERPL-MCNC: 3.6 G/DL (ref 3.5–5.2)
ALBUMIN/GLOB SERPL: 0.7 G/DL
ALP SERPL-CCNC: 257 U/L (ref 39–117)
ALT SERPL W P-5'-P-CCNC: 17 U/L (ref 1–41)
ANION GAP SERPL CALCULATED.3IONS-SCNC: 14 MMOL/L (ref 5–15)
AST SERPL-CCNC: 23 U/L (ref 1–40)
BASOPHILS # BLD AUTO: 0.1 10*3/MM3 (ref 0–0.2)
BASOPHILS NFR BLD AUTO: 0.4 % (ref 0–1.5)
BILIRUB SERPL-MCNC: 0.3 MG/DL (ref 0–1.2)
BUN SERPL-MCNC: 39 MG/DL (ref 8–23)
BUN/CREAT SERPL: 44.3 (ref 7–25)
CALCIUM SPEC-SCNC: 9.7 MG/DL (ref 8.6–10.5)
CHLORIDE SERPL-SCNC: 95 MMOL/L (ref 98–107)
CO2 SERPL-SCNC: 21 MMOL/L (ref 22–29)
CREAT SERPL-MCNC: 0.88 MG/DL (ref 0.76–1.27)
DEPRECATED RDW RBC AUTO: 50.7 FL (ref 37–54)
EGFRCR SERPLBLD CKD-EPI 2021: 97.2 ML/MIN/1.73
EOSINOPHIL # BLD AUTO: 0.05 10*3/MM3 (ref 0–0.4)
EOSINOPHIL NFR BLD AUTO: 0.2 % (ref 0.3–6.2)
ERYTHROCYTE [DISTWIDTH] IN BLOOD BY AUTOMATED COUNT: 18.2 % (ref 12.3–15.4)
GLOBULIN UR ELPH-MCNC: 4.9 GM/DL
GLUCOSE SERPL-MCNC: 109 MG/DL (ref 65–99)
HCT VFR BLD AUTO: 32.2 % (ref 37.5–51)
HGB BLD-MCNC: 9.9 G/DL (ref 13–17.7)
IMM GRANULOCYTES # BLD AUTO: 0.12 10*3/MM3 (ref 0–0.05)
IMM GRANULOCYTES NFR BLD AUTO: 0.5 % (ref 0–0.5)
LYMPHOCYTES # BLD AUTO: 3.85 10*3/MM3 (ref 0.7–3.1)
LYMPHOCYTES NFR BLD AUTO: 17.3 % (ref 19.6–45.3)
MAGNESIUM SERPL-MCNC: 2.6 MG/DL (ref 1.6–2.4)
MCH RBC QN AUTO: 23.8 PG (ref 26.6–33)
MCHC RBC AUTO-ENTMCNC: 30.7 G/DL (ref 31.5–35.7)
MCV RBC AUTO: 77.4 FL (ref 79–97)
MONOCYTES # BLD AUTO: 1.26 10*3/MM3 (ref 0.1–0.9)
MONOCYTES NFR BLD AUTO: 5.7 % (ref 5–12)
NEUTROPHILS NFR BLD AUTO: 16.87 10*3/MM3 (ref 1.7–7)
NEUTROPHILS NFR BLD AUTO: 75.9 % (ref 42.7–76)
NRBC BLD AUTO-RTO: 0 /100 WBC (ref 0–0.2)
PLATELET # BLD AUTO: 843 10*3/MM3 (ref 140–450)
PMV BLD AUTO: 9.6 FL (ref 6–12)
POTASSIUM SERPL-SCNC: 5.3 MMOL/L (ref 3.5–5.2)
PROT SERPL-MCNC: 8.5 G/DL (ref 6–8.5)
RBC # BLD AUTO: 4.16 10*6/MM3 (ref 4.14–5.8)
SODIUM SERPL-SCNC: 130 MMOL/L (ref 136–145)
WBC NRBC COR # BLD: 22.25 10*3/MM3 (ref 3.4–10.8)

## 2023-10-04 PROCEDURE — 1160F RVW MEDS BY RX/DR IN RCRD: CPT | Performed by: NURSE PRACTITIONER

## 2023-10-04 PROCEDURE — 80053 COMPREHEN METABOLIC PANEL: CPT | Performed by: NURSE PRACTITIONER

## 2023-10-04 PROCEDURE — 1159F MED LIST DOCD IN RCRD: CPT | Performed by: NURSE PRACTITIONER

## 2023-10-04 PROCEDURE — 36415 COLL VENOUS BLD VENIPUNCTURE: CPT | Performed by: NURSE PRACTITIONER

## 2023-10-04 PROCEDURE — 85025 COMPLETE CBC W/AUTO DIFF WBC: CPT | Performed by: NURSE PRACTITIONER

## 2023-10-04 PROCEDURE — 83735 ASSAY OF MAGNESIUM: CPT | Performed by: NURSE PRACTITIONER

## 2023-10-04 PROCEDURE — 99214 OFFICE O/P EST MOD 30 MIN: CPT | Performed by: NURSE PRACTITIONER

## 2023-10-04 RX ORDER — ONDANSETRON HYDROCHLORIDE 8 MG/1
8 TABLET, FILM COATED ORAL EVERY 8 HOURS PRN
Qty: 30 TABLET | Refills: 2 | Status: SHIPPED | OUTPATIENT
Start: 2023-10-04

## 2023-10-04 RX ORDER — MULTIPLE VITAMINS W/ MINERALS TAB 9MG-400MCG
1 TAB ORAL DAILY
Qty: 30 EACH | Refills: 3 | Status: SHIPPED | OUTPATIENT
Start: 2023-10-04

## 2023-10-04 RX ORDER — HYDROCODONE BITARTRATE AND ACETAMINOPHEN 10; 325 MG/1; MG/1
1 TABLET ORAL EVERY 6 HOURS PRN
Qty: 120 TABLET | Refills: 0 | Status: SHIPPED | OUTPATIENT
Start: 2023-10-04 | End: 2023-10-05 | Stop reason: SDUPTHER

## 2023-10-04 NOTE — PROGRESS NOTES
"Chief Complaint  Pancreatitis (Follow up )    Subjective        Ta Madison presents to [facility]  Pancreatitis  Episode onset: 6 months with ongoing nutrition concerns.  Feeding tube replaced tolerating feeds still unable to tolerate oral intake. The problem has been unchanged. Associated symptoms include abdominal pain, anorexia, fatigue (tired all the time no energy), nausea and weakness. Pertinent negatives include no fever or vomiting.       Objective   Vital Signs:  BP 98/62 (BP Location: Right arm, Patient Position: Sitting)   Pulse 82   Temp 97.5 °F (36.4 °C) (Temporal)   Ht 172.7 cm (67.99\")   Wt 66.1 kg (145 lb 12.8 oz)   SpO2 98%   BMI 22.17 kg/m²   Estimated body mass index is 22.17 kg/m² as calculated from the following:    Height as of this encounter: 172.7 cm (67.99\").    Weight as of this encounter: 66.1 kg (145 lb 12.8 oz).       BMI is within normal parameters. No other follow-up for BMI required.      Physical Exam  Vitals and nursing note reviewed.   Constitutional:       General: He is not in acute distress.     Appearance: He is well-developed. He is not ill-appearing.   HENT:      Head: Normocephalic.   Cardiovascular:      Rate and Rhythm: Normal rate and regular rhythm.      Heart sounds: Normal heart sounds. No murmur heard.  Pulmonary:      Effort: Pulmonary effort is normal.      Breath sounds: Normal breath sounds.   Abdominal:      General: Bowel sounds are normal. There is no distension.      Palpations: Abdomen is soft.      Tenderness: There is no abdominal tenderness.      Comments: Feeding tube in place with no surrounding erythema.       Skin:     General: Skin is warm and dry.   Neurological:      Mental Status: He is alert and oriented to person, place, and time.   Psychiatric:         Behavior: Behavior normal.      Result Review :During this visit the following were done:  Labs Reviewed [x]    Labs Ordered [x]    Radiology Reports " Reviewed []    Radiology Ordered []    PCP Records Reviewed []    Referring Provider Records Reviewed []    ER Records Reviewed []    Hospital Records Reviewed []    History Obtained From Family []    Radiology Images Reviewed []    Other Reviewed [x]    Records Requested []           Assessment and Plan   Diagnoses and all orders for this visit:    1. Necrotizing pancreatitis (Primary)  -     CBC Auto Differential; Future  -     Comprehensive Metabolic Panel; Future  -     Magnesium; Future  -     CBC Auto Differential  -     Comprehensive Metabolic Panel  -     Magnesium    2. Protein-calorie malnutrition, unspecified severity  -     CBC Auto Differential; Future  -     Comprehensive Metabolic Panel; Future  -     Magnesium; Future  -     CBC Auto Differential  -     Comprehensive Metabolic Panel  -     Magnesium    3. Other fatigue  Stop Remeron    Other orders  -     multivitamin with minerals tablet tablet; Take 1 tablet by mouth Daily.  Dispense: 30 each; Refill: 3    RTC one week sooner if any new or worsening concerns  Continue to increase intake as tolerated  Keep [facility] follow up in 2 days         Follow Up   Return in about 4 weeks (around 11/1/2023), or if symptoms worsen or fail to improve, for Recheck labs today .  Patient was given instructions and counseling regarding his condition or for health maintenance advice. Please see specific information pulled into the AVS if appropriate.

## 2023-10-04 NOTE — TELEPHONE ENCOUNTER
Caller: Azael Madison    Relationship: Emergency Contact    Best call back number: [phone]    What is the best time to reach you:ANYTIME    Who are you requesting to speak with (clinical staff, provider,  specific staff member): CLINICAL    What was the call regarding: REQUESTING A NEW PRESCRIPTION FOR ZOFRAN CALLED IN TO     [facility] #[id number] [address] AT [facility] [address] - [phone]  - [phone]       Is it okay if the provider responds through MyChart:

## 2023-10-05 ENCOUNTER — TELEPHONE (OUTPATIENT)
Dept: ONCOLOGY | Facility: CLINIC | Age: 63
End: 2023-10-05
Payer: COMMERCIAL

## 2023-10-05 DIAGNOSIS — G89.3 CHRONIC PAIN DUE TO MALIGNANT NEOPLASTIC DISEASE: ICD-10-CM

## 2023-10-05 RX ORDER — HYDROCODONE BITARTRATE AND ACETAMINOPHEN 10; 325 MG/1; MG/1
1 TABLET ORAL EVERY 6 HOURS PRN
Qty: 120 TABLET | Refills: 0 | Status: SHIPPED | OUTPATIENT
Start: 2023-10-05

## 2023-10-05 NOTE — TELEPHONE ENCOUNTER
Caller: LosAzael    Relationship: Emergency Contact    Best call back number: [phone]     Who are you requesting to speak with (clinical staff, provider,  specific staff member): CLINICAL    What was the call regarding: PATIENT'S PHARMACY AND ALL SURROUNDING PHARMACIES ARE OUT OF STOCK FOR [facility] RX.    PATIENT WIFE REQUESTING PROVIDER TO CANCEL RX AND PRESCRIBE ALTERNATE DOSE OR OPTION TO     Pharmacy    [facility] #[id number] - [place], [place] - [address] AT [facility] [address] - [phone]  - [phone]   [address] [address]  Phone: [phone]  Fax: [phone]

## 2023-10-09 RX ORDER — FOLIC ACID 1 MG/1
1 TABLET ORAL DAILY
Qty: 90 TABLET | Refills: 1 | Status: ON HOLD | OUTPATIENT
Start: 2023-10-09 | End: 2023-10-10

## 2023-10-09 RX ORDER — LANOLIN ALCOHOL/MO/W.PET/CERES
100 CREAM (GRAM) TOPICAL DAILY
Qty: 30 TABLET | Refills: 0 | Status: ON HOLD | OUTPATIENT
Start: 2023-10-09 | End: 2023-10-10

## 2023-10-10 ENCOUNTER — OFFICE VISIT (OUTPATIENT)
Dept: FAMILY MEDICINE CLINIC | Facility: CLINIC | Age: 63
End: 2023-10-10
Payer: COMMERCIAL

## 2023-10-10 ENCOUNTER — APPOINTMENT (OUTPATIENT)
Dept: GENERAL RADIOLOGY | Facility: HOSPITAL | Age: 63
DRG: 682 | End: 2023-10-10
Payer: COMMERCIAL

## 2023-10-10 ENCOUNTER — APPOINTMENT (OUTPATIENT)
Dept: CT IMAGING | Facility: HOSPITAL | Age: 63
DRG: 682 | End: 2023-10-10
Payer: COMMERCIAL

## 2023-10-10 ENCOUNTER — HOSPITAL ENCOUNTER (INPATIENT)
Facility: HOSPITAL | Age: 63
LOS: 3 days | Discharge: HOME | DRG: 682 | End: 2023-10-13
Attending: STUDENT IN AN ORGANIZED HEALTH CARE EDUCATION/TRAINING PROGRAM | Admitting: STUDENT IN AN ORGANIZED HEALTH CARE EDUCATION/TRAINING PROGRAM
Payer: COMMERCIAL

## 2023-10-10 VITALS
WEIGHT: 136 LBS | HEIGHT: 68 IN | RESPIRATION RATE: 16 BRPM | SYSTOLIC BLOOD PRESSURE: 122 MMHG | DIASTOLIC BLOOD PRESSURE: 67 MMHG | TEMPERATURE: 97.1 F | HEART RATE: 122 BPM | OXYGEN SATURATION: 98 % | BODY MASS INDEX: 20.61 KG/M2

## 2023-10-10 DIAGNOSIS — R19.7 ACUTE DIARRHEA: ICD-10-CM

## 2023-10-10 DIAGNOSIS — R10.84 ACUTE GENERALIZED ABDOMINAL PAIN: Primary | ICD-10-CM

## 2023-10-10 DIAGNOSIS — R10.13 EPIGASTRIC PAIN: ICD-10-CM

## 2023-10-10 DIAGNOSIS — N17.9 AKI (ACUTE KIDNEY INJURY): Primary | ICD-10-CM

## 2023-10-10 DIAGNOSIS — E87.5 HYPERKALEMIA: ICD-10-CM

## 2023-10-10 LAB
027 TOXIN: NORMAL
ABO GROUP BLD: NORMAL
ABO GROUP BLD: NORMAL
ADV 40+41 DNA STL QL NAA+NON-PROBE: NOT DETECTED
ALBUMIN SERPL-MCNC: 4.1 G/DL (ref 3.5–5.2)
ALBUMIN/GLOB SERPL: 0.7 G/DL
ALP SERPL-CCNC: 176 U/L (ref 39–117)
ALT SERPL W P-5'-P-CCNC: 14 U/L (ref 1–41)
ANION GAP SERPL CALCULATED.3IONS-SCNC: 12.5 MMOL/L (ref 5–15)
ANION GAP SERPL CALCULATED.3IONS-SCNC: 12.8 MMOL/L (ref 5–15)
ANION GAP SERPL CALCULATED.3IONS-SCNC: 13.7 MMOL/L (ref 5–15)
APTT PPP: 31.5 SECONDS (ref 26.5–34.5)
AST SERPL-CCNC: 17 U/L (ref 1–40)
ASTRO TYP 1-8 RNA STL QL NAA+NON-PROBE: NOT DETECTED
BASOPHILS # BLD AUTO: 0.1 10*3/MM3 (ref 0–0.2)
BASOPHILS NFR BLD AUTO: 0.4 % (ref 0–1.5)
BILIRUB SERPL-MCNC: 0.4 MG/DL (ref 0–1.2)
BILIRUB UR QL STRIP: NEGATIVE
BLD GP AB SCN SERPL QL: NEGATIVE
BUN SERPL-MCNC: 106 MG/DL (ref 8–23)
BUN SERPL-MCNC: 120 MG/DL (ref 8–23)
BUN SERPL-MCNC: 97 MG/DL (ref 8–23)
BUN/CREAT SERPL: 45.9 (ref 7–25)
BUN/CREAT SERPL: 46 (ref 7–25)
BUN/CREAT SERPL: 46.2 (ref 7–25)
C CAYETANENSIS DNA STL QL NAA+NON-PROBE: NOT DETECTED
C COLI+JEJ+UPSA DNA STL QL NAA+NON-PROBE: NOT DETECTED
C DIFF TOX GENS STL QL NAA+PROBE: NEGATIVE
CALCIUM SPEC-SCNC: 10.6 MG/DL (ref 8.6–10.5)
CALCIUM SPEC-SCNC: 12.6 MG/DL (ref 8.6–10.5)
CALCIUM SPEC-SCNC: 9.5 MG/DL (ref 8.6–10.5)
CHLORIDE SERPL-SCNC: 100 MMOL/L (ref 98–107)
CHLORIDE SERPL-SCNC: 100 MMOL/L (ref 98–107)
CHLORIDE SERPL-SCNC: 93 MMOL/L (ref 98–107)
CK SERPL-CCNC: 17 U/L (ref 20–200)
CLARITY UR: CLEAR
CO2 SERPL-SCNC: 15.3 MMOL/L (ref 22–29)
CO2 SERPL-SCNC: 16.2 MMOL/L (ref 22–29)
CO2 SERPL-SCNC: 18.5 MMOL/L (ref 22–29)
COLOR UR: YELLOW
CREAT SERPL-MCNC: 2.1 MG/DL (ref 0.76–1.27)
CREAT SERPL-MCNC: 2.31 MG/DL (ref 0.76–1.27)
CREAT SERPL-MCNC: 2.61 MG/DL (ref 0.76–1.27)
CRYPTOSP DNA STL QL NAA+NON-PROBE: NOT DETECTED
D-LACTATE SERPL-SCNC: 2.1 MMOL/L (ref 0.5–2)
D-LACTATE SERPL-SCNC: 2.3 MMOL/L (ref 0.5–2)
D-LACTATE SERPL-SCNC: 2.3 MMOL/L (ref 0.5–2)
D-LACTATE SERPL-SCNC: 3.3 MMOL/L (ref 0.5–2)
DEPRECATED RDW RBC AUTO: 55.4 FL (ref 37–54)
E HISTOLYT DNA STL QL NAA+NON-PROBE: NOT DETECTED
EAEC PAA PLAS AGGR+AATA ST NAA+NON-PRB: NOT DETECTED
EC STX1+STX2 GENES STL QL NAA+NON-PROBE: NOT DETECTED
EGFRCR SERPLBLD CKD-EPI 2021: 26.9 ML/MIN/1.73
EGFRCR SERPLBLD CKD-EPI 2021: 31.2 ML/MIN/1.73
EGFRCR SERPLBLD CKD-EPI 2021: 34.9 ML/MIN/1.73
EOSINOPHIL # BLD AUTO: 0.06 10*3/MM3 (ref 0–0.4)
EOSINOPHIL NFR BLD AUTO: 0.2 % (ref 0.3–6.2)
EPEC EAE GENE STL QL NAA+NON-PROBE: NOT DETECTED
ERYTHROCYTE [DISTWIDTH] IN BLOOD BY AUTOMATED COUNT: 19.3 % (ref 12.3–15.4)
ETEC LTA+ST1A+ST1B TOX ST NAA+NON-PROBE: NOT DETECTED
G LAMBLIA DNA STL QL NAA+NON-PROBE: NOT DETECTED
GEN 5 2HR TROPONIN T REFLEX: 19 NG/L
GLOBULIN UR ELPH-MCNC: 5.6 GM/DL
GLUCOSE BLDC GLUCOMTR-MCNC: 121 MG/DL (ref 70–130)
GLUCOSE SERPL-MCNC: 113 MG/DL (ref 65–99)
GLUCOSE SERPL-MCNC: 125 MG/DL (ref 65–99)
GLUCOSE SERPL-MCNC: 69 MG/DL (ref 65–99)
GLUCOSE UR STRIP-MCNC: NEGATIVE MG/DL
HCT VFR BLD AUTO: 41.2 % (ref 37.5–51)
HGB BLD-MCNC: 12.1 G/DL (ref 13–17.7)
HGB UR QL STRIP.AUTO: NEGATIVE
HOLD SPECIMEN: NORMAL
HOLD SPECIMEN: NORMAL
IMM GRANULOCYTES # BLD AUTO: 0.16 10*3/MM3 (ref 0–0.05)
IMM GRANULOCYTES NFR BLD AUTO: 0.6 % (ref 0–0.5)
INR PPP: 0.94 (ref 0.9–1.1)
KETONES UR QL STRIP: NEGATIVE
LEUKOCYTE ESTERASE UR QL STRIP.AUTO: NEGATIVE
LIPASE SERPL-CCNC: 75 U/L (ref 13–60)
LYMPHOCYTES # BLD AUTO: 2.69 10*3/MM3 (ref 0.7–3.1)
LYMPHOCYTES NFR BLD AUTO: 10.7 % (ref 19.6–45.3)
MCH RBC QN AUTO: 23.6 PG (ref 26.6–33)
MCHC RBC AUTO-ENTMCNC: 29.4 G/DL (ref 31.5–35.7)
MCV RBC AUTO: 80.5 FL (ref 79–97)
MONOCYTES # BLD AUTO: 2.35 10*3/MM3 (ref 0.1–0.9)
MONOCYTES NFR BLD AUTO: 9.4 % (ref 5–12)
NEUTROPHILS NFR BLD AUTO: 19.74 10*3/MM3 (ref 1.7–7)
NEUTROPHILS NFR BLD AUTO: 78.7 % (ref 42.7–76)
NITRITE UR QL STRIP: NEGATIVE
NOROVIRUS GI+II RNA STL QL NAA+NON-PROBE: NOT DETECTED
NRBC BLD AUTO-RTO: 0 /100 WBC (ref 0–0.2)
P SHIGELLOIDES DNA STL QL NAA+NON-PROBE: NOT DETECTED
PH UR STRIP.AUTO: <=5 [PH] (ref 5–8)
PLATELET # BLD AUTO: 950 10*3/MM3 (ref 140–450)
PMV BLD AUTO: 9.4 FL (ref 6–12)
POTASSIUM SERPL-SCNC: 5.1 MMOL/L (ref 3.5–5.2)
POTASSIUM SERPL-SCNC: 7.2 MMOL/L (ref 3.5–5.2)
POTASSIUM SERPL-SCNC: 7.8 MMOL/L (ref 3.5–5.2)
PROCALCITONIN SERPL-MCNC: 1.4 NG/ML (ref 0–0.25)
PROT SERPL-MCNC: 9.7 G/DL (ref 6–8.5)
PROT UR QL STRIP: NEGATIVE
PROTHROMBIN TIME: 13 SECONDS (ref 12.1–14.7)
RBC # BLD AUTO: 5.12 10*6/MM3 (ref 4.14–5.8)
RH BLD: POSITIVE
RH BLD: POSITIVE
RVA RNA STL QL NAA+NON-PROBE: NOT DETECTED
S ENT+BONG DNA STL QL NAA+NON-PROBE: NOT DETECTED
SAPO I+II+IV+V RNA STL QL NAA+NON-PROBE: NOT DETECTED
SHIGELLA SP+EIEC IPAH ST NAA+NON-PROBE: NOT DETECTED
SODIUM SERPL-SCNC: 122 MMOL/L (ref 136–145)
SODIUM SERPL-SCNC: 129 MMOL/L (ref 136–145)
SODIUM SERPL-SCNC: 131 MMOL/L (ref 136–145)
SP GR UR STRIP: 1.02 (ref 1–1.03)
T&S EXPIRATION DATE: NORMAL
TROPONIN T DELTA: -5 NG/L
TROPONIN T SERPL HS-MCNC: 24 NG/L
UROBILINOGEN UR QL STRIP: NORMAL
V CHOL+PARA+VUL DNA STL QL NAA+NON-PROBE: NOT DETECTED
V CHOLERAE DNA STL QL NAA+NON-PROBE: NOT DETECTED
WBC NRBC COR # BLD: 25.1 10*3/MM3 (ref 3.4–10.8)
WHOLE BLOOD HOLD COAG: NORMAL
WHOLE BLOOD HOLD SPECIMEN: NORMAL
Y ENTEROCOL DNA STL QL NAA+NON-PROBE: NOT DETECTED

## 2023-10-10 PROCEDURE — 71250 CT THORAX DX C-: CPT | Performed by: RADIOLOGY

## 2023-10-10 PROCEDURE — 86901 BLOOD TYPING SEROLOGIC RH(D): CPT | Performed by: STUDENT IN AN ORGANIZED HEALTH CARE EDUCATION/TRAINING PROGRAM

## 2023-10-10 PROCEDURE — 74176 CT ABD & PELVIS W/O CONTRAST: CPT | Performed by: RADIOLOGY

## 2023-10-10 PROCEDURE — 99285 EMERGENCY DEPT VISIT HI MDM: CPT

## 2023-10-10 PROCEDURE — 74176 CT ABD & PELVIS W/O CONTRAST: CPT

## 2023-10-10 PROCEDURE — 84484 ASSAY OF TROPONIN QUANT: CPT | Performed by: STUDENT IN AN ORGANIZED HEALTH CARE EDUCATION/TRAINING PROGRAM

## 2023-10-10 PROCEDURE — 25010000002 MORPHINE PER 10 MG: Performed by: INTERNAL MEDICINE

## 2023-10-10 PROCEDURE — 25010000002 PIPERACILLIN SOD-TAZOBACTAM PER 1 G: Performed by: STUDENT IN AN ORGANIZED HEALTH CARE EDUCATION/TRAINING PROGRAM

## 2023-10-10 PROCEDURE — 82550 ASSAY OF CK (CPK): CPT | Performed by: STUDENT IN AN ORGANIZED HEALTH CARE EDUCATION/TRAINING PROGRAM

## 2023-10-10 PROCEDURE — 81003 URINALYSIS AUTO W/O SCOPE: CPT | Performed by: STUDENT IN AN ORGANIZED HEALTH CARE EDUCATION/TRAINING PROGRAM

## 2023-10-10 PROCEDURE — 82948 REAGENT STRIP/BLOOD GLUCOSE: CPT

## 2023-10-10 PROCEDURE — 86850 RBC ANTIBODY SCREEN: CPT | Performed by: STUDENT IN AN ORGANIZED HEALTH CARE EDUCATION/TRAINING PROGRAM

## 2023-10-10 PROCEDURE — 86900 BLOOD TYPING SEROLOGIC ABO: CPT | Performed by: STUDENT IN AN ORGANIZED HEALTH CARE EDUCATION/TRAINING PROGRAM

## 2023-10-10 PROCEDURE — 94799 UNLISTED PULMONARY SVC/PX: CPT

## 2023-10-10 PROCEDURE — 86901 BLOOD TYPING SEROLOGIC RH(D): CPT

## 2023-10-10 PROCEDURE — 83605 ASSAY OF LACTIC ACID: CPT | Performed by: STUDENT IN AN ORGANIZED HEALTH CARE EDUCATION/TRAINING PROGRAM

## 2023-10-10 PROCEDURE — 71045 X-RAY EXAM CHEST 1 VIEW: CPT

## 2023-10-10 PROCEDURE — 25810000003 SODIUM CHLORIDE 0.9 % SOLUTION: Performed by: STUDENT IN AN ORGANIZED HEALTH CARE EDUCATION/TRAINING PROGRAM

## 2023-10-10 PROCEDURE — 85730 THROMBOPLASTIN TIME PARTIAL: CPT | Performed by: STUDENT IN AN ORGANIZED HEALTH CARE EDUCATION/TRAINING PROGRAM

## 2023-10-10 PROCEDURE — 71250 CT THORAX DX C-: CPT

## 2023-10-10 PROCEDURE — 93010 ELECTROCARDIOGRAM REPORT: CPT | Performed by: INTERNAL MEDICINE

## 2023-10-10 PROCEDURE — 25010000002 ONDANSETRON PER 1 MG: Performed by: STUDENT IN AN ORGANIZED HEALTH CARE EDUCATION/TRAINING PROGRAM

## 2023-10-10 PROCEDURE — 83690 ASSAY OF LIPASE: CPT | Performed by: STUDENT IN AN ORGANIZED HEALTH CARE EDUCATION/TRAINING PROGRAM

## 2023-10-10 PROCEDURE — 25010000002 HEPARIN (PORCINE) PER 1000 UNITS: Performed by: INTERNAL MEDICINE

## 2023-10-10 PROCEDURE — 63710000001 INSULIN REGULAR HUMAN PER 5 UNITS: Performed by: STUDENT IN AN ORGANIZED HEALTH CARE EDUCATION/TRAINING PROGRAM

## 2023-10-10 PROCEDURE — 85610 PROTHROMBIN TIME: CPT | Performed by: STUDENT IN AN ORGANIZED HEALTH CARE EDUCATION/TRAINING PROGRAM

## 2023-10-10 PROCEDURE — 87493 C DIFF AMPLIFIED PROBE: CPT | Performed by: STUDENT IN AN ORGANIZED HEALTH CARE EDUCATION/TRAINING PROGRAM

## 2023-10-10 PROCEDURE — 87507 IADNA-DNA/RNA PROBE TQ 12-25: CPT | Performed by: INTERNAL MEDICINE

## 2023-10-10 PROCEDURE — 86900 BLOOD TYPING SEROLOGIC ABO: CPT

## 2023-10-10 PROCEDURE — 85025 COMPLETE CBC W/AUTO DIFF WBC: CPT | Performed by: STUDENT IN AN ORGANIZED HEALTH CARE EDUCATION/TRAINING PROGRAM

## 2023-10-10 PROCEDURE — 25010000002 ONDANSETRON PER 1 MG: Performed by: HOSPITALIST

## 2023-10-10 PROCEDURE — 94640 AIRWAY INHALATION TREATMENT: CPT

## 2023-10-10 PROCEDURE — 25010000002 MORPHINE PER 10 MG: Performed by: STUDENT IN AN ORGANIZED HEALTH CARE EDUCATION/TRAINING PROGRAM

## 2023-10-10 PROCEDURE — 36415 COLL VENOUS BLD VENIPUNCTURE: CPT

## 2023-10-10 PROCEDURE — 87040 BLOOD CULTURE FOR BACTERIA: CPT | Performed by: STUDENT IN AN ORGANIZED HEALTH CARE EDUCATION/TRAINING PROGRAM

## 2023-10-10 PROCEDURE — 84145 PROCALCITONIN (PCT): CPT | Performed by: STUDENT IN AN ORGANIZED HEALTH CARE EDUCATION/TRAINING PROGRAM

## 2023-10-10 PROCEDURE — 93005 ELECTROCARDIOGRAM TRACING: CPT | Performed by: STUDENT IN AN ORGANIZED HEALTH CARE EDUCATION/TRAINING PROGRAM

## 2023-10-10 PROCEDURE — 80053 COMPREHEN METABOLIC PANEL: CPT | Performed by: STUDENT IN AN ORGANIZED HEALTH CARE EDUCATION/TRAINING PROGRAM

## 2023-10-10 PROCEDURE — 71045 X-RAY EXAM CHEST 1 VIEW: CPT | Performed by: RADIOLOGY

## 2023-10-10 RX ORDER — DEXTROSE MONOHYDRATE 25 G/50ML
50 INJECTION, SOLUTION INTRAVENOUS ONCE
Status: COMPLETED | OUTPATIENT
Start: 2023-10-10 | End: 2023-10-10

## 2023-10-10 RX ORDER — SODIUM CHLORIDE 0.9 % (FLUSH) 0.9 %
10 SYRINGE (ML) INJECTION AS NEEDED
Status: DISCONTINUED | OUTPATIENT
Start: 2023-10-10 | End: 2023-10-13 | Stop reason: HOSPADM

## 2023-10-10 RX ORDER — FOLIC ACID 1 MG/1
1 TABLET ORAL DAILY
COMMUNITY

## 2023-10-10 RX ORDER — ONDANSETRON 2 MG/ML
4 INJECTION INTRAMUSCULAR; INTRAVENOUS EVERY 6 HOURS PRN
Status: DISCONTINUED | OUTPATIENT
Start: 2023-10-10 | End: 2023-10-13 | Stop reason: HOSPADM

## 2023-10-10 RX ORDER — PANTOPRAZOLE SODIUM 40 MG/1
40 TABLET, DELAYED RELEASE ORAL DAILY
COMMUNITY
End: 2023-10-13 | Stop reason: HOSPADM

## 2023-10-10 RX ORDER — LACTULOSE 10 G/15ML
30 SOLUTION ORAL DAILY
Status: DISCONTINUED | OUTPATIENT
Start: 2023-10-10 | End: 2023-10-13

## 2023-10-10 RX ORDER — SODIUM CHLORIDE 9 MG/ML
40 INJECTION, SOLUTION INTRAVENOUS AS NEEDED
Status: DISCONTINUED | OUTPATIENT
Start: 2023-10-10 | End: 2023-10-13 | Stop reason: HOSPADM

## 2023-10-10 RX ORDER — MORPHINE SULFATE 2 MG/ML
2 INJECTION, SOLUTION INTRAMUSCULAR; INTRAVENOUS EVERY 4 HOURS PRN
Status: DISCONTINUED | OUTPATIENT
Start: 2023-10-10 | End: 2023-10-13 | Stop reason: HOSPADM

## 2023-10-10 RX ORDER — HYDROCODONE BITARTRATE AND ACETAMINOPHEN 10; 325 MG/1; MG/1
1 TABLET ORAL EVERY 6 HOURS PRN
COMMUNITY
End: 2023-10-13 | Stop reason: HOSPADM

## 2023-10-10 RX ORDER — HEPARIN SODIUM 5000 [USP'U]/ML
5000 INJECTION, SOLUTION INTRAVENOUS; SUBCUTANEOUS EVERY 8 HOURS SCHEDULED
Status: DISCONTINUED | OUTPATIENT
Start: 2023-10-10 | End: 2023-10-13 | Stop reason: HOSPADM

## 2023-10-10 RX ORDER — ENTERAL PUMP ACCESS.HYDROLYSIS
1 CARTRIDGE (EA) MISCELLANEOUS 3 TIMES DAILY
Status: ON HOLD | COMMUNITY
End: 2023-10-10

## 2023-10-10 RX ORDER — SODIUM CHLORIDE 0.9 % (FLUSH) 0.9 %
10 SYRINGE (ML) INJECTION EVERY 12 HOURS SCHEDULED
Status: DISCONTINUED | OUTPATIENT
Start: 2023-10-10 | End: 2023-10-13 | Stop reason: HOSPADM

## 2023-10-10 RX ORDER — ACETAMINOPHEN 325 MG/1
650 TABLET ORAL EVERY 4 HOURS PRN
Status: DISCONTINUED | OUTPATIENT
Start: 2023-10-10 | End: 2023-10-13 | Stop reason: HOSPADM

## 2023-10-10 RX ORDER — MORPHINE SULFATE 2 MG/ML
2 INJECTION, SOLUTION INTRAMUSCULAR; INTRAVENOUS ONCE
Status: COMPLETED | OUTPATIENT
Start: 2023-10-10 | End: 2023-10-10

## 2023-10-10 RX ORDER — ONDANSETRON 2 MG/ML
4 INJECTION INTRAMUSCULAR; INTRAVENOUS ONCE
Status: COMPLETED | OUTPATIENT
Start: 2023-10-10 | End: 2023-10-10

## 2023-10-10 RX ORDER — ATORVASTATIN CALCIUM 20 MG/1
20 TABLET, FILM COATED ORAL NIGHTLY
COMMUNITY

## 2023-10-10 RX ORDER — MULTIPLE VITAMINS W/ MINERALS TAB 9MG-400MCG
1 TAB ORAL DAILY
COMMUNITY

## 2023-10-10 RX ORDER — SODIUM POLYSTYRENE SULFONATE 4.1 MEQ/G
30 POWDER, FOR SUSPENSION ORAL; RECTAL ONCE
Status: COMPLETED | OUTPATIENT
Start: 2023-10-10 | End: 2023-10-10

## 2023-10-10 RX ORDER — FENOFIBRATE 145 MG/1
145 TABLET, COATED ORAL DAILY
COMMUNITY

## 2023-10-10 RX ORDER — SUCRALFATE ORAL 1 G/10ML
1 SUSPENSION ORAL 4 TIMES DAILY
COMMUNITY

## 2023-10-10 RX ORDER — IPRATROPIUM BROMIDE AND ALBUTEROL SULFATE 2.5; .5 MG/3ML; MG/3ML
3 SOLUTION RESPIRATORY (INHALATION) ONCE
Status: COMPLETED | OUTPATIENT
Start: 2023-10-10 | End: 2023-10-10

## 2023-10-10 RX ORDER — LANOLIN ALCOHOL/MO/W.PET/CERES
1000 CREAM (GRAM) TOPICAL DAILY
COMMUNITY

## 2023-10-10 RX ORDER — NITROGLYCERIN 0.4 MG/1
0.4 TABLET SUBLINGUAL
Status: DISCONTINUED | OUTPATIENT
Start: 2023-10-10 | End: 2023-10-13 | Stop reason: HOSPADM

## 2023-10-10 RX ORDER — MONTELUKAST SODIUM 10 MG/1
10 TABLET ORAL NIGHTLY
COMMUNITY

## 2023-10-10 RX ORDER — CALCIUM CARBONATE 500 MG/1
2 TABLET, CHEWABLE ORAL 2 TIMES DAILY PRN
Status: DISCONTINUED | OUTPATIENT
Start: 2023-10-10 | End: 2023-10-13 | Stop reason: HOSPADM

## 2023-10-10 RX ORDER — ONDANSETRON HYDROCHLORIDE 8 MG/1
8 TABLET, FILM COATED ORAL EVERY 8 HOURS PRN
COMMUNITY

## 2023-10-10 RX ORDER — HYDROCODONE BITARTRATE AND ACETAMINOPHEN 10; 325 MG/1; MG/1
1 TABLET ORAL EVERY 6 HOURS PRN
Status: DISCONTINUED | OUTPATIENT
Start: 2023-10-10 | End: 2023-10-11

## 2023-10-10 RX ADMIN — ONDANSETRON 4 MG: 2 INJECTION INTRAMUSCULAR; INTRAVENOUS at 11:36

## 2023-10-10 RX ADMIN — DEXTROSE MONOHYDRATE 50 ML: 25 INJECTION, SOLUTION INTRAVENOUS at 15:15

## 2023-10-10 RX ADMIN — ONDANSETRON 4 MG: 2 INJECTION INTRAMUSCULAR; INTRAVENOUS at 19:46

## 2023-10-10 RX ADMIN — CALCIUM CHLORIDE 2000 MG: 100 INJECTION INTRAVENOUS; INTRAVENTRICULAR at 15:41

## 2023-10-10 RX ADMIN — SODIUM POLYSTYRENE SULFONATE 30 G: 1 POWDER ORAL; RECTAL at 15:15

## 2023-10-10 RX ADMIN — INSULIN HUMAN 10 UNITS: 100 INJECTION, SOLUTION PARENTERAL at 15:15

## 2023-10-10 RX ADMIN — SODIUM BICARBONATE 75 MEQ: 84 INJECTION, SOLUTION INTRAVENOUS at 16:49

## 2023-10-10 RX ADMIN — MORPHINE SULFATE 2 MG: 2 INJECTION, SOLUTION INTRAMUSCULAR; INTRAVENOUS at 18:26

## 2023-10-10 RX ADMIN — MORPHINE SULFATE 2 MG: 2 INJECTION, SOLUTION INTRAMUSCULAR; INTRAVENOUS at 22:41

## 2023-10-10 RX ADMIN — Medication 10 ML: at 21:39

## 2023-10-10 RX ADMIN — SODIUM BICARBONATE 50 MEQ: 84 INJECTION INTRAVENOUS at 15:15

## 2023-10-10 RX ADMIN — MORPHINE SULFATE 2 MG: 2 INJECTION, SOLUTION INTRAMUSCULAR; INTRAVENOUS at 11:36

## 2023-10-10 RX ADMIN — PIPERACILLIN SODIUM AND TAZOBACTAM SODIUM 4.5 G: 4; .5 INJECTION, POWDER, LYOPHILIZED, FOR SOLUTION INTRAVENOUS at 11:36

## 2023-10-10 RX ADMIN — IPRATROPIUM BROMIDE AND ALBUTEROL SULFATE 3 ML: .5; 2.5 SOLUTION RESPIRATORY (INHALATION) at 15:12

## 2023-10-10 RX ADMIN — HEPARIN SODIUM 5000 UNITS: 5000 INJECTION INTRAVENOUS; SUBCUTANEOUS at 21:27

## 2023-10-10 RX ADMIN — SODIUM CHLORIDE 1836 ML: 9 INJECTION, SOLUTION INTRAVENOUS at 11:17

## 2023-10-10 RX ADMIN — LACTULOSE 30 G: 20 SOLUTION ORAL at 15:15

## 2023-10-10 NOTE — PROGRESS NOTES
Chief Complaint  Diarrhea, Vomiting, Fatigue, and Weight Loss    HPI:   Diarrhea   Associated symptoms include vomiting and weight loss.   Vomiting   Associated symptoms include diarrhea and weight loss.   Fatigue  Associated symptoms include fatigue and vomiting.   Weight Loss  Associated symptoms include fatigue and vomiting.      Ta Madison is a 63 y.o. male who presents today to [facility] for Diarrhea, Vomiting, Fatigue, and Weight Loss. He has a recent past medical history of necrotizing pancreatitis. He had a prolonged hospitalization and received broad spectrum antibiotics. He was also treated for C. Diff at that time. He is s/p PEG tube placement and has been receiving tube feeds up to 60mL/hr. He reports for the past three days >5 loose bowel movements daily and severe abdominal pain. He denies bloody stools and fevers.     Previous History:   Past Medical History:   Diagnosis Date    Elevated cholesterol     GERD (gastroesophageal reflux disease)     Increased heart rate     Mini stroke     Neck pain     Prostate cancer     Rash     Tobacco abuse       Past Surgical History:   Procedure Laterality Date    APPENDECTOMY      [facility]     COLONOSCOPY      COLONOSCOPY N/A 2023    Procedure: COLONOSCOPY;  Surgeon: Mahnaz Caraballo MD;  Location: [facility] OR;  Service: Gastroenterology;  Laterality: N/A;    ENDOSCOPY N/A 2023    Procedure: ESOPHAGOGASTRODUODENOSCOPY WITH KEOFEED PLACEMENT;  Surgeon: Yolande Eller MD;  Location: [facility] ENDOSCOPY;  Service: Gastroenterology;  Laterality: N/A;      Social History     Socioeconomic History    Marital status:    Tobacco Use    Smoking status: Former     Packs/day: 2.00     Years: 45.00     Additional pack years: 0.00     Total pack years: 90.00     Types: Cigarettes     Quit date: 2023     Years since quittin.2    Smokeless tobacco: Never   Vaping Use    Vaping Use: Never used    Substance and Sexual Activity    Alcohol use: No    Drug use: Yes     Types: Marijuana     Comment: occ     Sexual activity: Defer      Health Maintenance Due   Topic Date Due    COVID-19 Vaccine (1) Never done    ZOSTER VACCINE (1 of 2) Never done    INFLUENZA VACCINE  08/01/2023        Current Medications:  Current Outpatient Medications   Medication Sig Dispense Refill    albuterol sulfate  (90 Base) MCG/ACT inhaler Inhale 2 puffs Every 6 (Six) Hours As Needed for Wheezing. 18 g 5    aspirin 81 MG EC tablet Take 1 tablet by mouth Daily.      atorvastatin (LIPITOR) 20 MG tablet Take 1 tablet by mouth Every Night. 90 tablet 1    calcium carbonate (TUMS) 500 MG chewable tablet Chew 1 tablet.      cyanocobalamin (VITAMIN B-12) 1000 MCG tablet Take 1 tablet by mouth Daily for 30 days. 90 tablet 1    Digestive Enzyme Cartridge (Relizorb) device Use 1 each 3 (Three) Times a Day. Change every 500mL      EPINEPHrine (EPIPEN) 0.3 MG/0.3ML solution auto-injector injection INJECT 1 PEN IN THE MUSCLE ONE TIME AS DIRECTED      fenofibrate (TRICOR) 145 MG tablet Take 1 tablet by mouth Daily. 90 tablet 1    folic acid (FOLVITE) 1 MG tablet Take 1 tablet by mouth Daily for 90 days. 90 tablet 1    HYDROcodone-acetaminophen (NORCO)  MG per tablet Take 1 tablet by mouth Every 6 (Six) Hours As Needed for Moderate Pain. 120 tablet 0    melatonin 3 MG tablet Take 2 tablets by mouth.      menthol-zinc oxide (CALMOSEPTINE) 0.44-20.625 % ointment ointment Apply 1 application  topically to the appropriate area as directed As Needed (skin irritation from J tube site). 113 g 1    metoprolol tartrate (LOPRESSOR) 25 MG tablet Take 1 tablet by mouth Daily. 90 tablet 1    montelukast (SINGULAIR) 10 MG tablet Take 1 tablet by mouth Every Night. 90 tablet 1    multivitamin with minerals tablet tablet Take 1 tablet by mouth Daily. 30 each 3    Nutritional Supplements (Peptamen 1.5 Ziyad) liquid Take  by mouth.      ondansetron (ZOFRAN)  "8 MG tablet Take 1 tablet by mouth Every 8 (Eight) Hours As Needed for Nausea or Vomiting. 30 tablet 2    pantoprazole (PROTONIX) 40 MG EC tablet Take 1 tablet by mouth Daily.      sertraline (ZOLOFT) 50 MG tablet Take 1 tablet by mouth Daily. 90 tablet 1    Tab-A-Lucero/Iron/Beta Carotene tablet tablet Take 1 tablet by mouth Daily.      thiamine (VITAMIN B1) 100 MG tablet Take 1 tablet by mouth Daily for 30 days. 30 tablet 0    enzalutamide (XTANDI) 40 MG chemo capsule Take 4 capsules by mouth Daily. (Patient not taking: Reported on 10/4/2023) 120 capsule 5    HYDROcodone-acetaminophen (NORCO)  MG per tablet Take  by mouth Every 6 (Six) Hours As Needed. (Patient not taking: Reported on 10/10/2023)      hydrOXYzine (ATARAX) 50 MG tablet Take 1 tablet by mouth 3 (Three) Times a Day As Needed for Itching. (Patient not taking: Reported on 10/10/2023) 90 tablet 1    mirtazapine (REMERON) 7.5 MG tablet Take 1 tablet by mouth Every Night for 30 days. (Patient not taking: Reported on 10/10/2023) 90 tablet 1    nicotine (NICODERM CQ) 21 MG/24HR patch Place 1 patch on the skin as directed by provider Daily. (Patient not taking: Reported on 10/10/2023) 30 patch 1    ondansetron ODT (ZOFRAN-ODT) 8 MG disintegrating tablet Take 1 tablet by mouth. (Patient not taking: Reported on 10/10/2023)      oxyCODONE (ROXICODONE) 5 MG immediate release tablet  (Patient not taking: Reported on 10/4/2023)      vitamin D (ERGOCALCIFEROL) 1.25 MG (44648 UT) capsule capsule Take 1 capsule by mouth 1 (One) Time Per Week. (Patient not taking: Reported on 10/10/2023) 5 capsule 5     No current facility-administered medications for this visit.       Allergies:   No Known Allergies    Vitals:   /67 (BP Location: Right arm, Patient Position: Sitting, Cuff Size: Adult)   Pulse (!) 122   Temp 97.1 øF (36.2 øC) (Temporal)   Resp 16   Ht 172.7 cm (68\")   Wt 61.7 kg (136 lb)   SpO2 98%   BMI 20.68 kg/mý     Estimated body mass index is " "20.68 kg/mý as calculated from the following:    Height as of this encounter: 172.7 cm (68\").    Weight as of this encounter: 61.7 kg (136 lb).    Ta Madison  reports that he quit smoking about 2 months ago. His smoking use included cigarettes. He has a 90.00 pack-year smoking history. He has never used smokeless tobacco.           Physical Exam:   Physical Exam  Constitutional:       General: He is not in acute distress.     Appearance: He is ill-appearing.   HENT:      Mouth/Throat:      Mouth: Mucous membranes are dry.   Cardiovascular:      Rate and Rhythm: Regular rhythm. Tachycardia present.      Heart sounds: Normal heart sounds. No murmur heard.  Pulmonary:      Effort: Pulmonary effort is normal. No respiratory distress.      Breath sounds: No wheezing.   Abdominal:      General: There is distension.      Tenderness: There is abdominal tenderness (Diffuse tenderness to deep palpation in all quadrants). There is no guarding.   Skin:     General: Skin is warm and dry.   Neurological:      General: No focal deficit present.      Mental Status: He is alert.      Motor: Weakness present.          Lab Results:   Office Visit on 10/04/2023   Component Date Value Ref Range Status    WBC 10/04/2023 22.25 (H)  3.40 - 10.80 10*3/mm3 Final    RBC 10/04/2023 4.16  4.14 - 5.80 10*6/mm3 Final    Hemoglobin 10/04/2023 9.9 (L)  13.0 - 17.7 g/dL Final    Hematocrit 10/04/2023 32.2 (L)  37.5 - 51.0 % Final    MCV 10/04/2023 77.4 (L)  79.0 - 97.0 fL Final    MCH 10/04/2023 23.8 (L)  26.6 - 33.0 pg Final    MCHC 10/04/2023 30.7 (L)  31.5 - 35.7 g/dL Final    RDW 10/04/2023 18.2 (H)  12.3 - 15.4 % Final    RDW-SD 10/04/2023 50.7  37.0 - 54.0 fl Final    MPV 10/04/2023 9.6  6.0 - 12.0 fL Final    Platelets 10/04/2023 843 (H)  140 - 450 10*3/mm3 Final    Neutrophil % 10/04/2023 75.9  42.7 - 76.0 % Final    Lymphocyte % 10/04/2023 17.3 (L)  19.6 - 45.3 % Final    Monocyte % 10/04/2023 5.7  5.0 - 12.0 % Final    Eosinophil % " 10/04/2023 0.2 (L)  0.3 - 6.2 % Final    Basophil % 10/04/2023 0.4  0.0 - 1.5 % Final    Immature Grans % 10/04/2023 0.5  0.0 - 0.5 % Final    Neutrophils, Absolute 10/04/2023 16.87 (H)  1.70 - 7.00 10*3/mm3 Final    Lymphocytes, Absolute 10/04/2023 3.85 (H)  0.70 - 3.10 10*3/mm3 Final    Monocytes, Absolute 10/04/2023 1.26 (H)  0.10 - 0.90 10*3/mm3 Final    Eosinophils, Absolute 10/04/2023 0.05  0.00 - 0.40 10*3/mm3 Final    Basophils, Absolute 10/04/2023 0.10  0.00 - 0.20 10*3/mm3 Final    Immature Grans, Absolute 10/04/2023 0.12 (H)  0.00 - 0.05 10*3/mm3 Final    nRBC 10/04/2023 0.0  0.0 - 0.2 /100 WBC Final    Glucose 10/04/2023 109 (H)  65 - 99 mg/dL Final    BUN 10/04/2023 39 (H)  8 - 23 mg/dL Final    Creatinine 10/04/2023 0.88  0.76 - 1.27 mg/dL Final    Sodium 10/04/2023 130 (L)  136 - 145 mmol/L Final    Potassium 10/04/2023 5.3 (H)  3.5 - 5.2 mmol/L Final    Chloride 10/04/2023 95 (L)  98 - 107 mmol/L Final    CO2 10/04/2023 21.0 (L)  22.0 - 29.0 mmol/L Final    Calcium 10/04/2023 9.7  8.6 - 10.5 mg/dL Final    Total Protein 10/04/2023 8.5  6.0 - 8.5 g/dL Final    Albumin 10/04/2023 3.6  3.5 - 5.2 g/dL Final    ALT (SGPT) 10/04/2023 17  1 - 41 U/L Final    AST (SGOT) 10/04/2023 23  1 - 40 U/L Final    Alkaline Phosphatase 10/04/2023 257 (H)  39 - 117 U/L Final    Total Bilirubin 10/04/2023 0.3  0.0 - 1.2 mg/dL Final    Globulin 10/04/2023 4.9  gm/dL Final    A/G Ratio 10/04/2023 0.7  g/dL Final    BUN/Creatinine Ratio 10/04/2023 44.3 (H)  7.0 - 25.0 Final    Anion Gap 10/04/2023 14.0  5.0 - 15.0 mmol/L Final    eGFR 10/04/2023 97.2  >60.0 mL/min/1.73 Final    Magnesium 10/04/2023 2.6 (H)  1.6 - 2.4 mg/dL Final   Office Visit on 09/20/2023   Component Date Value Ref Range Status    WBC 09/20/2023 22.69 (H)  3.40 - 10.80 10*3/mm3 Final    RBC 09/20/2023 3.42 (L)  4.14 - 5.80 10*6/mm3 Final    Hemoglobin 09/20/2023 8.1 (L)  13.0 - 17.7 g/dL Final    Hematocrit 09/20/2023 26.1 (L)  37.5 - 51.0 % Final    MCV  09/20/2023 76.3 (L)  79.0 - 97.0 fL Final    MCH 09/20/2023 23.7 (L)  26.6 - 33.0 pg Final    MCHC 09/20/2023 31.0 (L)  31.5 - 35.7 g/dL Final    RDW 09/20/2023 17.2 (H)  12.3 - 15.4 % Final    RDW-SD 09/20/2023 46.7  37.0 - 54.0 fl Final    MPV 09/20/2023 10.1  6.0 - 12.0 fL Final    Platelets 09/20/2023 740 (H)  140 - 450 10*3/mm3 Final    Neutrophil % 09/20/2023 81.5 (H)  42.7 - 76.0 % Final    Lymphocyte % 09/20/2023 11.1 (L)  19.6 - 45.3 % Final    Monocyte % 09/20/2023 6.2  5.0 - 12.0 % Final    Eosinophil % 09/20/2023 0.0 (L)  0.3 - 6.2 % Final    Basophil % 09/20/2023 0.3  0.0 - 1.5 % Final    Immature Grans % 09/20/2023 0.9 (H)  0.0 - 0.5 % Final    Neutrophils, Absolute 09/20/2023 18.50 (H)  1.70 - 7.00 10*3/mm3 Final    Lymphocytes, Absolute 09/20/2023 2.51  0.70 - 3.10 10*3/mm3 Final    Monocytes, Absolute 09/20/2023 1.41 (H)  0.10 - 0.90 10*3/mm3 Final    Eosinophils, Absolute 09/20/2023 0.00  0.00 - 0.40 10*3/mm3 Final    Basophils, Absolute 09/20/2023 0.07  0.00 - 0.20 10*3/mm3 Final    Immature Grans, Absolute 09/20/2023 0.20 (H)  0.00 - 0.05 10*3/mm3 Final    nRBC 09/20/2023 0.0  0.0 - 0.2 /100 WBC Final    Glucose 09/20/2023 102 (H)  65 - 99 mg/dL Final    BUN 09/20/2023 18  8 - 23 mg/dL Final    Creatinine 09/20/2023 0.64 (L)  0.76 - 1.27 mg/dL Final    Sodium 09/20/2023 127 (L)  136 - 145 mmol/L Final    Potassium 09/20/2023 5.0  3.5 - 5.2 mmol/L Final    Chloride 09/20/2023 93 (L)  98 - 107 mmol/L Final    CO2 09/20/2023 22.8  22.0 - 29.0 mmol/L Final    Calcium 09/20/2023 8.5 (L)  8.6 - 10.5 mg/dL Final    Total Protein 09/20/2023 7.1  6.0 - 8.5 g/dL Final    Albumin 09/20/2023 2.6 (L)  3.5 - 5.2 g/dL Final    ALT (SGPT) 09/20/2023 15  1 - 41 U/L Final    AST (SGOT) 09/20/2023 16  1 - 40 U/L Final    Alkaline Phosphatase 09/20/2023 135 (H)  39 - 117 U/L Final    Total Bilirubin 09/20/2023 0.5  0.0 - 1.2 mg/dL Final    Globulin 09/20/2023 4.5  gm/dL Final    A/G Ratio 09/20/2023 0.6  g/dL Final     BUN/Creatinine Ratio 09/20/2023 28.1 (H)  7.0 - 25.0 Final    Anion Gap 09/20/2023 11.2  5.0 - 15.0 mmol/L Final    eGFR 09/20/2023 107.0  >60.0 mL/min/1.73 Final   Lab on 09/18/2023   Component Date Value Ref Range Status    Glucose 09/18/2023 99  65 - 99 mg/dL Final    BUN 09/18/2023 13  8 - 23 mg/dL Final    Creatinine 09/18/2023 0.59 (L)  0.76 - 1.27 mg/dL Final    Sodium 09/18/2023 127 (L)  136 - 145 mmol/L Final    Potassium 09/18/2023 4.7  3.5 - 5.2 mmol/L Final    Chloride 09/18/2023 93 (L)  98 - 107 mmol/L Final    CO2 09/18/2023 23.0  22.0 - 29.0 mmol/L Final    Calcium 09/18/2023 8.2 (L)  8.6 - 10.5 mg/dL Final    BUN/Creatinine Ratio 09/18/2023 22.0  7.0 - 25.0 Final    Anion Gap 09/18/2023 11.0  5.0 - 15.0 mmol/L Final    eGFR 09/18/2023 109.7  >60.0 mL/min/1.73 Final   Office Visit on 09/08/2023   Component Date Value Ref Range Status    WBC 09/08/2023 16.56 (H)  3.40 - 10.80 10*3/mm3 Final    RBC 09/08/2023 3.78 (L)  4.14 - 5.80 10*6/mm3 Final    Hemoglobin 09/08/2023 8.9 (L)  13.0 - 17.7 g/dL Final    Hematocrit 09/08/2023 28.6 (L)  37.5 - 51.0 % Final    MCV 09/08/2023 75.7 (L)  79.0 - 97.0 fL Final    MCH 09/08/2023 23.5 (L)  26.6 - 33.0 pg Final    MCHC 09/08/2023 31.1 (L)  31.5 - 35.7 g/dL Final    RDW 09/08/2023 16.6 (H)  12.3 - 15.4 % Final    RDW-SD 09/08/2023 45.0  37.0 - 54.0 fl Final    MPV 09/08/2023 11.2  6.0 - 12.0 fL Final    Platelets 09/08/2023 561 (H)  140 - 450 10*3/mm3 Final    Neutrophil % 09/08/2023 81.2 (H)  42.7 - 76.0 % Final    Lymphocyte % 09/08/2023 13.0 (L)  19.6 - 45.3 % Final    Monocyte % 09/08/2023 4.8 (L)  5.0 - 12.0 % Final    Eosinophil % 09/08/2023 0.1 (L)  0.3 - 6.2 % Final    Basophil % 09/08/2023 0.2  0.0 - 1.5 % Final    Immature Grans % 09/08/2023 0.7 (H)  0.0 - 0.5 % Final    Neutrophils, Absolute 09/08/2023 13.45 (H)  1.70 - 7.00 10*3/mm3 Final    Lymphocytes, Absolute 09/08/2023 2.16  0.70 - 3.10 10*3/mm3 Final    Monocytes, Absolute 09/08/2023 0.79   0.10 - 0.90 10*3/mm3 Final    Eosinophils, Absolute 09/08/2023 0.01  0.00 - 0.40 10*3/mm3 Final    Basophils, Absolute 09/08/2023 0.04  0.00 - 0.20 10*3/mm3 Final    Immature Grans, Absolute 09/08/2023 0.11 (H)  0.00 - 0.05 10*3/mm3 Final    nRBC 09/08/2023 0.0  0.0 - 0.2 /100 WBC Final    Glucose 09/08/2023 118 (H)  65 - 99 mg/dL Final    BUN 09/08/2023 28 (H)  8 - 23 mg/dL Final    Creatinine 09/08/2023 0.55 (L)  0.76 - 1.27 mg/dL Final    Sodium 09/08/2023 136  136 - 145 mmol/L Final    Potassium 09/08/2023 4.3  3.5 - 5.2 mmol/L Final    Chloride 09/08/2023 97 (L)  98 - 107 mmol/L Final    CO2 09/08/2023 26.0  22.0 - 29.0 mmol/L Final    Calcium 09/08/2023 9.4  8.6 - 10.5 mg/dL Final    Total Protein 09/08/2023 7.7  6.0 - 8.5 g/dL Final    Albumin 09/08/2023 3.2 (L)  3.5 - 5.2 g/dL Final    ALT (SGPT) 09/08/2023 12  1 - 41 U/L Final    AST (SGOT) 09/08/2023 15  1 - 40 U/L Final    Alkaline Phosphatase 09/08/2023 113  39 - 117 U/L Final    Total Bilirubin 09/08/2023 0.3  0.0 - 1.2 mg/dL Final    Globulin 09/08/2023 4.5  gm/dL Final    A/G Ratio 09/08/2023 0.7  g/dL Final    BUN/Creatinine Ratio 09/08/2023 50.9 (H)  7.0 - 25.0 Final    Anion Gap 09/08/2023 13.0  5.0 - 15.0 mmol/L Final    eGFR 09/08/2023 112.1  >60.0 mL/min/1.73 Final    Amylase 09/08/2023 237 (H)  28 - 100 U/L Final    Lipase 09/08/2023 96 (H)  13 - 60 U/L Final   No results displayed because visit has over 200 results.      Lab on 07/12/2023   Component Date Value Ref Range Status    PSA 07/12/2023 <0.014  0.000 - 4.000 ng/mL Final    Glucose 07/12/2023 110 (H)  65 - 99 mg/dL Final    BUN 07/12/2023 23  8 - 23 mg/dL Final    Creatinine 07/12/2023 1.05  0.76 - 1.27 mg/dL Final    Sodium 07/12/2023 140  136 - 145 mmol/L Final    Potassium 07/12/2023 4.6  3.5 - 5.2 mmol/L Final    Chloride 07/12/2023 103  98 - 107 mmol/L Final    CO2 07/12/2023 24.9  22.0 - 29.0 mmol/L Final    Calcium 07/12/2023 9.4  8.6 - 10.5 mg/dL Final    Total Protein  07/12/2023 7.4  6.0 - 8.5 g/dL Final    Albumin 07/12/2023 4.2  3.5 - 5.2 g/dL Final    ALT (SGPT) 07/12/2023 7  1 - 41 U/L Final    AST (SGOT) 07/12/2023 13  1 - 40 U/L Final    Alkaline Phosphatase 07/12/2023 82  39 - 117 U/L Final    Total Bilirubin 07/12/2023 0.2  0.0 - 1.2 mg/dL Final    Globulin 07/12/2023 3.2  gm/dL Final    A/G Ratio 07/12/2023 1.3  g/dL Final    BUN/Creatinine Ratio 07/12/2023 21.9  7.0 - 25.0 Final    Anion Gap 07/12/2023 12.1  5.0 - 15.0 mmol/L Final    eGFR 07/12/2023 80.3  >60.0 mL/min/1.73 Final    WBC 07/12/2023 7.53  3.40 - 10.80 10*3/mm3 Final    RBC 07/12/2023 4.83  4.14 - 5.80 10*6/mm3 Final    Hemoglobin 07/12/2023 11.9 (L)  13.0 - 17.7 g/dL Final    Hematocrit 07/12/2023 37.8  37.5 - 51.0 % Final    MCV 07/12/2023 78.3 (L)  79.0 - 97.0 fL Final    MCH 07/12/2023 24.6 (L)  26.6 - 33.0 pg Final    MCHC 07/12/2023 31.5  31.5 - 35.7 g/dL Final    RDW 07/12/2023 16.5 (H)  12.3 - 15.4 % Final    RDW-SD 07/12/2023 46.4  37.0 - 54.0 fl Final    MPV 07/12/2023 10.2  6.0 - 12.0 fL Final    Platelets 07/12/2023 558 (H)  140 - 450 10*3/mm3 Final    Neutrophil % 07/12/2023 57.6  42.7 - 76.0 % Final    Lymphocyte % 07/12/2023 31.2  19.6 - 45.3 % Final    Monocyte % 07/12/2023 8.1  5.0 - 12.0 % Final    Eosinophil % 07/12/2023 1.9  0.3 - 6.2 % Final    Basophil % 07/12/2023 0.9  0.0 - 1.5 % Final    Immature Grans % 07/12/2023 0.3  0.0 - 0.5 % Final    Neutrophils, Absolute 07/12/2023 4.34  1.70 - 7.00 10*3/mm3 Final    Lymphocytes, Absolute 07/12/2023 2.35  0.70 - 3.10 10*3/mm3 Final    Monocytes, Absolute 07/12/2023 0.61  0.10 - 0.90 10*3/mm3 Final    Eosinophils, Absolute 07/12/2023 0.14  0.00 - 0.40 10*3/mm3 Final    Basophils, Absolute 07/12/2023 0.07  0.00 - 0.20 10*3/mm3 Final    Immature Grans, Absolute 07/12/2023 0.02  0.00 - 0.05 10*3/mm3 Final    nRBC 07/12/2023 0.0  0.0 - 0.2 /100 WBC Final   Office Visit on 06/07/2023   Component Date Value Ref Range Status    Glucose 06/07/2023  95  65 - 99 mg/dL Final    BUN 06/07/2023 18  8 - 23 mg/dL Final    Creatinine 06/07/2023 0.85  0.76 - 1.27 mg/dL Final    Sodium 06/07/2023 137  136 - 145 mmol/L Final    Potassium 06/07/2023 4.9  3.5 - 5.2 mmol/L Final    Chloride 06/07/2023 102  98 - 107 mmol/L Final    CO2 06/07/2023 25.0  22.0 - 29.0 mmol/L Final    Calcium 06/07/2023 9.4  8.6 - 10.5 mg/dL Final    Total Protein 06/07/2023 7.5  6.0 - 8.5 g/dL Final    Albumin 06/07/2023 4.1  3.5 - 5.2 g/dL Final    ALT (SGPT) 06/07/2023 9  1 - 41 U/L Final    AST (SGOT) 06/07/2023 12  1 - 40 U/L Final    Alkaline Phosphatase 06/07/2023 104  39 - 117 U/L Final    Total Bilirubin 06/07/2023 0.2  0.0 - 1.2 mg/dL Final    Globulin 06/07/2023 3.4  gm/dL Final    A/G Ratio 06/07/2023 1.2  g/dL Final    BUN/Creatinine Ratio 06/07/2023 21.2  7.0 - 25.0 Final    Anion Gap 06/07/2023 10.0  5.0 - 15.0 mmol/L Final    eGFR 06/07/2023 98.2  >60.0 mL/min/1.73 Final    Total Cholesterol 06/07/2023 146  0 - 200 mg/dL Final    Triglycerides 06/07/2023 136  0 - 150 mg/dL Final    HDL Cholesterol 06/07/2023 31 (L)  40 - 60 mg/dL Final    LDL Cholesterol  06/07/2023 91  0 - 100 mg/dL Final    VLDL Cholesterol 06/07/2023 24  5 - 40 mg/dL Final    LDL/HDL Ratio 06/07/2023 2.83   Final   Lab on 04/12/2023   Component Date Value Ref Range Status    PSA 04/12/2023 <0.014  0.000 - 4.000 ng/mL Final    Glucose 04/12/2023 114 (H)  65 - 99 mg/dL Final    BUN 04/12/2023 23  8 - 23 mg/dL Final    Creatinine 04/12/2023 1.13  0.76 - 1.27 mg/dL Final    Sodium 04/12/2023 138  136 - 145 mmol/L Final    Potassium 04/12/2023 4.7  3.5 - 5.2 mmol/L Final    Chloride 04/12/2023 103  98 - 107 mmol/L Final    CO2 04/12/2023 25.7  22.0 - 29.0 mmol/L Final    Calcium 04/12/2023 9.7  8.6 - 10.5 mg/dL Final    Total Protein 04/12/2023 7.7  6.0 - 8.5 g/dL Final    Albumin 04/12/2023 4.2  3.5 - 5.2 g/dL Final    ALT (SGPT) 04/12/2023 7  1 - 41 U/L Final    AST (SGOT) 04/12/2023 11  1 - 40 U/L Final     Alkaline Phosphatase 04/12/2023 93  39 - 117 U/L Final    Total Bilirubin 04/12/2023 0.3  0.0 - 1.2 mg/dL Final    Globulin 04/12/2023 3.5  gm/dL Final    A/G Ratio 04/12/2023 1.2  g/dL Final    BUN/Creatinine Ratio 04/12/2023 20.4  7.0 - 25.0 Final    Anion Gap 04/12/2023 9.3  5.0 - 15.0 mmol/L Final    eGFR 04/12/2023 73.9  >60.0 mL/min/1.73 Final    WBC 04/12/2023 6.79  3.40 - 10.80 10*3/mm3 Final    RBC 04/12/2023 4.76  4.14 - 5.80 10*6/mm3 Final    Hemoglobin 04/12/2023 12.2 (L)  13.0 - 17.7 g/dL Final    Hematocrit 04/12/2023 38.5  37.5 - 51.0 % Final    MCV 04/12/2023 80.9  79.0 - 97.0 fL Final    MCH 04/12/2023 25.6 (L)  26.6 - 33.0 pg Final    MCHC 04/12/2023 31.7  31.5 - 35.7 g/dL Final    RDW 04/12/2023 15.0  12.3 - 15.4 % Final    RDW-SD 04/12/2023 43.9  37.0 - 54.0 fl Final    MPV 04/12/2023 10.3  6.0 - 12.0 fL Final    Platelets 04/12/2023 546 (H)  140 - 450 10*3/mm3 Final    Neutrophil % 04/12/2023 58.1  42.7 - 76.0 % Final    Lymphocyte % 04/12/2023 29.9  19.6 - 45.3 % Final    Monocyte % 04/12/2023 9.1  5.0 - 12.0 % Final    Eosinophil % 04/12/2023 1.3  0.3 - 6.2 % Final    Basophil % 04/12/2023 1.3  0.0 - 1.5 % Final    Immature Grans % 04/12/2023 0.3  0.0 - 0.5 % Final    Neutrophils, Absolute 04/12/2023 3.94  1.70 - 7.00 10*3/mm3 Final    Lymphocytes, Absolute 04/12/2023 2.03  0.70 - 3.10 10*3/mm3 Final    Monocytes, Absolute 04/12/2023 0.62  0.10 - 0.90 10*3/mm3 Final    Eosinophils, Absolute 04/12/2023 0.09  0.00 - 0.40 10*3/mm3 Final    Basophils, Absolute 04/12/2023 0.09  0.00 - 0.20 10*3/mm3 Final    Immature Grans, Absolute 04/12/2023 0.02  0.00 - 0.05 10*3/mm3 Final    nRBC 04/12/2023 0.0  0.0 - 0.2 /100 WBC Final       Assessment and Plan  Diagnoses and all orders for this visit:    1. Acute generalized abdominal pain (Primary)    2. Acute diarrhea    Patient has acute abdominal pain and diarrhea. Patients hospitalization and records were reviewed. He received broad spectrum antibiotics  and has a history of C. Diff. Last WBC was 22, 6 days ago. On exam patients abdomen is mildly distended, and diffusely tender. 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 I discussed ER evaluation with Dr. Archer. I decided to send the patient to the emergency room to rule out toxic megacolon and C. Diff. Patient has an acute illness that is a threat to his life if not treated emergently. Patient understood and went directly to the ER.     New Medications:   No orders of the defined types were placed in this encounter.      Discontinued Medications:   There are no discontinued medications.              BMI is within normal parameters. No other follow-up for BMI required.       Follow Up:   No follow-ups on file.    Patient was given instructions and counseling regarding his condition or for health maintenance advice. Please see specific information pulled into the AVS if appropriate.       This document has been electronically signed by Herman Colin DO   October 10, 2023 10:24 EDT    Dictated Utilizing Dragon Dictation: Part of this note may be an electronic transcription/translation of spoken language to printed text using the Dragon Dictation System.

## 2023-10-10 NOTE — H&P
[facility] Medicine Services  HISTORY & PHYSICAL    Patient Identification:  Name:  Ta Madison  Age:  63 y.o.  Sex:  male  :  1960  MRN:  [id number]   Visit Number:  [id number]  Admit Date: 10/10/2023   Primary Care Physician:  Yolande Olvera APRN     Subjective     Chief complaint:   Chief Complaint   Patient presents with    Vomiting    Diarrhea    Abdominal Pain     History of presenting illness:   Patient is a 63 y.o. 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 difficile, essential hypertension, hyperlipidemia, history of TIA, GERD, prostate cancer in the past, anxiety/depression, and former tobacco abuse that presented to the [facility] emergency department for evaluation of abdominal pain, nausea, vomiting, and diarrhea.  Patient with extensive past medical history since 2023 when patient had gallstone pancreatitis and found to have necrotizing pancreatitis.  Patient was treated at the [facility] in [facility].  See chart for details.  Patient recently discharged from [facility] about 2 weeks ago.  Patient  with recently inserted PEG-J tube.  He has been tolerating tube feeds.  Patient's wife at bedside states that last week the patient was seen by PCP, noted to have a 12 pound weight loss at that time.  Patient's wife under instruction of primary care was titrating up his tube feeds.  Patient had previously been on 65 cc an hour in the past, however due to recent hospitalization have been titrated down to 45 cc an hour.  Patient has been tolerating well until Friday.  Patient started to have increasing abdominal pain, nausea, vomiting and increased stool output.  Over the weekend, patient states that he started to develop diarrhea that was frequent.  He  reports chills but denies any known fever.  Again, increasing abdominal discomfort.  No chest pain or dyspnea.  No cough or upper respiratory complaints.  He states his diarrhea continue to worsen and made him concerned for C. difficile as he has had a history of this in the past.  Patient went to PCP today and due to acute complaints, was sent to the ED for further evaluation.  Patient hypotensive on arrival and acutely ill-appearing.  C. difficile toxin and GI panel PCR pending currently.  IV fluids administered with improvement in hypotension.  Patient noted be hyperkalemic, as well as TYREE, nephrology contacted by ED with treatment provided and bicarb drip recommended.    Upon arrival to the ED, vitals were temperature 96.8, heart rate 112, respiratory rate 20, blood pressure 84/58, oxygen saturation 99% on room air.  Initial high-sensitivity troponin T 24 with repeat at 19, -5 delta.  CK17.  CMP with sodium 122, chloride 93, CO2 15.3, , creatinine 2.6, BUN/creatinine ratio 46, EGFR 26.9, glucose 125, calcium 10.6, alkaline phosphatase 176, total protein 9.7 with potassium 7.8 however there was hemolysis of specimen.  Repeat BMP confirmed potassium of 7.2.  Initial lactic acid 2.3 with reflex at 2.1.  Lipase 75.  Procalcitonin 1.40.  INR 0.94.CBC with white blood cell count 25.10, hemoglobin 12.1, platelets 950,000, neutrophil percentage 78.7%, absolute neutrophil count 19.74.  Urinalysis unremarkable.  Chest x-ray with no evidence of acute cardiopulmonary disease.  CT chest without contrast with no acute cardiopulmonary disease.  CT abdomen pelvis without contrast with significant improvement in changes of necrotizing pancreatitis status post cystogastrostomy tube placement, necrosectomy, and metallic stent placement spanning from the G-tube into the stomach to the level of the proximal jejunum.  There is no significant ascites.  No acute appearing pancreatic changes.  No evidence of acute abscess.  There  is cholelithiasis present.  Known ED medication administration: 2000 mg IV calcium chloride x1, 25 g IV dextrose x1, 10 units IV regular insulin x1, DuoNeb nebulizer treatment x1, 30 g p.o. lactulose x1, 2 mg IV morphine x1, 4 mg IV Zofran x1, 4.5 g IV Zosyn x1, 50 mill equivalent IV sodium bicarb x1, 1.836 L bolus normal saline x1, bicarb drip.     Patient has been admitted to the progressive care unit for further evaluation and treatment.     Present during exam: Patient's wife   ---------------------------------------------------------------------------------------------------------------------   Review of Systems   Constitutional:  Positive for chills and fatigue. Negative for activity change, appetite change, diaphoresis and fever.   HENT:  Negative for congestion and sore throat.    Eyes:  Negative for discharge and visual disturbance.   Respiratory:  Negative for cough, chest tightness, shortness of breath and wheezing.    Cardiovascular:  Negative for chest pain, palpitations and leg swelling.   Gastrointestinal:  Positive for abdominal pain, diarrhea, nausea and vomiting. Negative for constipation.   Genitourinary:  Negative for decreased urine volume, dysuria, frequency and urgency.   Musculoskeletal:  Negative for arthralgias and myalgias.   Skin:  Negative for color change and wound.   Neurological:  Positive for weakness (Generalized). Negative for dizziness, syncope, light-headedness and headaches.   Psychiatric/Behavioral:  Negative for confusion. The patient is not nervous/anxious.       ---------------------------------------------------------------------------------------------------------------------   Past Medical History:   Diagnosis Date    Elevated cholesterol     GERD (gastroesophageal reflux disease)     Increased heart rate     Mini stroke     Neck pain     Prostate cancer     Rash     Tobacco abuse      Past Surgical History:   Procedure Laterality Date    APPENDECTOMY  1971    [place]  \[facility] [place] [facility]\""     COLONOSCOPY      COLONOSCOPY N/A 2023    Procedure: COLONOSCOPY;  Surgeon: Mahnaz Caraballo MD;  Location: [facility] OR;  Service: Gastroenterology;  Laterality: N/A;    ENDOSCOPY N/A 2023    Procedure: ESOPHAGOGASTRODUODENOSCOPY WITH KEOFEED PLACEMENT;  Surgeon: Yolande Eller MD;  Location:  [facility];  Service: Gastroenterology;  Laterality: N/A;     Family History   Problem Relation Age of Onset    Nephrolithiasis Mother     Heart disease Father     Hypertension Father     Kidney disease Father      Social History     Socioeconomic History    Marital status:    Tobacco Use    Smoking status: Former     Packs/day: 2.00     Years: 45.00     Additional pack years: 0.00     Total pack years: 90.00     Types: Cigarettes     Quit date: 2023     Years since quittin.2    Smokeless tobacco: Never   Vaping Use    Vaping Use: Never used   Substance and Sexual Activity    Alcohol use: No    Drug use: Yes     Types: Marijuana     Comment: occ     Sexual activity: Defer     ---------------------------------------------------------------------------------------------------------------------   Allergies:  Patient has no known allergies.  ---------------------------------------------------------------------------------------------------------------------   Medications below are reported home medications pulling from within the system; at this time, these medications have not been reconciled unless otherwise specified and are in the verification process for further verifcation as current home medications.    Prior to Admission Medications       Prescriptions Last Dose Informant Patient Reported? Taking?    albuterol sulfate  (90 Base) MCG/ACT inhaler   No No    Inhale 2 puffs Every 6 (Six) Hours As Needed for Wheezing.    aspirin 81 MG EC tablet   Yes No    Take 1 tablet by mouth Daily.    atorvastatin (LIPITOR) 20 MG tablet   No No    Take 1 tablet by mouth Every Night.     calcium carbonate (TUMS) 500 MG chewable tablet   Yes No    Chew 1 tablet.    cyanocobalamin (VITAMIN B-12) 1000 MCG tablet   No No    Take 1 tablet by mouth Daily for 30 days.    Digestive Enzyme Cartridge (Relizorb) device   Yes No    Use 1 each 3 (Three) Times a Day. Change every 500mL    enzalutamide (XTANDI) 40 MG chemo capsule   No No    Take 4 capsules by mouth Daily.    Patient not taking:  Reported on 10/4/2023    EPINEPHrine (EPIPEN) 0.3 MG/0.3ML solution auto-injector injection   Yes No    INJECT 1 PEN IN THE MUSCLE ONE TIME AS DIRECTED    fenofibrate (TRICOR) 145 MG tablet   No No    Take 1 tablet by mouth Daily.    folic acid (FOLVITE) 1 MG tablet   No No    Take 1 tablet by mouth Daily for 90 days.    HYDROcodone-acetaminophen (NORCO)  MG per tablet   No No    Take 1 tablet by mouth Every 6 (Six) Hours As Needed for Moderate Pain.    hydrOXYzine (ATARAX) 50 MG tablet   No No    Take 1 tablet by mouth 3 (Three) Times a Day As Needed for Itching.    Patient not taking:  Reported on 10/10/2023    melatonin 3 MG tablet   Yes No    Take 2 tablets by mouth.    menthol-zinc oxide (CALMOSEPTINE) 0.44-20.625 % ointment ointment   No No    Apply 1 application  topically to the appropriate area as directed As Needed (skin irritation from J tube site).    metoprolol tartrate (LOPRESSOR) 25 MG tablet   No No    Take 1 tablet by mouth Daily.    mirtazapine (REMERON) 7.5 MG tablet   No No    Take 1 tablet by mouth Every Night for 30 days.    Patient not taking:  Reported on 10/10/2023    montelukast (SINGULAIR) 10 MG tablet   No No    Take 1 tablet by mouth Every Night.    multivitamin with minerals tablet tablet   No No    Take 1 tablet by mouth Daily.    nicotine (NICODERM CQ) 21 MG/24HR patch   Yes No    Place 1 patch on the skin as directed by provider Daily.    Patient not taking:  Reported on 10/10/2023    Nutritional Supplements (Peptamen 1.5 Ziyad) liquid   Yes No    Take  by mouth.    ondansetron  (ZOFRAN) 8 MG tablet   No No    Take 1 tablet by mouth Every 8 (Eight) Hours As Needed for Nausea or Vomiting.    ondansetron ODT (ZOFRAN-ODT) 8 MG disintegrating tablet   Yes No    Take 1 tablet by mouth.    Patient not taking:  Reported on 10/10/2023    oxyCODONE (ROXICODONE) 5 MG immediate release tablet   Yes No    Patient not taking:  Reported on 10/4/2023    pantoprazole (PROTONIX) 40 MG EC tablet   Yes No    Take 1 tablet by mouth Daily.    sertraline (ZOLOFT) 50 MG tablet   No No    Take 1 tablet by mouth Daily.    Tab-A-Lucero/Iron/Beta Carotene tablet tablet   Yes No    Take 1 tablet by mouth Daily.    thiamine (VITAMIN B1) 100 MG tablet   No No    Take 1 tablet by mouth Daily for 30 days.    vitamin D (ERGOCALCIFEROL) 1.25 MG (39288 UT) capsule capsule   No No    Take 1 capsule by mouth 1 (One) Time Per Week.    Patient not taking:  Reported on 10/10/2023          ---------------------------------------------------------------------------------------------------------------------    Objective     Hospital Scheduled Meds:  heparin (porcine), 5,000 Units, Subcutaneous, Q8H  lactulose, 30 g, Per J Tube, Daily  sodium chloride, 10 mL, Intravenous, Q12H      sodium bicarbonate 8.4 % 75 mEq in sodium chloride 0.45 % 1,000 mL infusion (greater than 75 mEq), 75 mEq, Last Rate: 75 mEq (10/10/23 8891)        Current listed hospital scheduled medications may not yet reflect those currently placed in orders that are signed and held, awaiting patient's arrival to floor/unit.    ---------------------------------------------------------------------------------------------------------------------   Vital Signs:  Temp:  [96.8 øF (36 øC)-97.5 øF (36.4 øC)] 97.5 øF (36.4 øC)  Heart Rate:  [] 100  Resp:  [11-20] 11  BP: ()/() 122/65  Mean Arterial Pressure (Non-Invasive) for the past 24 hrs (Last 3 readings):   Noninvasive MAP (mmHg)   10/10/23 1700 79   10/10/23 1643 124   10/10/23 1620 78     SpO2  Percentage    10/10/23 1625 10/10/23 1643 10/10/23 1700   SpO2: 100% 98% 100%     SpO2:  [98 %-100 %] 100 %  on   ;   Device (Oxygen Therapy): room air    Body mass index is 22.86 kg/mý.  Wt Readings from Last 3 Encounters:   10/10/23 66.2 kg (145 lb 15.1 oz)   10/10/23 61.7 kg (136 lb)   10/04/23 66.1 kg (145 lb 12.8 oz)       ---------------------------------------------------------------------------------------------------------------------   Physical Exam:  Physical Exam  Nursing note reviewed.   Constitutional:       General: He is awake. He is not in acute distress.     Appearance: He is well-developed. He is ill-appearing. He is not toxic-appearing.      Comments: Sitting up in bed.  No acute distress noted.  Wife present at bedside.   HENT:      Head: Normocephalic and atraumatic.      Mouth/Throat:      Mouth: Mucous membranes are moist. Mucous membranes are dry.   Eyes:      Conjunctiva/sclera: Conjunctivae normal.      Pupils: Pupils are equal, round, and reactive to light.   Cardiovascular:      Rate and Rhythm: Regular rhythm. Tachycardia present.      Pulses:           Dorsalis pedis pulses are 2+ on the right side and 2+ on the left side.      Heart sounds: Normal heart sounds. No murmur heard.     No friction rub. No gallop.   Pulmonary:      Effort: Pulmonary effort is normal. No tachypnea, accessory muscle usage or respiratory distress.      Breath sounds: Normal breath sounds and air entry. No wheezing, rhonchi or rales.      Comments: On room air.  Speaks in full sentences without dyspnea.  Abdominal:      General: Bowel sounds are normal. There is no distension.      Palpations: Abdomen is soft.      Tenderness: There is generalized no abdominal tenderness. There is no guarding or rebound.      Comments: PEG-J in place.  Patient with a gauze pad at insertion site that is saturated in bright green drainage.  No surrounding erythema or bleeding.   Musculoskeletal:      Cervical back: Neck supple.       Right lower leg: No edema.      Left lower leg: No edema.   Skin:     General: Skin is warm and dry.      Capillary Refill: Capillary refill takes less than 2 seconds.      Coloration: Skin is pale (Diffuse pallor).   Neurological:      General: No focal deficit present.      Mental Status: He is alert and oriented to person, place, and time.      Sensory: Sensation is intact.      Motor: Motor function is intact. Weakness (Generalized, no lateralizing or focal weakness appreciated.) present.      Comments: Awake and alert. Follows commands. Answers questions appropriately. Moves all extremities equally. Strength and sensation intact. No focal neuro deficit on exam.   Psychiatric:         Attention and Perception: Attention normal.         Mood and Affect: Mood and affect normal.         Speech: Speech normal.         Behavior: Behavior is cooperative.       ---------------------------------------------------------------------------------------------------------------------  EKG: Pending cardiology read.  Per my interpretation: Normal sinus rhythm with heart rate 87 bpm, QTc 440 ms.  No ST elevation appreciated.  Peaked T waves noted.  Await final cardiology read.      Telemetry:    Sinus tachycardia 100s per bedside monitor     I have personally reviewed the EKG/Telemetry strip  ---------------------------------------------------------------------------------------------------------------------   Results from last 7 days   Lab Units 10/10/23  1357 10/10/23  1131   CK TOTAL U/L  --  17*   HSTROP T ng/L 19* 24*           Results from last 7 days   Lab Units 10/10/23  1357 10/10/23  1131 10/04/23  1000   LACTATE mmol/L 2.1* 2.3*  --    WBC 10*3/mm3  --  25.10* 22.25*   HEMOGLOBIN g/dL  --  12.1* 9.9*   HEMATOCRIT %  --  41.2 32.2*   MCV fL  --  80.5 77.4*   MCHC g/dL  --  29.4* 30.7*   PLATELETS 10*3/mm3  --  950* 843*   INR   --  0.94  --      Results from last 7 days   Lab Units 10/10/23  1645 10/10/23  9019  10/10/23  1131 10/04/23  1000   SODIUM mmol/L 131* 129* 122* 130*   POTASSIUM mmol/L 5.1 7.2* 7.8* 5.3*   MAGNESIUM mg/dL  --   --   --  2.6*   CHLORIDE mmol/L 100 100 93* 95*   CO2 mmol/L 18.5* 16.2* 15.3* 21.0*   BUN mg/dL 97* 106* 120* 39*   CREATININE mg/dL 2.10* 2.31* 2.61* 0.88   CALCIUM mg/dL 12.6* 9.5 10.6* 9.7   GLUCOSE mg/dL 69 113* 125* 109*   ALBUMIN g/dL  --   --  4.1 3.6   BILIRUBIN mg/dL  --   --  0.4 0.3   ALK PHOS U/L  --   --  176* 257*   AST (SGOT) U/L  --   --  17 23   ALT (SGPT) U/L  --   --  14 17   Estimated Creatinine Clearance: 34.2 mL/min (A) (by C-G formula based on SCr of 2.1 mg/dL (H)).    Glucose   Date/Time Value Ref Range Status   10/10/2023 1627 121 70 - 130 mg/dL Final     Lab Results   Component Value Date    HGBA1C 6.00 (H) 02/28/2023     Lab Results   Component Value Date    TSH 2.570 02/28/2023    FREET4 1.22 06/06/2018         Microbiology Results (last 10 days)       ** No results found for the last 240 hours. **           I have personally reviewed the above laboratory results.   ---------------------------------------------------------------------------------------------------------------------  Imaging Results (Last 7 Days)       Procedure Component Value Units Date/Time    CT Abdomen Pelvis Without Contrast [[id number]] Collected: 10/10/23 1412     Updated: 10/10/23 1421    Narrative:      EXAM:    CT Abdomen and Pelvis Without Intravenous Contrast     EXAM DATE:    10/10/2023 2:04 PM     CLINICAL HISTORY:    TYREE, sepsis     TECHNIQUE:    Axial computed tomography images of the abdomen and pelvis without  intravenous contrast.  Sagittal and coronal reformatted images were  created and reviewed.  This CT exam was performed using one or more of  the following dose reduction techniques:  automated exposure control,  adjustment of the mA and/or kV according to patient size, and/or use of  iterative reconstruction technique.     COMPARISON:    8/10/2023     FINDINGS:    Lung  bases:  Unremarkable as visualized.  No mass.  No consolidation.      ABDOMEN:    Liver:  Liver is within normal limits.    Gallbladder and bile ducts:  Cholelithiasis noted.  Common bile duct  appears within normal limits.    Pancreas:  Significant improvement in pancreatitis changes likely  reflecting sequela of necrotizing pancreatitis with patient status post  gastroenteric stenting and a cyst though gastrostomy drainage catheter.  Ill-defined fluid collection posterior to the gastric wall compatible  with a resolving pseudocyst or pancreatic abscess from necrotizing  pancreatitis.  Focal pancreatic ductal dilatation of the head of the  pancreas region but no acute inflammatory changes identified.    Spleen:  Unremarkable as visualized.  No splenomegaly.    Adrenals:  Unremarkable as visualized.  No mass.    Kidneys and ureters:  Unremarkable as visualized.  No obstructing  stones.  No hydronephrosis.    Stomach and bowel:  Unremarkable as visualized.  No obstruction.  No  mucosal thickening.      PELVIS:    Appendix:  Appendectomy.    Bladder:  Distention of the urinary bladder without wall thickening.   No stones.    Reproductive:  Mild prostate enlargement is noted.      ABDOMEN and PELVIS:    Intraperitoneal space:  Unremarkable as visualized.  No significant  free fluid identified.  No significant free air is identified.    Bones/joints:  No acute fracture.  No dislocation.    Soft tissues:  Unremarkable as visualized.    Vasculature:  Unremarkable as visualized.  No abdominal aortic  aneurysm.    Lymph nodes:  Unremarkable as visualized.  No enlarged lymph nodes.    Tubes, lines and devices:  G-tube noted within the stomach.       Impression:      1.  Significant improvement in changes of necrotizing pancreatitis  status post cystogastrostomy tube placement, necrosectomy, and [employer]  metallic stent placement spanning from the G-tube into the stomach to  the level of the proximal jejunum.  2.  No significant  ascites.  3.  No acute appearing pancreatitis changes. No findings to suggest  acute abscess.  4.  Cholelithiasis noted.  5.  Other incidental/nonacute findings above        This report was finalized on 10/10/2023 2:19 PM by Dr. Janusz Lerner MD.       CT Chest Without Contrast Diagnostic [[id number]] Collected: 10/10/23 1400     Updated: 10/10/23 1414    Narrative:      EXAM:    CT Chest Without Intravenous Contrast     EXAM DATE:    10/10/2023 2:03 PM     CLINICAL HISTORY:    sepsis     TECHNIQUE:    Axial computed tomography images of the chest without intravenous  contrast.  Sagittal and coronal reformatted images were created and  reviewed.  This CT exam was performed using one or more of the following  dose reduction techniques:  automated exposure control, adjustment of  the mA and/or kV according to patient size, and/or use of iterative  reconstruction technique.     COMPARISON:    8/6/2023     FINDINGS:    Lungs and pleural spaces:  Chronic interstitial lung changes are  noted.  No consolidative airspace disease identified.  No pleural  effusions identified.    Heart:  Unremarkable as visualized.  No cardiomegaly.  No significant  pericardial effusion.  No significant coronary artery calcifications.    Bones/joints:  Degenerative changes thoracic spine.  No acute  fracture.  No dislocation.    Soft tissues:  Unremarkable as visualized.    Vasculature:  Unremarkable as visualized.  No thoracic aortic  aneurysm.    Lymph nodes:  Unremarkable as visualized.  No enlarged lymph nodes.    Tubes, lines and devices:  G-tube noted.       Impression:      1.  No acute thoracic findings identified.  2.  Refer to CT abdomen/pelvis for abdomen findings.        This report was finalized on 10/10/2023 2:12 PM by Dr. Janusz Lerner MD.       XR Chest 1 View [[id number]] Collected: 10/10/23 1347     Updated: 10/10/23 1350    Narrative:      EXAM:    XR Chest, 1 View     EXAM DATE:    10/10/2023 12:19 PM     CLINICAL  HISTORY:    Severe Sepsis Protocol     TECHNIQUE:    Frontal view of the chest.     COMPARISON:    8/10/2023     FINDINGS:    Lungs and pleural spaces:  Unremarkable as visualized.  No  consolidation.  No pneumothorax.    Heart:  Unremarkable as visualized.  No cardiomegaly.    Mediastinum:  Unremarkable as visualized.    Bones/joints:  Unremarkable as visualized.       Impression:        Unremarkable exam. No acute cardiopulmonary findings identified.        This report was finalized on 10/10/2023 1:48 PM by Dr. Janusz Lerner MD.             I have personally reviewed the above radiology results.     ---------------------------------------------------------------------------------------------------------------------    Assessment & Plan      ACUTE HOSPITAL PROBLEMS    -SIRS vs severe sepsis, present on admission due to unclear source at this time   -Nausea, vomiting, diarrhea   -Abdominal pain   -History of C. Difficile colitis   -C. Difficile rule out   SIRS; HR > 90, RR >20, WBC >12K, Lactate > 2   Procal elevated   CT abdomen/pelvis and chest without acute abnormalities identified   Patient with known history of C Difficile colitis, C Diff toxin positive 9/11/2023. He reports complete treatment in the past.   Blood cultures obtained in ED, follow for final results   GI panel PCR and C diff toxin pending   Hold on antibiotics until C diff toxin resulted  IV fluid sepsis bolus administered in ED, continue gentle IV fluid hydration   Supportive care   PRN antiemetics available, hold on anti-diarrheal agents until C. Diff toxin results available   Closely monitor vitals and temperature curve   Repeat labs in AM     -Acute kidney injury   -Severe life threatening hyperkalemia   Baseline creatinine 0.5-0.6 with creatinine on admission of 2.61  Likely hypovolemic from acute GI losses   Potassium > 7 on admit. EKG with peaked T waves   IV fluid bolus given in ED, continue gentle IV fluids   Treatment given in ED: 2000  mg IV calcium chloride x1, 25 g IV dextrose x1, 10 units IV regular insulin x1, DuoNeb nebulizer treatment x1, , 50 mEq sodium bicarb x 1   Nephrology was contacted in ED, patient started on bicarbonate gtt per orders as well as dose of lactulose and Kayexalate   Repeat BMP with improvement in potassium, now 5.1. Creatinine remains elevated but improved to 2.1  Nephrology input/assistance is much appreciated   Monitor urine output and renal function closely  Avoid nephrotoxins as much as possible   Repeat chemistry panel in AM    -Recent necrotizing pancreatitis and gallstone pancreatitis s/p cyst-gastrostomy and necrosectomy with PEG-J tube insertion on tube feeds  CT abdomen/pelvis with improvement in necrotizing pancreatitis, no acute findings appreciated   Lipase not significantly elevated   Supportive care   Consult nutrition for tube feeds    CHRONIC MEDICAL PROBLEMS    -Essential hypertension  BP hypotensive on presentation   Hold home antihypertensive regimen at this time.   Closely monitor BP per hospital protocol, titrate medications as necessary  -Hyperlipidemia: Cont home medication regimen   -History of TIA: No focal deficits on exam   -GERD: Hold PPI as patient is C diff rule out   -History of prostate cancer   -Anxiety/depression: Supportive care, continue home medication regimen once reconciled per pharmacy.  -Former smoker   ---------------------------------------------------  DVT Prophylaxis: SQ heparin   Activity: Up with assistance as tolerated   ---------------------------------------------------  INPATIENT status due to the need for care which can only be reasonably provided in an hospital setting such as aggressive/expedited ancillary services and/or consultation services, the necessity for IV medications, close physician monitoring and/or the possible need for procedures.  In such, I feel patient's risk for adverse outcomes and need for care warrant INPATIENT evaluation and predict the  patient's care encounter to likely last beyond 2 midnights.    Code Status: FULL CODE   ---------------------------------------------------  Disposition/Discharge planning: Plans on home with wife once medically stable, pending clinical course   ---------------------------------------------------  I have discussed the patient's assessment and plan with the patient, patient's wife at bedside, nursing staff, and attending physician Chapito Bravo, *    Toña Montero PA-C  Hospitalist Service -- [facility]   Pager: [phone]    10/10/23  17:40 EDT    Attending Physician: Chapito Soler, *        ---------------------------------------------------------------------------------------------------------------------

## 2023-10-10 NOTE — ED PROVIDER NOTES
Subjective   History of Present Illness  Patient is a 63-year-old male with history significant for previous ischemic stroke, prostate cancer and recent necrotizing pancreatitis that required transfer to tertiary center comes from PCPs office with reports of weakness, fatigue, abdominal pain with 10-12 episodes of diarrhea over the past 3 days.  He is awake, alert and oriented and wife is present at bedside.  He has a jejunum tube that he uses for tube feeds and medications.  Over the weekend wife states they told him he could start trying to drink water if he was able to tolerate it.  He started this on Friday and symptoms seem to have worsened over the weekend.  He had multiple episodes of nausea and puked of gastric appearing contacts per family at bedside.  He reports subjective fever and chills.  He reports 10-12 episodes of diarrhea since Friday.  Wife states she did suction his J-tube and had about 200 to 300 cc out just prior to arrival after he vomited.  History of C. difficile in PCPs office was concerned it may be he may have this again.      Review of Systems   Constitutional:  Positive for chills and fatigue. Negative for fever.   HENT:  Negative for congestion, sinus pain and sore throat.    Respiratory:  Negative for cough, chest tightness, shortness of breath and wheezing.    Cardiovascular:  Negative for chest pain, palpitations and leg swelling.   Gastrointestinal:  Positive for abdominal pain, diarrhea, nausea and vomiting. Negative for constipation.   Genitourinary:  Negative for dysuria, frequency, hematuria and urgency.   Musculoskeletal:  Negative for arthralgias and myalgias.   Neurological:  Positive for weakness. Negative for dizziness, numbness and headaches.   Psychiatric/Behavioral:  Negative for confusion.        Past Medical History:   Diagnosis Date    Elevated cholesterol     GERD (gastroesophageal reflux disease)     Increased heart rate     Mini stroke     Neck pain     Prostate  cancer     Rash     Tobacco abuse        No Known Allergies    Past Surgical History:   Procedure Laterality Date    APPENDECTOMY      [facility]     COLONOSCOPY      COLONOSCOPY N/A 2023    Procedure: COLONOSCOPY;  Surgeon: Mahnaz Caraballo MD;  Location: [facility] OR;  Service: Gastroenterology;  Laterality: N/A;    ENDOSCOPY N/A 2023    Procedure: ESOPHAGOGASTRODUODENOSCOPY WITH KEOFEED PLACEMENT;  Surgeon: Yolande Eller MD;  Location:  [facility];  Service: Gastroenterology;  Laterality: N/A;       Family History   Problem Relation Age of Onset    Nephrolithiasis Mother     Heart disease Father     Hypertension Father     Kidney disease Father        Social History     Socioeconomic History    Marital status:    Tobacco Use    Smoking status: Former     Packs/day: 2.00     Years: 45.00     Additional pack years: 0.00     Total pack years: 90.00     Types: Cigarettes     Quit date: 2023     Years since quittin.2    Smokeless tobacco: Never   Vaping Use    Vaping Use: Never used   Substance and Sexual Activity    Alcohol use: No    Drug use: Yes     Types: Marijuana     Comment: occ     Sexual activity: Defer           Objective   Physical Exam  Vitals and nursing note reviewed. Exam conducted with a chaperone present.   Constitutional:       General: He is not in acute distress.     Appearance: He is normal weight. He is ill-appearing.      Comments: Chronically ill-appearing, cachectic   HENT:      Head: Normocephalic.      Mouth/Throat:      Mouth: Mucous membranes are moist.      Pharynx: Oropharynx is clear.   Eyes:      General: No scleral icterus.     Extraocular Movements: Extraocular movements intact.      Pupils: Pupils are equal, round, and reactive to light.   Neck:      Vascular: No JVD.   Cardiovascular:      Rate and Rhythm: Normal rate and regular rhythm.      Heart sounds: No murmur heard.     No friction rub. No gallop.   Pulmonary:      Effort:  Pulmonary effort is normal. No tachypnea.      Breath sounds: Normal breath sounds. No decreased breath sounds, wheezing, rhonchi or rales.   Abdominal:      General: Abdomen is flat. Bowel sounds are normal.      Palpations: Abdomen is soft.      Tenderness: generalized       Comments: -tube without surrounding erythema, drainage, gastric contents within the tube   Musculoskeletal:         General: Normal range of motion.      Cervical back: Normal range of motion and neck supple.      Right lower leg: No edema.      Left lower leg: No edema.   Skin:     General: Skin is warm and dry.      Capillary Refill: Capillary refill takes 2 to 3 seconds.   Neurological:      General: No focal deficit present.      Mental Status: He is alert and oriented to person, place, and time.   Psychiatric:         Mood and Affect: Mood normal.         Behavior: Behavior normal.         Procedures           ED Course  ED Course as of 10/10/23 1525   Tue Oct 10, 2023   1241 ECG 12 Lead Other; Sepsis  Sinus rhythm, rate 87, QTc 440, no acute ST or T wave changes [CW]      ED Course User Index  [CW] Vin Archer, DO                                           Medical Decision Making  --Patient appears chronically ill, alert and able to converse appropriately at bedside.  Initial BP 84/58, concern for intra-abdominal pathology.  --Labs initially noted TYREE and hyperkalemia but specimen was hemolyzed, repeat BUN of 106/creatinine of 2.3, potassium confirmed elevated at 7.2  --GI PCR pending  --C. difficile toxin pending  --CT abdomen/pelvis with contrast noted significant improvement of necrotizing pancreatitis, no obstructive uropathy  --IV fluid resuscitation, Zosyn, antiemetics/PPI  --IV 10 units insulin x1, dextrose, bicarb, DuoNebs and calcium gluconate, discussed with nephrology, bicarb drip, repeat chemistry in 2 hours p.m.) (530, recommended lactulose and Kayexalate as well  --Discussed with medicine, will admit      Amount  and/or Complexity of Data Reviewed  Labs: ordered.  Radiology: ordered.  ECG/medicine tests: ordered. Decision-making details documented in ED Course.    Risk  OTC drugs.  Prescription drug management.        Final diagnoses:   TYREE (acute kidney injury)   Hyperkalemia       ED Disposition  ED Disposition       ED Disposition   Decision to Admit    Condition   --    Comment   --               No follow-up provider specified.       Medication List      No changes were made to your prescriptions during this visit.            Vin Archer DO  10/10/23 1525

## 2023-10-10 NOTE — ED NOTES
Pt departing the ED en route to PCU with CAM Huff and Rafael RN. Pt departing the ER A&Ox4, RR even and unlabored, skin WPD. Pt vitals WNL noted to be NSR on the monitor prior to departure. Pt is leaving with #20g to right FA clean, dry, patent, and saline locked. Pt is leaving with #22g to left FA clean, dry, patent, and infusing per provider's orders with calcium chloride with no signs of infiltration. Pt is leaving not appearing in any acute distress with family at bedside.

## 2023-10-10 NOTE — PLAN OF CARE
Problem: Adult Inpatient Plan of Care  Goal: Plan of Care Review  Outcome: Ongoing, Progressing  Goal: Patient-Specific Goal (Individualized)  Outcome: Ongoing, Progressing  Goal: Absence of Hospital-Acquired Illness or Injury  Outcome: Ongoing, Progressing  Goal: Optimal Comfort and Wellbeing  Outcome: Ongoing, Progressing  Goal: Readiness for Transition of Care  Outcome: Ongoing, Progressing  Intervention: Mutually Develop Transition Plan  Recent Flowsheet Documentation  Taken 10/10/2023 1702 by Elicia Willis RN  Transportation Anticipated: family or friend will provide  Patient/Family Anticipated Services at Transition:   none   home health care   rehabilitation services  Patient/Family Anticipates Transition to: home with family  Taken 10/10/2023 1651 by Elicia Willis, RN  Equipment Currently Used at Home: (feeding tube pump)   other (see comments)   nutrition supplies     Problem: Pain Acute  Goal: Acceptable Pain Control and Functional Ability  Outcome: Ongoing, Progressing     Problem: Fall Injury Risk  Goal: Absence of Fall and Fall-Related Injury  Outcome: Ongoing, Progressing   Goal Outcome Evaluation:   Pt welcomed to PCU. Pt alert and oriented x 4. VSS and afebrile at this time. Family at bedside. Bed is locked in low position with call light in reach.

## 2023-10-11 LAB
ALBUMIN SERPL-MCNC: 2.9 G/DL (ref 3.5–5.2)
ALBUMIN/GLOB SERPL: 0.8 G/DL
ALP SERPL-CCNC: 116 U/L (ref 39–117)
ALT SERPL W P-5'-P-CCNC: 10 U/L (ref 1–41)
ANION GAP SERPL CALCULATED.3IONS-SCNC: 11.2 MMOL/L (ref 5–15)
ANION GAP SERPL CALCULATED.3IONS-SCNC: 9.3 MMOL/L (ref 5–15)
ANION GAP SERPL CALCULATED.3IONS-SCNC: 9.4 MMOL/L (ref 5–15)
ANION GAP SERPL CALCULATED.3IONS-SCNC: 9.7 MMOL/L (ref 5–15)
AST SERPL-CCNC: 13 U/L (ref 1–40)
BASOPHILS # BLD AUTO: 0.1 10*3/MM3 (ref 0–0.2)
BASOPHILS NFR BLD AUTO: 0.8 % (ref 0–1.5)
BILIRUB SERPL-MCNC: 0.5 MG/DL (ref 0–1.2)
BUN SERPL-MCNC: 63 MG/DL (ref 8–23)
BUN SERPL-MCNC: 75 MG/DL (ref 8–23)
BUN SERPL-MCNC: 78 MG/DL (ref 8–23)
BUN SERPL-MCNC: 86 MG/DL (ref 8–23)
BUN/CREAT SERPL: 55.8 (ref 7–25)
BUN/CREAT SERPL: 57.3 (ref 7–25)
BUN/CREAT SERPL: 57.4 (ref 7–25)
BUN/CREAT SERPL: 62.4 (ref 7–25)
CALCIUM SPEC-SCNC: 9.1 MG/DL (ref 8.6–10.5)
CALCIUM SPEC-SCNC: 9.3 MG/DL (ref 8.6–10.5)
CALCIUM SPEC-SCNC: 9.4 MG/DL (ref 8.6–10.5)
CALCIUM SPEC-SCNC: 9.6 MG/DL (ref 8.6–10.5)
CHLORIDE SERPL-SCNC: 96 MMOL/L (ref 98–107)
CHLORIDE SERPL-SCNC: 98 MMOL/L (ref 98–107)
CHLORIDE SERPL-SCNC: 98 MMOL/L (ref 98–107)
CHLORIDE SERPL-SCNC: 99 MMOL/L (ref 98–107)
CK SERPL-CCNC: 13 U/L (ref 20–200)
CO2 SERPL-SCNC: 22.3 MMOL/L (ref 22–29)
CO2 SERPL-SCNC: 22.8 MMOL/L (ref 22–29)
CO2 SERPL-SCNC: 23.6 MMOL/L (ref 22–29)
CO2 SERPL-SCNC: 23.7 MMOL/L (ref 22–29)
CREAT SERPL-MCNC: 1.01 MG/DL (ref 0.76–1.27)
CREAT SERPL-MCNC: 1.31 MG/DL (ref 0.76–1.27)
CREAT SERPL-MCNC: 1.36 MG/DL (ref 0.76–1.27)
CREAT SERPL-MCNC: 1.54 MG/DL (ref 0.76–1.27)
D-LACTATE SERPL-SCNC: 1.2 MMOL/L (ref 0.5–2)
DEPRECATED RDW RBC AUTO: 53.3 FL (ref 37–54)
EGFRCR SERPLBLD CKD-EPI 2021: 50.7 ML/MIN/1.73
EGFRCR SERPLBLD CKD-EPI 2021: 58.8 ML/MIN/1.73
EGFRCR SERPLBLD CKD-EPI 2021: 61.5 ML/MIN/1.73
EGFRCR SERPLBLD CKD-EPI 2021: 84.1 ML/MIN/1.73
EOSINOPHIL # BLD AUTO: 0.17 10*3/MM3 (ref 0–0.4)
EOSINOPHIL NFR BLD AUTO: 1.3 % (ref 0.3–6.2)
ERYTHROCYTE [DISTWIDTH] IN BLOOD BY AUTOMATED COUNT: 18.9 % (ref 12.3–15.4)
GLOBULIN UR ELPH-MCNC: 3.7 GM/DL
GLUCOSE BLDC GLUCOMTR-MCNC: 91 MG/DL (ref 70–130)
GLUCOSE SERPL-MCNC: 77 MG/DL (ref 65–99)
GLUCOSE SERPL-MCNC: 79 MG/DL (ref 65–99)
GLUCOSE SERPL-MCNC: 84 MG/DL (ref 65–99)
GLUCOSE SERPL-MCNC: 89 MG/DL (ref 65–99)
HCT VFR BLD AUTO: 25.6 % (ref 37.5–51)
HCT VFR BLD AUTO: 25.9 % (ref 37.5–51)
HGB BLD-MCNC: 7.7 G/DL (ref 13–17.7)
HGB BLD-MCNC: 7.8 G/DL (ref 13–17.7)
IMM GRANULOCYTES # BLD AUTO: 0.06 10*3/MM3 (ref 0–0.05)
IMM GRANULOCYTES NFR BLD AUTO: 0.5 % (ref 0–0.5)
LIPASE SERPL-CCNC: 34 U/L (ref 13–60)
LYMPHOCYTES # BLD AUTO: 2.58 10*3/MM3 (ref 0.7–3.1)
LYMPHOCYTES NFR BLD AUTO: 19.9 % (ref 19.6–45.3)
MAGNESIUM SERPL-MCNC: 2.5 MG/DL (ref 1.6–2.4)
MCH RBC QN AUTO: 23.5 PG (ref 26.6–33)
MCHC RBC AUTO-ENTMCNC: 30.1 G/DL (ref 31.5–35.7)
MCV RBC AUTO: 78 FL (ref 79–97)
MONOCYTES # BLD AUTO: 1.53 10*3/MM3 (ref 0.1–0.9)
MONOCYTES NFR BLD AUTO: 11.8 % (ref 5–12)
NEUTROPHILS NFR BLD AUTO: 65.7 % (ref 42.7–76)
NEUTROPHILS NFR BLD AUTO: 8.55 10*3/MM3 (ref 1.7–7)
NRBC BLD AUTO-RTO: 0 /100 WBC (ref 0–0.2)
PHOSPHATE SERPL-MCNC: 4.3 MG/DL (ref 2.5–4.5)
PLATELET # BLD AUTO: 660 10*3/MM3 (ref 140–450)
PMV BLD AUTO: 9.5 FL (ref 6–12)
POTASSIUM SERPL-SCNC: 4.1 MMOL/L (ref 3.5–5.2)
POTASSIUM SERPL-SCNC: 4.1 MMOL/L (ref 3.5–5.2)
POTASSIUM SERPL-SCNC: 4.2 MMOL/L (ref 3.5–5.2)
POTASSIUM SERPL-SCNC: 4.6 MMOL/L (ref 3.5–5.2)
PROT SERPL-MCNC: 6.6 G/DL (ref 6–8.5)
QT INTERVAL: 366 MS
QTC INTERVAL: 440 MS
RBC # BLD AUTO: 3.32 10*6/MM3 (ref 4.14–5.8)
SODIUM SERPL-SCNC: 128 MMOL/L (ref 136–145)
SODIUM SERPL-SCNC: 131 MMOL/L (ref 136–145)
SODIUM SERPL-SCNC: 131 MMOL/L (ref 136–145)
SODIUM SERPL-SCNC: 133 MMOL/L (ref 136–145)
WBC NRBC COR # BLD: 12.99 10*3/MM3 (ref 3.4–10.8)

## 2023-10-11 PROCEDURE — 82550 ASSAY OF CK (CPK): CPT | Performed by: INTERNAL MEDICINE

## 2023-10-11 PROCEDURE — 82948 REAGENT STRIP/BLOOD GLUCOSE: CPT

## 2023-10-11 PROCEDURE — 85025 COMPLETE CBC W/AUTO DIFF WBC: CPT | Performed by: INTERNAL MEDICINE

## 2023-10-11 PROCEDURE — 25010000002 MORPHINE PER 10 MG: Performed by: INTERNAL MEDICINE

## 2023-10-11 PROCEDURE — 97162 PT EVAL MOD COMPLEX 30 MIN: CPT

## 2023-10-11 PROCEDURE — 80053 COMPREHEN METABOLIC PANEL: CPT | Performed by: INTERNAL MEDICINE

## 2023-10-11 PROCEDURE — 25810000003 SODIUM CHLORIDE 0.9 % SOLUTION: Performed by: INTERNAL MEDICINE

## 2023-10-11 PROCEDURE — 97166 OT EVAL MOD COMPLEX 45 MIN: CPT

## 2023-10-11 PROCEDURE — 84100 ASSAY OF PHOSPHORUS: CPT | Performed by: INTERNAL MEDICINE

## 2023-10-11 PROCEDURE — 85018 HEMOGLOBIN: CPT | Performed by: HOSPITALIST

## 2023-10-11 PROCEDURE — 25010000002 HEPARIN (PORCINE) PER 1000 UNITS: Performed by: INTERNAL MEDICINE

## 2023-10-11 PROCEDURE — 83735 ASSAY OF MAGNESIUM: CPT | Performed by: INTERNAL MEDICINE

## 2023-10-11 PROCEDURE — 83605 ASSAY OF LACTIC ACID: CPT | Performed by: STUDENT IN AN ORGANIZED HEALTH CARE EDUCATION/TRAINING PROGRAM

## 2023-10-11 PROCEDURE — 85014 HEMATOCRIT: CPT | Performed by: HOSPITALIST

## 2023-10-11 PROCEDURE — 83690 ASSAY OF LIPASE: CPT | Performed by: INTERNAL MEDICINE

## 2023-10-11 RX ORDER — MONTELUKAST SODIUM 10 MG/1
10 TABLET ORAL NIGHTLY
Status: DISCONTINUED | OUTPATIENT
Start: 2023-10-11 | End: 2023-10-13 | Stop reason: HOSPADM

## 2023-10-11 RX ORDER — SODIUM CHLORIDE 0.9 % (FLUSH) 0.9 %
10 SYRINGE (ML) INJECTION EVERY 12 HOURS SCHEDULED
Status: DISCONTINUED | OUTPATIENT
Start: 2023-10-11 | End: 2023-10-13 | Stop reason: HOSPADM

## 2023-10-11 RX ORDER — LANSOPRAZOLE 30 MG/1
30 TABLET, ORALLY DISINTEGRATING, DELAYED RELEASE ORAL
Status: DISCONTINUED | OUTPATIENT
Start: 2023-10-12 | End: 2023-10-13 | Stop reason: HOSPADM

## 2023-10-11 RX ORDER — FOLIC ACID 1 MG/1
1 TABLET ORAL DAILY
Status: DISCONTINUED | OUTPATIENT
Start: 2023-10-11 | End: 2023-10-13 | Stop reason: HOSPADM

## 2023-10-11 RX ORDER — SODIUM CHLORIDE 0.9 % (FLUSH) 0.9 %
10 SYRINGE (ML) INJECTION AS NEEDED
Status: DISCONTINUED | OUTPATIENT
Start: 2023-10-11 | End: 2023-10-13 | Stop reason: HOSPADM

## 2023-10-11 RX ORDER — ONDANSETRON 4 MG/1
8 TABLET, FILM COATED ORAL EVERY 8 HOURS PRN
Status: DISCONTINUED | OUTPATIENT
Start: 2023-10-11 | End: 2023-10-13 | Stop reason: HOSPADM

## 2023-10-11 RX ORDER — SODIUM CHLORIDE 9 MG/ML
40 INJECTION, SOLUTION INTRAVENOUS AS NEEDED
Status: DISCONTINUED | OUTPATIENT
Start: 2023-10-11 | End: 2023-10-13 | Stop reason: HOSPADM

## 2023-10-11 RX ORDER — SUCRALFATE ORAL 1 G/10ML
1 SUSPENSION ORAL 4 TIMES DAILY
Status: DISCONTINUED | OUTPATIENT
Start: 2023-10-11 | End: 2023-10-12

## 2023-10-11 RX ORDER — ATORVASTATIN CALCIUM 20 MG/1
20 TABLET, FILM COATED ORAL NIGHTLY
Status: DISCONTINUED | OUTPATIENT
Start: 2023-10-11 | End: 2023-10-13 | Stop reason: HOSPADM

## 2023-10-11 RX ORDER — MULTIPLE VITAMINS W/ MINERALS TAB 9MG-400MCG
1 TAB ORAL DAILY
Status: DISCONTINUED | OUTPATIENT
Start: 2023-10-11 | End: 2023-10-13 | Stop reason: HOSPADM

## 2023-10-11 RX ORDER — METHION/INOS/CHOL BT/B COM/LIV 110MG-86MG
100 CAPSULE ORAL DAILY
Status: DISCONTINUED | OUTPATIENT
Start: 2023-10-11 | End: 2023-10-13 | Stop reason: HOSPADM

## 2023-10-11 RX ORDER — PANTOPRAZOLE SODIUM 40 MG/1
40 TABLET, DELAYED RELEASE ORAL DAILY
Status: DISCONTINUED | OUTPATIENT
Start: 2023-10-11 | End: 2023-10-11 | Stop reason: ALTCHOICE

## 2023-10-11 RX ORDER — FENOFIBRATE 145 MG/1
145 TABLET, COATED ORAL DAILY
Status: DISCONTINUED | OUTPATIENT
Start: 2023-10-11 | End: 2023-10-13 | Stop reason: HOSPADM

## 2023-10-11 RX ORDER — HYDROCODONE BITARTRATE AND ACETAMINOPHEN 10; 325 MG/1; MG/1
1 TABLET ORAL EVERY 6 HOURS PRN
Status: DISCONTINUED | OUTPATIENT
Start: 2023-10-11 | End: 2023-10-12

## 2023-10-11 RX ORDER — LANOLIN ALCOHOL/MO/W.PET/CERES
1000 CREAM (GRAM) TOPICAL DAILY
Status: DISCONTINUED | OUTPATIENT
Start: 2023-10-11 | End: 2023-10-13 | Stop reason: HOSPADM

## 2023-10-11 RX ORDER — SODIUM CHLORIDE 9 MG/ML
100 INJECTION, SOLUTION INTRAVENOUS CONTINUOUS
Status: DISCONTINUED | OUTPATIENT
Start: 2023-10-11 | End: 2023-10-13 | Stop reason: HOSPADM

## 2023-10-11 RX ORDER — HYDROXYZINE 50 MG/1
50 TABLET, FILM COATED ORAL 3 TIMES DAILY PRN
Status: DISCONTINUED | OUTPATIENT
Start: 2023-10-11 | End: 2023-10-13 | Stop reason: HOSPADM

## 2023-10-11 RX ADMIN — SODIUM CHLORIDE 100 ML/HR: 9 INJECTION, SOLUTION INTRAVENOUS at 19:57

## 2023-10-11 RX ADMIN — PANTOPRAZOLE SODIUM 40 MG: 40 TABLET, DELAYED RELEASE ORAL at 11:05

## 2023-10-11 RX ADMIN — HEPARIN SODIUM 5000 UNITS: 5000 INJECTION INTRAVENOUS; SUBCUTANEOUS at 21:09

## 2023-10-11 RX ADMIN — MORPHINE SULFATE 2 MG: 2 INJECTION, SOLUTION INTRAMUSCULAR; INTRAVENOUS at 21:18

## 2023-10-11 RX ADMIN — SERTRALINE 50 MG: 50 TABLET, FILM COATED ORAL at 11:04

## 2023-10-11 RX ADMIN — SUCRALFATE ORAL 1 G: 1 SUSPENSION ORAL at 13:08

## 2023-10-11 RX ADMIN — MORPHINE SULFATE 2 MG: 2 INJECTION, SOLUTION INTRAMUSCULAR; INTRAVENOUS at 03:05

## 2023-10-11 RX ADMIN — Medication 100 MG: at 12:20

## 2023-10-11 RX ADMIN — ATORVASTATIN CALCIUM 20 MG: 20 TABLET, FILM COATED ORAL at 21:09

## 2023-10-11 RX ADMIN — Medication 1 TABLET: at 11:04

## 2023-10-11 RX ADMIN — MONTELUKAST SODIUM 10 MG: 10 TABLET, COATED ORAL at 21:08

## 2023-10-11 RX ADMIN — HYDROCODONE BITARTRATE AND ACETAMINOPHEN 1 TABLET: 10; 325 TABLET ORAL at 16:05

## 2023-10-11 RX ADMIN — SODIUM CHLORIDE 100 ML/HR: 9 INJECTION, SOLUTION INTRAVENOUS at 09:34

## 2023-10-11 RX ADMIN — MORPHINE SULFATE 2 MG: 2 INJECTION, SOLUTION INTRAMUSCULAR; INTRAVENOUS at 13:14

## 2023-10-11 RX ADMIN — HEPARIN SODIUM 5000 UNITS: 5000 INJECTION INTRAVENOUS; SUBCUTANEOUS at 05:21

## 2023-10-11 RX ADMIN — LACTULOSE 30 G: 20 SOLUTION ORAL at 08:33

## 2023-10-11 RX ADMIN — METOPROLOL TARTRATE 25 MG: 25 TABLET, FILM COATED ORAL at 21:08

## 2023-10-11 RX ADMIN — SODIUM BICARBONATE 75 MEQ: 84 INJECTION, SOLUTION INTRAVENOUS at 03:36

## 2023-10-11 RX ADMIN — SUCRALFATE ORAL 1 G: 1 SUSPENSION ORAL at 19:22

## 2023-10-11 RX ADMIN — Medication 1000 MCG: at 11:03

## 2023-10-11 RX ADMIN — Medication 1 MG: at 11:04

## 2023-10-11 RX ADMIN — FENOFIBRATE 145 MG: 145 TABLET ORAL at 11:05

## 2023-10-11 RX ADMIN — Medication 10 ML: at 08:26

## 2023-10-11 RX ADMIN — HEPARIN SODIUM 5000 UNITS: 5000 INJECTION INTRAVENOUS; SUBCUTANEOUS at 15:20

## 2023-10-11 RX ADMIN — Medication 10 ML: at 08:27

## 2023-10-11 NOTE — PLAN OF CARE
Problem: Adult Inpatient Plan of Care  Goal: Plan of Care Review  Outcome: Ongoing, Progressing  Goal: Patient-Specific Goal (Individualized)  Outcome: Ongoing, Progressing  Goal: Absence of Hospital-Acquired Illness or Injury  Outcome: Ongoing, Progressing  Intervention: Identify and Manage Fall Risk  Recent Flowsheet Documentation  Taken 10/11/2023 1100 by Hanny Del Valle RN  Safety Promotion/Fall Prevention:   activity supervised   assistive device/personal items within reach   clutter free environment maintained   fall prevention program maintained   nonskid shoes/slippers when out of bed   room organization consistent   safety round/check completed  Taken 10/11/2023 0900 by Hanny Del Valle RN  Safety Promotion/Fall Prevention:   activity supervised   assistive device/personal items within reach   clutter free environment maintained   fall prevention program maintained   nonskid shoes/slippers when out of bed   room organization consistent   safety round/check completed  Taken 10/11/2023 0845 by Hanny Del Valle RN  Safety Promotion/Fall Prevention: activity supervised  Taken 10/11/2023 0700 by Hanny Del Valle RN  Safety Promotion/Fall Prevention:   activity supervised   assistive device/personal items within reach   clutter free environment maintained   fall prevention program maintained   nonskid shoes/slippers when out of bed   room organization consistent   safety round/check completed  Intervention: Prevent Skin Injury  Recent Flowsheet Documentation  Taken 10/11/2023 0845 by Hanny Del Valle RN  Skin Protection: adhesive use limited  Intervention: Prevent and Manage VTE (Venous Thromboembolism) Risk  Recent Flowsheet Documentation  Taken 10/11/2023 1100 by Hanny Del Valle RN  Activity Management: activity encouraged  Taken 10/11/2023 0900 by Hanny Del Valle RN  Activity Management: activity encouraged  Taken 10/11/2023 0845 by Hanny Del Valle RN  Activity Management: activity encouraged  Range of Motion: active  ROM (range of motion) encouraged  Taken 10/11/2023 0700 by Hanny Del Valle RN  Activity Management: activity encouraged  Intervention: Prevent Infection  Recent Flowsheet Documentation  Taken 10/11/2023 1100 by Hanny Del Valle RN  Infection Prevention:   rest/sleep promoted   single patient room provided  Taken 10/11/2023 0900 by Hanny Del Valle RN  Infection Prevention:   rest/sleep promoted   single patient room provided  Taken 10/11/2023 0845 by Hanny Del Valle RN  Infection Prevention:   single patient room provided   rest/sleep promoted   hand hygiene promoted  Taken 10/11/2023 0700 by Hanny Del Valle RN  Infection Prevention:   rest/sleep promoted   single patient room provided  Goal: Optimal Comfort and Wellbeing  Outcome: Ongoing, Progressing  Intervention: Provide Person-Centered Care  Recent Flowsheet Documentation  Taken 10/11/2023 0845 by Hanny Del Valle RN  Trust Relationship/Rapport:   care explained   choices provided   emotional support provided  Goal: Readiness for Transition of Care  Outcome: Ongoing, Progressing     Problem: Pain Acute  Goal: Acceptable Pain Control and Functional Ability  Outcome: Ongoing, Progressing  Intervention: Prevent or Manage Pain  Recent Flowsheet Documentation  Taken 10/11/2023 1100 by Hanny Del Valle RN  Medication Review/Management: medications reviewed  Taken 10/11/2023 0900 by Hanny Del Valle RN  Medication Review/Management: medications reviewed  Taken 10/11/2023 0845 by Hanny Del Valle RN  Medication Review/Management: medications reviewed  Taken 10/11/2023 0700 by Hanny Del Valle RN  Medication Review/Management: medications reviewed  Intervention: Optimize Psychosocial Wellbeing  Recent Flowsheet Documentation  Taken 10/11/2023 0845 by Hanny Del Valle RN  Diversional Activities:   television   smartphone     Problem: Fall Injury Risk  Goal: Absence of Fall and Fall-Related Injury  Outcome: Ongoing, Progressing  Intervention: Identify and Manage Contributors  Recent  Flowsheet Documentation  Taken 10/11/2023 1100 by Hanny Del Valle RN  Medication Review/Management: medications reviewed  Taken 10/11/2023 0900 by Hanny Del Valle RN  Medication Review/Management: medications reviewed  Taken 10/11/2023 0845 by Hanny Del Valle RN  Medication Review/Management: medications reviewed  Taken 10/11/2023 0700 by Hanny Del Valle RN  Medication Review/Management: medications reviewed  Intervention: Promote Injury-Free Environment  Recent Flowsheet Documentation  Taken 10/11/2023 1100 by Hanny Del Valle RN  Safety Promotion/Fall Prevention:   activity supervised   assistive device/personal items within reach   clutter free environment maintained   fall prevention program maintained   nonskid shoes/slippers when out of bed   room organization consistent   safety round/check completed  Taken 10/11/2023 0900 by Hanny Del Valle RN  Safety Promotion/Fall Prevention:   activity supervised   assistive device/personal items within reach   clutter free environment maintained   fall prevention program maintained   nonskid shoes/slippers when out of bed   room organization consistent   safety round/check completed  Taken 10/11/2023 0845 by Hanny Del Valle RN  Safety Promotion/Fall Prevention: activity supervised  Taken 10/11/2023 0700 by Hanny Del Valle RN  Safety Promotion/Fall Prevention:   activity supervised   assistive device/personal items within reach   clutter free environment maintained   fall prevention program maintained   nonskid shoes/slippers when out of bed   room organization consistent   safety round/check completed     Problem: Skin Injury Risk Increased  Goal: Skin Health and Integrity  Outcome: Ongoing, Progressing  Intervention: Optimize Skin Protection  Recent Flowsheet Documentation  Taken 10/11/2023 0845 by Hanny Del Valle RN  Skin Protection: adhesive use limited   Goal Outcome Evaluation:            PT lying in bed Awake, calm and A/O x4, pt on room air tolerating well no new  significant changes during shift, will continue to monitor pt.

## 2023-10-11 NOTE — PROGRESS NOTES
Nutrition Services    Patient Name:  Ta Madison  YOB: 1960  MRN: [id number]  Admit Date:  10/10/2023    Consult received for TF.  Spoke with wife Azael via phone.  Patient is on Peptamen 1.5 @ 65 ml/hr goal rate.  Per wife patient has Relizorb (enzyme) that runs at same time as TF.  Per Dr. Soler messaged via Epic secure chat - agreed for her to bring in home supply due to from a specialty pharmacy.  RN made aware Hanny Del Valle.  Peptamen 1.5 not on formulary at this time. Will provide Peptamen Af @ 45 ml/hr with 35 ml/hr water flush at this time.  Will increase slowly to a goal rate of 65 ml/hr.  Tf will provide 1872 kcal, 119 gm protein, 1267 ml fluid, 2107 ml total fluid.  Will follow tolerance to TF and adjust rate as tolerated.     Electronically signed by:  Cady Martinez RD  10/11/23 12:18 EDT

## 2023-10-11 NOTE — CASE MANAGEMENT/SOCIAL WORK
Discharge Planning Assessment   Constantino     Patient Name: Ta Madison  MRN: [id number]  Today's Date: 10/11/2023    Admit Date: 10/10/2023    Plan: SS received CM consult for D/C planning may need . SS spoke with pt at bedside on this date. Pt lives with spouse Azael and plans to return home at discharge with son Peng providing transportation. Pt utilizes [facility] for PT/OT services. Pt PCP Yolande Olvera. Pt has (s) cane, (r) walker, wheelchair and tube feeding supplies via unknown provider. Pt does not have POA/living will. SS to follow and assist with discharge planning.   Discharge Needs Assessment       Row Name 10/11/23 6795       Living Environment    People in Home spouse    Name(s) of People in Home spouse - Azael    Current Living Arrangements home    Potentially Unsafe Housing Conditions none    In the past 12 months has the electric, gas, oil, or water company threatened to shut off services in your home? No    Primary Care Provided by spouse/significant other    Provides Primary Care For no one    Family Caregiver if Needed spouse;child(donavon), adult    Family Caregiver Names spouse - Azael    Quality of Family Relationships helpful;involved;supportive    Able to Return to Prior Arrangements yes       Resource/Environmental Concerns    Resource/Environmental Concerns none       Food Insecurity    Within the past 12 months, you worried that your food would run out before you got the money to buy more. Never true    Within the past 12 months, the food you bought just didn't last and you didn't have money to get more. Never true       Transition Planning    Patient/Family Anticipates Transition to home with family    Transportation Anticipated family or friend will provide       Discharge Needs Assessment    Equipment Currently Used at Home wheelchair;walker, rolling;feeding device;cane, straight    Concerns to be Addressed discharge planning  may need home health              Discharge Plan       David  Name 10/11/23 9783       Plan    Plan SS received CM consult for D/C planning may need HH. SS spoke with pt at bedside on this date. Pt lives with spouse Azael and plans to return home at discharge with son Peng providing transportation. Pt utilizes [facility] for [facility]/OT services. Pt PCP Yolande Olvera. Pt has (s) cane, (r) walker, wheelchair and tube feeding supplies via unknown provider. Pt does not have POA/living will. SS to follow and assist with discharge planning.             Expected Discharge Date and Time       Expected Discharge Date Expected Discharge Time    Oct 14, 2023       Demographic Summary       Row Name 10/11/23 9942       General Information    Admission Type inpatient    Arrived From home    Referral Source --  CM    Reason for Consult discharge planning  may need home health    Preferred Language English               CLARISSA Harris

## 2023-10-11 NOTE — PAYOR COMM NOTE
"[facility]  NPI:[id number]    Utilization Review  Contact: Lorene Banks RN  Phone: [phone]  Fax:[phone]    INITIATE INPATIENT AUTHORIZATION  Francisco Madison (63 y.o. Male)       Date of Birth   1960    Social Security Number       Address   [address] [place] [address]    Home Phone   [phone]    MRN   [id number]       Jainism   None    Marital Status                               Admission Date   10/10/23    Admission Type   Emergency    Admitting Provider   Vin Archer DO    Attending Provider   Chapito Soler DO    Department, Room/Bed   [facility] PROGRESS CARE, P204/S2       Discharge Date       Discharge Disposition       Discharge Destination                                 Attending Provider: Chapito Soler DO    Allergies: No Known Allergies    Isolation: None   Infection: None   Code Status: CPR    Ht: 170.2 cm (67\")   Wt: 66.5 kg (146 lb 9.6 oz)    Admission Cmt: None   Principal Problem: Hyperkalemia [E87.5]                   Active Insurance as of 10/10/2023       Primary Coverage       Payor Plan Insurance Group Employer/Plan Group    [employer] [place] [employer] [place]ICA[place]        Payor Plan Address Payor Plan Phone Number Payor Plan Fax Number Effective Dates    [address] [address] [phone]  6/6/2018 - None Entered    [place] [address]         Subscriber Name Subscriber Birth Date Member ID       FRANCISCO MADISON 1960 [id number]                     Emergency Contacts        (Rel.) Home Phone Work Phone Mobile Phone    Azael Madison (Spouse) [phone]                 History & Physical        OToña Sosa PA at 10/10/23 1740       Attestation signed by Chapito Soler DO at 10/10/23 1902    I have reviewed this documentation and agree. Pt seen and examined in the PCU. Treatment plan as outlined.                        [facility] " Hospital Medicine Services  HISTORY & PHYSICAL    Patient Identification:  Name:  Ta Madison  Age:  63 y.o.  Sex:  male  :  1960  MRN:  [id number]   Visit Number:  [id number]  Admit Date: 10/10/2023   Primary Care Physician:  Yolande Olvera APRN     Subjective     Chief complaint:   Chief Complaint   Patient presents with    Vomiting    Diarrhea    Abdominal Pain     History of presenting illness:   Patient is a 63 y.o. 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 difficile, essential hypertension, hyperlipidemia, history of TIA, GERD, prostate cancer in the past, anxiety/depression, and former tobacco abuse that presented to the [facility] emergency department for evaluation of abdominal pain, nausea, vomiting, and diarrhea.  Patient with extensive past medical history since 2023 when patient had gallstone pancreatitis and found to have necrotizing pancreatitis.  Patient was treated at the [facility] in [facility].  See chart for details.  Patient recently discharged from [facility] about 2 weeks ago.  Patient  with recently inserted PEG-J tube.  He has been tolerating tube feeds.  Patient's wife at bedside states that last week the patient was seen by PCP, noted to have a 12 pound weight loss at that time.  Patient's wife under instruction of primary care was titrating up his tube feeds.  Patient had previously been on 65 cc an hour in the past, however due to recent hospitalization have been titrated down to 45 cc an hour.  Patient has been tolerating well until Friday.  Patient started to have increasing abdominal pain, nausea, vomiting and increased stool output.  Over the weekend, patient states that he started to develop diarrhea that was frequent.  He reports chills but denies any  known fever.  Again, increasing abdominal discomfort.  No chest pain or dyspnea.  No cough or upper respiratory complaints.  He states his diarrhea continue to worsen and made him concerned for C. difficile as he has had a history of this in the past.  Patient went to PCP today and due to acute complaints, was sent to the ED for further evaluation.  Patient hypotensive on arrival and acutely ill-appearing.  C. difficile toxin and GI panel PCR pending currently.  IV fluids administered with improvement in hypotension.  Patient noted be hyperkalemic, as well as TYREE, nephrology contacted by ED with treatment provided and bicarb drip recommended.    Upon arrival to the ED, vitals were temperature 96.8, heart rate 112, respiratory rate 20, blood pressure 84/58, oxygen saturation 99% on room air.  Initial high-sensitivity troponin T 24 with repeat at 19, -5 delta.  CK17.  CMP with sodium 122, chloride 93, CO2 15.3, , creatinine 2.6, BUN/creatinine ratio 46, EGFR 26.9, glucose 125, calcium 10.6, alkaline phosphatase 176, total protein 9.7 with potassium 7.8 however there was hemolysis of specimen.  Repeat BMP confirmed potassium of 7.2.  Initial lactic acid 2.3 with reflex at 2.1.  Lipase 75.  Procalcitonin 1.40.  INR 0.94.CBC with white blood cell count 25.10, hemoglobin 12.1, platelets 950,000, neutrophil percentage 78.7%, absolute neutrophil count 19.74.  Urinalysis unremarkable.  Chest x-ray with no evidence of acute cardiopulmonary disease.  CT chest without contrast with no acute cardiopulmonary disease.  CT abdomen pelvis without contrast with significant improvement in changes of necrotizing pancreatitis status post cystogastrostomy tube placement, necrosectomy, and metallic stent placement spanning from the G-tube into the stomach to the level of the proximal jejunum.  There is no significant ascites.  No acute appearing pancreatic changes.  No evidence of acute abscess.  There is cholelithiasis present.   Known ED medication administration: 2000 mg IV calcium chloride x1, 25 g IV dextrose x1, 10 units IV regular insulin x1, DuoNeb nebulizer treatment x1, 30 g p.o. lactulose x1, 2 mg IV morphine x1, 4 mg IV Zofran x1, 4.5 g IV Zosyn x1, 50 mill equivalent IV sodium bicarb x1, 1.836 L bolus normal saline x1, bicarb drip.     Patient has been admitted to the progressive care unit for further evaluation and treatment.     Present during exam: Patient's wife   ---------------------------------------------------------------------------------------------------------------------   Review of Systems   Constitutional:  Positive for chills and fatigue. Negative for activity change, appetite change, diaphoresis and fever.   HENT:  Negative for congestion and sore throat.    Eyes:  Negative for discharge and visual disturbance.   Respiratory:  Negative for cough, chest tightness, shortness of breath and wheezing.    Cardiovascular:  Negative for chest pain, palpitations and leg swelling.   Gastrointestinal:  Positive for abdominal pain, diarrhea, nausea and vomiting. Negative for constipation.   Genitourinary:  Negative for decreased urine volume, dysuria, frequency and urgency.   Musculoskeletal:  Negative for arthralgias and myalgias.   Skin:  Negative for color change and wound.   Neurological:  Positive for weakness (Generalized). Negative for dizziness, syncope, light-headedness and headaches.   Psychiatric/Behavioral:  Negative for confusion. The patient is not nervous/anxious.       ---------------------------------------------------------------------------------------------------------------------   Past Medical History:   Diagnosis Date    Elevated cholesterol     GERD (gastroesophageal reflux disease)     Increased heart rate     Mini stroke     Neck pain     Prostate cancer     Rash     Tobacco abuse      Past Surgical History:   Procedure Laterality Date    APPENDECTOMY  1971    [facility]     COLONOSCOPY       COLONOSCOPY N/A 2023    Procedure: COLONOSCOPY;  Surgeon: Mahnaz Caraballo MD;  Location: [facility] OR;  Service: Gastroenterology;  Laterality: N/A;    ENDOSCOPY N/A 2023    Procedure: ESOPHAGOGASTRODUODENOSCOPY WITH KEOFEED PLACEMENT;  Surgeon: Yolande Eller MD;  Location:  [facility];  Service: Gastroenterology;  Laterality: N/A;     Family History   Problem Relation Age of Onset    Nephrolithiasis Mother     Heart disease Father     Hypertension Father     Kidney disease Father      Social History     Socioeconomic History    Marital status:    Tobacco Use    Smoking status: Former     Packs/day: 2.00     Years: 45.00     Additional pack years: 0.00     Total pack years: 90.00     Types: Cigarettes     Quit date: 2023     Years since quittin.2    Smokeless tobacco: Never   Vaping Use    Vaping Use: Never used   Substance and Sexual Activity    Alcohol use: No    Drug use: Yes     Types: Marijuana     Comment: occ     Sexual activity: Defer     ---------------------------------------------------------------------------------------------------------------------   Allergies:  Patient has no known allergies.  ---------------------------------------------------------------------------------------------------------------------   Medications below are reported home medications pulling from within the system; at this time, these medications have not been reconciled unless otherwise specified and are in the verification process for further verifcation as current home medications.    Prior to Admission Medications       Prescriptions Last Dose Informant Patient Reported? Taking?    albuterol sulfate  (90 Base) MCG/ACT inhaler   No No    Inhale 2 puffs Every 6 (Six) Hours As Needed for Wheezing.    aspirin 81 MG EC tablet   Yes No    Take 1 tablet by mouth Daily.    atorvastatin (LIPITOR) 20 MG tablet   No No    Take 1 tablet by mouth Every Night.    calcium carbonate (TUMS) 500  MG chewable tablet   Yes No    Chew 1 tablet.    cyanocobalamin (VITAMIN B-12) 1000 MCG tablet   No No    Take 1 tablet by mouth Daily for 30 days.    Digestive Enzyme Cartridge (Relizorb) device   Yes No    Use 1 each 3 (Three) Times a Day. Change every 500mL    enzalutamide (XTANDI) 40 MG chemo capsule   No No    Take 4 capsules by mouth Daily.    Patient not taking:  Reported on 10/4/2023    EPINEPHrine (EPIPEN) 0.3 MG/0.3ML solution auto-injector injection   Yes No    INJECT 1 PEN IN THE MUSCLE ONE TIME AS DIRECTED    fenofibrate (TRICOR) 145 MG tablet   No No    Take 1 tablet by mouth Daily.    folic acid (FOLVITE) 1 MG tablet   No No    Take 1 tablet by mouth Daily for 90 days.    HYDROcodone-acetaminophen (NORCO)  MG per tablet   No No    Take 1 tablet by mouth Every 6 (Six) Hours As Needed for Moderate Pain.    hydrOXYzine (ATARAX) 50 MG tablet   No No    Take 1 tablet by mouth 3 (Three) Times a Day As Needed for Itching.    Patient not taking:  Reported on 10/10/2023    melatonin 3 MG tablet   Yes No    Take 2 tablets by mouth.    menthol-zinc oxide (CALMOSEPTINE) 0.44-20.625 % ointment ointment   No No    Apply 1 application  topically to the appropriate area as directed As Needed (skin irritation from J tube site).    metoprolol tartrate (LOPRESSOR) 25 MG tablet   No No    Take 1 tablet by mouth Daily.    mirtazapine (REMERON) 7.5 MG tablet   No No    Take 1 tablet by mouth Every Night for 30 days.    Patient not taking:  Reported on 10/10/2023    montelukast (SINGULAIR) 10 MG tablet   No No    Take 1 tablet by mouth Every Night.    multivitamin with minerals tablet tablet   No No    Take 1 tablet by mouth Daily.    nicotine (NICODERM CQ) 21 MG/24HR patch   Yes No    Place 1 patch on the skin as directed by provider Daily.    Patient not taking:  Reported on 10/10/2023    Nutritional Supplements (Peptamen 1.5 Ziyad) liquid   Yes No    Take  by mouth.    ondansetron (ZOFRAN) 8 MG tablet   No No    Take  1 tablet by mouth Every 8 (Eight) Hours As Needed for Nausea or Vomiting.    ondansetron ODT (ZOFRAN-ODT) 8 MG disintegrating tablet   Yes No    Take 1 tablet by mouth.    Patient not taking:  Reported on 10/10/2023    oxyCODONE (ROXICODONE) 5 MG immediate release tablet   Yes No    Patient not taking:  Reported on 10/4/2023    pantoprazole (PROTONIX) 40 MG EC tablet   Yes No    Take 1 tablet by mouth Daily.    sertraline (ZOLOFT) 50 MG tablet   No No    Take 1 tablet by mouth Daily.    Tab-A-Lucero/Iron/Beta Carotene tablet tablet   Yes No    Take 1 tablet by mouth Daily.    thiamine (VITAMIN B1) 100 MG tablet   No No    Take 1 tablet by mouth Daily for 30 days.    vitamin D (ERGOCALCIFEROL) 1.25 MG (27085 UT) capsule capsule   No No    Take 1 capsule by mouth 1 (One) Time Per Week.    Patient not taking:  Reported on 10/10/2023          ---------------------------------------------------------------------------------------------------------------------    Objective     Hospital Scheduled Meds:  heparin (porcine), 5,000 Units, Subcutaneous, Q8H  lactulose, 30 g, Per J Tube, Daily  sodium chloride, 10 mL, Intravenous, Q12H      sodium bicarbonate 8.4 % 75 mEq in sodium chloride 0.45 % 1,000 mL infusion (greater than 75 mEq), 75 mEq, Last Rate: 75 mEq (10/10/23 0667)        Current listed hospital scheduled medications may not yet reflect those currently placed in orders that are signed and held, awaiting patient's arrival to floor/unit.    ---------------------------------------------------------------------------------------------------------------------   Vital Signs:  Temp:  [96.8 øF (36 øC)-97.5 øF (36.4 øC)] 97.5 øF (36.4 øC)  Heart Rate:  [] 100  Resp:  [11-20] 11  BP: ()/() 122/65  Mean Arterial Pressure (Non-Invasive) for the past 24 hrs (Last 3 readings):   Noninvasive MAP (mmHg)   10/10/23 1700 79   10/10/23 1643 124   10/10/23 1620 78     SpO2 Percentage    10/10/23 1625 10/10/23 1643  10/10/23 1700   SpO2: 100% 98% 100%     SpO2:  [98 %-100 %] 100 %  on   ;   Device (Oxygen Therapy): room air    Body mass index is 22.86 kg/mý.  Wt Readings from Last 3 Encounters:   10/10/23 66.2 kg (145 lb 15.1 oz)   10/10/23 61.7 kg (136 lb)   10/04/23 66.1 kg (145 lb 12.8 oz)       ---------------------------------------------------------------------------------------------------------------------   Physical Exam:  Physical Exam  Nursing note reviewed.   Constitutional:       General: He is awake. He is not in acute distress.     Appearance: He is well-developed. He is ill-appearing. He is not toxic-appearing.      Comments: Sitting up in bed.  No acute distress noted.  Wife present at bedside.   HENT:      Head: Normocephalic and atraumatic.      Mouth/Throat:      Mouth: Mucous membranes are moist. Mucous membranes are dry.   Eyes:      Conjunctiva/sclera: Conjunctivae normal.      Pupils: Pupils are equal, round, and reactive to light.   Cardiovascular:      Rate and Rhythm: Regular rhythm. Tachycardia present.      Pulses:           Dorsalis pedis pulses are 2+ on the right side and 2+ on the left side.      Heart sounds: Normal heart sounds. No murmur heard.     No friction rub. No gallop.   Pulmonary:      Effort: Pulmonary effort is normal. No tachypnea, accessory muscle usage or respiratory distress.      Breath sounds: Normal breath sounds and air entry. No wheezing, rhonchi or rales.      Comments: On room air.  Speaks in full sentences without dyspnea.  Abdominal:      General: Bowel sounds are normal. There is no distension.      Palpations: Abdomen is soft.      Tenderness: There is generalized no abdominal tenderness. There is no guarding or rebound.      Comments: PEG-J in place.  Patient with a gauze pad at insertion site that is saturated in bright green drainage.  No surrounding erythema or bleeding.   Musculoskeletal:      Cervical back: Neck supple.      Right lower leg: No edema.      Left  lower leg: No edema.   Skin:     General: Skin is warm and dry.      Capillary Refill: Capillary refill takes less than 2 seconds.      Coloration: Skin is pale (Diffuse pallor).   Neurological:      General: No focal deficit present.      Mental Status: He is alert and oriented to person, place, and time.      Sensory: Sensation is intact.      Motor: Motor function is intact. Weakness (Generalized, no lateralizing or focal weakness appreciated.) present.      Comments: Awake and alert. Follows commands. Answers questions appropriately. Moves all extremities equally. Strength and sensation intact. No focal neuro deficit on exam.   Psychiatric:         Attention and Perception: Attention normal.         Mood and Affect: Mood and affect normal.         Speech: Speech normal.         Behavior: Behavior is cooperative.       ---------------------------------------------------------------------------------------------------------------------  EKG: Pending cardiology read.  Per my interpretation: Normal sinus rhythm with heart rate 87 bpm, QTc 440 ms.  No ST elevation appreciated.  Peaked T waves noted.  Await final cardiology read.      Telemetry:    Sinus tachycardia 100s per bedside monitor     I have personally reviewed the EKG/Telemetry strip  ---------------------------------------------------------------------------------------------------------------------   Results from last 7 days   Lab Units 10/10/23  1357 10/10/23  1131   CK TOTAL U/L  --  17*   HSTROP T ng/L 19* 24*           Results from last 7 days   Lab Units 10/10/23  1357 10/10/23  1131 10/04/23  1000   LACTATE mmol/L 2.1* 2.3*  --    WBC 10*3/mm3  --  25.10* 22.25*   HEMOGLOBIN g/dL  --  12.1* 9.9*   HEMATOCRIT %  --  41.2 32.2*   MCV fL  --  80.5 77.4*   MCHC g/dL  --  29.4* 30.7*   PLATELETS 10*3/mm3  --  950* 843*   INR   --  0.94  --      Results from last 7 days   Lab Units 10/10/23  1645 10/10/23  1357 10/10/23  1131 10/04/23  1000   SODIUM  mmol/L 131* 129* 122* 130*   POTASSIUM mmol/L 5.1 7.2* 7.8* 5.3*   MAGNESIUM mg/dL  --   --   --  2.6*   CHLORIDE mmol/L 100 100 93* 95*   CO2 mmol/L 18.5* 16.2* 15.3* 21.0*   BUN mg/dL 97* 106* 120* 39*   CREATININE mg/dL 2.10* 2.31* 2.61* 0.88   CALCIUM mg/dL 12.6* 9.5 10.6* 9.7   GLUCOSE mg/dL 69 113* 125* 109*   ALBUMIN g/dL  --   --  4.1 3.6   BILIRUBIN mg/dL  --   --  0.4 0.3   ALK PHOS U/L  --   --  176* 257*   AST (SGOT) U/L  --   --  17 23   ALT (SGPT) U/L  --   --  14 17   Estimated Creatinine Clearance: 34.2 mL/min (A) (by C-G formula based on SCr of 2.1 mg/dL (H)).    Glucose   Date/Time Value Ref Range Status   10/10/2023 1627 121 70 - 130 mg/dL Final     Lab Results   Component Value Date    HGBA1C 6.00 (H) 02/28/2023     Lab Results   Component Value Date    TSH 2.570 02/28/2023    FREET4 1.22 06/06/2018         Microbiology Results (last 10 days)       ** No results found for the last 240 hours. **           I have personally reviewed the above laboratory results.   ---------------------------------------------------------------------------------------------------------------------  Imaging Results (Last 7 Days)       Procedure Component Value Units Date/Time    CT Abdomen Pelvis Without Contrast [[id number]] Collected: 10/10/23 1412     Updated: 10/10/23 1421    Narrative:      EXAM:    CT Abdomen and Pelvis Without Intravenous Contrast     EXAM DATE:    10/10/2023 2:04 PM     CLINICAL HISTORY:    TYREE, sepsis     TECHNIQUE:    Axial computed tomography images of the abdomen and pelvis without  intravenous contrast.  Sagittal and coronal reformatted images were  created and reviewed.  This CT exam was performed using one or more of  the following dose reduction techniques:  automated exposure control,  adjustment of the mA and/or kV according to patient size, and/or use of  iterative reconstruction technique.     COMPARISON:    8/10/2023     FINDINGS:    Lung bases:  Unremarkable as visualized.  No  mass.  No consolidation.      ABDOMEN:    Liver:  Liver is within normal limits.    Gallbladder and bile ducts:  Cholelithiasis noted.  Common bile duct  appears within normal limits.    Pancreas:  Significant improvement in pancreatitis changes likely  reflecting sequela of necrotizing pancreatitis with patient status post  gastroenteric stenting and a cyst though gastrostomy drainage catheter.  Ill-defined fluid collection posterior to the gastric wall compatible  with a resolving pseudocyst or pancreatic abscess from necrotizing  pancreatitis.  Focal pancreatic ductal dilatation of the head of the  pancreas region but no acute inflammatory changes identified.    Spleen:  Unremarkable as visualized.  No splenomegaly.    Adrenals:  Unremarkable as visualized.  No mass.    Kidneys and ureters:  Unremarkable as visualized.  No obstructing  stones.  No hydronephrosis.    Stomach and bowel:  Unremarkable as visualized.  No obstruction.  No  mucosal thickening.      PELVIS:    Appendix:  Appendectomy.    Bladder:  Distention of the urinary bladder without wall thickening.   No stones.    Reproductive:  Mild prostate enlargement is noted.      ABDOMEN and PELVIS:    Intraperitoneal space:  Unremarkable as visualized.  No significant  free fluid identified.  No significant free air is identified.    Bones/joints:  No acute fracture.  No dislocation.    Soft tissues:  Unremarkable as visualized.    Vasculature:  Unremarkable as visualized.  No abdominal aortic  aneurysm.    Lymph nodes:  Unremarkable as visualized.  No enlarged lymph nodes.    Tubes, lines and devices:  G-tube noted within the stomach.       Impression:      1.  Significant improvement in changes of necrotizing pancreatitis  status post cystogastrostomy tube placement, necrosectomy, and [employer]  metallic stent placement spanning from the G-tube into the stomach to  the level of the proximal jejunum.  2.  No significant ascites.  3.  No acute appearing  pancreatitis changes. No findings to suggest  acute abscess.  4.  Cholelithiasis noted.  5.  Other incidental/nonacute findings above        This report was finalized on 10/10/2023 2:19 PM by Dr. Janusz Lerner MD.       CT Chest Without Contrast Diagnostic [[id number]] Collected: 10/10/23 1400     Updated: 10/10/23 1414    Narrative:      EXAM:    CT Chest Without Intravenous Contrast     EXAM DATE:    10/10/2023 2:03 PM     CLINICAL HISTORY:    sepsis     TECHNIQUE:    Axial computed tomography images of the chest without intravenous  contrast.  Sagittal and coronal reformatted images were created and  reviewed.  This CT exam was performed using one or more of the following  dose reduction techniques:  automated exposure control, adjustment of  the mA and/or kV according to patient size, and/or use of iterative  reconstruction technique.     COMPARISON:    8/6/2023     FINDINGS:    Lungs and pleural spaces:  Chronic interstitial lung changes are  noted.  No consolidative airspace disease identified.  No pleural  effusions identified.    Heart:  Unremarkable as visualized.  No cardiomegaly.  No significant  pericardial effusion.  No significant coronary artery calcifications.    Bones/joints:  Degenerative changes thoracic spine.  No acute  fracture.  No dislocation.    Soft tissues:  Unremarkable as visualized.    Vasculature:  Unremarkable as visualized.  No thoracic aortic  aneurysm.    Lymph nodes:  Unremarkable as visualized.  No enlarged lymph nodes.    Tubes, lines and devices:  G-tube noted.       Impression:      1.  No acute thoracic findings identified.  2.  Refer to CT abdomen/pelvis for abdomen findings.        This report was finalized on 10/10/2023 2:12 PM by Dr. Janusz Lerner MD.       XR Chest 1 View [[id number]] Collected: 10/10/23 1347     Updated: 10/10/23 1350    Narrative:      EXAM:    XR Chest, 1 View     EXAM DATE:    10/10/2023 12:19 PM     CLINICAL HISTORY:    Severe Sepsis Protocol      TECHNIQUE:    Frontal view of the chest.     COMPARISON:    8/10/2023     FINDINGS:    Lungs and pleural spaces:  Unremarkable as visualized.  No  consolidation.  No pneumothorax.    Heart:  Unremarkable as visualized.  No cardiomegaly.    Mediastinum:  Unremarkable as visualized.    Bones/joints:  Unremarkable as visualized.       Impression:        Unremarkable exam. No acute cardiopulmonary findings identified.        This report was finalized on 10/10/2023 1:48 PM by Dr. Janusz Lerner MD.             I have personally reviewed the above radiology results.     ---------------------------------------------------------------------------------------------------------------------    Assessment & Plan      ACUTE HOSPITAL PROBLEMS    -SIRS vs severe sepsis, present on admission due to unclear source at this time   -Nausea, vomiting, diarrhea   -Abdominal pain   -History of C. Difficile colitis   -C. Difficile rule out   SIRS; HR > 90, RR >20, WBC >12K, Lactate > 2   Procal elevated   CT abdomen/pelvis and chest without acute abnormalities identified   Patient with known history of C Difficile colitis, C Diff toxin positive 9/11/2023. He reports complete treatment in the past.   Blood cultures obtained in ED, follow for final results   GI panel PCR and C diff toxin pending   Hold on antibiotics until C diff toxin resulted  IV fluid sepsis bolus administered in ED, continue gentle IV fluid hydration   Supportive care   PRN antiemetics available, hold on anti-diarrheal agents until C. Diff toxin results available   Closely monitor vitals and temperature curve   Repeat labs in AM     -Acute kidney injury   -Severe life threatening hyperkalemia   Baseline creatinine 0.5-0.6 with creatinine on admission of 2.61  Likely hypovolemic from acute GI losses   Potassium > 7 on admit. EKG with peaked T waves   IV fluid bolus given in ED, continue gentle IV fluids   Treatment given in ED: 2000 mg IV calcium chloride x1, 25 g IV  dextrose x1, 10 units IV regular insulin x1, DuoNeb nebulizer treatment x1, , 50 mEq sodium bicarb x 1   Nephrology was contacted in ED, patient started on bicarbonate gtt per orders as well as dose of lactulose and Kayexalate   Repeat BMP with improvement in potassium, now 5.1. Creatinine remains elevated but improved to 2.1  Nephrology input/assistance is much appreciated   Monitor urine output and renal function closely  Avoid nephrotoxins as much as possible   Repeat chemistry panel in AM    -Recent necrotizing pancreatitis and gallstone pancreatitis s/p cyst-gastrostomy and necrosectomy with PEG-J tube insertion on tube feeds  CT abdomen/pelvis with improvement in necrotizing pancreatitis, no acute findings appreciated   Lipase not significantly elevated   Supportive care   Consult nutrition for tube feeds    CHRONIC MEDICAL PROBLEMS    -Essential hypertension  BP hypotensive on presentation   Hold home antihypertensive regimen at this time.   Closely monitor BP per hospital protocol, titrate medications as necessary  -Hyperlipidemia: Cont home medication regimen   -History of TIA: No focal deficits on exam   -GERD: Hold PPI as patient is C diff rule out   -History of prostate cancer   -Anxiety/depression: Supportive care, continue home medication regimen once reconciled per pharmacy.  -Former smoker   ---------------------------------------------------  DVT Prophylaxis: SQ heparin   Activity: Up with assistance as tolerated   ---------------------------------------------------  INPATIENT status due to the need for care which can only be reasonably provided in an hospital setting such as aggressive/expedited ancillary services and/or consultation services, the necessity for IV medications, close physician monitoring and/or the possible need for procedures.  In such, I feel patient's risk for adverse outcomes and need for care warrant INPATIENT evaluation and predict the patient's care encounter to likely last  beyond 2 midnights.    Code Status: FULL CODE   ---------------------------------------------------  Disposition/Discharge planning: Plans on home with wife once medically stable, pending clinical course   ---------------------------------------------------  I have discussed the patient's assessment and plan with the patient, patient's wife at bedside, nursing staff, and attending physician Chapito Bravo, *    Toña Montero PA-C  Hospitalist Service -- [facility]   Pager: [phone]    10/10/23  17:40 EDT    Attending Physician: Chapito Soler, *        ---------------------------------------------------------------------------------------------------------------------        Electronically signed by Chapito Soler DO at 10/10/23 1902          Emergency Department Notes        Jacqueline Edwards, RN at 10/10/23 1628          Pt departing the ED en route to PCU with CAM Huff and JUDITH Hall. Pt departing the ER A&Ox4, RR even and unlabored, skin WPD. Pt vitals WNL noted to be NSR on the monitor prior to departure. Pt is leaving with #20g to right FA clean, dry, patent, and saline locked. Pt is leaving with #22g to left FA clean, dry, patent, and infusing per provider's orders with calcium chloride with no signs of infiltration. Pt is leaving not appearing in any acute distress with family at bedside.    Electronically signed by Jacqueline Edwards, RN at 10/10/23 1630       Jacqueline Edwards, RN at 10/10/23 1628          Pt POC glucose 121.    Electronically signed by Jacqueline Edwards RN at 10/10/23 1628       Jacqueline Edwards RN at 10/10/23 1626          Report called to JUDITH Diaz.     Electronically signed by Jacqueline Edwards RN at 10/10/23 1626       Vin Archer DO at 10/10/23 1158          Subjective   History of Present Illness  Patient is a 63-year-old male with history significant for previous ischemic stroke, prostate cancer and recent  necrotizing pancreatitis that required transfer to tertiary center comes from PCPs office with reports of weakness, fatigue, abdominal pain with 10-12 episodes of diarrhea over the past 3 days.  He is awake, alert and oriented and wife is present at bedside.  He has a jejunum tube that he uses for tube feeds and medications.  Over the weekend wife states they told him he could start trying to drink water if he was able to tolerate it.  He started this on Friday and symptoms seem to have worsened over the weekend.  He had multiple episodes of nausea and puked of gastric appearing contacts per family at bedside.  He reports subjective fever and chills.  He reports 10-12 episodes of diarrhea since Friday.  Wife states she did suction his J-tube and had about 200 to 300 cc out just prior to arrival after he vomited.  History of C. difficile in PCPs office was concerned it may be he may have this again.      Review of Systems   Constitutional:  Positive for chills and fatigue. Negative for fever.   HENT:  Negative for congestion, sinus pain and sore throat.    Respiratory:  Negative for cough, chest tightness, shortness of breath and wheezing.    Cardiovascular:  Negative for chest pain, palpitations and leg swelling.   Gastrointestinal:  Positive for abdominal pain, diarrhea, nausea and vomiting. Negative for constipation.   Genitourinary:  Negative for dysuria, frequency, hematuria and urgency.   Musculoskeletal:  Negative for arthralgias and myalgias.   Neurological:  Positive for weakness. Negative for dizziness, numbness and headaches.   Psychiatric/Behavioral:  Negative for confusion.        Past Medical History:   Diagnosis Date    Elevated cholesterol     GERD (gastroesophageal reflux disease)     Increased heart rate     Mini stroke     Neck pain     Prostate cancer     Rash     Tobacco abuse        No Known Allergies    Past Surgical History:   Procedure Laterality Date    APPENDECTOMY  1971    [facility]     COLONOSCOPY      COLONOSCOPY N/A 2023    Procedure: COLONOSCOPY;  Surgeon: Mahnaz Caraballo MD;  Location:  [facility] OR;  Service: Gastroenterology;  Laterality: N/A;    ENDOSCOPY N/A 2023    Procedure: ESOPHAGOGASTRODUODENOSCOPY WITH KEOFEED PLACEMENT;  Surgeon: Yolande Eller MD;  Location:  [facility];  Service: Gastroenterology;  Laterality: N/A;       Family History   Problem Relation Age of Onset    Nephrolithiasis Mother     Heart disease Father     Hypertension Father     Kidney disease Father        Social History     Socioeconomic History    Marital status:    Tobacco Use    Smoking status: Former     Packs/day: 2.00     Years: 45.00     Additional pack years: 0.00     Total pack years: 90.00     Types: Cigarettes     Quit date: 2023     Years since quittin.2    Smokeless tobacco: Never   Vaping Use    Vaping Use: Never used   Substance and Sexual Activity    Alcohol use: No    Drug use: Yes     Types: Marijuana     Comment: occ     Sexual activity: Defer           Objective   Physical Exam  Vitals and nursing note reviewed. Exam conducted with a chaperone present.   Constitutional:       General: He is not in acute distress.     Appearance: He is normal weight. He is ill-appearing.      Comments: Chronically ill-appearing, cachectic   HENT:      Head: Normocephalic.      Mouth/Throat:      Mouth: Mucous membranes are moist.      Pharynx: Oropharynx is clear.   Eyes:      General: No scleral icterus.     Extraocular Movements: Extraocular movements intact.      Pupils: Pupils are equal, round, and reactive to light.   Neck:      Vascular: No JVD.   Cardiovascular:      Rate and Rhythm: Normal rate and regular rhythm.      Heart sounds: No murmur heard.     No friction rub. No gallop.   Pulmonary:      Effort: Pulmonary effort is normal. No tachypnea.      Breath sounds: Normal breath sounds. No decreased breath sounds, wheezing, rhonchi or rales.    Abdominal:      General: Abdomen is flat. Bowel sounds are normal.      Palpations: Abdomen is soft.      Tenderness: generalized       Comments: -tube without surrounding erythema, drainage, gastric contents within the tube   Musculoskeletal:         General: Normal range of motion.      Cervical back: Normal range of motion and neck supple.      Right lower leg: No edema.      Left lower leg: No edema.   Skin:     General: Skin is warm and dry.      Capillary Refill: Capillary refill takes 2 to 3 seconds.   Neurological:      General: No focal deficit present.      Mental Status: He is alert and oriented to person, place, and time.   Psychiatric:         Mood and Affect: Mood normal.         Behavior: Behavior normal.         Procedures          ED Course  ED Course as of 10/10/23 1525   Tue Oct 10, 2023   1241 ECG 12 Lead Other; Sepsis  Sinus rhythm, rate 87, QTc 440, no acute ST or T wave changes [CW]      ED Course User Index  [CW] Vin Archer, DO                                           Medical Decision Making  --Patient appears chronically ill, alert and able to converse appropriately at bedside.  Initial BP 84/58, concern for intra-abdominal pathology.  --Labs initially noted TYREE and hyperkalemia but specimen was hemolyzed, repeat BUN of 106/creatinine of 2.3, potassium confirmed elevated at 7.2  --GI PCR pending  --C. difficile toxin pending  --CT abdomen/pelvis with contrast noted significant improvement of necrotizing pancreatitis, no obstructive uropathy  --IV fluid resuscitation, Zosyn, antiemetics/PPI  --IV 10 units insulin x1, dextrose, bicarb, DuoNebs and calcium gluconate, discussed with nephrology, bicarb drip, repeat chemistry in 2 hours p.m.) (530, recommended lactulose and Kayexalate as well  --Discussed with medicine, will admit      Amount and/or Complexity of Data Reviewed  Labs: ordered.  Radiology: ordered.  ECG/medicine tests: ordered. Decision-making details documented in  ED Course.    Risk  OTC drugs.  Prescription drug management.        Final diagnoses:   TYREE (acute kidney injury)   Hyperkalemia       ED Disposition  ED Disposition       ED Disposition   Decision to Admit    Condition   --    Comment   --               No follow-up provider specified.       Medication List      No changes were made to your prescriptions during this visit.            Vin Archer DO  10/10/23 1525      Electronically signed by Vin Archer DO at 10/10/23 1525       Facility-Administered Medications as of 10/11/2023   Medication Dose Route Frequency Provider Last Rate Last Admin    acetaminophen (TYLENOL) tablet 650 mg  650 mg Oral Q4H PRN Chapito Soler DO        atorvastatin (LIPITOR) tablet 20 mg  20 mg Per J Tube Nightly Chapito Soler DO        calcium carbonate (TUMS) chewable tablet 500 mg (200 mg elemental)  2 tablet Oral BID PRN Chapito Soler DO        [COMPLETED] calcium chloride 2,000 mg in sodium chloride 0.9 % 100 mL IVPB  2,000 mg Intravenous Once Vin Archer  mL/hr at 10/10/23 1541 2,000 mg at 10/10/23 1541    [COMPLETED] dextrose (D50W) (25 g/50 mL) IV injection 50 mL  50 mL Intravenous Once Vin Archer DO   50 mL at 10/10/23 1515    fenofibrate (TRICOR) tablet 145 mg  145 mg Oral Daily Chapito Soler DO        folic acid (FOLVITE) tablet 1 mg  1 mg Per J Tube Daily Chapito Soler DO        heparin (porcine) 5000 UNIT/ML injection 5,000 Units  5,000 Units Subcutaneous Q8H Chapito Soler DO   5,000 Units at 10/11/23 0521    HYDROcodone-acetaminophen (NORCO)  MG per tablet 1 tablet  1 tablet Per J Tube Q6H PRN Chapito Soler DO        hydrOXYzine (ATARAX) tablet 50 mg  50 mg Oral TID PRN Chapito Soler DO        [COMPLETED] insulin regular (humuLIN R,novoLIN R) injection 10 Units  10 Units Intravenous Once Vin Archer DO   10 Units at  10/10/23 1515    [COMPLETED] ipratropium-albuterol (DUO-NEB) nebulizer solution 3 mL  3 mL Nebulization Once Vin Archer DO   3 mL at 10/10/23 1512    lactulose (CHRONULAC) 10 GM/15ML solution 30 g  30 g Per J Tube Daily Chapito Soler DO   30 g at 10/11/23 0833    metoprolol tartrate (LOPRESSOR) tablet 25 mg  25 mg Per J Tube BID Chapito Soler DO        montelukast (SINGULAIR) tablet 10 mg  10 mg Per J Tube Nightly Chapito Soler DO        [COMPLETED] morphine injection 2 mg  2 mg Intravenous Once Vin Archer DO   2 mg at 10/10/23 1136    morphine injection 2 mg  2 mg Intravenous Q4H PRN Chapito Soler DO   2 mg at 10/11/23 0305    multivitamin with minerals 1 tablet  1 tablet Oral Daily Chapito Soler DO        nitroglycerin (NITROSTAT) SL tablet 0.4 mg  0.4 mg Sublingual Q5 Min PRN Chapito Soler DO        [COMPLETED] ondansetron (ZOFRAN) injection 4 mg  4 mg Intravenous Once Vin Archer DO   4 mg at 10/10/23 1136    ondansetron (ZOFRAN) injection 4 mg  4 mg Intravenous Q6H PRN Pipe Muir MD   4 mg at 10/10/23 1946    ondansetron (ZOFRAN) tablet 8 mg  8 mg Per J Tube Q8H PRN Chapito Soler DO        pantoprazole (PROTONIX) EC tablet 40 mg  40 mg Oral Daily Chapito Soler DO        [COMPLETED] piperacillin-tazobactam (ZOSYN) IVPB 4.5 g in 100 mL NS VTB  4.5 g Intravenous Once Vin Archer DO   Stopped at 10/10/23 1206    sertraline (ZOLOFT) tablet 50 mg  50 mg Per J Tube Daily Chapito Soler DO        [COMPLETED] sodium bicarbonate injection 8.4% 50 mEq  50 mEq Intravenous Once Vin Archer DO   50 mEq at 10/10/23 1515    [COMPLETED] sodium chloride 0.9 % bolus 1,836 mL  30 mL/kg Intravenous Once Vin Archer DO   Stopped at 10/10/23 1217    sodium chloride 0.9 % flush 10 mL  10 mL Intravenous PRN Chapito Soler DO        sodium  chloride 0.9 % flush 10 mL  10 mL Intravenous Q12H Chapito Soler DO   10 mL at 10/11/23 0827    sodium chloride 0.9 % flush 10 mL  10 mL Intravenous PRN Chapito Soler,         sodium chloride 0.9 % flush 10 mL  10 mL Intravenous Q12H Pipe Muir MD   10 mL at 10/11/23 0826    sodium chloride 0.9 % flush 10 mL  10 mL Intravenous PRN Pipe Muir MD        sodium chloride 0.9 % infusion 40 mL  40 mL Intravenous PRN Chapito Soler,         sodium chloride 0.9 % infusion 40 mL  40 mL Intravenous PRN Pipe Muir MD        sodium chloride 0.9 % infusion  100 mL/hr Intravenous Continuous Jeremiah Ace  mL/hr at 10/11/23 0934 100 mL/hr at 10/11/23 0934    [COMPLETED] sodium polystyrene (KAYEXALATE) powder 30 g  30 g Per J Tube Once Vin Archer DO   30 g at 10/10/23 1515    sucralfate (CARAFATE) 1 GM/10ML suspension 1 g  1 g Oral 4x Daily Chapito Soler DO        thiamine (VITAMIN B-1) tablet 100 mg  100 mg Per J Tube Daily Chapito Soler DO        vitamin B-12 (CYANOCOBALAMIN) tablet 1,000 mcg  1,000 mcg Per J Tube Daily Chapito Soler DO         Physician Progress Notes (last 24 hours)  Notes from 10/10/23 0950 through 10/11/23 0950   No notes of this type exist for this encounter.          Consult Notes (last 24 hours)        Jeremiah Ace MD at 10/11/23 0733          NEPHROLOGY CONSULT NOTE    Referring Provider: Dr. Paige  Reason for Consultation: Acute kidney injury    Chief complaint abdominal pain    Subjective .     History of present illness: Patient is a 63-year-old with past medical history of necrotizing pancreatitis status postgastrostomy and had a PEG tube placement recently history of chronic C. difficile hypertension hyperlipidemia history of TIA GERD prostate cancer and tobacco abuse came to the hospital for abdominal pain nausea vomiting and diarrhea going on for few days patient was  recently at [facility] and in [facility] for acute pancreatitis when patient came into the hospital yesterday his potassium was 7.8 which was repeated and was 7.2 also patient's creatinine was 2.6 with a BUN of 120 patient stated that he has been having diarrhea for few days denies any chest pain no urgency no frequency no fever no chills patient is on chemo pill for prostate cancer calcium was 10.6 also right now patient denies any shortness of breath does not have any swelling no fever chills patient is on bicarb drip be treated potassium last night and the potassium is normal now  Review of Systems  All systems were reviewed and negative except for: Above    History  Past Medical History:   Diagnosis Date    Elevated cholesterol     GERD (gastroesophageal reflux disease)     Increased heart rate     Mini stroke     Neck pain     Prostate cancer     Rash     Tobacco abuse    ,   Past Surgical History:   Procedure Laterality Date    APPENDECTOMY      [facility]     COLONOSCOPY      COLONOSCOPY N/A 2023    Procedure: COLONOSCOPY;  Surgeon: Mahnaz Caraballo MD;  Location: [facility] OR;  Service: Gastroenterology;  Laterality: N/A;    ENDOSCOPY N/A 2023    Procedure: ESOPHAGOGASTRODUODENOSCOPY WITH KEOFEED PLACEMENT;  Surgeon: Yolande Eller MD;  Location: [facility] END[facility]COPY;  Service: Gastroenterology;  Laterality: N/A;   ,   Family History   Problem Relation Age of Onset    Nephrolithiasis Mother     Heart disease Father     Hypertension Father     Kidney disease Father    ,   Social History     Tobacco Use    Smoking status: Former     Packs/day: 2.00     Years: 45.00     Additional pack years: 0.00     Total pack years: 90.00     Types: Cigarettes     Quit date: 2023     Years since quittin.2    Smokeless tobacco: Never   Vaping Use    Vaping Use: Never used   Substance Use Topics    Alcohol use: No    Drug use: Yes     Types: Marijuana     Comment: occ    ,  Allergies:  Patient has no known allergies.,   Current Facility-Administered Medications:     acetaminophen (TYLENOL) tablet 650 mg, 650 mg, Oral, Q4H PRN, Chapito Soler A, DO    calcium carbonate (TUMS) chewable tablet 500 mg (200 mg elemental), 2 tablet, Oral, BID PRN, RyderChapito day A, DO    heparin (porcine) 5000 UNIT/ML injection 5,000 Units, 5,000 Units, Subcutaneous, Q8H, Chapito Soler, DO, 5,000 Units at 10/11/23 0521    HYDROcodone-acetaminophen (NORCO)  MG per tablet 1 tablet, 1 tablet, Oral, Q6H PRN, Chapito Soler DO    lactulose (CHRONULAC) 10 GM/15ML solution 30 g, 30 g, Per J Tube, Daily, Chapito Soler, DO, 30 g at 10/10/23 1515    morphine injection 2 mg, 2 mg, Intravenous, Q4H PRN, Chapito Soler DO, 2 mg at 10/11/23 0305    nitroglycerin (NITROSTAT) SL tablet 0.4 mg, 0.4 mg, Sublingual, Q5 Min PRN, Chapito Soler DO    ondansetron (ZOFRAN) injection 4 mg, 4 mg, Intravenous, Q6H PRN, Pipe Muir MD, 4 mg at 10/10/23 1946    sodium bicarbonate 8.4 % 75 mEq in sodium chloride 0.45 % 1,000 mL infusion (greater than 75 mEq), 75 mEq, Intravenous, Continuous, Chapito Soler DO, Last Rate: 100 mL/hr at 10/11/23 0336, 75 mEq at 10/11/23 0336    sodium chloride 0.9 % flush 10 mL, 10 mL, Intravenous, PRN, Chapito Soler A, DO    sodium chloride 0.9 % flush 10 mL, 10 mL, Intravenous, Q12H, Chapito Soler DO, 10 mL at 10/10/23 2139    sodium chloride 0.9 % flush 10 mL, 10 mL, Intravenous, PRN, RyderChapito day, DO    sodium chloride 0.9 % flush 10 mL, 10 mL, Intravenous, Q12H, Pipe Muir MD    sodium chloride 0.9 % flush 10 mL, 10 mL, Intravenous, PRN, Pipe Muir MD    sodium chloride 0.9 % infusion 40 mL, 40 mL, Intravenous, PRN, Chapito Soler,     sodium chloride 0.9 % infusion 40 mL, 40 mL, Intravenous, PRN, Pipe Muir MD sodium bicarbonate 8.4 % 75 mEq in  sodium chloride 0.45 % 1,000 mL infusion (greater than 75 mEq), 75 mEq, Last Rate: 75 mEq (10/11/23 0336)       Medications Prior to Admission   Medication Sig Dispense Refill Last Dose    atorvastatin (LIPITOR) 20 MG tablet Administer 1 tablet per J tube Every Night.   10/9/2023    fenofibrate (TRICOR) 145 MG tablet Administer 1 tablet per J tube Daily.   10/9/2023    folic acid (FOLVITE) 1 MG tablet Administer 1 tablet per J tube Daily.   10/9/2023    HYDROcodone-acetaminophen (NORCO)  MG per tablet Administer 1 tablet per J tube Every 6 (Six) Hours As Needed for Moderate Pain.   10/10/2023    hydrOXYzine (ATARAX) 50 MG tablet Take 1 tablet by mouth 3 (Three) Times a Day As Needed for Itching. 90 tablet 1 10/9/2023    metoprolol tartrate (LOPRESSOR) 25 MG tablet Administer 1 tablet per J tube 2 (Two) Times a Day.   10/9/2023    montelukast (SINGULAIR) 10 MG tablet Administer 1 tablet per J tube Every Night.   10/9/2023    sertraline (ZOLOFT) 50 MG tablet Administer 1 tablet per J tube Daily.   10/9/2023    sucralfate (Carafate) 1 GM/10ML suspension Take 10 mL by mouth 4 (Four) Times a Day.   10/9/2023    thiamine (VITAMIN B-1) 100 MG tablet  tablet Administer 1 tablet per J tube Daily.   10/9/2023    vitamin B-12 (CYANOCOBALAMIN) 1000 MCG tablet Administer 1 tablet per J tube Daily.   10/9/2023    enzalutamide (XTANDI) 40 MG chemo capsule Take 4 capsules by mouth Daily. 120 capsule 5 Unknown    multivitamin with minerals tablet tablet Take 1 tablet by mouth Daily.   Unknown    ondansetron (ZOFRAN) 8 MG tablet Administer 1 tablet per J tube Every 8 (Eight) Hours As Needed for Nausea or Vomiting.   Unknown    pantoprazole (PROTONIX) 40 MG EC tablet Take 1 tablet by mouth Daily.   Unknown          Objective     Laboratory Data :     Albumin Albumin   Date Value Ref Range Status   10/11/2023 2.9 (L) 3.5 - 5.2 g/dL Final   10/10/2023 4.1 3.5 - 5.2 g/dL Final      Magnesium Magnesium   Date Value Ref Range Status  "  10/11/2023 2.5 (H) 1.6 - 2.4 mg/dL Final          PTH               No results found for: \"PTH\"    CBC and coagulation:  Results from last 7 days   Lab Units 10/11/23  0641 10/11/23  0535 10/11/23  0156 10/10/23  1958 10/10/23  1645 10/10/23  1357 10/10/23  1131 10/04/23  1000   PROCALCITONIN ng/mL  --   --   --   --   --   --  1.40*  --    LACTATE mmol/L  --   --  1.2 2.3* 3.3*   < > 2.3*  --    WBC 10*3/mm3  --  12.99*  --   --   --   --  25.10* 22.25*   HEMOGLOBIN g/dL 7.7* 7.8*  --   --   --   --  12.1* 9.9*   HEMATOCRIT % 25.6* 25.9*  --   --   --   --  41.2 32.2*   MCV fL  --  78.0*  --   --   --   --  80.5 77.4*   MCHC g/dL  --  30.1*  --   --   --   --  29.4* 30.7*   PLATELETS 10*3/mm3  --  660*  --   --   --   --  950* 843*   INR   --   --   --   --   --   --  0.94  --     < > = values in this interval not displayed.     Acid/base balance:      Renal and electrolytes:    Results from last 7 days   Lab Units 10/11/23  0641 10/11/23  0535 10/11/23  0039 10/10/23  1645 10/10/23  1357 10/10/23  1131 10/04/23  1000   SODIUM mmol/L 131* 131* 133* 131* 129*   < > 130*   POTASSIUM mmol/L 4.2 4.1 4.6 5.1 7.2*   < > 5.3*   MAGNESIUM mg/dL  --  2.5*  --   --   --   --  2.6*   CHLORIDE mmol/L 98 98 99 100 100   < > 95*   CO2 mmol/L 23.6 23.7 22.8 18.5* 16.2*   < > 21.0*   BUN mg/dL 75* 78* 86* 97* 106*   < > 39*   CREATININE mg/dL 1.31* 1.36* 1.54* 2.10* 2.31*   < > 0.88   CALCIUM mg/dL 9.6 9.3 9.4 12.6* 9.5   < > 9.7   PHOSPHORUS mg/dL  --  4.3  --   --   --   --   --     < > = values in this interval not displayed.     Estimated Creatinine Clearance: 55 mL/min (A) (by C-G formula based on SCr of 1.31 mg/dL (H)).    Liver and pancreatic function:  Results from last 7 days   Lab Units 10/11/23  0535 10/10/23  1131 10/04/23  1000   ALBUMIN g/dL 2.9* 4.1 3.6   BILIRUBIN mg/dL 0.5 0.4 0.3   ALK PHOS U/L 116 176* 257*   AST (SGOT) U/L 13 17 23   ALT (SGPT) U/L 10 14 17   LIPASE U/L 34 75*  --          Cardiac:      Liver " and pancreatic function:  Results from last 7 days   Lab Units 10/11/23  0535 10/10/23  1131 10/04/23  1000   ALBUMIN g/dL 2.9* 4.1 3.6   BILIRUBIN mg/dL 0.5 0.4 0.3   ALK PHOS U/L 116 176* 257*   AST (SGOT) U/L 13 17 23   ALT (SGPT) U/L 10 14 17   LIPASE U/L 34 75*  --        CT Abdomen Pelvis Without Contrast    Result Date: 10/10/2023  1.  Significant improvement in changes of necrotizing pancreatitis status post cystogastrostomy tube placement, necrosectomy, and Axios metallic stent placement spanning from the G-tube into the stomach to the level of the proximal jejunum. 2.  No significant ascites. 3.  No acute appearing pancreatitis changes. No findings to suggest acute abscess. 4.  Cholelithiasis noted. 5.  Other incidental/nonacute findings above   This report was finalized on 10/10/2023 2:19 PM by Dr. Janusz Lerner MD.      CT Chest Without Contrast Diagnostic    Result Date: 10/10/2023  1.  No acute thoracic findings identified. 2.  Refer to CT abdomen/pelvis for abdomen findings.   This report was finalized on 10/10/2023 2:12 PM by Dr. Janusz Lerner MD.      XR Chest 1 View    Result Date: 10/10/2023    Unremarkable exam. No acute cardiopulmonary findings identified.   This report was finalized on 10/10/2023 1:48 PM by Dr. Janusz Lerner MD.        Vital Signs   Vitals:    10/11/23 0300 10/11/23 0400 10/11/23 0500 10/11/23 0600   BP: 107/52 101/52 100/51 94/58   Pulse: 96 92 90 87   Resp: 16 11 10 10   Temp:  97.9 øF (36.6 øC)     TempSrc:  Oral     SpO2: 100% 100% 98% 100%   Weight:   66.5 kg (146 lb 9.6 oz)    Height:            Intake/Output                   10/10/23 0701 - 10/11/23 0700     [id number] Total              Intake    P.O.  --  120 120    IV Piggyback  1936  -- 1936    Total Intake 3791 298 1101       Output    Urine  300  250 550    Emesis/NG output  500  -- 500    Total Output                Physical Exam:     General Appearance:    Alert, cooperative, in no acute  distress, oriented times three   Head:    Normocephalic, without obvious abnormality   Eyes:            Conjunctivae and sclerae normal, no icterus, no pallor, pupils CCERLA   Ears:    Ears appear intact    Throat:      Neck:   No adenopathy,no thyromegaly, no carotid bruit, no JVD   Back:       Lungs:     Clear to auscultation,respirations regular, even and                  unlabored    Heart:    Regular rhythm and normal rate, normal S1 and S2, no            murmur, no gallop, no rub, no click   Chest Wall:    No abnormalities observed   Abdomen:   PEG tube normal bowel sounds, no masses, no organomegaly, soft        non-tender, non-distended, no guarding, no rebound                tenderness   Rectal:     Deferred   Extremities:   Moves all extremities well, no edema, no cyanosis, no             redness   Pulses:      Skin:   No petechiae, no nodules, bruising or rash   Lymph nodes:      Neurologic:   Cranial nerves grossly intact, sensation normal, moves all extremities.     Results Review:   I reviewed the patient's new clinical results.  I personally viewed and interpreted the patient's EKG/Telemetry data      Assessment and Plan;    -Acute hyperkalemia  -Acute kidney injury  -Dehydration  -Azotemia  -History of necrotizing pancreatitis status post PEG tube placement  -Hypertension  -History of prostate cancer    10/11/2023  Patient baseline creatinine 0.88 went up to 2.6 now 1.31 after hydration so most likely patient had acute kidney injury secondary to prerenal dehydration secondary to diarrhea nausea vomiting, CT scan did not show any obstruction we will check urine lites and urine protein creatinine ratio and urine analysis  Azotemia is also better  Hyperkalemia was secondary to decreased GFR, and possible diet , now stable we will check again    Prognosis guarded    Please avoid nephrotoxic medication and pharmacy to adjust the medication according to the GFR      Plan of care discussed with pt, family  answered all questions, patient verbalized understanding and agreed.    YUKI Ace MD  10/11/23  07:33 EDT            Electronically signed by Jeremiah Ace MD at 10/11/23 0301

## 2023-10-11 NOTE — CONSULTS
NEPHROLOGY CONSULT NOTE    Referring Provider: Dr. Paige  Reason for Consultation: Acute kidney injury    Chief complaint abdominal pain    Subjective .     History of present illness: Patient is a 63-year-old with past medical history of necrotizing pancreatitis status postgastrostomy and had a PEG tube placement recently history of chronic C. difficile hypertension hyperlipidemia history of TIA GERD prostate cancer and tobacco abuse came to the hospital for abdominal pain nausea vomiting and diarrhea going on for few days patient was recently at [facility] and in [facility] for acute pancreatitis when patient came into the hospital yesterday his potassium was 7.8 which was repeated and was 7.2 also patient's creatinine was 2.6 with a BUN of 120 patient stated that he has been having diarrhea for few days denies any chest pain no urgency no frequency no fever no chills patient is on chemo pill for prostate cancer calcium was 10.6 also right now patient denies any shortness of breath does not have any swelling no fever chills patient is on bicarb drip be treated potassium last night and the potassium is normal now  Review of Systems  All systems were reviewed and negative except for: Above    History  Past Medical History:   Diagnosis Date    Elevated cholesterol     GERD (gastroesophageal reflux disease)     Increased heart rate     Mini stroke     Neck pain     Prostate cancer     Rash     Tobacco abuse    ,   Past Surgical History:   Procedure Laterality Date    APPENDECTOMY  1971    [facility]     COLONOSCOPY      COLONOSCOPY N/A 1/25/2023    Procedure: COLONOSCOPY;  Surgeon: Mahnaz Caraballo MD;  Location: [facility] OR;  Service: Gastroenterology;  Laterality: N/A;    ENDOSCOPY N/A 7/31/2023    Procedure: ESOPHAGOGASTRODUODENOSCOPY WITH KEOFEED PLACEMENT;  Surgeon: Yolande Eller MD;  Location:  [facility];  Service: Gastroenterology;  Laterality: N/A;   ,   Family History    Problem Relation Age of Onset    Nephrolithiasis Mother     Heart disease Father     Hypertension Father     Kidney disease Father    ,   Social History     Tobacco Use    Smoking status: Former     Packs/day: 2.00     Years: 45.00     Additional pack years: 0.00     Total pack years: 90.00     Types: Cigarettes     Quit date: 2023     Years since quittin.2    Smokeless tobacco: Never   Vaping Use    Vaping Use: Never used   Substance Use Topics    Alcohol use: No    Drug use: Yes     Types: Marijuana     Comment: occ    , Allergies:  Patient has no known allergies.,   Current Facility-Administered Medications:     acetaminophen (TYLENOL) tablet 650 mg, 650 mg, Oral, Q4H PRN, RyderLeonelopher A, DO    calcium carbonate (TUMS) chewable tablet 500 mg (200 mg elemental), 2 tablet, Oral, BID PRN, RyderGonsalo dayer A, DO    heparin (porcine) 5000 UNIT/ML injection 5,000 Units, 5,000 Units, Subcutaneous, Q8H, RyderChapito day A, DO, 5,000 Units at 10/11/23 0521    HYDROcodone-acetaminophen (NORCO)  MG per tablet 1 tablet, 1 tablet, Oral, Q6H PRN, RyderGonsalo dayer A, DO    lactulose (CHRONULAC) 10 GM/15ML solution 30 g, 30 g, Per J Tube, Daily, Chapito Soler DO, 30 g at 10/10/23 1515    morphine injection 2 mg, 2 mg, Intravenous, Q4H PRN, Gonsalo Solerer A, DO, 2 mg at 10/11/23 0305    nitroglycerin (NITROSTAT) SL tablet 0.4 mg, 0.4 mg, Sublingual, Q5 Min PRN, RyderChapito day A, DO    ondansetron (ZOFRAN) injection 4 mg, 4 mg, Intravenous, Q6H PRN, Pipe Muir MD, 4 mg at 10/10/23 1946    sodium bicarbonate 8.4 % 75 mEq in sodium chloride 0.45 % 1,000 mL infusion (greater than 75 mEq), 75 mEq, Intravenous, Continuous, Chapito Soler DO, Last Rate: 100 mL/hr at 10/11/23 0336, 75 mEq at 10/11/23 0336    sodium chloride 0.9 % flush 10 mL, 10 mL, Intravenous, PRN, Chapito Soler DO    sodium chloride 0.9 % flush 10 mL, 10 mL, Intravenous, Q12H,  Chapito Soler DO, 10 mL at 10/10/23 2139    sodium chloride 0.9 % flush 10 mL, 10 mL, Intravenous, PRN, Chapito Soler DO    sodium chloride 0.9 % flush 10 mL, 10 mL, Intravenous, Q12H, Pipe Muir MD    sodium chloride 0.9 % flush 10 mL, 10 mL, Intravenous, PRN, Pipe Muir MD    sodium chloride 0.9 % infusion 40 mL, 40 mL, Intravenous, PRN, Chapito Soler,     sodium chloride 0.9 % infusion 40 mL, 40 mL, Intravenous, PRN, Pipe Muir MD sodium bicarbonate 8.4 % 75 mEq in sodium chloride 0.45 % 1,000 mL infusion (greater than 75 mEq), 75 mEq, Last Rate: 75 mEq (10/11/23 0336)       Medications Prior to Admission   Medication Sig Dispense Refill Last Dose    atorvastatin (LIPITOR) 20 MG tablet Administer 1 tablet per J tube Every Night.   10/9/2023    fenofibrate (TRICOR) 145 MG tablet Administer 1 tablet per J tube Daily.   10/9/2023    folic acid (FOLVITE) 1 MG tablet Administer 1 tablet per J tube Daily.   10/9/2023    HYDROcodone-acetaminophen (NORCO)  MG per tablet Administer 1 tablet per J tube Every 6 (Six) Hours As Needed for Moderate Pain.   10/10/2023    hydrOXYzine (ATARAX) 50 MG tablet Take 1 tablet by mouth 3 (Three) Times a Day As Needed for Itching. 90 tablet 1 10/9/2023    metoprolol tartrate (LOPRESSOR) 25 MG tablet Administer 1 tablet per J tube 2 (Two) Times a Day.   10/9/2023    montelukast (SINGULAIR) 10 MG tablet Administer 1 tablet per J tube Every Night.   10/9/2023    sertraline (ZOLOFT) 50 MG tablet Administer 1 tablet per J tube Daily.   10/9/2023    sucralfate (Carafate) 1 GM/10ML suspension Take 10 mL by mouth 4 (Four) Times a Day.   10/9/2023    thiamine (VITAMIN B-1) 100 MG tablet  tablet Administer 1 tablet per J tube Daily.   10/9/2023    vitamin B-12 (CYANOCOBALAMIN) 1000 MCG tablet Administer 1 tablet per J tube Daily.   10/9/2023    enzalutamide (XTANDI) 40 MG chemo capsule Take 4 capsules by mouth Daily. 120  "capsule 5 Unknown    multivitamin with minerals tablet tablet Take 1 tablet by mouth Daily.   Unknown    ondansetron (ZOFRAN) 8 MG tablet Administer 1 tablet per J tube Every 8 (Eight) Hours As Needed for Nausea or Vomiting.   Unknown    pantoprazole (PROTONIX) 40 MG EC tablet Take 1 tablet by mouth Daily.   Unknown          Objective     Laboratory Data :     Albumin Albumin   Date Value Ref Range Status   10/11/2023 2.9 (L) 3.5 - 5.2 g/dL Final   10/10/2023 4.1 3.5 - 5.2 g/dL Final      Magnesium Magnesium   Date Value Ref Range Status   10/11/2023 2.5 (H) 1.6 - 2.4 mg/dL Final          PTH               No results found for: \"PTH\"    CBC and coagulation:  Results from last 7 days   Lab Units 10/11/23  0641 10/11/23  0535 10/11/23  0156 10/10/23  1958 10/10/23  1645 10/10/23  1357 10/10/23  1131 10/04/23  1000   PROCALCITONIN ng/mL  --   --   --   --   --   --  1.40*  --    LACTATE mmol/L  --   --  1.2 2.3* 3.3*   < > 2.3*  --    WBC 10*3/mm3  --  12.99*  --   --   --   --  25.10* 22.25*   HEMOGLOBIN g/dL 7.7* 7.8*  --   --   --   --  12.1* 9.9*   HEMATOCRIT % 25.6* 25.9*  --   --   --   --  41.2 32.2*   MCV fL  --  78.0*  --   --   --   --  80.5 77.4*   MCHC g/dL  --  30.1*  --   --   --   --  29.4* 30.7*   PLATELETS 10*3/mm3  --  660*  --   --   --   --  950* 843*   INR   --   --   --   --   --   --  0.94  --     < > = values in this interval not displayed.     Acid/base balance:      Renal and electrolytes:    Results from last 7 days   Lab Units 10/11/23  0641 10/11/23  0535 10/11/23  0039 10/10/23  1645 10/10/23  1357 10/10/23  1131 10/04/23  1000   SODIUM mmol/L 131* 131* 133* 131* 129*   < > 130*   POTASSIUM mmol/L 4.2 4.1 4.6 5.1 7.2*   < > 5.3*   MAGNESIUM mg/dL  --  2.5*  --   --   --   --  2.6*   CHLORIDE mmol/L 98 98 99 100 100   < > 95*   CO2 mmol/L 23.6 23.7 22.8 18.5* 16.2*   < > 21.0*   BUN mg/dL 75* 78* 86* 97* 106*   < > 39*   CREATININE mg/dL 1.31* 1.36* 1.54* 2.10* 2.31*   < > 0.88   CALCIUM " mg/dL 9.6 9.3 9.4 12.6* 9.5   < > 9.7   PHOSPHORUS mg/dL  --  4.3  --   --   --   --   --     < > = values in this interval not displayed.     Estimated Creatinine Clearance: 55 mL/min (A) (by C-G formula based on SCr of 1.31 mg/dL (H)).    Liver and pancreatic function:  Results from last 7 days   Lab Units 10/11/23  0535 10/10/23  1131 10/04/23  1000   ALBUMIN g/dL 2.9* 4.1 3.6   BILIRUBIN mg/dL 0.5 0.4 0.3   ALK PHOS U/L 116 176* 257*   AST (SGOT) U/L 13 17 23   ALT (SGPT) U/L 10 14 17   LIPASE U/L 34 75*  --          Cardiac:      Liver and pancreatic function:  Results from last 7 days   Lab Units 10/11/23  0535 10/10/23  1131 10/04/23  1000   ALBUMIN g/dL 2.9* 4.1 3.6   BILIRUBIN mg/dL 0.5 0.4 0.3   ALK PHOS U/L 116 176* 257*   AST (SGOT) U/L 13 17 23   ALT (SGPT) U/L 10 14 17   LIPASE U/L 34 75*  --        CT Abdomen Pelvis Without Contrast    Result Date: 10/10/2023  1.  Significant improvement in changes of necrotizing pancreatitis status post cystogastrostomy tube placement, necrosectomy, and Axios metallic stent placement spanning from the G-tube into the stomach to the level of the proximal jejunum. 2.  No significant ascites. 3.  No acute appearing pancreatitis changes. No findings to suggest acute abscess. 4.  Cholelithiasis noted. 5.  Other incidental/nonacute findings above   This report was finalized on 10/10/2023 2:19 PM by Dr. Janusz Lerner MD.      CT Chest Without Contrast Diagnostic    Result Date: 10/10/2023  1.  No acute thoracic findings identified. 2.  Refer to CT abdomen/pelvis for abdomen findings.   This report was finalized on 10/10/2023 2:12 PM by Dr. Janusz Lerner MD.      XR Chest 1 View    Result Date: 10/10/2023    Unremarkable exam. No acute cardiopulmonary findings identified.   This report was finalized on 10/10/2023 1:48 PM by Dr. Janusz Lerner MD.        Vital Signs   Vitals:    10/11/23 0300 10/11/23 0400 10/11/23 0500 10/11/23 0600   BP: 107/52 101/52 100/51 94/58    Pulse: 96 92 90 87   Resp: 16 11 10 10   Temp:  97.9 øF (36.6 øC)     TempSrc:  Oral     SpO2: 100% 100% 98% 100%   Weight:   66.5 kg (146 lb 9.6 oz)    Height:            Intake/Output                   10/10/23 0701 - 10/11/23 0700     [id number] 9848-7092 Total              Intake    P.O.  --  120 120    IV Piggyback  1936  -- 1936    Total Intake 1005 735 7099       Output    Urine  300  250 550    Emesis/NG output  500  -- 500    Total Output                Physical Exam:     General Appearance:    Alert, cooperative, in no acute distress, oriented times three   Head:    Normocephalic, without obvious abnormality   Eyes:            Conjunctivae and sclerae normal, no icterus, no pallor, pupils CCERLA   Ears:    Ears appear intact    Throat:      Neck:   No adenopathy,no thyromegaly, no carotid bruit, no JVD   Back:       Lungs:     Clear to auscultation,respirations regular, even and                  unlabored    Heart:    Regular rhythm and normal rate, normal S1 and S2, no            murmur, no gallop, no rub, no click   Chest Wall:    No abnormalities observed   Abdomen:   PEG tube normal bowel sounds, no masses, no organomegaly, soft        non-tender, non-distended, no guarding, no rebound                tenderness   Rectal:     Deferred   Extremities:   Moves all extremities well, no edema, no cyanosis, no             redness   Pulses:      Skin:   No petechiae, no nodules, bruising or rash   Lymph nodes:      Neurologic:   Cranial nerves grossly intact, sensation normal, moves all extremities.     Results Review:   I reviewed the patient's new clinical results.  I personally viewed and interpreted the patient's EKG/Telemetry data      Assessment and Plan;    -Acute hyperkalemia  -Acute kidney injury  -Dehydration  -Azotemia  -History of necrotizing pancreatitis status post PEG tube placement  -Hypertension  -History of prostate cancer    10/11/2023  Patient baseline creatinine 0.88 went up  to 2.6 now 1.31 after hydration so most likely patient had acute kidney injury secondary to prerenal dehydration secondary to diarrhea nausea vomiting, CT scan did not show any obstruction we will check urine lites and urine protein creatinine ratio and urine analysis  Azotemia is also better  Hyperkalemia was secondary to decreased GFR, and possible diet , now stable we will check again    Prognosis guarded    Please avoid nephrotoxic medication and pharmacy to adjust the medication according to the GFR      Plan of care discussed with pt, family answered all questions, patient verbalized understanding and agreed.    YUKI Ace MD  10/11/23  07:33 EDT

## 2023-10-11 NOTE — THERAPY EVALUATION
Acute Care - Occupational Therapy Initial Evaluation  [facility]     Patient Name: Ta Madison  : 1960  MRN: [id number]  Today's Date: 10/11/2023  Onset of Illness/Injury or Date of Surgery: 10/10/23     Referring Physician: Ryder    Admit Date: 10/10/2023       ICD-10-CM ICD-9-CM   1. TYREE (acute kidney injury)  N17.9 584.9   2. Hyperkalemia  E87.5 276.7     Patient Active Problem List   Diagnosis    Tachycardia    Hyperlipidemia    Multiple allergies    Tobacco abuse    Gastroesophageal reflux disease    Chronic right shoulder pain    Ganglion cyst of dorsum of right wrist    Benign skin cyst    Numbness and tingling of both upper extremities    Right shoulder pain    Adenocarcinoma of prostate    Encounter for screening for malignant neoplasm of colon    Epigastric pain    Acute gallstone pancreatitis    Personal history of transient ischemic attack (TIA)    Elevated serum creatinine    Sepsis associated hypotension    Hyperkalemia     Past Medical History:   Diagnosis Date    Elevated cholesterol     GERD (gastroesophageal reflux disease)     Increased heart rate     Mini stroke     Neck pain     Prostate cancer     Rash     Tobacco abuse      Past Surgical History:   Procedure Laterality Date    APPENDECTOMY      [facility]     COLONOSCOPY      COLONOSCOPY N/A 2023    Procedure: COLONOSCOPY;  Surgeon: Mahnaz Caraballo MD;  Location: [facility];  Service: Gastroenterology;  Laterality: N/A;    ENDOSCOPY N/A 2023    Procedure: ESOPHAGOGASTRODUODENOSCOPY WITH KEOFEED PLACEMENT;  Surgeon: Yolande Eller MD;  Location: [facility];  Service: Gastroenterology;  Laterality: N/A;         OT ASSESSMENT FLOWSHEET (last 12 hours)       OT Evaluation and Treatment       Row Name 10/11/23 1039                   OT Time and Intention    Document Type evaluation  -KR        Mode of Treatment occupational therapy  -KR        Patient Effort good  -KR        Comment Pt presents with  decreased strength/endurance. OT will follow as tolerated to enhance general activity tolerance/strengthening needed to improve self care skills.  -KR           Living Environment    Current Living Arrangements home  -KR        People in Home spouse  -KR        Primary Care Provided by self;spouse/significant other  -KR           Cognition    Affect/Mental Status (Cognition) WFL  -KR        Orientation Status (Cognition) oriented x 3  -KR        Follows Commands (Cognition) WFL  -KR           Range of Motion Comprehensive    Comment, General Range of Motion BUE WFL  -KR           Strength Comprehensive (MMT)    Comment, General Manual Muscle Testing (MMT) Assessment BUE 3/5  -KR           Activities of Daily Living    BADL Assessment/Intervention bathing;upper body dressing;lower body dressing;grooming;toileting  -KR           Bathing Assessment/Intervention    [place] Level (Bathing) bathing skills;contact guard assist  -KR           Upper Body Dressing Assessment/Training    [place] Level (Upper Body Dressing) upper body dressing skills;contact guard assist  -KR           Lower Body Dressing Assessment/Training    [place] Level (Lower Body Dressing) lower body dressing skills;contact guard assist  -KR           Grooming Assessment/Training    [place] Level (Grooming) grooming skills;set up  -KR           Toileting Assessment/Training    [place] Level (Toileting) toileting skills;contact guard assist  -KR           Plan of Care Review    Plan of Care Reviewed With patient;spouse  -KR           Therapy Assessment/Plan (OT)    Planned Therapy Interventions (OT) strengthening exercise;activity tolerance training  -KR           Therapy Plan Review/Discharge Plan (OT)    Anticipated Discharge Disposition (OT) home with assist  -KR           OT Goals    Strength Goal Selection (OT) strength, OT goal 1  -KR        Activity Tolerance Goal Selection (OT) activity tolerance, OT goal 1  -KR            Strength Goal 1 (OT)    Strength Goal 1 (OT) BUE increase x 1 to enhance self care/mobility performance  -KR        Time Frame (Strength Goal 1, OT) by discharge  -KR            Activity Tolerance Goal 1 (OT)    Activity Tolerance Goal 1 (OT) Increase to enhance ADL performance in home environment  -KR        Activity Level (Endurance Goal 1, OT) 15 min activity  -KR        Time Frame (Activity Tolerance Goal 1, OT) by discharge  -KR                  User Key  (r) = Recorded By, (t) = Taken By, (c) = Cosigned By      Initials Name Effective Dates    Janusz Daniels OT 06/16/21 -                            OT Recommendation and Plan  Planned Therapy Interventions (OT): strengthening exercise, activity tolerance training  Plan of Care Review  Plan of Care Reviewed With: patient, spouse  Plan of Care Reviewed With: patient, spouse        Time Calculation:     Therapy Charges for Today       Code Description Service Date Service Provider Modifiers Qty    [id number]  OT EVAL MOD COMPLEXITY 4 10/11/2023 Janusz Chambers OT GO 1                 Janusz Chambers OT  10/11/2023

## 2023-10-11 NOTE — PLAN OF CARE
Goal Outcome Evaluation:  Plan of Care Reviewed With: patient     Problem: Adult Inpatient Plan of Care  Goal: Patient-Specific Goal (Individualized)  Outcome: Ongoing, Progressing     Problem: Adult Inpatient Plan of Care  Goal: Absence of Hospital-Acquired Illness or Injury  Outcome: Ongoing, Progressing     Problem: Adult Inpatient Plan of Care  Goal: Optimal Comfort and Wellbeing  Outcome: Ongoing, Progressing     Problem: Adult Inpatient Plan of Care  Goal: Readiness for Transition of Care  Outcome: Ongoing, Progressing     Problem: Pain Acute  Goal: Acceptable Pain Control and Functional Ability  Outcome: Ongoing, Progressing        Progress: no change  Outcome Evaluation: Pt resting in bed during shift on RA. Pt is A&Ox4 during shift. Pt had complaints of pain that was helped with PRN medication. No other significant changes during shift, care ongoing.

## 2023-10-11 NOTE — PROGRESS NOTES
[facility] HOSPITALIST PROGRESS NOTE    Subjective     History:   Ta Madison is a 63 y.o. male admitted on 10/10/2023 secondary to Hyperkalemia     Procedures: None    CC: Follow up hyperkalemia, TYREE    Patient seen and examined with JUDITH Gamino. Awake and alert with his wife present at bedside. States he feels better today. No further episodes of vomiting or diarrhea reported. No reported CP or dyspnea. No acute events overnight per RN.     History taken from: patient, chart, and RN.      Objective     Vital Signs  Temp:  [97.5 øF (36.4 øC)-98.4 øF (36.9 øC)] 98.2 øF (36.8 øC)  Heart Rate:  [] 84  Resp:  [10-23] 23  BP: ()/() 132/108    Intake/Output Summary (Last 24 hours) at 10/11/2023 1311  Last data filed at 10/11/2023 0934  Gross per 24 hour   Intake 240 ml   Output 1452 ml   Net -1212 ml         Physical Exam:  General:    Awake, alert, in no acute distress   Heart:      Normal S1 and S2. Regular rate and rhythm. No significant murmur, rubs or gallops appreciated.   Lungs:     Respirations regular, even and unlabored. Lungs clear to auscultation B/L. No wheezes, rales or rhonchi.   Abdomen:   Soft and nontender. No guarding, rebound tenderness or  organomegaly noted. Bowel sounds present x 4. (+) J tube   Extremities:  No clubbing, cyanosis or edema noted. Moves UE and LE equally B/L.     Results Review:    Results from last 7 days   Lab Units 10/11/23  0641 10/11/23  0535 10/10/23  1131   WBC 10*3/mm3  --  12.99* 25.10*   HEMOGLOBIN g/dL 7.7* 7.8* 12.1*   PLATELETS 10*3/mm3  --  660* 950*     Results from last 7 days   Lab Units 10/11/23  0641 10/11/23  0535 10/11/23  0039 10/10/23  1645 10/10/23  1357 10/10/23  1131   SODIUM mmol/L 131* 131* 133* 131* 129* 122*   POTASSIUM mmol/L 4.2 4.1 4.6 5.1 7.2* 7.8*   CHLORIDE mmol/L 98 98 99 100 100 93*   CO2 mmol/L 23.6 23.7 22.8 18.5* 16.2* 15.3*   BUN mg/dL 75* 78* 86* 97* 106* 120*   CREATININE mg/dL 1.31* 1.36* 1.54* 2.10*  2.31* 2.61*   CALCIUM mg/dL 9.6 9.3 9.4 12.6* 9.5 10.6*   GLUCOSE mg/dL 79 77 89 69 113* 125*     Results from last 7 days   Lab Units 10/11/23  0535 10/10/23  1131   BILIRUBIN mg/dL 0.5 0.4   ALK PHOS U/L 116 176*   AST (SGOT) U/L 13 17   ALT (SGPT) U/L 10 14     Results from last 7 days   Lab Units 10/11/23  0535   MAGNESIUM mg/dL 2.5*     Results from last 7 days   Lab Units 10/10/23  1131   INR  0.94     Results from last 7 days   Lab Units 10/11/23  0641 10/10/23  1357 10/10/23  1131   CK TOTAL U/L 13*  --  17*   HSTROP T ng/L  --  19* 24*       Imaging Results (Last 24 Hours)       Procedure Component Value Units Date/Time    CT Abdomen Pelvis Without Contrast [[id number]] Collected: 10/10/23 1412     Updated: 10/10/23 1421    Narrative:      EXAM:    CT Abdomen and Pelvis Without Intravenous Contrast     EXAM DATE:    10/10/2023 2:04 PM     CLINICAL HISTORY:    TYREE, sepsis     TECHNIQUE:    Axial computed tomography images of the abdomen and pelvis without  intravenous contrast.  Sagittal and coronal reformatted images were  created and reviewed.  This CT exam was performed using one or more of  the following dose reduction techniques:  automated exposure control,  adjustment of the mA and/or kV according to patient size, and/or use of  iterative reconstruction technique.     COMPARISON:    8/10/2023     FINDINGS:    Lung bases:  Unremarkable as visualized.  No mass.  No consolidation.      ABDOMEN:    Liver:  Liver is within normal limits.    Gallbladder and bile ducts:  Cholelithiasis noted.  Common bile duct  appears within normal limits.    Pancreas:  Significant improvement in pancreatitis changes likely  reflecting sequela of necrotizing pancreatitis with patient status post  gastroenteric stenting and a cyst though gastrostomy drainage catheter.  Ill-defined fluid collection posterior to the gastric wall compatible  with a resolving pseudocyst or pancreatic abscess from necrotizing  pancreatitis.  Focal  pancreatic ductal dilatation of the head of the  pancreas region but no acute inflammatory changes identified.    Spleen:  Unremarkable as visualized.  No splenomegaly.    Adrenals:  Unremarkable as visualized.  No mass.    Kidneys and ureters:  Unremarkable as visualized.  No obstructing  stones.  No hydronephrosis.    Stomach and bowel:  Unremarkable as visualized.  No obstruction.  No  mucosal thickening.      PELVIS:    Appendix:  Appendectomy.    Bladder:  Distention of the urinary bladder without wall thickening.   No stones.    Reproductive:  Mild prostate enlargement is noted.      ABDOMEN and PELVIS:    Intraperitoneal space:  Unremarkable as visualized.  No significant  free fluid identified.  No significant free air is identified.    Bones/joints:  No acute fracture.  No dislocation.    Soft tissues:  Unremarkable as visualized.    Vasculature:  Unremarkable as visualized.  No abdominal aortic  aneurysm.    Lymph nodes:  Unremarkable as visualized.  No enlarged lymph nodes.    Tubes, lines and devices:  G-tube noted within the stomach.       Impression:      1.  Significant improvement in changes of necrotizing pancreatitis  status post cystogastrostomy tube placement, necrosectomy, and [employer]  metallic stent placement spanning from the G-tube into the stomach to  the level of the proximal jejunum.  2.  No significant ascites.  3.  No acute appearing pancreatitis changes. No findings to suggest  acute abscess.  4.  Cholelithiasis noted.  5.  Other incidental/nonacute findings above        This report was finalized on 10/10/2023 2:19 PM by Dr. Janusz Lerner MD.       CT Chest Without Contrast Diagnostic [[id number]] Collected: 10/10/23 1400     Updated: 10/10/23 1414    Narrative:      EXAM:    CT Chest Without Intravenous Contrast     EXAM DATE:    10/10/2023 2:03 PM     CLINICAL HISTORY:    sepsis     TECHNIQUE:    Axial computed tomography images of the chest without intravenous  contrast.  Sagittal and  coronal reformatted images were created and  reviewed.  This CT exam was performed using one or more of the following  dose reduction techniques:  automated exposure control, adjustment of  the mA and/or kV according to patient size, and/or use of iterative  reconstruction technique.     COMPARISON:    8/6/2023     FINDINGS:    Lungs and pleural spaces:  Chronic interstitial lung changes are  noted.  No consolidative airspace disease identified.  No pleural  effusions identified.    Heart:  Unremarkable as visualized.  No cardiomegaly.  No significant  pericardial effusion.  No significant coronary artery calcifications.    Bones/joints:  Degenerative changes thoracic spine.  No acute  fracture.  No dislocation.    Soft tissues:  Unremarkable as visualized.    Vasculature:  Unremarkable as visualized.  No thoracic aortic  aneurysm.    Lymph nodes:  Unremarkable as visualized.  No enlarged lymph nodes.    Tubes, lines and devices:  G-tube noted.       Impression:      1.  No acute thoracic findings identified.  2.  Refer to CT abdomen/pelvis for abdomen findings.        This report was finalized on 10/10/2023 2:12 PM by Dr. Janusz Lerner MD.       XR Chest 1 View [[id number]] Collected: 10/10/23 1347     Updated: 10/10/23 1350    Narrative:      EXAM:    XR Chest, 1 View     EXAM DATE:    10/10/2023 12:19 PM     CLINICAL HISTORY:    Severe Sepsis Protocol     TECHNIQUE:    Frontal view of the chest.     COMPARISON:    8/10/2023     FINDINGS:    Lungs and pleural spaces:  Unremarkable as visualized.  No  consolidation.  No pneumothorax.    Heart:  Unremarkable as visualized.  No cardiomegaly.    Mediastinum:  Unremarkable as visualized.    Bones/joints:  Unremarkable as visualized.       Impression:        Unremarkable exam. No acute cardiopulmonary findings identified.        This report was finalized on 10/10/2023 1:48 PM by Dr. Janusz Lerner MD.                 Medications:  atorvastatin, 20 mg, Per J Tube,  Nightly  fenofibrate, 145 mg, Oral, Daily  folic acid, 1 mg, Per J Tube, Daily  heparin (porcine), 5,000 Units, Subcutaneous, Q8H  lactulose, 30 g, Per J Tube, Daily  [START ON 10/12/2023] lansoprazole, 30 mg, Per J Tube, Q AM  metoprolol tartrate, 25 mg, Per J Tube, BID  montelukast, 10 mg, Per J Tube, Nightly  multivitamin with minerals, 1 tablet, Oral, Daily  sertraline, 50 mg, Per J Tube, Daily  sodium chloride, 10 mL, Intravenous, Q12H  sodium chloride, 10 mL, Intravenous, Q12H  sucralfate, 1 g, Oral, 4x Daily  thiamine, 100 mg, Per J Tube, Daily  vitamin B-12, 1,000 mcg, Per J Tube, Daily      sodium chloride, 100 mL/hr, Last Rate: 100 mL/hr (10/11/23 0934)            Assessment & Plan   Severe hyperkalemia: Resolved with targeted treatment and IVF's with bicarb. Monitor on telemetry. Repeat labs in the AM. Nephrology input appreciated.     TYREE: Likely prerenal with dehydration and GI losses. No obstruction on imaging. Creatine improved today. UOP stable. Nephrology has adjusted IVF's today. Monitor UOP and repeat labs in the AM.     SIRS: Possibly 2/2 dehydration. No obvious source of infection at present. No evidence of pneumonia on imaging. UA not suggestive of UTI. CT abd/pelvis appears improved from previous. Stool studies including GI PCR and C diff negative.  Afebrile. WBC improved and stable. Lactate has normalized. Monitor off antibiotics. Follow cultures and repeat labs in the AM.     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 Lipase has normalized on repeat this AM. Resume TF's. Will need outpatient follow up at .     Chronic microcytic anemia: Likely hemoconcentrated on presentation. Decreased today with all cell lines decreased from previous. No obvious signs of bleeding. Cont vitamin supplementation. Repeat CBC in the AM.     Essential HTN: BP overall stable.  Cont metoprolol and monitor.     DVT PPX: SQ  heparin    Transfer to medical floor.     Disposition Likely home when medically stable, possibly in AM.     Chapito Soler,   10/11/23  13:11 EDT

## 2023-10-11 NOTE — THERAPY EVALUATION
Acute Care - Physical Therapy Initial Evaluation  [facility]     Patient Name: Ta Madison  : 1960  MRN: [id number]  Today's Date: 10/11/2023   Onset of Illness/Injury or Date of Surgery: 10/10/23  Visit Dx:     ICD-10-CM ICD-9-CM   1. TYREE (acute kidney injury)  N17.9 584.9   2. Hyperkalemia  E87.5 276.7     Patient Active Problem List   Diagnosis    Tachycardia    Hyperlipidemia    Multiple allergies    Tobacco abuse    Gastroesophageal reflux disease    Chronic right shoulder pain    Ganglion cyst of dorsum of right wrist    Benign skin cyst    Numbness and tingling of both upper extremities    Right shoulder pain    Adenocarcinoma of prostate    Encounter for screening for malignant neoplasm of colon    Epigastric pain    Acute gallstone pancreatitis    Personal history of transient ischemic attack (TIA)    Elevated serum creatinine    Sepsis associated hypotension    Hyperkalemia     Past Medical History:   Diagnosis Date    Elevated cholesterol     GERD (gastroesophageal reflux disease)     Increased heart rate     Mini stroke     Neck pain     Prostate cancer     Rash     Tobacco abuse      Past Surgical History:   Procedure Laterality Date    APPENDECTOMY      [facility]     COLONOSCOPY      COLONOSCOPY N/A 2023    Procedure: COLONOSCOPY;  Surgeon: Mahnaz Caraballo MD;  Location: [facility];  Service: Gastroenterology;  Laterality: N/A;    ENDOSCOPY N/A 2023    Procedure: ESOPHAGOGASTRODUODENOSCOPY WITH KEOFEED PLACEMENT;  Surgeon: Yolande Eller MD;  Location: [facility] ENDOSCOPY;  Service: Gastroenterology;  Laterality: N/A;     PT Assessment (last 12 hours)       PT Evaluation and Treatment       Row Name 10/11/23 0815          Physical Therapy Time and Intention    Subjective Information no complaints  -CS     Document Type evaluation  -CS     Mode of Treatment physical therapy  -CS     Patient Effort good  -CS     Comment Pt seen bedside this AM. Pt reports being (I) w/  mobility but prior to coming to the hospital was using a walker because he had got so weak. Pt agreed to evaluation.  -       Row Name 10/11/23 0815          General Information    Patient Profile Reviewed yes  -CS     Onset of Illness/Injury or Date of Surgery 10/10/23  -     Referring Physician Ryder  -     Patient Observations alert;cooperative;agree to therapy  -CS     General Observations of Patient Pt supine, all PCU lines intact  -CS     Risks Reviewed patient:;LOB;dizziness;increased discomfort;change in vital signs;increased drainage;lines disloged  -CS     Benefits Reviewed patient:;improve function;increase independence;increase strength;increase balance;decrease pain;decrease risk of DVT;improve skin integrity;increase knowledge  -       Row Name 10/11/23 0815          Pain    Additional Documentation --  no c/o  -       Row Name 10/11/23 0815          Cognition    Orientation Status (Cognition) oriented x 4  -       Row Name 10/11/23 0815          Range of Motion (ROM)    Range of Motion --  (B)LE WFL  -       Row Name 10/11/23 0815          Strength Comprehensive (MMT)    Comment, General Manual Muscle Testing (MMT) Assessment (B)LE grossly 5/5  -       Row Name 10/11/23 0815          Bed Mobility    Bed Mobility bed mobility (all) activities  -     All Activities, [place] (Bed Mobility) standby assist  -     Assistive Device (Bed Mobility) head of bed elevated  -       Row Name 10/11/23 0815          Transfers    Transfers sit-stand transfer;stand-sit transfer  -       Row Name 10/11/23 0815          Sit-Stand Transfer    Sit-Stand [place] (Transfers) contact guard  -     Assistive Device (Sit-Stand Transfers) walker, front-wheeled  -       Row Name 10/11/23 0815          Stand-Sit Transfer    Stand-Sit [place] (Transfers) contact guard  -     Assistive Device (Stand-Sit Transfers) walker, front-wheeled  -       Row Name 10/11/23 0815           Gait/Stairs (Locomotion)    Gait/Stairs Locomotion gait/ambulation independence;gait/ambulation assistive device;distance ambulated  -CS     [place] Level (Gait) contact guard  -CS     Assistive Device (Gait) walker, front-wheeled  -CS     Distance in Feet (Gait) 100  -CS     Pattern (Gait) step-through  -CS     Comment, (Gait/Stairs) Slow cautious gait, no LOB, safe use of walker  -CS       Row Name 10/11/23 0815          Plan of Care Review    Plan of Care Reviewed With patient  -CS     Progress no change  -CS     Outcome Evaluation Pt presents w/ fair bedside mobility and gait limited by activity tolerance. Pt would benefit from skilled PT. Anticipate d/c home w/ family support.  -CS       Row Name 10/11/23 0815          Positioning and Restraints    Pre-Treatment Position in bed  -CS     Post Treatment Position bed  -CS     In Bed supine;call light within reach;encouraged to call for assist;with family/caregiver  -CS       Row Name 10/11/23 0815          Therapy Assessment/Plan (PT)    Functional Level at Time of Evaluation (PT) CGA  -CS     PT Diagnosis (PT) impaired functional mobility  -CS     Rehab Potential (PT) good, to achieve stated therapy goals  -CS     Criteria for Skilled Interventions Met (PT) yes;meets criteria;skilled treatment is necessary  -CS     Therapy Frequency (PT) 2 times/wk  2-5x/week  -CS     Predicted Duration of Therapy Intervention (PT) while in house  -CS     Problem List (PT) problems related to;balance;mobility  -CS     Activity Limitations Related to Problem List (PT) unable to ambulate safely;unable to transfer safely  -CS     Comment, Therapy Assessment/Plan (PT) Pt presents w/ fair bedside mobility and gait limited by activity tolerance. Pt would benefit from skilled PT. Anticipate d/c home w/ family support.  -CS       Row Name 10/11/23 0815          PT Evaluation Complexity    History, PT Evaluation Complexity 3 or more personal factors and/or comorbidities  -CS      Examination of Body Systems (PT Eval Complexity) total of 4 or more elements  -CS     Clinical Presentation (PT Evaluation Complexity) evolving  -CS     Clinical Decision Making (PT Evaluation Complexity) moderate complexity  -CS     Overall Complexity (PT Evaluation Complexity) moderate complexity  -CS       Row Name 10/11/23 0815          Therapy Plan Review/Discharge Plan (PT)    Therapy Plan Review (PT) evaluation/treatment results reviewed;care plan/treatment goals reviewed;risks/benefits reviewed;current/potential barriers reviewed;participants voiced agreement with care plan;participants included;patient  -CS       Row Name 10/11/23 0815          Physical Therapy Goals    Transfer Goal Selection (PT) transfer, PT goal 1  -CS     Gait Training Goal Selection (PT) gait training, PT goal 1  -CS       Row Name 10/11/23 0815          Transfer Goal 1 (PT)    Activity/Assistive Device (Transfer Goal 1, PT) transfers, all  -CS     [place] Level/Cues Needed (Transfer Goal 1, PT) standby assist  -CS     Time Frame (Transfer Goal 1, PT) long term goal (LTG);by discharge  -CS       Row Name 10/11/23 0815          Gait Training Goal 1 (PT)    Activity/Assistive Device (Gait Training Goal 1, PT) gait (walking locomotion);assistive device use  -CS     [place] Level (Gait Training Goal 1, PT) standby assist;independent  -CS     Distance (Gait Training Goal 1, PT) 250  -CS     Time Frame (Gait Training Goal 1, PT) long term goal (LTG);by discharge  -CS               User Key  (r) = Recorded By, (t) = Taken By, (c) = Cosigned By      Initials Name Provider Type    CS Chandan Dickerson, PT Physical Therapist                    Physical Therapy Education       Title: PT OT SLP Therapies (Done)       Topic: Physical Therapy (Done)       Point: Mobility training (Done)       Learning Progress Summary             Patient Acceptance, E,TB, VU by CS at 10/11/2023 1032                         Point: Home exercise program (Done)        Learning Progress Summary             Patient Acceptance, E,TB, VU by  at 10/11/2023 1032                         Point: Body mechanics (Done)       Learning Progress Summary             Patient Acceptance, E,TB, VU by  at 10/11/2023 1032                         Point: Precautions (Done)       Learning Progress Summary             Patient Acceptance, E,TB, VU by  at 10/11/2023 1032                                         User Key       Initials Effective Dates Name Provider Type Discipline     05/31/23 -  Chandan Dickerson, PT Physical Therapist PT                  PT Recommendation and Plan  Anticipated Discharge Disposition (PT): home, home with assist  Planned Therapy Interventions (PT): balance training, bed mobility training, gait training, home exercise program, neuromuscular re-education, patient/family education, strengthening, transfer training  Therapy Frequency (PT): 2 times/wk (2-5x/week)  Plan of Care Reviewed With: patient  Progress: no change  Outcome Evaluation: Pt presents w/ fair bedside mobility and gait limited by activity tolerance. Pt would benefit from skilled PT. Anticipate d/c home w/ family support.       Time Calculation:    PT Charges       Row Name 10/11/23 1033             Time Calculation    Start Time 0815  -      PT Received On 10/11/23  -      PT Goal Re-Cert Due Date 10/25/23  -                User Key  (r) = Recorded By, (t) = Taken By, (c) = Cosigned By      Initials Name Provider Type     Chandan Dickerson, PT Physical Therapist                  Therapy Charges for Today       Code Description Service Date Service Provider Modifiers Qty    45087066426 HC PT EVAL MOD COMPLEXITY 4 10/11/2023 Chandan Dickerson, PT GP 1            PT G-Codes  AM-PAC 6 Clicks Score (PT): 18    Chandan Dickerson PT  10/11/2023

## 2023-10-11 NOTE — PLAN OF CARE
Goal Outcome Evaluation:  Plan of Care Reviewed With: patient        Progress: no change  Outcome Evaluation: Pt presents w/ fair bedside mobility and gait limited by activity tolerance. Pt would benefit from skilled PT. Anticipate d/c home w/ family support.      Anticipated Discharge Disposition (PT): home, home with assist

## 2023-10-12 PROBLEM — E43 SEVERE MALNUTRITION: Status: ACTIVE | Noted: 2023-10-12

## 2023-10-12 LAB
ALBUMIN SERPL-MCNC: 2.6 G/DL (ref 3.5–5.2)
ALBUMIN/GLOB SERPL: 0.7 G/DL
ALP SERPL-CCNC: 105 U/L (ref 39–117)
ALT SERPL W P-5'-P-CCNC: 8 U/L (ref 1–41)
ANION GAP SERPL CALCULATED.3IONS-SCNC: 8.1 MMOL/L (ref 5–15)
ANION GAP SERPL CALCULATED.3IONS-SCNC: 8.4 MMOL/L (ref 5–15)
AST SERPL-CCNC: 14 U/L (ref 1–40)
BASOPHILS # BLD AUTO: 0.06 10*3/MM3 (ref 0–0.2)
BASOPHILS NFR BLD AUTO: 0.7 % (ref 0–1.5)
BILIRUB SERPL-MCNC: 0.3 MG/DL (ref 0–1.2)
BUN SERPL-MCNC: 39 MG/DL (ref 8–23)
BUN SERPL-MCNC: 49 MG/DL (ref 8–23)
BUN/CREAT SERPL: 63.9 (ref 7–25)
BUN/CREAT SERPL: 67.1 (ref 7–25)
CALCIUM SPEC-SCNC: 8.6 MG/DL (ref 8.6–10.5)
CALCIUM SPEC-SCNC: 8.7 MG/DL (ref 8.6–10.5)
CHLORIDE SERPL-SCNC: 98 MMOL/L (ref 98–107)
CHLORIDE SERPL-SCNC: 98 MMOL/L (ref 98–107)
CHLORIDE UR-SCNC: <20 MMOL/L
CO2 SERPL-SCNC: 21.6 MMOL/L (ref 22–29)
CO2 SERPL-SCNC: 21.9 MMOL/L (ref 22–29)
CREAT SERPL-MCNC: 0.61 MG/DL (ref 0.76–1.27)
CREAT SERPL-MCNC: 0.73 MG/DL (ref 0.76–1.27)
CREAT UR-MCNC: 80.7 MG/DL
DEPRECATED RDW RBC AUTO: 55.1 FL (ref 37–54)
EGFRCR SERPLBLD CKD-EPI 2021: 102.9 ML/MIN/1.73
EGFRCR SERPLBLD CKD-EPI 2021: 108.6 ML/MIN/1.73
EOSINOPHIL # BLD AUTO: 0.15 10*3/MM3 (ref 0–0.4)
EOSINOPHIL NFR BLD AUTO: 1.8 % (ref 0.3–6.2)
ERYTHROCYTE [DISTWIDTH] IN BLOOD BY AUTOMATED COUNT: 19.1 % (ref 12.3–15.4)
GLOBULIN UR ELPH-MCNC: 3.7 GM/DL
GLUCOSE SERPL-MCNC: 106 MG/DL (ref 65–99)
GLUCOSE SERPL-MCNC: 86 MG/DL (ref 65–99)
HCT VFR BLD AUTO: 26.7 % (ref 37.5–51)
HGB BLD-MCNC: 7.8 G/DL (ref 13–17.7)
IMM GRANULOCYTES # BLD AUTO: 0.04 10*3/MM3 (ref 0–0.05)
IMM GRANULOCYTES NFR BLD AUTO: 0.5 % (ref 0–0.5)
LYMPHOCYTES # BLD AUTO: 2.46 10*3/MM3 (ref 0.7–3.1)
LYMPHOCYTES NFR BLD AUTO: 30.3 % (ref 19.6–45.3)
MCH RBC QN AUTO: 23.1 PG (ref 26.6–33)
MCHC RBC AUTO-ENTMCNC: 29.2 G/DL (ref 31.5–35.7)
MCV RBC AUTO: 79.2 FL (ref 79–97)
MONOCYTES # BLD AUTO: 1.06 10*3/MM3 (ref 0.1–0.9)
MONOCYTES NFR BLD AUTO: 13 % (ref 5–12)
NEUTROPHILS NFR BLD AUTO: 4.36 10*3/MM3 (ref 1.7–7)
NEUTROPHILS NFR BLD AUTO: 53.7 % (ref 42.7–76)
NRBC BLD AUTO-RTO: 0 /100 WBC (ref 0–0.2)
PLATELET # BLD AUTO: 606 10*3/MM3 (ref 140–450)
PMV BLD AUTO: 9.3 FL (ref 6–12)
POTASSIUM SERPL-SCNC: 3.7 MMOL/L (ref 3.5–5.2)
POTASSIUM SERPL-SCNC: 4 MMOL/L (ref 3.5–5.2)
POTASSIUM UR-SCNC: 41.7 MMOL/L
PROT ?TM UR-MCNC: 10 MG/DL
PROT SERPL-MCNC: 6.3 G/DL (ref 6–8.5)
PROT/CREAT UR: 123.9 MG/G CREA (ref 0–200)
RBC # BLD AUTO: 3.37 10*6/MM3 (ref 4.14–5.8)
SODIUM SERPL-SCNC: 128 MMOL/L (ref 136–145)
SODIUM SERPL-SCNC: 128 MMOL/L (ref 136–145)
SODIUM UR-SCNC: <20 MMOL/L
WBC NRBC COR # BLD: 8.13 10*3/MM3 (ref 3.4–10.8)

## 2023-10-12 PROCEDURE — 82570 ASSAY OF URINE CREATININE: CPT | Performed by: INTERNAL MEDICINE

## 2023-10-12 PROCEDURE — 84133 ASSAY OF URINE POTASSIUM: CPT | Performed by: INTERNAL MEDICINE

## 2023-10-12 PROCEDURE — 25010000002 MORPHINE PER 10 MG: Performed by: INTERNAL MEDICINE

## 2023-10-12 PROCEDURE — 25010000002 HEPARIN (PORCINE) PER 1000 UNITS: Performed by: INTERNAL MEDICINE

## 2023-10-12 PROCEDURE — 84156 ASSAY OF PROTEIN URINE: CPT | Performed by: INTERNAL MEDICINE

## 2023-10-12 PROCEDURE — 84300 ASSAY OF URINE SODIUM: CPT | Performed by: INTERNAL MEDICINE

## 2023-10-12 PROCEDURE — 25810000003 SODIUM CHLORIDE 0.9 % SOLUTION: Performed by: INTERNAL MEDICINE

## 2023-10-12 PROCEDURE — 85025 COMPLETE CBC W/AUTO DIFF WBC: CPT | Performed by: INTERNAL MEDICINE

## 2023-10-12 PROCEDURE — 82436 ASSAY OF URINE CHLORIDE: CPT | Performed by: INTERNAL MEDICINE

## 2023-10-12 PROCEDURE — 80053 COMPREHEN METABOLIC PANEL: CPT | Performed by: INTERNAL MEDICINE

## 2023-10-12 RX ORDER — SUCRALFATE ORAL 1 G/10ML
1 SUSPENSION ORAL 4 TIMES DAILY
Status: DISCONTINUED | OUTPATIENT
Start: 2023-10-12 | End: 2023-10-13 | Stop reason: HOSPADM

## 2023-10-12 RX ORDER — OXYCODONE HYDROCHLORIDE 10 MG/1
10 TABLET ORAL EVERY 6 HOURS PRN
Status: DISCONTINUED | OUTPATIENT
Start: 2023-10-12 | End: 2023-10-13 | Stop reason: HOSPADM

## 2023-10-12 RX ADMIN — Medication 1 MG: at 08:25

## 2023-10-12 RX ADMIN — HEPARIN SODIUM 5000 UNITS: 5000 INJECTION INTRAVENOUS; SUBCUTANEOUS at 21:59

## 2023-10-12 RX ADMIN — Medication 1 TABLET: at 08:26

## 2023-10-12 RX ADMIN — MORPHINE SULFATE 2 MG: 2 INJECTION, SOLUTION INTRAMUSCULAR; INTRAVENOUS at 12:18

## 2023-10-12 RX ADMIN — HYDROCODONE BITARTRATE AND ACETAMINOPHEN 1 TABLET: 10; 325 TABLET ORAL at 15:52

## 2023-10-12 RX ADMIN — SUCRALFATE ORAL 1 G: 1 SUSPENSION ORAL at 21:59

## 2023-10-12 RX ADMIN — Medication 10 ML: at 10:00

## 2023-10-12 RX ADMIN — Medication 10 ML: at 21:57

## 2023-10-12 RX ADMIN — ATORVASTATIN CALCIUM 20 MG: 20 TABLET, FILM COATED ORAL at 21:57

## 2023-10-12 RX ADMIN — FENOFIBRATE 145 MG: 145 TABLET ORAL at 08:26

## 2023-10-12 RX ADMIN — HEPARIN SODIUM 5000 UNITS: 5000 INJECTION INTRAVENOUS; SUBCUTANEOUS at 05:48

## 2023-10-12 RX ADMIN — SUCRALFATE ORAL 1 G: 1 SUSPENSION ORAL at 12:19

## 2023-10-12 RX ADMIN — SUCRALFATE ORAL 1 G: 1 SUSPENSION ORAL at 16:34

## 2023-10-12 RX ADMIN — Medication 1000 MCG: at 08:25

## 2023-10-12 RX ADMIN — Medication 100 MG: at 08:25

## 2023-10-12 RX ADMIN — SODIUM CHLORIDE 100 ML/HR: 9 INJECTION, SOLUTION INTRAVENOUS at 15:52

## 2023-10-12 RX ADMIN — HYDROCODONE BITARTRATE AND ACETAMINOPHEN 1 TABLET: 10; 325 TABLET ORAL at 08:28

## 2023-10-12 RX ADMIN — METOPROLOL TARTRATE 25 MG: 25 TABLET, FILM COATED ORAL at 08:25

## 2023-10-12 RX ADMIN — MORPHINE SULFATE 2 MG: 2 INJECTION, SOLUTION INTRAMUSCULAR; INTRAVENOUS at 01:46

## 2023-10-12 RX ADMIN — SUCRALFATE ORAL 1 G: 1 SUSPENSION ORAL at 08:31

## 2023-10-12 RX ADMIN — LACTULOSE 30 G: 20 SOLUTION ORAL at 08:22

## 2023-10-12 RX ADMIN — MORPHINE SULFATE 2 MG: 2 INJECTION, SOLUTION INTRAMUSCULAR; INTRAVENOUS at 05:47

## 2023-10-12 RX ADMIN — MONTELUKAST SODIUM 10 MG: 10 TABLET, COATED ORAL at 21:57

## 2023-10-12 RX ADMIN — HEPARIN SODIUM 5000 UNITS: 5000 INJECTION INTRAVENOUS; SUBCUTANEOUS at 15:06

## 2023-10-12 RX ADMIN — SERTRALINE 50 MG: 50 TABLET, FILM COATED ORAL at 08:25

## 2023-10-12 RX ADMIN — SODIUM CHLORIDE 100 ML/HR: 9 INJECTION, SOLUTION INTRAVENOUS at 05:52

## 2023-10-12 RX ADMIN — LANSOPRAZOLE 30 MG: 30 TABLET, ORALLY DISINTEGRATING ORAL at 05:52

## 2023-10-12 NOTE — PROGRESS NOTES
Nutrition Services    Patient Name:  Ta Madison  YOB: 1960  MRN: [id number]  Admit Date:  10/10/2023    Malnutrition Severity Assessment      Patient meets criteria for : Severe Malnutrition  Malnutrition Type (last 8 hours)       Malnutrition Severity Assessment       Row Name 10/12/23 1058       Malnutrition Severity Assessment    Malnutrition Type Chronic Disease - Related Malnutrition      Row Name 10/12/23 1058       Insufficient Energy Intake     Insufficient Energy Intake Findings Severe    Insufficient Energy Intake  <50% of est. energy requirement for >or equal to 1 month      Row Name 10/12/23 1058       Unintentional Weight Loss     Unintentional Weight Loss Findings Severe    Unintentional Weight Loss  Weight loss greater than 10% in six months  22% wt loss in 6 months      Row Name 10/12/23 1058       Muscle Loss    Loss of Muscle Mass Findings Severe    Mu-ism Region Moderate - slight depression    Clavicle Bone Region Severe - protruding prominent bone    Acromion Bone Region Severe - squared shoulders, bones, and acromion process protrusion prominent    Scapular Bone Region Severe - prominent bones, depressions easily visible between ribs, scapula, spine, shoulders    Patellar Region Moderate - patella more prominent, less muscle definition around patella    Posterior Calf Region Moderate - some roundness, slight firmness      Row Name 10/12/23 1058       Fat Loss    Subcutaneous Fat Loss Findings Moderate    Orbital Region  Moderate -  somewhat hollowness, slightly dark circles    Upper Arm Region Severe - mostly skin, very little space between folds, fingers touch    Thoracic & Lumbar Region Moderate - ribs visible with mild depressions, iliac crest somewhat prominent      Row Name 10/12/23 1058       Criteria Met (Must meet criteria for severity in at least 2 of these categories: M Wasting, Fat Loss, Fluid, Secondary Signs, Wt. Status, Intake)    Patient meets criteria for   Severe Malnutrition                         Electronically signed by:  Cady Martinez RD  10/12/23 11:00 EDT

## 2023-10-12 NOTE — PROGRESS NOTES
[facility] HOSPITALIST PROGRESS NOTE    Subjective     History:   Ta Madison is a 63 y.o. male admitted on 10/10/2023 secondary to Hyperkalemia     Procedures: None    CC: Follow up hyperkalemia, TYREE    Patient seen and examined with JUDITH Simental. Awake and alert with his wife present at bedside. Reports abdominal pain but feels overall improved. No further episodes of vomiting or diarrhea reported. No reported CP or dyspnea. No acute events overnight per RN.     History taken from: patient, chart, and RN.      Objective     Vital Signs  Temp:  [97.4 øF (36.3 øC)-98.1 øF (36.7 øC)] 98.1 øF (36.7 øC)  Heart Rate:  [65-79] 68  Resp:  [14-16] 16  BP: ()/(51-60) 110/60    Intake/Output Summary (Last 24 hours) at 10/12/2023 1751  Last data filed at 10/12/2023 1740  Gross per 24 hour   Intake 2519.1 ml   Output 550 ml   Net 1969.1 ml         Physical Exam: Unchanged from previous.   General:    Awake, alert, in no acute distress   Heart:      Normal S1 and S2. Regular rate and rhythm. No significant murmur, rubs or gallops appreciated.   Lungs:     Respirations regular, even and unlabored. Lungs clear to auscultation B/L. No wheezes, rales or rhonchi.   Abdomen:   Soft and nontender. No guarding, rebound tenderness or  organomegaly noted. Bowel sounds present x 4. (+) J tube   Extremities:  No clubbing, cyanosis or edema noted. Moves UE and LE equally B/L.     Results Review:    Results from last 7 days   Lab Units 10/12/23  0112 10/11/23  0641 10/11/23  0535 10/10/23  1131   WBC 10*3/mm3 8.13  --  12.99* 25.10*   HEMOGLOBIN g/dL 7.8* 7.7* 7.8* 12.1*   PLATELETS 10*3/mm3 606*  --  660* 950*     Results from last 7 days   Lab Units 10/12/23  1243 10/12/23  0112 10/11/23  1357 10/11/23  0641 10/11/23  0535 10/11/23  0039 10/10/23  1645   SODIUM mmol/L 128* 128* 128* 131* 131* 133* 131*   POTASSIUM mmol/L 4.0 3.7 4.1 4.2 4.1 4.6 5.1   CHLORIDE mmol/L 98 98 96* 98 98 99 100   CO2 mmol/L 21.6* 21.9*  22.3 23.6 23.7 22.8 18.5*   BUN mg/dL 39* 49* 63* 75* 78* 86* 97*   CREATININE mg/dL 0.61* 0.73* 1.01 1.31* 1.36* 1.54* 2.10*   CALCIUM mg/dL 8.6 8.7 9.1 9.6 9.3 9.4 12.6*   GLUCOSE mg/dL 106* 86 84 79 77 89 69     Results from last 7 days   Lab Units 10/12/23  0112 10/11/23  0535 10/10/23  1131   BILIRUBIN mg/dL 0.3 0.5 0.4   ALK PHOS U/L 105 116 176*   AST (SGOT) U/L 14 13 17   ALT (SGPT) U/L 8 10 14     Results from last 7 days   Lab Units 10/11/23  0535   MAGNESIUM mg/dL 2.5*     Results from last 7 days   Lab Units 10/10/23  1131   INR  0.94     Results from last 7 days   Lab Units 10/11/23  0641 10/10/23  1357 10/10/23  1131   CK TOTAL U/L 13*  --  17*   HSTROP T ng/L  --  19* 24*       Imaging Results (Last 24 Hours)       ** No results found for the last 24 hours. **              Medications:  atorvastatin, 20 mg, Per J Tube, Nightly  fenofibrate, 145 mg, Oral, Daily  folic acid, 1 mg, Per J Tube, Daily  heparin (porcine), 5,000 Units, Subcutaneous, Q8H  lactulose, 30 g, Per J Tube, Daily  lansoprazole, 30 mg, Per J Tube, Q AM  metoprolol tartrate, 25 mg, Per J Tube, BID  montelukast, 10 mg, Per J Tube, Nightly  multivitamin with minerals, 1 tablet, Oral, Daily  sertraline, 50 mg, Per J Tube, Daily  sodium chloride, 10 mL, Intravenous, Q12H  sodium chloride, 10 mL, Intravenous, Q12H  sucralfate, 1 g, Per J Tube, 4x Daily  thiamine, 100 mg, Per J Tube, Daily  vitamin B-12, 1,000 mcg, Per J Tube, Daily      sodium chloride, 100 mL/hr, Last Rate: 100 mL/hr (10/12/23 1552)            Assessment & Plan   Severe hyperkalemia: Resolved with targeted treatment and IVF's with bicarb. Monitor on telemetry. Repeat labs in the AM. Nephrology input appreciated.     TYREE: Likely prerenal with dehydration and GI losses. No obstruction on imaging. Creatine improved again today. UOP stable. Cont IVF's per nephrology. Monitor UOP and repeat labs in the AM.     SIRS: Possibly 2/2 dehydration. No obvious source of infection at  present. No evidence of pneumonia on imaging. UA not suggestive of UTI. CT abd/pelvis appears improved from previous. Stool studies including GI PCR and C diff negative.  Afebrile. WBC improved and stable. Lactate has normalized. Monitor off antibiotics. Follow cultures and repeat labs in the AM.     Hyponatremia: Nephrology has stopped free water flushes. Cont IVF's per nephrology. Repeat labs in the AM.     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 Lipase has normalized on repeat.. Cont CLD and TF's. Will need outpatient follow up at .     Chronic microcytic anemia: Likely hemoconcentrated on presentation. Decreased from on admission with all cell lines decreased from previous. No obvious signs of bleeding. Cont vitamin supplementation. Repeat CBC in the AM.     Essential HTN: BP overall stable. Cont metoprolol and monitor.     DVT PPX: SQ  heparin    Disposition Likely home when medically stable, possibly in AM if Na improved.     Chapito Soler DO  10/12/23  17:51 EDT

## 2023-10-12 NOTE — PLAN OF CARE
Goal Outcome Evaluation:  Plan of Care Reviewed With: patient        Progress: no change  Outcome Evaluation: Pt resting in bed. Complains of gut pain, prn med given.

## 2023-10-12 NOTE — PROGRESS NOTES
Nephrology Progress Note      Subjective     10/12/2023  Patient is getting tube feeding along with free water denies any nausea vomiting diarrhea no shortness of breath    Objective       Vital signs :     Vitals:    10/11/23 1920 10/11/23 2321 10/12/23 0337 10/12/23 0600   BP: 110/51 105/53 125/60 93/57   BP Location: Right arm Right arm Right arm Right arm   Patient Position: Lying Lying Lying Lying   Pulse: 74 79 72 65   Resp: 14 14 16 16   Temp: 97.4 øF (36.3 øC) 97.9 øF (36.6 øC) 98.1 øF (36.7 øC) 98.1 øF (36.7 øC)   TempSrc: Oral Oral Oral Oral   SpO2:       Weight:       Height:            Intake/Output                         10/10/23 0701 - 10/11/23 0700 10/11/23 0701 - 10/12/23 0700     1626-3810 9099-3145 Total 5972-5490 5086-6212 Total                 Intake    P.O.  --  120 120  240  120 360    IV Piggyback  1936  -- 1936  --  -- --    Total Intake 3043 495 7999 240 120 360       Output    Urine  300  250 550  400  550 950    Emesis/NG output  500  -- 500  2  -- 2    Total Output  402 550 952           10/12/2023 examined and reviewed  Physical Exam:    General Appearance : alert, pleasant, appears stated age, cooperative an  Head  :  normocephalic, without obvious abnormality and atraumatic  Eyes  :  conjunctivae and sclerae normal, no icterus, no pallor and PERRLA  Neck  :  no adenopathy, suppple, no carotid bruit, no JVD   Lungs : clear to auscultation, respirations regular  Heart :  regular rhythm & normal rate, normal S1, S2 and no murmur, no rub  Abdomen : PEG tube normal bowel sounds, no masses, no hepatomegaly, no splenomegaly, soft non-tender and no guarding  Extremities : moves extremities well, no edema, no cyanosis and no redness  Skin :  no bleeding, bruising or rash  Neurologic :   orientated to person, place, time and situation, Grossly no focal deficits  Acess :     Laboratory Data :       CBC and coagulation:  Results from last 7 days   Lab Units 10/12/23  0112  "10/11/23  0641 10/11/23  0535 10/11/23  0156 10/10/23  1958 10/10/23  1645 10/10/23  1357 10/10/23  1131   PROCALCITONIN ng/mL  --   --   --   --   --   --   --  1.40*   LACTATE mmol/L  --   --   --  1.2 2.3* 3.3*   < > 2.3*   WBC 10*3/mm3 8.13  --  12.99*  --   --   --   --  25.10*   HEMOGLOBIN g/dL 7.8* 7.7* 7.8*  --   --   --   --  12.1*   HEMATOCRIT % 26.7* 25.6* 25.9*  --   --   --   --  41.2   MCV fL 79.2  --  78.0*  --   --   --   --  80.5   MCHC g/dL 29.2*  --  30.1*  --   --   --   --  29.4*   PLATELETS 10*3/mm3 606*  --  660*  --   --   --   --  950*   INR   --   --   --   --   --   --   --  0.94    < > = values in this interval not displayed.     Acid/base balance:      Renal and electrolytes:    Results from last 7 days   Lab Units 10/12/23  0112 10/11/23  1357 10/11/23  0641 10/11/23  0535 10/11/23  0039   SODIUM mmol/L 128* 128* 131* 131* 133*   POTASSIUM mmol/L 3.7 4.1 4.2 4.1 4.6   MAGNESIUM mg/dL  --   --   --  2.5*  --    CHLORIDE mmol/L 98 96* 98 98 99   CO2 mmol/L 21.9* 22.3 23.6 23.7 22.8   BUN mg/dL 49* 63* 75* 78* 86*   CREATININE mg/dL 0.73* 1.01 1.31* 1.36* 1.54*   CALCIUM mg/dL 8.7 9.1 9.6 9.3 9.4   PHOSPHORUS mg/dL  --   --   --  4.3  --      Estimated Creatinine Clearance: 99.3 mL/min (A) (by C-G formula based on SCr of 0.73 mg/dL (L)).     Liver and pancreatic function:  Results from last 7 days   Lab Units 10/12/23  0112 10/11/23  0535 10/10/23  1131   ALBUMIN g/dL 2.6* 2.9* 4.1   BILIRUBIN mg/dL 0.3 0.5 0.4   ALK PHOS U/L 105 116 176*   AST (SGOT) U/L 14 13 17   ALT (SGPT) U/L 8 10 14   LIPASE U/L  --  34 75*       Albumin Albumin   Date Value Ref Range Status   10/12/2023 2.6 (L) 3.5 - 5.2 g/dL Final   10/11/2023 2.9 (L) 3.5 - 5.2 g/dL Final   10/10/2023 4.1 3.5 - 5.2 g/dL Final      Magnesium Magnesium   Date Value Ref Range Status   10/11/2023 2.5 (H) 1.6 - 2.4 mg/dL Final          PTH               No results found for: \"PTH\"    Cardiac:      Liver and pancreatic function:  Results " from last 7 days   Lab Units 10/12/23  0112 10/11/23  0535 10/10/23  1131   ALBUMIN g/dL 2.6* 2.9* 4.1   BILIRUBIN mg/dL 0.3 0.5 0.4   ALK PHOS U/L 105 116 176*   AST (SGOT) U/L 14 13 17   ALT (SGPT) U/L 8 10 14   LIPASE U/L  --  34 75*       CT Abdomen Pelvis Without Contrast    Result Date: 10/10/2023  1.  Significant improvement in changes of necrotizing pancreatitis status post cystogastrostomy tube placement, necrosectomy, and [employer]xi[employer] metallic stent placement spanning from the G-tube into the stomach to the level of the proximal jejunum. 2.  No significant ascites. 3.  No acute appearing pancreatitis changes. No findings to suggest acute abscess. 4.  Cholelithiasis noted. 5.  Other incidental/nonacute findings above   This report was finalized on 10/10/2023 2:19 PM by Dr. Janusz Lerner MD.      CT Chest Without Contrast Diagnostic    Result Date: 10/10/2023  1.  No acute thoracic findings identified. 2.  Refer to CT abdomen/pelvis for abdomen findings.   This report was finalized on 10/10/2023 2:12 PM by Dr. Janusz Lerner MD.      XR Chest 1 View    Result Date: 10/10/2023    Unremarkable exam. No acute cardiopulmonary findings identified.   This report was finalized on 10/10/2023 1:48 PM by Dr. Janusz Lerner MD.        Medications :     atorvastatin, 20 mg, Per J Tube, Nightly  fenofibrate, 145 mg, Oral, Daily  folic acid, 1 mg, Per J Tube, Daily  heparin (porcine), 5,000 Units, Subcutaneous, Q8H  lactulose, 30 g, Per J Tube, Daily  lansoprazole, 30 mg, Per J Tube, Q AM  metoprolol tartrate, 25 mg, Per J Tube, BID  montelukast, 10 mg, Per J Tube, Nightly  multivitamin with minerals, 1 tablet, Oral, Daily  sertraline, 50 mg, Per J Tube, Daily  sodium chloride, 10 mL, Intravenous, Q12H  sodium chloride, 10 mL, Intravenous, Q12H  sucralfate, 1 g, Oral, 4x Daily  thiamine, 100 mg, Per J Tube, Daily  vitamin B-12, 1,000 mcg, Per J Tube, Daily      sodium chloride, 100 mL/hr, Last Rate: 100 mL/hr (10/12/23  0552)          Assessment & Plan   \  -Acute hyponatremia  -Acute hyperkalemia  -Acute kidney injury  -Dehydration  -Azotemia  -History of necrotizing pancreatitis status post PEG tube placement  -Hypertension  -History of prostate cancer     10/11/2023  Patient baseline creatinine 0.88 went up to 2.6 now 1.31 after hydration so most likely patient had acute kidney injury secondary to prerenal dehydration secondary to diarrhea nausea vomiting, CT scan did not show any obstruction we will check urine lites and urine protein creatinine ratio and urine analysis  Azotemia is also better  Hyperkalemia was secondary to decreased GFR, and possible diet , now stable we will check again    10/12/2023  Patient's sodium is 128 most likely secondary to free water flushes patient is getting we will DC all free water and repeat sodium again continue normal saline for now  Acute kidney injury is resolved CT scan did not show any obstruction  Hyperkalemia is resolved  Azotemia is also much better     Prognosis guarded     Please avoid nephrotoxic medication and pharmacy to adjust the medication according to the GFR        Plan of care discussed with pt, family answered all questions, patient verbalized understanding and agreed.    YUKI Ace MD  10/12/23  07:10 EDT

## 2023-10-12 NOTE — PLAN OF CARE
Goal Outcome Evaluation:  Plan of Care Reviewed With: patient        Progress: no change  Outcome Evaluation: Patient's VSS. Pt ambulated to bathroom. Pt c/o of pain during shift & PRN pain medication given. Pt has family at bedside. Pt voiced no further concerns at this time.

## 2023-10-13 ENCOUNTER — READMISSION MANAGEMENT (OUTPATIENT)
Dept: CALL CENTER | Facility: HOSPITAL | Age: 63
End: 2023-10-13
Payer: COMMERCIAL

## 2023-10-13 VITALS
BODY MASS INDEX: 23.15 KG/M2 | OXYGEN SATURATION: 98 % | HEIGHT: 67 IN | RESPIRATION RATE: 18 BRPM | TEMPERATURE: 98.2 F | WEIGHT: 147.5 LBS | SYSTOLIC BLOOD PRESSURE: 104 MMHG | HEART RATE: 96 BPM | DIASTOLIC BLOOD PRESSURE: 55 MMHG

## 2023-10-13 LAB
ALBUMIN SERPL-MCNC: 2.4 G/DL (ref 3.5–5.2)
ALBUMIN/GLOB SERPL: 0.6 G/DL
ALP SERPL-CCNC: 107 U/L (ref 39–117)
ALT SERPL W P-5'-P-CCNC: 9 U/L (ref 1–41)
ANION GAP SERPL CALCULATED.3IONS-SCNC: 6.5 MMOL/L (ref 5–15)
ANION GAP SERPL CALCULATED.3IONS-SCNC: 7.2 MMOL/L (ref 5–15)
ANION GAP SERPL CALCULATED.3IONS-SCNC: 8 MMOL/L (ref 5–15)
ANISOCYTOSIS BLD QL: NORMAL
AST SERPL-CCNC: 20 U/L (ref 1–40)
BASOPHILS # BLD AUTO: 0.04 10*3/MM3 (ref 0–0.2)
BASOPHILS NFR BLD AUTO: 0.5 % (ref 0–1.5)
BILIRUB SERPL-MCNC: 0.3 MG/DL (ref 0–1.2)
BUN SERPL-MCNC: 17 MG/DL (ref 8–23)
BUN SERPL-MCNC: 19 MG/DL (ref 8–23)
BUN SERPL-MCNC: 23 MG/DL (ref 8–23)
BUN/CREAT SERPL: 40.4 (ref 7–25)
BUN/CREAT SERPL: 43.4 (ref 7–25)
BUN/CREAT SERPL: 43.6 (ref 7–25)
CALCIUM SPEC-SCNC: 8.1 MG/DL (ref 8.6–10.5)
CALCIUM SPEC-SCNC: 8.3 MG/DL (ref 8.6–10.5)
CALCIUM SPEC-SCNC: 8.3 MG/DL (ref 8.6–10.5)
CHLORIDE SERPL-SCNC: 100 MMOL/L (ref 98–107)
CHLORIDE SERPL-SCNC: 105 MMOL/L (ref 98–107)
CHLORIDE SERPL-SCNC: 99 MMOL/L (ref 98–107)
CO2 SERPL-SCNC: 21 MMOL/L (ref 22–29)
CO2 SERPL-SCNC: 21.5 MMOL/L (ref 22–29)
CO2 SERPL-SCNC: 21.8 MMOL/L (ref 22–29)
CREAT SERPL-MCNC: 0.39 MG/DL (ref 0.76–1.27)
CREAT SERPL-MCNC: 0.47 MG/DL (ref 0.76–1.27)
CREAT SERPL-MCNC: 0.53 MG/DL (ref 0.76–1.27)
DACRYOCYTES BLD QL SMEAR: NORMAL
DEPRECATED RDW RBC AUTO: 62.1 FL (ref 37–54)
EGFRCR SERPLBLD CKD-EPI 2021: 113.3 ML/MIN/1.73
EGFRCR SERPLBLD CKD-EPI 2021: 117.5 ML/MIN/1.73
EGFRCR SERPLBLD CKD-EPI 2021: 124.3 ML/MIN/1.73
EOSINOPHIL # BLD AUTO: 0.11 10*3/MM3 (ref 0–0.4)
EOSINOPHIL NFR BLD AUTO: 1.3 % (ref 0.3–6.2)
ERYTHROCYTE [DISTWIDTH] IN BLOOD BY AUTOMATED COUNT: 19.5 % (ref 12.3–15.4)
GLOBULIN UR ELPH-MCNC: 3.8 GM/DL
GLUCOSE SERPL-MCNC: 102 MG/DL (ref 65–99)
GLUCOSE SERPL-MCNC: 106 MG/DL (ref 65–99)
GLUCOSE SERPL-MCNC: 110 MG/DL (ref 65–99)
HCT VFR BLD AUTO: 27.9 % (ref 37.5–51)
HGB BLD-MCNC: 7.6 G/DL (ref 13–17.7)
HYPOCHROMIA BLD QL: NORMAL
IMM GRANULOCYTES # BLD AUTO: 0.03 10*3/MM3 (ref 0–0.05)
IMM GRANULOCYTES NFR BLD AUTO: 0.3 % (ref 0–0.5)
LYMPHOCYTES # BLD AUTO: 1.75 10*3/MM3 (ref 0.7–3.1)
LYMPHOCYTES NFR BLD AUTO: 19.9 % (ref 19.6–45.3)
MCH RBC QN AUTO: 23.7 PG (ref 26.6–33)
MCHC RBC AUTO-ENTMCNC: 27.2 G/DL (ref 31.5–35.7)
MCV RBC AUTO: 86.9 FL (ref 79–97)
MONOCYTES # BLD AUTO: 0.81 10*3/MM3 (ref 0.1–0.9)
MONOCYTES NFR BLD AUTO: 9.2 % (ref 5–12)
NEUTROPHILS NFR BLD AUTO: 6.05 10*3/MM3 (ref 1.7–7)
NEUTROPHILS NFR BLD AUTO: 68.8 % (ref 42.7–76)
NRBC BLD AUTO-RTO: 0 /100 WBC (ref 0–0.2)
PLAT MORPH BLD: NORMAL
PLATELET # BLD AUTO: 583 10*3/MM3 (ref 140–450)
PMV BLD AUTO: 9.2 FL (ref 6–12)
POIKILOCYTOSIS BLD QL SMEAR: NORMAL
POTASSIUM SERPL-SCNC: 3.7 MMOL/L (ref 3.5–5.2)
POTASSIUM SERPL-SCNC: 3.8 MMOL/L (ref 3.5–5.2)
POTASSIUM SERPL-SCNC: 3.9 MMOL/L (ref 3.5–5.2)
PROT SERPL-MCNC: 6.2 G/DL (ref 6–8.5)
RBC # BLD AUTO: 3.21 10*6/MM3 (ref 4.14–5.8)
SODIUM SERPL-SCNC: 128 MMOL/L (ref 136–145)
SODIUM SERPL-SCNC: 128 MMOL/L (ref 136–145)
SODIUM SERPL-SCNC: 134 MMOL/L (ref 136–145)
WBC NRBC COR # BLD: 8.79 10*3/MM3 (ref 3.4–10.8)

## 2023-10-13 PROCEDURE — 85007 BL SMEAR W/DIFF WBC COUNT: CPT | Performed by: INTERNAL MEDICINE

## 2023-10-13 PROCEDURE — 25010000002 HEPARIN (PORCINE) PER 1000 UNITS: Performed by: INTERNAL MEDICINE

## 2023-10-13 PROCEDURE — 97116 GAIT TRAINING THERAPY: CPT

## 2023-10-13 PROCEDURE — 25810000003 SODIUM CHLORIDE 0.9 % SOLUTION: Performed by: INTERNAL MEDICINE

## 2023-10-13 PROCEDURE — 80053 COMPREHEN METABOLIC PANEL: CPT | Performed by: INTERNAL MEDICINE

## 2023-10-13 PROCEDURE — 85025 COMPLETE CBC W/AUTO DIFF WBC: CPT | Performed by: INTERNAL MEDICINE

## 2023-10-13 PROCEDURE — 25010000002 MORPHINE PER 10 MG: Performed by: INTERNAL MEDICINE

## 2023-10-13 RX ORDER — SODIUM CHLORIDE 1 G/1
2 TABLET ORAL
Status: DISCONTINUED | OUTPATIENT
Start: 2023-10-13 | End: 2023-10-13 | Stop reason: HOSPADM

## 2023-10-13 RX ORDER — LACTULOSE 10 G/15ML
30 SOLUTION ORAL DAILY PRN
Qty: 473 ML | Refills: 0 | Status: SHIPPED | OUTPATIENT
Start: 2023-10-13 | End: 2023-10-14 | Stop reason: SDUPTHER

## 2023-10-13 RX ORDER — SODIUM CHLORIDE 1 G/1
2 TABLET ORAL ONCE
Status: COMPLETED | OUTPATIENT
Start: 2023-10-13 | End: 2023-10-13

## 2023-10-13 RX ORDER — LACTULOSE 10 G/15ML
30 SOLUTION ORAL DAILY PRN
Status: DISCONTINUED | OUTPATIENT
Start: 2023-10-13 | End: 2023-10-13 | Stop reason: HOSPADM

## 2023-10-13 RX ORDER — NALOXONE HYDROCHLORIDE 4 MG/.1ML
SPRAY NASAL
Qty: 2 EACH | Refills: 0 | Status: SHIPPED | OUTPATIENT
Start: 2023-10-13 | End: 2023-10-14 | Stop reason: SDUPTHER

## 2023-10-13 RX ORDER — OXYCODONE HYDROCHLORIDE 10 MG/1
10 TABLET ORAL EVERY 6 HOURS PRN
Qty: 12 TABLET | Refills: 0 | Status: SHIPPED | OUTPATIENT
Start: 2023-10-13 | End: 2023-10-14 | Stop reason: SDUPTHER

## 2023-10-13 RX ORDER — SODIUM CHLORIDE 1 G/1
2 TABLET ORAL
Qty: 180 TABLET | Refills: 0 | Status: SHIPPED | OUTPATIENT
Start: 2023-10-14 | End: 2023-10-14 | Stop reason: SDUPTHER

## 2023-10-13 RX ORDER — LANSOPRAZOLE 30 MG/1
30 TABLET, ORALLY DISINTEGRATING, DELAYED RELEASE ORAL
Qty: 30 TABLET | Refills: 0 | Status: SHIPPED | OUTPATIENT
Start: 2023-10-14 | End: 2023-10-14 | Stop reason: SDUPTHER

## 2023-10-13 RX ADMIN — MORPHINE SULFATE 2 MG: 2 INJECTION, SOLUTION INTRAMUSCULAR; INTRAVENOUS at 05:26

## 2023-10-13 RX ADMIN — FENOFIBRATE 145 MG: 145 TABLET ORAL at 08:14

## 2023-10-13 RX ADMIN — SODIUM CHLORIDE TAB 1 GM 2 G: 1 TAB at 15:18

## 2023-10-13 RX ADMIN — MORPHINE SULFATE 2 MG: 2 INJECTION, SOLUTION INTRAMUSCULAR; INTRAVENOUS at 17:39

## 2023-10-13 RX ADMIN — Medication 1000 MCG: at 08:14

## 2023-10-13 RX ADMIN — SODIUM CHLORIDE 100 ML/HR: 9 INJECTION, SOLUTION INTRAVENOUS at 02:29

## 2023-10-13 RX ADMIN — Medication 100 MG: at 08:14

## 2023-10-13 RX ADMIN — SERTRALINE 50 MG: 50 TABLET, FILM COATED ORAL at 08:14

## 2023-10-13 RX ADMIN — OXYCODONE HYDROCHLORIDE 10 MG: 10 TABLET ORAL at 20:25

## 2023-10-13 RX ADMIN — Medication 10 ML: at 08:20

## 2023-10-13 RX ADMIN — SUCRALFATE ORAL 1 G: 1 SUSPENSION ORAL at 11:45

## 2023-10-13 RX ADMIN — MORPHINE SULFATE 2 MG: 2 INJECTION, SOLUTION INTRAMUSCULAR; INTRAVENOUS at 10:38

## 2023-10-13 RX ADMIN — Medication 1 MG: at 08:14

## 2023-10-13 RX ADMIN — LANSOPRAZOLE 30 MG: 30 TABLET, ORALLY DISINTEGRATING ORAL at 05:29

## 2023-10-13 RX ADMIN — SUCRALFATE ORAL 1 G: 1 SUSPENSION ORAL at 08:15

## 2023-10-13 RX ADMIN — SUCRALFATE ORAL 1 G: 1 SUSPENSION ORAL at 17:34

## 2023-10-13 RX ADMIN — METOPROLOL TARTRATE 25 MG: 25 TABLET, FILM COATED ORAL at 08:14

## 2023-10-13 RX ADMIN — SODIUM CHLORIDE TAB 1 GM 2 G: 1 TAB at 09:57

## 2023-10-13 RX ADMIN — HEPARIN SODIUM 5000 UNITS: 5000 INJECTION INTRAVENOUS; SUBCUTANEOUS at 15:18

## 2023-10-13 RX ADMIN — HEPARIN SODIUM 5000 UNITS: 5000 INJECTION INTRAVENOUS; SUBCUTANEOUS at 05:29

## 2023-10-13 RX ADMIN — SODIUM CHLORIDE TAB 1 GM 2 G: 1 TAB at 17:34

## 2023-10-13 RX ADMIN — ANTACID TABLETS 2 TABLET: 500 TABLET, CHEWABLE ORAL at 18:39

## 2023-10-13 RX ADMIN — SODIUM CHLORIDE TAB 1 GM 2 G: 1 TAB at 11:45

## 2023-10-13 RX ADMIN — Medication 1 TABLET: at 08:14

## 2023-10-13 RX ADMIN — SODIUM CHLORIDE 100 ML/HR: 9 INJECTION, SOLUTION INTRAVENOUS at 12:25

## 2023-10-13 RX ADMIN — OXYCODONE HYDROCHLORIDE 10 MG: 10 TABLET ORAL at 14:29

## 2023-10-13 RX ADMIN — MORPHINE SULFATE 2 MG: 2 INJECTION, SOLUTION INTRAMUSCULAR; INTRAVENOUS at 00:03

## 2023-10-13 NOTE — THERAPY TREATMENT NOTE
Acute Care - Physical Therapy Treatment Note  [facility]     Patient Name: Ta Madison  : 1960  MRN: [id number]  Today's Date: 10/13/2023   Onset of Illness/Injury or Date of Surgery: 10/10/23  Visit Dx:     ICD-10-CM ICD-9-CM   1. TYREE (acute kidney injury)  N17.9 584.9   2. Hyperkalemia  E87.5 276.7     Patient Active Problem List   Diagnosis    Tachycardia    Hyperlipidemia    Multiple allergies    Tobacco abuse    Gastroesophageal reflux disease    Chronic right shoulder pain    Ganglion cyst of dorsum of right wrist    Benign skin cyst    Numbness and tingling of both upper extremities    Right shoulder pain    Adenocarcinoma of prostate    Encounter for screening for malignant neoplasm of colon    Epigastric pain    Acute gallstone pancreatitis    Personal history of transient ischemic attack (TIA)    Elevated serum creatinine    Sepsis associated hypotension    Hyperkalemia    Severe malnutrition     Past Medical History:   Diagnosis Date    Elevated cholesterol     GERD (gastroesophageal reflux disease)     Increased heart rate     Mini stroke     Neck pain     Prostate cancer     Rash     Tobacco abuse      Past Surgical History:   Procedure Laterality Date    APPENDECTOMY      [facility]     COLONOSCOPY      COLONOSCOPY N/A 2023    Procedure: COLONOSCOPY;  Surgeon: Mahnaz Caraballo MD;  Location: [facility];  Service: Gastroenterology;  Laterality: N/A;    ENDOSCOPY N/A 2023    Procedure: ESOPHAGOGASTRODUODENOSCOPY WITH KEOFEED PLACEMENT;  Surgeon: Yolande Eller MD;  Location: [facility] ENDOSCOPY;  Service: Gastroenterology;  Laterality: N/A;     PT Assessment (last 12 hours)       PT Evaluation and Treatment       Row Name 10/13/23 1345          Physical Therapy Time and Intention    Document Type therapy note (daily note)  -CS     Mode of Treatment physical therapy  -CS     Patient Effort good  -CS     Comment Pt seen bedside this PM. Pt agreed to PT.  -CS       Row  Name 10/13/23 1345          General Information    Patient Profile Reviewed yes  -CS       Row Name 10/13/23 1345          Cognition    Orientation Status (Cognition) oriented x 4  -CS       Row Name 10/13/23 1343          Bed Mobility    Bed Mobility bed mobility (all) activities  -CS     All Activities, Garvin (Bed Mobility) standby assist  -CS     Assistive Device (Bed Mobility) head of bed elevated  -CS       Row Name 10/13/23 1345          Transfers    Transfers sit-stand transfer;stand-sit transfer  -CS       Row Name 10/13/23 1347          Sit-Stand Transfer    Sit-Stand Garvin (Transfers) standby assist  -CS     Assistive Device (Sit-Stand Transfers) walker, front-wheeled  -CS       Row Name 10/13/23 1343          Stand-Sit Transfer    Stand-Sit Garvin (Transfers) standby assist  -CS     Assistive Device (Stand-Sit Transfers) walker, front-wheeled  -CS       Row Name 10/13/23 1342          Gait/Stairs (Locomotion)    Gait/Stairs Locomotion gait/ambulation independence;gait/ambulation assistive device;distance ambulated  -CS     Garvin Level (Gait) standby assist  -CS     Assistive Device (Gait) walker, front-wheeled  -CS     Patient was able to Ambulate yes  -CS     Distance in Feet (Gait) 250  -CS     Pattern (Gait) step-through  -CS     Comment, (Gait/Stairs) Pt w/ safe use of RW, no LOB.  -CS       Row Name 10/13/23 1345          Plan of Care Review    Plan of Care Reviewed With patient  -CS     Progress improving  -CS     Outcome Evaluation Pt w/ improved tolerance for ambulation. Pt would benefit from continued skilled PT.  -CS       Row Name 10/13/23 1349          Positioning and Restraints    Pre-Treatment Position in bed  -CS     Post Treatment Position bed  -CS     In Bed supine;call light within reach;encouraged to call for assist  -CS       Row Name 10/13/23 1341          Therapy Assessment/Plan (PT)    Rehab Potential (PT) good, to achieve stated therapy goals  -CS      Criteria for Skilled Interventions Met (PT) yes;meets criteria;skilled treatment is necessary  -CS     Therapy Frequency (PT) 2 times/wk  2-5x/week  -CS     Problem List (PT) problems related to;balance;mobility  -CS     Activity Limitations Related to Problem List (PT) unable to ambulate safely;unable to transfer safely  -CS       Row Name 10/13/23 0140          Progress Summary (PT)    Progress Toward Functional Goals (PT) progress toward functional goals is good  -CS     Daily Progress Summary (PT) Pt w/ improved tolerance for ambulation. Pt would benefit from continued skilled PT.  -CS       Row Name 10/13/23 0810          Physical Therapy Goals    Transfer Goal Selection (PT) transfer, PT goal 1  -CS     Gait Training Goal Selection (PT) gait training, PT goal 1  -CS       Row Name 10/13/23 4072          Transfer Goal 1 (PT)    Activity/Assistive Device (Transfer Goal 1, PT) transfers, all  -CS     Bertie Level/Cues Needed (Transfer Goal 1, PT) standby assist  -CS     Time Frame (Transfer Goal 1, PT) long term goal (LTG);by discharge  -CS       Row Name 10/13/23 9666          Gait Training Goal 1 (PT)    Activity/Assistive Device (Gait Training Goal 1, PT) gait (walking locomotion);assistive device use  -CS     Bertie Level (Gait Training Goal 1, PT) standby assist;independent  -CS     Distance (Gait Training Goal 1, PT) 250  -CS     Time Frame (Gait Training Goal 1, PT) long term goal (LTG);by discharge  -CS               User Key  (r) = Recorded By, (t) = Taken By, (c) = Cosigned By      Initials Name Provider Type    Chandan Quezada, PT Physical Therapist                    Physical Therapy Education       Title: PT OT SLP Therapies (Done)       Topic: Physical Therapy (Done)       Point: Mobility training (Done)       Learning Progress Summary             Patient Acceptance, E,TB, VU by  at 10/11/2023 1032                         Point: Home exercise program (Done)       Learning Progress  Summary             Patient Acceptance, E,TB, VU by  at 10/11/2023 1032                         Point: Body mechanics (Done)       Learning Progress Summary             Patient Acceptance, E,TB, VU by  at 10/11/2023 1032                         Point: Precautions (Done)       Learning Progress Summary             Patient Acceptance, E,TB, VU by  at 10/11/2023 1032                                         User Key       Initials Effective Dates Name Provider Type Discipline     05/31/23 -  Chandan Dickerson, PT Physical Therapist PT                  PT Recommendation and Plan  Anticipated Discharge Disposition (PT): home, home with assist  Planned Therapy Interventions (PT): balance training, bed mobility training, gait training, home exercise program, neuromuscular re-education, patient/family education, strengthening, transfer training  Therapy Frequency (PT): 2 times/wk (2-5x/week)  Progress Summary (PT)  Progress Toward Functional Goals (PT): progress toward functional goals is good  Daily Progress Summary (PT): Pt w/ improved tolerance for ambulation. Pt would benefit from continued skilled PT.  Plan of Care Reviewed With: patient  Progress: improving  Outcome Evaluation: Pt w/ improved tolerance for ambulation. Pt would benefit from continued skilled PT.       Time Calculation:    PT Charges       Row Name 10/13/23 1352             Time Calculation    Start Time 1315  -CS      PT Received On 10/13/23  -      PT Goal Re-Cert Due Date 10/25/23  -         Timed Charges    50271 - Gait Training Minutes  25  -CS         Total Minutes    Timed Charges Total Minutes 25  -CS       Total Minutes 25  -CS                User Key  (r) = Recorded By, (t) = Taken By, (c) = Cosigned By      Initials Name Provider Type    CS Chandan Dickerson, PT Physical Therapist                  Therapy Charges for Today       Code Description Service Date Service Provider Modifiers Qty    41394699612 HC GAIT TRAINING EA 15 MIN  10/13/2023 Chandan Dickerson, PT GP 2            PT G-Codes  AM-PAC 6 Clicks Score (PT): 20    Chandan Dickerson, PT  10/13/2023

## 2023-10-13 NOTE — PROGRESS NOTES
[facility] HOSPITALIST PROGRESS NOTE    Subjective     History:   Ta Madison is a 63 y.o. male admitted on 10/10/2023 secondary to Hyperkalemia     Procedures: None    CC: Follow up hyperkalemia, TYREE    Patient seen and examined with JUDITH Simental. Awake and alert with his wife present at bedside. Abdominal pain appears improved. No further episodes of vomiting. No reported CP or dyspnea. Anxious to go home. No acute events overnight per RN.     History taken from: patient, chart, and RN.      Objective     Vital Signs  Temp:  [97.5 øF (36.4 øC)-98.8 øF (37.1 øC)] 98.1 øF (36.7 øC)  Heart Rate:  [68-85] 85  Resp:  [16-18] 18  BP: ()/(54-60) 124/56    Intake/Output Summary (Last 24 hours) at 10/13/2023 1817  Last data filed at 10/13/2023 1740  Gross per 24 hour   Intake 2987.9 ml   Output 598 ml   Net 2389.9 ml         Physical Exam: Unchanged from previous.   General:    Awake, alert, in no acute distress   Heart:      Normal S1 and S2. Regular rate and rhythm. No significant murmur, rubs or gallops appreciated.   Lungs:     Respirations regular, even and unlabored. Lungs clear to auscultation B/L. No wheezes, rales or rhonchi.   Abdomen:   Soft and nontender. No guarding, rebound tenderness or  organomegaly noted. Bowel sounds present x 4. (+) J tube   Extremities:  No clubbing, cyanosis or edema noted. Moves UE and LE equally B/L.     Results Review:    Results from last 7 days   Lab Units 10/13/23  0602 10/12/23  0112 10/11/23  0641 10/11/23  0535 10/10/23  1131   WBC 10*3/mm3 8.79 8.13  --  12.99* 25.10*   HEMOGLOBIN g/dL 7.6* 7.8* 7.7* 7.8* 12.1*   PLATELETS 10*3/mm3 583* 606*  --  660* 950*     Results from last 7 days   Lab Units 10/13/23  1335 10/13/23  0602 10/12/23  1243 10/12/23  0112 10/11/23  1357 10/11/23  0641 10/11/23  0535   SODIUM mmol/L 128* 128* 128* 128* 128* 131* 131*   POTASSIUM mmol/L 3.8 3.9 4.0 3.7 4.1 4.2 4.1   CHLORIDE mmol/L 99 100 98 98 96* 98 98   CO2 mmol/L  21.0* 21.5* 21.6* 21.9* 22.3 23.6 23.7   BUN mg/dL 19 23 39* 49* 63* 75* 78*   CREATININE mg/dL 0.47* 0.53* 0.61* 0.73* 1.01 1.31* 1.36*   CALCIUM mg/dL 8.3* 8.3* 8.6 8.7 9.1 9.6 9.3   GLUCOSE mg/dL 110* 102* 106* 86 84 79 77     Results from last 7 days   Lab Units 10/13/23  0602 10/12/23  0112 10/11/23  0535 10/10/23  1131   BILIRUBIN mg/dL 0.3 0.3 0.5 0.4   ALK PHOS U/L 107 105 116 176*   AST (SGOT) U/L 20 14 13 17   ALT (SGPT) U/L 9 8 10 14     Results from last 7 days   Lab Units 10/11/23  0535   MAGNESIUM mg/dL 2.5*     Results from last 7 days   Lab Units 10/10/23  1131   INR  0.94     Results from last 7 days   Lab Units 10/11/23  0641 10/10/23  1357 10/10/23  1131   CK TOTAL U/L 13*  --  17*   HSTROP T ng/L  --  19* 24*       Imaging Results (Last 24 Hours)       ** No results found for the last 24 hours. **              Medications:  atorvastatin, 20 mg, Per J Tube, Nightly  fenofibrate, 145 mg, Oral, Daily  folic acid, 1 mg, Per J Tube, Daily  heparin (porcine), 5,000 Units, Subcutaneous, Q8H  lansoprazole, 30 mg, Per J Tube, Q AM  metoprolol tartrate, 25 mg, Per J Tube, BID  montelukast, 10 mg, Per J Tube, Nightly  multivitamin with minerals, 1 tablet, Oral, Daily  sertraline, 50 mg, Per J Tube, Daily  sodium chloride, 10 mL, Intravenous, Q12H  sodium chloride, 10 mL, Intravenous, Q12H  sodium chloride, 2 g, Oral, TID With Meals  sucralfate, 1 g, Per J Tube, 4x Daily  thiamine, 100 mg, Per J Tube, Daily  vitamin B-12, 1,000 mcg, Per J Tube, Daily      sodium chloride, 100 mL/hr, Last Rate: 100 mL/hr (10/13/23 1225)            Assessment & Plan   Severe hyperkalemia: Resolved with targeted treatment and IVF's with bicarb. Monitor on telemetry. Repeat labs in the AM. Nephrology input appreciated.     TYREE: Likely prerenal with dehydration and GI losses. No obstruction on imaging. Creatine improved again today. UOP stable. Cont IVF's per nephrology. Monitor UOP and repeat labs in the AM.     SIRS: Possibly  2/2 dehydration. No obvious source of infection at present. No evidence of pneumonia on imaging. UA not suggestive of UTI. CT abd/pelvis appears improved from previous. Stool studies including GI PCR and C diff negative.  Afebrile. WBC improved and stable. Lactate has normalized. Monitor off antibiotics. Follow cultures and repeat labs in the AM.     Hyponatremia: Nephrology previously stopped free water flushes. Salt tablets added today. Cont IVF's per nephrology. Repeat labs this evening and in the AM.     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 Lipase has normalized on repeat.. Cont CLD and TF's. Will need outpatient follow up at .     Chronic microcytic anemia: Likely hemoconcentrated on presentation. Decreased from on admission with all cell lines decreased from previous. No obvious signs of bleeding. Stable today. Cont vitamin supplementation. Repeat CBC in the AM.     Essential HTN: BP overall stable. Cont metoprolol and monitor.     DVT PPX: SQ  heparin    Discussed with Dr. Ace.     Disposition Likely home when medically stable, possibly this evening vs AM when Na>130 per nephrology recs.      Chapito Soler,   10/13/23  18:17 EDT    Alert-The patient is alert, awake and responds to voice. The patient is oriented to time, place, and person. The triage nurse is able to obtain subjective information.

## 2023-10-13 NOTE — PROGRESS NOTES
Nephrology Progress Note      Subjective     10/12/2023  Patient is getting tube feeding along with free water denies any nausea vomiting diarrhea no shortness of breath    10/13/2023  Patient denies any nausea vomiting diarrhea is feeling better wants to go home    Objective       Vital signs :     Vitals:    10/12/23 1847 10/12/23 2300 10/12/23 2357 10/13/23 0617   BP: 93/56 115/55 109/54 120/56   BP Location: Right arm  Right arm Right arm   Patient Position: Lying  Lying Lying   Pulse: 68 80 83 80   Resp: 16 16 16 16   Temp: 97.5 øF (36.4 øC) 98.3 øF (36.8 øC)  98.8 øF (37.1 øC)   TempSrc: Oral Oral  Oral   SpO2:   97%    Weight:       Height:            Intake/Output                         10/11/23 0701 - 10/12/23 0700 10/12/23 0701 - 10/13/23 0700     [id number] 8169-0870 Total 4382-6903 3987-8098 Total                 Intake    P.O.  240  120 360  --  -- --    I.V.  --  -- --  2399.1  -- 2399.1    Total Intake 240 .1 -- 2399.1       Output    Urine  400  550 950  --  -- --    Emesis/NG output  2  -- 2  --  -- --    Total Output 402 550 952 -- -- --           10/12/2023 examined and reviewed  10/13/2023 examined and reviewed      Physical Exam:    General Appearance : alert, pleasant, appears stated age, cooperative an  Head  :  normocephalic, without obvious abnormality and atraumatic  Eyes  :  conjunctivae and sclerae normal, no icterus, no pallor and PERRLA  Neck  :  no adenopathy, suppple, no carotid bruit, no JVD   Lungs : clear to auscultation, respirations regular  Heart :  regular rhythm & normal rate, normal S1, S2 and no murmur, no rub  Abdomen : PEG tube normal bowel sounds, no masses, no hepatomegaly, no splenomegaly, soft non-tender and no guarding  Extremities : moves extremities well, no edema, no cyanosis and no redness  Skin :  no bleeding, bruising or rash  Neurologic :   orientated to person, place, time and situation, Grossly no focal deficits  Acess :     Laboratory Data :        CBC and coagulation:  Results from last 7 days   Lab Units 10/12/23  0112 10/11/23  0641 10/11/23  0535 10/11/23  0156 10/10/23  1958 10/10/23  1645 10/10/23  1357 10/10/23  1131   PROCALCITONIN ng/mL  --   --   --   --   --   --   --  1.40*   LACTATE mmol/L  --   --   --  1.2 2.3* 3.3*   < > 2.3*   WBC 10*3/mm3 8.13  --  12.99*  --   --   --   --  25.10*   HEMOGLOBIN g/dL 7.8* 7.7* 7.8*  --   --   --   --  12.1*   HEMATOCRIT % 26.7* 25.6* 25.9*  --   --   --   --  41.2   MCV fL 79.2  --  78.0*  --   --   --   --  80.5   MCHC g/dL 29.2*  --  30.1*  --   --   --   --  29.4*   PLATELETS 10*3/mm3 606*  --  660*  --   --   --   --  950*   INR   --   --   --   --   --   --   --  0.94    < > = values in this interval not displayed.     Acid/base balance:      Renal and electrolytes:    Results from last 7 days   Lab Units 10/13/23  0602 10/12/23  1243 10/12/23  0112 10/11/23  1357 10/11/23  0641 10/11/23  0535   SODIUM mmol/L 128* 128* 128* 128* 131* 131*   POTASSIUM mmol/L 3.9 4.0 3.7 4.1 4.2 4.1   MAGNESIUM mg/dL  --   --   --   --   --  2.5*   CHLORIDE mmol/L 100 98 98 96* 98 98   CO2 mmol/L 21.5* 21.6* 21.9* 22.3 23.6 23.7   BUN mg/dL 23 39* 49* 63* 75* 78*   CREATININE mg/dL 0.53* 0.61* 0.73* 1.01 1.31* 1.36*   CALCIUM mg/dL 8.3* 8.6 8.7 9.1 9.6 9.3   PHOSPHORUS mg/dL  --   --   --   --   --  4.3     Estimated Creatinine Clearance: 136.7 mL/min (A) (by C-G formula based on SCr of 0.53 mg/dL (L)).     Liver and pancreatic function:  Results from last 7 days   Lab Units 10/13/23  0602 10/12/23  0112 10/11/23  0535 10/10/23  1131   ALBUMIN g/dL 2.4* 2.6* 2.9* 4.1   BILIRUBIN mg/dL 0.3 0.3 0.5 0.4   ALK PHOS U/L 107 105 116 176*   AST (SGOT) U/L 20 14 13 17   ALT (SGPT) U/L 9 8 10 14   LIPASE U/L  --   --  34 75*       Albumin Albumin   Date Value Ref Range Status   10/13/2023 2.4 (L) 3.5 - 5.2 g/dL Final   10/12/2023 2.6 (L) 3.5 - 5.2 g/dL Final   10/11/2023 2.9 (L) 3.5 - 5.2 g/dL Final   10/10/2023 4.1 3.5 - 5.2  "g/dL Final      Magnesium Magnesium   Date Value Ref Range Status   10/11/2023 2.5 (H) 1.6 - 2.4 mg/dL Final          PTH               No results found for: \"PTH\"    Cardiac:      Liver and pancreatic function:  Results from last 7 days   Lab Units 10/13/23  0602 10/12/23  0112 10/11/23  0535 10/10/23  1131   ALBUMIN g/dL 2.4* 2.6* 2.9* 4.1   BILIRUBIN mg/dL 0.3 0.3 0.5 0.4   ALK PHOS U/L 107 105 116 176*   AST (SGOT) U/L 20 14 13 17   ALT (SGPT) U/L 9 8 10 14   LIPASE U/L  --   --  34 75*       CT Abdomen Pelvis Without Contrast    Result Date: 10/10/2023  1.  Significant improvement in changes of necrotizing pancreatitis status post cystogastrostomy tube placement, necrosectomy, and Axios metallic stent placement spanning from the G-tube into the stomach to the level of the proximal jejunum. 2.  No significant ascites. 3.  No acute appearing pancreatitis changes. No findings to suggest acute abscess. 4.  Cholelithiasis noted. 5.  Other incidental/nonacute findings above   This report was finalized on 10/10/2023 2:19 PM by Dr. Janusz Lerner MD.      CT Chest Without Contrast Diagnostic    Result Date: 10/10/2023  1.  No acute thoracic findings identified. 2.  Refer to CT abdomen/pelvis for abdomen findings.   This report was finalized on 10/10/2023 2:12 PM by Dr. Janusz Lerner MD.      XR Chest 1 View    Result Date: 10/10/2023    Unremarkable exam. No acute cardiopulmonary findings identified.   This report was finalized on 10/10/2023 1:48 PM by Dr. Janusz Lerner MD.        Medications :     atorvastatin, 20 mg, Per J Tube, Nightly  fenofibrate, 145 mg, Oral, Daily  folic acid, 1 mg, Per J Tube, Daily  heparin (porcine), 5,000 Units, Subcutaneous, Q8H  lactulose, 30 g, Per J Tube, Daily  lansoprazole, 30 mg, Per J Tube, Q AM  metoprolol tartrate, 25 mg, Per J Tube, BID  montelukast, 10 mg, Per J Tube, Nightly  multivitamin with minerals, 1 tablet, Oral, Daily  sertraline, 50 mg, Per J Tube, Daily  sodium " chloride, 10 mL, Intravenous, Q12H  sodium chloride, 10 mL, Intravenous, Q12H  sucralfate, 1 g, Per J Tube, 4x Daily  thiamine, 100 mg, Per J Tube, Daily  vitamin B-12, 1,000 mcg, Per J Tube, Daily      sodium chloride, 100 mL/hr, Last Rate: 100 mL/hr (10/13/23 0229)          Assessment & Plan   \  -Acute hyponatremia  -Acute hyperkalemia  -Acute kidney injury  -Dehydration  -Azotemia  -History of necrotizing pancreatitis status post PEG tube placement  -Hypertension  -History of prostate cancer     10/11/2023  Patient baseline creatinine 0.88 went up to 2.6 now 1.31 after hydration so most likely patient had acute kidney injury secondary to prerenal dehydration secondary to diarrhea nausea vomiting, CT scan did not show any obstruction we will check urine lites and urine protein creatinine ratio and urine analysis  Azotemia is also better  Hyperkalemia was secondary to decreased GFR, and possible diet , now stable we will check again    10/12/2023  Patient's sodium is 128 most likely secondary to free water flushes patient is getting we will DC all free water and repeat sodium again continue normal saline for now  Acute kidney injury is resolved CT scan did not show any obstruction  Hyperkalemia is resolved  Azotemia is also much better     10/13/2023  Patient's sodium is still 128 also be started on free water flushes and continue normal saline, will add 2 g sodium chloride tablet check sodium again at 12 if it is more than 30 patient can go home and follow-up as outpatient  Acute kidney injury is resolved no significant proteinuria CT scan did not show any obstruction  Hyperkalemia resolved    We will sign off and follow-up as outpatient if needed please call     Please avoid nephrotoxic medication and pharmacy to adjust the medication according to the GFR        Plan of care discussed with pt, family answered all questions, patient verbalized understanding and agreed.    YUKI Ace MD  10/13/23  06:49  EDT

## 2023-10-13 NOTE — CASE MANAGEMENT/SOCIAL WORK
Discharge Planning Assessment   [facility]     Patient Name: Ta Madison  MRN: [id number]  Today's Date: 10/13/2023    Admit Date: 10/10/2023         Discharge Plan       Row Name 10/13/23 1636       Plan    Plan SS spoke with Pt on this date. Pt lives with spouse Azael and plans to return home at discharge with son Peng providing transportation. Pt utilizes [facility] for PT/OT services. [facility] [phone] willl need contacted at discharge.  . Pt has (s) cane, (r) walker, wheelchair and tube feeding supplies via unknown provider. SS to follow.    Roadmap to Recovery Yes    Patient/Family in Agreement with Plan yes    Provided Post Acute Provider List? N/A  Pt currently has [facility]                    CLARISSA Haddad

## 2023-10-13 NOTE — PLAN OF CARE
Goal Outcome Evaluation:  Plan of Care Reviewed With: patient        Progress: improving  Outcome Evaluation: Patient's VSS. Pt ambulated to bathroom during shift. Pt has family at bedisde. Pt c/o of pain throughout shift & PRN pain medication given. Pt voiced no further concerns at this time.

## 2023-10-13 NOTE — PLAN OF CARE
Goal Outcome Evaluation:              Outcome Evaluation: Pt is resting in bed at this time. Pt c/o of pain, PRN medication given. Pt refused bath this shift, educated on importance. No concerns or complaints at this time. Will continue POC.

## 2023-10-14 RX ORDER — NALOXONE HYDROCHLORIDE 4 MG/.1ML
SPRAY NASAL
Qty: 2 EACH | Refills: 0 | Status: SHIPPED | OUTPATIENT
Start: 2023-10-14

## 2023-10-14 RX ORDER — OXYCODONE HYDROCHLORIDE 10 MG/1
10 TABLET ORAL EVERY 6 HOURS PRN
Qty: 12 TABLET | Refills: 0 | Status: SHIPPED | OUTPATIENT
Start: 2023-10-14

## 2023-10-14 RX ORDER — LANSOPRAZOLE 30 MG/1
30 TABLET, ORALLY DISINTEGRATING, DELAYED RELEASE ORAL
Qty: 30 TABLET | Refills: 0 | Status: SHIPPED | OUTPATIENT
Start: 2023-10-14

## 2023-10-14 RX ORDER — OXYCODONE HYDROCHLORIDE 10 MG/1
10 TABLET ORAL EVERY 6 HOURS PRN
Qty: 12 TABLET | Refills: 0 | Status: CANCELLED | OUTPATIENT
Start: 2023-10-14

## 2023-10-14 RX ORDER — LACTULOSE 10 G/15ML
30 SOLUTION ORAL DAILY PRN
Qty: 473 ML | Refills: 0 | Status: SHIPPED | OUTPATIENT
Start: 2023-10-14

## 2023-10-14 RX ORDER — SODIUM CHLORIDE 1 G/1
2 TABLET ORAL
Qty: 180 TABLET | Refills: 0 | Status: SHIPPED | OUTPATIENT
Start: 2023-10-14

## 2023-10-14 NOTE — OUTREACH NOTE
Prep Survey      Flowsheet Row Responses   [facility] [place] patient discharged from? [facility]   Is LACE score < 7 ? No   Eligibility [facility]   Date of Admission 10/10/23   Date of Discharge 10/13/23   Discharge Disposition Home or Self Care   Discharge diagnosis Hyperkalemia   Does the patient have one of the following disease processes/diagnoses(primary or secondary)? Other   Does the patient have Home health ordered? No   Is there a DME ordered? No   Prep survey completed? Yes            Cindy NUNN - Registered Nurse

## 2023-10-14 NOTE — PAYOR COMM NOTE
"[facility]  GRIS RASMUSSEN  PHONE  [phone]  FAX  [phone]  NPI:  [id number]    PATIENT D/C 10/13/2023    Francisco Ellison (63 y.o. Male)       Date of Birth   1960    Social Security Number       Address   [address] [address]    Home Phone   [phone]    MRN   [id number]       Scientology   None    Marital Status                               Admission Date   10/10/23    Admission Type   Emergency    Admitting Provider   Vin Archer DO    Attending Provider       Department, Room/Bed   [address], 3351/2S       Discharge Date   10/13/2023    Discharge Disposition   Home or Self Care    Discharge Destination                                 Attending Provider: (none)   Allergies: No Known Allergies    Isolation: None   Infection: None   Code Status: Prior    Ht: 170.2 cm (67\")   Wt: 66.9 kg (147 lb 8 oz)    Admission Cmt: None   Principal Problem: Hyperkalemia [E87.5]                   Active Insurance as of 10/10/2023       Primary Coverage       Payor Plan Insurance Group Employer/Plan Group    [employer] [place] [employer] [place]ICA[place]        Payor Plan Address Payor Plan Phone Number Payor Plan Fax Number Effective Dates    [address] [address] [phone]  6/6/2018 - None Entered    [facility] [id number]         Subscriber Name Subscriber Birth Date Member ID       FRANCISCO ELLISON [id number]                     Emergency Contacts        (Rel.) Home Phone Work Phone Mobile Phone    Azael Ellison (Spouse) [phone]                "

## 2023-10-14 NOTE — DISCHARGE SUMMARY
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 male who presented to [facility] [place] ED with CC of nausea, vomiting, abdominal pain and diarrhea. Please see admission H&P for complete details.     In the ED, workup revealed severe hyperkalemia with K+ of 7.8, TYREE and SIRS. EKG revealed peaked T waves. Cultures were obtained. Targeted treatment of his hyperkalemia was ordered and bicarb gtt initiated.      Hospital Course  Ta Madison was admitted to the PCU for further evaluation and treatment. He was continued on the bicarb gtt. Nephrology was consulted for further input.     Stool studies including GI PCR and C diff were negative. Infectious workup remained negative with his SIRS thought to be 2/2 dehydration. His K+ quickly normalized and remained stable throughout the duration of his hospitalization. His renal quickly began improving as well and thought to be prerenal with dehydration and GI losses contributing. IVF's were adjusted per nephrology and later stopped. His N/V, abdominal pain and diarrhea quickly improved with supportive treatment and he was placed back on clear liquids and TF's resumed as well.     Once his K+ normalized he was moved out of the PCU to the med/surg floor with telemetry. Follow up labs revealed mild hyponatremia and nephrology adjusted his IVF's to NS while stopping free water flushes with his  TF's. His Na+ remained low despite these interventions and nephrology added salt tablets with subsequent improvement. He remained clinically stable and remained hospitalized until his Na was >130 per nephrology recs. He reported uncontrolled pain on Norco and was transitioned to oxycodone with subsequent improvement. Later in the evening on 10/13, repeat labs revealed subsequent improvement with Na 134 and he was discharged home later that night with instructions for repeat labs and close outpatient follow up with is PCP and nephrology.        Condition on Discharge:  Stable    Vital Signs  Vitals:    10/13/23 1908   BP: 104/55   Pulse: 96   Resp: 18   Temp: 98.2 øF (36.8 øC)   SpO2: 98%       Physical Exam:  General:    Awake, alert, in no acute distress   Heart:      Normal S1 and S2. Regular rate and rhythm. No significant murmur, rubs or gallops appreciated.   Lungs:     Respirations regular, even and unlabored. Lungs clear to auscultation B/L. No wheezes, rales or rhonchi.   Abdomen:   Soft and nontender. No guarding, rebound tenderness or  organomegaly noted. Bowel sounds present x 4. (+) J tube   Extremities:  No clubbing, cyanosis or edema noted. Moves UE and LE equally B/L.     Discharge Disposition:   home      Discharge Medications:     Discharge Medications        New Medications        Instructions Start Date   lactulose 10 GM/15ML solution  Commonly known as: CHRONULAC   30 g, Per J Tube, Daily PRN      lansoprazole 30 MG Tablet Delayed Release Dispersible disintegrating tablet  Commonly known as: PREVACID SOLUTAB   30 mg, Per J Tube, Every Early Morning   Start Date: October 14, 2023     naloxone 4 MG/0.1ML nasal spray  Commonly known as: NARCAN   Call 911. Don't prime. [place] in 1 nostril for overdose. Repeat in 2-3 minutes in other nostril if no or minimal breathing/responsiveness.      oxyCODONE 10 MG tablet  Commonly known as: ROXICODONE   10 mg, Per J Tube, Every 6 Hours PRN      sodium chloride 1  g tablet   2 g, Oral, 3 Times Daily With Meals   Start Date: October 14, 2023            Continue These Medications        Instructions Start Date   atorvastatin 20 MG tablet  Commonly known as: LIPITOR   20 mg, Per J Tube, Nightly      Carafate 1 GM/10ML suspension  Generic drug: sucralfate   1 g, Oral, 4 Times Daily      fenofibrate 145 MG tablet  Commonly known as: TRICOR   145 mg, Per J Tube, Daily      folic acid 1 MG tablet  Commonly known as: FOLVITE   1 mg, Per J Tube, Daily      hydrOXYzine 50 MG tablet  Commonly known as: ATARAX   50 mg, Oral, 3 Times Daily PRN      metoprolol tartrate 25 MG tablet  Commonly known as: LOPRESSOR   25 mg, Per J Tube, 2 Times Daily      montelukast 10 MG tablet  Commonly known as: SINGULAIR   10 mg, Per J Tube, Nightly      multivitamin with minerals tablet tablet   1 tablet, Oral, Daily      ondansetron 8 MG tablet  Commonly known as: ZOFRAN   8 mg, Per J Tube, Every 8 Hours PRN      sertraline 50 MG tablet  Commonly known as: ZOLOFT   50 mg, Per J Tube, Daily      thiamine 100 MG tablet  tablet  Commonly known as: VITAMIN B-1   100 mg, Per J Tube, Daily      vitamin B-12 1000 MCG tablet  Commonly known as: CYANOCOBALAMIN   1,000 mcg, Per J Tube, Daily      Xtandi 40 MG chemo capsule  Generic drug: enzalutamide   160 mg, Oral, Daily             Stop These Medications      HYDROcodone-acetaminophen  MG per tablet  Commonly known as: NORCO     pantoprazole 40 MG EC tablet  Commonly known as: PROTONIX                Discharge Diet:   Dietary Orders (From admission, onward)       Start     Ordered    10/12/23 John Lang AF (Vital AF 1.2); Tube Feeding Type: Continuous; Continuous Tube Feeding Start Rate (mL/hr): 45; Then Advance Rate By (mL/hr): Other; Other: 5; Every __ Hours: 4; To Goal Rate of (mL/hr): 50  Diet Effective Now        Question Answer Comment   Tube Feeding Formula: Peptamen AF (Vital AF 1.2)    Tube Feeding Type Continuous    Continuous Tube Feeding  Start Rate (mL/hr) 45    Then Advance Rate By (mL/hr) Other    Other 5    Every __ Hours 4    To Goal Rate of (mL/hr) 50        10/12/23 1038    10/11/23 1800  Dietary Nutrition Supplements Boost Breeze (Ensure Clear)  Daily With Breakfast & Dinner      Question:  Select Supplement  Answer:  Boost Breeze (Ensure Clear)    10/11/23 1224    10/10/23 2010  Diet: Liquid Diets; Clear Liquid; Fluid Consistency: Thin (IDDSI 0)  Diet Effective Now        References:    Diet Order [place]   Question Answer Comment   Diets: Liquid Diets    Liquid Diet: Clear Liquid    Fluid Consistency: Thin (IDDSI 0)        10/10/23 2009                    Activity at Discharge:  activity as tolerated    Follow-up Appointments:  Additional Instructions for the Follow-ups that You Need to Schedule       Discharge Follow-up with PCP   As directed       Currently Documented PCP:    Yolande Olvera APRN    PCP Phone Number:    [phone]     Follow Up Details: Follow up with EUGENIA Mendoza in 1 week.        Discharge Follow-up with Specified Provider: Follow up with Dr. Ace in 2 weeks.   As directed      To: Follow up with Dr. Ace in 2 weeks.               Follow-up Information       Yolande Olvera APRN .    Specialty: Family Medicine  Why: Follow up with EUGENIA Mendoza in 1 week.  Contact information:  [address] [place] [address]  [phone]                           Your Scheduled Appointments      Oct 17, 2023  3:30 PM  Hospital Follow Up with EUGENIA Mendoza  [facility] ([place]) [address] [place] [address]  [phone]        Oct 18, 2023 10:30 AM  LAB with CONSTANT[facility] NURSE LAB  [facility] H[facility]ATOLOGY & ONCOL[facility]Y ([place]) [address]  [place] [address] [phone]   Bring ID and  Insurance cards.  If you received paperwork in the mail, fill it out and bring it with them.       Oct 18, 2023 11:00 AM  FOLLOW UP with HIRO Martinez MD  [facility]  HEMATOLOGY & ONCOLOGY ([place]) [address]  [place] [address]  [phone]   Bring ID and  Insurance cards.  New patients are to arrive 30 minutes prior to the appointement.  If you received paperwork in the mail, fill it out and bring it with them.  All other appt's need to be here 15 minutes early.        Oct 18, 2023 11:45 AM  INJECTION with  [facility] OP INFUS CHAIR 20  [facility] OUTPATIENT INFUSION ([place]) [address]  [place] [address]  [phone]        Nov 06, 2023  9:15 AM  Office Visit with EUGNEIA Mendoza  [facility] MEDICINE ([place]) [address] [place] [address]  [phone]   Arrive 15 minutes prior to appointment.  If you are in need of a language or hearing  please call the Department.       Dec 04, 2023  8:30 AM  Office Visit with EUGENIA Mendoza  [facility] MEDICINE ([place]) [address] [place] [address]  [phone]   Arrive 15 minutes prior to appointment.  If you are in need of a language or hearing  please call the Department.                Test Results Pending at Discharge:  Pending Labs       Order Current Status    Blood Culture - Blood, Arm, Left Preliminary result    Blood Culture - Blood, Wrist, Left Preliminary result             The 10-year ASCVD risk score (Briana REGALADO, et al., 2019) is: 7%    Values used to calculate the score:      Age: 63 years      Sex: Male      Is Non- : No      Diabetic: No      Tobacco smoker: No      Systolic Blood Pressure: 104 mmHg      Is BP treated: Yes      HDL Cholesterol: 39 mg/dL      Total Cholesterol: 134 mg/dL      Chapito Soler DO  10/13/23  20:03 EDT      Time: Greater than 30 minutes spent on this discharge.

## 2023-10-15 LAB
BACTERIA SPEC AEROBE CULT: NORMAL
BACTERIA SPEC AEROBE CULT: NORMAL

## 2023-10-16 ENCOUNTER — TRANSITIONAL CARE MANAGEMENT TELEPHONE ENCOUNTER (OUTPATIENT)
Dept: CALL CENTER | Facility: HOSPITAL | Age: 63
End: 2023-10-16
Payer: COMMERCIAL

## 2023-10-16 NOTE — DISCHARGE PLACEMENT REQUEST
"Francisco Madison (63 y.o. Male)       Date of Birth   1960    Social Security Number       Address   [address] [address]    Home Phone   [phone]    MRN   [id number]       Scientologist   None    Marital Status                               Admission Date   10/10/23    Admission Type   Emergency    Admitting Provider   Vin Archer DO    Attending Provider       Department, Room/Bed   [address], 3351/2S       Discharge Date   10/13/2023    Discharge Disposition   Home or Self Care    Discharge Destination                                 Attending Provider: (none)   Allergies: No Known Allergies    Isolation: None   Infection: None   Code Status: Prior    Ht: 170.2 cm (67\")   Wt: 66.9 kg (147 lb 8 oz)    Admission Cmt: None   Principal Problem: Hyperkalemia [E87.5]                   Active Insurance as of 10/10/2023       Primary Coverage       Payor Plan Insurance Group Employer/Plan Group    [employer] [place]LL[place] MEDICAID        Payor Plan Address Payor Plan Phone Number Payor Plan Fax Number Effective Dates    [address] [address] [phone]  6/6/2018 - None Entered    [facility] [id number]         Subscriber Name Subscriber Birth Date Member ID       FRANCISCO MADISON [id number]                     Emergency Contacts        (Rel.) Home Phone Work Phone Mobile Phone    Azael Madison (Spouse) [phone]          ry  Francisco Madison  MRN: [id number]    Icon primary diagnosis          High potassium levels   Icon Date   10/10/2023 - 10/13/2023   Icon Location   [address]   Icon Checklist header    Your Next Steps    Icon do   Do    Icon Checkbox     these medications from "[id number] - [facility]  [address] - [phone] PH - [phone] FX  lactulose  lansoprazole  naloxone  oxyCODONE  sodium chloride  Icon Location  "  Go    Oct 17 Hospital Follow Up 3:30 PM   Yolande Olvera  [facility]IC[facility]   [address] [place] [address] [phone]   You have more future appointments. Please review your full appointment list.  Дмитрийt    View your After Visit Summary and more online at [email or web address]   After Visit Summary  Instructions    Icon medication changes this visit           Your medications have changed    Icon medications to start taking   START taking:  lactulose (CHRONULAC)   lansoprazole (PREVACID SOLUTAB)   naloxone (NARCAN)   oxyCODONE (ROXICODONE)   sodium chloride   Icon medications to stop taking   STOP taking:  HYDROcodone-acetaminophen  MG per tablet (NORCO)   pantoprazole 40 MG EC tablet (PROTONIX)   Review your updated medication list below.  Your Next Steps  Icon do   Do  Icon Location   Go      COVID-19 Vaccination Information  Why Get Vaccinated?  Building defenses against COVID-19 is a team effort, and you are a malone part of that team. Getting the COVID-19 vaccine adds one more layer of protection for you, your coworkers, and family. Here are ways you can build people's confidence in the COVID-19 vaccines in your community and at home.     Get vaccinated and enroll in the v-safe text messaging program to help CDC monitor vaccine safety.   Tell others why you are getting vaccinated and encourage them to get vaccinated. Share your success story.    Learn how to have conversations about COVID-19 vaccine with coworkers, family, and friends.  https://www.cdc.gov/coronavirus/2019-ncov/vaccines/index.html     How do I schedule an appointment for a vaccine?  https://www.vaccines.gov/ helps you find locations that carry COVID-19 vaccines and their contact information. Because every location handles appointments differently, you will need to schedule your appointment directly with the location you choose.        If you have any questions about your recovery,  please call the [facility] Nurse Call Center at [phone]. A registered nurse is available 24 hours a day 7 days a week to assist you.   If you have any COVID-19 related questions, please call [phone].     Conversations that Matter: Have you thought about who would speak for you if you could not speak for yourself?     Consider appointing someone to make healthcare decisions for you if you are unable to do so. We can help! Call our Advance Care Planning helpline at [phone], or visit   [email or web address].  Icon diet    Diet instructions    Diet: Liquid Diets; Clear Liquid; Thin (IDDSI 0)   Discharge Diet:   Liquid Diets  Liquid Diet:   Clear Liquid  Fluid Consistency:   Thin (IDDSI 0)  Icon Orders placed today                  Other instructions    Discharge Follow-up with PCP   Currently Documented PCP:   Yolande Olvera APRN   PCP Phone Number:   [phone]   Discharge Follow-up with Specified Provider: Follow up with Dr. Ace in 2 weeks.   What's Next    What's Next          Follow up with Yolande Olvera  Follow up with EUGENIA Mendoza in 1 week. [address] [place] [address]  [phone]   Oct 17 [facility] Follow Up with Yolande Olvera  Tuesday Oct 17, 2023 3:30 PM [facility] FAMILY MEDICINE  [address] [place] [address]  [phone]   Oct 18 LAB  Wednesday Oct 18, 2023 10:30 AM  Bring ID and  Insurance cards.   If you received paperwork in the mail, fill it out and bring it with them. [facility] HEMATOLOGY & ONCOLOGY  [address] [facility]   [place] [address]  [phone]          FOLLOW UP with HIRO Martinez  Wednesday Oct 18, 2023 11:00 AM  Bring ID and  Insurance cards.   New patients are to arrive 30 minutes prior to the appointement.   If you received paperwork in the mail, fill it out and bring it with them.   All other appt's need to be here 15 minutes early.  [facility] HEMATOLOGY & ONCOLOGY  [address]   [place]  [address]  [phone]          INJECTION  Wednesday Oct 18, 2023 11:45 AM [facility]  [address]   [place] [address]  [phone]   Nov 6 Office Visit with Yolande Wade  Monday Nov 6, 2023 9:15 AM  Arrive 15 minutes prior to appointment.  If you are in need of a language or hearing  please call the Department. [facility]  [address] [place] [address]  [phone]   Dec 4 Office Visit with Yolande Wade  Monday Dec 4, 2023 8:30 AM  Arrive 15 minutes prior to appointment.  If you are in need of a language or hearing  please call the Department. [facility]  [address] [place] [address]  [phone]       Your Allergies  Date Reviewed: 10/10/2023  Your Allergies   No active allergies     Patient Belongings Returned    Document Return of Belongings Flowsheet    Were the patient bedside belongings sent home? Yes   Medication Retrieved from Unit & Sent Home --   Belongings Sent to Safe --   Belongings sent with: --   Belongings Retrieved from Security & Sent Home N/A   Medication Retrieved from Unit & Sent Home --   Medications Retrieved from Pharmacy & Sent Home N/A         iPrintt Signup    Our records indicate that you have an active Scient[employer]Hinacom account.     You can view your After Visit Summary by going to [email or web address] and logging in with your Point Inside username and password.  If you don't have a Point Inside username and password but a parent or guardian has access to your record, the parent or guardian should login with their own Point Inside username and password and access your record to view the After Visit Summary.     If you have questions, you can email [email or web address] or call [phone] or toll free number [phone] to talk to our Point Inside staff.  Remember, Point Inside is NOT to be used for urgent needs.  For medical emergencies, dial 911.      Medication List  Medication List     Morning Afternoon Evening Bedtime As Needed    atorvastatin 20 MG tablet  Commonly known as: LIPITOR  Administer 1 tablet per J tube Every Night.  Last time this was given: 20 mg on October 12, 2023  9:57 PM         Carafate 1 GM/10ML suspension  Take 10 mL by mouth 4 (Four) Times a Day.  Generic drug: sucralfate  Last time this was given: 1 g on October 13, 2023  5:34 PM         fenofibrate 145 MG tablet  Commonly known as: TRICOR  Administer 1 tablet per J tube Daily.  Last time this was given: 145 mg on October 13, 2023  8:14 AM         folic acid 1 MG tablet  Commonly known as: FOLVITE  Administer 1 tablet per J tube Daily.  Last time this was given: 1 mg on October 13, 2023  8:14 AM         hydrOXYzine 50 MG tablet  Commonly known as: ATARAX  Take 1 tablet by mouth 3 (Three) Times a Day As Needed for Itching.  For diagnoses: Itchy skin  Signed by: Yolande Olvera        [facility] medications to start taking   lactulose 10 GM/15ML solution  Commonly known as: CHRONULAC  Administer 45 mL per J tube Daily As Needed (Constipation).  Last time this was given: 30 g on October 12, 2023  8:22 AM  Signed by: Chapito Soler        [facility] medications to start taking   lansoprazole 30 MG Tablet Delayed Release Dispersible disintegrating tablet  Commonly known as: PREVACID SOLUTAB  Administer 1 tablet per J tube Every Morning.  Last time this was given: 30 mg on October 13, 2023  5:29 AM  Signed by: Chapito Soler         metoprolol tartrate 25 MG tablet  Commonly known as: LOPRESSOR  Administer 1 tablet per J tube 2 (Two) Times a Day.  Last time this was given: 25 mg on October 13, 2023  8:14 AM         montelukast 10 MG tablet  Commonly known as: SINGULAIR  Administer 1 tablet per J tube Every Night.  Last time this was given: 10 mg on October 12, 2023  9:57 PM         multivitamin with minerals tablet tablet  Take 1 tablet by mouth Daily.  Last time this was given: 1 tablet on October  13, 2023  8:14 AM        Icon medications to start taking   naloxone 4 MG/0.1ML nasal spray  Commonly known as: NARCAN  Call 911. Don't prime. [place] in 1 nostril for overdose. Repeat in 2-3 minutes in other nostril if no or minimal breathing/responsiveness.  Signed by: Chapito Soler         ondansetron 8 MG tablet  Commonly known as: ZOFRAN  Administer 1 tablet per J tube Every 8 (Eight) Hours As Needed for Nausea or Vomiting.  Last time this was given: Ask your nurse or doctor        Icon medications to start taking   oxyCODONE 10 MG tablet  Commonly known as: ROXICODONE  Administer 1 tablet per J tube Every 6 (Six) Hours As Needed for Moderate Pain.  For diagnoses: Abdominal pain, pit of stomach  Last time this was given: 10 mg on October 13, 2023  8:25 PM  Signed by: Chapito Soler         sertraline 50 MG tablet  Commonly known as: ZOLOFT  Administer 1 tablet per J tube Daily.  Last time this was given: 50 mg on October 13, 2023  8:14 AM        Icon medications to start taking   sodium chloride 1 g tablet  Take 2 tablets by mouth 3 (Three) Times a Day With Meals.  Last time this was given: 2 g on October 13, 2023  5:34 PM  Signed by: Chapito Soler         thiamine 100 MG tablet  tablet  Commonly known as: VITAMIN B-1  Administer 1 tablet per J tube Daily.  Last time this was given: 100 mg on October 13, 2023  8:14 AM         vitamin B-12 1000 MCG tablet  Commonly known as: CYANOCOBALAMIN  Administer 1 tablet per J tube Daily.  Last time this was given: 1,000 mcg on October 13, 2023  8:14 AM         Xtandi 40 MG chemo capsule  Take 4 capsules by mouth Daily.  For diagnoses: Adenocarcinoma of prostate  Generic drug: enzalutamide  Signed by: HIRO Martinez               History & Physical        Toña Montero PA at 10/10/23 1740       Attestation signed by Chapito Soler DO at 10/10/23 1908    I have reviewed this documentation and agree. Pt seen and examined in the PCU. Treatment plan as  outlined.                        [facility] Medicine Services  HISTORY & PHYSICAL    Patient Identification:  Name:  Ta Madison  Age:  63 y.o.  Sex:  male  :  1960  MRN:  [id number]   Visit Number:  [id number]  Admit Date: 10/10/2023   Primary Care Physician:  Yolande Olvera APRN     Subjective     Chief complaint:   Chief Complaint   Patient presents with    Vomiting    Diarrhea    Abdominal Pain     History of presenting illness:   Patient is a 63 y.o. 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 difficile, essential hypertension, hyperlipidemia, history of TIA, GERD, prostate cancer in the past, anxiety/depression, and former tobacco abuse that presented to the [facility] emergency department for evaluation of abdominal pain, nausea, vomiting, and diarrhea.  Patient with extensive past medical history since 2023 when patient had gallstone pancreatitis and found to have necrotizing pancreatitis.  Patient was treated at the [facility] in [facility].  See chart for details.  Patient recently discharged from [facility] about 2 weeks ago.  Patient  with recently inserted PEG-J tube.  He has been tolerating tube feeds.  Patient's wife at bedside states that last week the patient was seen by PCP, noted to have a 12 pound weight loss at that time.  Patient's wife under instruction of primary care was titrating up his tube feeds.  Patient had previously been on 65 cc an hour in the past, however due to recent hospitalization have been titrated down to 45 cc an hour.  Patient has been tolerating well until Friday.  Patient started to have increasing abdominal pain, nausea, vomiting and increased stool output.  Over the weekend, patient states that he started to develop diarrhea  that was frequent.  He reports chills but denies any known fever.  Again, increasing abdominal discomfort.  No chest pain or dyspnea.  No cough or upper respiratory complaints.  He states his diarrhea continue to worsen and made him concerned for C. difficile as he has had a history of this in the past.  Patient went to PCP today and due to acute complaints, was sent to the ED for further evaluation.  Patient hypotensive on arrival and acutely ill-appearing.  C. difficile toxin and GI panel PCR pending currently.  IV fluids administered with improvement in hypotension.  Patient noted be hyperkalemic, as well as TYREE, nephrology contacted by ED with treatment provided and bicarb drip recommended.    Upon arrival to the ED, vitals were temperature 96.8, heart rate 112, respiratory rate 20, blood pressure 84/58, oxygen saturation 99% on room air.  Initial high-sensitivity troponin T 24 with repeat at 19, -5 delta.  CK17.  CMP with sodium 122, chloride 93, CO2 15.3, , creatinine 2.6, BUN/creatinine ratio 46, EGFR 26.9, glucose 125, calcium 10.6, alkaline phosphatase 176, total protein 9.7 with potassium 7.8 however there was hemolysis of specimen.  Repeat BMP confirmed potassium of 7.2.  Initial lactic acid 2.3 with reflex at 2.1.  Lipase 75.  Procalcitonin 1.40.  INR 0.94.CBC with white blood cell count 25.10, hemoglobin 12.1, platelets 950,000, neutrophil percentage 78.7%, absolute neutrophil count 19.74.  Urinalysis unremarkable.  Chest x-ray with no evidence of acute cardiopulmonary disease.  CT chest without contrast with no acute cardiopulmonary disease.  CT abdomen pelvis without contrast with significant improvement in changes of necrotizing pancreatitis status post cystogastrostomy tube placement, necrosectomy, and metallic stent placement spanning from the G-tube into the stomach to the level of the proximal jejunum.  There is no significant ascites.  No acute appearing pancreatic changes.  No evidence  of acute abscess.  There is cholelithiasis present.  Known ED medication administration: 2000 mg IV calcium chloride x1, 25 g IV dextrose x1, 10 units IV regular insulin x1, DuoNeb nebulizer treatment x1, 30 g p.o. lactulose x1, 2 mg IV morphine x1, 4 mg IV Zofran x1, 4.5 g IV Zosyn x1, 50 mill equivalent IV sodium bicarb x1, 1.836 L bolus normal saline x1, bicarb drip.     Patient has been admitted to the progressive care unit for further evaluation and treatment.     Present during exam: Patient's wife   ---------------------------------------------------------------------------------------------------------------------   Review of Systems   Constitutional:  Positive for chills and fatigue. Negative for activity change, appetite change, diaphoresis and fever.   HENT:  Negative for congestion and sore throat.    Eyes:  Negative for discharge and visual disturbance.   Respiratory:  Negative for cough, chest tightness, shortness of breath and wheezing.    Cardiovascular:  Negative for chest pain, palpitations and leg swelling.   Gastrointestinal:  Positive for abdominal pain, diarrhea, nausea and vomiting. Negative for constipation.   Genitourinary:  Negative for decreased urine volume, dysuria, frequency and urgency.   Musculoskeletal:  Negative for arthralgias and myalgias.   Skin:  Negative for color change and wound.   Neurological:  Positive for weakness (Generalized). Negative for dizziness, syncope, light-headedness and headaches.   Psychiatric/Behavioral:  Negative for confusion. The patient is not nervous/anxious.       ---------------------------------------------------------------------------------------------------------------------   Past Medical History:   Diagnosis Date    Elevated cholesterol     GERD (gastroesophageal reflux disease)     Increased heart rate     Mini stroke     Neck pain     Prostate cancer     Rash     Tobacco abuse      Past Surgical History:   Procedure Laterality Date     APPENDECTOMY  1971    [facility]     COLONOSCOPY      COLONOSCOPY N/A 2023    Procedure: COLONOSCOPY;  Surgeon: Mahnaz Caraballo MD;  Location:  [facility];  Service: Gastroenterology;  Laterality: N/A;    ENDOSCOPY N/A 2023    Procedure: ESOPHAGOGASTRODUODENOSCOPY WITH KEOFEED PLACEMENT;  Surgeon: Yolande Eller MD;  Location:  [facility];  Service: Gastroenterology;  Laterality: N/A;     Family History   Problem Relation Age of Onset    Nephrolithiasis Mother     Heart disease Father     Hypertension Father     Kidney disease Father      Social History     Socioeconomic History    Marital status:    Tobacco Use    Smoking status: Former     Packs/day: 2.00     Years: 45.00     Additional pack years: 0.00     Total pack years: 90.00     Types: Cigarettes     Quit date: 2023     Years since quittin.2    Smokeless tobacco: Never   Vaping Use    Vaping Use: Never used   Substance and Sexual Activity    Alcohol use: No    Drug use: Yes     Types: Marijuana     Comment: occ     Sexual activity: Defer     ---------------------------------------------------------------------------------------------------------------------   Allergies:  Patient has no known allergies.  ---------------------------------------------------------------------------------------------------------------------   Medications below are reported home medications pulling from within the system; at this time, these medications have not been reconciled unless otherwise specified and are in the verification process for further verifcation as current home medications.    Prior to Admission Medications       Prescriptions Last Dose Informant Patient Reported? Taking?    albuterol sulfate  (90 Base) MCG/ACT inhaler   No No    Inhale 2 puffs Every 6 (Six) Hours As Needed for Wheezing.    aspirin 81 MG EC tablet   Yes No    Take 1 tablet by mouth Daily.    atorvastatin (LIPITOR) 20 MG tablet   No No    Take 1  tablet by mouth Every Night.    calcium carbonate (TUMS) 500 MG chewable tablet   Yes No    Chew 1 tablet.    cyanocobalamin (VITAMIN B-12) 1000 MCG tablet   No No    Take 1 tablet by mouth Daily for 30 days.    Digestive Enzyme Cartridge (Relizorb) device   Yes No    Use 1 each 3 (Three) Times a Day. Change every 500mL    enzalutamide (XTANDI) 40 MG chemo capsule   No No    Take 4 capsules by mouth Daily.    Patient not taking:  Reported on 10/4/2023    EPINEPHrine (EPIPEN) 0.3 MG/0.3ML solution auto-injector injection   Yes No    INJECT 1 PEN IN THE MUSCLE ONE TIME AS DIRECTED    fenofibrate (TRICOR) 145 MG tablet   No No    Take 1 tablet by mouth Daily.    folic acid (FOLVITE) 1 MG tablet   No No    Take 1 tablet by mouth Daily for 90 days.    HYDROcodone-acetaminophen (NORCO)  MG per tablet   No No    Take 1 tablet by mouth Every 6 (Six) Hours As Needed for Moderate Pain.    hydrOXYzine (ATARAX) 50 MG tablet   No No    Take 1 tablet by mouth 3 (Three) Times a Day As Needed for Itching.    Patient not taking:  Reported on 10/10/2023    melatonin 3 MG tablet   Yes No    Take 2 tablets by mouth.    menthol-zinc oxide (CALMOSEPTINE) 0.44-20.625 % ointment ointment   No No    Apply 1 application  topically to the appropriate area as directed As Needed (skin irritation from J tube site).    metoprolol tartrate (LOPRESSOR) 25 MG tablet   No No    Take 1 tablet by mouth Daily.    mirtazapine (REMERON) 7.5 MG tablet   No No    Take 1 tablet by mouth Every Night for 30 days.    Patient not taking:  Reported on 10/10/2023    montelukast (SINGULAIR) 10 MG tablet   No No    Take 1 tablet by mouth Every Night.    multivitamin with minerals tablet tablet   No No    Take 1 tablet by mouth Daily.    nicotine (NICODERM CQ) 21 MG/24HR patch   Yes No    Place 1 patch on the skin as directed by provider Daily.    Patient not taking:  Reported on 10/10/2023    Nutritional Supplements (Peptamen 1.5 Ziyad) liquid   Yes No    Take   by mouth.    ondansetron (ZOFRAN) 8 MG tablet   No No    Take 1 tablet by mouth Every 8 (Eight) Hours As Needed for Nausea or Vomiting.    ondansetron ODT (ZOFRAN-ODT) 8 MG disintegrating tablet   Yes No    Take 1 tablet by mouth.    Patient not taking:  Reported on 10/10/2023    oxyCODONE (ROXICODONE) 5 MG immediate release tablet   Yes No    Patient not taking:  Reported on 10/4/2023    pantoprazole (PROTONIX) 40 MG EC tablet   Yes No    Take 1 tablet by mouth Daily.    sertraline (ZOLOFT) 50 MG tablet   No No    Take 1 tablet by mouth Daily.    Tab-A-Lucero/Iron/Beta Carotene tablet tablet   Yes No    Take 1 tablet by mouth Daily.    thiamine (VITAMIN B1) 100 MG tablet   No No    Take 1 tablet by mouth Daily for 30 days.    vitamin D (ERGOCALCIFEROL) 1.25 MG (58115 UT) capsule capsule   No No    Take 1 capsule by mouth 1 (One) Time Per Week.    Patient not taking:  Reported on 10/10/2023          ---------------------------------------------------------------------------------------------------------------------    Objective     Hospital Scheduled Meds:  heparin (porcine), 5,000 Units, Subcutaneous, Q8H  lactulose, 30 g, Per J Tube, Daily  sodium chloride, 10 mL, Intravenous, Q12H      sodium bicarbonate 8.4 % 75 mEq in sodium chloride 0.45 % 1,000 mL infusion (greater than 75 mEq), 75 mEq, Last Rate: 75 mEq (10/10/23 1353)        Current listed hospital scheduled medications may not yet reflect those currently placed in orders that are signed and held, awaiting patient's arrival to floor/unit.    ---------------------------------------------------------------------------------------------------------------------   Vital Signs:  Temp:  [96.8 øF (36 øC)-97.5 øF (36.4 øC)] 97.5 øF (36.4 øC)  Heart Rate:  [] 100  Resp:  [11-20] 11  BP: ()/() 122/65  Mean Arterial Pressure (Non-Invasive) for the past 24 hrs (Last 3 readings):   Noninvasive MAP (mmHg)   10/10/23 1700 79   10/10/23 1643 124   10/10/23  1620 78     SpO2 Percentage    10/10/23 1625 10/10/23 1643 10/10/23 1700   SpO2: 100% 98% 100%     SpO2:  [98 %-100 %] 100 %  on   ;   Device (Oxygen Therapy): room air    Body mass index is 22.86 kg/mý.  Wt Readings from Last 3 Encounters:   10/10/23 66.2 kg (145 lb 15.1 oz)   10/10/23 61.7 kg (136 lb)   10/04/23 66.1 kg (145 lb 12.8 oz)       ---------------------------------------------------------------------------------------------------------------------   Physical Exam:  Physical Exam  Nursing note reviewed.   Constitutional:       General: He is awake. He is not in acute distress.     Appearance: He is well-developed. He is ill-appearing. He is not toxic-appearing.      Comments: Sitting up in bed.  No acute distress noted.  Wife present at bedside.   HENT:      Head: Normocephalic and atraumatic.      Mouth/Throat:      Mouth: Mucous membranes are moist. Mucous membranes are dry.   Eyes:      Conjunctiva/sclera: Conjunctivae normal.      Pupils: Pupils are equal, round, and reactive to light.   Cardiovascular:      Rate and Rhythm: Regular rhythm. Tachycardia present.      Pulses:           Dorsalis pedis pulses are 2+ on the right side and 2+ on the left side.      Heart sounds: Normal heart sounds. No murmur heard.     No friction rub. No gallop.   Pulmonary:      Effort: Pulmonary effort is normal. No tachypnea, accessory muscle usage or respiratory distress.      Breath sounds: Normal breath sounds and air entry. No wheezing, rhonchi or rales.      Comments: On room air.  Speaks in full sentences without dyspnea.  Abdominal:      General: Bowel sounds are normal. There is no distension.      Palpations: Abdomen is soft.      Tenderness: There is generalized no abdominal tenderness. There is no guarding or rebound.      Comments: PEG-J in place.  Patient with a gauze pad at insertion site that is saturated in bright green drainage.  No surrounding erythema or bleeding.   Musculoskeletal:      Cervical  back: Neck supple.      Right lower leg: No edema.      Left lower leg: No edema.   Skin:     General: Skin is warm and dry.      Capillary Refill: Capillary refill takes less than 2 seconds.      Coloration: Skin is pale (Diffuse pallor).   Neurological:      General: No focal deficit present.      Mental Status: He is alert and oriented to person, place, and time.      Sensory: Sensation is intact.      Motor: Motor function is intact. Weakness (Generalized, no lateralizing or focal weakness appreciated.) present.      Comments: Awake and alert. Follows commands. Answers questions appropriately. Moves all extremities equally. Strength and sensation intact. No focal neuro deficit on exam.   Psychiatric:         Attention and Perception: Attention normal.         Mood and Affect: Mood and affect normal.         Speech: Speech normal.         Behavior: Behavior is cooperative.       ---------------------------------------------------------------------------------------------------------------------  EKG: Pending cardiology read.  Per my interpretation: Normal sinus rhythm with heart rate 87 bpm, QTc 440 ms.  No ST elevation appreciated.  Peaked T waves noted.  Await final cardiology read.      Telemetry:    Sinus tachycardia 100s per bedside monitor     I have personally reviewed the EKG/Telemetry strip  ---------------------------------------------------------------------------------------------------------------------   Results from last 7 days   Lab Units 10/10/23  1357 10/10/23  1131   CK TOTAL U/L  --  17*   HSTROP T ng/L 19* 24*           Results from last 7 days   Lab Units 10/10/23  1357 10/10/23  1131 10/04/23  1000   LACTATE mmol/L 2.1* 2.3*  --    WBC 10*3/mm3  --  25.10* 22.25*   HEMOGLOBIN g/dL  --  12.1* 9.9*   HEMATOCRIT %  --  41.2 32.2*   MCV fL  --  80.5 77.4*   MCHC g/dL  --  29.4* 30.7*   PLATELETS 10*3/mm3  --  950* 843*   INR   --  0.94  --      Results from last 7 days   Lab Units  10/10/23  1645 10/10/23  1357 10/10/23  1131 10/04/23  1000   SODIUM mmol/L 131* 129* 122* 130*   POTASSIUM mmol/L 5.1 7.2* 7.8* 5.3*   MAGNESIUM mg/dL  --   --   --  2.6*   CHLORIDE mmol/L 100 100 93* 95*   CO2 mmol/L 18.5* 16.2* 15.3* 21.0*   BUN mg/dL 97* 106* 120* 39*   CREATININE mg/dL 2.10* 2.31* 2.61* 0.88   CALCIUM mg/dL 12.6* 9.5 10.6* 9.7   GLUCOSE mg/dL 69 113* 125* 109*   ALBUMIN g/dL  --   --  4.1 3.6   BILIRUBIN mg/dL  --   --  0.4 0.3   ALK PHOS U/L  --   --  176* 257*   AST (SGOT) U/L  --   --  17 23   ALT (SGPT) U/L  --   --  14 17   Estimated Creatinine Clearance: 34.2 mL/min (A) (by C-G formula based on SCr of 2.1 mg/dL (H)).    Glucose   Date/Time Value Ref Range Status   10/10/2023 1627 121 70 - 130 mg/dL Final     Lab Results   Component Value Date    HGBA1C 6.00 (H) 02/28/2023     Lab Results   Component Value Date    TSH 2.570 02/28/2023    FREET4 1.22 06/06/2018         Microbiology Results (last 10 days)       ** No results found for the last 240 hours. **           I have personally reviewed the above laboratory results.   ---------------------------------------------------------------------------------------------------------------------  Imaging Results (Last 7 Days)       Procedure Component Value Units Date/Time    CT Abdomen Pelvis Without Contrast [[id number]] Collected: 10/10/23 1412     Updated: 10/10/23 1421    Narrative:      EXAM:    CT Abdomen and Pelvis Without Intravenous Contrast     EXAM DATE:    10/10/2023 2:04 PM     CLINICAL HISTORY:    TYREE, sepsis     TECHNIQUE:    Axial computed tomography images of the abdomen and pelvis without  intravenous contrast.  Sagittal and coronal reformatted images were  created and reviewed.  This CT exam was performed using one or more of  the following dose reduction techniques:  automated exposure control,  adjustment of the mA and/or kV according to patient size, and/or use of  iterative reconstruction technique.     COMPARISON:     8/10/2023     FINDINGS:    Lung bases:  Unremarkable as visualized.  No mass.  No consolidation.      ABDOMEN:    Liver:  Liver is within normal limits.    Gallbladder and bile ducts:  Cholelithiasis noted.  Common bile duct  appears within normal limits.    Pancreas:  Significant improvement in pancreatitis changes likely  reflecting sequela of necrotizing pancreatitis with patient status post  gastroenteric stenting and a cyst though gastrostomy drainage catheter.  Ill-defined fluid collection posterior to the gastric wall compatible  with a resolving pseudocyst or pancreatic abscess from necrotizing  pancreatitis.  Focal pancreatic ductal dilatation of the head of the  pancreas region but no acute inflammatory changes identified.    Spleen:  Unremarkable as visualized.  No splenomegaly.    Adrenals:  Unremarkable as visualized.  No mass.    Kidneys and ureters:  Unremarkable as visualized.  No obstructing  stones.  No hydronephrosis.    Stomach and bowel:  Unremarkable as visualized.  No obstruction.  No  mucosal thickening.      PELVIS:    Appendix:  Appendectomy.    Bladder:  Distention of the urinary bladder without wall thickening.   No stones.    Reproductive:  Mild prostate enlargement is noted.      ABDOMEN and PELVIS:    Intraperitoneal space:  Unremarkable as visualized.  No significant  free fluid identified.  No significant free air is identified.    Bones/joints:  No acute fracture.  No dislocation.    Soft tissues:  Unremarkable as visualized.    Vasculature:  Unremarkable as visualized.  No abdominal aortic  aneurysm.    Lymph nodes:  Unremarkable as visualized.  No enlarged lymph nodes.    Tubes, lines and devices:  G-tube noted within the stomach.       Impression:      1.  Significant improvement in changes of necrotizing pancreatitis  status post cystogastrostomy tube placement, necrosectomy, and Axios  metallic stent placement spanning from the G-tube into the stomach to  the level of the  proximal jejunum.  2.  No significant ascites.  3.  No acute appearing pancreatitis changes. No findings to suggest  acute abscess.  4.  Cholelithiasis noted.  5.  Other incidental/nonacute findings above        This report was finalized on 10/10/2023 2:19 PM by Dr. Janusz Lerner MD.       CT Chest Without Contrast Diagnostic [[id number]] Collected: 10/10/23 1400     Updated: 10/10/23 1414    Narrative:      EXAM:    CT Chest Without Intravenous Contrast     EXAM DATE:    10/10/2023 2:03 PM     CLINICAL HISTORY:    sepsis     TECHNIQUE:    Axial computed tomography images of the chest without intravenous  contrast.  Sagittal and coronal reformatted images were created and  reviewed.  This CT exam was performed using one or more of the following  dose reduction techniques:  automated exposure control, adjustment of  the mA and/or kV according to patient size, and/or use of iterative  reconstruction technique.     COMPARISON:    8/6/2023     FINDINGS:    Lungs and pleural spaces:  Chronic interstitial lung changes are  noted.  No consolidative airspace disease identified.  No pleural  effusions identified.    Heart:  Unremarkable as visualized.  No cardiomegaly.  No significant  pericardial effusion.  No significant coronary artery calcifications.    Bones/joints:  Degenerative changes thoracic spine.  No acute  fracture.  No dislocation.    Soft tissues:  Unremarkable as visualized.    Vasculature:  Unremarkable as visualized.  No thoracic aortic  aneurysm.    Lymph nodes:  Unremarkable as visualized.  No enlarged lymph nodes.    Tubes, lines and devices:  G-tube noted.       Impression:      1.  No acute thoracic findings identified.  2.  Refer to CT abdomen/pelvis for abdomen findings.        This report was finalized on 10/10/2023 2:12 PM by Dr. Janusz Lerner MD.       XR Chest 1 View [[id number]] Collected: 10/10/23 1347     Updated: 10/10/23 1350    Narrative:      EXAM:    XR Chest, 1 View     EXAM DATE:     10/10/2023 12:19 PM     CLINICAL HISTORY:    Severe Sepsis Protocol     TECHNIQUE:    Frontal view of the chest.     COMPARISON:    8/10/2023     FINDINGS:    Lungs and pleural spaces:  Unremarkable as visualized.  No  consolidation.  No pneumothorax.    Heart:  Unremarkable as visualized.  No cardiomegaly.    Mediastinum:  Unremarkable as visualized.    Bones/joints:  Unremarkable as visualized.       Impression:        Unremarkable exam. No acute cardiopulmonary findings identified.        This report was finalized on 10/10/2023 1:48 PM by Dr. Janusz Lerner MD.             I have personally reviewed the above radiology results.     ---------------------------------------------------------------------------------------------------------------------    Assessment & Plan      ACUTE HOSPITAL PROBLEMS    -SIRS vs severe sepsis, present on admission due to unclear source at this time   -Nausea, vomiting, diarrhea   -Abdominal pain   -History of C. Difficile colitis   -C. Difficile rule out   SIRS; HR > 90, RR >20, WBC >12K, Lactate > 2   Procal elevated   CT abdomen/pelvis and chest without acute abnormalities identified   Patient with known history of C Difficile colitis, C Diff toxin positive 9/11/2023. He reports complete treatment in the past.   Blood cultures obtained in ED, follow for final results   GI panel PCR and C diff toxin pending   Hold on antibiotics until C diff toxin resulted  IV fluid sepsis bolus administered in ED, continue gentle IV fluid hydration   Supportive care   PRN antiemetics available, hold on anti-diarrheal agents until C. Diff toxin results available   Closely monitor vitals and temperature curve   Repeat labs in AM     -Acute kidney injury   -Severe life threatening hyperkalemia   Baseline creatinine 0.5-0.6 with creatinine on admission of 2.61  Likely hypovolemic from acute GI losses   Potassium > 7 on admit. EKG with peaked T waves   IV fluid bolus given in ED, continue gentle IV  fluids   Treatment given in ED: 2000 mg IV calcium chloride x1, 25 g IV dextrose x1, 10 units IV regular insulin x1, DuoNeb nebulizer treatment x1, , 50 mEq sodium bicarb x 1   Nephrology was contacted in ED, patient started on bicarbonate gtt per orders as well as dose of lactulose and Kayexalate   Repeat BMP with improvement in potassium, now 5.1. Creatinine remains elevated but improved to 2.1  Nephrology input/assistance is much appreciated   Monitor urine output and renal function closely  Avoid nephrotoxins as much as possible   Repeat chemistry panel in AM    -Recent necrotizing pancreatitis and gallstone pancreatitis s/p cyst-gastrostomy and necrosectomy with PEG-J tube insertion on tube feeds  CT abdomen/pelvis with improvement in necrotizing pancreatitis, no acute findings appreciated   Lipase not significantly elevated   Supportive care   Consult nutrition for tube feeds    CHRONIC MEDICAL PROBLEMS    -Essential hypertension  BP hypotensive on presentation   Hold home antihypertensive regimen at this time.   Closely monitor BP per hospital protocol, titrate medications as necessary  -Hyperlipidemia: Cont home medication regimen   -History of TIA: No focal deficits on exam   -GERD: Hold PPI as patient is C diff rule out   -History of prostate cancer   -Anxiety/depression: Supportive care, continue home medication regimen once reconciled per pharmacy.  -Former smoker   ---------------------------------------------------  DVT Prophylaxis: SQ heparin   Activity: Up with assistance as tolerated   ---------------------------------------------------  INPATIENT status due to the need for care which can only be reasonably provided in an hospital setting such as aggressive/expedited ancillary services and/or consultation services, the necessity for IV medications, close physician monitoring and/or the possible need for procedures.  In such, I feel patient's risk for adverse outcomes and need for care warrant  INPATIENT evaluation and predict the patient's care encounter to likely last beyond 2 midnights.    Code Status: FULL CODE   ---------------------------------------------------  Disposition/Discharge planning: Plans on home with wife once medically stable, pending clinical course   ---------------------------------------------------  I have discussed the patient's assessment and plan with the patient, patient's wife at bedside, nursing staff, and attending physician Chapito Bravo, *    Toña Montero PA-C  Hospitalist Service -- [facility]   Pager: [phone]    10/10/23  17:40 EDT    Attending Physician: Chapito Soler, Bubba        ---------------------------------------------------------------------------------------------------------------------        Electronically signed by Chapito Soler DO at 10/10/23 1902          Discharge Summary        Chapito Soler DO at 10/13/23 2003              [facility] DISCHARGE SUMMARY      Date of Admission: 10/10/2023    Date of Discharge: 10/13/2023     PCP: Yolande Olvera APRN    Admission Diagnosis:   Please see admission H&P    Discharge Diagnosis:   Severe hyperkalemia  TYREE  SIRS  Hyponatremia  Recent necrotizing pancreatitis and gallstone pancreatitis s/p cyst-gastrostomy and necrosectomy with PEG-J tube insertion  Chronic microcytic anemia  Essential HTN    Procedures Performed:  None     Consults:   Consults       Date and Time Order Name Status Description    10/10/2023  3:00 PM Inpatient Nephrology Consult                History of Present Illness:  Ta Madison is a 63 y.o. male who presented to [facility] ED with CC of nausea, vomiting, abdominal pain and diarrhea. Please see admission H&P for complete details.     In the ED, workup revealed severe hyperkalemia with K+ of 7.8, TYREE and SIRS. EKG revealed peaked T waves. Cultures were obtained. Targeted treatment of his hyperkalemia was ordered and bicarb gtt  initiated.      Hospital Course  Ta Madison was admitted to the PCU for further evaluation and treatment. He was continued on the bicarb gtt. Nephrology was consulted for further input.     Stool studies including GI PCR and C diff were negative. Infectious workup remained negative with his SIRS thought to be 2/2 dehydration. His K+ quickly normalized and remained stable throughout the duration of his hospitalization. His renal quickly began improving as well and thought to be prerenal with dehydration and GI losses contributing. IVF's were adjusted per nephrology and later stopped. His N/V, abdominal pain and diarrhea quickly improved with supportive treatment and he was placed back on clear liquids and TF's resumed as well.     Once his K+ normalized he was moved out of the PCU to the med/surg floor with telemetry. Follow up labs revealed mild hyponatremia and nephrology adjusted his IVF's to NS while stopping free water flushes with his TF's. His Na+ remained low despite these interventions and nephrology added salt tablets with subsequent improvement. He remained clinically stable and remained hospitalized until his Na was >130 per nephrology recs. He reported uncontrolled pain on Norco and was transitioned to oxycodone with subsequent improvement. Later in the evening on 10/13, repeat labs revealed subsequent improvement with Na 134 and he was discharged home later that night with instructions for repeat labs and close outpatient follow up with is PCP and nephrology.        Condition on Discharge:  Stable    Vital Signs  Vitals:    10/13/23 1908   BP: 104/55   Pulse: 96   Resp: 18   Temp: 98.2 øF (36.8 øC)   SpO2: 98%       Physical Exam:  General:    Awake, alert, in no acute distress   Heart:      Normal S1 and S2. Regular rate and rhythm. No significant murmur, rubs or gallops appreciated.   Lungs:     Respirations regular, even and unlabored. Lungs clear to auscultation B/L. No wheezes, rales or rhonchi.    Abdomen:   Soft and nontender. No guarding, rebound tenderness or  organomegaly noted. Bowel sounds present x 4. (+) J tube   Extremities:  No clubbing, cyanosis or edema noted. Moves UE and LE equally B/L.     Discharge Disposition:   home      Discharge Medications:     Discharge Medications        New Medications        Instructions Start Date   lactulose 10 GM/15ML solution  Commonly known as: CHRONULAC   30 g, Per J Tube, Daily PRN      lansoprazole 30 MG Tablet Delayed Release Dispersible disintegrating tablet  Commonly known as: PREVACID SOLUTAB   30 mg, Per J Tube, Every Early Morning   Start Date: October 14, 2023     naloxone 4 MG/0.1ML nasal spray  Commonly known as: NARCAN   Call 911. Don't prime. [place] in 1 nostril for overdose. Repeat in 2-3 minutes in other nostril if no or minimal breathing/responsiveness.      oxyCODONE 10 MG tablet  Commonly known as: ROXICODONE   10 mg, Per J Tube, Every 6 Hours PRN      sodium chloride 1 g tablet   2 g, Oral, 3 Times Daily With Meals   Start Date: October 14, 2023            Continue These Medications        Instructions Start Date   atorvastatin 20 MG tablet  Commonly known as: LIPITOR   20 mg, Per J Tube, Nightly      Carafate 1 GM/10ML suspension  Generic drug: sucralfate   1 g, Oral, 4 Times Daily      fenofibrate 145 MG tablet  Commonly known as: TRICOR   145 mg, Per J Tube, Daily      folic acid 1 MG tablet  Commonly known as: FOLVITE   1 mg, Per J Tube, Daily      hydrOXYzine 50 MG tablet  Commonly known as: ATARAX   50 mg, Oral, 3 Times Daily PRN      metoprolol tartrate 25 MG tablet  Commonly known as: LOPRESSOR   25 mg, Per J Tube, 2 Times Daily      montelukast 10 MG tablet  Commonly known as: SINGULAIR   10 mg, Per J Tube, Nightly      multivitamin with minerals tablet tablet   1 tablet, Oral, Daily      ondansetron 8 MG tablet  Commonly known as: ZOFRAN   8 mg, Per J Tube, Every 8 Hours PRN      sertraline 50 MG tablet  Commonly known as: ZOLOFT    50 mg, Per J Tube, Daily      thiamine 100 MG tablet  tablet  Commonly known as: VITAMIN B-1   100 mg, Per J Tube, Daily      vitamin B-12 1000 MCG tablet  Commonly known as: CYANOCOBALAMIN   1,000 mcg, Per J Tube, Daily      Xtandi 40 MG chemo capsule  Generic drug: enzalutamide   160 mg, Oral, Daily             Stop These Medications      HYDROcodone-acetaminophen  MG per tablet  Commonly known as: NORCO     pantoprazole 40 MG EC tablet  Commonly known as: PROTONIX                Discharge Diet:   Dietary Orders (From admission, onward)       Start     Ordered    10/12/23 1038  Peptamen AF (Vital AF 1.2); Tube Feeding Type: Continuous; Continuous Tube Feeding Start Rate (mL/hr): 45; Then Advance Rate By (mL/hr): Other; Other: 5; Every __ Hours: 4; To Goal Rate of (mL/hr): 50  Diet Effective Now        Question Answer Comment   Tube Feeding Formula: Peptamen AF (Vital AF 1.2)    Tube Feeding Type Continuous    Continuous Tube Feeding Start Rate (mL/hr) 45    Then Advance Rate By (mL/hr) Other    Other 5    Every __ Hours 4    To Goal Rate of (mL/hr) 50        10/12/23 1038    10/11/23 1800  Dietary Nutrition Supplements Boost Breeze (Ensure Clear)  Daily With Breakfast & Dinner      Question:  Select Supplement  Answer:  Boost Breeze (Ensure Clear)    10/11/23 1224    10/10/23 2010  Diet: Liquid Diets; Clear Liquid; Fluid Consistency: Thin (IDDSI 0)  Diet Effective Now        References:    Diet Order [place]   Question Answer Comment   Diets: Liquid Diets    Liquid Diet: Clear Liquid    Fluid Consistency: Thin (IDDSI 0)        10/10/23 2009                    Activity at Discharge:  activity as tolerated    Follow-up Appointments:  Additional Instructions for the Follow-ups that You Need to Schedule       Discharge Follow-up with PCP   As directed       Currently Documented PCP:    Yolande Olvera APRN    PCP Phone Number:    [phone]     Follow Up Details: Follow up with EUGENIA Mendoza in 1 week.         Discharge Follow-up with Specified Provider: Follow up with Dr. Ace in 2 weeks.   As directed      To: Follow up with Dr. Ace in 2 weeks.               Follow-up Information       Yolande Olvera APRN .    Specialty: Family Medicine  Why: Follow up with EUGENIA Mendoza in 1 week.  Contact information:  [address] [place] [address]  [phone]                           Your Scheduled Appointments      Oct 17, 2023  3:30 PM  Hospital Follow Up with EUGENIA Mendoza  [facility] FAMILY MEDICINE ([place]) [address] [place] [address]  [phone]        Oct 18, 2023 10:30 AM  LAB with [place] NURSE [facility]  [facility] HEMATOLOGY & ONCOLOGY ([place]) [address] [facility] [address]  [place] [address]  [phone]   Bring ID and  Insurance cards.  If you received paperwork in the mail, fill it out and bring it with them.       Oct 18, 2023 11:00 AM  FOLLOW UP with HIRO Martinez MD  [facility] HEMATOLOGY & ONCOLOGY ([place]) [address] [facility] [address]  [place] [address]  [phone]   Bring ID and  Insurance cards.  New patients are to arrive 30 minutes prior to the appointement.  If you received paperwork in the mail, fill it out and bring it with them.  All other appt's need to be here 15 minutes early.        Oct 18, 2023 11:45 AM  INJECTION with [facility] OP INFUS CHAIR 20  [facility] OUTPATIENT INFUSION ([place]) [address] [facility]  [place] [address]  [phone]        Nov 06, 2023  9:15 AM  Office Visit with EUGENIA Mendoza  [facility] ([place]) [address] [place] [address]  [phone]   Arrive 15 minutes prior to appointment.  If you are in need of a language or hearing  please call the Department.       Dec 04, 2023  8:30 AM  Office Visit with EUGENIA Mendoza  [facility] ([place]) [address] [place] [address]  [phone]   Arrive 15 minutes prior to appointment.  If you  are in need of a language or hearing  please call the Department.                Test Results Pending at Discharge:  Pending Labs       Order Current Status    Blood Culture - Blood, Arm, Left Preliminary result    Blood Culture - Blood, Wrist, Left Preliminary result             The 10-year ASCVD risk score (Briana REGALADO, et al., 2019) is: 7%    Values used to calculate the score:      Age: 63 years      Sex: Male      Is Non- : No      Diabetic: No      Tobacco smoker: No      Systolic Blood Pressure: 104 mmHg      Is BP treated: Yes      HDL Cholesterol: 39 mg/dL      Total Cholesterol: 134 mg/dL      Garima Garcia DO  10/13/23  20:03 EDT      Time: Greater than 30 minutes spent on this discharge.           Electronically signed by Garima Garcia DO at 10/15/23 1647       Discharge Order (From admission, onward)       Start     Ordered    10/13/23 2003  Discharge patient  Once        Expected Discharge Date: 10/13/23   Discharge Disposition: Home or Self Care   Physician of Record for Attribution - Please select from Treatment Team: GARIMA GARCIA [[id number]]   Review needed by CMO to determine Physician of Record: No      Question Answer Comment   Physician of Record for Attribution - Please select from Treatment Team GARIMA GARCIA    Review needed by CMO to determine Physician of Record No        10/13/23 2003

## 2023-10-16 NOTE — OUTREACH NOTE
Call Center TCM Note      Flowsheet Row Responses   [facility] patient discharged from? [facility]   Does the patient have one of the following disease processes/diagnoses(primary or secondary)? Other   TCM attempt successful? Yes   Call start time 1035   Call end time 1057   Discharge diagnosis Hyperkalemia   Person spoke with today (if not patient) and relationship spouse   Meds reviewed with patient/caregiver? Yes   Is the patient having any side effects they believe may be caused by any medication additions or changes? No   Does the patient have all medications ordered at discharge? Yes   Is the patient taking all medications as directed (includes completed medication regime)? Yes   Comments Patient has a followup scheduled with PCP Wade Lanier tomorrow. 10/17 and oncology on 10/18/2023.   Does the patient have an appointment with their PCP within 7-14 days of discharge? Yes   Psychosocial issues? No   Did the patient receive a copy of their discharge instructions? Yes   Nursing interventions Reviewed instructions with patient   What is the patient's perception of their health status since discharge? Improving   Is the patient/caregiver able to teach back signs and symptoms related to disease process for when to call PCP? Yes   Is the patient/caregiver able to teach back signs and symptoms related to disease process for when to call 911? Yes   Is the patient/caregiver able to teach back the hierarchy of who to call/visit for symptoms/problems? PCP, Specialist, Home health nurse, Urgent Care, ED, 911 Yes   If the patient is a current smoker, are they able to teach back resources for cessation? Not a smoker   TCM call completed? Yes   Call end time 1057   Would this patient benefit from a Referral to Amb Social Work? No   Is the patient interested in additional calls from an ambulatory ? No            Jerome Dominguez RN    10/16/2023, 10:58 EDT

## 2023-10-17 ENCOUNTER — OFFICE VISIT (OUTPATIENT)
Dept: FAMILY MEDICINE CLINIC | Facility: CLINIC | Age: 63
End: 2023-10-17
Payer: COMMERCIAL

## 2023-10-17 VITALS
TEMPERATURE: 98.4 F | HEART RATE: 88 BPM | WEIGHT: 156 LBS | DIASTOLIC BLOOD PRESSURE: 60 MMHG | HEIGHT: 67 IN | BODY MASS INDEX: 24.48 KG/M2 | OXYGEN SATURATION: 96 % | SYSTOLIC BLOOD PRESSURE: 100 MMHG

## 2023-10-17 DIAGNOSIS — K85.91 NECROTIZING PANCREATITIS: Primary | ICD-10-CM

## 2023-10-17 DIAGNOSIS — E87.5 HYPERKALEMIA: ICD-10-CM

## 2023-10-17 DIAGNOSIS — E87.1 HYPONATREMIA: ICD-10-CM

## 2023-10-17 DIAGNOSIS — K94.29 IRRITATION AROUND PERCUTANEOUS ENDOSCOPIC GASTROSTOMY (PEG) TUBE SITE: ICD-10-CM

## 2023-10-17 PROCEDURE — 80053 COMPREHEN METABOLIC PANEL: CPT | Performed by: NURSE PRACTITIONER

## 2023-10-17 PROCEDURE — 36415 COLL VENOUS BLD VENIPUNCTURE: CPT | Performed by: NURSE PRACTITIONER

## 2023-10-17 PROCEDURE — 85025 COMPLETE CBC W/AUTO DIFF WBC: CPT | Performed by: NURSE PRACTITIONER

## 2023-10-18 ENCOUNTER — INFUSION (OUTPATIENT)
Dept: ONCOLOGY | Facility: HOSPITAL | Age: 63
End: 2023-10-18
Payer: COMMERCIAL

## 2023-10-18 ENCOUNTER — OFFICE VISIT (OUTPATIENT)
Dept: ONCOLOGY | Facility: CLINIC | Age: 63
End: 2023-10-18
Payer: COMMERCIAL

## 2023-10-18 ENCOUNTER — LAB (OUTPATIENT)
Dept: ONCOLOGY | Facility: CLINIC | Age: 63
End: 2023-10-18
Payer: COMMERCIAL

## 2023-10-18 ENCOUNTER — APPOINTMENT (OUTPATIENT)
Dept: ONCOLOGY | Facility: HOSPITAL | Age: 63
End: 2023-10-18
Payer: COMMERCIAL

## 2023-10-18 VITALS
SYSTOLIC BLOOD PRESSURE: 97 MMHG | TEMPERATURE: 97.1 F | RESPIRATION RATE: 18 BRPM | OXYGEN SATURATION: 97 % | DIASTOLIC BLOOD PRESSURE: 54 MMHG | BODY MASS INDEX: 24.62 KG/M2 | HEART RATE: 64 BPM | WEIGHT: 157.2 LBS

## 2023-10-18 VITALS
RESPIRATION RATE: 18 BRPM | DIASTOLIC BLOOD PRESSURE: 54 MMHG | OXYGEN SATURATION: 99 % | SYSTOLIC BLOOD PRESSURE: 107 MMHG | HEART RATE: 79 BPM | TEMPERATURE: 98.2 F

## 2023-10-18 DIAGNOSIS — G89.3 CHRONIC PAIN DUE TO MALIGNANT NEOPLASTIC DISEASE: Primary | ICD-10-CM

## 2023-10-18 DIAGNOSIS — C79.51 MALIGNANT NEOPLASM METASTATIC TO BONE: ICD-10-CM

## 2023-10-18 DIAGNOSIS — C61 ADENOCARCINOMA OF PROSTATE: ICD-10-CM

## 2023-10-18 DIAGNOSIS — C61 ADENOCARCINOMA OF PROSTATE: Primary | ICD-10-CM

## 2023-10-18 LAB
ALBUMIN SERPL-MCNC: 2.7 G/DL (ref 3.5–5.2)
ALBUMIN SERPL-MCNC: 2.8 G/DL (ref 3.5–5.2)
ALBUMIN/GLOB SERPL: 0.7 G/DL
ALBUMIN/GLOB SERPL: 0.8 G/DL
ALP SERPL-CCNC: 656 U/L (ref 39–117)
ALP SERPL-CCNC: 842 U/L (ref 39–117)
ALT SERPL W P-5'-P-CCNC: 134 U/L (ref 1–41)
ALT SERPL W P-5'-P-CCNC: 201 U/L (ref 1–41)
ANION GAP SERPL CALCULATED.3IONS-SCNC: 7 MMOL/L (ref 5–15)
ANION GAP SERPL CALCULATED.3IONS-SCNC: 8.2 MMOL/L (ref 5–15)
AST SERPL-CCNC: 217 U/L (ref 1–40)
AST SERPL-CCNC: 84 U/L (ref 1–40)
BASOPHILS # BLD AUTO: 0.06 10*3/MM3 (ref 0–0.2)
BASOPHILS # BLD AUTO: 0.07 10*3/MM3 (ref 0–0.2)
BASOPHILS NFR BLD AUTO: 0.6 % (ref 0–1.5)
BASOPHILS NFR BLD AUTO: 0.7 % (ref 0–1.5)
BILIRUB SERPL-MCNC: 0.2 MG/DL (ref 0–1.2)
BILIRUB SERPL-MCNC: 0.3 MG/DL (ref 0–1.2)
BUN SERPL-MCNC: 16 MG/DL (ref 8–23)
BUN SERPL-MCNC: 16 MG/DL (ref 8–23)
BUN/CREAT SERPL: 35.6 (ref 7–25)
BUN/CREAT SERPL: 37.2 (ref 7–25)
CALCIUM SPEC-SCNC: 8.4 MG/DL (ref 8.6–10.5)
CALCIUM SPEC-SCNC: 8.5 MG/DL (ref 8.6–10.5)
CHLORIDE SERPL-SCNC: 100 MMOL/L (ref 98–107)
CHLORIDE SERPL-SCNC: 99 MMOL/L (ref 98–107)
CO2 SERPL-SCNC: 23.8 MMOL/L (ref 22–29)
CO2 SERPL-SCNC: 24 MMOL/L (ref 22–29)
CREAT SERPL-MCNC: 0.43 MG/DL (ref 0.76–1.27)
CREAT SERPL-MCNC: 0.45 MG/DL (ref 0.76–1.27)
DEPRECATED RDW RBC AUTO: 50.3 FL (ref 37–54)
DEPRECATED RDW RBC AUTO: 57.1 FL (ref 37–54)
EGFRCR SERPLBLD CKD-EPI 2021: 119.1 ML/MIN/1.73
EGFRCR SERPLBLD CKD-EPI 2021: 120.7 ML/MIN/1.73
EOSINOPHIL # BLD AUTO: 0.07 10*3/MM3 (ref 0–0.4)
EOSINOPHIL # BLD AUTO: 0.07 10*3/MM3 (ref 0–0.4)
EOSINOPHIL NFR BLD AUTO: 0.7 % (ref 0.3–6.2)
EOSINOPHIL NFR BLD AUTO: 0.7 % (ref 0.3–6.2)
ERYTHROCYTE [DISTWIDTH] IN BLOOD BY AUTOMATED COUNT: 18.5 % (ref 12.3–15.4)
ERYTHROCYTE [DISTWIDTH] IN BLOOD BY AUTOMATED COUNT: 19.8 % (ref 12.3–15.4)
FERRITIN SERPL-MCNC: 606.9 NG/ML (ref 30–400)
GLOBULIN UR ELPH-MCNC: 3.7 GM/DL
GLOBULIN UR ELPH-MCNC: 4.1 GM/DL
GLUCOSE SERPL-MCNC: 103 MG/DL (ref 65–99)
GLUCOSE SERPL-MCNC: 87 MG/DL (ref 65–99)
HCT VFR BLD AUTO: 25.6 % (ref 37.5–51)
HCT VFR BLD AUTO: 25.8 % (ref 37.5–51)
HGB BLD-MCNC: 7.6 G/DL (ref 13–17.7)
HGB BLD-MCNC: 8 G/DL (ref 13–17.7)
IMM GRANULOCYTES # BLD AUTO: 0.03 10*3/MM3 (ref 0–0.05)
IMM GRANULOCYTES # BLD AUTO: 0.04 10*3/MM3 (ref 0–0.05)
IMM GRANULOCYTES NFR BLD AUTO: 0.3 % (ref 0–0.5)
IMM GRANULOCYTES NFR BLD AUTO: 0.4 % (ref 0–0.5)
IRON 24H UR-MRATE: 19 MCG/DL (ref 59–158)
IRON SATN MFR SERPL: 6 % (ref 20–50)
LYMPHOCYTES # BLD AUTO: 2.16 10*3/MM3 (ref 0.7–3.1)
LYMPHOCYTES # BLD AUTO: 2.25 10*3/MM3 (ref 0.7–3.1)
LYMPHOCYTES NFR BLD AUTO: 21.5 % (ref 19.6–45.3)
LYMPHOCYTES NFR BLD AUTO: 21.7 % (ref 19.6–45.3)
MCH RBC QN AUTO: 23.5 PG (ref 26.6–33)
MCH RBC QN AUTO: 24 PG (ref 26.6–33)
MCHC RBC AUTO-ENTMCNC: 29.5 G/DL (ref 31.5–35.7)
MCHC RBC AUTO-ENTMCNC: 31.3 G/DL (ref 31.5–35.7)
MCV RBC AUTO: 76.9 FL (ref 79–97)
MCV RBC AUTO: 79.6 FL (ref 79–97)
MONOCYTES # BLD AUTO: 0.97 10*3/MM3 (ref 0.1–0.9)
MONOCYTES # BLD AUTO: 1.12 10*3/MM3 (ref 0.1–0.9)
MONOCYTES NFR BLD AUTO: 10.7 % (ref 5–12)
MONOCYTES NFR BLD AUTO: 9.7 % (ref 5–12)
NEUTROPHILS NFR BLD AUTO: 6.66 10*3/MM3 (ref 1.7–7)
NEUTROPHILS NFR BLD AUTO: 6.9 10*3/MM3 (ref 1.7–7)
NEUTROPHILS NFR BLD AUTO: 66 % (ref 42.7–76)
NEUTROPHILS NFR BLD AUTO: 67 % (ref 42.7–76)
NRBC BLD AUTO-RTO: 0 /100 WBC (ref 0–0.2)
NRBC BLD AUTO-RTO: 0 /100 WBC (ref 0–0.2)
PLATELET # BLD AUTO: 608 10*3/MM3 (ref 140–450)
PLATELET # BLD AUTO: 614 10*3/MM3 (ref 140–450)
PMV BLD AUTO: 10.4 FL (ref 6–12)
PMV BLD AUTO: 9.9 FL (ref 6–12)
POTASSIUM SERPL-SCNC: 4.3 MMOL/L (ref 3.5–5.2)
POTASSIUM SERPL-SCNC: 4.4 MMOL/L (ref 3.5–5.2)
PROT SERPL-MCNC: 6.5 G/DL (ref 6–8.5)
PROT SERPL-MCNC: 6.8 G/DL (ref 6–8.5)
PSA SERPL-MCNC: <0.014 NG/ML (ref 0–4)
RBC # BLD AUTO: 3.24 10*6/MM3 (ref 4.14–5.8)
RBC # BLD AUTO: 3.33 10*6/MM3 (ref 4.14–5.8)
SODIUM SERPL-SCNC: 131 MMOL/L (ref 136–145)
SODIUM SERPL-SCNC: 131 MMOL/L (ref 136–145)
TIBC SERPL-MCNC: 322 MCG/DL (ref 298–536)
TRANSFERRIN SERPL-MCNC: 216 MG/DL (ref 200–360)
WBC NRBC COR # BLD: 10.45 10*3/MM3 (ref 3.4–10.8)
WBC NRBC COR # BLD: 9.95 10*3/MM3 (ref 3.4–10.8)

## 2023-10-18 PROCEDURE — 82728 ASSAY OF FERRITIN: CPT | Performed by: INTERNAL MEDICINE

## 2023-10-18 PROCEDURE — 1126F AMNT PAIN NOTED NONE PRSNT: CPT | Performed by: INTERNAL MEDICINE

## 2023-10-18 PROCEDURE — 99214 OFFICE O/P EST MOD 30 MIN: CPT | Performed by: INTERNAL MEDICINE

## 2023-10-18 PROCEDURE — 83540 ASSAY OF IRON: CPT | Performed by: INTERNAL MEDICINE

## 2023-10-18 PROCEDURE — 25010000002 LEUPROLIDE 45 MG KIT: Performed by: INTERNAL MEDICINE

## 2023-10-18 PROCEDURE — 84153 ASSAY OF PSA TOTAL: CPT | Performed by: INTERNAL MEDICINE

## 2023-10-18 PROCEDURE — 84466 ASSAY OF TRANSFERRIN: CPT | Performed by: INTERNAL MEDICINE

## 2023-10-18 PROCEDURE — 85025 COMPLETE CBC W/AUTO DIFF WBC: CPT | Performed by: INTERNAL MEDICINE

## 2023-10-18 PROCEDURE — 80053 COMPREHEN METABOLIC PANEL: CPT | Performed by: INTERNAL MEDICINE

## 2023-10-18 PROCEDURE — 96402 CHEMO HORMON ANTINEOPL SQ/IM: CPT

## 2023-10-18 RX ADMIN — LEUPROLIDE ACETATE 45 MG: KIT at 12:20

## 2023-10-18 NOTE — PROGRESS NOTES
Name:  Ta Madison  :  1960  Date:  10/18/2023     REFERRING PHYSICIAN  Dheeraj Nieves MD    PRIMARY CARE PROVIDER  Yolande Olvera APRN    REASON FOR FOLLOWUP  1. Adenocarcinoma of prostate      CHIEF COMPLAINT  Slowly improving nausea and abdominal pain.    Dear Ms. Olvera,    HISTORY OF PRESENT ILLNESS:   I saw Mr. Madison in follow up today in our medical oncology clinic. As you are aware, he is a pleasant, 63 y.o., white male with a history of tobacco abuse who was referred to urology in 2020 for an elevated serum PSA level (of ~28 ng/mL, which had increased to 247 ng/mL at the time of his initial appointment in our clinic). An examination revealed a very hard and fixed prostate, and multiple core biopsies were performed on 2020. The results were consistent with [place] Score 5+4 or 4+5=9 adenocarcinoma involving all twelve sampled cores. A staging workup confirmed pelvic adenopathy as well as diffuse, sclerotic lesions throughout the axial and appendicular skeleton, including the right greater than left femoral diaphyses. With this stage IV diagnosis, he was referred to our clinic for further evaluation and management. At the time of his initial consultation in our office (on 2021), he was agreeable to starting definitive therapy with a combination of ADT (Lupron injections) and anti-androgen therapy (daily PO Xtandi).    INTERIM HISTORY:  Mr. Madison returns to clinic today for follow up again accompanied by his wife. He began [id number] Lupron on 2021 and, up until ~early summer 2023, he had also been on daily PO Xtandi (for a total of about ~two and one half years). Unfortunately, in early summer 2023, he developed necrotizing pancreatitis; and he has spent the better part of the past ~three to four months admitted to one hospital or another (including , [facility] and our facility). Throughout that time, he has been unable to take the Xtandi, as it was only just recently that he  could even attempt to take anything by mouth. He explains today that, since he was discharged from our hospital last week, he remains wholly reliant on tube feeds for his nutrition; but he has been able to take his pills orally (including the Xtandi, which he attempted on a couple of occasions recently and did fine). The diffuse skeletal pains he was experiencing at the time of his initial presentation in early  remain completely resolved. He has no other specific complaints.    Past Medical History:   Diagnosis Date    Elevated cholesterol     GERD (gastroesophageal reflux disease)     Increased heart rate     Mini stroke     Neck pain     Prostate cancer     Rash     Tobacco abuse        Past Surgical History:   Procedure Laterality Date    APPENDECTOMY      [facility]     COLONOSCOPY      COLONOSCOPY N/A 2023    Procedure: COLONOSCOPY;  Surgeon: Mahnaz Caraballo MD;  [facility];  Service: Gastroenterology;  Laterality: N/A;    ENDOSCOPY N/A 2023    Procedure: ESOPHAGOGASTRODUODENOSCOPY WITH KEOFEED PLACEMENT;  Surgeon: Yolande Eller MD;  Location:  [facility];  Service: Gastroenterology;  Laterality: N/A;       Social History     Socioeconomic History    Marital status:    Tobacco Use    Smoking status: Former     Packs/day: 2.00     Years: 45.00     Additional pack years: 0.00     Total pack years: 90.00     Types: Cigarettes     Quit date: 2023     Years since quittin.2    Smokeless tobacco: Never   Vaping Use    Vaping Use: Never used   Substance and Sexual Activity    Alcohol use: No    Drug use: Yes     Types: Marijuana     Comment: occ     Sexual activity: Defer       Family History   Problem Relation Age of Onset    Nephrolithiasis Mother     Heart disease Father     Hypertension Father     Kidney disease Father        No Known Allergies    Current Outpatient Medications   Medication Sig Dispense Refill    atorvastatin (LIPITOR) 20 MG tablet  Administer 1 tablet per J tube Every Night.      enzalutamide (XTANDI) 40 MG chemo capsule Take 4 capsules by mouth Daily. 120 capsule 5    fenofibrate (TRICOR) 145 MG tablet Administer 1 tablet per J tube Daily.      folic acid (FOLVITE) 1 MG tablet Administer 1 tablet per J tube Daily.      hydrOXYzine (ATARAX) 50 MG tablet Take 1 tablet by mouth 3 (Three) Times a Day As Needed for Itching. 90 tablet 1    lactulose (CHRONULAC) 10 GM/15ML solution Administer 45 mL per J tube Daily As Needed (Constipation). 473 mL 0    lansoprazole (PREVACID SOLUTAB) 30 MG Tablet Delayed Release Dispersible disintegrating tablet Administer 1 tablet per J tube Every Morning. 30 tablet 0    metoprolol tartrate (LOPRESSOR) 25 MG tablet Administer 1 tablet per J tube 2 (Two) Times a Day.      montelukast (SINGULAIR) 10 MG tablet Administer 1 tablet per J tube Every Night.      multivitamin with minerals tablet tablet Take 1 tablet by mouth Daily.      naloxone (NARCAN) 4 MG/0.1ML nasal spray Call 911. Don't prime. [place] in 1 nostril for overdose. Repeat in 2-3 minutes in other nostril if no or minimal breathing/responsiveness. 2 each 0    ondansetron (ZOFRAN) 8 MG tablet Administer 1 tablet per J tube Every 8 (Eight) Hours As Needed for Nausea or Vomiting.      oxyCODONE (ROXICODONE) 10 MG tablet Administer 1 tablet per J tube Every 6 (Six) Hours As Needed for Moderate Pain. 12 tablet 0    sertraline (ZOLOFT) 50 MG tablet Administer 1 tablet per J tube Daily.      sodium chloride 1 g tablet Take 2 tablets by mouth 3 (Three) Times a Day With Meals. 180 tablet 0    sucralfate (Carafate) 1 GM/10ML suspension Take 10 mL by mouth 4 (Four) Times a Day.      thiamine (VITAMIN B-1) 100 MG tablet  tablet Administer 1 tablet per J tube Daily.      vitamin B-12 (CYANOCOBALAMIN) 1000 MCG tablet Administer 1 tablet per J tube Daily.       No current facility-administered medications for this visit.     Facility-Administered Medications Ordered in  Other Visits   Medication Dose Route Frequency Provider Last Rate Last Admin    leuprolide (LUPRON) injection 45 mg  45 mg Intramuscular Once HIRO Martinez MD         REVIEW OF SYSTEMS  CONSTITUTIONAL:  No fever, chills or night sweats. Chronic fatigue (although at least somewhat improved compared to a month or two ago).  EYES:  No blurry vision, diplopia or other vision changes.  ENT:  No hearing loss, nosebleeds or sore throat.  CARDIOVASCULAR:  No palpitations, arrhythmia, syncopal episodes or edema.  PULMONARY:  No hemoptysis, wheezing, chronic cough or shortness of breath.  GASTROINTESTINAL:  As per the HPI above.  GENITOURINARY:  No hematuria, kidney stones or frequent urination.  MUSCULOSKELETAL:  As per the HPI above.  ENDOCRINE:  No excessive thirst. Hot flashes as above.  HEMATOLOGIC:  No history of free bleeding, spontaneous bleeding or clotting.  IMMUNOLOGIC:  No allergies or frequent infections.  NEUROLOGIC: No numbness, tingling, seizures or weakness.  PSYCHIATRIC:  No anxiety or depression.    PHYSICAL EXAMINATION  BP 97/54   Pulse 64   Temp 97.1 °F (36.2 °C) (Temporal)   Resp 18   Wt 71.3 kg (157 lb 3.2 oz)   SpO2 97%   BMI 24.62 kg/m²     Pain Score:  Pain Score    10/18/23 1031   PainSc: 0-No pain     ECO  GENERAL:  A well-developed, well-nourished, white male in no acute distress, now chronically ill-appearing (although he is reportedly doing much better now compared to how he was doing throughout the majority of this past summer).  HEENT:  Pupils equally round and reactive to light. Extraocular muscles intact.  CARDIOVASCULAR:  Regular rate and rhythm. No murmurs, gallops or rubs.  LUNGS:  Clear to auscultation bilaterally. No wheezing  ABDOMEN:  Soft, nontender, nondistended with positive bowel sounds. PEG in place.  EXTREMITIES:  No clubbing, cyanosis or edema bilaterally.  SKIN:  No petechiae or rashes.  NEURO:  Cranial nerves grossly intact. No focal deficits.  PSYCH:  Alert and  oriented x3.    LABORATORY  Lab Results   Component Value Date    WBC 10.45 10/18/2023    HGB 7.6 (L) 10/18/2023    HCT 25.8 (L) 10/18/2023    MCV 79.6 10/18/2023     (H) 10/18/2023    NEUTROABS 6.90 10/18/2023       Lab Results   Component Value Date     (L) 10/18/2023    K 4.3 10/18/2023    CL 99 10/18/2023    CO2 23.8 10/18/2023    BUN 16 10/18/2023    CREATININE 0.43 (L) 10/18/2023    GLUCOSE 103 (H) 10/18/2023    CALCIUM 8.5 (L) 10/18/2023    AST 84 (H) 10/18/2023     (H) 10/18/2023    ALKPHOS 656 (H) 10/18/2023    BILITOT 0.2 10/18/2023    PROTEINTOT 6.8 10/18/2023    ALBUMIN 2.7 (L) 10/18/2023     CBC (10/18/2023): WBCs: 10.45; HgB: 7.6; Hct: 25.8; platelets: 608; MCV: 79.6  CBC (10/17/2023): WBCs: 9.95; HgB: 8.0; Hct: 25.6; platelets: 614; MCV: 76.9  CBC (04/12/2023): WBCs: 6.79; HgB: 12.2; Hct: 38.5; platelets: 546  CBC (01/19/2023): WBCs: 8.19; HgB: 12.1; Hct: 37.6; platelets: 586  CBC (10/19/2022): WBCs: 11.25; HgB: 12.3; Hct: 37.3; platelets: 453  CBC (07/18/2022): WBCs: 7.82; HgB: 12.7; Hct: 39.0; platelets: 513  CBC (04/13/2022): WBCs: 7.62; HgB: 12.7; Hct: 38.7; platelets: 453  CBC (01/19/2022): WBCs: 7.56; HgB: 13.1; Hct: 41.0; platelets: 518  CBC (07/20/2021): WBCs: 7.43; HgB: 13.0; Hct: 39.6; platelets: 469  CBC (06/29/2021): WBCs: 7.8; HgB: 12.6; Hct: 38.1; platelets: 396  CBC (04/14/2021): WBCs: 7.2; HgB: 12.7; Hct: 38.8; platelets: 423  CBC (02/25/2021): WBCs: 6.5; HgB: 11.2; Hct: 34.4; platelets: 407    PSA (10/18/2023): pending  PSA (07/12/2023): < 0.014 ng/mL  PSA (04/12/2023): < 0.014 ng/mL  PSA (01/19/2023): < 0.014 ng/mL  PSA (10/19/2022): < 0.014 ng/mL  PSA (07/18/2022): < 0.014 ng/mL  PSA (04/13/2022): < 0.014 ng/mL  PSA (01/19/2022): < 0.014 ng/mL  PSA (10/13/2021): < 0.014 ng/mL  PSA (07/20/2021): 0.020 ng/mL   PSA (04/14/2021): 0.108 ng/mL  PSA (02/25/2021): 0.814 ng/mL  PSA (01/20/2021): 29.5 ng/mL  PSA (01/07/2021): 247.0 ng/mL  PSA (11/10/2020): 28.3  ng/mL    Iron profile (10/18/2023): serum iron: 19; % saturation: 6; TIBC: 322; ferritin: 606.9 ng/mL    IMAGING  NM bone scan, whole body (12/15/2020):  Impression: Extensive osteoblastic metastatic disease involving the axial and appendicular skeleton including the right greater than left femoral diaphyses.    CT pelvis with contrast (12/21/2020):  Impression:  1) Urinary bladder wall thickening noted.  2) Heterogeneous appearance of the prostate gland.  3) Nonspecific stranding in the retroperitoneal space.  4) Retroperitoneal region lymph node on the right side measuring 1.4 cm.  5) Right obturator chain region lymph node measuring 1.5 cm. Other lymph nodes are identified in this region.  6) Sclerotic bone lesions are noted throughout the pelvis concerning for metastatic disease.    CT chest without contrast (10/10/2023, compared to 08/06/2023):  Impression:  1) No acute thoracic findings identfied.  2) Refer to CT abdomen/pelvis for abdomen findings.    CT abdomen and pelvis without contrast (10/10/2023, compared to 08/10/2023):  Impression:  1) Significant improvement in changes of necrotizing pancreatitis status post cystogastrostomy tube placement, necrosectomy and Axios metallic stent placement spanning from the G-tube into the stomach to the level of the proximal jejunum.  2) No significant ascites.  3) No acute appearing pancreatitis changes. No findings to suggest acute abscess.  4) Cholelithiasis noted.  5) Other incidental/nonacute findings.    PATHOLOGY  Left and right prostate, needle core biopsies, twelve total cores (12/02/2020):  [place] Score 5+4 or 4+5 = 9 prostatic adenocarcinoma involving ~45-90% of all twelve (12/12) total cores.    IMPRESSION AND PLAN  Mr. Madison is a 63 y.o., white male with:  Prostate adenocarcinoma: Diagnosed in December 2020 with stage IV disease, with widespread metastases involving the axial and appendicular skeleton along with probable, pelvic adenopathy. I have had  multiple, long discussions with the patient and his wife since the time of his initial consultation in our clinic (on 01/07/2021) regarding this diagnosis and its prognosis. They remain aware that this disease is, unfortunately, not curable; however, particularly given his young age and this type of cancer's tendency to respond very well to initial anti-testosterone therapy, it was/remains very treatable, and sustained remissions are very possible. He was felt to be an excellent candidate for initial therapy with both ADT and androgen receptor blockade. He received an initial cycle of (currently now [id number]) Lupron on 01/08/2021 and was also started on daily PO Xtandi at that time. He has tolerated this regimen overall very well for the past ~two and one half (2.5) years; and, with this therapy, his serum PSA levels responded dramatically and quickly, decreasing from 247 ng/mL on 01/07/2021 to undetectable (<0.014 ng/mL) by 10/13/2021 (and this continues to be the case on the most recent repeat check, drawn on 07/12/2023; today's result is pending). Unfortunately, due to issue #2, he has not been able to take the Xtandi anywhere near consistently for the past ~three to four months (but he has never missed a dose of Lupron). He does explain today that he has recently been able to attempt to take his medications by mouth again, and this has been going fairly well (he remains fully reliant on his feeding tube for his nutritional/caloric needs). We will treat him with the next cycle of Lupron today, as planned; and he will attempt to resume the PO Xtandi (and he promises to notify us by phone by next week if this is not going well). We will see him back in our clinic in six weeks (early December) with a repeat CBC, CMP and PSA level.  Necrotizing pancreatitis: Unfortunately, his primary comorbidity throughout the majority of this past summer. While he now seems to (finally) be slowly recovering, he remains reliant on  a PEG tube for all of his nutritional/caloric needs. As discussed above, he has recently been able to start taking at least some of his medications by mouth again (which he now plans to do with the Xtandi). Ongoing management per [facility].  Skeletal metastases: There was diffuse involvement by issue #1 of both the axial and appendicular skeletons on the initial staging NM bone scan and CT of the abdomen and pelvis performed in late December 2020. 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 Continue to monitor.  Pain: The symptoms that were directly related to issue #3 remain resolved since he started Lupron/Xtandi in early January 2021 for the palliative treatment of issue #1. Continue prn Norco. Continue to monitor.  Anemia: Multifactorial, with chronic inflammation from issue #2 largely contributing. Continue to monitor.  The patient and his wife were in agreement with these plans.    It is a pleasure to participate in Mr. Madison's care. Please do not hesitate to call with any questions or concerns that you may have.    A total of 30 minutes were spent coordinating this patient’s care in clinic today; more than 50% of this time was face-to-face with the patient and his wife, reviewing his interim medical history, discussing the results of the most recent labwork, including serum PSA levels, and counseling on the current treatment and followup plan. All questions were answered to their satisfaction.    FOLLOW UP  Continue ([id number]) Lupron (dose today). Restart daily, PO Xtandi. With [facility], as previously planned. With urology, as previously planned. Return to our clinic in 6 weeks (early December) with a CBC, CMP, iron panel, ferritin level and PSA.            This document was electronically signed by HIRO Martinez MD October 18, 2023 12:27 EDT      CC: Yolande  Olvera, APRN

## 2023-10-18 NOTE — PROGRESS NOTES
Transitional Care Follow Up Visit  Subjective     Ta Madison is a 63 y.o. male who presents for a transitional care management visit.    Within 48 business hours after discharge our office contacted him via telephone to coordinate his care and needs.      I reviewed and discussed the details of that call along with the discharge summary, hospital problems, inpatient lab results, inpatient diagnostic studies, and consultation reports with Ta.     Current outpatient and discharge medications have been reconciled for the patient.  Reviewed by: EUGENIA Mendoza          10/13/2023     9:15 PM   Date of TCM Phone Call   [facility]   Date of Admission 10/10/2023   Date of Discharge 10/13/2023   Discharge Disposition Home or Self Care     Risk for Readmission (LACE) Score: 8 (10/13/2023  6:00 AM)      History of Present Illness returns today for follow up labs.  Feels he is doing better overall improved strength and energy     Course During Hospital Stay:  Hospital records reviewed and copied below:  Ta Madison was admitted to the PCU for further evaluation and treatment. He was continued on the bicarb gtt. Nephrology was consulted for further input.      Stool studies including GI PCR and C diff were negative. Infectious workup remained negative with his SIRS thought to be 2/2 dehydration. His K+ quickly normalized and remained stable throughout the duration of his hospitalization. His renal quickly began improving as well and thought to be prerenal with dehydration and GI losses contributing. IVF's were adjusted per nephrology and later stopped. His N/V, abdominal pain and diarrhea quickly improved with supportive treatment and he was placed back on clear liquids and TF's resumed as well.      Once his K+ normalized he was moved out of the PCU to the med/surg floor with telemetry. Follow up labs revealed mild hyponatremia and nephrology adjusted his IVF's to NS while stopping free water  flushes with his TF's. His Na+ remained low despite these interventions and nephrology added salt tablets with subsequent improvement. He remained clinically stable and remained hospitalized until his Na was >130 per nephrology recs. He reported uncontrolled pain on Norco and was transitioned to oxycodone with subsequent improvement. Later in the evening on 10/13, repeat labs revealed subsequent improvement with Na 134 and he was discharged home later that night with instructions for repeat labs and close outpatient follow up with is PCP and nephrology.       The following portions of the patient's history were reviewed and updated as appropriate: allergies, current medications, past family history, past medical history, past social history, past surgical history, and problem list.    Review of Systems    Objective   Physical Exam  Vitals and nursing note reviewed.   Constitutional:       General: He is not in acute distress.     Appearance: He is well-developed. He is not ill-appearing.   Cardiovascular:      Rate and Rhythm: Normal rate and regular rhythm.      Heart sounds: Normal heart sounds. No murmur heard.  Pulmonary:      Effort: Pulmonary effort is normal.      Breath sounds: Normal breath sounds.   Abdominal:      General: There is distension.      Tenderness: There is no abdominal tenderness.   Skin:     General: Skin is warm and dry.   Neurological:      Mental Status: He is alert and oriented to person, place, and time.   Psychiatric:         Behavior: Behavior normal.         Assessment & Plan   Diagnoses and all orders for this visit:    1. Necrotizing pancreatitis (Primary)  -     CBC Auto Differential; Future  -     Cancel: Comprehensive Metabolic Panel; Future  -     Comprehensive Metabolic Panel; Future  -     Basic Metabolic Panel; Standing  -     CBC w AUTO Differential; Future  -     CBC Auto Differential  -     Comprehensive Metabolic Panel    2. Hyperkalemia  -     CBC Auto Differential;  Future  -     Comprehensive Metabolic Panel; Future  -     Basic Metabolic Panel; Standing  -     CBC w AUTO Differential; Future  -     CBC Auto Differential  -     Comprehensive Metabolic Panel    3. Hyponatremia  -     CBC Auto Differential; Future  -     Comprehensive Metabolic Panel; Future  -     Basic Metabolic Panel; Standing  -     CBC w AUTO Differential; Future  -     CBC Auto Differential  -     Comprehensive Metabolic Panel    4. Irritation around percutaneous endoscopic gastrostomy (PEG) tube site    Skin care advised

## 2023-10-18 NOTE — PROGRESS NOTES
Venipuncture Blood Specimen Collection  Venipuncture performed in left arm by Yolande Aguilar MA with good hemostasis. Patient tolerated the procedure well without complications.   10/18/23   Yolande Aguilar MA

## 2023-10-23 ENCOUNTER — READMISSION MANAGEMENT (OUTPATIENT)
Dept: CALL CENTER | Facility: HOSPITAL | Age: 63
End: 2023-10-23
Payer: COMMERCIAL

## 2023-10-23 RX ORDER — SULFAMETHOXAZOLE AND TRIMETHOPRIM 800; 160 MG/1; MG/1
1 TABLET ORAL 2 TIMES DAILY
Qty: 20 TABLET | Refills: 0 | Status: SHIPPED | OUTPATIENT
Start: 2023-10-23

## 2023-10-23 NOTE — OUTREACH NOTE
Medical Week 2 Survey      Flowsheet Row Responses   [facility] discharged from? [facility]   Does the patient have one of the following disease processes/diagnoses(primary or secondary)? Other   Week 2 attempt successful? No   Unsuccessful attempts Attempt 1            JOSY BOSCH - Registered Nurse   no

## 2023-10-24 ENCOUNTER — READMISSION MANAGEMENT (OUTPATIENT)
Dept: CALL CENTER | Facility: HOSPITAL | Age: 63
End: 2023-10-24
Payer: COMMERCIAL

## 2023-10-24 NOTE — OUTREACH NOTE
Medical Week 2 Survey      Flowsheet Row Responses   [facility] patient discharged from? [facility]   Does the patient have one of the following disease processes/diagnoses(primary or secondary)? Other   Week 2 attempt successful? Yes   Call start time 1601   Call end time 1601   Person spoke with today (if not patient) and relationship spouse   Meds reviewed with patient/caregiver? Yes   Is the patient having any side effects they believe may be caused by any medication additions or changes? No   Does the patient have all medications ordered at discharge? Yes   Is the patient taking all medications as directed (includes completed medication regime)? Yes   Does the patient have a primary care provider?  Yes   Does the patient have an appointment with their PCP within 7 days of discharge? Yes   Has the patient kept scheduled appointments due by today? N/A   Has home health visited the patient within 72 hours of discharge? N/A   Psychosocial issues? No   What is the patient's perception of their health status since discharge? Improving   Week 2 Call Completed? Yes   Graduated Yes   Call end time 1601            Kisha MIRZA - Registered Nurse

## 2023-10-27 ENCOUNTER — SPECIALTY PHARMACY (OUTPATIENT)
Dept: ONCOLOGY | Facility: CLINIC | Age: 63
End: 2023-10-27
Payer: COMMERCIAL

## 2023-10-27 ENCOUNTER — TELEPHONE (OUTPATIENT)
Dept: FAMILY MEDICINE CLINIC | Facility: CLINIC | Age: 63
End: 2023-10-27
Payer: COMMERCIAL

## 2023-10-27 NOTE — PROGRESS NOTES
Specialty Pharmacy Patient Management Program  Oncology Reassessment     Ta Madison is a 63 y.o. male with prostate cancer seen by an Oncology provider and enrolled in the Oncology Patient Management program offered by [facility].  A follow-up outreach was conducted, including assessment of continued therapy appropriateness, medication adherence, and side effect incidence and management for Xtandi.       Relevant Past Medical History and Comorbidities  Relevant medical history and concomitant health conditions were discussed with the patient. The patient's chart has been reviewed for relevant past medical history and comorbid health conditions and updated as necessary.   Past Medical History:   Diagnosis Date    Elevated cholesterol     GERD (gastroesophageal reflux disease)     Increased heart rate     Mini stroke     Neck pain     Prostate cancer     Rash     Tobacco abuse      Social History     Socioeconomic History    Marital status:    Tobacco Use    Smoking status: Former     Packs/day: 2.00     Years: 45.00     Additional pack years: 0.00     Total pack years: 90.00     Types: Cigarettes     Quit date: 2023     Years since quittin.2    Smokeless tobacco: Never   Vaping Use    Vaping Use: Never used   Substance and Sexual Activity    Alcohol use: No    Drug use: Yes     Types: Marijuana     Comment: occ     Sexual activity: Defer       Allergies  Known allergies and reactions were discussed with the patient. The patient's chart has been reviewed for allergy information and updated as necessary.   Patient has no known allergies.    Relevant Laboratory Values  Lab Results   Component Value Date    GLUCOSE 103 (H) 10/18/2023    CALCIUM 8.5 (L) 10/18/2023     (L) 10/18/2023    K 4.3 10/18/2023    CO2 23.8 10/18/2023    CL 99 10/18/2023    BUN 16 10/18/2023    CREATININE 0.43 (L) 10/18/2023    EGFRIFNONA 70 2022    BCR 37.2 (H) 10/18/2023    ANIONGAP 8.2  10/18/2023     Lab Results   Component Value Date    WBC 10.45 10/18/2023    RBC 3.24 (L) 10/18/2023    HGB 7.6 (L) 10/18/2023    HCT 25.8 (L) 10/18/2023    MCV 79.6 10/18/2023    MCH 23.5 (L) 10/18/2023    MCHC 29.5 (L) 10/18/2023    RDW 19.8 (H) 10/18/2023    RDWSD 57.1 (H) 10/18/2023    MPV 9.9 10/18/2023     (H) 10/18/2023    NEUTRORELPCT 66.0 10/18/2023    LYMPHORELPCT 21.5 10/18/2023    MONORELPCT 10.7 10/18/2023    EOSRELPCT 0.7 10/18/2023    BASORELPCT 0.7 10/18/2023    AUTOIGPER 0.4 10/18/2023    NEUTROABS 6.90 10/18/2023    LYMPHSABS 2.25 10/18/2023    MONOSABS 1.12 (H) 10/18/2023    EOSABS 0.07 10/18/2023    BASOSABS 0.07 10/18/2023    AUTOIGNUM 0.04 10/18/2023    NRBC 0.0 10/18/2023        Current Medication List  This medication list has been reviewed with the patient and evaluated for any interactions or necessary modifications/recommendations, and updated to include all prescription medications, OTC medications, and supplements the patient is currently taking.  This list reflects what is contained in the patient's profile, which has also been marked as reviewed to communicate to other providers it is the most up to date version of the patient's current medication therapy.     Current Outpatient Medications:     atorvastatin (LIPITOR) 20 MG tablet, Administer 1 tablet per J tube Every Night., Disp: , Rfl:     enzalutamide (XTANDI) 40 MG chemo capsule, Take 4 capsules by mouth Daily., Disp: 120 capsule, Rfl: 5    fenofibrate (TRICOR) 145 MG tablet, Administer 1 tablet per J tube Daily., Disp: , Rfl:     folic acid (FOLVITE) 1 MG tablet, Administer 1 tablet per J tube Daily., Disp: , Rfl:     hydrOXYzine (ATARAX) 50 MG tablet, Take 1 tablet by mouth 3 (Three) Times a Day As Needed for Itching., Disp: 90 tablet, Rfl: 1    lactulose (CHRONULAC) 10 GM/15ML solution, Administer 45 mL per J tube Daily As Needed (Constipation)., Disp: 473 mL, Rfl: 0    lansoprazole (PREVACID SOLUTAB) 30 MG Tablet  Delayed Release Dispersible disintegrating tablet, Administer 1 tablet per J tube Every Morning., Disp: 30 tablet, Rfl: 0    metoprolol tartrate (LOPRESSOR) 25 MG tablet, Administer 1 tablet per J tube 2 (Two) Times a Day., Disp: , Rfl:     montelukast (SINGULAIR) 10 MG tablet, Administer 1 tablet per J tube Every Night., Disp: , Rfl:     multivitamin with minerals tablet tablet, Take 1 tablet by mouth Daily., Disp: , Rfl:     mupirocin (BACTROBAN) 2 % ointment, Apply 1 application  topically to the appropriate area as directed 3 (Three) Times a Day., Disp: 30 g, Rfl: 0    naloxone (NARCAN) 4 MG/0.1ML nasal spray, Call 911. Don't prime. [place] in 1 nostril for overdose. Repeat in 2-3 minutes in other nostril if no or minimal breathing/responsiveness., Disp: 2 each, Rfl: 0    ondansetron (ZOFRAN) 8 MG tablet, Administer 1 tablet per J tube Every 8 (Eight) Hours As Needed for Nausea or Vomiting., Disp: , Rfl:     oxyCODONE (ROXICODONE) 10 MG tablet, Administer 1 tablet per J tube Every 6 (Six) Hours As Needed for Moderate Pain., Disp: 12 tablet, Rfl: 0    sertraline (ZOLOFT) 50 MG tablet, Administer 1 tablet per J tube Daily., Disp: , Rfl:     sodium chloride 1 g tablet, Take 2 tablets by mouth 3 (Three) Times a Day With Meals., Disp: 180 tablet, Rfl: 0    sucralfate (Carafate) 1 GM/10ML suspension, Take 10 mL by mouth 4 (Four) Times a Day., Disp: , Rfl:     sulfamethoxazole-trimethoprim (Bactrim DS) 800-160 MG per tablet, Take 1 tablet by mouth 2 (Two) Times a Day., Disp: 20 tablet, Rfl: 0    thiamine (VITAMIN B-1) 100 MG tablet  tablet, Administer 1 tablet per J tube Daily., Disp: , Rfl:     vitamin B-12 (CYANOCOBALAMIN) 1000 MCG tablet, Administer 1 tablet per J tube Daily., Disp: , Rfl:   No current facility-administered medications for this visit.    Drug Interactions  DDI report generated; no significant interactions.     Adverse Drug Reactions  Adverse Reactions Experienced: None.   Plan for ADR Management:  Tolerating well; not required.     Hospitalizations and Urgent Care Since Last Assessment  Hospitalizations or Admissions: 3  ED Visits: 0   Urgent Office Visits: 0     Adherence and Self-Administration  Approximate Number of Doses Missed Since Last Assessment: 80-90  Ongoing or New Barriers to Patient Adherence and/or Self-Administration: Over the last several months, the patient has been admitted multiple times (once at [facility], once at [facility], and once at [facility]) for necrotizing pancreatitis. During this time, he has had a G-tube (and continues to), with virtually no PO access. Because of that, his Xtandi was held throughout the hospitalizations. He has recently been able to swallow tablets and has resumed his Xtandi.  Methods for Supporting Patient Adherence and/or Self-Administration: Provided direct education. Care coordinator to continue providing once-monthly outreach calls to assist with adherence and coordinate medication refills. Pharmacist to provide clinical assessments every 6 months and will assist in the management of clinical issues as they arise.      Goals of Therapy  Progress Toward Meeting Patient-Identified Goals of Therapy:  On-track  Patient-Identified Goals  Consistently take medications as prescribed  Patient will adhere to medication regimen  Patient will report any medication side effects to healthcare provider    Progress Toward Meeting Clinical Goals or Therapeutic Targets: On-track - PSA was undetectable 2 weeks ago, despite being off therapy for several months.  Clinical Goals or Therapeutic Targets  Support patient understanding of medication regimen  Ensure patient knows the pharmacy contact information  Schedule regular follow-up to monitor the treatment serious adverse events  Schedule regular follow-up to confirm medication adherence  Schedule regular follow-up to monitor disease progression or stabilty    Quality of Life Change Assessment   Quality of Life related to the  patient's specialty therapy was discussed with the patient. The QOL segment of this outreach has been reviewed and updated.   Quality of Life Score Change: 7    Reassessment Plan & Follow-Up  Pharmacist to continue to perform regular reassessments no more than (6) months from the previous assessment.  Care Coordinator to facilitate future refill outreaches, coordinate prescription delivery, and escalate clinical questions to pharmacist.     Additional Plans, Therapy Recommendations or Therapy Problems to Be Addressed: None at this time. Will continue to monitor closely.  Recent Provider Changes:  None.    Attestation  I attest that the specialty medication(s) addressed above are appropriate for the patient based on my reassessment. I also attest the patient was actively involved in and has agreed to the above plan of care. If the prescribed therapy is at any point deemed not appropriate based on the current or future assessments, a consultation will be initiated with the patient's specialty care provider to determine the best course of action. The revised plan of therapy will be documented along with any additional patient education provided.     Oscar Houser PharmD  Oncology Clinical Pharmacist  10/27/2023  10:36 EDT

## 2023-10-27 NOTE — TELEPHONE ENCOUNTER
Aleisha with [facility] called indicating patient had stated provider had wanted additional labs but they didn't have an order.  After reviewing labs, it looked like a BMP needed drawn so I faxed the requisition per her request.  In the meantime, she called back today and said labs were drawn yesterday at  and asked if they provider still wanted it drawn after reviewing those labs to let her know.  In the meantime for now, they would not draw any unless provider specified.   A BMP was not actually drawn @ .  Please advise.

## 2023-11-01 ENCOUNTER — SPECIALTY PHARMACY (OUTPATIENT)
Dept: PHARMACY | Facility: HOSPITAL | Age: 63
End: 2023-11-01
Payer: COMMERCIAL

## 2023-11-01 NOTE — PROGRESS NOTES
Specialty Pharmacy Refill Coordination Note      Name:  Ta Madison  :  1960  Date:  2023         Past Medical History:   Diagnosis Date    Elevated cholesterol     GERD (gastroesophageal reflux disease)     Increased heart rate     Mini stroke     Neck pain     Prostate cancer     Rash     Tobacco abuse        Past Surgical History:   Procedure Laterality Date    APPENDECTOMY      [facility]     COLONOSCOPY      COLONOSCOPY N/A 2023    Procedure: COLONOSCOPY;  Surgeon: Mahnaz Caraballo MD;  [facility];  Service: Gastroenterology;  Laterality: N/A;    ENDOSCOPY N/A 2023    Procedure: ESOPHAGOGASTRODUODENOSCOPY WITH KEOFEED PLACEMENT;  Surgeon: Yolande Eller MD;  Location: [facility] ENDOSCOPY;  Service: Gastroenterology;  Laterality: N/A;       Social History     Socioeconomic History    Marital status:    Tobacco Use    Smoking status: Former     Packs/day: 2.00     Years: 45.00     Additional pack years: 0.00     Total pack years: 90.00     Types: Cigarettes     Quit date: 2023     Years since quittin.2    Smokeless tobacco: Never   Vaping Use    Vaping Use: Never used   Substance and Sexual Activity    Alcohol use: No    Drug use: Yes     Types: Marijuana     Comment: occ     Sexual activity: Defer       Family History   Problem Relation Age of Onset    Nephrolithiasis Mother     Heart disease Father     Hypertension Father     Kidney disease Father        No Known Allergies    Current Outpatient Medications   Medication Sig Dispense Refill    atorvastatin (LIPITOR) 20 MG tablet Administer 1 tablet per J tube Every Night.      enzalutamide (XTANDI) 40 MG chemo capsule Take 4 capsules by mouth Daily. 120 capsule 5    fenofibrate (TRICOR) 145 MG tablet Administer 1 tablet per J tube Daily.      folic acid (FOLVITE) 1 MG tablet Administer 1 tablet per J tube Daily.      hydrOXYzine (ATARAX) 50 MG tablet Take 1 tablet by mouth 3 (Three) Times a  Day As Needed for Itching. 90 tablet 1    lactulose (CHRONULAC) 10 GM/15ML solution Administer 45 mL per J tube Daily As Needed (Constipation). 473 mL 0    lansoprazole (PREVACID SOLUTAB) 30 MG Tablet Delayed Release Dispersible disintegrating tablet Administer 1 tablet per J tube Every Morning. 30 tablet 0    metoprolol tartrate (LOPRESSOR) 25 MG tablet Administer 1 tablet per J tube 2 (Two) Times a Day.      montelukast (SINGULAIR) 10 MG tablet Administer 1 tablet per J tube Every Night.      multivitamin with minerals tablet tablet Take 1 tablet by mouth Daily.      mupirocin (BACTROBAN) 2 % ointment Apply 1 application  topically to the appropriate area as directed 3 (Three) Times a Day. 30 g 0    naloxone (NARCAN) 4 MG/0.1ML nasal spray Call 911. Don't prime. [place] in 1 nostril for overdose. Repeat in 2-3 minutes in other nostril if no or minimal breathing/responsiveness. 2 each 0    ondansetron (ZOFRAN) 8 MG tablet Administer 1 tablet per J tube Every 8 (Eight) Hours As Needed for Nausea or Vomiting.      oxyCODONE (ROXICODONE) 10 MG tablet Administer 1 tablet per J tube Every 6 (Six) Hours As Needed for Moderate Pain. 12 tablet 0    sertraline (ZOLOFT) 50 MG tablet Administer 1 tablet per J tube Daily.      sodium chloride 1 g tablet Take 2 tablets by mouth 3 (Three) Times a Day With Meals. 180 tablet 0    sucralfate (Carafate) 1 GM/10ML suspension Take 10 mL by mouth 4 (Four) Times a Day.      sulfamethoxazole-trimethoprim (Bactrim DS) 800-160 MG per tablet Take 1 tablet by mouth 2 (Two) Times a Day. 20 tablet 0    thiamine (VITAMIN B-1) 100 MG tablet  tablet Administer 1 tablet per J tube Daily.      vitamin B-12 (CYANOCOBALAMIN) 1000 MCG tablet Administer 1 tablet per J tube Daily.       No current facility-administered medications for this visit.         ASSESSMENT/PLAN:      Ta is a 63 y.o. male contacted today regarding refills of  xtandi 40mg chemo capsule  specialty medication(s).    Reviewed and  verified with patient:       Specialty medication(s) and dose(s) confirmed: yes    Refill Questions      Flowsheet Row Most Recent Value   Changes to allergies? No   Changes to medications? No   New conditions since last clinic visit No   Unplanned office visit, urgent care, ED, or hospital admission in the last 4 weeks  No   How does patient/caregiver feel medication is working? Very good   Financial problems or insurance changes  No   Since the previous refill, were any specialty medication doses or scheduled injections missed or delayed?  No   Does this patient require a clinical escalation to a pharmacist? No                  Medication Adherence          Adherence tools used: patient uses a pill box to manage medications      Support network for adherence: family member                Follow-up: 30 day(s)     Brittaney Howe, Pharmacy Technician  Specialty Pharmacy Technician

## 2023-11-06 ENCOUNTER — OFFICE VISIT (OUTPATIENT)
Dept: FAMILY MEDICINE CLINIC | Facility: CLINIC | Age: 63
End: 2023-11-06
Payer: COMMERCIAL

## 2023-11-06 VITALS
BODY MASS INDEX: 23.26 KG/M2 | OXYGEN SATURATION: 99 % | HEART RATE: 72 BPM | TEMPERATURE: 98 F | HEIGHT: 67 IN | WEIGHT: 148.2 LBS

## 2023-11-06 DIAGNOSIS — K85.91 NECROTIZING PANCREATITIS: Primary | ICD-10-CM

## 2023-11-06 PROCEDURE — 1159F MED LIST DOCD IN RCRD: CPT | Performed by: NURSE PRACTITIONER

## 2023-11-06 PROCEDURE — 99213 OFFICE O/P EST LOW 20 MIN: CPT | Performed by: NURSE PRACTITIONER

## 2023-11-06 PROCEDURE — 1160F RVW MEDS BY RX/DR IN RCRD: CPT | Performed by: NURSE PRACTITIONER

## 2023-11-06 RX ORDER — MULTIPLE VITAMINS W/ MINERALS TAB 9MG-400MCG
TAB ORAL
COMMUNITY
Start: 2023-09-01

## 2023-11-06 RX ORDER — PANTOPRAZOLE SODIUM 40 MG/1
40 TABLET, DELAYED RELEASE ORAL DAILY
Qty: 90 TABLET | Refills: 1 | Status: SHIPPED | OUTPATIENT
Start: 2023-11-06

## 2023-11-06 RX ORDER — MULTIPLE VITAMINS W/ MINERALS TAB 9MG-400MCG
1 TAB ORAL DAILY
Qty: 30 TABLET | Refills: 2 | Status: SHIPPED | OUTPATIENT
Start: 2023-11-06

## 2023-11-06 RX ORDER — PANTOPRAZOLE SODIUM 40 MG/1
40 TABLET, DELAYED RELEASE ORAL DAILY
COMMUNITY
End: 2023-11-06 | Stop reason: SDUPTHER

## 2023-11-06 NOTE — PROGRESS NOTES
"Chief Complaint  Pancreatitis and Follow-up    Subjective        Ta Madison presents to [facility]  Pancreatitis  This is a chronic (Feels he is improving daily.  Tolerating increased PO intake with minimal abd pain.  No fever.  Stools are still loose.) problem. The problem has been gradually improving. Associated symptoms include abdominal pain, fatigue and nausea. Pertinent negatives include no change in bowel habit, fever or vomiting. Exacerbated by: eating.       Objective   Vital Signs:  Pulse 72   Temp 98 °F (36.7 °C) (Temporal)   Ht 170.2 cm (67\")   Wt 67.2 kg (148 lb 3.2 oz)   SpO2 99%   BMI 23.21 kg/m²   Estimated body mass index is 23.21 kg/m² as calculated from the following:    Height as of this encounter: 170.2 cm (67\").    Weight as of this encounter: 67.2 kg (148 lb 3.2 oz).       BMI is within normal parameters. No other follow-up for BMI required.      Physical Exam  Vitals and nursing note reviewed.   Constitutional:       General: He is not in acute distress.     Appearance: He is well-developed. He is not ill-appearing.   HENT:      Head: Normocephalic.   Cardiovascular:      Rate and Rhythm: Normal rate and regular rhythm.      Heart sounds: Normal heart sounds. No murmur heard.  Pulmonary:      Effort: Pulmonary effort is normal.      Breath sounds: Normal breath sounds.   Abdominal:      General: Bowel sounds are normal.      Tenderness: There is abdominal tenderness.   Skin:     General: Skin is warm and dry.   Neurological:      Mental Status: He is alert and oriented to person, place, and time.   Psychiatric:         Behavior: Behavior normal.        Result Review :                   Assessment and Plan   Diagnoses and all orders for this visit:    1. Necrotizing pancreatitis (Primary)  -     pantoprazole (PROTONIX) 40 MG EC tablet; Take 1 tablet by mouth Daily.  Dispense: 90 tablet; Refill: 1  -     multivitamin with minerals tablet tablet; Take 1 " tablet by mouth Daily.  Dispense: 30 tablet; Refill: 2  -     thiamine (VITAMIN B-1) 100 MG tablet  tablet; Administer 1 tablet per J tube Daily.  Dispense: 90 tablet; Refill: 1             Follow Up   Return in about 6 weeks (around 12/18/2023), or if symptoms worsen or fail to improve, for Recheck.  Patient was given instructions and counseling regarding his condition or for health maintenance advice. Please see specific information pulled into the AVS if appropriate.

## 2023-11-15 DIAGNOSIS — C61 ADENOCARCINOMA OF PROSTATE: Primary | ICD-10-CM

## 2023-11-15 RX ORDER — HYDROCODONE BITARTRATE AND ACETAMINOPHEN 10; 325 MG/1; MG/1
1 TABLET ORAL EVERY 6 HOURS PRN
Qty: 120 TABLET | Refills: 0 | Status: SHIPPED | OUTPATIENT
Start: 2023-11-15

## 2023-11-15 NOTE — TELEPHONE ENCOUNTER
Caller: Azael Madison    Relationship: Emergency Contact    Best call back number: [phone]    Requested Prescriptions:   HYDROcodone-acetaminophen (NORCO)  MG per tablet [22433] (Order [id number])     Pharmacy where request should be sent:    [id number] [address] [facility] [address] - [phone] PH - [phone] FX     Last office visit with prescribing clinician: 10/18/2023   Last telemedicine visit with prescribing clinician: Visit date not found   Next office visit with prescribing clinician: 11/29/2023     Does the patient have less than a 3 day supply:  [x] Yes  [] No    Celi Case   11/15/23 12:38 EST

## 2023-11-17 DIAGNOSIS — K85.91 NECROTIZING PANCREATITIS: ICD-10-CM

## 2023-11-17 NOTE — TELEPHONE ENCOUNTER
Caller: Azael Madison    Relationship: Emergency Contact    Best call back number: [phone]     Requested Prescriptions:   Requested Prescriptions     Pending Prescriptions Disp Refills    thiamine (VITAMIN B-1) 100 MG tablet  tablet 90 tablet 1     Sig: Administer 1 tablet per J tube Daily.    lactulose (CHRONULAC) 10 GM/15ML solution 473 mL 0     Sig: Administer 45 mL per J tube Daily As Needed (Constipation).        Pharmacy where request should be sent: [facility] #[id number] [address] [facility] [address] - [phone] PH - [phone] FX     Last office visit with prescribing clinician: 11/6/2023   Last telemedicine visit with prescribing clinician: Visit date not found   Next office visit with prescribing clinician: 12/4/2023     Additional details provided by patient: PATIENT IS OUT OF VITAMIN B-1    Does the patient have less than a 3 day supply:  [x] Yes  [] No    Would you like a call back once the refill request has been completed: [x] Yes [] No    If the office needs to give you a call back, can they leave a voicemail: [x] Yes [] No    Nicolle Duron Rep   11/17/23 09:30 EST

## 2023-11-20 RX ORDER — LACTULOSE 10 G/15ML
30 SOLUTION ORAL DAILY PRN
Qty: 473 ML | Refills: 0 | Status: SHIPPED | OUTPATIENT
Start: 2023-11-20

## 2023-11-30 ENCOUNTER — OFFICE VISIT (OUTPATIENT)
Dept: ONCOLOGY | Facility: CLINIC | Age: 63
End: 2023-11-30
Payer: COMMERCIAL

## 2023-11-30 ENCOUNTER — LAB (OUTPATIENT)
Dept: ONCOLOGY | Facility: CLINIC | Age: 63
End: 2023-11-30
Payer: COMMERCIAL

## 2023-11-30 VITALS
TEMPERATURE: 97.5 F | OXYGEN SATURATION: 96 % | RESPIRATION RATE: 18 BRPM | HEART RATE: 89 BPM | BODY MASS INDEX: 23.32 KG/M2 | SYSTOLIC BLOOD PRESSURE: 109 MMHG | HEIGHT: 67 IN | WEIGHT: 148.6 LBS | DIASTOLIC BLOOD PRESSURE: 66 MMHG

## 2023-11-30 DIAGNOSIS — C61 ADENOCARCINOMA OF PROSTATE: Primary | ICD-10-CM

## 2023-11-30 DIAGNOSIS — C79.51 MALIGNANT NEOPLASM METASTATIC TO BONE: ICD-10-CM

## 2023-11-30 DIAGNOSIS — C61 ADENOCARCINOMA OF PROSTATE: ICD-10-CM

## 2023-11-30 LAB
ALBUMIN SERPL-MCNC: 3.8 G/DL (ref 3.5–5.2)
ALBUMIN/GLOB SERPL: 1 G/DL
ALP SERPL-CCNC: 97 U/L (ref 39–117)
ALT SERPL W P-5'-P-CCNC: 7 U/L (ref 1–41)
ANION GAP SERPL CALCULATED.3IONS-SCNC: 12.7 MMOL/L (ref 5–15)
AST SERPL-CCNC: 13 U/L (ref 1–40)
BASOPHILS # BLD AUTO: 0.14 10*3/MM3 (ref 0–0.2)
BASOPHILS NFR BLD AUTO: 1 % (ref 0–1.5)
BILIRUB SERPL-MCNC: <0.2 MG/DL (ref 0–1.2)
BUN SERPL-MCNC: 18 MG/DL (ref 8–23)
BUN/CREAT SERPL: 28.6 (ref 7–25)
CALCIUM SPEC-SCNC: 9.6 MG/DL (ref 8.6–10.5)
CHLORIDE SERPL-SCNC: 99 MMOL/L (ref 98–107)
CO2 SERPL-SCNC: 24.3 MMOL/L (ref 22–29)
CREAT SERPL-MCNC: 0.63 MG/DL (ref 0.76–1.27)
DEPRECATED RDW RBC AUTO: 55.8 FL (ref 37–54)
EGFRCR SERPLBLD CKD-EPI 2021: 107.5 ML/MIN/1.73
EOSINOPHIL # BLD AUTO: 0.13 10*3/MM3 (ref 0–0.4)
EOSINOPHIL NFR BLD AUTO: 1 % (ref 0.3–6.2)
ERYTHROCYTE [DISTWIDTH] IN BLOOD BY AUTOMATED COUNT: 18.8 % (ref 12.3–15.4)
FERRITIN SERPL-MCNC: 292.5 NG/ML (ref 30–400)
GLOBULIN UR ELPH-MCNC: 3.8 GM/DL
GLUCOSE SERPL-MCNC: 92 MG/DL (ref 65–99)
HCT VFR BLD AUTO: 37.2 % (ref 37.5–51)
HGB BLD-MCNC: 11.4 G/DL (ref 13–17.7)
IMM GRANULOCYTES # BLD AUTO: 0.05 10*3/MM3 (ref 0–0.05)
IMM GRANULOCYTES NFR BLD AUTO: 0.4 % (ref 0–0.5)
IRON 24H UR-MRATE: 40 MCG/DL (ref 59–158)
IRON SATN MFR SERPL: 9 % (ref 20–50)
LYMPHOCYTES # BLD AUTO: 3.72 10*3/MM3 (ref 0.7–3.1)
LYMPHOCYTES NFR BLD AUTO: 27.6 % (ref 19.6–45.3)
MCH RBC QN AUTO: 24.8 PG (ref 26.6–33)
MCHC RBC AUTO-ENTMCNC: 30.6 G/DL (ref 31.5–35.7)
MCV RBC AUTO: 81 FL (ref 79–97)
MONOCYTES # BLD AUTO: 0.86 10*3/MM3 (ref 0.1–0.9)
MONOCYTES NFR BLD AUTO: 6.4 % (ref 5–12)
NEUTROPHILS NFR BLD AUTO: 63.6 % (ref 42.7–76)
NEUTROPHILS NFR BLD AUTO: 8.57 10*3/MM3 (ref 1.7–7)
NRBC BLD AUTO-RTO: 0 /100 WBC (ref 0–0.2)
PLATELET # BLD AUTO: 889 10*3/MM3 (ref 140–450)
PMV BLD AUTO: 9.1 FL (ref 6–12)
POTASSIUM SERPL-SCNC: 4.4 MMOL/L (ref 3.5–5.2)
PROT SERPL-MCNC: 7.6 G/DL (ref 6–8.5)
RBC # BLD AUTO: 4.59 10*6/MM3 (ref 4.14–5.8)
SODIUM SERPL-SCNC: 136 MMOL/L (ref 136–145)
TIBC SERPL-MCNC: 438 MCG/DL (ref 298–536)
TRANSFERRIN SERPL-MCNC: 294 MG/DL (ref 200–360)
WBC NRBC COR # BLD AUTO: 13.47 10*3/MM3 (ref 3.4–10.8)

## 2023-11-30 PROCEDURE — 84153 ASSAY OF PSA TOTAL: CPT | Performed by: INTERNAL MEDICINE

## 2023-11-30 PROCEDURE — 82728 ASSAY OF FERRITIN: CPT | Performed by: INTERNAL MEDICINE

## 2023-11-30 PROCEDURE — 99214 OFFICE O/P EST MOD 30 MIN: CPT | Performed by: INTERNAL MEDICINE

## 2023-11-30 PROCEDURE — 84466 ASSAY OF TRANSFERRIN: CPT | Performed by: INTERNAL MEDICINE

## 2023-11-30 PROCEDURE — 1125F AMNT PAIN NOTED PAIN PRSNT: CPT | Performed by: INTERNAL MEDICINE

## 2023-11-30 PROCEDURE — 85025 COMPLETE CBC W/AUTO DIFF WBC: CPT | Performed by: INTERNAL MEDICINE

## 2023-11-30 PROCEDURE — 83540 ASSAY OF IRON: CPT | Performed by: INTERNAL MEDICINE

## 2023-11-30 PROCEDURE — 80053 COMPREHEN METABOLIC PANEL: CPT | Performed by: INTERNAL MEDICINE

## 2023-11-30 RX ORDER — ONDANSETRON HYDROCHLORIDE 8 MG/1
8 TABLET, FILM COATED ORAL EVERY 8 HOURS PRN
Qty: 90 TABLET | Refills: 3 | Status: SHIPPED | OUTPATIENT
Start: 2023-11-30

## 2023-11-30 RX ORDER — MORPHINE SULFATE 15 MG/1
15 TABLET, FILM COATED, EXTENDED RELEASE ORAL 2 TIMES DAILY
Qty: 60 TABLET | Refills: 0 | Status: SHIPPED | OUTPATIENT
Start: 2023-11-30

## 2023-11-30 NOTE — PROGRESS NOTES
Name:  Ta Madison  :  1960  Date:  2023     REFERRING PHYSICIAN  Dheeraj Nieves MD    PRIMARY CARE PROVIDER  Yolande Olvera APRN    REASON FOR FOLLOWUP  1. Adenocarcinoma of prostate      CHIEF COMPLAINT  Intermittent nausea and abdominal pain.    Dear Ms. Olvera,    HISTORY OF PRESENT ILLNESS:   I saw Mr. Madison in follow up today in our medical oncology clinic. As you are aware, he is a pleasant, 63 y.o., white male with a history of tobacco abuse who was referred to urology in 2020 for an elevated serum PSA level (of ~28 ng/mL, which had increased to 247 ng/mL at the time of his initial appointment in our clinic). An examination revealed a very hard and fixed prostate, and multiple core biopsies were performed on 2020. The results were consistent with [place]ber[place] Score 5+4 or 4+5=9 adenocarcinoma involving all twelve sampled cores. A staging workup confirmed pelvic adenopathy as well as diffuse, sclerotic lesions throughout the axial and appendicular skeleton, including the right greater than left femoral diaphyses. With this stage IV diagnosis, he was referred to our clinic for further evaluation and management. At the time of his initial consultation in our office (on 2021), he was agreeable to starting definitive therapy with a combination of ADT (Lupron injections) and anti-androgen therapy (daily PO Xtandi).    INTERIM HISTORY:  Mr. Madison returns to clinic today for follow up again accompanied by his wife. He began [id number] Lupron on 2021 and, between then and ~early summer 2023, he was also consistently on daily PO Xtandi (for a total of about ~two and one half years). Unfortunately, in early summer 2023, he developed necrotizing pancreatitis; and he spent the better part of the next ~three to four months admitted to one hospital or another (including , [facility] and our facility). Throughout that time, he was unable to take the Xtandi, as it was multiple months before  he could even attempt to take anything by mouth. More recently, while he remains wholly reliant on tube feeds for his nutrition, he has been able to at least take his pills orally (including the Xtandi, which he has now been back on for the past ~six weeks). The diffuse skeletal pains he was experiencing at the time of his initial presentation in early  remain completely resolved. He again has no other specific complaints.    Past Medical History:   Diagnosis Date    Elevated cholesterol     GERD (gastroesophageal reflux disease)     Increased heart rate     Mini stroke     Neck pain     Prostate cancer     Rash     Tobacco abuse        Past Surgical History:   Procedure Laterality Date    APPENDECTOMY      [facility]     COLONOSCOPY      COLONOSCOPY N/A 2023    Procedure: COLONOSCOPY;  Surgeon: Mahnaz Caraballo MD;  Location: [facility] OR;  Service: Gastroenterology;  Laterality: N/A;    ENDOSCOPY N/A 2023    Procedure: ESOPHAGOGASTRODUODENOSCOPY WITH KEOFEED PLACEMENT;  Surgeon: Yoalnde Eller MD;  Location:  [facility];  Service: Gastroenterology;  Laterality: N/A;       Social History     Socioeconomic History    Marital status:    Tobacco Use    Smoking status: Some Days     Packs/day: 2.00     Years: 45.00     Additional pack years: 0.00     Total pack years: 90.00     Types: Cigarettes     Last attempt to quit: 2023     Years since quittin.3    Smokeless tobacco: Never   Vaping Use    Vaping Use: Never used   Substance and Sexual Activity    Alcohol use: No    Drug use: Yes     Types: Marijuana     Comment: occ     Sexual activity: Defer       Family History   Problem Relation Age of Onset    Nephrolithiasis Mother     Heart disease Father     Hypertension Father     Kidney disease Father        No Known Allergies    Current Outpatient Medications   Medication Sig Dispense Refill    atorvastatin (LIPITOR) 20 MG tablet Administer 1 tablet per J tube Every  Night.      Cholecalciferol 50 MCG (2000 UT) capsule Take 1 capsule by mouth Daily.      enzalutamide (XTANDI) 40 MG chemo capsule Take 4 capsules by mouth Daily. 120 capsule 5    fenofibrate (TRICOR) 145 MG tablet Administer 1 tablet per J tube Daily.      folic acid (FOLVITE) 1 MG tablet Administer 1 tablet per J tube Daily.      HYDROcodone-acetaminophen (NORCO)  MG per tablet Take 1 tablet by mouth Every 6 (Six) Hours As Needed for Moderate Pain. 120 tablet 0    hydrOXYzine (ATARAX) 50 MG tablet Take 1 tablet by mouth 3 (Three) Times a Day As Needed for Itching. 90 tablet 1    lactulose (CHRONULAC) 10 GM/15ML solution Administer 45 mL per J tube Daily As Needed (Constipation). 473 mL 0    lansoprazole (PREVACID SOLUTAB) 30 MG Tablet Delayed Release Dispersible disintegrating tablet Administer 1 tablet per J tube Every Morning. 30 tablet 0    metoprolol tartrate (LOPRESSOR) 25 MG tablet Administer 1 tablet per J tube 2 (Two) Times a Day.      montelukast (SINGULAIR) 10 MG tablet Administer 1 tablet per J tube Every Night.      multivitamin with minerals (Tab-A-Lucero/Iron/Beta Carotene) tablet tablet       multivitamin with minerals tablet tablet Take 1 tablet by mouth Daily. 30 tablet 2    mupirocin (BACTROBAN) 2 % ointment Apply 1 application  topically to the appropriate area as directed 3 (Three) Times a Day. 30 g 0    naloxone (NARCAN) 4 MG/0.1ML nasal spray Call 911. Don't prime. [place] in 1 nostril for overdose. Repeat in 2-3 minutes in other nostril if no or minimal breathing/responsiveness. 2 each 0    ondansetron (ZOFRAN) 8 MG tablet Administer 1 tablet per J tube Every 8 (Eight) Hours As Needed for Nausea or Vomiting.      pantoprazole (PROTONIX) 40 MG EC tablet Take 1 tablet by mouth Daily. 90 tablet 1    sertraline (ZOLOFT) 50 MG tablet Administer 1 tablet per J tube Daily.      sodium chloride 1 g tablet Take 2 tablets by mouth 3 (Three) Times a Day With Meals. 180 tablet 0    sucralfate  "(Carafate) 1 GM/10ML suspension Take 10 mL by mouth 4 (Four) Times a Day.      thiamine (VITAMIN B-1) 100 MG tablet  tablet Administer 1 tablet per J tube Daily. 90 tablet 1    vitamin B-12 (CYANOCOBALAMIN) 1000 MCG tablet Administer 1 tablet per J tube Daily.      sulfamethoxazole-trimethoprim (Bactrim DS) 800-160 MG per tablet Take 1 tablet by mouth 2 (Two) Times a Day. (Patient not taking: Reported on 2023) 20 tablet 0     No current facility-administered medications for this visit.     REVIEW OF SYSTEMS  CONSTITUTIONAL:  No fever, chills or night sweats. Chronic fatigue (though overall still improved compared to this past summer).  EYES:  No blurry vision, diplopia or other vision changes.  ENT:  No hearing loss, nosebleeds or sore throat.  CARDIOVASCULAR:  No palpitations, arrhythmia, syncopal episodes or edema.  PULMONARY:  No hemoptysis, wheezing, chronic cough or shortness of breath.  GASTROINTESTINAL:  As per the HPI above.  GENITOURINARY:  No hematuria, kidney stones or frequent urination.  MUSCULOSKELETAL:  As per the HPI above.  ENDOCRINE:  No excessive thirst. Hot flashes as above.  HEMATOLOGIC:  No history of free bleeding, spontaneous bleeding or clotting.  IMMUNOLOGIC:  No allergies or frequent infections.  NEUROLOGIC: No numbness, tingling, seizures or weakness.  PSYCHIATRIC:  No anxiety or depression.    PHYSICAL EXAMINATION  /66   Pulse 89   Temp 97.5 °F (36.4 °C) (Temporal)   Resp 18   Ht 170.2 cm (67\")   Wt 67.4 kg (148 lb 9.6 oz)   SpO2 96%   BMI 23.27 kg/m²     Pain Score:  Pain Score    23 1351   PainSc:   4   PainLoc: Abdomen     ECO  GENERAL:  A well-developed, well-nourished, white male in no acute distress, now chronically ill-appearing (although he is reportedly still doing much better now compared to how he was doing throughout the majority of this past summer).  HEENT:  Pupils equally round and reactive to light. Extraocular muscles intact.  CARDIOVASCULAR: "  Regular rate and rhythm. No murmurs, gallops or rubs.  LUNGS:  Clear to auscultation bilaterally. No wheezing  ABDOMEN:  Soft, nontender, nondistended with positive bowel sounds. PEG in place.  EXTREMITIES:  No clubbing, cyanosis or edema bilaterally.  SKIN:  No petechiae or rashes.  NEURO:  Cranial nerves grossly intact. No focal deficits.  PSYCH:  Alert and oriented x3.    LABORATORY  Lab Results   Component Value Date    WBC 13.47 (H) 11/30/2023    HGB 11.4 (L) 11/30/2023    HCT 37.2 (L) 11/30/2023    MCV 81.0 11/30/2023     (H) 11/30/2023    NEUTROABS 8.57 (H) 11/30/2023       Lab Results   Component Value Date     (L) 10/18/2023    K 4.3 10/18/2023    CL 99 10/18/2023    CO2 23.8 10/18/2023    BUN 16 10/18/2023    CREATININE 0.43 (L) 10/18/2023    GLUCOSE 103 (H) 10/18/2023    CALCIUM 8.5 (L) 10/18/2023    AST 84 (H) 10/18/2023     (H) 10/18/2023    ALKPHOS 656 (H) 10/18/2023    BILITOT 0.2 10/18/2023    PROTEINTOT 6.8 10/18/2023    ALBUMIN 2.7 (L) 10/18/2023     CBC (11/30/2023): WBCs: 13.47; HgB: 11.4; Hct: 37.2; platelets: 889; MCV: 81.0  CBC (10/18/2023): WBCs: 10.45; HgB: 7.6; Hct: 25.8; platelets: 608; MCV: 79.6  CBC (10/17/2023): WBCs: 9.95; HgB: 8.0; Hct: 25.6; platelets: 614; MCV: 76.9  CBC (04/12/2023): WBCs: 6.79; HgB: 12.2; Hct: 38.5; platelets: 546  CBC (01/19/2023): WBCs: 8.19; HgB: 12.1; Hct: 37.6; platelets: 586  CBC (10/19/2022): WBCs: 11.25; HgB: 12.3; Hct: 37.3; platelets: 453  CBC (07/18/2022): WBCs: 7.82; HgB: 12.7; Hct: 39.0; platelets: 513  CBC (04/13/2022): WBCs: 7.62; HgB: 12.7; Hct: 38.7; platelets: 453  CBC (01/19/2022): WBCs: 7.56; HgB: 13.1; Hct: 41.0; platelets: 518  CBC (07/20/2021): WBCs: 7.43; HgB: 13.0; Hct: 39.6; platelets: 469  CBC (06/29/2021): WBCs: 7.8; HgB: 12.6; Hct: 38.1; platelets: 396  CBC (04/14/2021): WBCs: 7.2; HgB: 12.7; Hct: 38.8; platelets: 423  CBC (02/25/2021): WBCs: 6.5; HgB: 11.2; Hct: 34.4; platelets: 407    PSA (11/30/2023):  pending  PSA (10/18/2023): < 0.014 ng/mL  PSA (07/12/2023): < 0.014 ng/mL  PSA (04/12/2023): < 0.014 ng/mL  PSA (01/19/2023): < 0.014 ng/mL  PSA (10/19/2022): < 0.014 ng/mL  PSA (07/18/2022): < 0.014 ng/mL  PSA (04/13/2022): < 0.014 ng/mL  PSA (01/19/2022): < 0.014 ng/mL  PSA (10/13/2021): < 0.014 ng/mL  PSA (07/20/2021): 0.020 ng/mL   PSA (04/14/2021): 0.108 ng/mL  PSA (02/25/2021): 0.814 ng/mL  PSA (01/20/2021): 29.5 ng/mL  PSA (01/07/2021): 247.0 ng/mL  PSA (11/10/2020): 28.3 ng/mL    Iron profile (10/18/2023): serum iron: 19; % saturation: 6; TIBC: 322; ferritin: 606.9 ng/mL    IMAGING  NM bone scan, whole body (12/15/2020):  Impression: Extensive osteoblastic metastatic disease involving the axial and appendicular skeleton including the right greater than left femoral diaphyses.    CT pelvis with contrast (12/21/2020):  Impression:  1) Urinary bladder wall thickening noted.  2) Heterogeneous appearance of the prostate gland.  3) Nonspecific stranding in the retroperitoneal space.  4) Retroperitoneal region lymph node on the right side measuring 1.4 cm.  5) Right obturator chain region lymph node measuring 1.5 cm. Other lymph nodes are identified in this region.  6) Sclerotic bone lesions are noted throughout the pelvis concerning for metastatic disease.    CT chest without contrast (10/10/2023, compared to 08/06/2023):  Impression:  1) No acute thoracic findings identfied.  2) Refer to CT abdomen/pelvis for abdomen findings.    CT abdomen and pelvis without contrast (10/10/2023, compared to 08/10/2023):  Impression:  1) Significant improvement in changes of necrotizing pancreatitis status post cystogastrostomy tube placement, necrosectomy and Axios metallic stent placement spanning from the G-tube into the stomach to the level of the proximal jejunum.  2) No significant ascites.  3) No acute appearing pancreatitis changes. No findings to suggest acute abscess.  4) Cholelithiasis noted.  5) Other  incidental/nonacute findings.    CT abdomen and pelvis with contrast (10/23/2023):  Impression: Sequela of necrotizing pancreatitis with interval marked decrease in size of the area of walled off necrosis centered in the pancreatic body since 09/09/2023. Increased intra and extrahepatic biliary ductal dilation with poor visualization of the lower most aspect of the common bile duct, concerning for stricture. Recommend correlation with ERCP.    PATHOLOGY  Left and right prostate, needle core biopsies, twelve total cores (12/02/2020):  Wells Tann[place] Score 5+4 or 4+5 = 9 prostatic adenocarcinoma involving ~45-90% of all twelve (12/12) total cores.    IMPRESSION AND PLAN  Mr. Madison is a 63 y.o., white male with:  Prostate adenocarcinoma: Diagnosed in December 2020 with stage IV disease, with widespread metastases involving the axial and appendicular skeleton along with probable, pelvic adenopathy. I have had multiple, long discussions with the patient and his wife since the time of his initial consultation in our clinic (on 01/07/2021) regarding this diagnosis and its prognosis. They remain aware that this disease is, unfortunately, not curable; however, particularly given his young age and this type of cancer's tendency to respond very well to initial anti-testosterone therapy, it was/remains very treatable, and sustained remissions are very possible. He was felt to be an excellent candidate for initial therapy with both ADT and androgen receptor blockade. He received an initial cycle of (currently now [id number]) Lupron on 01/08/2021 and was also started on daily PO Xtandi at that time. He has tolerated this regimen overall very well for the past ~three years; and, with this therapy, his serum PSA levels responded dramatically and quickly, decreasing from 247 ng/mL on 01/07/2021 to undetectable (<0.014 ng/mL) by 10/13/2021 (and this continues to be the case on the most recent repeat check, drawn on 10/18/2023; today's result  is pending). Due to issue #2, he was unable to take the Xtandi anywhere near consistently for a total of ~three to four months (throughout much of the summer and early fall in 2023); but he never missed a dose of Lupron, and he was able to restart the Xtandi by late October 2023 (and he has been taking it consistently again for the past ~five to six weeks). We will see him back in our clinic in two months (late January 2024) with a repeat CBC, CMP and PSA level.  Necrotizing pancreatitis: Unfortunately, his primary comorbidity and the cause of his primary complaints since early Summer 2023. While he now seems to (finally) be slowly recovering, he remains reliant on a PEG tube for all of his nutritional/caloric needs. As discussed above, he has recently been able to start taking at least some of his medications by mouth again, including the Xtandi. Ongoing management per [facility].  Skeletal metastases: There was diffuse involvement by issue #1 of both the axial and appendicular skeletons on the initial staging NM bone scan and CT of the abdomen and pelvis performed in late December 2020. 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 Continue to monitor.  Pain: The symptoms that were directly related to issue #3 remain resolved since he started Lupron/Xtandi in early January 2021 for the palliative treatment of issue #1. His current symptoms are related to issue #2, not #1; however, with his diagnosis of an incurable malignancy, our clinic remains agreeable to managing this issue. A new Rx for MSContin 15 mg q12hr scheduled for baseline control was provided today. Continue prn Norco for breakthrough symptoms. Continue to monitor.  Anemia: Multifactorial, with chronic inflammation from issue #2 contributing. Recently improved compared to earlier this year, when issue #2  was more active. Continue to monitor.  Nausea: Multifactorial, with issue #2 contributing. Refills of prn Zofran were provided today. Continue to monitor.  The patient and his wife were in agreement with these plans.    It is a pleasure to participate in Mr. Madison's care. Please do not hesitate to call with any questions or concerns that you may have.    A total of 30 minutes were spent coordinating this patient’s care in clinic today; more than 50% of this time was face-to-face with the patient and his wife, reviewing his interim medical history, discussing the results of recent labwork, including serum PSA levels, and counseling on the current treatment and followup plan. All questions were answered to their satisfaction.    FOLLOW UP  New Rx for MSContin 15 mg PO q12hr disp #60 provided today. Continue prn Norco. Continue ([id number]) Lupron. Continue daily, PO Xtandi. With [facility], as previously planned. With urology, as previously planned. Return to our clinic in 2 months (late January 2024) with a CBC, CMP and PSA.            This document was electronically signed by HIRO Martinez MD November 30, 2023 14:24 EST      CC: EUGENIA Mendoza

## 2023-11-30 NOTE — PROGRESS NOTES
Venipuncture Blood Specimen Collection  Venipuncture performed in LEFT ARM by Chris Hernandez MA with good hemostasis. Patient tolerated the procedure well without complications.   11/30/23   Chris Hernandez MA

## 2023-12-01 LAB — PSA SERPL-MCNC: <0.014 NG/ML (ref 0–4)

## 2023-12-12 ENCOUNTER — SPECIALTY PHARMACY (OUTPATIENT)
Dept: PHARMACY | Facility: HOSPITAL | Age: 63
End: 2023-12-12
Payer: COMMERCIAL

## 2023-12-12 NOTE — PROGRESS NOTES
Specialty Pharmacy Refill Coordination Note     Ta is a 63 y.o. male contacted today regarding refills of  Xtandi specialty medication(s).    Reviewed and verified with patient:       Specialty medication(s) and dose(s) confirmed: yes    Refill Questions      Flowsheet Row Most Recent Value   Changes to allergies? No   Changes to medications? No   New conditions or infections since last clinic visit No   Unplanned office visit, urgent care, ED, or hospital admission in the last 4 weeks  No   How does patient/caregiver feel medication is working? Very good   Financial problems or insurance changes  No   If yes, describe changes in insurance or financial issues. na   Since the previous refill, were any specialty medication doses or scheduled injections missed or delayed?  No   If yes, please provide the amount na   Why were doses missed? na   Does this patient require a clinical escalation to a pharmacist? No            Delivery Questions      Flowsheet Row Most Recent Value   Copay verified? No   Copay amount na   Copay form of payment No copayment ($0)              Medication Adherence          Adherence tools used: patient uses a pill box to manage medications      Support network for adherence: family member                Follow-up: 30 day(s)     Corinne Whatley Pharmacy Technician  Specialty Pharmacy Technician

## 2023-12-18 ENCOUNTER — OFFICE VISIT (OUTPATIENT)
Dept: FAMILY MEDICINE CLINIC | Facility: CLINIC | Age: 63
End: 2023-12-18
Payer: COMMERCIAL

## 2023-12-18 VITALS
BODY MASS INDEX: 22.66 KG/M2 | OXYGEN SATURATION: 98 % | HEIGHT: 67 IN | DIASTOLIC BLOOD PRESSURE: 54 MMHG | HEART RATE: 100 BPM | TEMPERATURE: 98.2 F | WEIGHT: 144.4 LBS | SYSTOLIC BLOOD PRESSURE: 104 MMHG

## 2023-12-18 DIAGNOSIS — C61 ADENOCARCINOMA OF PROSTATE: ICD-10-CM

## 2023-12-18 DIAGNOSIS — K85.91 NECROTIZING PANCREATITIS: ICD-10-CM

## 2023-12-18 PROCEDURE — 99213 OFFICE O/P EST LOW 20 MIN: CPT | Performed by: NURSE PRACTITIONER

## 2023-12-18 PROCEDURE — 1160F RVW MEDS BY RX/DR IN RCRD: CPT | Performed by: NURSE PRACTITIONER

## 2023-12-18 PROCEDURE — 1159F MED LIST DOCD IN RCRD: CPT | Performed by: NURSE PRACTITIONER

## 2023-12-18 RX ORDER — HYDROCODONE BITARTRATE AND ACETAMINOPHEN 10; 325 MG/1; MG/1
1 TABLET ORAL EVERY 6 HOURS PRN
Qty: 120 TABLET | Refills: 0 | Status: SHIPPED | OUTPATIENT
Start: 2023-12-18

## 2023-12-18 RX ORDER — LEVOFLOXACIN 500 MG/1
TABLET, FILM COATED ORAL
COMMUNITY
Start: 2023-11-17

## 2023-12-18 RX ORDER — BENZONATATE 100 MG/1
100 CAPSULE ORAL 3 TIMES DAILY PRN
Qty: 30 CAPSULE | Refills: 0 | Status: SHIPPED | OUTPATIENT
Start: 2023-12-18

## 2023-12-18 RX ORDER — MULTIPLE VITAMINS W/ MINERALS TAB 9MG-400MCG
1 TAB ORAL DAILY
Qty: 30 TABLET | Refills: 2 | Status: SHIPPED | OUTPATIENT
Start: 2023-12-18

## 2023-12-18 NOTE — TELEPHONE ENCOUNTER
Caller: Azael Madison    Relationship: Emergency Contact    Best call back number: [phone]    Requested Prescriptions:   Requested Prescriptions     Pending Prescriptions Disp Refills    HYDROcodone-acetaminophen (NORCO)  MG per tablet 120 tablet 0     Sig: Take 1 tablet by mouth Every 6 (Six) Hours As Needed for Moderate Pain.        Pharmacy where request should be sent:    [id number] [address] [facility] [address] [facility] [address] - [phone]  - [phone]     Last office visit with prescribing clinician: 11/30/2023   Last telemedicine visit with prescribing clinician: Visit date not found   Next office visit with prescribing clinician: 1/30/2024     Additional details provided by patient: PT IS NOT ABLE TO TAKE THE MORPHINE; IT MAKES HIM SICK. DO THEY TURN THAT BACK IN IF HE CANNOT TAKE IT?    Does the patient have less than a 3 day supply:  [x] Yes  [] No    Celi Case   12/18/23 10:31 EST

## 2023-12-18 NOTE — PROGRESS NOTES
"Chief Complaint  necrotizing pancreatitis and Follow-up    Subjective        Ta Madison presents to [facility]  Pancreatitis  This is a chronic (Feels he is stable.  No fever.  Recent ERCP wtih two stents.  Since procedure poor appetite.  Stools improved consistency.  Tolerating feeds) problem. The problem has been gradually improving. Associated symptoms include abdominal pain (minimal), fatigue and nausea (single episode vomiting). Pertinent negatives include no change in bowel habit, fever or vomiting. Exacerbated by: eating.       Objective   Vital Signs:  /54 (BP Location: Right arm, Patient Position: Sitting)   Pulse 100   Temp 98.2 °F (36.8 °C) (Temporal)   Ht 170.2 cm (67\")   Wt 65.5 kg (144 lb 6.4 oz)   SpO2 98%   BMI 22.62 kg/m²   Estimated body mass index is 22.62 kg/m² as calculated from the following:    Height as of this encounter: 170.2 cm (67\").    Weight as of this encounter: 65.5 kg (144 lb 6.4 oz).       BMI is within normal parameters. No other follow-up for BMI required.      Physical Exam  Vitals and nursing note reviewed.   Constitutional:       General: He is not in acute distress.     Appearance: He is well-developed. He is not ill-appearing.   HENT:      Head: Normocephalic.      Nose: No congestion.   Cardiovascular:      Rate and Rhythm: Normal rate and regular rhythm.      Heart sounds: Normal heart sounds. No murmur heard.  Pulmonary:      Effort: Pulmonary effort is normal.      Breath sounds: Normal breath sounds.   Abdominal:      General: Bowel sounds are normal. There is no distension.      Palpations: There is no mass.      Tenderness: There is no abdominal tenderness. There is no left CVA tenderness, guarding or rebound.      Hernia: No hernia is present.      Comments: J tube clean no erythema     Skin:     General: Skin is warm and dry.   Neurological:      Mental Status: He is alert and oriented to person, place, and time. "   Psychiatric:         Behavior: Behavior normal.        Result Review :  During this visit the following were done:  Labs Reviewed [x]    Labs Ordered []    Radiology Reports Reviewed []    Radiology Ordered []    PCP Records Reviewed []    Referring Provider Records Reviewed []    ER Records Reviewed []    Hospital Records Reviewed []    History Obtained From Family []    Radiology Images Reviewed []    Other Reviewed []    Records Requested []           Assessment and Plan   Diagnoses and all orders for this visit:    1. Necrotizing pancreatitis  -     thiamine (VITAMIN B-1) 100 MG tablet  tablet; Administer 1 tablet per J tube Daily.  Dispense: 90 tablet; Refill: 1  -     multivitamin with minerals tablet tablet; Take 1 tablet by mouth Daily.  Dispense: 30 tablet; Refill: 2    Other orders  -     benzonatate (Tessalon Perles) 100 MG capsule; Take 1 capsule by mouth 3 (Three) Times a Day As Needed for Cough.  Dispense: 30 capsule; Refill: 0             Follow Up   Return in about 3 months (around 3/18/2024), or if symptoms worsen or fail to improve, for Recheck.  Patient was given instructions and counseling regarding his condition or for health maintenance advice. Please see specific information pulled into the AVS if appropriate.

## 2024-01-03 DIAGNOSIS — K85.91 NECROTIZING PANCREATITIS: ICD-10-CM

## 2024-01-03 RX ORDER — LACTULOSE 10 G/15ML
30 SOLUTION ORAL DAILY PRN
Qty: 473 ML | Refills: 0 | Status: SHIPPED | OUTPATIENT
Start: 2024-01-03

## 2024-01-05 ENCOUNTER — SPECIALTY PHARMACY (OUTPATIENT)
Dept: PHARMACY | Facility: HOSPITAL | Age: 64
End: 2024-01-05
Payer: COMMERCIAL

## 2024-01-05 NOTE — PROGRESS NOTES
Specialty Pharmacy Refill Coordination Note     Ta is a 63 y.o. male contacted today regarding refills of  Xtandi 40MG specialty medication(s).    Reviewed and verified with patient:       Specialty medication(s) and dose(s) confirmed: yes    Refill Questions      Flowsheet Row Most Recent Value   Changes to allergies? No   Changes to medications? No   New conditions or infections since last clinic visit No   Unplanned office visit, urgent care, ED, or hospital admission in the last 4 weeks  No   How does patient/caregiver feel medication is working? Very good   Financial problems or insurance changes  No   If yes, describe changes in insurance or financial issues. na   Since the previous refill, were any specialty medication doses or scheduled injections missed or delayed?  No   If yes, please provide the amount na   Why were doses missed? na   Does this patient require a clinical escalation to a pharmacist? No            Delivery Questions      Flowsheet Row Most Recent Value   Copay verified? Yes   Copay amount 0.00   Copay form of payment No copayment ($0)              Medication Adherence          Adherence tools used: patient uses a pill box to manage medications      Support network for adherence: family member                Follow-up: 30 day(s)     Candelaria Akers, Pharmacy Technician  Specialty Pharmacy Technician

## 2024-01-06 DIAGNOSIS — K85.91 NECROTIZING PANCREATITIS: ICD-10-CM

## 2024-01-08 RX ORDER — LANOLIN ALCOHOL/MO/W.PET/CERES
100 CREAM (GRAM) TOPICAL DAILY
Qty: 90 TABLET | Refills: 0 | Status: SHIPPED | OUTPATIENT
Start: 2024-01-08

## 2024-01-10 ENCOUNTER — TELEPHONE (OUTPATIENT)
Dept: FAMILY MEDICINE CLINIC | Facility: CLINIC | Age: 64
End: 2024-01-10
Payer: COMMERCIAL

## 2024-01-10 NOTE — TELEPHONE ENCOUNTER
Caller: [facility]    Relationship: Home Health    Best call back number: [phone]     What is the best time to reach you: ANYTIME, OK TO LEAVE VOICEMAIL.    Who are you requesting to speak with (clinical staff, provider,  specific staff member): CLINICAL STAFF    Do you know the name of the person who called: ANA    What was the call regarding: ANA IS CALLING TO ADVISE THAT PATIENT IS HAVING LABS DONE, AND NEXT WEEK IS HIS LAST SET OF LABS TO BE DRAWN. ANA WOULD LIKE TO KNOW IF THAT IS ALL THEY ARE DOING WITH THE PATIENT BECAUSE IF IT IS, THEN AFTER NEXT WEEK THEY WILL BE DISCHARGING HIM. PLEASE ADVISE.    Is it okay if the provider responds through "[employer]"t: NO CALL ONLY

## 2024-01-11 DIAGNOSIS — K85.91 NECROTIZING PANCREATITIS: ICD-10-CM

## 2024-01-15 RX ORDER — MULTIPLE VITAMINS W/ MINERALS TAB 9MG-400MCG
1 TAB ORAL DAILY
Qty: 30 TABLET | Refills: 2 | Status: SHIPPED | OUTPATIENT
Start: 2024-01-15

## 2024-01-23 ENCOUNTER — TELEPHONE (OUTPATIENT)
Dept: FAMILY MEDICINE CLINIC | Facility: CLINIC | Age: 64
End: 2024-01-23
Payer: COMMERCIAL

## 2024-01-23 RX ORDER — SODIUM CHLORIDE 1 G/1
2 TABLET ORAL
Qty: 180 TABLET | Refills: 0 | Status: SHIPPED | OUTPATIENT
Start: 2024-01-23

## 2024-01-23 RX ORDER — SODIUM CHLORIDE 1 G/1
TABLET ORAL
Qty: 540 TABLET | OUTPATIENT
Start: 2024-01-23

## 2024-01-23 NOTE — TELEPHONE ENCOUNTER
Caller: Azael Madison    Relationship: Emergency Contact    Best call back number: [phone]     Requested Prescriptions:   Requested Prescriptions     Pending Prescriptions Disp Refills    sodium chloride 1 g tablet 180 tablet 0     Sig: Take 2 tablets by mouth 3 (Three) Times a Day With Meals.        Pharmacy where request should be sent: [id number] [address] [facility] [address] - [phone] PH - [phone] FX     Last office visit with prescribing clinician: 12/18/2023   Last telemedicine visit with prescribing clinician: Visit date not found   Next office visit with prescribing clinician: 3/18/2024     Additional details provided by patient: SHOULD PATIENT CONTINUE THIS MEDICATION?  OUT OF MEDICATION.      Does the patient have less than a 3 day supply:  [x] Yes  [] No    Would you like a call back once the refill request has been completed: [x] Yes [] No    If the office needs to give you a call back, can they leave a voicemail: [x] Yes [] No    Claudia Guevara   01/23/24 09:57 EST

## 2024-01-23 NOTE — TELEPHONE ENCOUNTER
Caller: Azael Madison    Relationship: Emergency Contact    Best call back number: [phone]     What is the best time to reach you: ANY    Who are you requesting to speak with (clinical staff, provider,  specific staff member): NURSE    Do you know the name of the person who called: WIFE    What was the call regarding: PATIENT HAS CT OF ABDOMEN SCHEDULED AT .  CAN WE GET THIS SCHEDULED LOCAL?    Is it okay if the provider responds through MyChart: PHONE CALL PLEASE

## 2024-01-30 ENCOUNTER — LAB (OUTPATIENT)
Dept: ONCOLOGY | Facility: CLINIC | Age: 64
End: 2024-01-30
Payer: COMMERCIAL

## 2024-01-30 ENCOUNTER — OFFICE VISIT (OUTPATIENT)
Dept: ONCOLOGY | Facility: CLINIC | Age: 64
End: 2024-01-30
Payer: COMMERCIAL

## 2024-01-30 VITALS
HEART RATE: 85 BPM | RESPIRATION RATE: 18 BRPM | BODY MASS INDEX: 24.42 KG/M2 | DIASTOLIC BLOOD PRESSURE: 72 MMHG | WEIGHT: 155.6 LBS | HEIGHT: 67 IN | TEMPERATURE: 97.5 F | OXYGEN SATURATION: 99 % | SYSTOLIC BLOOD PRESSURE: 115 MMHG

## 2024-01-30 DIAGNOSIS — C61 ADENOCARCINOMA OF PROSTATE: Primary | ICD-10-CM

## 2024-01-30 DIAGNOSIS — C61 ADENOCARCINOMA OF PROSTATE: ICD-10-CM

## 2024-01-30 DIAGNOSIS — C79.51 MALIGNANT NEOPLASM METASTATIC TO BONE: ICD-10-CM

## 2024-01-30 LAB
ALBUMIN SERPL-MCNC: 4 G/DL (ref 3.5–5.2)
ALBUMIN/GLOB SERPL: 1.1 G/DL
ALP SERPL-CCNC: 124 U/L (ref 39–117)
ALT SERPL W P-5'-P-CCNC: 29 U/L (ref 1–41)
ANION GAP SERPL CALCULATED.3IONS-SCNC: 10.6 MMOL/L (ref 5–15)
AST SERPL-CCNC: 21 U/L (ref 1–40)
BASOPHILS # BLD AUTO: 0.09 10*3/MM3 (ref 0–0.2)
BASOPHILS NFR BLD AUTO: 0.9 % (ref 0–1.5)
BILIRUB SERPL-MCNC: 0.2 MG/DL (ref 0–1.2)
BUN SERPL-MCNC: 16 MG/DL (ref 8–23)
BUN/CREAT SERPL: 25.4 (ref 7–25)
CALCIUM SPEC-SCNC: 9.8 MG/DL (ref 8.6–10.5)
CHLORIDE SERPL-SCNC: 102 MMOL/L (ref 98–107)
CO2 SERPL-SCNC: 23.4 MMOL/L (ref 22–29)
CREAT SERPL-MCNC: 0.63 MG/DL (ref 0.76–1.27)
DEPRECATED RDW RBC AUTO: 50.7 FL (ref 37–54)
EGFRCR SERPLBLD CKD-EPI 2021: 107.5 ML/MIN/1.73
EOSINOPHIL # BLD AUTO: 0.14 10*3/MM3 (ref 0–0.4)
EOSINOPHIL NFR BLD AUTO: 1.5 % (ref 0.3–6.2)
ERYTHROCYTE [DISTWIDTH] IN BLOOD BY AUTOMATED COUNT: 16.4 % (ref 12.3–15.4)
GLOBULIN UR ELPH-MCNC: 3.7 GM/DL
GLUCOSE SERPL-MCNC: 115 MG/DL (ref 65–99)
HCT VFR BLD AUTO: 40.1 % (ref 37.5–51)
HGB BLD-MCNC: 12.6 G/DL (ref 13–17.7)
IMM GRANULOCYTES # BLD AUTO: 0.03 10*3/MM3 (ref 0–0.05)
IMM GRANULOCYTES NFR BLD AUTO: 0.3 % (ref 0–0.5)
LYMPHOCYTES # BLD AUTO: 3.07 10*3/MM3 (ref 0.7–3.1)
LYMPHOCYTES NFR BLD AUTO: 32 % (ref 19.6–45.3)
MCH RBC QN AUTO: 26.6 PG (ref 26.6–33)
MCHC RBC AUTO-ENTMCNC: 31.4 G/DL (ref 31.5–35.7)
MCV RBC AUTO: 84.8 FL (ref 79–97)
MONOCYTES # BLD AUTO: 0.74 10*3/MM3 (ref 0.1–0.9)
MONOCYTES NFR BLD AUTO: 7.7 % (ref 5–12)
NEUTROPHILS NFR BLD AUTO: 5.53 10*3/MM3 (ref 1.7–7)
NEUTROPHILS NFR BLD AUTO: 57.6 % (ref 42.7–76)
NRBC BLD AUTO-RTO: 0 /100 WBC (ref 0–0.2)
PLATELET # BLD AUTO: 568 10*3/MM3 (ref 140–450)
PMV BLD AUTO: 9.6 FL (ref 6–12)
POTASSIUM SERPL-SCNC: 4.5 MMOL/L (ref 3.5–5.2)
PROT SERPL-MCNC: 7.7 G/DL (ref 6–8.5)
RBC # BLD AUTO: 4.73 10*6/MM3 (ref 4.14–5.8)
SODIUM SERPL-SCNC: 136 MMOL/L (ref 136–145)
WBC NRBC COR # BLD AUTO: 9.6 10*3/MM3 (ref 3.4–10.8)

## 2024-01-30 PROCEDURE — 84153 ASSAY OF PSA TOTAL: CPT | Performed by: INTERNAL MEDICINE

## 2024-01-30 PROCEDURE — 85025 COMPLETE CBC W/AUTO DIFF WBC: CPT | Performed by: INTERNAL MEDICINE

## 2024-01-30 PROCEDURE — 99214 OFFICE O/P EST MOD 30 MIN: CPT | Performed by: INTERNAL MEDICINE

## 2024-01-30 PROCEDURE — 1126F AMNT PAIN NOTED NONE PRSNT: CPT | Performed by: INTERNAL MEDICINE

## 2024-01-30 PROCEDURE — 36415 COLL VENOUS BLD VENIPUNCTURE: CPT | Performed by: INTERNAL MEDICINE

## 2024-01-30 PROCEDURE — 80053 COMPREHEN METABOLIC PANEL: CPT | Performed by: INTERNAL MEDICINE

## 2024-01-30 RX ORDER — HYDROCODONE BITARTRATE AND ACETAMINOPHEN 10; 325 MG/1; MG/1
1 TABLET ORAL EVERY 6 HOURS PRN
Qty: 120 TABLET | Refills: 0 | Status: SHIPPED | OUTPATIENT
Start: 2024-01-30

## 2024-01-30 RX ORDER — MORPHINE SULFATE 15 MG/1
15 TABLET, FILM COATED, EXTENDED RELEASE ORAL 2 TIMES DAILY
Qty: 60 TABLET | Refills: 0 | Status: SHIPPED | OUTPATIENT
Start: 2024-01-30

## 2024-01-30 NOTE — PROGRESS NOTES
Name:  Ta Madison  :  1960  Date:  2024     REFERRING PHYSICIAN  Dheeraj Nieves MD    PRIMARY CARE PROVIDER  Yolande Olvera APRN    REASON FOR FOLLOWUP  1. Adenocarcinoma of prostate      CHIEF COMPLAINT  Intermittent nausea and abdominal pain, overall still slowly improving.    Dear Ms. Wade,    HISTORY OF PRESENT ILLNESS:   I saw Mr. Madison in follow up today in our medical oncology clinic. As you are aware, he is a pleasant, 63 y.o., white male with a history of tobacco abuse who was referred to urology in 2020 for an elevated serum PSA level (of ~28 ng/mL, which had increased to 247 ng/mL at the time of his initial appointment in our clinic). An examination revealed a very hard and fixed prostate, and multiple core biopsies were performed on 2020. The results were consistent with [place] Score 5+4 or 4+5=9 adenocarcinoma involving all twelve sampled cores. A staging workup confirmed pelvic adenopathy as well as diffuse, sclerotic lesions throughout the axial and appendicular skeleton, including the right greater than left femoral diaphyses. With this stage IV diagnosis, he was referred to our clinic for further evaluation and management. At the time of his initial consultation in our office (on 2021), he was agreeable to starting definitive therapy with a combination of ADT (Lupron injections) and anti-androgen therapy (daily PO Xtandi).    INTERIM HISTORY:  Mr. Madison returns to clinic today for follow up once again accompanied by his wife. He began [id number] Lupron on 2021 and, between then and ~early summer 2023, he was also consistently on daily PO Xtandi (for a total of about ~two and one half years). Unfortunately, in early summer 2023, he developed necrotizing pancreatitis; and he spent the better part of the next ~three to four months admitted to one hospital or another (including , [facility] and our facility). Throughout that time, he was unable to take the  Xtandi, as it was multiple months before he could even attempt to take anything by mouth. More recently, while he still remains at least somewhat reliant on tube feeds for his nutrition, he has still been able to at least take his pills orally (including the Xtandi, which he has now been back on for the past ~three months). He is still making steady progress with his ongoing recovery from the necrotizing pancreatitis. He is scheduled to follow up back up with her surgeon the middle of next month, at which time they are going to revise a couple of stents and, maybe, removed the PEG. He again has no other specific complaints.    Past Medical History:   Diagnosis Date    Elevated cholesterol     GERD (gastroesophageal reflux disease)     Increased heart rate     Mini stroke     Neck pain     Prostate cancer     Rash     Tobacco abuse        Past Surgical History:   Procedure Laterality Date    APPENDECTOMY      [facility]     COLONOSCOPY      COLONOSCOPY N/A 2023    Procedure: COLONOSCOPY;  Surgeon: Mahnaz Caraballo MD;  Location: [facility] OR;  Service: Gastroenterology;  Laterality: N/A;    ENDOSCOPY N/A 2023    Procedure: ESOPHAGOGASTRODUODENOSCOPY WITH KEOFEED PLACEMENT;  Surgeon: Yolande Eller MD;  Location:  [facility];  Service: Gastroenterology;  Laterality: N/A;       Social History     Socioeconomic History    Marital status:    Tobacco Use    Smoking status: Some Days     Packs/day: 2.00     Years: 45.00     Additional pack years: 0.00     Total pack years: 90.00     Types: Cigarettes     Last attempt to quit: 2023     Years since quittin.5    Smokeless tobacco: Never   Vaping Use    Vaping Use: Never used   Substance and Sexual Activity    Alcohol use: No    Drug use: Yes     Types: Marijuana     Comment: occ     Sexual activity: Defer       Family History   Problem Relation Age of Onset    Nephrolithiasis Mother     Heart disease Father     Hypertension  Father     Kidney disease Father        No Known Allergies    Current Outpatient Medications   Medication Sig Dispense Refill    atorvastatin (LIPITOR) 20 MG tablet Administer 1 tablet per J tube Every Night.      benzonatate (Tessalon Perles) 100 MG capsule Take 1 capsule by mouth 3 (Three) Times a Day As Needed for Cough. 30 capsule 0    Cholecalciferol 50 MCG (2000 UT) capsule Take 1 capsule by mouth Daily.      enzalutamide (XTANDI) 40 MG chemo capsule Take 4 capsules by mouth Daily. 120 capsule 5    fenofibrate (TRICOR) 145 MG tablet Administer 1 tablet per J tube Daily.      folic acid (FOLVITE) 1 MG tablet Administer 1 tablet per J tube Daily.      HYDROcodone-acetaminophen (NORCO)  MG per tablet Take 1 tablet by mouth Every 6 (Six) Hours As Needed for Moderate Pain. 120 tablet 0    hydrOXYzine (ATARAX) 50 MG tablet Take 1 tablet by mouth 3 (Three) Times a Day As Needed for Itching. 90 tablet 1    lactulose (CHRONULAC) 10 GM/15ML solution Administer 45 mL per J tube Daily As Needed (Constipation). 473 mL 0    lansoprazole (PREVACID SOLUTAB) 30 MG Tablet Delayed Release Dispersible disintegrating tablet Administer 1 tablet per J tube Every Morning. 30 tablet 0    metoprolol tartrate (LOPRESSOR) 25 MG tablet Administer 1 tablet per J tube 2 (Two) Times a Day.      montelukast (SINGULAIR) 10 MG tablet Administer 1 tablet per J tube Every Night.      multivitamin with minerals (Tab-A-Lucero/Iron/Beta Carotene) tablet tablet       multivitamin with minerals tablet tablet Take 1 tablet by mouth Daily. 30 tablet 2    mupirocin (BACTROBAN) 2 % ointment Apply 1 application  topically to the appropriate area as directed 3 (Three) Times a Day. 30 g 0    naloxone (NARCAN) 4 MG/0.1ML nasal spray Call 911. Don't prime. [place] in 1 nostril for overdose. Repeat in 2-3 minutes in other nostril if no or minimal breathing/responsiveness. 2 each 0    ondansetron (ZOFRAN) 8 MG tablet Take 1 tablet by mouth Every 8 (Eight)  "Hours As Needed for Nausea or Vomiting. 90 tablet 3    pantoprazole (PROTONIX) 40 MG EC tablet Take 1 tablet by mouth Daily. 90 tablet 1    sertraline (ZOLOFT) 50 MG tablet Administer 1 tablet per J tube Daily. 90 tablet 1    sodium chloride 1 g tablet Take 2 tablets by mouth 3 (Three) Times a Day With Meals. 180 tablet 0    sucralfate (Carafate) 1 GM/10ML suspension Take 10 mL by mouth 4 (Four) Times a Day.      thiamine (VITAMIN B1) 100 MG tablet TAKE 1 TABLET BY MOUTH EVERY DAY 90 tablet 0    vitamin B-12 (CYANOCOBALAMIN) 1000 MCG tablet Administer 1 tablet per J tube Daily.      levoFLOXacin (LEVAQUIN) 500 MG tablet       Morphine (MS CONTIN) 15 MG 12 hr tablet Take 1 tablet by mouth 2 (Two) Times a Day. 60 tablet 0    sulfamethoxazole-trimethoprim (Bactrim DS) 800-160 MG per tablet Take 1 tablet by mouth 2 (Two) Times a Day. 20 tablet 0     No current facility-administered medications for this visit.     REVIEW OF SYSTEMS  CONSTITUTIONAL:  No fever, chills or night sweats. Chronic fatigue (though overall still steadily improving compared to last Summer/early Fall).  EYES:  No blurry vision, diplopia or other vision changes.  ENT:  No hearing loss, nosebleeds or sore throat.  CARDIOVASCULAR:  No palpitations, arrhythmia, syncopal episodes or edema.  PULMONARY:  No hemoptysis, wheezing, chronic cough or shortness of breath.  GASTROINTESTINAL:  As per the HPI above.  GENITOURINARY:  No hematuria, kidney stones or frequent urination.  MUSCULOSKELETAL:  As per the HPI above.  ENDOCRINE:  No excessive thirst. Hot flashes as above.  HEMATOLOGIC:  No history of free bleeding, spontaneous bleeding or clotting.  IMMUNOLOGIC:  No allergies or frequent infections.  NEUROLOGIC: No numbness, tingling, seizures or weakness.  PSYCHIATRIC:  No anxiety or depression.    PHYSICAL EXAMINATION  /72   Pulse 85   Temp 97.5 °F (36.4 °C) (Temporal)   Resp 18   Ht 170.2 cm (67\")   Wt 70.6 kg (155 lb 9.6 oz)   SpO2 99%   BMI " 24.37 kg/m²     Pain Score:  Pain Score    24 1358   PainSc: 0-No pain       ECO  GENERAL:  A well-developed, well-nourished, white male in no acute distress, now less chronically ill-appearing (compared to late last year).  HEENT:  Pupils equally round and reactive to light. Extraocular muscles intact.  CARDIOVASCULAR:  Regular rate and rhythm. No murmurs, gallops or rubs.  LUNGS:  Clear to auscultation bilaterally. No wheezing  ABDOMEN:  Soft, nontender, nondistended with positive bowel sounds. PEG still in place.  EXTREMITIES:  No clubbing, cyanosis or edema bilaterally.  SKIN:  No petechiae or rashes.  NEURO:  Cranial nerves grossly intact. No focal deficits.  PSYCH:  Alert and oriented x3.    LABORATORY  Lab Results   Component Value Date    WBC 9.60 2024    HGB 12.6 (L) 2024    HCT 40.1 2024    MCV 84.8 2024     (H) 2024    NEUTROABS 5.53 2024       Lab Results   Component Value Date     2023    K 4.4 2023    CL 99 2023    CO2 24.3 2023    BUN 18 2023    CREATININE 0.63 (L) 2023    GLUCOSE 92 2023    CALCIUM 9.6 2023    AST 13 2023    ALT 7 2023    ALKPHOS 97 2023    BILITOT <0.2 2023    PROTEINTOT 7.6 2023    ALBUMIN 3.8 2023     CBC (2024): WBCs: 9.60; HgB: 12.6; Hct: 40.1; platelets: 568; MCV: 84.8  CBC (2023): WBCs: 10.77; HgB: 11.9; Hct: 37.2; platelets: 643; MCV: 79  CBC (2023): WBCs: 13.47; HgB: 11.4; Hct: 37.2; platelets: 889; MCV: 81.0  CBC (10/18/2023): WBCs: 10.45; HgB: 7.6; Hct: 25.8; platelets: 608; MCV: 79.6  CBC (10/17/2023): WBCs: 9.95; HgB: 8.0; Hct: 25.6; platelets: 614; MCV: 76.9  CBC (2023): WBCs: 6.79; HgB: 12.2; Hct: 38.5; platelets: 546  CBC (2023): WBCs: 8.19; HgB: 12.1; Hct: 37.6; platelets: 586  CBC (10/19/2022): WBCs: 11.25; HgB: 12.3; Hct: 37.3; platelets: 453  CBC (2022): WBCs: 7.82; HgB: 12.7; Hct: 39.0;  platelets: 513  CBC (04/13/2022): WBCs: 7.62; HgB: 12.7; Hct: 38.7; platelets: 453  CBC (01/19/2022): WBCs: 7.56; HgB: 13.1; Hct: 41.0; platelets: 518  CBC (07/20/2021): WBCs: 7.43; HgB: 13.0; Hct: 39.6; platelets: 469  CBC (06/29/2021): WBCs: 7.8; HgB: 12.6; Hct: 38.1; platelets: 396  CBC (04/14/2021): WBCs: 7.2; HgB: 12.7; Hct: 38.8; platelets: 423  CBC (02/25/2021): WBCs: 6.5; HgB: 11.2; Hct: 34.4; platelets: 407    PSA (01/30/2024): pending  PSA (11/30/2023): < 0.014 ng/mL  PSA (10/18/2023): < 0.014 ng/mL  PSA (07/12/2023): < 0.014 ng/mL  PSA (04/12/2023): < 0.014 ng/mL  PSA (01/19/2023): < 0.014 ng/mL  PSA (10/19/2022): < 0.014 ng/mL  PSA (07/18/2022): < 0.014 ng/mL  PSA (04/13/2022): < 0.014 ng/mL  PSA (01/19/2022): < 0.014 ng/mL  PSA (10/13/2021): < 0.014 ng/mL  PSA (07/20/2021): 0.020 ng/mL   PSA (04/14/2021): 0.108 ng/mL  PSA (02/25/2021): 0.814 ng/mL  PSA (01/20/2021): 29.5 ng/mL  PSA (01/07/2021): 247.0 ng/mL  PSA (11/10/2020): 28.3 ng/mL    Iron profile (10/18/2023): serum iron: 19; % saturation: 6; TIBC: 322; ferritin: 606.9 ng/mL    IMAGING  NM bone scan, whole body (12/15/2020):  Impression: Extensive osteoblastic metastatic disease involving the axial and appendicular skeleton including the right greater than left femoral diaphyses.    CT pelvis with contrast (12/21/2020):  Impression:  1) Urinary bladder wall thickening noted.  2) Heterogeneous appearance of the prostate gland.  3) Nonspecific stranding in the retroperitoneal space.  4) Retroperitoneal region lymph node on the right side measuring 1.4 cm.  5) Right obturator chain region lymph node measuring 1.5 cm. Other lymph nodes are identified in this region.  6) Sclerotic bone lesions are noted throughout the pelvis concerning for metastatic disease.    CT chest without contrast (10/10/2023, compared to 08/06/2023):  Impression:  1) No acute thoracic findings identfied.  2) Refer to CT abdomen/pelvis for abdomen findings.    CT abdomen and  pelvis without contrast (10/10/2023, compared to 08/10/2023):  Impression:  1) Significant improvement in changes of necrotizing pancreatitis status post cystogastrostomy tube placement, necrosectomy and Axios metallic stent placement spanning from the G-tube into the stomach to the level of the proximal jejunum.  2) No significant ascites.  3) No acute appearing pancreatitis changes. No findings to suggest acute abscess.  4) Cholelithiasis noted.  5) Other incidental/nonacute findings.    CT abdomen and pelvis with contrast (10/23/2023):  Impression: Sequela of necrotizing pancreatitis with interval marked decrease in size of the area of walled off necrosis centered in the pancreatic body since 09/09/2023. Increased intra and extrahepatic biliary ductal dilation with poor visualization of the lower most aspect of the common bile duct, concerning for stricture. Recommend correlation with ERCP.    PATHOLOGY  Left and right prostate, needle core biopsies, twelve total cores (12/02/2020):  [place] Score 5+4 or 4+5 = 9 prostatic adenocarcinoma involving ~45-90% of all twelve (12/12) total cores.    IMPRESSION AND PLAN  Mr. Madison is a 63 y.o., white male with:  Prostate adenocarcinoma: Diagnosed in December 2020 with stage IV disease, with widespread metastases involving the axial and appendicular skeleton along with probable, pelvic adenopathy. I have had multiple, long discussions with the patient and his wife since the time of his initial consultation in our clinic (on 01/07/2021) regarding this diagnosis and its prognosis. They remain aware that this disease is, unfortunately, not curable; however, particularly given his young age and this type of cancer's tendency to respond very well to initial anti-testosterone therapy, it was/remains very treatable, and sustained remissions are very possible. He was felt to be an excellent candidate for initial therapy with both ADT and androgen receptor blockade. He received an  initial cycle of (currently now [id number]) Lupron on 01/08/2021 and was also started on daily PO Xtandi at that time. He has tolerated this regimen overall very well for the past ~three years; and, with this therapy, his serum PSA levels responded dramatically and quickly, decreasing from 247 ng/mL on 01/07/2021 to undetectable (<0.014 ng/mL) by 10/13/2021 (and this continues to be the case on the most recent repeat check, drawn on 11/30/2023; today's result is pending). Due to issue #2, he was unable to take the Xtandi anywhere near consistently for a total of ~three to four months (throughout much of the summer and early fall in 2023); but he never missed a dose of Lupron, and he was able to restart the Xtandi by late October 2023 (and he has been taking it consistently again for the past ~three months). We will see him back in our clinic in ~three months, on the day of the next scheduled cycle of [id number] Lupron (on 04/17/2024) with a repeat CBC, CMP and PSA level.  Necrotizing pancreatitis: Unfortunately, his primary comorbidity and the cause of his primary complaints since early Summer 2023. While he is still slowly, but steadily, recovering from this ordeal, he remains at least somewhat reliant on a PEG tube for most of his nutritional/caloric needs. As discussed above, he has recently still been able to take medications by mouth again, including the Xtandi. Ongoing management per [facility] (with whom he follows up again in mid-February).  Skeletal metastases: There was diffuse involvement by issue #1 of both the axial and appendicular skeletons on the initial staging NM bone scan and CT of the abdomen and pelvis performed in late December 2020. 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 Continue to monitor.  Pain: The symptoms that were directly related to  issue #3 remain resolved since he started Lupron/Xtandi in early January 2021 for the palliative treatment of issue #1. His current symptoms are related to issue #2, not #1; however, with his diagnosis of an incurable malignancy, our clinic remains agreeable to managing this issue. Continue MSContin 15 mg q12hr scheduled for baseline control and prn Norco for breakthrough symptoms. Refills of both were provided today. Continue to monitor.  Anemia: Mild and multifactorial, with chronic inflammation from issue #2 recently contributing. Continue to monitor.  The patient and his wife were in agreement with these plans.    It is a pleasure to participate in Mr. Madison's care. Please do not hesitate to call with any questions or concerns that you may have.    A total of 30 minutes were spent coordinating this patient’s care in clinic today; more than 50% of this time was face-to-face with the patient and his wife, reviewing his interim medical history, discussing the results of the most recent repeat labwork, including serum PSA levels, and counseling on the current treatment and followup plan. All questions were answered to their satisfaction.    FOLLOW UP  Continue MSContin 15 mg PO q12hr and prn Norco 10/325 mg q6hr (refills of both provided today). Continue ([id number]) Lupron. Continue daily, PO Xtandi. With [facility] in mid-February, as previously planned. With urology, as previously planned. Return to our clinic in ~3 months, on the day of the next cycle of [id number] Lupron (on 04/17/2024), with a CBC, CMP and PSA.            This document was electronically signed by HIRO Martinez MD January 30, 2024 14:15 EST      CC: EUGENIA Mendoza

## 2024-01-30 NOTE — PROGRESS NOTES
Venipuncture Blood Specimen Collection  Venipuncture performed in left arm by Chris Hernandez MA with good hemostasis. Patient tolerated the procedure well without complications.   01/30/24   Chris Hernandez MA

## 2024-01-31 LAB — PSA SERPL-MCNC: <0.014 NG/ML (ref 0–4)

## 2024-02-19 ENCOUNTER — SPECIALTY PHARMACY (OUTPATIENT)
Dept: PHARMACY | Facility: HOSPITAL | Age: 64
End: 2024-02-19

## 2024-03-11 RX ORDER — SODIUM CHLORIDE 1 G/1
TABLET ORAL
Qty: 180 TABLET | Refills: 2 | Status: SHIPPED | OUTPATIENT
Start: 2024-03-11

## 2024-03-15 ENCOUNTER — SPECIALTY PHARMACY (OUTPATIENT)
Dept: PHARMACY | Facility: HOSPITAL | Age: 64
End: 2024-03-15

## 2024-03-15 ENCOUNTER — SPECIALTY PHARMACY (OUTPATIENT)
Dept: PHARMACY | Facility: HOSPITAL | Age: 64
End: 2024-03-15
Payer: COMMERCIAL

## 2024-03-15 DIAGNOSIS — C61 ADENOCARCINOMA OF PROSTATE: ICD-10-CM

## 2024-03-15 NOTE — PROGRESS NOTES
Specialty Pharmacy Patient Management Program  Oncology Reassessment     Ta Madison is a 63 y.o. male with prostate cancer seen by an Oncology provider and enrolled in the Oncology Patient Management program offered by [facility].  A follow-up outreach was conducted, including assessment of continued therapy appropriateness, medication adherence, and side effect incidence and management for Xtandi.       Relevant Past Medical History and Comorbidities  Relevant medical history and concomitant health conditions were discussed with the patient. The patient's chart has been reviewed for relevant past medical history and comorbid health conditions and updated as necessary.   Past Medical History:   Diagnosis Date    Elevated cholesterol     GERD (gastroesophageal reflux disease)     Increased heart rate     Mini stroke     Neck pain     Prostate cancer     Rash     Tobacco abuse      Social History     Socioeconomic History    Marital status:    Tobacco Use    Smoking status: Some Days     Current packs/day: 0.00     Average packs/day: 2.0 packs/day for 45.0 years (90.0 ttl pk-yrs)     Types: Cigarettes     Start date: 1978     Last attempt to quit: 2023     Years since quittin.6    Smokeless tobacco: Never   Vaping Use    Vaping status: Never Used   Substance and Sexual Activity    Alcohol use: No    Drug use: Yes     Types: Marijuana     Comment: occ     Sexual activity: Defer       Allergies  Known allergies and reactions were discussed with the patient. The patient's chart has been reviewed for allergy information and updated as necessary.   Patient has no known allergies.    Relevant Laboratory Values  Lab Results   Component Value Date    GLUCOSE 115 (H) 2024    CALCIUM 9.8 2024     2024    K 4.5 2024    CO2 23.4 2024     2024    BUN 16 2024    CREATININE 0.63 (L) 2024    EGFRIFNONA 70 2022    BCR  25.4 (H) 01/30/2024    ANIONGAP 10.6 01/30/2024     Lab Results   Component Value Date    WBC 9.60 01/30/2024    RBC 4.73 01/30/2024    HGB 12.6 (L) 01/30/2024    HCT 40.1 01/30/2024    MCV 84.8 01/30/2024    MCH 26.6 01/30/2024    MCHC 31.4 (L) 01/30/2024    RDW 16.4 (H) 01/30/2024    RDWSD 50.7 01/30/2024    MPV 9.6 01/30/2024     (H) 01/30/2024    NEUTRORELPCT 57.6 01/30/2024    LYMPHORELPCT 32.0 01/30/2024    MONORELPCT 7.7 01/30/2024    EOSRELPCT 1.5 01/30/2024    BASORELPCT 0.9 01/30/2024    AUTOIGPER 0.3 01/30/2024    NEUTROABS 5.53 01/30/2024    LYMPHSABS 3.07 01/30/2024    MONOSABS 0.74 01/30/2024    EOSABS 0.14 01/30/2024    BASOSABS 0.09 01/30/2024    AUTOIGNUM 0.03 01/30/2024    NRBC 0.0 01/30/2024        Current Medication List  This medication list has been reviewed with the patient and evaluated for any interactions or necessary modifications/recommendations, and updated to include all prescription medications, OTC medications, and supplements the patient is currently taking.  This list reflects what is contained in the patient's profile, which has also been marked as reviewed to communicate to other providers it is the most up to date version of the patient's current medication therapy.     Current Outpatient Medications:     enzalutamide (XTANDI) 40 MG chemo capsule, Take 4 capsules by mouth Daily., Disp: 120 capsule, Rfl: 5    atorvastatin (LIPITOR) 20 MG tablet, Administer 1 tablet per J tube Every Night., Disp: , Rfl:     benzonatate (Tessalon Perles) 100 MG capsule, Take 1 capsule by mouth 3 (Three) Times a Day As Needed for Cough., Disp: 30 capsule, Rfl: 0    Cholecalciferol 50 MCG (2000 UT) capsule, Take 1 capsule by mouth Daily., Disp: , Rfl:     fenofibrate (TRICOR) 145 MG tablet, Administer 1 tablet per J tube Daily., Disp: , Rfl:     folic acid (FOLVITE) 1 MG tablet, Administer 1 tablet per J tube Daily., Disp: , Rfl:     HYDROcodone-acetaminophen (NORCO)  MG per tablet, Take  1 tablet by mouth Every 6 (Six) Hours As Needed for Moderate Pain., Disp: 120 tablet, Rfl: 0    hydrOXYzine (ATARAX) 50 MG tablet, Take 1 tablet by mouth 3 (Three) Times a Day As Needed for Itching., Disp: 90 tablet, Rfl: 1    lactulose (CHRONULAC) 10 GM/15ML solution, Administer 45 mL per J tube Daily As Needed (Constipation)., Disp: 473 mL, Rfl: 0    lansoprazole (PREVACID SOLUTAB) 30 MG Tablet Delayed Release Dispersible disintegrating tablet, Administer 1 tablet per J tube Every Morning., Disp: 30 tablet, Rfl: 0    levoFLOXacin (LEVAQUIN) 500 MG tablet, , Disp: , Rfl:     metoprolol tartrate (LOPRESSOR) 25 MG tablet, Administer 1 tablet per J tube 2 (Two) Times a Day., Disp: , Rfl:     montelukast (SINGULAIR) 10 MG tablet, Administer 1 tablet per J tube Every Night., Disp: , Rfl:     Morphine (MS CONTIN) 15 MG 12 hr tablet, Take 1 tablet by mouth 2 (Two) Times a Day., Disp: 60 tablet, Rfl: 0    multivitamin with minerals (Tab-A-Lucero/Iron/Beta Carotene) tablet tablet, , Disp: , Rfl:     multivitamin with minerals tablet tablet, Take 1 tablet by mouth Daily., Disp: 30 tablet, Rfl: 2    mupirocin (BACTROBAN) 2 % ointment, Apply 1 application  topically to the appropriate area as directed 3 (Three) Times a Day., Disp: 30 g, Rfl: 0    naloxone (NARCAN) 4 MG/0.1ML nasal spray, Call 911. Don't prime. [place] in 1 nostril for overdose. Repeat in 2-3 minutes in other nostril if no or minimal breathing/responsiveness., Disp: 2 each, Rfl: 0    ondansetron (ZOFRAN) 8 MG tablet, Take 1 tablet by mouth Every 8 (Eight) Hours As Needed for Nausea or Vomiting., Disp: 90 tablet, Rfl: 3    pantoprazole (PROTONIX) 40 MG EC tablet, Take 1 tablet by mouth Daily., Disp: 90 tablet, Rfl: 1    sertraline (ZOLOFT) 50 MG tablet, Administer 1 tablet per J tube Daily., Disp: 90 tablet, Rfl: 1    sodium chloride 1 g tablet, TAKE 2 TABLETS BY MOUTH THREE TIMES DAILY WITH MEALS, Disp: 180 tablet, Rfl: 2    sucralfate (Carafate) 1 GM/10ML  suspension, Take 10 mL by mouth 4 (Four) Times a Day., Disp: , Rfl:     sulfamethoxazole-trimethoprim (Bactrim DS) 800-160 MG per tablet, Take 1 tablet by mouth 2 (Two) Times a Day., Disp: 20 tablet, Rfl: 0    thiamine (VITAMIN B1) 100 MG tablet, TAKE 1 TABLET BY MOUTH EVERY DAY, Disp: 90 tablet, Rfl: 0    vitamin B-12 (CYANOCOBALAMIN) 1000 MCG tablet, Administer 1 tablet per J tube Daily., Disp: , Rfl:     Drug Interactions  DDI report generated, no significant interactions identified     Adverse Drug Reactions  Adverse Reactions Experienced: None   Plan for ADR Management: N/A- tolerating well    Hospitalizations and Urgent Care Since Last Assessment  Hospitalizations or Admissions: None    ED Visits: None   Urgent Office Visits: None     Adherence and Self-Administration  Approximate Number of Doses Missed Since Last Assessment: None   Ongoing or New Barriers to Patient Adherence and/or Self-Administration: None   Methods for Supporting Patient Adherence and/or Self-Administration: Provided direct education. Care coordinator to continue providing once-monthly outreach calls to assist with adherence and coordinate medication refills. Pharmacist to provide clinical assessments every 6 months and will assist in the management of clinical issues as they arise.       Goals of Therapy  Progress Toward Meeting Patient-Identified Goals of Therapy:  On track  Patient-Identified Goals  Consistently take medications as prescribed  Patient will adhere to medication regimen  Patient will report any medication side effects to healthcare provider    Progress Toward Meeting Clinical Goals or Therapeutic Targets: On track  Clinical Goals or Therapeutic Targets  Support patient understanding of medication regimen  Ensure patient knows the pharmacy contact information  Schedule regular follow-up to monitor the treatment serious adverse events  Schedule regular follow-up to confirm medication adherence  Schedule regular follow-up to  monitor disease progression or stabilty    Quality of Life Change Assessment   Quality of Life related to the patient's specialty therapy was discussed with the patient. The QOL segment of this outreach has been reviewed and updated.   Quality of Life Score Change: 8    Reassessment Plan & Follow-Up  Pharmacist to continue to perform regular reassessments no more than (6) months from the previous assessment.  Care Coordinator to facilitate future refill outreaches, coordinate prescription delivery, and escalate clinical questions to pharmacist.     Additional Plans, Therapy Recommendations or Therapy Problems to Be Addressed: None  Recent Provider Changes:  None    Attestation  I attest that the specialty medication(s) addressed above are appropriate for the patient based on my reassessment. I also attest the patient was actively involved in and has agreed to the above plan of care. If the prescribed therapy is at any point deemed not appropriate based on the current or future assessments, a consultation will be initiated with the patient's specialty care provider to determine the best course of action. The revised plan of therapy will be documented along with any additional patient education provided.     Kristian Nash [facility]  Oncology Clinical Pharmacist  3/15/2024  10:05 EDT

## 2024-03-18 ENCOUNTER — OFFICE VISIT (OUTPATIENT)
Dept: FAMILY MEDICINE CLINIC | Facility: CLINIC | Age: 64
End: 2024-03-18
Payer: COMMERCIAL

## 2024-03-18 VITALS
DIASTOLIC BLOOD PRESSURE: 62 MMHG | HEIGHT: 67 IN | HEART RATE: 71 BPM | OXYGEN SATURATION: 98 % | BODY MASS INDEX: 25.05 KG/M2 | TEMPERATURE: 98.2 F | SYSTOLIC BLOOD PRESSURE: 126 MMHG | WEIGHT: 159.6 LBS

## 2024-03-18 DIAGNOSIS — G47.00 INSOMNIA, UNSPECIFIED TYPE: ICD-10-CM

## 2024-03-18 DIAGNOSIS — G89.29 CHRONIC RIGHT SHOULDER PAIN: ICD-10-CM

## 2024-03-18 DIAGNOSIS — I10 HYPERTENSION, UNSPECIFIED TYPE: ICD-10-CM

## 2024-03-18 DIAGNOSIS — E78.2 MIXED HYPERLIPIDEMIA: Primary | ICD-10-CM

## 2024-03-18 DIAGNOSIS — M25.541 ARTHRALGIA OF BOTH HANDS: ICD-10-CM

## 2024-03-18 DIAGNOSIS — M25.542 ARTHRALGIA OF BOTH HANDS: ICD-10-CM

## 2024-03-18 DIAGNOSIS — K85.91 NECROTIZING PANCREATITIS: ICD-10-CM

## 2024-03-18 DIAGNOSIS — M25.511 CHRONIC RIGHT SHOULDER PAIN: ICD-10-CM

## 2024-03-18 DIAGNOSIS — G62.9 NEUROPATHY: ICD-10-CM

## 2024-03-18 PROCEDURE — 84550 ASSAY OF BLOOD/URIC ACID: CPT | Performed by: NURSE PRACTITIONER

## 2024-03-18 PROCEDURE — 99214 OFFICE O/P EST MOD 30 MIN: CPT | Performed by: NURSE PRACTITIONER

## 2024-03-18 PROCEDURE — 36415 COLL VENOUS BLD VENIPUNCTURE: CPT | Performed by: NURSE PRACTITIONER

## 2024-03-18 PROCEDURE — 85025 COMPLETE CBC W/AUTO DIFF WBC: CPT | Performed by: NURSE PRACTITIONER

## 2024-03-18 PROCEDURE — 80053 COMPREHEN METABOLIC PANEL: CPT | Performed by: NURSE PRACTITIONER

## 2024-03-18 PROCEDURE — 80061 LIPID PANEL: CPT | Performed by: NURSE PRACTITIONER

## 2024-03-18 PROCEDURE — 86431 RHEUMATOID FACTOR QUANT: CPT | Performed by: NURSE PRACTITIONER

## 2024-03-18 PROCEDURE — 86038 ANTINUCLEAR ANTIBODIES: CPT | Performed by: NURSE PRACTITIONER

## 2024-03-18 PROCEDURE — 82607 VITAMIN B-12: CPT | Performed by: NURSE PRACTITIONER

## 2024-03-18 RX ORDER — AMITRIPTYLINE HYDROCHLORIDE 25 MG/1
25 TABLET, FILM COATED ORAL NIGHTLY
Qty: 30 TABLET | Refills: 2 | Status: SHIPPED | OUTPATIENT
Start: 2024-03-18

## 2024-03-18 RX ORDER — PANTOPRAZOLE SODIUM 40 MG/1
40 TABLET, DELAYED RELEASE ORAL DAILY
Qty: 90 TABLET | Refills: 1 | Status: SHIPPED | OUTPATIENT
Start: 2024-03-18

## 2024-03-18 NOTE — PROGRESS NOTES
"Chief Complaint  Numbness (Hands and feet )    Subjective          aT Madison presents to [facility]  History of Present Illness  Diagnosed with Prostate cancer two years ago .  Following oncology and Urology.  No recent changes.     Necrotizing pancreatitis DX 8 months ago finally improved J tube removed weight gradually increasing appetite improved.  Scheduled with surgeon for gallbladder removal next month.   Chronic right shoulder pain with return of symptoms over the past couple of months. Not sleeping well due to development of Neuropathy in his feet.      Hyperlipidemia  This is a chronic problem. Recent lipid tests were reviewed and are variable. Factors aggravating his hyperlipidemia include fatty foods. Current antihyperlipidemic treatment includes statins and diet change. Compliance problems include adherence to diet and adherence to exercise.    Hypertension  This is a chronic (tachycardia controlled with current medications) problem. The current episode started more than 1 year ago. The problem is controlled. Pertinent negatives include no malaise/fatigue, palpitations or peripheral edema. Risk factors for coronary artery disease include dyslipidemia. Past treatments include beta blockers. Current antihypertension treatment includes beta blockers. The current treatment provides significant improvement. Compliance problems include diet.        Objective   Vital Signs:   /62 (BP Location: Right arm, Patient Position: Sitting)   Pulse 71   Temp 98.2 °F (36.8 °C) (Temporal)   Ht 170.2 cm (67\")   Wt 72.4 kg (159 lb 9.6 oz)   SpO2 98%   BMI 25.00 kg/m²     Physical Exam  Vitals and nursing note reviewed.   Constitutional:       General: He is not in acute distress.     Appearance: He is well-developed. He is obese. He is not ill-appearing.   HENT:      Head: Normocephalic.   Eyes:      General:         Right eye: No discharge.         Left eye: No discharge.    "   Conjunctiva/sclera: Conjunctivae normal.   Cardiovascular:      Rate and Rhythm: Normal rate and regular rhythm.      Heart sounds: Normal heart sounds. No murmur heard.  Pulmonary:      Effort: Pulmonary effort is normal.      Breath sounds: Normal breath sounds.   Abdominal:      General: Bowel sounds are normal.      Palpations: Abdomen is soft.   Musculoskeletal:      Right lower leg: No edema.      Left lower leg: No edema.   Skin:     General: Skin is warm and dry.      Capillary Refill: Capillary refill takes 2 to 3 seconds.      Findings: Rash present.   Neurological:      General: No focal deficit present.      Mental Status: He is alert and oriented to person, place, and time.      Cranial Nerves: No cranial nerve deficit.      Sensory: No sensory deficit.      Motor: No weakness.      Coordination: Coordination normal.      Gait: Gait normal.      Deep Tendon Reflexes: Reflexes normal.   Psychiatric:         Behavior: Behavior normal.        Result Review :   During this visit the following were done:  Labs Reviewed [x]    Labs Ordered [x]    Radiology Reports Reviewed []    Radiology Ordered []    PCP Records Reviewed []    Referring Provider Records Reviewed []    ER Records Reviewed []    Hospital Records Reviewed []    History Obtained From Family []    Radiology Images Reviewed []    Other Reviewed []    Records Requested []             Diagnoses and all orders for this visit:    1. Mixed hyperlipidemia (Primary)  -     Comprehensive Metabolic Panel; Future  -     Lipid Panel; Future  -     Comprehensive Metabolic Panel  -     Lipid Panel    2. Necrotizing pancreatitis  -     pantoprazole (PROTONIX) 40 MG EC tablet; Take 1 tablet by mouth Daily.  Dispense: 90 tablet; Refill: 1    3. Arthralgia of both hands  -     CBC Auto Differential; Future  -     Comprehensive Metabolic Panel; Future  -     LOUIS by IFA, Reflex 9-biomarkers profile; Future  -     Rheumatoid Factor; Future  -     Uric Acid;  Future  -     Vitamin B12; Future  -     CBC Auto Differential  -     Comprehensive Metabolic Panel  -     LOUIS by IFA, Reflex 9-biomarkers profile  -     Rheumatoid Factor  -     Uric Acid  -     Vitamin B12    4. Hypertension, unspecified type  -     metoprolol tartrate (LOPRESSOR) 25 MG tablet; Take 1 tablet by mouth 2 (Two) Times a Day.  Dispense: 180 tablet; Refill: 1  -     Comprehensive Metabolic Panel; Future  -     Lipid Panel; Future  -     Comprehensive Metabolic Panel  -     Lipid Panel    5. Chronic right shoulder pain    6. Insomnia, unspecified type  -     amitriptyline (ELAVIL) 25 MG tablet; Take 1 tablet by mouth Every Night.  Dispense: 30 tablet; Refill: 2    7. Neuropathy  -     amitriptyline (ELAVIL) 25 MG tablet; Take 1 tablet by mouth Every Night.  Dispense: 30 tablet; Refill: 2  -     CBC Auto Differential  -     Comprehensive Metabolic Panel  -     Lipid Panel  -     LOUIS by IFA, Reflex 9-biomarkers profile  -     Rheumatoid Factor  -     Uric Acid  -     Vitamin B12    Continue with urology and oncology     BMI is >= 25 and < 30. (Overweight) The following options were offered after discussion: exercise counseling/recommendations and nutrition counseling/recommendations              Follow Up   Return in about 3 months (around 6/18/2024), or if symptoms worsen or fail to improve, for Recheck labs today.  Patient was given instructions and counseling regarding his condition or for health maintenance advice. Please see specific information pulled into the AVS if appropriate.

## 2024-03-19 ENCOUNTER — TELEPHONE (OUTPATIENT)
Dept: FAMILY MEDICINE CLINIC | Facility: CLINIC | Age: 64
End: 2024-03-19
Payer: COMMERCIAL

## 2024-03-19 LAB
ALBUMIN SERPL-MCNC: 4.2 G/DL (ref 3.5–5.2)
ALBUMIN/GLOB SERPL: 1.3 G/DL
ALP SERPL-CCNC: 146 U/L (ref 39–117)
ALT SERPL W P-5'-P-CCNC: 10 U/L (ref 1–41)
ANA SER QL IF: NEGATIVE
ANION GAP SERPL CALCULATED.3IONS-SCNC: 10 MMOL/L (ref 5–15)
AST SERPL-CCNC: 12 U/L (ref 1–40)
BASOPHILS # BLD AUTO: 0.09 10*3/MM3 (ref 0–0.2)
BASOPHILS NFR BLD AUTO: 1 % (ref 0–1.5)
BILIRUB SERPL-MCNC: 0.2 MG/DL (ref 0–1.2)
BUN SERPL-MCNC: 21 MG/DL (ref 8–23)
BUN/CREAT SERPL: 24.7 (ref 7–25)
CALCIUM SPEC-SCNC: 9.4 MG/DL (ref 8.6–10.5)
CHLORIDE SERPL-SCNC: 102 MMOL/L (ref 98–107)
CHOLEST SERPL-MCNC: 220 MG/DL (ref 0–200)
CHROMATIN AB SERPL-ACNC: <10 IU/ML (ref 0–14)
CO2 SERPL-SCNC: 25 MMOL/L (ref 22–29)
CREAT SERPL-MCNC: 0.85 MG/DL (ref 0.76–1.27)
DEPRECATED RDW RBC AUTO: 44.3 FL (ref 37–54)
EGFRCR SERPLBLD CKD-EPI 2021: 98.2 ML/MIN/1.73
EOSINOPHIL # BLD AUTO: 0.13 10*3/MM3 (ref 0–0.4)
EOSINOPHIL NFR BLD AUTO: 1.5 % (ref 0.3–6.2)
ERYTHROCYTE [DISTWIDTH] IN BLOOD BY AUTOMATED COUNT: 14.5 % (ref 12.3–15.4)
GLOBULIN UR ELPH-MCNC: 3.2 GM/DL
GLUCOSE SERPL-MCNC: 85 MG/DL (ref 65–99)
HCT VFR BLD AUTO: 39.3 % (ref 37.5–51)
HDLC SERPL-MCNC: 39 MG/DL (ref 40–60)
HGB BLD-MCNC: 13 G/DL (ref 13–17.7)
IMM GRANULOCYTES # BLD AUTO: 0.02 10*3/MM3 (ref 0–0.05)
IMM GRANULOCYTES NFR BLD AUTO: 0.2 % (ref 0–0.5)
LABORATORY COMMENT REPORT: NORMAL
LDLC SERPL CALC-MCNC: 142 MG/DL (ref 0–100)
LDLC/HDLC SERPL: 3.54 {RATIO}
LYMPHOCYTES # BLD AUTO: 3.43 10*3/MM3 (ref 0.7–3.1)
LYMPHOCYTES NFR BLD AUTO: 39.7 % (ref 19.6–45.3)
MCH RBC QN AUTO: 27.7 PG (ref 26.6–33)
MCHC RBC AUTO-ENTMCNC: 33.1 G/DL (ref 31.5–35.7)
MCV RBC AUTO: 83.6 FL (ref 79–97)
MONOCYTES # BLD AUTO: 0.61 10*3/MM3 (ref 0.1–0.9)
MONOCYTES NFR BLD AUTO: 7.1 % (ref 5–12)
NEUTROPHILS NFR BLD AUTO: 4.36 10*3/MM3 (ref 1.7–7)
NEUTROPHILS NFR BLD AUTO: 50.5 % (ref 42.7–76)
NRBC BLD AUTO-RTO: 0 /100 WBC (ref 0–0.2)
PLATELET # BLD AUTO: 532 10*3/MM3 (ref 140–450)
PMV BLD AUTO: 10.4 FL (ref 6–12)
POTASSIUM SERPL-SCNC: 4.4 MMOL/L (ref 3.5–5.2)
PROT SERPL-MCNC: 7.4 G/DL (ref 6–8.5)
RBC # BLD AUTO: 4.7 10*6/MM3 (ref 4.14–5.8)
SODIUM SERPL-SCNC: 137 MMOL/L (ref 136–145)
TRIGL SERPL-MCNC: 214 MG/DL (ref 0–150)
URATE SERPL-MCNC: 4.4 MG/DL (ref 3.4–7)
VIT B12 BLD-MCNC: >2000 PG/ML (ref 211–946)
VLDLC SERPL-MCNC: 39 MG/DL (ref 5–40)
WBC NRBC COR # BLD AUTO: 8.64 10*3/MM3 (ref 3.4–10.8)

## 2024-03-19 NOTE — TELEPHONE ENCOUNTER
----- Message from EUGENIA Mendoza sent at 3/19/2024  1:48 PM EDT -----  Labs look good except cholesterol.  Was he for sure fasting?  And taking Lipitor

## 2024-03-21 NOTE — TELEPHONE ENCOUNTER
Spoke with wife she reports he was fasting but had not been taking his Lipitor consistently,she had seen the labs & told him he needs to be taking it everyday,she will stay on him.

## 2024-04-01 DIAGNOSIS — C61 ADENOCARCINOMA OF PROSTATE: ICD-10-CM

## 2024-04-01 RX ORDER — HYDROCODONE BITARTRATE AND ACETAMINOPHEN 10; 325 MG/1; MG/1
1 TABLET ORAL EVERY 6 HOURS PRN
Qty: 120 TABLET | Refills: 0 | Status: SHIPPED | OUTPATIENT
Start: 2024-04-01

## 2024-04-01 NOTE — TELEPHONE ENCOUNTER
Caller: MOE ELLISON    Relationship: WIFE    Best call back number: [phone]    Requested Prescriptions:   Requested Prescriptions     Pending Prescriptions Disp Refills    HYDROcodone-acetaminophen (NORCO)  MG per tablet 120 tablet 0     Sig: Take 1 tablet by mouth Every 6 (Six) Hours As Needed for Moderate Pain.        Pharmacy where request should be sent: [id number] [place] [address] AT [facility] [address] - [phone]  - [phone] FX     Last office visit with prescribing clinician: 1/30/2024   Last telemedicine visit with prescribing clinician: Visit date not found   Next office visit with prescribing clinician: 4/17/2024     Additional details provided by patient:     Does the patient have less than a 3 day supply:  [x] Yes  [] No    Would you like a call back once the refill request has been completed: [] Yes [x] No    If the office needs to give you a call back, can they leave a voicemail: [] Yes [x] No

## 2024-04-09 ENCOUNTER — SPECIALTY PHARMACY (OUTPATIENT)
Dept: PHARMACY | Facility: HOSPITAL | Age: 64
End: 2024-04-09
Payer: COMMERCIAL

## 2024-04-10 DIAGNOSIS — K85.91 NECROTIZING PANCREATITIS: ICD-10-CM

## 2024-04-10 RX ORDER — FOLIC ACID 1 MG/1
1000 TABLET ORAL DAILY
Qty: 90 TABLET | Refills: 0 | Status: SHIPPED | OUTPATIENT
Start: 2024-04-10

## 2024-04-10 RX ORDER — MIRTAZAPINE 7.5 MG/1
7.5 TABLET, FILM COATED ORAL NIGHTLY
Qty: 90 TABLET | Refills: 1 | OUTPATIENT
Start: 2024-04-10

## 2024-04-10 RX ORDER — LANOLIN ALCOHOL/MO/W.PET/CERES
100 CREAM (GRAM) TOPICAL DAILY
Qty: 90 TABLET | Refills: 0 | Status: SHIPPED | OUTPATIENT
Start: 2024-04-10

## 2024-04-17 ENCOUNTER — LAB (OUTPATIENT)
Dept: ONCOLOGY | Facility: CLINIC | Age: 64
End: 2024-04-17
Payer: COMMERCIAL

## 2024-04-17 ENCOUNTER — OFFICE VISIT (OUTPATIENT)
Dept: ONCOLOGY | Facility: CLINIC | Age: 64
End: 2024-04-17
Payer: COMMERCIAL

## 2024-04-17 ENCOUNTER — INFUSION (OUTPATIENT)
Dept: ONCOLOGY | Facility: HOSPITAL | Age: 64
End: 2024-04-17
Payer: COMMERCIAL

## 2024-04-17 VITALS
DIASTOLIC BLOOD PRESSURE: 65 MMHG | SYSTOLIC BLOOD PRESSURE: 112 MMHG | HEART RATE: 94 BPM | BODY MASS INDEX: 25.21 KG/M2 | WEIGHT: 160.6 LBS | TEMPERATURE: 97.5 F | HEIGHT: 67 IN | OXYGEN SATURATION: 97 % | RESPIRATION RATE: 20 BRPM

## 2024-04-17 VITALS
WEIGHT: 160.7 LBS | OXYGEN SATURATION: 95 % | BODY MASS INDEX: 25.22 KG/M2 | SYSTOLIC BLOOD PRESSURE: 105 MMHG | RESPIRATION RATE: 18 BRPM | HEIGHT: 67 IN | HEART RATE: 78 BPM | DIASTOLIC BLOOD PRESSURE: 55 MMHG | TEMPERATURE: 97.5 F

## 2024-04-17 DIAGNOSIS — E87.1 HYPONATREMIA: ICD-10-CM

## 2024-04-17 DIAGNOSIS — K85.91 NECROTIZING PANCREATITIS: ICD-10-CM

## 2024-04-17 DIAGNOSIS — C61 ADENOCARCINOMA OF PROSTATE: Primary | ICD-10-CM

## 2024-04-17 DIAGNOSIS — E87.5 HYPERKALEMIA: ICD-10-CM

## 2024-04-17 LAB
ANION GAP SERPL CALCULATED.3IONS-SCNC: 12 MMOL/L (ref 5–15)
BASOPHILS # BLD AUTO: 0.07 10*3/MM3 (ref 0–0.2)
BASOPHILS NFR BLD AUTO: 0.7 % (ref 0–1.5)
BUN SERPL-MCNC: 17 MG/DL (ref 8–23)
BUN/CREAT SERPL: 23 (ref 7–25)
CALCIUM SPEC-SCNC: 9.2 MG/DL (ref 8.6–10.5)
CHLORIDE SERPL-SCNC: 100 MMOL/L (ref 98–107)
CO2 SERPL-SCNC: 22 MMOL/L (ref 22–29)
CREAT SERPL-MCNC: 0.74 MG/DL (ref 0.76–1.27)
DEPRECATED RDW RBC AUTO: 44.2 FL (ref 37–54)
EGFRCR SERPLBLD CKD-EPI 2021: 102.4 ML/MIN/1.73
EOSINOPHIL # BLD AUTO: 0.06 10*3/MM3 (ref 0–0.4)
EOSINOPHIL NFR BLD AUTO: 0.6 % (ref 0.3–6.2)
ERYTHROCYTE [DISTWIDTH] IN BLOOD BY AUTOMATED COUNT: 14.2 % (ref 12.3–15.4)
GLUCOSE SERPL-MCNC: 112 MG/DL (ref 65–99)
HCT VFR BLD AUTO: 36.5 % (ref 37.5–51)
HGB BLD-MCNC: 11.6 G/DL (ref 13–17.7)
IMM GRANULOCYTES # BLD AUTO: 0.05 10*3/MM3 (ref 0–0.05)
IMM GRANULOCYTES NFR BLD AUTO: 0.5 % (ref 0–0.5)
LYMPHOCYTES # BLD AUTO: 2.76 10*3/MM3 (ref 0.7–3.1)
LYMPHOCYTES NFR BLD AUTO: 28.9 % (ref 19.6–45.3)
MCH RBC QN AUTO: 26.9 PG (ref 26.6–33)
MCHC RBC AUTO-ENTMCNC: 31.8 G/DL (ref 31.5–35.7)
MCV RBC AUTO: 84.7 FL (ref 79–97)
MONOCYTES # BLD AUTO: 0.53 10*3/MM3 (ref 0.1–0.9)
MONOCYTES NFR BLD AUTO: 5.5 % (ref 5–12)
NEUTROPHILS NFR BLD AUTO: 6.09 10*3/MM3 (ref 1.7–7)
NEUTROPHILS NFR BLD AUTO: 63.8 % (ref 42.7–76)
NRBC BLD AUTO-RTO: 0 /100 WBC (ref 0–0.2)
PLATELET # BLD AUTO: 599 10*3/MM3 (ref 140–450)
PMV BLD AUTO: 10.1 FL (ref 6–12)
POTASSIUM SERPL-SCNC: 3.9 MMOL/L (ref 3.5–5.2)
RBC # BLD AUTO: 4.31 10*6/MM3 (ref 4.14–5.8)
SODIUM SERPL-SCNC: 134 MMOL/L (ref 136–145)
WBC NRBC COR # BLD AUTO: 9.56 10*3/MM3 (ref 3.4–10.8)

## 2024-04-17 PROCEDURE — 85025 COMPLETE CBC W/AUTO DIFF WBC: CPT | Performed by: NURSE PRACTITIONER

## 2024-04-17 PROCEDURE — 96402 CHEMO HORMON ANTINEOPL SQ/IM: CPT

## 2024-04-17 PROCEDURE — 99214 OFFICE O/P EST MOD 30 MIN: CPT | Performed by: INTERNAL MEDICINE

## 2024-04-17 PROCEDURE — 80048 BASIC METABOLIC PNL TOTAL CA: CPT | Performed by: NURSE PRACTITIONER

## 2024-04-17 PROCEDURE — 25010000002 LEUPROLIDE 45 MG KIT: Performed by: INTERNAL MEDICINE

## 2024-04-17 PROCEDURE — 84153 ASSAY OF PSA TOTAL: CPT | Performed by: INTERNAL MEDICINE

## 2024-04-17 PROCEDURE — 1126F AMNT PAIN NOTED NONE PRSNT: CPT | Performed by: INTERNAL MEDICINE

## 2024-04-17 RX ADMIN — LEUPROLIDE ACETATE 45 MG: KIT at 15:53

## 2024-04-17 NOTE — PROGRESS NOTES
Name:  Ta Madison  :  1960  Date:  2024     REFERRING PHYSICIAN  Dheeraj Nieves MD    PRIMARY CARE PROVIDER  Yolande Olvera APRN    REASON FOR FOLLOWUP  1. Adenocarcinoma of prostate      CHIEF COMPLAINT  None.    Dear Ms. Wade,    HISTORY OF PRESENT ILLNESS:   I saw Mr. Madison in follow up today in our medical oncology clinic. As you are aware, he is a pleasant, 63 y.o., white male with a history of tobacco abuse who was referred to urology in 2020 for an elevated serum PSA level (of ~28 ng/mL, which had increased to 247 ng/mL at the time of his initial appointment in our clinic). An examination revealed a very hard and fixed prostate, and multiple core biopsies were performed on 2020. The results were consistent with [place] Score 5+4 or 4+5=9 adenocarcinoma involving all twelve sampled cores. A staging workup confirmed pelvic adenopathy as well as diffuse, sclerotic lesions throughout the axial and appendicular skeleton, including the right greater than left femoral diaphyses. With this stage IV diagnosis, he was referred to our clinic for further evaluation and management. At the time of his initial consultation in our office (on 2021), he was agreeable to starting definitive therapy with a combination of ADT (Lupron injections) and anti-androgen therapy (daily PO Xtandi).    INTERIM HISTORY:  Mr. Madison returns to clinic today for follow up by himself. He began [id number] Lupron on 2021 and, between then and ~early summer 2023, he was also consistently on daily PO Xtandi (for a total of about ~two and one half years). Unfortunately, in early summer 2023, he developed necrotizing pancreatitis; and he spent the better part of the next ~three to four months admitted to one hospital or another (including , [facility] and our facility). Throughout that time, he was unable to take the Xtandi, as it was multiple months before he could even attempt to take anything by mouth. He has  slowly, but steadily, continued to recover; and he happily reports today that his PEG has now been removed, and he is overall doing much, much better. He has restarted and remains compliant with his daily Xtandi. He again has no other specific complaints.    Past Medical History:   Diagnosis Date    Elevated cholesterol     GERD (gastroesophageal reflux disease)     Increased heart rate     Mini stroke     Neck pain     Prostate cancer     Rash     Tobacco abuse        Past Surgical History:   Procedure Laterality Date    APPENDECTOMY      [facility]     COLONOSCOPY      COLONOSCOPY N/A 2023    Procedure: COLONOSCOPY;  Surgeon: Mahnaz Caraballo MD;  Location:  [facility];  Service: Gastroenterology;  Laterality: N/A;    ENDOSCOPY N/A 2023    Procedure: ESOPHAGOGASTRODUODENOSCOPY WITH KEOFEED PLACEMENT;  Surgeon: Yolande Eller MD;  Location:  [facility];  Service: Gastroenterology;  Laterality: N/A;       Social History     Socioeconomic History    Marital status:    Tobacco Use    Smoking status: Some Days     Current packs/day: 0.00     Average packs/day: 2.0 packs/day for 45.0 years (90.0 ttl pk-yrs)     Types: Cigarettes     Start date: 1978     Last attempt to quit: 2023     Years since quittin.7    Smokeless tobacco: Never   Vaping Use    Vaping status: Never Used   Substance and Sexual Activity    Alcohol use: No    Drug use: Yes     Types: Marijuana     Comment: occ     Sexual activity: Defer       Family History   Problem Relation Age of Onset    Nephrolithiasis Mother     Heart disease Father     Hypertension Father     Kidney disease Father        No Known Allergies    Current Outpatient Medications   Medication Sig Dispense Refill    amitriptyline (ELAVIL) 25 MG tablet Take 1 tablet by mouth Every Night. 30 tablet 2    atorvastatin (LIPITOR) 20 MG tablet Administer 1 tablet per J tube Every Night.      benzonatate (Tessalon Perles) 100 MG capsule Take  1 capsule by mouth 3 (Three) Times a Day As Needed for Cough. 30 capsule 0    Cholecalciferol 50 MCG (2000 UT) capsule Take 1 capsule by mouth Daily.      enzalutamide (XTANDI) 40 MG chemo capsule Take 4 capsules by mouth Daily. 120 capsule 5    fenofibrate (TRICOR) 145 MG tablet Administer 1 tablet per J tube Daily.      folic acid (FOLVITE) 1 MG tablet TAKE 1 TABLET BY MOUTH DAILY 90 tablet 0    HYDROcodone-acetaminophen (NORCO)  MG per tablet Take 1 tablet by mouth Every 6 (Six) Hours As Needed for Moderate Pain. 120 tablet 0    hydrOXYzine (ATARAX) 50 MG tablet Take 1 tablet by mouth 3 (Three) Times a Day As Needed for Itching. 90 tablet 1    lactulose (CHRONULAC) 10 GM/15ML solution Administer 45 mL per J tube Daily As Needed (Constipation). 473 mL 0    metoprolol tartrate (LOPRESSOR) 25 MG tablet Take 1 tablet by mouth 2 (Two) Times a Day. 180 tablet 1    montelukast (SINGULAIR) 10 MG tablet Administer 1 tablet per J tube Every Night.      Morphine (MS CONTIN) 15 MG 12 hr tablet Take 1 tablet by mouth 2 (Two) Times a Day. 60 tablet 0    multivitamin with minerals (Tab-A-Lucero/Iron/Beta Carotene) tablet tablet       multivitamin with minerals tablet tablet Take 1 tablet by mouth Daily. 30 tablet 2    mupirocin (BACTROBAN) 2 % ointment Apply 1 application  topically to the appropriate area as directed 3 (Three) Times a Day. 30 g 0    naloxone (NARCAN) 4 MG/0.1ML nasal spray Call 911. Don't prime. [place] in 1 nostril for overdose. Repeat in 2-3 minutes in other nostril if no or minimal breathing/responsiveness. 2 each 0    ondansetron (ZOFRAN) 8 MG tablet Take 1 tablet by mouth Every 8 (Eight) Hours As Needed for Nausea or Vomiting. 90 tablet 3    pantoprazole (PROTONIX) 40 MG EC tablet Take 1 tablet by mouth Daily. 90 tablet 1    sertraline (ZOLOFT) 50 MG tablet Administer 1 tablet per J tube Daily. 90 tablet 1    sodium chloride 1 g tablet TAKE 2 TABLETS BY MOUTH THREE TIMES DAILY WITH MEALS 180 tablet 2     "sucralfate (Carafate) 1 GM/10ML suspension Take 10 mL by mouth 4 (Four) Times a Day.      thiamine (VITAMIN B1) 100 MG tablet TAKE 1 TABLET BY MOUTH EVERY DAY 90 tablet 0    vitamin B-12 (CYANOCOBALAMIN) 1000 MCG tablet Administer 1 tablet per J tube Daily.       No current facility-administered medications for this visit.     REVIEW OF SYSTEMS  CONSTITUTIONAL:  No fever, chills or night sweats. Improved fatigue.  EYES:  No blurry vision, diplopia or other vision changes.  ENT:  No hearing loss, nosebleeds or sore throat.  CARDIOVASCULAR:  No palpitations, arrhythmia, syncopal episodes or edema.  PULMONARY:  No hemoptysis, wheezing, chronic cough or shortness of breath.  GASTROINTESTINAL:  As per the HPI above.  GENITOURINARY:  No hematuria, kidney stones or frequent urination.  MUSCULOSKELETAL:  As per the HPI above.  ENDOCRINE:  No excessive thirst. Hot flashes as above.  HEMATOLOGIC:  No history of free bleeding, spontaneous bleeding or clotting.  IMMUNOLOGIC:  No allergies or frequent infections.  NEUROLOGIC: No numbness, tingling, seizures or weakness.  PSYCHIATRIC:  No anxiety or depression.    PHYSICAL EXAMINATION  /65   Pulse 94   Temp 97.5 °F (36.4 °C) (Temporal)   Resp 20   Ht 170.2 cm (67\")   Wt 72.8 kg (160 lb 9.6 oz)   SpO2 97%   BMI 25.15 kg/m²     Pain Score:  Pain Score    24 1503   PainSc: 0-No pain     ECO  GENERAL:  A well-developed, well-nourished, white male in no acute distress, now no longer chronically ill-appearing (as he was in late ).  HEENT:  Pupils equally round and reactive to light. Extraocular muscles intact.  CARDIOVASCULAR:  Regular rate and rhythm. No murmurs, gallops or rubs.  LUNGS:  Clear to auscultation bilaterally. No wheezing  ABDOMEN:  Soft, nontender, nondistended with positive bowel sounds. PEG no longer in place.  EXTREMITIES:  No clubbing, cyanosis or edema bilaterally.  SKIN:  No petechiae or rashes.  NEURO:  Cranial nerves grossly intact. No " focal deficits.  PSYCH:  Alert and oriented x3.    LABORATORY  Lab Results   Component Value Date    WBC 9.56 04/17/2024    HGB 11.6 (L) 04/17/2024    HCT 36.5 (L) 04/17/2024    MCV 84.7 04/17/2024     (H) 04/17/2024    NEUTROABS 6.09 04/17/2024       Lab Results   Component Value Date     (L) 04/17/2024    K 3.9 04/17/2024     04/17/2024    CO2 22.0 04/17/2024    BUN 17 04/17/2024    CREATININE 0.74 (L) 04/17/2024    GLUCOSE 112 (H) 04/17/2024    CALCIUM 9.2 04/17/2024    AST 12 03/18/2024    ALT 10 03/18/2024    ALKPHOS 146 (H) 03/18/2024    BILITOT 0.2 03/18/2024    PROTEINTOT 7.4 03/18/2024    ALBUMIN 4.2 03/18/2024     CBC (04/17/2024): WBCs: 9.56; HgB: 11.6; Hct: 36.5; platelets: 599  CBC (03/18/2024): WBCs: 8.64; HgB: 13.0; Hct: 39.3; platelets: 532  CBC (01/30/2024): WBCs: 9.60; HgB: 12.6; Hct: 40.1; platelets: 568; MCV: 84.8  CBC (12/27/2023): WBCs: 10.77; HgB: 11.9; Hct: 37.2; platelets: 643; MCV: 79  CBC (11/30/2023): WBCs: 13.47; HgB: 11.4; Hct: 37.2; platelets: 889; MCV: 81.0  CBC (10/18/2023): WBCs: 10.45; HgB: 7.6; Hct: 25.8; platelets: 608; MCV: 79.6  CBC (10/17/2023): WBCs: 9.95; HgB: 8.0; Hct: 25.6; platelets: 614; MCV: 76.9  CBC (04/12/2023): WBCs: 6.79; HgB: 12.2; Hct: 38.5; platelets: 546  CBC (01/19/2023): WBCs: 8.19; HgB: 12.1; Hct: 37.6; platelets: 586  CBC (10/19/2022): WBCs: 11.25; HgB: 12.3; Hct: 37.3; platelets: 453  CBC (07/18/2022): WBCs: 7.82; HgB: 12.7; Hct: 39.0; platelets: 513  CBC (04/13/2022): WBCs: 7.62; HgB: 12.7; Hct: 38.7; platelets: 453  CBC (01/19/2022): WBCs: 7.56; HgB: 13.1; Hct: 41.0; platelets: 518  CBC (07/20/2021): WBCs: 7.43; HgB: 13.0; Hct: 39.6; platelets: 469  CBC (06/29/2021): WBCs: 7.8; HgB: 12.6; Hct: 38.1; platelets: 396  CBC (04/14/2021): WBCs: 7.2; HgB: 12.7; Hct: 38.8; platelets: 423  CBC (02/25/2021): WBCs: 6.5; HgB: 11.2; Hct: 34.4; platelets: 407    PSA (04/17/2024): pending  PSA (01/30/2024): < 0.014 ng/mL  PSA (11/30/2023): < 0.014  ng/mL  PSA (10/18/2023): < 0.014 ng/mL  PSA (07/12/2023): < 0.014 ng/mL  PSA (04/12/2023): < 0.014 ng/mL  PSA (01/19/2023): < 0.014 ng/mL  PSA (10/19/2022): < 0.014 ng/mL  PSA (07/18/2022): < 0.014 ng/mL  PSA (04/13/2022): < 0.014 ng/mL  PSA (01/19/2022): < 0.014 ng/mL  PSA (10/13/2021): < 0.014 ng/mL  PSA (07/20/2021): 0.020 ng/mL   PSA (04/14/2021): 0.108 ng/mL  PSA (02/25/2021): 0.814 ng/mL  PSA (01/20/2021): 29.5 ng/mL  PSA (01/07/2021): 247.0 ng/mL  PSA (11/10/2020): 28.3 ng/mL    Iron profile (10/18/2023): serum iron: 19; % saturation: 6; TIBC: 322; ferritin: 606.9 ng/mL    IMAGING  NM bone scan, whole body (12/15/2020):  Impression: Extensive osteoblastic metastatic disease involving the axial and appendicular skeleton including the right greater than left femoral diaphyses.    CT pelvis with contrast (12/21/2020):  Impression:  1) Urinary bladder wall thickening noted.  2) Heterogeneous appearance of the prostate gland.  3) Nonspecific stranding in the retroperitoneal space.  4) Retroperitoneal region lymph node on the right side measuring 1.4 cm.  5) Right obturator chain region lymph node measuring 1.5 cm. Other lymph nodes are identified in this region.  6) Sclerotic bone lesions are noted throughout the pelvis concerning for metastatic disease.    CT chest without contrast (10/10/2023, compared to 08/06/2023):  Impression:  1) No acute thoracic findings identfied.  2) Refer to CT abdomen/pelvis for abdomen findings.    CT abdomen and pelvis without contrast (10/10/2023, compared to 08/10/2023):  Impression:  1) Significant improvement in changes of necrotizing pancreatitis status post cystogastrostomy tube placement, necrosectomy and Axios metallic stent placement spanning from the G-tube into the stomach to the level of the proximal jejunum.  2) No significant ascites.  3) No acute appearing pancreatitis changes. No findings to suggest acute abscess.  4) Cholelithiasis noted.  5) Other  incidental/nonacute findings.    CT abdomen and pelvis with contrast (10/23/2023):  Impression: Sequela of necrotizing pancreatitis with interval marked decrease in size of the area of walled off necrosis centered in the pancreatic body since 09/09/2023. Increased intra and extrahepatic biliary ductal dilation with poor visualization of the lower most aspect of the common bile duct, concerning for stricture. Recommend correlation with ERCP.    PATHOLOGY  Left and right prostate, needle core biopsies, twelve total cores (12/02/2020):  [place] Score 5+4 or 4+5 = 9 prostatic adenocarcinoma involving ~45-90% of all twelve (12/12) total cores.    IMPRESSION AND PLAN  Mr. Madison is a 63 y.o., white male with:  Prostate adenocarcinoma: Diagnosed in December 2020 with stage IV disease, with widespread metastases involving the axial and appendicular skeleton along with probable, pelvic adenopathy. I have had multiple, long discussions with the patient and his wife since the time of his initial consultation in our clinic (on 01/07/2021) regarding this diagnosis and its prognosis. They remain aware that this disease is, unfortunately, not curable; however, particularly given his young age and this type of cancer's tendency to respond very well to initial anti-testosterone therapy, it was/remains very treatable, and sustained remissions are very possible. He was felt to be an excellent candidate for initial therapy with both ADT and androgen receptor blockade. He received an initial cycle of (currently now [id number]) Lupron on 01/08/2021 and was also started on daily PO Xtandi at that time. He has tolerated this regimen overall very well for the past ~three (plus) years; and, with this therapy, his serum PSA levels responded dramatically and quickly, decreasing from 247 ng/mL on 01/07/2021 to undetectable (<0.014 ng/mL) by 10/13/2021 (and this continues to be the case on the most recent repeat check, drawn on 01/30/2024; today's  result is pending). Due to issue #2, he was unable to take the Xtandi anywhere near consistently for a total of ~three to four months (throughout much of the summer and early fall in 2023); but he never missed a dose of Lupron, and he was able to restart the Xtandi by late October 2023 (and he has been taking it consistently again for the past ~six months). We will proceed with this current treatment plan. We will see him back in our clinic in six months, on the day of the next scheduled cycle of [id number] Lupron (on 10/16/2024) with a repeat CBC, CMP and PSA level.  Necrotizing pancreatitis: Unfortunately, his primary comorbidity and the cause of his primary complaints since early Summer 2023. He has now finally all but fully recovered from this ordeal, and his PEG has been removed. Ongoing management per [facility] (with whom he follows up again in mid-February).  Skeletal metastases: There was diffuse involvement by issue #1 of both the axial and appendicular skeletons on the initial staging NM bone scan and CT of the abdomen and pelvis performed in late December 2020. 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 Continue to monitor.  Pain: The symptoms that were directly related to issue #3 remain resolved since he started Lupron/Xtandi in early January 2021 for the palliative treatment of issue #1. His current symptoms are related to issue #2, not #1; however, with his diagnosis of an incurable malignancy, our clinic remains agreeable to managing this issue. Continue MSContin 15 mg q12hr scheduled for baseline control and prn Norco for breakthrough symptoms. Continue to monitor.  The patient was in agreement with these plans.    It is a pleasure to participate in Mr. Madison's care. Please do not hesitate to call with any questions or concerns that you may have.    A  total of 30 minutes were spent coordinating this patient’s care in clinic today; more than 50% of this time was face-to-face with the patient, reviewing his interim medical history, discussing the results of the most recent repeat labwork, including the serum PSA levels (which are trended above), and counseling on the current treatment and followup plan. All questions were answered to his satisfaction.    FOLLOW UP  Continue MSContin 15 mg PO q12hr and prn Norco 10/325 mg q6hr. Continue ([id number]) Lupron. Continue daily, PO Xtandi. With [facility], as previously planned. With urology, as previously planned. Return to our clinic in 6 months, on the day of the next cycle of [id number] Lupron (on 10/16/2024), with a CBC, CMP and PSA.            This document was electronically signed by HIRO Martinez MD April 17, 2024 16:03 EDT      CC: EUGENIA Mendoza

## 2024-04-17 NOTE — PROGRESS NOTES
Venipuncture Blood Specimen Collection  Venipuncture performed in right arm by Jacqueline Velázquez MA with good hemostasis. Patient tolerated the procedure well without complications.   04/17/24   Jacqueline Velázquez MA

## 2024-04-18 LAB — PSA SERPL-MCNC: <0.014 NG/ML (ref 0–4)

## 2024-04-24 DIAGNOSIS — F45.8 NEUROPATHIC PRURITUS: ICD-10-CM

## 2024-04-24 DIAGNOSIS — K85.91 NECROTIZING PANCREATITIS: ICD-10-CM

## 2024-04-24 RX ORDER — ATORVASTATIN CALCIUM 20 MG/1
20 TABLET, FILM COATED ORAL NIGHTLY
Qty: 90 TABLET | Refills: 1 | Status: SHIPPED | OUTPATIENT
Start: 2024-04-24

## 2024-04-24 RX ORDER — PANTOPRAZOLE SODIUM 40 MG/1
40 TABLET, DELAYED RELEASE ORAL DAILY
Qty: 90 TABLET | Refills: 1 | Status: SHIPPED | OUTPATIENT
Start: 2024-04-24

## 2024-04-24 RX ORDER — FENOFIBRATE 145 MG/1
145 TABLET, COATED ORAL DAILY
Qty: 90 TABLET | Refills: 1 | Status: SHIPPED | OUTPATIENT
Start: 2024-04-24

## 2024-04-24 RX ORDER — HYDROXYZINE 50 MG/1
50 TABLET, FILM COATED ORAL 3 TIMES DAILY PRN
Qty: 90 TABLET | Refills: 1 | Status: SHIPPED | OUTPATIENT
Start: 2024-04-24

## 2024-04-24 RX ORDER — MONTELUKAST SODIUM 10 MG/1
10 TABLET ORAL NIGHTLY
Qty: 90 TABLET | Refills: 1 | Status: SHIPPED | OUTPATIENT
Start: 2024-04-24

## 2024-05-03 NOTE — PROGRESS NOTES
"Palliative Care Daily Progress Note     C/C: pain improving, up walking to bathroom     S: Medical record reviewed. Follow up visit for pain management. Events noted. Spouse at bedside.  Rested well overnight.  Using fewer PCA bolus doses.     ROS: +pain RUQ, rates 5/10, -nausea, - dypsnea     O: Code Status:   Code Status and Medical Interventions:   Ordered at: 07/23/23 2228     Code Status (Patient has no pulse and is not breathing):    CPR (Attempt to Resuscitate)     Medical Interventions (Patient has pulse or is breathing):    Full Support     Release to patient:    Routine Release      Advanced Directives: Advance Directive Status: Patient does not have advance directive   Goals of Care: Ongoing.   Palliative Performance Scale Score: 60%     /71 (BP Location: Right arm, Patient Position: Lying)   Pulse 113   Temp 99.7 øF (37.6 øC) (Oral)   Resp 18   Ht 172.7 cm (68\")   Wt 108 kg (237 lb)   SpO2 92%   BMI 36.04 kg/mý     Intake/Output Summary (Last 24 hours) at 8/10/2023 1634  Last data filed at 8/10/2023 1245  Gross per 24 hour   Intake 1888.81 ml   Output --   Net 1888.81 ml       PE:  General Appearance:    Alert, cooperative, NAD   HEENT:    NC/AT, EOMI, anicteric, MMM, face relaxed   Neck:   supple, trachea midline, no JVD   Lungs:     CTA bilat, diminished in bases; respirations regular, even and unlabored; RR on exam    Heart:    RRR, normal S1 and S2, no M/R/G   Abdomen:     Normal bowel sounds, tender, distended   G/U:   Deferred   MSK/Extremities:   Wasting, no edema   Pulses:   Pulses palpable and equal bilaterally   Skin:   Warm, dry   Neurologic:   A/Ox3, cooperative, RENDON   Psych:   Calm, appropriate     Meds: Reviewed and changes noted    Labs:   Results from last 7 days   Lab Units 08/10/23  0313   WBC 10*3/mm3 16.76*   HEMOGLOBIN g/dL 7.8*   HEMATOCRIT % 25.3*   PLATELETS 10*3/mm3 773*     Results from last 7 days   Lab Units 08/10/23  0313   SODIUM mmol/L 139   POTASSIUM mmol/L " 3.9   CHLORIDE mmol/L 104   CO2 mmol/L 25.0   BUN mg/dL 21   CREATININE mg/dL 0.51*   GLUCOSE mg/dL 157*   CALCIUM mg/dL 7.7*     Results from last 7 days   Lab Units 08/10/23  0313   SODIUM mmol/L 139   POTASSIUM mmol/L 3.9   CHLORIDE mmol/L 104   CO2 mmol/L 25.0   BUN mg/dL 21   CREATININE mg/dL 0.51*   CALCIUM mg/dL 7.7*   BILIRUBIN mg/dL 0.2   ALK PHOS U/L 125*   ALT (SGPT) U/L 7   AST (SGOT) U/L 16   GLUCOSE mg/dL 157*     Imaging Results (Last 72 Hours)       Procedure Component Value Units Date/Time    XR Chest 1 View [[id number]] Collected: 08/08/23 0618     Updated: 08/08/23 0622    Narrative:      XR CHEST 1 VW    Date of Exam: 8/8/2023 5:54 AM EDT    Indication: Crackles    Comparison: 8/4/2023 and chest CT 8/6/2023.    Findings:  Stable small bilateral pleural effusions. There is improved aeration of the lung bases with mild residual atelectasis. Stable interstitial prominence in the lungs. Feeding tube again seen coursing below the diaphragm. No pneumothorax or new lung opacity.   No acute osseous abnormality.      Impression:      Impression:  Stable interstitial prominence in the lungs, bibasilar atelectasis and small bilateral pleural effusions.      Electronically Signed: Abdirizak Ivan    8/8/2023 6:19 AM EDT    Workstation ID: [id number]                  Diagnostics: Reviewed    A:   Acute gallstone pancreatitis    Hyperlipidemia    Tobacco abuse    Gastroesophageal reflux disease    Adenocarcinoma of prostate    Personal history of transient ischemic attack (TIA)    Elevated serum creatinine    Sepsis associated hypotension       S/Sx:  1. Pain- improving with CADD.  Using fewer PCA bolus doses x 24 hours.      P:   Palliative Care Team will continue to follow patient. Please do not hesitate to contact us regarding further sx mgmt or GOC needs.  Raven Alarcon, APRN  8/10/2023  Time spent: 20 min     PROVIDER:[TOKEN:[[id number]],ESTABLISHEDPATIENT:[T]],FREE:[LAST:[[place] for High Risk Pregnancy],FIRST:[Sandi],PHONE:[phone]],FAX:[(   )    -],ADDRESS:[[address] [place] [address]],SCHEDULEDAPPT:[05/06/2024],SCHEDULEDAPPTTIME:[02:30 AM],ESTABLISHEDPATIENT:[T]]

## 2024-05-23 ENCOUNTER — SPECIALTY PHARMACY (OUTPATIENT)
Dept: PHARMACY | Facility: HOSPITAL | Age: 64
End: 2024-05-23
Payer: COMMERCIAL

## 2024-05-23 NOTE — PROGRESS NOTES
Specialty Pharmacy Refill Coordination Note     Ta is a 63 y.o. male contacted today regarding refills of Xtandi specialty medication(s).     Reviewed and verified with patient:       Specialty medication(s) and dose(s) confirmed: yes     Refill Questions       Flowsheet Row Most Recent Value   Changes to allergies? No   Changes to medications? No   New conditions or infections since last clinic visit No   Unplanned office visit, urgent care, ED, or hospital admission in the last 4 weeks  No   How does patient/caregiver feel medication is working? Very good   Financial problems or insurance changes  No   Since the previous refill, were any specialty medication doses or scheduled injections missed or delayed?  No   Does this patient require a clinical escalation to a pharmacist? No                Delivery Questions       Flowsheet Row Most Recent Value   Copay verified? Yes   Copay amount $0   Copay form of payment N/A

## 2024-05-31 DIAGNOSIS — C61 ADENOCARCINOMA OF PROSTATE: ICD-10-CM

## 2024-05-31 RX ORDER — HYDROCODONE BITARTRATE AND ACETAMINOPHEN 10; 325 MG/1; MG/1
1 TABLET ORAL EVERY 6 HOURS PRN
Qty: 120 TABLET | Refills: 0 | Status: SHIPPED | OUTPATIENT
Start: 2024-05-31

## 2024-05-31 NOTE — TELEPHONE ENCOUNTER
Caller: YOLI    Relationship: Child    Best call back number: [phone]    Requested Prescriptions:   Requested Prescriptions     Pending Prescriptions Disp Refills    HYDROcodone-acetaminophen (NORCO)  MG per tablet 120 tablet 0     Sig: Take 1 tablet by mouth Every 6 (Six) Hours As Needed for Moderate Pain.        Pharmacy where request should be sent: [id number] [place] [address] AT [facility] [address] - [phone]  - [phone] FX     Last office visit with prescribing clinician: 4/17/2024   Last telemedicine visit with prescribing clinician: Visit date not found   Next office visit with prescribing clinician: 10/17/2024     Additional details provided by patient:     Does the patient have less than a 3 day supply:  [x] Yes  [] No    Would you like a call back once the refill request has been completed: [] Yes [x] No    If the office needs to give you a call back, can they leave a voicemail: [] Yes [x] No

## 2024-06-18 ENCOUNTER — OFFICE VISIT (OUTPATIENT)
Dept: FAMILY MEDICINE CLINIC | Facility: CLINIC | Age: 64
End: 2024-06-18
Payer: COMMERCIAL

## 2024-06-18 VITALS
DIASTOLIC BLOOD PRESSURE: 62 MMHG | SYSTOLIC BLOOD PRESSURE: 128 MMHG | BODY MASS INDEX: 25.8 KG/M2 | HEART RATE: 82 BPM | TEMPERATURE: 97.7 F | OXYGEN SATURATION: 100 % | HEIGHT: 67 IN | WEIGHT: 164.4 LBS

## 2024-06-18 DIAGNOSIS — K86.1 CHRONIC BILIARY PANCREATITIS: ICD-10-CM

## 2024-06-18 DIAGNOSIS — G89.29 CHRONIC RIGHT SHOULDER PAIN: ICD-10-CM

## 2024-06-18 DIAGNOSIS — E78.2 MIXED HYPERLIPIDEMIA: Primary | ICD-10-CM

## 2024-06-18 DIAGNOSIS — F45.8 NEUROPATHIC PRURITUS: ICD-10-CM

## 2024-06-18 DIAGNOSIS — M25.511 CHRONIC RIGHT SHOULDER PAIN: ICD-10-CM

## 2024-06-18 DIAGNOSIS — G62.9 NEUROPATHY: ICD-10-CM

## 2024-06-18 DIAGNOSIS — I10 HYPERTENSION, UNSPECIFIED TYPE: ICD-10-CM

## 2024-06-18 PROCEDURE — 99214 OFFICE O/P EST MOD 30 MIN: CPT | Performed by: NURSE PRACTITIONER

## 2024-06-18 PROCEDURE — 1126F AMNT PAIN NOTED NONE PRSNT: CPT | Performed by: NURSE PRACTITIONER

## 2024-06-18 PROCEDURE — 1159F MED LIST DOCD IN RCRD: CPT | Performed by: NURSE PRACTITIONER

## 2024-06-18 PROCEDURE — 1160F RVW MEDS BY RX/DR IN RCRD: CPT | Performed by: NURSE PRACTITIONER

## 2024-06-18 RX ORDER — HYDROXYZINE 50 MG/1
50 TABLET, FILM COATED ORAL 3 TIMES DAILY PRN
Qty: 90 TABLET | Refills: 1 | Status: SHIPPED | OUTPATIENT
Start: 2024-06-18

## 2024-06-19 RX ORDER — ATORVASTATIN CALCIUM 20 MG/1
20 TABLET, FILM COATED ORAL NIGHTLY
Qty: 90 TABLET | Refills: 1 | Status: SHIPPED | OUTPATIENT
Start: 2024-06-19

## 2024-06-19 NOTE — PROGRESS NOTES
"Chief Complaint  Hyperlipidemia    Subjective          Ta Madison presents to [facility]  History of Present Illness  Diagnosed with Prostate cancer two years ago .  Following oncology and Urology.  No recent changes.     Necrotizing pancreatitis DX 8 months ago finally improved J tube removed weight gradually increasing appetite improved.  Scheduled with surgeon for gallbladder removal next month.   Chronic right shoulder pain with return of symptoms over the past couple of months. Not sleeping well due to development of Neuropathy in his feet.      Hyperlipidemia  This is a chronic problem. Recent lipid tests were reviewed and are variable. Factors aggravating his hyperlipidemia include fatty foods. Current antihyperlipidemic treatment includes statins and diet change. Compliance problems include adherence to diet and adherence to exercise.    Hypertension  This is a chronic (tachycardia controlled with current medications) problem. The current episode started more than 1 year ago. The problem is controlled. Pertinent negatives include no malaise/fatigue, palpitations or peripheral edema. Risk factors for coronary artery disease include dyslipidemia. Past treatments include beta blockers. Current antihypertension treatment includes beta blockers. The current treatment provides significant improvement. Compliance problems include diet.        Objective   Vital Signs:   /62 (BP Location: Right arm, Patient Position: Sitting)   Pulse 82   Temp 97.7 °F (36.5 °C) (Temporal)   Ht 170.2 cm (67\")   Wt 74.6 kg (164 lb 6.4 oz)   SpO2 100%   BMI 25.75 kg/m²     Physical Exam  Vitals and nursing note reviewed.   Constitutional:       General: He is not in acute distress.     Appearance: He is well-developed. He is obese. He is not ill-appearing.   HENT:      Head: Normocephalic.   Eyes:      General:         Right eye: No discharge.         Left eye: No discharge.      " Conjunctiva/sclera: Conjunctivae normal.   Cardiovascular:      Rate and Rhythm: Normal rate and regular rhythm.      Heart sounds: Normal heart sounds. No murmur heard.  Pulmonary:      Effort: Pulmonary effort is normal.      Breath sounds: Normal breath sounds.   Abdominal:      General: Bowel sounds are normal.      Palpations: Abdomen is soft.   Musculoskeletal:      Right lower leg: No edema.      Left lower leg: No edema.   Skin:     General: Skin is warm and dry.      Capillary Refill: Capillary refill takes 2 to 3 seconds.      Findings: Rash present.   Neurological:      General: No focal deficit present.      Mental Status: He is alert and oriented to person, place, and time.      Cranial Nerves: No cranial nerve deficit.      Sensory: No sensory deficit.      Motor: No weakness.      Coordination: Coordination normal.      Gait: Gait normal.      Deep Tendon Reflexes: Reflexes normal.   Psychiatric:         Behavior: Behavior normal.        Result Review :   During this visit the following were done:  Labs Reviewed [x]    Labs Ordered [x]    Radiology Reports Reviewed []    Radiology Ordered []    PCP Records Reviewed []    Referring Provider Records Reviewed []    ER Records Reviewed []    Hospital Records Reviewed []    History Obtained From Family []    Radiology Images Reviewed []    Other Reviewed []    Records Requested []             Diagnoses and all orders for this visit:    1. Mixed hyperlipidemia (Primary)  -     CBC Auto Differential; Future  -     Comprehensive Metabolic Panel; Future  -     Lipid Panel; Future    2. Neuropathic pruritus  -     hydrOXYzine (ATARAX) 50 MG tablet; Take 1 tablet by mouth 3 (Three) Times a Day As Needed for Itching.  Dispense: 90 tablet; Refill: 1  -     Vitamin B12; Future    3. Hypertension, unspecified type  -     CBC Auto Differential; Future  -     Comprehensive Metabolic Panel; Future  -     Lipid Panel; Future  -     TSH; Future  -     metoprolol tartrate  (LOPRESSOR) 25 MG tablet; Take 1 tablet by mouth 2 (Two) Times a Day.  Dispense: 180 tablet; Refill: 1    4. Chronic biliary pancreatitis    5. Chronic right shoulder pain    6. Neuropathy    Other orders  -     atorvastatin (LIPITOR) 20 MG tablet; Take 1 tablet by mouth Every Night.  Dispense: 90 tablet; Refill: 1    Continue with urology and oncology     BMI is >= 25 and < 30. (Overweight) The following options were offered after discussion: exercise counseling/recommendations and nutrition counseling/recommendations              Follow Up   Return in about 3 months (around 9/18/2024), or if symptoms worsen or fail to improve, for Recheck.  Patient was given instructions and counseling regarding his condition or for health maintenance advice. Please see specific information pulled into the AVS if appropriate.

## 2024-06-24 ENCOUNTER — SPECIALTY PHARMACY (OUTPATIENT)
Dept: PHARMACY | Facility: HOSPITAL | Age: 64
End: 2024-06-24
Payer: COMMERCIAL

## 2024-06-24 DIAGNOSIS — G47.00 INSOMNIA, UNSPECIFIED TYPE: ICD-10-CM

## 2024-06-24 DIAGNOSIS — G62.9 NEUROPATHY: ICD-10-CM

## 2024-06-24 RX ORDER — AMITRIPTYLINE HYDROCHLORIDE 25 MG/1
25 TABLET, FILM COATED ORAL NIGHTLY
Qty: 30 TABLET | Refills: 2 | Status: SHIPPED | OUTPATIENT
Start: 2024-06-24

## 2024-07-09 RX ORDER — FOLIC ACID 1 MG/1
1000 TABLET ORAL DAILY
Qty: 90 TABLET | Refills: 0 | Status: SHIPPED | OUTPATIENT
Start: 2024-07-09

## 2024-07-10 DIAGNOSIS — C61 ADENOCARCINOMA OF PROSTATE: ICD-10-CM

## 2024-07-10 RX ORDER — HYDROCODONE BITARTRATE AND ACETAMINOPHEN 10; 325 MG/1; MG/1
1 TABLET ORAL EVERY 6 HOURS PRN
Qty: 120 TABLET | Refills: 0 | Status: SHIPPED | OUTPATIENT
Start: 2024-07-10

## 2024-07-10 NOTE — TELEPHONE ENCOUNTER
Caller: SANJAY    Relationship: Child    Best call back number: [phone]    Requested Prescriptions:   Requested Prescriptions     Pending Prescriptions Disp Refills    HYDROcodone-acetaminophen (NORCO)  MG per tablet 120 tablet 0     Sig: Take 1 tablet by mouth Every 6 (Six) Hours As Needed for Moderate Pain.        Pharmacy where request should be sent: [id number] [place] [address] - [phone]  - [phone] FX     Last office visit with prescribing clinician: 4/17/2024   Last telemedicine visit with prescribing clinician: Visit date not found   Next office visit with prescribing clinician: 10/17/2024     Additional details provided by patient:     Does the patient have less than a 3 day supply:  [x] Yes  [] No    Would you like a call back once the refill request has been completed: [] Yes [x] No    If the office needs to give you a call back, can they leave a voicemail: [] Yes [x] No

## 2024-07-24 ENCOUNTER — SPECIALTY PHARMACY (OUTPATIENT)
Dept: PHARMACY | Facility: HOSPITAL | Age: 64
End: 2024-07-24
Payer: COMMERCIAL

## 2024-07-24 NOTE — PROGRESS NOTES
Specialty Pharmacy Refill Coordination Note     Ta is a 64 y.o. male contacted today regarding refills of  Xtandi specialty medication(s).    Reviewed and verified with patient:       Specialty medication(s) and dose(s) confirmed: yes    Refill Questions      Flowsheet Row Most Recent Value   Changes to allergies? No   Changes to medications? No   New conditions or infections since last clinic visit No   Unplanned office visit, urgent care, ED, or hospital admission in the last 4 weeks  No   How does patient/caregiver feel medication is working? Very good   Financial problems or insurance changes  No   Since the previous refill, were any specialty medication doses or scheduled injections missed or delayed?  No   Why were doses missed? na   Does this patient require a clinical escalation to a pharmacist? No            Delivery Questions      Flowsheet Row Most Recent Value   Copay verified? No   Copay amount na   Copay form of payment No copayment ($0)              Medication Adherence    Adherence tools used: patient uses a pill box to manage medications  Support network for adherence: family member          Follow-up: 30 day(s)     Corinne Whatley, Pharmacy Technician  Specialty Pharmacy Technician

## 2024-08-05 ENCOUNTER — OFFICE VISIT (OUTPATIENT)
Dept: FAMILY MEDICINE CLINIC | Facility: CLINIC | Age: 64
End: 2024-08-05
Payer: COMMERCIAL

## 2024-08-05 ENCOUNTER — LAB (OUTPATIENT)
Dept: FAMILY MEDICINE CLINIC | Facility: CLINIC | Age: 64
End: 2024-08-05
Payer: COMMERCIAL

## 2024-08-05 VITALS
HEIGHT: 67 IN | HEART RATE: 73 BPM | WEIGHT: 162.4 LBS | DIASTOLIC BLOOD PRESSURE: 68 MMHG | BODY MASS INDEX: 25.49 KG/M2 | TEMPERATURE: 98 F | SYSTOLIC BLOOD PRESSURE: 114 MMHG | OXYGEN SATURATION: 96 %

## 2024-08-05 DIAGNOSIS — E46 PROTEIN-CALORIE MALNUTRITION, UNSPECIFIED SEVERITY: ICD-10-CM

## 2024-08-05 DIAGNOSIS — K86.1 CHRONIC BILIARY PANCREATITIS: ICD-10-CM

## 2024-08-05 DIAGNOSIS — M25.511 CHRONIC RIGHT SHOULDER PAIN: ICD-10-CM

## 2024-08-05 DIAGNOSIS — G62.9 NEUROPATHY: ICD-10-CM

## 2024-08-05 DIAGNOSIS — I10 HYPERTENSION, UNSPECIFIED TYPE: ICD-10-CM

## 2024-08-05 DIAGNOSIS — E78.2 MIXED HYPERLIPIDEMIA: ICD-10-CM

## 2024-08-05 DIAGNOSIS — E87.5 HYPERKALEMIA: ICD-10-CM

## 2024-08-05 DIAGNOSIS — E87.1 HYPONATREMIA: ICD-10-CM

## 2024-08-05 DIAGNOSIS — F45.8 NEUROPATHIC PRURITUS: ICD-10-CM

## 2024-08-05 DIAGNOSIS — K85.91 NECROTIZING PANCREATITIS: ICD-10-CM

## 2024-08-05 DIAGNOSIS — C61 PROSTATE CANCER: ICD-10-CM

## 2024-08-05 DIAGNOSIS — G89.29 CHRONIC RIGHT SHOULDER PAIN: ICD-10-CM

## 2024-08-05 DIAGNOSIS — E78.2 MIXED HYPERLIPIDEMIA: Primary | ICD-10-CM

## 2024-08-05 DIAGNOSIS — C61 ADENOCARCINOMA OF PROSTATE: ICD-10-CM

## 2024-08-05 LAB
ALBUMIN SERPL-MCNC: 4.1 G/DL (ref 3.5–5.2)
ALBUMIN/GLOB SERPL: 1.1 G/DL
ALP SERPL-CCNC: 175 U/L (ref 39–117)
ALT SERPL W P-5'-P-CCNC: 5 U/L (ref 1–41)
ANION GAP SERPL CALCULATED.3IONS-SCNC: 10.8 MMOL/L (ref 5–15)
AST SERPL-CCNC: 9 U/L (ref 1–40)
BASOPHILS # BLD AUTO: 0.1 10*3/MM3 (ref 0–0.2)
BASOPHILS NFR BLD AUTO: 1.1 % (ref 0–1.5)
BILIRUB SERPL-MCNC: 0.2 MG/DL (ref 0–1.2)
BUN SERPL-MCNC: 12 MG/DL (ref 8–23)
BUN/CREAT SERPL: 14.6 (ref 7–25)
CALCIUM SPEC-SCNC: 9.3 MG/DL (ref 8.6–10.5)
CHLORIDE SERPL-SCNC: 100 MMOL/L (ref 98–107)
CHOLEST SERPL-MCNC: 169 MG/DL (ref 0–200)
CO2 SERPL-SCNC: 24.2 MMOL/L (ref 22–29)
CREAT SERPL-MCNC: 0.82 MG/DL (ref 0.76–1.27)
DEPRECATED RDW RBC AUTO: 48.2 FL (ref 37–54)
EGFRCR SERPLBLD CKD-EPI 2021: 98.7 ML/MIN/1.73
EOSINOPHIL # BLD AUTO: 0.09 10*3/MM3 (ref 0–0.4)
EOSINOPHIL NFR BLD AUTO: 1 % (ref 0.3–6.2)
ERYTHROCYTE [DISTWIDTH] IN BLOOD BY AUTOMATED COUNT: 17 % (ref 12.3–15.4)
GLOBULIN UR ELPH-MCNC: 3.9 GM/DL
GLUCOSE SERPL-MCNC: 84 MG/DL (ref 65–99)
HCT VFR BLD AUTO: 39.1 % (ref 37.5–51)
HDLC SERPL-MCNC: 34 MG/DL (ref 40–60)
HGB BLD-MCNC: 12.2 G/DL (ref 13–17.7)
IMM GRANULOCYTES # BLD AUTO: 0.03 10*3/MM3 (ref 0–0.05)
IMM GRANULOCYTES NFR BLD AUTO: 0.3 % (ref 0–0.5)
LDLC SERPL CALC-MCNC: 102 MG/DL (ref 0–100)
LDLC/HDLC SERPL: 2.86 {RATIO}
LYMPHOCYTES # BLD AUTO: 3.21 10*3/MM3 (ref 0.7–3.1)
LYMPHOCYTES NFR BLD AUTO: 33.9 % (ref 19.6–45.3)
MCH RBC QN AUTO: 24.6 PG (ref 26.6–33)
MCHC RBC AUTO-ENTMCNC: 31.2 G/DL (ref 31.5–35.7)
MCV RBC AUTO: 78.8 FL (ref 79–97)
MONOCYTES # BLD AUTO: 0.63 10*3/MM3 (ref 0.1–0.9)
MONOCYTES NFR BLD AUTO: 6.7 % (ref 5–12)
NEUTROPHILS NFR BLD AUTO: 5.4 10*3/MM3 (ref 1.7–7)
NEUTROPHILS NFR BLD AUTO: 57 % (ref 42.7–76)
NRBC BLD AUTO-RTO: 0 /100 WBC (ref 0–0.2)
PLATELET # BLD AUTO: 381 10*3/MM3 (ref 140–450)
PMV BLD AUTO: 10.4 FL (ref 6–12)
POTASSIUM SERPL-SCNC: 4.5 MMOL/L (ref 3.5–5.2)
PROT SERPL-MCNC: 8 G/DL (ref 6–8.5)
RBC # BLD AUTO: 4.96 10*6/MM3 (ref 4.14–5.8)
SODIUM SERPL-SCNC: 135 MMOL/L (ref 136–145)
TRIGL SERPL-MCNC: 189 MG/DL (ref 0–150)
TSH SERPL DL<=0.05 MIU/L-ACNC: 1.61 UIU/ML (ref 0.27–4.2)
VLDLC SERPL-MCNC: 33 MG/DL (ref 5–40)
WBC NRBC COR # BLD AUTO: 9.46 10*3/MM3 (ref 3.4–10.8)

## 2024-08-05 PROCEDURE — 36415 COLL VENOUS BLD VENIPUNCTURE: CPT

## 2024-08-05 PROCEDURE — 80050 GENERAL HEALTH PANEL: CPT | Performed by: NURSE PRACTITIONER

## 2024-08-05 PROCEDURE — 1126F AMNT PAIN NOTED NONE PRSNT: CPT | Performed by: NURSE PRACTITIONER

## 2024-08-05 PROCEDURE — 80061 LIPID PANEL: CPT | Performed by: NURSE PRACTITIONER

## 2024-08-05 PROCEDURE — 84153 ASSAY OF PSA TOTAL: CPT | Performed by: INTERNAL MEDICINE

## 2024-08-05 PROCEDURE — 99214 OFFICE O/P EST MOD 30 MIN: CPT | Performed by: NURSE PRACTITIONER

## 2024-08-05 PROCEDURE — 82607 VITAMIN B-12: CPT | Performed by: INTERNAL MEDICINE

## 2024-08-05 RX ORDER — OMALIZUMAB 150 MG/ML
150 INJECTION, SOLUTION SUBCUTANEOUS
COMMUNITY

## 2024-08-05 RX ORDER — FENOFIBRATE 145 MG/1
145 TABLET, COATED ORAL DAILY
Qty: 90 TABLET | Refills: 1 | Status: SHIPPED | OUTPATIENT
Start: 2024-08-05

## 2024-08-05 RX ORDER — PANTOPRAZOLE SODIUM 40 MG/1
40 TABLET, DELAYED RELEASE ORAL DAILY
Qty: 90 TABLET | Refills: 1 | Status: SHIPPED | OUTPATIENT
Start: 2024-08-05

## 2024-08-05 NOTE — PROGRESS NOTES
"Chief Complaint  Numbness (Hands and feet )    Subjective          Ta Madison presents to [facility]  History of Present Illness  Diagnosed with Prostate cancer two years ago .  Following oncology and Urology.  Necrotizing pancreatitis DX 10 months ago finally improved J tube removed weight gradually increasing appetite improved.  Chronic right shoulder pain with return of symptoms over the past couple of months. Not sleeping well due to development of Neuropathy in his feet.  Today admits to taking wife's Neurontin with some improvements.  Will discuss this further with oncology at next appt      Hyperlipidemia  This is a chronic problem. Recent lipid tests were reviewed and are variable. Factors aggravating his hyperlipidemia include fatty foods. Current antihyperlipidemic treatment includes statins and diet change. Compliance problems include adherence to diet and adherence to exercise.    Hypertension  This is a chronic (tachycardia controlled with current medications) problem. The current episode started more than 1 year ago. The problem is controlled. Pertinent negatives include no malaise/fatigue, palpitations or peripheral edema. Risk factors for coronary artery disease include dyslipidemia. Past treatments include beta blockers. Current antihypertension treatment includes beta blockers. The current treatment provides significant improvement. Compliance problems include diet.        Objective   Vital Signs:   /68 (BP Location: Right arm, Patient Position: Sitting)   Pulse 73   Temp 98 °F (36.7 °C) (Temporal)   Ht 170.2 cm (67\")   Wt 73.7 kg (162 lb 6.4 oz)   SpO2 96%   BMI 25.44 kg/m²     Physical Exam  Vitals and nursing note reviewed.   Constitutional:       General: He is not in acute distress.     Appearance: He is well-developed. He is obese. He is not ill-appearing.   HENT:      Head: Normocephalic.   Eyes:      General:         Right eye: No discharge.  "        Left eye: No discharge.      Conjunctiva/sclera: Conjunctivae normal.   Cardiovascular:      Rate and Rhythm: Normal rate and regular rhythm.      Heart sounds: Normal heart sounds. No murmur heard.  Pulmonary:      Effort: Pulmonary effort is normal.      Breath sounds: Normal breath sounds.   Abdominal:      General: Bowel sounds are normal.      Palpations: Abdomen is soft.   Musculoskeletal:      Right lower leg: No edema.      Left lower leg: No edema.   Skin:     General: Skin is warm and dry.      Capillary Refill: Capillary refill takes 2 to 3 seconds.      Findings: Rash present.   Neurological:      General: No focal deficit present.      Mental Status: He is alert and oriented to person, place, and time.      Cranial Nerves: No cranial nerve deficit.      Sensory: No sensory deficit.      Motor: No weakness.      Coordination: Coordination normal.      Gait: Gait normal.      Deep Tendon Reflexes: Reflexes normal.   Psychiatric:         Behavior: Behavior normal.        Result Review :   During this visit the following were done:  Labs Reviewed [x]    Labs Ordered [x]    Radiology Reports Reviewed []    Radiology Ordered []    PCP Records Reviewed []    Referring Provider Records Reviewed []    ER Records Reviewed []    Hospital Records Reviewed []    History Obtained From Family []    Radiology Images Reviewed []    Other Reviewed []    Records Requested []             Diagnoses and all orders for this visit:    1. Mixed hyperlipidemia (Primary)  -     fenofibrate (TRICOR) 145 MG tablet; Take 1 tablet by mouth Daily.  Dispense: 90 tablet; Refill: 1    2. Neuropathy  Likely related to Chemo Will further discuss with oncology     3. Hypertension, unspecified type    4. Chronic biliary pancreatitis  -     pantoprazole (PROTONIX) 40 MG EC tablet; Take 1 tablet by mouth Daily.  Dispense: 90 tablet; Refill: 1    5. Chronic right shoulder pain    6. Prostate cancer  Continue with oncology       Continue  with urology and oncology     BMI is >= 25 and < 30. (Overweight) The following options were offered after discussion: exercise counseling/recommendations and nutrition counseling/recommendations              Follow Up   Return in about 3 months (around 11/5/2024) for Recheck  lab today.  Patient was given instructions and counseling regarding his condition or for health maintenance advice. Please see specific information pulled into the AVS if appropriate.

## 2024-08-06 ENCOUNTER — TELEPHONE (OUTPATIENT)
Dept: FAMILY MEDICINE CLINIC | Facility: CLINIC | Age: 64
End: 2024-08-06
Payer: COMMERCIAL

## 2024-08-06 LAB
PSA SERPL-MCNC: <0.014 NG/ML (ref 0–4)
VIT B12 BLD-MCNC: 763 PG/ML (ref 211–946)

## 2024-08-19 ENCOUNTER — SPECIALTY PHARMACY (OUTPATIENT)
Dept: PHARMACY | Facility: HOSPITAL | Age: 64
End: 2024-08-19
Payer: COMMERCIAL

## 2024-08-19 NOTE — PROGRESS NOTES
Specialty Pharmacy Refill Coordination Note     Ta is a 64 y.o. male contacted today regarding refills of  Xtandi specialty medication(s).    Reviewed and verified with patient:       Specialty medication(s) and dose(s) confirmed: yes    Refill Questions      Flowsheet Row Most Recent Value   Changes to allergies? No   Changes to medications? No   New conditions or infections since last clinic visit No   Unplanned office visit, urgent care, ED, or hospital admission in the last 4 weeks  No   How does patient/caregiver feel medication is working? Very good   Financial problems or insurance changes  No   Since the previous refill, were any specialty medication doses or scheduled injections missed or delayed?  No   Why were doses missed? na   Does this patient require a clinical escalation to a pharmacist? No            Delivery Questions      Flowsheet Row Most Recent Value   Delivery method [employer]   Delivery address verified with patient/caregiver? Yes   Delivery address Prescription   Number of medications in delivery 1   Medication(s) being filled and delivered Enzalutamide   Doses left of specialty medications 0   Copay verified? No   Copay amount 0   Copay form of payment Pay at pickup   Ship Date 08/23   Delivery Date 08/24   Signature Required No              Medication Adherence    Adherence tools used: patient uses a pill box to manage medications  Support network for adherence: family member          Follow-up: 30 day(s)     Corinne Whatley Pharmacy Technician  Specialty Pharmacy Technician

## 2024-08-27 ENCOUNTER — SPECIALTY PHARMACY (OUTPATIENT)
Dept: PHARMACY | Facility: HOSPITAL | Age: 64
End: 2024-08-27
Payer: COMMERCIAL

## 2024-08-27 DIAGNOSIS — C61 ADENOCARCINOMA OF PROSTATE: ICD-10-CM

## 2024-08-27 NOTE — PROGRESS NOTES
Specialty Pharmacy Patient Management Program  Oncology Reassessment     Ta Madison was referred by their provider to the Oncology Patient Management program offered by [facility] for prostate cancer. A follow-up outreach was conducted, including assessment of continued therapy appropriateness, medication adherence, and side effect incidence and management for Xtandi.    Changes to Insurance Coverage or Financial Support  N/A    Relevant Past Medical History and Comorbidities  Relevant medical history and concomitant health conditions were discussed with the patient. The patient's chart has been reviewed for relevant past medical history and comorbid health conditions and updated as necessary.   Past Medical History:   Diagnosis Date    Elevated cholesterol     GERD (gastroesophageal reflux disease)     Increased heart rate     Mini stroke     Neck pain     Prostate cancer     Rash     Tobacco abuse      Social History     Socioeconomic History    Marital status:    Tobacco Use    Smoking status: Some Days     Current packs/day: 0.00     Average packs/day: 2.0 packs/day for 45.0 years (90.0 ttl pk-yrs)     Types: Cigarettes     Start date: 1978     Last attempt to quit: 2023     Years since quittin.0    Smokeless tobacco: Never   Vaping Use    Vaping status: Never Used   Substance and Sexual Activity    Alcohol use: No    Drug use: Yes     Types: Marijuana     Comment: occ     Sexual activity: Defer     Problem list reviewed by Kristian Nash RPH on 2024 at 10:31 AM    Hospitalizations and Urgent Care Since Last Assessment  ED Visits, Admissions, or Hospitalizations: Patient received a laparoscopic cholecystectomy on 24 at [facility]  Urgent Office Visits: None    Allergies  Known allergies and reactions were discussed with the patient. The patient's chart has been reviewed for allergy information and updated as necessary.   No Known  Allergies  Allergies reviewed by Kristian Nash RPH on 8/27/2024 at 10:31 AM    Relevant Laboratory Values  Relevant laboratory values were discussed with the patient. The following specialty medication dose adjustment(s) are recommended: N/A    Lab Results   Component Value Date    GLUCOSE 84 08/05/2024    CALCIUM 9.3 08/05/2024     (L) 08/05/2024    K 4.5 08/05/2024    CO2 24.2 08/05/2024     08/05/2024    BUN 12 08/05/2024    CREATININE 0.82 08/05/2024    EGFRIFAFRI 104 02/07/2024    EGFRIFNONA 70 01/19/2022    BCR 14.6 08/05/2024    ANIONGAP 10.8 08/05/2024     Lab Results   Component Value Date    CHOL 169 08/05/2024    CHLPL 174 03/26/2015    TRIG 189 (H) 08/05/2024    HDL 34 (L) 08/05/2024     (H) 08/05/2024       Current Medication List  This medication list has been reviewed with the patient and evaluated for any interactions or necessary modifications/recommendations, and updated to include all prescription medications, OTC medications, and supplements the patient is currently taking.  This list reflects what is contained in the patient's profile, which has also been marked as reviewed to communicate to other providers it is the most up to date version of the patient's current medication therapy.     Current Outpatient Medications:     enzalutamide (XTANDI) 40 MG chemo capsule, Take 4 capsules by mouth Daily., Disp: 120 capsule, Rfl: 5    atorvastatin (LIPITOR) 20 MG tablet, Take 1 tablet by mouth Every Night., Disp: 90 tablet, Rfl: 1    fenofibrate (TRICOR) 145 MG tablet, Take 1 tablet by mouth Daily., Disp: 90 tablet, Rfl: 1    HYDROcodone-acetaminophen (NORCO)  MG per tablet, Take 1 tablet by mouth Every 6 (Six) Hours As Needed for Moderate Pain., Disp: 120 tablet, Rfl: 0    hydrOXYzine (ATARAX) 50 MG tablet, Take 1 tablet by mouth 3 (Three) Times a Day As Needed for Itching., Disp: 90 tablet, Rfl: 1    metoprolol tartrate (LOPRESSOR) 25 MG tablet, Take 1 tablet by mouth 2 (Two)  Times a Day. (Patient taking differently: Take 1 tablet by mouth Daily.), Disp: 180 tablet, Rfl: 1    Morphine (MS CONTIN) 15 MG 12 hr tablet, Take 1 tablet by mouth 2 (Two) Times a Day., Disp: 60 tablet, Rfl: 0    multivitamin with minerals (Tab-A-Lucero/Iron/Beta Carotene) tablet tablet, , Disp: , Rfl:     naloxone (NARCAN) 4 MG/0.1ML nasal spray, Call 911. Don't prime. [place] in 1 nostril for overdose. Repeat in 2-3 minutes in other nostril if no or minimal breathing/responsiveness. (Patient not taking: Reported on 8/5/2024), Disp: 2 each, Rfl: 0    omalizumab (Xolair) 150 MG/ML solution prefilled syringe injection, Inject 1 mL under the skin into the appropriate area as directed Every 28 (Twenty-Eight) Days., Disp: , Rfl:     pantoprazole (PROTONIX) 40 MG EC tablet, Take 1 tablet by mouth Daily., Disp: 90 tablet, Rfl: 1    Medicines reviewed by Kristian Nsah RPH on 8/27/2024 at 10:31 AM    Drug Interactions  DDI report generated; no significant interactions identified    Adverse Drug Reactions  Medication tolerability: Tolerating with no to minimal ADRs  Medication plan: Continue therapy with normal follow-up  Plan for ADR Management: N/A    Adherence, Self-Administration, and Current Therapy Problems  Adherence related to the patient's specialty therapy was discussed with the patient. The Adherence segment of this outreach has been reviewed and updated.     Adherence Questions  Linked Medication(s) Assessed: Enzalutamide  On average, how many doses/injections does the patient miss per month?: 0  What are the identified reasons for non-adherence or missed doses? : no problems identified  What is the estimated medication adherence level?: %  Based on the patient/caregiver response and refill history, does this patient require an MTP to track adherence improvements?: no    Additional Barriers to Patient Self-Administration: None  Methods for Supporting Patient Self-Administration: Provided direct education.  Care coordinator to continue providing once-monthly outreach calls to assist with adherence and coordinate medication refills. Pharmacist to provide clinical assessments every 6 months and will assist in the management of clinical issues as they arise.     Open Medication Therapy Problems  No medication therapy recommendations to display    Goals of Therapy  Goals related to the patient's specialty therapy were discussed with the patient. The Patient Goals segment of this outreach has been reviewed and updated.   Goals Addressed Today        Specialty Pharmacy General Goal      To achieve and maintain an undetectable PSA.              Quality of Life Assessment   Quality of Life related to the patient's enrollment in the patient management program and services provided was discussed with the patient. The QOL segment of this outreach has been reviewed and updated.  Quality of Life Improvement Scale: 8-Moderately better    Reassessment Plan & Follow-Up  1. Medication Therapy Changes: None  2. Related Plans, Therapy Recommendations, or Issues to Be Addressed: None  3. Pharmacist to perform regular assessments no more than (6) months from the previous assessment.  4. Care Coordinator to set up future refill outreaches, coordinate prescription delivery, and escalate clinical questions to pharmacist.    Attestation  Therapeutic appropriateness: Appropriate   I attest the patient was actively involved in and has agreed to the above plan of care.  If the prescribed therapy is at any point deemed not appropriate based on the current or future assessments, a consultation will be initiated with the patient's specialty care provider to determine the best course of action. The revised plan of therapy will be documented along with any required assessments and/or additional patient education provided.     Kristian Nash, Karin, JONATHAN  Clinical Specialty Pharmacist, Oncology  8/27/2024  10:35 EDT

## 2024-09-03 DIAGNOSIS — C61 ADENOCARCINOMA OF PROSTATE: ICD-10-CM

## 2024-09-03 RX ORDER — HYDROCODONE BITARTRATE AND ACETAMINOPHEN 10; 325 MG/1; MG/1
1 TABLET ORAL EVERY 6 HOURS PRN
Qty: 120 TABLET | Refills: 0 | Status: ON HOLD | OUTPATIENT
Start: 2024-09-03

## 2024-09-03 NOTE — TELEPHONE ENCOUNTER
Pt is requesting refill on hydrocodone he has two days left on it. Pt uses [facility] in [place]

## 2024-09-09 ENCOUNTER — APPOINTMENT (OUTPATIENT)
Dept: GENERAL RADIOLOGY | Facility: HOSPITAL | Age: 64
DRG: 481 | End: 2024-09-09
Payer: COMMERCIAL

## 2024-09-09 ENCOUNTER — HOSPITAL ENCOUNTER (INPATIENT)
Facility: HOSPITAL | Age: 64
LOS: 3 days | Discharge: HOME | DRG: 481 | End: 2024-09-12
Attending: STUDENT IN AN ORGANIZED HEALTH CARE EDUCATION/TRAINING PROGRAM | Admitting: INTERNAL MEDICINE
Payer: COMMERCIAL

## 2024-09-09 ENCOUNTER — APPOINTMENT (OUTPATIENT)
Dept: CT IMAGING | Facility: HOSPITAL | Age: 64
DRG: 481 | End: 2024-09-09
Payer: COMMERCIAL

## 2024-09-09 DIAGNOSIS — S72.001A CLOSED FRACTURE OF RIGHT HIP, INITIAL ENCOUNTER: Primary | ICD-10-CM

## 2024-09-09 DIAGNOSIS — Z98.890 POST-OPERATIVE STATE: ICD-10-CM

## 2024-09-09 PROBLEM — S72.009A HIP FRACTURE: Status: ACTIVE | Noted: 2024-09-09

## 2024-09-09 LAB
ALBUMIN SERPL-MCNC: 3.9 G/DL (ref 3.5–5.2)
ALBUMIN/GLOB SERPL: 1 G/DL
ALP SERPL-CCNC: 143 U/L (ref 39–117)
ALT SERPL W P-5'-P-CCNC: 6 U/L (ref 1–41)
ANION GAP SERPL CALCULATED.3IONS-SCNC: 10.5 MMOL/L (ref 5–15)
APTT PPP: 38.5 SECONDS (ref 26.5–34.5)
AST SERPL-CCNC: 10 U/L (ref 1–40)
BASOPHILS # BLD AUTO: 0.05 10*3/MM3 (ref 0–0.2)
BASOPHILS NFR BLD AUTO: 0.5 % (ref 0–1.5)
BILIRUB SERPL-MCNC: 0.3 MG/DL (ref 0–1.2)
BUN SERPL-MCNC: 17 MG/DL (ref 8–23)
BUN/CREAT SERPL: 21.8 (ref 7–25)
CALCIUM SPEC-SCNC: 9.3 MG/DL (ref 8.6–10.5)
CHLORIDE SERPL-SCNC: 101 MMOL/L (ref 98–107)
CO2 SERPL-SCNC: 24.5 MMOL/L (ref 22–29)
CREAT SERPL-MCNC: 0.78 MG/DL (ref 0.76–1.27)
DEPRECATED RDW RBC AUTO: 48 FL (ref 37–54)
EGFRCR SERPLBLD CKD-EPI 2021: 100.2 ML/MIN/1.73
EOSINOPHIL # BLD AUTO: 0.04 10*3/MM3 (ref 0–0.4)
EOSINOPHIL NFR BLD AUTO: 0.4 % (ref 0.3–6.2)
ERYTHROCYTE [DISTWIDTH] IN BLOOD BY AUTOMATED COUNT: 17.1 % (ref 12.3–15.4)
GLOBULIN UR ELPH-MCNC: 3.9 GM/DL
GLUCOSE SERPL-MCNC: 102 MG/DL (ref 65–99)
HCT VFR BLD AUTO: 35.9 % (ref 37.5–51)
HGB BLD-MCNC: 11.3 G/DL (ref 13–17.7)
HOLD SPECIMEN: NORMAL
HOLD SPECIMEN: NORMAL
IMM GRANULOCYTES # BLD AUTO: 0.04 10*3/MM3 (ref 0–0.05)
IMM GRANULOCYTES NFR BLD AUTO: 0.4 % (ref 0–0.5)
INR PPP: 0.94 (ref 0.9–1.1)
LYMPHOCYTES # BLD AUTO: 2.37 10*3/MM3 (ref 0.7–3.1)
LYMPHOCYTES NFR BLD AUTO: 24.3 % (ref 19.6–45.3)
MCH RBC QN AUTO: 24.4 PG (ref 26.6–33)
MCHC RBC AUTO-ENTMCNC: 31.5 G/DL (ref 31.5–35.7)
MCV RBC AUTO: 77.4 FL (ref 79–97)
MONOCYTES # BLD AUTO: 1 10*3/MM3 (ref 0.1–0.9)
MONOCYTES NFR BLD AUTO: 10.2 % (ref 5–12)
NEUTROPHILS NFR BLD AUTO: 6.27 10*3/MM3 (ref 1.7–7)
NEUTROPHILS NFR BLD AUTO: 64.2 % (ref 42.7–76)
NRBC BLD AUTO-RTO: 0 /100 WBC (ref 0–0.2)
PLATELET # BLD AUTO: 444 10*3/MM3 (ref 140–450)
PMV BLD AUTO: 9.9 FL (ref 6–12)
POTASSIUM SERPL-SCNC: 4.2 MMOL/L (ref 3.5–5.2)
PROT SERPL-MCNC: 7.8 G/DL (ref 6–8.5)
PROTHROMBIN TIME: 12.6 SECONDS (ref 12.1–14.7)
RBC # BLD AUTO: 4.64 10*6/MM3 (ref 4.14–5.8)
SODIUM SERPL-SCNC: 136 MMOL/L (ref 136–145)
WBC NRBC COR # BLD AUTO: 9.77 10*3/MM3 (ref 3.4–10.8)
WHOLE BLOOD HOLD COAG: NORMAL
WHOLE BLOOD HOLD SPECIMEN: NORMAL

## 2024-09-09 PROCEDURE — 99285 EMERGENCY DEPT VISIT HI MDM: CPT

## 2024-09-09 PROCEDURE — 99223 1ST HOSP IP/OBS HIGH 75: CPT

## 2024-09-09 PROCEDURE — 85025 COMPLETE CBC W/AUTO DIFF WBC: CPT

## 2024-09-09 PROCEDURE — 80053 COMPREHEN METABOLIC PANEL: CPT

## 2024-09-09 PROCEDURE — 73502 X-RAY EXAM HIP UNI 2-3 VIEWS: CPT

## 2024-09-09 PROCEDURE — 72131 CT LUMBAR SPINE W/O DYE: CPT | Performed by: RADIOLOGY

## 2024-09-09 PROCEDURE — 85610 PROTHROMBIN TIME: CPT

## 2024-09-09 PROCEDURE — 73502 X-RAY EXAM HIP UNI 2-3 VIEWS: CPT | Performed by: RADIOLOGY

## 2024-09-09 PROCEDURE — 25010000002 HYDROMORPHONE PER 4 MG: Performed by: INTERNAL MEDICINE

## 2024-09-09 PROCEDURE — 25010000002 HEPARIN (PORCINE) PER 1000 UNITS: Performed by: INTERNAL MEDICINE

## 2024-09-09 PROCEDURE — 72131 CT LUMBAR SPINE W/O DYE: CPT

## 2024-09-09 PROCEDURE — 93005 ELECTROCARDIOGRAM TRACING: CPT

## 2024-09-09 PROCEDURE — 85730 THROMBOPLASTIN TIME PARTIAL: CPT

## 2024-09-09 PROCEDURE — 73552 X-RAY EXAM OF FEMUR 2/>: CPT

## 2024-09-09 PROCEDURE — 25010000002 ONDANSETRON PER 1 MG

## 2024-09-09 PROCEDURE — 25010000002 MORPHINE PER 10 MG: Performed by: STUDENT IN AN ORGANIZED HEALTH CARE EDUCATION/TRAINING PROGRAM

## 2024-09-09 PROCEDURE — 73552 X-RAY EXAM OF FEMUR 2/>: CPT | Performed by: RADIOLOGY

## 2024-09-09 RX ORDER — HYDROXYZINE HYDROCHLORIDE 50 MG/1
50 TABLET, FILM COATED ORAL 3 TIMES DAILY PRN
Status: DISCONTINUED | OUTPATIENT
Start: 2024-09-09 | End: 2024-09-12 | Stop reason: HOSPADM

## 2024-09-09 RX ORDER — BISACODYL 5 MG/1
5 TABLET, DELAYED RELEASE ORAL DAILY PRN
Status: DISCONTINUED | OUTPATIENT
Start: 2024-09-09 | End: 2024-09-12 | Stop reason: HOSPADM

## 2024-09-09 RX ORDER — PANTOPRAZOLE SODIUM 40 MG/1
40 TABLET, DELAYED RELEASE ORAL DAILY
Status: DISCONTINUED | OUTPATIENT
Start: 2024-09-10 | End: 2024-09-12 | Stop reason: HOSPADM

## 2024-09-09 RX ORDER — HYDROMORPHONE HYDROCHLORIDE 1 MG/ML
0.5 INJECTION, SOLUTION INTRAMUSCULAR; INTRAVENOUS; SUBCUTANEOUS EVERY 4 HOURS PRN
Status: DISCONTINUED | OUTPATIENT
Start: 2024-09-09 | End: 2024-09-10

## 2024-09-09 RX ORDER — SODIUM CHLORIDE 0.9 % (FLUSH) 0.9 %
10 SYRINGE (ML) INJECTION AS NEEDED
Status: DISCONTINUED | OUTPATIENT
Start: 2024-09-09 | End: 2024-09-12 | Stop reason: HOSPADM

## 2024-09-09 RX ORDER — HYDROCODONE BITARTRATE AND ACETAMINOPHEN 10; 325 MG/1; MG/1
1 TABLET ORAL EVERY 6 HOURS PRN
Status: CANCELLED | OUTPATIENT
Start: 2024-09-09

## 2024-09-09 RX ORDER — ASPIRIN 81 MG/1
81 TABLET ORAL DAILY
Status: DISCONTINUED | OUTPATIENT
Start: 2024-09-09 | End: 2024-09-12 | Stop reason: HOSPADM

## 2024-09-09 RX ORDER — SODIUM CHLORIDE 0.9 % (FLUSH) 0.9 %
10 SYRINGE (ML) INJECTION EVERY 12 HOURS SCHEDULED
Status: DISCONTINUED | OUTPATIENT
Start: 2024-09-09 | End: 2024-09-12 | Stop reason: HOSPADM

## 2024-09-09 RX ORDER — POLYETHYLENE GLYCOL 3350 17 G/17G
17 POWDER, FOR SOLUTION ORAL DAILY PRN
Status: DISCONTINUED | OUTPATIENT
Start: 2024-09-09 | End: 2024-09-12 | Stop reason: HOSPADM

## 2024-09-09 RX ORDER — ASPIRIN 81 MG/1
81 TABLET ORAL DAILY
COMMUNITY

## 2024-09-09 RX ORDER — ONDANSETRON 2 MG/ML
4 INJECTION INTRAMUSCULAR; INTRAVENOUS ONCE
Status: COMPLETED | OUTPATIENT
Start: 2024-09-09 | End: 2024-09-09

## 2024-09-09 RX ORDER — SODIUM CHLORIDE 9 MG/ML
40 INJECTION, SOLUTION INTRAVENOUS AS NEEDED
Status: DISCONTINUED | OUTPATIENT
Start: 2024-09-09 | End: 2024-09-12 | Stop reason: HOSPADM

## 2024-09-09 RX ORDER — FENOFIBRATE 145 MG/1
145 TABLET, COATED ORAL DAILY
Status: DISCONTINUED | OUTPATIENT
Start: 2024-09-09 | End: 2024-09-12 | Stop reason: HOSPADM

## 2024-09-09 RX ORDER — METOPROLOL TARTRATE 25 MG/1
25 TABLET, FILM COATED ORAL 2 TIMES DAILY
Status: DISCONTINUED | OUTPATIENT
Start: 2024-09-09 | End: 2024-09-12 | Stop reason: HOSPADM

## 2024-09-09 RX ORDER — AMOXICILLIN 250 MG
2 CAPSULE ORAL 2 TIMES DAILY PRN
Status: DISCONTINUED | OUTPATIENT
Start: 2024-09-09 | End: 2024-09-12 | Stop reason: HOSPADM

## 2024-09-09 RX ORDER — BISACODYL 10 MG
10 SUPPOSITORY, RECTAL RECTAL DAILY PRN
Status: DISCONTINUED | OUTPATIENT
Start: 2024-09-09 | End: 2024-09-12 | Stop reason: HOSPADM

## 2024-09-09 RX ORDER — ATORVASTATIN CALCIUM 20 MG/1
20 TABLET, FILM COATED ORAL NIGHTLY
Status: DISCONTINUED | OUTPATIENT
Start: 2024-09-09 | End: 2024-09-12 | Stop reason: HOSPADM

## 2024-09-09 RX ORDER — HEPARIN SODIUM 5000 [USP'U]/ML
5000 INJECTION, SOLUTION INTRAVENOUS; SUBCUTANEOUS ONCE
Status: COMPLETED | OUTPATIENT
Start: 2024-09-09 | End: 2024-09-09

## 2024-09-09 RX ADMIN — ASPIRIN 81 MG: 81 TABLET, COATED ORAL at 17:35

## 2024-09-09 RX ADMIN — MORPHINE SULFATE 4 MG: 4 INJECTION, SOLUTION INTRAMUSCULAR; INTRAVENOUS at 12:16

## 2024-09-09 RX ADMIN — ONDANSETRON 4 MG: 2 INJECTION INTRAMUSCULAR; INTRAVENOUS at 12:16

## 2024-09-09 RX ADMIN — ATORVASTATIN CALCIUM 20 MG: 20 TABLET, FILM COATED ORAL at 20:59

## 2024-09-09 RX ADMIN — FENOFIBRATE 145 MG: 145 TABLET ORAL at 17:47

## 2024-09-09 RX ADMIN — HEPARIN SODIUM 5000 UNITS: 5000 INJECTION INTRAVENOUS; SUBCUTANEOUS at 15:07

## 2024-09-09 RX ADMIN — HYDROMORPHONE HYDROCHLORIDE 0.5 MG: 1 INJECTION, SOLUTION INTRAMUSCULAR; INTRAVENOUS; SUBCUTANEOUS at 15:16

## 2024-09-09 RX ADMIN — HYDROMORPHONE HYDROCHLORIDE 0.5 MG: 1 INJECTION, SOLUTION INTRAMUSCULAR; INTRAVENOUS; SUBCUTANEOUS at 20:59

## 2024-09-09 RX ADMIN — Medication 10 ML: at 20:59

## 2024-09-09 RX ADMIN — Medication 10 ML: at 15:08

## 2024-09-09 RX ADMIN — METOPROLOL TARTRATE 25 MG: 25 TABLET, FILM COATED ORAL at 20:59

## 2024-09-09 NOTE — H&P
[facility] Medicine Services  History & Physical    Patient Identification:  Name:  Ta Madison  Age:  64 y.o.  Sex:  male  :  1960  MRN:  [id number]   Visit Number:  [id number]  Admit Date: 2024   Primary Care Physician:  Yolande Olvera APRN    Subjective     Chief complaint: Fall    History of presenting illness:      Ta Madison is a 64 y.o. male who presented for further evaluation of right hip pain s/p fall yesterday around 1 o clock. He reports he was loosening a fitting with a large wrench- when the fitting suddenly broke loose he fell backward, landing on high right hip. He states he tried to walk it off yesterday evening but as pain persisting today he came in for further evaluation. He reports pain currently well controlled if he's lying still. He denies further injury or complaint. He reports PMHx significant for stage IV prostate cancer with mets to bone including the hips. He follows with Dr. Martinez. He also reports a recent history of severe pancreatitis due to choledocholithiasis back in May.     Past medical history is significant for HLD, GERD, prostate cancer with mets to bone, tobacco abuse    Upon arrival to the ED, vital signs were temp 98, heart rate 87, respirations 18, /60, SpO2 98% on RA.  XR right femur showed subcapital right femoral neck fracture.  EKG was NSR.    Known Emergency Department medications received prior to my evaluation included morphine, zofran.   Emergency Department Room location at the time of my evaluation was 411.     ---------------------------------------------------------------------------------------------------------------------   Review of Systems   Constitutional:  Negative for chills and fever.   HENT:  Negative for congestion and rhinorrhea.    Respiratory:  Negative for cough and shortness of breath.    Cardiovascular:  Negative for chest pain.   Gastrointestinal:  Negative for abdominal pain.   Genitourinary:   Negative for difficulty urinating and dysuria.   Musculoskeletal:  Positive for arthralgias and myalgias.   Skin:  Negative for rash and wound.   Neurological:  Negative for dizziness and light-headedness.        ---------------------------------------------------------------------------------------------------------------------   Past Medical History:   Diagnosis Date    Elevated cholesterol     GERD (gastroesophageal reflux disease)     Increased heart rate     Mini stroke     Neck pain     Prostate cancer     Rash     Tobacco abuse      Past Surgical History:   Procedure Laterality Date    APPENDECTOMY      [facility]     COLONOSCOPY      COLONOSCOPY N/A 2023    Procedure: COLONOSCOPY;  Surgeon: Mahnaz Caraballo MD;  Location: [facility] OR;  Service: Gastroenterology;  Laterality: N/A;    ENDOSCOPY N/A 2023    Procedure: ESOPHAGOGASTRODUODENOSCOPY WITH KEOFEED PLACEMENT;  Surgeon: Yolande Eller MD;  Location: [facility] ENDOSCOPY;  Service: Gastroenterology;  Laterality: N/A;     Family History   Problem Relation Age of Onset    Nephrolithiasis Mother     Heart disease Father     Hypertension Father     Kidney disease Father      Social History     Socioeconomic History    Marital status:    Tobacco Use    Smoking status: Some Days     Current packs/day: 0.00     Average packs/day: 2.0 packs/day for 45.0 years (90.0 ttl pk-yrs)     Types: Cigarettes     Start date: 1978     Last attempt to quit: 2023     Years since quittin.1    Smokeless tobacco: Never   Vaping Use    Vaping status: Never Used   Substance and Sexual Activity    Alcohol use: No    Drug use: Yes     Types: Marijuana     Comment: occ     Sexual activity: Defer     ---------------------------------------------------------------------------------------------------------------------   Allergies:  Patient has no known  allergies.  ---------------------------------------------------------------------------------------------------------------------   Home medications:    Medications below are reported home medications pulling from within the system; at this time, these medications have not been reconciled unless otherwise specified and are in the verification process for further verifcation as current home medications.  (Not in a hospital admission)      Hospital Scheduled Meds:          Current listed hospital scheduled medications may not yet reflect those currently placed in orders that are signed and held awaiting patient's arrival to floor.   ---------------------------------------------------------------------------------------------------------------------     Objective     Vital Signs:  Temp:  [98 °F (36.7 °C)] 98 °F (36.7 °C)  Heart Rate:  [71-87] 71  Resp:  [18] 18  BP: (106)/(60) 106/60      09/09/24  0946   Weight: 73.5 kg (162 lb)     Body mass index is 25.37 kg/m².  ---------------------------------------------------------------------------------------------------------------------       Physical Exam  Vitals and nursing note reviewed.   HENT:      Head: Normocephalic and atraumatic.   Eyes:      Extraocular Movements: Extraocular movements intact.   Cardiovascular:      Rate and Rhythm: Normal rate and regular rhythm.      Pulses: Normal pulses.   Pulmonary:      Effort: Pulmonary effort is normal.      Breath sounds: No wheezing or rhonchi.   Abdominal:      Palpations: Abdomen is soft.   Musculoskeletal:      Right lower leg: No edema.      Left lower leg: No edema.   Skin:     General: Skin is warm and dry.   Neurological:      Mental Status: He is alert. Mental status is at baseline.   Psychiatric:         Mood and Affect: Mood normal.         Behavior: Behavior normal.             ---------------------------------------------------------------------------------------------------------------------  EKG:        I  "have personally looked at the EKG.  ---------------------------------------------------------------------------------------------------------------------   Results from last 7 days   Lab Units 09/09/24  1214   WBC 10*3/mm3 9.77   HEMOGLOBIN g/dL 11.3*   HEMATOCRIT % 35.9*   MCV fL 77.4*   MCHC g/dL 31.5   PLATELETS 10*3/mm3 444   INR  0.94               Invalid input(s): \"PROT\"CrCl cannot be calculated (Patient's most recent lab result is older than the maximum 30 days allowed.).  No results found for: \"AMMONIA\"          Lab Results   Component Value Date    HGBA1C 5.2 10/26/2023     Lab Results   Component Value Date    TSH 1.610 08/05/2024    FREET4 1.22 06/06/2018     No results found for: \"PREGTESTUR\", \"PREGSERUM\", \"HCG\", \"HCGQUANT\"  Pain Management Panel          Latest Ref Rng & Units 10/12/2023   Pain Management Panel   Creatinine, Urine mg/dL 80.7       Details                 No results found for: \"BLOODCX\"  No results found for: \"URINECX\"  No results found for: \"WOUNDCX\"  No results found for: \"STOOLCX\"      ---------------------------------------------------------------------------------------------------------------------  Imaging Results (Last 7 Days)       Procedure Component Value Units Date/Time    CT Lumbar Spine Without Contrast [[id number]] Collected: 09/09/24 1049     Updated: 09/09/24 1052    Narrative:      PROCEDURE: CT LUMBAR SPINE WO CONTRAST-     HISTORY: fall from a bull     TECHNIQUE: Multiple axial CT images were obtained through the lumbar  spine using bone window algorithms. Coronal and sagittal images were  reconstructed from the original axial data set. This study was performed  with techniques to keep radiation doses as low as reasonably achievable  (ALARA). Individualized dose reduction techniques using automated  exposure control or adjustment of mA and/or kV according to the patient  size were employed.     COMPARISON: None.     FINDINGS: The lumbar spine is well aligned with no " "acute fractures.  There are diffuse degenerative changes with loss of disc space height  and facet arthropathy. Note is made of a bilateral L5 pars defect. The  paraspinal soft tissues are unremarkable.       Impression:      No acute process.              This report was finalized on 9/9/2024 10:50 AM by Kasey Lorenzo M.D..       XR Femur 2 View Right [[id number]] Collected: 09/09/24 1044     Updated: 09/09/24 1048    Narrative:      PROCEDURE: XR FEMUR 2 VW RIGHT-     HISTORY: fall     COMPARISON: None.     FINDINGS:  A 2 view exam demonstrates a suspected subcapital right  femoral neck fracture.  The joint spaces are preserved.  No soft tissue  abnormality is seen.       Impression:      Subcapital right femoral neck fracture.                 This report was finalized on 9/9/2024 10:46 AM by Kasey Lorenzo M.D..       XR Hip With or Without Pelvis 2 - 3 View Right [[id number]] Collected: 09/09/24 1046     Updated: 09/09/24 1048    Narrative:      PROCEDURE: XR HIP W OR WO PELVIS 2-3 VIEW RIGHT-     HISTORY: fall     COMPARISON: None.     FINDINGS: There is a subcapital right femoral neck fracture. The joint  spaces are preserved. The pubic symphysis and SI joints are intact. The  soft tissues are unremarkable.       Impression:      Subcapital right femoral neck fracture.              This report was finalized on 9/9/2024 10:46 AM by Kasey Lorenzo M.D..               Cultures:  No results found for: \"BLOODCX\", \"URINECX\", \"WOUNDCX\", \"MRSACX\", \"RESPCX\", \"STOOLCX\"    Last echocardiogram:          I have personally reviewed the above radiology images and read the final radiology report on 09/09/24  ---------------------------------------------------------------------------------------------------------------------  Assessment / Plan     Active Hospital Problems    Diagnosis  POA    **Hip fracture [S72.009A]  Yes       ASSESSMENT/PLAN:    Right femoral neck fracture  Patient presented to the ED " following fall with subsequent right hip pain, found to have right femoral neck fracture  Admit to the telemetry unit  Consult orthopedic surgery for further assistance and recommendations, appreciated  NPO at midnight  Continue PRN pain regimen  Will benefit from PT post operatively  Trend labs PRN    Chronic:  HLD  GERD  Prostate cancer  Continue  home medications as indicated once med rec is complete      ----------  -DVT prophylaxis: SCDs  -Activity: Strict bed rest  -Expected length of stay: INPATIENT status due to the need for care which can only be reasonably provided in an hospital setting such as aggressive/expedited ancillary services and/or consultation services, the necessity for IV medications, close physician monitoring and/or the possible need for procedures.  In such, I feel patient’s risk for adverse outcomes and need for care warrant INPATIENT evaluation and predict the patient’s care encounter to likely last beyond 2 midnights.   -Disposition pending further course    High risk secondary to Right hip fx    Code Status and Medical Interventions: CPR (Attempt to Resuscitate); Full Support   Ordered at: 09/09/24 1228     Code Status (Patient has no pulse and is not breathing):    CPR (Attempt to Resuscitate)     Medical Interventions (Patient has pulse or is breathing):    Full Support       Jp Tolliver PA-C   09/09/24  12:33 EDT

## 2024-09-09 NOTE — CASE MANAGEMENT/SOCIAL WORK
Discharge Planning Assessment   Constant[place]     Patient Name: Ta Madison  MRN: [id number]  Today's Date: 9/9/2024    Admit Date: 9/9/2024    Plan: Spoke with patient at bedside. Patient lives at home with granddaughter and will return ar discharge. Patient has no POA or Living Will. Patient uses no DME, Oxygen or HH. Patients PCP Yolande Olvera and pharmacy [place], [place]. Patients family will transport home at discharge.   Discharge Needs Assessment       Row Name 09/09/24 1253       Living Environment    People in Home grandchild(donavon)    Potentially Unsafe Housing Conditions none    In the past 12 months has the electric, gas, oil, or water company threatened to shut off services in your home? No    Primary Care Provided by self    Provides Primary Care For no one    Family Caregiver if Needed child(donavon), adult    Family Caregiver Names Daisha [phone]    Quality of Family Relationships helpful;involved;supportive    Able to Return to Prior Arrangements yes       Resource/Environmental Concerns    Resource/Environmental Concerns none    Transportation Concerns none       Transportation Needs    In the past 12 months, has lack of transportation kept you from medical appointments or from getting medications? no    In the past 12 months, has lack of transportation kept you from meetings, work, or from getting things needed for daily living? No       Food Insecurity    Within the past 12 months, you worried that your food would run out before you got the money to buy more. Never true    Within the past 12 months, the food you bought just didn't last and you didn't have money to get more. Never true       Transition Planning    Patient/Family Anticipates Transition to home with family    Patient/Family Anticipated Services at Transition none    Transportation Anticipated family or friend will provide       Discharge Needs Assessment    Readmission Within the Last 30 Days no previous admission in last 30 days     Equipment Currently Used at Home none    Concerns to be Addressed discharge planning    Anticipated Changes Related to Illness none    Equipment Needed After Discharge none                   Discharge Plan       Row Name 09/09/24 1257       Plan    Plan Spoke with patient at bedside. Patient lives at home with granddaughter and will return ar discharge. Patient has no POA or Living Will. Patient uses no DME, Oxygen or HH. Patients PCP Yolande Olvera and pharmacy [place], [place]. Patients family will transport home at discharge.    Patient/Family in Agreement with Plan yes                  Continued Care and Services - Admitted Since 9/9/2024    No active coordination exists for this encounter.       Selected Continued Care - Episodes Includes continued care and service providers with selected services from the active episodes listed below      Oncology Episode start date: 11/4/2021   There are no active outsourced providers for this episode.                 Expected Discharge Date and Time       Expected Discharge Date Expected Discharge Time    Sep 12, 2024            Demographic Summary       Row Name 09/09/24 1233       General Information    Admission Type inpatient    Arrived From home    Referral Source emergency department    Reason for Consult discharge planning    Preferred Language English                 Cynthia Barrera

## 2024-09-09 NOTE — PLAN OF CARE
"Goal Outcome Evaluation:  Plan of Care Reviewed With: patient        Progress: no change  Outcome Evaluation: Pt admitted from ER, A&O, stable on RA, has no c/o, stated \"had pain med which keep pain under control\", PRN pain meds available. Pt will be NPO after MN for surgery tomorrow. Will con't with POC.                               "

## 2024-09-10 ENCOUNTER — ANESTHESIA EVENT (OUTPATIENT)
Dept: PERIOP | Facility: HOSPITAL | Age: 64
End: 2024-09-10
Payer: COMMERCIAL

## 2024-09-10 ENCOUNTER — ANESTHESIA (OUTPATIENT)
Dept: PERIOP | Facility: HOSPITAL | Age: 64
End: 2024-09-10
Payer: COMMERCIAL

## 2024-09-10 ENCOUNTER — APPOINTMENT (OUTPATIENT)
Dept: GENERAL RADIOLOGY | Facility: HOSPITAL | Age: 64
DRG: 481 | End: 2024-09-10
Payer: COMMERCIAL

## 2024-09-10 LAB
ABO GROUP BLD: NORMAL
BLD GP AB SCN SERPL QL: NEGATIVE
RH BLD: POSITIVE
T&S EXPIRATION DATE: NORMAL

## 2024-09-10 PROCEDURE — 25010000002 FENTANYL CITRATE (PF) 50 MCG/ML SOLUTION: Performed by: NURSE ANESTHETIST, CERTIFIED REGISTERED

## 2024-09-10 PROCEDURE — 25010000002 MORPHINE PER 10 MG: Performed by: ORTHOPAEDIC SURGERY

## 2024-09-10 PROCEDURE — C1713 ANCHOR/SCREW BN/BN,TIS/BN: HCPCS | Performed by: ORTHOPAEDIC SURGERY

## 2024-09-10 PROCEDURE — 25010000002 ONDANSETRON PER 1 MG: Performed by: NURSE ANESTHETIST, CERTIFIED REGISTERED

## 2024-09-10 PROCEDURE — 86901 BLOOD TYPING SEROLOGIC RH(D): CPT | Performed by: NURSE PRACTITIONER

## 2024-09-10 PROCEDURE — 25010000002 PROPOFOL 200 MG/20ML EMULSION: Performed by: NURSE ANESTHETIST, CERTIFIED REGISTERED

## 2024-09-10 PROCEDURE — 80048 BASIC METABOLIC PNL TOTAL CA: CPT | Performed by: ORTHOPAEDIC SURGERY

## 2024-09-10 PROCEDURE — C1769 GUIDE WIRE: HCPCS | Performed by: ORTHOPAEDIC SURGERY

## 2024-09-10 PROCEDURE — 86850 RBC ANTIBODY SCREEN: CPT | Performed by: NURSE PRACTITIONER

## 2024-09-10 PROCEDURE — 99232 SBSQ HOSP IP/OBS MODERATE 35: CPT

## 2024-09-10 PROCEDURE — 25010000002 MEPERIDINE PER 100 MG: Performed by: NURSE ANESTHETIST, CERTIFIED REGISTERED

## 2024-09-10 PROCEDURE — 86900 BLOOD TYPING SEROLOGIC ABO: CPT | Performed by: NURSE PRACTITIONER

## 2024-09-10 PROCEDURE — 0QS604Z REPOSITION RIGHT UPPER FEMUR WITH INTERNAL FIXATION DEVICE, OPEN APPROACH: ICD-10-PCS | Performed by: ORTHOPAEDIC SURGERY

## 2024-09-10 PROCEDURE — 25010000002 CEFAZOLIN PER 500 MG: Performed by: ORTHOPAEDIC SURGERY

## 2024-09-10 PROCEDURE — 85027 COMPLETE CBC AUTOMATED: CPT | Performed by: ORTHOPAEDIC SURGERY

## 2024-09-10 PROCEDURE — 25810000003 SODIUM CHLORIDE 0.9 % SOLUTION: Performed by: ORTHOPAEDIC SURGERY

## 2024-09-10 PROCEDURE — 76000 FLUOROSCOPY <1 HR PHYS/QHP: CPT

## 2024-09-10 PROCEDURE — 25010000002 HYDROMORPHONE PER 4 MG: Performed by: INTERNAL MEDICINE

## 2024-09-10 PROCEDURE — 25810000003 LACTATED RINGERS PER 1000 ML: Performed by: ANESTHESIOLOGY

## 2024-09-10 DEVICE — IMPLANTABLE DEVICE: Type: IMPLANTABLE DEVICE | Site: HIP | Status: FUNCTIONAL

## 2024-09-10 DEVICE — PLT FEM NK SYS TI ALLOY 1H STRL: Type: IMPLANTABLE DEVICE | Site: HIP | Status: FUNCTIONAL

## 2024-09-10 RX ORDER — SODIUM CHLORIDE 0.9 % (FLUSH) 0.9 %
10 SYRINGE (ML) INJECTION EVERY 12 HOURS SCHEDULED
Status: DISCONTINUED | OUTPATIENT
Start: 2024-09-10 | End: 2024-09-10 | Stop reason: HOSPADM

## 2024-09-10 RX ORDER — ONDANSETRON 4 MG/1
4 TABLET, ORALLY DISINTEGRATING ORAL EVERY 6 HOURS PRN
Status: DISCONTINUED | OUTPATIENT
Start: 2024-09-10 | End: 2024-09-12 | Stop reason: HOSPADM

## 2024-09-10 RX ORDER — MIDAZOLAM HYDROCHLORIDE 1 MG/ML
1 INJECTION INTRAMUSCULAR; INTRAVENOUS
Status: DISCONTINUED | OUTPATIENT
Start: 2024-09-10 | End: 2024-09-10 | Stop reason: HOSPADM

## 2024-09-10 RX ORDER — FENTANYL CITRATE 50 UG/ML
INJECTION, SOLUTION INTRAMUSCULAR; INTRAVENOUS AS NEEDED
Status: DISCONTINUED | OUTPATIENT
Start: 2024-09-10 | End: 2024-09-10 | Stop reason: SURG

## 2024-09-10 RX ORDER — KETOROLAC TROMETHAMINE 30 MG/ML
30 INJECTION, SOLUTION INTRAMUSCULAR; INTRAVENOUS EVERY 6 HOURS PRN
Status: DISCONTINUED | OUTPATIENT
Start: 2024-09-10 | End: 2024-09-12 | Stop reason: HOSPADM

## 2024-09-10 RX ORDER — ONDANSETRON 2 MG/ML
INJECTION INTRAMUSCULAR; INTRAVENOUS AS NEEDED
Status: DISCONTINUED | OUTPATIENT
Start: 2024-09-10 | End: 2024-09-10 | Stop reason: SURG

## 2024-09-10 RX ORDER — ONDANSETRON 4 MG/1
4 TABLET, FILM COATED ORAL EVERY 8 HOURS PRN
Qty: 10 TABLET | Refills: 0 | Status: SHIPPED | OUTPATIENT
Start: 2024-09-10 | End: 2024-09-16

## 2024-09-10 RX ORDER — OXYCODONE AND ACETAMINOPHEN 7.5; 325 MG/1; MG/1
1 TABLET ORAL EVERY 8 HOURS PRN
Qty: 20 TABLET | Refills: 0 | Status: SHIPPED | OUTPATIENT
Start: 2024-09-10 | End: 2024-09-20

## 2024-09-10 RX ORDER — PHENYLEPHRINE HCL IN 0.9% NACL 1 MG/10 ML
SYRINGE (ML) INTRAVENOUS AS NEEDED
Status: DISCONTINUED | OUTPATIENT
Start: 2024-09-10 | End: 2024-09-10 | Stop reason: SURG

## 2024-09-10 RX ORDER — SODIUM CHLORIDE 9 MG/ML
150 INJECTION, SOLUTION INTRAVENOUS CONTINUOUS
Status: DISCONTINUED | OUTPATIENT
Start: 2024-09-10 | End: 2024-09-11

## 2024-09-10 RX ORDER — SODIUM CHLORIDE 0.9 % (FLUSH) 0.9 %
10 SYRINGE (ML) INJECTION AS NEEDED
Status: DISCONTINUED | OUTPATIENT
Start: 2024-09-10 | End: 2024-09-10 | Stop reason: HOSPADM

## 2024-09-10 RX ORDER — MAGNESIUM HYDROXIDE 1200 MG/15ML
LIQUID ORAL AS NEEDED
Status: DISCONTINUED | OUTPATIENT
Start: 2024-09-10 | End: 2024-09-10 | Stop reason: HOSPADM

## 2024-09-10 RX ORDER — IPRATROPIUM BROMIDE AND ALBUTEROL SULFATE 2.5; .5 MG/3ML; MG/3ML
3 SOLUTION RESPIRATORY (INHALATION) ONCE AS NEEDED
Status: DISCONTINUED | OUTPATIENT
Start: 2024-09-10 | End: 2024-09-10 | Stop reason: HOSPADM

## 2024-09-10 RX ORDER — FENTANYL CITRATE 50 UG/ML
50 INJECTION, SOLUTION INTRAMUSCULAR; INTRAVENOUS
Status: DISCONTINUED | OUTPATIENT
Start: 2024-09-10 | End: 2024-09-10 | Stop reason: HOSPADM

## 2024-09-10 RX ORDER — ONDANSETRON 2 MG/ML
4 INJECTION INTRAMUSCULAR; INTRAVENOUS EVERY 6 HOURS PRN
Status: DISCONTINUED | OUTPATIENT
Start: 2024-09-10 | End: 2024-09-12 | Stop reason: HOSPADM

## 2024-09-10 RX ORDER — ENOXAPARIN SODIUM 100 MG/ML
40 INJECTION SUBCUTANEOUS EVERY 24 HOURS
Status: DISCONTINUED | OUTPATIENT
Start: 2024-09-11 | End: 2024-09-12 | Stop reason: HOSPADM

## 2024-09-10 RX ORDER — SODIUM CHLORIDE 9 MG/ML
40 INJECTION, SOLUTION INTRAVENOUS AS NEEDED
Status: DISCONTINUED | OUTPATIENT
Start: 2024-09-10 | End: 2024-09-10 | Stop reason: HOSPADM

## 2024-09-10 RX ORDER — NALOXONE HCL 0.4 MG/ML
0.4 VIAL (ML) INJECTION
Status: DISCONTINUED | OUTPATIENT
Start: 2024-09-10 | End: 2024-09-12 | Stop reason: HOSPADM

## 2024-09-10 RX ORDER — FAMOTIDINE 10 MG/ML
INJECTION, SOLUTION INTRAVENOUS AS NEEDED
Status: DISCONTINUED | OUTPATIENT
Start: 2024-09-10 | End: 2024-09-10 | Stop reason: SURG

## 2024-09-10 RX ORDER — ENOXAPARIN SODIUM 100 MG/ML
40 INJECTION SUBCUTANEOUS
Qty: 8 ML | Refills: 0 | Status: SHIPPED | OUTPATIENT
Start: 2024-09-10 | End: 2024-10-02

## 2024-09-10 RX ORDER — CYCLOBENZAPRINE HCL 10 MG
10 TABLET ORAL 3 TIMES DAILY PRN
Qty: 30 TABLET | Refills: 0 | Status: SHIPPED | OUTPATIENT
Start: 2024-09-10

## 2024-09-10 RX ORDER — SODIUM CHLORIDE, SODIUM LACTATE, POTASSIUM CHLORIDE, CALCIUM CHLORIDE 600; 310; 30; 20 MG/100ML; MG/100ML; MG/100ML; MG/100ML
100 INJECTION, SOLUTION INTRAVENOUS ONCE AS NEEDED
Status: DISCONTINUED | OUTPATIENT
Start: 2024-09-10 | End: 2024-09-10 | Stop reason: HOSPADM

## 2024-09-10 RX ORDER — MEPERIDINE HYDROCHLORIDE 25 MG/ML
12.5 INJECTION INTRAMUSCULAR; INTRAVENOUS; SUBCUTANEOUS
Status: COMPLETED | OUTPATIENT
Start: 2024-09-10 | End: 2024-09-10

## 2024-09-10 RX ORDER — ONDANSETRON 2 MG/ML
4 INJECTION INTRAMUSCULAR; INTRAVENOUS AS NEEDED
Status: DISCONTINUED | OUTPATIENT
Start: 2024-09-10 | End: 2024-09-10 | Stop reason: HOSPADM

## 2024-09-10 RX ORDER — OXYCODONE AND ACETAMINOPHEN 10; 325 MG/1; MG/1
1 TABLET ORAL EVERY 4 HOURS PRN
Status: DISCONTINUED | OUTPATIENT
Start: 2024-09-10 | End: 2024-09-12 | Stop reason: HOSPADM

## 2024-09-10 RX ORDER — OXYCODONE AND ACETAMINOPHEN 5; 325 MG/1; MG/1
1 TABLET ORAL ONCE AS NEEDED
Status: DISCONTINUED | OUTPATIENT
Start: 2024-09-10 | End: 2024-09-10 | Stop reason: HOSPADM

## 2024-09-10 RX ORDER — SODIUM CHLORIDE, SODIUM LACTATE, POTASSIUM CHLORIDE, CALCIUM CHLORIDE 600; 310; 30; 20 MG/100ML; MG/100ML; MG/100ML; MG/100ML
125 INJECTION, SOLUTION INTRAVENOUS ONCE
Status: COMPLETED | OUTPATIENT
Start: 2024-09-10 | End: 2024-09-10

## 2024-09-10 RX ORDER — PROPOFOL 10 MG/ML
INJECTION, EMULSION INTRAVENOUS AS NEEDED
Status: DISCONTINUED | OUTPATIENT
Start: 2024-09-10 | End: 2024-09-10 | Stop reason: SURG

## 2024-09-10 RX ADMIN — HYDROMORPHONE HYDROCHLORIDE 0.5 MG: 1 INJECTION, SOLUTION INTRAMUSCULAR; INTRAVENOUS; SUBCUTANEOUS at 14:53

## 2024-09-10 RX ADMIN — OXYCODONE HYDROCHLORIDE AND ACETAMINOPHEN 1 TABLET: 10; 325 TABLET ORAL at 22:12

## 2024-09-10 RX ADMIN — CEFAZOLIN 2000 MG: 2 INJECTION, POWDER, FOR SOLUTION INTRAMUSCULAR; INTRAVENOUS at 22:12

## 2024-09-10 RX ADMIN — ATORVASTATIN CALCIUM 20 MG: 20 TABLET, FILM COATED ORAL at 20:08

## 2024-09-10 RX ADMIN — MEPERIDINE HYDROCHLORIDE 12.5 MG: 25 INJECTION, SOLUTION INTRAMUSCULAR; INTRAVENOUS; SUBCUTANEOUS at 17:52

## 2024-09-10 RX ADMIN — Medication 10 ML: at 10:28

## 2024-09-10 RX ADMIN — METOPROLOL TARTRATE 25 MG: 25 TABLET, FILM COATED ORAL at 20:08

## 2024-09-10 RX ADMIN — HYDROMORPHONE HYDROCHLORIDE 0.5 MG: 1 INJECTION, SOLUTION INTRAMUSCULAR; INTRAVENOUS; SUBCUTANEOUS at 10:28

## 2024-09-10 RX ADMIN — PROPOFOL 130 MG: 10 INJECTION, EMULSION INTRAVENOUS at 16:21

## 2024-09-10 RX ADMIN — Medication 100 MCG: at 16:28

## 2024-09-10 RX ADMIN — SODIUM CHLORIDE 150 ML/HR: 9 INJECTION, SOLUTION INTRAVENOUS at 18:24

## 2024-09-10 RX ADMIN — HYDROMORPHONE HYDROCHLORIDE 0.5 MG: 1 INJECTION, SOLUTION INTRAMUSCULAR; INTRAVENOUS; SUBCUTANEOUS at 06:19

## 2024-09-10 RX ADMIN — FENTANYL CITRATE 50 MCG: 50 INJECTION, SOLUTION INTRAMUSCULAR; INTRAVENOUS at 17:42

## 2024-09-10 RX ADMIN — MEPERIDINE HYDROCHLORIDE 12.5 MG: 25 INJECTION, SOLUTION INTRAMUSCULAR; INTRAVENOUS; SUBCUTANEOUS at 17:47

## 2024-09-10 RX ADMIN — HYDROMORPHONE HYDROCHLORIDE 0.5 MG: 1 INJECTION, SOLUTION INTRAMUSCULAR; INTRAVENOUS; SUBCUTANEOUS at 02:23

## 2024-09-10 RX ADMIN — FENTANYL CITRATE 50 MCG: 50 INJECTION INTRAMUSCULAR; INTRAVENOUS at 16:50

## 2024-09-10 RX ADMIN — Medication 10 ML: at 20:08

## 2024-09-10 RX ADMIN — FENTANYL CITRATE 50 MCG: 50 INJECTION INTRAMUSCULAR; INTRAVENOUS at 16:16

## 2024-09-10 RX ADMIN — ONDANSETRON 4 MG: 2 INJECTION INTRAMUSCULAR; INTRAVENOUS at 16:16

## 2024-09-10 RX ADMIN — TRANEXAMIC ACID 1000 MG: 100 INJECTION, SOLUTION INTRAVENOUS at 17:14

## 2024-09-10 RX ADMIN — MORPHINE SULFATE 4 MG: 4 INJECTION, SOLUTION INTRAMUSCULAR; INTRAVENOUS at 20:08

## 2024-09-10 RX ADMIN — SODIUM CHLORIDE, POTASSIUM CHLORIDE, SODIUM LACTATE AND CALCIUM CHLORIDE: 600; 310; 30; 20 INJECTION, SOLUTION INTRAVENOUS at 16:16

## 2024-09-10 RX ADMIN — CEFAZOLIN 2 G: 2 INJECTION, POWDER, FOR SOLUTION INTRAMUSCULAR; INTRAVENOUS at 16:16

## 2024-09-10 RX ADMIN — FAMOTIDINE 20 MG: 10 INJECTION, SOLUTION INTRAVENOUS at 16:16

## 2024-09-10 RX ADMIN — TRANEXAMIC ACID 1000 MG: 100 INJECTION, SOLUTION INTRAVENOUS at 16:23

## 2024-09-10 NOTE — ANESTHESIA PROCEDURE NOTES
Airway  Urgency: elective    Date/Time: 9/10/2024 4:22 PM  Airway not difficult    General Information and Staff    Patient location during procedure: OR  CRNA/CAA: Genet Guadarrama CRNA    Indications and Patient Condition  Indications for airway management: airway protection    Preoxygenated: yes  MILS maintained throughout  Mask difficulty assessment: 0 - not attempted    Final Airway Details  Final airway type: supraglottic airway      Successful airway: unique  Size 4     Number of attempts at approach: 1  Assessment: lips, teeth, and gum same as pre-op

## 2024-09-10 NOTE — ANESTHESIA PREPROCEDURE EVALUATION
Anesthesia Evaluation     no history of anesthetic complications:   NPO Solid Status: > 8 hours  NPO Liquid Status: > 8 hours           Airway   Mallampati: II  TM distance: >3 FB  Neck ROM: full  No difficulty expected  Dental - normal exam     Pulmonary - normal exam   (+) a smoker Current,  Cardiovascular - normal exam    Patient on routine beta blocker and Beta blocker given within 24 hours of surgery    (+) dysrhythmias Tachycardia, hyperlipidemia      Neuro/Psych  (+) TIA, numbness  GI/Hepatic/Renal/Endo    (+) GERD    Musculoskeletal     (+) neck pain  Abdominal  - normal exam    Bowel sounds: normal.   Substance History      OB/GYN          Other      history of cancer                Anesthesia Plan    ASA 3     general     intravenous induction     Anesthetic plan, risks, benefits, and alternatives have been provided, discussed and informed consent has been obtained with: patient.    CODE STATUS:    Code Status (Patient has no pulse and is not breathing): CPR (Attempt to Resuscitate)  Medical Interventions (Patient has pulse or is breathing): Full Support

## 2024-09-10 NOTE — CONSULTS
Inpatient Orthopedic Surgery Consult  Consult performed by: Maren Olivo APRN  Consult ordered by: Jason Wilcox MD        Patient Identification:  Name:  Ta Madison  Age:  64 y.o.  Sex:  male  :  1960  MRN:  [id number]  Visit Number:  [id number]  Primary care provider:  Yolande Olvera APRN    History of presenting illness:  Patient is a 63 y/o male seen in consultation regarding a right hip fracture. He fell yesterday while using a wrench to loosen a fitting. He developed right hip pain and difficulty with weight bearing on the right leg. He has no significant cardiac history and does not take blood thinners. His pain is currently controlled.     ---------------------------------------------------------------------------------------------------------------------    Review of Systems   Constitutional:  Positive for activity change.   Respiratory:  Negative for shortness of breath.    Cardiovascular:  Negative for chest pain.   Musculoskeletal:         Per the history of present illness   All other systems reviewed and are negative.     ---------------------------------------------------------------------------------------------------------------------   Past History:  Family History   Problem Relation Age of Onset    Nephrolithiasis Mother     Heart disease Father     Hypertension Father     Kidney disease Father      Past Medical History:   Diagnosis Date    Elevated cholesterol     GERD (gastroesophageal reflux disease)     Increased heart rate     Mini stroke     Neck pain     Prostate cancer     Rash     Tobacco abuse      Past Surgical History:   Procedure Laterality Date    APPENDECTOMY      [facility]     COLONOSCOPY      COLONOSCOPY N/A 2023    Procedure: COLONOSCOPY;  Surgeon: Mahnaz Caraballo MD;  Location: [facility];  Service: Gastroenterology;  Laterality: N/A;    ENDOSCOPY N/A 2023    Procedure: ESOPHAGOGASTRODUODENOSCOPY WITH KEOFEED PLACEMENT;   Surgeon: Yolande Eller MD;  Location: [facility] ENDOSCOPY;  Service: Gastroenterology;  Laterality: N/A;     Social History     Socioeconomic History    Marital status:    Tobacco Use    Smoking status: Some Days     Current packs/day: 0.00     Average packs/day: 2.0 packs/day for 45.0 years (90.0 ttl pk-yrs)     Types: Cigarettes     Start date: 1978     Last attempt to quit: 2023     Years since quittin.1    Smokeless tobacco: Never   Vaping Use    Vaping status: Never Used   Substance and Sexual Activity    Alcohol use: No    Drug use: Yes     Types: Marijuana     Comment: occ     Sexual activity: Defer     ---------------------------------------------------------------------------------------------------------------------   Allergies:  Patient has no known allergies.  ---------------------------------------------------------------------------------------------------------------------   Prior to Admission Medications       Prescriptions Last Dose Informant Patient Reported? Taking?    aspirin 81 MG EC tablet 2024  Yes Yes    Take 1 tablet by mouth Daily.    atorvastatin (LIPITOR) 20 MG tablet 2024  No Yes    Take 1 tablet by mouth Every Night.    enzalutamide (XTANDI) 40 MG chemo capsule 2024  No Yes    Take 4 capsules by mouth Daily.    fenofibrate (TRICOR) 145 MG tablet 2024  No Yes    Take 1 tablet by mouth Daily.    hydrOXYzine (ATARAX) 50 MG tablet 2024  No Yes    Take 1 tablet by mouth 3 (Three) Times a Day As Needed for Itching.    metoprolol tartrate (LOPRESSOR) 25 MG tablet 2024  No Yes    Take 1 tablet by mouth 2 (Two) Times a Day.    pantoprazole (PROTONIX) 40 MG EC tablet 2024  No Yes    Take 1 tablet by mouth Daily.    HYDROcodone-acetaminophen (NORCO)  MG per tablet Unknown  No No    Take 1 tablet by mouth Every 6 (Six) Hours As Needed for Moderate Pain.          Hospital Meds:  aspirin, 81 mg, Oral, Daily  atorvastatin, 20 mg, Oral,  Nightly  fenofibrate, 145 mg, Oral, Daily  metoprolol tartrate, 25 mg, Oral, BID  pantoprazole, 40 mg, Oral, Daily  sodium chloride, 10 mL, Intravenous, Q12H         ---------------------------------------------------------------------------------------------------------------------   Vital Signs:  Temp:  [98 °F (36.7 °C)-98.7 °F (37.1 °C)] 98.7 °F (37.1 °C)  Heart Rate:  [68-87] 74  Resp:  [16-20] 20  BP: (105-127)/(55-62) 127/62      09/09/24  0946   Weight: 73.5 kg (162 lb)     Body mass index is 25.37 kg/m².  ---------------------------------------------------------------------------------------------------------------------     Physical exam:  Constitutional:  Well-developed and well-nourished.  No acute distress.      HENT:  Head: Normocephalic and atraumatic.  Mouth:  Moist mucous membranes.    Eyes:  Conjunctivae are normal.  Pupils are equal, round, and reactive to light.  No scleral icterus.  Neck:  Neck supple.  Trachea midline.  Cardiovascular:  Normal rate and regular rhythm.  Pulmonary/Chest:  No respiratory distress and good air movement.  Abdominal:  Soft.  No distension and no tenderness.   Musculoskeletal:  Right hip exam: Right lower extremity is in external rotation. SILT all dermatomes. DF/PF intact right ankle. DP pulse palpable.   Neurological:  Alert and oriented to person, place, and time.     Skin:  Skin is warm and dry.  No rash noted.  No ecchymosis or abrasion.   Psychiatric:  Normal mood and affect.  Behavior is normal.  Judgment and thought content normal.   Peripheral vascular:  No pitting edema and strong pulses on all 4 extremities.      ---------------------------------------------------------------------------------------------------------------------   .  ---------------------------------------------------------------------------------------------------------------------   Results from last 7 days   Lab Units 09/09/24  1214   WBC 10*3/mm3 9.77   HEMOGLOBIN g/dL 11.3*   HEMATOCRIT  "% 35.9*   MCV fL 77.4*   MCHC g/dL 31.5   PLATELETS 10*3/mm3 444   INR  0.94         Results from last 7 days   Lab Units 09/09/24  1214   SODIUM mmol/L 136   POTASSIUM mmol/L 4.2   CHLORIDE mmol/L 101   CO2 mmol/L 24.5   BUN mg/dL 17   CREATININE mg/dL 0.78   CALCIUM mg/dL 9.3   GLUCOSE mg/dL 102*   ALBUMIN g/dL 3.9   BILIRUBIN mg/dL 0.3   ALK PHOS U/L 143*   AST (SGOT) U/L 10   ALT (SGPT) U/L 6   Estimated Creatinine Clearance: 100.8 mL/min (by C-G formula based on SCr of 0.78 mg/dL).  No results found for: \"AMMONIA\"          Lab Results   Component Value Date    HGBA1C 5.2 10/26/2023     Lab Results   Component Value Date    TSH 1.610 08/05/2024    FREET4 1.22 06/06/2018     No results found for: \"PREGTESTUR\", \"PREGSERUM\", \"HCG\", \"HCGQUANT\"  Pain Management Panel          Latest Ref Rng & Units 10/12/2023   Pain Management Panel   Creatinine, Urine mg/dL 80.7       Details                 No results found for: \"BLOODCX\"  No results found for: \"URINECX\"  No results found for: \"WOUNDCX\"  No results found for: \"STOOLCX\"      ---------------------------------------------------------------------------------------------------------------------   Imaging Results (Last 7 Days)       Procedure Component Value Units Date/Time    CT Lumbar Spine Without Contrast [[id number]] Collected: 09/09/24 1049     Updated: 09/09/24 1052    Narrative:      PROCEDURE: CT LUMBAR SPINE WO CONTRAST-     HISTORY: fall from a bull     TECHNIQUE: Multiple axial CT images were obtained through the lumbar  spine using bone window algorithms. Coronal and sagittal images were  reconstructed from the original axial data set. This study was performed  with techniques to keep radiation doses as low as reasonably achievable  (ALARA). Individualized dose reduction techniques using automated  exposure control or adjustment of mA and/or kV according to the patient  size were employed.     COMPARISON: None.     FINDINGS: The lumbar spine is well aligned " with no acute fractures.  There are diffuse degenerative changes with loss of disc space height  and facet arthropathy. Note is made of a bilateral L5 pars defect. The  paraspinal soft tissues are unremarkable.       Impression:      No acute process.              This report was finalized on 9/9/2024 10:50 AM by Kasey Lorenzo M.D..       XR Femur 2 View Right [[id number]] Collected: 09/09/24 1044     Updated: 09/09/24 1048    Narrative:      PROCEDURE: XR FEMUR 2 VW RIGHT-     HISTORY: fall     COMPARISON: None.     FINDINGS:  A 2 view exam demonstrates a suspected subcapital right  femoral neck fracture.  The joint spaces are preserved.  No soft tissue  abnormality is seen.       Impression:      Subcapital right femoral neck fracture.                 This report was finalized on 9/9/2024 10:46 AM by Kasey Lorenzo M.D..       XR Hip With or Without Pelvis 2 - 3 View Right [[id number]] Collected: 09/09/24 1046     Updated: 09/09/24 1048    Narrative:      PROCEDURE: XR HIP W OR WO PELVIS 2-3 VIEW RIGHT-     HISTORY: fall     COMPARISON: None.     FINDINGS: There is a subcapital right femoral neck fracture. The joint  spaces are preserved. The pubic symphysis and SI joints are intact. The  soft tissues are unremarkable.       Impression:      Subcapital right femoral neck fracture.              This report was finalized on 9/9/2024 10:46 AM by Kasey Lorenzo M.D..             ----------------------------------------------------------------------------------------------------------------------      Assessment:   Right hip subcapital fracture       Plan:   X rays were reviewed and the patient is recommended for surgical intervention.     He will be scheduled for right hip open reduction and internal fixation later today. The nature of this procedure, as well as, risks, benefits, alternatives and complications to the above operation was discussed with the patient. Risks include, but are not limited to,  anesthesia, death, injury to nerves and vessels, infection, blood clots, continued pain and repeat or revision surgery. Following the discussion, the patient verbalized understanding of the risks and benefits to the above procedure and elected to proceed with surgery.  Surgical consent was obtained.    Orthopedic surgery will continue to follow.     Thank you for the consult.    EUGENIA Olivia  09/10/24  09:15 EDT

## 2024-09-10 NOTE — PLAN OF CARE
Goal Outcome Evaluation:  Plan of Care Reviewed With: patient        Progress: no change  Outcome Evaluation: Patient resting in bed. Patient is alert and oriented. VSS on RA. Patient c/o of pain, PRN pain meds given per orders. Patient has been NPO since midnight. Surgery bath completed. Will continue with plan of care.

## 2024-09-10 NOTE — BRIEF OP NOTE
HIP CANNULATED SCREW PLACEMENT  Progress Note    Ta Madison  9/10/2024    Pre-op Diagnosis:   Closed fracture of right hip, initial encounter [S72.001A]       Post-Op Diagnosis Codes:     * Closed fracture of right hip, initial encounter [S72.001A]    Procedure/CPT® Codes:        Procedure(s):  FEMORAL NECK OPEN REDUCTION INTERNAL FIXATION  With plate and screw              Surgeon(s):  Nacho Montoya MD    Anesthesia: General    Staff:   Circulator: Nika Harris RN  Radiology Technologist: Palomo Thompson, RT  Scrub Person: [place] Kathy  Vendor Representative: Kristopher Poole         Estimated Blood Loss: 100ml    Urine Voided: * No values recorded between 9/10/2024  4:17 PM and 9/10/2024  5:12 PM *    Specimens:                None          Drains:   [REMOVED] Gastrostomy/Enterostomy Gastrostomy-jejunostomy LLQ (Removed)       Findings:         Complications:           Nacho Montoya MD     Date: 9/10/2024  Time: 17:16 EDT

## 2024-09-10 NOTE — PLAN OF CARE
Goal Outcome Evaluation:  Plan of Care Reviewed With: patient           Outcome Evaluation: patient rested in bed during shift. VSS during shift. PRN pain medication given. WIll continue with plan of care.

## 2024-09-10 NOTE — PROGRESS NOTES
Patient Identification:  Name:  Ta Madison  Age:  64 y.o.  Sex:  male  :  1960  MRN:  [id number]  Visit Number:  [id number]  Primary Care Provider:  Yolande Olvera APRN    Length of stay:  1    Subjective/Interval History/Consultants/Procedures     Chief Complaint:   Chief Complaint   Patient presents with    Fall       Subjective/Interval History:    64 y.o. male who was admitted on 2024 with right femoral neck fx    PMH is significant for HLD, GERD, prostate cancer w/ mets to bone, tobacco use  For complete admission information, please see history and physical.     Consultations:  orthopedic surgery     Procedures/Scans:  XR right femur  XR right hip   CT L spine wo contrast     Today, the patient was seen and examined with family member at bedside. He was more uncomfortable today complaining of some increased pain. No new complaints noted. Scheduled for ORIF later today.      Room location at the time of evaluation was 350b.    ----------------------------------------------------------------------------------------------------------------------  Current Hospital Meds:  aspirin, 81 mg, Oral, Daily  atorvastatin, 20 mg, Oral, Nightly  fenofibrate, 145 mg, Oral, Daily  metoprolol tartrate, 25 mg, Oral, BID  pantoprazole, 40 mg, Oral, Daily  sodium chloride, 10 mL, Intravenous, Q12H         ----------------------------------------------------------------------------------------------------------------------      Objective     Vital Signs:  Temp:  [98 °F (36.7 °C)-98.7 °F (37.1 °C)] 98.7 °F (37.1 °C)  Heart Rate:  [68-87] 74  Resp:  [16-20] 20  BP: (105-127)/(55-62) 127/62      24  0946   Weight: 73.5 kg (162 lb)     Body mass index is 25.37 kg/m².    Intake/Output Summary (Last 24 hours) at 9/10/2024 0909  Last data filed at 9/10/2024 0700  Gross per 24 hour   Intake 420 ml   Output 950 ml   Net -530 ml     No intake/output data recorded.  NPO Diet NPO Type: Strict  "NPO  ----------------------------------------------------------------------------------------------------------------------    Physical Exam  Vitals and nursing note reviewed.   Constitutional:       General: He is not in acute distress.  HENT:      Head: Normocephalic and atraumatic.   Eyes:      Extraocular Movements: Extraocular movements intact.   Cardiovascular:      Rate and Rhythm: Normal rate and regular rhythm.   Pulmonary:      Effort: Pulmonary effort is normal.      Breath sounds: Normal breath sounds.   Abdominal:      Palpations: Abdomen is soft.   Musculoskeletal:      Right lower leg: No edema.      Left lower leg: No edema.   Skin:     General: Skin is warm and dry.   Neurological:      Mental Status: He is alert. Mental status is at baseline.   Psychiatric:         Mood and Affect: Mood normal.         Behavior: Behavior normal.            ----------------------------------------------------------------------------------------------------------------------  Tele:      ----------------------------------------------------------------------------------------------------------------------      Results from last 7 days   Lab Units 09/09/24  1214   WBC 10*3/mm3 9.77   HEMOGLOBIN g/dL 11.3*   HEMATOCRIT % 35.9*   MCV fL 77.4*   MCHC g/dL 31.5   PLATELETS 10*3/mm3 444   INR  0.94         Results from last 7 days   Lab Units 09/09/24  1214   SODIUM mmol/L 136   POTASSIUM mmol/L 4.2   CHLORIDE mmol/L 101   CO2 mmol/L 24.5   BUN mg/dL 17   CREATININE mg/dL 0.78   CALCIUM mg/dL 9.3   GLUCOSE mg/dL 102*   ALBUMIN g/dL 3.9   BILIRUBIN mg/dL 0.3   ALK PHOS U/L 143*   AST (SGOT) U/L 10   ALT (SGPT) U/L 6   Estimated Creatinine Clearance: 100.8 mL/min (by C-G formula based on SCr of 0.78 mg/dL).  No results found for: \"AMMONIA\"      No results found for: \"BLOODCX\"  No results found for: \"URINECX\"  No results found for: \"WOUNDCX\"  No results found for: " "\"STOOLCX\"  ----------------------------------------------------------------------------------------------------------------------  Imaging Results (Last 24 Hours)       Procedure Component Value Units Date/Time    CT Lumbar Spine Without Contrast [[id number]] Collected: 09/09/24 1049     Updated: 09/09/24 1052    Narrative:      PROCEDURE: CT LUMBAR SPINE WO CONTRAST-     HISTORY: fall from a bull     TECHNIQUE: Multiple axial CT images were obtained through the lumbar  spine using bone window algorithms. Coronal and sagittal images were  reconstructed from the original axial data set. This study was performed  with techniques to keep radiation doses as low as reasonably achievable  (ALARA). Individualized dose reduction techniques using automated  exposure control or adjustment of mA and/or kV according to the patient  size were employed.     COMPARISON: None.     FINDINGS: The lumbar spine is well aligned with no acute fractures.  There are diffuse degenerative changes with loss of disc space height  and facet arthropathy. Note is made of a bilateral L5 pars defect. The  paraspinal soft tissues are unremarkable.       Impression:      No acute process.              This report was finalized on 9/9/2024 10:50 AM by Kasey Lorenzo M.D..       XR Femur 2 View Right [[id number]] Collected: 09/09/24 1044     Updated: 09/09/24 1048    Narrative:      PROCEDURE: XR FEMUR 2 VW RIGHT-     HISTORY: fall     COMPARISON: None.     FINDINGS:  A 2 view exam demonstrates a suspected subcapital right  femoral neck fracture.  The joint spaces are preserved.  No soft tissue  abnormality is seen.       Impression:      Subcapital right femoral neck fracture.                 This report was finalized on 9/9/2024 10:46 AM by Kasey Lorenzo M.D..       XR Hip With or Without Pelvis 2 - 3 View Right [[id number]] Collected: 09/09/24 1046     Updated: 09/09/24 1048    Narrative:      PROCEDURE: XR HIP W OR WO PELVIS 2-3 VIEW " RIGHT-     HISTORY: fall     COMPARISON: None.     FINDINGS: There is a subcapital right femoral neck fracture. The joint  spaces are preserved. The pubic symphysis and SI joints are intact. The  soft tissues are unremarkable.       Impression:      Subcapital right femoral neck fracture.              This report was finalized on 9/9/2024 10:46 AM by Kasey Lorenzo M.D..             ----------------------------------------------------------------------------------------------------------------------   I have reviewed the above laboratory values for 09/10/24    Assessment/Plan     Active Hospital Problems    Diagnosis  POA    **Hip fracture [S72.009A]  Yes         ASSESSMENT/PLAN:    Right femoral neck fracture  Patient presented to the ED following fall with subsequent right hip pain, found to have right femoral neck fracture  Admitted to the telemetry unit  Orthopedic surgery consulted and following, plan for ORIF today.   Patient is NPO pending  Labs and VS stable, will continue to trend  Continue PRN pain regimen  Will benefit from PT post operatively     Chronic:  HLD  GERD  Prostate cancer  Continue  home medications as indicated     -----------  -DVT prophylaxis: SQH x 1, defer to ortho post op  -Disposition plans/anticipated needs: Pending course and progress with PT        The patient is high risk due to the following diagnoses/reasons:  hip fx        Jp Tolliver PA-C  09/10/24  09:09 EDT

## 2024-09-10 NOTE — CASE MANAGEMENT/SOCIAL WORK
Discharge Planning Assessment  RADHA Meeks     Patient Name: Ta Madison  MRN: [id number]  Today's Date: 9/10/2024    Admit Date: 9/9/2024       Discharge Plan       Row Name 09/10/24 1408       Plan    Plan SS received nursing consult for discharge planning. Pt to have ORIF of Hip today in OR. SS to follow up with PT/OT and coordinate for discharge recommendations. SS to follow.                    CLARISSA Haddad

## 2024-09-10 NOTE — PAYOR COMM NOTE
"[facility]  GRIS RASMUSSEN  PHONE  [phone]  FAX  [phone]  NPI:  [id number]    REQUEST FOR INPATIENT AUTH    Francisco Madison (64 y.o. Male)       Date of Birth   1960    Social Security Number       Address   [address] [address]    Home Phone   [phone]    MRN   [id number]       Caodaism   None    Marital Status                               Admission Date   9/9/24    Admission Type   Emergency    Admitting Provider   Jason Wilcox MD    Attending Provider   Jason Wilcox MD    Department, Room/Bed   [address], 3350/2S       Discharge Date       Discharge Disposition       Discharge Destination                                 Attending Provider: Jason Wilcox MD    Allergies: No Known Allergies    Isolation: None   Infection: None   Code Status: CPR    Ht: 170.2 cm (67\")   Wt: 73.5 kg (162 lb)    Admission Cmt: None   Principal Problem: Hip fracture [S72.009A]                   Active Insurance as of 9/9/2024       Primary Coverage       Payor Plan Insurance Group Employer/Plan Group    [employer] [place] [employer] [place]ICA[place]        Payor Plan Address Payor Plan Phone Number Payor Plan Fax Number Effective Dates    [address] [address] [phone]  6/6/2018 - None Entered    [facility] [id number]         Subscriber Name Subscriber Birth Date Member ID       FRANCISCO MADISON 1960 [id number]                     Emergency Contacts        (Rel.) Home Phone Work Phone Mobile Phone    YOLI LORD (Daughter) -- -- [phone]                 History & Physical        Jp Tolliver PA-C at 09/09/24 1233       Attestation signed by Jason Wilcox MD at 09/09/24 1406    I have evaluated the patient independently, I have reviewed H&P, all labs and images from today and evaluated the patient at bedside.  I have discussed with ONRI Tolliver and agree.  Give dose of SQH now once, diet for today, IV narcotics for pain " control, KILLIAN @ [place], Orthopedics consulted for recommendations, likely definitive intervention tomorrow surgically but follow up Orthopedics plans, Will follow up tomorrow with formal note and updated plan.                      [facility] Medicine Services  History & Physical    Patient Identification:  Name:  Ta Madison  Age:  64 y.o.  Sex:  male  :  1960  MRN:  [id number]   Visit Number:  [id number]  Admit Date: 2024   Primary Care Physician:  Yolande Olvera APRN    Subjective     Chief complaint: Fall    History of presenting illness:      Ta Madison is a 64 y.o. male who presented for further evaluation of right hip pain s/p fall yesterday around 1 o clock. He reports he was loosening a fitting with a large wrench- when the fitting suddenly broke loose he fell backward, landing on high right hip. He states he tried to walk it off yesterday evening but as pain persisting today he came in for further evaluation. He reports pain currently well controlled if he's lying still. He denies further injury or complaint. He reports PMHx significant for stage IV prostate cancer with mets to bone including the hips. He follows with Dr. Martinez. He also reports a recent history of severe pancreatitis due to choledocholithiasis back in May.     Past medical history is significant for HLD, GERD, prostate cancer with mets to bone, tobacco abuse    Upon arrival to the ED, vital signs were temp 98, heart rate 87, respirations 18, /60, SpO2 98% on RA.  XR right femur showed subcapital right femoral neck fracture.  EKG was NSR.    Known Emergency Department medications received prior to my evaluation included morphine, zofran.   Emergency Department Room location at the time of my evaluation was 411.     ---------------------------------------------------------------------------------------------------------------------   Review of Systems   Constitutional:  Negative for chills and fever.   HENT:   Negative for congestion and rhinorrhea.    Respiratory:  Negative for cough and shortness of breath.    Cardiovascular:  Negative for chest pain.   Gastrointestinal:  Negative for abdominal pain.   Genitourinary:  Negative for difficulty urinating and dysuria.   Musculoskeletal:  Positive for arthralgias and myalgias.   Skin:  Negative for rash and wound.   Neurological:  Negative for dizziness and light-headedness.        ---------------------------------------------------------------------------------------------------------------------   Past Medical History:   Diagnosis Date    Elevated cholesterol     GERD (gastroesophageal reflux disease)     Increased heart rate     Mini stroke     Neck pain     Prostate cancer     Rash     Tobacco abuse      Past Surgical History:   Procedure Laterality Date    APPENDECTOMY      [facility]     COLONOSCOPY      COLONOSCOPY N/A 2023    Procedure: COLONOSCOPY;  Surgeon: Mahnaz Caraballo MD;  Location: [facility] OR;  Service: Gastroenterology;  Laterality: N/A;    ENDOSCOPY N/A 2023    Procedure: ESOPHAGOGASTRODUODENOSCOPY WITH KEOFEED PLACEMENT;  Surgeon: Yolande Eller MD;  Location: [facility] ENDOSCOPY;  Service: Gastroenterology;  Laterality: N/A;     Family History   Problem Relation Age of Onset    Nephrolithiasis Mother     Heart disease Father     Hypertension Father     Kidney disease Father      Social History     Socioeconomic History    Marital status:    Tobacco Use    Smoking status: Some Days     Current packs/day: 0.00     Average packs/day: 2.0 packs/day for 45.0 years (90.0 ttl pk-yrs)     Types: Cigarettes     Start date: 1978     Last attempt to quit: 2023     Years since quittin.1    Smokeless tobacco: Never   Vaping Use    Vaping status: Never Used   Substance and Sexual Activity    Alcohol use: No    Drug use: Yes     Types: Marijuana     Comment: occ     Sexual activity: Defer      ---------------------------------------------------------------------------------------------------------------------   Allergies:  Patient has no known allergies.  ---------------------------------------------------------------------------------------------------------------------   Home medications:    Medications below are reported home medications pulling from within the system; at this time, these medications have not been reconciled unless otherwise specified and are in the verification process for further verifcation as current home medications.  (Not in a hospital admission)      Hospital Scheduled Meds:          Current listed hospital scheduled medications may not yet reflect those currently placed in orders that are signed and held awaiting patient's arrival to floor.   ---------------------------------------------------------------------------------------------------------------------     Objective     Vital Signs:  Temp:  [98 °F (36.7 °C)] 98 °F (36.7 °C)  Heart Rate:  [71-87] 71  Resp:  [18] 18  BP: (106)/(60) 106/60      09/09/24  0946   Weight: 73.5 kg (162 lb)     Body mass index is 25.37 kg/m².  ---------------------------------------------------------------------------------------------------------------------       Physical Exam  Vitals and nursing note reviewed.   HENT:      Head: Normocephalic and atraumatic.   Eyes:      Extraocular Movements: Extraocular movements intact.   Cardiovascular:      Rate and Rhythm: Normal rate and regular rhythm.      Pulses: Normal pulses.   Pulmonary:      Effort: Pulmonary effort is normal.      Breath sounds: No wheezing or rhonchi.   Abdominal:      Palpations: Abdomen is soft.   Musculoskeletal:      Right lower leg: No edema.      Left lower leg: No edema.   Skin:     General: Skin is warm and dry.   Neurological:      Mental Status: He is alert. Mental status is at baseline.   Psychiatric:         Mood and Affect: Mood normal.         Behavior:  "Behavior normal.             ---------------------------------------------------------------------------------------------------------------------  EKG:        I have personally looked at the EKG.  ---------------------------------------------------------------------------------------------------------------------   Results from last 7 days   Lab Units 09/09/24  1214   WBC 10*3/mm3 9.77   HEMOGLOBIN g/dL 11.3*   HEMATOCRIT % 35.9*   MCV fL 77.4*   MCHC g/dL 31.5   PLATELETS 10*3/mm3 444   INR  0.94               Invalid input(s): \"PROT\"CrCl cannot be calculated (Patient's most recent lab result is older than the maximum 30 days allowed.).  No results found for: \"AMMONIA\"          Lab Results   Component Value Date    HGBA1C 5.2 10/26/2023     Lab Results   Component Value Date    TSH 1.610 08/05/2024    FREET4 1.22 06/06/2018     No results found for: \"PREGTESTUR\", \"PREGSERUM\", \"HCG\", \"HCGQUANT\"  Pain Management Panel          Latest Ref Rng & Units 10/12/2023   Pain Management Panel   Creatinine, Urine mg/dL 80.7       Details                 No results found for: \"BLOODCX\"  No results found for: \"URINECX\"  No results found for: \"WOUNDCX\"  No results found for: \"STOOLCX\"      ---------------------------------------------------------------------------------------------------------------------  Imaging Results (Last 7 Days)       Procedure Component Value Units Date/Time    CT Lumbar Spine Without Contrast [[id number]] Collected: 09/09/24 1049     Updated: 09/09/24 1052    Narrative:      PROCEDURE: CT LUMBAR SPINE WO CONTRAST-     HISTORY: fall from a bull     TECHNIQUE: Multiple axial CT images were obtained through the lumbar  spine using bone window algorithms. Coronal and sagittal images were  reconstructed from the original axial data set. This study was performed  with techniques to keep radiation doses as low as reasonably achievable  (ALARA). Individualized dose reduction techniques using automated  exposure " "control or adjustment of mA and/or kV according to the patient  size were employed.     COMPARISON: None.     FINDINGS: The lumbar spine is well aligned with no acute fractures.  There are diffuse degenerative changes with loss of disc space height  and facet arthropathy. Note is made of a bilateral L5 pars defect. The  paraspinal soft tissues are unremarkable.       Impression:      No acute process.              This report was finalized on 9/9/2024 10:50 AM by Kasey Lorenzo M.D..       XR Femur 2 View Right [[id number]] Collected: 09/09/24 1044     Updated: 09/09/24 1048    Narrative:      PROCEDURE: XR FEMUR 2 VW RIGHT-     HISTORY: fall     COMPARISON: None.     FINDINGS:  A 2 view exam demonstrates a suspected subcapital right  femoral neck fracture.  The joint spaces are preserved.  No soft tissue  abnormality is seen.       Impression:      Subcapital right femoral neck fracture.                 This report was finalized on 9/9/2024 10:46 AM by Kasey Lorenzo M.D..       XR Hip With or Without Pelvis 2 - 3 View Right [[id number]] Collected: 09/09/24 1046     Updated: 09/09/24 1048    Narrative:      PROCEDURE: XR HIP W OR WO PELVIS 2-3 VIEW RIGHT-     HISTORY: fall     COMPARISON: None.     FINDINGS: There is a subcapital right femoral neck fracture. The joint  spaces are preserved. The pubic symphysis and SI joints are intact. The  soft tissues are unremarkable.       Impression:      Subcapital right femoral neck fracture.              This report was finalized on 9/9/2024 10:46 AM by Kasey Lorenzo M.D..               Cultures:  No results found for: \"BLOODCX\", \"URINECX\", \"WOUNDCX\", \"MRSACX\", \"RESPCX\", \"STOOLCX\"    Last echocardiogram:          I have personally reviewed the above radiology images and read the final radiology report on 09/09/24  ---------------------------------------------------------------------------------------------------------------------  Assessment / Plan     Active " Hospital Problems    Diagnosis  POA    **Hip fracture [S72.009A]  Yes       ASSESSMENT/PLAN:    Right femoral neck fracture  Patient presented to the ED following fall with subsequent right hip pain, found to have right femoral neck fracture  Admit to the telemetry unit  Consult orthopedic surgery for further assistance and recommendations, appreciated  NPO at midnight  Continue PRN pain regimen  Will benefit from PT post operatively  Trend labs PRN    Chronic:  HLD  GERD  Prostate cancer  Continue  home medications as indicated once med rec is complete      ----------  -DVT prophylaxis: SCDs  -Activity: Strict bed rest  -Expected length of stay: INPATIENT status due to the need for care which can only be reasonably provided in an hospital setting such as aggressive/expedited ancillary services and/or consultation services, the necessity for IV medications, close physician monitoring and/or the possible need for procedures.  In such, I feel patient’s risk for adverse outcomes and need for care warrant INPATIENT evaluation and predict the patient’s care encounter to likely last beyond 2 midnights.   -Disposition pending further course    High risk secondary to Right hip fx    Code Status and Medical Interventions: CPR (Attempt to Resuscitate); Full Support   Ordered at: 09/09/24 1228     Code Status (Patient has no pulse and is not breathing):    CPR (Attempt to Resuscitate)     Medical Interventions (Patient has pulse or is breathing):    Full Support       Jp Tolliver PA-C   09/09/24  12:33 EDT    Electronically signed by Jason Wilcox MD at 09/09/24 1406       Emergency Department Notes    No notes of this type exist for this encounter.       Current Facility-Administered Medications   Medication Dose Route Frequency Provider Last Rate Last Admin    aspirin EC tablet 81 mg  81 mg Oral Daily Jason Wilcox MD   81 mg at 09/09/24 6653    atorvastatin (LIPITOR) tablet 20 mg  20 mg Oral Nightly Jason Wilcox  MD   20 mg at 09/09/24 2059    sennosides-docusate (PERICOLACE) 8.6-50 MG per tablet 2 tablet  2 tablet Oral BID PRN Jason Wilcox MD        And    polyethylene glycol (MIRALAX) packet 17 g  17 g Oral Daily PRN Jason Wilcox MD        And    bisacodyl (DULCOLAX) EC tablet 5 mg  5 mg Oral Daily PRN Jason Wilcox MD        And    bisacodyl (DULCOLAX) suppository 10 mg  10 mg Rectal Daily PRN Jason Wilcox MD        fenofibrate (TRICOR) tablet 145 mg  145 mg Oral Daily Jason Wilcox MD   145 mg at 09/09/24 1747    HYDROmorphone (DILAUDID) injection 0.5 mg  0.5 mg Intravenous Q4H PRN Jason Wilcox MD   0.5 mg at 09/10/24 0619    hydrOXYzine (ATARAX) tablet 50 mg  50 mg Oral TID PRN Jason Wilcox MD        metoprolol tartrate (LOPRESSOR) tablet 25 mg  25 mg Oral BID Jason Wilcox MD   25 mg at 09/09/24 2059    pantoprazole (PROTONIX) EC tablet 40 mg  40 mg Oral Daily Jason Wilcox MD        sodium chloride 0.9 % flush 10 mL  10 mL Intravenous PRN Marine Granados, APRN        sodium chloride 0.9 % flush 10 mL  10 mL Intravenous Q12H Jason Wilcox MD   10 mL at 09/09/24 2059    sodium chloride 0.9 % flush 10 mL  10 mL Intravenous PRN Jason Wilcox MD        sodium chloride 0.9 % infusion 40 mL  40 mL Intravenous PRN Jason Wilcox MD         Orders (last 24 hrs)        Start     Ordered    09/10/24 0900  pantoprazole (PROTONIX) EC tablet 40 mg  Daily         09/09/24 1601    09/10/24 0001  NPO Diet NPO Type: Strict NPO  Diet Effective Midnight         09/09/24 1339    09/09/24 2103  Initiate & Follow Hypercapnic Monitoring Guideline for Opioid Administration via EtCO2 and / or SpO2  Continuous        Comments: Follow Hypercapnic Monitoring Guideline As Outlined in Process Instructions (Open Order Report to View Full Instructions)    09/09/24 2102 09/09/24 2103  Opioid Administration - Document EtCO2 and / or SpO2 With Each Set of Vitals & Any Change in Patient Status  Per Order Details        Comments:  With Each Set of Vitals & Any Change in Patient Status    09/09/24 2102 09/09/24 2103  Opioid Administration - Notify Provider Hypercapnic Monitoring  Continuous        Comments: Open Order Report to View Parameters Requiring Provider Notification    09/09/24 2102 09/09/24 2103  Opioid Administration - Continuous Pulse Oximetry (SpO2)  Continuous         09/09/24 2102 09/09/24 2100  atorvastatin (LIPITOR) tablet 20 mg  Nightly         09/09/24 1601    09/09/24 2100  metoprolol tartrate (LOPRESSOR) tablet 25 mg  2 Times Daily         09/09/24 1601    09/09/24 1800  Oral Care  2 Times Daily       09/09/24 1339    09/09/24 1700  aspirin EC tablet 81 mg  Daily         09/09/24 1601    09/09/24 1700  fenofibrate (TRICOR) tablet 145 mg  Daily         09/09/24 1601    09/09/24 1601  hydrOXYzine (ATARAX) tablet 50 mg  3 Times Daily PRN         09/09/24 1601    09/09/24 1600  Vital Signs  Every 4 Hours       09/09/24 1339    09/09/24 1504  Inpatient Case Management  Consult  Once        Provider:  (Not yet assigned)    09/09/24 1503    09/09/24 1430  sodium chloride 0.9 % flush 10 mL  Every 12 Hours Scheduled         09/09/24 1339    09/09/24 1430  heparin (porcine) 5000 UNIT/ML injection 5,000 Units  Once         09/09/24 1339    09/09/24 1416  scuds pump  Once         09/09/24 1416    09/09/24 1340  Intake & Output  Every Shift       09/09/24 1339    09/09/24 1340  Weigh Patient  Once         09/09/24 1339    09/09/24 1340  Insert Peripheral IV  Once         09/09/24 1339    09/09/24 1340  Saline Lock & Maintain IV Access  Continuous         09/09/24 1339    09/09/24 1340  Place Sequential Compression Device  Once         09/09/24 1339    09/09/24 1340  Maintain Sequential Compression Device  Continuous         09/09/24 1339    09/09/24 1340  Activity - Strict Bed Rest  Until Discontinued         09/09/24 1339    09/09/24 1340  Inpatient Orthopedic Surgery Consult  Once        Specialty:  Orthopedic  "Surgery  Provider:  Nacho Montoya MD    09/09/24 1339    09/09/24 1340  Diet: Regular/House; Fluid Consistency: Thin (IDDSI 0)  Diet Effective Now,   Status:  Canceled         09/09/24 1339    09/09/24 1339  sodium chloride 0.9 % flush 10 mL  As Needed         09/09/24 1339    09/09/24 1339  sodium chloride 0.9 % infusion 40 mL  As Needed         09/09/24 1339    09/09/24 1339  HYDROmorphone (DILAUDID) injection 0.5 mg  Every 4 Hours PRN         09/09/24 1339    09/09/24 1339  sennosides-docusate (PERICOLACE) 8.6-50 MG per tablet 2 tablet  2 Times Daily PRN        Placed in \"And\" Linked Group    09/09/24 1339    09/09/24 1339  polyethylene glycol (MIRALAX) packet 17 g  Daily PRN        Placed in \"And\" Linked Group    09/09/24 1339    09/09/24 1339  bisacodyl (DULCOLAX) EC tablet 5 mg  Daily PRN        Placed in \"And\" Linked Group    09/09/24 1339    09/09/24 1339  bisacodyl (DULCOLAX) suppository 10 mg  Daily PRN        Placed in \"And\" Linked Group    09/09/24 1339    09/09/24 1228  Inpatient Admission  Once         09/09/24 1228    09/09/24 1228  Code Status and Medical Interventions: CPR (Attempt to Resuscitate); Full Support  Continuous         09/09/24 1228    09/09/24 1212  morphine injection 4 mg  Once         09/09/24 1156    09/09/24 1211  ondansetron (ZOFRAN) injection 4 mg  Once         09/09/24 1155    09/09/24 1156  CBC & Differential  Once         09/09/24 1155    09/09/24 1156  Comprehensive Metabolic Panel  Once         09/09/24 1155    09/09/24 1156  [place] Draw  Once         09/09/24 1155    09/09/24 1156  Protime-INR  Once         09/09/24 1155    09/09/24 1156  aPTT  Once         09/09/24 1155    09/09/24 1156  ECG 12 Lead Pre-Op / Pre-Procedure  Once         09/09/24 1155    09/09/24 1156  Insert Peripheral IV  Once        Placed in \"And\" Linked Group    09/09/24 1155    09/09/24 1156  CBC Auto Differential  PROCEDURE ONCE         09/09/24 1155    09/09/24 1156  Green Top (Gel)  PROCEDURE " "ONCE         09/09/24 1155    09/09/24 1156  Lavender Top  PROCEDURE ONCE         09/09/24 1155    09/09/24 1156  Gold Top - SST  PROCEDURE ONCE         09/09/24 1155    09/09/24 1156  Light Blue Top  PROCEDURE ONCE         09/09/24 1155    09/09/24 1155  sodium chloride 0.9 % flush 10 mL  As Needed        Placed in \"And\" Linked Group    09/09/24 1155    09/09/24 0954  XR Hip With or Without Pelvis 2 - 3 View Right  1 Time Imaging         09/09/24 0954    09/09/24 0954  XR Femur 2 View Right  1 Time Imaging         09/09/24 0954    09/09/24 0954  CT Lumbar Spine Without Contrast  1 Time Imaging         09/09/24 0954    --  aspirin 81 MG EC tablet  Daily         09/09/24 1528                  Physician Progress Notes (last 24 hours)  Notes from 09/09/24 0642 through 09/10/24 0642   No notes of this type exist for this encounter.       Consult Notes (last 24 hours)  Notes from 09/09/24 0642 through 09/10/24 0642   No notes of this type exist for this encounter.       "

## 2024-09-10 NOTE — OP NOTE
Operative report  Surgeon Nacho Dorsey  Preoperative diagnosis right hip subcapital impacted fracture Garden 2  Postoperative diagnosis the same  Surgical procedure right hip open reduction internal fixation with plate and screw using Synthes femoral neck system    After proper patient and limb identification bring to the operating room general anesthesia dorsal decubitus on the Minetto table.  Some traction internal rotation C arm showed good alignment of the fracture that still present the valgus impacted condition.  Correction in scrubbing and sterile draping proper timeout performed  Longitudinal incision over the lateral aspect of the hip.  Incision of the fascia mirna.  Then retracting vastus lateralis anteriorly.  Insertion of the guide and the guide and pin insertion of pin at the proper position centrally slightly inferior and central on the lateral view.  Reaming with the adequate reamer insertion of the plate nail device.  With the good positioning.  Securing the plate to the shaft of the femur with 1 screw with good positioning.  Then insertion of the entire rotational screw superiorly with good positioning.  AP lateral view with C arm showed good alignment irrigation with sterile fluid.  Closing the fascia with Vicryl 1 interrupted suture skin was closed Vicryl 1 Monocryl 2 oh and metal clip.  Aqua seal dressing was applied  Patient returned to recovery in stable condition  Blood loss 100 cc no specimen

## 2024-09-11 LAB
ANION GAP SERPL CALCULATED.3IONS-SCNC: 16.2 MMOL/L (ref 5–15)
BUN SERPL-MCNC: 17 MG/DL (ref 8–23)
BUN/CREAT SERPL: 21 (ref 7–25)
CALCIUM SPEC-SCNC: 8.6 MG/DL (ref 8.6–10.5)
CHLORIDE SERPL-SCNC: 101 MMOL/L (ref 98–107)
CO2 SERPL-SCNC: 16.8 MMOL/L (ref 22–29)
CREAT SERPL-MCNC: 0.81 MG/DL (ref 0.76–1.27)
DEPRECATED RDW RBC AUTO: 52.5 FL (ref 37–54)
EGFRCR SERPLBLD CKD-EPI 2021: 99.1 ML/MIN/1.73
ERYTHROCYTE [DISTWIDTH] IN BLOOD BY AUTOMATED COUNT: 17.1 % (ref 12.3–15.4)
GLUCOSE SERPL-MCNC: 107 MG/DL (ref 65–99)
HCT VFR BLD AUTO: 33.9 % (ref 37.5–51)
HGB BLD-MCNC: 10.1 G/DL (ref 13–17.7)
MCH RBC QN AUTO: 25.1 PG (ref 26.6–33)
MCHC RBC AUTO-ENTMCNC: 29.8 G/DL (ref 31.5–35.7)
MCV RBC AUTO: 84.1 FL (ref 79–97)
PLATELET # BLD AUTO: 372 10*3/MM3 (ref 140–450)
PMV BLD AUTO: 10.4 FL (ref 6–12)
POTASSIUM SERPL-SCNC: 4.7 MMOL/L (ref 3.5–5.2)
QT INTERVAL: 416 MS
QTC INTERVAL: 468 MS
RBC # BLD AUTO: 4.03 10*6/MM3 (ref 4.14–5.8)
SODIUM SERPL-SCNC: 134 MMOL/L (ref 136–145)
WBC NRBC COR # BLD AUTO: 11.04 10*3/MM3 (ref 3.4–10.8)

## 2024-09-11 PROCEDURE — 99231 SBSQ HOSP IP/OBS SF/LOW 25: CPT

## 2024-09-11 PROCEDURE — 97161 PT EVAL LOW COMPLEX 20 MIN: CPT

## 2024-09-11 PROCEDURE — 25010000002 ENOXAPARIN PER 10 MG: Performed by: ORTHOPAEDIC SURGERY

## 2024-09-11 PROCEDURE — 25810000003 SODIUM CHLORIDE 0.9 % SOLUTION: Performed by: ORTHOPAEDIC SURGERY

## 2024-09-11 PROCEDURE — 25010000002 CEFAZOLIN PER 500 MG: Performed by: ORTHOPAEDIC SURGERY

## 2024-09-11 PROCEDURE — 97167 OT EVAL HIGH COMPLEX 60 MIN: CPT

## 2024-09-11 PROCEDURE — 25010000002 MORPHINE PER 10 MG: Performed by: ORTHOPAEDIC SURGERY

## 2024-09-11 RX ADMIN — METOPROLOL TARTRATE 25 MG: 25 TABLET, FILM COATED ORAL at 08:46

## 2024-09-11 RX ADMIN — Medication 10 ML: at 20:56

## 2024-09-11 RX ADMIN — MORPHINE SULFATE 4 MG: 4 INJECTION, SOLUTION INTRAMUSCULAR; INTRAVENOUS at 11:12

## 2024-09-11 RX ADMIN — MORPHINE SULFATE 4 MG: 4 INJECTION, SOLUTION INTRAMUSCULAR; INTRAVENOUS at 16:22

## 2024-09-11 RX ADMIN — MORPHINE SULFATE 4 MG: 4 INJECTION, SOLUTION INTRAMUSCULAR; INTRAVENOUS at 13:32

## 2024-09-11 RX ADMIN — METOPROLOL TARTRATE 25 MG: 25 TABLET, FILM COATED ORAL at 20:53

## 2024-09-11 RX ADMIN — ATORVASTATIN CALCIUM 20 MG: 20 TABLET, FILM COATED ORAL at 20:53

## 2024-09-11 RX ADMIN — ASPIRIN 81 MG: 81 TABLET, COATED ORAL at 08:46

## 2024-09-11 RX ADMIN — PANTOPRAZOLE SODIUM 40 MG: 40 TABLET, DELAYED RELEASE ORAL at 08:46

## 2024-09-11 RX ADMIN — MORPHINE SULFATE 4 MG: 4 INJECTION, SOLUTION INTRAMUSCULAR; INTRAVENOUS at 22:35

## 2024-09-11 RX ADMIN — ENOXAPARIN SODIUM 40 MG: 100 INJECTION SUBCUTANEOUS at 08:46

## 2024-09-11 RX ADMIN — MORPHINE SULFATE 4 MG: 4 INJECTION, SOLUTION INTRAMUSCULAR; INTRAVENOUS at 01:26

## 2024-09-11 RX ADMIN — CEFAZOLIN 2000 MG: 2 INJECTION, POWDER, FOR SOLUTION INTRAMUSCULAR; INTRAVENOUS at 05:16

## 2024-09-11 RX ADMIN — SODIUM CHLORIDE 150 ML/HR: 9 INJECTION, SOLUTION INTRAVENOUS at 02:42

## 2024-09-11 RX ADMIN — FENOFIBRATE 145 MG: 145 TABLET ORAL at 08:46

## 2024-09-11 RX ADMIN — Medication 10 ML: at 08:47

## 2024-09-11 RX ADMIN — OXYCODONE HYDROCHLORIDE AND ACETAMINOPHEN 1 TABLET: 10; 325 TABLET ORAL at 02:50

## 2024-09-11 RX ADMIN — MORPHINE SULFATE 4 MG: 4 INJECTION, SOLUTION INTRAMUSCULAR; INTRAVENOUS at 05:24

## 2024-09-11 RX ADMIN — OXYCODONE HYDROCHLORIDE AND ACETAMINOPHEN 1 TABLET: 10; 325 TABLET ORAL at 17:56

## 2024-09-11 RX ADMIN — OXYCODONE HYDROCHLORIDE AND ACETAMINOPHEN 1 TABLET: 10; 325 TABLET ORAL at 08:46
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
## 2024-09-11 NOTE — PLAN OF CARE
Goal Outcome Evaluation:              Outcome Evaluation: Pt seen for evaluation this date, pleasant and cooperative with therapy. Pt may benefit from therapeutic intervention s/p R hip ORIF to progress towards PLOF.      Anticipated Discharge Disposition (PT): home with assist, home with outpatient therapy services, home with supervision, inpatient rehabilitation facility

## 2024-09-11 NOTE — CASE MANAGEMENT/SOCIAL WORK
Discharge Planning Assessment   [place]     Patient Name: Ta Madison  MRN: [id number]  Today's Date: 9/11/2024    Admit Date: 9/9/2024     Discharge Plan       Row Name 09/11/24 1609       Plan    Plan SS spoke with Pt at bedside on this date. PT has recommended home with outpatient PT vs Inpatient rehab. SS discussed both options with Pt. Pt wishes to discuss further with his family and asked SS re-visit tomorrow. Pt lives at home with his minor grand-daughter. Pt does not utilize home health or DME services. If Pt wants to go home with outpatient PT he would benefit from a rolling walker and bedside commode. SS to follow.                   GARLAND HaddadW

## 2024-09-11 NOTE — THERAPY EVALUATION
Acute Care - Occupational Therapy Initial Evaluation   [place]     Patient Name: Ta Madison  : 1960  MRN: [id number]  Today's Date: 2024             Admit Date: 2024       ICD-10-CM ICD-9-CM   1. Closed fracture of right hip, initial encounter  S72.001A 820.8   2. Post-operative state  Z98.890 V45.89     Patient Active Problem List   Diagnosis    Tachycardia    Hyperlipidemia    Multiple allergies    Tobacco abuse    Gastroesophageal reflux disease    Chronic right shoulder pain    Ganglion cyst of dorsum of right wrist    Benign skin cyst    Numbness and tingling of both upper extremities    Right shoulder pain    Adenocarcinoma of prostate    Encounter for screening for malignant neoplasm of colon    Epigastric pain    Acute gallstone pancreatitis    Personal history of transient ischemic attack (TIA)    Elevated serum creatinine    Sepsis associated hypotension    Hyperkalemia    Severe malnutrition    Hip fracture     Past Medical History:   Diagnosis Date    Elevated cholesterol     GERD (gastroesophageal reflux disease)     Increased heart rate     Mini stroke     Neck pain     Prostate cancer     Rash     Tobacco abuse      Past Surgical History:   Procedure Laterality Date    ABDOMINAL SURGERY      [facility]     [facility]      COLONOSCOPY N/A 2023    Procedure: COLONOSCOPY;  Surgeon: Mahnaz Caraballo MD;  Location: [facility];  Service: Gastroenterology;  Laterality: N/A;    ENDOSCOPY N/A 2023    Procedure: ESOPHAGOGASTRODUODENOSCOPY WITH KEOFEED PLACEMENT;  Surgeon: Yolande Eller MD;  Location: [facility] ENDOSCOPY;  Service: Gastroenterology;  Laterality: N/A;         OT ASSESSMENT FLOWSHEET (Last 12 Hours)       OT Evaluation and Treatment       Row Name 24 1111                   OT Time and Intention    Document Type evaluation  -KR        Mode of Treatment occupational therapy  -KR        Patient Effort adequate  -KR            General Information    General Observations of Patient alert/cooperative  -KR        Prior Level of Function independent:  -KR           Living Environment    Current Living Arrangements home  -KR        People in Home grandchild(donavon)  -KR        Primary Care Provided by self  -KR           Cognition    Affect/Mental Status (Cognition) WFL  -KR        Orientation Status (Cognition) oriented x 3  -KR        Follows Commands (Cognition) WFL  -KR           Range of Motion Comprehensive    Comment, General Range of Motion BUE WFL  -KR           Strength Comprehensive (MMT)    Comment, General Manual Muscle Testing (MMT) Assessment BUE 3/5  -KR           Activities of Daily Living    BADL Assessment/Intervention bathing;upper body dressing;lower body dressing;grooming;feeding;toileting  -KR           Bathing Assessment/Intervention    [place] Level (Bathing) bathing skills;moderate assist (50% patient effort)  -KR           Upper Body Dressing Assessment/Training    [place] Level (Upper Body Dressing) upper body dressing skills;minimum assist (75% patient effort)  -KR           Lower Body Dressing Assessment/Training    [place] Level (Lower Body Dressing) lower body dressing skills;maximum assist (25% patient effort)  -KR           Grooming Assessment/Training    [place] Level (Grooming) grooming skills;contact guard assist  -KR           Self-Feeding Assessment/Training    [place] Level (Feeding) feeding skills;set up  -KR           Toileting Assessment/Training    [place] Level (Toileting) toileting skills;maximum assist (25% patient effort)  -KR           Wound 09/10/24 1644 Right hip Incision    Wound - Properties Group Placement Date: 09/10/24  -KB Placement Time: 1644 -KB Side: Right  -KB Location: hip  -KB Primary Wound Type: Incision  -KB    Retired Wound - Properties Group Placement Date: 09/10/24  -KB Placement Time: 1644 -KB Side: Right  -KB Location: hip  -KB Primary Wound  Type: Incision  -KB    Retired Wound - Properties Group Date first assessed: 09/10/24  -KB Time first assessed: 1644  -KB Side: Right  -KB Location: hip  -KB Primary Wound Type: Incision  -KB       Plan of Care Review    Plan of Care Reviewed With patient  -KR           Therapy Assessment/Plan (OT)    Rehab Potential (OT) good, to achieve stated therapy goals  -KR        Criteria for Skilled Therapeutic Interventions Met (OT) yes  -KR        Planned Therapy Interventions (OT) activity tolerance training;adaptive equipment training;BADL retraining;strengthening exercise  -KR           Therapy Plan Review/Discharge Plan (OT)    Anticipated Discharge Disposition (OT) home  -KR           OT Goals    Strength Goal Selection (OT) strength, OT goal 1  -KR           Strength Goal 1 (OT)    Strength Goal 1 (OT) BUE increase x 1 to enhance self care/mobility skills  -KR        Time Frame (Strength Goal 1, OT) by discharge  -KR           Patient Education Goal (OT)    Activity (Patient Education Goal, OT) AE/DME training as needed to enhance safety/independence with ADLs  -KR        [place]/Cues/Accuracy (Memory Goal 2, OT) demonstrates adequately  -KR        Time Frame (Patient Education Goal, OT) by discharge  -KR                  User Key  (r) = Recorded By, (t) = Taken By, (c) = Cosigned By      Initials Name Effective Dates    Nika Woodard RN 05/26/22 -     Janusz Daniels OT 06/16/21 -                            OT Recommendation and Plan  Planned Therapy Interventions (OT): activity tolerance training, adaptive equipment training, BADL retraining, strengthening exercise  Plan of Care Review  Plan of Care Reviewed With: patient  Plan of Care Reviewed With: patient        Time Calculation:     Therapy Charges for Today       Code Description Service Date Service Provider Modifiers Qty    [id number]  OT EVAL HIGH COMPLEXITY 4 9/11/2024 Janusz Chambers, OT GO 1                 Janusz Chambers  OT  9/11/2024

## 2024-09-11 NOTE — PROGRESS NOTES
"Patient Identification:  Name:  Ta Madison  Age:  64 y.o.  Sex:  male  :  1960  MRN:  [id number]  Visit Number:  [id number]  Primary Care Provider:  Yolande Olvera APRN    Length of stay:  2    Subjective/Interval History/Consultants/Procedures     Chief Complaint:   Chief Complaint   Patient presents with    Fall       Subjective/Interval History:    64 y.o. male who was admitted on 2024 with hip fracture    PMH is significant for HLD, GERD, prostate cancer w/ mets to bone, tobacco use   For complete admission information, please see history and physical.     Consultations:  orthopedic surgery   PT OT  CM/SW    Procedures/Scans:  XR right femur  XR right hip   CT L spine wo contrast   Right hip ORIF with plate and screw 9/10/24- Dr. Montoya     Today, the patient was seen and examined with no family present at the bedside. He reported feeling okay this morning. He is drowsy. He reported his pain \"could be better.\" No BM post op but is passing gas. Able to urinate without difficulty.      Room location at the time of evaluation was 350b.    ----------------------------------------------------------------------------------------------------------------------  Current Hospital Meds:  aspirin, 81 mg, Oral, Daily  atorvastatin, 20 mg, Oral, Nightly  enoxaparin, 40 mg, Subcutaneous, Q24H  fenofibrate, 145 mg, Oral, Daily  metoprolol tartrate, 25 mg, Oral, BID  pantoprazole, 40 mg, Oral, Daily  sodium chloride, 10 mL, Intravenous, Q12H      sodium chloride, 150 mL/hr, Last Rate: 150 mL/hr (24 0242)      ----------------------------------------------------------------------------------------------------------------------      Objective     Vital Signs:  Temp:  [98 °F (36.7 °C)-99 °F (37.2 °C)] 99 °F (37.2 °C)  Heart Rate:  [69-98] 95  Resp:  [13-18] 18  BP: (119-156)/(56-80) 148/66      24  0946 09/10/24  1521   Weight: 73.5 kg (162 lb) 73.5 kg (162 lb)     Body mass index is 25.37 " kg/m².    Intake/Output Summary (Last 24 hours) at 9/11/2024 0922  Last data filed at 9/11/2024 0900  Gross per 24 hour   Intake 700 ml   Output 900 ml   Net -200 ml     I/O this shift:  In: 240 [P.O.:240]  Out: -   Diet: Regular/House; Fluid Consistency: Thin (IDDSI 0)  ----------------------------------------------------------------------------------------------------------------------    Physical Exam  Vitals and nursing note reviewed.   HENT:      Head: Normocephalic and atraumatic.   Eyes:      Extraocular Movements: Extraocular movements intact.   Cardiovascular:      Rate and Rhythm: Normal rate and regular rhythm.   Pulmonary:      Effort: Pulmonary effort is normal.      Breath sounds: Normal breath sounds.   Abdominal:      Palpations: Abdomen is soft.   Musculoskeletal:      Right lower leg: No edema.      Left lower leg: No edema.   Skin:     General: Skin is warm and dry.   Neurological:      Mental Status: Mental status is at baseline.   Psychiatric:         Mood and Affect: Mood normal.         Behavior: Behavior normal.                ----------------------------------------------------------------------------------------------------------------------  Tele:      ----------------------------------------------------------------------------------------------------------------------      Results from last 7 days   Lab Units 09/10/24  2359 09/09/24  1214   WBC 10*3/mm3 11.04* 9.77   HEMOGLOBIN g/dL 10.1* 11.3*   HEMATOCRIT % 33.9* 35.9*   MCV fL 84.1 77.4*   MCHC g/dL 29.8* 31.5   PLATELETS 10*3/mm3 372 444   INR   --  0.94         Results from last 7 days   Lab Units 09/10/24  2359 09/09/24  1214   SODIUM mmol/L 134* 136   POTASSIUM mmol/L 4.7 4.2   CHLORIDE mmol/L 101 101   CO2 mmol/L 16.8* 24.5   BUN mg/dL 17 17   CREATININE mg/dL 0.81 0.78   CALCIUM mg/dL 8.6 9.3   GLUCOSE mg/dL 107* 102*   ALBUMIN g/dL  --  3.9   BILIRUBIN mg/dL  --  0.3   ALK PHOS U/L  --  143*   AST (SGOT) U/L  --  10   ALT (SGPT)  "U/L  --  6   Estimated Creatinine Clearance: 97 mL/min (by C-G formula based on SCr of 0.81 mg/dL).  No results found for: \"AMMONIA\"      No results found for: \"BLOODCX\"  No results found for: \"URINECX\"  No results found for: \"WOUNDCX\"  No results found for: \"STOOLCX\"  ----------------------------------------------------------------------------------------------------------------------  Imaging Results (Last 24 Hours)       Procedure Component Value Units Date/Time    FL Surgery Fluoro [[id number]] Collected: 09/11/24 0832     Updated: 09/11/24 0835    Narrative:      Fluoro time     PROCEDURE: Fluoroscopy in the operating room.     HISTORY: orif; S72.001A-Fracture of unspecified part of neck of right  femur, initial encounter for closed fracture; Z98.890-Other specified  postprocedural states     COMPARISON: None.     FINDINGS: Fluoroscopy time was provided by the radiology department for  the clinical service. 2 images were obtained and fluoroscopy time was  0.8 minutes. The images provided demonstrates compression screws  spanning a subcapital right femoral neck fracture.       Impression:      Please see the operative report.                 This report was finalized on 9/11/2024 8:33 AM by Kasey Lorenzo M.D..             ----------------------------------------------------------------------------------------------------------------------   I have reviewed the above laboratory values for 09/11/24    Assessment/Plan     Active Hospital Problems    Diagnosis  POA    **Hip fracture [S72.009A]  Yes         ASSESSMENT/PLAN:    Right femoral neck fracture  Patient presented to the ED following fall with subsequent right hip pain, found to have right femoral neck fracture  Admitted to the telemetry unit  Orthopedic surgery consulted and following  Patient underwent ORIF 9/10 and tolerated well  Labs and VS stable post operatively, will continue to trend  Continue PRN pain regimen  PT OT to see and evaluate. CM/SW " assisting with discharge planning. Will follow up.      Chronic:  HLD  GERD  Prostate cancer  Continue  home medications as indicated     -----------  -DVT prophylaxis: Lovenox  -Disposition plans/anticipated needs: Pending course and progress with PT/OT        The patient is high risk due to the following diagnoses/reasons:  right hip fx s/p JOSEY Tolliver PA-C  09/11/24  09:22 EDT    yes

## 2024-09-11 NOTE — PLAN OF CARE
Goal Outcome Evaluation:  Plan of Care Reviewed With: patient        Progress: no change  Outcome Evaluation: Pt resting in bed throughout shift. VSS on RA. PRN pain medication given. Pt is complaining that pain medications are ineffective. No acute changes. Will continue plan of care.                               Problem: Adult Inpatient Plan of Care  Goal: Plan of Care Review  Outcome: Unable to Meet, Plan Revised  Flowsheets (Taken 9/11/2024 0317)  Progress: no change  Plan of Care Reviewed With: patient  Outcome Evaluation: Pt resting in bed throughout shift. VSS on RA. PRN pain medication given. Pt is complaining that pain medications are ineffective. No acute changes. Will continue plan of care.  Goal: Patient-Specific Goal (Individualized)  Outcome: Unable to Meet, Plan Revised  Goal: Absence of Hospital-Acquired Illness or Injury  Outcome: Unable to Meet, Plan Revised  Goal: Optimal Comfort and Wellbeing  Outcome: Unable to Meet, Plan Revised  Goal: Readiness for Transition of Care  Outcome: Unable to Meet, Plan Revised     Problem: Skin Injury Risk Increased  Goal: Skin Health and Integrity  Outcome: Unable to Meet, Plan Revised     Problem: Adjustment to Injury (Hip Fracture Medical Management)  Goal: Optimal Coping with Change in Health Status  Outcome: Unable to Meet, Plan Revised     Problem: Pain (Hip Fracture Medical Management)  Goal: Acceptable Pain Level  Outcome: Unable to Meet, Plan Revised     Problem: Hypertension Comorbidity  Goal: Blood Pressure in Desired Range  Outcome: Unable to Meet, Plan Revised     Problem: Fall Injury Risk  Goal: Absence of Fall and Fall-Related Injury  Outcome: Unable to Meet, Plan Revised

## 2024-09-12 ENCOUNTER — READMISSION MANAGEMENT (OUTPATIENT)
Dept: CALL CENTER | Facility: HOSPITAL | Age: 64
End: 2024-09-12
Payer: COMMERCIAL

## 2024-09-12 ENCOUNTER — TELEPHONE (OUTPATIENT)
Dept: SOCIAL WORK | Facility: HOSPITAL | Age: 64
End: 2024-09-12
Payer: COMMERCIAL

## 2024-09-12 VITALS
BODY MASS INDEX: 25.43 KG/M2 | WEIGHT: 162 LBS | OXYGEN SATURATION: 97 % | DIASTOLIC BLOOD PRESSURE: 56 MMHG | TEMPERATURE: 97.8 F | HEIGHT: 67 IN | RESPIRATION RATE: 16 BRPM | HEART RATE: 86 BPM | SYSTOLIC BLOOD PRESSURE: 104 MMHG

## 2024-09-12 LAB
ANION GAP SERPL CALCULATED.3IONS-SCNC: 13.7 MMOL/L (ref 5–15)
BASOPHILS # BLD AUTO: 0.04 10*3/MM3 (ref 0–0.2)
BASOPHILS NFR BLD AUTO: 0.5 % (ref 0–1.5)
BUN SERPL-MCNC: 13 MG/DL (ref 8–23)
BUN/CREAT SERPL: 18.6 (ref 7–25)
CALCIUM SPEC-SCNC: 9 MG/DL (ref 8.6–10.5)
CHLORIDE SERPL-SCNC: 100 MMOL/L (ref 98–107)
CO2 SERPL-SCNC: 19.3 MMOL/L (ref 22–29)
CREAT SERPL-MCNC: 0.7 MG/DL (ref 0.76–1.27)
DEPRECATED RDW RBC AUTO: 47.7 FL (ref 37–54)
EGFRCR SERPLBLD CKD-EPI 2021: 103.5 ML/MIN/1.73
EOSINOPHIL # BLD AUTO: 0.08 10*3/MM3 (ref 0–0.4)
EOSINOPHIL NFR BLD AUTO: 0.9 % (ref 0.3–6.2)
ERYTHROCYTE [DISTWIDTH] IN BLOOD BY AUTOMATED COUNT: 16.7 % (ref 12.3–15.4)
GLUCOSE BLDC GLUCOMTR-MCNC: 99 MG/DL (ref 70–130)
GLUCOSE SERPL-MCNC: 99 MG/DL (ref 65–99)
HCT VFR BLD AUTO: 31.7 % (ref 37.5–51)
HGB BLD-MCNC: 9.7 G/DL (ref 13–17.7)
IMM GRANULOCYTES # BLD AUTO: 0.03 10*3/MM3 (ref 0–0.05)
IMM GRANULOCYTES NFR BLD AUTO: 0.3 % (ref 0–0.5)
LYMPHOCYTES # BLD AUTO: 2.16 10*3/MM3 (ref 0.7–3.1)
LYMPHOCYTES NFR BLD AUTO: 24.7 % (ref 19.6–45.3)
MCH RBC QN AUTO: 24 PG (ref 26.6–33)
MCHC RBC AUTO-ENTMCNC: 30.6 G/DL (ref 31.5–35.7)
MCV RBC AUTO: 78.3 FL (ref 79–97)
MONOCYTES # BLD AUTO: 1.04 10*3/MM3 (ref 0.1–0.9)
MONOCYTES NFR BLD AUTO: 11.9 % (ref 5–12)
NEUTROPHILS NFR BLD AUTO: 5.38 10*3/MM3 (ref 1.7–7)
NEUTROPHILS NFR BLD AUTO: 61.7 % (ref 42.7–76)
NRBC BLD AUTO-RTO: 0 /100 WBC (ref 0–0.2)
PLATELET # BLD AUTO: 384 10*3/MM3 (ref 140–450)
PMV BLD AUTO: 10.3 FL (ref 6–12)
POTASSIUM SERPL-SCNC: 3.9 MMOL/L (ref 3.5–5.2)
RBC # BLD AUTO: 4.05 10*6/MM3 (ref 4.14–5.8)
SODIUM SERPL-SCNC: 133 MMOL/L (ref 136–145)
WBC NRBC COR # BLD AUTO: 8.73 10*3/MM3 (ref 3.4–10.8)

## 2024-09-12 PROCEDURE — 25010000002 MORPHINE PER 10 MG: Performed by: ORTHOPAEDIC SURGERY

## 2024-09-12 PROCEDURE — 25010000002 ENOXAPARIN PER 10 MG: Performed by: ORTHOPAEDIC SURGERY

## 2024-09-12 PROCEDURE — 82948 REAGENT STRIP/BLOOD GLUCOSE: CPT

## 2024-09-12 PROCEDURE — 80048 BASIC METABOLIC PNL TOTAL CA: CPT

## 2024-09-12 PROCEDURE — 85025 COMPLETE CBC W/AUTO DIFF WBC: CPT

## 2024-09-12 PROCEDURE — 99239 HOSP IP/OBS DSCHRG MGMT >30: CPT

## 2024-09-12 RX ADMIN — PANTOPRAZOLE SODIUM 40 MG: 40 TABLET, DELAYED RELEASE ORAL at 09:25

## 2024-09-12 RX ADMIN — FENOFIBRATE 145 MG: 145 TABLET ORAL at 09:25

## 2024-09-12 RX ADMIN — OXYCODONE HYDROCHLORIDE AND ACETAMINOPHEN 1 TABLET: 10; 325 TABLET ORAL at 09:45

## 2024-09-12 RX ADMIN — MORPHINE SULFATE 4 MG: 4 INJECTION, SOLUTION INTRAMUSCULAR; INTRAVENOUS at 03:19

## 2024-09-12 RX ADMIN — OXYCODONE HYDROCHLORIDE AND ACETAMINOPHEN 1 TABLET: 10; 325 TABLET ORAL at 00:51

## 2024-09-12 RX ADMIN — ENOXAPARIN SODIUM 40 MG: 100 INJECTION SUBCUTANEOUS at 09:22

## 2024-09-12 NOTE — PAYOR COMM NOTE
"CONTACT:  RICHARD RODRIGUEZ RN  UTILIZATION MANAGEMENT DEPT.   [facility]   [address] [facility], [address]   PHONE:  [phone]   FAX: [phone]       [facility] 9/12/2024    AUTH # [id number]          Francisco Madison (64 y.o. Male)       Date of Birth   1960    Social Security Number       Address   [address] [id number]    Home Phone   [phone]    MRN   [id number]       Jew   None    Marital Status                               Admission Date   9/9/24    Admission Type   Emergency    Admitting Provider   Jason Wilcox MD    Attending Provider       Department, Room/Bed   [address], 3350/2S       Discharge Date   9/12/2024    Discharge Disposition   Home or Self Care    Discharge Destination   Home                              Attending Provider: (none)   Allergies: No Known Allergies    Isolation: None   Infection: None   Code Status: CPR    Ht: 170.2 cm (67\")   Wt: 73.5 kg (162 lb)    Admission Cmt: None   Principal Problem: Hip fracture [S72.009A]                   Active Insurance as of 9/9/2024       Primary Coverage       Payor Plan Insurance Group Employer/Plan Group    [employer] [place] [employer]ICA[employer]        Payor Plan Address Payor Plan Phone Number Payor Plan Fax Number Effective Dates    [address] [address] [phone]  6/6/2018 - None Entered    [facility] [id number]         Subscriber Name Subscriber Birth Date Member ID       FRANCISCO MADISON 1960 [id number]                     Emergency Contacts        (Rel.) Home Phone Work Phone Mobile Phone    YOLI LORD (Daughter) -- -- [phone]                 Discharge Summary        Jp Tolliver PA-C at 09/12/24 0855       Attestation signed by Jason Wilcox MD at 09/12/24 1028    I have reviewed this documentation and agree.  Patient doing well, ambulatory, follow up PCP 1 week, follow up Orthopedics 2 weeks, walker ordered, referred to PT " outpatient.                       [facility] HOSPITALIST DISCHARGE SUMMARY    Patient Identification:  Name:  Ta Madison  Age:  64 y.o.  Sex:  male  :  1960  MRN:  [id number]  Visit Number:  [id number]    Date of Admission: 2024  Date of Discharge:  24     PCP: Yolande Olvera APRN    Discharging Provider: Jonathan Tolliver PA-C / Dr. Wilcox    Discharge Diagnoses     Discharge Diagnoses:  Right femoral neck fracture s/p ORIF with plate and screw    Secondary Diagnoses:  Hyperlipidemia  GERD  Prostate cancer with mets to bone    Needs on follow up:  Orthopedic surgery 2 weeks, PCP 1 week, outpatient physical therapy  Consults/Procedures     Consults:   Consults       Date and Time Order Name Status Description    2024  1:39 PM Inpatient Orthopedic Surgery Consult Completed             Procedures/Scans Performed:  Procedure(s):  FEMORAL NECK OPEN REDUCTION INTERNAL FIXATION   XR right femur  XR right hip   CT L spine wo contrast    History of Presenting Illness     Chief Complaint   Patient presents with    Fall       Patient is a 64 y.o. male who presented to [facility] complaining of fall.  Please see the admitting history and physical for further details.      Hospital Course     Patient was admitted to [facility] on 2024 following presentation to [facility] ED for further evaluation of right hip pain following a fall the day prior.  Had been loosening a bolt with a large branch, when bolt broke loose he fell backward landing on the hip.  XR of the right femur and hip showed subcapital right femoral neck fracture.  He was initiated on as needed pain regimen and admitted for further management.    Orthopedic surgery consulted and followed.  Patient underwent ORIF with plate and screw on 9/10/2024 and tolerated procedure well.  PT and OT saw and evaluated postoperatively and recommended home with outpatient therapy services.  Given patient remaining stable and expressing desire to return  home he was felt to have reached the maximum benefit of the current hospitalization.  As such case was discussed with attending physician and agree he is stable for discharge home on state.  Lovenox to be continued for total of 3 weeks per orthopedic surgery recommendations for DVT prophylaxis.  Will provide a walker at discharge and refer to outpatient therapy services.  Patient may bear weight as tolerated.  Patient will be scheduled to follow-up with orthopedic surgery in 2 weeks, PCP for routine hospital follow-up within 1 week.  Will provide short course as needed pain medication and muscle relaxer.  This discharge and follow-up plan was discussed with the patient on the date of discharge and he expressed agreement and understanding.    Discharge Vitals/Physical Examination     Vital Signs:  Temp:  [97.8 °F (36.6 °C)-99.1 °F (37.3 °C)] 97.8 °F (36.6 °C)  Heart Rate:  [82-93] 86  Resp:  [16-18] 16  BP: (104-137)/(56-89) 104/56  No data found.  SpO2 Percentage    09/11/24 1812 09/12/24 0300 09/12/24 0641   SpO2: 96% 95% 97%     SpO2:  [95 %-97 %] 97 %  on   ;        Body mass index is 25.37 kg/m².  Wt Readings from Last 3 Encounters:   09/10/24 73.5 kg (162 lb)   08/05/24 73.7 kg (162 lb 6.4 oz)   06/18/24 74.6 kg (164 lb 6.4 oz)         Physical Exam:  Physical Exam  Vitals and nursing note reviewed.   Constitutional:       General: He is not in acute distress.  HENT:      Head: Normocephalic and atraumatic.   Eyes:      Extraocular Movements: Extraocular movements intact.   Cardiovascular:      Rate and Rhythm: Normal rate and regular rhythm.   Pulmonary:      Effort: Pulmonary effort is normal.      Breath sounds: Normal breath sounds.   Abdominal:      Palpations: Abdomen is soft.   Musculoskeletal:      Right lower leg: No edema.      Left lower leg: No edema.   Skin:     General: Skin is warm and dry.   Neurological:      Mental Status: He is alert. Mental status is at baseline.   Psychiatric:         Mood  "and Affect: Mood normal.         Behavior: Behavior normal.         Pertinent Laboratory/Radiology Results     Pertinent Laboratory Results:            Results from last 7 days   Lab Units 09/12/24  0046 09/10/24  2359 09/09/24  1214   WBC 10*3/mm3 8.73 11.04* 9.77   HEMOGLOBIN g/dL 9.7* 10.1* 11.3*   HEMATOCRIT % 31.7* 33.9* 35.9*   MCV fL 78.3* 84.1 77.4*   MCHC g/dL 30.6* 29.8* 31.5   PLATELETS 10*3/mm3 384 372 444   INR   --   --  0.94     Results from last 7 days   Lab Units 09/12/24  0054 09/10/24  2359 09/09/24  1214   SODIUM mmol/L 133* 134* 136   POTASSIUM mmol/L 3.9 4.7 4.2   CHLORIDE mmol/L 100 101 101   CO2 mmol/L 19.3* 16.8* 24.5   BUN mg/dL 13 17 17   CREATININE mg/dL 0.70* 0.81 0.78   CALCIUM mg/dL 9.0 8.6 9.3   GLUCOSE mg/dL 99 107* 102*   ALBUMIN g/dL  --   --  3.9   BILIRUBIN mg/dL  --   --  0.3   ALK PHOS U/L  --   --  143*   AST (SGOT) U/L  --   --  10   ALT (SGPT) U/L  --   --  6   Estimated Creatinine Clearance: 112.3 mL/min (A) (by C-G formula based on SCr of 0.7 mg/dL (L)).  No results found for: \"AMMONIA\"    Glucose   Date/Time Value Ref Range Status   09/12/2024 0655 99 70 - 130 mg/dL Final     Lab Results   Component Value Date    HGBA1C 5.2 10/26/2023     Lab Results   Component Value Date    TSH 1.610 08/05/2024    FREET4 1.22 06/06/2018       No results found for: \"BLOODCX\"  No results found for: \"URINECX\"  No results found for: \"WOUNDCX\"  No results found for: \"STOOLCX\"  No results found for: \"RESPCX\"  Pain Management Panel          Latest Ref Rng & Units 10/12/2023   Pain Management Panel   Creatinine, Urine mg/dL 80.7       Details                   Pertinent Radiology Results:  Imaging Results (All)       Procedure Component Value Units Date/Time    FL Surgery Fluoro [[id number]] Collected: 09/11/24 0832     Updated: 09/11/24 0835    Narrative:      Fluoro time     PROCEDURE: Fluoroscopy in the operating room.     HISTORY: orif; S72.001A-Fracture of unspecified part of neck of " right  femur, initial encounter for closed fracture; Z98.890-Other specified  postprocedural states     COMPARISON: None.     FINDINGS: Fluoroscopy time was provided by the radiology department for  the clinical service. 2 images were obtained and fluoroscopy time was  0.8 minutes. The images provided demonstrates compression screws  spanning a subcapital right femoral neck fracture.       Impression:      Please see the operative report.                 This report was finalized on 9/11/2024 8:33 AM by Kasey Lorenzo M.D..       CT Lumbar Spine Without Contrast [[id number]] Collected: 09/09/24 1049     Updated: 09/09/24 1052    Narrative:      PROCEDURE: CT LUMBAR SPINE WO CONTRAST-     HISTORY: fall from a bull     TECHNIQUE: Multiple axial CT images were obtained through the lumbar  spine using bone window algorithms. Coronal and sagittal images were  reconstructed from the original axial data set. This study was performed  with techniques to keep radiation doses as low as reasonably achievable  (ALARA). Individualized dose reduction techniques using automated  exposure control or adjustment of mA and/or kV according to the patient  size were employed.     COMPARISON: None.     FINDINGS: The lumbar spine is well aligned with no acute fractures.  There are diffuse degenerative changes with loss of disc space height  and facet arthropathy. Note is made of a bilateral L5 pars defect. The  paraspinal soft tissues are unremarkable.       Impression:      No acute process.              This report was finalized on 9/9/2024 10:50 AM by Kasey Lorenzo M.D..       XR Femur 2 View Right [[id number]] Collected: 09/09/24 1044     Updated: 09/09/24 1048    Narrative:      PROCEDURE: XR FEMUR 2 VW RIGHT-     HISTORY: fall     COMPARISON: None.     FINDINGS:  A 2 view exam demonstrates a suspected subcapital right  femoral neck fracture.  The joint spaces are preserved.  No soft tissue  abnormality is seen.        Impression:      Subcapital right femoral neck fracture.                 This report was finalized on 9/9/2024 10:46 AM by Kasey Lorenzo M.D..       XR Hip With or Without Pelvis 2 - 3 View Right [[id number]] Collected: 09/09/24 1046     Updated: 09/09/24 1048    Narrative:      PROCEDURE: XR HIP W OR WO PELVIS 2-3 VIEW RIGHT-     HISTORY: fall     COMPARISON: None.     FINDINGS: There is a subcapital right femoral neck fracture. The joint  spaces are preserved. The pubic symphysis and SI joints are intact. The  soft tissues are unremarkable.       Impression:      Subcapital right femoral neck fracture.              This report was finalized on 9/9/2024 10:46 AM by Kasey Lorenzo M.D..               Discharge Disposition/Discharge Medications/Discharge Appointments     Discharge Disposition:   Home or Self Care    Discharge Follow up:   Additional Instructions for the Follow-ups that You Need to Schedule       Ambulatory Referral to Physical Therapy   As directed      Specialty needed: Evaluate and treat POST OP   Follow-up needed: Yes        Discharge Follow-up with PCP   As directed       Currently Documented PCP:    Yolande Olvera APRN    PCP Phone Number:    [phone]     Follow Up Details: 1 week post hospital discharge        Discharge Follow-up with Specified Provider: Ortho 2 weeks   As directed      To: Ortho 2 weeks               Follow-up Information       Yolande Olvera APRN .    Specialty: Family Medicine  Why: 1 week post hospital discharge  Contact information:  [address] [place] [address]  [phone]                             Condition at Discharge:  Stable     Discharge Medications:     Your medication list        START taking these medications        Instructions Last Dose Given Next Dose Due   cyclobenzaprine 10 MG tablet  Commonly known as: FLEXERIL      Take 1 tablet by mouth 3 (Three) Times a Day As Needed for Muscle Spasms.       Enoxaparin Sodium 40 MG/0.4ML solution prefilled  syringe syringe  Commonly known as: LOVENOX      Inject 0.4 mL under the skin into the appropriate area as directed Daily for 20 days.       ondansetron 4 MG tablet  Commonly known as: Zofran      Take 1 tablet by mouth Every 8 (Eight) Hours As Needed for Nausea or Vomiting for up to 5 days.       oxyCODONE-acetaminophen 7.5-325 MG per tablet  Commonly known as: PERCOCET      Take 1 tablet by mouth Every 8 Hours As Needed for Moderate Pain for up to 10 days.              CONTINUE taking these medications        Instructions Last Dose Given Next Dose Due   aspirin 81 MG EC tablet      Take 1 tablet by mouth Daily.       atorvastatin 20 MG tablet  Commonly known as: LIPITOR      Take 1 tablet by mouth Every Night.       enzalutamide 40 MG chemo capsule  Commonly known as: XTANDI      Take 4 capsules by mouth Daily.       fenofibrate 145 MG tablet  Commonly known as: TRICOR      Take 1 tablet by mouth Daily.       HYDROcodone-acetaminophen  MG per tablet  Commonly known as: NORCO      Take 1 tablet by mouth Every 6 (Six) Hours As Needed for Moderate Pain.       hydrOXYzine 50 MG tablet  Commonly known as: ATARAX      Take 1 tablet by mouth 3 (Three) Times a Day As Needed for Itching.       metoprolol tartrate 25 MG tablet  Commonly known as: LOPRESSOR      Take 1 tablet by mouth 2 (Two) Times a Day.       pantoprazole 40 MG EC tablet  Commonly known as: PROTONIX      Take 1 tablet by mouth Daily.                 Where to Get Your Medications        These medications were sent to [facility] [address] [address]      Hours: Monday to Friday 7 AM to 6 PM Phone: [phone]   cyclobenzaprine 10 MG tablet  Enoxaparin Sodium 40 MG/0.4ML solution prefilled syringe syringe  ondansetron 4 MG tablet  oxyCODONE-acetaminophen 7.5-325 MG per tablet          Discharge Diet:  Diet Instructions    Regular           Discharge Activity:  Activity Instructions    RLE Weight Bearing as  Tolerated        Maintain current surgical dressing.  May reinforce for saturation              Time spent on this discharge exceeded 30 minutes.      Electronically signed by Gonzalez Wilcox MD at 09/12/24 1028       Discharge Order (From admission, onward)       Start     Ordered    09/12/24 0816  Discharge patient  Once        Expected Discharge Date: 09/12/24   Expected Discharge Time: Morning   Discharge Disposition: Home or Self Care   Physician of Record for Attribution - Please select from Treatment Team: GONZALEZ WILCOX [[id number]]   Review needed by CMO to determine Physician of Record: No      Question Answer Comment   Physician of Record for Attribution - Please select from Treatment Team GONZALEZ WILCOX    Review needed by CMO to determine Physician of Record No        09/12/24 0818

## 2024-09-12 NOTE — CASE MANAGEMENT/SOCIAL WORK
Discharge Planning Assessment   [facility]     Patient Name: Ta Madison  MRN: [id number]  Today's Date: 9/12/2024    Admit Date: 9/9/2024          Discharge Plan       Row Name 09/12/24 0841       Plan    Final Discharge Disposition Code 01 - home or self-care    Final Note Pt is being discharged home with outpatient Physical therapy. Pt will need to take outpatient Physical therapy order to any outpatient physical therapy of his choice. Pt's family to provide transport.                    CLARISSA Haddad

## 2024-09-12 NOTE — PLAN OF CARE
Goal Outcome Evaluation:  Plan of Care Reviewed With: patient        Progress: no change  Outcome Evaluation: Pt resting in bed at this time. VSS on RA. Pt has ambulated to the bathroom this shift. PRN pain medications given. No concerns or requests. Will continue plan of care.

## 2024-09-12 NOTE — DISCHARGE INSTR - ACTIVITY
RLE Weight Bearing as Tolerated        Maintain current surgical dressing.  May reinforce for saturation

## 2024-09-12 NOTE — DISCHARGE INSTR - APPOINTMENTS
Follow-up appointment scheduled with EUGENIA SUAREZ, on 9-17-24 at 3:30, office can be reached at [phone]    Follow-up appointment scheduled with DR NAGY on 9-26-24 at 2:30, office can be reached at [phone]

## 2024-09-12 NOTE — DISCHARGE SUMMARY
[facility] HOSPITALIST DISCHARGE SUMMARY    Patient Identification:  Name:  Ta Madison  Age:  64 y.o.  Sex:  male  :  1960  MRN:  [id number]  Visit Number:  [id number]    Date of Admission: 2024  Date of Discharge:  24     PCP: Yolande Olvera APRN    Discharging Provider: Jonathan Tolliver PA-C / Dr. Wilcox    Discharge Diagnoses     Discharge Diagnoses:  Right femoral neck fracture s/p ORIF with plate and screw    Secondary Diagnoses:  Hyperlipidemia  GERD  Prostate cancer with mets to bone    Needs on follow up:  Orthopedic surgery 2 weeks, PCP 1 week, outpatient physical therapy  Consults/Procedures     Consults:   Consults       Date and Time Order Name Status Description    2024  1:39 PM Inpatient Orthopedic Surgery Consult Completed             Procedures/Scans Performed:  Procedure(s):  FEMORAL NECK OPEN REDUCTION INTERNAL FIXATION   XR right femur  XR right hip   CT L spine wo contrast    History of Presenting Illness     Chief Complaint   Patient presents with    Fall       Patient is a 64 y.o. male who presented to [facility] complaining of fall.  Please see the admitting history and physical for further details.      Hospital Course     Patient was admitted to [facility] on 2024 following presentation to [facility] ED for further evaluation of right hip pain following a fall the day prior.  Had been loosening a bolt with a large branch, when bolt broke loose he fell backward landing on the hip.  XR of the right femur and hip showed subcapital right femoral neck fracture.  He was initiated on as needed pain regimen and admitted for further management.    Orthopedic surgery consulted and followed.  Patient underwent ORIF with plate and screw on 9/10/2024 and tolerated procedure well.  PT and OT saw and evaluated postoperatively and recommended home with outpatient therapy services.  Given patient remaining stable and expressing desire to return home he was felt to have  reached the maximum benefit of the current hospitalization.  As such case was discussed with attending physician and agree he is stable for discharge home on state.  Lovenox to be continued for total of 3 weeks per orthopedic surgery recommendations for DVT prophylaxis.  Will provide a walker at discharge and refer to outpatient therapy services.  Patient may bear weight as tolerated.  Patient will be scheduled to follow-up with orthopedic surgery in 2 weeks, PCP for routine hospital follow-up within 1 week.  Will provide short course as needed pain medication and muscle relaxer.  This discharge and follow-up plan was discussed with the patient on the date of discharge and he expressed agreement and understanding.    Discharge Vitals/Physical Examination     Vital Signs:  Temp:  [97.8 °F (36.6 °C)-99.1 °F (37.3 °C)] 97.8 °F (36.6 °C)  Heart Rate:  [82-93] 86  Resp:  [16-18] 16  BP: (104-137)/(56-89) 104/56  No data found.  SpO2 Percentage    09/11/24 1812 09/12/24 0300 09/12/24 0641   SpO2: 96% 95% 97%     SpO2:  [95 %-97 %] 97 %  on   ;        Body mass index is 25.37 kg/m².  Wt Readings from Last 3 Encounters:   09/10/24 73.5 kg (162 lb)   08/05/24 73.7 kg (162 lb 6.4 oz)   06/18/24 74.6 kg (164 lb 6.4 oz)         Physical Exam:  Physical Exam  Vitals and nursing note reviewed.   Constitutional:       General: He is not in acute distress.  HENT:      Head: Normocephalic and atraumatic.   Eyes:      Extraocular Movements: Extraocular movements intact.   Cardiovascular:      Rate and Rhythm: Normal rate and regular rhythm.   Pulmonary:      Effort: Pulmonary effort is normal.      Breath sounds: Normal breath sounds.   Abdominal:      Palpations: Abdomen is soft.   Musculoskeletal:      Right lower leg: No edema.      Left lower leg: No edema.   Skin:     General: Skin is warm and dry.   Neurological:      Mental Status: He is alert. Mental status is at baseline.   Psychiatric:         Mood and Affect: Mood normal.   "       Behavior: Behavior normal.         Pertinent Laboratory/Radiology Results     Pertinent Laboratory Results:            Results from last 7 days   Lab Units 09/12/24  0046 09/10/24  2359 09/09/24  1214   WBC 10*3/mm3 8.73 11.04* 9.77   HEMOGLOBIN g/dL 9.7* 10.1* 11.3*   HEMATOCRIT % 31.7* 33.9* 35.9*   MCV fL 78.3* 84.1 77.4*   MCHC g/dL 30.6* 29.8* 31.5   PLATELETS 10*3/mm3 384 372 444   INR   --   --  0.94     Results from last 7 days   Lab Units 09/12/24  0054 09/10/24  2359 09/09/24  1214   SODIUM mmol/L 133* 134* 136   POTASSIUM mmol/L 3.9 4.7 4.2   CHLORIDE mmol/L 100 101 101   CO2 mmol/L 19.3* 16.8* 24.5   BUN mg/dL 13 17 17   CREATININE mg/dL 0.70* 0.81 0.78   CALCIUM mg/dL 9.0 8.6 9.3   GLUCOSE mg/dL 99 107* 102*   ALBUMIN g/dL  --   --  3.9   BILIRUBIN mg/dL  --   --  0.3   ALK PHOS U/L  --   --  143*   AST (SGOT) U/L  --   --  10   ALT (SGPT) U/L  --   --  6   Estimated Creatinine Clearance: 112.3 mL/min (A) (by C-G formula based on SCr of 0.7 mg/dL (L)).  No results found for: \"AMMONIA\"    Glucose   Date/Time Value Ref Range Status   09/12/2024 0655 99 70 - 130 mg/dL Final     Lab Results   Component Value Date    HGBA1C 5.2 10/26/2023     Lab Results   Component Value Date    TSH 1.610 08/05/2024    FREET4 1.22 06/06/2018       No results found for: \"BLOODCX\"  No results found for: \"URINECX\"  No results found for: \"WOUNDCX\"  No results found for: \"STOOLCX\"  No results found for: \"RESPCX\"  Pain Management Panel          Latest Ref Rng & Units 10/12/2023   Pain Management Panel   Creatinine, Urine mg/dL 80.7       Details                   Pertinent Radiology Results:  Imaging Results (All)       Procedure Component Value Units Date/Time    FL Surgery Fluoro [[id number]] Collected: 09/11/24 0832     Updated: 09/11/24 0835    Narrative:      Fluoro time     PROCEDURE: Fluoroscopy in the operating room.     HISTORY: orif; S72.001A-Fracture of unspecified part of neck of right  femur, initial encounter " for closed fracture; Z98.890-Other specified  postprocedural states     COMPARISON: None.     FINDINGS: Fluoroscopy time was provided by the radiology department for  the clinical service. 2 images were obtained and fluoroscopy time was  0.8 minutes. The images provided demonstrates compression screws  spanning a subcapital right femoral neck fracture.       Impression:      Please see the operative report.                 This report was finalized on 9/11/2024 8:33 AM by Kasey Lorenzo M.D..       CT Lumbar Spine Without Contrast [[id number]] Collected: 09/09/24 1049     Updated: 09/09/24 1052    Narrative:      PROCEDURE: CT LUMBAR SPINE WO CONTRAST-     HISTORY: fall from a bull     TECHNIQUE: Multiple axial CT images were obtained through the lumbar  spine using bone window algorithms. Coronal and sagittal images were  reconstructed from the original axial data set. This study was performed  with techniques to keep radiation doses as low as reasonably achievable  (ALARA). Individualized dose reduction techniques using automated  exposure control or adjustment of mA and/or kV according to the patient  size were employed.     COMPARISON: None.     FINDINGS: The lumbar spine is well aligned with no acute fractures.  There are diffuse degenerative changes with loss of disc space height  and facet arthropathy. Note is made of a bilateral L5 pars defect. The  paraspinal soft tissues are unremarkable.       Impression:      No acute process.              This report was finalized on 9/9/2024 10:50 AM by Kasey Lorenzo M.D..       XR Femur 2 View Right [[id number]] Collected: 09/09/24 1044     Updated: 09/09/24 1048    Narrative:      PROCEDURE: XR FEMUR 2 VW RIGHT-     HISTORY: fall     COMPARISON: None.     FINDINGS:  A 2 view exam demonstrates a suspected subcapital right  femoral neck fracture.  The joint spaces are preserved.  No soft tissue  abnormality is seen.       Impression:      Subcapital right  femoral neck fracture.                 This report was finalized on 9/9/2024 10:46 AM by Kasey Lorenzo M.D..       XR Hip With or Without Pelvis 2 - 3 View Right [[id number]] Collected: 09/09/24 1046     Updated: 09/09/24 1048    Narrative:      PROCEDURE: XR HIP W OR WO PELVIS 2-3 VIEW RIGHT-     HISTORY: fall     COMPARISON: None.     FINDINGS: There is a subcapital right femoral neck fracture. The joint  spaces are preserved. The pubic symphysis and SI joints are intact. The  soft tissues are unremarkable.       Impression:      Subcapital right femoral neck fracture.              This report was finalized on 9/9/2024 10:46 AM by Kasey Lorenzo M.D..               Discharge Disposition/Discharge Medications/Discharge Appointments     Discharge Disposition:   Home or Self Care    Discharge Follow up:   Additional Instructions for the Follow-ups that You Need to Schedule       Ambulatory Referral to Physical Therapy   As directed      Specialty needed: Evaluate and treat POST OP   Follow-up needed: Yes        Discharge Follow-up with PCP   As directed       Currently Documented PCP:    Yolande Olvera APRN    PCP Phone Number:    [phone]     Follow Up Details: 1 week post hospital discharge        Discharge Follow-up with Specified Provider: Ortho 2 weeks   As directed      To: Ortho 2 weeks               Follow-up Information       Yolande Olvera APRN .    Specialty: Family Medicine  Why: 1 week post hospital discharge  Contact information:  [address] [place] [address]  [phone]                             Condition at Discharge:  Stable     Discharge Medications:     Your medication list        START taking these medications        Instructions Last Dose Given Next Dose Due   cyclobenzaprine 10 MG tablet  Commonly known as: FLEXERIL      Take 1 tablet by mouth 3 (Three) Times a Day As Needed for Muscle Spasms.       Enoxaparin Sodium 40 MG/0.4ML solution prefilled syringe syringe  Commonly known as:  LOVENOX      Inject 0.4 mL under the skin into the appropriate area as directed Daily for 20 days.       ondansetron 4 MG tablet  Commonly known as: Zofran      Take 1 tablet by mouth Every 8 (Eight) Hours As Needed for Nausea or Vomiting for up to 5 days.       oxyCODONE-acetaminophen 7.5-325 MG per tablet  Commonly known as: PERCOCET      Take 1 tablet by mouth Every 8 Hours As Needed for Moderate Pain for up to 10 days.              CONTINUE taking these medications        Instructions Last Dose Given Next Dose Due   aspirin 81 MG EC tablet      Take 1 tablet by mouth Daily.       atorvastatin 20 MG tablet  Commonly known as: LIPITOR      Take 1 tablet by mouth Every Night.       enzalutamide 40 MG chemo capsule  Commonly known as: XTANDI      Take 4 capsules by mouth Daily.       fenofibrate 145 MG tablet  Commonly known as: TRICOR      Take 1 tablet by mouth Daily.       HYDROcodone-acetaminophen  MG per tablet  Commonly known as: NORCO      Take 1 tablet by mouth Every 6 (Six) Hours As Needed for Moderate Pain.       hydrOXYzine 50 MG tablet  Commonly known as: ATARAX      Take 1 tablet by mouth 3 (Three) Times a Day As Needed for Itching.       metoprolol tartrate 25 MG tablet  Commonly known as: LOPRESSOR      Take 1 tablet by mouth 2 (Two) Times a Day.       pantoprazole 40 MG EC tablet  Commonly known as: PROTONIX      Take 1 tablet by mouth Daily.                 Where to Get Your Medications        These medications were sent to [facility] [address] [address]      Hours: Monday to Friday 7 AM to 6 PM Phone: [phone]   cyclobenzaprine 10 MG tablet  Enoxaparin Sodium 40 MG/0.4ML solution prefilled syringe syringe  ondansetron 4 MG tablet  oxyCODONE-acetaminophen 7.5-325 MG per tablet          Discharge Diet:  Diet Instructions    Regular           Discharge Activity:  Activity Instructions    RLE Weight Bearing as Tolerated        Maintain current surgical  dressing.  May reinforce for saturation              Time spent on this discharge exceeded 30 minutes.

## 2024-09-12 NOTE — TELEPHONE ENCOUNTER
SS notified by Pt and Pt's dtr Daisha [phone] states Outpt PT will not be an option due to both his dtrs working. They have requested home health instead. SS notified providers. Pt prefers [facility]. SS to arrange [facility] with a HH order.

## 2024-09-12 NOTE — DISCHARGE PLACEMENT REQUEST
"Francisco aMdison (64 y.o. Male)       Date of Birth   1960    Social Security Number       [address] [id number]    Home Phone   [phone]    MRN   [id number]       Gnosticist   None    Marital Status                               Admission Date   24    Admission Type   Emergency    Admitting Provider   Jason Wilcox MD    Attending Provider   Jason Wilcox MD    Department, Room/Bed   [address], 3350/2S       Discharge Date       Discharge Disposition   Home or Self Care    Discharge Destination                                 Attending Provider: Jason Wilcox MD    Allergies: No Known Allergies    Isolation: None   Infection: None   Code Status: CPR    Ht: 170.2 cm (67\")   Wt: 73.5 kg (162 lb)    Admission Cmt: None   Principal Problem: Hip fracture [S72.009A]                   Active Insurance as of 2024       Primary Coverage       Payor Plan Insurance Group Employer/Plan Group    [employer] [place] [employer] [place]ICA[place]        Payor Plan Address Payor Plan Phone Number Payor Plan Fax Number Effective Dates    [address] [address] [phone]  2018 - None Entered    [facility] [id number]         Subscriber Name Subscriber Birth Date Member ID       FRANCISCO MADISON 1960 [id number]                     Emergency Contacts        (Rel.) Home Phone Work Phone Mobile Phone    YOLI LORD (Daughter) -- -- [phone]          [address] [address]  Dept. Phone:  [phone]  Dept. Fax:  [phone] Date Ordered: Sep 12, 2024         Patient:  Francisco Madison MRN:  [id number]   Hany CHAVIS  [facility] [address] :  1960  SSN:    Phone: [phone] Sex:  M     Weight: 73.5 kg (162 lb)         Ht Readings from Last 1 Encounters:   09/10/24 170.2 cm (67\")         Walker  Walker Folding with Wheels              (Order ID: [id number])    Diagnosis:  Closed fracture " of right hip, initial encounter (S72.001A [ICD-10-CM] 820.8 [ICD-9-CM])   Quantity:  1     Equipment:  Walker Folding with Wheels  Length of Need: 99 Months = Lifetime        Authorizing Provider's Phone: [phone]  Authorizing Provider:Jason Wilcox MD  Authorizing Provider's NPI: [id number]  Order Entered By: Jason Wilcox MD 2024  8:18 AM     Electronically signed by: Jason Wilcox MD 2024  8:18 AM            History & Physical        Jp Tolliver PA-C at 24 1233       Attestation signed by Jason Wilcox MD at 24 1406    I have evaluated the patient independently, I have reviewed H&P, all labs and images from today and evaluated the patient at bedside.  I have discussed with NORI Tolliver and agree.  Give dose of SQH now once, diet for today, IV narcotics for pain control, NPO @ MN, Orthopedics consulted for recommendations, likely definitive intervention tomorrow surgically but follow up Orthopedics plans, Will follow up tomorrow with formal note and updated plan.                      [facility]  History & Physical    Patient Identification:  Name:  Ta Madison  Age:  64 y.o.  Sex:  male  :  1960  MRN:  [id number]   Visit Number:  [id number]  Admit Date: 2024   Primary Care Physician:  Yolande Olvera APRN    Subjective     Chief complaint: Fall    History of presenting illness:      Ta Madison is a 64 y.o. male who presented for further evaluation of right hip pain s/p fall yesterday around 1 o clock. He reports he was loosening a fitting with a large wrench- when the fitting suddenly broke loose he fell backward, landing on high right hip. He states he tried to walk it off yesterday evening but as pain persisting today he came in for further evaluation. He reports pain currently well controlled if he's lying still. He denies further injury or complaint. He reports PMHx significant for stage IV prostate cancer  with mets to bone including the hips. He follows with Dr. Martinez. He also reports a recent history of severe pancreatitis due to choledocholithiasis back in May.     Past medical history is significant for HLD, GERD, prostate cancer with mets to bone, tobacco abuse    Upon arrival to the ED, vital signs were temp 98, heart rate 87, respirations 18, /60, SpO2 98% on RA.  XR right femur showed subcapital right femoral neck fracture.  EKG was NSR.    Known Emergency Department medications received prior to my evaluation included morphine, zofran.   Emergency Department Room location at the time of my evaluation was 411.     ---------------------------------------------------------------------------------------------------------------------   Review of Systems   Constitutional:  Negative for chills and fever.   HENT:  Negative for congestion and rhinorrhea.    Respiratory:  Negative for cough and shortness of breath.    Cardiovascular:  Negative for chest pain.   Gastrointestinal:  Negative for abdominal pain.   Genitourinary:  Negative for difficulty urinating and dysuria.   Musculoskeletal:  Positive for arthralgias and myalgias.   Skin:  Negative for rash and wound.   Neurological:  Negative for dizziness and light-headedness.        ---------------------------------------------------------------------------------------------------------------------   Past Medical History:   Diagnosis Date    Elevated cholesterol     GERD (gastroesophageal reflux disease)     Increased heart rate     Mini stroke     Neck pain     Prostate cancer     Rash     Tobacco abuse      Past Surgical History:   Procedure Laterality Date    APPENDECTOMY  1971    [facility]     COLONOSCOPY      COLONOSCOPY N/A 1/25/2023    Procedure: COLONOSCOPY;  Surgeon: Mahnza Caraballo MD;  Location: [facility];  Service: Gastroenterology;  Laterality: N/A;    ENDOSCOPY N/A 7/31/2023    Procedure: ESOPHAGOGASTRODUODENOSCOPY WITH KEOFEED  PLACEMENT;  Surgeon: Yolande Eller MD;  Location: [facility]COPY;  Service: Gastroenterology;  Laterality: N/A;     Family History   Problem Relation Age of Onset    Nephrolithiasis Mother     Heart disease Father     Hypertension Father     Kidney disease Father      Social History     Socioeconomic History    Marital status:    Tobacco Use    Smoking status: Some Days     Current packs/day: 0.00     Average packs/day: 2.0 packs/day for 45.0 years (90.0 ttl pk-yrs)     Types: Cigarettes     Start date: 1978     Last attempt to quit: 2023     Years since quittin.1    Smokeless tobacco: Never   Vaping Use    Vaping status: Never Used   Substance and Sexual Activity    Alcohol use: No    Drug use: Yes     Types: Marijuana     Comment: occ     Sexual activity: Defer     ---------------------------------------------------------------------------------------------------------------------   Allergies:  Patient has no known allergies.  ---------------------------------------------------------------------------------------------------------------------   Home medications:    Medications below are reported home medications pulling from within the system; at this time, these medications have not been reconciled unless otherwise specified and are in the verification process for further verifcation as current home medications.  (Not in a hospital admission)      Hospital Scheduled Meds:          Current listed hospital scheduled medications may not yet reflect those currently placed in orders that are signed and held awaiting patient's arrival to floor.   ---------------------------------------------------------------------------------------------------------------------     Objective     Vital Signs:  Temp:  [98 °F (36.7 °C)] 98 °F (36.7 °C)  Heart Rate:  [71-87] 71  Resp:  [18] 18  BP: (106)/(60) 106/60      24  0946   Weight: 73.5 kg (162 lb)     Body mass index is 25.37  "kg/m².  ---------------------------------------------------------------------------------------------------------------------       Physical Exam  Vitals and nursing note reviewed.   HENT:      Head: Normocephalic and atraumatic.   Eyes:      Extraocular Movements: Extraocular movements intact.   Cardiovascular:      Rate and Rhythm: Normal rate and regular rhythm.      Pulses: Normal pulses.   Pulmonary:      Effort: Pulmonary effort is normal.      Breath sounds: No wheezing or rhonchi.   Abdominal:      Palpations: Abdomen is soft.   Musculoskeletal:      Right lower leg: No edema.      Left lower leg: No edema.   Skin:     General: Skin is warm and dry.   Neurological:      Mental Status: He is alert. Mental status is at baseline.   Psychiatric:         Mood and Affect: Mood normal.         Behavior: Behavior normal.             ---------------------------------------------------------------------------------------------------------------------  EKG:        I have personally looked at the EKG.  ---------------------------------------------------------------------------------------------------------------------   Results from last 7 days   Lab Units 09/09/24  1214   WBC 10*3/mm3 9.77   HEMOGLOBIN g/dL 11.3*   HEMATOCRIT % 35.9*   MCV fL 77.4*   MCHC g/dL 31.5   PLATELETS 10*3/mm3 444   INR  0.94               Invalid input(s): \"PROT\"CrCl cannot be calculated (Patient's most recent lab result is older than the maximum 30 days allowed.).  No results found for: \"AMMONIA\"          Lab Results   Component Value Date    HGBA1C 5.2 10/26/2023     Lab Results   Component Value Date    TSH 1.610 08/05/2024    FREET4 1.22 06/06/2018     No results found for: \"PREGTESTUR\", \"PREGSERUM\", \"HCG\", \"HCGQUANT\"  Pain Management Panel          Latest Ref Rng & Units 10/12/2023   Pain Management Panel   Creatinine, Urine mg/dL 80.7       Details                 No results found for: \"BLOODCX\"  No results found for: \"URINECX\"  No " "results found for: \"WOUNDCX\"  No results found for: \"STOOLCX\"      ---------------------------------------------------------------------------------------------------------------------  Imaging Results (Last 7 Days)       Procedure Component Value Units Date/Time    CT Lumbar Spine Without Contrast [[id number]] Collected: 09/09/24 1049     Updated: 09/09/24 1052    Narrative:      PROCEDURE: CT LUMBAR SPINE WO CONTRAST-     HISTORY: fall from a bull     TECHNIQUE: Multiple axial CT images were obtained through the lumbar  spine using bone window algorithms. Coronal and sagittal images were  reconstructed from the original axial data set. This study was performed  with techniques to keep radiation doses as low as reasonably achievable  (ALARA). Individualized dose reduction techniques using automated  exposure control or adjustment of mA and/or kV according to the patient  size were employed.     COMPARISON: None.     FINDINGS: The lumbar spine is well aligned with no acute fractures.  There are diffuse degenerative changes with loss of disc space height  and facet arthropathy. Note is made of a bilateral L5 pars defect. The  paraspinal soft tissues are unremarkable.       Impression:      No acute process.              This report was finalized on 9/9/2024 10:50 AM by Kasey Lorenzo M.D..       XR Femur 2 View Right [[id number]] Collected: 09/09/24 1044     Updated: 09/09/24 1048    Narrative:      PROCEDURE: XR FEMUR 2 VW RIGHT-     HISTORY: fall     COMPARISON: None.     FINDINGS:  A 2 view exam demonstrates a suspected subcapital right  femoral neck fracture.  The joint spaces are preserved.  No soft tissue  abnormality is seen.       Impression:      Subcapital right femoral neck fracture.                 This report was finalized on 9/9/2024 10:46 AM by Kasey Lorenzo M.D..       XR Hip With or Without Pelvis 2 - 3 View Right [[id number]] Collected: 09/09/24 1046     Updated: 09/09/24 1048    Narrative:  " "    PROCEDURE: XR HIP W OR WO PELVIS 2-3 VIEW RIGHT-     HISTORY: fall     COMPARISON: None.     FINDINGS: There is a subcapital right femoral neck fracture. The joint  spaces are preserved. The pubic symphysis and SI joints are intact. The  soft tissues are unremarkable.       Impression:      Subcapital right femoral neck fracture.              This report was finalized on 9/9/2024 10:46 AM by Kasey Lorenzo M.D..               Cultures:  No results found for: \"BLOODCX\", \"URINECX\", \"WOUNDCX\", \"MRSACX\", \"RESPCX\", \"STOOLCX\"    Last echocardiogram:          I have personally reviewed the above radiology images and read the final radiology report on 09/09/24  ---------------------------------------------------------------------------------------------------------------------  Assessment / Plan     Active Hospital Problems    Diagnosis  POA    **Hip fracture [S72.009A]  Yes       ASSESSMENT/PLAN:    Right femoral neck fracture  Patient presented to the ED following fall with subsequent right hip pain, found to have right femoral neck fracture  Admit to the telemetry unit  Consult orthopedic surgery for further assistance and recommendations, appreciated  NPO at midnight  Continue PRN pain regimen  Will benefit from PT post operatively  Trend labs PRN    Chronic:  HLD  GERD  Prostate cancer  Continue  home medications as indicated once med rec is complete      ----------  -DVT prophylaxis: SCDs  -Activity: Strict bed rest  -Expected length of stay: INPATIENT status due to the need for care which can only be reasonably provided in an hospital setting such as aggressive/expedited ancillary services and/or consultation services, the necessity for IV medications, close physician monitoring and/or the possible need for procedures.  In such, I feel patient’s risk for adverse outcomes and need for care warrant INPATIENT evaluation and predict the patient’s care encounter to likely last beyond 2 midnights.   -Disposition " pending further course    High risk secondary to Right hip fx    Code Status and Medical Interventions: CPR (Attempt to Resuscitate); Full Support   Ordered at: 09/09/24 1228     Code Status (Patient has no pulse and is not breathing):    CPR (Attempt to Resuscitate)     Medical Interventions (Patient has pulse or is breathing):    Full Support       Jp Tolliver PA-C   09/09/24  12:33 EDT    Electronically signed by Jason Wilcox MD at 09/09/24 4714

## 2024-09-12 NOTE — OUTREACH NOTE
Prep Survey      Flowsheet Row Responses   [facility] patient discharged from? [facility]   Is LACE score < 7 ? No   Eligibility [facility]   Date of Admission 09/09/24   Date of Discharge 09/12/24   Discharge diagnosis Hip fracture, FEMORAL NECK OPEN REDUCTION INTERNAL FIXATION   Does the patient have one of the following disease processes/diagnoses(primary or secondary)? General Surgery   Does the patient have Home health ordered? No   Is there a DME ordered? Yes   What DME was ordered? rolling walker from [facility]   Comments regarding appointments out pt PT   Prep survey completed? Yes            Graciela MIRZA - Registered Nurse

## 2024-09-13 ENCOUNTER — TRANSITIONAL CARE MANAGEMENT TELEPHONE ENCOUNTER (OUTPATIENT)
Dept: CALL CENTER | Facility: HOSPITAL | Age: 64
End: 2024-09-13
Payer: COMMERCIAL

## 2024-09-13 NOTE — DISCHARGE PLACEMENT REQUEST
"Francisco Madison (64 y.o. Male)       Date of Birth   1960    Social Security Number       Address   [address] [id number]    Home Phone   [phone]    MRN   [id number]       Sikh   None    Marital Status                               Admission Date   24    Admission Type   Emergency    Admitting Provider   Gonzalez Wilcox MD    Attending Provider       Department, Room/Bed   [address], 3350/2S       Discharge Date   2024    Discharge Disposition   Home or Self Care    Discharge Destination   Home                              Attending Provider: (none)   Allergies: No Known Allergies    Isolation: None   Infection: None   Code Status: Prior    Ht: 170.2 cm (67\")   Wt: 73.5 kg (162 lb)    Admission Cmt: None   Principal Problem: Hip fracture [S72.009A]                   Active Insurance as of 2024       Primary Coverage       Payor Plan Insurance Group Employer/Plan Group    [employer] [place] [employer] [place]ICA[place]        Payor Plan Address Payor Plan Phone Number Payor Plan Fax Number Effective Dates     [address] [address] [phone]  2018 - None Entered    [facility] [id number]         Subscriber Name Subscriber Birth Date Member ID       FRANCISCO MADISON 1960 [id number]                     Emergency Contacts        (Rel.) Home Phone Work Phone Mobile Phone    YOLI LORD (Daughter) -- -- [phone]          [address] [address]  Phone:  [phone]  Fax:  [phone] Date: Sep 12, 2024      Ambulatory Referral to Home Health     Patient:  Francisco Madison MRN:  [id number]   [facility] [address] :  1960  SSN:    Phone: [phone] Sex:  M      INSURANCE PAYOR PLAN GROUP # SUBSCRIBER ID   Primary:    [facility] [id number]   [id number]      Referring Provider Information:  GONZALEZ WILCOX Phone: [phone] Fax: [phone]       Referral " Information:   # Visits:  999 Referral Type: Home Health [42]   Urgency:  Routine Referral Reason: Specialty Services Required   Start Date: Sep 12, 2024 End Date:  To be determined by Insurer   Diagnosis: Closed fracture of right hip, initial encounter (S72.001A [ICD-10-CM] 820.8 [ICD-9-CM])      Refer to Dept:   Refer to Provider:   Refer to Provider Phone:   Refer to Facility:       Face to Face Visit Date: 9/12/2024  Follow-up provider for Plan of Care? I treated the patient in an acute care facility and will not continue treatment after discharge.  Follow-up provider: ANUPAM CHONG [[id number]]  Reason/Clinical Findings: debility, hip fx  Describe mobility limitations that make leaving home difficult: debility, hip fx  Nursing/Therapeutic Services Requested: Physical Therapy  Nursing/Therapeutic Services Requested: Skilled Nursing  Skilled nursing orders: Cardiopulmonary assessments  Skilled nursing orders: Wound care dressing/changes  PT orders: Therapeutic exercise  PT orders: Strengthening  PT orders: Total joint pathway  Frequency: 1 Week 1     This document serves as a request of services and does not constitute Insurance authorization or approval of services.  To determine eligibility, please contact the members Insurance carrier to verify and review coverage.     If you have medical questions regarding this request for services. Please contact [address] at [phone] during normal business hours.        Authorizing Provider:Jason Wilcox MD  Authorizing Provider's NPI: [id number]  Order Entered By: Jason Wilcox MD 9/12/2024  5:05 PM     Electronically signed by: Jason Wilcox MD 9/12/2024  5:05 PM          History & Physical        Jp Tolliver PA-C at 09/09/24 1233       Attestation signed by Jason Wilcox MD at 09/09/24 1406    I have evaluated the patient independently, I have reviewed H&P, all labs and images from today and evaluated the patient at bedside.  I have  discussed with NORI Tolliver and agree.  Give dose of SQH now once, diet for today, IV narcotics for pain control, NPO @ MN, Orthopedics consulted for recommendations, likely definitive intervention tomorrow surgically but follow up Orthopedics plans, Will follow up tomorrow with formal note and updated plan.                      [facility]  History & Physical    Patient Identification:  Name:  Ta Madison  Age:  64 y.o.  Sex:  male  :  1960  MRN:  [id number]   Visit Number:  [id number]  Admit Date: 2024   Primary Care Physician:  Yolande Olvera APRN    Subjective     Chief complaint: Fall    History of presenting illness:      Ta Madison is a 64 y.o. male who presented for further evaluation of right hip pain s/p fall yesterday around 1 o clock. He reports he was loosening a fitting with a large wrench- when the fitting suddenly broke loose he fell backward, landing on high right hip. He states he tried to walk it off yesterday evening but as pain persisting today he came in for further evaluation. He reports pain currently well controlled if he's lying still. He denies further injury or complaint. He reports PMHx significant for stage IV prostate cancer with mets to bone including the hips. He follows with Dr. Martinez. He also reports a recent history of severe pancreatitis due to choledocholithiasis back in May.     Past medical history is significant for HLD, GERD, prostate cancer with mets to bone, tobacco abuse    Upon arrival to the ED, vital signs were temp 98, heart rate 87, respirations 18, /60, SpO2 98% on RA.  XR right femur showed subcapital right femoral neck fracture.  EKG was NSR.    Known Emergency Department medications received prior to my evaluation included morphine, zofran.   Emergency Department Room location at the time of my evaluation was 411.      ---------------------------------------------------------------------------------------------------------------------   Review of Systems   Constitutional:  Negative for chills and fever.   HENT:  Negative for congestion and rhinorrhea.    Respiratory:  Negative for cough and shortness of breath.    Cardiovascular:  Negative for chest pain.   Gastrointestinal:  Negative for abdominal pain.   Genitourinary:  Negative for difficulty urinating and dysuria.   Musculoskeletal:  Positive for arthralgias and myalgias.   Skin:  Negative for rash and wound.   Neurological:  Negative for dizziness and light-headedness.        ---------------------------------------------------------------------------------------------------------------------   Past Medical History:   Diagnosis Date    Elevated cholesterol     GERD (gastroesophageal reflux disease)     Increased heart rate     Mini stroke     Neck pain     Prostate cancer     Rash     Tobacco abuse      Past Surgical History:   Procedure Laterality Date    APPENDECTOMY      [facility]     COLONOSCOPY      COLONOSCOPY N/A 2023    Procedure: COLONOSCOPY;  Surgeon: Mahnaz Caraballo MD;  [facility];  Service: Gastroenterology;  Laterality: N/A;    ENDOSCOPY N/A 2023    Procedure: ESOPHAGOGASTRODUODENOSCOPY WITH KEOFEED PLACEMENT;  Surgeon: Yolande Eller MD;  Location:  [facility];  Service: Gastroenterology;  Laterality: N/A;     Family History   Problem Relation Age of Onset    Nephrolithiasis Mother     Heart disease Father     Hypertension Father     Kidney disease Father      Social History     Socioeconomic History    Marital status:    Tobacco Use    Smoking status: Some Days     Current packs/day: 0.00     Average packs/day: 2.0 packs/day for 45.0 years (90.0 ttl pk-yrs)     Types: Cigarettes     Start date: 1978     Last attempt to quit: 2023     Years since quittin.1    Smokeless tobacco: Never   Vaping  Use    Vaping status: Never Used   Substance and Sexual Activity    Alcohol use: No    Drug use: Yes     Types: Marijuana     Comment: occ     Sexual activity: Defer     ---------------------------------------------------------------------------------------------------------------------   Allergies:  Patient has no known allergies.  ---------------------------------------------------------------------------------------------------------------------   Home medications:    Medications below are reported home medications pulling from within the system; at this time, these medications have not been reconciled unless otherwise specified and are in the verification process for further verifcation as current home medications.  (Not in a hospital admission)      Hospital Scheduled Meds:          Current listed hospital scheduled medications may not yet reflect those currently placed in orders that are signed and held awaiting patient's arrival to floor.   ---------------------------------------------------------------------------------------------------------------------     Objective     Vital Signs:  Temp:  [98 °F (36.7 °C)] 98 °F (36.7 °C)  Heart Rate:  [71-87] 71  Resp:  [18] 18  BP: (106)/(60) 106/60      09/09/24  0946   Weight: 73.5 kg (162 lb)     Body mass index is 25.37 kg/m².  ---------------------------------------------------------------------------------------------------------------------       Physical Exam  Vitals and nursing note reviewed.   HENT:      Head: Normocephalic and atraumatic.   Eyes:      Extraocular Movements: Extraocular movements intact.   Cardiovascular:      Rate and Rhythm: Normal rate and regular rhythm.      Pulses: Normal pulses.   Pulmonary:      Effort: Pulmonary effort is normal.      Breath sounds: No wheezing or rhonchi.   Abdominal:      Palpations: Abdomen is soft.   Musculoskeletal:      Right lower leg: No edema.      Left lower leg: No edema.   Skin:     General: Skin is warm  "and dry.   Neurological:      Mental Status: He is alert. Mental status is at baseline.   Psychiatric:         Mood and Affect: Mood normal.         Behavior: Behavior normal.             ---------------------------------------------------------------------------------------------------------------------  EKG:        I have personally looked at the EKG.  ---------------------------------------------------------------------------------------------------------------------   Results from last 7 days   Lab Units 09/09/24  1214   WBC 10*3/mm3 9.77   HEMOGLOBIN g/dL 11.3*   HEMATOCRIT % 35.9*   MCV fL 77.4*   MCHC g/dL 31.5   PLATELETS 10*3/mm3 444   INR  0.94               Invalid input(s): \"PROT\"CrCl cannot be calculated (Patient's most recent lab result is older than the maximum 30 days allowed.).  No results found for: \"AMMONIA\"          Lab Results   Component Value Date    HGBA1C 5.2 10/26/2023     Lab Results   Component Value Date    TSH 1.610 08/05/2024    FREET4 1.22 06/06/2018     No results found for: \"PREGTESTUR\", \"PREGSERUM\", \"HCG\", \"HCGQUANT\"  Pain Management Panel          Latest Ref Rng & Units 10/12/2023   Pain Management Panel   Creatinine, Urine mg/dL 80.7       Details                 No results found for: \"BLOODCX\"  No results found for: \"URINECX\"  No results found for: \"WOUNDCX\"  No results found for: \"STOOLCX\"      ---------------------------------------------------------------------------------------------------------------------  Imaging Results (Last 7 Days)       Procedure Component Value Units Date/Time    CT Lumbar Spine Without Contrast [[id number]] Collected: 09/09/24 1049     Updated: 09/09/24 1052    Narrative:      PROCEDURE: CT LUMBAR SPINE WO CONTRAST-     HISTORY: fall from a bull     TECHNIQUE: Multiple axial CT images were obtained through the lumbar  spine using bone window algorithms. Coronal and sagittal images were  reconstructed from the original axial data set. This study was " "performed  with techniques to keep radiation doses as low as reasonably achievable  (ALARA). Individualized dose reduction techniques using automated  exposure control or adjustment of mA and/or kV according to the patient  size were employed.     COMPARISON: None.     FINDINGS: The lumbar spine is well aligned with no acute fractures.  There are diffuse degenerative changes with loss of disc space height  and facet arthropathy. Note is made of a bilateral L5 pars defect. The  paraspinal soft tissues are unremarkable.       Impression:      No acute process.              This report was finalized on 9/9/2024 10:50 AM by Kasey Lorenzo M.D..       XR Femur 2 View Right [[id number]] Collected: 09/09/24 1044     Updated: 09/09/24 1048    Narrative:      PROCEDURE: XR FEMUR 2 VW RIGHT-     HISTORY: fall     COMPARISON: None.     FINDINGS:  A 2 view exam demonstrates a suspected subcapital right  femoral neck fracture.  The joint spaces are preserved.  No soft tissue  abnormality is seen.       Impression:      Subcapital right femoral neck fracture.                 This report was finalized on 9/9/2024 10:46 AM by Kasey Lorenzo M.D..       XR Hip With or Without Pelvis 2 - 3 View Right [[id number]] Collected: 09/09/24 1046     Updated: 09/09/24 1048    Narrative:      PROCEDURE: XR HIP W OR WO PELVIS 2-3 VIEW RIGHT-     HISTORY: fall     COMPARISON: None.     FINDINGS: There is a subcapital right femoral neck fracture. The joint  spaces are preserved. The pubic symphysis and SI joints are intact. The  soft tissues are unremarkable.       Impression:      Subcapital right femoral neck fracture.              This report was finalized on 9/9/2024 10:46 AM by Kasey Lorenzo M.D..               Cultures:  No results found for: \"BLOODCX\", \"URINECX\", \"WOUNDCX\", \"MRSACX\", \"RESPCX\", \"STOOLCX\"    Last echocardiogram:          I have personally reviewed the above radiology images and read the final radiology report " on 09/09/24  ---------------------------------------------------------------------------------------------------------------------  Assessment / Plan     Active Hospital Problems    Diagnosis  POA    **Hip fracture [S72.009A]  Yes       ASSESSMENT/PLAN:    Right femoral neck fracture  Patient presented to the ED following fall with subsequent right hip pain, found to have right femoral neck fracture  Admit to the telemetry unit  Consult orthopedic surgery for further assistance and recommendations, appreciated  NPO at midnight  Continue PRN pain regimen  Will benefit from PT post operatively  Trend labs PRN    Chronic:  HLD  GERD  Prostate cancer  Continue  home medications as indicated once med rec is complete      ----------  -DVT prophylaxis: SCDs  -Activity: Strict bed rest  -Expected length of stay: INPATIENT status due to the need for care which can only be reasonably provided in an hospital setting such as aggressive/expedited ancillary services and/or consultation services, the necessity for IV medications, close physician monitoring and/or the possible need for procedures.  In such, I feel patient’s risk for adverse outcomes and need for care warrant INPATIENT evaluation and predict the patient’s care encounter to likely last beyond 2 midnights.   -Disposition pending further course    High risk secondary to Right hip fx    Code Status and Medical Interventions: CPR (Attempt to Resuscitate); Full Support   Ordered at: 09/09/24 1228     Code Status (Patient has no pulse and is not breathing):    CPR (Attempt to Resuscitate)     Medical Interventions (Patient has pulse or is breathing):    Full Support       Jp Tolliver PA-C   09/09/24  12:33 EDT    Electronically signed by Jason Wilcox MD at 09/09/24 1406          Physician Progress Notes (most recent note)        Maren Olivo, APRN at 09/11/24 1332       Summary:POD #1 status post right hip ORIF with FNS                 Inpatient Progress  "Note  Ta Madison  Date: 09/11/24  MRN: [id number]      Subjective: Patient seen and evaluated at the bedside. He is doing well and states that his pain is much improved since having surgery. He has been walking in the hallway with physical therapy.  He is tolerating his diet well.  He denies any chest pain or shortness of breath.       Objective:      Vitals:    09/10/24 2302 09/11/24 0300 09/11/24 0650 09/11/24 1325   BP: 156/79 143/80 148/66 137/66   BP Location: Left arm Left arm Left arm Right arm   Patient Position: Lying Lying Lying Lying   Pulse: 87 69 95 87   Resp: 18 18 18 16   Temp: 98.5 °F (36.9 °C) 98.7 °F (37.1 °C) 99 °F (37.2 °C) 99.1 °F (37.3 °C)   TempSrc: Oral Oral Oral Oral   SpO2: 100% 100% 94% 96%   Weight:       Height:              Physical Exam:  Constitutional:  Well-developed, well-nourished.  No acute distress.        Musculoskeletal: Right hip exam: Surgical dressing with minimal drainage noted.  Light touch sensation is grossly intact to the right lower extremity.  Thigh is supple, calf is soft and nontender to palpation.  Dorsi and plantarflexion intact to the right ankle.  DP pulse palpable.    Labs:    Results from last 7 days   Lab Units 09/10/24  2359 09/09/24  1214   WBC 10*3/mm3 11.04* 9.77   HEMOGLOBIN g/dL 10.1* 11.3*   HEMATOCRIT % 33.9* 35.9*   MCV fL 84.1 77.4*   MCHC g/dL 29.8* 31.5   PLATELETS 10*3/mm3 372 444   INR   --  0.94         Results from last 7 days   Lab Units 09/10/24  2359 09/09/24  1214   SODIUM mmol/L 134* 136   POTASSIUM mmol/L 4.7 4.2   CHLORIDE mmol/L 101 101   CO2 mmol/L 16.8* 24.5   BUN mg/dL 17 17   CREATININE mg/dL 0.81 0.78   CALCIUM mg/dL 8.6 9.3   GLUCOSE mg/dL 107* 102*   ALBUMIN g/dL  --  3.9   BILIRUBIN mg/dL  --  0.3   ALK PHOS U/L  --  143*   AST (SGOT) U/L  --  10   ALT (SGPT) U/L  --  6   Estimated Creatinine Clearance: 97 mL/min (by C-G formula based on SCr of 0.81 mg/dL).  No results found for: \"AMMONIA\"          No results found for: " "\"HGBA1C\"  Lab Results   Component Value Date    TSH 1.610 08/05/2024    FREET4 1.22 06/06/2018         Pain Management Panel          Latest Ref Rng & Units 10/12/2023   Pain Management Panel   Creatinine, Urine mg/dL 80.7       Details                   No results found for: \"BLOODCX\"  No results found for: \"URINECX\"  No results found for: \"WOUNDCX\"  No results found for: \"STOOLCX\"              Radiology:  Imaging Results (Last 72 Hours)       Procedure Component Value Units Date/Time    FL Surgery Fluoro [[id number]] Collected: 09/11/24 0832     Updated: 09/11/24 0835    Narrative:      Fluoro time     PROCEDURE: Fluoroscopy in the operating room.     HISTORY: orif; S72.001A-Fracture of unspecified part of neck of right  femur, initial encounter for closed fracture; Z98.890-Other specified  postprocedural states     COMPARISON: None.     FINDINGS: Fluoroscopy time was provided by the radiology department for  the clinical service. 2 images were obtained and fluoroscopy time was  0.8 minutes. The images provided demonstrates compression screws  spanning a subcapital right femoral neck fracture.       Impression:      Please see the operative report.                 This report was finalized on 9/11/2024 8:33 AM by Kasey Lorenzo M.D..       CT Lumbar Spine Without Contrast [[id number]] Collected: 09/09/24 1049     Updated: 09/09/24 1052    Narrative:      PROCEDURE: CT LUMBAR SPINE WO CONTRAST-     HISTORY: fall from a bull     TECHNIQUE: Multiple axial CT images were obtained through the lumbar  spine using bone window algorithms. Coronal and sagittal images were  reconstructed from the original axial data set. This study was performed  with techniques to keep radiation doses as low as reasonably achievable  (ALARA). Individualized dose reduction techniques using automated  exposure control or adjustment of mA and/or kV according to the patient  size were employed.     COMPARISON: None.     FINDINGS: The lumbar " spine is well aligned with no acute fractures.  There are diffuse degenerative changes with loss of disc space height  and facet arthropathy. Note is made of a bilateral L5 pars defect. The  paraspinal soft tissues are unremarkable.       Impression:      No acute process.              This report was finalized on 9/9/2024 10:50 AM by Kasey Lorenzo M.D..       XR Femur 2 View Right [[id number]] Collected: 09/09/24 1044     Updated: 09/09/24 1048    Narrative:      PROCEDURE: XR FEMUR 2 VW RIGHT-     HISTORY: fall     COMPARISON: None.     FINDINGS:  A 2 view exam demonstrates a suspected subcapital right  femoral neck fracture.  The joint spaces are preserved.  No soft tissue  abnormality is seen.       Impression:      Subcapital right femoral neck fracture.                 This report was finalized on 9/9/2024 10:46 AM by Kasey Lorenzo M.D..       XR Hip With or Without Pelvis 2 - 3 View Right [[id number]] Collected: 09/09/24 1046     Updated: 09/09/24 1048    Narrative:      PROCEDURE: XR HIP W OR WO PELVIS 2-3 VIEW RIGHT-     HISTORY: fall     COMPARISON: None.     FINDINGS: There is a subcapital right femoral neck fracture. The joint  spaces are preserved. The pubic symphysis and SI joints are intact. The  soft tissues are unremarkable.       Impression:      Subcapital right femoral neck fracture.              This report was finalized on 9/9/2024 10:46 AM by Kasey Lorenzo M.D..                 Assessment:      POD #1 status post right hip open reduction internal fixation with FNS      Plan:     Maintain current surgical dressing.  May reinforce for saturation  .  PT/OT weight-bear as tolerated.  Use a walker for assistance.    Polar ice for pain and swelling.    DVT/PPx: Lovenox 40 mg subcutaneous daily x 14 days.    Continue pain control.    Discharge planning: TBD    Outpatient follow-up 2 weeks at discharge with Dr. Montoya.    Maren Olivo, EUGENIA   09/11/24   13:33  EDT    Electronically signed by Maren Olivo APRN at 24 1431          Consult Notes (most recent note)        Maren Olivo APRN at 09/10/24 0914        Consult Orders    1. Inpatient Orthopedic Surgery Consult [[id number]] ordered by Jason Wilcox MD              Attestation signed by Nacho Montoya MD at 09/10/24 1446    I have reviewed this documentation and agree.                    Inpatient Orthopedic Surgery Consult  Consult performed by: Maren Olivo APRN  Consult ordered by: Jason Wilcox MD        Patient Identification:  Name:  Ta Madison  Age:  64 y.o.  Sex:  male  :  1960  MRN:  [id number]  Visit Number:  [id number]  Primary care provider:  Yolande Olvera APRN    History of presenting illness:  Patient is a 63 y/o male seen in consultation regarding a right hip fracture. He fell yesterday while using a wrench to loosen a fitting. He developed right hip pain and difficulty with weight bearing on the right leg. He has no significant cardiac history and does not take blood thinners. His pain is currently controlled.     ---------------------------------------------------------------------------------------------------------------------    Review of Systems   Constitutional:  Positive for activity change.   Respiratory:  Negative for shortness of breath.    Cardiovascular:  Negative for chest pain.   Musculoskeletal:         Per the history of present illness   All other systems reviewed and are negative.     ---------------------------------------------------------------------------------------------------------------------   Past History:  Family History   Problem Relation Age of Onset    Nephrolithiasis Mother     Heart disease Father     Hypertension Father     Kidney disease Father      Past Medical History:   Diagnosis Date    Elevated cholesterol     GERD (gastroesophageal reflux disease)     Increased heart rate     Mini stroke     Neck pain      Prostate cancer     Rash     Tobacco abuse      Past Surgical History:   Procedure Laterality Date    [facility]     [facility]      COLONOSCOPY N/A 2023    Procedure: COLONOSCOPY;  Surgeon: Mahnaz Caraballo MD;  Location: [facility] OR;  Service: Gastroenterology;  Laterality: N/A;    ENDOSCOPY N/A 2023    Procedure: ESOPHAGOGASTRODUODENOSCOPY WITH KEOFEED PLACEMENT;  Surgeon: Yolande Eller MD;  Location:  [facility];  Service: Gastroenterology;  Laterality: N/A;     Social History     Socioeconomic History    Marital status:    Tobacco Use    Smoking status: Some Days     Current packs/day: 0.00     Average packs/day: 2.0 packs/day for 45.0 years (90.0 ttl pk-yrs)     Types: Cigarettes     Start date: 1978     Last attempt to quit: 2023     Years since quittin.1    Smokeless tobacco: Never   Vaping Use    Vaping status: Never Used   Substance and Sexual Activity    Alcohol use: No    Drug use: Yes     Types: Marijuana     Comment: occ     Sexual activity: Defer     ---------------------------------------------------------------------------------------------------------------------   Allergies:  Patient has no known allergies.  ---------------------------------------------------------------------------------------------------------------------   Prior to Admission Medications       Prescriptions Last Dose Informant Patient Reported? Taking?    aspirin 81 MG EC tablet 2024  Yes Yes    Take 1 tablet by mouth Daily.    atorvastatin (LIPITOR) 20 MG tablet 2024  No Yes    Take 1 tablet by mouth Every Night.    enzalutamide (XTANDI) 40 MG chemo capsule 2024  No Yes    Take 4 capsules by mouth Daily.    fenofibrate (TRICOR) 145 MG tablet 2024  No Yes    Take 1 tablet by mouth Daily.    hydrOXYzine (ATARAX) 50 MG tablet 2024  No Yes    Take 1 tablet by mouth 3 (Three) Times a Day As Needed for Itching.    metoprolol tartrate  (LOPRESSOR) 25 MG tablet 9/8/2024  No Yes    Take 1 tablet by mouth 2 (Two) Times a Day.    pantoprazole (PROTONIX) 40 MG EC tablet 9/9/2024  No Yes    Take 1 tablet by mouth Daily.    HYDROcodone-acetaminophen (NORCO)  MG per tablet Unknown  No No    Take 1 tablet by mouth Every 6 (Six) Hours As Needed for Moderate Pain.          [facility] Meds:  aspirin, 81 mg, Oral, Daily  atorvastatin, 20 mg, Oral, Nightly  fenofibrate, 145 mg, Oral, Daily  metoprolol tartrate, 25 mg, Oral, BID  pantoprazole, 40 mg, Oral, Daily  sodium chloride, 10 mL, Intravenous, Q12H         ---------------------------------------------------------------------------------------------------------------------   Vital Signs:  Temp:  [98 °F (36.7 °C)-98.7 °F (37.1 °C)] 98.7 °F (37.1 °C)  Heart Rate:  [68-87] 74  Resp:  [16-20] 20  BP: (105-127)/(55-62) 127/62      09/09/24  0946   Weight: 73.5 kg (162 lb)     Body mass index is 25.37 kg/m².  ---------------------------------------------------------------------------------------------------------------------     Physical exam:  Constitutional:  Well-developed and well-nourished.  No acute distress.      HENT:  Head: Normocephalic and atraumatic.  Mouth:  Moist mucous membranes.    Eyes:  Conjunctivae are normal.  Pupils are equal, round, and reactive to light.  No scleral icterus.  Neck:  Neck supple.  Trachea midline.  Cardiovascular:  Normal rate and regular rhythm.  Pulmonary/Chest:  No respiratory distress and good air movement.  Abdominal:  Soft.  No distension and no tenderness.   Musculoskeletal:  Right hip exam: Right lower extremity is in external rotation. SILT all dermatomes. DF/PF intact right ankle. DP pulse palpable.   Neurological:  Alert and oriented to person, place, and time.     Skin:  Skin is warm and dry.  No rash noted.  No ecchymosis or abrasion.   Psychiatric:  Normal mood and affect.  Behavior is normal.  Judgment and thought content normal.   Peripheral vascular:  No  "pitting edema and strong pulses on all 4 extremities.      ---------------------------------------------------------------------------------------------------------------------   .  ---------------------------------------------------------------------------------------------------------------------   Results from last 7 days   Lab Units 09/09/24  1214   WBC 10*3/mm3 9.77   HEMOGLOBIN g/dL 11.3*   HEMATOCRIT % 35.9*   MCV fL 77.4*   MCHC g/dL 31.5   PLATELETS 10*3/mm3 444   INR  0.94         Results from last 7 days   Lab Units 09/09/24  1214   SODIUM mmol/L 136   POTASSIUM mmol/L 4.2   CHLORIDE mmol/L 101   CO2 mmol/L 24.5   BUN mg/dL 17   CREATININE mg/dL 0.78   CALCIUM mg/dL 9.3   GLUCOSE mg/dL 102*   ALBUMIN g/dL 3.9   BILIRUBIN mg/dL 0.3   ALK PHOS U/L 143*   AST (SGOT) U/L 10   ALT (SGPT) U/L 6   Estimated Creatinine Clearance: 100.8 mL/min (by C-G formula based on SCr of 0.78 mg/dL).  No results found for: \"AMMONIA\"          Lab Results   Component Value Date    HGBA1C 5.2 10/26/2023     Lab Results   Component Value Date    TSH 1.610 08/05/2024    FREET4 1.22 06/06/2018     No results found for: \"PREGTESTUR\", \"PREGSERUM\", \"HCG\", \"HCGQUANT\"  Pain Management Panel          Latest Ref Rng & Units 10/12/2023   Pain Management Panel   Creatinine, Urine mg/dL 80.7       Details                 No results found for: \"BLOODCX\"  No results found for: \"URINECX\"  No results found for: \"WOUNDCX\"  No results found for: \"STOOLCX\"      ---------------------------------------------------------------------------------------------------------------------   Imaging Results (Last 7 Days)       Procedure Component Value Units Date/Time    CT Lumbar Spine Without Contrast [[id number]] Collected: 09/09/24 1049     Updated: 09/09/24 1052    Narrative:      PROCEDURE: CT LUMBAR SPINE WO CONTRAST-     HISTORY: fall from a bull     TECHNIQUE: Multiple axial CT images were obtained through the lumbar  spine using bone window algorithms. " Coronal and sagittal images were  reconstructed from the original axial data set. This study was performed  with techniques to keep radiation doses as low as reasonably achievable  (ALARA). Individualized dose reduction techniques using automated  exposure control or adjustment of mA and/or kV according to the patient  size were employed.     COMPARISON: None.     FINDINGS: The lumbar spine is well aligned with no acute fractures.  There are diffuse degenerative changes with loss of disc space height  and facet arthropathy. Note is made of a bilateral L5 pars defect. The  paraspinal soft tissues are unremarkable.       Impression:      No acute process.              This report was finalized on 9/9/2024 10:50 AM by Kasey Lorenzo M.D..       XR Femur 2 View Right [[id number]] Collected: 09/09/24 1044     Updated: 09/09/24 1048    Narrative:      PROCEDURE: XR FEMUR 2 VW RIGHT-     HISTORY: fall     COMPARISON: None.     FINDINGS:  A 2 view exam demonstrates a suspected subcapital right  femoral neck fracture.  The joint spaces are preserved.  No soft tissue  abnormality is seen.       Impression:      Subcapital right femoral neck fracture.                 This report was finalized on 9/9/2024 10:46 AM by Kasey Lorenzo M.D..       XR Hip With or Without Pelvis 2 - 3 View Right [[id number]] Collected: 09/09/24 1046     Updated: 09/09/24 1048    Narrative:      PROCEDURE: XR HIP W OR WO PELVIS 2-3 VIEW RIGHT-     HISTORY: fall     COMPARISON: None.     FINDINGS: There is a subcapital right femoral neck fracture. The joint  spaces are preserved. The pubic symphysis and SI joints are intact. The  soft tissues are unremarkable.       Impression:      Subcapital right femoral neck fracture.              This report was finalized on 9/9/2024 10:46 AM by Kasey Lorenzo M.D..              ----------------------------------------------------------------------------------------------------------------------      Assessment:   Right hip subcapital fracture       Plan:   X rays were reviewed and the patient is recommended for surgical intervention.     He will be scheduled for right hip open reduction and internal fixation later today. The nature of this procedure, as well as, risks, benefits, alternatives and complications to the above operation was discussed with the patient. Risks include, but are not limited to, anesthesia, death, injury to nerves and vessels, infection, blood clots, continued pain and repeat or revision surgery. Following the discussion, the patient verbalized understanding of the risks and benefits to the above procedure and elected to proceed with surgery.  Surgical consent was obtained.    Orthopedic surgery will continue to follow.     Thank you for the consult.    EUGENIA Olivia  09/10/24  09:15 EDT    Electronically signed by Nacho Montoya MD at 09/10/24 1446          Physical Therapy Notes (most recent note)        Anay Tsang, PT at 24 1507  Version 1 of 1         Goal Outcome Evaluation:              Outcome Evaluation: Pt seen for evaluation this date, pleasant and cooperative with therapy. Pt may benefit from therapeutic intervention s/p R hip ORIF to progress towards PLOF.      Anticipated Discharge Disposition (PT): home with assist, home with outpatient therapy services, home with supervision, inpatient rehabilitation facility                          Electronically signed by Anay Tsang PT at 24 1505          Occupational Therapy Notes (most recent note)        Janusz Chambers OT at 24 1125          Acute Care - Occupational Therapy Initial Evaluation   Constantino     Patient Name: Ta Madison  : 1960  MRN: [id number]  Today's Date: 2024             Admit Date: 2024       ICD-10-CM ICD-9-CM   1. Closed  fracture of right hip, initial encounter  S72.001A 820.8   2. Post-operative state  Z98.890 V45.89     Patient Active Problem List   Diagnosis    Tachycardia    Hyperlipidemia    Multiple allergies    Tobacco abuse    Gastroesophageal reflux disease    Chronic right shoulder pain    Ganglion cyst of dorsum of right wrist    Benign skin cyst    Numbness and tingling of both upper extremities    Right shoulder pain    Adenocarcinoma of prostate    Encounter for screening for malignant neoplasm of colon    Epigastric pain    Acute gallstone pancreatitis    Personal history of transient ischemic attack (TIA)    Elevated serum creatinine    Sepsis associated hypotension    Hyperkalemia    Severe malnutrition    Hip fracture     Past Medical History:   Diagnosis Date    Elevated cholesterol     GERD (gastroesophageal reflux disease)     Increased heart rate     Mini stroke     Neck pain     Prostate cancer     Rash     Tobacco abuse      Past Surgical History:   Procedure Laterality Date    ABDOMINAL SURGERY      APPENDECTOMY  1971    [facility]     COLONOSCOPY      COLONOSCOPY N/A 01/25/2023    Procedure: COLONOSCOPY;  Surgeon: Mahnaz Caraballo MD;  Location: [facility];  Service: Gastroenterology;  Laterality: N/A;    ENDOSCOPY N/A 07/31/2023    Procedure: ESOPHAGOGASTRODUODENOSCOPY WITH KEOFEED PLACEMENT;  Surgeon: Yolande Eller MD;  Location: [facility]placed by [facility];  Service: Gastroenterology;  Laterality: N/A;         OT ASSESSMENT FLOWSHEET (Last 12 Hours)       OT Evaluation and Treatment       Row Name 09/11/24 1111                   OT Time and Intention    Document Type evaluation  -KR        Mode of Treatment occupational therapy  -KR        Patient Effort adequate  -KR           General Information    General Observations of Patient alert/cooperative  -KR        Prior Level of Function independent:  -KR           Living Environment    Current Living Arrangements home  -KR        People in Home  grandchild(donavon)  -KR        Primary Care Provided by self  -KR           Cognition    Affect/Mental Status (Cognition) WFL  -KR        Orientation Status (Cognition) oriented x 3  -KR        Follows Commands (Cognition) WFL  -KR           Range of Motion Comprehensive    Comment, General Range of Motion BUE WFL  -KR           Strength Comprehensive (MMT)    Comment, General Manual Muscle Testing (MMT) Assessment BUE 3/5  -KR           Activities of Daily Living    BADL Assessment/Intervention bathing;upper body dressing;lower body dressing;grooming;feeding;toileting  -KR           Bathing Assessment/Intervention    Addison Level (Bathing) bathing skills;moderate assist (50% patient effort)  -KR           Upper Body Dressing Assessment/Training    Addison Level (Upper Body Dressing) upper body dressing skills;minimum assist (75% patient effort)  -KR           Lower Body Dressing Assessment/Training    Addison Level (Lower Body Dressing) lower body dressing skills;maximum assist (25% patient effort)  -KR           Grooming Assessment/Training    Addison Level (Grooming) grooming skills;contact guard assist  -KR           Self-Feeding Assessment/Training    Addison Level (Feeding) feeding skills;set up  -KR           Toileting Assessment/Training    Addison Level (Toileting) toileting skills;maximum assist (25% patient effort)  -KR           Wound 09/10/24 1644 Right hip Incision    Wound - Properties Group Placement Date: 09/10/24  -KB Placement Time: 1644 -KB Side: Right  -KB Location: hip  -KB Primary Wound Type: Incision  -KB    Retired Wound - Properties Group Placement Date: 09/10/24  -KB Placement Time: 1644  -KB Side: Right  -KB Location: hip  -KB Primary Wound Type: Incision  -KB    Retired Wound - Properties Group Date first assessed: 09/10/24  -KB Time first assessed: 1644 -KB Side: Right  -KB Location: hip  -KB Primary Wound Type: Incision  -KB       Plan of Care Review     Plan of Care Reviewed With patient  -KR           Therapy Assessment/Plan (OT)    Rehab Potential (OT) good, to achieve stated therapy goals  -KR        Criteria for Skilled Therapeutic Interventions Met (OT) yes  -KR        Planned Therapy Interventions (OT) activity tolerance training;adaptive equipment training;BADL retraining;strengthening exercise  -KR           Therapy Plan Review/Discharge Plan (OT)    Anticipated Discharge Disposition (OT) home  -KR           OT Goals    Strength Goal Selection (OT) strength, OT goal 1  -KR           Strength Goal 1 (OT)    Strength Goal 1 (OT) BUE increase x 1 to enhance self care/mobility skills  -KR        Time Frame (Strength Goal 1, OT) by discharge  -KR           Patient Education Goal (OT)    Activity (Patient Education Goal, OT) AE/DME training as needed to enhance safety/independence with ADLs  -KR        Valier/Cues/Accuracy (Memory Goal 2, OT) demonstrates adequately  -KR        Time Frame (Patient Education Goal, OT) by discharge  -KR                  User Key  (r) = Recorded By, (t) = Taken By, (c) = Cosigned By      Initials Name Effective Dates    Nika Woodard RN 05/26/22 -     Janusz Daniels OT 06/16/21 -                            OT Recommendation and Plan  Planned Therapy Interventions (OT): activity tolerance training, adaptive equipment training, BADL retraining, strengthening exercise  Plan of Care Review  Plan of Care Reviewed With: patient  Plan of Care Reviewed With: patient        Time Calculation:     Therapy Charges for Today       Code Description Service Date Service Provider Modifiers Qty    [id number]  OT EVAL HIGH COMPLEXITY 4 9/11/2024 Janusz Chambers OT GO 1                 Janusz Chambers OT  9/11/2024    Electronically signed by Janusz Chambers OT at 09/11/24 1126          Discharge Summary        Jp Tolliver PA-C at 09/12/24 0855       Attestation signed by Jason Wilcox MD at 09/12/24 1028    I have  reviewed this documentation and agree.  Patient doing well, ambulatory, follow up PCP 1 week, follow up Orthopedics 2 weeks, walker ordered, referred to PT outpatient.                       [facility] HOSPITALIST DISCHARGE SUMMARY    Patient Identification:  Name:  Ta Madison  Age:  64 y.o.  Sex:  male  :  1960  MRN:  [id number]  Visit Number:  [id number]    Date of Admission: 2024  Date of Discharge:  24     PCP: Yolande Olvera APRN    Discharging Provider: Jonathan Tolliver PA-C / Dr. Wilcox    Discharge Diagnoses     Discharge Diagnoses:  Right femoral neck fracture s/p ORIF with plate and screw    Secondary Diagnoses:  Hyperlipidemia  GERD  Prostate cancer with mets to bone    Needs on follow up:  Orthopedic surgery 2 weeks, PCP 1 week, outpatient physical therapy  Consults/Procedures     Consults:   Consults       Date and Time Order Name Status Description    2024  1:39 PM Inpatient Orthopedic Surgery Consult Completed             Procedures/Scans Performed:  Procedure(s):  FEMORAL NECK OPEN REDUCTION INTERNAL FIXATION   XR right femur  XR right hip   CT L spine wo contrast    History of Presenting Illness     Chief Complaint   Patient presents with    Fall       Patient is a 64 y.o. male who presented to [facility] complaining of fall.  Please see the admitting history and physical for further details.      Hospital Course     Patient was admitted to [facility] on 2024 following presentation to [facility] ED for further evaluation of right hip pain following a fall the day prior.  Had been loosening a bolt with a large branch, when bolt broke loose he fell backward landing on the hip.  XR of the right femur and hip showed subcapital right femoral neck fracture.  He was initiated on as needed pain regimen and admitted for further management.    Orthopedic surgery consulted and followed.  Patient underwent ORIF with plate and screw on 9/10/2024 and tolerated procedure well.  PT and  OT saw and evaluated postoperatively and recommended home with outpatient therapy services.  Given patient remaining stable and expressing desire to return home he was felt to have reached the maximum benefit of the current hospitalization.  As such case was discussed with attending physician and agree he is stable for discharge home on state.  Lovenox to be continued for total of 3 weeks per orthopedic surgery recommendations for DVT prophylaxis.  Will provide a walker at discharge and refer to outpatient therapy services.  Patient may bear weight as tolerated.  Patient will be scheduled to follow-up with orthopedic surgery in 2 weeks, PCP for routine hospital follow-up within 1 week.  Will provide short course as needed pain medication and muscle relaxer.  This discharge and follow-up plan was discussed with the patient on the date of discharge and he expressed agreement and understanding.    Discharge Vitals/Physical Examination     Vital Signs:  Temp:  [97.8 °F (36.6 °C)-99.1 °F (37.3 °C)] 97.8 °F (36.6 °C)  Heart Rate:  [82-93] 86  Resp:  [16-18] 16  BP: (104-137)/(56-89) 104/56  No data found.  SpO2 Percentage    09/11/24 1812 09/12/24 0300 09/12/24 0641   SpO2: 96% 95% 97%     SpO2:  [95 %-97 %] 97 %  on   ;        Body mass index is 25.37 kg/m².  Wt Readings from Last 3 Encounters:   09/10/24 73.5 kg (162 lb)   08/05/24 73.7 kg (162 lb 6.4 oz)   06/18/24 74.6 kg (164 lb 6.4 oz)         Physical Exam:  Physical Exam  Vitals and nursing note reviewed.   Constitutional:       General: He is not in acute distress.  HENT:      Head: Normocephalic and atraumatic.   Eyes:      Extraocular Movements: Extraocular movements intact.   Cardiovascular:      Rate and Rhythm: Normal rate and regular rhythm.   Pulmonary:      Effort: Pulmonary effort is normal.      Breath sounds: Normal breath sounds.   Abdominal:      Palpations: Abdomen is soft.   Musculoskeletal:      Right lower leg: No edema.      Left lower leg: No  "edema.   Skin:     General: Skin is warm and dry.   Neurological:      Mental Status: He is alert. Mental status is at baseline.   Psychiatric:         Mood and Affect: Mood normal.         Behavior: Behavior normal.         Pertinent Laboratory/Radiology Results     Pertinent Laboratory Results:            Results from last 7 days   Lab Units 09/12/24  0046 09/10/24  2359 09/09/24  1214   WBC 10*3/mm3 8.73 11.04* 9.77   HEMOGLOBIN g/dL 9.7* 10.1* 11.3*   HEMATOCRIT % 31.7* 33.9* 35.9*   MCV fL 78.3* 84.1 77.4*   MCHC g/dL 30.6* 29.8* 31.5   PLATELETS 10*3/mm3 384 372 444   INR   --   --  0.94     Results from last 7 days   Lab Units 09/12/24  0054 09/10/24  2359 09/09/24  1214   SODIUM mmol/L 133* 134* 136   POTASSIUM mmol/L 3.9 4.7 4.2   CHLORIDE mmol/L 100 101 101   CO2 mmol/L 19.3* 16.8* 24.5   BUN mg/dL 13 17 17   CREATININE mg/dL 0.70* 0.81 0.78   CALCIUM mg/dL 9.0 8.6 9.3   GLUCOSE mg/dL 99 107* 102*   ALBUMIN g/dL  --   --  3.9   BILIRUBIN mg/dL  --   --  0.3   ALK PHOS U/L  --   --  143*   AST (SGOT) U/L  --   --  10   ALT (SGPT) U/L  --   --  6   Estimated Creatinine Clearance: 112.3 mL/min (A) (by C-G formula based on SCr of 0.7 mg/dL (L)).  No results found for: \"AMMONIA\"    Glucose   Date/Time Value Ref Range Status   09/12/2024 0655 99 70 - 130 mg/dL Final     Lab Results   Component Value Date    HGBA1C 5.2 10/26/2023     Lab Results   Component Value Date    TSH 1.610 08/05/2024    FREET4 1.22 06/06/2018       No results found for: \"BLOODCX\"  No results found for: \"URINECX\"  No results found for: \"WOUNDCX\"  No results found for: \"STOOLCX\"  No results found for: \"RESPCX\"  Pain Management Panel          Latest Ref Rng & Units 10/12/2023   Pain Management Panel   Creatinine, Urine mg/dL 80.7       Details                   Pertinent Radiology Results:  Imaging Results (All)       Procedure Component Value Units Date/Time    FL Surgery Fluoro [[id number]] Collected: 09/11/24 0832     Updated: 09/11/24 " 0835    Narrative:      Fluoro time     PROCEDURE: Fluoroscopy in the operating room.     HISTORY: orif; S72.001A-Fracture of unspecified part of neck of right  femur, initial encounter for closed fracture; Z98.890-Other specified  postprocedural states     COMPARISON: None.     FINDINGS: Fluoroscopy time was provided by the radiology department for  the clinical service. 2 images were obtained and fluoroscopy time was  0.8 minutes. The images provided demonstrates compression screws  spanning a subcapital right femoral neck fracture.       Impression:      Please see the operative report.                 This report was finalized on 9/11/2024 8:33 AM by Kasey Lorenzo M.D..       CT Lumbar Spine Without Contrast [[id number]] Collected: 09/09/24 1049     Updated: 09/09/24 1052    Narrative:      PROCEDURE: CT LUMBAR SPINE WO CONTRAST-     HISTORY: fall from a bull     TECHNIQUE: Multiple axial CT images were obtained through the lumbar  spine using bone window algorithms. Coronal and sagittal images were  reconstructed from the original axial data set. This study was performed  with techniques to keep radiation doses as low as reasonably achievable  (ALARA). Individualized dose reduction techniques using automated  exposure control or adjustment of mA and/or kV according to the patient  size were employed.     COMPARISON: None.     FINDINGS: The lumbar spine is well aligned with no acute fractures.  There are diffuse degenerative changes with loss of disc space height  and facet arthropathy. Note is made of a bilateral L5 pars defect. The  paraspinal soft tissues are unremarkable.       Impression:      No acute process.              This report was finalized on 9/9/2024 10:50 AM by Kasey Lorenzo M.D..       XR Femur 2 View Right [[id number]] Collected: 09/09/24 1044     Updated: 09/09/24 1048    Narrative:      PROCEDURE: XR FEMUR 2 VW RIGHT-     HISTORY: fall     COMPARISON: None.     FINDINGS:  A 2 view  exam demonstrates a suspected subcapital right  femoral neck fracture.  The joint spaces are preserved.  No soft tissue  abnormality is seen.       Impression:      Subcapital right femoral neck fracture.                 This report was finalized on 9/9/2024 10:46 AM by Kasey Lorenzo M.D..       XR Hip With or Without Pelvis 2 - 3 View Right [[id number]] Collected: 09/09/24 1046     Updated: 09/09/24 1048    Narrative:      PROCEDURE: XR HIP W OR WO PELVIS 2-3 VIEW RIGHT-     HISTORY: fall     COMPARISON: None.     FINDINGS: There is a subcapital right femoral neck fracture. The joint  spaces are preserved. The pubic symphysis and SI joints are intact. The  soft tissues are unremarkable.       Impression:      Subcapital right femoral neck fracture.              This report was finalized on 9/9/2024 10:46 AM by Kasey Lorenzo M.D..               Discharge Disposition/Discharge Medications/Discharge Appointments     Discharge Disposition:   Home or Self Care    Discharge Follow up:   Additional Instructions for the Follow-ups that You Need to Schedule       Ambulatory Referral to Physical Therapy   As directed      Specialty needed: Evaluate and treat POST OP   Follow-up needed: Yes        Discharge Follow-up with PCP   As directed       Currently Documented PCP:    Yolande Olvera APRN    PCP Phone Number:    [phone]     Follow Up Details: 1 week post hospital discharge        Discharge Follow-up with Specified Provider: Ortho 2 weeks   As directed      To: Ortho 2 weeks               Follow-up Information       Yolande Olvera APRN .    Specialty: Family Medicine  Why: 1 week post hospital discharge  Contact information:  [address] [place] [address]  [phone]                             Condition at Discharge:  Stable     Discharge Medications:     Your medication list        START taking these medications        Instructions Last Dose Given Next Dose Due   cyclobenzaprine 10 MG tablet  Commonly known  as: FLEXERIL      Take 1 tablet by mouth 3 (Three) Times a Day As Needed for Muscle Spasms.       Enoxaparin Sodium 40 MG/0.4ML solution prefilled syringe syringe  Commonly known as: LOVENOX      Inject 0.4 mL under the skin into the appropriate area as directed Daily for 20 days.       ondansetron 4 MG tablet  Commonly known as: Zofran      Take 1 tablet by mouth Every 8 (Eight) Hours As Needed for Nausea or Vomiting for up to 5 days.       oxyCODONE-acetaminophen 7.5-325 MG per tablet  Commonly known as: PERCOCET      Take 1 tablet by mouth Every 8 Hours As Needed for Moderate Pain for up to 10 days.              CONTINUE taking these medications        Instructions Last Dose Given Next Dose Due   aspirin 81 MG EC tablet      Take 1 tablet by mouth Daily.       atorvastatin 20 MG tablet  Commonly known as: LIPITOR      Take 1 tablet by mouth Every Night.       enzalutamide 40 MG chemo capsule  Commonly known as: XTANDI      Take 4 capsules by mouth Daily.       fenofibrate 145 MG tablet  Commonly known as: TRICOR      Take 1 tablet by mouth Daily.       HYDROcodone-acetaminophen  MG per tablet  Commonly known as: NORCO      Take 1 tablet by mouth Every 6 (Six) Hours As Needed for Moderate Pain.       hydrOXYzine 50 MG tablet  Commonly known as: ATARAX      Take 1 tablet by mouth 3 (Three) Times a Day As Needed for Itching.       metoprolol tartrate 25 MG tablet  Commonly known as: LOPRESSOR      Take 1 tablet by mouth 2 (Two) Times a Day.       pantoprazole 40 MG EC tablet  Commonly known as: PROTONIX      Take 1 tablet by mouth Daily.                 Where to Get Your Medications        [facility] [place] [address]      Hours: Monday to Friday 7 AM to 6 PM Phone: [phone]   cyclobenzaprine 10 MG tablet  Enoxaparin Sodium 40 MG/0.4ML solution prefilled syringe syringe  ondansetron 4 MG tablet  oxyCODONE-acetaminophen 7.5-325 MG per  tablet          Discharge Diet:  Diet Instructions    Regular           Discharge Activity:  Activity Instructions    RLE Weight Bearing as Tolerated        Maintain current surgical dressing.  May reinforce for saturation              Time spent on this discharge exceeded 30 minutes.      Electronically signed by Gonzalez Wilcox MD at 09/12/24 1028       Discharge Order (From admission, onward)       Start     Ordered    09/12/24 0816  Discharge patient  Once        Expected Discharge Date: 09/12/24   Expected Discharge Time: Morning   Discharge Disposition: Home or Self Care   Physician of Record for Attribution - Please select from Treatment Team: GONZALEZ WILCOX [[id number]]   Review needed by CMO to determine Physician of Record: No      Question Answer Comment   Physician of Record for Attribution - Please select from Treatment Team GONZALEZ WILCOX    Review needed by CMO to determine Physician of Record No        09/12/24 0804

## 2024-09-13 NOTE — CASE MANAGEMENT/SOCIAL WORK
Discharge Planning Assessment   Pedro     Patient Name: Ta Madison  MRN: [id number]  Today's Date: 9/13/2024    Admit Date: 9/9/2024            Discharge Plan       Row Name 09/13/24 0854       Plan    Final Discharge Disposition Code 06 - home with home health care    Final Note Pt was discharged home on 09/12/24. Pt and family called back and requested home health instead. Provider ordered home health. Pt preferred [facility]. SS contacted [facility] [phone] per [place] and faxed HH referral to fax [phone]. [facility] has accepted Pt and will follow up with Pt at home. SS notified Pt's dtr Daisha Madison [phone] on this date and notified Provider  has been arranged.                  Continued Care and Services - Discharged on 9/12/2024 Admission date: 9/9/2024 - Discharge disposition: Home or Self Care      Home Medical Care       Service Provider Request Status Selected Services Address Phone Fax Patient Preferred    [facility] DEPT HOME HEALTH  Selected Home Nursing ,  Home Rehabilitation [address] [place] [address] [address] [facility] [address] [phone] --                    CLARISSA Haddad

## 2024-09-13 NOTE — OUTREACH NOTE
Call Center TCM Note      Flowsheet Row Responses   [facility] patient discharged from? [facility]   Does the patient have one of the following disease processes/diagnoses(primary or secondary)? General Surgery   TCM attempt successful? Yes  [VR for Azael wife]   Call start time 1448   Call end time 1454   Discharge diagnosis Hip fracture, FEMORAL NECK OPEN REDUCTION INTERNAL FIXATION   Medication alerts for this patient Lovenox x 20 days   Meds reviewed with patient/caregiver? Yes   Is the patient having any side effects they believe may be caused by any medication additions or changes? No   Does the patient have all medications related to this admission filled (includes all antibiotics, pain medications, etc.) Yes   Is the patient taking all medications as directed (includes completed medication regime)? Yes   Comments Hospital f/u 9/17/24@330pm   Does the patient have an appointment with their PCP within 7-14 days of discharge? Yes   What is the Home health agency?  HH--noted HH referral has been placed   Has home health visited the patient within 72 hours of discharge? Unsure   What DME was ordered? rolling walker from [facility]   Has all DME been delivered? Yes   Psychosocial issues? No   Did the patient receive a copy of their discharge instructions? Yes   Nursing interventions Reviewed instructions with patient   What is the patient's perception of their health status since discharge? Improving  [Pt reports he is doing ok, ambulating frequently with walker without issues. Pt questioned if ok to cross his legs--advised against crossing legs and reviewed positioning when sitting and also to avoid bending over from seated position,etc.]   Nursing interventions Nurse provided patient education  [post op instructions reviewed]   Is the patient /caregiver able to teach back basic post-op care? Continue use of incentive spirometry at least 1 week post discharge, No tub bath, swimming, or hot tub until instructed by  MD, Keep incision areas clean,dry and protected, Lifting as instructed by MD in discharge instructions   Is the patient/caregiver able to teach back signs and symptoms of incisional infection? Increased redness, swelling or pain at the incisonal site, Increased drainage or bleeding, Incisional warmth, Pus or odor from incision, Fever  [reports dressing on site and was advised to leave in place until f/u appt, reviewed s/s infection with pt.  Noted that HH will also be following]   Is the patient/caregiver able to teach back steps to recovery at home? Rest and rebuild strength, gradually increase activity   TCM call completed? Yes   Call end time 1919            Tamika Saleh, JUDITH    9/13/2024, 15:00 EDT

## 2024-09-13 NOTE — DISCHARGE PLACEMENT REQUEST
"Francisco Madison (64 y.o. Male)       Date of Birth   1960    Social Security Number       Address   [address] [place] [address]    Home Phone   [phone]    MRN   [id number]       Yarsani   None    Marital Status                               Admission Date   9/9/24    Admission Type   Emergency    Admitting Provider   Jason Wilcox MD    Attending Provider       Department, Room/Bed   [address], 3350/2S       Discharge Date   9/12/2024    Discharge Disposition   Home or Self Care    Discharge Destination   Home                              Attending Provider: (none)   Allergies: No Known Allergies    Isolation: None   Infection: None   Code Status: Prior    Ht: 170.2 cm (67\")   Wt: 73.5 kg (162 lb)    Admission Cmt: None   Principal Problem: Hip fracture [S72.009A]                   Active Insurance as of 9/9/2024       Primary Coverage       Payor Plan Insurance Group Employer/Plan Group    [employer] [place] [employer] [place]ICA[place]        Payor Plan Address Payor Plan Phone Number Payor Plan Fax Number Effective Dates     [address] [address] [phone]  6/6/2018 - None Entered    [place] [address]         Subscriber Name Subscriber Birth Date Member ID       FRANCISCO MADISON 1960 [id number]                     Emergency Contacts        (Rel.) Home Phone Work Phone Mobile Phone    YOLI LORD (Daughter) -- -- [phone]                 Operative/Procedure Notes (most recent note)        Nacho Montoya MD at 09/10/24 1644          Operative report  Surgeon Nacho Dorsey  Preoperative diagnosis right hip subcapital impacted fracture Garden 2  Postoperative diagnosis the same  Surgical procedure right hip open reduction internal fixation with plate and screw using Synthes femoral neck system    After proper patient and limb identification bring to the operating room general anesthesia dorsal decubitus on the [place] table.  Some traction internal " rotation C arm showed good alignment of the fracture that still present the valgus impacted condition.  Correction in scrubbing and sterile draping proper timeout performed  Longitudinal incision over the lateral aspect of the hip.  Incision of the fascia mirna.  Then retracting vastus lateralis anteriorly.  Insertion of the guide and the guide and pin insertion of pin at the proper position centrally slightly inferior and central on the lateral view.  Reaming with the adequate reamer insertion of the plate nail device.  With the good positioning.  Securing the plate to the shaft of the femur with 1 screw with good positioning.  Then insertion of the entire rotational screw superiorly with good positioning.  AP lateral view with C arm showed good alignment irrigation with sterile fluid.  Closing the fascia with Vicryl 1 interrupted suture skin was closed Vicryl 1 Monocryl 2 oh and metal clip.  Aqua seal dressing was applied  Patient returned to recovery in stable condition  Blood loss 100 cc no specimen        Electronically signed by Nacho Montoya MD at 09/10/24 1720          Consult Notes (all)        Maren Olivo APRN at 09/10/24 0914        Consult Orders    1. Inpatient Orthopedic Surgery Consult [[id number]] ordered by Jason Wilcox MD              Attestation signed by Nacho Montoya MD at 09/10/24 1446    I have reviewed this documentation and agree.                    Inpatient Orthopedic Surgery Consult  Consult performed by: Maren Olivo APRN  Consult ordered by: Jason Wilcox MD        Patient Identification:  Name:  Ta Madison  Age:  64 y.o.  Sex:  male  :  1960  MRN:  [id number]  Visit Number:  [id number]  Primary care provider:  Yolande Olvera APRN    History of presenting illness:  Patient is a 63 y/o male seen in consultation regarding a right hip fracture. He fell yesterday while using a wrench to loosen a fitting. He developed right hip pain and difficulty with  weight bearing on the right leg. He has no significant cardiac history and does not take blood thinners. His pain is currently controlled.     ---------------------------------------------------------------------------------------------------------------------    Review of Systems   Constitutional:  Positive for activity change.   Respiratory:  Negative for shortness of breath.    Cardiovascular:  Negative for chest pain.   Musculoskeletal:         Per the history of present illness   All other systems reviewed and are negative.     ---------------------------------------------------------------------------------------------------------------------   Past History:  Family History   Problem Relation Age of Onset    Nephrolithiasis Mother     Heart disease Father     Hypertension Father     Kidney disease Father      Past Medical History:   Diagnosis Date    Elevated cholesterol     GERD (gastroesophageal reflux disease)     Increased heart rate     Mini stroke     Neck pain     Prostate cancer     Rash     Tobacco abuse      Past Surgical History:   Procedure Laterality Date    APPENDECTOMY      [facility]     COLONOSCOPY      COLONOSCOPY N/A 2023    Procedure: COLONOSCOPY;  Surgeon: Mahnaz Caraballo MD;  Location: [facility] OR;  Service: Gastroenterology;  Laterality: N/A;    ENDOSCOPY N/A 2023    Procedure: ESOPHAGOGASTRODUODENOSCOPY WITH KEOFEED PLACEMENT;  Surgeon: Yolande Eller MD;  Location:  [facility];  Service: Gastroenterology;  Laterality: N/A;     Social History     Socioeconomic History    Marital status:    Tobacco Use    Smoking status: Some Days     Current packs/day: 0.00     Average packs/day: 2.0 packs/day for 45.0 years (90.0 ttl pk-yrs)     Types: Cigarettes     Start date: 1978     Last attempt to quit: 2023     Years since quittin.1    Smokeless tobacco: Never   Vaping Use    Vaping status: Never Used   Substance and Sexual Activity     Alcohol use: No    Drug use: Yes     Types: Marijuana     Comment: occ     Sexual activity: Defer     ---------------------------------------------------------------------------------------------------------------------   Allergies:  Patient has no known allergies.  ---------------------------------------------------------------------------------------------------------------------   Prior to Admission Medications       Prescriptions Last Dose Informant Patient Reported? Taking?    aspirin 81 MG EC tablet 9/8/2024  Yes Yes    Take 1 tablet by mouth Daily.    atorvastatin (LIPITOR) 20 MG tablet 9/8/2024  No Yes    Take 1 tablet by mouth Every Night.    enzalutamide (XTANDI) 40 MG chemo capsule 9/8/2024  No Yes    Take 4 capsules by mouth Daily.    fenofibrate (TRICOR) 145 MG tablet 9/8/2024  No Yes    Take 1 tablet by mouth Daily.    hydrOXYzine (ATARAX) 50 MG tablet 9/9/2024  No Yes    Take 1 tablet by mouth 3 (Three) Times a Day As Needed for Itching.    metoprolol tartrate (LOPRESSOR) 25 MG tablet 9/8/2024  No Yes    Take 1 tablet by mouth 2 (Two) Times a Day.    pantoprazole (PROTONIX) 40 MG EC tablet 9/9/2024  No Yes    Take 1 tablet by mouth Daily.    HYDROcodone-acetaminophen (NORCO)  MG per tablet Unknown  No No    Take 1 tablet by mouth Every 6 (Six) Hours As Needed for Moderate Pain.          Hospital Meds:  aspirin, 81 mg, Oral, Daily  atorvastatin, 20 mg, Oral, Nightly  fenofibrate, 145 mg, Oral, Daily  metoprolol tartrate, 25 mg, Oral, BID  pantoprazole, 40 mg, Oral, Daily  sodium chloride, 10 mL, Intravenous, Q12H         ---------------------------------------------------------------------------------------------------------------------   Vital Signs:  Temp:  [98 °F (36.7 °C)-98.7 °F (37.1 °C)] 98.7 °F (37.1 °C)  Heart Rate:  [68-87] 74  Resp:  [16-20] 20  BP: (105-127)/(55-62) 127/62      09/09/24  0946   Weight: 73.5 kg (162 lb)     Body mass index is 25.37  kg/m².  ---------------------------------------------------------------------------------------------------------------------     Physical exam:  Constitutional:  Well-developed and well-nourished.  No acute distress.      HENT:  Head: Normocephalic and atraumatic.  Mouth:  Moist mucous membranes.    Eyes:  Conjunctivae are normal.  Pupils are equal, round, and reactive to light.  No scleral icterus.  Neck:  Neck supple.  Trachea midline.  Cardiovascular:  Normal rate and regular rhythm.  Pulmonary/Chest:  No respiratory distress and good air movement.  Abdominal:  Soft.  No distension and no tenderness.   Musculoskeletal:  Right hip exam: Right lower extremity is in external rotation. SILT all dermatomes. DF/PF intact right ankle. DP pulse palpable.   Neurological:  Alert and oriented to person, place, and time.     Skin:  Skin is warm and dry.  No rash noted.  No ecchymosis or abrasion.   Psychiatric:  Normal mood and affect.  Behavior is normal.  Judgment and thought content normal.   Peripheral vascular:  No pitting edema and strong pulses on all 4 extremities.      ---------------------------------------------------------------------------------------------------------------------   .  ---------------------------------------------------------------------------------------------------------------------   Results from last 7 days   Lab Units 09/09/24  1214   WBC 10*3/mm3 9.77   HEMOGLOBIN g/dL 11.3*   HEMATOCRIT % 35.9*   MCV fL 77.4*   MCHC g/dL 31.5   PLATELETS 10*3/mm3 444   INR  0.94         Results from last 7 days   Lab Units 09/09/24  1214   SODIUM mmol/L 136   POTASSIUM mmol/L 4.2   CHLORIDE mmol/L 101   CO2 mmol/L 24.5   BUN mg/dL 17   CREATININE mg/dL 0.78   CALCIUM mg/dL 9.3   GLUCOSE mg/dL 102*   ALBUMIN g/dL 3.9   BILIRUBIN mg/dL 0.3   ALK PHOS U/L 143*   AST (SGOT) U/L 10   ALT (SGPT) U/L 6   Estimated Creatinine Clearance: 100.8 mL/min (by C-G formula based on SCr of 0.78 mg/dL).  No results found  "for: \"AMMONIA\"          Lab Results   Component Value Date    HGBA1C 5.2 10/26/2023     Lab Results   Component Value Date    TSH 1.610 08/05/2024    FREET4 1.22 06/06/2018     No results found for: \"PREGTESTUR\", \"PREGSERUM\", \"HCG\", \"HCGQUANT\"  Pain Management Panel          Latest Ref Rng & Units 10/12/2023   Pain Management Panel   Creatinine, Urine mg/dL 80.7       Details                 No results found for: \"BLOODCX\"  No results found for: \"URINECX\"  No results found for: \"WOUNDCX\"  No results found for: \"STOOLCX\"      ---------------------------------------------------------------------------------------------------------------------   Imaging Results (Last 7 Days)       Procedure Component Value Units Date/Time    CT Lumbar Spine Without Contrast [[id number]] Collected: 09/09/24 1049     Updated: 09/09/24 1052    Narrative:      PROCEDURE: CT LUMBAR SPINE WO CONTRAST-     HISTORY: fall from a bull     TECHNIQUE: Multiple axial CT images were obtained through the lumbar  spine using bone window algorithms. Coronal and sagittal images were  reconstructed from the original axial data set. This study was performed  with techniques to keep radiation doses as low as reasonably achievable  (ALARA). Individualized dose reduction techniques using automated  exposure control or adjustment of mA and/or kV according to the patient  size were employed.     COMPARISON: None.     FINDINGS: The lumbar spine is well aligned with no acute fractures.  There are diffuse degenerative changes with loss of disc space height  and facet arthropathy. Note is made of a bilateral L5 pars defect. The  paraspinal soft tissues are unremarkable.       Impression:      No acute process.              This report was finalized on 9/9/2024 10:50 AM by Kasey Lorenzo M.D..       XR Femur 2 View Right [[id number]] Collected: 09/09/24 1044     Updated: 09/09/24 1048    Narrative:      PROCEDURE: XR FEMUR 2 VW RIGHT-     HISTORY: fall   "   COMPARISON: None.     FINDINGS:  A 2 view exam demonstrates a suspected subcapital right  femoral neck fracture.  The joint spaces are preserved.  No soft tissue  abnormality is seen.       Impression:      Subcapital right femoral neck fracture.                 This report was finalized on 9/9/2024 10:46 AM by Kasey Lorenzo M.D..       XR Hip With or Without Pelvis 2 - 3 View Right [[id number]] Collected: 09/09/24 1046     Updated: 09/09/24 1048    Narrative:      PROCEDURE: XR HIP W OR WO PELVIS 2-3 VIEW RIGHT-     HISTORY: fall     COMPARISON: None.     FINDINGS: There is a subcapital right femoral neck fracture. The joint  spaces are preserved. The pubic symphysis and SI joints are intact. The  soft tissues are unremarkable.       Impression:      Subcapital right femoral neck fracture.              This report was finalized on 9/9/2024 10:46 AM by Kasey Lorenzo M.D..             ----------------------------------------------------------------------------------------------------------------------      Assessment:   Right hip subcapital fracture       Plan:   X rays were reviewed and the patient is recommended for surgical intervention.     He will be scheduled for right hip open reduction and internal fixation later today. The nature of this procedure, as well as, risks, benefits, alternatives and complications to the above operation was discussed with the patient. Risks include, but are not limited to, anesthesia, death, injury to nerves and vessels, infection, blood clots, continued pain and repeat or revision surgery. Following the discussion, the patient verbalized understanding of the risks and benefits to the above procedure and elected to proceed with surgery.  Surgical consent was obtained.    Orthopedic surgery will continue to follow.     Thank you for the consult.    EUGENIA Olivia  09/10/24  09:15 EDT    Electronically signed by Nacho Montoya MD at 09/10/24 9506

## 2024-09-16 ENCOUNTER — TELEPHONE (OUTPATIENT)
Dept: SOCIAL WORK | Facility: HOSPITAL | Age: 64
End: 2024-09-16
Payer: COMMERCIAL

## 2024-09-18 ENCOUNTER — SPECIALTY PHARMACY (OUTPATIENT)
Dept: PHARMACY | Facility: HOSPITAL | Age: 64
End: 2024-09-18
Payer: COMMERCIAL

## 2024-09-18 ENCOUNTER — OFFICE VISIT (OUTPATIENT)
Dept: FAMILY MEDICINE CLINIC | Facility: CLINIC | Age: 64
End: 2024-09-18
Payer: COMMERCIAL

## 2024-09-18 VITALS
DIASTOLIC BLOOD PRESSURE: 56 MMHG | OXYGEN SATURATION: 100 % | HEIGHT: 67 IN | TEMPERATURE: 98.2 F | BODY MASS INDEX: 24.83 KG/M2 | HEART RATE: 90 BPM | SYSTOLIC BLOOD PRESSURE: 90 MMHG | WEIGHT: 158.2 LBS

## 2024-09-18 DIAGNOSIS — C61 PROSTATE CANCER METASTATIC TO BONE: ICD-10-CM

## 2024-09-18 DIAGNOSIS — C79.51 PROSTATE CANCER METASTATIC TO BONE: ICD-10-CM

## 2024-09-18 DIAGNOSIS — S72.001A CLOSED DISPLACED FRACTURE OF RIGHT FEMORAL NECK: Primary | ICD-10-CM

## 2024-09-18 DIAGNOSIS — Z98.890 HISTORY OF OPEN REDUCTION AND INTERNAL FIXATION (ORIF) PROCEDURE: ICD-10-CM

## 2024-09-18 PROCEDURE — 99496 TRANSJ CARE MGMT HIGH F2F 7D: CPT | Performed by: NURSE PRACTITIONER

## 2024-09-18 PROCEDURE — 1126F AMNT PAIN NOTED NONE PRSNT: CPT | Performed by: NURSE PRACTITIONER

## 2024-09-18 NOTE — ED PROVIDER NOTES
Subjective   History of Present Illness  Patient is a 64-year-old male with past medical history of GERD, prostate cancer, and hyperlipidemia.  Patient presents with complaints of right hip pain.  Patient reports that he fell last week off a bull and injured his right hip however reports that it did feel better.  Today patient reports he was assisting his son and using a wrench when he lost control of the ranch and fell straight back onto his right hip.  Patient reports that the pain became worse in his right hip.  There is no obvious deformity noted to the right hip or any leg shortening.  Patient denies hitting his head or any loss of consciousness.  Patient reports that his right hip hurts worse when he attempts to ambulate and put pressure on the right hip.  Patient denies any other complaints this date.  Patient presents private vehicle with his family.        Review of Systems   Constitutional: Negative.  Negative for fever.   HENT: Negative.     Respiratory: Negative.     Cardiovascular: Negative.  Negative for chest pain.   Gastrointestinal: Negative.  Negative for abdominal pain.   Endocrine: Negative.    Genitourinary: Negative.  Negative for dysuria.   Musculoskeletal:         Right hip pain   Skin: Negative.    Neurological: Negative.    Psychiatric/Behavioral: Negative.     All other systems reviewed and are negative.      Past Medical History:   Diagnosis Date    Elevated cholesterol     GERD (gastroesophageal reflux disease)     Increased heart rate     Mini stroke     Neck pain     Prostate cancer     Rash     Tobacco abuse        No Known Allergies    Past Surgical History:   Procedure Laterality Date    ABDOMINAL SURGERY      APPENDECTOMY  1971    [facility]     COLONOSCOPY      COLONOSCOPY N/A 01/25/2023    Procedure: COLONOSCOPY;  Surgeon: Mahnaz Caraballo MD;  Location: [facility];  Service: Gastroenterology;  Laterality: N/A;    ENDOSCOPY N/A 07/31/2023    Procedure:  ESOPHAGOGASTRODUODENOSCOPY WITH KEOFEED PLACEMENT;  Surgeon: Yolande Eller MD;  Location:  [facility];  Service: Gastroenterology;  Laterality: N/A;    HIP CANNULATED SCREW PLACEMENT Right 9/10/2024    Procedure: FEMORAL NECK OPEN REDUCTION INTERNAL FIXATION;  Surgeon: Nacho Montoya MD;  Location:  [facility];  Service: Orthopedics;  Laterality: Right;       Family History   Problem Relation Age of Onset    Nephrolithiasis Mother     Heart disease Father     Hypertension Father     Kidney disease Father        Social History     Socioeconomic History    Marital status:    Tobacco Use    Smoking status: Some Days     Current packs/day: 0.00     Average packs/day: 2.0 packs/day for 45.0 years (90.0 ttl pk-yrs)     Types: Cigarettes     Start date: 1978     Last attempt to quit: 2023     Years since quittin.1    Smokeless tobacco: Never   Vaping Use    Vaping status: Never Used   Substance and Sexual Activity    Alcohol use: No    Drug use: Yes     Types: Marijuana     Comment: occ     Sexual activity: Defer           Objective   Physical Exam  Vitals and nursing note reviewed.   Constitutional:       General: He is not in acute distress.     Appearance: He is well-developed. He is not diaphoretic.   HENT:      Head: Normocephalic and atraumatic.      Right Ear: External ear normal.      Left Ear: External ear normal.      Nose: Nose normal.   Eyes:      Conjunctiva/sclera: Conjunctivae normal.      Pupils: Pupils are equal, round, and reactive to light.   Neck:      Vascular: No JVD.      Trachea: No tracheal deviation.   Cardiovascular:      Rate and Rhythm: Normal rate and regular rhythm.      Heart sounds: Normal heart sounds. No murmur heard.  Pulmonary:      Effort: Pulmonary effort is normal. No respiratory distress.      Breath sounds: Normal breath sounds. No wheezing.   Abdominal:      General: Bowel sounds are normal.      Palpations: Abdomen is soft.      Tenderness: There is no  abdominal tenderness.   Musculoskeletal:         General: Tenderness and signs of injury present. No deformity. Normal range of motion.      Cervical back: Normal range of motion and neck supple.   Skin:     General: Skin is warm and dry.      Coloration: Skin is not pale.      Findings: No erythema or rash.   Neurological:      Mental Status: He is alert and oriented to person, place, and time.      Cranial Nerves: No cranial nerve deficit.   Psychiatric:         Behavior: Behavior normal.         Thought Content: Thought content normal.         Procedures           ED Course  ED Course as of 09/18/24 1239   Mon Sep 09, 2024   1158 Spoke with Dr. Montoya who accepts the patient regarding right femoral neck fracture. Patient to be NPO after midnight.  [KH]   1200 PROCEDURE: XR FEMUR 2 VW RIGHT-     HISTORY: fall     COMPARISON: None.     FINDINGS:  A 2 view exam demonstrates a suspected subcapital right  femoral neck fracture.  The joint spaces are preserved.  No soft tissue  abnormality is seen.     IMPRESSION:  Subcapital right femoral neck fracture.      [KH]   1200 PROCEDURE: XR HIP W OR WO PELVIS 2-3 VIEW RIGHT-     HISTORY: fall     COMPARISON: None.     FINDINGS: There is a subcapital right femoral neck fracture. The joint  spaces are preserved. The pubic symphysis and SI joints are intact. The  soft tissues are unremarkable.     IMPRESSION:  Subcapital right femoral neck fracture.      [KH]   1208 Spoke with patient regarding plan of care including admission to the hospital for surgery tomorrow morning to fix femoral head fracture.  Patient verbalizes understanding and is agreeable to the plan of being admitted. [KH]   1227 Spoke to Dr. Wilcox who accepts the patient to the hospitalist team. [KH]      ED Course User Index  [KH] Marine Granados, APRN                                             Medical Decision Making  Patient is a 64-year-old male with past medical history of GERD, prostate cancer, and  hyperlipidemia.  Patient presents with complaints of right hip pain.  Patient reports that he fell last week off a bull and injured his right hip however reports that it did feel better.  Today patient reports he was assisting his son and using a wrench when he lost control of the ranch and fell straight back onto his right hip.  Patient reports that the pain became worse in his right hip.  There is no obvious deformity noted to the right hip or any leg shortening.  Patient denies hitting his head or any loss of consciousness.  Patient reports that his right hip hurts worse when he attempts to ambulate and put pressure on the right hip.  Patient denies any other complaints this date.  Patient presents private vehicle with his family.    Problems Addressed:  Closed fracture of right hip, initial encounter: complicated acute illness or injury    Amount and/or Complexity of Data Reviewed  Labs: ordered.  ECG/medicine tests: ordered.    Risk  Prescription drug management.  Decision regarding hospitalization.        Final diagnoses:   Closed fracture of right hip, initial encounter       ED Disposition  ED Disposition       ED Disposition   Decision to Admit    Condition   --    Comment   Level of Care: Telemetry [5]   Diagnosis: Hip fracture [072138]   Admitting Physician: GONZALEZ ALBARRAN [[id number]]   Attending Physician: GONZALEZ ALBARRAN [[id number]]   Certification: I Certify That Inpatient Hospital Services Are Medically Necessary For Greater Than 2 Midnights                 Yolande Olvera, APRN  [address] [place] [address]  [phone]      1 week post hospital discharge    [address] [place] [address] [phone]             Medication List        New Prescriptions      cyclobenzaprine 10 MG tablet  Commonly known as: FLEXERIL  Take 1 tablet by mouth 3 (Three) Times a Day As Needed for Muscle Spasms.     Enoxaparin Sodium 40 MG/0.4ML solution prefilled syringe  syringe  Commonly known as: LOVENOX  Inject 0.4 mL under the skin into the appropriate area as directed Daily for 20 days.     oxyCODONE-acetaminophen 7.5-325 MG per tablet  Commonly known as: PERCOCET  Take 1 tablet by mouth Every 8 Hours As Needed for Moderate Pain for up to 10 days.            ASK your doctor about these medications      ondansetron 4 MG tablet  Commonly known as: Zofran  Take 1 tablet by mouth Every 8 (Eight) Hours As Needed for Nausea or Vomiting for up to 5 days.  Ask about: Should I take this medication?               Where to Get Your Medications        These medications were sent to [facility] [address] [address]      Hours: Monday to Friday 7 AM to 6 PM Phone: [phone]   cyclobenzaprine 10 MG tablet  Enoxaparin Sodium 40 MG/0.4ML solution prefilled syringe syringe  ondansetron 4 MG tablet  oxyCODONE-acetaminophen 7.5-325 MG per tablet            Marine Granados, APRN  09/18/24 1236

## 2024-09-19 ENCOUNTER — TELEPHONE (OUTPATIENT)
Dept: FAMILY MEDICINE CLINIC | Facility: CLINIC | Age: 64
End: 2024-09-19
Payer: COMMERCIAL

## 2024-09-23 RX ORDER — VARENICLINE TARTRATE 1 MG/1
1 TABLET, FILM COATED ORAL 2 TIMES DAILY
Qty: 56 TABLET | Refills: 1 | Status: SHIPPED | OUTPATIENT
Start: 2024-10-21 | End: 2024-12-16

## 2024-09-23 RX ORDER — VARENICLINE TARTRATE 0.5 (11)-1
KIT ORAL
Qty: 53 EACH | Refills: 0 | Status: SHIPPED | OUTPATIENT
Start: 2024-09-23 | End: 2024-10-22

## 2024-09-24 ENCOUNTER — TELEPHONE (OUTPATIENT)
Dept: FAMILY MEDICINE CLINIC | Facility: CLINIC | Age: 64
End: 2024-09-24
Payer: COMMERCIAL

## 2024-10-17 ENCOUNTER — LAB (OUTPATIENT)
Dept: ONCOLOGY | Facility: CLINIC | Age: 64
End: 2024-10-17
Payer: COMMERCIAL

## 2024-10-17 ENCOUNTER — INFUSION (OUTPATIENT)
Dept: ONCOLOGY | Facility: HOSPITAL | Age: 64
End: 2024-10-17
Payer: COMMERCIAL

## 2024-10-17 ENCOUNTER — OFFICE VISIT (OUTPATIENT)
Dept: ONCOLOGY | Facility: CLINIC | Age: 64
End: 2024-10-17
Payer: COMMERCIAL

## 2024-10-17 VITALS
SYSTOLIC BLOOD PRESSURE: 97 MMHG | TEMPERATURE: 98 F | DIASTOLIC BLOOD PRESSURE: 56 MMHG | HEART RATE: 89 BPM | RESPIRATION RATE: 18 BRPM | OXYGEN SATURATION: 96 %

## 2024-10-17 VITALS
TEMPERATURE: 97.5 F | SYSTOLIC BLOOD PRESSURE: 110 MMHG | BODY MASS INDEX: 24.77 KG/M2 | WEIGHT: 157.8 LBS | DIASTOLIC BLOOD PRESSURE: 56 MMHG | OXYGEN SATURATION: 96 % | RESPIRATION RATE: 18 BRPM | HEART RATE: 99 BPM | HEIGHT: 67 IN

## 2024-10-17 DIAGNOSIS — C79.51 MALIGNANT NEOPLASM METASTATIC TO BONE: ICD-10-CM

## 2024-10-17 DIAGNOSIS — C61 ADENOCARCINOMA OF PROSTATE: Primary | ICD-10-CM

## 2024-10-17 LAB
ALBUMIN SERPL-MCNC: 3.7 G/DL (ref 3.5–5.2)
ALBUMIN/GLOB SERPL: 0.9 G/DL
ALP SERPL-CCNC: 109 U/L (ref 39–117)
ALT SERPL W P-5'-P-CCNC: 5 U/L (ref 1–41)
ANION GAP SERPL CALCULATED.3IONS-SCNC: 11 MMOL/L (ref 5–15)
AST SERPL-CCNC: 11 U/L (ref 1–40)
BASOPHILS # BLD AUTO: 0.04 10*3/MM3 (ref 0–0.2)
BASOPHILS NFR BLD AUTO: 0.3 % (ref 0–1.5)
BILIRUB SERPL-MCNC: 0.8 MG/DL (ref 0–1.2)
BUN SERPL-MCNC: 19 MG/DL (ref 8–23)
BUN/CREAT SERPL: 19.6 (ref 7–25)
CALCIUM SPEC-SCNC: 9.2 MG/DL (ref 8.6–10.5)
CHLORIDE SERPL-SCNC: 100 MMOL/L (ref 98–107)
CO2 SERPL-SCNC: 25 MMOL/L (ref 22–29)
CREAT SERPL-MCNC: 0.97 MG/DL (ref 0.76–1.27)
DEPRECATED RDW RBC AUTO: 50.6 FL (ref 37–54)
EGFRCR SERPLBLD CKD-EPI 2021: 87.7 ML/MIN/1.73
EOSINOPHIL # BLD AUTO: 0.13 10*3/MM3 (ref 0–0.4)
EOSINOPHIL NFR BLD AUTO: 1 % (ref 0.3–6.2)
ERYTHROCYTE [DISTWIDTH] IN BLOOD BY AUTOMATED COUNT: 17.4 % (ref 12.3–15.4)
GLOBULIN UR ELPH-MCNC: 4 GM/DL
GLUCOSE SERPL-MCNC: 125 MG/DL (ref 65–99)
HCT VFR BLD AUTO: 32.3 % (ref 37.5–51)
HGB BLD-MCNC: 10.4 G/DL (ref 13–17.7)
IMM GRANULOCYTES # BLD AUTO: 0.05 10*3/MM3 (ref 0–0.05)
IMM GRANULOCYTES NFR BLD AUTO: 0.4 % (ref 0–0.5)
LYMPHOCYTES # BLD AUTO: 2.09 10*3/MM3 (ref 0.7–3.1)
LYMPHOCYTES NFR BLD AUTO: 15.5 % (ref 19.6–45.3)
MCH RBC QN AUTO: 25.5 PG (ref 26.6–33)
MCHC RBC AUTO-ENTMCNC: 32.2 G/DL (ref 31.5–35.7)
MCV RBC AUTO: 79.2 FL (ref 79–97)
MONOCYTES # BLD AUTO: 1.02 10*3/MM3 (ref 0.1–0.9)
MONOCYTES NFR BLD AUTO: 7.6 % (ref 5–12)
NEUTROPHILS NFR BLD AUTO: 10.13 10*3/MM3 (ref 1.7–7)
NEUTROPHILS NFR BLD AUTO: 75.2 % (ref 42.7–76)
NRBC BLD AUTO-RTO: 0 /100 WBC (ref 0–0.2)
PLATELET # BLD AUTO: 401 10*3/MM3 (ref 140–450)
PMV BLD AUTO: 10.5 FL (ref 6–12)
POTASSIUM SERPL-SCNC: 3.9 MMOL/L (ref 3.5–5.2)
PROT SERPL-MCNC: 7.7 G/DL (ref 6–8.5)
RBC # BLD AUTO: 4.08 10*6/MM3 (ref 4.14–5.8)
SODIUM SERPL-SCNC: 136 MMOL/L (ref 136–145)
WBC NRBC COR # BLD AUTO: 13.46 10*3/MM3 (ref 3.4–10.8)

## 2024-10-17 PROCEDURE — 99214 OFFICE O/P EST MOD 30 MIN: CPT | Performed by: INTERNAL MEDICINE

## 2024-10-17 PROCEDURE — 25010000002 LEUPROLIDE 45 MG KIT: Performed by: INTERNAL MEDICINE

## 2024-10-17 PROCEDURE — 96402 CHEMO HORMON ANTINEOPL SQ/IM: CPT

## 2024-10-17 PROCEDURE — 1125F AMNT PAIN NOTED PAIN PRSNT: CPT | Performed by: INTERNAL MEDICINE

## 2024-10-17 PROCEDURE — 84153 ASSAY OF PSA TOTAL: CPT | Performed by: INTERNAL MEDICINE

## 2024-10-17 PROCEDURE — 80053 COMPREHEN METABOLIC PANEL: CPT | Performed by: INTERNAL MEDICINE

## 2024-10-17 PROCEDURE — 85025 COMPLETE CBC W/AUTO DIFF WBC: CPT | Performed by: INTERNAL MEDICINE

## 2024-10-17 RX ORDER — GABAPENTIN 600 MG/1
600 TABLET ORAL 2 TIMES DAILY
Qty: 60 TABLET | Refills: 3 | Status: SHIPPED | OUTPATIENT
Start: 2024-10-17

## 2024-10-17 RX ORDER — FERROUS SULFATE 325(65) MG
325 TABLET ORAL 2 TIMES DAILY
Qty: 60 TABLET | Refills: 3 | Status: SHIPPED | OUTPATIENT
Start: 2024-10-17

## 2024-10-17 RX ADMIN — LEUPROLIDE ACETATE 45 MG: KIT at 15:13

## 2024-10-17 NOTE — PROGRESS NOTES
Venipuncture Blood Specimen Collection  Venipuncture performed in right arm by Chris Hernandez MA with good hemostasis. Patient tolerated the procedure well without complications.   10/17/24   Chris Hernandez MA

## 2024-10-17 NOTE — PROGRESS NOTES
Name:  Ta Madison  :  1960  Date:  10/17/2024     REFERRING PHYSICIAN  Dheeraj Nieves MD    PRIMARY CARE PROVIDER  Yolande Olvera APRN    REASON FOR FOLLOWUP  1. Adenocarcinoma of prostate      CHIEF COMPLAINT  Chronic neuropathies in his hands and feet.    Dear Ms. Wade,    HISTORY OF PRESENT ILLNESS:   I saw Mr. Madison in follow up today in our medical oncology clinic. As you are aware, he is a pleasant, 64 y.o., white male with a history of tobacco abuse who was referred to urology in 2020 for an elevated serum PSA level (of ~28 ng/mL, which had increased to 247 ng/mL at the time of his initial appointment in our clinic). An examination revealed a very hard and fixed prostate, and multiple core biopsies were performed on 2020. The results were consistent with [place] Score 5+4 or 4+5=9 adenocarcinoma involving all twelve sampled cores. A staging workup confirmed pelvic adenopathy as well as diffuse, sclerotic lesions throughout the axial and appendicular skeleton, including the right greater than left femoral diaphyses. With this stage IV diagnosis, he was referred to our clinic for further evaluation and management. At the time of his initial consultation in our office (on 2021), he was agreeable to starting definitive therapy with a combination of ADT (Lupron injections) and anti-androgen therapy (daily PO Xtandi).    INTERIM HISTORY:  Mr. Madison returns to clinic today for follow up accompanied by his granddaughter. He began [id number] Lupron on 2021 and, between then and ~early summer 2023, he was also consistently on daily PO Xtandi (for a total of about ~two and one half years). In early summer 2023, he developed necrotizing pancreatitis; and he spent the better part of the next ~three to four months admitted to one hospital or another (including , [facility] and our facility). Throughout that time, he was unable to take the Xtandi, as it was multiple months before he could  "even attempt to take anything by mouth. He has slowly, but steadily, continued to recover, his PEG has been removed; and he has not had any recurrent problems related to this issue since. today that his PEG has now been removed, and he is overall doing much, much better. Unfortunately, since we last saw him in our clinic, over the course of a few days in mid-September 2024, he fell hard onto his right hip twice (the first time when he \"got into a tangle with his bull\", and the second time when he and his son were working in his yard. The second time, he suffered a subcapital fracture of the right femoral neck. He underwent an ORIF on 09/10/2024, and he has been recovering uneventfully. His only new complaint today is that his chronic neuropathies in both of his hands and feet have been bothering him more lately, and some Neurontin that he borrowed from his sister helped quite a bit. He has no other specific complaints.    Past Medical History:   Diagnosis Date    Elevated cholesterol     GERD (gastroesophageal reflux disease)     Increased heart rate     Mini stroke     Neck pain     Prostate cancer     Rash     Tobacco abuse        Past Surgical History:   Procedure Laterality Date    ABDOMINAL SURGERY      APPENDECTOMY  1971    [facility]     COLONOSCOPY      COLONOSCOPY N/A 01/25/2023    Procedure: COLONOSCOPY;  Surgeon: Mahnaz Caraballo MD;  Location: [facility];  Service: Gastroenterology;  Laterality: N/A;    ENDOSCOPY N/A 07/31/2023    Procedure: ESOPHAGOGASTRODUODENOSCOPY WITH KEOFEED PLACEMENT;  Surgeon: Yolande Eller MD;  Location:  [facility];  Service: Gastroenterology;  Laterality: N/A;    HIP CANNULATED SCREW PLACEMENT Right 9/10/2024    Procedure: FEMORAL NECK OPEN REDUCTION INTERNAL FIXATION;  Surgeon: Nacho Montoya MD;  Location: [facility];  Service: Orthopedics;  Laterality: Right;       Social History     Socioeconomic History    Marital status:    Tobacco Use    " Smoking status: Some Days     Current packs/day: 0.00     Average packs/day: 2.0 packs/day for 45.0 years (90.0 ttl pk-yrs)     Types: Cigarettes     Start date: 1978     Last attempt to quit: 2023     Years since quittin.2    Smokeless tobacco: Never   Vaping Use    Vaping status: Never Used   Substance and Sexual Activity    Alcohol use: No    Drug use: Yes     Types: Marijuana     Comment: occ     Sexual activity: Defer       Family History   Problem Relation Age of Onset    Nephrolithiasis Mother     Heart disease Father     Hypertension Father     Kidney disease Father        No Known Allergies    Current Outpatient Medications   Medication Sig Dispense Refill    aspirin 81 MG EC tablet Take 1 tablet by mouth Daily.      atorvastatin (LIPITOR) 20 MG tablet Take 1 tablet by mouth Every Night. 90 tablet 1    cyclobenzaprine (FLEXERIL) 10 MG tablet Take 1 tablet by mouth 3 (Three) Times a Day As Needed for Muscle Spasms. 30 tablet 0    enzalutamide (XTANDI) 40 MG chemo capsule Take 4 capsules by mouth Daily. 120 capsule 5    fenofibrate (TRICOR) 145 MG tablet Take 1 tablet by mouth Daily. 90 tablet 1    HYDROcodone-acetaminophen (NORCO)  MG per tablet Take 1 tablet by mouth Every 6 (Six) Hours As Needed for Moderate Pain. 120 tablet 0    hydrOXYzine (ATARAX) 50 MG tablet Take 1 tablet by mouth 3 (Three) Times a Day As Needed for Itching. 90 tablet 1    metoprolol tartrate (LOPRESSOR) 25 MG tablet Take 1 tablet by mouth 2 (Two) Times a Day. 180 tablet 1    pantoprazole (PROTONIX) 40 MG EC tablet Take 1 tablet by mouth Daily. 90 tablet 1    [START ON 10/21/2024] varenicline (Chantix Continuing Month Ravinder) 1 MG tablet Take 1 tablet by mouth 2 (Two) Times a Day for 56 days. 56 tablet 1    Varenicline Tartrate, Starter, 0.5 MG X 11 & 1 MG X 42 tablet therapy pack Take 0.5 mg by mouth Daily for 3 days, THEN 0.5 mg 2 (Two) Times a Day for 4 days, THEN 1 mg 2 (Two) Times a Day for 21 days as directed  "per package instructions. 53 each 0    ferrous sulfate 325 (65 FE) MG tablet Take 1 tablet by mouth 2 (Two) Times a Day. 60 tablet 3    gabapentin (NEURONTIN) 600 MG tablet Take 1 tablet by mouth 2 (Two) Times a Day. 60 tablet 3     No current facility-administered medications for this visit.     REVIEW OF SYSTEMS  CONSTITUTIONAL:  No fever, chills or night sweats. Improved fatigue.  EYES:  No blurry vision, diplopia or other vision changes.  ENT:  No hearing loss, nosebleeds or sore throat.  CARDIOVASCULAR:  No palpitations, arrhythmia, syncopal episodes or edema.  PULMONARY:  No hemoptysis, wheezing, chronic cough or shortness of breath.  GASTROINTESTINAL:  As per the HPI above.  GENITOURINARY:  No hematuria, kidney stones or frequent urination.  MUSCULOSKELETAL:  As per the HPI above.  ENDOCRINE:  No excessive thirst. Hot flashes as above.  HEMATOLOGIC:  No history of free bleeding, spontaneous bleeding or clotting.  IMMUNOLOGIC:  No allergies or frequent infections.  NEUROLOGIC: No numbness, tingling, seizures or weakness. Recently more prominent neuropathies in both hands and feet, as per the HPI above.  PSYCHIATRIC:  No anxiety or depression.    PHYSICAL EXAMINATION  /56   Pulse 99   Temp 97.5 °F (36.4 °C) (Temporal)   Resp 18   Ht 170.2 cm (67\")   Wt 71.6 kg (157 lb 12.8 oz)   SpO2 96%   BMI 24.71 kg/m²     Pain Score:  Pain Score    10/17/24 1425   PainSc:   6   PainLoc: Hip     ECO  GENERAL:  A well-developed, well-nourished, white male in no acute distress.  HEENT:  Pupils equally round and reactive to light. Extraocular muscles intact.  CARDIOVASCULAR:  Regular rate and rhythm. No murmurs, gallops or rubs.  LUNGS:  Clear to auscultation bilaterally. No wheezing  ABDOMEN:  Soft, nontender, nondistended with positive bowel sounds.  EXTREMITIES:  No clubbing, cyanosis or edema bilaterally. Status post right-sided ORIF last month (September).  SKIN:  No petechiae or rashes.  NEURO:  Cranial " nerves grossly intact. No focal deficits.  PSYCH:  Alert and oriented x3.    LABORATORY  Lab Results   Component Value Date    WBC 13.46 (H) 10/17/2024    HGB 10.4 (L) 10/17/2024    HCT 32.3 (L) 10/17/2024    MCV 79.2 10/17/2024     10/17/2024    NEUTROABS 10.13 (H) 10/17/2024       Lab Results   Component Value Date     10/17/2024    K 3.9 10/17/2024     10/17/2024    CO2 25.0 10/17/2024    BUN 19 10/17/2024    CREATININE 0.97 10/17/2024    GLUCOSE 125 (H) 10/17/2024    CALCIUM 9.2 10/17/2024    AST 11 10/17/2024    ALT 5 10/17/2024    ALKPHOS 109 10/17/2024    BILITOT 0.8 10/17/2024    PROTEINTOT 7.7 10/17/2024    ALBUMIN 3.7 10/17/2024     CBC (10/17/2024): WBCs: 13.46; HgB: 10.4; Hct: 32.3; platelets: 401; MCV: 79.2  CBC (04/17/2024): WBCs: 9.56; HgB: 11.6; Hct: 36.5; platelets: 599  CBC (03/18/2024): WBCs: 8.64; HgB: 13.0; Hct: 39.3; platelets: 532  CBC (01/30/2024): WBCs: 9.60; HgB: 12.6; Hct: 40.1; platelets: 568; MCV: 84.8  CBC (12/27/2023): WBCs: 10.77; HgB: 11.9; Hct: 37.2; platelets: 643; MCV: 79  CBC (11/30/2023): WBCs: 13.47; HgB: 11.4; Hct: 37.2; platelets: 889; MCV: 81.0  CBC (10/18/2023): WBCs: 10.45; HgB: 7.6; Hct: 25.8; platelets: 608; MCV: 79.6  CBC (10/17/2023): WBCs: 9.95; HgB: 8.0; Hct: 25.6; platelets: 614; MCV: 76.9  CBC (04/12/2023): WBCs: 6.79; HgB: 12.2; Hct: 38.5; platelets: 546  CBC (01/19/2023): WBCs: 8.19; HgB: 12.1; Hct: 37.6; platelets: 586  CBC (10/19/2022): WBCs: 11.25; HgB: 12.3; Hct: 37.3; platelets: 453  CBC (07/18/2022): WBCs: 7.82; HgB: 12.7; Hct: 39.0; platelets: 513  CBC (04/13/2022): WBCs: 7.62; HgB: 12.7; Hct: 38.7; platelets: 453  CBC (01/19/2022): WBCs: 7.56; HgB: 13.1; Hct: 41.0; platelets: 518  CBC (07/20/2021): WBCs: 7.43; HgB: 13.0; Hct: 39.6; platelets: 469  CBC (06/29/2021): WBCs: 7.8; HgB: 12.6; Hct: 38.1; platelets: 396  CBC (04/14/2021): WBCs: 7.2; HgB: 12.7; Hct: 38.8; platelets: 423  CBC (02/25/2021): WBCs: 6.5; HgB: 11.2; Hct: 34.4;  platelets: 407    PSA (10/17/2024): pending  PSA (08/05/2024): < 0.014 ng/mL  PSA (04/17/2024): < 0.014 ng/mL  PSA (01/30/2024): < 0.014 ng/mL  PSA (11/30/2023): < 0.014 ng/mL  PSA (10/18/2023): < 0.014 ng/mL  PSA (07/12/2023): < 0.014 ng/mL  PSA (04/12/2023): < 0.014 ng/mL  PSA (01/19/2023): < 0.014 ng/mL  PSA (10/19/2022): < 0.014 ng/mL  PSA (07/18/2022): < 0.014 ng/mL  PSA (04/13/2022): < 0.014 ng/mL  PSA (01/19/2022): < 0.014 ng/mL  PSA (10/13/2021): < 0.014 ng/mL  PSA (07/20/2021): 0.020 ng/mL   PSA (04/14/2021): 0.108 ng/mL  PSA (02/25/2021): 0.814 ng/mL  PSA (01/20/2021): 29.5 ng/mL  PSA (01/07/2021): 247.0 ng/mL  PSA (11/10/2020): 28.3 ng/mL    Iron profile (10/18/2023): serum iron: 19; % saturation: 6; TIBC: 322; ferritin: 606.9 ng/mL    IMAGING  NM bone scan, whole body (12/15/2020):  Impression: Extensive osteoblastic metastatic disease involving the axial and appendicular skeleton including the right greater than left femoral diaphyses.    CT pelvis with contrast (12/21/2020):  Impression:  1) Urinary bladder wall thickening noted.  2) Heterogeneous appearance of the prostate gland.  3) Nonspecific stranding in the retroperitoneal space.  4) Retroperitoneal region lymph node on the right side measuring 1.4 cm.  5) Right obturator chain region lymph node measuring 1.5 cm. Other lymph nodes are identified in this region.  6) Sclerotic bone lesions are noted throughout the pelvis concerning for metastatic disease.    CT chest without contrast (10/10/2023, compared to 08/06/2023):  Impression:  1) No acute thoracic findings identfied.  2) Refer to CT abdomen/pelvis for abdomen findings.    CT abdomen and pelvis without contrast (10/10/2023, compared to 08/10/2023):  Impression:  1) Significant improvement in changes of necrotizing pancreatitis status post cystogastrostomy tube placement, necrosectomy and Axios metallic stent placement spanning from the G-tube into the stomach to the level of the proximal  jejunum.  2) No significant ascites.  3) No acute appearing pancreatitis changes. No findings to suggest acute abscess.  4) Cholelithiasis noted.  5) Other incidental/nonacute findings.    CT abdomen and pelvis with contrast (10/23/2023):  Impression: Sequela of necrotizing pancreatitis with interval marked decrease in size of the area of walled off necrosis centered in the pancreatic body since 09/09/2023. Increased intra and extrahepatic biliary ductal dilation with poor visualization of the lower most aspect of the common bile duct, concerning for stricture. Recommend correlation with ERCP.    PATHOLOGY  Left and right prostate, needle core biopsies, twelve total cores (12/02/2020):  [place] Score 5+4 or 4+5 = 9 prostatic adenocarcinoma involving ~45-90% of all twelve (12/12) total cores.    IMPRESSION AND PLAN  Mr. Madison is a 64 y.o., white male with:  Prostate adenocarcinoma: Diagnosed in December 2020 with stage IV disease, with widespread metastases involving the axial and appendicular skeleton along with probable, pelvic adenopathy. I have had multiple, long discussions with the patient and his wife since the time of his initial consultation in our clinic (on 01/07/2021) regarding this diagnosis and its prognosis. They remain aware that this disease is, unfortunately, not curable; however, particularly given his young age and this type of cancer's tendency to respond very well to initial anti-testosterone therapy, it was/remains very treatable, and sustained remissions are very possible. He was felt to be an excellent candidate for initial therapy with both ADT and androgen receptor blockade. He received an initial cycle of (currently now [id number]) Lupron on 01/08/2021 and was also started on daily PO Xtandi at that time. He has tolerated this regimen overall very well for the past ~three (plus) years; and, with this therapy, his serum PSA levels responded dramatically and quickly, decreasing from 247 ng/mL  on 01/07/2021 to undetectable (<0.014 ng/mL) by 10/13/2021 (and this continues to be the case on the most recent repeat check, drawn on 08/05/2024; today's result is pending). Due to issue #2, he was unable to take the Xtandi anywhere near consistently for a total of ~three to four months (throughout much of the summer and early fall in 2023); but he never missed a dose of Lupron, and he was able to restart the Xtandi by late October 2023 (and he has been taking it consistently again for the past ~year). We will proceed with this current treatment plan. We will see him back in our clinic in six months, on the day of the next scheduled cycle of [id number] Lupron (in April 2025) with a repeat CBC, CMP and PSA level.  Necrotizing pancreatitis: A serious problem back in Summer 2023; however, as of early this year, he seems to have fully recovered from this ordeal, and his PEG has been removed. Ongoing management per [facility].  Skeletal metastases: There was diffuse involvement by issue #1 of both the axial and appendicular skeletons on the initial staging NM bone scan and CT of the abdomen and pelvis performed in late December 2020. 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 Continue to monitor.  Pain: The symptoms that were directly related to issue #3 remain resolved since he started Lupron/Xtandi in early January 2021 for the palliative treatment of issue #1. His more recent symptoms have generally been related to issue #2 (and, even more recently, issues #5 and #6, not #1; however, with his diagnosis  of an incurable malignancy, our clinic remains agreeable to managing this issue. Continue prn Norco. Continue to monitor.  Right-sided hip fracture: Status post a couple of traumatic falls onto his right hip in September 2024 and status post ORIF at that time. Ongoing management per orthopedic surgery.  Neuropathies: Chronic symptoms in his hands and feet have recently been worsening. A Rx for Neurontin 600 mg PO BID was provided today. Continue to monitor.  Iron deficiency: Likely secondary to his recent surgery for issue #5. A Rx for ferrous sulfate 325 mg PO BID was provided today. Continue to monitor.  The patient and his granddaughter were in agreement with these plans.    It is a pleasure to participate in Mr. Madison's care. Please do not hesitate to call with any questions or concerns that you may have.    A total of 30 minutes were spent coordinating this patient’s care in clinic today; more than 50% of this time was face-to-face with the patient and his granddaughter, reviewing his interim medical history, discussing the results of the most recent repeat labwork, including the serum PSA levels (which are trended above), and counseling on the current treatment and followup plan. All questions were answered to their satisfaction.    FOLLOW UP  New Rxs for Neurontin 600 mg PO BID disp #60 and ferrous sulfate 325 mg PO BID disp #60 both provided today. Continue Norco 10/325 mg q6hr as well. Continue ([id number]) Lupron (including a dose today). Continue daily, PO Xtandi. With [facility], as previously planned. With urology, as previously planned. With orthopedic surgery, as planned. Return to our clinic in 6 months, on the day of the next cycle of [id number] Lupron (in April 2025), with a CBC, CMP, iron panel, ferritin level and PSA.          This document was electronically signed by HIRO Martinez MD October 17, 2024 17:17 EDT      CC: EUGENIA Mendoza

## 2024-10-18 LAB — PSA SERPL-MCNC: <0.014 NG/ML (ref 0–4)

## 2024-10-22 ENCOUNTER — SPECIALTY PHARMACY (OUTPATIENT)
Dept: PHARMACY | Facility: HOSPITAL | Age: 64
End: 2024-10-22
Payer: COMMERCIAL

## 2024-10-22 NOTE — PROGRESS NOTES
Specialty Pharmacy Refill Coordination Note     Ta is a 64 y.o. male contacted today regarding refills of  Xtandi specialty medication(s).    Reviewed and verified with patient:       Specialty medication(s) and dose(s) confirmed: yes    Refill Questions      Flowsheet Row Most Recent Value   Changes to allergies? No   Changes to medications? No   New conditions or infections since last clinic visit No   Unplanned office visit, urgent care, ED, or hospital admission in the last 4 weeks  No   How does patient/caregiver feel medication is working? Very good   Financial problems or insurance changes  No   Since the previous refill, were any specialty medication doses or scheduled injections missed or delayed?  No   Why were doses missed? na   Does this patient require a clinical escalation to a pharmacist? No            Delivery Questions      Flowsheet Row Most Recent Value   Delivery method [employer]   Delivery address verified with patient/caregiver? Yes   Delivery address Home   Number of medications in delivery 1   Medication(s) being filled and delivered Enzalutamide (XTANDI)   Doses left of specialty medications na   Copay verified? No   Copay amount na   Copay form of payment No copayment ($0)   Ship Date 10/23   Delivery Date 10/24   Signature Required No              Medication Adherence    Adherence tools used: patient uses a pill box to manage medications  Support network for adherence: family member          Follow-up: 30 day(s)     Corinne Whatley, Pharmacy Technician  Specialty Pharmacy Technician

## 2024-10-23 NOTE — PROGRESS NOTES
Subjective   Ta Madison is a 58 y.o. male.     Chief Complaint: Rash    History of Present Illness   This is a chronic problem. The current episode started more than 1 year ago. The problem is unchanged. The affected locations include the right hand and left hand. He was exposed to nothing. Past treatments include triamcinolone ointment.  No relief of symptoms.   Rash continues to spread up arms and around neck.  Pt states that he has had a couple of areas in the top of his scalp area also.   He has tried hydroxyzine for the itching and it has not helped at all.  He does admit that his wife gave him a gabapentin and his itching did completely stop and he was able to rest without scratching areas.  He has been referred to dermatology but is awaiting phone call with date and time.  He has requested a script for gabapentin and I have agreed.  I have also told him that no further gabapentin will be prescribed and he must keep his appt with dermatology.  Pt states that he has every intention of keeping the appt.     Family History   Problem Relation Age of Onset   • Nephrolithiasis Mother    • Heart disease Father    • Hypertension Father    • Kidney disease Father        Social History     Social History   • Marital status:      Spouse name: N/A   • Number of children: N/A   • Years of education: N/A     Occupational History   • Not on file.     Social History Main Topics   • Smoking status: Current Every Day Smoker     Packs/day: 1.00     Types: Cigarettes   • Smokeless tobacco: Never Used   • Alcohol use No   • Drug use: Yes     Types: Marijuana      Comment: occ    • Sexual activity: Defer     Other Topics Concern   • Not on file     Social History Narrative   • No narrative on file       Past Medical History:   Diagnosis Date   • Increased heart rate    • Mini stroke (CMS/HCC)    • Tobacco abuse        Review of Systems   Constitutional: Negative.    HENT: Negative.    Respiratory: Negative.   "  Cardiovascular: Negative.    Gastrointestinal: Negative.    Musculoskeletal: Negative.    Skin: Positive for rash.   Neurological: Negative.    Psychiatric/Behavioral: Negative.        Objective   Physical Exam   Constitutional: He is oriented to person, place, and time. He appears well-developed and well-nourished.   Neck: Normal range of motion. Neck supple.   Cardiovascular: Normal rate, regular rhythm and normal heart sounds.    Pulmonary/Chest: Effort normal and breath sounds normal.   Neurological: He is alert and oriented to person, place, and time.   Skin: Skin is warm and dry.   Erythematous maculopapular round areas noted to bilateral forearms, neck and scalp area   Psychiatric: He has a normal mood and affect. His behavior is normal. Judgment and thought content normal.   Nursing note and vitals reviewed.      Procedures    Vitals: Blood pressure 113/65, pulse 77, temperature 99.3 °F (37.4 °C), temperature source Oral, height 175.3 cm (69\"), weight 83.5 kg (184 lb), SpO2 98 %.    Allergies: No Known Allergies     During this visit the following were done:  Labs Reviewed []    Labs Ordered []    Radiology Reports Reviewed []    Radiology Ordered []    PCP Records Reviewed []    Referring Provider Records Reviewed []    ER Records Reviewed []    Hospital Records Reviewed []    History Obtained From Family []    Radiology Images Reviewed []    Other Reviewed []    Records Requested []      Assessment/Plan   Ta was seen today for rash.    Diagnoses and all orders for this visit:    Pruritic rash  -     gabapentin (NEURONTIN) 300 MG capsule; Take 1 capsule by mouth 3 (Three) Times a Day.      Keep appt with dermatology         " none Elidel Pregnancy And Lactation Text: This medication is Pregnancy Category C. It is unknown if this medication is excreted in breast milk. Tazorac Pregnancy And Lactation Text: This medication is not safe during pregnancy. It is unknown if this medication is excreted in breast milk. Siliq Pregnancy And Lactation Text: The risk during pregnancy and breastfeeding is uncertain with this medication. Minocycline Counseling: Patient advised regarding possible photosensitivity and discoloration of the teeth, skin, lips, tongue and gums.  Patient instructed to avoid sunlight, if possible.  When exposed to sunlight, patients should wear protective clothing, sunglasses, and sunscreen.  The patient was instructed to call the office immediately if the following severe adverse effects occur:  hearing changes, easy bruising/bleeding, severe headache, or vision changes.  The patient verbalized understanding of the proper use and possible adverse effects of minocycline.  All of the patient's questions and concerns were addressed. Gabapentin Counseling: I discussed with the patient the risks of gabapentin including but not limited to dizziness, somnolence, fatigue and ataxia. Cosentyx Counseling:  I discussed with the patient the risks of Cosentyx including but not limited to worsening of Crohn's disease, immunosuppression, allergic reactions and infections.  The patient understands that monitoring is required including a PPD at baseline and must alert us or the primary physician if symptoms of infection or other concerning signs are noted. Niacinamide Pregnancy And Lactation Text: These medications are considered safe during pregnancy. Cephalexin Pregnancy And Lactation Text: This medication is Pregnancy Category B and considered safe during pregnancy.  It is also excreted in breast milk but can be used safely for shorter doses. Qbrexza Counseling:  I discussed with the patient the risks of Qbrexza including but not limited to headache, mydriasis, blurred vision, dry eyes, nasal dryness, dry mouth, dry throat, dry skin, urinary hesitation, and constipation.  Local skin reactions including erythema, burning, stinging, and itching can also occur. Calcipotriene Counseling:  I discussed with the patient the risks of calcipotriene including but not limited to erythema, scaling, itching, and irritation. Ivermectin Counseling:  Patient instructed to take medication on an empty stomach with a full glass of water.  Patient informed of potential adverse effects including but not limited to nausea, diarrhea, dizziness, itching, and swelling of the extremities or lymph nodes.  The patient verbalized understanding of the proper use and possible adverse effects of ivermectin.  All of the patient's questions and concerns were addressed. Cyclophosphamide Counseling:  I discussed with the patient the risks of cyclophosphamide including but not limited to hair loss, hormonal abnormalities, decreased fertility, abdominal pain, diarrhea, nausea and vomiting, bone marrow suppression and infection. The patient understands that monitoring is required while taking this medication. Winlevi Pregnancy And Lactation Text: This medication is considered safe during pregnancy and breastfeeding. Thalidomide Pregnancy And Lactation Text: This medication is Pregnancy Category X and is absolutely contraindicated during pregnancy. It is unknown if it is excreted in breast milk. Acitretin Pregnancy And Lactation Text: This medication is Pregnancy Category X and should not be given to women who are pregnant or may become pregnant in the future. This medication is excreted in breast milk. Minoxidil Pregnancy And Lactation Text: This medication has not been assigned a Pregnancy Risk Category but animal studies failed to show danger with the topical medication. It is unknown if the medication is excreted in breast milk. Colchicine Counseling:  Patient counseled regarding adverse effects including but not limited to stomach upset (nausea, vomiting, stomach pain, or diarrhea).  Patient instructed to limit alcohol consumption while taking this medication.  Colchicine may reduce blood counts especially with prolonged use.  The patient understands that monitoring of kidney function and blood counts may be required, especially at baseline. The patient verbalized understanding of the proper use and possible adverse effects of colchicine.  All of the patient's questions and concerns were addressed. Detail Level: Simple Oxybutynin Counseling:  I discussed with the patient the risks of oxybutynin including but not limited to skin rash, drowsiness, dry mouth, difficulty urinating, and blurred vision. Topical Clindamycin Counseling: Patient counseled that this medication may cause skin irritation or allergic reactions.  In the event of skin irritation, the patient was advised to reduce the amount of the drug applied or use it less frequently.   The patient verbalized understanding of the proper use and possible adverse effects of clindamycin.  All of the patient's questions and concerns were addressed. Terbinafine Pregnancy And Lactation Text: This medication is Pregnancy Category B and is considered safe during pregnancy. It is also excreted in breast milk and breast feeding isn't recommended. Siliq Counseling:  I discussed with the patient the risks of Siliq including but not limited to new or worsening depression, suicidal thoughts and behavior, immunosuppression, malignancy, posterior leukoencephalopathy syndrome, and serious infections.  The patient understands that monitoring is required including a PPD at baseline and must alert us or the primary physician if symptoms of infection or other concerning signs are noted. There is also a special program designed to monitor depression which is required with Siliq. Minocycline Pregnancy And Lactation Text: This medication is Pregnancy Category D and not consider safe during pregnancy. It is also excreted in breast milk. Eucrisa Counseling: Patient may experience a mild burning sensation during topical application. Eucrisa is not approved in children less than 2 years of age. Finasteride Pregnancy And Lactation Text: This medication is absolutely contraindicated during pregnancy. It is unknown if it is excreted in breast milk. Fluconazole Counseling:  Patient counseled regarding adverse effects of fluconazole including but not limited to headache, diarrhea, nausea, upset stomach, liver function test abnormalities, taste disturbance, and stomach pain.  There is a rare possibility of liver failure that can occur when taking fluconazole.  The patient understands that monitoring of LFTs and kidney function test may be required, especially at baseline. The patient verbalized understanding of the proper use and possible adverse effects of fluconazole.  All of the patient's questions and concerns were addressed. Xeljanz Counseling: I discussed with the patient the risks of Xeljanz therapy including increased risk of infection, liver issues, headache, diarrhea, or cold symptoms. Live vaccines should be avoided. They were instructed to call if they have any problems. Cimzia Pregnancy And Lactation Text: This medication crosses the placenta but can be considered safe in certain situations. Cimzia may be excreted in breast milk. Calcipotriene Pregnancy And Lactation Text: This medication has not been proven safe during pregnancy. It is unknown if this medication is excreted in breast milk. Qbrexza Pregnancy And Lactation Text: There is no available data on Qbrexza use in pregnant women.  There is no available data on Qbrexza use in lactation. Cyclophosphamide Pregnancy And Lactation Text: This medication is Pregnancy Category D and it isn't considered safe during pregnancy. This medication is excreted in breast milk. Clindamycin Counseling: I counseled the patient regarding use of clindamycin as an antibiotic for prophylactic and/or therapeutic purposes. Clindamycin is active against numerous classes of bacteria, including skin bacteria. Side effects may include nausea, diarrhea, gastrointestinal upset, rash, hives, yeast infections, and in rare cases, colitis. Zyclara Counseling:  I discussed with the patient the risks of imiquimod including but not limited to erythema, scaling, itching, weeping, crusting, and pain.  Patient understands that the inflammatory response to imiquimod is variable from person to person and was educated regarded proper titration schedule.  If flu-like symptoms develop, patient knows to discontinue the medication and contact us. Albendazole Pregnancy And Lactation Text: This medication is Pregnancy Category C and it isn't known if it is safe during pregnancy. It is also excreted in breast milk. Niacinamide Counseling: I recommended taking niacin or niacinamide, also know as vitamin B3, twice daily. Recent evidence suggests that taking vitamin B3 (500 mg twice daily) can reduce the risk of actinic keratoses and non-melanoma skin cancers. Side effects of vitamin B3 include flushing and headache. Bexarotene Counseling:  I discussed with the patient the risks of bexarotene including but not limited to hair loss, dry lips/skin/eyes, liver abnormalities, hyperlipidemia, pancreatitis, depression/suicidal ideation, photosensitivity, drug rash/allergic reactions, hypothyroidism, anemia, leukopenia, infection, cataracts, and teratogenicity.  Patient understands that they will need regular blood tests to check lipid profile, liver function tests, white blood cell count, thyroid function tests and pregnancy test if applicable. Ilumya Counseling: I discussed with the patient the risks of tildrakizumab including but not limited to immunosuppression, malignancy, posterior leukoencephalopathy syndrome, and serious infections.  The patient understands that monitoring is required including a PPD at baseline and must alert us or the primary physician if symptoms of infection or other concerning signs are noted. Clofazimine Pregnancy And Lactation Text: This medication is Pregnancy Category C and isn't considered safe during pregnancy. It is excreted in breast milk. Otezla Pregnancy And Lactation Text: This medication is Pregnancy Category C and it isn't known if it is safe during pregnancy. It is unknown if it is excreted in breast milk. Tranexamic Acid Counseling:  Patient advised of the small risk of bleeding problems with tranexamic acid. They were also instructed to call if they developed any nausea, vomiting or diarrhea. All of the patient's questions and concerns were addressed. Include Pregnancy/Lactation Warning?: No Mirvaso Counseling: Mirvaso is a topical medication which can decrease superficial blood flow where applied. Side effects are uncommon and include stinging, redness and allergic reactions. Aklief counseling:  Patient advised to apply a pea-sized amount only at bedtime and wait 30 minutes after washing their face before applying.  If too drying, patient may add a non-comedogenic moisturizer.  The most commonly reported side effects including irritation, redness, scaling, dryness, stinging, burning, itching, and increased risk of sunburn.  The patient verbalized understanding of the proper use and possible adverse effects of retinoids.  All of the patient's questions and concerns were addressed. Topical Clindamycin Pregnancy And Lactation Text: This medication is Pregnancy Category B and is considered safe during pregnancy. It is unknown if it is excreted in breast milk. Rituxan Pregnancy And Lactation Text: This medication is Pregnancy Category C and it isn't know if it is safe during pregnancy. It is unknown if this medication is excreted in breast milk but similar antibodies are known to be excreted. Finasteride Counseling:  I discussed with the patient the risks of use of finasteride including but not limited to decreased libido, decreased ejaculate volume, gynecomastia, and depression. Women should not handle medication.  All of the patient's questions and concerns were addressed. Quinolones Counseling:  I discussed with the patient the risks of fluoroquinolones including but not limited to GI upset, allergic reaction, drug rash, diarrhea, dizziness, photosensitivity, yeast infections, liver function test abnormalities, tendonitis/tendon rupture. Fluconazole Pregnancy And Lactation Text: This medication is Pregnancy Category C and it isn't know if it is safe during pregnancy. It is also excreted in breast milk. Cimzia Counseling:  I discussed with the patient the risks of Cimzia including but not limited to immunosuppression, allergic reactions and infections.  The patient understands that monitoring is required including a PPD at baseline and must alert us or the primary physician if symptoms of infection or other concerning signs are noted. Xelmarekz Pregnancy And Lactation Text: This medication is Pregnancy Category D and is not considered safe during pregnancy.  The risk during breast feeding is also uncertain. Rhofade Counseling: Rhofade is a topical medication which can decrease superficial blood flow where applied. Side effects are uncommon and include stinging, redness and allergic reactions. Taltz Counseling: I discussed with the patient the risks of ixekizumab including but not limited to immunosuppression, serious infections, worsening of inflammatory bowel disease and drug reactions.  The patient understands that monitoring is required including a PPD at baseline and must alert us or the primary physician if symptoms of infection or other concerning signs are noted. Albendazole Counseling:  I discussed with the patient the risks of albendazole including but not limited to cytopenia, kidney damage, nausea/vomiting and severe allergy.  The patient understands that this medication is being used in an off-label manner. Cyclosporine Counseling:  I discussed with the patient the risks of cyclosporine including but not limited to hypertension, gingival hyperplasia,myelosuppression, immunosuppression, liver damage, kidney damage, neurotoxicity, lymphoma, and serious infections. The patient understands that monitoring is required including baseline blood pressure, CBC, CMP, lipid panel and uric acid, and then 1-2 times monthly CMP and blood pressure. Clindamycin Pregnancy And Lactation Text: This medication can be used in pregnancy if certain situations. Clindamycin is also present in breast milk. Cimetidine Counseling:  I discussed with the patient the risks of Cimetidine including but not limited to gynecomastia, headache, diarrhea, nausea, drowsiness, arrhythmias, pancreatitis, skin rashes, psychosis, bone marrow suppression and kidney toxicity. Libtayo Pregnancy And Lactation Text: This medication is contraindicated in pregnancy and when breast feeding. Humira Pregnancy And Lactation Text: This medication is Pregnancy Category B and is considered safe during pregnancy. It is unknown if this medication is excreted in breast milk. Clofazimine Counseling:  I discussed with the patient the risks of clofazimine including but not limited to skin and eye pigmentation, liver damage, nausea/vomiting, gastrointestinal bleeding and allergy. Otezla Counseling: The side effects of Otezla were discussed with the patient, including but not limited to worsening or new depression, weight loss, diarrhea, nausea, upper respiratory tract infection, and headache. Patient instructed to call the office should any adverse effect occur.  The patient verbalized understanding of the proper use and possible adverse effects of Otezla.  All the patient's questions and concerns were addressed. Tranexamic Acid Pregnancy And Lactation Text: It is unknown if this medication is safe during pregnancy or breast feeding. Bexarotene Pregnancy And Lactation Text: This medication is Pregnancy Category X and should not be given to women who are pregnant or may become pregnant. This medication should not be used if you are breast feeding. Mirvaso Pregnancy And Lactation Text: This medication has not been assigned a Pregnancy Risk Category. It is unknown if the medication is excreted in breast milk. Aklief Pregnancy And Lactation Text: It is unknown if this medication is safe to use during pregnancy.  It is unknown if this medication is excreted in breast milk.  Breastfeeding women should use the topical cream on the smallest area of the skin for the shortest time needed while breastfeeding.  Do not apply to nipple and areola. Birth Control Pills Counseling: Birth Control Pill Counseling: I discussed with the patient the potential side effects of OCPs including but not limited to increased risk of stroke, heart attack, thrombophlebitis, deep venous thrombosis, hepatic adenomas, breast changes, GI upset, headaches, and depression.  The patient verbalized understanding of the proper use and possible adverse effects of OCPs. All of the patient's questions and concerns were addressed. Rituxan Counseling:  I discussed with the patient the risks of Rituxan infusions. Side effects can include infusion reactions, severe drug rashes including mucocutaneous reactions, reactivation of latent hepatitis and other infections and rarely progressive multifocal leukoencephalopathy.  All of the patient's questions and concerns were addressed. Topical Ketoconazole Counseling: Patient counseled that this medication may cause skin irritation or allergic reactions.  In the event of skin irritation, the patient was advised to reduce the amount of the drug applied or use it less frequently.   The patient verbalized understanding of the proper use and possible adverse effects of ketoconazole.  All of the patient's questions and concerns were addressed. Hydroquinone Counseling:  Patient advised that medication may result in skin irritation, lightening (hypopigmentation), dryness, and burning.  In the event of skin irritation, the patient was advised to reduce the amount of the drug applied or use it less frequently.  Rarely, spots that are treated with hydroquinone can become darker (pseudoochronosis).  Should this occur, patient instructed to stop medication and call the office. The patient verbalized understanding of the proper use and possible adverse effects of hydroquinone.  All of the patient's questions and concerns were addressed. Griseofulvin Counseling:  I discussed with the patient the risks of griseofulvin including but not limited to photosensitivity, cytopenia, liver damage, nausea/vomiting and severe allergy.  The patient understands that this medication is best absorbed when taken with a fatty meal (e.g., ice cream or french fries). Xolair Counseling:  Patient informed of potential adverse effects including but not limited to fever, muscle aches, rash and allergic reactions.  The patient verbalized understanding of the proper use and possible adverse effects of Xolair.  All of the patient's questions and concerns were addressed. Cibinqo Pregnancy And Lactation Text: It is unknown if this medication will adversely affect pregnancy or breast feeding.  You should not take this medication if you are currently pregnant or planning a pregnancy or while breastfeeding. Cyclosporine Pregnancy And Lactation Text: This medication is Pregnancy Category C and it isn't know if it is safe during pregnancy. This medication is excreted in breast milk. Cantharidin Pregnancy And Lactation Text: The use of this medication during pregnancy or lactation is not recommended as there is insufficient data. Libtayo Counseling- I discussed with the patient the risks of Libtayo including but not limited to nausea, vomiting, diarrhea, and bone or muscle pain.  The patient verbalized understanding of the proper use and possible adverse effects of Libtayo.  All of the patient's questions and concerns were addressed. Doxycycline Counseling:  Patient counseled regarding possible photosensitivity and increased risk for sunburn.  Patient instructed to avoid sunlight, if possible.  When exposed to sunlight, patients should wear protective clothing, sunglasses, and sunscreen.  The patient was instructed to call the office immediately if the following severe adverse effects occur:  hearing changes, easy bruising/bleeding, severe headache, or vision changes.  The patient verbalized understanding of the proper use and possible adverse effects of doxycycline.  All of the patient's questions and concerns were addressed. Valtrex Counseling: I discussed with the patient the risks of valacyclovir including but not limited to kidney damage, nausea, vomiting and severe allergy.  The patient understands that if the infection seems to be worsening or is not improving, they are to call. Isotretinoin Counseling: Patient should get monthly blood tests, not donate blood, not drive at night if vision affected, not share medication, and not undergo elective surgery for 6 months after tx completed. Side effects reviewed, pt to contact office should one occur. Humira Counseling:  I discussed with the patient the risks of adalimumab including but not limited to myelosuppression, immunosuppression, autoimmune hepatitis, demyelinating diseases, lymphoma, and serious infections.  The patient understands that monitoring is required including a PPD at baseline and must alert us or the primary physician if symptoms of infection or other concerning signs are noted. Azelaic Acid Counseling: Patient counseled that medicine may cause skin irritation and to avoid applying near the eyes.  In the event of skin irritation, the patient was advised to reduce the amount of the drug applied or use it less frequently.   The patient verbalized understanding of the proper use and possible adverse effects of azelaic acid.  All of the patient's questions and concerns were addressed. Opzelura Counseling:  I discussed with the patient the risks of Opzelura including but not limited to nasopharngitis, bronchitis, ear infection, eosinophila, hives, diarrhea, folliculitis, tonsillitis, and rhinorrhea.  Taken orally, this medication has been linked to serious infections; higher rate of mortality; malignancy and lymphoproliferative disorders; major adverse cardiovascular events; thrombosis; thrombocytopenia, anemia, and neutropenia; and lipid elevations. Birth Control Pills Pregnancy And Lactation Text: This medication should be avoided if pregnant and for the first 30 days post-partum. Oral Minoxidil Pregnancy And Lactation Text: This medication should only be used when clearly needed if you are pregnant, attempting to become pregnant or breast feeding. Rinvoq Pregnancy And Lactation Text: Based on animal studies, Rinvoq may cause embryo-fetal harm when administered to pregnant women.  The medication should not be used in pregnancy.  Breastfeeding is not recommended during treatment and for 6 days after the last dose. Rifampin Counseling: I discussed with the patient the risks of rifampin including but not limited to liver damage, kidney damage, red-orange body fluids, nausea/vomiting and severe allergy. Erivedge Counseling- I discussed with the patient the risks of Erivedge including but not limited to nausea, vomiting, diarrhea, constipation, weight loss, changes in the sense of taste, decreased appetite, muscle spasms, and hair loss.  The patient verbalized understanding of the proper use and possible adverse effects of Erivedge.  All of the patient's questions and concerns were addressed. Opioid Counseling: I discussed with the patient the potential side effects of opioids including but not limited to addiction, altered mental status, and depression. I stressed avoiding alcohol, benzodiazepines, muscle relaxants and sleep aids unless specifically okayed by a physician. The patient verbalized understanding of the proper use and possible adverse effects of opioids. All of the patient's questions and concerns were addressed. They were instructed to flush the remaining pills down the toilet if they did not need them for pain. Griseofulvin Pregnancy And Lactation Text: This medication is Pregnancy Category X and is known to cause serious birth defects. It is unknown if this medication is excreted in breast milk but breast feeding should be avoided. Solaraze Counseling:  I discussed with the patient the risks of Solaraze including but not limited to erythema, scaling, itching, weeping, crusting, and pain. Cibinqo Counseling: I discussed with the patient the risks of Cibinqo therapy including but not limited to common cold, nausea, headache, cold sores, increased blood CPK levels, dizziness, UTIs, fatigue, acne, and vomitting. Live vaccines should be avoided.  This medication has been linked to serious infections; higher rate of mortality; malignancy and lymphoproliferative disorders; major adverse cardiovascular events; thrombosis; thrombocytopenia and lymphopenia; lipid elevations; and retinal detachment. Doxepin Counseling:  Patient advised that the medication is sedating and not to drive a car after taking this medication. Patient informed of potential adverse effects including but not limited to dry mouth, urinary retention, and blurry vision.  The patient verbalized understanding of the proper use and possible adverse effects of doxepin.  All of the patient's questions and concerns were addressed. Xolair Pregnancy And Lactation Text: This medication is Pregnancy Category B and is considered safe during pregnancy. This medication is excreted in breast milk. Stelara Counseling:  I discussed with the patient the risks of ustekinumab including but not limited to immunosuppression, malignancy, posterior leukoencephalopathy syndrome, and serious infections.  The patient understands that monitoring is required including a PPD at baseline and must alert us or the primary physician if symptoms of infection or other concerning signs are noted. 5-Fu Counseling: 5-Fluorouracil Counseling:  I discussed with the patient the risks of 5-fluorouracil including but not limited to erythema, scaling, itching, weeping, crusting, and pain. Hydroxychloroquine Pregnancy And Lactation Text: This medication has been shown to cause fetal harm but it isn't assigned a Pregnancy Risk Category. There are small amounts excreted in breast milk. Doxycycline Pregnancy And Lactation Text: This medication is Pregnancy Category D and not consider safe during pregnancy. It is also excreted in breast milk but is considered safe for shorter treatment courses. Valtrex Pregnancy And Lactation Text: this medication is Pregnancy Category B and is considered safe during pregnancy. This medication is not directly found in breast milk but it's metabolite acyclovir is present. Methotrexate Counseling:  Patient counseled regarding adverse effects of methotrexate including but not limited to nausea, vomiting, abnormalities in liver function tests. Patients may develop mouth sores, rash, diarrhea, and abnormalities in blood counts. The patient understands that monitoring is required including LFT's and blood counts.  There is a rare possibility of scarring of the liver and lung problems that can occur when taking methotrexate. Persistent nausea, loss of appetite, pale stools, dark urine, cough, and shortness of breath should be reported immediately. Patient advised to discontinue methotrexate treatment at least three months before attempting to become pregnant.  I discussed the need for folate supplements while taking methotrexate.  These supplements can decrease side effects during methotrexate treatment. The patient verbalized understanding of the proper use and possible adverse effects of methotrexate.  All of the patient's questions and concerns were addressed. Opzelura Pregnancy And Lactation Text: There is insufficient data to evaluate drug-associated risk for major birth defects, miscarriage, or other adverse maternal or fetal outcomes.  There is a pregnancy registry that monitors pregnancy outcomes in pregnant persons exposed to the medication during pregnancy.  It is unknown if this medication is excreted in breast milk.  Do not breastfeed during treatment and for about 4 weeks after the last dose. Arava Counseling:  Patient counseled regarding adverse effects of Arava including but not limited to nausea, vomiting, abnormalities in liver function tests. Patients may develop mouth sores, rash, diarrhea, and abnormalities in blood counts. The patient understands that monitoring is required including LFTs and blood counts.  There is a rare possibility of scarring of the liver and lung problems that can occur when taking methotrexate. Persistent nausea, loss of appetite, pale stools, dark urine, cough, and shortness of breath should be reported immediately. Patient advised to discontinue Arava treatment and consult with a physician prior to attempting conception. The patient will have to undergo a treatment to eliminate Arava from the body prior to conception. Azithromycin Counseling:  I discussed with the patient the risks of azithromycin including but not limited to GI upset, allergic reaction, drug rash, diarrhea, and yeast infections. Azelaic Acid Pregnancy And Lactation Text: This medication is considered safe during pregnancy and breast feeding. Oral Minoxidil Counseling- I discussed with the patient the risks of oral minoxidil including but not limited to shortness of breath, swelling of the feet or ankles, dizziness, lightheadedness, unwanted hair growth and allergic reaction.  The patient verbalized understanding of the proper use and possible adverse effects of oral minoxidil.  All of the patient's questions and concerns were addressed. Rifampin Pregnancy And Lactation Text: This medication is Pregnancy Category C and it isn't know if it is safe during pregnancy. It is also excreted in breast milk and should not be used if you are breast feeding. Rinvoq Counseling: I discussed with the patient the risks of Rinvoq therapy including but not limited to upper respiratory tract infections, shingles, cold sores, bronchitis, nausea, cough, fever, acne, and headache. Live vaccines should be avoided.  This medication has been linked to serious infections; higher rate of mortality; malignancy and lymphoproliferative disorders; major adverse cardiovascular events; thrombosis; thrombocytopenia, anemia, and neutropenia; lipid elevations; liver enzyme elevations; and gastrointestinal perforations. Dutasteride Pregnancy And Lactation Text: This medication is absolutely contraindicated in women, especially during pregnancy and breast feeding. Feminization of male fetuses is possible if taking while pregnant. Spironolactone Counseling: Patient advised regarding risks of diarrhea, abdominal pain, hyperkalemia, birth defects (for female patients), liver toxicity and renal toxicity. The patient may need blood work to monitor liver and kidney function and potassium levels while on therapy. The patient verbalized understanding of the proper use and possible adverse effects of spironolactone.  All of the patient's questions and concerns were addressed. Topical Sulfur Applications Counseling: Topical Sulfur Counseling: Patient counseled that this medication may cause skin irritation or allergic reactions.  In the event of skin irritation, the patient was advised to reduce the amount of the drug applied or use it less frequently.   The patient verbalized understanding of the proper use and possible adverse effects of topical sulfur application.  All of the patient's questions and concerns were addressed. Imiquimod Counseling:  I discussed with the patient the risks of imiquimod including but not limited to erythema, scaling, itching, weeping, crusting, and pain.  Patient understands that the inflammatory response to imiquimod is variable from person to person and was educated regarded proper titration schedule.  If flu-like symptoms develop, patient knows to discontinue the medication and contact us. Opioid Pregnancy And Lactation Text: These medications can lead to premature delivery and should be avoided during pregnancy. These medications are also present in breast milk in small amounts. Doxepin Pregnancy And Lactation Text: This medication is Pregnancy Category C and it isn't known if it is safe during pregnancy. It is also excreted in breast milk and breast feeding isn't recommended. Adbry Pregnancy And Lactation Text: It is unknown if this medication will adversely affect pregnancy or breast feeding. Itraconazole Counseling:  I discussed with the patient the risks of itraconazole including but not limited to liver damage, nausea/vomiting, neuropathy, and severe allergy.  The patient understands that this medication is best absorbed when taken with acidic beverages such as non-diet cola or ginger ale.  The patient understands that monitoring is required including baseline LFTs and repeat LFTs at intervals.  The patient understands that they are to contact us or the primary physician if concerning signs are noted. Solaraze Pregnancy And Lactation Text: This medication is Pregnancy Category B and is considered safe. There is some data to suggest avoiding during the third trimester. It is unknown if this medication is excreted in breast milk. Erythromycin Counseling:  I discussed with the patient the risks of erythromycin including but not limited to GI upset, allergic reaction, drug rash, diarrhea, increase in liver enzymes, and yeast infections. Hydroxychloroquine Counseling:  I discussed with the patient that a baseline ophthalmologic exam is needed at the start of therapy and every year thereafter while on therapy. A CBC may also be warranted for monitoring.  The side effects of this medication were discussed with the patient, including but not limited to agranulocytosis, aplastic anemia, seizures, rashes, retinopathy, and liver toxicity. Patient instructed to call the office should any adverse effect occur.  The patient verbalized understanding of the proper use and possible adverse effects of Plaquenil.  All the patient's questions and concerns were addressed. 5-Fu Pregnancy And Lactation Text: This medication is Pregnancy Category X and contraindicated in pregnancy and in women who may become pregnant. It is unknown if this medication is excreted in breast milk. Methotrexate Pregnancy And Lactation Text: This medication is Pregnancy Category X and is known to cause fetal harm. This medication is excreted in breast milk. Picato Counseling:  I discussed with the patient the risks of Picato including but not limited to erythema, scaling, itching, weeping, crusting, and pain. Enbrel Counseling:  I discussed with the patient the risks of etanercept including but not limited to myelosuppression, immunosuppression, autoimmune hepatitis, demyelinating diseases, lymphoma, and infections.  The patient understands that monitoring is required including a PPD at baseline and must alert us or the primary physician if symptoms of infection or other concerning signs are noted. Benzoyl Peroxide Counseling: Patient counseled that medicine may cause skin irritation and bleach clothing.  In the event of skin irritation, the patient was advised to reduce the amount of the drug applied or use it less frequently.   The patient verbalized understanding of the proper use and possible adverse effects of benzoyl peroxide.  All of the patient's questions and concerns were addressed. Azathioprine Counseling:  I discussed with the patient the risks of azathioprine including but not limited to myelosuppression, immunosuppression, hepatotoxicity, lymphoma, and infections.  The patient understands that monitoring is required including baseline LFTs, Creatinine, possible TPMP genotyping and weekly CBCs for the first month and then every 2 weeks thereafter.  The patient verbalized understanding of the proper use and possible adverse effects of azathioprine.  All of the patient's questions and concerns were addressed. Azithromycin Pregnancy And Lactation Text: This medication is considered safe during pregnancy and is also secreted in breast milk. Topical Sulfur Applications Pregnancy And Lactation Text: This medication is considered safe during pregnancy and breast feeding secondary to limited systemic absorption. Spironolactone Pregnancy And Lactation Text: This medication can cause feminization of the male fetus and should be avoided during pregnancy. The active metabolite is also found in breast milk. Olumiant Pregnancy And Lactation Text: Based on animal studies, Olumiant may cause embryo-fetal harm when administered to pregnant women.  The medication should not be used in pregnancy.  Breastfeeding is not recommended during treatment. Dutasteride Counseling: Dustasteride Counseling:  I discussed with the patient the risks of use of dutasteride including but not limited to decreased libido, decreased ejaculate volume, and gynecomastia. Women who can become pregnant should not handle medication.  All of the patient's questions and concerns were addressed. Sarecycline Counseling: Patient advised regarding possible photosensitivity and discoloration of the teeth, skin, lips, tongue and gums.  Patient instructed to avoid sunlight, if possible.  When exposed to sunlight, patients should wear protective clothing, sunglasses, and sunscreen.  The patient was instructed to call the office immediately if the following severe adverse effects occur:  hearing changes, easy bruising/bleeding, severe headache, or vision changes.  The patient verbalized understanding of the proper use and possible adverse effects of sarecycline.  All of the patient's questions and concerns were addressed. Adbry Counseling: I discussed with the patient the risks of tralokinumab including but not limited to eye infection and irritation, cold sores, injection site reactions, worsening of asthma, allergic reactions and increased risk of parasitic infection.  Live vaccines should be avoided while taking tralokinumab. The patient understands that monitoring is required and they must alert us or the primary physician if symptoms of infection or other concerning signs are noted. Skyrizi Counseling: I discussed with the patient the risks of risankizumab-rzaa including but not limited to immunosuppression, and serious infections.  The patient understands that monitoring is required including a PPD at baseline and must alert us or the primary physician if symptoms of infection or other concerning signs are noted. Erythromycin Pregnancy And Lactation Text: This medication is Pregnancy Category B and is considered safe during pregnancy. It is also excreted in breast milk. Glycopyrrolate Pregnancy And Lactation Text: This medication is Pregnancy Category B and is considered safe during pregnancy. It is unknown if it is excreted breast milk. Hydroxyzine Counseling: Patient advised that the medication is sedating and not to drive a car after taking this medication.  Patient informed of potential adverse effects including but not limited to dry mouth, urinary retention, and blurry vision.  The patient verbalized understanding of the proper use and possible adverse effects of hydroxyzine.  All of the patient's questions and concerns were addressed. Topical Retinoid counseling:  Patient advised to apply a pea-sized amount only at bedtime and wait 30 minutes after washing their face before applying.  If too drying, patient may add a non-comedogenic moisturizer. The patient verbalized understanding of the proper use and possible adverse effects of retinoids.  All of the patient's questions and concerns were addressed. Drysol Counseling:  I discussed with the patient the risks of drysol/aluminum chloride including but not limited to skin rash, itching, irritation, burning. Prednisone Counseling:  I discussed with the patient the risks of prolonged use of prednisone including but not limited to weight gain, insomnia, osteoporosis, mood changes, diabetes, susceptibility to infection, glaucoma and high blood pressure.  In cases where prednisone use is prolonged, patients should be monitored with blood pressure checks, serum glucose levels and an eye exam.  Additionally, the patient may need to be placed on GI prophylaxis, PCP prophylaxis, and calcium and vitamin D supplementation and/or a bisphosphonate.  The patient verbalized understanding of the proper use and the possible adverse effects of prednisone.  All of the patient's questions and concerns were addressed. Dupixent Pregnancy And Lactation Text: This medication likely crosses the placenta but the risk for the fetus is uncertain. This medication is excreted in breast milk. Benzoyl Peroxide Pregnancy And Lactation Text: This medication is Pregnancy Category C. It is unknown if benzoyl peroxide is excreted in breast milk. Azathioprine Pregnancy And Lactation Text: This medication is Pregnancy Category D and isn't considered safe during pregnancy. It is unknown if this medication is excreted in breast milk. Odomzo Counseling- I discussed with the patient the risks of Odomzo including but not limited to nausea, vomiting, diarrhea, constipation, weight loss, changes in the sense of taste, decreased appetite, muscle spasms, and hair loss.  The patient verbalized understanding of the proper use and possible adverse effects of Odomzo.  All of the patient's questions and concerns were addressed. Bactrim Counseling:  I discussed with the patient the risks of sulfa antibiotics including but not limited to GI upset, allergic reaction, drug rash, diarrhea, dizziness, photosensitivity, and yeast infections.  Rarely, more serious reactions can occur including but not limited to aplastic anemia, agranulocytosis, methemoglobinemia, blood dyscrasias, liver or kidney failure, lung infiltrates or desquamative/blistering drug rashes. SSKI Counseling:  I discussed with the patient the risks of SSKI including but not limited to thyroid abnormalities, metallic taste, GI upset, fever, headache, acne, arthralgias, paraesthesias, lymphadenopathy, easy bleeding, arrhythmias, and allergic reaction. Klisyri Counseling:  I discussed with the patient the risks of Klisyri including but not limited to erythema, scaling, itching, weeping, crusting, and pain. Olumiant Counseling: I discussed with the patient the risks of Olumiant therapy including but not limited to upper respiratory tract infections, shingles, cold sores, and nausea. Live vaccines should be avoided.  This medication has been linked to serious infections; higher rate of mortality; malignancy and lymphoproliferative disorders; major adverse cardiovascular events; thrombosis; gastrointestinal perforations; neutropenia; lymphopenia; anemia; liver enzyme elevations; and lipid elevations. Wartpeel Counseling:  I discussed with the patient the risks of Wartpeel including but not limited to erythema, scaling, itching, weeping, crusting, and pain. Isotretinoin Pregnancy And Lactation Text: This medication is Pregnancy Category X and is considered extremely dangerous during pregnancy. It is unknown if it is excreted in breast milk. Dapsone Pregnancy And Lactation Text: This medication is Pregnancy Category C and is not considered safe during pregnancy or breast feeding. Propranolol Pregnancy And Lactation Text: This medication is Pregnancy Category C and it isn't known if it is safe during pregnancy. It is excreted in breast milk. Ketoconazole Counseling:   Patient counseled regarding improving absorption with orange juice.  Adverse effects include but are not limited to breast enlargement, headache, diarrhea, nausea, upset stomach, liver function test abnormalities, taste disturbance, and stomach pain.  There is a rare possibility of liver failure that can occur when taking ketoconazole. The patient understands that monitoring of LFTs may be required, especially at baseline. The patient verbalized understanding of the proper use and possible adverse effects of ketoconazole.  All of the patient's questions and concerns were addressed. Hydroxyzine Pregnancy And Lactation Text: This medication is not safe during pregnancy and should not be taken. It is also excreted in breast milk and breast feeding isn't recommended. Glycopyrrolate Counseling:  I discussed with the patient the risks of glycopyrrolate including but not limited to skin rash, drowsiness, dry mouth, difficulty urinating, and blurred vision. Metronidazole Counseling:  I discussed with the patient the risks of metronidazole including but not limited to seizures, nausea/vomiting, a metallic taste in the mouth, nausea/vomiting and severe allergy. Dupixent Counseling: I discussed with the patient the risks of dupilumab including but not limited to eye infection and irritation, cold sores, injection site reactions, worsening of asthma, allergic reactions and increased risk of parasitic infection.  Live vaccines should be avoided while taking dupilumab. Dupilumab will also interact with certain medications such as warfarin and cyclosporine. The patient understands that monitoring is required and they must alert us or the primary physician if symptoms of infection or other concerning signs are noted. Protopic Counseling: Patient may experience a mild burning sensation during topical application. Protopic is not approved in children less than 2 years of age. There have been case reports of hematologic and skin malignancies in patients using topical calcineurin inhibitors although causality is questionable. Bactrim Pregnancy And Lactation Text: This medication is Pregnancy Category D and is known to cause fetal risk.  It is also excreted in breast milk. Nsaids Pregnancy And Lactation Text: These medications are considered safe up to 30 weeks gestation. It is excreted in breast milk. Carac Counseling:  I discussed with the patient the risks of Carac including but not limited to erythema, scaling, itching, weeping, crusting, and pain. Cellcept Counseling:  I discussed with the patient the risks of mycophenolate mofetil including but not limited to infection/immunosuppression, GI upset, hypokalemia, hypercholesterolemia, bone marrow suppression, lymphoproliferative disorders, malignancy, GI ulceration/bleed/perforation, colitis, interstitial lung disease, kidney failure, progressive multifocal leukoencephalopathy, and birth defects.  The patient understands that monitoring is required including a baseline creatinine and regular CBC testing. In addition, patient must alert us immediately if symptoms of infection or other concerning signs are noted. Sski Pregnancy And Lactation Text: This medication is Pregnancy Category D and isn't considered safe during pregnancy. It is excreted in breast milk. Klisyri Pregnancy And Lactation Text: It is unknown if this medication can harm a developing fetus or if it is excreted in breast milk. Tetracycline Counseling: Patient counseled regarding possible photosensitivity and increased risk for sunburn.  Patient instructed to avoid sunlight, if possible.  When exposed to sunlight, patients should wear protective clothing, sunglasses, and sunscreen.  The patient was instructed to call the office immediately if the following severe adverse effects occur:  hearing changes, easy bruising/bleeding, severe headache, or vision changes.  The patient verbalized understanding of the proper use and possible adverse effects of tetracycline.  All of the patient's questions and concerns were addressed. Patient understands to avoid pregnancy while on therapy due to potential birth defects. High Dose Vitamin A Counseling: Side effects reviewed, pt to contact office should one occur. Dapsone Counseling: I discussed with the patient the risks of dapsone including but not limited to hemolytic anemia, agranulocytosis, rashes, methemoglobinemia, kidney failure, peripheral neuropathy, headaches, GI upset, and liver toxicity.  Patients who start dapsone require monitoring including baseline LFTs and weekly CBCs for the first month, then every month thereafter.  The patient verbalized understanding of the proper use and possible adverse effects of dapsone.  All of the patient's questions and concerns were addressed. Ketoconazole Pregnancy And Lactation Text: This medication is Pregnancy Category C and it isn't know if it is safe during pregnancy. It is also excreted in breast milk and breast feeding isn't recommended. Propranolol Counseling:  I discussed with the patient the risks of propranolol including but not limited to low heart rate, low blood pressure, low blood sugar, restlessness and increased cold sensitivity. They should call the office if they experience any of these side effects. Elidel Counseling: Patient may experience a mild burning sensation during topical application. Elidel is not approved in children less than 2 years of age. There have been case reports of hematologic and skin malignancies in patients using topical calcineurin inhibitors although causality is questionable. Simponi Counseling:  I discussed with the patient the risks of golimumab including but not limited to myelosuppression, immunosuppression, autoimmune hepatitis, demyelinating diseases, lymphoma, and serious infections.  The patient understands that monitoring is required including a PPD at baseline and must alert us or the primary physician if symptoms of infection or other concerning signs are noted. Tazorac Counseling:  Patient advised that medication is irritating and drying.  Patient may need to apply sparingly and wash off after an hour before eventually leaving it on overnight.  The patient verbalized understanding of the proper use and possible adverse effects of tazorac.  All of the patient's questions and concerns were addressed. Metronidazole Pregnancy And Lactation Text: This medication is Pregnancy Category B and considered safe during pregnancy.  It is also excreted in breast milk. Tremfya Counseling: I discussed with the patient the risks of guselkumab including but not limited to immunosuppression, serious infections, and drug reactions.  The patient understands that monitoring is required including a PPD at baseline and must alert us or the primary physician if symptoms of infection or other concerning signs are noted. Cephalexin Counseling: I counseled the patient regarding use of cephalexin as an antibiotic for prophylactic and/or therapeutic purposes. Cephalexin (commonly prescribed under brand name Keflex) is a cephalosporin antibiotic which is active against numerous classes of bacteria, including most skin bacteria. Side effects may include nausea, diarrhea, gastrointestinal upset, rash, hives, yeast infections, and in rare cases, hepatitis, kidney disease, seizures, fever, confusion, neurologic symptoms, and others. Patients with severe allergies to penicillin medications are cautioned that there is about a 10% incidence of cross-reactivity with cephalosporins. When possible, patients with penicillin allergies should use alternatives to cephalosporins for antibiotic therapy. Nsaids Counseling: NSAID Counseling: I discussed with the patient that NSAIDs should be taken with food. Prolonged use of NSAIDs can result in the development of stomach ulcers.  Patient advised to stop taking NSAIDs if abdominal pain occurs.  The patient verbalized understanding of the proper use and possible adverse effects of NSAIDs.  All of the patient's questions and concerns were addressed. Protopic Pregnancy And Lactation Text: This medication is Pregnancy Category C. It is unknown if this medication is excreted in breast milk when applied topically. Thalidomide Counseling: I discussed with the patient the risks of thalidomide including but not limited to birth defects, anxiety, weakness, chest pain, dizziness, cough and severe allergy. Acitretin Counseling:  I discussed with the patient the risks of acitretin including but not limited to hair loss, dry lips/skin/eyes, liver damage, hyperlipidemia, depression/suicidal ideation, photosensitivity.  Serious rare side effects can include but are not limited to pancreatitis, pseudotumor cerebri, bony changes, clot formation/stroke/heart attack.  Patient understands that alcohol is contraindicated since it can result in liver toxicity and significantly prolong the elimination of the drug by many years. Infliximab Counseling:  I discussed with the patient the risks of infliximab including but not limited to myelosuppression, immunosuppression, autoimmune hepatitis, demyelinating diseases, lymphoma, and serious infections.  The patient understands that monitoring is required including a PPD at baseline and must alert us or the primary physician if symptoms of infection or other concerning signs are noted. Minoxidil Counseling: Minoxidil is a topical medication which can increase blood flow where it is applied. It is uncertain how this medication increases hair growth. Side effects are uncommon and include stinging and allergic reactions. Winlevi Counseling:  I discussed with the patient the risks of topical clascoterone including but not limited to erythema, scaling, itching, and stinging. Patient voiced their understanding. High Dose Vitamin A Pregnancy And Lactation Text: High dose vitamin A therapy is contraindicated during pregnancy and breast feeding. Terbinafine Counseling: Patient counseling regarding adverse effects of terbinafine including but not limited to headache, diarrhea, rash, upset stomach, liver function test abnormalities, itching, taste/smell disturbance, nausea, abdominal pain, and flatulence.  There is a rare possibility of liver failure that can occur when taking terbinafine.  The patient understands that a baseline LFT and kidney function test may be required. The patient verbalized understanding of the proper use and possible adverse effects of terbinafine.  All of the patient's questions and concerns were addressed.

## 2024-11-14 DIAGNOSIS — C61 ADENOCARCINOMA OF PROSTATE: ICD-10-CM

## 2024-11-14 RX ORDER — HYDROCODONE BITARTRATE AND ACETAMINOPHEN 10; 325 MG/1; MG/1
1 TABLET ORAL EVERY 6 HOURS PRN
Qty: 120 TABLET | Refills: 0 | Status: SHIPPED | OUTPATIENT
Start: 2024-11-14

## 2024-11-14 NOTE — TELEPHONE ENCOUNTER
Caller: Ta Madison    Relationship: Self    Best call back number: [phone]     Requested Prescriptions:   Requested Prescriptions     Pending Prescriptions Disp Refills    HYDROcodone-acetaminophen (NORCO)  MG per tablet 120 tablet 0     Sig: Take 1 tablet by mouth Every 6 (Six) Hours As Needed for Moderate Pain.        Pharmacy where request should be sent:  [facility]    Last office visit with prescribing clinician: 10/17/2024   Last telemedicine visit with prescribing clinician: Visit date not found   Next office visit with prescribing clinician: 4/17/2025         Does the patient have less than a 3 day supply:  [x] Yes  [] No    Would you like a call back once the refill request has been completed: [] Yes [x] No    If the office needs to give you a call back, can they leave a voicemail: [] Yes [x] No    Nicolle Simpson Rep   11/14/24 13:37 EST

## 2024-11-15 ENCOUNTER — SPECIALTY PHARMACY (OUTPATIENT)
Dept: PHARMACY | Facility: HOSPITAL | Age: 64
End: 2024-11-15
Payer: COMMERCIAL

## 2024-11-15 DIAGNOSIS — C61 ADENOCARCINOMA OF PROSTATE: ICD-10-CM

## 2024-11-15 NOTE — PROGRESS NOTES
Specialty Pharmacy Patient Management Program  Oncology Initial Assessment     Ta Madison was referred by their provider to the Oncology Patient Management program offered by [facility] for prostate cancer on 11/15/24.  An initial outreach was conducted, including assessment of therapy appropriateness and specialty medication education for Xtandi. The patient was introduced to services offered by [facility], including: regular assessments, refill coordination, curbside pick-up or mail order delivery options, prior authorization maintenance, and financial assistance programs as applicable. The patient was also provided with contact information for the pharmacy team.     Insurance Coverage & Financial Support  [place] Medicaid     Relevant Past Medical History and Comorbidities  Relevant medical history and concomitant health conditions were discussed with the patient. The patient's chart has been reviewed for relevant past medical history and comorbid conditions and updated as necessary.  Past Medical History:   Diagnosis Date    Elevated cholesterol     GERD (gastroesophageal reflux disease)     Increased heart rate     Mini stroke     Neck pain     Prostate cancer     Rash     Tobacco abuse      Social History     Socioeconomic History    Marital status:    Tobacco Use    Smoking status: Some Days     Current packs/day: 0.00     Average packs/day: 2.0 packs/day for 45.0 years (90.0 ttl pk-yrs)     Types: Cigarettes     Start date: 1978     Last attempt to quit: 2023     Years since quittin.3    Smokeless tobacco: Never   Vaping Use    Vaping status: Never Used   Substance and Sexual Activity    Alcohol use: No    Drug use: Yes     Types: Marijuana     Comment: occ     Sexual activity: Defer       Problem list reviewed by Kristian Nash RPH on 11/15/2024 at  1:52 PM    Allergies  Known allergies and reactions were discussed with the patient. The  patient's chart has been reviewed for  allergy information and updated as necessary.   No Known Allergies    Allergies reviewed by Kristian Nash RPH on 11/15/2024 at  1:52 PM    Relevant Laboratory Values  Relevant laboratory values were discussed with the patient. The following specialty medication dose adjustment(s) are recommended: N/A  Lab Results   Component Value Date    GLUCOSE 125 (H) 10/17/2024    CALCIUM 9.2 10/17/2024     10/17/2024    K 3.9 10/17/2024    CO2 25.0 10/17/2024     10/17/2024    BUN 19 10/17/2024    CREATININE 0.97 10/17/2024    EGFRIFAFRI 104 02/07/2024    EGFRIFNONA 70 01/19/2022    BCR 19.6 10/17/2024    ANIONGAP 11.0 10/17/2024     Lab Results   Component Value Date    CHOL 169 08/05/2024    CHLPL 174 03/26/2015    TRIG 189 (H) 08/05/2024    HDL 34 (L) 08/05/2024     (H) 08/05/2024         Current Medication List  This medication list has been reviewed with the patient and evaluated for any interactions or necessary modifications/recommendations, and updated to include all prescription medications, OTC medications, and supplements the patient is currently taking.  This list reflects what is contained in the patient's profile, which has also been marked as reviewed to communicate to other providers it is the most up to date version of the patient's current medication therapy.     Current Outpatient Medications:     enzalutamide (XTANDI) 40 MG chemo capsule, Take 4 capsules by mouth Daily., Disp: 120 capsule, Rfl: 5    aspirin 81 MG EC tablet, Take 1 tablet by mouth Daily., Disp: , Rfl:     atorvastatin (LIPITOR) 20 MG tablet, Take 1 tablet by mouth Every Night., Disp: 90 tablet, Rfl: 1    cyclobenzaprine (FLEXERIL) 10 MG tablet, Take 1 tablet by mouth 3 (Three) Times a Day As Needed for Muscle Spasms., Disp: 30 tablet, Rfl: 0    fenofibrate (TRICOR) 145 MG tablet, Take 1 tablet by mouth Daily., Disp: 90 tablet, Rfl: 1    ferrous sulfate 325 (65 FE) MG tablet, Take 1  tablet by mouth 2 (Two) Times a Day., Disp: 60 tablet, Rfl: 3    gabapentin (NEURONTIN) 600 MG tablet, Take 1 tablet by mouth 2 (Two) Times a Day., Disp: 60 tablet, Rfl: 3    HYDROcodone-acetaminophen (NORCO)  MG per tablet, Take 1 tablet by mouth Every 6 (Six) Hours As Needed for Moderate Pain., Disp: 120 tablet, Rfl: 0    hydrOXYzine (ATARAX) 50 MG tablet, Take 1 tablet by mouth 3 (Three) Times a Day As Needed for Itching., Disp: 90 tablet, Rfl: 1    metoprolol tartrate (LOPRESSOR) 25 MG tablet, Take 1 tablet by mouth 2 (Two) Times a Day., Disp: 180 tablet, Rfl: 1    pantoprazole (PROTONIX) 40 MG EC tablet, Take 1 tablet by mouth Daily., Disp: 90 tablet, Rfl: 1    varenicline (Chantix Continuing Month Ravinder) 1 MG tablet, Take 1 tablet by mouth 2 (Two) Times a Day for 56 days., Disp: 56 tablet, Rfl: 1  No current facility-administered medications for this visit.    Medicines reviewed by Kristian Nash RPH on 11/15/2024 at  1:52 PM    Drug Interactions  DDI report generated; no significant interactions identified    Initial Education Provided for Specialty Medication  The patient has been provided with the following education in verbal and written form. All questions and concerns have been addressed prior to the patient receiving the medication, and the patient has verbalized comprehension of the education and any materials provided. Additional patient education shall be provided and documented upon request by the patient, provider, or payer.    TOPICS COMMENTS   Storage and Handling of Oral Specialty Medication Store in the original container, in a dry location out of direct sunlight, and out of reach of children or pets. and Store at room temperature.  Discussed safe handling and what to do with any unused medication.   Administration of Oral Specialty Medication Take with or without food at the same time(s) each day. and Do not crush or chew tablets.   Adherence to Oral Specialty Regimen and Handling Missed  Doses Patient is likely to have good treatment adherence; reinforced the importance of adherence. Reviewed how to address missed doses and encouraged the patient to let us know of any missed doses.   Anemia: role of RBC, cause, s/s, ways to manage, role of transfusion Reviewed the role of RBC and the use of transfusions if hemoglobin decreases too much.  Patient to notify us if he experiences shortness of breath, dizziness, or palpitations.  Also let patient know that he could feel more tired than usual and to try to stay active, but rest if he needs to.    Thrombocytopenia: role of platelet, cause, s/s, ways to prevent bleeding, things to avoid, when to seek help Reviewed the role of platelets in blood clotting and when to call clinic (bloody nose that bleeds for 5 minutes despite pressure, a cut that won't stop bleeding despite pressure, gums that bleed excessively with brushing or flossing). Recommended using an electric razor, soft bristle toothbrush, and blowing your nose gently.    Neutropenia: role of WBC, cause, infection precautions, s/s of infection, when to call MD Reviewed the role of WBC, good infection prevention practices, and when to call the clinic (temperature 100.4F, sore throat, burning urination, etc).   Nutrition and Appetite Changes:  importance of maintaining healthy diet & weight, ways to manage to improve intake, dietary consult, exercise regimen, electrolyte and/or blood glucose abnormalities Decreased Appetite: Discussed the risk of decreased appetite. Recommended eating smaller, more frequent meals. Instructed the patient to contact the clinic if losing weight or having difficulty eating enough to maintain energy level., Increased Blood Sugar: This patient's oncology therapy can increase blood sugar.  Recommended that the patient monitor blood sugar more closely and contact their primary care provider for management recommendations if blood glucose values start trending upward.    Diarrhea: causes, s/s of dehydration, ways to manage, dietary changes, when to call MD Chemotherapy : Discussed the risk of diarrhea. Instructed patient on use OTC loperamide with diarrhea onset, but emphasized the importance of calling the clinic if 4-6 episodes in 24 hours not relieved by OTC loperamide.   Constipation: causes, ways to manage, dietary changes, when to call MD Provided supplementary handout with instructions for use of docusate and other OTC therapies to manage constipation.  Patient was instructed to call us if medications aren't working.   Nausea/Vomiting: cause, use of antiemetics, dietary changes, when to call MD Emetic risk: Low-Minimal  PRN home meds: Ondansetron    Instructed the patient to take a dose of the PRN medication at the first onset of nausea and if it's not working to call us for additional medications.  Also provided non-drug measures to mitigate nausea.   Mouth Sores: causes, oral care, ways to manage Mouth sores can be prevented by making a mouth wash mixture of salt, baking soda, and water. The patient was instructed to swish and spit four times daily after meals and before bedtime.  Use of a soft bristle toothbrush was recommended.  The patient was instructed to avoid alcohol-containing OTC mouthwashes.    Alopecia: cause, ways to manage, resources N/A   Nervous System Changes: causes, s/s, neuropathies, cognitive changes, ways to manage Hot Flashes - Discussed the possibility of hot flashes as well as mitigation strategies   Pain: causes, ways to manage Chemo: Discussed muscle and joint aches/pains with chemotherapy, and recommended the use of OTC pain relief with ibuprofen or acetaminophen if needed.   Skin/Nail Changes: cause, s/s, ways to manage Generalized Rash: Discussed the potential for a rash or itchy skin, offered nonpharmacologic prevention strategies, and instructed the patient to call if a rash develops and worsens.   Organ Toxicities: cause, s/s, need for  diagnostic tests, labs, when to notify MD Discussed potential effects on organ systems, monitoring, diagnostic tests, labs, and when to notify the MD. Discussed the signs/symptoms of the following: hepatotoxicity and hypertension - recommended close monitoring of blood pressure, provided BP log, and explained how to track values.   Survivorship: distress, distress assessment, secondary malignancies, early/late effects, follow-up, social issues, social support N/A   Miscellaneous Financial Issues: None  Lab Draws: On or before day 1 of each cycle, no sooner than 3 days early.  Fluid Retention: Explained the signs/symptoms of fluid retention around ankles, feet, and possibly in the hands.  Reviewed strategies to minimize this and recommended that the patient call the clinic if he/she gains 5 or more pounds in 1 week or experiences shortness of breath without exertion.    Blood Clots: Explained the rare, but possible risk of DVT or PE.  Reviewed the signs and symptoms of VTE and stressed the urgency to call 911 immediately.   Infertility and Sexuality:  causes, fertility preservation options, sexuality changes, ways to manage, importance of birth control Oral Oncology Therapy: Reviewed safe sex practices and the importance of minimizing exposure to body fluids while on oral oncology therapy.   Home Care: how to manage bodily fluids Counseled on management of soiled linens and proper flush technique.  Discussed how to manage all the side effects at home and advised when to contact the MD office       Adherence and Self-Administration  Adherence related to the patient's specialty therapy was discussed with the patient. The Adherence segment of this outreach has been reviewed and updated.     Is there a concern with patient's ability to self administer the medication correctly and without issue?: No  Were any potential barriers to adherence identified during the initial assessment or patient education?: No  Are there any  concerns regarding the patient's understanding of the importance of medication adherence?: No  Methods for Supporting Patient Adherence and/or Self-Administration: Provided direct education. Care coordinator to continue providing once-monthly outreach calls to assist with adherence and coordinate medication refills. Pharmacist to provide clinical assessments every 6 months and will assist in the management of clinical issues as they arise.     Open Medication Therapy Problems  No medication therapy recommendations to display    Goals of Therapy  Goals related to the patient's specialty therapy were discussed with the patient. The Patient Goals segment of this outreach has been reviewed and updated.   Goals Addressed Today        Specialty Pharmacy General Goal      To achieve and maintain an undetectable PSA.    Update 11/15/24: Most recent PSA level obtained on 10/17/24 remains undetectable at 0.014 ng/mL.            Reassessment Plan & Follow-Up  1. Medication Therapy Changes: None  2. Related Plans, Therapy Recommendations, or Therapy Problems to Be Addressed: None  3. Pharmacist to perform regular assessments no more than (6) months from the previous assessment.  4. Care Coordinator to set up future refill outreaches, coordinate prescription delivery, and escalate clinical questions to pharmacist.  5. Welcome information and patient satisfaction survey to be sent by specialty pharmacy team with patient's initial fill.    Attestation  Therapeutic appropriateness: Appropriate   I attest the patient was actively involved in and has agreed to the above plan of care. If the prescribed therapy is at any point deemed not appropriate based on the current or future assessments, a consultation will be initiated with the patient's specialty care provider to determine the best course of action. The revised plan of therapy will be documented along with any required assessments and/or additional patient education provided.      Kristian Nash, PharmD, JONATHAN  Clinical Specialty Pharmacist, Oncology  11/15/2024  13:59 EST

## 2024-11-22 ENCOUNTER — TELEPHONE (OUTPATIENT)
Dept: ONCOLOGY | Facility: CLINIC | Age: 64
End: 2024-11-22
Payer: COMMERCIAL

## 2024-11-22 ENCOUNTER — SPECIALTY PHARMACY (OUTPATIENT)
Dept: PHARMACY | Facility: HOSPITAL | Age: 64
End: 2024-11-22
Payer: COMMERCIAL

## 2024-11-22 DIAGNOSIS — C61 ADENOCARCINOMA OF PROSTATE: ICD-10-CM

## 2024-11-22 DIAGNOSIS — F45.8 NEUROPATHIC PRURITUS: ICD-10-CM

## 2024-11-22 RX ORDER — GABAPENTIN 600 MG/1
600 TABLET ORAL 2 TIMES DAILY
Qty: 60 TABLET | Refills: 3 | Status: SHIPPED | OUTPATIENT
Start: 2024-11-22

## 2024-11-22 RX ORDER — GABAPENTIN 600 MG/1
600 TABLET ORAL 2 TIMES DAILY
Qty: 60 TABLET | Refills: 3 | OUTPATIENT
Start: 2024-11-22

## 2024-11-22 NOTE — TELEPHONE ENCOUNTER
Caller: Ta Madison    Relationship: Self    Best call back number: [phone]     Requested Prescriptions:   Requested Prescriptions     Pending Prescriptions Disp Refills    gabapentin (NEURONTIN) 600 MG tablet 60 tablet 3     Sig: Take 1 tablet by mouth 2 (Two) Times a Day.    hydrOXYzine (ATARAX) 50 MG tablet 90 tablet 1     Sig: Take 1 tablet by mouth 3 (Three) Times a Day As Needed for Itching.        Pharmacy where request should be sent: [facility] #[id number] [address]  AT [facility] [address] - [phone]  - [phone] FX     Last office visit with prescribing clinician: 9/18/2024   Last telemedicine visit with prescribing clinician: Visit date not found   Next office visit with prescribing clinician: Visit date not found     Additional details provided by patient: 4 GABAPENTIN LEFT    Does the patient have less than a 3 day supply:  [x] Yes  [] No    Would you like a call back once the refill request has been completed: [] Yes [x] No    If the office needs to give you a call back, can they leave a voicemail: [] Yes [x] No    Claudia Guevara   11/22/24 11:36 EST

## 2024-11-22 NOTE — PROGRESS NOTES
Specialty Pharmacy Refill Coordination Note     Ta is a 64 y.o. male contacted today regarding refills of  Xtandi specialty medication(s).    Reviewed and verified with patient:       Specialty medication(s) and dose(s) confirmed: yes    Refill Questions      Flowsheet Row Most Recent Value   Changes to allergies? No   Changes to medications? No   New conditions or infections since last clinic visit No   Unplanned office visit, urgent care, ED, or hospital admission in the last 4 weeks  No   How does patient/caregiver feel medication is working? Very good   Financial problems or insurance changes  No   Since the previous refill, were any specialty medication doses or scheduled injections missed or delayed?  No   Does this patient require a clinical escalation to a pharmacist? No            Delivery Questions      Flowsheet Row Most Recent Value   Delivery method UPS   Delivery address verified with patient/caregiver? Yes   Delivery address Home   Number of medications in delivery 1   Medication(s) being filled and delivered Enzalutamide (XTANDI)   Doses left of specialty medications na   Copay verified? Yes   Copay amount 0$   Copay form of payment No copayment ($0)   Ship Date 11/25   Delivery Date 11/26   Signature Required Yes              Medication Adherence    Adherence tools used: patient uses a pill box to manage medications  Support network for adherence: family member          Follow-up: 30 day(s)     Corinne Whatley, Pharmacy Technician  Specialty Pharmacy Technician

## 2024-11-22 NOTE — TELEPHONE ENCOUNTER
Caller: Ta Madison    Relationship: Self    Best call back number: [phone]    What is the best time to reach you: ANYTIME    Who are you requesting to speak with (clinical staff, provider,  specific staff member): CLINICAL    What was the call regarding: PT IS CALLING IN REGARDS TO HIS MEDICATION FOR HYDROCODONE STATING HIS INSURANCE IS WANTING A PRIOR AUTH, PT IS OUT OF MEDICATION    [phone]

## 2024-11-22 NOTE — TELEPHONE ENCOUNTER
Caller: Ta Madison    Relationship: Self    Best call back number: [phone]    Requested Prescriptions:   Requested Prescriptions     Pending Prescriptions Disp Refills    gabapentin (NEURONTIN) 600 MG tablet 60 tablet 3     Sig: Take 1 tablet by mouth 2 (Two) Times a Day.      Pharmacy where request should be sent: [facility] #[id number] [place] [address] [facility] [address] - [phone]  - [phone] FX     Last office visit with prescribing clinician: 10/17/2024   Last telemedicine visit with prescribing clinician: Visit date not found   Next office visit with prescribing clinician: 4/17/2025     Does the patient have less than a 3 day supply:  [x] Yes  [] No    Would you like a call back once the refill request has been completed: [] Yes [x] No    If the office needs to give you a call back, can they leave a voicemail: [] Yes [x] No

## 2024-11-25 ENCOUNTER — TELEPHONE (OUTPATIENT)
Dept: FAMILY MEDICINE CLINIC | Facility: CLINIC | Age: 64
End: 2024-11-25
Payer: COMMERCIAL

## 2024-11-25 RX ORDER — HYDROXYZINE HYDROCHLORIDE 50 MG/1
50 TABLET, FILM COATED ORAL 3 TIMES DAILY PRN
Qty: 90 TABLET | Refills: 1 | Status: SHIPPED | OUTPATIENT
Start: 2024-11-25

## 2024-11-25 NOTE — TELEPHONE ENCOUNTER
Spoke with pt regarding overdue labs due stated he will be in to have them done verbal understanding.

## 2024-11-27 ENCOUNTER — LAB (OUTPATIENT)
Dept: FAMILY MEDICINE CLINIC | Facility: CLINIC | Age: 64
End: 2024-11-27
Payer: COMMERCIAL

## 2024-11-27 DIAGNOSIS — C79.51 MALIGNANT NEOPLASM METASTATIC TO BONE: ICD-10-CM

## 2024-11-27 DIAGNOSIS — C61 ADENOCARCINOMA OF PROSTATE: ICD-10-CM

## 2024-11-27 LAB
FERRITIN SERPL-MCNC: 156.7 NG/ML (ref 30–400)
IRON 24H UR-MRATE: 38 MCG/DL (ref 59–158)
IRON SATN MFR SERPL: 8 % (ref 20–50)
PSA SERPL-MCNC: <0.014 NG/ML (ref 0–4)
TIBC SERPL-MCNC: 451 MCG/DL (ref 298–536)
TRANSFERRIN SERPL-MCNC: 303 MG/DL (ref 200–360)

## 2024-11-27 PROCEDURE — 83540 ASSAY OF IRON: CPT | Performed by: INTERNAL MEDICINE

## 2024-11-27 PROCEDURE — 84466 ASSAY OF TRANSFERRIN: CPT | Performed by: INTERNAL MEDICINE

## 2024-11-27 PROCEDURE — 82728 ASSAY OF FERRITIN: CPT | Performed by: INTERNAL MEDICINE

## 2024-11-27 PROCEDURE — 84153 ASSAY OF PSA TOTAL: CPT | Performed by: INTERNAL MEDICINE

## 2025-01-13 ENCOUNTER — SPECIALTY PHARMACY (OUTPATIENT)
Dept: PHARMACY | Facility: HOSPITAL | Age: 65
End: 2025-01-13
Payer: COMMERCIAL

## 2025-01-13 NOTE — PROGRESS NOTES
Specialty Pharmacy Refill Coordination Note     Ta is a 64 y.o. male contacted today regarding refills of  Xtandi specialty medication(s).    Reviewed and verified with patient:       Specialty medication(s) and dose(s) confirmed: yes    Refill Questions      Flowsheet Row Most Recent Value   Changes to allergies? No   Changes to medications? No   New conditions or infections since last clinic visit No   Unplanned office visit, urgent care, ED, or hospital admission in the last 4 weeks  No   How does patient/caregiver feel medication is working? Very good   Financial problems or insurance changes  No   Since the previous refill, were any specialty medication doses or scheduled injections missed or delayed?  No   Does this patient require a clinical escalation to a pharmacist? No            Delivery Questions      Flowsheet Row Most Recent Value   Delivery method UPS   Delivery address verified with patient/caregiver? Yes   Delivery address Home   Number of medications in delivery 1   Medication(s) being filled and delivered Enzalutamide (XTANDI)   Doses left of specialty medications na   Copay verified? Yes   Copay amount 0$   Copay form of payment No copayment ($0)   Ship Date 01/13   Delivery Date 01/14   Signature Required Yes              Medication Adherence    Adherence tools used: patient uses a pill box to manage medications  Support network for adherence: family member          Follow-up: 30 day(s)     Corinne Whatley, Pharmacy Technician  Specialty Pharmacy Technician

## 2025-01-28 DIAGNOSIS — C61 ADENOCARCINOMA OF PROSTATE: ICD-10-CM

## 2025-01-28 RX ORDER — HYDROCODONE BITARTRATE AND ACETAMINOPHEN 10; 325 MG/1; MG/1
1 TABLET ORAL EVERY 6 HOURS PRN
Qty: 120 TABLET | Refills: 0 | Status: SHIPPED | OUTPATIENT
Start: 2025-01-28

## 2025-01-28 NOTE — TELEPHONE ENCOUNTER
Caller: Ta Madison    Relationship: Self    Best call back number: [phone]    Requested Prescriptions:   Requested Prescriptions     Pending Prescriptions Disp Refills    HYDROcodone-acetaminophen (NORCO)  MG per tablet 120 tablet 0     Sig: Take 1 tablet by mouth Every 6 (Six) Hours As Needed for Moderate Pain.        Pharmacy where request should be sent: [facility] #[id number] [place] [address] AT [facility] [address] - [phone]  - [phone] FX     Last office visit with prescribing clinician: 10/17/2024   Last telemedicine visit with prescribing clinician: Visit date not found   Next office visit with prescribing clinician: 4/17/2025     Does the patient have less than a 3 day supply:  [x] Yes  [] No    Would you like a call back once the refill request has been completed: [] Yes [x] No    If the office needs to give you a call back, can they leave a voicemail: [] Yes [x] No    Nicolle Wong Rep   01/28/25 16:12 EST

## 2025-02-11 ENCOUNTER — SPECIALTY PHARMACY (OUTPATIENT)
Dept: PHARMACY | Facility: HOSPITAL | Age: 65
End: 2025-02-11
Payer: COMMERCIAL

## 2025-02-11 NOTE — PROGRESS NOTES
Specialty Pharmacy Patient Management Program  Refill Outreach     Ta was contacted today regarding refills of their medication(s) Xtandi    Refill Questions      Flowsheet Row Most Recent Value   Changes to allergies? No   Changes to medications? No   New conditions or infections since last clinic visit No   Unplanned office visit, urgent care, ED, or hospital admission in the last 4 weeks  No   How does patient/caregiver feel medication is working? Very good   Financial problems or insurance changes  No   Since the previous refill, were any specialty medication doses or scheduled injections missed or delayed?  No   Does this patient require a clinical escalation to a pharmacist? No            Delivery Questions      Flowsheet Row Most Recent Value   Delivery method  at Pharmacy   Delivery address verified with patient/caregiver? Yes   Delivery address Home   Number of medications in delivery 1   Medication(s) being filled and delivered Enzalutamide (XTANDI)   Doses left of specialty medications na   Copay verified? Yes   Copay amount 0.00$   Copay form of payment No copayment ($0)   Signature Required Yes            Medication Adherence    Adherence tools used: patient uses a pill box to manage medications  Support network for adherence: family member          Follow-up: 30 day(s)     Corinne Whatley Pharmacy Technician  2/11/2025  09:42 EST

## 2025-02-20 ENCOUNTER — SPECIALTY PHARMACY (OUTPATIENT)
Dept: PHARMACY | Facility: HOSPITAL | Age: 65
End: 2025-02-20
Payer: COMMERCIAL

## 2025-02-20 DIAGNOSIS — C61 ADENOCARCINOMA OF PROSTATE: ICD-10-CM

## 2025-02-20 NOTE — PROGRESS NOTES
Specialty Pharmacy Patient Management Program  Oncology Reassessment     Ta Madison was referred by their provider to the Oncology Patient Management program offered by [facility] for prostate cancer. A follow-up outreach was conducted, including assessment of continued therapy appropriateness, medication adherence, and side effect incidence and management for Xtandi.    Changes to Insurance Coverage or Financial Support  None    Relevant Past Medical History and Comorbidities  Relevant medical history and concomitant health conditions were discussed with the patient. The patient's chart has been reviewed for relevant past medical history and comorbid health conditions and updated as necessary.   Past Medical History:   Diagnosis Date    Elevated cholesterol     GERD (gastroesophageal reflux disease)     Increased heart rate     Mini stroke     Neck pain     Prostate cancer     Rash     Tobacco abuse      Social History     Socioeconomic History    Marital status:    Tobacco Use    Smoking status: Some Days     Current packs/day: 0.00     Average packs/day: 2.0 packs/day for 45.0 years (90.0 ttl pk-yrs)     Types: Cigarettes     Start date: 1978     Last attempt to quit: 2023     Years since quittin.5    Smokeless tobacco: Never   Vaping Use    Vaping status: Never Used   Substance and Sexual Activity    Alcohol use: No    Drug use: Yes     Types: Marijuana     Comment: occ     Sexual activity: Defer     Problem list reviewed by Kaila Lares RPH on 2025 at  2:26 PM    Hospitalizations and Urgent Care Since Last Assessment  ED Visits, Admissions, or Hospitalizations: None  Urgent Office Visits: None    Allergies  Known allergies and reactions were discussed with the patient. The patient's chart has been reviewed for allergy information and updated as necessary.   No Known Allergies  Allergies reviewed by Kaila Lares RPH on 2025 at  2:26 PM    Relevant  Laboratory Values  Relevant laboratory values were discussed with the patient. The following specialty medication dose adjustment(s) are recommended: Most recent PSA of <0.014 on 11/27/24    Lab Results   Component Value Date    GLUCOSE 125 (H) 10/17/2024    CALCIUM 9.2 10/17/2024     10/17/2024    K 3.9 10/17/2024    CO2 25.0 10/17/2024     10/17/2024    BUN 19 10/17/2024    CREATININE 0.97 10/17/2024    EGFRIFAFRI 104 02/07/2024    EGFRIFNONA 70 01/19/2022    BCR 19.6 10/17/2024    ANIONGAP 11.0 10/17/2024     Lab Results   Component Value Date    CHOL 169 08/05/2024    CHLPL 174 03/26/2015    TRIG 189 (H) 08/05/2024    HDL 34 (L) 08/05/2024     (H) 08/05/2024       Current Medication List  This medication list has been reviewed with the patient and evaluated for any interactions or necessary modifications/recommendations, and updated to include all prescription medications, OTC medications, and supplements the patient is currently taking.  This list reflects what is contained in the patient's profile, which has also been marked as reviewed to communicate to other providers it is the most up to date version of the patient's current medication therapy.     Current Outpatient Medications:     aspirin 81 MG EC tablet, Take 1 tablet by mouth Daily., Disp: , Rfl:     atorvastatin (LIPITOR) 20 MG tablet, Take 1 tablet by mouth Every Night., Disp: 90 tablet, Rfl: 1    cyclobenzaprine (FLEXERIL) 10 MG tablet, Take 1 tablet by mouth 3 (Three) Times a Day As Needed for Muscle Spasms., Disp: 30 tablet, Rfl: 0    enzalutamide (XTANDI) 40 MG chemo capsule, Take 4 capsules by mouth Daily., Disp: 120 capsule, Rfl: 5    fenofibrate (TRICOR) 145 MG tablet, Take 1 tablet by mouth Daily., Disp: 90 tablet, Rfl: 1    ferrous sulfate 325 (65 FE) MG tablet, Take 1 tablet by mouth 2 (Two) Times a Day., Disp: 60 tablet, Rfl: 3    gabapentin (NEURONTIN) 600 MG tablet, Take 1 tablet by mouth 2 (Two) Times a Day., Disp: 60  tablet, Rfl: 3    HYDROcodone-acetaminophen (NORCO)  MG per tablet, Take 1 tablet by mouth Every 6 (Six) Hours As Needed for Moderate Pain., Disp: 120 tablet, Rfl: 0    hydrOXYzine (ATARAX) 50 MG tablet, Take 1 tablet by mouth 3 (Three) Times a Day As Needed for Itching., Disp: 90 tablet, Rfl: 1    metoprolol tartrate (LOPRESSOR) 25 MG tablet, Take 1 tablet by mouth 2 (Two) Times a Day., Disp: 180 tablet, Rfl: 1    pantoprazole (PROTONIX) 40 MG EC tablet, Take 1 tablet by mouth Daily., Disp: 90 tablet, Rfl: 1    Medicines reviewed by Kaila Lares [facility] on 2/20/2025 at  2:26 PM    Drug Interactions  No significant drug-drug interactions with Xtandi according to literature.     Adverse Drug Reactions  Medication tolerability: Tolerating with no to minimal ADRs  Medication plan: Continue therapy with normal follow-up  Plan for ADR Management: N/A    Adherence, Self-Administration, and Current Therapy Problems  Adherence related to the patient's specialty therapy was discussed with the patient. The Adherence segment of this outreach has been reviewed and updated.     Adherence Questions  Linked Medication(s) Assessed: Enzalutamide (XTANDI)  On average, how many doses/injections does the patient miss per month?: 0  What are the identified reasons for non-adherence or missed doses? : no problems identified  What is the estimated medication adherence level?: %  Based on the patient/caregiver response and refill history, does this patient require an MTP to track adherence improvements?: no    Additional Barriers to Patient Self-Administration: None  Methods for Supporting Patient Self-Administration: Provided direct education. Care coordinator to continue providing once-monthly outreach calls to assist with adherence and coordinate medication refills. Pharmacist to provide clinical assessments every 6 months and will assist in the management of clinical issues as they arise.     Open Medication Therapy  Problems  No medication therapy recommendations to display    Goals of Therapy  Goals related to the patient's specialty therapy were discussed with the patient. The Patient Goals segment of this outreach has been reviewed and updated.   Goals Addressed Today        Specialty Pharmacy General Goal      To achieve and maintain an undetectable PSA.    Update 2/20/25: Most recent PSA level obtained on 11/27/24 remains undetectable at <0.014 ng/mL.              Quality of Life Assessment   Quality of Life related to the patient's enrollment in the patient management program and services provided was discussed with the patient. The QOL segment of this outreach has been reviewed and updated.  Quality of Life Improvement Scale: 8-Moderately better    Reassessment Plan & Follow-Up  1. Medication Therapy Changes: None  2. Related Plans, Therapy Recommendations, or Issues to Be Addressed: None  3. Pharmacist to perform regular assessments no more than (6) months from the previous assessment.  4. Care Coordinator to set up future refill outreaches, coordinate prescription delivery, and escalate clinical questions to pharmacist.    Attestation  Therapeutic appropriateness: Appropriate   I attest the patient was actively involved in and has agreed to the above plan of care.  If the prescribed therapy is at any point deemed not appropriate based on the current or future assessments, a consultation will be initiated with the patient's specialty care provider to determine the best course of action. The revised plan of therapy will be documented along with any required assessments and/or additional patient education provided.     Kaila Lares [facility]  02/20/25  14:35 EST

## 2025-03-11 ENCOUNTER — SPECIALTY PHARMACY (OUTPATIENT)
Dept: PHARMACY | Facility: HOSPITAL | Age: 65
End: 2025-03-11
Payer: COMMERCIAL

## 2025-03-11 NOTE — PROGRESS NOTES
Specialty Pharmacy Patient Management Program  Refill Outreach     Ta was contacted today regarding refills of their medication(s).    Refill Questions      Flowsheet Row Most Recent Value   Changes to allergies? No   Changes to medications? No   New conditions or infections since last clinic visit No   Unplanned office visit, urgent care, ED, or hospital admission in the last 4 weeks  No   How does patient/caregiver feel medication is working? Very good   Financial problems or insurance changes  No   Since the previous refill, were any specialty medication doses or scheduled injections missed or delayed?  No   Does this patient require a clinical escalation to a pharmacist? No            Delivery Questions      Flowsheet Row Most Recent Value   Delivery method UPS   Delivery address verified with patient/caregiver? Yes   Delivery address Home   Number of medications in delivery 1   Medication(s) being filled and delivered Enzalutamide (XTANDI)   Doses left of specialty medications na   Copay verified? Yes   Copay amount 0.00$   Copay form of payment No copayment ($0)   Delivery Date Selection 03/13/25   Signature Required Yes            Medication Adherence    Adherence tools used: patient uses a pill box to manage medications  Support network for adherence: family member          Follow-up: 30 day(s)     Corinne Whatley Pharmacy Technician  3/11/2025  11:32 EDT

## 2025-03-13 DIAGNOSIS — C61 ADENOCARCINOMA OF PROSTATE: ICD-10-CM

## 2025-03-13 RX ORDER — HYDROCODONE BITARTRATE AND ACETAMINOPHEN 10; 325 MG/1; MG/1
1 TABLET ORAL EVERY 6 HOURS PRN
Qty: 120 TABLET | Refills: 0 | Status: SHIPPED | OUTPATIENT
Start: 2025-03-13

## 2025-03-13 NOTE — TELEPHONE ENCOUNTER
Caller: Ta Madison    Relationship: Self    Best call back number: [phone]    Requested Prescriptions:   Requested Prescriptions     Pending Prescriptions Disp Refills    HYDROcodone-acetaminophen (NORCO)  MG per tablet 120 tablet 0     Sig: Take 1 tablet by mouth Every 6 (Six) Hours As Needed for Moderate Pain.      Pharmacy where request should be sent: [facility] #[id number] [place] [address] AT [facility] [address] - [phone]  - [phone] FX     Last office visit with prescribing clinician: 10/17/2024   Last telemedicine visit with prescribing clinician: Visit date not found   Next office visit with prescribing clinician: 4/17/2025     Does the patient have less than a 3 day supply:  [x] Yes  [] No    Would you like a call back once the refill request has been completed: [] Yes [x] No    If the office needs to give you a call back, can they leave a voicemail: [] Yes [x] No

## 2025-03-24 DIAGNOSIS — K86.1 CHRONIC BILIARY PANCREATITIS: ICD-10-CM

## 2025-03-24 DIAGNOSIS — F45.8 NEUROPATHIC PRURITUS: ICD-10-CM

## 2025-03-24 RX ORDER — HYDROXYZINE HYDROCHLORIDE 50 MG/1
50 TABLET, FILM COATED ORAL 3 TIMES DAILY PRN
Qty: 90 TABLET | Refills: 1 | Status: SHIPPED | OUTPATIENT
Start: 2025-03-24

## 2025-03-24 RX ORDER — PANTOPRAZOLE SODIUM 40 MG/1
40 TABLET, DELAYED RELEASE ORAL DAILY
Qty: 90 TABLET | Refills: 1 | Status: SHIPPED | OUTPATIENT
Start: 2025-03-24

## 2025-04-16 DIAGNOSIS — C61 ADENOCARCINOMA OF PROSTATE: Primary | ICD-10-CM

## 2025-04-17 ENCOUNTER — OFFICE VISIT (OUTPATIENT)
Dept: ONCOLOGY | Facility: CLINIC | Age: 65
End: 2025-04-17
Payer: COMMERCIAL

## 2025-04-17 ENCOUNTER — LAB (OUTPATIENT)
Dept: ONCOLOGY | Facility: CLINIC | Age: 65
End: 2025-04-17
Payer: COMMERCIAL

## 2025-04-17 ENCOUNTER — SPECIALTY PHARMACY (OUTPATIENT)
Dept: PHARMACY | Facility: HOSPITAL | Age: 65
End: 2025-04-17
Payer: COMMERCIAL

## 2025-04-17 ENCOUNTER — INFUSION (OUTPATIENT)
Dept: ONCOLOGY | Facility: HOSPITAL | Age: 65
End: 2025-04-17
Payer: COMMERCIAL

## 2025-04-17 VITALS
TEMPERATURE: 97.7 F | OXYGEN SATURATION: 98 % | RESPIRATION RATE: 18 BRPM | SYSTOLIC BLOOD PRESSURE: 117 MMHG | DIASTOLIC BLOOD PRESSURE: 61 MMHG | HEART RATE: 75 BPM

## 2025-04-17 VITALS
DIASTOLIC BLOOD PRESSURE: 57 MMHG | RESPIRATION RATE: 18 BRPM | WEIGHT: 147.8 LBS | BODY MASS INDEX: 23.2 KG/M2 | TEMPERATURE: 97.7 F | HEIGHT: 67 IN | SYSTOLIC BLOOD PRESSURE: 101 MMHG | HEART RATE: 86 BPM | OXYGEN SATURATION: 97 %

## 2025-04-17 DIAGNOSIS — C61 ADENOCARCINOMA OF PROSTATE: ICD-10-CM

## 2025-04-17 DIAGNOSIS — C61 ADENOCARCINOMA OF PROSTATE: Primary | ICD-10-CM

## 2025-04-17 DIAGNOSIS — C79.51 MALIGNANT NEOPLASM METASTATIC TO BONE: ICD-10-CM

## 2025-04-17 LAB
ALBUMIN SERPL-MCNC: 3.5 G/DL (ref 3.5–5.2)
ALBUMIN/GLOB SERPL: 0.9 G/DL
ALP SERPL-CCNC: 81 U/L (ref 39–117)
ALT SERPL W P-5'-P-CCNC: 5 U/L (ref 1–41)
ANION GAP SERPL CALCULATED.3IONS-SCNC: 11.2 MMOL/L (ref 5–15)
AST SERPL-CCNC: 12 U/L (ref 1–40)
BASOPHILS # BLD AUTO: 0.08 10*3/MM3 (ref 0–0.2)
BASOPHILS NFR BLD AUTO: 0.9 % (ref 0–1.5)
BILIRUB SERPL-MCNC: 0.4 MG/DL (ref 0–1.2)
BUN SERPL-MCNC: 13 MG/DL (ref 8–23)
BUN/CREAT SERPL: 15.9 (ref 7–25)
CALCIUM SPEC-SCNC: 8.4 MG/DL (ref 8.6–10.5)
CHLORIDE SERPL-SCNC: 103 MMOL/L (ref 98–107)
CO2 SERPL-SCNC: 21.8 MMOL/L (ref 22–29)
CREAT SERPL-MCNC: 0.82 MG/DL (ref 0.76–1.27)
DEPRECATED RDW RBC AUTO: 51.4 FL (ref 37–54)
EGFRCR SERPLBLD CKD-EPI 2021: 98.7 ML/MIN/1.73
EOSINOPHIL # BLD AUTO: 0.3 10*3/MM3 (ref 0–0.4)
EOSINOPHIL NFR BLD AUTO: 3.3 % (ref 0.3–6.2)
ERYTHROCYTE [DISTWIDTH] IN BLOOD BY AUTOMATED COUNT: 17.8 % (ref 12.3–15.4)
FERRITIN SERPL-MCNC: 200.2 NG/ML (ref 30–400)
GLOBULIN UR ELPH-MCNC: 4.1 GM/DL
GLUCOSE SERPL-MCNC: 86 MG/DL (ref 65–99)
HCT VFR BLD AUTO: 32.3 % (ref 37.5–51)
HGB BLD-MCNC: 10 G/DL (ref 13–17.7)
IMM GRANULOCYTES # BLD AUTO: 0.03 10*3/MM3 (ref 0–0.05)
IMM GRANULOCYTES NFR BLD AUTO: 0.3 % (ref 0–0.5)
IRON 24H UR-MRATE: 34 MCG/DL (ref 59–158)
IRON SATN MFR SERPL: 5 % (ref 20–50)
LYMPHOCYTES # BLD AUTO: 3.03 10*3/MM3 (ref 0.7–3.1)
LYMPHOCYTES NFR BLD AUTO: 33.2 % (ref 19.6–45.3)
MCH RBC QN AUTO: 24.9 PG (ref 26.6–33)
MCHC RBC AUTO-ENTMCNC: 31 G/DL (ref 31.5–35.7)
MCV RBC AUTO: 80.5 FL (ref 79–97)
MONOCYTES # BLD AUTO: 0.64 10*3/MM3 (ref 0.1–0.9)
MONOCYTES NFR BLD AUTO: 7 % (ref 5–12)
NEUTROPHILS NFR BLD AUTO: 5.05 10*3/MM3 (ref 1.7–7)
NEUTROPHILS NFR BLD AUTO: 55.3 % (ref 42.7–76)
NRBC BLD AUTO-RTO: 0 /100 WBC (ref 0–0.2)
PLATELET # BLD AUTO: 543 10*3/MM3 (ref 140–450)
PMV BLD AUTO: 9.6 FL (ref 6–12)
POTASSIUM SERPL-SCNC: 4 MMOL/L (ref 3.5–5.2)
PROT SERPL-MCNC: 7.6 G/DL (ref 6–8.5)
RBC # BLD AUTO: 4.01 10*6/MM3 (ref 4.14–5.8)
SODIUM SERPL-SCNC: 136 MMOL/L (ref 136–145)
TIBC SERPL-MCNC: 629 MCG/DL (ref 298–536)
TRANSFERRIN SERPL-MCNC: 422 MG/DL (ref 200–360)
WBC NRBC COR # BLD AUTO: 9.13 10*3/MM3 (ref 3.4–10.8)

## 2025-04-17 PROCEDURE — 85025 COMPLETE CBC W/AUTO DIFF WBC: CPT | Performed by: INTERNAL MEDICINE

## 2025-04-17 PROCEDURE — 84466 ASSAY OF TRANSFERRIN: CPT | Performed by: INTERNAL MEDICINE

## 2025-04-17 PROCEDURE — 1125F AMNT PAIN NOTED PAIN PRSNT: CPT | Performed by: INTERNAL MEDICINE

## 2025-04-17 PROCEDURE — 82728 ASSAY OF FERRITIN: CPT | Performed by: INTERNAL MEDICINE

## 2025-04-17 PROCEDURE — 84153 ASSAY OF PSA TOTAL: CPT | Performed by: INTERNAL MEDICINE

## 2025-04-17 PROCEDURE — 80053 COMPREHEN METABOLIC PANEL: CPT | Performed by: INTERNAL MEDICINE

## 2025-04-17 PROCEDURE — 96402 CHEMO HORMON ANTINEOPL SQ/IM: CPT

## 2025-04-17 PROCEDURE — 99214 OFFICE O/P EST MOD 30 MIN: CPT | Performed by: INTERNAL MEDICINE

## 2025-04-17 PROCEDURE — 83540 ASSAY OF IRON: CPT | Performed by: INTERNAL MEDICINE

## 2025-04-17 PROCEDURE — 25010000002 LEUPROLIDE 45 MG KIT: Performed by: INTERNAL MEDICINE

## 2025-04-17 RX ORDER — FERROUS SULFATE 325(65) MG
325 TABLET ORAL
Qty: 90 TABLET | Refills: 3 | Status: SHIPPED | OUTPATIENT
Start: 2025-04-17

## 2025-04-17 RX ORDER — HYDROCODONE BITARTRATE AND ACETAMINOPHEN 10; 325 MG/1; MG/1
1 TABLET ORAL EVERY 6 HOURS PRN
Qty: 120 TABLET | Refills: 0 | Status: SHIPPED | OUTPATIENT
Start: 2025-04-17

## 2025-04-17 RX ADMIN — LEUPROLIDE ACETATE 45 MG: KIT at 15:50

## 2025-04-17 NOTE — PROGRESS NOTES
Venipuncture Blood Specimen Collection  Venipuncture performed in left arm by Arlene Buckley MA with good hemostasis. Patient tolerated the procedure well without complications.   04/17/25   Arlene Buckley MA

## 2025-04-17 NOTE — PROGRESS NOTES
Specialty Pharmacy Patient Management Program  Oncology / Hematology Refill Outreach      Ta was contacted today regarding refills of his medication(s).    Specialty medication(s) and dose(s) confirmed: Xtandi    Refill Questions      Flowsheet Row Most Recent Value   Changes to allergies? No   Changes to medications? No   New conditions or infections since last clinic visit No   Unplanned office visit, urgent care, ED, or hospital admission in the last 4 weeks  No   How does patient/caregiver feel medication is working? Good   Financial problems or insurance changes  No   Since the previous refill, were any specialty medication doses or scheduled injections missed or delayed?  No   Does this patient require a clinical escalation to a pharmacist? No          Delivery Questions      Flowsheet Row Most Recent Value   Delivery method UPS   Delivery address verified with patient/caregiver? Yes   Delivery address Home   Other address preferred N/A   Number of medications in delivery 1   Medication(s) being filled and delivered Enzalutamide (XTANDI)   Doses left of specialty medications Patient estimates 3 doses   Copay verified? Yes   Copay amount $0   Copay form of payment No copayment ($0)   Delivery Date Selection 04/18/25   Signature Required Yes   Do you consent to receive electronic handouts?  No            Follow-Up: 21d  [USUALLY TIME FRAME UNTIL NEXT REFILL OUTREACH FOLLOW-UP (APPROX) Ex. 25d, 85d, etc. ]    Christiana Moyer, Pharmacy Technician  4/17/2025  11:38 EDT     Medical Assessment Completed on: 03-May-2022 08:47

## 2025-04-17 NOTE — PROGRESS NOTES
Name:  Ta Madison  :  1960  Date:  2025     REFERRING PHYSICIAN  Dheeraj Nieves MD    PRIMARY CARE PROVIDER  Yolande Olvera APRN    REASON FOR FOLLOWUP  1. Adenocarcinoma of prostate      CHIEF COMPLAINT  Chronic fatigue.    Dear Ms. Elliottb,    HISTORY OF PRESENT ILLNESS:   I saw Mr. Madison in follow up today in our medical oncology clinic. As you are aware, he is a pleasant, 64 y.o., white male with a history of tobacco abuse who was referred to urology in 2020 for an elevated serum PSA level (of ~28 ng/mL, which had increased to 247 ng/mL at the time of his initial appointment in our clinic). An examination revealed a very hard and fixed prostate, and multiple core biopsies were performed on 2020. The results were consistent with Maceo Score 5+4 or 4+5=9 adenocarcinoma involving all twelve sampled cores. A staging workup confirmed pelvic adenopathy as well as diffuse, sclerotic lesions throughout the axial and appendicular skeleton, including the right greater than left femoral diaphyses. With this stage IV diagnosis, he was referred to our clinic for further evaluation and management. At the time of his initial consultation in our office (on 2021), he was agreeable to starting definitive therapy with a combination of ADT (Lupron injections) and anti-androgen therapy (daily PO Xtandi).    INTERIM HISTORY:  Mr. Madiosn returns to clinic today for follow up by himself. He began [id number] Lupron on 2021 and, between then and ~early summer 2023, he was also consistently on daily PO Xtandi (for a total of about ~two and one half years). In early summer 2023, he developed necrotizing pancreatitis; and he spent the better part of the next ~three to four months admitted to one hospital or another (including , [facility] and our facility). Throughout that time, he was unable to take the Xtandi, as it was multiple months before he could even attempt to take anything by mouth. He slowly,  "but steadily, recovered, his PEG has been removed; and he has not had any recurrent problems related to this issue since. In mid-September 2024, he fell hard onto his right hip twice; the first time when he \"got into a tangle with his bull\", and the second time when he and his son were working in his yard. The second time, he suffered a subcapital fracture of the right femoral neck. He underwent an ORIF on 09/10/2024, and he has been recovering uneventfully from this as well. The Neurontin we gave him at his previous appointment has helped his chronic neuropathies. His primary complaint today is of fatigue. He does admit that he has not been taking the PO iron we gave him last Fall as religiously as he should. He has no new or other specific complaints.    Past Medical History:   Diagnosis Date    Elevated cholesterol     GERD (gastroesophageal reflux disease)     Increased heart rate     Mini stroke     Neck pain     Prostate cancer     Rash     Tobacco abuse        Past Surgical History:   Procedure Laterality Date    ABDOMINAL SURGERY      APPENDECTOMY  1971    [facility]     COLONOSCOPY      COLONOSCOPY N/A 01/25/2023    Procedure: COLONOSCOPY;  Surgeon: Mahnaz Caraballo MD;  Location: [facility] OR;  Service: Gastroenterology;  Laterality: N/A;    ENDOSCOPY N/A 07/31/2023    Procedure: ESOPHAGOGASTRODUODENOSCOPY WITH KEOFEED PLACEMENT;  Surgeon: Yolande Eller MD;  Location: [facility] ENDOSCOPY;  Service: Gastroenterology;  Laterality: N/A;    HIP CANNULATED SCREW PLACEMENT Right 9/10/2024    Procedure: FEMORAL NECK OPEN REDUCTION INTERNAL FIXATION;  Surgeon: Nacho Montoya MD;  Location: [facility] OR;  Service: Orthopedics;  Laterality: Right;       Social History     Socioeconomic History    Marital status:    Tobacco Use    Smoking status: Some Days     Current packs/day: 0.00     Average packs/day: 2.0 packs/day for 45.0 years (90.0 ttl pk-yrs)     Types: Cigarettes     Start date: " 1978     Last attempt to quit: 2023     Years since quittin.7    Smokeless tobacco: Never   Vaping Use    Vaping status: Never Used   Substance and Sexual Activity    Alcohol use: No    Drug use: Yes     Types: Marijuana     Comment: occ     Sexual activity: Defer       Family History   Problem Relation Age of Onset    Nephrolithiasis Mother     Heart disease Father     Hypertension Father     Kidney disease Father        No Known Allergies    Current Outpatient Medications   Medication Sig Dispense Refill    aspirin 81 MG EC tablet Take 1 tablet by mouth Daily.      atorvastatin (LIPITOR) 20 MG tablet Take 1 tablet by mouth Every Night. 90 tablet 1    cyclobenzaprine (FLEXERIL) 10 MG tablet Take 1 tablet by mouth 3 (Three) Times a Day As Needed for Muscle Spasms. 30 tablet 0    enzalutamide (XTANDI) 40 MG chemo capsule Take 4 capsules by mouth Daily. 120 capsule 5    fenofibrate (TRICOR) 145 MG tablet Take 1 tablet by mouth Daily. 90 tablet 1    gabapentin (NEURONTIN) 600 MG tablet Take 1 tablet by mouth 2 (Two) Times a Day. 60 tablet 3    hydrOXYzine (ATARAX) 50 MG tablet TAKE 1 TABLET BY MOUTH THREE TIMES DAILY AS NEEDED FOR ITCHING 90 tablet 1    metoprolol tartrate (LOPRESSOR) 25 MG tablet Take 1 tablet by mouth 2 (Two) Times a Day. 180 tablet 1    pantoprazole (PROTONIX) 40 MG EC tablet TAKE 1 TABLET BY MOUTH DAILY 90 tablet 1    ferrous sulfate 325 (65 FE) MG tablet Take 1 tablet by mouth 3 (Three) Times a Day With Meals. 90 tablet 3    HYDROcodone-acetaminophen (NORCO)  MG per tablet Take 1 tablet by mouth Every 6 (Six) Hours As Needed for Moderate Pain. 120 tablet 0     No current facility-administered medications for this visit.     REVIEW OF SYSTEMS  CONSTITUTIONAL:  No fever, chills or night sweats. Chronic fatigue.  EYES:  No blurry vision, diplopia or other vision changes.  ENT:  No hearing loss, nosebleeds or sore throat.  CARDIOVASCULAR:  No palpitations, arrhythmia, syncopal  "episodes or edema.  PULMONARY:  No hemoptysis, wheezing, chronic cough or shortness of breath.  GASTROINTESTINAL:  As per the HPI above.  GENITOURINARY:  No hematuria, kidney stones or frequent urination.  MUSCULOSKELETAL:  As per the HPI above.  ENDOCRINE:  No excessive thirst. Hot flashes as above.  HEMATOLOGIC:  No history of free bleeding, spontaneous bleeding or clotting.  IMMUNOLOGIC:  No allergies or frequent infections.  NEUROLOGIC: No numbness, tingling, seizures or weakness. Improved neuropathies (in both the hands and feet) since starting Neurontin in 2024, as per the HPI above.  PSYCHIATRIC:  No anxiety or depression.    PHYSICAL EXAMINATION  /57   Pulse 86   Temp 97.7 °F (36.5 °C) (Temporal)   Resp 18   Ht 170.2 cm (67.01\")   Wt 67 kg (147 lb 12.8 oz)   SpO2 97%   BMI 23.14 kg/m²     Pain Score:  Pain Score    25 1436   PainSc: 3    PainLoc: Hip  Comment: bilateral     ECO  GENERAL:  A well-developed, well-nourished, white male in no acute distress.  HEENT:  Pupils equally round and reactive to light. Extraocular muscles intact.  CARDIOVASCULAR:  Regular rate and rhythm. No murmurs, gallops or rubs.  LUNGS:  Clear to auscultation bilaterally. No wheezing  ABDOMEN:  Soft, nontender, nondistended with positive bowel sounds.  EXTREMITIES:  No clubbing, cyanosis or edema bilaterally.  SKIN:  No petechiae or rashes.  NEURO:  Cranial nerves grossly intact. No focal deficits.  PSYCH:  Alert and oriented x3.    LABORATORY  Lab Results   Component Value Date    WBC 9.13 2025    HGB 10.0 (L) 2025    HCT 32.3 (L) 2025    MCV 80.5 2025     (H) 2025    NEUTROABS 5.05 2025       Lab Results   Component Value Date     2025    K 4.0 2025     2025    CO2 21.8 (L) 2025    BUN 13 2025    CREATININE 0.82 2025    GLUCOSE 86 2025    CALCIUM 8.4 (L) 2025    AST 12 2025    ALT 5 2025    " ALKPHOS 81 04/17/2025    BILITOT 0.4 04/17/2025    PROTEINTOT 7.6 04/17/2025    ALBUMIN 3.5 04/17/2025     CBC (04/17/2024): WBCs: 9.13; HgB: 10.0; Hct: 32.3; platelets: 543; MCV: 80.5  CBC (10/17/2024): WBCs: 13.46; HgB: 10.4; Hct: 32.3; platelets: 401; MCV: 79.2  CBC (04/17/2024): WBCs: 9.56; HgB: 11.6; Hct: 36.5; platelets: 599  CBC (03/18/2024): WBCs: 8.64; HgB: 13.0; Hct: 39.3; platelets: 532  CBC (01/30/2024): WBCs: 9.60; HgB: 12.6; Hct: 40.1; platelets: 568; MCV: 84.8  CBC (12/27/2023): WBCs: 10.77; HgB: 11.9; Hct: 37.2; platelets: 643; MCV: 79  CBC (11/30/2023): WBCs: 13.47; HgB: 11.4; Hct: 37.2; platelets: 889; MCV: 81.0  CBC (10/18/2023): WBCs: 10.45; HgB: 7.6; Hct: 25.8; platelets: 608; MCV: 79.6  CBC (10/17/2023): WBCs: 9.95; HgB: 8.0; Hct: 25.6; platelets: 614; MCV: 76.9  CBC (04/12/2023): WBCs: 6.79; HgB: 12.2; Hct: 38.5; platelets: 546  CBC (01/19/2023): WBCs: 8.19; HgB: 12.1; Hct: 37.6; platelets: 586  CBC (10/19/2022): WBCs: 11.25; HgB: 12.3; Hct: 37.3; platelets: 453  CBC (07/18/2022): WBCs: 7.82; HgB: 12.7; Hct: 39.0; platelets: 513  CBC (04/13/2022): WBCs: 7.62; HgB: 12.7; Hct: 38.7; platelets: 453  CBC (01/19/2022): WBCs: 7.56; HgB: 13.1; Hct: 41.0; platelets: 518  CBC (07/20/2021): WBCs: 7.43; HgB: 13.0; Hct: 39.6; platelets: 469  CBC (06/29/2021): WBCs: 7.8; HgB: 12.6; Hct: 38.1; platelets: 396  CBC (04/14/2021): WBCs: 7.2; HgB: 12.7; Hct: 38.8; platelets: 423  CBC (02/25/2021): WBCs: 6.5; HgB: 11.2; Hct: 34.4; platelets: 407    PSA (04/17/2025): pending  PSA (11/27/2024): < 0.014 ng/mL  PSA (10/17/2024): < 0.014 ng/mL  PSA (08/05/2024): < 0.014 ng/mL  PSA (04/17/2024): < 0.014 ng/mL  PSA (01/30/2024): < 0.014 ng/mL  PSA (11/30/2023): < 0.014 ng/mL  PSA (10/18/2023): < 0.014 ng/mL  PSA (07/12/2023): < 0.014 ng/mL  PSA (04/12/2023): < 0.014 ng/mL  PSA (01/19/2023): < 0.014 ng/mL  PSA (10/19/2022): < 0.014 ng/mL  PSA (07/18/2022): < 0.014 ng/mL  PSA (04/13/2022): < 0.014 ng/mL  PSA (01/19/2022):  < 0.014 ng/mL  PSA (10/13/2021): < 0.014 ng/mL  PSA (07/20/2021): 0.020 ng/mL   PSA (04/14/2021): 0.108 ng/mL  PSA (02/25/2021): 0.814 ng/mL  PSA (01/20/2021): 29.5 ng/mL  PSA (01/07/2021): 247.0 ng/mL  PSA (11/10/2020): 28.3 ng/mL    Iron profile (04/17/2025): serum iron: 34; % saturation: 5; TIBC: 629; ferritin: 200.2 ng/mL  Iron profile (11/27/2024): serum iron: 38; % saturation: 8; TIBC: 451; ferritin: 156.7 ng/mL  Iron profile (10/18/2023): serum iron: 19; % saturation: 6; TIBC: 322; ferritin: 606.9 ng/mL    IMAGING  NM bone scan, whole body (12/15/2020):  Impression: Extensive osteoblastic metastatic disease involving the axial and appendicular skeleton including the right greater than left femoral diaphyses.    CT pelvis with contrast (12/21/2020):  Impression:  1) Urinary bladder wall thickening noted.  2) Heterogeneous appearance of the prostate gland.  3) Nonspecific stranding in the retroperitoneal space.  4) Retroperitoneal region lymph node on the right side measuring 1.4 cm.  5) Right obturator chain region lymph node measuring 1.5 cm. Other lymph nodes are identified in this region.  6) Sclerotic bone lesions are noted throughout the pelvis concerning for metastatic disease.    CT chest without contrast (10/10/2023, compared to 08/06/2023):  Impression:  1) No acute thoracic findings identfied.  2) Refer to CT abdomen/pelvis for abdomen findings.    CT abdomen and pelvis without contrast (10/10/2023, compared to 08/10/2023):  Impression:  1) Significant improvement in changes of necrotizing pancreatitis status post cystogastrostomy tube placement, necrosectomy and Axios metallic stent placement spanning from the G-tube into the stomach to the level of the proximal jejunum.  2) No significant ascites.  3) No acute appearing pancreatitis changes. No findings to suggest acute abscess.  4) Cholelithiasis noted.  5) Other incidental/nonacute findings.    CT abdomen and pelvis with contrast  (10/23/2023):  Impression: Sequela of necrotizing pancreatitis with interval marked decrease in size of the area of walled off necrosis centered in the pancreatic body since 09/09/2023. Increased intra and extrahepatic biliary ductal dilation with poor visualization of the lower most aspect of the common bile duct, concerning for stricture. Recommend correlation with ERCP.    PATHOLOGY  Left and right prostate, needle core biopsies, twelve total cores (12/02/2020):  Snover Score 5+4 or 4+5 = 9 prostatic adenocarcinoma involving ~45-90% of all twelve (12/12) total cores.    IMPRESSION AND PLAN  Mr. Madison is a 64 y.o., white male with:  Prostate adenocarcinoma: Diagnosed in December 2020 with stage IV disease, with widespread metastases involving the axial and appendicular skeleton along with probable, pelvic adenopathy. I have had multiple, long discussions with the patient and his wife since the time of his initial consultation in our clinic (on 01/07/2021) regarding this diagnosis and its prognosis. They remain aware that this disease is, unfortunately, not curable; however, particularly given his young age and this type of cancer's tendency to respond very well to initial anti-testosterone therapy, it was/remains very treatable, and sustained remissions are very possible. He was felt to be an excellent candidate for initial therapy with both ADT and androgen receptor blockade. He received an initial cycle of (currently now [id number]) Lupron on 01/08/2021 and was also started on daily PO Xtandi at that time. He has tolerated this regimen overall very well for the past ~four (plus) years; and, with this therapy, his serum PSA levels responded dramatically and quickly, decreasing from 247 ng/mL on 01/07/2021 to undetectable (<0.014 ng/mL) by 10/13/2021 (and this continues to be the case on the most recent repeat check, drawn on 11/27/2024; today's result is pending). Due to issue #2, he was unable to take the Xtandi  anywhere near consistently for a total of ~three to four months during the summer and early fall of 2023; but he never missed a dose of Lupron, and he was able to restart the Xtandi by late October 2023 (and he has been taking it consistently again for the past ~one and one half years). We will proceed with this current treatment plan. Due to issue #7 (see below), we will see him back in our clinic in two months with repeat labs.  Necrotizing pancreatitis: A serious problem back in Summer 2023; however, as of early 2024, he seemed to have fully recovered from this ordeal, and his PEG has been removed. Ongoing management per [facility].  Skeletal metastases: There was diffuse involvement by issue #1 of both the axial and appendicular skeletons on the initial staging NM bone scan and CT of the abdomen and pelvis performed in late December 2020. 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 Continue to monitor.  Pain: The symptoms that were directly related to issue #3 remain resolved since he started Lupron/Xtandi in early January 2021 for the palliative treatment of issue #1. His more recent symptoms have generally been related to issue #2 (and, even more recently, issues #5 and #6, not #1; however, with his diagnosis of an incurable malignancy, our clinic remains agreeable to managing this issue. Continue prn Norco (and a refill was provided today). Continue to monitor.  Right-sided hip fracture: Status post a couple of traumatic falls onto his right hip in September 2024 and status post ORIF at that  time. Ongoing management per orthopedic surgery.  Neuropathies: Chronic symptoms in his hands and feet had recently been worsening. A Rx for Neurontin 600 mg PO BID was provided in Fall 2024, and he confirms today that this has helped quite a bit. Continue to monitor.  Iron deficiency: A Rx for ferrous sulfate 325 mg PO BID was provided today in Fall 2024 for this issue, which was likely exacerbated by the hip surgery he had undergone at that time. Today's repeat CBC and iron studies are about the same as they were six months ago, and he admits that he has not been taking the PO iron as prescribed (actually, he has not been taking much at all). A new Rx for ferrous sulfate 325 mg PO TID was provided today, and he promised that he would be more compliant this time. We will see him back in our clinic in two months (~mid-June) with a CBC, iron panel and ferritin level.  The patient was in agreement with these plans.    It is a pleasure to participate in Mr. Madison's care. Please do not hesitate to call with any questions or concerns that you may have.    A total of 30 minutes were spent coordinating this patient’s care in clinic today; more than 50% of this time was face-to-face with the patient, reviewing his interim medical history, discussing the results of the most recent repeat labwork, including today's, and counseling on the current treatment and followup plan. All questions were answered to his satisfaction.    FOLLOW UP  New Rx for ferrous sulfate 325 mg PO TID disp #90 provided today. Continue Neurontin 600 mg PO BID. Continue Norco 10/325 mg q6hr as well (refill provided today). Continue ([id number]) Lupron (including a dose today). Continue daily, PO Xtandi. With [facility], as previously planned. With urology, as previously planned. With orthopedic surgery, as planned. Return to our clinic in 2 months (~mid-June) with a CBC, iron panel and ferritin level.          This document was electronically signed by HIRO  Alex Martinez MD April 17, 2025 15:25 EDT      CC: Yolande Olvera, APRN

## 2025-04-18 LAB — PSA SERPL-MCNC: <0.014 NG/ML (ref 0–4)

## 2025-04-29 DIAGNOSIS — C61 ADENOCARCINOMA OF PROSTATE: ICD-10-CM

## 2025-04-29 RX ORDER — GABAPENTIN 600 MG/1
600 TABLET ORAL 2 TIMES DAILY
Qty: 60 TABLET | Refills: 3 | Status: SHIPPED | OUTPATIENT
Start: 2025-04-29

## 2025-04-29 RX ORDER — HYDROCODONE BITARTRATE AND ACETAMINOPHEN 10; 325 MG/1; MG/1
1 TABLET ORAL EVERY 6 HOURS PRN
Qty: 120 TABLET | Refills: 0 | Status: SHIPPED | OUTPATIENT
Start: 2025-04-29

## 2025-04-29 NOTE — TELEPHONE ENCOUNTER
Caller: [place]    Relationship: Self    Best call back number:   Telephone Information:   Mobile [phone]         Requested Prescriptions:   Requested Prescriptions     Pending Prescriptions Disp Refills    gabapentin (NEURONTIN) 600 MG tablet 60 tablet 3     Sig: Take 1 tablet by mouth 2 (Two) Times a Day.    HYDROcodone-acetaminophen (NORCO)  MG per tablet 120 tablet 0     Sig: Take 1 tablet by mouth Every 6 (Six) Hours As Needed for Moderate Pain.        Pharmacy where request should be sent: [id number] [place] [address] [facility] [address] [facility] [address] - [phone]  - [phone] FX     Last office visit with prescribing clinician: 4/17/2025   Last telemedicine visit with prescribing clinician: Visit date not found   Next office visit with prescribing clinician: 6/17/2025     Does the patient have less than a 3 day supply:  [x] Yes  [] No    If the office needs to give you a call back, can they leave a voicemail: [x] Yes [] No

## 2025-05-21 ENCOUNTER — SPECIALTY PHARMACY (OUTPATIENT)
Dept: PHARMACY | Facility: HOSPITAL | Age: 65
End: 2025-05-21
Payer: COMMERCIAL

## 2025-05-21 NOTE — PROGRESS NOTES
Specialty Pharmacy Patient Management Program  Refill Outreach     Ta was contacted today regarding refills of their medication(s).    Refill Questions      Flowsheet Row Most Recent Value   Changes to allergies? No   Changes to medications? No   New conditions or infections since last clinic visit No   Unplanned office visit, urgent care, ED, or hospital admission in the last 4 weeks  No   How does patient/caregiver feel medication is working? Very good   Financial problems or insurance changes  No   Since the previous refill, were any specialty medication doses or scheduled injections missed or delayed?  No   Does this patient require a clinical escalation to a pharmacist? No            Delivery Questions      Flowsheet Row Most Recent Value   Delivery method UPS   Delivery address verified with patient/caregiver? Yes   Delivery address Home   Other address preferred na   Number of medications in delivery 1   Medication(s) being filled and delivered Enzalutamide (XTANDI)   Doses left of specialty medications na   Copay verified? Yes   Copay amount 0.00$   Copay form of payment No copayment ($0)   Delivery Date Selection 05/24/25   Signature Required Yes   Do you consent to receive electronic handouts?  Yes            Medication Adherence    Adherence tools used: patient uses a pill box to manage medications  Support network for adherence: family member          Follow-up: 30 day(s)     Corinne Whatley, Pharmacy Technician  5/21/2025  10:58 EDT

## 2025-05-31 DIAGNOSIS — I10 HYPERTENSION, UNSPECIFIED TYPE: ICD-10-CM

## 2025-06-02 ENCOUNTER — LAB (OUTPATIENT)
Dept: FAMILY MEDICINE CLINIC | Facility: CLINIC | Age: 65
End: 2025-06-02
Payer: COMMERCIAL

## 2025-06-02 ENCOUNTER — OFFICE VISIT (OUTPATIENT)
Dept: FAMILY MEDICINE CLINIC | Facility: CLINIC | Age: 65
End: 2025-06-02
Payer: COMMERCIAL

## 2025-06-02 VITALS
HEIGHT: 67 IN | DIASTOLIC BLOOD PRESSURE: 80 MMHG | WEIGHT: 148.2 LBS | OXYGEN SATURATION: 100 % | BODY MASS INDEX: 23.26 KG/M2 | HEART RATE: 77 BPM | SYSTOLIC BLOOD PRESSURE: 138 MMHG | TEMPERATURE: 98 F

## 2025-06-02 DIAGNOSIS — E78.2 MIXED HYPERLIPIDEMIA: ICD-10-CM

## 2025-06-02 DIAGNOSIS — I10 HYPERTENSION, UNSPECIFIED TYPE: ICD-10-CM

## 2025-06-02 DIAGNOSIS — M25.473 ANKLE SWELLING, UNSPECIFIED LATERALITY: Primary | ICD-10-CM

## 2025-06-02 DIAGNOSIS — K86.1 CHRONIC BILIARY PANCREATITIS: ICD-10-CM

## 2025-06-02 LAB
ALBUMIN SERPL-MCNC: 4 G/DL (ref 3.5–5.2)
ALBUMIN/GLOB SERPL: 1.1 G/DL
ALP SERPL-CCNC: 122 U/L (ref 39–117)
ALT SERPL W P-5'-P-CCNC: 7 U/L (ref 1–41)
ANION GAP SERPL CALCULATED.3IONS-SCNC: 11.7 MMOL/L (ref 5–15)
AST SERPL-CCNC: 14 U/L (ref 1–40)
BASOPHILS # BLD AUTO: 0.09 10*3/MM3 (ref 0–0.2)
BASOPHILS NFR BLD AUTO: 1.4 % (ref 0–1.5)
BILIRUB SERPL-MCNC: 0.2 MG/DL (ref 0–1.2)
BUN SERPL-MCNC: 10 MG/DL (ref 8–23)
BUN/CREAT SERPL: 14.3 (ref 7–25)
CALCIUM SPEC-SCNC: 9.4 MG/DL (ref 8.6–10.5)
CHLORIDE SERPL-SCNC: 100 MMOL/L (ref 98–107)
CHOLEST SERPL-MCNC: 147 MG/DL (ref 0–200)
CO2 SERPL-SCNC: 24.3 MMOL/L (ref 22–29)
CREAT SERPL-MCNC: 0.7 MG/DL (ref 0.76–1.27)
DEPRECATED RDW RBC AUTO: 47.6 FL (ref 37–54)
EGFRCR SERPLBLD CKD-EPI 2021: 102.9 ML/MIN/1.73
EOSINOPHIL # BLD AUTO: 0.11 10*3/MM3 (ref 0–0.4)
EOSINOPHIL NFR BLD AUTO: 1.7 % (ref 0.3–6.2)
ERYTHROCYTE [DISTWIDTH] IN BLOOD BY AUTOMATED COUNT: 16.1 % (ref 12.3–15.4)
GLOBULIN UR ELPH-MCNC: 3.7 GM/DL
GLUCOSE SERPL-MCNC: 71 MG/DL (ref 65–99)
HBA1C MFR BLD: 5 % (ref 4.8–5.6)
HCT VFR BLD AUTO: 36.7 % (ref 37.5–51)
HDLC SERPL-MCNC: 35 MG/DL (ref 40–60)
HGB BLD-MCNC: 11.8 G/DL (ref 13–17.7)
IMM GRANULOCYTES # BLD AUTO: 0.01 10*3/MM3 (ref 0–0.05)
IMM GRANULOCYTES NFR BLD AUTO: 0.2 % (ref 0–0.5)
LDLC SERPL CALC-MCNC: 87 MG/DL (ref 0–100)
LDLC/HDLC SERPL: 2.38 {RATIO}
LYMPHOCYTES # BLD AUTO: 2.53 10*3/MM3 (ref 0.7–3.1)
LYMPHOCYTES NFR BLD AUTO: 38.5 % (ref 19.6–45.3)
MCH RBC QN AUTO: 26.3 PG (ref 26.6–33)
MCHC RBC AUTO-ENTMCNC: 32.2 G/DL (ref 31.5–35.7)
MCV RBC AUTO: 81.7 FL (ref 79–97)
MONOCYTES # BLD AUTO: 0.48 10*3/MM3 (ref 0.1–0.9)
MONOCYTES NFR BLD AUTO: 7.3 % (ref 5–12)
NEUTROPHILS NFR BLD AUTO: 3.35 10*3/MM3 (ref 1.7–7)
NEUTROPHILS NFR BLD AUTO: 50.9 % (ref 42.7–76)
NRBC BLD AUTO-RTO: 0 /100 WBC (ref 0–0.2)
PLATELET # BLD AUTO: 393 10*3/MM3 (ref 140–450)
PMV BLD AUTO: 10.4 FL (ref 6–12)
POTASSIUM SERPL-SCNC: 4 MMOL/L (ref 3.5–5.2)
PROT SERPL-MCNC: 7.7 G/DL (ref 6–8.5)
RBC # BLD AUTO: 4.49 10*6/MM3 (ref 4.14–5.8)
SODIUM SERPL-SCNC: 136 MMOL/L (ref 136–145)
TRIGL SERPL-MCNC: 144 MG/DL (ref 0–150)
VLDLC SERPL-MCNC: 25 MG/DL (ref 5–40)
WBC NRBC COR # BLD AUTO: 6.57 10*3/MM3 (ref 3.4–10.8)

## 2025-06-02 PROCEDURE — 93000 ELECTROCARDIOGRAM COMPLETE: CPT | Performed by: NURSE PRACTITIONER

## 2025-06-02 PROCEDURE — 1159F MED LIST DOCD IN RCRD: CPT | Performed by: NURSE PRACTITIONER

## 2025-06-02 PROCEDURE — 80053 COMPREHEN METABOLIC PANEL: CPT | Performed by: NURSE PRACTITIONER

## 2025-06-02 PROCEDURE — 82150 ASSAY OF AMYLASE: CPT | Performed by: NURSE PRACTITIONER

## 2025-06-02 PROCEDURE — 85025 COMPLETE CBC W/AUTO DIFF WBC: CPT | Performed by: NURSE PRACTITIONER

## 2025-06-02 PROCEDURE — 99214 OFFICE O/P EST MOD 30 MIN: CPT | Performed by: NURSE PRACTITIONER

## 2025-06-02 PROCEDURE — 83036 HEMOGLOBIN GLYCOSYLATED A1C: CPT | Performed by: NURSE PRACTITIONER

## 2025-06-02 PROCEDURE — 80061 LIPID PANEL: CPT | Performed by: NURSE PRACTITIONER

## 2025-06-02 PROCEDURE — 83690 ASSAY OF LIPASE: CPT | Performed by: NURSE PRACTITIONER

## 2025-06-02 PROCEDURE — 36415 COLL VENOUS BLD VENIPUNCTURE: CPT

## 2025-06-02 PROCEDURE — 1160F RVW MEDS BY RX/DR IN RCRD: CPT | Performed by: NURSE PRACTITIONER

## 2025-06-02 PROCEDURE — 1126F AMNT PAIN NOTED NONE PRSNT: CPT | Performed by: NURSE PRACTITIONER

## 2025-06-02 RX ORDER — ATORVASTATIN CALCIUM 20 MG/1
20 TABLET, FILM COATED ORAL NIGHTLY
Qty: 90 TABLET | Refills: 1 | Status: SHIPPED | OUTPATIENT
Start: 2025-06-02

## 2025-06-02 RX ORDER — PANTOPRAZOLE SODIUM 40 MG/1
40 TABLET, DELAYED RELEASE ORAL DAILY
Qty: 90 TABLET | Refills: 1 | Status: SHIPPED | OUTPATIENT
Start: 2025-06-02

## 2025-06-02 RX ORDER — FENOFIBRATE 145 MG/1
145 TABLET, FILM COATED ORAL DAILY
Qty: 90 TABLET | Refills: 1 | Status: SHIPPED | OUTPATIENT
Start: 2025-06-02

## 2025-06-02 RX ORDER — METOPROLOL TARTRATE 25 MG/1
25 TABLET, FILM COATED ORAL 2 TIMES DAILY
Qty: 180 TABLET | Refills: 1 | OUTPATIENT
Start: 2025-06-02

## 2025-06-02 RX ORDER — OMEPRAZOLE 20 MG/1
20 CAPSULE, DELAYED RELEASE ORAL DAILY
Qty: 90 CAPSULE | Refills: 1 | Status: SHIPPED | OUTPATIENT
Start: 2025-06-02

## 2025-06-02 RX ORDER — METOPROLOL TARTRATE 25 MG/1
25 TABLET, FILM COATED ORAL 2 TIMES DAILY
Qty: 180 TABLET | Refills: 1 | Status: SHIPPED | OUTPATIENT
Start: 2025-06-02

## 2025-06-02 NOTE — PROGRESS NOTES
"Chief Complaint   Edema (Bilateral feet and ankle swelling )     Subjective          Ta Madison presents to [facility]   History of Present Illness   History of Present Illness  The patient presents for evaluation of bilateral foot swelling, abdominal pain, and medication management.    He reports experiencing bilateral foot swelling, which he attributes to increased physical activity. There have been no recent changes in his medication regimen, except for the addition of an iron supplement. He occasionally sleeps in a chair, which he believes may be contributing to his symptoms. He has a history of prostate cancer, diagnosed 4 years ago, and is concerned about the potential recurrence of the disease causing his current symptoms. He has been prescribed an iron supplement, which he suspects may be causing his foot swelling and notes that his stools have turned black since starting the supplement.    He has been experiencing abdominal pain, particularly after consuming greasy foods. He has been managing this with Protonix, taking it twice daily. He also reports a history of pancreatitis.    He has been on metoprolol, taking 2 tablets daily, and has recently refilled his prescriptions for atorvastatin and fenofibrate.    PAST SURGICAL HISTORY:  - Gallbladder removal  - Prostate cancer treatment       Review of Systems       Objective    Vital Signs:   /80 (BP Location: Right arm, Patient Position: Sitting)   Pulse 77   Temp 98 °F (36.7 °C) (Temporal)   Ht 170.2 cm (67.01\")   Wt 67.2 kg (148 lb 3.2 oz)   SpO2 100%   BMI 23.20 kg/m²     Physical Exam  Cardiovascular: Regular rate and rhythm, no murmurs, rubs, or gallops  Extremities: Bilateral foot swelling         Physical Exam  Vitals and nursing note reviewed.   Constitutional:       General: He is not in acute distress.     Appearance: He is well-developed and normal weight. He is not ill-appearing.   HENT:      Head: " Normocephalic.   Eyes:      Conjunctiva/sclera: Conjunctivae normal.   Cardiovascular:      Rate and Rhythm: Normal rate and regular rhythm.      Heart sounds: Normal heart sounds. No murmur heard.  Pulmonary:      Effort: Pulmonary effort is normal.      Breath sounds: Normal breath sounds. No wheezing.   Abdominal:      General: Bowel sounds are normal. There is no distension.      Palpations: Abdomen is soft.      Tenderness: There is no abdominal tenderness.   Musculoskeletal:      Right lower leg: Edema present.      Left lower leg: Edema present.      Comments: Non pitting bilateral     Skin:     General: Skin is warm and dry.   Neurological:      General: No focal deficit present.      Mental Status: He is alert and oriented to person, place, and time.   Psychiatric:         Mood and Affect: Mood normal.         Behavior: Behavior normal.         Thought Content: Thought content normal.         Judgment: Judgment normal.        Result Review :  During this visit the following were done:  Labs Reviewed [x]    Labs Ordered [x]    Radiology Reports Reviewed []    Radiology Ordered []    PCP Records Reviewed []    Referring Provider Records Reviewed []    ER Records Reviewed []    Hospital Records Reviewed []    History Obtained From Family []    Radiology Images Reviewed []    Other Reviewed [x]    Records Requested []          Results  Labs   - PSA: 04/2025, Normal    Diagnostic Testing   - EKG: Normal         ECG 12 Lead    Date/Time: 6/2/2025 12:45 PM  Performed by: Yolande Olvera APRN    Authorized by: Yolande Olvera APRN  Comparison: not compared with previous ECG   Rhythm: sinus rhythm  Rate: normal  BPM: 64  Conduction: conduction normal  ST Segments: ST segments normal  T Waves: T waves normal  QRS axis: normal  Other: no other findings    Clinical impression: normal ECG           Assessment and Plan    Diagnoses and all orders for this visit:    1. Chronic biliary pancreatitis  -     pantoprazole (PROTONIX) 40  MG EC tablet; Take 1 tablet by mouth Daily.  Dispense: 90 tablet; Refill: 1  -     omeprazole (priLOSEC) 20 MG capsule; Take 1 capsule by mouth Daily.  Dispense: 90 capsule; Refill: 1  -     CBC Auto Differential; Future  -     Comprehensive Metabolic Panel; Future  -     Hemoglobin A1c; Future  -     Lipid Panel; Future    2. Mixed hyperlipidemia  -     atorvastatin (LIPITOR) 20 MG tablet; Take 1 tablet by mouth Every Night.  Dispense: 90 tablet; Refill: 1  -     fenofibrate (TRICOR) 145 MG tablet; Take 1 tablet by mouth Daily.  Dispense: 90 tablet; Refill: 1  -     CBC Auto Differential; Future  -     Comprehensive Metabolic Panel; Future  -     Hemoglobin A1c; Future  -     Lipid Panel; Future    3. Hypertension, unspecified type  -     metoprolol tartrate (LOPRESSOR) 25 MG tablet; Take 1 tablet by mouth 2 (Two) Times a Day.  Dispense: 180 tablet; Refill: 1  -     CBC Auto Differential; Future  -     Comprehensive Metabolic Panel; Future  -     Hemoglobin A1c; Future  -     Lipid Panel; Future    Other orders  -     ECG 12 Lead       Assessment & Plan  1. Bilateral foot swelling.  - EKG results are within normal limits, and blood pressure readings are satisfactory.  - Recent initiation of iron supplementation is unlikely to be the cause of foot swelling.  - Advised to avoid sleeping in a chair and instead opt for a bed to potentially alleviate the foot swelling.  - Will send to the lab for further evaluation.    2. Abdominal pain.  - Reports abdominal pain when consuming greasy foods.  - Has been taking Protonix twice a day.  - Omeprazole will be added to the current Protonix regimen to manage the symptoms better.  - Advised to avoid greasy foods to prevent exacerbation of symptoms.    3. Medication management.  - Has not had blood pressure or cholesterol medications refilled in a long time.  - Currently on metoprolol 2 times a day and atorvastatin.  - Has been taking fenofibrate as well.  - All current  medications will be refilled.       Patient's Body mass index is 23.2 kg/m². indicating that he is within normal range (BMI 18.5-24.9). No BMI management plan needed..      Follow Up    Return in about 3 months (around 9/2/2025), or if symptoms worsen or fail to improve, for Recheck  labs today .   Patient was given instructions and counseling regarding his condition or for health maintenance advice. Please see specific information pulled into the AVS if appropriate.           This document has been electronically signed by EUGENIA Mendoza  June 2, 2025 12:45 EDT    Patient or patient representative verbalized consent for the use of Ambient Listening during the visit with  EUGENIA Mendoza for chart documentation. 6/2/2025  12:46 EDT

## 2025-06-03 ENCOUNTER — RESULTS FOLLOW-UP (OUTPATIENT)
Dept: FAMILY MEDICINE CLINIC | Facility: CLINIC | Age: 65
End: 2025-06-03
Payer: COMMERCIAL

## 2025-06-03 DIAGNOSIS — K86.1 CHRONIC BILIARY PANCREATITIS: Primary | ICD-10-CM

## 2025-06-03 LAB
AMYLASE SERPL-CCNC: 151 U/L (ref 28–100)
LIPASE SERPL-CCNC: 102 U/L (ref 13–60)

## 2025-06-17 ENCOUNTER — OFFICE VISIT (OUTPATIENT)
Dept: ONCOLOGY | Facility: CLINIC | Age: 65
End: 2025-06-17
Payer: COMMERCIAL

## 2025-06-17 ENCOUNTER — LAB (OUTPATIENT)
Dept: ONCOLOGY | Facility: CLINIC | Age: 65
End: 2025-06-17
Payer: COMMERCIAL

## 2025-06-17 VITALS
RESPIRATION RATE: 20 BRPM | SYSTOLIC BLOOD PRESSURE: 102 MMHG | DIASTOLIC BLOOD PRESSURE: 65 MMHG | HEART RATE: 85 BPM | HEIGHT: 67 IN | BODY MASS INDEX: 23.23 KG/M2 | OXYGEN SATURATION: 100 % | TEMPERATURE: 97.5 F | WEIGHT: 148 LBS

## 2025-06-17 DIAGNOSIS — C61 ADENOCARCINOMA OF PROSTATE: ICD-10-CM

## 2025-06-17 DIAGNOSIS — C61 ADENOCARCINOMA OF PROSTATE: Primary | ICD-10-CM

## 2025-06-17 DIAGNOSIS — C79.51 MALIGNANT NEOPLASM METASTATIC TO BONE: ICD-10-CM

## 2025-06-17 LAB
ALBUMIN SERPL-MCNC: 3.9 G/DL (ref 3.5–5.2)
ALBUMIN/GLOB SERPL: 1.1 G/DL
ALP SERPL-CCNC: 100 U/L (ref 39–117)
ALT SERPL W P-5'-P-CCNC: <5 U/L (ref 1–41)
ANION GAP SERPL CALCULATED.3IONS-SCNC: 11.2 MMOL/L (ref 5–15)
AST SERPL-CCNC: 13 U/L (ref 1–40)
BASOPHILS # BLD AUTO: 0.05 10*3/MM3 (ref 0–0.2)
BASOPHILS NFR BLD AUTO: 0.4 % (ref 0–1.5)
BILIRUB SERPL-MCNC: 0.3 MG/DL (ref 0–1.2)
BUN SERPL-MCNC: 17.3 MG/DL (ref 8–23)
BUN/CREAT SERPL: 21.1 (ref 7–25)
CALCIUM SPEC-SCNC: 9.1 MG/DL (ref 8.6–10.5)
CHLORIDE SERPL-SCNC: 103 MMOL/L (ref 98–107)
CO2 SERPL-SCNC: 23.8 MMOL/L (ref 22–29)
CREAT SERPL-MCNC: 0.82 MG/DL (ref 0.76–1.27)
DEPRECATED RDW RBC AUTO: 49 FL (ref 37–54)
EGFRCR SERPLBLD CKD-EPI 2021: 98.1 ML/MIN/1.73
EOSINOPHIL # BLD AUTO: 0.11 10*3/MM3 (ref 0–0.4)
EOSINOPHIL NFR BLD AUTO: 0.8 % (ref 0.3–6.2)
ERYTHROCYTE [DISTWIDTH] IN BLOOD BY AUTOMATED COUNT: 16.5 % (ref 12.3–15.4)
FERRITIN SERPL-MCNC: 210 NG/ML (ref 30–400)
GLOBULIN UR ELPH-MCNC: 3.5 GM/DL
GLUCOSE SERPL-MCNC: 92 MG/DL (ref 65–99)
HCT VFR BLD AUTO: 39.6 % (ref 37.5–51)
HGB BLD-MCNC: 12.4 G/DL (ref 13–17.7)
IMM GRANULOCYTES # BLD AUTO: 0.05 10*3/MM3 (ref 0–0.05)
IMM GRANULOCYTES NFR BLD AUTO: 0.4 % (ref 0–0.5)
IRON 24H UR-MRATE: 14 MCG/DL (ref 59–158)
IRON SATN MFR SERPL: 3 % (ref 20–50)
LYMPHOCYTES # BLD AUTO: 3.43 10*3/MM3 (ref 0.7–3.1)
LYMPHOCYTES NFR BLD AUTO: 25.5 % (ref 19.6–45.3)
MCH RBC QN AUTO: 25.5 PG (ref 26.6–33)
MCHC RBC AUTO-ENTMCNC: 31.3 G/DL (ref 31.5–35.7)
MCV RBC AUTO: 81.5 FL (ref 79–97)
MONOCYTES # BLD AUTO: 1.03 10*3/MM3 (ref 0.1–0.9)
MONOCYTES NFR BLD AUTO: 7.7 % (ref 5–12)
NEUTROPHILS NFR BLD AUTO: 65.2 % (ref 42.7–76)
NEUTROPHILS NFR BLD AUTO: 8.77 10*3/MM3 (ref 1.7–7)
NRBC BLD AUTO-RTO: 0 /100 WBC (ref 0–0.2)
PLATELET # BLD AUTO: 423 10*3/MM3 (ref 140–450)
PMV BLD AUTO: 10.2 FL (ref 6–12)
POTASSIUM SERPL-SCNC: 4.4 MMOL/L (ref 3.5–5.2)
PROT SERPL-MCNC: 7.4 G/DL (ref 6–8.5)
RBC # BLD AUTO: 4.86 10*6/MM3 (ref 4.14–5.8)
SODIUM SERPL-SCNC: 138 MMOL/L (ref 136–145)
TIBC SERPL-MCNC: 419 MCG/DL (ref 298–536)
TRANSFERRIN SERPL-MCNC: 281 MG/DL (ref 200–360)
WBC NRBC COR # BLD AUTO: 13.44 10*3/MM3 (ref 3.4–10.8)

## 2025-06-17 PROCEDURE — 84466 ASSAY OF TRANSFERRIN: CPT | Performed by: INTERNAL MEDICINE

## 2025-06-17 PROCEDURE — 1125F AMNT PAIN NOTED PAIN PRSNT: CPT | Performed by: INTERNAL MEDICINE

## 2025-06-17 PROCEDURE — 99214 OFFICE O/P EST MOD 30 MIN: CPT | Performed by: INTERNAL MEDICINE

## 2025-06-17 PROCEDURE — 80053 COMPREHEN METABOLIC PANEL: CPT | Performed by: INTERNAL MEDICINE

## 2025-06-17 PROCEDURE — 82728 ASSAY OF FERRITIN: CPT | Performed by: INTERNAL MEDICINE

## 2025-06-17 PROCEDURE — 85025 COMPLETE CBC W/AUTO DIFF WBC: CPT | Performed by: INTERNAL MEDICINE

## 2025-06-17 PROCEDURE — 83540 ASSAY OF IRON: CPT | Performed by: INTERNAL MEDICINE

## 2025-06-17 NOTE — PROGRESS NOTES
Venipuncture Blood Specimen Collection  Venipuncture performed in left arm by Arlene Buckley MA with good hemostasis. Patient tolerated the procedure well without complications.   06/17/25   Arlene Buckley MA

## 2025-06-17 NOTE — PROGRESS NOTES
Name:  Ta Madison  :  1960  Date:  2025     REFERRING PHYSICIAN  Dheeraj Nieves MD    PRIMARY CARE PROVIDER  Yolande Olvera APRN    REASON FOR FOLLOWUP  1. Adenocarcinoma of prostate      CHIEF COMPLAINT  Chronic fatigue, improved since (consistently) starting PO iron supplementation.    Dear Ms. Olvera,    HISTORY OF PRESENT ILLNESS:   I saw Mr. Madison in follow up today in our medical oncology clinic. As you are aware, he is a pleasant, 65 y.o., white male with a history of tobacco abuse who was referred to urology in 2020 for an elevated serum PSA level (of ~28 ng/mL, which had increased to 247 ng/mL at the time of his initial appointment in our clinic). An examination revealed a very hard and fixed prostate, and multiple core biopsies were performed on 2020. The results were consistent with [place] Score 5+4 or 4+5=9 adenocarcinoma involving all twelve sampled cores. A staging workup confirmed pelvic adenopathy as well as diffuse, sclerotic lesions throughout the axial and appendicular skeleton, including the right greater than left femoral diaphyses. With this stage IV diagnosis, he was referred to our clinic for further evaluation and management. At the time of his initial consultation in our office (on 2021), he was agreeable to starting definitive therapy with a combination of ADT (Lupron injections) and anti-androgen therapy (daily PO Xtandi).    INTERIM HISTORY:  Mr. Madison returns to clinic today for follow up by himself. He began [id number] Lupron on 2021 and, between then and ~early summer 2023, he was also consistently on daily PO Xtandi (for a total of about ~two and one half years). In early summer 2023, he developed necrotizing pancreatitis; and he spent the better part of the next ~three to four months admitted to one hospital or another (including , [facility] and our facility). Throughout that time, he was unable to take the Xtandi, as it was multiple months  "before he could even attempt to take anything by mouth. He slowly, but steadily, recovered, his PEG has been removed; and he has not had any recurrent problems related to this issue since. He was able to restart the Xtandi by late October 2023 (and he has been taking it consistently again since then, for an overall total of ~four and one half years now). In mid-September 2024, he fell hard onto his right hip twice; the first time when he \"got into a tangle with his bull\", and the second time when he and his son were working in his yard. The second time, he suffered a subcapital fracture of the right femoral neck. He underwent an ORIF on 09/10/2024, and he has recovered uneventfully from this as well. The Neurontin we gave him at a previous appointment has helped his chronic neuropathies. He has been taking PO ferrous sulfate BID more or less consistently for the past several months now, and he can tell that his chronic fatigue has improved. He again has no new or other specific complaints.    Past Medical History:   Diagnosis Date    Elevated cholesterol     GERD (gastroesophageal reflux disease)     Increased heart rate     Mini stroke     Neck pain     Prostate cancer     Rash     Tobacco abuse        Past Surgical History:   Procedure Laterality Date    ABDOMINAL SURGERY      APPENDECTOMY  1971    [facility]     COLONOSCOPY      COLONOSCOPY N/A 01/25/2023    Procedure: COLONOSCOPY;  Surgeon: Mahnaz Caraballo MD;  Location: [facility] OR;  Service: Gastroenterology;  Laterality: N/A;    ENDOSCOPY N/A 07/31/2023    Procedure: ESOPHAGOGASTRODUODENOSCOPY WITH KEOFEED PLACEMENT;  Surgeon: Yolande Eller MD;  Location:  [facility];  Service: Gastroenterology;  Laterality: N/A;    HIP CANNULATED SCREW PLACEMENT Right 9/10/2024    Procedure: FEMORAL NECK OPEN REDUCTION INTERNAL FIXATION;  Surgeon: Nacho Montoya MD;  Location: [facility] OR;  Service: Orthopedics;  Laterality: Right;       Social History "     Socioeconomic History    Marital status:    Tobacco Use    Smoking status: Some Days     Current packs/day: 0.00     Average packs/day: 2.0 packs/day for 45.0 years (90.0 ttl pk-yrs)     Types: Cigarettes     Start date: 1978     Last attempt to quit: 2023     Years since quittin.9    Smokeless tobacco: Never   Vaping Use    Vaping status: Never Used   Substance and Sexual Activity    Alcohol use: No    Drug use: Yes     Types: Marijuana     Comment: occ     Sexual activity: Defer       Family History   Problem Relation Age of Onset    Nephrolithiasis Mother     Heart disease Father     Hypertension Father     Kidney disease Father        No Known Allergies    Current Outpatient Medications   Medication Sig Dispense Refill    aspirin 81 MG EC tablet Take 1 tablet by mouth Daily.      atorvastatin (LIPITOR) 20 MG tablet Take 1 tablet by mouth Every Night. 90 tablet 1    cyclobenzaprine (FLEXERIL) 10 MG tablet Take 1 tablet by mouth 3 (Three) Times a Day As Needed for Muscle Spasms. 30 tablet 0    enzalutamide (XTANDI) 40 MG chemo capsule Take 4 capsules by mouth Daily. 120 capsule 5    fenofibrate (TRICOR) 145 MG tablet Take 1 tablet by mouth Daily. 90 tablet 1    ferrous sulfate 325 (65 FE) MG tablet Take 1 tablet by mouth 3 (Three) Times a Day With Meals. 90 tablet 3    gabapentin (NEURONTIN) 600 MG tablet Take 1 tablet by mouth 2 (Two) Times a Day. 60 tablet 3    HYDROcodone-acetaminophen (NORCO)  MG per tablet Take 1 tablet by mouth Every 6 (Six) Hours As Needed for Moderate Pain. 120 tablet 0    hydrOXYzine (ATARAX) 50 MG tablet TAKE 1 TABLET BY MOUTH THREE TIMES DAILY AS NEEDED FOR ITCHING 90 tablet 1    metoprolol tartrate (LOPRESSOR) 25 MG tablet Take 1 tablet by mouth 2 (Two) Times a Day. 180 tablet 1    omeprazole (priLOSEC) 20 MG capsule Take 1 capsule by mouth Daily. 90 capsule 1    pantoprazole (PROTONIX) 40 MG EC tablet Take 1 tablet by mouth Daily. 90 tablet 1     "triamcinolone (KENALOG) 0.1 % ointment Apply 1 application Externally (to tick bite) Twice a day for 30 days. 80 g 1     No current facility-administered medications for this visit.     REVIEW OF SYSTEMS  CONSTITUTIONAL:  No fever, chills or night sweats. Chronic fatigue, recently reimproved, as per the HPI above.  EYES:  No blurry vision, diplopia or other vision changes.  ENT:  No hearing loss, nosebleeds or sore throat.  CARDIOVASCULAR:  No palpitations, arrhythmia, syncopal episodes or edema.  PULMONARY:  No hemoptysis, wheezing, chronic cough or shortness of breath.  GASTROINTESTINAL:  As per the HPI above.  GENITOURINARY:  No hematuria, kidney stones or frequent urination.  MUSCULOSKELETAL:  As per the HPI above.  ENDOCRINE:  No excessive thirst. Hot flashes as above.  HEMATOLOGIC:  No history of free bleeding, spontaneous bleeding or clotting.  IMMUNOLOGIC:  No allergies or frequent infections.  NEUROLOGIC: No numbness, tingling, seizures or weakness. Improved neuropathies (in both the hands and feet) since starting Neurontin in 2024, as per the HPI above.  PSYCHIATRIC:  No anxiety or depression.    PHYSICAL EXAMINATION  /65   Pulse 85   Temp 97.5 °F (36.4 °C) (Temporal)   Resp 20   Ht 170.2 cm (67.01\")   Wt 67.1 kg (148 lb)   SpO2 100%   BMI 23.17 kg/m²     Pain Score:  Pain Score    25 1028   PainSc: 5    PainLoc: Generalized     ECO  GENERAL:  A well-developed, well-nourished, white male in no acute distress.  HEENT:  Pupils equally round and reactive to light. Extraocular muscles intact.  CARDIOVASCULAR:  Regular rate and rhythm. No murmurs, gallops or rubs.  LUNGS:  Clear to auscultation bilaterally. No wheezing  ABDOMEN:  Soft, nontender, nondistended with positive bowel sounds.  EXTREMITIES:  No clubbing, cyanosis or edema bilaterally.  SKIN:  No petechiae or rashes.  NEURO:  Cranial nerves grossly intact. No focal deficits.  PSYCH:  Alert and oriented x3.    The physical " exam is unchanged from the previous one.    LABORATORY  Lab Results   Component Value Date    WBC 13.44 (H) 06/17/2025    HGB 12.4 (L) 06/17/2025    HCT 39.6 06/17/2025    MCV 81.5 06/17/2025     06/17/2025    NEUTROABS 8.77 (H) 06/17/2025       Lab Results   Component Value Date     06/17/2025    K 4.4 06/17/2025     06/17/2025    CO2 23.8 06/17/2025    BUN 17.3 06/17/2025    CREATININE 0.82 06/17/2025    GLUCOSE 92 06/17/2025    CALCIUM 9.1 06/17/2025    AST 13 06/17/2025    ALT <5 06/17/2025    ALKPHOS 100 06/17/2025    BILITOT 0.3 06/17/2025    PROTEINTOT 7.4 06/17/2025    ALBUMIN 3.9 06/17/2025     CBC (06/17/2025): WBCs: 13.44; HgB: 12.4; Hct: 39.6; platelets: 423; MCV: 81.5  CBC (06/02/2025): WBCs: 6.57; HgB: 11.8; Hct: 36.7; platelets: 393; MCV: 81.7  CBC (04/17/2024): WBCs: 9.13; HgB: 10.0; Hct: 32.3; platelets: 543; MCV: 80.5  CBC (10/17/2024): WBCs: 13.46; HgB: 10.4; Hct: 32.3; platelets: 401; MCV: 79.2  CBC (04/17/2024): WBCs: 9.56; HgB: 11.6; Hct: 36.5; platelets: 599  CBC (03/18/2024): WBCs: 8.64; HgB: 13.0; Hct: 39.3; platelets: 532  CBC (01/30/2024): WBCs: 9.60; HgB: 12.6; Hct: 40.1; platelets: 568; MCV: 84.8  CBC (12/27/2023): WBCs: 10.77; HgB: 11.9; Hct: 37.2; platelets: 643; MCV: 79  CBC (11/30/2023): WBCs: 13.47; HgB: 11.4; Hct: 37.2; platelets: 889; MCV: 81.0  CBC (10/18/2023): WBCs: 10.45; HgB: 7.6; Hct: 25.8; platelets: 608; MCV: 79.6  CBC (10/17/2023): WBCs: 9.95; HgB: 8.0; Hct: 25.6; platelets: 614; MCV: 76.9  CBC (04/12/2023): WBCs: 6.79; HgB: 12.2; Hct: 38.5; platelets: 546  CBC (01/19/2023): WBCs: 8.19; HgB: 12.1; Hct: 37.6; platelets: 586  CBC (10/19/2022): WBCs: 11.25; HgB: 12.3; Hct: 37.3; platelets: 453  CBC (07/18/2022): WBCs: 7.82; HgB: 12.7; Hct: 39.0; platelets: 513  CBC (04/13/2022): WBCs: 7.62; HgB: 12.7; Hct: 38.7; platelets: 453  CBC (01/19/2022): WBCs: 7.56; HgB: 13.1; Hct: 41.0; platelets: 518  CBC (07/20/2021): WBCs: 7.43; HgB: 13.0; Hct: 39.6; platelets:  469  CBC (06/29/2021): WBCs: 7.8; HgB: 12.6; Hct: 38.1; platelets: 396  CBC (04/14/2021): WBCs: 7.2; HgB: 12.7; Hct: 38.8; platelets: 423  CBC (02/25/2021): WBCs: 6.5; HgB: 11.2; Hct: 34.4; platelets: 407    PSA (04/17/2025): < 0.014 ng/mL  PSA (11/27/2024): < 0.014 ng/mL  PSA (10/17/2024): < 0.014 ng/mL  PSA (08/05/2024): < 0.014 ng/mL  PSA (04/17/2024): < 0.014 ng/mL  PSA (01/30/2024): < 0.014 ng/mL  PSA (11/30/2023): < 0.014 ng/mL  PSA (10/18/2023): < 0.014 ng/mL  PSA (07/12/2023): < 0.014 ng/mL  PSA (04/12/2023): < 0.014 ng/mL  PSA (01/19/2023): < 0.014 ng/mL  PSA (10/19/2022): < 0.014 ng/mL  PSA (07/18/2022): < 0.014 ng/mL  PSA (04/13/2022): < 0.014 ng/mL  PSA (01/19/2022): < 0.014 ng/mL  PSA (10/13/2021): < 0.014 ng/mL  PSA (07/20/2021): 0.020 ng/mL   PSA (04/14/2021): 0.108 ng/mL  PSA (02/25/2021): 0.814 ng/mL  PSA (01/20/2021): 29.5 ng/mL  PSA (01/07/2021): 247.0 ng/mL  PSA (11/10/2020): 28.3 ng/mL    Iron profile (06/17/2025): serum iron: 14; % saturation: 3; TIBC: 419; ferritin: 210.0 ng/mL  Iron profile (04/17/2025): serum iron: 34; % saturation: 5; TIBC: 629; ferritin: 200.2 ng/mL  Iron profile (11/27/2024): serum iron: 38; % saturation: 8; TIBC: 451; ferritin: 156.7 ng/mL  Iron profile (10/18/2023): serum iron: 19; % saturation: 6; TIBC: 322; ferritin: 606.9 ng/mL    IMAGING  NM bone scan, whole body (12/15/2020):  Impression: Extensive osteoblastic metastatic disease involving the axial and appendicular skeleton including the right greater than left femoral diaphyses.    CT pelvis with contrast (12/21/2020):  Impression:  1) Urinary bladder wall thickening noted.  2) Heterogeneous appearance of the prostate gland.  3) Nonspecific stranding in the retroperitoneal space.  4) Retroperitoneal region lymph node on the right side measuring 1.4 cm.  5) Right obturator chain region lymph node measuring 1.5 cm. Other lymph nodes are identified in this region.  6) Sclerotic bone lesions are noted throughout the  pelvis concerning for metastatic disease.    CT chest without contrast (10/10/2023, compared to 08/06/2023):  Impression:  1) No acute thoracic findings identfied.  2) Refer to CT abdomen/pelvis for abdomen findings.    CT abdomen and pelvis without contrast (10/10/2023, compared to 08/10/2023):  Impression:  1) Significant improvement in changes of necrotizing pancreatitis status post cystogastrostomy tube placement, necrosectomy and Axios metallic stent placement spanning from the G-tube into the stomach to the level of the proximal jejunum.  2) No significant ascites.  3) No acute appearing pancreatitis changes. No findings to suggest acute abscess.  4) Cholelithiasis noted.  5) Other incidental/nonacute findings.    CT abdomen and pelvis with contrast (10/23/2023):  Impression: Sequela of necrotizing pancreatitis with interval marked decrease in size of the area of walled off necrosis centered in the pancreatic body since 09/09/2023. Increased intra and extrahepatic biliary ductal dilation with poor visualization of the lower most aspect of the common bile duct, concerning for stricture. Recommend correlation with ERCP.    PATHOLOGY  Left and right prostate, needle core biopsies, twelve total cores (12/02/2020):  [place] Score 5+4 or 4+5 = 9 prostatic adenocarcinoma involving ~45-90% of all twelve (12/12) total cores.    IMPRESSION AND PLAN  Mr. Madison is a 65 y.o., white male with:  Prostate adenocarcinoma: Diagnosed in December 2020 with stage IV disease, with widespread metastases involving the axial and appendicular skeleton along with probable, pelvic adenopathy. I have had multiple, long discussions with the patient and his wife since the time of his initial consultation in our clinic (on 01/07/2021) regarding this diagnosis and its prognosis. They remain aware that this disease is, unfortunately, not curable; however, particularly given his young age and this type of cancer's tendency to respond very well  to initial anti-testosterone therapy, it was/remains very treatable, and sustained remissions are very possible. He was felt to be an excellent candidate for initial therapy with both ADT and androgen receptor blockade. He received an initial cycle of (currently now [id number]) Lupron on 01/08/2021 and was also started on daily PO Xtandi at that time. With this therapy, his serum PSA levels responded dramatically and quickly, decreasing from 247 ng/mL on 01/07/2021 to undetectable (<0.014 ng/mL) by 10/13/2021 (and this continues to be the case on the most recent repeat check, drawn on 04/17/2025). Due to issue #2, he was unable to take the Xtandi anywhere near consistently for a total of ~three to four months during the summer and early fall of 2023; but he never missed a dose of Lupron, and he was able to restart the Xtandi by late October 2023 (and he has been taking it consistently again since, for an overall total of ~four and one half years now). We will proceed with this current treatment plan. We will see him back in our clinic in ~four months, on the day of the next scheduled cycle of [id number] Lupron (in mid-October), with a CBC, CMP and PSA.  Necrotizing pancreatitis: A serious problem back in Summer 2023; however, as of early 2024, he seemed to have fully recovered from this ordeal, and his PEG has been removed. Ongoing management per [facility].  Skeletal metastases: There was diffuse involvement by issue #1 of both the axial and appendicular skeletons on the initial staging NM bone scan and CT of the abdomen and pelvis performed in late December 2020. 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 Continue to monitor.  Pain: The symptoms that were directly related to issue #3 remain resolved since he started Lupron/Xtandi in early January 2021 for the palliative treatment of issue #1. His more recent symptoms have generally been related to issue #2 (and, even more recently, issues #5 and #6, not #1; however, with his diagnosis of an incurable malignancy, our clinic remains agreeable to managing this issue. Continue prn Norco. Continue to monitor.  Right-sided hip fracture: Status post a couple of traumatic falls onto his right hip in September 2024 and also status post ORIF at that time. Ongoing management per orthopedic surgery.  Neuropathies: Chronic symptoms in his hands and feet had recently been worsening. A Rx for Neurontin 600 mg PO BID was provided in Fall 2024, and he reiterates today that this has helped quite a bit. Continue to monitor.  Iron deficiency: A Rx for ferrous sulfate 325 mg PO BID was provided in Fall 2024 for this issue, which was likely exacerbated by the hip surgery he had undergone at that time; but a repeat CBC and iron studies drawn on 04/17/2025 were overall about the same as they were six months prior, and he admitted that he had not been taking the PO iron consistently (actually, he had hardly been taking it at all). A new Rx for ferrous sulfate was provided at that time, he insists that he has been taking consistently at least BID; and he can tell that his chronic fatigue has recently been a little better. Today's repeat CBC and iron studies show an overall improvement compared to this past April. He will continue the ferrous sulfate BID; and we will see him back in our clinic in four months (~mid-October) with a repeat CBC, iron panel and ferritin level.  The patient was in agreement with these plans.    It is a pleasure to participate in Mr. Madison's care. Please do not  hesitate to call with any questions or concerns that you may have.    A total of 30 minutes were spent coordinating this patient’s care in clinic today; more than 50% of this time was face-to-face with the patient, reviewing his interim medical history, discussing the results of today's repeat labwork and counseling on the current treatment and followup plan. All questions were answered to his satisfaction.    FOLLOW UP  Continue ferrous sulfate 325 mg PO daily to BID. Continue Neurontin 600 mg PO BID. Continue Norco 10/325 mg q6hr. Continue ([id number]) Lupron. Continue daily, PO Xtandi. With [facility], as previously planned. With urology, as previously planned. With orthopedic surgery, as planned. Return to our clinic in ~4 months, on the day of the next scheduled cycle of Lupron (~mid-October), with a CBC, CMP, iron panel, ferritin level and PSA.          This document was electronically signed by HIRO Martinez MD June 17, 2025 11:21 EDT      CC: EUGENIA Mendoza

## 2025-06-23 ENCOUNTER — SPECIALTY PHARMACY (OUTPATIENT)
Dept: PHARMACY | Facility: HOSPITAL | Age: 65
End: 2025-06-23
Payer: COMMERCIAL

## 2025-06-23 NOTE — PROGRESS NOTES
Specialty Pharmacy Patient Management Program  Refill Outreach     Ta was contacted today regarding refills of their medication(s).    Refill Questions      Flowsheet Row Most Recent Value   Changes to allergies? No   Changes to medications? No   New conditions or infections since last clinic visit No   Unplanned office visit, urgent care, ED, or hospital admission in the last 4 weeks  No   How does patient/caregiver feel medication is working? Very good   Financial problems or insurance changes  No   Since the previous refill, were any specialty medication doses or scheduled injections missed or delayed?  No   Does this patient require a clinical escalation to a pharmacist? No            Delivery Questions      Flowsheet Row Most Recent Value   Delivery method UPS   Delivery address verified with patient/caregiver? Yes   Delivery address Home   Other address preferred na   Number of medications in delivery 1   Medication(s) being filled and delivered Enzalutamide (XTANDI)   Doses left of specialty medications na   Copay verified? Yes   Copay amount 0.00$   Copay form of payment No copayment ($0)   Delivery Date Selection 06/24/25   Signature Required Yes   Do you consent to receive electronic handouts?  Yes            Medication Adherence    Adherence tools used: patient uses a pill box to manage medications  Support network for adherence: family member          Follow-up: 30 day(s)     Corinne Whatley Pharmacy Technician  6/23/2025  11:41 EDT

## 2025-06-27 DIAGNOSIS — F45.8 NEUROPATHIC PRURITUS: ICD-10-CM

## 2025-06-30 RX ORDER — HYDROXYZINE HYDROCHLORIDE 50 MG/1
50 TABLET, FILM COATED ORAL 3 TIMES DAILY PRN
Qty: 90 TABLET | Refills: 1 | Status: SHIPPED | OUTPATIENT
Start: 2025-06-30

## 2025-07-01 DIAGNOSIS — C61 ADENOCARCINOMA OF PROSTATE: ICD-10-CM

## 2025-07-01 RX ORDER — HYDROCODONE BITARTRATE AND ACETAMINOPHEN 10; 325 MG/1; MG/1
1 TABLET ORAL EVERY 6 HOURS PRN
Qty: 120 TABLET | Refills: 0 | Status: SHIPPED | OUTPATIENT
Start: 2025-07-01

## 2025-07-31 ENCOUNTER — SPECIALTY PHARMACY (OUTPATIENT)
Dept: PHARMACY | Facility: HOSPITAL | Age: 65
End: 2025-07-31
Payer: COMMERCIAL

## 2025-07-31 DIAGNOSIS — C61 ADENOCARCINOMA OF PROSTATE: ICD-10-CM

## 2025-07-31 RX ORDER — HYDROCODONE BITARTRATE AND ACETAMINOPHEN 10; 325 MG/1; MG/1
1 TABLET ORAL EVERY 6 HOURS PRN
Qty: 120 TABLET | Refills: 0 | Status: SHIPPED | OUTPATIENT
Start: 2025-07-31

## 2025-07-31 NOTE — PROGRESS NOTES
Specialty Pharmacy Patient Management Program  Refill Outreach     Ta was contacted today regarding refills of their medication(s).    Refill Questions      Flowsheet Row Most Recent Value   Changes to allergies? No   Changes to medications? No   New conditions or infections since last clinic visit No   Unplanned office visit, urgent care, ED, or hospital admission in the last 4 weeks  No   How does patient/caregiver feel medication is working? Very good   Financial problems or insurance changes  No   Since the previous refill, were any specialty medication doses or scheduled injections missed or delayed?  No   Does this patient require a clinical escalation to a pharmacist? No            Delivery Questions      Flowsheet Row Most Recent Value   Delivery method UPS   Delivery address verified with patient/caregiver? Yes   Delivery address Home   Other address preferred na   Number of medications in delivery 1   Medication(s) being filled and delivered Enzalutamide (XTANDI)   Doses left of specialty medications na   Copay verified? Yes   Copay amount 0.00$   Copay form of payment No copayment ($0)   Delivery Date Selection 08/04/25   Signature Required Yes   Do you consent to receive electronic handouts?  Yes            Medication Adherence    Adherence tools used: patient uses a pill box to manage medications  Support network for adherence: family member          Follow-up: 30 day(s)     Corinne Whatley Pharmacy Technician  7/31/2025  11:05 EDT

## 2025-07-31 NOTE — TELEPHONE ENCOUNTER
Caller: Ta Madison    Relationship: Self    Best call back number: [phone]    Requested Prescriptions:   Requested Prescriptions     Pending Prescriptions Disp Refills    HYDROcodone-acetaminophen (NORCO)  MG per tablet 120 tablet 0     Sig: Take 1 tablet by mouth Every 6 (Six) Hours As Needed for Moderate Pain.        Pharmacy where request should be sent: [id number] [place] [address] AT [facility] [address] - [phone]  - [phone] FX     Last office visit with prescribing clinician: 6/17/2025   Last telemedicine visit with prescribing clinician: Visit date not found   Next office visit with prescribing clinician: 10/16/2025     Additional details provided by patient: NA    Does the patient have less than a 3 day supply:  [x] Yes  [] No    Would you like a call back once the refill request has been completed: [] Yes [] No    If the office needs to give you a call back, can they leave a voicemail: [] Yes [] No    Nicolle Melissa Rep   07/31/25 11:07 EDT

## 2025-08-05 DIAGNOSIS — E78.2 MIXED HYPERLIPIDEMIA: ICD-10-CM

## 2025-08-05 RX ORDER — ATORVASTATIN CALCIUM 20 MG/1
20 TABLET, FILM COATED ORAL NIGHTLY
Qty: 90 TABLET | Refills: 1 | OUTPATIENT
Start: 2025-08-05

## 2025-08-06 ENCOUNTER — SPECIALTY PHARMACY (OUTPATIENT)
Dept: ONCOLOGY | Facility: HOSPITAL | Age: 65
End: 2025-08-06
Payer: COMMERCIAL

## 2025-08-06 DIAGNOSIS — C61 ADENOCARCINOMA OF PROSTATE: ICD-10-CM

## 2025-08-28 DIAGNOSIS — C61 ADENOCARCINOMA OF PROSTATE: ICD-10-CM

## 2025-08-28 RX ORDER — GABAPENTIN 600 MG/1
600 TABLET ORAL 2 TIMES DAILY
Qty: 60 TABLET | Refills: 3 | Status: SHIPPED | OUTPATIENT
Start: 2025-08-28

## 2025-08-28 RX ORDER — HYDROCODONE BITARTRATE AND ACETAMINOPHEN 10; 325 MG/1; MG/1
1 TABLET ORAL EVERY 6 HOURS PRN
Qty: 120 TABLET | Refills: 0 | Status: SHIPPED | OUTPATIENT
Start: 2025-08-28

## (undated) DEVICE — Device: Brand: DEFENDO AIR/WATER/SUCTION AND BIOPSY VALVE

## (undated) DEVICE — CONTN GRAD MEAS TRIANG 32OZ BLK

## (undated) DEVICE — SYR LUERLOK 50ML

## (undated) DEVICE — DRSNG PAD ABD 8X10IN STRL

## (undated) DEVICE — POLYP TRAP: Brand: TRAPEASE®

## (undated) DEVICE — TP SXN YANKR FLANG STRL

## (undated) DEVICE — Device

## (undated) DEVICE — SUCTION CANISTER, 1500CC, RIGID: Brand: DEROYAL

## (undated) DEVICE — HYBRID CO2 TUBING/CAP SET FOR OLYMPUS® SCOPES & CO2 SOURCE: Brand: ERBE

## (undated) DEVICE — OPTIFOAM GENTLE AG,POST-OP STRIP,3.5X10: Brand: MEDLINE

## (undated) DEVICE — GLV SURG SENSICARE PI ORTHO SZ7.5 LF STRL

## (undated) DEVICE — DRSNG WND GZ CURAD OIL EMULSION 3X8IN LF STRL 1PK

## (undated) DEVICE — LUBE JELLY FOIL PACK 1.4 OZ: Brand: MEDLINE INDUSTRIES, INC.

## (undated) DEVICE — SAFELINER SUCTION CANISTER 1000CC: Brand: DEROYAL

## (undated) DEVICE — KT ORCA ORCAPOD DISP STRL

## (undated) DEVICE — ENDOGATOR TUBING FOR ENDOGATOR EGP-100 IRRIGATION PUMP,OLYMPUS OFP PUMP, OLYMPUS AFU-100 PUMP AND ERBE EIP2 PUMP: Brand: ENDOGATOR

## (undated) DEVICE — TUBING, SUCTION, 1/4" X 20', STRAIGHT: Brand: MEDLINE INDUSTRIES, INC.

## (undated) DEVICE — HLDR TBG NG AMT BRIDLE PRO 12F

## (undated) DEVICE — HOLDER: Brand: DEROYAL

## (undated) DEVICE — TUBING,OXYGEN,CRUSH RES,7',CLEAR,UC: Brand: MEDLINE INDUSTRIES, INC.

## (undated) DEVICE — SPNG GZ WOVN 4X4IN 12PLY 10/BX STRL

## (undated) DEVICE — ADAPT CLN LUM OLYMP AIR/H20

## (undated) DEVICE — PILLW ABD MD

## (undated) DEVICE — SOLIDIFIER LIQ PREMISORB 1500CC

## (undated) DEVICE — GW FOR TROCH NAIL 3.2X400MM

## (undated) DEVICE — NASO-JEJUNAL FEEDING TUBE WITH ENFIT CONNECTION: Brand: COVIDIEN

## (undated) DEVICE — PROXIMATE RH ROTATING HEAD SKIN STAPLERS (35 WIDE) CONTAINS 35 STAINLESS STEEL STAPLES: Brand: PROXIMATE

## (undated) DEVICE — FIRST STEP BEDSIDE ADD WATER KIT - RESEALABLE STAND-UP POUCH, ENDOSCOPIC CLEANING PAD - 1 POUCH: Brand: FIRST STEP BEDSIDE ADD WATER KIT - RESEALABLE STAND-UP POUCH, ENDOSCOPIC CLEANIN

## (undated) DEVICE — SOL IRR H2O BTL 1000ML STRL

## (undated) DEVICE — PENCL SMOKE/EVAC MEGADYNE TELESCP 10FT

## (undated) DEVICE — SNAR POLYP CAPTIFLX MICRO OVL 13MM 240CM

## (undated) DEVICE — ASMBL REAMR ADJ W/CANN/DRL/BIT/10.2X251MM 12.5MM

## (undated) DEVICE — PAD REPL POST SURG TOTL KN

## (undated) DEVICE — 4-PORT MANIFOLD: Brand: NEPTUNE 2

## (undated) DEVICE — BNDG ELAS CO-FLEX SLF ADHR 4IN5YD LF STRL

## (undated) DEVICE — THE BITE BLOCK MAXI, LATEX FREE STRAP IS USED TO PROTECT THE ENDOSCOPE INSERTION TUBE FROM BEING BITTEN BY THE PATIENT.

## (undated) DEVICE — TUBING, SUCTION, 1/4" X 10', STRAIGHT: Brand: MEDLINE

## (undated) DEVICE — ENDOGATOR AUXILIARY WATER JET CONNECTOR: Brand: ENDOGATOR

## (undated) DEVICE — CONN Y IRR DISP 1P/U

## (undated) DEVICE — 6619 2 PTNT ISO SYS INCISE AREA&LT;(&GT;&&LT;)&GT;P: Brand: STERI-DRAPE™ IOBAN™ 2

## (undated) DEVICE — INTRO ACCSR BLNT TP

## (undated) DEVICE — ENDOGATOR HYBRID TUBING KIT FOR USE WITH ENDOGATOR IRRIGATION PUMP, OLYMPUS PUMP, GI4000 ESU, AND TORRENT IRRIGATION PUMP.: Brand: ENDOGATOR KIT